# Patient Record
Sex: FEMALE | Race: WHITE | NOT HISPANIC OR LATINO | Employment: OTHER | URBAN - NONMETROPOLITAN AREA
[De-identification: names, ages, dates, MRNs, and addresses within clinical notes are randomized per-mention and may not be internally consistent; named-entity substitution may affect disease eponyms.]

---

## 2017-04-20 ENCOUNTER — OFFICE VISIT (OUTPATIENT)
Dept: FAMILY MEDICINE CLINIC | Facility: CLINIC | Age: 52
End: 2017-04-20

## 2017-04-20 VITALS
WEIGHT: 154 LBS | HEIGHT: 65 IN | BODY MASS INDEX: 25.66 KG/M2 | SYSTOLIC BLOOD PRESSURE: 90 MMHG | OXYGEN SATURATION: 97 % | RESPIRATION RATE: 24 BRPM | TEMPERATURE: 98.6 F | HEART RATE: 73 BPM | DIASTOLIC BLOOD PRESSURE: 62 MMHG

## 2017-04-20 DIAGNOSIS — B02.9 HERPES ZOSTER WITHOUT COMPLICATION: Primary | ICD-10-CM

## 2017-04-20 PROCEDURE — 99213 OFFICE O/P EST LOW 20 MIN: CPT | Performed by: NURSE PRACTITIONER

## 2017-04-20 RX ORDER — FOLIC ACID 1 MG/1
2 TABLET ORAL DAILY
COMMUNITY
End: 2018-03-05

## 2017-04-20 RX ORDER — GABAPENTIN 100 MG/1
CAPSULE ORAL
Qty: 90 CAPSULE | Refills: 0 | Status: SHIPPED | OUTPATIENT
Start: 2017-04-20 | End: 2017-10-23 | Stop reason: SDUPTHER

## 2017-04-20 RX ORDER — VALACYCLOVIR HYDROCHLORIDE 1 G/1
1000 TABLET, FILM COATED ORAL 3 TIMES DAILY
Qty: 21 TABLET | Refills: 0 | Status: SHIPPED | OUTPATIENT
Start: 2017-04-20 | End: 2018-03-05

## 2017-04-20 RX ORDER — ETODOLAC 400 MG/1
500 TABLET, FILM COATED ORAL 2 TIMES DAILY
Status: ON HOLD | COMMUNITY
End: 2018-05-02

## 2017-04-20 NOTE — PATIENT INSTRUCTIONS
Shingles  Shingles, which is also known as herpes zoster, is an infection that causes a painful skin rash and fluid-filled blisters. Shingles is not related to genital herpes, which is a sexually transmitted infection.     Shingles only develops in people who:  · Have had chickenpox.  · Have received the chickenpox vaccine. (This is rare.)  CAUSES  Shingles is caused by varicella-zoster virus (VZV). This is the same virus that causes chickenpox. After exposure to VZV, the virus stays in the body in an inactive (dormant) state. Shingles develops if the virus reactivates. This can happen many years after the initial exposure to VZV. It is not known what causes this virus to reactivate.  RISK FACTORS  People who have had chickenpox or received the chickenpox vaccine are at risk for shingles. Infection is more common in people who:  · Are older than age 50.  · Have a weakened defense (immune) system, such as those with HIV, AIDS, or cancer.  · Are taking medicines that weaken the immune system, such as transplant medicines.  · Are under great stress.  SYMPTOMS  Early symptoms of this condition include itching, tingling, and pain in an area on your skin. Pain may be described as burning, stabbing, or throbbing.  A few days or weeks after symptoms start, a painful red rash appears, usually on one side of the body in a bandlike or beltlike pattern. The rash eventually turns into fluid-filled blisters that break open, scab over, and dry up in about 2-3 weeks.  At any time during the infection, you may also develop:  · A fever.  · Chills.  · A headache.  · An upset stomach.  DIAGNOSIS  This condition is diagnosed with a skin exam. Sometimes, skin or fluid samples are taken from the blisters before a diagnosis is made. These samples are examined under a microscope or sent to a lab for testing.  TREATMENT  There is no specific cure for this condition. Your health care provider will probably prescribe medicines to help you  manage pain, recover more quickly, and avoid long-term problems. Medicines may include:  · Antiviral drugs.  · Anti-inflammatory drugs.  · Pain medicines.  If the area involved is on your face, you may be referred to a specialist, such as an eye doctor (ophthalmologist) or an ear, nose, and throat (ENT) doctor to help you avoid eye problems, chronic pain, or disability.  HOME CARE INSTRUCTIONS  Medicines  · Take medicines only as directed by your health care provider.  · Apply an anti-itch or numbing cream to the affected area as directed by your health care provider.  Blister and Rash Care  · Take a cool bath or apply cool compresses to the area of the rash or blisters as directed by your health care provider. This may help with pain and itching.  · Keep your rash covered with a loose bandage (dressing). Wear loose-fitting clothing to help ease the pain of material rubbing against the rash.  · Keep your rash and blisters clean with mild soap and cool water or as directed by your health care provider.  · Check your rash every day for signs of infection. These include redness, swelling, and pain that lasts or increases.  · Do not pick your blisters.  · Do not scratch your rash.  General Instructions  · Rest as directed by your health care provider.  · Keep all follow-up visits as directed by your health care provider. This is important.  · Until your blisters scab over, your infection can cause chickenpox in people who have never had it or been vaccinated against it. To prevent this from happening, avoid contact with other people, especially:    Babies.    Pregnant women.    Children who have eczema.    Elderly people who have transplants.    People who have chronic illnesses, such as leukemia or AIDS.  SEEK MEDICAL CARE IF:  · Your pain is not relieved with prescribed medicines.  · Your pain does not get better after the rash heals.  · Your rash looks infected. Signs of infection include redness, swelling, and pain  that lasts or increases.  SEEK IMMEDIATE MEDICAL CARE IF:  · The rash is on your face or nose.  · You have facial pain, pain around your eye area, or loss of feeling on one side of your face.  · You have ear pain or you have ringing in your ear.  · You have loss of taste.  · Your condition gets worse.     This information is not intended to replace advice given to you by your health care provider. Make sure you discuss any questions you have with your health care provider.     Document Released: 12/18/2006 Document Revised: 01/08/2016 Document Reviewed: 10/29/2015  [employer] Patient Education ©2016 [employer]

## 2017-04-20 NOTE — PROGRESS NOTES
Subjective     Adela Anthony A 52 y.o. female developed a rash on the right side her her face with tingling and burning and has progressively gotten worse over the last 24 hours that has now broke out in shingles rash on face.  Rates overall 10/10.   Has history of fever blisters.    She has Ra and gets methotrexate and sees specialist in [place], takes etodolac for pain.       Chief Complaint   Patient presents with   • possible shingles     face       Shingles Presentation:  a prodrome of local tingling, burning, pain, and hyperesthesia begins along the involved dermatone 4-5 days before any noticeable skin eruption.  May be mistaken for cardiac, musculoskeletal, gastrointestinal or gyn problems. If ophthalmic branch of the trigeminal nerve is involved, there is significant risk of severe and permanent vision damage. Vesicles on the side or on the tip of the nose (Barone’s sign) are associated with the most serious ocular complications       Current Outpatient Prescriptions:   •  etodolac (LODINE) 400 MG tablet, Take 400 mg by mouth 2 (Two) Times a Day., Disp: , Rfl:   •  folic acid (FOLVITE) 1 MG tablet, Take 2 mg by mouth Daily., Disp: , Rfl:   •  Methotrexate, PF, (RASUVO) 17.5 MG/0.35ML solution auto-injector, Inject 1.4 mL under the skin 1 (One) Time Per Week., Disp: , Rfl:     No Known Allergies    History reviewed. No pertinent past medical history.    Past Surgical History:   Procedure Laterality Date   • FRACTURE SURGERY Bilateral     2010   • WRIST SURGERY Bilateral      Social History     Social History   • Marital status:      Spouse name: N/A   • Number of children: N/A   • Years of education: N/A     Occupational History   • Not on file.     Social History Main Topics   • Smoking status: Current Every Day Smoker     Packs/day: 1.00     Types: Cigarettes   • Smokeless tobacco: Never Used   • Alcohol use Yes   • Drug use: No   • Sexual activity: Not on file     Other Topics Concern   •  "Not on file     Social History Narrative     BOLD indicates positive   General:  weight loss, fever, chills, appetite loss  SKIN: has rash on the right side of face, change in wart/mole, itching, rash, new lesions, nail changes  HEENT:   ear pain, sore throat, sinus pressure, blurry vision, eye pain, dry eyes, tinnitus  Respiratory: cough, difficulty breathing, wheezing, hemoptysis   Cardiovascular:  chest pain, shortness of breath, swelling of extremities, syncope  Gastro: abdominal pain, constipation, nausea, vomiting, diarrhea, hematemesis  Genito: hematuria, dysuria, glycosuria, hesitancy, frequency, incontinence  \"All other systems reviewed and negative, except as listed above.”    OBJECTIVE:  Constitutional:  Appearance-No acute distress, Consistent with stated age. Orientation- Oriented x 3, alert Posture-Not doubled over. Gait-Normal pace, normal arm movement. Posture- Normal Build and Nutrition-Well developed and well nourished.  General- Patient is pleasant and cooperative with the interview and exam.    Integumentary: General-crops of vesicular lesions located on the right side of the cheek following the nerve dermatone.   Palpation- Normal skin moisture/turgor. Skin is warm to touch, no increased warmth. Capillary refill is normal bilateral Upper and lower extremity.     CHEST/LUNG: Inspection- symmetric chest wall no pectus deformity. Normal effort, no distress, no use of accessory muscles. Palpation- nontender sternum, ribline.  No abnormal pulsations. Auscultation- Breath sounds normal throughout all lung fields.  Normal tracheal sounds, Normal bronchial sounds overlying sternum, Bronchovessicular sounds normal between scapulae posteriorly, Normal vessicular breath sounds heard throughout periphery. Lungs are clear today. Adventitious sounds- No wheezes, rales, rhonchi.     CARDIOVASCULAR:  Carotid artery- normal, no bruits or abnormal pulsations. Jugular vein- no pulsations. Palpation/Percussion- " Normal PMI, no palpable thrill  Auscultation- Regular rate and rhythm. No murmur noted in sitting, supine positions. Extremities- no digital clubbing, cyanosis, edema, increased warmth.    Musculoskeletal: Generalized-No generalized swelling or edema of extremities, no digital clubbing or cyanosis, neurovascularly intact all four extremities.    Neuropsych: Oriented- Person, place, time. (AAOx3), Mood/affect- normal and congruent. Able to articulate well. Speech-Normal speech, normal rate, normal tone, normal use of language, volume and coherence.  Thought content- normal with ability   to perform basic computations and apply abstract thought/reason. Associations- intact, no SI/HI, no hallucinations, delusions, obsessions.  Judgment/insight- Appropriate. Memory-Recall intact, remote and recent memory intact. Knowledge- Age appropriate fund of knowledge, concentration and attention span normal.    Lymphatic: Head/Neck- normal size and non tender to palpation. Axillary- Head and neck LN are normal size and non tender to palpation. Femoral and Inguinal- normal size and non tender to palpation.    Assessment   Adela was seen today for possible shingles.    Diagnoses and all orders for this visit:    Herpes zoster without complication  -     valACYclovir (VALTREX) 1000 MG tablet; Take 1 tablet by mouth 3 (Three) Times a Day.  -     gabapentin (NEURONTIN) 100 MG capsule; Day 1:  1 capsule today Day 2:  1 capsule twice daily Day 3:  1 capsule three times daily    Definition:  A painful vesicular rash caused by reactivation of the varicella zoster virus, a double stranded DNA herpes virus persisting latently in dorsal root ganglia     Differentials: Prior to the eruption of the rash, pain localized to one side of the body -whether face, chest or abdomen-is difficult to diagnoses.  Once the vesicular rash appears, considerations include varicella, herpes simplex, contact dermatitis, bacterial skin infections    Prednisone:  60mg/day and tapering the dose over 10-14 days  Cool Tap water (or Domeboro soaks) applied 20 minutes several times a day to promotes maceration of the vesicles, removes serum and crust and also relieves pain      Return in about 1 week (around 4/27/2017).    Charlene Huntley, [place], APRN-BC  04/20/2017

## 2017-05-02 ENCOUNTER — OFFICE VISIT (OUTPATIENT)
Dept: FAMILY MEDICINE CLINIC | Facility: CLINIC | Age: 52
End: 2017-05-02

## 2017-05-02 VITALS
BODY MASS INDEX: 26.46 KG/M2 | DIASTOLIC BLOOD PRESSURE: 64 MMHG | SYSTOLIC BLOOD PRESSURE: 110 MMHG | TEMPERATURE: 98.5 F | OXYGEN SATURATION: 98 % | RESPIRATION RATE: 16 BRPM | HEART RATE: 77 BPM | WEIGHT: 158.8 LBS | HEIGHT: 65 IN

## 2017-05-02 DIAGNOSIS — E66.09 NON MORBID OBESITY DUE TO EXCESS CALORIES: ICD-10-CM

## 2017-05-02 DIAGNOSIS — L02.91 ABSCESS: Primary | ICD-10-CM

## 2017-05-02 DIAGNOSIS — Z72.0 TOBACCO USE: ICD-10-CM

## 2017-05-02 DIAGNOSIS — Z71.6 ENCOUNTER FOR SMOKING CESSATION COUNSELING: ICD-10-CM

## 2017-05-02 DIAGNOSIS — Z12.31 ENCOUNTER FOR SCREENING MAMMOGRAM FOR MALIGNANT NEOPLASM OF BREAST: ICD-10-CM

## 2017-05-02 DIAGNOSIS — M79.601 ARM PAIN, RIGHT: ICD-10-CM

## 2017-05-02 PROCEDURE — 99213 OFFICE O/P EST LOW 20 MIN: CPT | Performed by: NURSE PRACTITIONER

## 2017-05-02 PROCEDURE — 10060 I&D ABSCESS SIMPLE/SINGLE: CPT | Performed by: NURSE PRACTITIONER

## 2017-05-02 RX ORDER — CLINDAMYCIN HYDROCHLORIDE 300 MG/1
300 CAPSULE ORAL 3 TIMES DAILY
Qty: 21 CAPSULE | Refills: 0 | Status: SHIPPED | OUTPATIENT
Start: 2017-05-02 | End: 2017-05-09

## 2017-05-02 RX ORDER — HYDROCODONE BITARTRATE AND ACETAMINOPHEN 7.5; 325 MG/1; MG/1
1 TABLET ORAL EVERY 6 HOURS PRN
Qty: 8 TABLET | Refills: 0 | Status: SHIPPED | OUTPATIENT
Start: 2017-05-02 | End: 2017-08-02

## 2017-05-03 ENCOUNTER — OFFICE VISIT (OUTPATIENT)
Dept: FAMILY MEDICINE CLINIC | Facility: CLINIC | Age: 52
End: 2017-05-03

## 2017-05-03 VITALS
WEIGHT: 157.4 LBS | RESPIRATION RATE: 16 BRPM | OXYGEN SATURATION: 98 % | TEMPERATURE: 98.5 F | BODY MASS INDEX: 26.23 KG/M2 | SYSTOLIC BLOOD PRESSURE: 112 MMHG | HEART RATE: 72 BPM | HEIGHT: 65 IN | DIASTOLIC BLOOD PRESSURE: 70 MMHG

## 2017-05-03 DIAGNOSIS — L08.9 SKIN INFECTION: Primary | ICD-10-CM

## 2017-05-03 PROBLEM — M06.9 RHEUMATOID ARTHRITIS INVOLVING MULTIPLE SITES (HCC): Status: ACTIVE | Noted: 2017-05-03

## 2017-05-03 PROBLEM — B00.2 ORAL HERPES SIMPLEX INFECTION: Status: ACTIVE | Noted: 2017-05-03

## 2017-05-03 PROBLEM — G89.4 CHRONIC PAIN SYNDROME: Status: ACTIVE | Noted: 2017-05-03

## 2017-05-03 PROCEDURE — 96372 THER/PROPH/DIAG INJ SC/IM: CPT | Performed by: NURSE PRACTITIONER

## 2017-05-03 PROCEDURE — 99024 POSTOP FOLLOW-UP VISIT: CPT | Performed by: NURSE PRACTITIONER

## 2017-05-03 RX ORDER — CEFTRIAXONE 1 G/1
1 INJECTION, POWDER, FOR SOLUTION INTRAMUSCULAR; INTRAVENOUS ONCE
Status: COMPLETED | OUTPATIENT
Start: 2017-05-03 | End: 2017-05-03

## 2017-05-03 RX ADMIN — CEFTRIAXONE 1 G: 1 INJECTION, POWDER, FOR SOLUTION INTRAMUSCULAR; INTRAVENOUS at 12:35

## 2017-05-05 ENCOUNTER — OFFICE VISIT (OUTPATIENT)
Dept: FAMILY MEDICINE CLINIC | Facility: CLINIC | Age: 52
End: 2017-05-05

## 2017-05-05 VITALS
OXYGEN SATURATION: 96 % | WEIGHT: 159 LBS | HEART RATE: 67 BPM | BODY MASS INDEX: 26.49 KG/M2 | HEIGHT: 65 IN | DIASTOLIC BLOOD PRESSURE: 68 MMHG | TEMPERATURE: 98.5 F | RESPIRATION RATE: 16 BRPM | SYSTOLIC BLOOD PRESSURE: 108 MMHG

## 2017-05-05 DIAGNOSIS — L02.413 ABSCESS OF FOREARM, RIGHT: Primary | ICD-10-CM

## 2017-05-05 PROCEDURE — 99024 POSTOP FOLLOW-UP VISIT: CPT | Performed by: NURSE PRACTITIONER

## 2017-05-05 PROCEDURE — 96372 THER/PROPH/DIAG INJ SC/IM: CPT | Performed by: NURSE PRACTITIONER

## 2017-05-05 RX ORDER — CEFTRIAXONE 1 G/1
1 INJECTION, POWDER, FOR SOLUTION INTRAMUSCULAR; INTRAVENOUS ONCE
Status: COMPLETED | OUTPATIENT
Start: 2017-05-05 | End: 2017-05-05

## 2017-05-05 RX ORDER — PREDNISONE 10 MG/1
10 TABLET ORAL DAILY
COMMUNITY
End: 2017-08-23

## 2017-05-05 RX ADMIN — CEFTRIAXONE 1 G: 1 INJECTION, POWDER, FOR SOLUTION INTRAMUSCULAR; INTRAVENOUS at 09:28

## 2017-05-08 ENCOUNTER — OFFICE VISIT (OUTPATIENT)
Dept: FAMILY MEDICINE CLINIC | Facility: CLINIC | Age: 52
End: 2017-05-08

## 2017-05-08 VITALS
HEIGHT: 65 IN | HEART RATE: 88 BPM | RESPIRATION RATE: 16 BRPM | WEIGHT: 157 LBS | DIASTOLIC BLOOD PRESSURE: 62 MMHG | SYSTOLIC BLOOD PRESSURE: 114 MMHG | OXYGEN SATURATION: 95 % | TEMPERATURE: 98.5 F | BODY MASS INDEX: 26.16 KG/M2

## 2017-05-08 DIAGNOSIS — L02.413 ABSCESS OF FOREARM, RIGHT: Primary | ICD-10-CM

## 2017-05-08 PROCEDURE — 99024 POSTOP FOLLOW-UP VISIT: CPT | Performed by: NURSE PRACTITIONER

## 2017-08-01 ENCOUNTER — TRANSCRIBE ORDERS (OUTPATIENT)
Dept: ADMINISTRATIVE | Facility: HOSPITAL | Age: 52
End: 2017-08-01

## 2017-08-01 DIAGNOSIS — Z78.0 MENOPAUSE: Primary | ICD-10-CM

## 2017-08-02 ENCOUNTER — OFFICE VISIT (OUTPATIENT)
Dept: FAMILY MEDICINE CLINIC | Facility: CLINIC | Age: 52
End: 2017-08-02

## 2017-08-02 VITALS
TEMPERATURE: 98.2 F | WEIGHT: 163 LBS | DIASTOLIC BLOOD PRESSURE: 72 MMHG | BODY MASS INDEX: 27.16 KG/M2 | SYSTOLIC BLOOD PRESSURE: 114 MMHG | HEART RATE: 91 BPM | HEIGHT: 65 IN | OXYGEN SATURATION: 97 % | RESPIRATION RATE: 12 BRPM

## 2017-08-02 DIAGNOSIS — M54.31 SCIATICA OF RIGHT SIDE: ICD-10-CM

## 2017-08-02 DIAGNOSIS — M62.838 MUSCLE SPASM: Primary | ICD-10-CM

## 2017-08-02 PROCEDURE — 99213 OFFICE O/P EST LOW 20 MIN: CPT | Performed by: NURSE PRACTITIONER

## 2017-08-02 RX ORDER — OMEPRAZOLE 40 MG/1
40 CAPSULE, DELAYED RELEASE ORAL DAILY
COMMUNITY
Start: 2017-05-04 | End: 2018-11-19 | Stop reason: SDUPTHER

## 2017-08-02 RX ORDER — TIZANIDINE 4 MG/1
4 TABLET ORAL 2 TIMES DAILY PRN
Qty: 60 TABLET | Refills: 1 | Status: SHIPPED | OUTPATIENT
Start: 2017-08-02 | End: 2018-03-05 | Stop reason: SDUPTHER

## 2017-08-02 NOTE — PATIENT INSTRUCTIONS
Sciatica  Sciatica is pain, numbness, weakness, or tingling along the path of the sciatic nerve. The sciatic nerve starts in the lower back and runs down the back of each leg. The nerve controls the muscles in the lower leg and in the back of the knee. It also provides feeling (sensation) to the back of the thigh, the lower leg, and the sole of the foot. Sciatica is a symptom of another medical condition that pinches or puts pressure on the sciatic nerve.  Generally, sciatica only affects one side of the body. Sciatica usually goes away on its own or with treatment. In some cases, sciatica may keep coming back (recur).  CAUSES  This condition is caused by pressure on the sciatic nerve, or pinching of the sciatic nerve. This may be the result of:  · A disk in between the bones of the spine (vertebrae) bulging out too far (herniated disk).  · Age-related changes in the spinal disks (degenerative disk disease).  · A pain disorder that affects a muscle in the buttock (piriformis syndrome).  · Extra bone growth (bone spur) near the sciatic nerve.  · An injury or break (fracture) of the pelvis.  · Pregnancy.  · Tumor (rare).  RISK FACTORS  The following factors may make you more likely to develop this condition:  · Playing sports that place pressure or stress on the spine, such as football or weight lifting.  · Having poor strength and flexibility.  · A history of back injury.  · A history of back surgery.  · Sitting for long periods of time.  · Doing activities that involve repetitive bending or lifting.  · Obesity.  SYMPTOMS  Symptoms can vary from mild to very severe, and they may include:  · Any of these problems in the lower back, leg, hip, or buttock:    Mild tingling or dull aches.    Burning sensations.    Sharp pains.  · Numbness in the back of the calf or the sole of the foot.  · Leg weakness.  · Severe back pain that makes movement difficult.  These symptoms may get worse when you cough, sneeze, or laugh, or  when you sit or stand for long periods of time. Being overweight may also make symptoms worse. In some cases, symptoms may recur over time.  DIAGNOSIS  This condition may be diagnosed based on:  · Your symptoms.  · A physical exam. Your health care provider may ask you to do certain movements to check whether those movements trigger your symptoms.  · You may have tests, including:    Blood tests.    X-rays.    MRI.    CT scan.  TREATMENT  In many cases, this condition improves on its own, without any treatment. However, treatment may include:  · Reducing or modifying physical activity during periods of pain.  · Exercising and stretching to strengthen your abdomen and improve the flexibility of your spine.  · Icing and applying heat to the affected area.  · Medicines that help:    To relieve pain and swelling.    To relax your muscles.  · Injections of medicines that help to relieve pain, irritation, and inflammation around the sciatic nerve (steroids).  · Surgery.  HOME CARE INSTRUCTIONS  Medicines  · Take over-the-counter and prescription medicines only as told by your health care provider.  · Do not drive or operate heavy machinery while taking prescription pain medicine.  Managing Pain  · If directed, apply ice to the affected area.    Put ice in a plastic bag.    Place a towel between your skin and the bag.    Leave the ice on for 20 minutes, 2-3 times a day.  · After icing, apply heat to the affected area before you exercise or as often as told by your health care provider. Use the heat source that your health care provider recommends, such as a moist heat pack or a heating pad.    Place a towel between your skin and the heat source.    Leave the heat on for 20-30 minutes.    Remove the heat if your skin turns bright red. This is especially important if you are unable to feel pain, heat, or cold. You may have a greater risk of getting burned.  Activity  · Return to your normal activities as told by your health  care provider. Ask your health care provider what activities are safe for you.    Avoid activities that make your symptoms worse.  · Take brief periods of rest throughout the day. Resting in a lying or standing position is usually better than sitting to rest.    When you rest for longer periods, mix in some mild activity or stretching between periods of rest. This will help to prevent stiffness and pain.    Avoid sitting for long periods of time without moving. Get up and move around at least one time each hour.  · Exercise and stretch regularly, as told by your health care provider.  · Do not lift anything that is heavier than 10 lb (4.5 kg) while you have symptoms of sciatica. When you do not have symptoms, you should still avoid heavy lifting, especially repetitive heavy lifting.  · When you lift objects, always use proper lifting technique, which includes:    Bending your knees.    Keeping the load close to your body.    Avoiding twisting.  General Instructions   · Use good posture.    Avoid leaning forward while sitting.    Avoid hunching over while standing.  · Maintain a healthy weight. Excess weight puts extra stress on your back and makes it difficult to maintain good posture.  · Wear supportive, comfortable shoes. Avoid wearing high heels.  · Avoid sleeping on a mattress that is too soft or too hard. A mattress that is firm enough to support your back when you sleep may help to reduce your pain.  · Keep all follow-up visits as told by your health care provider. This is important.  SEEK MEDICAL CARE IF:  · You have pain that wakes you up when you are sleeping.  · You have pain that gets worse when you lie down.  · Your pain is worse than you have experienced in the past.  · Your pain lasts longer than 4 weeks.  · You experience unexplained weight loss.  SEEK IMMEDIATE MEDICAL CARE IF:  · You lose control of your bowel or bladder (incontinence).  · You have:    Weakness in your lower back, pelvis, buttocks,  or legs that gets worse.    Redness or swelling of your back.    A burning sensation when you urinate.     This information is not intended to replace advice given to you by your health care provider. Make sure you discuss any questions you have with your health care provider.     Document Released: 12/12/2002 Document Revised: 04/10/2017 Document Reviewed: 08/26/2016  [employer] Interactive Patient Education ©2017 [employer]

## 2017-08-02 NOTE — PROGRESS NOTES
Chief Complaint   Patient presents with   • Sciatica     radiates down right leg   • spot on knee     she would like to have evaluated        No Known Allergies    HPI: Adela Davis is a 52 y.o. female presents today with sciatica that is radiating down right leg.  She says it flared up the other day, and she had to drive to [place] yesterday for RA appt and it is really flared up.   She struggles with her RA, and chronic pain syndrome and takes multiple treatments : enbrel, and gabapentin, and methotrexate GERD stable with omeprazole.  She does not come in unless the pain is unbearable.  Rates pain 10+/10. She also has a small spot on her leg R that has been there and doesn't go away    Past Medical History:   Diagnosis Date   • Arthritis      Past Surgical History:   Procedure Laterality Date   • FRACTURE SURGERY Bilateral     2010   • WRIST SURGERY Bilateral      Social History     Social History   • Marital status:      Spouse name: N/A   • Number of children: N/A   • Years of education: N/A     Social History Main Topics   • Smoking status: Current Every Day Smoker     Packs/day: 1.00     Types: Cigarettes   • Smokeless tobacco: Never Used   • Alcohol use No   • Drug use: No   • Sexual activity: Not Asked     Other Topics Concern   • None     Social History Narrative     Family History   Problem Relation Age of Onset   • Arthritis Mother    • COPD Mother    • Atrial fibrillation Mother    • Osteoarthritis Mother    • Heart disease Father    • Hypertension Father    • Arthritis Father    • Arthritis Sister    • Arthritis Brother    • No Known Problems Daughter    • Arthritis Maternal Grandfather        Current Outpatient Prescriptions on File Prior to Visit   Medication Sig Dispense Refill   • etodolac (LODINE) 400 MG tablet Take 500 mg by mouth 2 (Two) Times a Day.     • folic acid (FOLVITE) 1 MG tablet Take 2 mg by mouth Daily.     • Methotrexate, PF, (RASUVO) 17.5 MG/0.35ML solution auto-injector  "Inject 1.4 mL under the skin 1 (One) Time Per Week.     • predniSONE (DELTASONE) 10 MG tablet Take 10 mg by mouth Daily.     • valACYclovir (VALTREX) 1000 MG tablet Take 1 tablet by mouth 3 (Three) Times a Day. 21 tablet 0   • gabapentin (NEURONTIN) 100 MG capsule Day 1:  1 capsule today Day 2:  1 capsule twice daily Day 3:  1 capsule three times daily 90 capsule 0   • [DISCONTINUED] HYDROcodone-acetaminophen (NORCO) 7.5-325 MG per tablet Take 1 tablet by mouth Every 6 (Six) Hours As Needed for Moderate Pain (4-6). 8 tablet 0     No current facility-administered medications on file prior to visit.         REVIEW OF SYMPTOMS: (Positives bolded)  General:  weight loss, fever, chills, night sweats, fatigue, appetite loss  HEENT:  blurry vision, eye pain, eye discharge, dry eyes, decreased vision  Respiratory: shortness of breath, cough, hemoptysis, wheezing, pleurisy,   Cardiovascular:  chest pain, PND, palpitation, edema, orthopnea, syncope, swelling of extremities  Gastro: Nausea, vomiting, diarrhea, hematemesis, abdominal pain, constipation  Genito: hematuria, dysuria, glycosuria, hesitancy, frequency, incontinence  Musckelo: Arthralgia, myalgia, muscle weakness, joint swelling, NSAID use, Joint pain, back pain  Skin: rash, pruritis, sores, nail changes, skin thickening  Neuro:  Migraine, numbness, ataxia, tremor, vertigo, weakness, memory loss,  \"All other systems reviewed and negative, except as listed above.”      OBJECTIVE:  Constitutional:  Appearance-No acute distress, Consistent with stated age. Orientation- Oriented x 3, alert Posture-Not doubled over. Gait-Normal pace, normal arm movement. Posture- Normal Build and Nutrition-Well developed and well nourished.  General- Patient is pleasant and cooperative with the interview and exam. I can see that she is in a lot of pain, she is rubbing her hands and leg, she is standing then sitting, and face is grimacing.     Integumentary: General-No rashes, ulcers or " lesions. No edema.  Palpation- Normal skin moisture/turgor. Skin is warm to touch, no increased warmth. Capillary refill is normal bilateral Upper and lower extremity. Has a small area on the top of her R leg that looks like a cyst or old pimple, it is smaller then a pencil eraser    Head/Neck: Head- normocephalic and atraumatic.  Neck- without visible/palpable lumps or pulsations.  Palpation- No bony tenderness about head/neck along frontal, occiptial, temporal, parietal, mastoid, jawline, zygoma, orbit or any other location.  NO temporal artery tenderness. No TMJ tenderness. Normal cervical ROM.   Neck Supple.  Thyroid-No thyromegaly, no nodules    CHEST/LUNG: Inspection- symmetric chest wall no pectus deformity. Normal effort, no distress, no use of accessory muscles. Palpation- nontender sternum, ribline.  No abnormal pulsations. Auscultation- Breath sounds normal throughout all lung fields.  Normal tracheal sounds, Normal bronchial sounds overlying sternum, Bronchovessicular sounds normal between scapulae posteriorly, Normal vessicular breath sounds heard throughout periphery. Lungs are clear today. Adventitious sounds- No wheezes, rales, rhonchi.     CARDIOVASCULAR:  Carotid artery- normal, no bruits or abnormal pulsations. Jugular vein- no pulsations. Palpation/Percussion- Normal PMI, no palpable thrill  Auscultation- Regular rate and rhythm. No murmur noted in sitting, supine positions. Extremities- no digital clubbing, cyanosis, edema, increased warmth.    Peripheral Vascular: Upper extremity Left- Normal temperature with pink nailbeds and no ulcerations.  Upper extremity Right- Normal temperature with pink nailbeds and no ulcerations.  Lower extremity- Normal temperature with pink nailbeds and no ulcerations. DP pulses 2+ bilaterally.  Pedal hair intact.  Normal capillary refill. Edema- No edema.    Musculoskeletal: Generalized-No generalized swelling or edema of extremities, no digital clubbing or  cyanosis, neurovascularly intact all four extremities.  Upper extremity- Symmetrical posture.  No visible deformity.  Normal sensation along medial and lateral upper extremity proximally and distally.  NO tenderness overlying shoulder, lateral/medial epicondyle.  3/5 and strength 5/5 bilateral UE.  Elbow palpated, no tenderness overlying olecranon.  Normal supination, pronation to active/passive ROM and to resisted rotation. Bicep insertion/tricep insertion appear normal without obvious pathology. Rotator cuff evaluated and intact.  Normal wrist ROM bilaterally. Normal hand movement, intrinsic muscles of hands normal. No tenderness to palpation of hands/wrists/elbows.  Lower extremity- Not tender to palpation, no pain, no swelling, edema or erythema of surrounding tissue, normal strength and tone.  Normal appearing hip ROM bilaterally without pain.  Knee ROM normal at 0-120 degrees. No tenderness overlying trochanters, no tenderness about patella, quad tendon, patellar tendon.  No tenderness at tibial tuberosity. Ankle normal ROM not tender to palpation along medial/lateral malleolus. Normal movement of toes, no tenderness bilateral feet/toes.  Normal foot type. Calves symmetrical.  Stretching demonstrated today.    Lumbar Spine/Ribs- Has some point tenderness on palpation, has decreased rom with any movement.  No deformities, masses or  known fractures, normal strength, Normal stability. Straight leg raises with difficulty, leans back as see raises legs, inversion/eversion normal      Neurological: General- Moves all 4 extremities symmetrically. Symmetrical face and body posture. Cranial nerves- individually evaluated II-XII and intact. PERRLA, Normal EOMI, visual/special senses appear intact, Face is symmetrical and normal sensation/movement, normal tongue, normal strength/posture of neck musculature. Balance- Romberg intact.  Reflexes- ntact with DTR 2+ patellar, Achilles, bicep, brachial,tricep. Ankle  clonus normal with 2 beats.  Strength- 5/5 bilateral UE and LE. Soft touch- intact bilateral UE and LE.  Temperature sensation- intact bilateral UE and LE. Cerebellar testing-Rapid alternating movements intact.  Heel shin intact. Able to walk normal gait, normal heel toe walking. Neck- supple.      Neuropsych: Oriented- Person, place, time. (AAOx3), Mood/affect- normal and congruent. Able to articulate well. Speech-Normal speech, normal rate, normal tone, normal use of language, volume and coherence.  Thought content- normal with ability to perform basic computations and apply abstract thought/reason. Associations- intact, no SI/HI, no hallucinations, delusions, obsessions.  Judgment/insight- Appropriate. Memory-Recall intact, remote and recent memory intact. Knowledge- Age appropriate fund of knowledge, concentration and attention span normal.    Lymphatic: Head/Neck- normal size and non tender to palpation. Axillary- Head and neck LN are normal size and non tender to palpation. Femoral and Inguinal- normal size and non tender to palpation.      Assessment/Plan:  Adela was seen today for sciatica and spot on knee.    Diagnoses and all orders for this visit:    R sided sciatica  Muscle spasm  -     tiZANidine (ZANAFLEX) 4 MG tablet; Take 1 tablet by mouth 2 (Two) Times a Day As Needed for Muscle Spasms.    Return in about 1 week (around 8/9/2017).    Charlene Huntley, [place], APRN-BC  08/02/2017

## 2017-08-03 ENCOUNTER — TELEPHONE (OUTPATIENT)
Dept: FAMILY MEDICINE CLINIC | Facility: CLINIC | Age: 52
End: 2017-08-03

## 2017-08-03 NOTE — TELEPHONE ENCOUNTER
Patient said she was suppose to call us back if her pain wasn't any better, she stated it's a little worse today. She's requesting Charlene call in something for pain. Please review.

## 2017-08-04 DIAGNOSIS — M54.10 RADICULAR LOW BACK PAIN: Primary | ICD-10-CM

## 2017-08-04 RX ORDER — HYDROCODONE BITARTRATE AND ACETAMINOPHEN 7.5; 325 MG/1; MG/1
1 TABLET ORAL EVERY 6 HOURS PRN
Qty: 9 TABLET | Refills: 0 | Status: SHIPPED | OUTPATIENT
Start: 2017-08-04 | End: 2017-08-23

## 2017-08-04 NOTE — PROGRESS NOTES
Pt was seen on 8/2/17 for back pain, radicular pain, and RA flare up.  I told her if she was still having problems by Friday then let me know and I will send in norco for a couple days.  She says the pain is worsening and she is having hard time doing adls.  Explained to her that we can only treat acute pain (back) for 3 days.  She does have chronic pain from RA.    Follow up if no improvement.     Charlene Huntley, [place], APRN-BC  08/04/2017

## 2017-08-07 ENCOUNTER — HOSPITAL ENCOUNTER (OUTPATIENT)
Dept: BONE DENSITY | Facility: HOSPITAL | Age: 52
Discharge: HOME | End: 2017-08-07
Admitting: INTERNAL MEDICINE

## 2017-08-07 DIAGNOSIS — Z78.0 MENOPAUSE: ICD-10-CM

## 2017-08-07 PROCEDURE — 77080 DXA BONE DENSITY AXIAL: CPT

## 2017-08-23 ENCOUNTER — OFFICE VISIT (OUTPATIENT)
Dept: FAMILY MEDICINE CLINIC | Facility: CLINIC | Age: 52
End: 2017-08-23

## 2017-08-23 VITALS
HEART RATE: 70 BPM | TEMPERATURE: 98.4 F | SYSTOLIC BLOOD PRESSURE: 112 MMHG | HEIGHT: 65 IN | OXYGEN SATURATION: 98 % | RESPIRATION RATE: 16 BRPM | BODY MASS INDEX: 27.19 KG/M2 | WEIGHT: 163.2 LBS | DIASTOLIC BLOOD PRESSURE: 74 MMHG

## 2017-08-23 DIAGNOSIS — J01.00 ACUTE NON-RECURRENT MAXILLARY SINUSITIS: ICD-10-CM

## 2017-08-23 DIAGNOSIS — J02.9 SORE THROAT: Primary | ICD-10-CM

## 2017-08-23 LAB
EXPIRATION DATE: NORMAL
INTERNAL CONTROL: NORMAL
Lab: NORMAL
S PYO AG THROAT QL: NEGATIVE

## 2017-08-23 PROCEDURE — 87880 STREP A ASSAY W/OPTIC: CPT | Performed by: NURSE PRACTITIONER

## 2017-08-23 PROCEDURE — 99214 OFFICE O/P EST MOD 30 MIN: CPT | Performed by: NURSE PRACTITIONER

## 2017-08-23 RX ORDER — AZITHROMYCIN 250 MG/1
TABLET, FILM COATED ORAL
Qty: 6 TABLET | Refills: 0 | Status: SHIPPED | OUTPATIENT
Start: 2017-08-23 | End: 2017-10-23

## 2017-08-23 RX ORDER — METHOTREXATE 25 MG/.5ML
22.5 INJECTION, SOLUTION SUBCUTANEOUS WEEKLY
COMMUNITY
Start: 2017-08-18 | End: 2018-03-05

## 2017-08-23 NOTE — PROGRESS NOTES
Chief Complaint   Patient presents with   • URI     sore throat, headache        No Known Allergies    HPI: Adela Davis is a 52 y.o. female presents who today with complaints of sinus pain and pressure, sore throat, headache, ear pain about 3 weeks ago.  She used otc meds and it finally seemed like it went away for a few days and then it came back worse than the first time.  Feels horrible, says that this morning symptoms worsened and she became nauseated.  Has not vomited but feels like it.  Rates over all 8/10.  Has RA some what controlled with enbrel, etodolac, gabapentin, methotrexate, tizanidine, GERD stable with omeprazole and fever blisters stable with valacyclovir.   Would like a strep tests  Because of her RA says she needs to know if it is strep      Past Medical History:   Diagnosis Date   • Arthritis      Past Surgical History:   Procedure Laterality Date   • FRACTURE SURGERY Bilateral     2010   • WRIST SURGERY Bilateral      Social History     Social History   • Marital status:      Spouse name: N/A   • Number of children: N/A   • Years of education: N/A     Social History Main Topics   • Smoking status: Current Every Day Smoker     Packs/day: 1.00     Years: 20.00     Types: Cigarettes   • Smokeless tobacco: Never Used   • Alcohol use No   • Drug use: No   • Sexual activity: Not Asked     Other Topics Concern   • None     Social History Narrative     Family History   Problem Relation Age of Onset   • Arthritis Mother    • COPD Mother    • Atrial fibrillation Mother    • Osteoarthritis Mother    • Heart disease Father    • Hypertension Father    • Arthritis Father    • Arthritis Sister    • Arthritis Brother    • No Known Problems Daughter    • Arthritis Maternal Grandfather        Current Outpatient Prescriptions on File Prior to Visit   Medication Sig Dispense Refill   • Etanercept (ENBREL SC) Inject 50 mg under the skin 1 (One) Time Per Week.     • Etanercept 50 MG/ML solution  "auto-injector Inject 50 mg under the skin 1 (One) Time Per Week.     • etodolac (LODINE) 400 MG tablet Take 500 mg by mouth 2 (Two) Times a Day.     • folic acid (FOLVITE) 1 MG tablet Take 2 mg by mouth Daily.     • gabapentin (NEURONTIN) 100 MG capsule Day 1:  1 capsule today Day 2:  1 capsule twice daily Day 3:  1 capsule three times daily 90 capsule 0   • Methotrexate, PF, (RASUVO) 17.5 MG/0.35ML solution auto-injector Inject 1.4 mL under the skin 1 (One) Time Per Week.     • omeprazole (priLOSEC) 40 MG capsule Take 40 mg by mouth Daily.     • tiZANidine (ZANAFLEX) 4 MG tablet Take 1 tablet by mouth 2 (Two) Times a Day As Needed for Muscle Spasms. 60 tablet 1   • valACYclovir (VALTREX) 1000 MG tablet Take 1 tablet by mouth 3 (Three) Times a Day. 21 tablet 0   • [DISCONTINUED] HYDROcodone-acetaminophen (NORCO) 7.5-325 MG per tablet Take 1 tablet by mouth Every 6 (Six) Hours As Needed for Moderate Pain (4-6). 9 tablet 0   • [DISCONTINUED] predniSONE (DELTASONE) 10 MG tablet Take 10 mg by mouth Daily.       No current facility-administered medications on file prior to visit.       BOLD indicates positive   General:  weight loss, fever, chills, appetite loss  SKIN: change in wart/mole, itching, rash, new lesions, nail changes  HEENT:   ear pain, sore throat, sinus pressure, blurry vision, eye pain, dry eyes, tinnitus  Respiratory: cough, difficulty breathing, wheezing, hemoptysis   Cardiovascular:  chest pain, shortness of breath, swelling of extremities, syncope  Gastro: abdominal pain, constipation, nausea, vomiting, diarrhea, hematemesis  Genito: hematuria, dysuria, glycosuria, hesitancy, frequency, incontinence  Musckelo: joint pain, muscle cramps, arthralgia’s, muscle weakness, joint swelling, NSAID use  \"All other systems reviewed and negative, except as listed above.”      OBJECTIVE:  Constitutional:  Alert, oriented x 3, well developed, well nourished. Consistent with stated age. Not in acute distress.  Has " normal posture. Gait and station normal. .  Behavior appropriate. Patient is pleasant and cooperative with the interview and exam.  Looks like she feels bad    Skin: No rashes, no visible scars or suspicious moles noted.  Skin is warm to touch. Normal appropriate skin turgor.  Capillary refill is normal bilateral Upper and lower extremity.     Head/Neck: Head is normocephalic and atraumatic.  Neck without visible/palpable lumps.  No thyromegaly.    Eye: Bilaterally EOMI.  PERRLA.  Sclera/conjunctiva is normal, no discharge. Cornea is normal and clear. Lens is normal.  Eyeball normal. Upper eyelid normal.  Lower eyelid normal.   Abnormal: allergic shiners, leo-orbital puffiness    OROPHARYNX: Mucosa pink and moist, posterior pharynx erythematous,  Dentition average for age. No obvious dental carries. No lesions. Tongue normal.    EARS: Bilateral auditory canal normal, without redness or cerumen impaction. Bilateral Tympanic membrane Normal, pearly gray with good cone of light and landmarks.  Hearing grossly intact to normal conversation.     NOSE: Purulent drainage, mucosa is erythematous, edematous and congested, nares patent    SINUS:  Frontal and maxillary sinus tenderness on palpation.       CHEST/LUNG: No use of accessory muscles, chest non-tender on palpation.  Breath sounds normal throughout all lung fields.  No wheezes, rales, rhonchi.    CARDIOVASCULAR:  Inspection: Carotid artery bilateral normal, no bruits, pulse regular.  Palpation/Percussion Bilateral normal pulsations.  Auscultation: Regular rate and rhythm. No murmur noted in sitting, supine, standing or squatting positions.    LYMPH: Cervical Nodes-normal, size; non-tender to palpation. Axillary Nodes- normal size; non-tender to palpation.     Assessment:  Adela was seen today for uri.    Diagnoses and all orders for this visit:    Sore throat  -     POCT rapid strep A  -     azithromycin (ZITHROMAX) 250 MG tablet; Take 2 tablets the first day, then 1  tablet daily for 4 days.    Acute non-recurrent maxillary sinusitis  -     azithromycin (ZITHROMAX) 250 MG tablet; Take 2 tablets the first day, then 1 tablet daily for 4 days.       POCT rapid strep A   Order: [id number]   Status:  Final result   Visible to patient:  No (Not Released) Dx:  Sore throat      Ref Range & Units 3:10 PM     Rapid Strep A Screen Negative, VALID, INVALID, Not Performed Negative   Internal Control Passed Passed   Lot Number  [id number]   Expiration Date  10/31/2018   Resulting Agency  [facility]      Specimen Collected: 08/23/17  3:10 PM Last Resulted: 08/23/17  3:10 PM              Declines culture      Definition:  Acute, subacute, or chronic inflammation of the mucous membranes that line the paranasal                       sinuses and concomitant inflammation of nasal mucosa  • Adjunctive Therapy  a. Intranasal saline irrigation with either physiologic or hypertonic saline is recommended  b. Intranasal corticosteroids in addition to antibiotics  c. Netti pot  d. Good handwashing  e. If smoke-recommend smoking cessation  f.  Humidify the air; steam inhalation and warm compresses often help relieve pressure  g. Increase fluid intake  h. Sleep with bed elevated  i. Avoid allergens and excessively dry heat  j. Avoid swimming/diving and air travel during acute period  k. Avoid use of antihistamines unless there is an allergic basis   l. Teach proper application of nasal sprays           Return in about 1 week (around 8/30/2017).    Charlene Huntley, DNP, APRN-BC  8/23/2017

## 2017-10-23 ENCOUNTER — OFFICE VISIT (OUTPATIENT)
Dept: FAMILY MEDICINE CLINIC | Facility: CLINIC | Age: 52
End: 2017-10-23

## 2017-10-23 VITALS
TEMPERATURE: 98.2 F | OXYGEN SATURATION: 94 % | RESPIRATION RATE: 18 BRPM | BODY MASS INDEX: 27.89 KG/M2 | HEART RATE: 73 BPM | WEIGHT: 167.4 LBS | DIASTOLIC BLOOD PRESSURE: 74 MMHG | SYSTOLIC BLOOD PRESSURE: 118 MMHG | HEIGHT: 65 IN

## 2017-10-23 DIAGNOSIS — D36.7 CYST, DERMOID, ARM, RIGHT: Primary | ICD-10-CM

## 2017-10-23 DIAGNOSIS — Z51.81 ENCOUNTER FOR THERAPEUTIC DRUG MONITORING: ICD-10-CM

## 2017-10-23 DIAGNOSIS — B02.9 HERPES ZOSTER WITHOUT COMPLICATION: ICD-10-CM

## 2017-10-23 PROCEDURE — 99214 OFFICE O/P EST MOD 30 MIN: CPT | Performed by: NURSE PRACTITIONER

## 2017-10-23 RX ORDER — GABAPENTIN 100 MG/1
100 CAPSULE ORAL 3 TIMES DAILY
Qty: 90 CAPSULE | Refills: 3 | Status: SHIPPED | OUTPATIENT
Start: 2017-10-23 | End: 2018-04-27 | Stop reason: SDUPTHER

## 2017-10-23 NOTE — PROGRESS NOTES
Chief Complaint   Patient presents with   • Consult     Patient has knot coming back up on right arm. She said the spot is painful and the pain radiates to her thumb.       No Known Allergies    HPI:  Adela Davis is a 52 y.o. female presents today with reoccurrence of a cyst on her right forearm and shingles. She was seen in May for an abscess on her forearm that I drained and it wouldn't go away so I sent pt to Dr. Zayas who reopened it.  It resolved but then it started coming back a couple weeks ago and it continues to grow and is painful. She also has shingles on her cheek next to right nare and complains of severe pain.  She has valtrex that I had prescribed last time she had it and started that but the pain is unbearable.  Rates overall 8/10.    She has multiple chronic problems including RA and chronic pain and she refuses to take any narcotics because she does not want to get addicted.  She has tried all kinds of treatment for the RA and now is on enbrel and methotrexate.   Her pain is so severe that she has difficulty making it thru the day, and any thing she has to do with her hands is painful.  She struggles with the simple things in life like opening a jar, lifting heavy pans, even just doing daily activities of living. We had discussed in the past disability she didn't want to do that but now she has no other options because she can't work a steady job due to the pain.  She struggles every day.  I had given her gabapentin in the past which worked but no that its a narcotic she quit taking it because she didn't want to be on anything addicting.  I explained that although it went Narcotic in June, it was more so because drug cartel where using gabapentin to mix with the drugs to make a more intense high.   We discussed gabapentin in length and with this pain with the shingles she agrees to try it again.  She has GERD stable with omeprazole.  She does not come in unless the pain is unbearable.  She has  RA stable with methotrexate, enbrel, and etodolac.  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      Past Medical History:   Diagnosis Date   • Arthritis      Past Surgical History:   Procedure Laterality Date   • FRACTURE SURGERY Bilateral     2010   • WRIST SURGERY Bilateral      Social History     Social History   • Marital status:      Spouse name: N/A   • Number of children: N/A   • Years of education: N/A     Social History Main Topics   • Smoking status: Current Every Day Smoker     Packs/day: 1.00     Years: 20.00     Types: Cigarettes   • Smokeless tobacco: Never Used   • Alcohol use No   • Drug use: No   • Sexual activity: Not Asked     Other Topics Concern   • None     Social History Narrative     Family History   Problem Relation Age of Onset   • Arthritis Mother    • COPD Mother    • Atrial fibrillation Mother    • Osteoarthritis Mother    • Heart disease Father    • Hypertension Father    • Arthritis Father    • Arthritis Sister    • Arthritis Brother    • No Known Problems Daughter    • Arthritis Maternal Grandfather        Current Outpatient Prescriptions on File Prior to Visit   Medication Sig Dispense Refill   • Etanercept (ENBREL SC) Inject 50 mg under the skin 1 (One) Time Per Week.     • Etanercept 50 MG/ML solution auto-injector Inject 50 mg under the skin 1 (One) Time Per Week.     • etodolac (LODINE) 400 MG tablet Take 500 mg by mouth 2 (Two) Times a Day.     • folic acid (FOLVITE) 1 MG tablet Take 2 mg by mouth Daily.     • gabapentin (NEURONTIN) 100 MG capsule Day 1:  1 capsule today Day 2:  1 capsule twice daily Day 3:  1 capsule three times daily 90 capsule 0   • omeprazole (priLOSEC) 40 MG capsule Take 40 mg by mouth Daily.     • RASUVO 25 MG/0.5ML solution auto-injector Inject 22.5 mg under the skin 1 (One) Time Per Week.     • tiZANidine (ZANAFLEX) 4 MG tablet Take 1 tablet by mouth 2 (Two) Times a Day As Needed for Muscle Spasms. 60 tablet 1   • valACYclovir (VALTREX) 1000 MG  "tablet Take 1 tablet by mouth 3 (Three) Times a Day. 21 tablet 0   • Methotrexate, PF, (RASUVO) 17.5 MG/0.35ML solution auto-injector Inject 1.4 mL under the skin 1 (One) Time Per Week.     • [DISCONTINUED] azithromycin (ZITHROMAX) 250 MG tablet Take 2 tablets the first day, then 1 tablet daily for 4 days. 6 tablet 0     No current facility-administered medications on file prior to visit.         REVIEW OF SYMPTOMS: (Positives bolded)  General:  weight loss, fever, chills, night sweats, fatigue, appetite loss  HEENT:  blurry vision, eye pain, eye discharge, dry eyes, decreased vision, sore throat tinnitus, bloody nose, hearin gloss, sinus pain/pressure, ear pain/pressure.   Respiratory: shortness of breath, cough, hemoptysis, wheezing, pleurisy,   Cardiovascular:  chest pain, PND, palpitation, edema, orthopnea, syncope, swelling of extremities  Gastro: Nausea, vomiting, diarrhea, hematemesis, abdominal pain, constipation  Genito: hematuria, dysuria, glycosuria, hesitancy, frequency, incontinence  Musckelo: Arthralgia, myalgia, muscle weakness, joint swelling, NSAID use, chronic pain  Skin: rash, pruritis, sores, nail changes, skin thickening, change in wart/mole, cyst forearm  Neuro:  Migraine, numbness, ataxia, tremor, vertigo, weakness, memory loss,  \"All other systems reviewed and negative, except as listed above.”      OBJECTIVE:  Constitutional:  Appearance-No acute distress, Consistent with stated age. Orientation- Oriented x 3, alert Posture-Not doubled over. Gait-Normal pace, normal arm movement. Posture- Normal Build and Nutrition-Well developed and well nourished.  General- Patient is pleasant and cooperative with the interview and exam.    Integumentary: General-No ulcers, No edema.  Has several crops of vesicular lesion on the right cheek from the right nare across cheek that resembles shingles.   Has < dime sized cyst on the right forearm parallel with incision from arm surgery. Tender to touch.  " Firm    Head/Neck: Head- normocephalic and atraumatic.  Neck- without visible/palpable lumps or pulsations.  Palpation- No bony tenderness about head/neck along frontal, occiptial, temporal, parietal, mastoid, jawline, zygoma, orbit or any other location.  NO temporal artery tenderness. No TMJ tenderness. Normal cervical ROM.   Neck Supple.  Thyroid-No thyromegaly, no nodules    CHEST/LUNG: Inspection- symmetric chest wall no pectus deformity. Normal effort, no distress, no use of accessory muscles. Palpation- nontender sternum, ribline.  No abnormal pulsations. Auscultation- Breath sounds normal throughout all lung fields.  Normal tracheal sounds, Normal bronchial sounds overlying sternum, Bronchovessicular sounds normal between scapulae posteriorly, Normal vessicular breath sounds heard throughout periphery. Lungs are clear today. Adventitious sounds- No wheezes, rales, rhonchi.     CARDIOVASCULAR:  Carotid artery- normal, no bruits or abnormal pulsations. Jugular vein- no pulsations. Palpation/Percussion- Normal PMI, no palpable thrill  Auscultation- Regular rate and rhythm. No murmur noted in sitting, supine positions. Extremities- no digital clubbing, cyanosis, edema, increased warmth.    ABDOMEN: Inspection- normal and no visible pulsations. Normal contour. Auscultation- Bowel sounds normal, no abdominal bruits. Palpation/Percussion- soft, non-tender, no rebound tenderness, no rigidity (guarding), no jar tenderness, no masses.  Liver-no hepatomegaly, Spleen - no splenomegaly, Hernias- none. Rectal not examined.     Musculoskeletal: Generalized-No generalized swelling or edema of extremities, no digital clubbing or cyanosis, neurovascularly intact all four extremities.  Upper extremity- Symmetrical posture.  No visible deformity.  Normal sensation along medial and lateral upper extremity proximally and distally.  NO tenderness overlying shoulder, lateral/medial epicondyle.  5/5 and strength 5/5 bilateral  UE.  Elbow palpated, no tenderness overlying olecranon.  Normal supination, pronation to active/passive ROM and to resisted rotation. Bicep insertion/tricep insertion appear normal without obvious pathology. Rotator cuff evaluated and intact.  Normal wrist ROM bilaterally. Normal hand movement, intrinsic muscles of hands normal. No tenderness to palpation of hands/wrists/elbows.  Lower extremity- Not tender to palpation, no pain, no swelling, edema or erythema of surrounding tissue, normal strength and tone.  Normal appearing hip ROM bilaterally without pain.  Knee ROM normal at 0-120 degrees. No tenderness overlying trochanters, no tenderness about patella, quad tendon, patellar tendon.  No tenderness at tibial tuberosity. Ankle normal ROM not tender to palpation along medial/lateral malleolus. Normal movement of toes, no tenderness bilateral feet/toes.  Normal foot type. Calves symmetrical.  Stretching demonstrated today.  Spine/Ribs- No deformities, masses or tenderness, no known fractures, normal strength, Normal ROM. Normal stability No tenderness along C/T/L spine.  Normal appearing ROM about spine.        Neuropsych: Oriented- Person, place, time. (AAOx3), Mood/affect- normal and congruent. Able to articulate well. Speech-Normal speech, normal rate, normal tone, normal use of language, volume and coherence.  Thought content- normal with ability to perform basic computations and apply abstract thought/reason. Associations- intact, no SI/HI, no hallucinations, delusions, obsessions.  Judgment/insight- Appropriate. Memory-Recall intact, remote and recent memory intact. Knowledge- Age appropriate fund of knowledge, concentration and attention span normal.    Lymphatic: Head/Neck- normal size and non tender to palpation. Axillary- Head and neck LN are normal size and non tender to palpation. Femoral and Inguinal- normal size and non tender to palpation.      Assessment/Plan:  Adela was seen today for  consult.    Diagnoses and all orders for this visit:    Cyst, dermoid, arm, right  -     Ambulatory Referral to General Surgery    Herpes zoster without complication  -     gabapentin (NEURONTIN) 100 MG capsule; Take 1 capsule by mouth 3 (Three) Times a Day. Day 1:  1 capsule today Day 2:  1 capsule twice daily Day 3:  1 capsule three times daily        -     ALMA query complete. #   Treatment plan to include limited course of prescribed controlled substance. Risks including addiction, benefits, and alternatives presented to patient.           -     The patient has read and signed the [facility] Controlled Substance contract.  I will continue to see patient for regular follow up appointments.  They are well controlled on their medication.                   ALMA is updated every 3 months. The patient is aware of the potential for addiction and dependence.        Return in about 1 week (around 10/30/2017).     Charlene Huntley, DNP, APRN-BC  10/23/2017        Failed to add [place] # earlier:  [phone]

## 2017-10-23 NOTE — PATIENT INSTRUCTIONS
Shingles  Shingles, which is also known as herpes zoster, is an infection that causes a painful skin rash and fluid-filled blisters. Shingles is not related to genital herpes, which is a sexually transmitted infection.   Shingles only develops in people who:  · Have had chickenpox.  · Have received the chickenpox vaccine. (This is rare.)  CAUSES  Shingles is caused by varicella-zoster virus (VZV). This is the same virus that causes chickenpox. After exposure to VZV, the virus stays in the body in an inactive (dormant) state. Shingles develops if the virus reactivates. This can happen many years after the initial exposure to VZV. It is not known what causes this virus to reactivate.  RISK FACTORS  People who have had chickenpox or received the chickenpox vaccine are at risk for shingles. Infection is more common in people who:  · Are older than age 50.  · Have a weakened defense (immune) system, such as those with HIV, AIDS, or cancer.  · Are taking medicines that weaken the immune system, such as transplant medicines.  · Are under great stress.  SYMPTOMS  Early symptoms of this condition include itching, tingling, and pain in an area on your skin. Pain may be described as burning, stabbing, or throbbing.  A few days or weeks after symptoms start, a painful red rash appears, usually on one side of the body in a bandlike or beltlike pattern. The rash eventually turns into fluid-filled blisters that break open, scab over, and dry up in about 2-3 weeks.  At any time during the infection, you may also develop:  · A fever.  · Chills.  · A headache.  · An upset stomach.  DIAGNOSIS  This condition is diagnosed with a skin exam. Sometimes, skin or fluid samples are taken from the blisters before a diagnosis is made. These samples are examined under a microscope or sent to a lab for testing.  TREATMENT  There is no specific cure for this condition. Your health care provider will probably prescribe medicines to help you manage  pain, recover more quickly, and avoid long-term problems. Medicines may include:  · Antiviral drugs.  · Anti-inflammatory drugs.  · Pain medicines.  If the area involved is on your face, you may be referred to a specialist, such as an eye doctor (ophthalmologist) or an ear, nose, and throat (ENT) doctor to help you avoid eye problems, chronic pain, or disability.  HOME CARE INSTRUCTIONS  Medicines  · Take medicines only as directed by your health care provider.  · Apply an anti-itch or numbing cream to the affected area as directed by your health care provider.  Blister and Rash Care  · Take a cool bath or apply cool compresses to the area of the rash or blisters as directed by your health care provider. This may help with pain and itching.  · Keep your rash covered with a loose bandage (dressing). Wear loose-fitting clothing to help ease the pain of material rubbing against the rash.  · Keep your rash and blisters clean with mild soap and cool water or as directed by your health care provider.  · Check your rash every day for signs of infection. These include redness, swelling, and pain that lasts or increases.  · Do not pick your blisters.  · Do not scratch your rash.  General Instructions  · Rest as directed by your health care provider.  · Keep all follow-up visits as directed by your health care provider. This is important.  · Until your blisters scab over, your infection can cause chickenpox in people who have never had it or been vaccinated against it. To prevent this from happening, avoid contact with other people, especially:    Babies.    Pregnant women.    Children who have eczema.    Elderly people who have transplants.    People who have chronic illnesses, such as leukemia or AIDS.  SEEK MEDICAL CARE IF:  · Your pain is not relieved with prescribed medicines.  · Your pain does not get better after the rash heals.  · Your rash looks infected. Signs of infection include redness, swelling, and pain that  lasts or increases.  SEEK IMMEDIATE MEDICAL CARE IF:  · The rash is on your face or nose.  · You have facial pain, pain around your eye area, or loss of feeling on one side of your face.  · You have ear pain or you have ringing in your ear.  · You have loss of taste.  · Your condition gets worse.     This information is not intended to replace advice given to you by your health care provider. Make sure you discuss any questions you have with your health care provider.     Document Released: 12/18/2006 Document Revised: 04/10/2017 Document Reviewed: 10/29/2015  [employer] Patient Education ©2017 [employer]

## 2017-11-01 LAB — DRUGS UR: NORMAL

## 2017-11-21 PROCEDURE — 88304 TISSUE EXAM BY PATHOLOGIST: CPT | Performed by: ORTHOPAEDIC SURGERY

## 2017-11-22 ENCOUNTER — LAB REQUISITION (OUTPATIENT)
Dept: LAB | Facility: HOSPITAL | Age: 52
End: 2017-11-22

## 2017-11-22 ENCOUNTER — TELEPHONE (OUTPATIENT)
Dept: FAMILY MEDICINE CLINIC | Facility: CLINIC | Age: 52
End: 2017-11-22

## 2017-11-22 DIAGNOSIS — Z00.00 ROUTINE GENERAL MEDICAL EXAMINATION AT A HEALTH CARE FACILITY: ICD-10-CM

## 2017-11-22 NOTE — TELEPHONE ENCOUNTER
PATIENT CALLED STATES THAT SHE HAD SURGERY YESTERDAY BY DR PAT SHE WAS GIVEN NORCO FOR PAIN AND WANTED TO CALL AND REPORT THAT SHE WAS GIVEN THE RX.

## 2017-11-27 LAB
CYTO UR: NORMAL
LAB AP CASE REPORT: NORMAL
LAB AP CLINICAL INFORMATION: NORMAL
Lab: NORMAL
PATH REPORT.FINAL DX SPEC: NORMAL
PATH REPORT.GROSS SPEC: NORMAL

## 2018-03-05 ENCOUNTER — OFFICE VISIT (OUTPATIENT)
Dept: FAMILY MEDICINE CLINIC | Facility: CLINIC | Age: 53
End: 2018-03-05

## 2018-03-05 VITALS
WEIGHT: 177 LBS | DIASTOLIC BLOOD PRESSURE: 70 MMHG | TEMPERATURE: 98.7 F | HEART RATE: 78 BPM | BODY MASS INDEX: 29.49 KG/M2 | RESPIRATION RATE: 18 BRPM | OXYGEN SATURATION: 97 % | SYSTOLIC BLOOD PRESSURE: 108 MMHG | HEIGHT: 65 IN

## 2018-03-05 DIAGNOSIS — Z51.81 ENCOUNTER FOR THERAPEUTIC DRUG MONITORING: ICD-10-CM

## 2018-03-05 DIAGNOSIS — M05.79 RHEUMATOID ARTHRITIS INVOLVING MULTIPLE SITES WITH POSITIVE RHEUMATOID FACTOR (HCC): Primary | ICD-10-CM

## 2018-03-05 DIAGNOSIS — M79.643 CHRONIC HAND PAIN, UNSPECIFIED LATERALITY: ICD-10-CM

## 2018-03-05 DIAGNOSIS — G89.29 CHRONIC HIP PAIN, UNSPECIFIED LATERALITY: ICD-10-CM

## 2018-03-05 DIAGNOSIS — M25.559 CHRONIC HIP PAIN, UNSPECIFIED LATERALITY: ICD-10-CM

## 2018-03-05 DIAGNOSIS — M62.838 MUSCLE SPASM: ICD-10-CM

## 2018-03-05 DIAGNOSIS — G89.29 CHRONIC HAND PAIN, UNSPECIFIED LATERALITY: ICD-10-CM

## 2018-03-05 PROCEDURE — 99214 OFFICE O/P EST MOD 30 MIN: CPT | Performed by: NURSE PRACTITIONER

## 2018-03-05 RX ORDER — TIZANIDINE 4 MG/1
4 TABLET ORAL 2 TIMES DAILY PRN
Qty: 60 TABLET | Refills: 1 | Status: SHIPPED | OUTPATIENT
Start: 2018-03-05 | End: 2018-06-22 | Stop reason: SDUPTHER

## 2018-03-05 RX ORDER — MELATONIN
1000 DAILY
Status: ON HOLD | COMMUNITY
End: 2018-05-02

## 2018-03-05 RX ORDER — HYDROCODONE BITARTRATE AND ACETAMINOPHEN 5; 325 MG/1; MG/1
1 TABLET ORAL EVERY 6 HOURS PRN
Qty: 60 TABLET | Refills: 0 | Status: SHIPPED | OUTPATIENT
Start: 2018-03-05 | End: 2018-03-29 | Stop reason: SDUPTHER

## 2018-03-05 NOTE — PROGRESS NOTES
Chief Complaint   Patient presents with   • Follow-up     Follow-up for medication refills and back pain.      No Known Allergies    HPI:  Adela Davis is a 53 y.o. female presents today with complaints of her pain in hands, Hips, back worse.  Is having problems functioning for daily ADLS.  She has rheumatoid arthritis and   Struggling with the pain.  She is scared to death of the spinal injections because her sister had a severe reaction and got paralyzed after one.  I gave her Neurontin and it is helping some but not controlling pain.  She sees Dr. Fontenot Rhematologist and says if they gabapentin is not working she needs to talk to her PCP and see if she will write pain meds.     Chronic problems: Chronic Pain and RA somewhat stable with enbrel, methotrexate, and chronic pain somewhat controlled with Neurontin.   She struggles with the simple things in life like opening a jar, lifting heavy pans, even just doing daily activities of living. We had discussed in the past disability and she applied but they turned her down because she can still use her hands.   She has ligetament reasons for pain medication. Rates pain 10/10        Past Medical History:   Diagnosis Date   • Arthritis      Past Surgical History:   Procedure Laterality Date   • FRACTURE SURGERY Bilateral     2010   • WRIST SURGERY Bilateral      Social History     Social History   • Marital status:      Social History Main Topics   • Smoking status: Current Every Day Smoker     Packs/day: 1.00     Years: 20.00     Types: Cigarettes   • Smokeless tobacco: Never Used      Comment: Would like to quit.   • Alcohol use No   • Drug use: No   • Sexual activity: Defer     Family History   Problem Relation Age of Onset   • Arthritis Mother    • COPD Mother    • Atrial fibrillation Mother    • Osteoarthritis Mother    • Heart disease Father    • Hypertension Father    • Arthritis Father    • Arthritis Sister    • Arthritis Brother    • No Known  Problems Daughter    • Arthritis Maternal Grandfather        Current Outpatient Prescriptions on File Prior to Visit   Medication Sig Dispense Refill   • Etanercept (ENBREL SC) Inject 50 mg under the skin 1 (One) Time Per Week.     • Etanercept 50 MG/ML solution auto-injector Inject 50 mg under the skin 1 (One) Time Per Week.     • etodolac (LODINE) 400 MG tablet Take 500 mg by mouth 2 (Two) Times a Day.     • gabapentin (NEURONTIN) 100 MG capsule Take 1 capsule by mouth 3 (Three) Times a Day. Day 1:  1 capsule today Day 2:  1 capsule twice daily Day 3:  1 capsule three times daily 90 capsule 3   • omeprazole (priLOSEC) 40 MG capsule Take 40 mg by mouth Daily.     • tiZANidine (ZANAFLEX) 4 MG tablet Take 1 tablet by mouth 2 (Two) Times a Day As Needed for Muscle Spasms. 60 tablet 1   • [DISCONTINUED] folic acid (FOLVITE) 1 MG tablet Take 2 mg by mouth Daily.     • [DISCONTINUED] Methotrexate, PF, (RASUVO) 17.5 MG/0.35ML solution auto-injector Inject 1.4 mL under the skin 1 (One) Time Per Week.     • [DISCONTINUED] RASUVO 25 MG/0.5ML solution auto-injector Inject 22.5 mg under the skin 1 (One) Time Per Week.     • [DISCONTINUED] valACYclovir (VALTREX) 1000 MG tablet Take 1 tablet by mouth 3 (Three) Times a Day. 21 tablet 0     No current facility-administered medications on file prior to visit.         REVIEW OF SYMPTOMS: (Positives bolded)  General:  weight loss, fever, chills, night sweats, fatigue, appetite loss  HEENT:  blurry vision, eye pain, eye discharge, dry eyes, decreased vision    Respiratory: shortness of breath, cough, hemoptysis, wheezing, pleurisy,   Cardiovascular:  chest pain, PND, palpitation, edema, orthopnea, syncope, swelling of extremities  Gastro: Nausea, vomiting, diarrhea, hematemesis, abdominal pain, constipation  Genito: hematuria, dysuria, glycosuria, hesitancy, frequency, incontinence  Musckelo: Arthralgia, myalgia, muscle weakness, joint swelling, NSAID use, Hand pain  Skin: rash,  "pruritis, sores, nail changes, skin thickening  Neuro:  Migraine, numbness, ataxia, tremor, vertigo, weakness, memory loss  \"All other systems reviewed and negative, except as listed above.”      OBJECTIVE:  Constitutional:  Appearance-No acute distress, Consistent with stated age. Orientation- Oriented x 3, alert Posture-Not doubled over. Gait-Normal pace, normal arm movement. Posture- Normal Build and Nutrition-Well developed and well nourished.  General- Patient is pleasant and cooperative with the interview and exam.    Integumentary: General-No rashes, ulcers or lesions. No edema.  Palpation- Normal skin moisture/turgor. Skin is warm to touch, no increased warmth. Capillary refill is normal bilateral Upper and lower extremity.     Head/Neck: Head- normocephalic and atraumatic.  Neck- without visible/palpable lumps or pulsations.  Palpation- No bony tenderness about head/neck along frontal, occiptial, temporal, parietal, mastoid, jawline, zygoma, orbit or any other location.  NO temporal artery tenderness. No TMJ tenderness. Normal cervical ROM.   Neck Supple.  Thyroid-No thyromegaly, no nodules    CHEST/LUNG: Inspection- symmetric chest wall no pectus deformity. Normal effort, no distress, no use of accessory muscles. Palpation- nontender sternum, ribline.  No abnormal pulsations. Auscultation- Breath sounds normal throughout all lung fields.  Normal tracheal sounds, Normal bronchial sounds overlying sternum, Bronchovessicular sounds normal between scapulae posteriorly, Normal vessicular breath sounds heard throughout periphery. Lungs are clear today. Adventitious sounds- No wheezes, rales, rhonchi.     CARDIOVASCULAR:  Carotid artery- normal, no bruits or abnormal pulsations. Jugular vein- no pulsations. Palpation/Percussion- Normal PMI, no palpable thrill  Auscultation- Regular rate and rhythm. No murmur noted in sitting, supine positions. Extremities- no digital clubbing, cyanosis, edema, increased " warmth.    ABDOMEN: Inspection- normal and no visible pulsations. Normal contour. Auscultation- Bowel sounds normal, no abdominal bruits. Palpation/Percussion- soft, non-tender, no rebound tenderness, no rigidity (guarding), no jar tenderness, no masses.  Liver-no hepatomegaly, Spleen - no splenomegaly, Hernias- none. Rectal not examined.     Peripheral Vascular: Upper extremity Left- Normal temperature with pink nailbeds and no ulcerations.  Upper extremity Right- Normal temperature with pink nailbeds and no ulcerations.  Lower extremity- Normal temperature with pink nailbeds and no ulcerations. DP pulses 2+ bilaterally.  Pedal hair intact.  Normal capillary refill. Edema- No edema.    Musculoskeletal: Generalized-No generalized swelling or edema of extremities, no digital clubbing or cyanosis, neurovascularly intact all four extremities.  Upper extremity- Symmetrical posture.  No visible deformity.  Normal sensation along medial and lateral upper extremity proximally and distally.  Has tenderness on palpation of the hands, wrists, and fingers.   are weak, 3/5, has decreased sensation and tingling along medial and lateral upper extremity bilaterally.  Elbow palpated, no tenderness overlying olecranon. Complains about pain with any movement. She has decreased ROM, in hands  Lower extremity- Complains of tenderness on palpation of bilateral hips.  Decreased strength and tone.  No tenderness at tibial tuberosity. Ankle normal ROM not tender to palpation along medial/lateral malleolus. Normal movement of toes, no tenderness bilateral feet/toes.  Normal foot type. Calves symmetrical.  Stretching demonstrated today.  Spine/Ribs- No deformities, masses or tenderness, no known fractures, normal strength, Normal ROM. Normal stability No tenderness along C/T/L spine.  Normal appearing ROM about spine.      Neurological: General- Moves all 4 extremities symmetrically. Symmetrical face and body posture. Cranial nerves-  individually evaluated II-XII and intact. PERRLA, Normal EOMI, visual/special senses appear intact, Face is symmetrical and normal sensation/movement, normal tongue, normal strength/posture of neck musculature. Balance- Romberg intact.  Reflexes- ntact with DTR 2+ patellar, Achilles, bicep, brachial,tricep. Ankle clonus normal with 2 beats.  Strength- 5/5 bilateral UE and LE. Soft touch- intact bilateral UE and LE.  Temperature sensation- intact bilateral UE and LE. Cerebellar testing-Rapid alternating movements intact.  Heel shin intact. Able to walk normal gait, normal heel toe walking. Neck- supple.      Neuropsych: Oriented- Person, place, time. (AAOx3), Mood/affect- normal and congruent. Able to articulate well. Speech-Normal speech, normal rate, normal tone, normal use of language, volume and coherence.  Thought content- normal with ability to perform basic computations and apply abstract thought/reason. Associations- intact, no SI/HI, no hallucinations, delusions, obsessions.  Judgment/insight- Appropriate. Memory-Recall intact, remote and recent memory intact. Knowledge- Age appropriate fund of knowledge, concentration and attention span normal.    Lymphatic: Head/Neck- normal size and non tender to palpation. Axillary- Head and neck LN are normal size and non tender to palpation. Femoral and Inguinal- normal size and non tender to palpation.      Assessment/Plan:  Adela was seen today for follow-up.    Diagnoses and all orders for this visit:    Rheumatoid arthritis involving multiple sites with positive rheumatoid factor  Chronic hip pain, unspecified laterality  -     HYDROcodone-acetaminophen (NORCO) 5-325 MG per tablet; Take 1 tablet by mouth Every 6 (Six) Hours As Needed (60).    Muscle spasm  -     tiZANidine (ZANAFLEX) 4 MG tablet; Take 1 tablet by mouth 2 (Two) Times a Day As Needed for Muscle Spasms.    Encounter for therapeutic drug monitoring  -     Drug Profile Urine - 9 Drugs - Urine, Clean  Catch    Chronic hand pain, unspecified laterality  -     HYDROcodone-acetaminophen (NORCO) 5-325 MG per tablet; Take 1 tablet by mouth Every 6 (Six) Hours As Needed (60).        -     I agreed to give her pain medication because she has legitimate RA and is taking RA meds        -     UDS today        -     Control contract discussed and signed today        -     Discussed .HB1 and its regulation        -     ALMA query requested today and complete. #[id number]   Treatment plan to include Long term course norco prescribed controlled substance. Risks including addiction,                                  benefits, and alternatives presented to patient.             -     The patient has read and signed the [facility] Controlled Substance contract.  I will continue to see patient for regular follow up appointments.  Patient is well controlled on medication.      -     Addiction was discussed and risks and benefits of drug therapy.  It was reinforced about the risks and benefits of opioids.  It was reinforced that patient may not call early for medication, may not ask for increase in the number of pills given monthly or increase in dosage of medication, may not jump around to different pharmacies or providers and may not receive any narcotics from other providers including ER, or the contract will be broken and she will no longer get narcotics from this facility.      -    ALMA is updated every 3 months. The patient is aware of the potential for addiction and dependence.    Management plan:  Take medications as prescribed; return to the clinic of new or worsening concerns.       Risks/benefits of current and new medications discussed with the patient and or family today.  The patient/family are aware and accept that if there any side effects they should call or return to clinic as soon as possible.  Appropriate F/U discussed for topics addressed today. All questions were answered to the satisfactory state of  patient/family.  Should symptoms fail to improve or worsen they agree to call or return to clinic or to go to the ER. Education handouts were offered on any new Rx if requested.  Discussed the importance of following up with any needed screening tests/labs/specialist appointments and any requested follow-up recommended by me today.  Importance of maintaining follow-up discussed and patient accepts that missed appointments can delay diagnosis and potentially lead to worsening of conditions.    Return in about 1 month (around 4/5/2018), or if symptoms worsen or fail to improve.    Charlene Huntley, DNP, APRN-BC  03/05/2018

## 2018-03-05 NOTE — PATIENT INSTRUCTIONS
Hand Pain  Many things can cause hand pain. Some common causes are:  · An injury.  · Repeating the same movement with your hand over and over (overuse).  · Osteoporosis.  · Arthritis.  · Lumps in the tendons or joints of the hand and wrist (ganglion cysts).  · Infection.  Follow these instructions at home:  Pay attention to any changes in your symptoms. Take these actions to help with your discomfort:  · If directed, put ice on the affected area:  ¨ Put ice in a plastic bag.  ¨ Place a towel between your skin and the bag.  ¨ Leave the ice on for 15-20 minutes, 3?4 times a day for 2 days.  · Take over-the-counter and prescription medicines only as told by your health care provider.  · Minimize stress on your hands and wrists as much as possible.  · Take breaks from repetitive activity often.  · Do stretches as told by your health care provider.  · Do not do activities that make your pain worse.  Contact a health care provider if:  · Your pain does not get better after a few days of self-care.  · Your pain gets worse.  · Your pain affects your ability to do your daily activities.  Get help right away if:  · Your hand becomes warm, red, or swollen.  · Your hand is numb or tingling.  · Your hand is extremely swollen or deformed.  · Your hand or fingers turn white or blue.  · You cannot move your hand, wrist, or fingers.  This information is not intended to replace advice given to you by your health care provider. Make sure you discuss any questions you have with your health care provider.  Document Released: 01/13/2017 Document Revised: 05/25/2017 Document Reviewed: 01/13/2016  [employer] Interactive Patient Education © 2017 [employer]

## 2018-03-06 LAB
AMPHETAMINES UR QL SCN: NEGATIVE NG/ML
BARBITURATES UR QL SCN: NEGATIVE NG/ML
BENZODIAZ UR QL: NEGATIVE NG/ML
BZE UR QL: NEGATIVE NG/ML
CANNABINOIDS UR QL SCN: NEGATIVE NG/ML
METHADONE UR QL SCN: NEGATIVE NG/ML
OPIATES UR QL: NEGATIVE NG/ML
PCP UR QL: NEGATIVE NG/ML
PROPOXYPH UR QL: NEGATIVE NG/ML

## 2018-03-29 ENCOUNTER — OFFICE VISIT (OUTPATIENT)
Dept: FAMILY MEDICINE CLINIC | Facility: CLINIC | Age: 53
End: 2018-03-29

## 2018-03-29 VITALS
TEMPERATURE: 98.4 F | HEIGHT: 65 IN | OXYGEN SATURATION: 98 % | WEIGHT: 183 LBS | SYSTOLIC BLOOD PRESSURE: 107 MMHG | HEART RATE: 72 BPM | RESPIRATION RATE: 18 BRPM | DIASTOLIC BLOOD PRESSURE: 73 MMHG | BODY MASS INDEX: 30.49 KG/M2

## 2018-03-29 DIAGNOSIS — G89.29 CHRONIC HIP PAIN, UNSPECIFIED LATERALITY: ICD-10-CM

## 2018-03-29 DIAGNOSIS — E66.9 OBESITY (BMI 30-39.9): ICD-10-CM

## 2018-03-29 DIAGNOSIS — M05.79 RHEUMATOID ARTHRITIS INVOLVING MULTIPLE SITES WITH POSITIVE RHEUMATOID FACTOR (HCC): Primary | ICD-10-CM

## 2018-03-29 DIAGNOSIS — M25.559 CHRONIC HIP PAIN, UNSPECIFIED LATERALITY: ICD-10-CM

## 2018-03-29 PROBLEM — M81.0 SENILE OSTEOPOROSIS: Status: ACTIVE | Noted: 2017-09-28

## 2018-03-29 PROCEDURE — 99213 OFFICE O/P EST LOW 20 MIN: CPT | Performed by: NURSE PRACTITIONER

## 2018-03-29 RX ORDER — HYDROCODONE BITARTRATE AND ACETAMINOPHEN 5; 325 MG/1; MG/1
1 TABLET ORAL EVERY 6 HOURS PRN
Qty: 60 TABLET | Refills: 0 | Status: SHIPPED | OUTPATIENT
Start: 2018-03-29 | End: 2018-04-27 | Stop reason: SDUPTHER

## 2018-03-29 RX ORDER — SULFASALAZINE 500 MG/1
TABLET ORAL 3 TIMES DAILY
COMMUNITY
Start: 2018-03-19 | End: 2018-08-20 | Stop reason: ALTCHOICE

## 2018-03-29 NOTE — PROGRESS NOTES
Chief Complaint   Patient presents with   • Rheumatoid Arthritis     Pateint is here today for a follow up after starting new pain medicaton.         No Known Allergies    HPI: Adela Davis is a 53 y.o. female presents today for follow up for chronic pain related to Rheumatoid Arthritis.  She was struggling with the pain unable to do normal ADL's, since taking the norco she is able to be out of bed more and do more ADLs.  She says she tries to only take when she is in a lot of pain.  Rates pain today 7/10, and it is mostly in the back and hips.          Chronic problems: Chronic Pain and RA somewhat stable with enbrel, methotrexate, and chronic pain somewhat controlled with Neurontin.   She struggles with the simple things in life like opening a jar, lifting heavy pans, even just doing daily activities of living. We had discussed in the past disability and she applied but they turned her down because she can still use her hands.   She has ligetament reasons for pain medication.       Past Medical History:   Diagnosis Date   • Arthritis      Past Surgical History:   Procedure Laterality Date   • FRACTURE SURGERY Bilateral     2010   • WRIST SURGERY Bilateral      Social History     Social History   • Marital status:      Social History Main Topics   • Smoking status: Current Every Day Smoker     Packs/day: 1.00     Years: 20.00     Types: Cigarettes   • Smokeless tobacco: Never Used      Comment: Would like to quit.   • Alcohol use No   • Drug use: No   • Sexual activity: Defer     Other Topics Concern   • Not on file     Family History   Problem Relation Age of Onset   • Arthritis Mother    • COPD Mother    • Atrial fibrillation Mother    • Osteoarthritis Mother    • Heart disease Father    • Hypertension Father    • Arthritis Father    • Arthritis Sister    • Arthritis Brother    • No Known Problems Daughter    • Arthritis Maternal Grandfather        Current Outpatient Prescriptions on File Prior to  "Visit   Medication Sig Dispense Refill   • Calcium-Magnesium-Vitamin D (CALCIUM 500 PO) Take  by mouth.     • cholecalciferol (VITAMIN D3) 1000 units tablet Take 1,000 Units by mouth Daily.     • Etanercept (ENBREL SC) Inject 50 mg under the skin 1 (One) Time Per Week.     • Etanercept 50 MG/ML solution auto-injector Inject 50 mg under the skin 1 (One) Time Per Week.     • etodolac (LODINE) 400 MG tablet Take 500 mg by mouth 2 (Two) Times a Day.     • gabapentin (NEURONTIN) 100 MG capsule Take 1 capsule by mouth 3 (Three) Times a Day. Day 1:  1 capsule today Day 2:  1 capsule twice daily Day 3:  1 capsule three times daily 90 capsule 3   • HYDROcodone-acetaminophen (NORCO) 5-325 MG per tablet Take 1 tablet by mouth Every 6 (Six) Hours As Needed (60). 60 tablet 0   • omeprazole (priLOSEC) 40 MG capsule Take 40 mg by mouth Daily.     • tiZANidine (ZANAFLEX) 4 MG tablet Take 1 tablet by mouth 2 (Two) Times a Day As Needed for Muscle Spasms. 60 tablet 1     No current facility-administered medications on file prior to visit.         REVIEW OF SYMPTOMS: (Positives bolded)  General:  weight loss, fever, chills, night sweats, fatigue, appetite loss  HEENT:  blurry vision, eye pain, eye discharge, dry eyes  Respiratory: shortness of breath, cough, hemoptysis, wheezing, pleurisy,   Cardiovascular:  chest pain, PND, palpitation, edema, orthopnea, syncope, swelling of extremities  Gastro: Nausea, vomiting, diarrhea, hematemesis, abdominal pain, constipation  Genito: hematuria, dysuria, glycosuria, hesitancy, frequency, incontinence  Musckelo: Arthralgia, myalgia, muscle weakness, joint swelling, NSAID use  Skin: rash, pruritis, sores, nail changes, skin thickening, change in wart/mole, itching, rash, new lesions, pruritus, nail changes  Neuro:  Migraine, numbness, ataxia, tremor, vertigo, weakness, memory loss  \"All other systems reviewed and negative, except as listed above.”      OBJECTIVE:  Constitutional:  Appearance-No " acute distress, Consistent with stated age. Orientation- Oriented x 3, alert Posture-Not doubled over. Gait-Normal pace, normal arm movement. Posture- Normal Build and Nutrition-Well developed and well nourished.  General- Patient is pleasant and cooperative with the interview and exam.    Integumentary: General-No rashes, ulcers or lesions. No edema.  Palpation- Normal skin moisture/turgor. Skin is warm to touch, no increased warmth. Capillary refill is normal bilateral Upper and lower extremity.     Head/Neck: Head- normocephalic and atraumatic.  Neck- without visible/palpable lumps or pulsations.  Palpation- No bony tenderness about head/neck along frontal, occiptial, temporal, parietal, mastoid, jawline, zygoma, orbit or any other location.  NO temporal artery tenderness. No TMJ tenderness. Normal cervical ROM.   Neck Supple.  Thyroid-No thyromegaly, no nodules    Eye: Bilaterally PERRLA, EOMI.  No discharge.  Upper and lower eyelids are normal. Sclera/conjunctiva normal without discharge. Cornea is normal and clear. Lens is normal.  Eyeball appears normal. No ciliary flushing, no conjunctival injection.    ENMT:  Pinna- normal without tenderness or erythema.  External auditory canal Left- normal without erythema or discharge, no excessive cerumen. External auditory canal Right-normal without erythema or discharge, no excessive cerumen. TM left- Grey/pearly, normal light reflex and anatomy TM Right- Grey/pearly, normal light reflex and anatomy Hearing Assessment-normal to conversational speech at 2-5 feet.  Nose and sinus-No sinus tenderness along frontal/maxillary region. External appearance normal and midline. Nares- bilateral quiet airflow, no discharge. Nasal mucosa- No bleeding noted and no ulcerations observed. Pink, moist. Turbinates non boggy. Lips- normal color, moist without cracks/lesions Oral Cavity/Palate- hard/soft palate intact without lesions, oral mucosa pink and moist. Dentition assessed and  discussed appropriate oral care. Tongue normal midline.  Oropharynx- no pharyngeal erythema, Uvula midline. No post nasal drip. No exudate. Salivary glands- Non tender to palpation    CHEST/LUNG: Inspection- symmetric chest wall no pectus deformity. Normal effort, no distress, no use of accessory muscles. Palpation- nontender sternum, ribline.  No abnormal pulsations. Auscultation- Breath sounds normal throughout all lung fields.  Normal tracheal sounds, Normal bronchial sounds overlying sternum, Bronchovessicular sounds normal between scapulae posteriorly, Normal vessicular breath sounds heard throughout periphery. Lungs are clear today. Adventitious sounds- No wheezes, rales, rhonchi.     CARDIOVASCULAR:  Carotid artery- normal, no bruits or abnormal pulsations. Jugular vein- no pulsations. Palpation/Percussion- Normal PMI, no palpable thrill  Auscultation- Regular rate and rhythm. No murmur noted in sitting, supine positions. Extremities- no digital clubbing, cyanosis, edema, increased warmth.    ABDOMEN: Inspection- normal and no visible pulsations. Normal contour. Auscultation- Bowel sounds normal, no abdominal bruits. Palpation/Percussion- soft, non-tender, no rebound tenderness, no rigidity (guarding), no jar tenderness, no masses.  Liver-no hepatomegaly, Spleen - no splenomegaly, Hernias- none. Rectal not examined.     Peripheral Vascular: Upper extremity Left- Normal temperature with pink nailbeds and no ulcerations.  Upper extremity Right- Normal temperature with pink nailbeds and no ulcerations.  Lower extremity- Normal temperature with pink nailbeds and no ulcerations. DP pulses 2+ bilaterally.  Pedal hair intact.  Normal capillary refill. Edema- No edema.    Musculoskeletal: Generalized-No generalized swelling or edema of extremities, no digital clubbing or cyanosis, neurovascularly intact all four extremities.  Upper extremity- Symmetrical posture.  No visible deformity.  Normal sensation along medial  and lateral upper extremity proximally and distally.  NO tenderness overlying shoulder, lateral/medial epicondyle.  weak 3/5 bilateral, Strength 3/5 bilateral.  Elbow palpated, no tenderness overlying olecranon.  Normal supination, pronation to active/passive ROM and to resisted rotation. Bicep insertion/tricep insertion appear normal without obvious pathology. Rotator cuff evaluated and intact.  Normal wrist ROM bilaterally. Normal hand movement, intrinsic muscles of hands normal. No tenderness to palpation of hands/wrists/elbows.  Lower extremity- Not tender to palpation, no pain, no swelling, edema or erythema of surrounding tissue, normal strength and tone.  Normal appearing hip ROM bilaterally without pain.  Knee ROM normal at 0-120 degrees. No tenderness overlying trochanters, no tenderness about patella, quad tendon, patellar tendon.  No tenderness at tibial tuberosity. Ankle normal ROM not tender to palpation along medial/lateral malleolus. Normal movement of toes, no tenderness bilateral feet/toes.  Normal foot type. Calves symmetrical.  Stretching demonstrated today.  Lumbar Spine- No deformities, masses or no known fractures, decreased, decreased ROM with bending and twisting.  Straight leg raises with pain raises all the way up.  Tenderness on palpation on lumbar.       Neuropsych: Oriented- Person, place, time. (AAOx3), Mood/affect- normal and congruent. Able to articulate well. Speech-Normal speech, normal rate, normal tone, normal use of language, volume and coherence.  Thought content- normal with ability to perform basic computations and apply abstract thought/reason. Associations- intact, no SI/HI, no hallucinations, delusions, obsessions.  Judgment/insight- Appropriate. Memory-Recall intact, remote and recent memory intact. Knowledge- Age appropriate fund of knowledge, concentration and attention span normal.    Lymphatic: Head/Neck- normal size and non tender to palpation. Axillary- Head and  neck LN are normal size and non tender to palpation. Femoral and Inguinal- normal size and non tender to palpation.      Assessment/Plan:  Adela was seen today for rheumatoid arthritis.    Diagnoses and all orders for this visit:    Rheumatoid arthritis involving multiple sites with positive rheumatoid factor  -     HYDROcodone-acetaminophen (NORCO) 5-325 MG per tablet; Take 1 tablet by mouth Every 6 (Six) Hours As Needed (60).    Obesity (BMI 30-39.9)    Chronic hip pain, unspecified laterality  -     HYDROcodone-acetaminophen (NORCO) 5-325 MG per tablet; Take 1 tablet by mouth Every 6 (Six) Hours As Needed (60).      ALMA query requested today and complete.   Treatment plan to include Long term course of prescribed controlled substance. Risks including addiction, benefits, and alternatives presented to patient.     -     The patient has read and signed the [facility] Controlled Substance contract.  I will continue to see patient for regular follow up appointments.  Patient is well controlled on medication.      -     Addiction was discussed and risks and benefits of drug therapy.  It was reinforced about the risks and benefits of opioids.  It was reinforced that patient may not call early for medication, may not ask for increase in the number of pills given monthly or increase in dosage of medication, may not jump around to different pharmacies or providers and may not receive any narcotics from other providers including ER, or the contract will be broken and she will no longer get narcotics from this facility.    -    ALMA is updated every 3 months. The patient is aware of the potential for addiction and dependence.      Obesity: BMI;  30.5 Weight 183 pounds     Follow up plan:  Loose 5 pounds before next visit     -  Documentation of education    The patient BMI is outside this range and we recommended/discussed today to utilize a diet/exercise program to get back into the appropriate range.  Federal  guidelines recommend that people under the age of 65 should have a BMI of 18.5-24.9  Food diary:  Bring to next visit  tial step is to document everything that is consumed. Pt's that have a food diary  Lose 2x the weight  by keeping track of foods.   Choose one bad food weekly and eliminate it from your diet.  Replace with one healthy food  Exercise diary: Goal over next 2-4 weeks is walk 30 minutes per day 5 days per week at pace difficult to hold conversation.   Drink more water, less soda.   Cut back on portion sizes.   Today we encouraged roughly a 1 lb per week weight loss with initial goal of 5% weight loss.  Avoid fast foods, eat more salads  If eating out for a meal, consider cutting food in half and placing into a to go container.  Individually portion any foods coming into the home   Use smaller plates  Drink 12 ozof water 30 minutes before meal as way to suppress appetite.  Discussed Contrave, metformin, topamax, Belviq and the cost of medications  Encouraged internet programs or self help books  Set  Goals that are realistic   Educated on ways to measure food  Baseball: 1 cup good for green salad, frozen yogurt, medium piece of fruit  Handful:  ½ cup good cut fruit, cooked vegetables, pasta, rice  Egg:  ¼ cup good for dried fruit  Deck of cards: 3 ounces good for meat and poultry  Check book:  3 ounces grilled fish  Plate Method:  reduce plate size to 9 inch dinner plate.  Half of plate should be filled with non-starchy vegetables (broccoli, lettuce, cauliflower, tomatoes), ¼ plate with lean source of protein (lean chicken, turkey, fish), remaining ½ with whole grains (brown rice, potato, whole grain breads  Avoid liquid calories (regular soda, juice, coffee with cream)  Focus  on water, seltzer water and other non-calorie drinks  Replace regular sugar with non-caloric sweeteners  Avoid skipping meals: plan small regular meals throughout the day in order to keep your hunger controlled  Consider using  meal replacements if unable to plan a healthy meal (protein shake, high protein bar)  Replace all white bread with whole wheat/whole grain alternative  Swap regular salad dressings (mayonnaise, butter, or low fat or fat free alternative)  Avoid high fat, high calorie, high carbohydrate snakes (cookies, pastries, cakes)  Snack on fruits, low fat dairy (yogurt, cottage cheese)            Management plan:  Take medications as prescribed; return to the clinic of new or worsening concerns.       Risks/benefits of current and new medications discussed with the patient and or family today.  The patient/family are aware and accept that if there any side effects they should call or return to clinic as soon as possible.  Appropriate F/U discussed for topics addressed today. All questions were answered to the satisfactory state of patient/family.  Should symptoms fail to improve or worsen they agree to call or return to clinic or to go to the ER. Education handouts were offered on any new Rx if requested.  Discussed the importance of following up with any needed screening tests/labs/specialist appointments and any requested follow-up recommended by me today.  Importance of maintaining follow-up discussed and patient accepts that missed appointments can delay diagnosis and potentially lead to worsening of conditions.    Return in about 1 month (around 4/29/2018).      Charlene Huntley, DNP, APRN-BC  03/29/2018

## 2018-03-29 NOTE — PATIENT INSTRUCTIONS
Follow up plan:      -  Documentation of education   The patient BMI is outside this range and we recommended/discussed today to utilize a diet/exercise program to get back into the appropriate range.  Federal guidelines recommend that people under the age of 65 should have a BMI of 18.5-24.9  Food diary:  Bring to next visit  tial step is to document everything that is consumed. Pt's that have a food diary  Lose 2x the weight  by keeping track of foods.   Choose one bad food weekly and eliminate it from your diet.  Replace with one healthy food  Exercise diary: Goal over next 2-4 weeks is walk 30 minutes per day 5 days per week at pace difficult to hold conversation.   Drink more water, less soda.   Cut back on portion sizes.   Today we encouraged roughly a 1 lb per week weight loss with initial goal of 5% weight loss.  Avoid fast foods, eat more salads  If eating out for a meal, consider cutting food in half and placing into a to go container.  Individually portion any foods coming into the home   Use smaller plates  Drink 12 ozof water 30 minutes before meal as way to suppress appetite.  Discussed Contrave, metformin, topamax, Belviq and the cost of medications  Encouraged internet programs or self help books  Set  Goals that are realistic   Educated on ways to measure food  Baseball: 1 cup good for green salad, frozen yogurt, medium piece of fruit  Handful:  ½ cup good cut fruit, cooked vegetables, pasta, rice  Egg:  ¼ cup good for dried fruit  Deck of cards: 3 ounces good for meat and poultry  Check book:  3 ounces grilled fish  Plate Method:  reduce plate size to 9 inch dinner plate.  Half of plate should be filled with non-starchy vegetables (broccoli, lettuce, cauliflower, tomatoes), ¼ plate with lean source of protein (lean chicken, turkey, fish), remaining ½ with whole grains (brown rice, potato, whole grain breads  Avoid liquid calories (regular soda, juice, coffee with cream)  Focus  on water, seltzer  water and other non-calorie drinks  Replace regular sugar with non-caloric sweeteners  Avoid skipping meals: plan small regular meals throughout the day in order to keep your hunger controlled  Consider using meal replacements if unable to plan a healthy meal (protein shake, high protein bar)  Replace all white bread with whole wheat/whole grain alternative  Swap regular salad dressings (mayonnaise, butter, or low fat or fat free alternative)  Avoid high fat, high calorie, high carbohydrate snakes (cookies, pastries, cakes)  Snack on fruits, low fat dairy (yogurt, cottage cheese)

## 2018-04-03 DIAGNOSIS — M62.838 MUSCLE SPASM: ICD-10-CM

## 2018-04-03 RX ORDER — TIZANIDINE 4 MG/1
TABLET ORAL
Qty: 60 TABLET | Refills: 0 | OUTPATIENT
Start: 2018-04-03

## 2018-04-27 ENCOUNTER — OFFICE VISIT (OUTPATIENT)
Dept: FAMILY MEDICINE CLINIC | Facility: CLINIC | Age: 53
End: 2018-04-27

## 2018-04-27 VITALS
SYSTOLIC BLOOD PRESSURE: 106 MMHG | BODY MASS INDEX: 31.92 KG/M2 | RESPIRATION RATE: 16 BRPM | HEIGHT: 64 IN | TEMPERATURE: 98.5 F | OXYGEN SATURATION: 98 % | DIASTOLIC BLOOD PRESSURE: 72 MMHG | HEART RATE: 77 BPM | WEIGHT: 187 LBS

## 2018-04-27 DIAGNOSIS — G89.29 CHRONIC HIP PAIN, UNSPECIFIED LATERALITY: ICD-10-CM

## 2018-04-27 DIAGNOSIS — G62.9 NEUROPATHY: ICD-10-CM

## 2018-04-27 DIAGNOSIS — M05.79 RHEUMATOID ARTHRITIS INVOLVING MULTIPLE SITES WITH POSITIVE RHEUMATOID FACTOR (HCC): Primary | ICD-10-CM

## 2018-04-27 DIAGNOSIS — M81.0 SENILE OSTEOPOROSIS: ICD-10-CM

## 2018-04-27 DIAGNOSIS — M25.559 CHRONIC HIP PAIN, UNSPECIFIED LATERALITY: ICD-10-CM

## 2018-04-27 DIAGNOSIS — G89.4 CHRONIC PAIN SYNDROME: ICD-10-CM

## 2018-04-27 PROCEDURE — 99214 OFFICE O/P EST MOD 30 MIN: CPT | Performed by: NURSE PRACTITIONER

## 2018-04-27 RX ORDER — GABAPENTIN 100 MG/1
100 CAPSULE ORAL 3 TIMES DAILY
Qty: 90 CAPSULE | Refills: 5 | Status: SHIPPED | OUTPATIENT
Start: 2018-04-27 | End: 2018-08-20 | Stop reason: DRUGHIGH

## 2018-04-27 RX ORDER — GABAPENTIN 100 MG/1
100 CAPSULE ORAL 3 TIMES DAILY
Qty: 90 CAPSULE | Refills: 5 | Status: SHIPPED | OUTPATIENT
Start: 2018-04-27 | End: 2018-04-27 | Stop reason: SDUPTHER

## 2018-04-27 RX ORDER — HYDROCODONE BITARTRATE AND ACETAMINOPHEN 5; 325 MG/1; MG/1
1 TABLET ORAL EVERY 6 HOURS PRN
Qty: 60 TABLET | Refills: 0 | Status: SHIPPED | OUTPATIENT
Start: 2018-04-27 | End: 2018-05-24 | Stop reason: SDUPTHER

## 2018-04-27 NOTE — PROGRESS NOTES
Chief Complaint   Patient presents with   • Pain     Patient here for refills on her Neurontin and Norco. Patient Uday is completed    • Nicotine Dependence     patient would like to talk to you about smoking cessation         No Known Allergies     HPI:  Adela Davis is a 53 y.o. female presents today with for chronic pain follow up. She still has pain in her hand, hips and back.  She has RA, osteoporosis, and chronic pain.  The medications help her get through her normal adls. She says every day is a struggle.  She see Dr. Thompson Rhematologist.       Chronic problems: Chronic Pain and RA somewhat stable with enbrel, methotrexate, and chronic pain somewhat controlled with Neurontin.   She struggles with the simple things in life like opening a jar, lifting heavy pans, even just doing daily activities of living. We had discussed in the past disability and she applied but they turned her down because she can still use her hands. She has ligetament reasons for pain medication. Rates pain 6/10.  She says since she has been on the neurontin, tizanidine and norco she is functioning better.            Past Medical History:   Diagnosis Date   • Arthritis    • Rheumatoid arthritis    • Senile osteoporosis 9/28/2017     Past Surgical History:   Procedure Laterality Date   • FRACTURE SURGERY Bilateral     2010   • WRIST SURGERY Bilateral      Social History     Social History   • Marital status:      Social History Main Topics   • Smoking status: Current Every Day Smoker     Packs/day: 1.00     Years: 20.00     Types: Cigarettes   • Smokeless tobacco: Never Used      Comment: Would like to quit.   • Alcohol use No   • Drug use: No   • Sexual activity: Defer     Other Topics Concern   • Not on file     Family History   Problem Relation Age of Onset   • Arthritis Mother    • COPD Mother    • Atrial fibrillation Mother    • Osteoarthritis Mother    • Heart disease Father    • Hypertension Father    • Arthritis  Father    • Arthritis Sister    • Arthritis Brother    • No Known Problems Daughter    • Arthritis Maternal Grandfather        Current Outpatient Prescriptions on File Prior to Visit   Medication Sig Dispense Refill   • Calcium-Magnesium-Vitamin D (CALCIUM 500 PO) Take  by mouth.     • cholecalciferol (VITAMIN D3) 1000 units tablet Take 1,000 Units by mouth Daily.     • Etanercept (ENBREL SC) Inject 50 mg under the skin 1 (One) Time Per Week.     • Etanercept 50 MG/ML solution auto-injector Inject 50 mg under the skin 1 (One) Time Per Week.     • etodolac (LODINE) 400 MG tablet Take 500 mg by mouth 2 (Two) Times a Day.     • gabapentin (NEURONTIN) 100 MG capsule Take 1 capsule by mouth 3 (Three) Times a Day. Day 1:  1 capsule today Day 2:  1 capsule twice daily Day 3:  1 capsule three times daily 90 capsule 3   • HYDROcodone-acetaminophen (NORCO) 5-325 MG per tablet Take 1 tablet by mouth Every 6 (Six) Hours As Needed (60). 60 tablet 0   • omeprazole (priLOSEC) 40 MG capsule Take 40 mg by mouth Daily.     • sulfaSALAzine (AZULFIDINE) 500 MG tablet      • tiZANidine (ZANAFLEX) 4 MG tablet Take 1 tablet by mouth 2 (Two) Times a Day As Needed for Muscle Spasms. 60 tablet 1     No current facility-administered medications on file prior to visit.         REVIEW OF SYMPTOMS: (Positives bolded)  General:  weight loss, fever, chills, night sweats, fatigue, appetite loss  HEENT:  blurry vision, eye pain, eye discharge, dry eyes, decreased vision, sore throat tinnitus, bloody nose, hearin gloss, sinus pain/pressure, ear pain/pressure.   Respiratory: shortness of breath, cough, hemoptysis, wheezing, pleurisy,   Cardiovascular:  chest pain, PND, palpitation, edema, orthopnea, syncope, swelling of extremities  Gastro: Nausea, vomiting, diarrhea, hematemesis, abdominal pain, constipation  Genito: hematuria, dysuria, glycosuria, hesitancy, frequency, incontinence  Musckelo: Arthralgia, myalgia, muscle weakness, joint swelling,  "NSAID use  Skin: rash, pruritis, sores, nail changes, skin thickening, change in wart/mole, itching, rash, new lesions, pruritus, nail changes  Neuro:  Migraine, numbness, ataxia, tremor, vertigo, weakness, memory loss  \"All other systems reviewed and negative, except as listed above.”      OBJECTIVE:  Constitutional:  Appearance-No acute distress, Consistent with stated age. Orientation- Oriented x 3, alert Posture-Not doubled over. Gait-Normal pace, normal arm movement. Posture- Normal Build and Nutrition-Well developed and well nourished.  General- Patient is pleasant and cooperative with the interview and exam.    Integumentary: General-No rashes, ulcers or lesions. No edema.  Palpation- Normal skin moisture/turgor. Skin is warm to touch, no increased warmth. Capillary refill is normal bilateral Upper and lower extremity.     Head/Neck: Head- normocephalic and atraumatic.  Neck- without visible/palpable lumps or pulsations.  Palpation- No bony tenderness about head/neck along frontal, occiptial, temporal, parietal, mastoid, jawline, zygoma, orbit or any other location.  NO temporal artery tenderness. No TMJ tenderness. Normal cervical ROM.   Neck Supple.  Thyroid-No thyromegaly, no nodules    CHEST/LUNG: Inspection- symmetric chest wall no pectus deformity. Normal effort, no distress, no use of accessory muscles. Palpation- nontender sternum, ribline.  No abnormal pulsations. Auscultation- Breath sounds normal throughout all lung fields.  Normal tracheal sounds, Normal bronchial sounds overlying sternum, Bronchovessicular sounds normal between scapulae posteriorly, Normal vessicular breath sounds heard throughout periphery. Lungs are clear today. Adventitious sounds- No wheezes, rales, rhonchi.     CARDIOVASCULAR:  Carotid artery- normal, no bruits or abnormal pulsations. Jugular vein- no pulsations. Palpation/Percussion- Normal PMI, no palpable thrill  Auscultation- Regular rate and rhythm. No murmur noted in " sitting, supine positions. Extremities- no digital clubbing, cyanosis, edema, increased warmth.    Musculoskeletal:  Musculoskeletal: Generalized-No generalized swelling or edema of extremities, no digital clubbing or cyanosis, neurovascularly intact all four extremities.  Upper extremity- Symmetrical posture. Bilateral hands have 1+ edema, visible deformity, decreased sensation along medial and lateral upper extremities.  She has tenderness on palpation of the hands, elbows and arms.  weak 3/5 bilateral, Strength 3/5 bilateral.  Elbow palpated, no tenderness overlying olecranon.  Normal supination, pronation to active/passive ROM and to resisted rotation. Bicep insertion/tricep insertion appear normal without obvious pathology. Rotator cuff evaluated and intact.  Normal wrist ROM bilaterally. Normal hand movement, intrinsic muscles of hands normal. No tenderness to palpation of hands/wrists/elbows.  Lower extremity- Not tender to palpation, no pain, no swelling, edema or erythema of surrounding tissue, normal strength and tone.  Normal appearing hip ROM bilaterally without pain.  Knee ROM normal at 0-120 degrees. No tenderness overlying trochanters, no tenderness about patella, quad tendon, patellar tendon.  No tenderness at tibial tuberosity. Ankle normal ROM not tender to palpation along medial/lateral malleolus. Normal movement of toes, no tenderness bilateral feet/toes.  Normal foot type. Calves symmetrical.  Stretching demonstrated today.  Lumbar Spine- No deformities, masses or no known fractures, decreased, decreased ROM with bending and twisting.  Straight leg raises with pain raises all the way up.  Tenderness on palpation on lumbar.         Neuropsych: Oriented- Person, place, time. (AAOx3), Mood/affect- normal and congruent. Able to articulate well. Speech-Normal speech, normal rate, normal tone, normal use of language, volume and coherence.  Thought content- normal with ability to perform basic  computations and apply abstract thought/reason. Associations- intact, no SI/HI, no hallucinations, delusions, obsessions.  Judgment/insight- Appropriate. Memory-Recall intact, remote and recent memory intact. Knowledge- Age appropriate fund of knowledge, concentration and attention span normal.    Lymphatic: Head/Neck- normal size and non tender to palpation. Axillary- Head and neck LN are normal size and non tender to palpation. Femoral and Inguinal- normal size and non tender to palpation.      Assessment/Plan:  Adela was seen today for pain and nicotine dependence.    Diagnoses and all orders for this visit:    Herpes zoster without complication  -     gabapentin (NEURONTIN) 100 MG capsule; Take 1 capsule by mouth 3 (Three) Times a Day. Day 1:  1 capsule today Day 2:  1 capsule twice daily Day 3:  1 capsule three times daily    Rheumatoid arthritis involving multiple sites with positive rheumatoid factor  -     HYDROcodone-acetaminophen (NORCO) 5-325 MG per tablet; Take 1 tablet by mouth Every 6 (Six) Hours As Needed (60).    Chronic hip pain, unspecified laterality  -     HYDROcodone-acetaminophen (NORCO) 5-325 MG per tablet; Take 1 tablet by mouth Every 6 (Six) Hours As Needed (60).        ALMA Report:     As part of this patient's treatment plan, I am prescribing controlled substances. The patient has been made aware of appropriate use of such medications, including potential risk of opiod use, somnolence, limited ability to drive and /or work safely, and potential for dependence or overdose. It has also been made clear that these medications are for use by this patient only, without concomitant use of alcohol or other substances unless prescribed.     Patient has completed prescribing agreement detailing terms of continued prescribing of controlled substances, including monitoring ALMA reports, urine drug screening, and pill counts if necessary. The patient is aware that inappropriate use will result in  cessation of prescribing such medications. Done 1/15/18.      ALMA report has been reviewed by: Charlene Huntley, DNP, APRN on 4/23/18, #[id number].  The report was scanned into the patient's chart.      -     Pt is aware of the potential for addiction and dependence.    Addiction was discussed.  The  Risks, benefits and alternative options of drug therapy discussed.  It was reinforced about the risks and benefits of opioids.  It was reinforced that patient may not call early for medication, may not ask for increase in the number of pills given monthly or increase in dosage of medication, may not jump around to different pharmacies or providers and may not receive any narcotics from other providers including ER, or the contract will be broken and narcotics will no longer be prescribed from this facility if any of these criteria has been broken.      Date of last UDS: 3/5/18    Obesity: Deferred here for chronic pain and says she doesn't want to discuss again at this visit.  We can reasses at next visit.     Take medications as prescribed; return to the clinic of new or worsening concerns.       Risks/benefits of current and new medications discussed with the patient and or family today.  The patient/family are aware and accept that if there any side effects they should call or return to clinic as soon as possible.  Appropriate F/U discussed for topics addressed today. All questions were answered to the satisfactory state of patient/family.  Should symptoms fail to improve or worsen they agree to call or return to clinic or to go to the ER. Education handouts were offered on any new Rx if requested.  Discussed the importance of following up with any needed screening tests/labs/specialist appointments and any requested follow-up recommended by me today.  Importance of maintaining follow-up discussed and patient accepts that missed appointments can delay diagnosis and potentially lead to worsening of  conditions.      Charlene Huntley, [place], APRN-BC  04/27/2018

## 2018-05-01 ENCOUNTER — APPOINTMENT (OUTPATIENT)
Dept: CT IMAGING | Facility: HOSPITAL | Age: 53
End: 2018-05-01

## 2018-05-01 ENCOUNTER — HOSPITAL ENCOUNTER (OUTPATIENT)
Facility: HOSPITAL | Age: 53
Setting detail: OBSERVATION
Discharge: HOME | End: 2018-05-02
Attending: EMERGENCY MEDICINE | Admitting: FAMILY MEDICINE

## 2018-05-01 ENCOUNTER — APPOINTMENT (OUTPATIENT)
Dept: GENERAL RADIOLOGY | Facility: HOSPITAL | Age: 53
End: 2018-05-01

## 2018-05-01 DIAGNOSIS — R07.9 CHEST PAIN, UNSPECIFIED TYPE: Primary | ICD-10-CM

## 2018-05-01 DIAGNOSIS — R91.1 LUNG NODULE: ICD-10-CM

## 2018-05-01 DIAGNOSIS — R11.2 NAUSEA AND VOMITING, INTRACTABILITY OF VOMITING NOT SPECIFIED, UNSPECIFIED VOMITING TYPE: ICD-10-CM

## 2018-05-01 DIAGNOSIS — E87.1 HYPONATREMIA: ICD-10-CM

## 2018-05-01 LAB
ALBUMIN SERPL-MCNC: 3.7 G/DL (ref 3.5–5)
ALBUMIN/GLOB SERPL: 1.5 G/DL (ref 1.1–2.5)
ALP SERPL-CCNC: 74 U/L (ref 24–120)
ALT SERPL W P-5'-P-CCNC: 32 U/L (ref 0–54)
ANION GAP SERPL CALCULATED.3IONS-SCNC: 12 MMOL/L (ref 4–13)
APTT PPP: 33.7 SECONDS (ref 24.1–34.8)
ARTERIAL PATENCY WRIST A: POSITIVE
AST SERPL-CCNC: 25 U/L (ref 7–45)
ATMOSPHERIC PRESS: 753 MMHG
BASE EXCESS BLDA CALC-SCNC: -0.3 MMOL/L (ref 0–2)
BASOPHILS # BLD AUTO: 0.06 10*3/MM3 (ref 0–0.2)
BASOPHILS NFR BLD AUTO: 0.7 % (ref 0–2)
BDY SITE: ABNORMAL
BILIRUB SERPL-MCNC: 0.5 MG/DL (ref 0.1–1)
BODY TEMPERATURE: 37 C
BUN BLD-MCNC: 6 MG/DL (ref 5–21)
BUN/CREAT SERPL: 10.2 (ref 7–25)
CALCIUM SPEC-SCNC: 8.9 MG/DL (ref 8.4–10.4)
CHLORIDE SERPL-SCNC: 94 MMOL/L (ref 98–110)
CO2 SERPL-SCNC: 20 MMOL/L (ref 24–31)
CREAT BLD-MCNC: 0.59 MG/DL (ref 0.5–1.4)
D DIMER PPP FEU-MCNC: 3.64 MG/L (FEU) (ref 0–0.5)
D-LACTATE SERPL-SCNC: 1.5 MMOL/L (ref 0.5–2)
DEPRECATED RDW RBC AUTO: 39 FL (ref 40–54)
EOSINOPHIL # BLD AUTO: 0.13 10*3/MM3 (ref 0–0.7)
EOSINOPHIL NFR BLD AUTO: 1.5 % (ref 0–4)
ERYTHROCYTE [DISTWIDTH] IN BLOOD BY AUTOMATED COUNT: 12.6 % (ref 12–15)
GAS FLOW AIRWAY: 2 LPM
GFR SERPL CREATININE-BSD FRML MDRD: 107 ML/MIN/1.73
GLOBULIN UR ELPH-MCNC: 2.5 GM/DL
GLUCOSE BLD-MCNC: 127 MG/DL (ref 70–100)
HCG SERPL QL: NEGATIVE
HCO3 BLDA-SCNC: 24 MMOL/L (ref 20–26)
HCT VFR BLD AUTO: 38 % (ref 37–47)
HGB BLD-MCNC: 13.3 G/DL (ref 12–16)
HOLD SPECIMEN: NORMAL
HOLD SPECIMEN: NORMAL
IMM GRANULOCYTES # BLD: 0.04 10*3/MM3 (ref 0–0.03)
IMM GRANULOCYTES NFR BLD: 0.4 % (ref 0–5)
INR PPP: 0.92 (ref 0.91–1.09)
LIPASE SERPL-CCNC: 163 U/L (ref 23–203)
LYMPHOCYTES # BLD AUTO: 2.49 10*3/MM3 (ref 0.72–4.86)
LYMPHOCYTES NFR BLD AUTO: 27.8 % (ref 15–45)
Lab: ABNORMAL
MCH RBC QN AUTO: 29.8 PG (ref 28–32)
MCHC RBC AUTO-ENTMCNC: 35 G/DL (ref 33–36)
MCV RBC AUTO: 85 FL (ref 82–98)
MODALITY: ABNORMAL
MONOCYTES # BLD AUTO: 0.92 10*3/MM3 (ref 0.19–1.3)
MONOCYTES NFR BLD AUTO: 10.3 % (ref 4–12)
NEUTROPHILS # BLD AUTO: 5.31 10*3/MM3 (ref 1.87–8.4)
NEUTROPHILS NFR BLD AUTO: 59.3 % (ref 39–78)
NRBC BLD MANUAL-RTO: 0 /100 WBC (ref 0–0)
NT-PROBNP SERPL-MCNC: 118 PG/ML (ref 0–900)
PCO2 BLDA: 37.2 MM HG (ref 35–45)
PH BLDA: 7.42 PH UNITS (ref 7.35–7.45)
PLATELET # BLD AUTO: 270 10*3/MM3 (ref 130–400)
PMV BLD AUTO: 9.7 FL (ref 6–12)
PO2 BLDA: 57.2 MM HG (ref 83–108)
POTASSIUM BLD-SCNC: 3.4 MMOL/L (ref 3.5–5.3)
PROT SERPL-MCNC: 6.2 G/DL (ref 6.3–8.7)
PROTHROMBIN TIME: 12.6 SECONDS (ref 11.9–14.6)
RBC # BLD AUTO: 4.47 10*6/MM3 (ref 4.2–5.4)
SAO2 % BLDCOA: 91.4 % (ref 94–99)
SODIUM BLD-SCNC: 126 MMOL/L (ref 135–145)
TROPONIN I SERPL-MCNC: <0.012 NG/ML (ref 0–0.03)
VENTILATOR MODE: ABNORMAL
WBC NRBC COR # BLD: 8.95 10*3/MM3 (ref 4.8–10.8)
WHOLE BLOOD HOLD SPECIMEN: NORMAL
WHOLE BLOOD HOLD SPECIMEN: NORMAL

## 2018-05-01 PROCEDURE — 25010000002 PROMETHAZINE PER 50 MG: Performed by: NURSE PRACTITIONER

## 2018-05-01 PROCEDURE — 83036 HEMOGLOBIN GLYCOSYLATED A1C: CPT | Performed by: INTERNAL MEDICINE

## 2018-05-01 PROCEDURE — 96361 HYDRATE IV INFUSION ADD-ON: CPT

## 2018-05-01 PROCEDURE — 25010000002 ONDANSETRON PER 1 MG

## 2018-05-01 PROCEDURE — 93010 ELECTROCARDIOGRAM REPORT: CPT | Performed by: INTERNAL MEDICINE

## 2018-05-01 PROCEDURE — 80053 COMPREHEN METABOLIC PANEL: CPT

## 2018-05-01 PROCEDURE — 71275 CT ANGIOGRAPHY CHEST: CPT

## 2018-05-01 PROCEDURE — 82803 BLOOD GASES ANY COMBINATION: CPT

## 2018-05-01 PROCEDURE — 99284 EMERGENCY DEPT VISIT MOD MDM: CPT

## 2018-05-01 PROCEDURE — 96374 THER/PROPH/DIAG INJ IV PUSH: CPT

## 2018-05-01 PROCEDURE — 83880 ASSAY OF NATRIURETIC PEPTIDE: CPT | Performed by: NURSE PRACTITIONER

## 2018-05-01 PROCEDURE — 84703 CHORIONIC GONADOTROPIN ASSAY: CPT

## 2018-05-01 PROCEDURE — 85379 FIBRIN DEGRADATION QUANT: CPT | Performed by: NURSE PRACTITIONER

## 2018-05-01 PROCEDURE — 83605 ASSAY OF LACTIC ACID: CPT | Performed by: NURSE PRACTITIONER

## 2018-05-01 PROCEDURE — 85610 PROTHROMBIN TIME: CPT

## 2018-05-01 PROCEDURE — 71045 X-RAY EXAM CHEST 1 VIEW: CPT

## 2018-05-01 PROCEDURE — 96372 THER/PROPH/DIAG INJ SC/IM: CPT

## 2018-05-01 PROCEDURE — 36600 WITHDRAWAL OF ARTERIAL BLOOD: CPT

## 2018-05-01 PROCEDURE — 93005 ELECTROCARDIOGRAM TRACING: CPT

## 2018-05-01 PROCEDURE — 85025 COMPLETE CBC W/AUTO DIFF WBC: CPT

## 2018-05-01 PROCEDURE — 0 IOPAMIDOL PER 1 ML: Performed by: NURSE PRACTITIONER

## 2018-05-01 PROCEDURE — 83690 ASSAY OF LIPASE: CPT | Performed by: NURSE PRACTITIONER

## 2018-05-01 PROCEDURE — 93005 ELECTROCARDIOGRAM TRACING: CPT | Performed by: EMERGENCY MEDICINE

## 2018-05-01 PROCEDURE — 84484 ASSAY OF TROPONIN QUANT: CPT

## 2018-05-01 PROCEDURE — 85730 THROMBOPLASTIN TIME PARTIAL: CPT | Performed by: NURSE PRACTITIONER

## 2018-05-01 RX ORDER — SODIUM CHLORIDE 0.9 % (FLUSH) 0.9 %
10 SYRINGE (ML) INJECTION AS NEEDED
Status: DISCONTINUED | OUTPATIENT
Start: 2018-05-01 | End: 2018-05-02 | Stop reason: HOSPADM

## 2018-05-01 RX ORDER — SODIUM CHLORIDE 9 MG/ML
125 INJECTION, SOLUTION INTRAVENOUS CONTINUOUS
Status: DISCONTINUED | OUTPATIENT
Start: 2018-05-01 | End: 2018-05-02 | Stop reason: HOSPADM

## 2018-05-01 RX ORDER — PROMETHAZINE HYDROCHLORIDE 25 MG/ML
12.5 INJECTION, SOLUTION INTRAMUSCULAR; INTRAVENOUS ONCE
Status: COMPLETED | OUTPATIENT
Start: 2018-05-01 | End: 2018-05-01

## 2018-05-01 RX ORDER — ASPIRIN 81 MG/1
324 TABLET, CHEWABLE ORAL ONCE
Status: DISCONTINUED | OUTPATIENT
Start: 2018-05-01 | End: 2018-05-01

## 2018-05-01 RX ORDER — ONDANSETRON 2 MG/ML
4 INJECTION INTRAMUSCULAR; INTRAVENOUS ONCE
Status: COMPLETED | OUTPATIENT
Start: 2018-05-01 | End: 2018-05-01

## 2018-05-01 RX ADMIN — SODIUM CHLORIDE 125 ML/HR: 9 INJECTION, SOLUTION INTRAVENOUS at 21:54

## 2018-05-01 RX ADMIN — IOPAMIDOL 89 ML: 755 INJECTION, SOLUTION INTRAVENOUS at 23:25

## 2018-05-01 RX ADMIN — PROMETHAZINE HYDROCHLORIDE 12.5 MG: 25 INJECTION INTRAMUSCULAR; INTRAVENOUS at 21:40

## 2018-05-01 RX ADMIN — SODIUM CHLORIDE 1000 ML: 9 INJECTION, SOLUTION INTRAVENOUS at 21:23

## 2018-05-01 RX ADMIN — ONDANSETRON 4 MG: 2 INJECTION, SOLUTION INTRAMUSCULAR; INTRAVENOUS at 21:06

## 2018-05-02 ENCOUNTER — APPOINTMENT (OUTPATIENT)
Dept: CARDIOLOGY | Facility: HOSPITAL | Age: 53
End: 2018-05-02
Attending: INTERNAL MEDICINE

## 2018-05-02 VITALS
HEIGHT: 64 IN | OXYGEN SATURATION: 99 % | SYSTOLIC BLOOD PRESSURE: 116 MMHG | TEMPERATURE: 97.6 F | DIASTOLIC BLOOD PRESSURE: 73 MMHG | RESPIRATION RATE: 18 BRPM | WEIGHT: 186.44 LBS | BODY MASS INDEX: 31.83 KG/M2 | HEART RATE: 68 BPM

## 2018-05-02 PROBLEM — R07.9 CHEST PAIN: Status: ACTIVE | Noted: 2018-05-02

## 2018-05-02 LAB
ALBUMIN SERPL-MCNC: 4.1 G/DL (ref 3.5–5)
ALBUMIN/GLOB SERPL: 1.4 G/DL (ref 1.1–2.5)
ALP SERPL-CCNC: 56 U/L (ref 24–120)
ALT SERPL W P-5'-P-CCNC: 35 U/L (ref 0–54)
ANION GAP SERPL CALCULATED.3IONS-SCNC: 7 MMOL/L (ref 4–13)
AST SERPL-CCNC: 31 U/L (ref 7–45)
BH CV STRESS BP STAGE 1: NORMAL
BH CV STRESS BP STAGE 2: NORMAL
BH CV STRESS BP STAGE 3: NORMAL
BH CV STRESS DURATION MIN STAGE 1: 3
BH CV STRESS DURATION MIN STAGE 2: 3
BH CV STRESS DURATION MIN STAGE 3: 0
BH CV STRESS DURATION SEC STAGE 1: 0
BH CV STRESS DURATION SEC STAGE 2: 0
BH CV STRESS DURATION SEC STAGE 3: 48
BH CV STRESS ECHO POST STRESS EJECTION FRACTION EF: 65 %
BH CV STRESS GRADE STAGE 1: 10
BH CV STRESS GRADE STAGE 2: 12
BH CV STRESS GRADE STAGE 3: 14
BH CV STRESS HR STAGE 1: 110
BH CV STRESS HR STAGE 2: 132
BH CV STRESS HR STAGE 3: 142
BH CV STRESS METS STAGE 1: 5
BH CV STRESS METS STAGE 2: 7.5
BH CV STRESS METS STAGE 3: 10
BH CV STRESS PROTOCOL 1: NORMAL
BH CV STRESS RECOVERY BP: NORMAL MMHG
BH CV STRESS RECOVERY HR: 69 BPM
BH CV STRESS SPEED STAGE 1: 1.7
BH CV STRESS SPEED STAGE 2: 2.5
BH CV STRESS SPEED STAGE 3: 3.4
BH CV STRESS STAGE 1: 1
BH CV STRESS STAGE 2: 2
BH CV STRESS STAGE 3: 3
BILIRUB SERPL-MCNC: 0.6 MG/DL (ref 0.1–1)
BUN BLD-MCNC: 5 MG/DL (ref 5–21)
BUN/CREAT SERPL: 7.7 (ref 7–25)
CALCIUM SPEC-SCNC: 9.1 MG/DL (ref 8.4–10.4)
CHLORIDE SERPL-SCNC: 105 MMOL/L (ref 98–110)
CO2 SERPL-SCNC: 30 MMOL/L (ref 24–31)
CREAT BLD-MCNC: 0.65 MG/DL (ref 0.5–1.4)
GFR SERPL CREATININE-BSD FRML MDRD: 96 ML/MIN/1.73
GLOBULIN UR ELPH-MCNC: 2.9 GM/DL
GLUCOSE BLD-MCNC: 94 MG/DL (ref 70–100)
HBA1C MFR BLD: 5.1 %
LV EF 2D ECHO EST: 55 %
MAGNESIUM SERPL-MCNC: 1.9 MG/DL (ref 1.4–2.2)
MAXIMAL PREDICTED HEART RATE: 168 BPM
NT-PROBNP SERPL-MCNC: 97.6 PG/ML (ref 0–900)
PERCENT MAX PREDICTED HR: 84.52 %
POTASSIUM BLD-SCNC: 4 MMOL/L (ref 3.5–5.3)
PROT SERPL-MCNC: 7 G/DL (ref 6.3–8.7)
SODIUM BLD-SCNC: 142 MMOL/L (ref 135–145)
STRESS BASELINE BP: NORMAL MMHG
STRESS BASELINE HR: 62 BPM
STRESS PERCENT HR: 99 %
STRESS POST ESTIMATED WORKLOAD: 10 METS
STRESS POST EXERCISE DUR MIN: 6 MIN
STRESS POST EXERCISE DUR SEC: 48 SEC
STRESS POST PEAK BP: NORMAL MMHG
STRESS POST PEAK HR: 142 BPM
STRESS TARGET HR: 143 BPM
TROPONIN I SERPL-MCNC: 0.01 NG/ML (ref 0–0.03)
TROPONIN I SERPL-MCNC: <0.012 NG/ML (ref 0–0.03)
TROPONIN I SERPL-MCNC: <0.012 NG/ML (ref 0–0.03)

## 2018-05-02 PROCEDURE — 93017 CV STRESS TEST TRACING ONLY: CPT

## 2018-05-02 PROCEDURE — 93010 ELECTROCARDIOGRAM REPORT: CPT | Performed by: INTERNAL MEDICINE

## 2018-05-02 PROCEDURE — 80053 COMPREHEN METABOLIC PANEL: CPT | Performed by: INTERNAL MEDICINE

## 2018-05-02 PROCEDURE — 25010000002 ENOXAPARIN PER 10 MG: Performed by: INTERNAL MEDICINE

## 2018-05-02 PROCEDURE — 83735 ASSAY OF MAGNESIUM: CPT | Performed by: INTERNAL MEDICINE

## 2018-05-02 PROCEDURE — 93350 STRESS TTE ONLY: CPT

## 2018-05-02 PROCEDURE — 96361 HYDRATE IV INFUSION ADD-ON: CPT

## 2018-05-02 PROCEDURE — 84484 ASSAY OF TROPONIN QUANT: CPT | Performed by: INTERNAL MEDICINE

## 2018-05-02 PROCEDURE — 93005 ELECTROCARDIOGRAM TRACING: CPT | Performed by: INTERNAL MEDICINE

## 2018-05-02 PROCEDURE — G0378 HOSPITAL OBSERVATION PER HR: HCPCS

## 2018-05-02 PROCEDURE — 25810000003 SODIUM CHLORIDE 0.9 % WITH KCL 20 MEQ 20-0.9 MEQ/L-% SOLUTION: Performed by: INTERNAL MEDICINE

## 2018-05-02 PROCEDURE — 93352 ADMIN ECG CONTRAST AGENT: CPT | Performed by: INTERNAL MEDICINE

## 2018-05-02 PROCEDURE — 93350 STRESS TTE ONLY: CPT | Performed by: INTERNAL MEDICINE

## 2018-05-02 PROCEDURE — 83880 ASSAY OF NATRIURETIC PEPTIDE: CPT | Performed by: INTERNAL MEDICINE

## 2018-05-02 PROCEDURE — 96372 THER/PROPH/DIAG INJ SC/IM: CPT

## 2018-05-02 PROCEDURE — 25010000002 PERFLUTREN 6.52 MG/ML SUSPENSION: Performed by: INTERNAL MEDICINE

## 2018-05-02 PROCEDURE — 84484 ASSAY OF TROPONIN QUANT: CPT | Performed by: EMERGENCY MEDICINE

## 2018-05-02 PROCEDURE — 93005 ELECTROCARDIOGRAM TRACING: CPT | Performed by: EMERGENCY MEDICINE

## 2018-05-02 PROCEDURE — 93018 CV STRESS TEST I&R ONLY: CPT | Performed by: INTERNAL MEDICINE

## 2018-05-02 RX ORDER — ASPIRIN 81 MG/1
81 TABLET ORAL DAILY
Status: DISCONTINUED | OUTPATIENT
Start: 2018-05-03 | End: 2018-05-02 | Stop reason: HOSPADM

## 2018-05-02 RX ORDER — PANTOPRAZOLE SODIUM 40 MG/1
40 TABLET, DELAYED RELEASE ORAL EVERY MORNING
Status: DISCONTINUED | OUTPATIENT
Start: 2018-05-02 | End: 2018-05-02 | Stop reason: HOSPADM

## 2018-05-02 RX ORDER — TIZANIDINE 4 MG/1
4 TABLET ORAL 2 TIMES DAILY PRN
Status: DISCONTINUED | OUTPATIENT
Start: 2018-05-02 | End: 2018-05-02 | Stop reason: HOSPADM

## 2018-05-02 RX ORDER — NITROGLYCERIN 0.4 MG/1
0.4 TABLET SUBLINGUAL
Status: DISCONTINUED | OUTPATIENT
Start: 2018-05-02 | End: 2018-05-02 | Stop reason: HOSPADM

## 2018-05-02 RX ORDER — SULFASALAZINE 500 MG/1
500 TABLET ORAL DAILY
Status: DISCONTINUED | OUTPATIENT
Start: 2018-05-02 | End: 2018-05-02 | Stop reason: HOSPADM

## 2018-05-02 RX ORDER — SODIUM CHLORIDE AND POTASSIUM CHLORIDE 150; 900 MG/100ML; MG/100ML
125 INJECTION, SOLUTION INTRAVENOUS CONTINUOUS
Status: DISCONTINUED | OUTPATIENT
Start: 2018-05-02 | End: 2018-05-02 | Stop reason: HOSPADM

## 2018-05-02 RX ORDER — ASPIRIN 81 MG/1
324 TABLET, CHEWABLE ORAL ONCE
Status: COMPLETED | OUTPATIENT
Start: 2018-05-02 | End: 2018-05-02

## 2018-05-02 RX ORDER — HYDROCODONE BITARTRATE AND ACETAMINOPHEN 5; 325 MG/1; MG/1
1 TABLET ORAL EVERY 6 HOURS PRN
Status: DISCONTINUED | OUTPATIENT
Start: 2018-05-02 | End: 2018-05-02 | Stop reason: HOSPADM

## 2018-05-02 RX ORDER — TOCOPHERSOLAN (VITAMIN E TPGS) 400/15ML
600 LIQUID (ML) ORAL 2 TIMES DAILY
COMMUNITY

## 2018-05-02 RX ORDER — IBUPROFEN 200 MG
200 TABLET ORAL EVERY 6 HOURS SCHEDULED
Status: DISCONTINUED | OUTPATIENT
Start: 2018-05-02 | End: 2018-05-02 | Stop reason: HOSPADM

## 2018-05-02 RX ORDER — ETODOLAC 500 MG/1
1500 TABLET, FILM COATED ORAL 2 TIMES DAILY
COMMUNITY
Start: 2018-04-03 | End: 2018-12-14

## 2018-05-02 RX ORDER — MELATONIN
1000 DAILY
COMMUNITY

## 2018-05-02 RX ORDER — ONDANSETRON 2 MG/ML
4 INJECTION INTRAMUSCULAR; INTRAVENOUS EVERY 6 HOURS PRN
Status: DISCONTINUED | OUTPATIENT
Start: 2018-05-02 | End: 2018-05-02 | Stop reason: HOSPADM

## 2018-05-02 RX ORDER — ETODOLAC 500 MG/1
500 TABLET, FILM COATED ORAL 2 TIMES DAILY
COMMUNITY
Start: 2018-02-01 | End: 2018-05-09 | Stop reason: SDUPTHER

## 2018-05-02 RX ORDER — SODIUM CHLORIDE 0.9 % (FLUSH) 0.9 %
1-10 SYRINGE (ML) INJECTION AS NEEDED
Status: DISCONTINUED | OUTPATIENT
Start: 2018-05-02 | End: 2018-05-02 | Stop reason: HOSPADM

## 2018-05-02 RX ORDER — GABAPENTIN 100 MG/1
100 CAPSULE ORAL 3 TIMES DAILY
Status: DISCONTINUED | OUTPATIENT
Start: 2018-05-02 | End: 2018-05-02 | Stop reason: HOSPADM

## 2018-05-02 RX ORDER — MELATONIN
1000 DAILY
Status: DISCONTINUED | OUTPATIENT
Start: 2018-05-02 | End: 2018-05-02 | Stop reason: HOSPADM

## 2018-05-02 RX ADMIN — ENOXAPARIN SODIUM 40 MG: 40 INJECTION SUBCUTANEOUS at 09:07

## 2018-05-02 RX ADMIN — CHOLECALCIFEROL (VITAMIN D3) 25 MCG (1,000 UNIT) TABLET 1000 UNITS: TABLET at 09:07

## 2018-05-02 RX ADMIN — SULFASALAZINE 500 MG: 500 TABLET ORAL at 09:07

## 2018-05-02 RX ADMIN — PERFLUTREN 8.48 MG: 6.52 INJECTION, SUSPENSION INTRAVENOUS at 13:54

## 2018-05-02 RX ADMIN — POTASSIUM CHLORIDE AND SODIUM CHLORIDE 125 ML/HR: 900; 150 INJECTION, SOLUTION INTRAVENOUS at 04:11

## 2018-05-02 RX ADMIN — PANTOPRAZOLE SODIUM 40 MG: 40 TABLET, DELAYED RELEASE ORAL at 07:27

## 2018-05-02 RX ADMIN — GABAPENTIN 100 MG: 100 CAPSULE ORAL at 09:07

## 2018-05-02 RX ADMIN — ASPIRIN 81 MG 324 MG: 81 TABLET ORAL at 04:09

## 2018-05-02 RX ADMIN — Medication 1 TABLET: at 09:07

## 2018-05-02 NOTE — PLAN OF CARE
Problem: Pain, Chronic (Adult)  Goal: Identify Related Risk Factors and Signs and Symptoms  Outcome: Ongoing (interventions implemented as appropriate)    Goal: Acceptable Pain/Comfort Level and Functional Ability  Outcome: Ongoing (interventions implemented as appropriate)      Problem: Patient Care Overview  Goal: Plan of Care Review  Outcome: Ongoing (interventions implemented as appropriate)   05/02/18 0521   Coping/Psychosocial   Plan of Care Reviewed With patient   Plan of Care Review   Progress improving   OTHER   Outcome Summary pt arrived to our ED via EMS with c/o chest pain, admit for obs, npo status for stress echo with contrast, neg troponin, lovenox for vte, VSS, sinus 64-70 on tele, will continue to monitor       Problem: Cardiac: ACS (Acute Coronary Syndrome) (Adult)  Goal: Signs and Symptoms of Listed Potential Problems Will be Absent, Minimized or Managed (Cardiac: ACS)  Outcome: Ongoing (interventions implemented as appropriate)

## 2018-05-02 NOTE — DISCHARGE SUMMARY
[facility] Medicine Services  DISCHARGE SUMMARY       Date of Admission: 5/1/2018  Date of Discharge:  5/2/2018  Primary Care Physician: Charlene Huntley, DNP, APRN    Discharge Diagnoses:  Hospital Problem List     Chest pain        Discharge diagnoses   1.  Chest pain, atypical  2.  Nausea with vomiting, resolved  3.  Hyponatremia, resolved  4.  Mild hypokalemia, resolved  5.  Tobacco abuse  6.  Rheumatoid arthritis  7.  2 soft tissue pulmonary nodules, 4mm in the right middle lobe, 5mm right upper lobe, repeat CT scan in 6 months  8.  Subcarinal and hilar adenopathy likely reactive in nature, follow-up CT chest  9.  Cholelithiasis  Presenting Problem/History of Present Illness:  Chest pain, unspecified type [R07.9]     Chief Complaint on Day of Discharge: No further chest pain.  History of Present Illness on Day of Discharge:   Sitting up in room.  Denies any further episodes of chest pain, palpitations or shortness of breath.  She denies further nausea or vomiting since emergency room.  She denies diaphoresis.  Tolerated diet.  Ambulated without chest pain or shortness of breath.  Hopes to go home today.     Consults: None    Procedures Performed: None    Pertinent Test Results:     Stress echocardiogram 5/2/18  · Left ventricular systolic function is normal. Estimated EF = 55%.  · Normal stress echo with no significant echocardiographic evidence for myocardial ischemia.      Results from last 7 days  Lab Units 05/01/18  2107   WBC 10*3/mm3 8.95   HEMOGLOBIN g/dL 13.3   HEMATOCRIT % 38.0   PLATELETS 10*3/mm3 270          Results from last 7 days  Lab Units 05/02/18  0405 05/01/18  2107   SODIUM mmol/L 142 126*   POTASSIUM mmol/L 4.0 3.4*   CHLORIDE mmol/L 105 94*   CO2 mmol/L 30.0 20.0*   BUN mg/dL 5 6   CREATININE mg/dL 0.65 0.59   CALCIUM mg/dL 9.1 8.9   BILIRUBIN mg/dL 0.6 0.5   ALK PHOS U/L 56 74   ALT (SGPT) U/L 35 32   AST (SGOT) U/L 31 25   GLUCOSE mg/dL 94 127*       Specimen: Blood Updated: 05/02/18 0645    Troponin I <0.012 ng/mL    Troponin [[id number]] (Normal) Collected: 05/02/18 0405   Lab Status: Final result Specimen: Blood Updated: 05/02/18 0515    Troponin I <0.012 ng/mL    Magnesium [[id number]] (Normal) Collected: 05/02/18 0405   Lab Status: Final result Specimen: Blood Updated: 05/02/18 0508    Magnesium 1.9 mg/dL       Collected: 05/02/18 0005   Lab Status: Final result Specimen: Blood Updated: 05/02/18 0034    Troponin I 0.013 ng/mL    BNP [[id number]] (Normal) Collected: 05/02/18 0005   Lab Status: Final result Specimen: Blood Updated: 05/02/18 0403    proBNP 97.6 pg/mL    Blood Gas, Arterial [[id number]] (Abnormal) Collected: 05/01/18 2150   Lab Status: Final result Specimen: Arterial Blood Updated: 05/01/18 2158    Site Right Radial    Jovanny's Test Positive    pH, Arterial 7.418 pH units     pCO2, Arterial 37.2 mm Hg     pO2, Arterial 57.2 (L) mm Hg     HCO3, Arterial 24.0 mmol/L     Base Excess, Arterial -0.3 (L) mmol/L     O2 Saturation, Arterial 91.4 (L) %     Temperature 37.0 C     Barometric Pressure for Blood Gas 753 mmHg     Modality Nasal Cannula    Flow Rate 2.0 lpm     Ventilator Mode NA    Collected by [id number]   Lactic Acid, Plasma [[id number]] (Normal) Collected: 05/01/18 2123   Lab Status: Final result Specimen: Blood Updated: 05/01/18 2143    Lactate 1.5 mmol/L      Troponin [[id number]] (Normal) Collected: 05/01/18 2107   Lab Status: Final result Specimen: Blood Updated: 05/01/18 2150    Troponin I <0.012 ng/mL    Protime-INR [[id number]] (Normal) Collected: 05/01/18 2107   Lab Status: Final result Specimen: Blood Updated: 05/01/18 2134    Protime 12.6 Seconds     INR 0.92   hCG, Serum, Qualitative [[id number]] (Normal) Collected: 05/01/18 2107   Lab Status: Final result Specimen: Blood Updated: 05/01/18 2137    HCG Qualitative Negative     nRBC 0.0 /100 WBC     Lipase [[id number]] (Normal) Collected: 05/01/18 2107   Lab Status: Final result Specimen:  Blood Updated: 05/01/18 2137    Lipase 163 U/L    BNP [[id number]] (Normal) Collected: 05/01/18 2107   Lab Status: Final result Specimen: Blood Updated: 05/01/18 2150    proBNP 118.0 pg/mL    aPTT [[id number]] (Normal) Collected: 05/01/18 2107   Lab Status: Final result Specimen: Blood Updated: 05/01/18 2134    PTT 33.7 seconds    D-dimer, Quantitative [[id number]] (Abnormal) Collected: 05/01/18 2107   Lab Status: Final result Specimen: Blood Updated: 05/01/18 2232    D-Dimer, Quantitative 3.64 (H) mg/L (FEU)    Narrative:     Reference Range is 0-0.50 mg/L FEU. However, results <0.50 mg/L FEU tends to rule out DVT or PE. Results >0.50 mg/L FEU are not useful in predicting absence or presence of DVT or PE.   Hemoglobin A1c [[id number]] Collected: 05/01/18 2107   Lab Status: Final result Specimen: Blood Updated: 05/02/18 0353    Hemoglobin A1C 5.1 %      Imaging Results (all)     Procedure Component Value Units Date/Time    CT Angiogram Chest With Contrast [[id number]] Collected:  05/02/18 0724     Updated:  05/02/18 0741    Narrative:       CT chest angiogram dated 5/1/2018 11:18 PM CDT      HISTORY: Hypoxia. Midsternal chest pain.     COMPARISON: None.      DLP: 455 mGy cm. Automated exposure control was utilized to diminish  patient radiation dose.     TECHNIQUE: Helical tomographic images of the chest were obtained after  the administration of intravenous contrast following angiogram protocol.  Additionally, 3D MIP reconstructions in the coronal and sagittal planes  were provided.        FINDINGS:    The imaged portion of the base of the neck appears unremarkable.      There is adequate enhancement of the pulmonary arteries to evaluate for  central and segmental pulmonary emboli. There are no filling defects  within the main, lobar, segmental or visualized subsegmental pulmonary  arteries. The pulmonary vessels are within normal limits.      There is a 5 mm soft tissue nodule within the right upper lobe just  above  the fissure on image #61 of sequence 5. This would warrant  follow-up with repeat nonenhanced CT imaging of the thorax in 6 months.  There is dependent atelectasis within both lower lobes. A subpleural  nodule is noted within the right middle lobe just below the minor  fissure measuring 4 mm in size on image #68 of sequence 5. There is no  evidence of pulmonary contusion or pneumothorax.. The trachea and  bronchial tree are patent.      The heart and great vessels appear unremarkable. There is no pericardial  effusion.      There is a 1.4 cm enlarged subpectoral and right axillary node. Several  other rather prominent nodes within the axilla bilaterally. A 1.4 cm  short axis subcarinal node is present. Prominent bilateral hilar nodes  are present including a 11 mm left hilar node and 11 mm right hilar  node. These are nonspecific but would warrant follow-up.     The osseous structures of the thorax and surrounding soft tissues  demonstrate no acute process.      Cholelithiasis is present. The adrenals are normal. A small hiatal  hernia is present..        Impression:       1. No evidence of pulmonary embolus or other acute cardiopulmonary  process.  2. Bilateral lower lobe subsegmental atelectasis. There are 2 soft  tissue pulmonary nodules one within the right middle lobe and one within  the right upper lobe which would warrant follow-up with repeat  nonenhanced CT imaging of the chest in 6 months. There is also enlarged  right axillary, subcarinal and hilar adenopathy which would warrant  follow-up. These nodes may be reactive in nature but given their size I  would recommend follow-up at the time of the repeat CT of the chest for  evaluation of the right upper and middle lobe pulmonary nodules.  3. Cholelithiasis.  4. Hiatal hernia.        This report was finalized on 05/02/2018 07:38 by Dr. Asad Lovett MD.    XR Chest 1 View [[id number]] Collected:  05/01/18 2139     Updated:  05/01/18 2144    Narrative:        History:  53-year-old with chest pain.     Reference:  Chest radiograph October 2016     Findings:  Frontal chest radiograph performed.     Heart size within normal limits. Mild bronchial thickening appears  chronic. Minimal subsegmental atelectasis in the left base. No acute  consolidation or edema. Pleural spaces are clear. There is an old  posterior left rib fracture. There is an old fracture the proximal left  humerus.          Impression:       No acute findings.  This report was finalized on 05/01/2018 21:41 by  Dre Alexandre, .        Hospital Course  Patient is a 53 y.o. female presented to [facility] emergency room 5/1/18 with midsternal chest pain that started 2 hours prior to presentation to the emergency room.  She reported tingling down both arms at rest and notified EMS.  She was given aspirin and sublingual nitroglycerin with resolution of the pain.  She complained of nausea with vomiting in the emergency room.  She received Zofran.  She has history of tobacco abuse and rheumatoid arthritis.  Troponin negative.  D-dimer 3.64.  PO2 57 on room air.  Sodium 126, potassium 3.4.  CT angiogram no evidence of pulmonary embolus or acute cardiopulmonary process.  She received aspirin, Zofran IV, Phenergan IM, normal saline fluid bolus in the emergency room.    She was admitted to the telemetry floor and remained normal sinus rhythm.  Serial troponins were monitored and were all negative.  She has stress echocardiogram 5/2/18 with results normal stress echo with no evidence for myocardial ischemia.  Ejection fraction 55%.  She denied any further episodes of chest pain, nausea or vomiting after treatment in the emergency room.  She remained normal sinus rhythm.  CT scan report revealed 5 mm soft tissue nodule right upper lobe and 4 mm nodule right middle lobe recommend follow-up CT in 6 months.  Also 1.4 cm large subpectoral and right axillary node noted.  Findings were discussed with patient.  She  "tells me that this was reported to her by physician upon admission.  Have advised her to make sure that her primary care provider is aware and she will need follow-up CT scan.  Also primary care provider to examine right axilla regarding any nodes present.  Discussed smoking cessation.  Patient plans to discontinue tobacco use.    On 5/2/18 she is medically stable for discharge.  Cardiac testing negative.  Stress echocardiogram negative.  No further episodes of chest pain, palpitations, shortness of breath, nausea or vomiting.  She will follow-up with her primary care provider SURYA Daniels 5/9/18.    Physical Exam on Discharge:  /73   Pulse 68   Temp 97.6 °F (36.4 °C) (Temporal Artery )   Resp 18   Ht 162.6 cm (64\")   Wt 84.6 kg (186 lb 7 oz)   SpO2 99%   BMI 32.00 kg/m²   Physical Exam   Constitutional: She is oriented to person, place, and time. She appears well-developed and well-nourished.   HENT:   Head: Normocephalic and atraumatic.   Eyes: EOM are normal. Pupils are equal, round, and reactive to light.   Neck: Normal range of motion. Neck supple.   Cardiovascular: Normal rate, regular rhythm, normal heart sounds and intact distal pulses.  Exam reveals no gallop and no friction rub.    No murmur heard.  Normal sinus rhythm 56-70 on telemetry   Pulmonary/Chest: Effort normal and breath sounds normal. No respiratory distress. She has no wheezes. She has no rales.   Abdominal: Soft. Bowel sounds are normal. She exhibits no distension. There is no tenderness.   Genitourinary:   Genitourinary Comments: Voiding.   Musculoskeletal: Normal range of motion. She exhibits no edema or deformity.   Neurological: She is alert and oriented to person, place, and time.   Skin: Skin is warm and dry.   Psychiatric: She has a normal mood and affect. Her behavior is normal. Judgment and thought content normal.     Condition on Discharge:  Stable and improved    Discharge Disposition:  Home    Discharge Diet: "   Diet Instructions     Advance Diet As Tolerated          as tolerated    Activity at Discharge:   Activity Instructions     Activity as Tolerated          as tolerated    Discharge Care Plan / Instructions:   1.  Should she worsen returning symptoms of chest pain she should seek medical attention.    Discharge Medications:   Adela Davis   Home Medication Instructions JOHANNA:[id number]    Printed on:05/02/18 1500   Medication Information                      Calcium-Magnesium-Vitamin D 500-250-200 MG-MG-UNIT tablet  Take 500 tablets by mouth 2 (Two) Times a Day.             cholecalciferol (VITAMIN D3) 1000 units tablet  Take 1,000 Units by mouth Daily.             Etanercept (ENBREL SC)  Inject 50 mg under the skin 1 (One) Time Per Week.             Etanercept 50 MG/ML solution auto-injector  Inject 50 mg under the skin 1 (One) Time Per Week.             etodolac (LODINE) 500 MG tablet  Take 500 mg by mouth 2 (Two) Times a Day.                          gabapentin (NEURONTIN) 100 MG capsule  Take 1 capsule by mouth 3 (Three) Times a Day.             HYDROcodone-acetaminophen (NORCO) 5-325 MG per tablet  Take 1 tablet by mouth Every 6 (Six) Hours As Needed (60).             omeprazole (priLOSEC) 40 MG capsule  Take 40 mg by mouth Daily.             sulfaSALAzine (AZULFIDINE) 500 MG tablet               tiZANidine (ZANAFLEX) 4 MG tablet  Take 1 tablet by mouth 2 (Two) Times a Day As Needed for Muscle Spasms.               Follow-up Appointments:   Follow-up Information     Charlene Huntley, [place], APRN Follow up in 1 week(s).    Specialties:  Family Medicine, Emergency Medicine  Why:  APPOINTMENT ON MAY 09, 2018 AT 09:30 AM  Contact information:  [address] [address] [phone]                 Test Results Pending at Discharge: None    The above documentation resulted from a face-to-face encounter by me Daniella LONDON, [facility].    SURYA Quintanilla  05/02/18  3:00 PM    Time:  This  discharge process required 35 minutes for completion.     Plan discussed with Dr. Chamberlain and patient.    Time spent in face-to-face evaluation, chart review, planning and education 35 minutes.  Please note that portions of this note may have been completed with a voice recognition program. Efforts were made to edit the dictations, but occasionally words are mistranscribed.        I personally evaluated and examined the patient in conjunction with SURYA Sullivan and agree with the assessment, treatment plan, and disposition of the patient as recorded by her. My history, exam, and further recommendations are: I have reviewed and agree with the plans. Kt.

## 2018-05-02 NOTE — H&P
[facility] Medicine Services  HISTORY AND PHYSICAL    Date of Admission: 5/1/2018  Primary Care Physician: Charlene Huntley, DNP, APRN    Subjective     Chief Complaint: Chest pain    History of Present Illness  Patient is a 53-year-old white female past medical history of rheumatoid arthritis who presents emergency room today with acute onset of midsternal chest pain.  She states that it started about 2 hours prior to arrival radiated down both arms it occurred at rest she apparently called EMS was given aspirin and nitroglycerin which improved her pain.  Patient has had some nausea as well since that time she is vomiting in the emergency room and she had no previous cardiac history she does smoke a pack a day she also does have rheumatoid arthritis as a risk factor for coronary artery disease.  No family history of coronary disease.  She is being admitted for further evaluation and management of chest pain.  She is also noted to have a low PO2 on her ABG when she presented on nasal cannula oxygen so a CTA was done for chest itch is also negative.  Her cardiac markers are negative her EKG is unremarkable her potassium is slightly low.        Review of Systems   14 point review of systems negative except for as per HPI    Otherwise complete ROS reviewed and negative except as mentioned in the HPI.    Past Medical History:   Past Medical History:   Diagnosis Date   • Arthritis    • Rheumatoid arthritis    • Senile osteoporosis 9/28/2017     Past Surgical History:  Past Surgical History:   Procedure Laterality Date   • FRACTURE SURGERY Bilateral     2010   • WRIST SURGERY Bilateral      Social History:  reports that she has been smoking Cigarettes.  She has a 20.00 pack-year smoking history. She has never used smokeless tobacco. She reports that she does not drink alcohol or use drugs.    Family History: family history includes Arthritis in her brother, father, maternal  grandfather, mother, and sister; Atrial fibrillation in her mother; COPD in her mother; Heart disease in her father; Hypertension in her father; No Known Problems in her daughter; Osteoarthritis in her mother.       Allergies:  No Known Allergies  Medications:  Prior to Admission medications    Medication Sig Start Date End Date Taking? Authorizing Provider   Calcium-Magnesium-Vitamin D 500-250-200 MG-MG-UNIT tablet Take 500 tablets by mouth 2 (Two) Times a Day.   Yes Historical Provider, MD   cholecalciferol (VITAMIN D3) 1000 units tablet Take 1,000 Units by mouth Daily.   Yes Historical Provider, MD   etodolac (LODINE) 500 MG tablet Take 500 mg by mouth 2 (Two) Times a Day. 4/3/18  Yes Historical Provider, MD   etodolac (LODINE) 500 MG tablet Take 500 mg by mouth 2 (Two) Times a Day. 2/1/18  Yes Historical Provider, MD   gabapentin (NEURONTIN) 100 MG capsule Take 1 capsule by mouth 3 (Three) Times a Day. 4/27/18  Yes Charlene Huntley DNP, APRN   HYDROcodone-acetaminophen (NORCO) 5-325 MG per tablet Take 1 tablet by mouth Every 6 (Six) Hours As Needed (60). 4/27/18  Yes Charlene Huntley DNP, APRN   omeprazole (priLOSEC) 40 MG capsule Take 40 mg by mouth Daily. 5/4/17  Yes Historical Provider, MD   sulfaSALAzine (AZULFIDINE) 500 MG tablet  3/19/18  Yes Historical Provider, MD   tiZANidine (ZANAFLEX) 4 MG tablet Take 1 tablet by mouth 2 (Two) Times a Day As Needed for Muscle Spasms. 3/5/18  Yes Charlene Huntley DNP, APRN   Etanercept (ENBREL SC) Inject 50 mg under the skin 1 (One) Time Per Week.    Historical Provider, MD   Etanercept 50 MG/ML solution auto-injector Inject 50 mg under the skin 1 (One) Time Per Week. 8/1/17   Historical Provider, MD   Calcium-Magnesium-Vitamin D (CALCIUM 500 PO) Take  by mouth.  5/2/18  Historical Provider, MD   cholecalciferol (VITAMIN D3) 1000 units tablet Take 1,000 Units by mouth Daily.  5/2/18  Historical Provider, MD   etodolac (LODINE) 400 MG tablet Take 500 mg by mouth 2  "(Two) Times a Day.  5/2/18  Historical Provider, MD     Objective     Vital Signs: /47 (BP Location: Left arm, Patient Position: Lying)   Pulse 65   Temp 98.7 °F (37.1 °C) (Temporal Artery )   Resp 18   Ht 162.6 cm (64\")   Wt 84.6 kg (186 lb 7 oz)   SpO2 96%   BMI 32.00 kg/m²   Physical Exam  Constitutional: She is oriented to person, place, and time. She appears well-developed and well-nourished.   HENT:   Head: Normocephalic and atraumatic.   Cardiovascular: Normal rate, regular rhythm and normal heart sounds.    Pulmonary/Chest: Effort normal and breath sounds normal.   Abdominal: Soft. Bowel sounds are normal.   Neurological: She is alert and oriented to person, place, and time.   Skin: Skin is warm and dry. Capillary refill takes less than 2 seconds.   Psychiatric: She has a normal mood and affect.   Nursing note and vitals reviewed.      Results Reviewed:  Lab Results (last 24 hours)     Procedure Component Value Units Date/Time    BNP [[id number]]  (Normal) Collected:  05/02/18 0005    Specimen:  Blood Updated:  05/02/18 0403     proBNP 97.6 pg/mL     Hemoglobin A1c [[id number]] Collected:  05/01/18 2107    Specimen:  Blood Updated:  05/02/18 0353     Hemoglobin A1C 5.1 %     Narrative:       Less than 6.0           Non-Diabetic Range  6.0-7.0                 ADA Therapeutic Target  Greater than 7.0        Action Suggested    Troponin [[id number]]  (Normal) Collected:  05/02/18 0005    Specimen:  Blood Updated:  05/02/18 0034     Troponin I 0.013 ng/mL     D-dimer, Quantitative [[id number]]  (Abnormal) Collected:  05/01/18 2107    Specimen:  Blood Updated:  05/01/18 2232     D-Dimer, Quantitative 3.64 (H) mg/L (FEU)     Narrative:       Reference Range is 0-0.50 mg/L FEU. However, results <0.50 mg/L FEU tends to rule out DVT or PE. Results >0.50 mg/L FEU are not useful in predicting absence or presence of DVT or PE.    [place] Draw [[id number]] Collected:  05/01/18 2107    Specimen:  Blood Updated:  " 05/01/18 2215    Narrative:       The following orders were created for panel order [place] Draw.  Procedure                               Abnormality         Status                     ---------                               -----------         ------                     Light Blue Top[[id number]]                                   Final result               Green Top (Gel)[[id number]]                                  Final result               Lavender Top[[id number]]                                     Final result               Red Top[[id number]]                                          Final result                 Please view results for these tests on the individual orders.    Green Top (Gel) [[id number]] Collected:  05/01/18 2107    Specimen:  Blood Updated:  05/01/18 2215     Extra Tube Hold for add-ons.     Comment: Auto resulted.       Red Top [[id number]] Collected:  05/01/18 2107    Specimen:  Blood Updated:  05/01/18 2215     Extra Tube Hold for add-ons.     Comment: Auto resulted.       Light Blue Top [[id number]] Collected:  05/01/18 2107    Specimen:  Blood Updated:  05/01/18 2215     Extra Tube hold for add-on     Comment: Auto resulted       Lavender Top [[id number]] Collected:  05/01/18 2107    Specimen:  Blood Updated:  05/01/18 2215     Extra Tube hold for add-on     Comment: Auto resulted       Blood Gas, Arterial [[id number]]  (Abnormal) Collected:  05/01/18 2150    Specimen:  Arterial Blood Updated:  05/01/18 2158     Site Right Radial     Jovanny's Test Positive     pH, Arterial 7.418 pH units      pCO2, Arterial 37.2 mm Hg      pO2, Arterial 57.2 (L) mm Hg      HCO3, Arterial 24.0 mmol/L      Base Excess, Arterial -0.3 (L) mmol/L      O2 Saturation, Arterial 91.4 (L) %      Temperature 37.0 C      Barometric Pressure for Blood Gas 753 mmHg      Modality Nasal Cannula     Flow Rate 2.0 lpm      Ventilator Mode NA     Collected by [id number]    Troponin [[id number]]  (Normal) Collected:  05/01/18 2107     Specimen:  Blood Updated:  05/01/18 2150     Troponin I <0.012 ng/mL     BNP [[id number]]  (Normal) Collected:  05/01/18 2107    Specimen:  Blood Updated:  05/01/18 2150     proBNP 118.0 pg/mL     Lactic Acid, Plasma [[id number]]  (Normal) Collected:  05/01/18 2123    Specimen:  Blood Updated:  05/01/18 2143     Lactate 1.5 mmol/L     Comprehensive Metabolic Panel [[id number]]  (Abnormal) Collected:  05/01/18 2107    Specimen:  Blood Updated:  05/01/18 2137     Glucose 127 (H) mg/dL      BUN 6 mg/dL      Creatinine 0.59 mg/dL      Sodium 126 (L) mmol/L      Potassium 3.4 (L) mmol/L      Chloride 94 (L) mmol/L      CO2 20.0 (L) mmol/L      Calcium 8.9 mg/dL      Total Protein 6.2 (L) g/dL      Albumin 3.70 g/dL      ALT (SGPT) 32 U/L      AST (SGOT) 25 U/L      Alkaline Phosphatase 74 U/L      Total Bilirubin 0.5 mg/dL      eGFR Non African Amer 107 mL/min/1.73      Globulin 2.5 gm/dL      A/G Ratio 1.5 g/dL      BUN/Creatinine Ratio 10.2     Anion Gap 12.0 mmol/L     hCG, Serum, Qualitative [[id number]]  (Normal) Collected:  05/01/18 2107    Specimen:  Blood Updated:  05/01/18 2137     HCG Qualitative Negative    Lipase [[id number]]  (Normal) Collected:  05/01/18 2107    Specimen:  Blood Updated:  05/01/18 2137     Lipase 163 U/L     Protime-INR [[id number]]  (Normal) Collected:  05/01/18 2107    Specimen:  Blood Updated:  05/01/18 2134     Protime 12.6 Seconds      INR 0.92    aPTT [[id number]]  (Normal) Collected:  05/01/18 2107    Specimen:  Blood Updated:  05/01/18 2134     PTT 33.7 seconds     CBC & Differential [[id number]] Collected:  05/01/18 2107    Specimen:  Blood Updated:  05/01/18 2126    Narrative:       The following orders were created for panel order CBC & Differential.  Procedure                               Abnormality         Status                     ---------                               -----------         ------                     CBC Auto Differential[[id number]]        Abnormal            Final  result                 Please view results for these tests on the individual orders.    CBC Auto Differential [[id number]]  (Abnormal) Collected:  05/01/18 2107    Specimen:  Blood Updated:  05/01/18 2126     WBC 8.95 10*3/mm3      RBC 4.47 10*6/mm3      Hemoglobin 13.3 g/dL      Hematocrit 38.0 %      MCV 85.0 fL      MCH 29.8 pg      MCHC 35.0 g/dL      RDW 12.6 %      RDW-SD 39.0 (L) fl      MPV 9.7 fL      Platelets 270 10*3/mm3      Neutrophil % 59.3 %      Lymphocyte % 27.8 %      Monocyte % 10.3 %      Eosinophil % 1.5 %      Basophil % 0.7 %      Immature Grans % 0.4 %      Neutrophils, Absolute 5.31 10*3/mm3      Lymphocytes, Absolute 2.49 10*3/mm3      Monocytes, Absolute 0.92 10*3/mm3      Eosinophils, Absolute 0.13 10*3/mm3      Basophils, Absolute 0.06 10*3/mm3      Immature Grans, Absolute 0.04 (H) 10*3/mm3      nRBC 0.0 /100 WBC         Imaging Results (last 24 hours)     Procedure Component Value Units Date/Time    CT Angiogram Chest With Contrast [[id number]] Updated:  05/01/18 2336    XR Chest 1 View [[id number]] Collected:  05/01/18 2139     Updated:  05/01/18 2144    Narrative:       History:  53-year-old with chest pain.     Reference:  Chest radiograph October 2016     Findings:  Frontal chest radiograph performed.     Heart size within normal limits. Mild bronchial thickening appears  chronic. Minimal subsegmental atelectasis in the left base. No acute  consolidation or edema. Pleural spaces are clear. There is an old  posterior left rib fracture. There is an old fracture the proximal left  humerus.          Impression:       No acute findings.  This report was finalized on 05/01/2018 21:41 by  Dre Alexandre, .        I have personally reviewed and interpreted the radiology studies and ECG obtained at time of admission.     Assessment / Plan     Assessment:   Hospital Problem List     Chest pain      1.  Chest pain plans to admit patient rule out for myocardial infarction with serial enzymes and  EKGs.  We will also obtain a stress echo if this is negative.  2.  Hypokalemia replace potassium she is also hyponatremic will give her normal saline with 20 of potassium IV and repeat her labs.      She is seen after midnight:          Code Status: Full     I discussed the patients findings and my recommendations with the patient    Estimated length of stay one day    Ned Hernandez MD   05/02/18   4:43 AM

## 2018-05-02 NOTE — ED PROVIDER NOTES
Subjective   Patient is a 7-year-old  female that presents to the ER today with complaint of chest pain.  Patient reports approximately 2 hours prior to arrival she developed some midsternal chest pain.  Patient states she also began to have tingling down both arms.  The patient states that she called EMS and upon arrival EMS.  The patient is a 24 mg of aspirin.  Patient also was given one sublingual nitroglycerin.  States that this did take the pain away.  The patient nausea since that time.  Patient was actively vomiting during the examination.  The patient did receive some Zofran and upon arrival to the ER.  Patient reports no previous cardiac history.  She is a one pack per day smoker.  Patient has history of rheumatoid arthritis, no other medical history.  She has no family history of cardiac disease.  She has had no history of PE or DVT in the past.  She has had normal external swelling or pain.  Patient has had no recent long-distance travel.  She presents ER today for further evaluation.        History provided by:  Patient   used: No    Chest Pain   Pain location:  Substernal area  Pain quality: aching and dull    Pain radiates to:  L arm and R arm  Pain severity:  Mild  Onset quality:  Sudden  Duration:  2 hours  Timing:  Constant  Progression:  Unchanged  Chronicity:  New  Context: not breathing, not drug use, not eating, not intercourse, not lifting, not movement, not raising an arm, not at rest, not stress and not trauma    Relieved by:  Nothing  Worsened by:  Nothing  Ineffective treatments:  None tried  Associated symptoms: nausea and vomiting    Associated symptoms: no abdominal pain, no AICD problem, no altered mental status, no anorexia, no anxiety, no back pain, no claudication, no cough, no diaphoresis, no dizziness, no dysphagia, no fatigue, no fever, no headache, no heartburn, no lower extremity edema, no near-syncope, no numbness, no orthopnea, no palpitations, no  PND, no shortness of breath, no syncope and no weakness    Risk factors: smoking    Risk factors: no aortic disease, no birth control, no coronary artery disease, no diabetes mellitus, no Kena-Danlos syndrome, no high cholesterol, no hypertension, no immobilization, not male, no Marfan's syndrome, not obese, not pregnant, no prior DVT/PE and no surgery        Review of Systems   Constitutional: Negative for diaphoresis, fatigue and fever.   HENT: Negative for trouble swallowing.    Respiratory: Negative for cough and shortness of breath.    Cardiovascular: Positive for chest pain. Negative for palpitations, orthopnea, claudication, syncope, PND and near-syncope.   Gastrointestinal: Positive for nausea and vomiting. Negative for abdominal pain, anorexia and heartburn.   Musculoskeletal: Negative for back pain.   Neurological: Negative for dizziness, weakness, numbness and headaches.   All other systems reviewed and are negative.      Past Medical History:   Diagnosis Date   • Arthritis    • Rheumatoid arthritis    • Senile osteoporosis 9/28/2017       No Known Allergies    Past Surgical History:   Procedure Laterality Date   • FRACTURE SURGERY Bilateral     2010   • WRIST SURGERY Bilateral        Family History   Problem Relation Age of Onset   • Arthritis Mother    • COPD Mother    • Atrial fibrillation Mother    • Osteoarthritis Mother    • Heart disease Father    • Hypertension Father    • Arthritis Father    • Arthritis Sister    • Arthritis Brother    • No Known Problems Daughter    • Arthritis Maternal Grandfather        Social History     Social History   • Marital status:      Social History Main Topics   • Smoking status: Current Every Day Smoker     Packs/day: 1.00     Years: 20.00     Types: Cigarettes   • Smokeless tobacco: Never Used      Comment: Would like to quit.   • Alcohol use No   • Drug use: No   • Sexual activity: Defer     Other Topics Concern   • Not on file           Objective  "  Physical Exam   Constitutional: She is oriented to person, place, and time. She appears well-developed and well-nourished.   HENT:   Head: Normocephalic and atraumatic.   Cardiovascular: Normal rate, regular rhythm and normal heart sounds.    Pulmonary/Chest: Effort normal and breath sounds normal.   Abdominal: Soft. Bowel sounds are normal.   Neurological: She is alert and oriented to person, place, and time.   Skin: Skin is warm and dry. Capillary refill takes less than 2 seconds.   Psychiatric: She has a normal mood and affect.   Nursing note and vitals reviewed.      Procedures           ED Course  ED Course   Comment By Time   Patient's labs are reviewed.  Troponin was negative.  EKG from today compared to EKG from 10/16. There are no significant changes noted. SURYA Zimmerman 05/01 2156   CBC was unremarkable.  CMP showed a glucose of 127, sodium of 126.  Patient's potassium is 3.4.  CO2 of 20. SURYA Zimmerman 05/01 2157   X-ray shows no acute findings.  The patient did receive aspirin 324 mg by mouth per EMS on the way to the ER.  She received 1 sublingual nitroglycerin that relieved her pain.  The patient has received 8 mg of Zofran to cover still has nausea and vomiting.  She is going to receive Phenergran IM. The pt is receiving IV fluids at 125 ml/hr. At this time the pt will be admitted to the hospitalist service at this time. Case discussed with Dr. West who agrees with plan of care.  SURYA Zimmerman 05/01 2157   Pt ABG shows low PO2; will order CTA chest to r/o PE.  SURYA Zimmerman 05/01 2211   Pending CTA chest at this time; case discussed and turned over to Dr. Cook; pt will be admitted once CTA results are returned and reviewed.  SURYA Zimmemran 05/01 2316        Patient signed out to me by Caroline LONDON with CTA chest pending.  CTA chest shows \"no pulmonary embolus and.  Normal thoracic aorta.  Mediastinal and bilateral hilar lymphadenopathy, " "nonspecific.  Small amount of posterior dependent bilateral atelectasis.  6 mm pulmonary nodule opacities and right upper lobe, best seen on series 5 images 61, perihilar region.  Tiny cholelithiasis.  Minimal mid thoracic spine scoliosis convex to the right.  Mild degenerative changes.\"    I spoke to Dr. Hernandez who agrees to admit the patient for her chest pain, nausea vomiting, and hyponatremia.  Patient comfortable at time of admission and agreeable with plan of care.  Also, I made her aware of her pulmonary nodule and the fact that she will need follow-up with a likely follow-up CT chest in the near future.            HEART Score (for prediction of 6-week risk of major adverse cardiac event) reviewed and/or performed as part of the patient evaluation and treatment planning process.  The result associated with this review/performance is: 3    PERC Rule (for pulmonary embolism) reviewed and/or performed as part of the patient evaluation and treatment planning process.  The result associated with this review/performance is: 1       MDM    ED Disposition     None      Admit      Final diagnoses:   Chest pain, unspecified type   Nausea and vomiting, intractability of vomiting not specified, unspecified vomiting type   Hyponatremia   Lung nodule            Judson Cook, DO  05/02/18 0045       Judson Cook, DO  05/02/18 0053    "

## 2018-05-08 ENCOUNTER — TELEPHONE (OUTPATIENT)
Dept: FAMILY MEDICINE CLINIC | Facility: CLINIC | Age: 53
End: 2018-05-08

## 2018-05-08 NOTE — TELEPHONE ENCOUNTER
----- Message from Sanna Tim sent at 5/2/2018  1:23 PM CDT -----  Regarding: Hosp Discharge  Patient will be discharged either today or tomorrow from [facility]. I've made her 1 week f/u appointment for 05/09/2018. She also has an appointment for a med refill on 05/24/2018 and they weren't sure if that needed to be canceled.

## 2018-05-08 NOTE — TELEPHONE ENCOUNTER
Well the appt for 5/9/18 will be a transitional and the one 5/24/18 is for narcotics so yes she will have to have both

## 2018-05-09 ENCOUNTER — OFFICE VISIT (OUTPATIENT)
Dept: FAMILY MEDICINE CLINIC | Facility: CLINIC | Age: 53
End: 2018-05-09

## 2018-05-09 VITALS
TEMPERATURE: 98.4 F | RESPIRATION RATE: 16 BRPM | DIASTOLIC BLOOD PRESSURE: 80 MMHG | HEIGHT: 64 IN | BODY MASS INDEX: 31.58 KG/M2 | OXYGEN SATURATION: 99 % | HEART RATE: 71 BPM | SYSTOLIC BLOOD PRESSURE: 102 MMHG | WEIGHT: 185 LBS

## 2018-05-09 DIAGNOSIS — M05.79 RHEUMATOID ARTHRITIS INVOLVING MULTIPLE SITES WITH POSITIVE RHEUMATOID FACTOR (HCC): ICD-10-CM

## 2018-05-09 DIAGNOSIS — F41.8 ANXIETY ASSOCIATED WITH DEPRESSION: ICD-10-CM

## 2018-05-09 DIAGNOSIS — Z09 HOSPITAL DISCHARGE FOLLOW-UP: Primary | ICD-10-CM

## 2018-05-09 PROCEDURE — 99496 TRANSJ CARE MGMT HIGH F2F 7D: CPT | Performed by: NURSE PRACTITIONER

## 2018-05-09 RX ORDER — DULOXETIN HYDROCHLORIDE 30 MG/1
30 CAPSULE, DELAYED RELEASE ORAL DAILY
Qty: 30 CAPSULE | Refills: 0 | Status: SHIPPED | OUTPATIENT
Start: 2018-05-09 | End: 2018-05-29 | Stop reason: SDUPTHER

## 2018-05-09 NOTE — PROGRESS NOTES
Transitional Care Follow Up Visit    Date of Admission: 5/1/2018  Date of Discharge:  5/2/2018    Risk for Readmission (LACE) Score: 4 (5/2/2018  5:00 AM)      Primary Care Physician: Charlene Huntley, DNP, APRN    Subjective     Adela Davis is a 53 y.o. female who presents for a transitional care management visit.    Within 48 business hours after discharge our office contacted her via telephone to coordinate her care and needs.      I reviewed and discussed the details of that call along with the discharge summary, hospital problems, inpatient lab results, inpatient diagnostic studies, and consultation reports with Adela.     Current outpatient and discharge medications have been reconciled for the patient.    The following portions of the patient's history were reviewed and updated as appropriate: allergies, current medications, past family history, past medical history, past social history, past surgical history and problem list.    History of Present Illness :  Hospital Course  Patient is a 53 y.o. female presented to [facility] emergency room 5/1/18 with midsternal chest pain that started 2 hours prior to presentation to the emergency room.  She reported tingling down both arms at rest and notified EMS.  She was given aspirin and sublingual nitroglycerin with resolution of the pain.  She complained of nausea with vomiting in the emergency room.  She received Zofran.  She has history of tobacco abuse and rheumatoid arthritis.  Troponin negative.  D-dimer 3.64.  PO2 57 on room air.  Sodium 126, potassium 3.4.  CT angiogram no evidence of pulmonary embolus or acute cardiopulmonary process.  She received aspirin, Zofran IV, Phenergan IM, normal saline fluid bolus in the emergency room.     She was admitted to the telemetry floor and remained normal sinus rhythm.     She denied any further episodes of chest pain, nausea or vomiting after treatment in the emergency room.  She remained normal sinus  rhythm.  CT scan report revealed 5 mm soft tissue nodule right upper lobe and 4 mm nodule right middle lobe recommend follow-up CT in 6 months.  Also 1.4 cm large subpectoral and right axillary node noted, will need follow up CT.  Cardiac testing negative.  Stress echocardiogram negative. Serial troponins were monitored and were all negative,  Ejection fraction 55%.         Stress echocardiogram 5/2/18  · Left ventricular systolic function is normal. Estimated EF = 55%.  · Normal stress echo with no significant echocardiographic evidence for myocardial ischemia.    CT Angiogram chest:   Impression:      1. No evidence of pulmonary embolus or other acute cardiopulmonary  process.  2. Bilateral lower lobe subsegmental atelectasis. There are 2 soft  tissue pulmonary nodules one within the right middle lobe and one within  the right upper lobe which would warrant follow-up with repeat  nonenhanced CT imaging of the chest in 6 months. There is also enlarged  right axillary, subcarinal and hilar adenopathy which would warrant  follow-up. These nodes may be reactive in nature but given their size I  would recommend follow-up at the time of the repeat CT of the chest for  evaluation of the right upper and middle lobe pulmonary nodules.  3. Cholelithiasis.  4. Hiatal hernia.        This report was finalized on 05/02/2018 07:38 by Dr. Asad Lovett MD.     XR Chest 1 View [[id number]] Collected:  05/01/18 2139     Updated:  05/01/18 2144   Narrative:      History:  53-year-old with chest pain.     Reference:  Chest radiograph October 2016     Findings:  Frontal chest radiograph performed.     Heart size within normal limits. Mild bronchial thickening appears  chronic. Minimal subsegmental atelectasis in the left base. No acute  consolidation or edema. Pleural spaces are clear. There is an old  posterior left rib fracture. There is an old fracture the proximal left  humerus.             Review of Systems   Constitutional:  Positive for activity change. Negative for chills and fatigue.   HENT: Negative.    Respiratory: Negative for apnea, cough, choking, chest tightness and shortness of breath.    Cardiovascular: Negative for chest pain, palpitations and leg swelling.   Gastrointestinal: Negative.    Endocrine: Negative.    Musculoskeletal: Positive for arthralgias, back pain, joint swelling and myalgias.   Skin: Negative.    Neurological: Negative for dizziness, seizures, facial asymmetry, light-headedness, numbness and headaches.   Hematological: Negative for adenopathy. Does not bruise/bleed easily.   Psychiatric/Behavioral: Negative for agitation, behavioral problems, confusion and decreased concentration.   All other systems reviewed and are negative.      Objective   Physical Exam   Constitutional: She is oriented to person, place, and time. Vital signs are normal. She appears well-developed and well-nourished. She is cooperative.   HENT:   Head: Normocephalic and atraumatic.   Right Ear: Hearing normal.   Left Ear: Hearing normal.   Nose: Nose normal.   Mouth/Throat: Uvula is midline, oropharynx is clear and moist and mucous membranes are normal.   Eyes: Conjunctivae are normal. Pupils are equal, round, and reactive to light.   Neck: Normal range of motion. Neck supple. Normal carotid pulses and no hepatojugular reflux present. Carotid bruit is not present.   Cardiovascular: Normal rate, regular rhythm and normal heart sounds.    Pulmonary/Chest: Effort normal and breath sounds normal.   Abdominal: Soft. Normal appearance and bowel sounds are normal.   Musculoskeletal:        Right wrist: She exhibits decreased range of motion, tenderness and swelling.        Left wrist: She exhibits decreased range of motion, tenderness and swelling.        Right knee: She exhibits decreased range of motion. Tenderness found. Patellar tendon tenderness noted.        Left knee: She exhibits decreased range of motion. Tenderness found. Patellar  tendon tenderness noted.        Right hand: She exhibits decreased range of motion and tenderness.        Left hand: She exhibits decreased range of motion and tenderness.        Hands:  Lymphadenopathy:        Head (right side): No submental, no submandibular and no tonsillar adenopathy present.        Head (left side): No submental, no submandibular and no tonsillar adenopathy present.     She has no cervical adenopathy.   Neurological: She is alert and oriented to person, place, and time. She has normal strength. No cranial nerve deficit or sensory deficit. She displays a negative Romberg sign.   Skin: Skin is warm, dry and intact.   Psychiatric: She has a normal mood and affect. Her speech is normal and behavior is normal. Judgment and thought content normal. Cognition and memory are normal.   Nursing note and vitals reviewed.      Assessment/Plan   Adela was seen today for chest pain.    Diagnoses and all orders for this visit:    Hospital discharge follow-up    Anxiety associated with depression  -     DULoxetine (CYMBALTA) 30 MG capsule; Take 1 capsule by mouth Daily.        -     PHQ-9 score 0    Rheumatoid arthritis involving multiple sites with positive rheumatoid factor        -   Keep follow up appt for RA and pain        -   Continue all meds as previously prescribed        Hospital discharge  Discharge diagnoses   1.  Chest pain, atypical  2.  Nausea with vomiting, resolved  3.  Hyponatremia, resolved  4.  Mild hypokalemia, resolved  5.  Tobacco abuse  6.  Rheumatoid arthritis  7.  2 soft tissue pulmonary nodules, 4mm in the right middle lobe, 5mm right upper lobe, repeat CT scan in 6 months  8.  Subcarinal and hilar adenopathy likely reactive in nature, follow-up CT chest  9.  Cholelithiasis       Return in about 1 month (around 6/9/2018) for Recheck RA and pain.    Charlene Huntley, DNP, APRN-BC  05/09/2018

## 2018-05-09 NOTE — PATIENT INSTRUCTIONS
Generalized Anxiety Disorder, Adult  Generalized anxiety disorder (LYNN) is a mental health disorder. People with this condition constantly worry about everyday events. Unlike normal anxiety, worry related to LYNN is not triggered by a specific event. These worries also do not fade or get better with time. LYNN interferes with life functions, including relationships, work, and school.  LYNN can vary from mild to severe. People with severe LYNN can have intense waves of anxiety with physical symptoms (panic attacks).  What are the causes?  The exact cause of LYNN is not known.  What increases the risk?  This condition is more likely to develop in:  · Women.  · People who have a family history of anxiety disorders.  · People who are very shy.  · People who experience very stressful life events, such as the death of a loved one.  · People who have a very stressful family environment.  What are the signs or symptoms?  People with LYNN often worry excessively about many things in their lives, such as their health and family. They may also be overly concerned about:  · Doing well at work.  · Being on time.  · Natural disasters.  · Friendships.  Physical symptoms of LYNN include:  · Fatigue.  · Muscle tension or having muscle twitches.  · Trembling or feeling shaky.  · Being easily startled.  · Feeling like your heart is pounding or racing.  · Feeling out of breath or like you cannot take a deep breath.  · Having trouble falling asleep or staying asleep.  · Sweating.  · Nausea, diarrhea, or irritable bowel syndrome (IBS).  · Headaches.  · Trouble concentrating or remembering facts.  · Restlessness.  · Irritability.  How is this diagnosed?  Your health care provider can diagnose LYNN based on your symptoms and medical history. You will also have a physical exam. The health care provider will ask specific questions about your symptoms, including how severe they are, when they started, and if they come and go. Your health care  provider may ask you about your use of alcohol or drugs, including prescription medicines. Your health care provider may refer you to a mental health specialist for further evaluation.  Your health care provider will do a thorough examination and may perform additional tests to rule out other possible causes of your symptoms.  To be diagnosed with LYNN, a person must have anxiety that:  · Is out of his or her control.  · Affects several different aspects of his or her life, such as work and relationships.  · Causes distress that makes him or her unable to take part in normal activities.  · Includes at least three physical symptoms of LYNN, such as restlessness, fatigue, trouble concentrating, irritability, muscle tension, or sleep problems.  Before your health care provider can confirm a diagnosis of LYNN, these symptoms must be present more days than they are not, and they must last for six months or longer.  How is this treated?  The following therapies are usually used to treat LYNN:  · Medicine. Antidepressant medicine is usually prescribed for long-term daily control. Antianxiety medicines may be added in severe cases, especially when panic attacks occur.  · Talk therapy (psychotherapy). Certain types of talk therapy can be helpful in treating LYNN by providing support, education, and guidance. Options include:  ¨ Cognitive behavioral therapy (CBT). People learn coping skills and techniques to ease their anxiety. They learn to identify unrealistic or negative thoughts and behaviors and to replace them with positive ones.  ¨ Acceptance and commitment therapy (ACT). This treatment teaches people how to be mindful as a way to cope with unwanted thoughts and feelings.  ¨ Biofeedback. This process trains you to manage your body's response (physiological response) through breathing techniques and relaxation methods. You will work with a therapist while machines are used to monitor your physical symptoms.  · Stress  management techniques. These include yoga, meditation, and exercise.  A mental health specialist can help determine which treatment is best for you. Some people see improvement with one type of therapy. However, other people require a combination of therapies.  Follow these instructions at home:  · Take over-the-counter and prescription medicines only as told by your health care provider.  · Try to maintain a normal routine.  · Try to anticipate stressful situations and allow extra time to manage them.  · Practice any stress management or self-calming techniques as taught by your health care provider.  · Do not punish yourself for setbacks or for not making progress.  · Try to recognize your accomplishments, even if they are small.  · Keep all follow-up visits as told by your health care provider. This is important.  Contact a health care provider if:  · Your symptoms do not get better.  · Your symptoms get worse.  · You have signs of depression, such as:  ¨ A persistently sad, cranky, or irritable mood.  ¨ Loss of enjoyment in activities that used to bring you dafne.  ¨ Change in weight or eating.  ¨ Changes in sleeping habits.  ¨ Avoiding friends or family members.  ¨ Loss of energy for normal tasks.  ¨ Feelings of guilt or worthlessness.  Get help right away if:  · You have serious thoughts about hurting yourself or others.  If you ever feel like you may hurt yourself or others, or have thoughts about taking your own life, get help right away. You can go to your nearest emergency department or call:  · Your local emergency services (911 in the [place]  · A suicide crisis helpline, such as the National Suicide Prevention Lifeline at 1-540.710.5512. This is open 24 hours a day.  Summary  · Generalized anxiety disorder (LYNN) is a mental health disorder that involves worry that is not triggered by a specific event.  · People with LYNN often worry excessively about many things in their lives, such as their health and  family.  · LYNN may cause physical symptoms such as restlessness, trouble concentrating, sleep problems, frequent sweating, nausea, diarrhea, headaches, and trembling or muscle twitching.  · A mental health specialist can help determine which treatment is best for you. Some people see improvement with one type of therapy. However, other people require a combination of therapies.  This information is not intended to replace advice given to you by your health care provider. Make sure you discuss any questions you have with your health care provider.  Document Released: 04/14/2014 Document Revised: 11/07/2017 Document Reviewed: 11/07/2017  [employer] Patient Education © 2017 [employer]

## 2018-05-14 PROBLEM — R59.1 LYMPHADENOPATHY: Status: ACTIVE | Noted: 2018-05-14

## 2018-05-14 PROBLEM — IMO0001 LUNG NODULE < 6CM ON CT: Status: ACTIVE | Noted: 2018-05-14

## 2018-05-24 ENCOUNTER — OFFICE VISIT (OUTPATIENT)
Dept: FAMILY MEDICINE CLINIC | Facility: CLINIC | Age: 53
End: 2018-05-24

## 2018-05-24 VITALS
SYSTOLIC BLOOD PRESSURE: 120 MMHG | BODY MASS INDEX: 30.73 KG/M2 | OXYGEN SATURATION: 98 % | DIASTOLIC BLOOD PRESSURE: 74 MMHG | HEIGHT: 64 IN | HEART RATE: 84 BPM | TEMPERATURE: 98.4 F | RESPIRATION RATE: 16 BRPM | WEIGHT: 180 LBS

## 2018-05-24 DIAGNOSIS — M05.79 RHEUMATOID ARTHRITIS INVOLVING MULTIPLE SITES WITH POSITIVE RHEUMATOID FACTOR (HCC): ICD-10-CM

## 2018-05-24 DIAGNOSIS — G89.29 CHRONIC HIP PAIN, UNSPECIFIED LATERALITY: ICD-10-CM

## 2018-05-24 DIAGNOSIS — E66.9 OBESITY (BMI 30.0-34.9): Primary | ICD-10-CM

## 2018-05-24 DIAGNOSIS — M25.559 CHRONIC HIP PAIN, UNSPECIFIED LATERALITY: ICD-10-CM

## 2018-05-24 PROCEDURE — 99214 OFFICE O/P EST MOD 30 MIN: CPT | Performed by: NURSE PRACTITIONER

## 2018-05-24 RX ORDER — HYDROCODONE BITARTRATE AND ACETAMINOPHEN 5; 325 MG/1; MG/1
1 TABLET ORAL EVERY 6 HOURS PRN
Qty: 60 TABLET | Refills: 0 | Status: SHIPPED | OUTPATIENT
Start: 2018-05-24 | End: 2018-06-22 | Stop reason: SDUPTHER

## 2018-05-24 NOTE — PROGRESS NOTES
Chief Complaint   Patient presents with   • Anxiety     Pt states anxiety is somewhat better but still having problems.          HPI  Adela Davis is a 53 y.o. female presents today for follow up for chronic pain. She states that her Rheumatologist left so now she has to get another one.  She is still struggling with the RA but the infusions due help her.  Rates pain in joints and back as 7/10.  Says that the gabapentin and the norco help her function throughout the day.      Chronic problems: Chronic problems: Chronic Pain and RA somewhat stable with enbrel, methotrexate, and chronic pain somewhat controlled with Neurontin.   She struggles with the simple things in life like opening a jar, lifting heavy pans, even just doing daily activities of living. We had discussed in the past disability and she applied but they turned her down because she can still use her hands. She has ligetament reasons for pain medication. Rates pain 6/10.  She says since she has been on the neurontin, tizanidine and norco she is functioning better.        PCP:  Charlene Huntley, DNP, APRN    No Known Allergies    Past Medical History:   Diagnosis Date   • Arthritis    • Rheumatoid arthritis    • Senile osteoporosis 9/28/2017       Past Surgical History:   Procedure Laterality Date   • FRACTURE SURGERY Bilateral     2010   • WRIST SURGERY Bilateral        Social History     Social History   • Marital status:      Social History Main Topics   • Smoking status: Current Every Day Smoker     Packs/day: 1.00     Years: 20.00     Types: Cigarettes   • Smokeless tobacco: Never Used      Comment: Would like to quit.   • Alcohol use No   • Drug use: No   • Sexual activity: Defer     Other Topics Concern   • Not on file       Family History   Problem Relation Age of Onset   • Arthritis Mother    • COPD Mother    • Atrial fibrillation Mother    • Osteoarthritis Mother    • Heart disease Father    • Hypertension Father    • Arthritis  Father    • Arthritis Sister    • Arthritis Brother    • No Known Problems Daughter    • Arthritis Maternal Grandfather        Current Outpatient Prescriptions on File Prior to Visit   Medication Sig Dispense Refill   • Calcium-Magnesium-Vitamin D 500-250-200 MG-MG-UNIT tablet Take 500 tablets by mouth 2 (Two) Times a Day.     • cholecalciferol (VITAMIN D3) 1000 units tablet Take 1,000 Units by mouth Daily.     • DULoxetine (CYMBALTA) 30 MG capsule Take 1 capsule by mouth Daily. 30 capsule 0   • etodolac (LODINE) 500 MG tablet Take 1,500 mg by mouth 2 (Two) Times a Day.     • gabapentin (NEURONTIN) 100 MG capsule Take 1 capsule by mouth 3 (Three) Times a Day. 90 capsule 5   • omeprazole (priLOSEC) 40 MG capsule Take 40 mg by mouth Daily.     • sulfaSALAzine (AZULFIDINE) 500 MG tablet Take  by mouth 3 (Three) Times a Day.     • tiZANidine (ZANAFLEX) 4 MG tablet Take 1 tablet by mouth 2 (Two) Times a Day As Needed for Muscle Spasms. 60 tablet 1   • [DISCONTINUED] HYDROcodone-acetaminophen (NORCO) 5-325 MG per tablet Take 1 tablet by mouth Every 6 (Six) Hours As Needed (60). 60 tablet 0   • [DISCONTINUED] Etanercept (ENBREL SC) Inject 50 mg under the skin 1 (One) Time Per Week.       No current facility-administered medications on file prior to visit.         REVIEW OF SYMPTOMS: (Positives bolded)  General:  weight loss, fever, chills, night sweats, fatigue, appetite loss  HEENT:  blurry vision, eye pain, eye discharge, dry eyes, decreased vision  Respiratory: shortness of breath, cough, hemoptysis, wheezing, pleurisy,   Cardiovascular:  chest pain, PND, palpitation, edema, orthopnea, syncope, swelling of extremities  Gastro: Nausea, vomiting, diarrhea, hematemesis, abdominal pain, constipation  Genito: hematuria, dysuria, glycosuria, hesitancy, frequency, incontinence  Musckelo: Arthralgia, myalgia, muscle weakness, joint swelling, NSAID use  Skin: rash, pruritis, sores, nail changes, skin thickening, change in  "wart/mole, itching, rash, new lesions, pruritus, nail changes  Neuro:  Migraine, numbness, ataxia, tremor, vertigo, weakness, memory loss  \"All other systems reviewed and negative, except as listed above.”      OBJECTIVE:  Constitutional:  Appearance-No acute distress, Consistent with stated age. Orientation- Oriented x 3, alert Posture-Not doubled over. Gait-Normal pace, normal arm movement. Posture- Normal Build and Nutrition-Well developed and well nourished.  General- Patient is pleasant and cooperative with the interview and exam.    Integumentary: General-No rashes, ulcers or lesions. No edema.  Palpation- Normal skin moisture/turgor. Skin is warm to touch, no increased warmth. Capillary refill is normal bilateral Upper and lower extremity.     Head/Neck: Head- normocephalic and atraumatic.  Neck- without visible/palpable lumps or pulsations.  Palpation- No bony tenderness about head/neck along frontal, occiptial, temporal, parietal, mastoid, jawline, zygoma, orbit or any other location.  NO temporal artery tenderness. No TMJ tenderness. Normal cervical ROM.   Neck Supple.  Thyroid-No thyromegaly, no nodules    CHEST/LUNG: Inspection- symmetric chest wall no pectus deformity. Normal effort, no distress, no use of accessory muscles. Palpation- nontender sternum, ribline.  No abnormal pulsations. Auscultation- Breath sounds normal throughout all lung fields.  Normal tracheal sounds, Normal bronchial sounds overlying sternum, Bronchovessicular sounds normal between scapulae posteriorly, Normal vessicular breath sounds heard throughout periphery. Lungs are clear today. Adventitious sounds- No wheezes, rales, rhonchi.     CARDIOVASCULAR:  Carotid artery- normal, no bruits or abnormal pulsations. Jugular vein- no pulsations. Palpation/Percussion- Normal PMI, no palpable thrill  Auscultation- Regular rate and rhythm. No murmur noted in sitting, supine positions. Extremities- no digital clubbing, cyanosis, edema, " increased warmth.    ABDOMEN: Inspection- normal and no visible pulsations. Normal contour. Auscultation- Bowel sounds normal, no abdominal bruits. Palpation/Percussion- soft, non-tender, no rebound tenderness, no rigidity (guarding), no jar tenderness, no masses.  Liver-no hepatomegaly, Spleen - no splenomegaly, Hernias- none. Rectal not examined.     Peripheral Vascular: Upper extremity Left- Normal temperature with pink nailbeds and no ulcerations.  Upper extremity Right- Normal temperature with pink nailbeds and no ulcerations.  Lower extremity- Normal temperature with pink nailbeds and no ulcerations. DP pulses 2+ bilaterally.  Pedal hair intact.  Normal capillary refill. Edema- No edema.    Musculoskeletal:   Upper extremity- Symmetrical posture. Bilateral hands have 1+ edema, visible deformity, decreased sensation along medial and lateral upper extremities.  She has tenderness on palpation of the hands, elbows and arms.  weak 3/5 bilateral, Strength 3/5 bilateral.  Elbow palpated, no tenderness overlying olecranon.  Normal supination, pronation to active/passive ROM and to resisted rotation. Bicep insertion/tricep insertion appear normal without obvious pathology. Rotator cuff evaluated and intact.  Normal wrist ROM bilaterally. Normal hand movement, intrinsic muscles of hands normal. No tenderness to palpation of hands/wrists/elbows.  Lower extremity- Not tender to palpation, no pain, no swelling, edema or erythema of surrounding tissue, normal strength and tone.  Normal appearing hip ROM bilaterally without pain.  Knee ROM normal at 0-120 degrees. No tenderness overlying trochanters, no tenderness about patella, quad tendon, patellar tendon.  No tenderness at tibial tuberosity. Ankle normal ROM not tender to palpation along medial/lateral malleolus. Normal movement of toes, no tenderness bilateral feet/toes.  Normal foot type. Calves symmetrical.  Stretching demonstrated today.  Lumbar Spine- No  deformities, masses or no known fractures, decreased, decreased ROM with bending and twisting.  Straight leg raises with pain raises all the way up.  Tenderness on palpation on lumbar.           Neuropsych: Oriented- Person, place, time. (AAOx3), Mood/affect- normal and congruent. Able to articulate well. Speech-Normal speech, normal rate, normal tone, normal use of language, volume and coherence.  Thought content- normal with ability to perform basic computations and apply abstract thought/reason. Associations- intact, no SI/HI, no hallucinations, delusions, obsessions.  Judgment/insight- Appropriate. Memory-Recall intact, remote and recent memory intact. Knowledge- Age appropriate fund of knowledge, concentration and attention span normal.    Lymphatic: Head/Neck- normal size and non tender to palpation. Axillary- Head and neck LN are normal size and non tender to palpation. Femoral and Inguinal- normal size and non tender to palpation.      Assessment/Plan:  Adela was seen today for anxiety.    Diagnoses and all orders for this visit:    Rheumatoid arthritis involving multiple sites with positive rheumatoid factor  -     HYDROcodone-acetaminophen (NORCO) 5-325 MG per tablet; Take 1 tablet by mouth Every 6 (Six) Hours As Needed (60).    Chronic hip pain, unspecified laterality  -     HYDROcodone-acetaminophen (NORCO) 5-325 MG per tablet; Take 1 tablet by mouth Every 6 (Six) Hours As Needed (60).         -      ALMA Report:     As part of this patient's treatment plan, I am prescribing controlled substances. The patient has been made aware of appropriate use of such medications, including potential risk of opiod use, somnolence, limited ability to drive and /or work safely, and potential for dependence or overdose. It has also been made clear that these medications are for use by this patient only, without concomitant use of alcohol or other substances unless prescribed.     Patient has completed prescribing  agreement detailing terms of continued prescribing of controlled substances, including monitoring ALMA reports, urine drug screening, and pill counts if necessary. The patient is aware that inappropriate use will result in cessation of prescribing such medications.     ALMA report has been reviewed by: Charlene Huntley, DNP, APRN. The report was scanned into the patient's chart.      -     Pt is aware of the potential for addiction and dependence.    Addiction was discussed.  The  Risks, benefits and alternative options of drug therapy discussed.  It was reinforced about the risks and benefits of opioids.  It was reinforced that patient may not call early for medication, may not ask for increase in the number of pills given monthly or increase in dosage of medication, may not jump around to different pharmacies or providers and may not receive any narcotics from other providers including ER, or the contract will be broken and narcotics will no longer be prescribed from this facility if any of these criteria has been broken.         obesity:  BMI: 30.9      Weight:    180    Follow up plan:  Lose at least 3 pounds before next chronic visit, exercise at least 3 times a week for 30 minute increments, eat baked instead of fried foods, and eat healthier     -  Documentation of education   The patient BMI is outside this range and we recommended/discussed today to utilize a diet/exercise program to get back into the appropriate range.  Federal guidelines recommend that people under the age of 65 should have a BMI of 18.5-24.9  Food diary:  Bring to next visit  tial step is to document everything that is consumed. Pt's that have a food diary  Lose 2x the weight  by keeping track of foods.   Choose one bad food weekly and eliminate it from your diet.  Replace with one healthy food  Exercise diary: Goal over next 2-4 weeks is walk 30 minutes per day 5 days per week at pace difficult to hold conversation.   Drink more  water, less soda.   Cut back on portion sizes.   Today we encouraged roughly a 1 lb per week weight loss with initial goal of 5% weight loss.  Avoid fast foods, eat more salads  If eating out for a meal, consider cutting food in half and placing into a to go container.  Individually portion any foods coming into the home   Use smaller plates  Drink 12 ozof water 30 minutes before meal as way to suppress appetite.  Discussed Contrave, metformin, topamax, Belviq and the cost of medications  Encouraged internet programs or self help books  Set  Goals that are realistic   Educated on ways to measure food  Baseball: 1 cup good for green salad, frozen yogurt, medium piece of fruit  Handful:  ½ cup good cut fruit, cooked vegetables, pasta, rice  Egg:  ¼ cup good for dried fruit  Deck of cards: 3 ounces good for meat and poultry  Check book:  3 ounces grilled fish  Plate Method:  reduce plate size to 9 inch dinner plate.  Half of plate should be filled with non-starchy vegetables (broccoli, lettuce, cauliflower, tomatoes), ¼ plate with lean source of protein (lean chicken, turkey, fish), remaining ½ with whole grains (brown rice, potato, whole grain breads  Avoid liquid calories (regular soda, juice, coffee with cream)  Focus  on water, seltzer water and other non-calorie drinks  Replace regular sugar with non-caloric sweeteners  Avoid skipping meals: plan small regular meals throughout the day in order to keep your hunger controlled  Consider using meal replacements if unable to plan a healthy meal (protein shake, high protein bar)  Replace all white bread with whole wheat/whole grain alternative  Swap regular salad dressings (mayonnaise, butter, or low fat or fat free alternative)  Avoid high fat, high calorie, high carbohydrate snakes (cookies, pastries, cakes)  Snack on fruits, low fat dairy (yogurt, cottage cheese)            Management plan:  Take medications as prescribed; return to the clinic of new or worsening  concerns.       Risks/benefits of current and new medications discussed with the patient and or family today.  The patient/family are aware and accept that if there any side effects they should call or return to clinic as soon as possible.  Appropriate F/U discussed for topics addressed today. All questions were answered to the satisfactory state of patient/family.  Should symptoms fail to improve or worsen they agree to call or return to clinic or to go to the ER. Education handouts were offered on any new Rx if requested.  Discussed the importance of following up with any needed screening tests/labs/specialist appointments and any requested follow-up recommended by me today.  Importance of maintaining follow-up discussed and patient accepts that missed appointments can delay diagnosis and potentially lead to worsening of conditions.    Return in about 1 month (around 6/24/2018) for Recheck Chronic pain and Schedule well visit.    Charlene Huntley, DNP, APRN  5/24/2018

## 2018-05-24 NOTE — PATIENT INSTRUCTIONS
Chronic Pain, Adult  Chronic pain is a type of pain that lasts or keeps coming back (recurs) for at least six months. You may have chronic headaches, abdominal pain, or body pain. Chronic pain may be related to an illness, such as fibromyalgia or complex regional pain syndrome. Sometimes the cause of chronic pain is not known.  Chronic pain can make it hard for you to do daily activities. If not treated, chronic pain can lead to other health problems, including anxiety and depression. Treatment depends on the cause and severity of your pain. You may need to work with a pain specialist to come up with a treatment plan. The plan may include medicine, counseling, and physical therapy. Many people benefit from a combination of two or more types of treatment to control their pain.  Follow these instructions at home:  Lifestyle   · Consider keeping a pain diary to share with your health care providers.  · Consider talking with a mental health care provider (psychologist) about how to cope with chronic pain.  · Consider joining a chronic pain support group.  · Try to control or lower your stress levels. Talk to your health care provider about strategies to do this.  General instructions     · Take over-the-counter and prescription medicines only as told by your health care provider.  · Follow your treatment plan as told by your health care provider. This may include:  ¨ Gentle, regular exercise.  ¨ Eating a healthy diet that includes foods such as vegetables, fruits, fish, and lean meats.  ¨ Cognitive or behavioral therapy.  ¨ Working with a physical therapist.  ¨ Meditation or yoga.  ¨ Acupuncture or massage therapy.  ¨ Aroma, color, light, or sound therapy.  ¨ Local electrical stimulation.  ¨ Shots (injections) of numbing or pain-relieving medicines into the spine or the area of pain.  · Check your pain level as told by your health care provider. Ask your health care provider if you should use a pain scale.  · Learn as  much as you can about how to manage your chronic pain. Ask your health care provider if an intensive pain rehabilitation program or a chronic pain specialist would be helpful.  · Keep all follow-up visits as told by your health care provider. This is important.  Contact a health care provider if:  · Your pain gets worse.  · You have new pain.  · You have trouble sleeping.  · You have trouble doing your normal activities.  · Your pain is not controlled with treatment.  · Your have side effects from pain medicine.  · You feel weak.  Get help right away if:  · You lose feeling or have numbness in your body.  · You lose control of bowel or bladder function.  · Your pain suddenly gets much worse.  · You develop shaking or chills.  · You develop confusion.  · You develop chest pain.  · You have trouble breathing or shortness of breath.  · You pass out.  · You have thoughts about hurting yourself or others.  This information is not intended to replace advice given to you by your health care provider. Make sure you discuss any questions you have with your health care provider.  Document Released: 09/09/2003 Document Revised: 08/17/2017 Document Reviewed: 06/06/2017  [employer] Interactive Patient Education © 2017 [employer]

## 2018-05-29 DIAGNOSIS — F41.8 ANXIETY ASSOCIATED WITH DEPRESSION: ICD-10-CM

## 2018-05-29 RX ORDER — DULOXETIN HYDROCHLORIDE 30 MG/1
CAPSULE, DELAYED RELEASE ORAL
Qty: 30 CAPSULE | Refills: 0 | Status: SHIPPED | OUTPATIENT
Start: 2018-05-29 | End: 2018-05-30 | Stop reason: SDUPTHER

## 2018-05-29 NOTE — TELEPHONE ENCOUNTER
Patient was seen and it was discussed that her Cymbalta was going to be increased and she had checked at the pharmacy and it had not been sent in if you could please review.

## 2018-05-30 DIAGNOSIS — F41.8 ANXIETY ASSOCIATED WITH DEPRESSION: ICD-10-CM

## 2018-05-30 RX ORDER — DULOXETIN HYDROCHLORIDE 60 MG/1
CAPSULE, DELAYED RELEASE ORAL
Qty: 30 CAPSULE | Refills: 2 | Status: SHIPPED | OUTPATIENT
Start: 2018-05-30 | End: 2018-09-08 | Stop reason: SDUPTHER

## 2018-06-22 ENCOUNTER — OFFICE VISIT (OUTPATIENT)
Dept: FAMILY MEDICINE CLINIC | Facility: CLINIC | Age: 53
End: 2018-06-22

## 2018-06-22 VITALS
RESPIRATION RATE: 18 BRPM | OXYGEN SATURATION: 98 % | WEIGHT: 182.8 LBS | HEIGHT: 64 IN | TEMPERATURE: 98.6 F | SYSTOLIC BLOOD PRESSURE: 110 MMHG | HEART RATE: 71 BPM | BODY MASS INDEX: 31.21 KG/M2 | DIASTOLIC BLOOD PRESSURE: 60 MMHG

## 2018-06-22 DIAGNOSIS — M25.559 CHRONIC HIP PAIN, UNSPECIFIED LATERALITY: ICD-10-CM

## 2018-06-22 DIAGNOSIS — M62.838 MUSCLE SPASM: ICD-10-CM

## 2018-06-22 DIAGNOSIS — M05.79 RHEUMATOID ARTHRITIS INVOLVING MULTIPLE SITES WITH POSITIVE RHEUMATOID FACTOR (HCC): Primary | ICD-10-CM

## 2018-06-22 DIAGNOSIS — G89.29 CHRONIC HIP PAIN, UNSPECIFIED LATERALITY: ICD-10-CM

## 2018-06-22 PROCEDURE — 99214 OFFICE O/P EST MOD 30 MIN: CPT | Performed by: NURSE PRACTITIONER

## 2018-06-22 RX ORDER — TIZANIDINE 4 MG/1
4 TABLET ORAL 2 TIMES DAILY PRN
Qty: 60 TABLET | Refills: 5 | Status: SHIPPED | OUTPATIENT
Start: 2018-06-22 | End: 2018-10-19 | Stop reason: SDUPTHER

## 2018-06-22 RX ORDER — HYDROCODONE BITARTRATE AND ACETAMINOPHEN 5; 325 MG/1; MG/1
1 TABLET ORAL EVERY 6 HOURS PRN
Qty: 60 TABLET | Refills: 0 | Status: SHIPPED | OUTPATIENT
Start: 2018-06-22 | End: 2018-07-20 | Stop reason: SDUPTHER

## 2018-06-22 NOTE — PATIENT INSTRUCTIONS
Acetaminophen; Hydrocodone tablets or capsules  What is this medicine?  ACETAMINOPHEN; HYDROCODONE (a set a MICHELE sugar fen; dhiraj droe KOE done) is a pain reliever. It is used to treat moderate to severe pain.  This medicine may be used for other purposes; ask your health care provider or pharmacist if you have questions.  COMMON BRAND NAME(S): Anexsia, Bancap HC, Ceta-Plus, Co-Gesic, Comfortpak, Dolagesic, Dolorex Forte, DuoCet, Hydrocet, Hydrogesic, Lorcet, Lorcet HD, Lorcet Plus, Lortab, Margesic H, Maxidone, Norco, Polygesic, Stagesic, Vanacet, Verdrocet, Vicodin, Vicodin ES, Vicodin HP, Xodol, Zydone  What should I tell my health care provider before I take this medicine?  They need to know if you have any of these conditions:  -brain tumor  -Crohn's disease, inflammatory bowel disease, or ulcerative colitis  -drug abuse or addiction  -head injury  -heart or circulation problems  -if you often drink alcohol  -kidney disease or problems going to the bathroom  -liver disease  -lung disease, asthma, or breathing problems  -an unusual or allergic reaction to acetaminophen, hydrocodone, other opioid analgesics, other medicines, foods, dyes, or preservatives  -pregnant or trying to get pregnant  -breast-feeding  How should I use this medicine?  Take this medicine by mouth with a glass of water. Follow the directions on the prescription label. You can take it with or without food. If it upsets your stomach, take it with food. Do not take your medicine more often than directed.  A special MedGuide will be given to you by the pharmacist with each prescription and refill. Be sure to read this information carefully each time.  Talk to your pediatrician regarding the use of this medicine in children. Special care may be needed.  Overdosage: If you think you have taken too much of this medicine contact a poison control center or emergency room at once.  NOTE: This medicine is only for you. Do not share this medicine with  others.  What if I miss a dose?  If you miss a dose, take it as soon as you can. If it is almost time for your next dose, take only that dose. Do not take double or extra doses.  What may interact with this medicine?  This medicine may interact with the following medications:  -alcohol  -antiviral medicines for HIV or AIDS  -atropine  -antihistamines for allergy, cough and cold  -certain antibiotics like erythromycin, clarithromycin  -certain medicines for anxiety or sleep  -certain medicines for bladder problems like oxybutynin, tolterodine  -certain medicines for depression like amitriptyline, fluoxetine, sertraline  -certain medicines for fungal infections like ketoconazole and itraconazole  -certain medicines for Parkinson's disease like benztropine, trihexyphenidyl  -certain medicines for seizures like carbamazepine, phenobarbital, phenytoin, primidone  -certain medicines for stomach problems like dicyclomine, hyoscyamine  -certain medicines for travel sickness like scopolamine  -general anesthetics like halothane, isoflurane, methoxyflurane, propofol  -ipratropium  -local anesthetics like lidocaine, pramoxine, tetracaine  -MAOIs like Carbex, Eldepryl, Marplan, Nardil, and Parnate  -medicines that relax muscles for surgery  -other medicines with acetaminophen  -other narcotic medicines for pain or cough  -phenothiazines like chlorpromazine, mesoridazine, prochlorperazine, thioridazine  -rifampin  This list may not describe all possible interactions. Give your health care provider a list of all the medicines, herbs, non-prescription drugs, or dietary supplements you use. Also tell them if you smoke, drink alcohol, or use illegal drugs. Some items may interact with your medicine.  What should I watch for while using this medicine?  Tell your doctor or health care professional if your pain does not go away, if it gets worse, or if you have new or a different type of pain. You may develop tolerance to the medicine.  Tolerance means that you will need a higher dose of the medicine for pain relief. Tolerance is normal and is expected if you take the medicine for a long time.  Do not suddenly stop taking your medicine because you may develop a severe reaction. Your body becomes used to the medicine. This does NOT mean you are addicted. Addiction is a behavior related to getting and using a drug for a non-medical reason. If you have pain, you have a medical reason to take pain medicine. Your doctor will tell you how much medicine to take. If your doctor wants you to stop the medicine, the dose will be slowly lowered over time to avoid any side effects.  There are different types of narcotic medicines (opiates). If you take more than one type at the same time or if you are taking another medicine that also causes drowsiness, you may have more side effects. Give your health care provider a list of all medicines you use. Your doctor will tell you how much medicine to take. Do not take more medicine than directed. Call emergency for help if you have problems breathing or unusual sleepiness.  Do not take other medicines that contain acetaminophen with this medicine. Always read labels carefully. If you have questions, ask your doctor or pharmacist.  If you take too much acetaminophen get medical help right away. Too much acetaminophen can be very dangerous and cause liver damage. Even if you do not have symptoms, it is important to get help right away.  You may get drowsy or dizzy. Do not drive, use machinery, or do anything that needs mental alertness until you know how this medicine affects you. Do not stand or sit up quickly, especially if you are an older patient. This reduces the risk of dizzy or fainting spells. Alcohol may interfere with the effect of this medicine. Avoid alcoholic drinks.  The medicine will cause constipation. Try to have a bowel movement at least every 2 to 3 days. If you do not have a bowel movement for 3  days, call your doctor or health care professional.  Your mouth may get dry. Chewing sugarless gum or sucking hard candy, and drinking plenty of water may help. Contact your doctor if the problem does not go away or is severe.  What side effects may I notice from receiving this medicine?  Side effects that you should report to your doctor or health care professional as soon as possible:  -allergic reactions like skin rash, itching or hives, swelling of the face, lips, or tongue  -breathing problems  -confusion  -redness, blistering, peeling or loosening of the skin, including inside the mouth  -signs and symptoms of low blood pressure like dizziness; feeling faint or lightheaded, falls; unusually weak or tired  -trouble passing urine or change in the amount of urine  -yellowing of the eyes or skin  Side effects that usually do not require medical attention (report to your doctor or health care professional if they continue or are bothersome):  -constipation  -dry mouth  -nausea, vomiting  -tiredness  This list may not describe all possible side effects. Call your doctor for medical advice about side effects. You may report side effects to FDA at 2-677-FDA-0421.  Where should I keep my medicine?  Keep out of the reach of children. This medicine can be abused. Keep your medicine in a safe place to protect it from theft. Do not share this medicine with anyone. Selling or giving away this medicine is dangerous and against the law.  This medicine may cause accidental overdose and death if it taken by other adults, children, or pets. Mix any unused medicine with a substance like cat litter or coffee grounds. Then throw the medicine away in a sealed container like a sealed bag or a coffee can with a lid. Do not use the medicine after the expiration date.  Store at room temperature between 15 and 30 degrees C (59 and 86 degrees F).  NOTE: This sheet is a summary. It may not cover all possible information. If you have  questions about this medicine, talk to your doctor, pharmacist, or health care provider.  © 2018 [employer] (2016-09-09 10:02:16)

## 2018-06-22 NOTE — PROGRESS NOTES
Chief Complaint   Patient presents with   • Pain     Back pain.    Needs medication refill.            HPI  Adela Davis is a 53 y.o. female presents today with chronic pain and Rheumatoid arthritis and she is trying to get a disability.  She is unable to work due to chronic pain and her daily life is affected by this disabling disease.  She currently takes humira every 14 days and has had failure on methotrexate, and embrel.  Goes to [facility] and sees RA doctor.  She is unable to work because of the pain in her hips, feet, hands and back.  She barely can get up daily for normal ADL's  Some days are better then others.  She is not able to hold a job due to the pain and she has to take her pain medication daily and since it is a narcotic that limits her ability to work.     Chronic problems: Chronic pain/RA stable with humira, and chronic pain somewhat controlled with Neurontin.   She struggles with the simple things in life like opening a jar, lifting heavy pans, even just doing daily activities of living. We had discussed in the past disability and she applied but they turned her down because she can still use her hands. She has ligetament reasons for pain medication. Rates pain 6/10.  She says since she has been on the neurontin, tizanidine and norco she is functioning better.        PCP:  Charlene Huntley, DNP, APRN    No Known Allergies    Past Medical History:   Diagnosis Date   • Arthritis    • Rheumatoid arthritis    • Senile osteoporosis 9/28/2017       Past Surgical History:   Procedure Laterality Date   • FRACTURE SURGERY Bilateral     2010   • WRIST SURGERY Bilateral        Social History     Social History   • Marital status:      Social History Main Topics   • Smoking status: Current Every Day Smoker     Packs/day: 1.00     Years: 20.00     Types: Cigarettes   • Smokeless tobacco: Never Used      Comment: Would like to quit.   • Alcohol use No   • Drug use: No   • Sexual activity: Defer      Other Topics Concern   • Not on file       Family History   Problem Relation Age of Onset   • Arthritis Mother    • COPD Mother    • Atrial fibrillation Mother    • Osteoarthritis Mother    • Heart disease Father    • Hypertension Father    • Arthritis Father    • Arthritis Sister    • Arthritis Brother    • No Known Problems Daughter    • Arthritis Maternal Grandfather        Current Outpatient Prescriptions on File Prior to Visit   Medication Sig Dispense Refill   • Adalimumab (HUMIRA SC) Inject 1 application under the skin.     • Calcium-Magnesium-Vitamin D 500-250-200 MG-MG-UNIT tablet Take 500 tablets by mouth 2 (Two) Times a Day.     • cholecalciferol (VITAMIN D3) 1000 units tablet Take 1,000 Units by mouth Daily.     • DULoxetine (CYMBALTA) 60 MG capsule One capsule daily 30 capsule 2   • etodolac (LODINE) 500 MG tablet Take 1,500 mg by mouth 2 (Two) Times a Day.     • gabapentin (NEURONTIN) 100 MG capsule Take 1 capsule by mouth 3 (Three) Times a Day. 90 capsule 5   • omeprazole (priLOSEC) 40 MG capsule Take 40 mg by mouth Daily.     • sulfaSALAzine (AZULFIDINE) 500 MG tablet Take  by mouth 3 (Three) Times a Day.     • [DISCONTINUED] HYDROcodone-acetaminophen (NORCO) 5-325 MG per tablet Take 1 tablet by mouth Every 6 (Six) Hours As Needed (60). 60 tablet 0   • [DISCONTINUED] tiZANidine (ZANAFLEX) 4 MG tablet Take 1 tablet by mouth 2 (Two) Times a Day As Needed for Muscle Spasms. 60 tablet 1     No current facility-administered medications on file prior to visit.         REVIEW OF SYMPTOMS: (Positives bolded)  General:  weight loss, fever, chills, night sweats, fatigue, appetite loss  HEENT:  blurry vision, eye pain, eye discharge, dry eyes, decreased vision  Respiratory: shortness of breath, cough, hemoptysis, wheezing, pleurisy,   Cardiovascular:  chest pain, PND, palpitation, edema, orthopnea, syncope, swelling of extremities  Gastro: Nausea, vomiting, diarrhea, hematemesis, abdominal pain,  "constipation  Genito: hematuria, dysuria, glycosuria, hesitancy, frequency, incontinence  Musckelo: Arthralgia, myalgia, muscle weakness, joint swelling, NSAID use  Skin: rash, pruritis, sores, nail changes, skin thickening, change in wart/mole, itching, rash, new lesions, pruritus, nail changes  Neuro:  Migraine, numbness, ataxia, tremor, vertigo, weakness, memory loss  \"All other systems reviewed and negative, except as listed above.”      OBJECTIVE:  Constitutional:  Appearance-No acute distress, Consistent with stated age. Orientation- Oriented x 3, alert Posture-Not doubled over. Gait-Normal pace, normal arm movement. Posture- Normal Build and Nutrition-Well developed and well nourished.  General- Patient is pleasant and cooperative with the interview and exam.    Integumentary: General-No rashes, ulcers or lesions. No edema.  Palpation- Normal skin moisture/turgor. Skin is warm to touch, no increased warmth. Capillary refill is normal bilateral Upper and lower extremity.     Head/Neck: Head- normocephalic and atraumatic.  Neck- without visible/palpable lumps or pulsations.  Palpation- No bony tenderness about head/neck along frontal, occiptial, temporal, parietal, mastoid, jawline, zygoma, orbit or any other location.  NO temporal artery tenderness. No TMJ tenderness. Normal cervical ROM.   Neck Supple.  Thyroid-No thyromegaly, no nodules    Eye: Bilaterally PERRLA, EOMI.  No discharge.  Upper and lower eyelids are normal. Sclera/conjunctiva normal without discharge. Cornea is normal and clear. Lens is normal.  Eyeball appears normal. No ciliary flushing, no conjunctival injection.    ENMT:  Pinna- normal without tenderness or erythema.  External auditory canal Left- normal without erythema or discharge, no excessive cerumen. External auditory canal Right-normal without erythema or discharge, no excessive cerumen. TM left- Grey/pearly, normal light reflex and anatomy TM Right- Grey/pearly, normal light reflex " and anatomy Hearing Assessment-normal to conversational speech at 2-5 feet.  Nose and sinus-No sinus tenderness along frontal/maxillary region. External appearance normal and midline. Nares- bilateral quiet airflow, no discharge. Nasal mucosa- No bleeding noted and no ulcerations observed. Pink, moist. Turbinates non boggy. Lips- normal color, moist without cracks/lesions Oral Cavity/Palate- hard/soft palate intact without lesions, oral mucosa pink and moist. Dentition assessed and discussed appropriate oral care. Tongue normal midline.  Oropharynx- no pharyngeal erythema, Uvula midline. No post nasal drip. No exudate. Salivary glands- Non tender to palpation    CHEST/LUNG: Inspection- symmetric chest wall no pectus deformity. Normal effort, no distress, no use of accessory muscles. Palpation- nontender sternum, ribline.  No abnormal pulsations. Auscultation- Breath sounds normal throughout all lung fields.  Normal tracheal sounds, Normal bronchial sounds overlying sternum, Bronchovessicular sounds normal between scapulae posteriorly, Normal vessicular breath sounds heard throughout periphery. Lungs are clear today. Adventitious sounds- No wheezes, rales, rhonchi.     CARDIOVASCULAR:  Carotid artery- normal, no bruits or abnormal pulsations. Jugular vein- no pulsations. Palpation/Percussion- Normal PMI, no palpable thrill  Auscultation- Regular rate and rhythm. No murmur noted in sitting, supine positions. Extremities- no digital clubbing, cyanosis, edema, increased warmth.    ABDOMEN: Inspection- normal and no visible pulsations. Normal contour. Auscultation- Bowel sounds normal, no abdominal bruits. Palpation/Percussion- soft, non-tender, no rebound tenderness, no rigidity (guarding), no jar tenderness, no masses.  Liver-no hepatomegaly, Spleen - no splenomegaly, Hernias- none. Rectal not examined.     Peripheral Vascular: Upper extremity Left- Normal temperature with pink nailbeds and no ulcerations.  Upper  extremity Right- Normal temperature with pink nailbeds and no ulcerations.  Lower extremity- Normal temperature with pink nailbeds and no ulcerations. DP pulses 2+ bilaterally.  Pedal hair intact.  Normal capillary refill. Edema- No edema.    Musculoskeletal: Generalized-No generalized swelling or edema of extremities, no digital clubbing or cyanosis, neurovascularly intact all four extremities.  Upper extremity- Symmetrical posture.  No visible deformity.  Normal sensation along medial and lateral upper extremity proximally and distally.  NO tenderness overlying shoulder, lateral/medial epicondyle.  bilaterally 3/5, strength 3/5.   Elbow palpated, no tenderness overlying olecranon.  Normal supination, pronation to active/passive ROM and to resisted rotation. Bicep insertion/tricep insertion appear normal without obvious pathology. Rotator cuff evaluated and intact.  Normal wrist ROM bilaterally. Normal hand movement, intrinsic muscles of hands normal. Has tenderness on palpation of the wrists and hands.  Lower extremity- Not tender to palpation, no pain, no swelling, edema or erythema of surrounding tissue, normal strength and tone.  Normal appearing hip ROM bilaterally without pain.  Knee ROM normal at 0-120 degrees. No tenderness overlying trochanters, no tenderness about patella, quad tendon, patellar tendon.  No tenderness at tibial tuberosity. Ankle normal ROM not tender to palpation along medial/lateral malleolus. Normal movement of toes, no tenderness bilateral feet/toes.  Normal foot type. Calves symmetrical.  Stretching demonstrated today.  Lumbar Spine- No deformities, masses or no known fractures, decreased, decreased ROM with bending and twisting.  Straight leg raises with pain raises all the way up.  Tenderness on palpation on whole lumbar spine.  No deformities, masses or no known fractures, normal strength, decreased ROM.      Neuropsych: Oriented- Person, place, time. (AAOx3), Mood/affect-  normal and congruent. Able to articulate well. Speech-Normal speech, normal rate, normal tone, normal use of language, volume and coherence.  Thought content- normal with ability to perform basic computations and apply abstract thought/reason. Associations- intact, no SI/HI, no hallucinations, delusions, obsessions.  Judgment/insight- Appropriate. Memory-Recall intact, remote and recent memory intact. Knowledge- Age appropriate fund of knowledge, concentration and attention span normal.    Lymphatic: Head/Neck- normal size and non tender to palpation. Axillary- Head and neck LN are normal size and non tender to palpation. Femoral and Inguinal- normal size and non tender to palpation.      Assessment/Plan:  Adela was seen today for pain.    Diagnoses and all orders for this visit:    Rheumatoid arthritis involving multiple sites with positive rheumatoid factor  -     HYDROcodone-acetaminophen (NORCO) 5-325 MG per tablet; Take 1 tablet by mouth Every 6 (Six) Hours As Needed (60).        -       ALMA Report:     As part of this patient's treatment plan, I am prescribing controlled substances. The patient has been made aware of appropriate use of such medications, including potential risk of opiod use, somnolence, limited ability to drive and /or work safely, and potential for dependence or overdose. It has also been made clear that these medications are for use by this patient only, without concomitant use of alcohol or other substances unless prescribed.     Patient has completed prescribing agreement detailing terms of continued prescribing of controlled substances, including monitoring ALMA reports, urine drug screening, and pill counts if necessary. The patient is aware that inappropriate use will result in cessation of prescribing such medications.     ALMA report has been reviewed by: Charlene Huntley, [place], APRN on 6/22/18, #[id number]  The report was scanned into the patient's chart.      -     Pt is aware  of the potential for addiction and dependence.    Addiction was discussed.  The  Risks, benefits and alternative options of drug therapy discussed.  It was reinforced about the risks and benefits of opioids.  It was reinforced that patient may not call early for medication, may not ask for increase in the number of pills given monthly or increase in dosage of medication, may not jump around to different pharmacies or providers and may not receive any narcotics from other providers including ER, or the contract will be broken and narcotics will no longer be prescribed from this facility if any of these criteria has been broken.        Chronic hip pain, unspecified laterality  -     HYDROcodone-acetaminophen (NORCO) 5-325 MG per tablet; Take 1 tablet by mouth Every 6 (Six) Hours As Needed (60).    Muscle spasm  -     tiZANidine (ZANAFLEX) 4 MG tablet; Take 1 tablet by mouth 2 (Two) Times a Day As Needed for Muscle Spasms.        Morbid obesity:  BMI:   31.4    Weight:  182      Follow up plan:  Lose at least 3 pounds before next chronic visit, exercise at least 3 times a week for 30 minute increments, eat baked instead of fried foods, and eat healthier     -  Documentation of education   The patient BMI is outside this range and we recommended/discussed today to utilize a diet/exercise program to get back into the appropriate range.  Federal guidelines recommend that people under the age of 65 should have a BMI of 18.5-24.9  Food diary:  Bring to next visit  tial step is to document everything that is consumed. Pt's that have a food diary  Lose 2x the weight  by keeping track of foods.   Choose one bad food weekly and eliminate it from your diet.  Replace with one healthy food  Exercise diary: Goal over next 2-4 weeks is walk 30 minutes per day 5 days per week at pace difficult to hold conversation.   Drink more water, less soda.   Cut back on portion sizes.   Today we encouraged roughly a 1 lb per week weight loss  with initial goal of 5% weight loss.  Avoid fast foods, eat more salads  If eating out for a meal, consider cutting food in half and placing into a to go container.  Individually portion any foods coming into the home   Use smaller plates  Drink 12 ozof water 30 minutes before meal as way to suppress appetite.  Discussed Contrave, metformin, topamax, Belviq and the cost of medications  Encouraged internet programs or self help books  Set  Goals that are realistic   Educated on ways to measure food  Baseball: 1 cup good for green salad, frozen yogurt, medium piece of fruit  Handful:  ½ cup good cut fruit, cooked vegetables, pasta, rice  Egg:  ¼ cup good for dried fruit  Deck of cards: 3 ounces good for meat and poultry  Check book:  3 ounces grilled fish  Plate Method:  reduce plate size to 9 inch dinner plate.  Half of plate should be filled with non-starchy vegetables (broccoli, lettuce, cauliflower, tomatoes), ¼ plate with lean source of protein (lean chicken, turkey, fish), remaining ½ with whole grains (brown rice, potato, whole grain breads  Avoid liquid calories (regular soda, juice, coffee with cream)  Focus  on water, seltzer water and other non-calorie drinks  Replace regular sugar with non-caloric sweeteners  Avoid skipping meals: plan small regular meals throughout the day in order to keep your hunger controlled  Consider using meal replacements if unable to plan a healthy meal (protein shake, high protein bar)  Replace all white bread with whole wheat/whole grain alternative  Swap regular salad dressings (mayonnaise, butter, or low fat or fat free alternative)  Avoid high fat, high calorie, high carbohydrate snakes (cookies, pastries, cakes)  Snack on fruits, low fat dairy (yogurt, cottage cheese)        Disability forms filled out complementary during office visit.     Management plan:  Take medications as prescribed; return to the clinic of new or worsening concerns.       Risks/benefits of current and  new medications discussed with the patient and or family today.  The patient/family are aware and accept that if there any side effects they should call or return to clinic as soon as possible.  Appropriate F/U discussed for topics addressed today. All questions were answered to the satisfactory state of patient/family.  Should symptoms fail to improve or worsen they agree to call or return to clinic or to go to the ER. Education handouts were offered on any new Rx if requested.  Discussed the importance of following up with any needed screening tests/labs/specialist appointments and any requested follow-up recommended by me today.  Importance of maintaining follow-up discussed and patient accepts that missed appointments can delay diagnosis and potentially lead to worsening of conditions.    Return in about 1 month (around 7/22/2018) for Recheck chronic pain.    Charlene Huntley, DNP, APRN  6/22/2018

## 2018-07-20 ENCOUNTER — OFFICE VISIT (OUTPATIENT)
Dept: FAMILY MEDICINE CLINIC | Facility: CLINIC | Age: 53
End: 2018-07-20

## 2018-07-20 VITALS
HEART RATE: 70 BPM | HEIGHT: 64 IN | TEMPERATURE: 98.8 F | SYSTOLIC BLOOD PRESSURE: 134 MMHG | RESPIRATION RATE: 18 BRPM | BODY MASS INDEX: 30.8 KG/M2 | WEIGHT: 180.4 LBS | OXYGEN SATURATION: 95 % | DIASTOLIC BLOOD PRESSURE: 82 MMHG

## 2018-07-20 DIAGNOSIS — Z12.39 SCREENING FOR MALIGNANT NEOPLASM OF BREAST: ICD-10-CM

## 2018-07-20 DIAGNOSIS — M25.559 CHRONIC HIP PAIN, UNSPECIFIED LATERALITY: ICD-10-CM

## 2018-07-20 DIAGNOSIS — F17.200 NEEDS SMOKING CESSATION EDUCATION: ICD-10-CM

## 2018-07-20 DIAGNOSIS — M05.79 RHEUMATOID ARTHRITIS INVOLVING MULTIPLE SITES WITH POSITIVE RHEUMATOID FACTOR (HCC): Primary | ICD-10-CM

## 2018-07-20 DIAGNOSIS — G89.29 CHRONIC HIP PAIN, UNSPECIFIED LATERALITY: ICD-10-CM

## 2018-07-20 PROCEDURE — 99214 OFFICE O/P EST MOD 30 MIN: CPT | Performed by: NURSE PRACTITIONER

## 2018-07-20 RX ORDER — NICOTINE 21 MG/24HR
1 PATCH, TRANSDERMAL 24 HOURS TRANSDERMAL EVERY 24 HOURS
Qty: 30 PATCH | Refills: 5 | Status: SHIPPED | OUTPATIENT
Start: 2018-07-20 | End: 2018-09-18

## 2018-07-20 RX ORDER — HYDROCODONE BITARTRATE AND ACETAMINOPHEN 5; 325 MG/1; MG/1
1 TABLET ORAL EVERY 6 HOURS PRN
Qty: 60 TABLET | Refills: 0 | Status: SHIPPED | OUTPATIENT
Start: 2018-07-20 | End: 2018-08-20 | Stop reason: SDUPTHER

## 2018-07-20 NOTE — PATIENT INSTRUCTIONS
Tobacco abuse: Smoking Cessation discussed today for  >10 minutes.  · Patient advised regarding the risks of continued tobacco use  · Set a quit date    · Methods to quit smoking were discussed   · Handouts offered to the patient regarding tobacco cessation  · Patient expressed willingness to quit smoking   · Benefits of oral medications were discussed:  Chantix/Wellbutrin/Nicoderm CQ/gum   · Risks and side effects of medication discussed:  Suicidal thoughts, behavior change, agitation, hostility  · Discussed no benefit or recommendation to switch to E. Cigarettes as health hazards are not yet known.   · Lifestyle changes discussed  · Stress reduction discussed  · BH cessation class and handout offered to patient today.   · Discussed to consider ration technique to quit with time: Each day set out the number of cigarettes that you allow yourself to smoke.  Each day decrease this number by 1 cigarrette until you get to a point that you feel you need another cigarette.  You can hold at that level x 1 week.  Continue to decrease until you either are tobacco free or until you cannot decline any further. When you cannot decrease this any further you can return and we can discussed medication options again.  Initial goal with reduction technique is 25% in 3 months.  · Write down all the reasons why you want to quit: money, children, spouse, being healthy  · Don't buy cartons of cigarettes, buy one pack at a time so cigarettes are not available  · Write down ways that you plan to stop the cravings: chewing gum, candy, drink a glass of water, kiss your child, take a walk, read a book, throw a ball and exercise  · Develop a contract and have a supportive witness to sign and post on refrigerator  · Get rid of all smoking paraphernalia: left over cigarettes, matches, lighters  · Avoid too much caffeine, it will cause jitters when trying to quit  · In place of smoking cigarettes try sunflower seeds, sugar free lollipops, gum,  carrot or celery sticks.   · Try herbal tea when you have a craving to smoke, it will relax you.   · Instead of taking a smoking break at work play a game of Three Screen Games or take a walk  · Create a smoke free zone, don't allow anyone to smoke in your home, car, or sitting next to you  · Make actual no smoking signs and post in hour house  · Search the Internet on ways to quit smoking:  Http://smokefree.gov/smokefreetxt and www.heart.org  · Report any side effects of smoking cessation medications to your health care provider  · Make an index card and any time you are tempter to light up, take it out and read it:          A.  Smoking increases the risk of lung, bladder, pancreatic, mouth, and esophageal              cancers        B.  Smoking increases the risk of heart disease, stroke and high blood pressure        C.  Smoking reduces levels of folate.  Low Levels can increase the risk for              depression and Alzheimer's disease        D.  Smoking  Increases the likelihood of impotence        E.  Smoking affects the ability to smell and taste        F.  Smoking results in low birthrate babies    Steps to Quit Smoking  Smoking tobacco can be bad for your health. It can also affect almost every organ in your body. Smoking puts you and people around you at risk for many serious long-lasting (chronic) diseases. Quitting smoking is hard, but it is one of the best things that you can do for your health. It is never too late to quit.  What are the benefits of quitting smoking?  When you quit smoking, you lower your risk for getting serious diseases and conditions. They can include:  · Lung cancer or lung disease.  · Heart disease.  · Stroke.  · Heart attack.  · Not being able to have children (infertility).  · Weak bones (osteoporosis) and broken bones (fractures).    If you have coughing, wheezing, and shortness of breath, those symptoms may get better when you quit. You may also get sick less often. If you are  pregnant, quitting smoking can help to lower your chances of having a baby of low birth weight.  What can I do to help me quit smoking?  Talk with your doctor about what can help you quit smoking. Some things you can do (strategies) include:  · Quitting smoking totally, instead of slowly cutting back how much you smoke over a period of time.  · Going to in-person counseling. You are more likely to quit if you go to many counseling sessions.  · Using resources and support systems, such as:  ? Online chats with a counselor.  ? Phone quitlines.  ? Printed self-help materials.  ? Support groups or group counseling.  ? Text messaging programs.  ? Mobile phone apps or applications.  · Taking medicines. Some of these medicines may have nicotine in them. If you are pregnant or breastfeeding, do not take any medicines to quit smoking unless your doctor says it is okay. Talk with your doctor about counseling or other things that can help you.    Talk with your doctor about using more than one strategy at the same time, such as taking medicines while you are also going to in-person counseling. This can help make quitting easier.  What things can I do to make it easier to quit?  Quitting smoking might feel very hard at first, but there is a lot that you can do to make it easier. Take these steps:  · Talk to your family and friends. Ask them to support and encourage you.  · Call phone quitlines, reach out to support groups, or work with a counselor.  · Ask people who smoke to not smoke around you.  · Avoid places that make you want (trigger) to smoke, such as:  ? Bars.  ? Parties.  ? Smoke-break areas at work.  · Spend time with people who do not smoke.  · Lower the stress in your life. Stress can make you want to smoke. Try these things to help your stress:  ? Getting regular exercise.  ? Deep-breathing exercises.  ? Yoga.  ? Meditating.  ? Doing a body scan. To do this, close your eyes, focus on one area of your body at a time  from head to toe, and notice which parts of your body are tense. Try to relax the muscles in those areas.  · Download or buy apps on your mobile phone or tablet that can help you stick to your quit plan. There are many free apps, such as QuitGuide from the CDC (Centers for Disease Control and Prevention). You can find more support from smokefree.gov and other websites.    This information is not intended to replace advice given to you by your health care provider. Make sure you discuss any questions you have with your health care provider.  Document Released: 10/14/2010 Document Revised: 08/15/2017 Document Reviewed: 05/03/2016  [employer] Interactive Patient Education © 2018 [employer]

## 2018-07-20 NOTE — PROGRESS NOTES
Chief Complaint   Patient presents with   • Pain     Medication Refill           HPI  Adela Davis is a 53 y.o. female presents today for chronic pain, and RA  Pain back pain shoulder  Aphal 1     Chronic problems: Chronic Pain and RA somewhat stable with enbrel, methotrexate, and chronic pain somewhat controlled with Neurontin.   She struggles with the simple things in life like opening a jar, lifting heavy pans, even just doing daily activities of living. We had discussed in the past disability and she applied but they turned her down because she can still use her hands. She has ligetament reasons for pain medication.              PCP:  Charlene Huntley, DNP, APRN    No Known Allergies    Past Medical History:   Diagnosis Date   • Arthritis    • Rheumatoid arthritis (CMS/[facility])    • Senile osteoporosis 9/28/2017       Past Surgical History:   Procedure Laterality Date   • FRACTURE SURGERY Bilateral     2010   • WRIST SURGERY Bilateral        Social History     Social History   • Marital status:      Social History Main Topics   • Smoking status: Current Every Day Smoker     Packs/day: 1.00     Years: 20.00     Types: Cigarettes   • Smokeless tobacco: Never Used      Comment: Would like to quit.   • Alcohol use No   • Drug use: No   • Sexual activity: Defer     Other Topics Concern   • Not on file       Family History   Problem Relation Age of Onset   • Arthritis Mother    • COPD Mother    • Atrial fibrillation Mother    • Osteoarthritis Mother    • Heart disease Father    • Hypertension Father    • Arthritis Father    • Arthritis Sister    • Arthritis Brother    • No Known Problems Daughter    • Arthritis Maternal Grandfather        Current Outpatient Prescriptions on File Prior to Visit   Medication Sig Dispense Refill   • Adalimumab (HUMIRA) 40 MG/0.8ML Pen-injector Kit Inject 40 mg under the skin Every 14 (Fourteen) Days.     • Calcium-Magnesium-Vitamin D 500-250-200 MG-MG-UNIT tablet Take 500 tablets  "by mouth 2 (Two) Times a Day.     • cholecalciferol (VITAMIN D3) 1000 units tablet Take 1,000 Units by mouth Daily.     • DULoxetine (CYMBALTA) 60 MG capsule One capsule daily 30 capsule 2   • etodolac (LODINE) 500 MG tablet Take 1,500 mg by mouth 2 (Two) Times a Day.     • gabapentin (NEURONTIN) 100 MG capsule Take 1 capsule by mouth 3 (Three) Times a Day. 90 capsule 5   • HYDROcodone-acetaminophen (NORCO) 5-325 MG per tablet Take 1 tablet by mouth Every 6 (Six) Hours As Needed (60). 60 tablet 0   • omeprazole (priLOSEC) 40 MG capsule Take 40 mg by mouth Daily.     • sulfaSALAzine (AZULFIDINE) 500 MG tablet Take  by mouth 3 (Three) Times a Day.     • tiZANidine (ZANAFLEX) 4 MG tablet Take 1 tablet by mouth 2 (Two) Times a Day As Needed for Muscle Spasms. 60 tablet 5   • [DISCONTINUED] Adalimumab (HUMIRA SC) Inject 1 application under the skin.       No current facility-administered medications on file prior to visit.         REVIEW OF SYMPTOMS: (Positives bolded)  General:  weight loss, fever, chills, night sweats, fatigue, appetite loss  HEENT:  blurry vision, eye pain, eye discharge, dry eyes, decreased vision,   Respiratory: shortness of breath, cough, hemoptysis, wheezing, pleurisy,   Cardiovascular:  chest pain, PND, palpitation, edema, orthopnea, syncope, swelling of extremities  Gastro: Nausea, vomiting, diarrhea, hematemesis, abdominal pain, constipation  Genito: hematuria, dysuria, glycosuria, hesitancy, frequency, incontinence  Musckelo: Arthralgia, myalgia, muscle weakness, joint swelling, NSAID use  Skin: rash, pruritis, sores, nail changes, skin thickening, change in wart/mole, itching, rash, new lesions, pruritus, nail changes  Neuro:  Migraine, numbness, ataxia, tremor, vertigo, weakness, memory loss  \"All other systems reviewed and negative, except as listed above.”      OBJECTIVE:  Constitutional:  Appearance-No acute distress, Consistent with stated age. Orientation- Oriented x 3, alert " Posture-Not doubled over. Gait-Normal pace, normal arm movement. Posture- Normal Build and Nutrition-Well developed and well nourished.  General- Patient is pleasant and cooperative with the interview and exam.    Integumentary: General-No rashes, ulcers or lesions. No edema.  Palpation- Normal skin moisture/turgor. Skin is warm to touch, no increased warmth. Capillary refill is normal bilateral Upper and lower extremity.     Head/Neck: Head- normocephalic and atraumatic.  Neck- without visible/palpable lumps or pulsations.  Palpation- No bony tenderness about head/neck along frontal, occiptial, temporal, parietal, mastoid, jawline, zygoma, orbit or any other location.  NO temporal artery tenderness. No TMJ tenderness. Normal cervical ROM.   Neck Supple.  Thyroid-No thyromegaly, no nodules    CHEST/LUNG: Inspection- symmetric chest wall no pectus deformity. Normal effort, no distress, no use of accessory muscles. Palpation- nontender sternum, ribline.  No abnormal pulsations. Auscultation- Breath sounds normal throughout all lung fields.  Normal tracheal sounds, Normal bronchial sounds overlying sternum, Bronchovessicular sounds normal between scapulae posteriorly, Normal vessicular breath sounds heard throughout periphery. Lungs are clear today. Adventitious sounds- No wheezes, rales, rhonchi.     CARDIOVASCULAR:  Carotid artery- normal, no bruits or abnormal pulsations. Jugular vein- no pulsations. Palpation/Percussion- Normal PMI, no palpable thrill  Auscultation- Regular rate and rhythm. No murmur noted in sitting, supine positions. Extremities- no digital clubbing, cyanosis, edema, increased warmth.    ABDOMEN: Inspection- normal and no visible pulsations. Normal contour. Auscultation- Bowel sounds normal, no abdominal bruits. Palpation/Percussion- soft, non-tender, no rebound tenderness, no rigidity (guarding), no jar tenderness, no masses.  Liver-no hepatomegaly, Spleen - no splenomegaly, Hernias- none.  Rectal not examined.     Peripheral Vascular: Upper extremity Left- Normal temperature with pink nailbeds and no ulcerations.  Upper extremity Right- Normal temperature with pink nailbeds and no ulcerations.  Lower extremity- Normal temperature with pink nailbeds and no ulcerations. DP pulses 2+ bilaterally.  Pedal hair intact.  Normal capillary refill. Edema- No edema.    Musculoskeletal: Generalized-No generalized swelling or edema of extremities, no digital clubbing or cyanosis, neurovascularly intact all four extremities.  Upper extremity-Tenderness on palpation of the left shoulder.  No visible deformity.  Normal sensation along medial and lateral upper extremity proximally and distally.   4/5 and strength 3/5 bilateral UE.  Elbow palpated, no tenderness overlying olecranon.  Normal supination, pronation to active/passive ROM and to resisted rotation. Bicep insertion/tricep insertion appear normal without obvious pathology. Rotator cuff evaluated and intact.  Normal wrist ROM bilaterally. Normal hand movement, intrinsic muscles of hands normal. No tenderness to palpation of hands/wrists/elbows.  Lower extremity- Not tender to palpation, no pain, no swelling, edema or erythema of surrounding tissue, normal strength and tone.  Normal appearing hip ROM bilaterally without pain.  Knee ROM normal at 0-120 degrees. No tenderness overlying trochanters, no tenderness about patella, quad tendon, patellar tendon.  No tenderness at tibial tuberosity. Ankle normal ROM not tender to palpation along medial/lateral malleolus. Normal movement of toes, no tenderness bilateral feet/toes.  Normal foot type. Calves symmetrical.  Stretching demonstrated today.  Lumbar Spine- No deformities, masses or no known fractures, decreased, decreased ROM with bending and twisting.  Straight leg raises with pain raises all the way up. Tenderness on palpation on lumbar.       Neurological: General- Moves all 4 extremities symmetrically.  Symmetrical face and body posture. Cranial nerves- individually evaluated II-XII and intact. PERRLA, Normal EOMI, visual/special senses appear intact, Face is symmetrical and normal sensation/movement, normal tongue, normal strength/posture of neck musculature. Balance- Romberg intact.  Reflexes- ntact with DTR 2+ patellar, Achilles, bicep, brachial,tricep. Ankle clonus normal with 2 beats.  Strength- 5/5 bilateral UE and LE. Soft touch- intact bilateral UE and LE.  Temperature sensation- intact bilateral UE and LE. Cerebellar testing-Rapid alternating movements intact.  Heel shin intact. Able to walk normal gait, normal heel toe walking. Neck- supple.      Neuropsych: Oriented- Person, place, time. (AAOx3), Mood/affect- normal and congruent. Able to articulate well. Speech-Normal speech, normal rate, normal tone, normal use of language, volume and coherence.  Thought content- normal with ability to perform basic computations and apply abstract thought/reason. Associations- intact, no SI/HI, no hallucinations, delusions, obsessions.  Judgment/insight- Appropriate. Memory-Recall intact, remote and recent memory intact. Knowledge- Age appropriate fund of knowledge, concentration and attention span normal.    Lymphatic: Head/Neck- normal size and non tender to palpation. Axillary- Head and neck LN are normal size and non tender to palpation. Femoral and Inguinal- normal size and non tender to palpation.      Assessment/Plan:  Adela was seen today for pain.    Diagnoses and all orders for this visit:    Rheumatoid arthritis involving multiple sites with positive rheumatoid factor (CMS/HCC)  -     HYDROcodone-acetaminophen (NORCO) 5-325 MG per tablet; Take 1 tablet by mouth Every 6 (Six) Hours As Needed (60).    Chronic hip pain, unspecified laterality  -     HYDROcodone-acetaminophen (NORCO) 5-325 MG per tablet; Take 1 tablet by mouth Every 6 (Six) Hours As Needed (60).  -       ALMA Report:     As part of this  patient's treatment plan, I am prescribing controlled substances. The patient has been made aware of appropriate use of such medications, including potential risk of opiod use, somnolence, limited ability to drive and /or work safely, and potential for dependence or overdose. It has also been made clear that these medications are for use by this patient only, without concomitant use of alcohol or other substances unless prescribed.     Patient has completed prescribing agreement detailing terms of continued prescribing of controlled substances, including monitoring ALMA reports, urine drug screening, and pill counts if necessary. The patient is aware that inappropriate use will result in cessation of prescribing such medications.     ALMA report has been reviewed by: Charlene Huntley, DNP, APRN #[id number] The report was scanned into the patient's chart.      -     Pt is aware of the potential for addiction and dependence.    Addiction was discussed.  The  Risks, benefits and alternative options of drug therapy discussed.  It was reinforced about the risks and benefits of opioids.  It was reinforced that patient may not call early for medication, may not ask for increase in the number of pills given monthly or increase in dosage of medication, may not jump around to different pharmacies or providers and may not receive any narcotics from other providers including ER, or the contract will be broken and narcotics will no longer be prescribed from this facility if any of these criteria has been broken        Screening for malignant neoplasm of breast  -     Mammo Screening Digital Tomosynthesis Bilateral With CAD      Morbid obesity:  BMI:  31.0     Weight:    180    Follow up plan:  Lose at least 3 pounds before next chronic visit, exercise at least 3 times a week for 30 minute increments, eat baked instead of fried foods, and eat healthier     -  Documentation of education   The patient BMI is outside this range  and we recommended/discussed today to utilize a diet/exercise program to get back into the appropriate range.  Federal guidelines recommend that people under the age of 65 should have a BMI of 18.5-24.9  Food diary:  Bring to next visit  tial step is to document everything that is consumed. Pt's that have a food diary  Lose 2x the weight  by keeping track of foods.   Choose one bad food weekly and eliminate it from your diet.  Replace with one healthy food  Exercise diary: Goal over next 2-4 weeks is walk 30 minutes per day 5 days per week at pace difficult to hold conversation.   Drink more water, less soda.   Cut back on portion sizes.   Today we encouraged roughly a 1 lb per week weight loss with initial goal of 5% weight loss.  Avoid fast foods, eat more salads  If eating out for a meal, consider cutting food in half and placing into a to go container.  Individually portion any foods coming into the home   Use smaller plates  Drink 12 ozof water 30 minutes before meal as way to suppress appetite.  Discussed Contrave, metformin, topamax, Belviq and the cost of medications  Encouraged internet programs or self help books  Set  Goals that are realistic   Educated on ways to measure food  Baseball: 1 cup good for green salad, frozen yogurt, medium piece of fruit  Handful:  ½ cup good cut fruit, cooked vegetables, pasta, rice  Egg:  ¼ cup good for dried fruit  Deck of cards: 3 ounces good for meat and poultry  Check book:  3 ounces grilled fish  Plate Method:  reduce plate size to 9 inch dinner plate.  Half of plate should be filled with non-starchy vegetables (broccoli, lettuce, cauliflower, tomatoes), ¼ plate with lean source of protein (lean chicken, turkey, fish), remaining ½ with whole grains (brown rice, potato, whole grain breads  Avoid liquid calories (regular soda, juice, coffee with cream)  Focus  on water, seltzer water and other non-calorie drinks  Replace regular sugar with non-caloric sweeteners  Avoid  skipping meals: plan small regular meals throughout the day in order to keep your hunger controlled  Consider using meal replacements if unable to plan a healthy meal (protein shake, high protein bar)  Replace all white bread with whole wheat/whole grain alternative  Swap regular salad dressings (mayonnaise, butter, or low fat or fat free alternative)  Avoid high fat, high calorie, high carbohydrate snakes (cookies, pastries, cakes)  Snack on fruits, low fat dairy (yogurt, cottage cheese)        Needs smoking cessation education  -     nicotine (NICODERM CQ) 21 MG/24HR patch; Place 1 patch on the skin Daily.  -     Plan for a quit date  -     Plan activities around times you smoke    Tobacco abuse: Smoking Cessation discussed today for  >10 minutes.  · Patient advised regarding the risks of continued tobacco use  · Set a quit date    · Methods to quit smoking were discussed   · Handouts offered to the patient regarding tobacco cessation  · Patient expressed willingness to quit smoking   · Benefits of oral medications were discussed:  Chantix/Wellbutrin/Nicoderm CQ/gum   · Risks and side effects of medication discussed:  Suicidal thoughts, behavior change, agitation, hostility  · Discussed no benefit or recommendation to switch to E. Cigarettes as health hazards are not yet known.   · Lifestyle changes discussed  · Stress reduction discussed  · BH cessation class and handout offered to patient today.   · Discussed to consider ration technique to quit with time: Each day set out the number of cigarettes that you allow yourself to smoke.  Each day decrease this number by 1 cigarrette until you get to a point that you feel you need another cigarette.  You can hold at that level x 1 week.  Continue to decrease until you either are tobacco free or until you cannot decline any further. When you cannot decrease this any further you can return and we can discussed medication options again.  Initial goal with reduction  technique is 25% in 3 months.  · Write down all the reasons why you want to quit: money, children, spouse, being healthy  · Don't buy cartons of cigarettes, buy one pack at a time so cigarettes are not available  · Write down ways that you plan to stop the cravings: chewing gum, candy, drink a glass of water, kiss your child, take a walk, read a book, throw a ball and exercise  · Develop a contract and have a supportive witness to sign and post on refrigerator  · Get rid of all smoking paraphernalia: left over cigarettes, matches, lighters  · Avoid too much caffeine, it will cause jitters when trying to quit  · In place of smoking cigarettes try sunflower seeds, sugar free lollipops, gum, carrot or celery sticks.   · Try herbal tea when you have a craving to smoke, it will relax you.   · Instead of taking a smoking break at work play a game of [employer] or take a walk  · Create a smoke free zone, don't allow anyone to smoke in your home, car, or sitting next to you  · Make actual no smoking signs and post in hour house  · Search the Internet on ways to quit smoking:  Http://smokefree.gov/smokefreetxt and www.heart.org  · Report any side effects of smoking cessation medications to your health care provider  · Make an index card and any time you are tempter to light up, take it out and read it:          A.  Smoking increases the risk of lung, bladder, pancreatic, mouth, and esophageal              cancers        B.  Smoking increases the risk of heart disease, stroke and high blood pressure        C.  Smoking reduces levels of folate.  Low Levels can increase the risk for              depression and Alzheimer's disease        D.  Smoking  Increases the likelihood of impotence        E.  Smoking affects the ability to smell and taste        F.  Smoking results in low birthrate babies    Management plan:  Take medications as prescribed; return to the clinic of new or worsening concerns.       Risks/benefits of current  and new medications discussed with the patient and or family today.  The patient/family are aware and accept that if there any side effects they should call or return to clinic as soon as possible.  Appropriate F/U discussed for topics addressed today. All questions were answered to the satisfactory state of patient/family.  Should symptoms fail to improve or worsen they agree to call or return to clinic or to go to the ER. Education handouts were offered on any new Rx if requested.  Discussed the importance of following up with any needed screening tests/labs/specialist appointments and any requested follow-up recommended by me today.  Importance of maintaining follow-up discussed and patient accepts that missed appointments can delay diagnosis and potentially lead to worsening of conditions.    No Follow-up on file.    Charlene Huntley, DNP, APRN  7/20/2018

## 2018-07-30 ENCOUNTER — HOSPITAL ENCOUNTER (OUTPATIENT)
Dept: MAMMOGRAPHY | Facility: HOSPITAL | Age: 53
Discharge: HOME | End: 2018-07-30
Admitting: NURSE PRACTITIONER

## 2018-07-30 PROCEDURE — 77067 SCR MAMMO BI INCL CAD: CPT

## 2018-07-30 PROCEDURE — 77063 BREAST TOMOSYNTHESIS BI: CPT

## 2018-08-15 ENCOUNTER — APPOINTMENT (OUTPATIENT)
Dept: LAB | Facility: HOSPITAL | Age: 53
End: 2018-08-15

## 2018-08-15 ENCOUNTER — TRANSCRIBE ORDERS (OUTPATIENT)
Dept: ADMINISTRATIVE | Facility: HOSPITAL | Age: 53
End: 2018-08-15

## 2018-08-15 DIAGNOSIS — M05.79 SEROPOSITIVE RHEUMATOID ARTHRITIS OF MULTIPLE SITES (HCC): Primary | ICD-10-CM

## 2018-08-15 DIAGNOSIS — Z51.81 THERAPEUTIC DRUG MONITORING: ICD-10-CM

## 2018-08-15 DIAGNOSIS — Z51.81 ENCOUNTER FOR THERAPEUTIC DRUG MONITORING: ICD-10-CM

## 2018-08-15 DIAGNOSIS — M25.60 STIFFNESS IN JOINT: ICD-10-CM

## 2018-08-15 LAB
ALT SERPL W P-5'-P-CCNC: 28 U/L (ref 0–54)
AST SERPL-CCNC: 35 U/L (ref 7–45)
BASOPHILS # BLD AUTO: 0.11 10*3/MM3 (ref 0–0.2)
BASOPHILS NFR BLD AUTO: 1.4 % (ref 0–2)
CREAT BLD-MCNC: 0.64 MG/DL (ref 0.5–1.4)
CRP SERPL-MCNC: <0.5 MG/DL (ref 0–0.99)
DEPRECATED RDW RBC AUTO: 41.1 FL (ref 40–54)
EOSINOPHIL # BLD AUTO: 0.63 10*3/MM3 (ref 0–0.7)
EOSINOPHIL NFR BLD AUTO: 8.3 % (ref 0–4)
ERYTHROCYTE [DISTWIDTH] IN BLOOD BY AUTOMATED COUNT: 12.6 % (ref 12–15)
ERYTHROCYTE [SEDIMENTATION RATE] IN BLOOD: 2 MM/HR (ref 0–20)
GFR SERPL CREATININE-BSD FRML MDRD: 97 ML/MIN/1.73
HCT VFR BLD AUTO: 43.7 % (ref 37–47)
HGB BLD-MCNC: 14.6 G/DL (ref 12–16)
IMM GRANULOCYTES # BLD: 0.02 10*3/MM3 (ref 0–0.03)
IMM GRANULOCYTES NFR BLD: 0.3 % (ref 0–5)
LYMPHOCYTES # BLD AUTO: 2.48 10*3/MM3 (ref 0.72–4.86)
LYMPHOCYTES NFR BLD AUTO: 32.5 % (ref 15–45)
MCH RBC QN AUTO: 29.6 PG (ref 28–32)
MCHC RBC AUTO-ENTMCNC: 33.4 G/DL (ref 33–36)
MCV RBC AUTO: 88.6 FL (ref 82–98)
MONOCYTES # BLD AUTO: 0.8 10*3/MM3 (ref 0.19–1.3)
MONOCYTES NFR BLD AUTO: 10.5 % (ref 4–12)
NEUTROPHILS # BLD AUTO: 3.58 10*3/MM3 (ref 1.87–8.4)
NEUTROPHILS NFR BLD AUTO: 47 % (ref 39–78)
NRBC BLD MANUAL-RTO: 0 /100 WBC (ref 0–0)
PLATELET # BLD AUTO: 281 10*3/MM3 (ref 130–400)
PMV BLD AUTO: 9.4 FL (ref 6–12)
RBC # BLD AUTO: 4.93 10*6/MM3 (ref 4.2–5.4)
WBC NRBC COR # BLD: 7.62 10*3/MM3 (ref 4.8–10.8)

## 2018-08-15 PROCEDURE — 86140 C-REACTIVE PROTEIN: CPT | Performed by: INTERNAL MEDICINE

## 2018-08-15 PROCEDURE — 82565 ASSAY OF CREATININE: CPT | Performed by: INTERNAL MEDICINE

## 2018-08-15 PROCEDURE — 85651 RBC SED RATE NONAUTOMATED: CPT | Performed by: INTERNAL MEDICINE

## 2018-08-15 PROCEDURE — 84460 ALANINE AMINO (ALT) (SGPT): CPT | Performed by: INTERNAL MEDICINE

## 2018-08-15 PROCEDURE — 36415 COLL VENOUS BLD VENIPUNCTURE: CPT

## 2018-08-15 PROCEDURE — 85025 COMPLETE CBC W/AUTO DIFF WBC: CPT | Performed by: INTERNAL MEDICINE

## 2018-08-15 PROCEDURE — 84450 TRANSFERASE (AST) (SGOT): CPT | Performed by: INTERNAL MEDICINE

## 2018-08-20 ENCOUNTER — OFFICE VISIT (OUTPATIENT)
Dept: FAMILY MEDICINE CLINIC | Facility: CLINIC | Age: 53
End: 2018-08-20

## 2018-08-20 VITALS
SYSTOLIC BLOOD PRESSURE: 124 MMHG | TEMPERATURE: 98.2 F | RESPIRATION RATE: 18 BRPM | HEART RATE: 66 BPM | DIASTOLIC BLOOD PRESSURE: 78 MMHG | BODY MASS INDEX: 30.22 KG/M2 | OXYGEN SATURATION: 98 % | HEIGHT: 64 IN | WEIGHT: 177 LBS

## 2018-08-20 VITALS
OXYGEN SATURATION: 98 % | BODY MASS INDEX: 30.22 KG/M2 | HEIGHT: 64 IN | HEART RATE: 66 BPM | SYSTOLIC BLOOD PRESSURE: 124 MMHG | RESPIRATION RATE: 18 BRPM | TEMPERATURE: 98.2 F | DIASTOLIC BLOOD PRESSURE: 78 MMHG | WEIGHT: 177 LBS

## 2018-08-20 DIAGNOSIS — M05.79 RHEUMATOID ARTHRITIS INVOLVING MULTIPLE SITES WITH POSITIVE RHEUMATOID FACTOR (HCC): ICD-10-CM

## 2018-08-20 DIAGNOSIS — M25.559 CHRONIC HIP PAIN, UNSPECIFIED LATERALITY: ICD-10-CM

## 2018-08-20 DIAGNOSIS — G62.9 NEUROPATHY: ICD-10-CM

## 2018-08-20 DIAGNOSIS — G89.29 CHRONIC HIP PAIN, UNSPECIFIED LATERALITY: ICD-10-CM

## 2018-08-20 DIAGNOSIS — Z00.01 ENCOUNTER FOR WELL ADULT EXAM WITH ABNORMAL FINDINGS: Primary | ICD-10-CM

## 2018-08-20 PROCEDURE — 99214 OFFICE O/P EST MOD 30 MIN: CPT | Performed by: NURSE PRACTITIONER

## 2018-08-20 PROCEDURE — 99396 PREV VISIT EST AGE 40-64: CPT | Performed by: NURSE PRACTITIONER

## 2018-08-20 RX ORDER — HYDROCODONE BITARTRATE AND ACETAMINOPHEN 5; 325 MG/1; MG/1
1 TABLET ORAL EVERY 6 HOURS PRN
Qty: 60 TABLET | Refills: 0 | Status: SHIPPED | OUTPATIENT
Start: 2018-08-20 | End: 2018-09-18 | Stop reason: SDUPTHER

## 2018-08-20 RX ORDER — GABAPENTIN 300 MG/1
300 CAPSULE ORAL 3 TIMES DAILY
Qty: 90 CAPSULE | Refills: 5 | Status: SHIPPED | OUTPATIENT
Start: 2018-08-20 | End: 2018-08-20 | Stop reason: SDUPTHER

## 2018-08-20 RX ORDER — GABAPENTIN 300 MG/1
300 CAPSULE ORAL 3 TIMES DAILY
Qty: 90 CAPSULE | Refills: 5 | Status: SHIPPED | OUTPATIENT
Start: 2018-08-20 | End: 2019-02-15 | Stop reason: SDUPTHER

## 2018-08-20 NOTE — PROGRESS NOTES
Subjective     Adela Davis is a 53 year old white female here for follow up and refills for chronic pain and RA.  She has good days and bad days and says she struggles with simple things like lifting pots and pains, working with her hands all day and  Opening jars.  She applied for disability because she is not able to work due to the weakness in her hands and wrists and RA.  She said she did something to her back and could hardly move for a couple days and she had to take extra gabapentin to control the pain.  Just wanted me to know she took extra.  We have talked about increasing the gabapentin but she didn't want to but we discussed again today and she agreed to increase it.     Chronic problems: RA somewhat stable with enbrel, methotrexate, and chronic pain somewhat controlled with Neurontin, tizanidine, etodolac and norco, depression/fibromyalgia stable with duloxetine, vit d def stable with cholecalciferol, gerd stable with omeprazole.           Pain   This is a chronic problem. The current episode started more than 1 year ago. The problem occurs constantly. The problem has been unchanged. Associated symptoms include arthralgias, fatigue, headaches, myalgias, numbness and weakness. Pertinent negatives include no chest pain or chills. The symptoms are aggravated by bending, coughing, walking, twisting and standing. She has tried acetaminophen, heat, ice, NSAIDs, lying down, rest, walking, position changes and oral narcotics for the symptoms.        The following portions of the patient's history were reviewed and updated as appropriate: allergies, current medications, past family history, past medical history, past social history, past surgical history and problem list.    Review of Systems   Constitutional: Positive for fatigue. Negative for chills.   HENT: Negative for ear pain, facial swelling and postnasal drip.    Respiratory: Negative.    Cardiovascular: Negative for chest pain.   Genitourinary: Negative.     Musculoskeletal: Positive for arthralgias and myalgias.   Neurological: Positive for weakness, numbness and headache. Negative for dizziness.   All other systems reviewed and are negative.      Objective   Physical Exam   Constitutional: She is oriented to person, place, and time. Vital signs are normal. She appears well-developed and well-nourished. She is cooperative.   HENT:   Head: Normocephalic and atraumatic.   Right Ear: Hearing normal.   Left Ear: Hearing normal.   Nose: Nose normal.   Mouth/Throat: Uvula is midline, oropharynx is clear and moist and mucous membranes are normal.   Eyes: Pupils are equal, round, and reactive to light. Conjunctivae, EOM and lids are normal.   Neck: Normal range of motion. Neck supple.   Cardiovascular: Normal rate, regular rhythm, normal heart sounds, intact distal pulses and normal pulses.    Pulmonary/Chest: Effort normal and breath sounds normal.   Abdominal: Soft. Bowel sounds are normal.   Musculoskeletal:        Right shoulder: She exhibits decreased range of motion and tenderness.        Left shoulder: She exhibits decreased range of motion and tenderness.        Right hip: She exhibits decreased range of motion and decreased strength.        Left hip: She exhibits decreased range of motion, decreased strength and tenderness.        Right knee: She exhibits decreased range of motion. Tenderness found.        Left knee: She exhibits decreased range of motion. Tenderness found.        Arms:       Right hand: She exhibits decreased range of motion and tenderness.        Left hand: She exhibits decreased range of motion and tenderness.        Legs:  Neurological: She is alert and oriented to person, place, and time. She has normal strength. No cranial nerve deficit or sensory deficit. She displays a negative Romberg sign.   Skin: Skin is warm and dry.   Psychiatric: She has a normal mood and affect. Her speech is normal and behavior is normal. Judgment and thought  content normal. Cognition and memory are normal.   Nursing note and vitals reviewed.      Problems Addressed this Visit        Unprioritized    Rheumatoid arthritis involving multiple sites (CMS/[facility])    Relevant Medications    HYDROcodone-acetaminophen (NORCO) 5-325 MG per tablet    gabapentin (NEURONTIN) 300 MG capsule      Other Visit Diagnoses     Chronic hip pain, unspecified laterality        Relevant Medications    HYDROcodone-acetaminophen (NORCO) 5-325 MG per tablet    Neuropathy        Relevant Medications    gabapentin (NEURONTIN) 300 MG capsule  Increased from 100mg tid to 300mg tid            ALMA Report:     As part of this patient's treatment plan, I am prescribing controlled substances. The patient has been made aware of appropriate use of such medications, including potential risk of opiod use, somnolence, limited ability to drive and /or work safely, and potential for dependence or overdose, and opiods should be used sparingly and are not recommended for long term use.  It has also been made clear that these medications are for use by this patient only, without concomitant use of alcohol or other substances unless prescribed.     Patient has completed prescribing agreement detailing terms of continued prescribing of controlled substances, including monitoring ALMA reports, urine drug screening, and pill counts if necessary. The patient is aware that inappropriate use will result in cessation of prescribing such medications.    Toxassure:  I have ordered a urine drug screen to monitor patients predisposition to and patterns of drug use/misuse in order to establish and maintain the safe and effective use of analgesics in the treatment of chronic pain      ALMA report has been reviewed by: Charlene Huntley, [place], APRN on 8/20/18, #[id number]  The report was scanned into the patient's chart.  Last fill for norco was 7/20/18, gabapentin 7/17/18    -     Pt is aware of the potential for addiction and  dependence.    Addiction was discussed.  The  Risks, benefits and alternative options of drug therapy discussed.  It was reinforced about the risks and benefits of opioids.  It was reinforced that patient may not call early for medication, may not ask for increase in the number of pills given monthly or increase in dosage of medication, may not jump around to different pharmacies or providers and may not receive any narcotics from other providers including ER, or the contract will be broken and narcotics will no longer be prescribed from this facility if any of these criteria has been broken.      Date of last ALMA: today    Date of last UDS: 3/5/18      Return in about 1 month (around 9/20/2018) for Recheck chronic pain.  Charlene Huntley, DNP, APRN-BC  08/20/2018

## 2018-08-20 NOTE — PROGRESS NOTES
Chief Complaint   Patient presents with   • Annual Exam     Pt here for annual wellness exam         Chief Complaint   Patient presents with   • Annual Exam     Pt here for annual wellness exam         No Known Allergies    HPI:  Adela Davis is a 53 y.o. female presents today for an annual exam. She has problems with RA and chronic pain.  She had a flare up of sciatica and had to take a couple extra neurontin and she wanted to tell me about it.   We increased the mg of her neurontin today      Chronic problems: Chronic pain/RA stable with enbrel, and chronic pain somewhat controlled with Neurontin and norco.   She struggles with the simple things in life like opening a jar, lifting heavy pans, even just doing daily activities of living. We had discussed in the past disability and she applied but they turned her down because she can still use her hands. She has ligetament reasons for pain medication. Rates pain 6/10.  She says since she has been on the neurontin, tizanidine and norco she is functioning better.      Past Medical History:   Diagnosis Date   • Senile osteoporosis 9/28/2017     Past Surgical History:   Procedure Laterality Date   • FRACTURE SURGERY Bilateral     2010   • WRIST SURGERY Bilateral      Social History     Social History   • Marital status:      Social History Main Topics   • Smoking status: Current Every Day Smoker     Packs/day: 1.00     Years: 20.00     Types: Cigarettes   • Smokeless tobacco: Never Used      Comment: Would like to quit.   • Alcohol use No   • Drug use: No   • Sexual activity: Defer     Other Topics Concern   • Not on file     Family History   Problem Relation Age of Onset   • Arthritis Mother    • COPD Mother    • Atrial fibrillation Mother    • Osteoarthritis Mother    • Heart disease Father    • Hypertension Father    • Arthritis Father    • Arthritis Sister    • Arthritis Brother    • No Known Problems Daughter    • Arthritis Maternal Grandfather    • Breast  cancer Neg Hx        Current Outpatient Prescriptions on File Prior to Visit   Medication Sig Dispense Refill   • Calcium-Magnesium-Vitamin D 500-250-200 MG-MG-UNIT tablet Take 500 tablets by mouth 2 (Two) Times a Day.     • cholecalciferol (VITAMIN D3) 1000 units tablet Take 1,000 Units by mouth Daily.     • DULoxetine (CYMBALTA) 60 MG capsule One capsule daily 30 capsule 2   • etanercept (ENBREL) 50 MG/ML solution prefilled syringe injection Inject 50 mg under the skin into the appropriate area as directed 1 (One) Time Per Week.     • etodolac (LODINE) 500 MG tablet Take 1,500 mg by mouth 2 (Two) Times a Day.     • gabapentin (NEURONTIN) 300 MG capsule Take 1 capsule by mouth 3 (Three) Times a Day. 90 capsule 5   • HYDROcodone-acetaminophen (NORCO) 5-325 MG per tablet Take 1 tablet by mouth Every 6 (Six) Hours As Needed (60). 60 tablet 0   • nicotine (NICODERM CQ) 21 MG/24HR patch Place 1 patch on the skin Daily. 30 patch 5   • omeprazole (priLOSEC) 40 MG capsule Take 40 mg by mouth Daily.     • tiZANidine (ZANAFLEX) 4 MG tablet Take 1 tablet by mouth 2 (Two) Times a Day As Needed for Muscle Spasms. 60 tablet 5   • [DISCONTINUED] Adalimumab (HUMIRA) 40 MG/0.8ML Pen-injector Kit Inject 40 mg under the skin Every 14 (Fourteen) Days.     • [DISCONTINUED] gabapentin (NEURONTIN) 100 MG capsule Take 1 capsule by mouth 3 (Three) Times a Day. 90 capsule 5   • [DISCONTINUED] HYDROcodone-acetaminophen (NORCO) 5-325 MG per tablet Take 1 tablet by mouth Every 6 (Six) Hours As Needed (60). 60 tablet 0   • [DISCONTINUED] sulfaSALAzine (AZULFIDINE) 500 MG tablet Take  by mouth 3 (Three) Times a Day.       No current facility-administered medications on file prior to visit.         REVIEW OF SYMPTOMS: (Positives bolded)  General:  weight loss, fever, chills, night sweats, fatigue, appetite loss  HEENT:  blurry vision, eye pain, eye discharge, dry eyes, decreased vision, sore throat tinnitus, bloody nose, hearin gloss, sinus  "pain/pressure, ear pain/pressure.   Respiratory: shortness of breath, cough, hemoptysis, wheezing, pleurisy,   Cardiovascular:  chest pain, PND, palpitation, edema, orthopnea, syncope, swelling of extremities  Gastro: Nausea, vomiting, diarrhea, hematemesis, abdominal pain, constipation  Genito: hematuria, dysuria, glycosuria, hesitancy, frequency, incontinence  Skin: rash, pruritis, sores, nail changes, skin thickening, change in wart/mole, itching, rash, new lesions, pruritus, nail changes  Neuro:  Migraine, numbness, ataxia, tremor, vertigo, weakness, memory loss, Irritability, dizziness  Endocrine: excessive thirst, polyuria, cold intolerance, heat intolerance, goiter  Musco:  Arthralgia, myaglias  Psychiatric: depression, anxiety, anti-depressants, alcohol abuse, drug abuse,   \"All other systems reviewed and negative, except as listed above.”    The following portions of the patient's history were reviewed and updated as appropriate: allergies, current medications, past family history, past medical history, past social history, past surgical history and problem list.    OBJECTIVE:  Constitutional:  Appearance-No acute distress, Consistent with stated age. Orientation- Oriented x 3, alert Posture-Not doubled over. Gait-Normal pace, normal arm movement. Posture- Normal Build and Nutrition-Well developed and well nourished.  General- Patient is pleasant and cooperative with the interview and exam.    Integumentary: General-No rashes, ulcers or lesions. No edema.  Palpation- Normal skin moisture/turgor. Skin is warm to touch, no increased warmth. Capillary refill is normal bilateral Upper and lower extremity.     Head/Neck: Head- normocephalic and atraumatic.  Neck- without visible/palpable lumps or pulsations.  Palpation- No bony tenderness about head/neck along frontal, occiptial, temporal, parietal, mastoid, jawline, zygoma, orbit or any other location.  NO temporal artery tenderness. No TMJ tenderness. Normal " cervical ROM.   Neck Supple.  Thyroid-No thyromegaly, no nodules    Eye: Bilaterally PERRLA, EOMI.  No discharge.  Upper and lower eyelids are normal. Sclera/conjunctiva normal without discharge. Cornea is normal and clear. Lens is normal.  Eyeball appears normal. No ciliary flushing, no conjunctival injection.    ENMT:  Pinna- normal without tenderness or erythema.  External auditory canal Left- normal without erythema or discharge, no excessive cerumen. External auditory canal Right-normal without erythema or discharge, no excessive cerumen. TM left- Grey/pearly, normal light reflex and anatomy TM Right- Grey/pearly, normal light reflex and anatomy Hearing Assessment-normal to conversational speech at 2-5 feet.  Nose and sinus-No sinus tenderness along frontal/maxillary region. External appearance normal and midline. Nares- bilateral quiet airflow, no discharge. Nasal mucosa- No bleeding noted and no ulcerations observed. Pink, moist. Turbinates non boggy. Lips- normal color, moist without cracks/lesions Oral Cavity/Palate- hard/soft palate intact without lesions, oral mucosa pink and moist. Dentition assessed and discussed appropriate oral care. Tongue normal midline.  Oropharynx- no pharyngeal erythema, Uvula midline. No post nasal drip. No exudate. Salivary glands- Non tender to palpation    CHEST/LUNG: Inspection- symmetric chest wall no pectus deformity. Normal effort, no distress, no use of accessory muscles. Palpation- nontender sternum, ribline.  No abnormal pulsations. Auscultation- Breath sounds normal throughout all lung fields.  Normal tracheal sounds, Normal bronchial sounds overlying sternum, Bronchovessicular sounds normal between scapulae posteriorly, Normal vessicular breath sounds heard throughout periphery. Lungs are clear today. Adventitious sounds- No wheezes, rales, rhonchi.     CARDIOVASCULAR:  Carotid artery- normal, no bruits or abnormal pulsations. Jugular vein- no pulsations.  Palpation/Percussion- Normal PMI, no palpable thrill  Auscultation- Regular rate and rhythm. No murmur noted in sitting, supine positions. Extremities- no digital clubbing, cyanosis, edema, increased warmth.    Peripheral Vascular: Upper extremity Left- Normal temperature with pink nailbeds and no ulcerations.  Upper extremity Right- Normal temperature with pink nailbeds and no ulcerations.  Lower extremity- Normal temperature with pink nailbeds and no ulcerations. DP pulses 2+ bilaterally.  Pedal hair intact.  Normal capillary refill. Edema- No edema.    Musculoskeletal: Generalized-No generalized swelling or edema of extremities, no digital clubbing or cyanosis, neurovascularly intact all four extremities.  Bilateral Upper extremities- Symmetrical posture.  Has RA joints on the DIP and PIP that are enlarged and tender on fingers, wrists, elbows.   Normal sensation along medial and lateral upper extremity proximally and distally. Has tenderness on palpation of the shoulders, and lateral/medial epicondyle.  3/5, and strength 3/5  Lower extremity- Not tender to palpation, no pain, no swelling, edema or erythema of surrounding tissue, normal strength and tone.  Normal appearing hip ROM bilaterally without pain.  Knee ROM normal at 0-120 degrees. No tenderness overlying trochanters, no tenderness about patella, quad tendon, patellar tendon.  No tenderness at tibial tuberosity. Ankle normal ROM not tender to palpation along medial/lateral malleolus. Normal movement of toes, no tenderness bilateral feet/toes.  Normal foot type. Calves symmetrical.  Stretching demonstrated today.  Lumbar Spine/Ribs- No deformities, masses or known fractures, normal strength,Has decreased ROM with bending and twisting Normal ROM. Normal stability No tenderness along C/T/L spine.  Normal appearing ROM about spine.      Neurological: General- Moves all 4 extremities symmetrically. Symmetrical face and body posture. Cranial nerves-  "individually evaluated II-XII and intact. PERRLA, Normal EOMI, visual/special senses appear intact, Face is symmetrical and normal sensation/movement, normal tongue, normal strength/posture of neck musculature. Balance- Romberg intact.  Reflexes- ntact with DTR 2+ patellar, Achilles, bicep, brachial,tricep. Ankle clonus normal with 2 beats.  Strength- 5/5 bilateral UE and LE. Soft touch- intact bilateral UE and LE.  Temperature sensation- intact bilateral UE and LE. Cerebellar testing-Rapid alternating movements intact.  Heel shin intact. Able to walk normal gait, normal heel toe walking. Neck- supple.      Neuropsych: Oriented- Person, place, time. (AAOx3), Mood/affect- normal and congruent. Able to articulate well. Speech-Normal speech, normal rate, normal tone, normal use of language, volume and coherence.  Thought content- normal with ability to perform basic computations and apply abstract thought/reason. Associations- intact, no SI/HI, no hallucinations, delusions, obsessions.  Judgment/insight- Appropriate. Memory-Recall intact, remote and recent memory intact. Knowledge- Age appropriate fund of knowledge, concentration and attention span normal.    Lymphatic: Head/Neck- normal size and non tender to palpation. Axillary- Head and neck LN are normal size and non tender to palpation. Femoral and Inguinal- normal size and non tender to palpation.    /78 (BP Location: Left arm, Patient Position: Sitting, Cuff Size: Adult)   Pulse 66   Temp 98.2 °F (36.8 °C) (Oral)   Resp 18   Ht 162.6 cm (64\")   Wt 80.3 kg (177 lb)   SpO2 98%   BMI 30.38 kg/m²          ASSESSMENT/PLAN  Problems Addressed this Visit     None      Visit Diagnoses     Encounter for well adult exam with abnormal findings    -  Primary    Rheumatoid arthritis involving multiple sites with positive rheumatoid factor (CMS/[facility])                Health:  Exercise daily at least 30 minutes 3x week  Lose weight- decrease calories in diet, eat " healthier        Return in about 1 year (around 8/20/2019) for Annual physical.    Return in about 1 year (around 10/12/2017).    Charlene Huntley, DNP, APRN-BC  08/20/2018

## 2018-08-20 NOTE — PATIENT INSTRUCTIONS
Back Pain, Adult  Many adults have back pain from time to time. Common causes of back pain include:  · A strained muscle or ligament.  · Wear and tear (degeneration) of the spinal disks.  · Arthritis.  · A hit to the back.    Back pain can be short-lived (acute) or last a long time (chronic). A physical exam, lab tests, and imaging studies may be done to find the cause of your pain.  Follow these instructions at home:  Managing pain and stiffness  · Take over-the-counter and prescription medicines only as told by your health care provider.  · If directed, apply heat to the affected area as often as told by your health care provider. Use the heat source that your health care provider recommends, such as a moist heat pack or a heating pad.  ? Place a towel between your skin and the heat source.  ? Leave the heat on for 20-30 minutes.  ? Remove the heat if your skin turns bright red. This is especially important if you are unable to feel pain, heat, or cold. You have a greater risk of getting burned.  · If directed, apply ice to the injured area:  ? Put ice in a plastic bag.  ? Place a towel between your skin and the bag.  ? Leave the ice on for 20 minutes, 2-3 times a day for the first 2-3 days.  Activity  · Do not stay in bed. Resting more than 1-2 days can delay your recovery.  · Take short walks on even surfaces as soon as you are able. Try to increase the length of time you walk each day.  · Do not sit, drive, or  one place for more than 30 minutes at a time. Sitting or standing for long periods of time can put stress on your back.  · Use proper lifting techniques. When you bend and lift, use positions that put less stress on your back:  ? Bend your knees.  ? Keep the load close to your body.  ? Avoid twisting.  · Exercise regularly as told by your health care provider. Exercising will help your back heal faster. This also helps prevent back injuries by keeping muscles strong and flexible.  · Your health  care provider may recommend that you see a physical therapist. This person can help you come up with a safe exercise program. Do any exercises as told by your physical therapist.  Lifestyle  · Maintain a healthy weight. Extra weight puts stress on your back and makes it difficult to have good posture.  · Avoid activities or situations that make you feel anxious or stressed. Learn ways to manage anxiety and stress. One way to manage stress is through exercise. Stress and anxiety increase muscle tension and can make back pain worse.  General instructions  · Sleep on a firm mattress in a comfortable position. Try lying on your side with your knees slightly bent. If you lie on your back, put a pillow under your knees.  · Follow your treatment plan as told by your health care provider. This may include:  ? Cognitive or behavioral therapy.  ? Acupuncture or massage therapy.  ? Meditation or yoga.  Contact a health care provider if:  · You have pain that is not relieved with rest or medicine.  · You have increasing pain going down into your legs or buttocks.  · Your pain does not improve in 2 weeks.  · You have pain at night.  · You lose weight.  · You have a fever or chills.  Get help right away if:  · You develop new bowel or bladder control problems.  · You have unusual weakness or numbness in your arms or legs.  · You develop nausea or vomiting.  · You develop abdominal pain.  · You feel faint.  Summary  · Many adults have back pain from time to time. A physical exam, lab tests, and imaging studies may be done to find the cause of your pain.  · Use proper lifting techniques. When you bend and lift, use positions that put less stress on your back.  · Take over-the-counter and prescription medicines and apply heat or ice as directed by your health care provider.  This information is not intended to replace advice given to you by your health care provider. Make sure you discuss any questions you have with your health care  provider.  Document Released: 12/18/2006 Document Revised: 01/22/2018 Document Reviewed: 01/22/2018  [employer] Interactive Patient Education © 2018 [employer]

## 2018-08-28 ENCOUNTER — OFFICE VISIT (OUTPATIENT)
Dept: FAMILY MEDICINE CLINIC | Facility: CLINIC | Age: 53
End: 2018-08-28

## 2018-08-28 VITALS
HEIGHT: 64 IN | WEIGHT: 174.8 LBS | HEART RATE: 72 BPM | DIASTOLIC BLOOD PRESSURE: 80 MMHG | RESPIRATION RATE: 18 BRPM | TEMPERATURE: 98.8 F | SYSTOLIC BLOOD PRESSURE: 128 MMHG | OXYGEN SATURATION: 98 % | BODY MASS INDEX: 29.84 KG/M2

## 2018-08-28 DIAGNOSIS — J20.9 ACUTE BRONCHITIS, UNSPECIFIED ORGANISM: ICD-10-CM

## 2018-08-28 DIAGNOSIS — B37.9 YEAST INFECTION: ICD-10-CM

## 2018-08-28 DIAGNOSIS — J01.00 ACUTE NON-RECURRENT MAXILLARY SINUSITIS: Primary | ICD-10-CM

## 2018-08-28 PROCEDURE — 99214 OFFICE O/P EST MOD 30 MIN: CPT | Performed by: NURSE PRACTITIONER

## 2018-08-28 PROCEDURE — 96372 THER/PROPH/DIAG INJ SC/IM: CPT | Performed by: NURSE PRACTITIONER

## 2018-08-28 RX ORDER — DEXTROMETHORPHAN HYDROBROMIDE AND PROMETHAZINE HYDROCHLORIDE 15; 6.25 MG/5ML; MG/5ML
5 SYRUP ORAL 4 TIMES DAILY PRN
Qty: 120 ML | Refills: 1 | Status: SHIPPED | OUTPATIENT
Start: 2018-08-28 | End: 2018-10-19

## 2018-08-28 RX ORDER — CEFDINIR 300 MG/1
300 CAPSULE ORAL 2 TIMES DAILY
Qty: 14 CAPSULE | Refills: 0 | Status: SHIPPED | OUTPATIENT
Start: 2018-08-28 | End: 2018-09-04

## 2018-08-28 RX ORDER — FLUCONAZOLE 150 MG/1
150 TABLET ORAL ONCE
Qty: 1 TABLET | Refills: 0 | Status: SHIPPED | OUTPATIENT
Start: 2018-08-28 | End: 2018-08-28

## 2018-08-28 RX ORDER — DEXAMETHASONE SODIUM PHOSPHATE 10 MG/ML
10 INJECTION INTRAMUSCULAR; INTRAVENOUS ONCE
Status: COMPLETED | OUTPATIENT
Start: 2018-08-28 | End: 2018-08-28

## 2018-08-28 RX ADMIN — DEXAMETHASONE SODIUM PHOSPHATE 10 MG: 10 INJECTION INTRAMUSCULAR; INTRAVENOUS at 16:25

## 2018-08-28 NOTE — PROGRESS NOTES
Chief Complaint   Patient presents with   • URI     Patient reports symptoms started yesterday         No Known Allergies      HPI:  Adela Davis is a 53 y.o. female presents today with complaints of sinus pain and congestion since Friday.  She had an embrel shot on Thursday and then on Friday she had an outbreak of shingles and started taken valtrex and that has gotten better.  Then Friday night started with the sinus pain and pressure across the nasal bridge.  Rates pain 7/10 in face and back  Chronic problems: Chronic pain/RA stable with humira, and chronic pain somewhat controlled with Neurontin.   She struggles with the simple things in life like opening a jar, lifting heavy pans, even just doing daily activities of living. She applied for disability but they turned her down because she can still use her hands. She has ligetament reasons for pain medication. She says since she has been on the neurontin, tizanidine and norco she is functioning better.    Past Medical History:   Diagnosis Date   • Senile osteoporosis 9/28/2017     Past Surgical History:   Procedure Laterality Date   • FRACTURE SURGERY Bilateral     2010   • WRIST SURGERY Bilateral      Social History     Social History   • Marital status:      Social History Main Topics   • Smoking status: Current Every Day Smoker     Packs/day: 1.00     Years: 20.00     Types: Cigarettes   • Smokeless tobacco: Never Used      Comment: Would like to quit.   • Alcohol use No   • Drug use: No   • Sexual activity: Defer     Other Topics Concern   • Not on file     Family History   Problem Relation Age of Onset   • Arthritis Mother    • COPD Mother    • Atrial fibrillation Mother    • Osteoarthritis Mother    • Heart disease Father    • Hypertension Father    • Arthritis Father    • Arthritis Sister    • Arthritis Brother    • No Known Problems Daughter    • Arthritis Maternal Grandfather    • Breast cancer Neg Hx        Current Outpatient Prescriptions on  "File Prior to Visit   Medication Sig Dispense Refill   • Calcium-Magnesium-Vitamin D 500-250-200 MG-MG-UNIT tablet Take 500 tablets by mouth 2 (Two) Times a Day.     • cholecalciferol (VITAMIN D3) 1000 units tablet Take 1,000 Units by mouth Daily.     • DULoxetine (CYMBALTA) 60 MG capsule One capsule daily 30 capsule 2   • etanercept (ENBREL) 50 MG/ML solution prefilled syringe injection Inject 50 mg under the skin into the appropriate area as directed 1 (One) Time Per Week.     • gabapentin (NEURONTIN) 300 MG capsule Take 1 capsule by mouth 3 (Three) Times a Day. 90 capsule 5   • HYDROcodone-acetaminophen (NORCO) 5-325 MG per tablet Take 1 tablet by mouth Every 6 (Six) Hours As Needed (60). 60 tablet 0   • nicotine (NICODERM CQ) 21 MG/24HR patch Place 1 patch on the skin Daily. 30 patch 5   • omeprazole (priLOSEC) 40 MG capsule Take 40 mg by mouth Daily.     • tiZANidine (ZANAFLEX) 4 MG tablet Take 1 tablet by mouth 2 (Two) Times a Day As Needed for Muscle Spasms. 60 tablet 5   • etodolac (LODINE) 500 MG tablet Take 1,500 mg by mouth 2 (Two) Times a Day.       No current facility-administered medications on file prior to visit.         ROS:  REVIEW OF SYMPTOMS: (Positives bolded)  General:  weight loss, fever, chills, night sweats, fatigue, appetite loss  HEENT:  blurry vision, eye pain, dry eyes, decreased vision, sore throat tinnitus, bloody nose, hearing loss, sinus pain/pressure, ear pain/pressure.   Respiratory: shortness of breath, cough, hemoptysis, wheezing, pleurisy,   Cardiovascular:  chest pain, PND, palpitation, edema, orthopnea, syncope, swelling of extremities  Gastro: Nausea, vomiting, diarrhea, hematemesis, abdominal pain, constipation  Neuro:  Migraine, numbness, ataxia, tremor, vertigo, weakness, memory loss, Irritability, dizziness  Allergic/immune: allergic rhinitis, hay fever, asthma, hives  \"All other systems reviewed and negative, except as listed above.”      OBJECTIVE:  Constitutional:  " Alert, oriented x 3, well developed, well nourished. Consistent with stated age. Not in acute distress.  Has normal posture. Gait and station normal. .  Behavior appropriate. Patient is pleasant and cooperative with the interview and exam.    Skin: No rashes, no visible scars or suspicious moles noted.  Skin is warm to touch. Normal appropriate skin turgor.  Capillary refill is normal bilateral Upper and lower extremity.     Head/Neck: Head is normocephalic and atraumatic.  Neck without visible/palpable lumps.  No thyromegaly.    Eye: Bilaterally EOMI.  PERRLA.  Sclera/conjunctiva is normal, no discharge. Cornea is normal and clear. Lens is normal.  Eyeball normal. Upper eyelid normal.  Lower eyelid normal.       OROPHARYNX: Mucosa pink and moist, posterior pharynx erythematous,  Dentition average for age. No obvious dental carries. No lesions. Tongue normal.    EARS: Bilateral auditory canal normal, without redness or cerumen impaction. Bilateral Tympanic membrane Normal, pearly gray with good cone of light and landmarks.  Hearing grossly intact to normal conversation.     NOSE: nasal passages are erythematous, edematous and congested, nares patent    SINUS:  Frontal and maxillary sinus tenderness on palpation.       CHEST/LUNG: No use of accessory muscles, chest non-tender on palpation.  Breath sounds normal throughout all lung fields.  No wheezes, rales, rhonchi.    CARDIOVASCULAR:  Inspection: Carotid artery bilateral normal, no bruits, pulse regular.  Palpation/Percussion Bilateral normal pulsations.  Auscultation: Regular rate and rhythm. No murmur noted in sitting, supine, standing or squatting positions.    LYMPH: Cervical Nodes-normal, size; non-tender to palpation. Axillary Nodes- normal size; non-tender to palpation.     Assessment:  Adela was seen today for uri.    Diagnoses and all orders for this visit:    Acute non-recurrent maxillary sinusitis  -     dexamethasone (DECADRON) injection 10 mg; Inject 1  mL into the appropriate muscle as directed by prescriber 1 (One) Time.  -     cefdinir (OMNICEF) 300 MG capsule; Take 1 capsule by mouth 2 (Two) Times a Day for 7 days.    Yeast infection  -     fluconazole (DIFLUCAN) 150 MG tablet; Take 1 tablet by mouth 1 (One) Time for 1 dose.    Acute bronchitis, unspecified organism  -     promethazine-dextromethorphan (PROMETHAZINE-DM) 6.25-15 MG/5ML syrup; Take 5 mL by mouth 4 (Four) Times a Day As Needed for Cough.      Definition:  Acute, subacute, or chronic inflammation of the mucous membranes that line the paranasal sinuses and concomitant inflammation of nasal mucosa      • Adjunctive Therapy  a. Intranasal saline irrigation with either physiologic or hypertonic saline is recommended  b. Intranasal corticosteroids in addition to antibiotics  c. Netti pot  d. Good handwashing  e. If smoke-recommend smoking cessation  f.  Humidify the air; steam inhalation and warm compresses often help relieve pressure  g. Increase fluid intake  h. Sleep with bed elevated  i. Avoid allergens and excessively dry heat  j. Avoid swimming/diving and air travel during acute period  k. Avoid use of antihistamines unless there is an allergic basis   l. Teach proper application of nasal sprays      I spoke with the patient about the benefits and risks associated with antibiotic therapy; the benefits include treating a bacterial infection, preventing the spread of disease, and minimizing serious complications associated with bacterial diseases; the risks include antibiotic resistance, allergic reactions, oral and/ or vaginal candidia, and GI related side effects such as an upset stomach and diarrhea, as well as placing the patient at an increase risk for C-diff; verbal acknowledgement of these risk were obtained; based on the patients complaint and clinical finding, no antibiotics are required at this time.             Return in about 1 week (around 9/4/2018), or if symptoms worsen or fail to  improve.    Charlene Huntley, [place], APRN-BC  08/28/2018

## 2018-08-30 NOTE — PATIENT INSTRUCTIONS

## 2018-09-08 DIAGNOSIS — F41.8 ANXIETY ASSOCIATED WITH DEPRESSION: ICD-10-CM

## 2018-09-10 RX ORDER — DULOXETIN HYDROCHLORIDE 60 MG/1
CAPSULE, DELAYED RELEASE ORAL
Qty: 30 CAPSULE | Refills: 2 | Status: SHIPPED | OUTPATIENT
Start: 2018-09-10 | End: 2018-12-14 | Stop reason: SDUPTHER

## 2018-09-18 ENCOUNTER — OFFICE VISIT (OUTPATIENT)
Dept: FAMILY MEDICINE CLINIC | Facility: CLINIC | Age: 53
End: 2018-09-18

## 2018-09-18 VITALS
SYSTOLIC BLOOD PRESSURE: 132 MMHG | RESPIRATION RATE: 18 BRPM | HEART RATE: 73 BPM | BODY MASS INDEX: 30.73 KG/M2 | TEMPERATURE: 99 F | WEIGHT: 180 LBS | OXYGEN SATURATION: 98 % | DIASTOLIC BLOOD PRESSURE: 74 MMHG | HEIGHT: 64 IN

## 2018-09-18 DIAGNOSIS — G89.29 CHRONIC HIP PAIN, UNSPECIFIED LATERALITY: ICD-10-CM

## 2018-09-18 DIAGNOSIS — M25.559 CHRONIC HIP PAIN, UNSPECIFIED LATERALITY: ICD-10-CM

## 2018-09-18 DIAGNOSIS — M05.79 RHEUMATOID ARTHRITIS INVOLVING MULTIPLE SITES WITH POSITIVE RHEUMATOID FACTOR (HCC): ICD-10-CM

## 2018-09-18 PROCEDURE — 99214 OFFICE O/P EST MOD 30 MIN: CPT | Performed by: NURSE PRACTITIONER

## 2018-09-18 RX ORDER — HYDROCODONE BITARTRATE AND ACETAMINOPHEN 5; 325 MG/1; MG/1
1 TABLET ORAL EVERY 6 HOURS PRN
Qty: 60 TABLET | Refills: 0 | Status: SHIPPED | OUTPATIENT
Start: 2018-09-18 | End: 2018-10-19 | Stop reason: SDUPTHER

## 2018-09-18 NOTE — PROGRESS NOTES
Chief Complaint   Patient presents with   • Pain     Follow up for pain in back and shoulders.            HPI  Adela Davis is a 53 y.o. female presents today for chronic pain in back, shoulders and hands.  She has RA and sees a rheumatologist in [place], however he left and now she is seeing a new one tomorrow. Says the norco helps so she can do ADLS    Chronic problems: RA somewhat stable with enbrel, methotrexate, and chronic pain somewhat controlled with Neurontin, tizanidine, etodolac and norco, depression/fibromyalgia stable with duloxetine, vit d def stable with cholecalciferol, gerd stable with omeprazole.            PCP:  Charlene Huntley, DNP, APRN    No Known Allergies    Past Medical History:   Diagnosis Date   • Senile osteoporosis 9/28/2017       Past Surgical History:   Procedure Laterality Date   • FRACTURE SURGERY Bilateral     2010   • WRIST SURGERY Bilateral        Social History     Social History   • Marital status:      Social History Main Topics   • Smoking status: Former Smoker     Packs/day: 1.00     Years: 20.00     Types: Cigarettes     Quit date: 9/3/2018   • Smokeless tobacco: Never Used      Comment: Would like to quit.   • Alcohol use No   • Drug use: No   • Sexual activity: Defer     Other Topics Concern   • Not on file       Family History   Problem Relation Age of Onset   • Arthritis Mother    • COPD Mother    • Atrial fibrillation Mother    • Osteoarthritis Mother    • Heart disease Father    • Hypertension Father    • Arthritis Father    • Arthritis Sister    • Arthritis Brother    • No Known Problems Daughter    • Arthritis Maternal Grandfather    • Breast cancer Neg Hx        Current Outpatient Prescriptions on File Prior to Visit   Medication Sig Dispense Refill   • Calcium-Magnesium-Vitamin D 500-250-200 MG-MG-UNIT tablet Take 500 tablets by mouth 2 (Two) Times a Day.     • cholecalciferol (VITAMIN D3) 1000 units tablet Take 1,000 Units by mouth Daily.     •  DULoxetine (CYMBALTA) 60 MG capsule TAKE ONE CAPSULE BY MOUTH DAILY 30 capsule 2   • etanercept (ENBREL) 50 MG/ML solution prefilled syringe injection Inject 50 mg under the skin into the appropriate area as directed 1 (One) Time Per Week.     • etodolac (LODINE) 500 MG tablet Take 1,500 mg by mouth 2 (Two) Times a Day.     • gabapentin (NEURONTIN) 300 MG capsule Take 1 capsule by mouth 3 (Three) Times a Day. 90 capsule 5   • HYDROcodone-acetaminophen (NORCO) 5-325 MG per tablet Take 1 tablet by mouth Every 6 (Six) Hours As Needed (60). 60 tablet 0   • omeprazole (priLOSEC) 40 MG capsule Take 40 mg by mouth Daily.     • promethazine-dextromethorphan (PROMETHAZINE-DM) 6.25-15 MG/5ML syrup Take 5 mL by mouth 4 (Four) Times a Day As Needed for Cough. 120 mL 1   • tiZANidine (ZANAFLEX) 4 MG tablet Take 1 tablet by mouth 2 (Two) Times a Day As Needed for Muscle Spasms. 60 tablet 5   • [DISCONTINUED] nicotine (NICODERM CQ) 21 MG/24HR patch Place 1 patch on the skin Daily. 30 patch 5     No current facility-administered medications on file prior to visit.         REVIEW OF SYMPTOMS: (Positives bolded)  General:  weight loss, fever, chills, night sweats, fatigue, appetite loss  HEENT:  blurry vision, eye pain, eye discharge, dry eyes, decreased vision, sore throat tinnitus, bloody nose, hearin gloss, sinus pain/pressure, ear pain/pressure.   Respiratory: shortness of breath, cough, hemoptysis, wheezing, pleurisy,   Cardiovascular:  chest pain, PND, palpitation, edema, orthopnea, syncope, swelling of extremities  Gastro: Nausea, vomiting, diarrhea, hematemesis, abdominal pain, constipation  Genito: hematuria, dysuria, glycosuria, hesitancy, frequency, incontinence  Musckelo: Arthralgia, myalgia, muscle weakness, joint swelling, NSAID use  Skin: rash, pruritis, sores, nail changes, skin thickening, change in wart/mole, itching, rash, new lesions, pruritus, nail changes  Neuro:  Migraine, numbness, ataxia, tremor, vertigo,  "weakness, memory loss  \"All other systems reviewed and negative, except as listed above.”      OBJECTIVE:  Constitutional:  Appearance-No acute distress, Consistent with stated age. Orientation- Oriented x 3, alert Posture-Not doubled over. Gait-Normal pace, normal arm movement. Posture- Normal Build and Nutrition-Well developed and well nourished.  General- Patient is pleasant and cooperative with the interview and exam.    Integumentary: General-No rashes, ulcers or lesions. No edema.  Palpation- Normal skin moisture/turgor. Skin is warm to touch, no increased warmth. Capillary refill is normal bilateral Upper and lower extremity.     Head/Neck: Head- normocephalic and atraumatic.  Neck- without visible/palpable lumps or pulsations.  Palpation- No bony tenderness about head/neck along frontal, occiptial, temporal, parietal, mastoid, jawline, zygoma, orbit or any other location.  NO temporal artery tenderness. No TMJ tenderness. Normal cervical ROM.   Neck Supple.  Thyroid-No thyromegaly, no nodules    Eye: Bilaterally PERRLA, EOMI.  No discharge.  Upper and lower eyelids are normal. Sclera/conjunctiva normal without discharge. Cornea is normal and clear. Lens is normal.  Eyeball appears normal. No ciliary flushing, no conjunctival injection.    CHEST/LUNG: Inspection- symmetric chest wall no pectus deformity. Normal effort, no distress, no use of accessory muscles. Palpation- nontender sternum, ribline.  No abnormal pulsations. Auscultation- Breath sounds normal throughout all lung fields.  Normal tracheal sounds, Normal bronchial sounds overlying sternum, Bronchovessicular sounds normal between scapulae posteriorly, Normal vessicular breath sounds heard throughout periphery. Lungs are clear today. Adventitious sounds- No wheezes, rales, rhonchi.     CARDIOVASCULAR:  Carotid artery- normal, no bruits or abnormal pulsations. Jugular vein- no pulsations. Palpation/Percussion- Normal PMI, no palpable " thrill  Auscultation- Regular rate and rhythm. No murmur noted in sitting, supine positions. Extremities- no digital clubbing, cyanosis, edema, increased warmth.    ABDOMEN: Inspection- normal and no visible pulsations. Normal contour. Auscultation- Bowel sounds normal, no abdominal bruits. Palpation/Percussion- soft, non-tender, no rebound tenderness, no rigidity (guarding), no jar tenderness, no masses.  Liver-no hepatomegaly, Spleen - no splenomegaly, Hernias- none. Rectal not examined.     Peripheral Vascular: Upper extremity Left- Normal temperature with pink nailbeds and no ulcerations.  Upper extremity Right- Normal temperature with pink nailbeds and no ulcerations.  Lower extremity- Normal temperature with pink nailbeds and no ulcerations. DP pulses 2+ bilaterally.  Pedal hair intact.  Normal capillary refill. Edema- No edema.    Musculoskeletal: Generalized-No generalized swelling or edema of extremities, no digital clubbing or cyanosis, neurovascularly intact all four extremities.  Upper extremity- Symmetrical posture.  Has RA of all hand joints, has 1+ swelling and tenderness.  Normal sensation along medial and lateral upper extremity proximally and distally.  Tenderness overlying shoulder.  3/5 bilaterally, Strength 3/5 bilateral.  Elbow palpated, Tenderness overlying olecranon bilateral.  Normal supination, pronation to active/passive ROM and to resisted rotation. Bicep insertion/tricep insertion appear normal without obvious pathology. Rotator cuff evaluated and intact.  Normal wrist ROM bilaterally. With tenderness on palpation.  Normal hand movement, intrinsic muscles of hands normal. .  Lower extremity- Not tender to palpation, no pain, no swelling, edema or erythema of surrounding tissue, normal strength and tone.  Normal appearing hip ROM bilaterally without pain.  Knee ROM normal at 0-120 degrees. No tenderness overlying trochanters, no tenderness about patella, quad tendon, patellar tendon.   No tenderness at tibial tuberosity. Ankle normal ROM not tender to palpation along medial/lateral malleolus. Normal movement of toes, no tenderness bilateral feet/toes.  Normal foot type. Calves symmetrical.  Stretching demonstrated today.  Lumbar Spine- No deformities, masses or no known fractures, decreased, decreased ROM with bending and twisting.  Straight leg raises with pain raises all the way up. Tenderness on palpation on whole lumbar spine.  No deformities, masses or no known fractures, normal strength, decreased ROM.      Neuropsych: Oriented- Person, place, time. (AAOx3), Mood/affect- normal and congruent. Able to articulate well. Speech-Normal speech, normal rate, normal tone, normal use of language, volume and coherence.  Thought content- normal with ability to perform basic computations and apply abstract thought/reason. Associations- intact, no SI/HI, no hallucinations, delusions, obsessions.  Judgment/insight- Appropriate. Memory-Recall intact, remote and recent memory intact. Knowledge- Age appropriate fund of knowledge, concentration and attention span normal.    Lymphatic: Head/Neck- normal size and non tender to palpation. Axillary- Head and neck LN are normal size and non tender to palpation. Femoral and Inguinal- normal size and non tender to palpation.      Assessment/Plan:  Adela was seen today for pain.    Diagnoses and all orders for this visit:    Rheumatoid arthritis involving multiple sites with positive rheumatoid factor (CMS/HCC)  -     HYDROcodone-acetaminophen (NORCO) 5-325 MG per tablet; Take 1 tablet by mouth Every 6 (Six) Hours As Needed (60).    Chronic hip pain, unspecified laterality  -     HYDROcodone-acetaminophen (NORCO) 5-325 MG per tablet; Take 1 tablet by mouth Every 6 (Six) Hours As Needed (60).        -       ALMA Report:     As part of this patient's treatment plan, I am prescribing controlled substances. The patient has been made aware of appropriate use of such  medications, including potential risk of opiod use, somnolence, limited ability to drive and /or work safely, and potential for dependence or overdose, and opiods should be used sparingly and are not recommended for long term use.  It has also been made clear that these medications are for use by this patient only, without concomitant use of alcohol or other substances unless prescribed.     Patient has completed prescribing agreement detailing terms of continued prescribing of controlled substances, including monitoring ALMA reports, urine drug screening, and pill counts if necessary. The patient is aware that inappropriate use will result in cessation of prescribing such medications.    Toxassure:  I have ordered a urine drug screen to monitor patients predisposition to and patterns of drug use/misuse in order to establish and maintain the safe and effective use of analgesics in the treatment of chronic pain      ALMA report has been reviewed by: Charlene Huntley, [place], APRN 9/18/18, #[id number]  The report was scanned into the patient's chart.      -     Pt is aware of the potential for addiction and dependence.    Addiction was discussed.  The  Risks, benefits and alternative options of drug therapy discussed.  It was reinforced about the risks and benefits of opioids.  It was reinforced that patient may not call early for medication, may not ask for increase in the number of pills given monthly or increase in dosage of medication, may not jump around to different pharmacies or providers and may not receive any narcotics from other providers including ER, or the contract will be broken and narcotics will no longer be prescribed from this facility if any of these criteria has been broken.      Management plan:  Take medications as prescribed; return to the clinic of new or worsening concerns.       Risks/benefits of current and new medications discussed with the patient and or family today.  The patient/family  are aware and accept that if there any side effects they should call or return to clinic as soon as possible.  Appropriate F/U discussed for topics addressed today. All questions were answered to the satisfactory state of patient/family.  Should symptoms fail to improve or worsen they agree to call or return to clinic or to go to the ER. Education handouts were offered on any new Rx if requested.  Discussed the importance of following up with any needed screening tests/labs/specialist appointments and any requested follow-up recommended by me today.  Importance of maintaining follow-up discussed and patient accepts that missed appointments can delay diagnosis and potentially lead to worsening of conditions.    Return in about 1 month (around 10/18/2018) for  chronic pain.    Charlene Huntley, DNP, APRN  9/18/2018

## 2018-10-19 ENCOUNTER — OFFICE VISIT (OUTPATIENT)
Dept: FAMILY MEDICINE CLINIC | Facility: CLINIC | Age: 53
End: 2018-10-19

## 2018-10-19 VITALS
WEIGHT: 183.8 LBS | DIASTOLIC BLOOD PRESSURE: 82 MMHG | BODY MASS INDEX: 31.38 KG/M2 | TEMPERATURE: 98.6 F | HEIGHT: 64 IN | SYSTOLIC BLOOD PRESSURE: 128 MMHG | RESPIRATION RATE: 18 BRPM | OXYGEN SATURATION: 97 % | HEART RATE: 74 BPM

## 2018-10-19 DIAGNOSIS — M62.838 MUSCLE SPASM: ICD-10-CM

## 2018-10-19 DIAGNOSIS — Z23 NEEDS FLU SHOT: ICD-10-CM

## 2018-10-19 DIAGNOSIS — M05.79 RHEUMATOID ARTHRITIS INVOLVING MULTIPLE SITES WITH POSITIVE RHEUMATOID FACTOR (HCC): Primary | ICD-10-CM

## 2018-10-19 DIAGNOSIS — G89.29 CHRONIC HIP PAIN, UNSPECIFIED LATERALITY: ICD-10-CM

## 2018-10-19 DIAGNOSIS — M25.559 CHRONIC HIP PAIN, UNSPECIFIED LATERALITY: ICD-10-CM

## 2018-10-19 PROCEDURE — 99214 OFFICE O/P EST MOD 30 MIN: CPT | Performed by: NURSE PRACTITIONER

## 2018-10-19 PROCEDURE — 90674 CCIIV4 VAC NO PRSV 0.5 ML IM: CPT | Performed by: NURSE PRACTITIONER

## 2018-10-19 PROCEDURE — 90471 IMMUNIZATION ADMIN: CPT | Performed by: NURSE PRACTITIONER

## 2018-10-19 RX ORDER — HYDROCODONE BITARTRATE AND ACETAMINOPHEN 5; 325 MG/1; MG/1
1 TABLET ORAL EVERY 6 HOURS PRN
Qty: 60 TABLET | Refills: 0 | Status: SHIPPED | OUTPATIENT
Start: 2018-10-19 | End: 2018-11-19 | Stop reason: SDUPTHER

## 2018-10-19 RX ORDER — TIZANIDINE 4 MG/1
4 TABLET ORAL 2 TIMES DAILY PRN
Qty: 60 TABLET | Refills: 5 | Status: SHIPPED | OUTPATIENT
Start: 2018-10-19 | End: 2019-04-12 | Stop reason: SDUPTHER

## 2018-10-19 NOTE — PROGRESS NOTES
CC: pain    Adela Arauz is a 53 year old female here for follow up for chronic pain and rheumatoid arthritis.  Her Rhematologist left and she got assigned another one in [place] and she says she is so happy with the new one.   They are going to keep her on the enbrel since she is doing so well on it.  She comes monthly for pain medication, she does not call early, does not ask for increases and says that the RA is controlled to a certain extent with the norco and gabapentin.        Pain   This is a chronic problem. The current episode started more than 1 year ago. The problem occurs constantly. The problem has been unchanged. Associated symptoms include arthralgias, headaches, joint swelling and myalgias. Pertinent negatives include no chest pain, fever, urinary symptoms, vertigo or visual change. The symptoms are aggravated by standing, stress, exertion, twisting and walking. She has tried lying down, heat, position changes, rest and walking for the symptoms. The treatment provided moderate relief.        Chronic problems: RA stable with enbrel, methotrexate and managed by Rhematologist in [place], and chronic pain controlled with Neurontin, tizanidine, etodolac and norco, depression/fibromyalgia stable with duloxetine, vit d def stable with cholecalciferol, gerd stable with omeprazole.    The following portions of the patient's history were reviewed and updated as appropriate: allergies, current medications, past family history, past medical history, past social history, past surgical history and problem list.    Review of Systems   Constitutional: Negative for activity change and fever.   Respiratory: Negative for apnea, chest tightness and shortness of breath.    Cardiovascular: Negative for chest pain, palpitations and leg swelling.   Endocrine: Negative.    Genitourinary: Negative.    Musculoskeletal: Positive for arthralgias, joint swelling and myalgias.   Allergic/Immunologic: Negative.    Neurological:  Negative for vertigo, seizures, headache and memory problem.   Psychiatric/Behavioral: Negative for agitation, negative for hyperactivity and depressed mood.   All other systems reviewed and are negative.      Objective   Physical Exam   Constitutional: She is oriented to person, place, and time. Vital signs are normal. She appears well-developed and well-nourished. She is cooperative.   HENT:   Head: Normocephalic and atraumatic.   Right Ear: Hearing normal.   Left Ear: Hearing normal.   Nose: Nose normal.   Mouth/Throat: Uvula is midline, oropharynx is clear and moist and mucous membranes are normal.   Eyes: Pupils are equal, round, and reactive to light. Conjunctivae, EOM and lids are normal.   Neck: Normal range of motion.   Cardiovascular: Normal rate, regular rhythm, normal heart sounds, intact distal pulses and normal pulses.    Pulmonary/Chest: Effort normal and breath sounds normal.   Abdominal: Soft. Bowel sounds are normal.   Musculoskeletal:        Right wrist: She exhibits decreased range of motion and tenderness.        Left wrist: She exhibits decreased range of motion and tenderness.        Right knee: She exhibits decreased range of motion.        Left knee: She exhibits decreased range of motion.        Right hand: She exhibits decreased range of motion and tenderness.        Left hand: She exhibits decreased range of motion and tenderness.   Lymphadenopathy:        Head (right side): No submental, no submandibular and no tonsillar adenopathy present.        Head (left side): No submental, no submandibular and no tonsillar adenopathy present.     She has no cervical adenopathy.   Neurological: She is alert and oriented to person, place, and time. She has normal strength. No cranial nerve deficit or sensory deficit. She displays a negative Romberg sign.   Skin: Skin is warm and dry.   Psychiatric: She has a normal mood and affect. Her behavior is normal. Judgment and thought content normal.  "Cognition and memory are normal.   Nursing note and vitals reviewed.    /82 (BP Location: Left arm, Patient Position: Sitting, Cuff Size: Adult)   Pulse 74   Temp 98.6 °F (37 °C) (Oral)   Resp 18   Ht 162.6 cm (64.02\")   Wt 83.4 kg (183 lb 12.8 oz)   SpO2 97%   Breastfeeding? No   BMI 31.53 kg/m²       Assessment/Plan   Adela was seen today for pain.    Diagnoses and all orders for this visit:      Rheumatoid arthritis involving multiple sites with positive rheumatoid factor (CMS/[facility])  Chronic hip pain, unspecified laterality  -     HYDROcodone-acetaminophen (NORCO) 5-325 MG per tablet; Take 1 tablet by mouth Every 6 (Six) Hours As Needed (60).        -     Continue the gabapentin as prescribed.        -       ALMA Report:     As part of this patient's treatment plan, I am prescribing controlled substances. The patient has been made aware of appropriate use of such medications, including potential risk of opiod use, somnolence, limited ability to drive and /or work safely, and potential for dependence or overdose, and opiods should be used sparingly and are not recommended for long term use.  It has also been made clear that these medications are for use by this patient only, without concomitant use of alcohol or other substances unless prescribed.     Patient has completed prescribing agreement detailing terms of continued prescribing of controlled substances, including monitoring ALMA reports, urine drug screening, and pill counts if necessary. The patient is aware that inappropriate use will result in cessation of prescribing such medications.    Toxassure:  I have ordered a urine drug screen to monitor patients predisposition to and patterns of drug use/misuse in order to establish and maintain the safe and effective use of analgesics in the treatment of chronic pain      ALMA report has been reviewed by: Charlene Huntley, DNP, APRN on 10/19/18, #[id number]  The report was scanned into the " patient's chart.      -     Pt is aware of the potential for addiction and dependence.    Addiction was discussed.  The  Risks, benefits and alternative options of drug therapy discussed.  It was reinforced about the risks and benefits of opioids.  It was reinforced that patient may not call early for medication, may not ask for increase in the number of pills given monthly or increase in dosage of medication, may not jump around to different pharmacies or providers and may not receive any narcotics from other providers including ER, or the contract will be broken and narcotics will no longer be prescribed from this facility if any of these criteria has been broken.        Muscle spasm  -     tiZANidine (ZANAFLEX) 4 MG tablet; Take 1 tablet by mouth 2 (Two) Times a Day As Needed for Muscle Spasms.         Needs flu shot  -     Flucelvax Quad=>4Years ([id number])    -     Pt is aware of the potential for addiction and dependence.    Addiction was discussed.  The  Risks, benefits and alternative options of drug therapy discussed.  It was reinforced about the risks and benefits of opioids.  It was reinforced that patient may not call early for medication, may not ask for increase in the number of pills given monthly or increase in dosage of medication, may not jump around to different pharmacies or providers and may not receive any narcotics from other providers including ER, or the contract will be broken and narcotics will no longer be prescribed from this facility if any of these criteria has been broken.        Charlene Huntley, DNP, APRN-BC  10/19/2018

## 2018-10-22 NOTE — PATIENT INSTRUCTIONS
Rheumatoid Arthritis  Rheumatoid arthritis (RA) is a long-term (chronic) disease. RA causes inflammation in your joints. Your joints may feel painful, stiff, swollen, warm, or tender. RA may start slowly. Usually, it affects the small joints of the hands and feet. It can also affect other parts of the body, even the heart, eyes, or lungs. Symptoms of RA often come and go. Sometimes, symptoms get worse for a while. These are called flares.  There is no cure for RA, but your doctor will work with you to find the best treatment option for you. This will depend on how the disease is changing in your body.  Follow these instructions at home:  · Take over-the-counter and prescription medicines only as told by your doctor. Your doctor may change (adjust) your medicines every 3 months.  · Start an exercise program as told by your doctor.  · Rest when you have a flare.  · Return to your normal activities as told by your doctor. Ask your doctor what activities are safe for you.  · Keep all follow-up visits as told by your doctor. This is important.  Contact a doctor if:  · You have a flare.  · You have a fever.  · You have problems (side effects) because of your medicines.  Get help right away if:  · You have chest pain.  · You have trouble breathing.  · You have a hot, painful joint all of a sudden, and it is worse than your usual joint aches.  This information is not intended to replace advice given to you by your health care provider. Make sure you discuss any questions you have with your health care provider.  Document Released: 03/11/2013 Document Revised: 05/25/2017 Document Reviewed: 09/29/2016  [email or web address] Interactive Patient Education © 2018 [employer]

## 2018-11-19 ENCOUNTER — OFFICE VISIT (OUTPATIENT)
Dept: FAMILY MEDICINE CLINIC | Facility: CLINIC | Age: 53
End: 2018-11-19

## 2018-11-19 VITALS
SYSTOLIC BLOOD PRESSURE: 130 MMHG | DIASTOLIC BLOOD PRESSURE: 80 MMHG | RESPIRATION RATE: 18 BRPM | HEART RATE: 71 BPM | HEIGHT: 64 IN | TEMPERATURE: 99 F | BODY MASS INDEX: 31.82 KG/M2 | OXYGEN SATURATION: 99 % | WEIGHT: 186.4 LBS

## 2018-11-19 DIAGNOSIS — M05.79 RHEUMATOID ARTHRITIS INVOLVING MULTIPLE SITES WITH POSITIVE RHEUMATOID FACTOR (HCC): ICD-10-CM

## 2018-11-19 DIAGNOSIS — M25.559 CHRONIC HIP PAIN, UNSPECIFIED LATERALITY: ICD-10-CM

## 2018-11-19 DIAGNOSIS — G89.29 CHRONIC HIP PAIN, UNSPECIFIED LATERALITY: ICD-10-CM

## 2018-11-19 PROCEDURE — 99214 OFFICE O/P EST MOD 30 MIN: CPT | Performed by: NURSE PRACTITIONER

## 2018-11-19 RX ORDER — HYDROCODONE BITARTRATE AND ACETAMINOPHEN 5; 325 MG/1; MG/1
1 TABLET ORAL EVERY 6 HOURS PRN
Qty: 60 TABLET | Refills: 0 | Status: SHIPPED | OUTPATIENT
Start: 2018-11-19 | End: 2018-12-14 | Stop reason: SDUPTHER

## 2018-11-19 RX ORDER — OMEPRAZOLE 40 MG/1
40 CAPSULE, DELAYED RELEASE ORAL DAILY
Qty: 30 CAPSULE | Refills: 5 | Status: SHIPPED | OUTPATIENT
Start: 2018-11-19 | End: 2019-10-23 | Stop reason: SDUPTHER

## 2018-11-19 NOTE — PROGRESS NOTES
Chief Complaint   Patient presents with   • Osteoporosis     Follow Up          HPI  Adela Arauz is a 53 y.o. female presents today for follow up for chronic pain.  Sees rheumatologist in [place] for her worsening RA.  Have difficulty doing daily tasks, anything that she has to use her hands she can't do. (opening jars, a can of coffee, lifting or holding pans), has difficulty with buttons and bra, hands stay numb and tingling all the time.   She is currently on Enbrel with some improvement but after a couple of days symptoms return.  Her hands stay swollen most of the time.  She has severe difficulty with steps due to the pain in her knees.   She has problems getting comfortable at night due to hip pain.  She has to use pillows to prop her self.  Has difficulty cooking, like stirring  Things or making biscuits.  Neck continues to have pain and some days she has difficulty turning from side to side or touching chest.  Summer weather is better for her she functions much better.  Winter is worse.  The neurontin and norco help the pain so she can function daily.  Rates pain in hands 8/10.  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  She can not open bottles, she has difficulty with dexterity, as well as back pain after standing for extended hours. She is having left shoulder surgery by Dr. Carvajal in Dec and she has had to get off her NSAIDS and that is also making her pain worse.       Chronic problems: Chronic Pain and RA somewhat stable with enbrel, methotrexate, and chronic pain somewhat controlled with Neurontin.   She struggles with the simple things in life like opening a jar, lifting heavy pans, even just doing daily activities of living. We had discussed in the past disability and she applied but they turned her down because she can still use her hands. She has ligetament reasons for pain medication.       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    • Senile osteoporosis 2017       Past Surgical History:   Procedure Laterality Date   • FRACTURE SURGERY Bilateral        • WRIST SURGERY Bilateral        Social History     Socioeconomic History   • Marital status:      Spouse name: Not on file   • Number of children: Not on file   • Years of education: Not on file   • Highest education level: Not on file   Tobacco Use   • Smoking status: Former Smoker     Packs/day: 1.00     Years: 20.00     Pack years: 20.00     Types: Cigarettes     Last attempt to quit: 9/3/2018     Years since quittin.2   • Smokeless tobacco: Never Used   • Tobacco comment: Would like to quit.   Substance and Sexual Activity   • Alcohol use: No   • Drug use: No   • Sexual activity: Defer       Family History   Problem Relation Age of Onset   • Arthritis Mother    • COPD Mother    • Atrial fibrillation Mother    • Osteoarthritis Mother    • Heart disease Father    • Hypertension Father    • Arthritis Father    • Arthritis Sister    • Arthritis Brother    • No Known Problems Daughter    • Arthritis Maternal Grandfather    • Breast cancer Neg Hx        Current Outpatient Medications on File Prior to Visit   Medication Sig Dispense Refill   • Calcium-Magnesium-Vitamin D 500-250-200 MG-MG-UNIT tablet Take 500 tablets by mouth 2 (Two) Times a Day.     • cholecalciferol (VITAMIN D3) 1000 units tablet Take 1,000 Units by mouth Daily.     • DULoxetine (CYMBALTA) 60 MG capsule TAKE ONE CAPSULE BY MOUTH DAILY 30 capsule 2   • etanercept (ENBREL) 50 MG/ML solution prefilled syringe injection Inject 50 mg under the skin into the appropriate area as directed 1 (One) Time Per Week.     • etodolac (LODINE) 500 MG tablet Take 1,500 mg by mouth 2 (Two) Times a Day.     • gabapentin (NEURONTIN) 300 MG capsule Take 1 capsule by mouth 3 (Three) Times a Day. 90 capsule 5   •  "HYDROcodone-acetaminophen (NORCO) 5-325 MG per tablet Take 1 tablet by mouth Every 6 (Six) Hours As Needed (60). 60 tablet 0   • omeprazole (priLOSEC) 40 MG capsule Take 40 mg by mouth Daily.     • tiZANidine (ZANAFLEX) 4 MG tablet Take 1 tablet by mouth 2 (Two) Times a Day As Needed for Muscle Spasms. 60 tablet 5     No current facility-administered medications on file prior to visit.         REVIEW OF SYMPTOMS: (Positives bolded)  General:  weight loss, fever, chills, night sweats, fatigue, appetite loss  HEENT:  blurry vision, eye pain, eye discharge, dry eyes  Respiratory: shortness of breath, cough, hemoptysis, wheezing, pleurisy,   Cardiovascular:  chest pain, PND, palpitation, edema, orthopnea, syncope, swelling of extremities  Gastro: Nausea, vomiting, diarrhea, hematemesis, abdominal pain, constipation  Genito: hematuria, dysuria, glycosuria, hesitancy, frequency, incontinence  Musckelo: Arthralgia, myalgia, muscle weakness, joint swelling, NSAID use  Skin: rash, pruritis, sores, nail changes, skin thickening, change in wart/mole, itching, rash, new lesions, pruritus, nail changes  Neuro:  Migraine, numbness, ataxia, tremor, vertigo, weakness, memory loss  \"All other systems reviewed and negative, except as listed above.”      OBJECTIVE:  Constitutional:  Appearance-No acute distress, Consistent with stated age. Orientation- Oriented x 3, alert Posture-Not doubled over. Gait-Normal pace, normal arm movement. Posture- Normal Build and Nutrition-Well developed and well nourished.  General- Patient is pleasant and cooperative with the interview and exam.    Integumentary: General-No rashes, ulcers or lesions. No edema.  Palpation- Normal skin moisture/turgor. Skin is warm to touch, no increased warmth. Capillary refill is normal bilateral Upper and lower extremity.     Eye: Bilaterally PERRLA, EOMI.  No discharge.  Upper and lower eyelids are normal. Sclera/conjunctiva normal without discharge. Cornea is " normal and clear. Lens is normal.  Eyeball appears normal. No ciliary flushing, no conjunctival injection.    ENMT:  Pinna- normal without tenderness or erythema.  External auditory canal Left- normal without erythema or discharge, no excessive cerumen. External auditory canal Right-normal without erythema or discharge, no excessive cerumen. TM left- Grey/pearly, normal light reflex and anatomy TM Right- Grey/pearly, normal light reflex and anatomy Hearing Assessment-normal to conversational speech at 2-5 feet.  Nose and sinus-No sinus tenderness along frontal/maxillary region. External appearance normal and midline. Nares- bilateral quiet airflow, no discharge. Nasal mucosa- No bleeding noted and no ulcerations observed. Pink, moist. Turbinates non boggy. Lips- normal color, moist without cracks/lesions Oral Cavity/Palate- hard/soft palate intact without lesions, oral mucosa pink and moist. Dentition assessed and discussed appropriate oral care. Tongue normal midline.  Oropharynx- no pharyngeal erythema, Uvula midline. No post nasal drip. No exudate. Salivary glands- Non tender to palpation    CHEST/LUNG: Inspection- symmetric chest wall no pectus deformity. Normal effort, no distress, no use of accessory muscles. Palpation- nontender sternum, ribline.  No abnormal pulsations. Auscultation- Breath sounds normal throughout all lung fields.  Normal tracheal sounds, Normal bronchial sounds overlying sternum, Bronchovessicular sounds normal between scapulae posteriorly, Normal vessicular breath sounds heard throughout periphery. Lungs are clear today. Adventitious sounds- No wheezes, rales, rhonchi.     CARDIOVASCULAR:  Carotid artery- normal, no bruits or abnormal pulsations. Jugular vein- no pulsations. Palpation/Percussion- Normal PMI, no palpable thrill  Auscultation- Regular rate and rhythm. No murmur noted in sitting, supine positions. Extremities- no digital clubbing, cyanosis, edema, increased  warmth.    ABDOMEN: Inspection- normal and no visible pulsations. Normal contour. Auscultation- Bowel sounds normal, no abdominal bruits. Palpation/Percussion- soft, non-tender, no rebound tenderness, no rigidity (guarding), no jar tenderness, no masses.  Liver-no hepatomegaly, Spleen - no splenomegaly, Hernias- none. Rectal not examined.     Peripheral Vascular: Upper extremity Left- Normal temperature with pink nailbeds and no ulcerations.  Upper extremity Right- Normal temperature with pink nailbeds and no ulcerations.  Lower extremity- Normal temperature with pink nailbeds and no ulcerations. DP pulses 2+ bilaterally.  Pedal hair intact.  Normal capillary refill. Edema- No edema.    Musculoskeletal:  digital clubbing or cyanosis, neurovascularly intact all four extremities.  Bilateral Upper extremities- Symmetrical posture.  No visible deformity.  Normal sensation along medial and lateral upper extremity proximally and distally.  Has tenderness on palpation of the shoulder blades bilaterally, worse on the right, has decreased rom with difficulty raising arms over head.   3/5, strength 3/5  Elbow palpated, Has tenderness overlying olecranon.  Rotator cuff evaluated and intact.  Decreased ROM of wrist and hands, hands are 2+ swelling, with enlarged knuckles, tenderness on all her joints.   Bilateral Lower extremities:  Bilateral hips tender to palpation, has pain with movement,  no swelling, edema or erythema of surrounding tissue, normal strength and tone.  Has decreased ROM with lifting leg up to chest bilaterally.  Complains of pain with movement of knees and has problems with straight leg raises.   Lumbar Spine:  - No deformities, masses or known fractures, Has decreased strength, decreased rom and tenderness on palpation of the whole lumbars spine. This is a change in whole exam, she is usually not this tender all over. .      Neuropsych: Oriented- Person, place, time. (AAOx3), Mood/affect- normal and  congruent. Able to articulate well. Speech-Normal speech, normal rate, normal tone, normal use of language, volume and coherence.  Thought content- normal with ability to perform basic computations and apply abstract thought/reason. Associations- intact, no SI/HI, no hallucinations, delusions, obsessions.  Judgment/insight- Appropriate. Memory-Recall intact, remote and recent memory intact. Knowledge- Age appropriate fund of knowledge, concentration and attention span normal.    Lymphatic: Head/Neck- normal size and non tender to palpation. Axillary- Head and neck LN are normal size and non tender to palpation. Femoral and Inguinal- normal size and non tender to palpation.      Assessment/Plan:  Adela was seen today for osteoporosis.    Diagnoses and all orders for this visit:    Rheumatoid arthritis involving multiple sites with positive rheumatoid factor (CMS/HCC)  -     HYDROcodone-acetaminophen (NORCO) 5-325 MG per tablet; Take 1 tablet by mouth Every 6 (Six) Hours As Needed (60).    Chronic hip pain, unspecified laterality  -     HYDROcodone-acetaminophen (NORCO) 5-325 MG per tablet; Take 1 tablet by mouth Every 6 (Six) Hours As Needed (60).    Other orders  -     omeprazole (priLOSEC) 40 MG capsule; Take 1 capsule by mouth Daily.      ALMA Report:     As part of this patient's treatment plan, I am prescribing controlled substances. The patient has been made aware of appropriate use of such medications, including potential risk of opiod use, somnolence, limited ability to drive and /or work safely, and potential for dependence or overdose, and opiods should be used sparingly and are not recommended for long term use.  It has also been made clear that these medications are for use by this patient only, without concomitant use of alcohol or other substances unless prescribed.     Patient has completed prescribing agreement detailing terms of continued prescribing of controlled substances, including monitoring  ALMA reports, urine drug screening, and pill counts if necessary. The patient is aware that inappropriate use will result in cessation of prescribing such medications.    Toxassure:  I have ordered a urine drug screen to monitor patients predisposition to and patterns of drug use/misuse in order to establish and maintain the safe and effective use of analgesics in the treatment of chronic pain      ALMA report has been reviewed by: Charlene Huntley, [place], APRN on 11/19/18.  The report was scanned into the patient's chart.      -     Pt is aware of the potential for addiction and dependence.    Addiction was discussed.  The  Risks, benefits and alternative options of drug therapy discussed.  It was reinforced about the risks and benefits of opioids.  It was reinforced that patient may not call early for medication, may not ask for increase in the number of pills given monthly or increase in dosage of medication, may not jump around to different pharmacies or providers and may not receive any narcotics from other providers including ER, or the contract will be broken and narcotics will no longer be prescribed from this facility if any of these criteria has been broken.          Morbid obesity:  BMI:  32.0     Weight: 186  Follow up plan:  Lose at least 3 pounds before next chronic visit, exercise at least 3 times a week for 30 minute increments, eat baked instead of fried foods, and eat healthier     -  Documentation of education   The patient BMI is outside this range and we recommended/discussed today to utilize a diet/exercise program to get back into the appropriate range.  Federal guidelines recommend that people under the age of 65 should have a BMI of 18.5-24.9  Food diary:  Bring to next visit  tial step is to document everything that is consumed. Pt's that have a food diary  Lose 2x the weight  by keeping track of foods.   Choose one bad food weekly and eliminate it from your diet.  Replace with one  healthy food  Exercise diary: Goal over next 2-4 weeks is walk 30 minutes per day 5 days per week at pace difficult to hold conversation.   Drink more water, less soda.   Cut back on portion sizes.   Today we encouraged roughly a 1 lb per week weight loss with initial goal of 5% weight loss.  Avoid fast foods, eat more salads  If eating out for a meal, consider cutting food in half and placing into a to go container.  Individually portion any foods coming into the home   Use smaller plates  Drink 12 ozof water 30 minutes before meal as way to suppress appetite.  Discussed Contrave, metformin, topamax, Belviq and the cost of medications  Encouraged internet programs or self help books  Set  Goals that are realistic   Educated on ways to measure food  Baseball: 1 cup good for green salad, frozen yogurt, medium piece of fruit  Handful:  ½ cup good cut fruit, cooked vegetables, pasta, rice  Egg:  ¼ cup good for dried fruit  Deck of cards: 3 ounces good for meat and poultry  Check book:  3 ounces grilled fish  Plate Method:  reduce plate size to 9 inch dinner plate.  Half of plate should be filled with non-starchy vegetables (broccoli, lettuce, cauliflower, tomatoes), ¼ plate with lean source of protein (lean chicken, turkey, fish), remaining ½ with whole grains (brown rice, potato, whole grain breads  Avoid liquid calories (regular soda, juice, coffee with cream)  Focus  on water, seltzer water and other non-calorie drinks  Replace regular sugar with non-caloric sweeteners  Avoid skipping meals: plan small regular meals throughout the day in order to keep your hunger controlled  Consider using meal replacements if unable to plan a healthy meal (protein shake, high protein bar)  Replace all white bread with whole wheat/whole grain alternative  Swap regular salad dressings (mayonnaise, butter, or low fat or fat free alternative)  Avoid high fat, high calorie, high carbohydrate snakes (cookies, pastries, cakes)  Snack on  fruits, low fat dairy (yogurt, cottage cheese)            Management plan:  Take medications as prescribed; return to the clinic of new or worsening concerns.       Risks/benefits of current and new medications discussed with the patient and or family today.  The patient/family are aware and accept that if there any side effects they should call or return to clinic as soon as possible.  Appropriate F/U discussed for topics addressed today. All questions were answered to the satisfactory state of patient/family.  Should symptoms fail to improve or worsen they agree to call or return to clinic or to go to the ER. Education handouts were offered on any new Rx if requested.  Discussed the importance of following up with any needed screening tests/labs/specialist appointments and any requested follow-up recommended by me today.  Importance of maintaining follow-up discussed and patient accepts that missed appointments can delay diagnosis and potentially lead to worsening of conditions.    Return in about 1 month (around 12/19/2018) for Recheck pain RA.    Charlene Huntley, DNP, APRN  11/19/2018

## 2018-11-30 ENCOUNTER — HOSPITAL ENCOUNTER (OUTPATIENT)
Dept: GENERAL RADIOLOGY | Facility: HOSPITAL | Age: 53
Discharge: HOME | End: 2018-11-30
Admitting: NURSE PRACTITIONER

## 2018-11-30 ENCOUNTER — OFFICE VISIT (OUTPATIENT)
Dept: FAMILY MEDICINE CLINIC | Facility: CLINIC | Age: 53
End: 2018-11-30

## 2018-11-30 VITALS
SYSTOLIC BLOOD PRESSURE: 106 MMHG | RESPIRATION RATE: 18 BRPM | HEART RATE: 74 BPM | DIASTOLIC BLOOD PRESSURE: 75 MMHG | BODY MASS INDEX: 32.03 KG/M2 | TEMPERATURE: 98.1 F | HEIGHT: 64 IN | WEIGHT: 187.6 LBS | OXYGEN SATURATION: 97 %

## 2018-11-30 DIAGNOSIS — M25.552 HIP PAIN, BILATERAL: ICD-10-CM

## 2018-11-30 DIAGNOSIS — M25.551 HIP PAIN, BILATERAL: ICD-10-CM

## 2018-11-30 DIAGNOSIS — M54.5 ACUTE BILATERAL LOW BACK PAIN, WITH SCIATICA PRESENCE UNSPECIFIED: Primary | ICD-10-CM

## 2018-11-30 PROCEDURE — 99214 OFFICE O/P EST MOD 30 MIN: CPT | Performed by: NURSE PRACTITIONER

## 2018-11-30 PROCEDURE — 72100 X-RAY EXAM L-S SPINE 2/3 VWS: CPT

## 2018-11-30 PROCEDURE — 96372 THER/PROPH/DIAG INJ SC/IM: CPT | Performed by: NURSE PRACTITIONER

## 2018-11-30 RX ORDER — KETOROLAC TROMETHAMINE 30 MG/ML
60 INJECTION, SOLUTION INTRAMUSCULAR; INTRAVENOUS ONCE
Status: COMPLETED | OUTPATIENT
Start: 2018-11-30 | End: 2018-11-30

## 2018-11-30 RX ORDER — DEXAMETHASONE SODIUM PHOSPHATE 10 MG/ML
10 INJECTION INTRAMUSCULAR; INTRAVENOUS ONCE
Status: COMPLETED | OUTPATIENT
Start: 2018-11-30 | End: 2018-11-30

## 2018-11-30 RX ORDER — KETOROLAC TROMETHAMINE 30 MG/ML
60 INJECTION, SOLUTION INTRAMUSCULAR; INTRAVENOUS ONCE
Status: DISCONTINUED | OUTPATIENT
Start: 2018-11-30 | End: 2018-11-30

## 2018-11-30 RX ADMIN — DEXAMETHASONE SODIUM PHOSPHATE 10 MG: 10 INJECTION INTRAMUSCULAR; INTRAVENOUS at 11:42

## 2018-11-30 RX ADMIN — KETOROLAC TROMETHAMINE 60 MG: 30 INJECTION, SOLUTION INTRAMUSCULAR; INTRAVENOUS at 11:55

## 2018-11-30 NOTE — PATIENT INSTRUCTIONS
Back Pain, Adult  Many adults have back pain from time to time. Common causes of back pain include:  · A strained muscle or ligament.  · Wear and tear (degeneration) of the spinal disks.  · Arthritis.  · A hit to the back.    Back pain can be short-lived (acute) or last a long time (chronic). A physical exam, lab tests, and imaging studies may be done to find the cause of your pain.  Follow these instructions at home:  Managing pain and stiffness  · Take over-the-counter and prescription medicines only as told by your health care provider.  · If directed, apply heat to the affected area as often as told by your health care provider. Use the heat source that your health care provider recommends, such as a moist heat pack or a heating pad.  ? Place a towel between your skin and the heat source.  ? Leave the heat on for 20-30 minutes.  ? Remove the heat if your skin turns bright red. This is especially important if you are unable to feel pain, heat, or cold. You have a greater risk of getting burned.  · If directed, apply ice to the injured area:  ? Put ice in a plastic bag.  ? Place a towel between your skin and the bag.  ? Leave the ice on for 20 minutes, 2-3 times a day for the first 2-3 days.  Activity  · Do not stay in bed. Resting more than 1-2 days can delay your recovery.  · Take short walks on even surfaces as soon as you are able. Try to increase the length of time you walk each day.  · Do not sit, drive, or  one place for more than 30 minutes at a time. Sitting or standing for long periods of time can put stress on your back.  · Use proper lifting techniques. When you bend and lift, use positions that put less stress on your back:  ? Bend your knees.  ? Keep the load close to your body.  ? Avoid twisting.  · Exercise regularly as told by your health care provider. Exercising will help your back heal faster. This also helps prevent back injuries by keeping muscles strong and flexible.  · Your health  care provider may recommend that you see a physical therapist. This person can help you come up with a safe exercise program. Do any exercises as told by your physical therapist.  Lifestyle  · Maintain a healthy weight. Extra weight puts stress on your back and makes it difficult to have good posture.  · Avoid activities or situations that make you feel anxious or stressed. Learn ways to manage anxiety and stress. One way to manage stress is through exercise. Stress and anxiety increase muscle tension and can make back pain worse.  General instructions  · Sleep on a firm mattress in a comfortable position. Try lying on your side with your knees slightly bent. If you lie on your back, put a pillow under your knees.  · Follow your treatment plan as told by your health care provider. This may include:  ? Cognitive or behavioral therapy.  ? Acupuncture or massage therapy.  ? Meditation or yoga.  Contact a health care provider if:  · You have pain that is not relieved with rest or medicine.  · You have increasing pain going down into your legs or buttocks.  · Your pain does not improve in 2 weeks.  · You have pain at night.  · You lose weight.  · You have a fever or chills.  Get help right away if:  · You develop new bowel or bladder control problems.  · You have unusual weakness or numbness in your arms or legs.  · You develop nausea or vomiting.  · You develop abdominal pain.  · You feel faint.  Summary  · Many adults have back pain from time to time. A physical exam, lab tests, and imaging studies may be done to find the cause of your pain.  · Use proper lifting techniques. When you bend and lift, use positions that put less stress on your back.  · Take over-the-counter and prescription medicines and apply heat or ice as directed by your health care provider.  This information is not intended to replace advice given to you by your health care provider. Make sure you discuss any questions you have with your health care  provider.  Document Released: 12/18/2006 Document Revised: 01/22/2018 Document Reviewed: 01/22/2018  [employer] Interactive Patient Education © 2018 [employer]

## 2018-11-30 NOTE — PROGRESS NOTES
Chief Complaint   Patient presents with   • Back Pain     Symptoms of back pain since Sunday.    She states she does not recall injuring it in any way.          HPI  Adela Arauz is a 53 y.o. female presents today with complaints of lower back pain near tailbone that has progressively worsening since Sunday.  Patient states that it feels like something is pulling apart and radiating down to the hips and feels like they are going to pop out of socket.  Treatments trieds: Norco and zanaflex, biofreeze, epson salt, ice with minimal relief, heat worsens the pain.  She has not has any known injury. She has difficulty turning from side to side and twisting.  She is not able to bend and lean forward.         Chronic problems: Chronic Pain and RA somewhat stable with enbrel, methotrexate, and chronic pain somewhat controlled with Neurontin.   She struggles with the simple things in life like opening a jar, lifting heavy pans, even just doing daily activities of living. We had discussed in the past disability and she applied but they turned her down because she can still use her hands. She has ligetament reasons for pain medication.   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    • Osteoporosis    • Senile osteoporosis 9/28/2017       Past Surgical History:   Procedure Laterality Date   • FRACTURE SURGERY Bilateral     2010   • WRIST SURGERY Bilateral        Social History     Socioeconomic History   • Marital status:      Spouse name: Not on file   • Number of children: Not on file   • Years of education: Not on file   • Highest education level: Not on file   Tobacco Use   • Smoking status: Former Smoker     Packs/day: 1.00     Years: 20.00     Pack years: 20.00     Types: Cigarettes     Last attempt to  quit: 9/3/2018     Years since quittin.2   • Smokeless tobacco: Never Used   • Tobacco comment: Would like to quit.   Substance and Sexual Activity   • Alcohol use: No   • Drug use: No   • Sexual activity: Defer       Family History   Problem Relation Age of Onset   • Arthritis Mother    • COPD Mother    • Atrial fibrillation Mother    • Osteoarthritis Mother    • Heart disease Father    • Hypertension Father    • Arthritis Father    • Arthritis Sister    • Arthritis Brother    • No Known Problems Daughter    • Arthritis Maternal Grandfather    • Breast cancer Neg Hx        Current Outpatient Medications on File Prior to Visit   Medication Sig Dispense Refill   • Calcium-Magnesium-Vitamin D 500-250-200 MG-MG-UNIT tablet Take 500 tablets by mouth 2 (Two) Times a Day.     • cholecalciferol (VITAMIN D3) 1000 units tablet Take 1,000 Units by mouth Daily.     • DULoxetine (CYMBALTA) 60 MG capsule TAKE ONE CAPSULE BY MOUTH DAILY 30 capsule 2   • etanercept (ENBREL) 50 MG/ML solution prefilled syringe injection Inject 50 mg under the skin into the appropriate area as directed 1 (One) Time Per Week.     • etodolac (LODINE) 500 MG tablet Take 1,500 mg by mouth 2 (Two) Times a Day.     • gabapentin (NEURONTIN) 300 MG capsule Take 1 capsule by mouth 3 (Three) Times a Day. 90 capsule 5   • HYDROcodone-acetaminophen (NORCO) 5-325 MG per tablet Take 1 tablet by mouth Every 6 (Six) Hours As Needed (60). 60 tablet 0   • omeprazole (priLOSEC) 40 MG capsule Take 1 capsule by mouth Daily. 30 capsule 5   • tiZANidine (ZANAFLEX) 4 MG tablet Take 1 tablet by mouth 2 (Two) Times a Day As Needed for Muscle Spasms. 60 tablet 5     No current facility-administered medications on file prior to visit.         REVIEW OF SYMPTOMS: (Positives bolded)  General:  weight loss, fever, chills, night sweats, fatigue, appetite loss  HEENT:  blurry vision, eye pain, eye discharge, dry eyes, decreased vision, sore throat, tinnitus  Respiratory:  "shortness of breath, cough, hemoptysis, wheezing, pleurisy,   Cardiovascular:  chest pain, PND, palpitation, edema, orthopnea, syncope, swelling of extremities  Gastro: Nausea, vomiting, diarrhea, hematemesis, abdominal pain, constipation  Genito: hematuria, dysuria, glycosuria, hesitancy, frequency, incontinence  Musckelo: Arthralgia, myalgia, muscle weakness, joint swelling, NSAID use  Skin: rash, pruritis, sores, nail changes, skin thickening, change in wart/mole, itching, rash, new lesions, pruritus, nail changes  Neuro:  Migraine, numbness, ataxia, tremor, vertigo, weakness, memory loss  \"All other systems reviewed and negative, except as listed above.”      OBJECTIVE:  Constitutional:  Appearance-No acute distress, Consistent with stated age. Orientation- Oriented x 3, alert Posture-Not doubled over. Gait-Normal pace, normal arm movement. Posture- Normal Build and Nutrition-Well developed and well nourished.  General- Patient is pleasant and cooperative with the interview and exam.    Integumentary: General-No rashes, ulcers or lesions. No edema.  Palpation- Normal skin moisture/turgor. Skin is warm to touch, no increased warmth. Capillary refill is normal bilateral Upper and lower extremity.     Head/Neck: Head- normocephalic and atraumatic.  Neck- without visible/palpable lumps or pulsations.  Palpation- No bony tenderness about head/neck along frontal, occiptial, temporal, parietal, mastoid, jawline, zygoma, orbit or any other location.  NO temporal artery tenderness. No TMJ tenderness. Decreased cervical ROM.   Neck Supple.      CHEST/LUNG: Inspection- symmetric chest wall no pectus deformity. Normal effort, no distress, no use of accessory muscles. Palpation- nontender sternum, ribline.  No abnormal pulsations. Auscultation- Breath sounds normal throughout all lung fields.  Normal tracheal sounds, Normal bronchial sounds overlying sternum, Bronchovessicular sounds normal between scapulae posteriorly, " Normal vessicular breath sounds heard throughout periphery. Lungs are clear today. Adventitious sounds- No wheezes, rales, rhonchi.     CARDIOVASCULAR:  Carotid artery- normal, no bruits or abnormal pulsations. Jugular vein- no pulsations. Palpation/Percussion- Normal PMI, no palpable thrill  Auscultation- Regular rate and rhythm. No murmur noted in sitting, supine positions.     ABDOMEN: Inspection- normal and no visible pulsations. Normal contour. Auscultation- Bowel sounds normal, no abdominal bruits. Palpation/Percussion- soft, non-tender, no rebound tenderness, no rigidity (guarding), no jar tenderness, no masses.  Liver-no hepatomegaly, Spleen - no splenomegaly, Hernias- none. Rectal not examined.     Peripheral Vascular: Upper extremity Left- Normal temperature with pink nailbeds and no ulcerations.  Upper extremity Right- Normal temperature with pink nailbeds and no ulcerations.  Lower extremity- Normal temperature with pink nailbeds and no ulcerations. DP pulses 2+ bilaterally.  Pedal hair intact.  Normal capillary refill. Edema- No edema.    Musculoskeletal:  digital clubbing or cyanosis, neurovascularly intact all four extremities.  Upper extremity- Symmetrical posture.  No visible deformity.  Normal sensation along medial and lateral upper extremity proximally and distally.  Bilateral shoulder tenderness.  5/5 and strength 5/5 bilateral UE.  Elbow palpated, no tenderness overlying olecranon.  Normal supination, pronation to active/passive ROM and to resisted rotation. Bicep insertion/tricep insertion appear normal without obvious pathology. Rotator cuff evaluated and intact.  Normal wrist ROM bilaterally.  Tenderness to palpation of hands/wrists/elbows.    Lower extremity- Not tender to palpation, no pain, no swelling, edema or erythema of surrounding tissue, normal strength and tone.  Normal appearing hip ROM bilaterally without pain.  Knee ROM normal at 0-120 degrees. No tenderness overlying  trochanters, no tenderness about patella, quad tendon, patellar tendon.  No tenderness at tibial tuberosity. Ankle normal ROM not tender to palpation along medial/lateral malleolus. Normal movement of toes, no tenderness bilateral feet/toes.  Normal foot type. Calves symmetrical.  Stretching demonstrated today.  Spine/Ribs- Tenderness lower back that goes into tailbone and hips, decreased strength, decreased ROM. Decreased stability and tenderness along T/L spine.  Unable to twist or bend.       Neurological: General- Moves all 4 extremities symmetrically. Symmetrical face and body posture. Cranial nerves- individually evaluated II-XII and intact. PERRLA, Normal EOMI, visual/special senses appear intact, Face is symmetrical and normal sensation/movement, normal tongue, normal strength/posture of neck musculature.   Cerebellar testing-Rapid alternating movements intact. Unable to perform heel shin. Abnormal gait, patient guarding back while trying to walk.  . Neck- supple.      Neuropsych: Oriented- Person, place, time. (AAOx3), Mood/affect- normal and congruent. Able to articulate well. Speech-Normal speech, normal rate, normal tone, normal use of language, volume and coherence.  Thought content- normal with ability to perform basic computations and apply abstract thought/reason. Associations- intact, no SI/HI, no hallucinations, delusions, obsessions.  Judgment/insight- Appropriate. Memory-Recall intact, remote and recent memory intact. Knowledge- Age appropriate fund of knowledge, concentration and attention span normal.        Assessment/Plan:  Adela was seen today for back pain.    Diagnoses and all orders for this visit:    Acute bilateral low back pain, with sciatica presence unspecified  -     Discontinue: ketorolac (TORADOL) injection 60 mg; Inject 2 mL into the appropriate muscle as directed by prescriber 1 (One) Time.  -     dexamethasone (DECADRON) injection 10 mg; Inject 1 mL into the appropriate muscle  as directed by prescriber 1 (One) Time.  -     XR Spine Lumbar 2 or 3 View  -     ketorolac (TORADOL) injection 60 mg; Inject 2 mL into the appropriate muscle as directed by prescriber 1 (One) Time.    Hip pain, bilateral  -     dexamethasone (DECADRON) injection 10 mg; Inject 1 mL into the appropriate muscle as directed by prescriber 1 (One) Time.  -     XR Spine Lumbar 2 or 3 View  -     ketorolac (TORADOL) injection 60 mg; Inject 2 mL into the appropriate muscle as directed by prescriber 1 (One) Time.    Constipation       -   Take mag citrate 1 bottle today    Management plan:  Take medications as prescribed; return to the clinic of new or worsening concerns.       Risks/benefits of current and new medications discussed with the patient and or family today.  The patient/family are aware and accept that if there any side effects they should call or return to clinic as soon as possible.  Appropriate F/U discussed for topics addressed today. All questions were answered to the satisfactory state of patient/family.  Should symptoms fail to improve or worsen they agree to call or return to clinic or to go to the ER. Education handouts were offered on any new Rx if requested.  Discussed the importance of following up with any needed screening tests/labs/specialist appointments and any requested follow-up recommended by me today.  Importance of maintaining follow-up discussed and patient accepts that missed appointments can delay diagnosis and potentially lead to worsening of conditions.    Return in about 1 week (around 12/7/2018), or if symptoms worsen or fail to improve.  11/30/2018  Charlene Huntley, [place], APRN-BC

## 2018-12-14 ENCOUNTER — OFFICE VISIT (OUTPATIENT)
Dept: FAMILY MEDICINE CLINIC | Facility: CLINIC | Age: 53
End: 2018-12-14

## 2018-12-14 VITALS
DIASTOLIC BLOOD PRESSURE: 83 MMHG | RESPIRATION RATE: 18 BRPM | BODY MASS INDEX: 32.13 KG/M2 | OXYGEN SATURATION: 98 % | TEMPERATURE: 98.4 F | SYSTOLIC BLOOD PRESSURE: 112 MMHG | WEIGHT: 188.2 LBS | HEIGHT: 64 IN | HEART RATE: 74 BPM

## 2018-12-14 DIAGNOSIS — M05.79 RHEUMATOID ARTHRITIS INVOLVING MULTIPLE SITES WITH POSITIVE RHEUMATOID FACTOR (HCC): Primary | ICD-10-CM

## 2018-12-14 DIAGNOSIS — M25.559 CHRONIC HIP PAIN, UNSPECIFIED LATERALITY: ICD-10-CM

## 2018-12-14 DIAGNOSIS — G89.29 CHRONIC HIP PAIN, UNSPECIFIED LATERALITY: ICD-10-CM

## 2018-12-14 DIAGNOSIS — M41.50 SCOLIOSIS DUE TO DEGENERATIVE DISEASE OF SPINE IN ADULT PATIENT: ICD-10-CM

## 2018-12-14 DIAGNOSIS — M54.42 CHRONIC BILATERAL LOW BACK PAIN WITH BILATERAL SCIATICA: ICD-10-CM

## 2018-12-14 DIAGNOSIS — R91.8 PULMONARY NODULES: ICD-10-CM

## 2018-12-14 DIAGNOSIS — G89.29 CHRONIC BILATERAL LOW BACK PAIN WITH BILATERAL SCIATICA: ICD-10-CM

## 2018-12-14 DIAGNOSIS — M54.41 CHRONIC BILATERAL LOW BACK PAIN WITH BILATERAL SCIATICA: ICD-10-CM

## 2018-12-14 DIAGNOSIS — F41.8 ANXIETY ASSOCIATED WITH DEPRESSION: ICD-10-CM

## 2018-12-14 PROCEDURE — 99214 OFFICE O/P EST MOD 30 MIN: CPT | Performed by: NURSE PRACTITIONER

## 2018-12-14 PROCEDURE — 96372 THER/PROPH/DIAG INJ SC/IM: CPT | Performed by: NURSE PRACTITIONER

## 2018-12-14 RX ORDER — DULOXETIN HYDROCHLORIDE 60 MG/1
CAPSULE, DELAYED RELEASE ORAL
Qty: 30 CAPSULE | Refills: 2 | Status: SHIPPED | OUTPATIENT
Start: 2018-12-14 | End: 2019-03-14 | Stop reason: SDUPTHER

## 2018-12-14 RX ORDER — KETOROLAC TROMETHAMINE 30 MG/ML
60 INJECTION, SOLUTION INTRAMUSCULAR; INTRAVENOUS ONCE
Status: COMPLETED | OUTPATIENT
Start: 2018-12-14 | End: 2018-12-14

## 2018-12-14 RX ORDER — HYDROCODONE BITARTRATE AND ACETAMINOPHEN 5; 325 MG/1; MG/1
1 TABLET ORAL EVERY 6 HOURS PRN
Qty: 90 TABLET | Refills: 0 | Status: SHIPPED | OUTPATIENT
Start: 2018-12-14 | End: 2019-01-14 | Stop reason: SDUPTHER

## 2018-12-14 RX ADMIN — KETOROLAC TROMETHAMINE 60 MG: 30 INJECTION, SOLUTION INTRAMUSCULAR; INTRAVENOUS at 11:04

## 2018-12-14 NOTE — PROGRESS NOTES
Chief Complaint   Patient presents with   • Shoulder Pain     Pt is here for followup on shoulder surgery.      Post op 1 week.          HPI  Adela Arauz is a 53 y.o. female presents today with   Include RA, include surgery, she is really hurting today    Chronic problems: Chronic Pain and RA somewhat stable with enbrel, methotrexate, and chronic pain somewhat controlled with Neurontin.   She struggles with the simple things in life like opening a jar, lifting heavy pans, even just doing daily activities of living. We had discussed in the past disability and she applied but they turned her down because she can still use her hands. She has ligetament reasons for pain medication. recently had surgery on her left arm removed 3 bone spurs off and had shave the shoulder joint down because it was bone on bone.  Dr. Carvajal did the surgery.  He gave her a script for norco 7.5 mg but was unable to get it filled because she gets norco from me.  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    • Osteoporosis    • Senile osteoporosis 2017       Past Surgical History:   Procedure Laterality Date   • FRACTURE SURGERY Bilateral        • SHOULDER SURGERY     • WRIST SURGERY Bilateral        Social History     Socioeconomic History   • Marital status:      Spouse name: Not on file   • Number of children: Not on file   • Years of education: Not on file   • Highest education level: Not on file   Tobacco Use   • Smoking status: Former Smoker     Packs/day: 1.00     Years: 20.00     Pack years: 20.00     Types: Cigarettes     Last attempt to quit: 9/3/2018     Years since quittin.2   • Smokeless tobacco: Never Used   • Tobacco comment: Would like to quit.   Substance and Sexual Activity   •  Alcohol use: No   • Drug use: No   • Sexual activity: Defer       Family History   Problem Relation Age of Onset   • Arthritis Mother    • COPD Mother    • Atrial fibrillation Mother    • Osteoarthritis Mother    • Heart disease Father    • Hypertension Father    • Arthritis Father    • Arthritis Sister    • Arthritis Brother    • No Known Problems Daughter    • Arthritis Maternal Grandfather    • Breast cancer Neg Hx        Current Outpatient Medications on File Prior to Visit   Medication Sig Dispense Refill   • Calcium-Magnesium-Vitamin D 500-250-200 MG-MG-UNIT tablet Take 500 tablets by mouth 2 (Two) Times a Day.     • cholecalciferol (VITAMIN D3) 1000 units tablet Take 1,000 Units by mouth Daily.     • DULoxetine (CYMBALTA) 60 MG capsule TAKE ONE CAPSULE BY MOUTH DAILY 30 capsule 2   • etanercept (ENBREL) 50 MG/ML solution prefilled syringe injection Inject 50 mg under the skin into the appropriate area as directed 1 (One) Time Per Week.     • gabapentin (NEURONTIN) 300 MG capsule Take 1 capsule by mouth 3 (Three) Times a Day. 90 capsule 5   • HYDROcodone-acetaminophen (NORCO) 5-325 MG per tablet Take 1 tablet by mouth Every 6 (Six) Hours As Needed (60). 60 tablet 0   • omeprazole (priLOSEC) 40 MG capsule Take 1 capsule by mouth Daily. 30 capsule 5   • tiZANidine (ZANAFLEX) 4 MG tablet Take 1 tablet by mouth 2 (Two) Times a Day As Needed for Muscle Spasms. 60 tablet 5   • [DISCONTINUED] etodolac (LODINE) 500 MG tablet Take 1,500 mg by mouth 2 (Two) Times a Day.       No current facility-administered medications on file prior to visit.         REVIEW OF SYMPTOMS: (Positives bolded)  General:  weight loss, fever, chills, night sweats, fatigue, appetite loss  HEENT:  blurry vision, eye pain, eye discharge, dry eyes, decreased vision, sore throat tinnitus, bloody nose, hearin gloss, sinus pain/pressure, ear pain/pressure.   Respiratory: shortness of breath, cough, hemoptysis, wheezing, pleurisy,   Cardiovascular:   "chest pain, PND, palpitation, edema, orthopnea, syncope, swelling of extremities  Gastro: Nausea, vomiting, diarrhea, hematemesis, abdominal pain, constipation  Genito: hematuria, dysuria, glycosuria, hesitancy, frequency, incontinence  Musckelo: Arthralgia, myalgia, muscle weakness, joint swelling, NSAID use  Skin: rash, pruritis, sores, nail changes, skin thickening, change in wart/mole, itching, rash, new lesions, pruritus, nail changes  Neuro:  Migraine, numbness, ataxia, tremor, vertigo, weakness, memory loss  \"All other systems reviewed and negative, except as listed above.”      OBJECTIVE:  Constitutional:  Appearance-No acute distress, Consistent with stated age. Orientation- Oriented x 3, alert Posture-Not doubled over. Gait-Normal pace, normal arm movement. Posture- Normal Build and Nutrition-Well developed and well nourished.  General- Patient is pleasant and cooperative with the interview and exam.    Integumentary: General-No rashes, ulcers or lesions. No edema.  Palpation- Normal skin moisture/turgor. Skin is warm to touch, no increased warmth. Capillary refill is normal bilateral Upper and lower extremity.     Eye: Bilaterally PERRLA, EOMI.  No discharge.  Upper and lower eyelids are normal. Sclera/conjunctiva normal without discharge. Cornea is normal and clear. Lens is normal.  Eyeball appears normal. No ciliary flushing, no conjunctival injection.    ENMT:  Pinna- normal without tenderness or erythema.  External auditory canal Left- normal without erythema or discharge, no excessive cerumen. External auditory canal Right-normal without erythema or discharge, no excessive cerumen. TM left- Grey/pearly, normal light reflex and anatomy TM Right- Grey/pearly, normal light reflex and anatomy Hearing Assessment-normal to conversational speech at 2-5 feet.  Nose and sinus-No sinus tenderness along frontal/maxillary region. External appearance normal and midline. Nares- bilateral quiet airflow, no " discharge. Nasal mucosa- No bleeding noted and no ulcerations observed. Pink, moist. Turbinates non boggy. Lips- normal color, moist without cracks/lesions Oral Cavity/Palate- hard/soft palate intact without lesions, oral mucosa pink and moist. Dentition assessed and discussed appropriate oral care. Tongue normal midline.  Oropharynx- no pharyngeal erythema, Uvula midline. No post nasal drip. No exudate. Salivary glands- Non tender to palpation    CHEST/LUNG: Inspection- symmetric chest wall no pectus deformity. Normal effort, no distress, no use of accessory muscles. Palpation- nontender sternum, ribline.  No abnormal pulsations. Auscultation- Breath sounds normal throughout all lung fields.  Normal tracheal sounds, Normal bronchial sounds overlying sternum, Bronchovessicular sounds normal between scapulae posteriorly, Normal vessicular breath sounds heard throughout periphery. Lungs are clear today. Adventitious sounds- No wheezes, rales, rhonchi.     CARDIOVASCULAR:  Carotid artery- normal, no bruits or abnormal pulsations. Jugular vein- no pulsations. Palpation/Percussion- Normal PMI, no palpable thrill  Auscultation- Regular rate and rhythm. No murmur noted in sitting, supine positions. Extremities- no digital clubbing, cyanosis, edema, increased warmth.    ABDOMEN: Inspection- normal and no visible pulsations. Normal contour. Auscultation- Bowel sounds normal, no abdominal bruits. Palpation/Percussion- soft, non-tender, no rebound tenderness, no rigidity (guarding), no jar tenderness, no masses.  Liver-no hepatomegaly, Spleen - no splenomegaly, Hernias- none. Rectal not examined.     Peripheral Vascular: Upper extremity Left- Normal temperature with pink nailbeds and no ulcerations.  Upper extremity Right- Normal temperature with pink nailbeds and no ulcerations.  Lower extremity- Normal temperature with pink nailbeds and no ulcerations. DP pulses 2+ bilaterally.  Pedal hair intact.  Normal capillary refill.  Edema- No edema.    Musculoskeletal:  digital clubbing or cyanosis, neurovascularly intact all four extremities.  R Upper extremity- Symmetrical posture.  No visible deformity.  Decreased sensation along medical and lateral upper extremity, with tenderness on palpation.  Unable to examine left arm/shoulder because it is immobilized in a sling  Lower extremity- Not tender to palpation, no pain, no swelling, edema or erythema of surrounding tissue, normal strength and tone.  Normal appearing hip ROM bilaterally without pain.  Knee ROM normal at 0-120 degrees. No tenderness overlying trochanters, no tenderness about patella, quad tendon, patellar tendon.  No tenderness at tibial tuberosity. Ankle normal ROM not tender to palpation along medial/lateral malleolus. Normal movement of toes, no tenderness bilateral feet/toes.  Normal foot type. Calves symmetrical.  Stretching demonstrated today.  Lumbar Spine- No deformities, masses or no known fractures, decreased, decreased ROM with bending and twisting.  Straight leg raises with pain raises all the way up. Tenderness on palpation on lumbar.           Neurological: General- Moves all 4 extremities symmetrically. Symmetrical face and body posture. Cranial nerves- individually evaluated II-XII and intact. PERRLA, Normal EOMI, visual/special senses appear intact, Face is symmetrical and normal sensation/movement, normal tongue, normal strength/posture of neck musculature. Balance- Romberg intact.  Reflexes- ntact with DTR 2+ patellar, Achilles, bicep, brachial,tricep. Ankle clonus normal with 2 beats.  Strength- 5/5 bilateral UE and LE. Soft touch- intact bilateral UE and LE.  Temperature sensation- intact bilateral UE and LE. Cerebellar testing-Rapid alternating movements intact.  Heel shin intact. Able to walk normal gait, normal heel toe walking. Neck- supple.        Neuropsych: Oriented- Person, place, time. (AAOx3), Mood/affect- normal and congruent. Able to articulate  well. Speech-Normal speech, normal rate, normal tone, normal use of language, volume and coherence.  Thought content- normal with ability to perform basic computations and apply abstract thought/reason. Associations- intact, no SI/HI, no hallucinations, delusions, obsessions.  Judgment/insight- Appropriate. Memory-Recall intact, remote and recent memory intact. Knowledge- Age appropriate fund of knowledge, concentration and attention span normal.    Lymphatic: Head/Neck- normal size and non tender to palpation. Axillary- Head and neck LN are normal size and non tender to palpation. Femoral and Inguinal- normal size and non tender to palpation.      Assessment/Plan:  Adela was seen today for shoulder pain.    Diagnoses and all orders for this visit:    Chronic bilateral low back pain with bilateral sciatica  -     ketorolac (TORADOL) injection 60 mg; Inject 2 mL into the appropriate muscle as directed by prescriber 1 (One) Time.    Rheumatoid arthritis involving multiple sites with positive rheumatoid factor (CMS/HCC)  -     HYDROcodone-acetaminophen (NORCO) 5-325 MG per tablet; Take 1 tablet by mouth Every 6 (Six) Hours As Needed (60).    Chronic hip pain, unspecified laterality  -     HYDROcodone-acetaminophen (NORCO) 5-325 MG per tablet; Take 1 tablet by mouth Every 6 (Six) Hours As Needed (60).  -     ketorolac (TORADOL) injection 60 mg; Inject 2 mL into the appropriate muscle as directed by prescriber 1 (One) Time.     ALMA Report:     As part of this patient's treatment plan, I am prescribing controlled substances. The patient has been made aware of appropriate use of such medications, including potential risk of opiod use, somnolence, limited ability to drive and /or work safely, and potential for dependence or overdose, and opiods should be used sparingly and are not recommended for long term use.  It has also been made clear that these medications are for use by this patient only, without concomitant use of  alcohol or other substances unless prescribed.     Patient has completed prescribing agreement detailing terms of continued prescribing of controlled substances, including monitoring LAMA reports, urine drug screening, and pill counts if necessary. The patient is aware that inappropriate use will result in cessation of prescribing such medications.    Toxassure:  I have ordered a urine drug screen to monitor patients predisposition to and patterns of drug use/misuse in order to establish and maintain the safe and effective use of analgesics in the treatment of chronic pain      ALMA report has been reviewed by: Charlene Huntley, DNP, APRN. The report was scanned into the patient's chart.      -     Pt is aware of the potential for addiction and dependence.    Addiction was discussed.  The  Risks, benefits and alternative options of drug therapy discussed.  It was reinforced about the risks and benefits of opioids.  It was reinforced that patient may not call early for medication, may not ask for increase in the number of pills given monthly or increase in dosage of medication, may not jump around to different pharmacies or providers and may not receive any narcotics from other providers including ER, or the contract will be broken and narcotics will no longer be prescribed from this facility if any of these criteria has been broken.      Since pt recently had left shoulder and arm surgery and has not been able to take the norco 7.5 I am going to increase her amt to 90 for this month. She can also fill today.  I called [place] [facility] and told them she could fill today      Pulmonary nodules  -     CT Angiogram Chest With Contrast  CT CHEST 5/2018 showed:   There are 2 soft tissue pulmonary nodules one within the right middle lobe and one within the right upper lobe which would warrant follow-up with repeat nonenhanced CT imaging of the chest in 6 months. There is also enlarged right axillary, subcarinal and  hilar adenopathy which would warrant  follow-up. These nodes may be reactive in nature but given their size I  would recommend follow-up at the time of the repeat CT of the chest for evaluation of the right upper and middle lobe pulmonary nodules.    Scoliosis due to degenerative disc       Refer to Dr. Saturnino miller     Morbid obesity:  BMI:   32.    Weight:    188    Follow up plan:  Lose at least 3 pounds before next chronic visit, exercise at least 3 times a week for 30 minute increments, eat baked instead of fried foods, and eat healthier     -  Documentation of education   The patient BMI is outside this range and we recommended/discussed today to utilize a diet/exercise program to get back into the appropriate range.  Federal guidelines recommend that people under the age of 65 should have a BMI of 18.5-24.9  Food diary:  Bring to next visit  tial step is to document everything that is consumed. Pt's that have a food diary  Lose 2x the weight  by keeping track of foods.   Choose one bad food weekly and eliminate it from your diet.  Replace with one healthy food  Exercise diary: Goal over next 2-4 weeks is walk 30 minutes per day 5 days per week at pace difficult to hold conversation.   Drink more water, less soda.   Cut back on portion sizes.   Today we encouraged roughly a 1 lb per week weight loss with initial goal of 5% weight loss.  Avoid fast foods, eat more salads  If eating out for a meal, consider cutting food in half and placing into a to go container.  Individually portion any foods coming into the home   Use smaller plates  Drink 12 ozof water 30 minutes before meal as way to suppress appetite.  Discussed Contrave, metformin, topamax, Belviq and the cost of medications  Encouraged internet programs or self help books  Set  Goals that are realistic   Educated on ways to measure food  Baseball: 1 cup good for green salad, frozen yogurt, medium piece of fruit  Handful:  ½ cup good cut fruit, cooked  vegetables, pasta, rice  Egg:  ¼ cup good for dried fruit  Deck of cards: 3 ounces good for meat and poultry  Check book:  3 ounces grilled fish  Plate Method:  reduce plate size to 9 inch dinner plate.  Half of plate should be filled with non-starchy vegetables (broccoli, lettuce, cauliflower, tomatoes), ¼ plate with lean source of protein (lean chicken, turkey, fish), remaining ½ with whole grains (brown rice, potato, whole grain breads  Avoid liquid calories (regular soda, juice, coffee with cream)  Focus  on water, seltzer water and other non-calorie drinks  Replace regular sugar with non-caloric sweeteners  Avoid skipping meals: plan small regular meals throughout the day in order to keep your hunger controlled  Consider using meal replacements if unable to plan a healthy meal (protein shake, high protein bar)  Replace all white bread with whole wheat/whole grain alternative  Swap regular salad dressings (mayonnaise, butter, or low fat or fat free alternative)  Avoid high fat, high calorie, high carbohydrate snakes (cookies, pastries, cakes)  Snack on fruits, low fat dairy (yogurt, cottage cheese)            Management plan:  Take medications as prescribed; return to the clinic of new or worsening concerns.       Risks/benefits of current and new medications discussed with the patient and or family today.  The patient/family are aware and accept that if there any side effects they should call or return to clinic as soon as possible.  Appropriate F/U discussed for topics addressed today. All questions were answered to the satisfactory state of patient/family.  Should symptoms fail to improve or worsen they agree to call or return to clinic or to go to the ER. Education handouts were offered on any new Rx if requested.  Discussed the importance of following up with any needed screening tests/labs/specialist appointments and any requested follow-up recommended by me today.  Importance of maintaining follow-up  discussed and patient accepts that missed appointments can delay diagnosis and potentially lead to worsening of conditions.    Return in about 1 month (around 1/14/2019) for Recheck chronic pain.    Charlene Huntley, DNP, APRN  12/14/2018

## 2018-12-17 ENCOUNTER — TELEPHONE (OUTPATIENT)
Dept: FAMILY MEDICINE CLINIC | Facility: CLINIC | Age: 53
End: 2018-12-17

## 2018-12-17 DIAGNOSIS — R91.1 LUNG NODULE SEEN ON IMAGING STUDY: Primary | ICD-10-CM

## 2018-12-17 DIAGNOSIS — R91.1 PULMONARY NODULE: Primary | ICD-10-CM

## 2018-12-21 ENCOUNTER — OFFICE VISIT (OUTPATIENT)
Dept: ORTHOPEDIC SURGERY | Facility: CLINIC | Age: 53
End: 2018-12-21

## 2018-12-21 VITALS — BODY MASS INDEX: 32.1 KG/M2 | WEIGHT: 188 LBS | HEIGHT: 64 IN

## 2018-12-21 DIAGNOSIS — M54.5 LOW BACK PAIN, UNSPECIFIED BACK PAIN LATERALITY, UNSPECIFIED CHRONICITY, WITH SCIATICA PRESENCE UNSPECIFIED: Primary | ICD-10-CM

## 2018-12-21 DIAGNOSIS — M51.36 DDD (DEGENERATIVE DISC DISEASE), LUMBAR: ICD-10-CM

## 2018-12-21 DIAGNOSIS — M05.79 RHEUMATOID ARTHRITIS INVOLVING MULTIPLE SITES WITH POSITIVE RHEUMATOID FACTOR (HCC): ICD-10-CM

## 2018-12-21 PROCEDURE — 99203 OFFICE O/P NEW LOW 30 MIN: CPT | Performed by: ORTHOPAEDIC SURGERY

## 2018-12-21 NOTE — PROGRESS NOTES
Adela Arauz is a 53 y.o. female   Primary provider:  Charlene Huntley, DNP, APRN       Chief Complaint   Patient presents with   • Lumbar Spine - Pain       HISTORY OF PRESENT ILLNESS: new patient with low back pain ,xrays done 11/30/18 patient had steroid shot and using ice and heat,   Pain   This is a chronic problem. The current episode started more than 1 year ago. The problem occurs constantly. The problem has been unchanged. Associated symptoms include numbness. The symptoms are aggravated by twisting, walking and standing. She has tried ice, heat, NSAIDs and rest for the symptoms. The treatment provided no relief.     53 y back pain. The pain is present in the low back. Constant.  Sharp. The pain is worse with prolonged standing, climbing stairs.  Radiating pain to the lower back from the tailbone.  Physical therapy was done 1-1/2 year ago, which did not help.  7/10 pain.  Non smoker.       Current Outpatient Medications on File Prior to Visit   Medication Sig Dispense Refill   • Calcium-Magnesium-Vitamin D 500-250-200 MG-MG-UNIT tablet Take 500 tablets by mouth 2 (Two) Times a Day.     • cholecalciferol (VITAMIN D3) 1000 units tablet Take 1,000 Units by mouth Daily.     • DULoxetine (CYMBALTA) 60 MG capsule TAKE ONE CAPSULE BY MOUTH DAILY 30 capsule 2   • etanercept (ENBREL) 50 MG/ML solution prefilled syringe injection Inject 50 mg under the skin into the appropriate area as directed 1 (One) Time Per Week.     • gabapentin (NEURONTIN) 300 MG capsule Take 1 capsule by mouth 3 (Three) Times a Day. 90 capsule 5   • HYDROcodone-acetaminophen (NORCO) 5-325 MG per tablet Take 1 tablet by mouth Every 6 (Six) Hours As Needed (60). 90 tablet 0   • omeprazole (priLOSEC) 40 MG capsule Take 1 capsule by mouth Daily. 30 capsule 5   • tiZANidine (ZANAFLEX) 4 MG tablet Take 1 tablet by mouth 2 (Two) Times a Day As Needed for Muscle Spasms. 60 tablet 5     No current facility-administered medications on file prior to  visit.      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, Disp: , Rfl:   •  gabapentin (NEURONTIN) 300 MG capsule, Take 1 capsule by mouth 3 (Three) Times a Day., Disp: 90 capsule, Rfl: 5  •  HYDROcodone-acetaminophen (NORCO) 5-325 MG per tablet, Take 1 tablet by mouth Every 6 (Six) Hours As Needed (60)., Disp: 90 tablet, Rfl: 0  •  omeprazole (priLOSEC) 40 MG capsule, Take 1 capsule by mouth Daily., Disp: 30 capsule, Rfl: 5  •  tiZANidine (ZANAFLEX) 4 MG tablet, Take 1 tablet by mouth 2 (Two) Times a Day As Needed for Muscle Spasms., Disp: 60 tablet, Rfl: 5    Past Surgical History:   Procedure Laterality Date   • FRACTURE SURGERY Bilateral     2010   • SHOULDER SURGERY     • WRIST SURGERY Bilateral        Family History   Problem Relation Age of Onset   • Arthritis Mother    • COPD Mother    • Atrial fibrillation Mother    • Osteoarthritis Mother    • Heart disease Father    • Hypertension Father    • Arthritis Father    • Arthritis Sister    • Arthritis Brother    • No Known Problems Daughter    • Arthritis Maternal Grandfather    • Breast cancer Neg Hx        Social History     Socioeconomic History   • Marital status:      Spouse name: Not on file   • Number of children: Not on file   • Years of education: Not on file   • Highest  "education level: Not on file   Social Needs   • Financial resource strain: Not on file   • Food insecurity - worry: Not on file   • Food insecurity - inability: Not on file   • Transportation needs - medical: Not on file   • Transportation needs - non-medical: Not on file   Occupational History   • Not on file   Tobacco Use   • Smoking status: Former Smoker     Packs/day: 1.00     Years: 20.00     Pack years: 20.00     Types: Cigarettes     Last attempt to quit: 9/3/2018     Years since quittin.3   • Smokeless tobacco: Never Used   • Tobacco comment: Would like to quit.   Substance and Sexual Activity   • Alcohol use: No   • Drug use: No   • Sexual activity: Defer   Other Topics Concern   • Not on file   Social History Narrative   • Not on file        Review of Systems   Constitutional: Negative.    HENT: Negative.    Eyes: Negative.    Respiratory: Negative.    Cardiovascular: Negative.    Gastrointestinal: Negative.    Endocrine: Negative.    Genitourinary: Negative.    Musculoskeletal: Negative.    Skin: Negative.    Allergic/Immunologic: Negative.    Neurological: Positive for numbness.   Hematological: Negative.    Psychiatric/Behavioral: Negative.        PHYSICAL EXAMINATION:       Ht 162.6 cm (64\")   Wt 85.3 kg (188 lb)   BMI 32.27 kg/m²     Physical Exam   Constitutional: She is oriented to person, place, and time. She appears well-developed and well-nourished. No distress.   HENT:   Head: Normocephalic.   Mouth/Throat: No oropharyngeal exudate.   Eyes: Pupils are equal, round, and reactive to light. No scleral icterus.   Neck: No JVD present. No tracheal deviation present. No thyromegaly present.   Cardiovascular: Normal rate and intact distal pulses.   Pulmonary/Chest: Effort normal. No stridor. No respiratory distress. She has no wheezes.   Abdominal: Soft. She exhibits no distension. There is no tenderness. There is no guarding.   Neurological: She is alert and oriented to person, place, and time. " She displays normal reflexes. No cranial nerve deficit. Coordination normal.   Skin: Skin is warm and dry. Capillary refill takes less than 2 seconds. No rash noted. No erythema.   Psychiatric: She has a normal mood and affect. Her behavior is normal.       GAIT:     []  Normal  []  Antalgic    Assistive device: []  None  []  Walker     []  Crutches  []  Cane     []  Wheelchair  []  Stretcher    Back Exam     Tenderness   The patient is experiencing tenderness in the lumbar.    Range of Motion   Extension: 10   Flexion: 60   Lateral bend right: 10   Lateral bend left: 10   Rotation right: 10   Rotation left: 10     Muscle Strength   Right Quadriceps:  5/5   Left Quadriceps:  5/5   Right Hamstrings:  5/5   Left Hamstrings:  5/5     Tests   Straight leg raise right: negative  Straight leg raise left: negative    Reflexes   Patellar: 0/4  Achilles: 1/4    Other   Sensation: normal  Gait: normal   Erythema: no back redness                  No results found.    Xray of lumbar spine - decreased disc height of L3-4, L4-5 and L5-S1, curvature, scoliosis changes      ASSESSMENT:    Diagnoses and all orders for this visit:    Low back pain, unspecified back pain laterality, unspecified chronicity, with sciatica presence unspecified    DDD (degenerative disc disease), lumbar    Rheumatoid arthritis involving multiple sites with positive rheumatoid factor (CMS/[facility])          PLAN    Pain medicine, muscle relaxers      Carlos Knapp MD

## 2019-01-03 ENCOUNTER — APPOINTMENT (OUTPATIENT)
Dept: CT IMAGING | Facility: HOSPITAL | Age: 54
End: 2019-01-03

## 2019-01-14 ENCOUNTER — OFFICE VISIT (OUTPATIENT)
Dept: FAMILY MEDICINE CLINIC | Facility: CLINIC | Age: 54
End: 2019-01-14

## 2019-01-14 VITALS
SYSTOLIC BLOOD PRESSURE: 120 MMHG | DIASTOLIC BLOOD PRESSURE: 80 MMHG | WEIGHT: 189 LBS | RESPIRATION RATE: 18 BRPM | HEIGHT: 64 IN | TEMPERATURE: 98.2 F | BODY MASS INDEX: 32.27 KG/M2 | OXYGEN SATURATION: 97 % | HEART RATE: 84 BPM

## 2019-01-14 DIAGNOSIS — M05.79 RHEUMATOID ARTHRITIS INVOLVING MULTIPLE SITES WITH POSITIVE RHEUMATOID FACTOR (HCC): Primary | ICD-10-CM

## 2019-01-14 DIAGNOSIS — M25.559 CHRONIC HIP PAIN, UNSPECIFIED LATERALITY: ICD-10-CM

## 2019-01-14 DIAGNOSIS — F41.0 PANIC ATTACKS: ICD-10-CM

## 2019-01-14 DIAGNOSIS — G89.29 CHRONIC HIP PAIN, UNSPECIFIED LATERALITY: ICD-10-CM

## 2019-01-14 DIAGNOSIS — G89.4 CHRONIC PAIN SYNDROME: ICD-10-CM

## 2019-01-14 PROCEDURE — 99214 OFFICE O/P EST MOD 30 MIN: CPT | Performed by: NURSE PRACTITIONER

## 2019-01-14 RX ORDER — HYDROCODONE BITARTRATE AND ACETAMINOPHEN 5; 325 MG/1; MG/1
1 TABLET ORAL EVERY 6 HOURS PRN
Qty: 90 TABLET | Refills: 0 | Status: SHIPPED | OUTPATIENT
Start: 2019-01-14 | End: 2019-02-15 | Stop reason: SDUPTHER

## 2019-01-14 NOTE — PROGRESS NOTES
Chief Complaint   Patient presents with   • Anxiety     Pt states she is having panic attacks again.            HPI  Adela Aruaz is a 53 y.o. female presents today for follow up for chronic pain and RA.  She sees Rheumatologist in [place], [place].   Rates pain in her back today at 7/10.  She is now having anxiety attacks again.  Has had 3 since xmas, May 2018 her brother  by suicide and she found him.  With the holidays it has been worse.  Says that they get really bad and has shortness of breath, hands and feet tingle, hands shake and the last anywhere between 15-20 mintues.  Some days are worse then others.  Denies any suicidal or homicidal ideations.     Chronic problems: RA stable with humira, and chronic pain, chronic back pain  somewhat controlled with Neurontin.   She struggles with normal ADLS of every day living.  The gabapentin and norco help relieve some of the pain so she can function.  She applied  for disability but they turned her down because she can still use her hands.    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    • Osteoporosis    • Senile osteoporosis 2017       Past Surgical History:   Procedure Laterality Date   • FRACTURE SURGERY Bilateral        • SHOULDER SURGERY     • WRIST SURGERY Bilateral        Social History     Socioeconomic History   • Marital status:      Spouse name: Not on file   • Number of children: Not on file   • Years of education: Not on file   • Highest education level: Not on file   Tobacco Use   • Smoking status: Former Smoker     Packs/day: 1.00     Years: 20.00     Pack years: 20.00     Types: Cigarettes     Last attempt to quit: 9/3/2018     Years since quittin.3   • Smokeless tobacco: Never Used   • Tobacco comment: Would like to quit.   Substance and Sexual Activity   • Alcohol use: No   • Drug use: No   • Sexual activity:  Defer       Family History   Problem Relation Age of Onset   • Arthritis Mother    • COPD Mother    • Atrial fibrillation Mother    • Osteoarthritis Mother    • Heart disease Father    • Hypertension Father    • Arthritis Father    • Arthritis Sister    • Arthritis Brother    • No Known Problems Daughter    • Arthritis Maternal Grandfather    • Breast cancer Neg Hx        Current Outpatient Medications on File Prior to Visit   Medication Sig Dispense Refill   • Calcium-Magnesium-Vitamin D 500-250-200 MG-MG-UNIT tablet Take 500 tablets by mouth 2 (Two) Times a Day.     • cholecalciferol (VITAMIN D3) 1000 units tablet Take 1,000 Units by mouth Daily.     • DULoxetine (CYMBALTA) 60 MG capsule TAKE ONE CAPSULE BY MOUTH DAILY 30 capsule 2   • etanercept (ENBREL) 50 MG/ML solution prefilled syringe injection Inject 50 mg under the skin into the appropriate area as directed 1 (One) Time Per Week.     • gabapentin (NEURONTIN) 300 MG capsule Take 1 capsule by mouth 3 (Three) Times a Day. 90 capsule 5   • HYDROcodone-acetaminophen (NORCO) 5-325 MG per tablet Take 1 tablet by mouth Every 6 (Six) Hours As Needed (60). 90 tablet 0   • omeprazole (priLOSEC) 40 MG capsule Take 1 capsule by mouth Daily. 30 capsule 5   • tiZANidine (ZANAFLEX) 4 MG tablet Take 1 tablet by mouth 2 (Two) Times a Day As Needed for Muscle Spasms. 60 tablet 5     No current facility-administered medications on file prior to visit.         REVIEW OF SYMPTOMS: (Positives bolded)  General:  weight loss, fever, chills, night sweats, fatigue, appetite loss  HEENT:  blurry vision, eye pain, eye discharge, dry eyes, decreased vision  Respiratory: shortness of breath, cough, hemoptysis, wheezing, pleurisy,   Cardiovascular:  chest pain, PND, palpitation, edema, orthopnea, syncope, swelling of extremities  Gastro: Nausea, vomiting, diarrhea, hematemesis, abdominal pain, constipation  Genito: hematuria, dysuria, glycosuria, hesitancy, frequency,  "incontinence  Musckelo: Arthralgia, myalgia, muscle weakness, joint swelling, NSAID use  Skin: rash, pruritis, sores, nail changes, skin thickening, change in wart/mole, itching, rash, new lesions, pruritus, nail changes  Neuro:  Migraine, numbness, ataxia, tremor, vertigo, weakness, panic attacks   \"All other systems reviewed and negative, except as listed above.”      OBJECTIVE:  Constitutional:  Appearance-No acute distress, Consistent with stated age. Orientation- Oriented x 3, alert Posture-Not doubled over. Gait-Normal pace, normal arm movement. Posture- Normal Build and Nutrition-Well developed and well nourished.  General- Patient is pleasant and cooperative with the interview and exam.    Integumentary: General-No rashes, ulcers or lesions. No edema.  Palpation- Normal skin moisture/turgor. Skin is warm to touch, no increased warmth. Capillary refill is normal bilateral Upper and lower extremity.     CHEST/LUNG: Inspection- symmetric chest wall no pectus deformity. Normal effort, no distress, no use of accessory muscles. Palpation- nontender sternum, ribline.  No abnormal pulsations. Auscultation- Breath sounds normal throughout all lung fields.  Normal tracheal sounds, Normal bronchial sounds overlying sternum, Bronchovessicular sounds normal between scapulae posteriorly, Normal vessicular breath sounds heard throughout periphery. Lungs are clear today. Adventitious sounds- No wheezes, rales, rhonchi.     CARDIOVASCULAR:  Carotid artery- normal, no bruits or abnormal pulsations. Jugular vein- no pulsations. Palpation/Percussion- Normal PMI, no palpable thrill  Auscultation- Regular rate and rhythm. No murmur noted in sitting, supine positions. Extremities- no digital clubbing, cyanosis, edema, increased warmth.    ABDOMEN: Inspection- normal and no visible pulsations. Normal contour. Auscultation- Bowel sounds normal, no abdominal bruits. Palpation/Percussion- soft, non-tender, no rebound tenderness, no " rigidity (guarding), no jar tenderness, no masses.  Liver-no hepatomegaly, Spleen - no splenomegaly, Hernias- none. Rectal not examined.     Peripheral Vascular: Upper extremity Left- Normal temperature with pink nailbeds and no ulcerations.  Upper extremity Right- Normal temperature with pink nailbeds and no ulcerations.  Lower extremity- Normal temperature with pink nailbeds and no ulcerations. DP pulses 2+ bilaterally.  Pedal hair intact.  Normal capillary refill. Edema- No edema.    Musculoskeletal:   Upper extremity-         Right wrist: She exhibits decreased range of motion and tenderness.        Left wrist: She exhibits decreased range of motion and tenderness.        Right knee: She exhibits decreased range of motion.        Left knee: She exhibits decreased range of motion.        Right hand: She exhibits decreased range of motion and tenderness,  3/5, strength 3/5       Left hand: She exhibits decreased range of motion and tenderness,  3/5, strength 3/5  Lumbar Spine-   No deformities, masses or known fractures  decreased range of motion and tenderness with bending and twisting.    Straight leg raises with pain.    Tenderness on palpation of the whole lumbar spine  Decreased strength    Neuropsych: Oriented- Person, place, time. (AAOx3), Mood/affect- normal and congruent. Able to articulate well. Speech-Normal speech, normal rate, normal tone, normal use of language, volume and coherence.  Thought content- normal with ability to perform basic computations and apply abstract thought/reason. Associations- intact, no SI/HI, no hallucinations, delusions, obsessions.  Judgment/insight- Appropriate. Memory-Recall intact, remote and recent memory intact. Knowledge- Age appropriate fund of knowledge, concentration and attention span normal.    Lymphatic: Head/Neck- normal size and non tender to palpation. Axillary- Head and neck LN are normal size and non tender to palpation. Femoral and Inguinal- normal  size and non tender to palpation.      Assessment/Plan:  Adela was seen today for anxiety.    Diagnoses and all orders for this visit:    Rheumatoid arthritis involving multiple sites with positive rheumatoid factor (CMS/HCC)  -     HYDROcodone-acetaminophen (NORCO) 5-325 MG per tablet; Take 1 tablet by mouth Every 6 (Six) Hours As Needed (60).    Chronic pain syndrome    Chronic hip pain, unspecified laterality  -     HYDROcodone-acetaminophen (NORCO) 5-325 MG per tablet; Take 1 tablet by mouth Every 6 (Six) Hours As Needed (60).    Panic attacks  -     sertraline (ZOLOFT) 50 MG tablet; Take 0.5 mg nightly x 7 nights, then increase to 1 tab nightly.      ALMA Report:     As part of this patient's treatment plan, I am prescribing controlled substances. The patient has been made aware of appropriate use of such medications, including potential risk of opiod use, somnolence, limited ability to drive and /or work safely, and potential for dependence or overdose, and opiods should be used sparingly and are not recommended for long term use.  It has also been made clear that these medications are for use by this patient only, without concomitant use of alcohol or other substances unless prescribed.     Patient has completed prescribing agreement detailing terms of continued prescribing of controlled substances, including monitoring ALMA reports, urine drug screening, and pill counts if necessary. The patient is aware that inappropriate use will result in cessation of prescribing such medications.    Toxassure:  I have ordered a urine drug screen to monitor patients predisposition to and patterns of drug use/misuse in order to establish and maintain the safe and effective use of analgesics in the treatment of chronic pain      ALMA report has been reviewed by: Charlene Huntley, DNP, APRN on 1/14/19, see scanned alma.  The report was scanned into the patient's chart.      -     Pt is aware of the potential for  addiction and dependence.    Addiction was discussed.  The  Risks, benefits and alternative options of drug therapy discussed.  It was reinforced about the risks and benefits of opioids.  It was reinforced that patient may not call early for medication, may not ask for increase in the number of pills given monthly or increase in dosage of medication, may not jump around to different pharmacies or providers and may not receive any narcotics from other providers including ER, or the contract will be broken and narcotics will no longer be prescribed from this facility if any of these criteria has been broken.              Morbid obesity:  BMI:   32.3    Weight:    188    Follow up plan:  Lose at least 3 pounds before next chronic visit, exercise at least 3 times a week for 30 minute increments, eat baked instead of fried foods, and eat healthier     -  Documentation of education   The patient BMI is outside this range and we recommended/discussed today to utilize a diet/exercise program to get back into the appropriate range.  Federal guidelines recommend that people under the age of 65 should have a BMI of 18.5-24.9  Food diary:  Bring to next visit  tial step is to document everything that is consumed. Pt's that have a food diary  Lose 2x the weight  by keeping track of foods.   Choose one bad food weekly and eliminate it from your diet.  Replace with one healthy food  Exercise diary: Goal over next 2-4 weeks is walk 30 minutes per day 5 days per week at pace difficult to hold conversation.   Drink more water, less soda.   Cut back on portion sizes.   Today we encouraged roughly a 1 lb per week weight loss with initial goal of 5% weight loss.  Avoid fast foods, eat more salads  If eating out for a meal, consider cutting food in half and placing into a to go container.  Individually portion any foods coming into the home   Use smaller plates  Drink 12 ozof water 30 minutes before meal as way to suppress  appetite.  Discussed Contrave, metformin, topamax, Belviq and the cost of medications  Encouraged internet programs or self help books  Set  Goals that are realistic   Educated on ways to measure food  Baseball: 1 cup good for green salad, frozen yogurt, medium piece of fruit  Handful:  ½ cup good cut fruit, cooked vegetables, pasta, rice  Egg:  ¼ cup good for dried fruit  Deck of cards: 3 ounces good for meat and poultry  Check book:  3 ounces grilled fish  Plate Method:  reduce plate size to 9 inch dinner plate.  Half of plate should be filled with non-starchy vegetables (broccoli, lettuce, cauliflower, tomatoes), ¼ plate with lean source of protein (lean chicken, turkey, fish), remaining ½ with whole grains (brown rice, potato, whole grain breads  Avoid liquid calories (regular soda, juice, coffee with cream)  Focus  on water, seltzer water and other non-calorie drinks  Replace regular sugar with non-caloric sweeteners  Avoid skipping meals: plan small regular meals throughout the day in order to keep your hunger controlled  Consider using meal replacements if unable to plan a healthy meal (protein shake, high protein bar)  Replace all white bread with whole wheat/whole grain alternative  Swap regular salad dressings (mayonnaise, butter, or low fat or fat free alternative)  Avoid high fat, high calorie, high carbohydrate snakes (cookies, pastries, cakes)  Snack on fruits, low fat dairy (yogurt, cottage cheese)            Management plan:  Take medications as prescribed; return to the clinic of new or worsening concerns.       Risks/benefits of current and new medications discussed with the patient and or family today.  The patient/family are aware and accept that if there any side effects they should call or return to clinic as soon as possible.  Appropriate F/U discussed for topics addressed today. All questions were answered to the satisfactory state of patient/family.  Should symptoms fail to improve or worsen  they agree to call or return to clinic or to go to the ER. Education handouts were offered on any new Rx if requested.  Discussed the importance of following up with any needed screening tests/labs/specialist appointments and any requested follow-up recommended by me today.  Importance of maintaining follow-up discussed and patient accepts that missed appointments can delay diagnosis and potentially lead to worsening of conditions.    Return in about 1 month (around 2/14/2019) for Recheck RA chronic pain.    Charlene Huntley, DNP, APRN  1/14/2019

## 2019-01-17 ENCOUNTER — OFFICE VISIT (OUTPATIENT)
Dept: PULMONOLOGY | Facility: CLINIC | Age: 54
End: 2019-01-17

## 2019-01-17 DIAGNOSIS — E88.01 AAT (ALPHA-1-ANTITRYPSIN) DEFICIENCY (HCC): ICD-10-CM

## 2019-01-17 DIAGNOSIS — R91.8 MULTIPLE LUNG NODULES: Primary | ICD-10-CM

## 2019-01-17 DIAGNOSIS — R59.0 HILAR ADENOPATHY: ICD-10-CM

## 2019-01-17 DIAGNOSIS — M06.9 RHEUMATOID ARTHRITIS INVOLVING MULTIPLE SITES, UNSPECIFIED RHEUMATOID FACTOR PRESENCE: ICD-10-CM

## 2019-01-17 PROBLEM — R91.1 LUNG NODULE: Status: ACTIVE | Noted: 2018-05-14

## 2019-01-17 LAB
DIFF CAP.CO: NORMAL ML/MMHG SEC
FEV1/FVC: NORMAL %
FEV1: NORMAL LITERS
FVC VOL RESPIRATORY: NORMAL LITERS

## 2019-01-17 PROCEDURE — 94729 DIFFUSING CAPACITY: CPT | Performed by: INTERNAL MEDICINE

## 2019-01-17 PROCEDURE — 94010 BREATHING CAPACITY TEST: CPT | Performed by: INTERNAL MEDICINE

## 2019-01-17 PROCEDURE — 99204 OFFICE O/P NEW MOD 45 MIN: CPT | Performed by: INTERNAL MEDICINE

## 2019-01-17 NOTE — PROGRESS NOTES
Subjective   Adela Arauz is a 54 y.o. female.     Chief Complaint   Patient presents with   • Lung Nodule      [facility].  Records, xray reports and images were not available for review prior to patient arrival.    History of Present Illness   She had a panic attack, went to the ER, got a ct scan and was found to have lung nodule.  Previously healthy till RA diagnosed 2 years ago.  Taking enbrel.  The CT scan in question showed 2 lung nodules for which follow-up was recommended.  The patient had some trouble getting that done.  She is then referred to us for evaluation.  Patient denies any respiratory complaints right now.  Denies fevers or chills or sweats.  She denies hemoptysis or weight loss.  She is unaware of a lung nodules or other lung problems prior to the current illness.  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   has a past surgical history that includes Wrist surgery (Bilateral); Fracture surgery (Bilateral); and Shoulder surgery.  family history includes Arthritis in her brother, father, maternal grandfather, mother, and sister; Atrial fibrillation in her mother; COPD in her mother; Heart disease in her father; Hypertension in her father; No Known Problems in her daughter; Osteoarthritis in her mother.   reports that she quit smoking about 4 months ago. Her smoking use included cigarettes. She has a 20.00 pack-year smoking history. she has never used smokeless tobacco. She reports that she does not drink alcohol or use drugs.  No Known Allergies  Medications    Current Outpatient Medications:   •  Calcium-Magnesium-Vitamin D 500-250-200 MG-MG-UNIT tablet, Take 500  "tablets by mouth 2 (Two) Times a Day., Disp: , Rfl:   •  cholecalciferol (VITAMIN D3) 1000 units tablet, Take 1,000 Units by mouth Daily., Disp: , Rfl:   •  DULoxetine (CYMBALTA) 60 MG capsule, TAKE ONE CAPSULE BY MOUTH DAILY, Disp: 30 capsule, Rfl: 2  •  etanercept (ENBREL) 50 MG/ML solution prefilled syringe injection, Inject 50 mg under the skin into the appropriate area as directed 1 (One) Time Per Week., Disp: , Rfl:   •  gabapentin (NEURONTIN) 300 MG capsule, Take 1 capsule by mouth 3 (Three) Times a Day., Disp: 90 capsule, Rfl: 5  •  HYDROcodone-acetaminophen (NORCO) 5-325 MG per tablet, Take 1 tablet by mouth Every 6 (Six) Hours As Needed (60)., Disp: 90 tablet, Rfl: 0  •  omeprazole (priLOSEC) 40 MG capsule, Take 1 capsule by mouth Daily., Disp: 30 capsule, Rfl: 5  •  sertraline (ZOLOFT) 50 MG tablet, Take 0.5 mg nightly x 7 nights, then increase to 1 tab nightly., Disp: 30 tablet, Rfl: 0  •  tiZANidine (ZANAFLEX) 4 MG tablet, Take 1 tablet by mouth 2 (Two) Times a Day As Needed for Muscle Spasms., Disp: 60 tablet, Rfl: 5    Review of Systems   Constitutional: Negative for chills and fever.   HENT: Negative for trouble swallowing and voice change.    Eyes: Negative for photophobia and visual disturbance.   Respiratory: Negative for shortness of breath.    Gastrointestinal: Negative for abdominal pain, nausea, vomiting and GERD.   Genitourinary: Negative for difficulty urinating and hematuria.   Musculoskeletal: Negative for gait problem and joint swelling.   Skin: Negative for pallor and skin lesions.   Neurological: Negative for tremors, weakness and confusion.   Hematological: Negative for adenopathy. Does not bruise/bleed easily.   Psychiatric/Behavioral: The patient is not nervous/anxious.      ------------------------------------  Objective   Pulse 78   Ht 162.6 cm (64\")   Wt 86.2 kg (190 lb)   SpO2 98% Comment: RA  Breastfeeding? No   BMI 32.61 kg/m²   Physical Exam   Constitutional: She appears " well-developed. She does not appear ill. No distress.   HENT:   Head: Atraumatic.   Nose: Nose normal.   Eyes: Conjunctivae and EOM are normal. No scleral icterus.   Neck: Neck supple.   Cardiovascular: Normal rate, regular rhythm, S1 normal and S2 normal.   Pulmonary/Chest: Effort normal and breath sounds normal.   Abdominal: Soft. She exhibits no distension. There is no tenderness.   Musculoskeletal: She exhibits no deformity.   lue in sling     Lymphadenopathy:     She has no cervical adenopathy.   Neurological: She is alert.   Skin: Skin is warm. No rash noted.   Psychiatric: She has a normal mood and affect.         CT chest angiogram dated 5/1/2018 11:18 PM CDT   HISTORY: Hypoxia. Midsternal chest pain.  COMPARISON: None.   FINDINGS:    The imaged portion of the base of the neck appears unremarkable.   There is adequate enhancement of the pulmonary arteries to evaluate for  central and segmental pulmonary emboli. There are no filling defects  within the main, lobar, segmental or visualized subsegmental pulmonary  arteries. The pulmonary vessels are within normal limits.   There is a 5 mm soft tissue nodule within the right upper lobe just  above the fissure on image #61 of sequence 5. This would warrant  follow-up with repeat nonenhanced CT imaging of the thorax in 6 months.  There is dependent atelectasis within both lower lobes. A subpleural  nodule is noted within the right middle lobe just below the minor  fissure measuring 4 mm in size on image #68 of sequence 5. There is no  evidence of pulmonary contusion or pneumothorax.. The trachea and  bronchial tree are patent.   The heart and great vessels appear unremarkable. There is no pericardial  effusion.   There is a 1.4 cm enlarged subpectoral and right axillary node. Several  other rather prominent nodes within the axilla bilaterally. A 1.4 cm  short axis subcarinal node is present. Prominent bilateral hilar nodes  are present including a 11 mm left hilar  node and 11 mm right hilar  node. These are nonspecific but would warrant follow-up.  The osseous structures of the thorax and surrounding soft tissues  demonstrate no acute process.   Cholelithiasis is present. The adrenals are normal. A small hiatal  hernia is present..   IMPRESSION:  1. No evidence of pulmonary embolus or other acute cardiopulmonary  process.  2. Bilateral lower lobe subsegmental atelectasis. There are 2 soft  tissue pulmonary nodules one within the right middle lobe and one within  the right upper lobe which would warrant follow-up with repeat  nonenhanced CT imaging of the chest in 6 months. There is also enlarged  right axillary, subcarinal and hilar adenopathy which would warrant  follow-up. These nodes may be reactive in nature but given their size I  would recommend follow-up at the time of the repeat CT of the chest for  evaluation of the right upper and middle lobe pulmonary nodules.  3. Cholelithiasis.  4. Hiatal hernia.  This report was finalized on 05/02/2018 07:38 by Dr. Asad Lovett MD.    Pulmonary Functions Testing Results:  FEV1   Date Value Ref Range Status   01/17/2019 118% liters Final     FVC   Date Value Ref Range Status   01/17/2019 118% liters Final     FEV1/FVC   Date Value Ref Range Status   01/17/2019 81.97% % Final     DLCO   Date Value Ref Range Status   01/17/2019 108% ml/mmHg sec Final      My interpretation of PFT: normal  ------------------------------------  Assessment/Plan   Adela was seen today for lung nodule.    Diagnoses and all orders for this visit:    Multiple lung nodules  -     Pulmonary Function Test  -     CT Chest Without Contrast; Future  -     CT Chest Without Contrast    Rheumatoid arthritis involving multiple sites, unspecified rheumatoid factor presence (CMS/HCC)    AAT (alpha-1-antitrypsin) deficiency (CMS/HCC)    Hilar adenopathy      Patient's Body mass index is 32.61 kg/m². BMI is above normal parameters. Recommendations include: referral  to primary care.      This is a lady with hilar adenopathy and some subcarinal adenopathy also.  She does report arthritis and has been found to have lung nodules.  These do require follow-up.  We will try to get a noncontrasted CT of the chest with indication for follow-up of multiple lung nodules. at the same time we discussed her rheumatoid arthritis as a possible cause of the lung nodules.  A former smoker she is at elevated risk for lung cancer.  At the same time she also has alpha-1 antitrypsin unfavorable genetics.  Fortunately poor function tests are normal.  We will need to watch these serially.  She would be a candidate for replacement therapy in the event of decline of pulmonary function.  I strongly advised her never to start smoking again.  We will see her back after the next CT.  We will hopefully also get a decent look at the hilar nodes.  Him to try to do this without contrast.

## 2019-01-18 VITALS
BODY MASS INDEX: 32.44 KG/M2 | DIASTOLIC BLOOD PRESSURE: 100 MMHG | HEIGHT: 64 IN | SYSTOLIC BLOOD PRESSURE: 140 MMHG | HEART RATE: 78 BPM | WEIGHT: 190 LBS | OXYGEN SATURATION: 98 %

## 2019-02-15 ENCOUNTER — OFFICE VISIT (OUTPATIENT)
Dept: FAMILY MEDICINE CLINIC | Facility: CLINIC | Age: 54
End: 2019-02-15

## 2019-02-15 VITALS
TEMPERATURE: 98.8 F | BODY MASS INDEX: 33.02 KG/M2 | HEIGHT: 64 IN | RESPIRATION RATE: 18 BRPM | OXYGEN SATURATION: 96 % | HEART RATE: 79 BPM | WEIGHT: 193.4 LBS | DIASTOLIC BLOOD PRESSURE: 86 MMHG | SYSTOLIC BLOOD PRESSURE: 119 MMHG

## 2019-02-15 DIAGNOSIS — M05.79 RHEUMATOID ARTHRITIS INVOLVING MULTIPLE SITES WITH POSITIVE RHEUMATOID FACTOR (HCC): ICD-10-CM

## 2019-02-15 DIAGNOSIS — G89.29 CHRONIC HIP PAIN, UNSPECIFIED LATERALITY: ICD-10-CM

## 2019-02-15 DIAGNOSIS — F41.0 PANIC ATTACKS: ICD-10-CM

## 2019-02-15 DIAGNOSIS — G62.9 NEUROPATHY: ICD-10-CM

## 2019-02-15 DIAGNOSIS — M25.559 CHRONIC HIP PAIN, UNSPECIFIED LATERALITY: ICD-10-CM

## 2019-02-15 PROCEDURE — 99214 OFFICE O/P EST MOD 30 MIN: CPT | Performed by: NURSE PRACTITIONER

## 2019-02-15 RX ORDER — HYDROCODONE BITARTRATE AND ACETAMINOPHEN 5; 325 MG/1; MG/1
1 TABLET ORAL EVERY 6 HOURS PRN
Qty: 90 TABLET | Refills: 0 | Status: SHIPPED | OUTPATIENT
Start: 2019-02-15 | End: 2019-03-14 | Stop reason: SDUPTHER

## 2019-02-15 RX ORDER — GABAPENTIN 300 MG/1
300 CAPSULE ORAL 3 TIMES DAILY
Qty: 90 CAPSULE | Refills: 5 | Status: SHIPPED | OUTPATIENT
Start: 2019-02-15 | End: 2019-04-12 | Stop reason: SDUPTHER

## 2019-02-15 NOTE — PROGRESS NOTES
Subjective     Chief Complaint   Patient presents with   • Pain     Pt is here for followup and medication refills.          History of Present Illness  Adela Arauz is a 54 y.o. female here today for follow up for chronic pain from Rheumatoid arthritis.  Patient complains of pain in hands and wrists today. Pain today is 8/10 in back and left shoulder.  She goes for her disability hearing on  March 5th in [place].  She states that she has not had any anxiety and panic attack issues in the past month. She states that when she takes a full tablet of the zoloft, she cannot get out of bed.  She denies any suicidal or homicidal ideations.      Chronic problems: RA stable with humira, and chronic pain, chronic back pain  somewhat controlled with Neurontin.   She struggles with normal ADLS of every day living.  The gabapentin and norco help relieve some of the pain so she can function. GERD stable with omeprazole.  Panic attacks stable sertraline.  Depression and fibromyalgia stable with duloxetine.      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  No Known Allergies    Current Outpatient Medications:   •  Calcium-Magnesium-Vitamin D 500-250-200 MG-MG-UNIT tablet, Take 500 tablets by mouth 2 (Two) Times a Day., Disp: , Rfl:   •  cholecalciferol (VITAMIN D3) 1000 units tablet, Take 1,000 Units by mouth Daily., Disp: , Rfl:   •  DULoxetine (CYMBALTA) 60 MG capsule, TAKE ONE CAPSULE BY MOUTH DAILY, Disp: 30 capsule, Rfl: 2  •  etanercept (ENBREL) 50 MG/ML solution prefilled syringe injection, Inject 50 mg under the skin into the appropriate area as directed 1 (One) Time Per Week., Disp: , Rfl:   •  gabapentin (NEURONTIN) 300 MG capsule, Take 1 capsule by mouth 3 (Three) Times a Day., Disp: 90 capsule, Rfl: 5  •  HYDROcodone-acetaminophen (NORCO) 5-325 MG  per tablet, Take 1 tablet by mouth Every 6 (Six) Hours As Needed (60)., Disp: 90 tablet, Rfl: 0  •  omeprazole (priLOSEC) 40 MG capsule, Take 1 capsule by mouth Daily., Disp: 30 capsule, Rfl: 5  •  sertraline (ZOLOFT) 50 MG tablet, Take 0.5 mg nightly x 7 nights, then increase to 1 tab nightly., Disp: 30 tablet, Rfl: 0  •  tiZANidine (ZANAFLEX) 4 MG tablet, Take 1 tablet by mouth 2 (Two) Times a Day As Needed for Muscle Spasms., Disp: 60 tablet, Rfl: 5  Past Medical History:   Diagnosis Date   • AAT (alpha-1-antitrypsin) deficiency (CMS/HCC) 2019   • 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, 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    • Osteoporosis    • Senile osteoporosis 2017     Past Surgical History:   Procedure Laterality Date   • FRACTURE SURGERY Bilateral        • SHOULDER SURGERY     • WRIST SURGERY Bilateral      Social History     Socioeconomic History   • Marital status:      Spouse name: Not on file   • Number of children: Not on file   • Years of education: Not on file   • Highest education level: Not on file   Tobacco Use   • Smoking status: Former Smoker     Packs/day: 1.00     Years: 20.00     Pack years: 20.00     Types: Cigarettes     Last attempt to quit: 9/3/2018     Years since quittin.4   • Smokeless tobacco: Never Used   • Tobacco comment: Would like to quit.   Substance and Sexual Activity   • Alcohol use: No   • Drug use: No   • Sexual activity: Defer     Family History   Problem Relation Age of Onset   • Arthritis Mother    • COPD Mother    • Atrial fibrillation Mother    • Osteoarthritis Mother    • Heart disease Father    • Hypertension Father    • Arthritis Father    • Arthritis Sister    • Arthritis Brother    • No Known Problems Daughter    • Arthritis Maternal Grandfather    • Breast cancer Neg Hx        Family history, surgical history, past medical history, Allergies and med's reviewed with patient today and updated in [employer] EMR.     ROS:  Review of Systems   Constitutional: Positive  for fatigue. Negative for chills and fever.   HENT: Negative for congestion, rhinorrhea, sinus pressure, sinus pain and sore throat.    Eyes: Negative.  Negative for pain, discharge and itching.   Respiratory: Negative.  Negative for cough, chest tightness, shortness of breath and wheezing.    Cardiovascular: Positive for leg swelling. Negative for chest pain and palpitations.   Gastrointestinal: Negative.  Negative for diarrhea, nausea and vomiting.   Genitourinary: Negative.    Musculoskeletal: Positive for arthralgias, back pain, joint swelling, myalgias, neck pain and neck stiffness.   Neurological: Positive for weakness (in hands), numbness (in hands) and headaches. Negative for light-headedness.   All other systems reviewed and are negative.         Objective   Vitals:    02/15/19 0840   BP: 119/86   Pulse: 79   Resp: 18   Temp: 98.8 °F (37.1 °C)   SpO2: 96%     Physical Exam   Constitutional: She is oriented to person, place, and time. She appears well-developed and well-nourished. She is cooperative. No distress.   HENT:   Head: Normocephalic and atraumatic.   Right Ear: Tympanic membrane normal.   Left Ear: Tympanic membrane normal.   Mouth/Throat: Uvula is midline and mucous membranes are normal. No tonsillar exudate.   Eyes: EOM and lids are normal. Pupils are equal, round, and reactive to light.   Neck: Trachea normal, normal range of motion and full passive range of motion without pain. Neck supple.   Cardiovascular: Normal rate, regular rhythm, S1 normal, S2 normal and normal heart sounds.   Pulmonary/Chest: Effort normal and breath sounds normal.   Abdominal: Soft. Normal appearance and bowel sounds are normal.   Musculoskeletal:        Left shoulder: She exhibits decreased range of motion and tenderness.        Right wrist: She exhibits decreased range of motion and tenderness.        Left wrist: She exhibits decreased range of motion and tenderness.        Lumbar back: She exhibits decreased range  of motion and tenderness.        Right hand: She exhibits decreased range of motion and tenderness.        Left hand: She exhibits decreased range of motion and tenderness.   Neurological: She is alert and oriented to person, place, and time. She has normal strength.   Skin: Skin is warm, dry and intact. Capillary refill takes less than 2 seconds. She is not diaphoretic.   Psychiatric: She has a normal mood and affect. Her speech is normal and behavior is normal. Judgment and thought content normal. Cognition and memory are normal.   Nursing note and vitals reviewed.         Assessment/Plan   Adela was seen today for pain.    Diagnoses and all orders for this visit:    Panic Attacks       -      Decrease sertraline dose down to 0.5 tablet due to not tolerating a full tablet.      Rheumatoid arthritis involving multiple sites with positive rheumatoid factor (CMS/HCC)  -     HYDROcodone-acetaminophen (NORCO) 5-325 MG per tablet; Take 1 tablet by mouth Every 6 (Six) Hours As Needed (60).  -     gabapentin (NEURONTIN) 300 MG capsule; Take 1 capsule by mouth 3 (Three) Times a Day.    Chronic hip pain, unspecified laterality  -     HYDROcodone-acetaminophen (NORCO) 5-325 MG per tablet; Take 1 tablet by mouth Every 6 (Six) Hours As Needed (60).    Neuropathy  -     gabapentin (NEURONTIN) 300 MG capsule; Take 1 capsule by mouth 3 (Three) Times a Day.      ALMA Report:     As part of this patient's treatment plan, I am prescribing controlled substances. The patient has been made aware of appropriate use of such medications, including potential risk of opiod use, somnolence, limited ability to drive and /or work safely, and potential for dependence or overdose, and opiods should be used sparingly and are not recommended for long term use.  It has also been made clear that these medications are for use by this patient only, without concomitant use of alcohol or other substances unless prescribed.     Patient has completed  prescribing agreement detailing terms of continued prescribing of controlled substances, including monitoring ALMA reports, urine drug screening, and pill counts if necessary. The patient is aware that inappropriate use will result in cessation of prescribing such medications.    Toxassure:  I have ordered a urine drug screen to monitor patients predisposition to and patterns of drug use/misuse in order to establish and maintain the safe and effective use of analgesics in the treatment of chronic pain      ALMA report has been reviewed by: Charlene Huntley, DNP, APRN.  #[id number].  The report was scanned into the patient's chart.      -     Pt is aware of the potential for addiction and dependence.    Addiction was discussed.  The  Risks, benefits and alternative options of drug therapy discussed.  It was reinforced about the risks and benefits of opioids.  It was reinforced that patient may not call early for medication, may not ask for increase in the number of pills given monthly or increase in dosage of medication, may not jump around to different pharmacies or providers and may not receive any narcotics from other providers including ER, or the contract will be broken and narcotics will no longer be prescribed from this facility if any of these criteria has been broken.      ALMA query complete. Treatment plan to include limited course of prescribed  controlled substance. Risks including addiction, benefits, and alternatives presented to patient.          Management Plan:   Risks/benefits of current and new medications discussed with the patient and or family today.  The patient/family are aware and accept that if there any side effects they should call or return to clinic as soon as possible.  Appropriate F/U discussed for topics addressed today. All questions were answered to the satisfactory state of patient/family.  Should symptoms fail to improve or worsen they agree to call or return to clinic or to  go to the ER. Education handouts were offered on any new Rx if requested.  Discussed the importance of following up with any needed screening tests/labs/specialist appointments and any requested follow-up recommended by me today.  Importance of maintaining follow-up discussed and patient accepts that missed appointments can delay diagnosis and potentially lead to worsening of conditions.    An After Visit Summary was printed and given to the patient at discharge.    Return in about 1 month (around 3/15/2019) for Recheck chronic pain and RA.     Charlene Huntley, [place], APRN-BC    2/15/2019   9:07 AM      This note was electronically signed.

## 2019-02-15 NOTE — PATIENT INSTRUCTIONS
Rheumatoid Arthritis  Rheumatoid arthritis (RA) is a long-term (chronic) disease that causes inflammation in your joints. RA may start slowly. It usually affects the small joints of the hands and feet. Usually, the same joints are affected on both sides of your body. Inflammation from RA can also affect other parts of your body, including your heart, eyes, or lungs.  RA is an autoimmune disease. That means that your body's defense system (immune system) mistakenly attacks healthy body tissues. There is no cure for RA, but medicines can help your symptoms and halt or slow down the progression of the disease.  What are the causes?  The exact cause of RA is not known.  What increases the risk?  This condition is more likely to develop in:  · Women.  · People who have a family history of RA or other autoimmune diseases.    What are the signs or symptoms?  Symptoms of this condition vary from person to person. Symptoms usually start gradually. They are often worse in the morning. The first symptom may be morning stiffness that lasts longer than 30 minutes.  As RA progresses, symptoms may include:  · Pain, stiffness, swelling, warmth, and tenderness in joints on both sides of your body.  · Loss of energy.  · Loss of appetite.  · Weight loss.  · Low-grade fever.  · Dry eyes and dry mouth.  · Firm lumps (rheumatoid nodules) that grow beneath your skin in areas such as your forearm bones near your elbows and on your hands.  · Changes in the appearance of joints (deformity) and loss of joint function.    Symptoms of RA often come and go. Sometimes, symptoms get worse for a period of time. These are called flares.  How is this diagnosed?  This condition is diagnosed based on your symptoms, medical history, and physical exam. You may have X-rays or MRI to check for the type of joint changes that are caused by RA. You may also have blood tests to look for:  · Proteins (antibodies) that your immune system may make if you have RA.  "They include rheumatoid factor (RF) and anti-CCP.  ? When blood tests show these proteins, you are said to have \"seropositive RA.\"  ? When blood tests do not show these proteins, you may have \"seronegative RA.\"  · Inflammation in your blood.  · A low number of red blood cells (anemia).    How is this treated?  The goals of treatment are to relieve pain, reduce inflammation, and slow down or stop joint damage and disability. Treatment may include:  · Lifestyle changes. It is important to rest, eat a healthy diet, and exercise.  · Medicines. Your health care provider may adjust your medicines every 3 months until treatment goals are reached. Common medicines include:  ? Pain relievers (analgesics).  ? Corticosteroids and NSAIDs to reduce inflammation.  ? Disease-modifying antirheumatic drugs (DMARDs) to try to slow the course of the disease.  ? Biologic response modifiers to reduce inflammation and damage.  · Physical therapy and occupational therapy.  · Surgery, if you have severe joint damage. Joint replacement or fusing of joints may be needed.    Your health care provider will work with you to identify the best treatment option for you based on assessment of the overall disease activity in your body.  Follow these instructions at home:  · Take over-the-counter and prescription medicines only as told by your health care provider.  · Start an exercise program as told by your health care provider.  · Rest when you are having a flare.  · Return to your normal activities as told by your health care provider. Ask your health care provider what activities are safe for you.  · Keep all follow-up visits as told by your health care provider. This is important.  Where to find more information:  · American College of Rheumatology: www.rheumatology.org  · Arthritis Foundation: www.arthritis.org  Contact a health care provider if:  · You have a flare-up of RA symptoms.  · You have a fever.  · You have side effects from your " medicines.  Get help right away if:  · You have chest pain.  · You have trouble breathing.  · You quickly develop a hot, painful joint that is more severe than your usual joint aches.  This information is not intended to replace advice given to you by your health care provider. Make sure you discuss any questions you have with your health care provider.  Document Released: 12/15/2001 Document Revised: 05/23/2017 Document Reviewed: 09/29/2016  [employer] Interactive Patient Education © 2018 [employer]

## 2019-03-14 ENCOUNTER — OFFICE VISIT (OUTPATIENT)
Dept: FAMILY MEDICINE CLINIC | Facility: CLINIC | Age: 54
End: 2019-03-14

## 2019-03-14 VITALS
DIASTOLIC BLOOD PRESSURE: 74 MMHG | HEART RATE: 88 BPM | WEIGHT: 189.2 LBS | TEMPERATURE: 98.6 F | HEIGHT: 64 IN | SYSTOLIC BLOOD PRESSURE: 113 MMHG | OXYGEN SATURATION: 97 % | RESPIRATION RATE: 18 BRPM | BODY MASS INDEX: 32.3 KG/M2

## 2019-03-14 DIAGNOSIS — M25.559 CHRONIC HIP PAIN, UNSPECIFIED LATERALITY: ICD-10-CM

## 2019-03-14 DIAGNOSIS — F41.8 ANXIETY ASSOCIATED WITH DEPRESSION: ICD-10-CM

## 2019-03-14 DIAGNOSIS — G89.29 CHRONIC HIP PAIN, UNSPECIFIED LATERALITY: ICD-10-CM

## 2019-03-14 DIAGNOSIS — Z51.81 THERAPEUTIC DRUG MONITORING: ICD-10-CM

## 2019-03-14 DIAGNOSIS — Z79.891 LONG TERM (CURRENT) USE OF OPIATE ANALGESIC: Primary | ICD-10-CM

## 2019-03-14 DIAGNOSIS — M05.79 RHEUMATOID ARTHRITIS INVOLVING MULTIPLE SITES WITH POSITIVE RHEUMATOID FACTOR (HCC): ICD-10-CM

## 2019-03-14 PROCEDURE — 99214 OFFICE O/P EST MOD 30 MIN: CPT | Performed by: NURSE PRACTITIONER

## 2019-03-14 RX ORDER — HYDROCODONE BITARTRATE AND ACETAMINOPHEN 5; 325 MG/1; MG/1
1 TABLET ORAL EVERY 6 HOURS PRN
Qty: 90 TABLET | Refills: 0 | Status: SHIPPED | OUTPATIENT
Start: 2019-03-14 | End: 2019-04-12 | Stop reason: SDUPTHER

## 2019-03-14 NOTE — PROGRESS NOTES
Subjective   Adela Arauz is a 54 y.o. female.     Adela Arauz is a 54 yr old white female here for follow up for chronic pain from fibromyalgia and RA.  She sees a arthritis dr in [place].   She says pain has been worse the last couple days. Says she went for her disability hearing but does not know the outcome.  Has pain all over the worst in her wrists, hips, neck and back.     Pain   This is a chronic problem. The current episode started more than 1 year ago. The problem occurs constantly. The problem has been gradually worsening. Associated symptoms include arthralgias, joint swelling and myalgias. Pertinent negatives include no chest pain, coughing, headaches, sore throat or visual change. The symptoms are aggravated by twisting, standing and bending. She has tried rest, sleep, oral narcotics, position changes and lying down for the symptoms. The treatment provided mild relief.      Chronic problems: Chronic pain/RA stable with humira, and chronic pain somewhat controlled with Neurontin.   She struggles with the simple things in life like opening a jar, lifting heavy pans, even just doing daily activities of living. She applied for disability but they turned her down because she can still use her hands. She has ligetament reasons for pain medication. She says since she has been on the neurontin, tizanidine and norco she is functioning better.    The following portions of the patient's history were reviewed and updated as appropriate: allergies, current medications, past family history, past medical history, past social history, past surgical history and problem list.    Review of Systems   Constitutional: Positive for activity change. Negative for appetite change.   HENT: Negative for sore throat.    Respiratory: Negative for cough, choking and chest tightness.    Cardiovascular: Negative for chest pain, palpitations and leg swelling.   Musculoskeletal: Positive for arthralgias, joint swelling and myalgias.    Allergic/Immunologic: Negative for environmental allergies, food allergies and immunocompromised state.   Psychiatric/Behavioral: Negative for behavioral problems, decreased concentration and depressed mood.   All other systems reviewed and are negative.      Objective   Physical Exam   Constitutional: She is oriented to person, place, and time. Vital signs are normal. She appears well-developed and well-nourished. She is cooperative.   HENT:   Head: Normocephalic and atraumatic.   Right Ear: Hearing normal.   Left Ear: Hearing normal.   Nose: Nose normal.   Eyes: Conjunctivae are normal. Pupils are equal, round, and reactive to light.   Neck:       Cardiovascular: Normal rate, regular rhythm and normal heart sounds.   Pulmonary/Chest: Effort normal and breath sounds normal.   Abdominal: Soft. Normal appearance and bowel sounds are normal.   Musculoskeletal:        Cervical back: She exhibits decreased range of motion, tenderness and pain.        Lumbar back: She exhibits decreased range of motion, tenderness, pain and spasm.        Back:    Lymphadenopathy:        Head (right side): No submental, no submandibular and no tonsillar adenopathy present.        Head (left side): No submental, no submandibular and no tonsillar adenopathy present.   Neurological: She is alert and oriented to person, place, and time. She has normal strength. No cranial nerve deficit or sensory deficit.   Skin: Skin is warm, dry and intact.   Psychiatric: She has a normal mood and affect. Her speech is normal and behavior is normal. Judgment normal. Cognition and memory are normal.   Nursing note and vitals reviewed.        Assessment/Plan   Adela was seen today for pain.    Diagnoses and all orders for this visit:    Rheumatoid arthritis involving multiple sites with positive rheumatoid factor (CMS/[facility])  -     HYDROcodone-acetaminophen (NORCO) 5-325 MG per tablet; Take 1 tablet by mouth Every 6 (Six) Hours As Needed (60).    Chronic hip  pain, unspecified laterality  -     HYDROcodone-acetaminophen (NORCO) 5-325 MG per tablet; Take 1 tablet by mouth Every 6 (Six) Hours As Needed (60).        ALMA Report:     As part of this patient's treatment plan, I am prescribing controlled substances. The patient has been made aware of appropriate use of such medications, including potential risk of opiod use, somnolence, limited ability to drive and /or work safely, and potential for dependence or overdose, and opiods should be used sparingly and are not recommended for long term use.  It has also been made clear that these medications are for use by this patient only, without concomitant use of alcohol or other substances unless prescribed.     Patient has completed prescribing agreement detailing terms of continued prescribing of controlled substances, including monitoring ALMA reports, urine drug screening, and pill counts if necessary. The patient is aware that inappropriate use will result in cessation of prescribing such medications.    Toxassure:  I have ordered a urine drug screen to monitor patients predisposition to and patterns of drug use/misuse in order to establish and maintain the safe and effective use of analgesics in the treatment of chronic pain      ALMA report has been reviewed by: Charlene Huntley, [place], APRN on 3/14/19, #[id number]  The report was scanned into the patient's chart.      -     Pt is aware of the potential for addiction and dependence.    Addiction was discussed.  The  Risks, benefits and alternative options of drug therapy discussed.  It was reinforced about the risks and benefits of opioids.  It was reinforced that patient may not call early for medication, may not ask for increase in the number of pills given monthly or increase in dosage of medication, may not jump around to different pharmacies or providers and may not receive any narcotics from other providers including ER, or the contract will be broken and  narcotics will no longer be prescribed from this facility if any of these criteria has been broken.      Date of last ALMA: today    Date of last UDS: today    Return in about 1 month (around 4/14/2019) for Recheck chronic pain.    Charlene Huntley, DNP, APRN-BC

## 2019-03-15 RX ORDER — DULOXETIN HYDROCHLORIDE 60 MG/1
CAPSULE, DELAYED RELEASE ORAL
Qty: 30 CAPSULE | Refills: 2 | Status: SHIPPED | OUTPATIENT
Start: 2019-03-15 | End: 2019-05-25 | Stop reason: SDUPTHER

## 2019-03-22 LAB
7AMINOCLONAZEPAM/CREAT UR: NOT DETECTED NG/MG CREAT
A-OH ALPRAZ/CREAT UR: NOT DETECTED NG/MG CREAT
ALFENTANIL UR QL: NOT DETECTED NG/MG CREAT
ALPHA-HYDROXYTRIAZOLAM, URINE: NOT DETECTED NG/MG CREAT
AMOBARBITAL UR QL CFM: NOT DETECTED
AMPHET/CREAT UR: NOT DETECTED NG/MG CREAT
AMPHETAMINES UR QL CFM: NEGATIVE
BARBITAL UR QL CFM: NOT DETECTED
BARBITURATES UR QL CFM: NEGATIVE
BENZODIAZ UR QL CFM: NEGATIVE
BUPRENORPHINE UR QL CFM: NEGATIVE
BUPRENORPHINE/CREAT UR: NOT DETECTED NG/MG CREAT
BUTABARBITAL UR QL CFM: NOT DETECTED
BUTALBITAL UR QL CFM: NOT DETECTED
BZE/CREAT UR: NOT DETECTED NG/MG CREAT
CANNABINOIDS UR QL CFM: NORMAL
CARBOXYTHC/CREAT UR: 252 NG/MG CREAT
CLONAZEPAM UR QL: NOT DETECTED NG/MG CREAT
COCAETHYLENE UR QL CFM: NOT DETECTED NG/MG CREAT
COCAINE UR QL CFM: NEGATIVE
CODEINE/CREAT UR: NOT DETECTED NG/MG CREAT
CONV COCAINE, UR: NOT DETECTED NG/MG CREAT
CREAT UR-MCNC: 94 MG/DL
DESALKYLFLURAZ/CREAT UR: NOT DETECTED NG/MG CREAT
DHC/CREAT UR: NOT DETECTED NG/MG CREAT
DIAZEPAM/CREAT UR: NOT DETECTED NG/MG CREAT
DRUGS UR: NORMAL
EDDP/CREAT UR: NOT DETECTED NG/MG CREAT
ETHANOL UR CFM-MCNC: NOT DETECTED G/DL
ETHANOL UR QL CFM: NEGATIVE
FENTANYL UR QL CFM: NEGATIVE
FENTANYL/CREAT UR: NOT DETECTED NG/MG CREAT
GABAPENTIN UR-MCNC: 37.5 UG/ML
HX OF MEDICATION USE: NORMAL
HYDROCODONE/CREAT UR: NOT DETECTED NG/MG CREAT
HYDROMORPHONE/CREAT UR: NOT DETECTED NG/MG CREAT
LEVEL OF DETECTION:: NORMAL
LORAZEPAM/CREAT UR: NOT DETECTED NG/MG CREAT
LORAZEPAM/CREAT UR: NOT DETECTED NG/MG CREAT
MDA/CREAT UR: NOT DETECTED NG/MG CREAT
MDMA/CREAT UR: NOT DETECTED NG/MG CREAT
MEPHOBARBITAL UR QL CFM: NOT DETECTED
METHADONE UR QL CFM: NEGATIVE
METHADONE/CREAT UR: NOT DETECTED NG/MG CREAT
METHAMPHET/CREAT UR: NOT DETECTED NG/MG CREAT
MIDAZOLAM UR-MCNC: NOT DETECTED NG/MG CREAT
MORPHINE/CREAT UR: NOT DETECTED NG/MG CREAT
N-DESMETHYLTRAMADOL, U: NOT DETECTED NG/MG CREAT
NARCOTICS UR: NEGATIVE
NORBUPRENORPHINE/CREAT UR: NOT DETECTED NG/MG CREAT
NORCODEINE UR-MCNC: NOT DETECTED NG/MG CREAT
NORDIAZEPAM/CREAT UR: NOT DETECTED NG/MG CREAT
NORFENTANYL/CREAT UR: NOT DETECTED NG/MG CREAT
NORHYDROCODONE/CREAT UR: 165 NG/MG CREAT
NOROXYCODONE/CREAT UR: NOT DETECTED NG/MG CREAT
NOROXYMORPHONE: NOT DETECTED NG/MG CREAT
O-DESMETHYLTRAMADOL, UR: NOT DETECTED NG/MG CREAT
OPIATES UR QL CFM: NORMAL
OXAZEPAM/CREAT UR: NOT DETECTED NG/MG CREAT
OXYCODONE UR QL CFM: NEGATIVE
OXYCODONE/CREAT UR: NOT DETECTED NG/MG CREAT
OXYMORPHONE/CREAT UR: NOT DETECTED NG/MG CREAT
PENTOBARB UR QL CFM: NOT DETECTED
PHENOBARB UR QL CFM: NOT DETECTED
SECOBARBITAL UR QL CFM: NOT DETECTED
SUFENTANIL UR QL: NOT DETECTED NG/MG CREAT
TAPENTADOL UR QL CFM: NEGATIVE
TAPENTADOL/CREAT UR: NOT DETECTED NG/MG CREAT
TEMAZEPAM/CREAT UR: NOT DETECTED NG/MG CREAT
THIOPENTAL UR QL CFM: NOT DETECTED
TRAMADOL UR QL CFM: NOT DETECTED NG/MG CREAT

## 2019-03-26 NOTE — PROGRESS NOTES
Please call the pt and tell her the results of her drug screen.  Please tell her that the drug screen was positive for marijuana.  I will give her this one chance however, if there is marijuana in her system the next drug screen, then I will no longer be able to prescribe you norco.  She can expect to get a random at some point.

## 2019-04-12 ENCOUNTER — OFFICE VISIT (OUTPATIENT)
Dept: FAMILY MEDICINE CLINIC | Facility: CLINIC | Age: 54
End: 2019-04-12

## 2019-04-12 VITALS
DIASTOLIC BLOOD PRESSURE: 76 MMHG | RESPIRATION RATE: 18 BRPM | WEIGHT: 196 LBS | SYSTOLIC BLOOD PRESSURE: 126 MMHG | HEART RATE: 68 BPM | BODY MASS INDEX: 33.46 KG/M2 | TEMPERATURE: 98.4 F | HEIGHT: 64 IN | OXYGEN SATURATION: 100 %

## 2019-04-12 DIAGNOSIS — F41.0 PANIC ATTACKS: ICD-10-CM

## 2019-04-12 DIAGNOSIS — G89.4 CHRONIC PAIN SYNDROME: Primary | ICD-10-CM

## 2019-04-12 DIAGNOSIS — M25.559 CHRONIC HIP PAIN, UNSPECIFIED LATERALITY: ICD-10-CM

## 2019-04-12 DIAGNOSIS — G62.9 NEUROPATHY: ICD-10-CM

## 2019-04-12 DIAGNOSIS — M62.838 MUSCLE SPASM: ICD-10-CM

## 2019-04-12 DIAGNOSIS — M05.79 RHEUMATOID ARTHRITIS INVOLVING MULTIPLE SITES WITH POSITIVE RHEUMATOID FACTOR (HCC): ICD-10-CM

## 2019-04-12 DIAGNOSIS — G89.29 CHRONIC HIP PAIN, UNSPECIFIED LATERALITY: ICD-10-CM

## 2019-04-12 PROCEDURE — 99214 OFFICE O/P EST MOD 30 MIN: CPT | Performed by: NURSE PRACTITIONER

## 2019-04-12 RX ORDER — GABAPENTIN 300 MG/1
300 CAPSULE ORAL 3 TIMES DAILY
Qty: 90 CAPSULE | Refills: 5 | Status: SHIPPED | OUTPATIENT
Start: 2019-04-12 | End: 2019-10-23 | Stop reason: SDUPTHER

## 2019-04-12 RX ORDER — TIZANIDINE 4 MG/1
4 TABLET ORAL 2 TIMES DAILY PRN
Qty: 60 TABLET | Refills: 5 | Status: SHIPPED | OUTPATIENT
Start: 2019-04-12 | End: 2019-10-23 | Stop reason: SDUPTHER

## 2019-04-12 RX ORDER — HYDROCODONE BITARTRATE AND ACETAMINOPHEN 5; 325 MG/1; MG/1
1 TABLET ORAL EVERY 6 HOURS PRN
Qty: 90 TABLET | Refills: 0 | Status: SHIPPED | OUTPATIENT
Start: 2019-04-12 | End: 2019-05-10 | Stop reason: SDUPTHER

## 2019-04-12 NOTE — PROGRESS NOTES
Chief Complaint   Patient presents with   • Pain     Pt is here for followup and medication refills.         Subjective   Adela Arauz is a 54 y.o. female here for follow up for chronic back/hip pain and chronic joint pain from RA.  Some days  She can't get out of bed.  Her pain is much worse this time because she his having pain in her shoulder and has been going to Dr. Carvajal and getting injections in it. I did talk to her about her drug test which was positive for Carboxy-THC.  We discuss the risks with narcotic use and she says that she has been trying CBD oil and hemp oil to help with her pain.  She says I am just trying to find something to help me.  I did tell her she would have a random test and is she is positive again, I will not be able to prescribe to her.      The following portions of the patient's history were reviewed and updated as appropriate: allergies, current medications, past family history, past medical history, past social history, past surgical history and problem list.    Current Outpatient Medications:   •  Calcium-Magnesium-Vitamin D 500-250-200 MG-MG-UNIT tablet, Take 500 tablets by mouth 2 (Two) Times a Day., Disp: , Rfl:   •  cholecalciferol (VITAMIN D3) 1000 units tablet, Take 1,000 Units by mouth Daily., Disp: , Rfl:   •  DULoxetine (CYMBALTA) 60 MG capsule, TAKE ONE CAPSULE BY MOUTH DAILY, Disp: 30 capsule, Rfl: 2  •  etanercept (ENBREL) 50 MG/ML solution prefilled syringe injection, Inject 50 mg under the skin into the appropriate area as directed 1 (One) Time Per Week., Disp: , Rfl:   •  gabapentin (NEURONTIN) 300 MG capsule, Take 1 capsule by mouth 3 (Three) Times a Day., Disp: 90 capsule, Rfl: 5  •  HYDROcodone-acetaminophen (NORCO) 5-325 MG per tablet, Take 1 tablet by mouth Every 6 (Six) Hours As Needed (60)., Disp: 90 tablet, Rfl: 0  •  omeprazole (priLOSEC) 40 MG capsule, Take 1 capsule by mouth Daily., Disp: 30 capsule, Rfl: 5  •  sertraline (ZOLOFT) 50 MG tablet,  Take 0.5 tablet nightly, Disp: 30 tablet, Rfl: 0  •  tiZANidine (ZANAFLEX) 4 MG tablet, Take 1 tablet by mouth 2 (Two) Times a Day As Needed for Muscle Spasms., Disp: 60 tablet, Rfl: 5  No Known Allergies  Past Medical History:   Diagnosis Date   • AAT (alpha-1-antitrypsin) deficiency (CMS/HCC) 2019   • 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, 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    • Osteoporosis    • Senile osteoporosis 2017     Past Surgical History:   Procedure Laterality Date   • FRACTURE SURGERY Bilateral        • SHOULDER SURGERY     • WRIST SURGERY Bilateral      Social History     Socioeconomic History   • Marital status:      Spouse name: Not on file   • Number of children: Not on file   • Years of education: Not on file   • Highest education level: Not on file   Tobacco Use   • Smoking status: Former Smoker     Packs/day: 1.00     Years: 20.00     Pack years: 20.00     Types: Cigarettes     Last attempt to quit: 9/3/2018     Years since quittin.6   • Smokeless tobacco: Never Used   • Tobacco comment: Would like to quit.   Substance and Sexual Activity   • Alcohol use: No   • Drug use: No   • Sexual activity: Defer       Family History   Problem Relation Age of Onset   • Arthritis Mother    • COPD Mother    • Atrial fibrillation Mother    • Osteoarthritis Mother    • Heart disease Father    • Hypertension Father    • Arthritis Father    • Arthritis Sister    • Arthritis Brother    • No Known Problems Daughter    • Arthritis Maternal Grandfather    • Breast cancer Neg Hx        REVIEW OF SYMPTOMS: (Positives bolded)  General:  weight loss, fever, chills, night sweats, fatigue, appetite loss  HEENT:  blurry vision, eye pain, eye discharge, dry eyes, decreased vision  Respiratory: shortness of breath, cough, hemoptysis, wheezing, pleurisy,   Cardiovascular:  chest pain, PND, palpitation, edema, orthopnea, syncope  Gastro: Nausea, vomiting, diarrhea, hematemesis, abdominal pain, constipation  Genito:  "hematuria, dysuria, glycosuria, hesitancy, frequency, incontinence  Musckelo: Arthralgia, myalgia, muscle weakness, joint swelling, NSAID use  Skin: rash, pruritis, sores, nail changes, skin thickening, change in wart/mole, itching   Neuro:  Migraine, numbness, ataxia, tremor, vertigo, weakness, memory loss  \"All other systems reviewed and negative, except as listed above.”      OBJECTIVE:  Constitutional:  No acute distress, Consistent with stated age. Oriented x 3, alert Posture. Patient is pleasant and cooperative with the interview and exam.    Integumentary: No rashes, ulcers or lesions. No edema.  Normal skin moisture/turgor. Skin is warm to touch, no increased warmth. Capillary refill is normal bilateral Upper and lower extremity.     Eye: Bilaterally PERRLA, EOMI.  No discharge.  Upper and lower eyelids are normal. Sclera/conjunctiva normal without discharge. Cornea is normal and clear. Lens is normal.  Eyeball appears normal. No ciliary flushing, no conjunctival injection.    ENMT:  Canals  normal without erythema or discharge, no excessive cerumen. Tympanic membranes Grey/pearly, normal light reflex and anatomy. Hearing normal to conversational speech at 2-5 feet. Nares airflow, no discharge. Dentition assessed and discussed appropriate oral care. Tongue normal midline.  no pharyngeal erythema, Uvula midline. No post nasal drip.     CHEST/LUNG:Lungs clear throughout lung fields without rale, rhonchi or wheezes.  Normal effort, no distress, no use of accessory muscles. nontender sternum, ribline.  No abnormal pulsations.      CARDIOVASCULAR:  Regular rate and rhythm. No murmur noted in sitting, supine positions. digital clubbing, cyanosis, edema, increased warmth.  normal, no bruits or abnormal pulsations.      ABDOMEN: normal and no visible pulsations. Normal contour. Bowel sounds normal, no abdominal bruits. Abd soft, non-tender, no rebound tenderness, no rigidity (guarding), no jar tenderness, no masses. " " no hepatomegaly, no splenomegaly     Neuropsych: Able to articulate well. Normal speech, normal rate, normal tone, normal use of language, volume and coherence.  Thought- normal with ability to perform basic computations and apply abstract thought/reason. No hallucinations, delusions, obsessions.   Appropriate judgement and memory.      /76 (BP Location: Left arm, Patient Position: Sitting, Cuff Size: Large Adult)   Pulse 68   Temp 98.4 °F (36.9 °C) (Oral)   Resp 18   Ht 162.6 cm (64.02\")   Wt 88.9 kg (196 lb)   SpO2 100%   Breastfeeding? No   BMI 33.63 kg/m²     ASSESSMENT  Adela was seen today for pain.    Diagnoses and all orders for this visit:    Chronic pain syndrome    Chronic hip pain, unspecified laterality  -     HYDROcodone-acetaminophen (NORCO) 5-325 MG per tablet; Take 1 tablet by mouth Every 6 (Six) Hours As Needed (60).    Rheumatoid arthritis involving multiple sites with positive rheumatoid factor (CMS/HCC)  -     gabapentin (NEURONTIN) 300 MG capsule; Take 1 capsule by mouth 3 (Three) Times a Day.  -     HYDROcodone-acetaminophen (NORCO) 5-325 MG per tablet; Take 1 tablet by mouth Every 6 (Six) Hours As Needed (60).  -       ALMA Report:     As part of this patient's treatment plan, I am prescribing controlled substances. The patient has been made aware of appropriate use of such medications, including potential risk of opiod use, somnolence, limited ability to drive and /or work safely, and potential for dependence or overdose, and opiods should be used sparingly and are not recommended for long term use.  It has also been made clear that these medications are for use by this patient only, without concomitant use of alcohol or other substances unless prescribed.     Patient has completed prescribing agreement detailing terms of continued prescribing of controlled substances, including monitoring ALMA reports, urine drug screening, and pill counts if necessary. The patient is " aware that inappropriate use will result in cessation of prescribing such medications.    Toxassure:  I have ordered a urine drug screen to monitor patients predisposition to and patterns of drug use/misuse in order to establish and maintain the safe and effective use of analgesics in the treatment of chronic pain      ALMA report has been reviewed by: Charlene Huntley DNP, APRN, #[id number]  The report was scanned into the patient's chart.      -     Pt is aware of the potential for addiction and dependence.    Addiction was discussed.  The  Risks, benefits and alternative options of drug therapy discussed.  It was reinforced about the risks and benefits of opioids.  It was reinforced that patient may not call early for medication, may not ask for increase in the number of pills given monthly or increase in dosage of medication, may not jump around to different pharmacies or providers and may not receive any narcotics from other providers including ER, or the contract will be broken and narcotics will no longer be prescribed from this facility if any of these criteria has been broken.   Last Dose was: this morning    Last Fill was:  3/14/19    Date of last ALMA: 4/9/19    Date of last UDS:  3/15/19       Neuropathy  -     gabapentin (NEURONTIN) 300 MG capsule; Take 1 capsule by mouth 3 (Three) Times a Day.    Panic attacks  -     sertraline (ZOLOFT) 50 MG tablet; Take 0.5 tablet nightly    Muscle spasm  -     tiZANidine (ZANAFLEX) 4 MG tablet; Take 1 tablet by mouth 2 (Two) Times a Day As Needed for Muscle Spasms.           Return for Recheck chornic pain.    Charlene Huntley DNP, APRN-BC   04/12/2019

## 2019-05-10 ENCOUNTER — OFFICE VISIT (OUTPATIENT)
Dept: FAMILY MEDICINE CLINIC | Facility: CLINIC | Age: 54
End: 2019-05-10

## 2019-05-10 VITALS
OXYGEN SATURATION: 95 % | TEMPERATURE: 98.6 F | WEIGHT: 198.6 LBS | HEART RATE: 81 BPM | BODY MASS INDEX: 33.9 KG/M2 | SYSTOLIC BLOOD PRESSURE: 114 MMHG | HEIGHT: 64 IN | RESPIRATION RATE: 18 BRPM | DIASTOLIC BLOOD PRESSURE: 78 MMHG

## 2019-05-10 DIAGNOSIS — Z79.891 ENCOUNTER FOR LONG-TERM USE OF OPIATE ANALGESIC: Primary | ICD-10-CM

## 2019-05-10 DIAGNOSIS — M05.79 RHEUMATOID ARTHRITIS INVOLVING MULTIPLE SITES WITH POSITIVE RHEUMATOID FACTOR (HCC): ICD-10-CM

## 2019-05-10 DIAGNOSIS — G89.29 CHRONIC HIP PAIN, UNSPECIFIED LATERALITY: ICD-10-CM

## 2019-05-10 DIAGNOSIS — M25.559 CHRONIC HIP PAIN, UNSPECIFIED LATERALITY: ICD-10-CM

## 2019-05-10 PROCEDURE — 99214 OFFICE O/P EST MOD 30 MIN: CPT | Performed by: NURSE PRACTITIONER

## 2019-05-10 RX ORDER — HYDROCODONE BITARTRATE AND ACETAMINOPHEN 5; 325 MG/1; MG/1
1 TABLET ORAL EVERY 6 HOURS PRN
Qty: 90 TABLET | Refills: 0 | Status: SHIPPED | OUTPATIENT
Start: 2019-05-10 | End: 2019-06-03 | Stop reason: SDUPTHER

## 2019-05-10 NOTE — PROGRESS NOTES
CC: Chronic Pain and RA    Adela Arauz is a 54 yr old female here for follow up for chronic pain and RA, RA is managed by Rheumatologist in [place], however, there is a rheumatologist in [place] and she is going to transfer to her.  She takes Reclast injection       HPI  Adela Arauz is a 53 y.o. female presents today for follow up for chronic pain and RA.  She sees Rheumatologist in [place], [place].   Rates pain in her back today at 7/10.  She is now having anxiety attacks again.  Has had 3 since xmas, May 2018 her brother  by suicide and she found him.  With the holidays it has been worse.  Says that they get really bad and has shortness of breath, hands and feet tingle, hands shake and the last anywhere between 15-20 mintues.  Some days are worse then others.  Denies any suicidal or homicidal ideations.      Chronic problems: RA stable with humira, and chronic pain, chronic back pain  somewhat controlled with Neurontin.   She struggles with normal ADLS of every day living.  The gabapentin and norco help relieve some of the pain so she can function.  She applied  for disability but they turned her down because she can still use her hands.        Pain   This is a chronic problem. The current episode started more than 1 year ago. The problem occurs constantly. Associated symptoms include arthralgias, fatigue and myalgias. Pertinent negatives include no chest pain or coughing. The symptoms are aggravated by twisting, standing, walking and exertion. She has tried rest, sleep, position changes, lying down, NSAIDs and walking for the symptoms. The treatment provided mild relief.        The following portions of the patient's history were reviewed and updated as appropriate: allergies, current medications, past family history, past medical history, past social history, past surgical history and problem list.    Review of Systems   Constitutional: Positive for activity change and fatigue.   Respiratory: Negative for  cough, chest tightness and shortness of breath.    Cardiovascular: Negative for chest pain, palpitations and leg swelling.   Genitourinary: Negative.    Musculoskeletal: Positive for arthralgias and myalgias.   Neurological: Negative.    Hematological: Negative.    Psychiatric/Behavioral: Negative for agitation, behavioral problems and decreased concentration.   All other systems reviewed and are negative.      Objective   Physical Exam   Constitutional: Vital signs are normal. She appears well-developed and well-nourished. She is cooperative.   HENT:   Head: Normocephalic and atraumatic.   Right Ear: Hearing, tympanic membrane, external ear and ear canal normal.   Left Ear: Hearing, tympanic membrane, external ear and ear canal normal.   Nose: Nose normal.   Mouth/Throat: Uvula is midline, oropharynx is clear and moist and mucous membranes are normal.   Eyes: Conjunctivae and EOM are normal. Pupils are equal, round, and reactive to light.   Neck: Normal range of motion. Neck supple.   Cardiovascular: Normal rate, regular rhythm and normal heart sounds.   Pulmonary/Chest: Effort normal and breath sounds normal.   Abdominal: Soft. Normal appearance and bowel sounds are normal.   Musculoskeletal:        Right shoulder: She exhibits tenderness, pain and spasm.        Left shoulder: She exhibits decreased range of motion, tenderness, pain and spasm.        Right wrist: She exhibits decreased range of motion and tenderness.        Left wrist: She exhibits decreased range of motion and tenderness.        Right hip: She exhibits decreased range of motion, decreased strength and tenderness.        Left hip: She exhibits decreased range of motion, decreased strength and tenderness.        Right knee: She exhibits decreased range of motion. Tenderness found.        Left knee: She exhibits decreased range of motion. Tenderness found.        Right ankle: She exhibits decreased range of motion. Tenderness.        Left ankle: She  exhibits decreased range of motion. Tenderness.        Cervical back: She exhibits decreased range of motion and tenderness.        Thoracic back: She exhibits decreased range of motion and tenderness.        Lumbar back: She exhibits decreased range of motion and tenderness.        Right forearm: She exhibits tenderness.        Left forearm: She exhibits tenderness.        Arms:  Neurological: She is alert.   Psychiatric: She has a normal mood and affect. Her behavior is normal. Judgment and thought content normal.   Nursing note and vitals reviewed.        Assessment/Plan   Adela was seen today for pain.    Diagnoses and all orders for this visit:    Rheumatoid arthritis involving multiple sites with positive rheumatoid factor (CMS/HCC)  -     HYDROcodone-acetaminophen (NORCO) 5-325 MG per tablet; Take 1 tablet by mouth Every 6 (Six) Hours As Needed (60).    Chronic hip pain, unspecified laterality  -     HYDROcodone-acetaminophen (NORCO) 5-325 MG per tablet; Take 1 tablet by mouth Every 6 (Six) Hours As Needed (60).        ALMA Report:     As part of this patient's treatment plan, I am prescribing controlled substances. The patient has been made aware of appropriate use of such medications, including potential risk of opiod use, somnolence, limited ability to drive and /or work safely, and potential for dependence or overdose, and opiods should be used sparingly and are not recommended for long term use.  It has also been made clear that these medications are for use by this patient only, without concomitant use of alcohol or other substances unless prescribed.     Patient has completed prescribing agreement detailing terms of continued prescribing of controlled substances, including monitoring ALMA reports, urine drug screening, and pill counts if necessary. The patient is aware that inappropriate use will result in cessation of prescribing such medications.    Toxassure:  I have ordered a urine drug screen  to monitor patients predisposition to and patterns of drug use/misuse in order to establish and maintain the safe and effective use of analgesics in the treatment of chronic pain      ALMA report has been reviewed by: Charlene Huntley DNP, APRN.   The report was scanned into the patient's chart.      -     Pt is aware of the potential for addiction and dependence.    Addiction was discussed.  The  Risks, benefits and alternative options of drug therapy discussed.  It was reinforced about the risks and benefits of opioids.  It was reinforced that patient may not call early for medication, may not ask for increase in the number of pills given monthly or increase in dosage of medication, may not jump around to different pharmacies or providers and may not receive any narcotics from other providers including ER, or the contract will be broken and narcotics will no longer be prescribed from this facility if any of these criteria has been broken.    Last Dose was:    Last Fill was:  4/2019  Date of last ALMA: today    Date of last UDS: 3/15/19 but was positive for THC, so I am going to random her today    Return in about 1 month (around 6/10/2019) for Recheck.    Charlene Huntley DNP, APRN-BC  05/10/2019

## 2019-05-16 LAB
7AMINOCLONAZEPAM/CREAT UR: NOT DETECTED NG/MG CREAT
A-OH ALPRAZ/CREAT UR: NOT DETECTED NG/MG CREAT
ALFENTANIL UR QL: NOT DETECTED NG/MG CREAT
ALPHA-HYDROXYTRIAZOLAM, URINE: NOT DETECTED NG/MG CREAT
AMOBARBITAL UR QL CFM: NOT DETECTED
AMPHET/CREAT UR: NOT DETECTED NG/MG CREAT
AMPHETAMINES UR QL CFM: NEGATIVE
BARBITAL UR QL CFM: NOT DETECTED
BARBITURATES UR QL CFM: NEGATIVE
BENZODIAZ UR QL CFM: NEGATIVE
BUPRENORPHINE UR QL CFM: NEGATIVE
BUPRENORPHINE/CREAT UR: NOT DETECTED NG/MG CREAT
BUTABARBITAL UR QL CFM: NOT DETECTED
BUTALBITAL UR QL CFM: NOT DETECTED
BZE/CREAT UR: NOT DETECTED NG/MG CREAT
CANNABINOIDS UR QL CFM: NEGATIVE
CARBOXYTHC/CREAT UR: NOT DETECTED NG/MG CREAT
CLONAZEPAM UR QL: NOT DETECTED NG/MG CREAT
COCAETHYLENE UR QL CFM: NOT DETECTED NG/MG CREAT
COCAINE UR QL CFM: NEGATIVE
CODEINE/CREAT UR: NOT DETECTED NG/MG CREAT
CONV COCAINE, UR: NOT DETECTED NG/MG CREAT
CREAT UR-MCNC: 43 MG/DL
DESALKYLFLURAZ/CREAT UR: NOT DETECTED NG/MG CREAT
DHC/CREAT UR: 121 NG/MG CREAT
DIAZEPAM/CREAT UR: NOT DETECTED NG/MG CREAT
DRUGS UR: NORMAL
EDDP/CREAT UR: NOT DETECTED NG/MG CREAT
ETHANOL UR CFM-MCNC: NOT DETECTED G/DL
ETHANOL UR QL CFM: NEGATIVE
FENTANYL UR QL CFM: NEGATIVE
FENTANYL/CREAT UR: NOT DETECTED NG/MG CREAT
HX OF MEDICATION USE: NORMAL
HYDROCODONE/CREAT UR: 1500 NG/MG CREAT
HYDROMORPHONE/CREAT UR: NOT DETECTED NG/MG CREAT
LEVEL OF DETECTION:: NORMAL
LORAZEPAM/CREAT UR: NOT DETECTED NG/MG CREAT
LORAZEPAM/CREAT UR: NOT DETECTED NG/MG CREAT
MDA/CREAT UR: NOT DETECTED NG/MG CREAT
MDMA/CREAT UR: NOT DETECTED NG/MG CREAT
MEPHOBARBITAL UR QL CFM: NOT DETECTED
METHADONE UR QL CFM: NEGATIVE
METHADONE/CREAT UR: NOT DETECTED NG/MG CREAT
METHAMPHET/CREAT UR: NOT DETECTED NG/MG CREAT
MIDAZOLAM UR-MCNC: NOT DETECTED NG/MG CREAT
MORPHINE/CREAT UR: NOT DETECTED NG/MG CREAT
N-DESMETHYLTRAMADOL, U: NOT DETECTED NG/MG CREAT
NARCOTICS UR: NEGATIVE
NORBUPRENORPHINE/CREAT UR: NOT DETECTED NG/MG CREAT
NORCODEINE UR-MCNC: NOT DETECTED NG/MG CREAT
NORDIAZEPAM/CREAT UR: NOT DETECTED NG/MG CREAT
NORFENTANYL/CREAT UR: NOT DETECTED NG/MG CREAT
NORHYDROCODONE/CREAT UR: 993 NG/MG CREAT
NOROXYCODONE/CREAT UR: NOT DETECTED NG/MG CREAT
NOROXYMORPHONE: NOT DETECTED NG/MG CREAT
O-DESMETHYLTRAMADOL, UR: NOT DETECTED NG/MG CREAT
OPIATES UR QL CFM: NORMAL
OXAZEPAM/CREAT UR: NOT DETECTED NG/MG CREAT
OXYCODONE UR QL CFM: NEGATIVE
OXYCODONE/CREAT UR: NOT DETECTED NG/MG CREAT
OXYMORPHONE/CREAT UR: NOT DETECTED NG/MG CREAT
PENTOBARB UR QL CFM: NOT DETECTED
PHENOBARB UR QL CFM: NOT DETECTED
SECOBARBITAL UR QL CFM: NOT DETECTED
SUFENTANIL UR QL: NOT DETECTED NG/MG CREAT
TAPENTADOL UR QL CFM: NEGATIVE
TAPENTADOL/CREAT UR: NOT DETECTED NG/MG CREAT
TEMAZEPAM/CREAT UR: NOT DETECTED NG/MG CREAT
THIOPENTAL UR QL CFM: NOT DETECTED
TRAMADOL UR QL CFM: NOT DETECTED NG/MG CREAT

## 2019-05-25 DIAGNOSIS — F41.8 ANXIETY ASSOCIATED WITH DEPRESSION: ICD-10-CM

## 2019-05-28 RX ORDER — DULOXETIN HYDROCHLORIDE 60 MG/1
CAPSULE, DELAYED RELEASE ORAL
Qty: 30 CAPSULE | Refills: 2 | Status: SHIPPED | OUTPATIENT
Start: 2019-05-28 | End: 2019-08-28 | Stop reason: SDUPTHER

## 2019-05-28 NOTE — TELEPHONE ENCOUNTER
Please call pt and find out if she is taking the sertraline or the duloxetine because this came over for duloxetine and was put on sertraline

## 2019-05-29 ENCOUNTER — TELEPHONE (OUTPATIENT)
Dept: FAMILY MEDICINE CLINIC | Facility: CLINIC | Age: 54
End: 2019-05-29

## 2019-05-29 DIAGNOSIS — B02.9 HERPES ZOSTER WITHOUT COMPLICATION: Primary | ICD-10-CM

## 2019-05-29 RX ORDER — VALACYCLOVIR HYDROCHLORIDE 500 MG/1
TABLET, FILM COATED ORAL
Qty: 60 TABLET | Refills: 1 | Status: SHIPPED | OUTPATIENT
Start: 2019-05-29 | End: 2019-09-18

## 2019-05-29 NOTE — TELEPHONE ENCOUNTER
Adela called said she had shingles again on her face and wanted to know if you would call her Valtrex in or does she have to be seen.

## 2019-06-03 ENCOUNTER — OFFICE VISIT (OUTPATIENT)
Dept: FAMILY MEDICINE CLINIC | Facility: CLINIC | Age: 54
End: 2019-06-03

## 2019-06-03 VITALS
BODY MASS INDEX: 33.53 KG/M2 | OXYGEN SATURATION: 95 % | HEIGHT: 64 IN | DIASTOLIC BLOOD PRESSURE: 75 MMHG | WEIGHT: 196.4 LBS | RESPIRATION RATE: 20 BRPM | SYSTOLIC BLOOD PRESSURE: 112 MMHG

## 2019-06-03 DIAGNOSIS — F41.0 PANIC ATTACK: Primary | ICD-10-CM

## 2019-06-03 DIAGNOSIS — M25.559 CHRONIC HIP PAIN, UNSPECIFIED LATERALITY: ICD-10-CM

## 2019-06-03 DIAGNOSIS — M05.79 RHEUMATOID ARTHRITIS INVOLVING MULTIPLE SITES WITH POSITIVE RHEUMATOID FACTOR (HCC): ICD-10-CM

## 2019-06-03 DIAGNOSIS — G89.29 CHRONIC HIP PAIN, UNSPECIFIED LATERALITY: ICD-10-CM

## 2019-06-03 PROCEDURE — 99214 OFFICE O/P EST MOD 30 MIN: CPT | Performed by: NURSE PRACTITIONER

## 2019-06-03 RX ORDER — HYDROCODONE BITARTRATE AND ACETAMINOPHEN 5; 325 MG/1; MG/1
1 TABLET ORAL EVERY 6 HOURS PRN
Qty: 90 TABLET | Refills: 0 | Status: SHIPPED | OUTPATIENT
Start: 2019-06-03 | End: 2019-06-27 | Stop reason: SDUPTHER

## 2019-06-03 RX ORDER — ALPRAZOLAM 0.5 MG/1
0.5 TABLET ORAL 2 TIMES DAILY PRN
Qty: 60 TABLET | Refills: 0 | Status: SHIPPED | OUTPATIENT
Start: 2019-06-03 | End: 2019-06-27 | Stop reason: SDUPTHER

## 2019-06-03 NOTE — PROGRESS NOTES
Chief Complaint   Patient presents with   • Herpes Zoster     Complaints of what she thinks is the shingles.    States she has had 2 panic attacks in the past two weeks.     • Panic Attack       Subjective   Adela Arauz is a 54 y.o. female here for follow up for shingles on the right side of face.  I sent prescription for valtrex last week when I was out of office, and she ha been taking it and the lesions are almost gone on her face, she does have small amt of swelling still present. She also had 2 panic attacks in the last week because her and her daughter got into a fight and now daughter will not allow her to see the grandbabby.  Says that she can't keep having these attacks.  Is tearful in office. Has been struggling with these attacks for the last several months.   She is on zoloft but when she takes a whole pill then she sleeps half of the next day, I do want her to increase to 1 tab nightly of zoloft.   She is also on cymbalta too. She is also here for refill on her pain medications for chronic pain. She sees a rheumatologist and struggles with RA.  She says the gabapentin and the norco really help her function daily.       Chronic problems: RA stable with humira, and chronic pain, chronic back pain  somewhat controlled with Neurontin.   She struggles with normal ADLS of every day living.  The gabapentin and norco help relieve some of the pain so she can function.  She applied  for disability but they turned her down because she can still use her hands.    The following portions of the patient's history were reviewed and updated as appropriate: allergies, current medications, past family history, past medical history, past social history, past surgical history and problem list.      Current Outpatient Medications:   •  Calcium-Magnesium-Vitamin D 500-250-200 MG-MG-UNIT tablet, Take 500 tablets by mouth 2 (Two) Times a Day., Disp: , Rfl:   •  cholecalciferol (VITAMIN D3) 1000 units tablet, Take 1,000  Units by mouth Daily., Disp: , Rfl:   •  DULoxetine (CYMBALTA) 60 MG capsule, TAKE ONE CAPSULE BY MOUTH DAILY, Disp: 30 capsule, Rfl: 2  •  etanercept (ENBREL) 50 MG/ML solution prefilled syringe injection, Inject 50 mg under the skin into the appropriate area as directed 1 (One) Time Per Week., Disp: , Rfl:   •  gabapentin (NEURONTIN) 300 MG capsule, Take 1 capsule by mouth 3 (Three) Times a Day., Disp: 90 capsule, Rfl: 5  •  HYDROcodone-acetaminophen (NORCO) 5-325 MG per tablet, Take 1 tablet by mouth Every 6 (Six) Hours As Needed (60)., Disp: 90 tablet, Rfl: 0  •  omeprazole (priLOSEC) 40 MG capsule, Take 1 capsule by mouth Daily., Disp: 30 capsule, Rfl: 5  •  sertraline (ZOLOFT) 50 MG tablet, Take 0.5 tablet nightly, Disp: 30 tablet, Rfl: 0  •  tiZANidine (ZANAFLEX) 4 MG tablet, Take 1 tablet by mouth 2 (Two) Times a Day As Needed for Muscle Spasms., Disp: 60 tablet, Rfl: 5  •  valACYclovir (VALTREX) 500 MG tablet, Take 1 tab q 12 hours x 3 days., Disp: 60 tablet, Rfl: 1  No Known Allergies  Past Medical History:   Diagnosis Date   • AAT (alpha-1-antitrypsin) deficiency (CMS/HCC) 2019   • 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, 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    • Osteoporosis    • Senile osteoporosis 2017     Past Surgical History:   Procedure Laterality Date   • FRACTURE SURGERY Bilateral        • SHOULDER SURGERY     • WRIST SURGERY Bilateral      Social History     Socioeconomic History   • Marital status:      Spouse name: Not on file   • Number of children: Not on file   • Years of education: Not on file   • Highest education level: Not on file   Tobacco Use   • Smoking status: Former Smoker     Packs/day: 1.00     Years: 20.00     Pack years: 20.00     Types: Cigarettes     Last attempt to quit: 9/3/2018     Years since quittin.7   • Smokeless tobacco: Never Used   • Tobacco comment: Would like to quit.   Substance and Sexual Activity   • Alcohol use: No   • Drug use: No   • Sexual activity:  "Defer     Family History   Problem Relation Age of Onset   • Arthritis Mother    • COPD Mother    • Atrial fibrillation Mother    • Osteoarthritis Mother    • Heart disease Father    • Hypertension Father    • Arthritis Father    • Arthritis Sister    • Arthritis Brother    • No Known Problems Daughter    • Arthritis Maternal Grandfather    • Breast cancer Neg Hx        REVIEW OF SYMPTOMS: (Positives bolded)  General:  weight loss, fever, chills, night sweats, fatigue, appetite loss  HEENT:  blurry vision, eye pain, eye discharge, dry eyes, decreased vision  Respiratory: shortness of breath, cough, hemoptysis, wheezing, pleurisy,   Cardiovascular:  chest pain, PND, palpitation, edema, orthopnea, syncope  Gastro: Nausea, vomiting, diarrhea, hematemesis, abdominal pain, constipation  Genito: hematuria, dysuria, glycosuria, hesitancy, frequency, incontinence  Musckelo: Arthralgia, myalgia, muscle weakness, joint swelling, NSAID use  Skin: rash, pruritis, sores, nail changes, skin thickening, change in wart/mole, itching   Neuro:  Migraine, numbness, ataxia, tremor, vertigo, weakness, memory loss,  Anxiety attack   \"All other systems reviewed and negative, except as listed above.”      OBJECTIVE:  Constitutional:  No acute distress, Consistent with stated age. Oriented x 3, alert Posture. Patient is pleasant and cooperative with the interview and exam.    Integumentary: No rashes, ulcers or lesions. No edema.  Normal skin moisture/turgor. Skin is warm to touch, no increased warmth. Capillary refill is normal bilateral Upper and lower extremity.     Eye: Bilaterally PERRLA, EOMI.  No discharge.  Upper and lower eyelids are normal. Sclera/conjunctiva normal without discharge. Cornea is normal and clear. Lens is normal.  Eyeball appears normal. No ciliary flushing, no conjunctival injection.    ENMT:  Canals  normal without erythema or discharge, no excessive cerumen. Tympanic membranes Grey/pearly, normal light reflex and " "anatomy. Hearing normal to conversational speech at 2-5 feet. Nares airflow, no discharge. Dentition assessed and discussed appropriate oral care. Tongue normal midline.  no pharyngeal erythema, Uvula midline. No post nasal drip.     CHEST/LUNG:Lungs clear throughout lung fields without rale, rhonchi or wheezes.  Normal effort, no distress, no use of accessory muscles. nontender sternum, ribline.  No abnormal pulsations.      CARDIOVASCULAR:  Regular rate and rhythm. No murmur noted in sitting, supine positions. digital clubbing, cyanosis, edema, increased warmth.  normal, no bruits or abnormal pulsations.      ABDOMEN: normal and no visible pulsations. Normal contour. Bowel sounds normal, no abdominal bruits. Abd soft, non-tender, no rebound tenderness, no rigidity (guarding), no jar tenderness, no masses.  no hepatomegaly, no splenomegaly     Neuropsych: Able to articulate well. Normal speech, normal rate, normal tone, normal use of language, volume and coherence.  Thought- normal with ability to perform basic computations and apply abstract thought/reason. No hallucinations, delusions, obsessions.   Appropriate judgement and memory.      /75 (BP Location: Left arm, Patient Position: Sitting, Cuff Size: Large Adult)   Resp 20   Ht 162.6 cm (64.02\")   Wt 89.1 kg (196 lb 6.4 oz)   SpO2 95%   Breastfeeding? No   BMI 33.70 kg/m²     ASSESSMENT  Adela was seen today for herpes zoster and panic attack.    Diagnoses and all orders for this visit:    Panic attack  -     ALPRAZolam (XANAX) 0.5 MG tablet; Take 1 tablet by mouth 2 (Two) Times a Day As Needed for Anxiety.    Rheumatoid arthritis involving multiple sites with positive rheumatoid factor (CMS/HCC)  -     HYDROcodone-acetaminophen (NORCO) 5-325 MG per tablet; Take 1 tablet by mouth Every 6 (Six) Hours As Needed (60).    Chronic hip pain, unspecified laterality  -     HYDROcodone-acetaminophen (NORCO) 5-325 MG per tablet; Take 1 tablet by mouth Every 6 " (Six) Hours As Needed (60).      ALMA Report:     As part of this patient's treatment plan, I am prescribing controlled substances. The patient has been made aware of appropriate use of such medications, including potential risk of opiod use, somnolence, limited ability to drive and /or work safely, and potential for dependence or overdose, and opiods should be used sparingly and are not recommended for long term use.  It has also been made clear that these medications are for use by this patient only, without concomitant use of alcohol or other substances unless prescribed.     Patient has completed prescribing agreement detailing terms of continued prescribing of controlled substances, including monitoring ALMA reports, urine drug screening yearly an random drug screens to monitor patients compliance to treatment plan, and pill counts if necessary. The patient is aware that inappropriate use will result in cessation of prescribing such medications.    Toxassure:  I have ordered a urine drug screen to monitor patients predisposition to and patterns of drug use/misuse in order to establish and maintain the safe and effective use of analgesics in the treatment of chronic pain      ALMA report has been reviewed by: Charlene Huntley, [place], APRN.  The report was scanned into the patient's chart.      -     Pt is aware of the potential for addiction and dependence.    Addiction was discussed.  The  Risks, benefits and alternative options of drug therapy discussed.  It was reinforced about the risks and benefits of opioids.  It was reinforced that patient may not call early for medication, may not ask for increase in the number of pills given monthly or increase in dosage of medication, may not jump around to different pharmacies or providers and may not receive any narcotics from other providers including ER, or the contract will be broken and narcotics will no longer be prescribed from this facility if any of  these criteria has been broken.  Last Dose was: today    Last Fill was:  April 2019    Date of last UDS: 5/10/19         PHQ-9 Depression Screening  Little interest or pleasure in doing things? 2   Feeling down, depressed, or hopeless? 2   Trouble falling or staying asleep, or sleeping too much? 1   Feeling tired or having little energy? 2   Poor appetite or overeating? 0   Feeling bad about yourself - or that you are a failure or have let yourself or your family down? 1   Trouble concentrating on things, such as reading the newspaper or watching television? 1   Moving or speaking so slowly that other people could have noticed? Or the opposite - being so fidgety or restless that you have been moving around a lot more than usual? 1   Thoughts that you would be better off dead, or of hurting yourself in some way? 2   PHQ-9 Total Score 12   If you checked off any problems, how difficult have these problems made it for you to do your work, take care of things at home, or get along with other people? Somewhat difficult       Return in about 1 month (around 7/3/2019) for Recheck anxiety/panic attacks.    Charlene Huntley, DNP, APRN-BC   06/03/19

## 2019-06-27 ENCOUNTER — OFFICE VISIT (OUTPATIENT)
Dept: FAMILY MEDICINE CLINIC | Facility: CLINIC | Age: 54
End: 2019-06-27

## 2019-06-27 VITALS
BODY MASS INDEX: 33.39 KG/M2 | RESPIRATION RATE: 18 BRPM | OXYGEN SATURATION: 98 % | HEIGHT: 64 IN | SYSTOLIC BLOOD PRESSURE: 117 MMHG | DIASTOLIC BLOOD PRESSURE: 79 MMHG | TEMPERATURE: 98.1 F | HEART RATE: 75 BPM | WEIGHT: 195.6 LBS

## 2019-06-27 DIAGNOSIS — M05.79 RHEUMATOID ARTHRITIS INVOLVING MULTIPLE SITES WITH POSITIVE RHEUMATOID FACTOR (HCC): ICD-10-CM

## 2019-06-27 DIAGNOSIS — G89.29 CHRONIC HIP PAIN, UNSPECIFIED LATERALITY: ICD-10-CM

## 2019-06-27 DIAGNOSIS — M25.559 CHRONIC HIP PAIN, UNSPECIFIED LATERALITY: ICD-10-CM

## 2019-06-27 DIAGNOSIS — F41.0 PANIC ATTACK: ICD-10-CM

## 2019-06-27 PROCEDURE — 99214 OFFICE O/P EST MOD 30 MIN: CPT | Performed by: NURSE PRACTITIONER

## 2019-06-27 RX ORDER — HYDROCODONE BITARTRATE AND ACETAMINOPHEN 5; 325 MG/1; MG/1
1 TABLET ORAL EVERY 6 HOURS PRN
Qty: 90 TABLET | Refills: 0 | Status: SHIPPED | OUTPATIENT
Start: 2019-06-27 | End: 2019-07-26 | Stop reason: SDUPTHER

## 2019-06-27 RX ORDER — ALPRAZOLAM 0.5 MG/1
0.5 TABLET ORAL 2 TIMES DAILY PRN
Qty: 60 TABLET | Refills: 0 | Status: SHIPPED | OUTPATIENT
Start: 2019-06-27 | End: 2019-07-26 | Stop reason: SDUPTHER

## 2019-07-12 ENCOUNTER — HOSPITAL ENCOUNTER (OUTPATIENT)
Dept: CT IMAGING | Facility: HOSPITAL | Age: 54
Discharge: HOME | End: 2019-07-12
Admitting: INTERNAL MEDICINE

## 2019-07-12 PROCEDURE — 71250 CT THORAX DX C-: CPT

## 2019-07-17 ENCOUNTER — OFFICE VISIT (OUTPATIENT)
Dept: PULMONOLOGY | Facility: CLINIC | Age: 54
End: 2019-07-17

## 2019-07-17 VITALS
BODY MASS INDEX: 33.46 KG/M2 | HEART RATE: 78 BPM | OXYGEN SATURATION: 97 % | SYSTOLIC BLOOD PRESSURE: 122 MMHG | WEIGHT: 196 LBS | HEIGHT: 64 IN | DIASTOLIC BLOOD PRESSURE: 80 MMHG

## 2019-07-17 DIAGNOSIS — R59.0 HILAR ADENOPATHY: ICD-10-CM

## 2019-07-17 DIAGNOSIS — M06.9 RHEUMATOID ARTHRITIS INVOLVING MULTIPLE SITES, UNSPECIFIED RHEUMATOID FACTOR PRESENCE: Primary | ICD-10-CM

## 2019-07-17 DIAGNOSIS — E88.01 AAT (ALPHA-1-ANTITRYPSIN) DEFICIENCY (HCC): ICD-10-CM

## 2019-07-17 DIAGNOSIS — R91.8 MULTIPLE LUNG NODULES: ICD-10-CM

## 2019-07-17 DIAGNOSIS — R59.0 AXILLARY ADENOPATHY: ICD-10-CM

## 2019-07-17 PROCEDURE — 99214 OFFICE O/P EST MOD 30 MIN: CPT | Performed by: INTERNAL MEDICINE

## 2019-07-17 NOTE — PROGRESS NOTES
Subjective   Adela Arauz is a 54 y.o. female.     Background: A patient for whom [facility] denied a CT angiogram in 12/2018.  Alpha 1 SZ genotype    Chief Complaint   Patient presents with   • f/u lung nodules        History of Present Illness   She is bothered mainly with her arthritis now.  No respiratory complaints.  She follows up after the latest ct chest confirming stabiility of lung nodules at 1 year but persistent enlarged right axillary node.  This is not palpable or otherwise noted by the patient.  Nodules are present continuously for at least 1 year asymptomatically in chest following hospitalization last year,  No fever no hemoptysis.  No other aggravating, alleviating factors or associated symptoms.     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   has a past surgical history that includes Wrist surgery (Bilateral); Fracture surgery (Bilateral); and Shoulder surgery.  family history includes Arthritis in her brother, father, maternal grandfather, mother, and sister; Atrial fibrillation in her mother; COPD in her mother; Heart disease in her father; Hypertension in her father; No Known Problems in her daughter; Osteoarthritis in her mother.   reports that she quit smoking about 10 months ago. Her smoking use included cigarettes. She has a 20.00 pack-year smoking history. She has never used smokeless tobacco. She reports that she does not drink alcohol or use drugs.  No Known Allergies  Medications    Current Outpatient Medications:   •  ALPRAZolam (XANAX) 0.5 MG tablet, Take 1 tablet by mouth 2 (Two) Times a Day As Needed for Anxiety., Disp: 60 tablet, Rfl: 0  •  Calcium-Magnesium-Vitamin D 500-250-200 MG-MG-UNIT tablet, Take 500 tablets by mouth 2 (Two) Times a Day., Disp: , Rfl:   •  cholecalciferol (VITAMIN D3)  "1000 units tablet, Take 1,000 Units by mouth Daily., Disp: , Rfl:   •  DULoxetine (CYMBALTA) 60 MG capsule, TAKE ONE CAPSULE BY MOUTH DAILY, Disp: 30 capsule, Rfl: 2  •  etanercept (ENBREL) 50 MG/ML solution prefilled syringe injection, Inject 50 mg under the skin into the appropriate area as directed 1 (One) Time Per Week., Disp: , Rfl:   •  gabapentin (NEURONTIN) 300 MG capsule, Take 1 capsule by mouth 3 (Three) Times a Day., Disp: 90 capsule, Rfl: 5  •  HYDROcodone-acetaminophen (NORCO) 5-325 MG per tablet, Take 1 tablet by mouth Every 6 (Six) Hours As Needed (60)., Disp: 90 tablet, Rfl: 0  •  omeprazole (priLOSEC) 40 MG capsule, Take 1 capsule by mouth Daily., Disp: 30 capsule, Rfl: 5  •  sertraline (ZOLOFT) 50 MG tablet, Take 0.5 tablet nightly, Disp: 30 tablet, Rfl: 0  •  tiZANidine (ZANAFLEX) 4 MG tablet, Take 1 tablet by mouth 2 (Two) Times a Day As Needed for Muscle Spasms., Disp: 60 tablet, Rfl: 5  •  valACYclovir (VALTREX) 500 MG tablet, Take 1 tab q 12 hours x 3 days., Disp: 60 tablet, Rfl: 1    Review of Systems   Respiratory: Negative for cough, shortness of breath, wheezing and stridor.    Musculoskeletal: Positive for arthralgias (related to RA).   Hematological: Negative for adenopathy.       Objective   /80   Pulse 78   Ht 162.6 cm (64\")   Wt 88.9 kg (196 lb)   SpO2 97% Comment: ra  Breastfeeding? No   BMI 33.64 kg/m²   Physical Exam   Constitutional: She appears well-developed. She does not appear ill. No distress.   HENT:   Head: Atraumatic.   Nose: Nose normal.   Eyes: Conjunctivae and EOM are normal. No scleral icterus.   Neck: Neck supple.   Cardiovascular: Normal rate, regular rhythm, S1 normal and S2 normal.   No murmur heard.  Pulmonary/Chest: Effort normal and breath sounds normal. She has no wheezes. She has no rales.   Abdominal: Soft. She exhibits no distension. There is no tenderness.   Musculoskeletal: She exhibits no deformity.   Lymphadenopathy:     She has no cervical " adenopathy.   Neurological: She is alert.   Skin: Skin is warm. No rash noted.   Psychiatric: She has a normal mood and affect.     -----------------------------------------------------------------------------------------------  Recent Imaging:    Ct Chest Without Contrast    Result Date: 7/12/2019  Impression: 1. Right axillary lymph node increasing in size compared to the previous examination measuring 2.4 x 2 by 3 cm previously measured 1.6 x 1.1 x 2 cm. Differential considerations include reactive lymph nodes; a malignant process not excluded. Correlate with patient presentation. Ultrasound guided biopsy considered if clinically indicated. 2. Stable probably benign pulmonary nodules. 3. Otherwise, no acute cardiopulmonary process. This report was finalized on 07/12/2019 12:36 by Dr. Israel Tracy MD.    My interpretation: axillary node enlarged on right side: previously described pretracheal and subcarinal adenopathy normalized  -----------------------------------------------------------------------------------------------  Pulmonary Functions Testing Results:  FEV1   Date Value Ref Range Status   01/17/2019 118% liters Final     FVC   Date Value Ref Range Status   01/17/2019 118% liters Final     FEV1/FVC   Date Value Ref Range Status   01/17/2019 81.97% % Final     DLCO   Date Value Ref Range Status   01/17/2019 108% ml/mmHg sec Final        -----------------------------------------------------------------------------------------------  Assessment/Plan   Problem List Items Addressed This Visit        Respiratory    Multiple lung nodules < 6mm identified 5/2018    Overview       CT scan report revealed 5 mm soft tissue nodule right upper lobe and 4 mm nodule right middle lobe recommend follow-up CT in 6 months.  (November 2018), denied by insurance.  CTA 2019 showed stable lung nodules, mediastinal and hilar adenopathy.  Follow up 7/2019 showed stable lung nodules and progressive axillary adenopathy on right,  improved mediastinal nodes.         AAT (alpha-1-antitrypsin) deficiency (CMS/HCC)       Musculoskeletal and Integument    Rheumatoid arthritis involving multiple sites (CMS/HCC) - Primary       Immune and Lymphatic    Hilar adenopathy    Axillary adenopathy        Patient's Body mass index is 33.64 kg/m². BMI is above normal parameters. Recommendations include: referral to primary care.      Will plan usg directed sampling of axillary node  Follow up after that  Will need additonal surveillance CT chest next year, stable at 1 year  Follow up spirometry next time, at elevated risk emphysema due to AAT genetics  Hilar and mediastinal adenopathy are better  RA still problematic but I don't think chest process is directly related, although it can be with RA lung involvement, warrants follow up.       Electronically signed by Yan Seay MD, 7/17/2019, 9:35 AM

## 2019-07-22 ENCOUNTER — TRANSCRIBE ORDERS (OUTPATIENT)
Dept: PULMONOLOGY | Facility: CLINIC | Age: 54
End: 2019-07-22

## 2019-07-24 ENCOUNTER — TELEPHONE (OUTPATIENT)
Dept: PULMONOLOGY | Facility: CLINIC | Age: 54
End: 2019-07-24

## 2019-07-24 DIAGNOSIS — F41.0 PANIC ATTACKS: ICD-10-CM

## 2019-07-26 ENCOUNTER — OFFICE VISIT (OUTPATIENT)
Dept: FAMILY MEDICINE CLINIC | Facility: CLINIC | Age: 54
End: 2019-07-26

## 2019-07-26 VITALS
DIASTOLIC BLOOD PRESSURE: 72 MMHG | SYSTOLIC BLOOD PRESSURE: 132 MMHG | WEIGHT: 192 LBS | HEART RATE: 87 BPM | TEMPERATURE: 99.7 F | HEIGHT: 64 IN | RESPIRATION RATE: 18 BRPM | OXYGEN SATURATION: 97 % | BODY MASS INDEX: 32.78 KG/M2

## 2019-07-26 DIAGNOSIS — M05.79 RHEUMATOID ARTHRITIS INVOLVING MULTIPLE SITES WITH POSITIVE RHEUMATOID FACTOR (HCC): ICD-10-CM

## 2019-07-26 DIAGNOSIS — R59.0 AXILLARY ADENOPATHY: Primary | ICD-10-CM

## 2019-07-26 DIAGNOSIS — G89.29 CHRONIC HIP PAIN, UNSPECIFIED LATERALITY: ICD-10-CM

## 2019-07-26 DIAGNOSIS — F41.0 PANIC ATTACK: ICD-10-CM

## 2019-07-26 DIAGNOSIS — M25.559 CHRONIC HIP PAIN, UNSPECIFIED LATERALITY: ICD-10-CM

## 2019-07-26 PROCEDURE — 99214 OFFICE O/P EST MOD 30 MIN: CPT | Performed by: NURSE PRACTITIONER

## 2019-07-26 RX ORDER — HYDROCODONE BITARTRATE AND ACETAMINOPHEN 5; 325 MG/1; MG/1
1 TABLET ORAL EVERY 6 HOURS PRN
Qty: 90 TABLET | Refills: 0 | Status: SHIPPED | OUTPATIENT
Start: 2019-07-26 | End: 2019-08-26 | Stop reason: SDUPTHER

## 2019-07-26 RX ORDER — ALPRAZOLAM 0.5 MG/1
0.5 TABLET ORAL 2 TIMES DAILY PRN
Qty: 60 TABLET | Refills: 0 | Status: SHIPPED | OUTPATIENT
Start: 2019-07-26 | End: 2019-08-26 | Stop reason: SDUPTHER

## 2019-07-31 DIAGNOSIS — R59.0 AXILLARY ADENOPATHY: Primary | ICD-10-CM

## 2019-08-09 ENCOUNTER — HOSPITAL ENCOUNTER (OUTPATIENT)
Dept: ULTRASOUND IMAGING | Facility: HOSPITAL | Age: 54
Discharge: HOME | End: 2019-08-09
Admitting: INTERNAL MEDICINE

## 2019-08-09 DIAGNOSIS — R59.0 AXILLARY ADENOPATHY: ICD-10-CM

## 2019-08-09 PROCEDURE — 88305 TISSUE EXAM BY PATHOLOGIST: CPT | Performed by: INTERNAL MEDICINE

## 2019-08-09 PROCEDURE — 88334 PATH CONSLTJ SURG CYTO XM EA: CPT | Performed by: INTERNAL MEDICINE

## 2019-08-09 PROCEDURE — 88184 FLOWCYTOMETRY/ TC 1 MARKER: CPT | Performed by: INTERNAL MEDICINE

## 2019-08-09 PROCEDURE — 76942 ECHO GUIDE FOR BIOPSY: CPT

## 2019-08-09 PROCEDURE — 88185 FLOWCYTOMETRY/TC ADD-ON: CPT | Performed by: INTERNAL MEDICINE

## 2019-08-20 ENCOUNTER — OFFICE VISIT (OUTPATIENT)
Dept: PULMONOLOGY | Facility: CLINIC | Age: 54
End: 2019-08-20

## 2019-08-20 VITALS
DIASTOLIC BLOOD PRESSURE: 78 MMHG | WEIGHT: 192 LBS | SYSTOLIC BLOOD PRESSURE: 124 MMHG | BODY MASS INDEX: 32.78 KG/M2 | HEIGHT: 64 IN | HEART RATE: 90 BPM | OXYGEN SATURATION: 99 %

## 2019-08-20 DIAGNOSIS — R59.0 HILAR ADENOPATHY: ICD-10-CM

## 2019-08-20 DIAGNOSIS — C82.04 GRADE 1 FOLLICULAR LYMPHOMA OF LYMPH NODES OF AXILLA (HCC): ICD-10-CM

## 2019-08-20 DIAGNOSIS — R91.8 MULTIPLE LUNG NODULES: Primary | ICD-10-CM

## 2019-08-20 DIAGNOSIS — R59.0 AXILLARY ADENOPATHY: ICD-10-CM

## 2019-08-20 DIAGNOSIS — M06.9 RHEUMATOID ARTHRITIS INVOLVING MULTIPLE SITES, UNSPECIFIED RHEUMATOID FACTOR PRESENCE: ICD-10-CM

## 2019-08-20 DIAGNOSIS — E88.01 AAT (ALPHA-1-ANTITRYPSIN) DEFICIENCY (HCC): ICD-10-CM

## 2019-08-20 LAB
DIFF CAP.CO: NORMAL ML/MMHG SEC
FEV1/FVC: 81.58 %
FEV1: NORMAL LITERS
FVC VOL RESPIRATORY: NORMAL LITERS
TLC: NORMAL LITERS

## 2019-08-20 PROCEDURE — 99213 OFFICE O/P EST LOW 20 MIN: CPT | Performed by: INTERNAL MEDICINE

## 2019-08-20 PROCEDURE — 94727 GAS DIL/WSHOT DETER LNG VOL: CPT | Performed by: INTERNAL MEDICINE

## 2019-08-20 PROCEDURE — 94729 DIFFUSING CAPACITY: CPT | Performed by: INTERNAL MEDICINE

## 2019-08-20 PROCEDURE — 94010 BREATHING CAPACITY TEST: CPT | Performed by: INTERNAL MEDICINE

## 2019-08-20 ASSESSMENT — PULMONARY FUNCTION TESTS: FEV1/FVC: 81.58

## 2019-08-20 NOTE — PROGRESS NOTES
Subjective   Adela Arauz is a 54 y.o. female.     Background: Pt with multiple lung nodules stable 7/2019, RA, panic attacks.  Alpha 1 SZ genotype    Chief Complaint   Patient presents with   • F/u of Lung Nodules        History of Present Illness   Pt with RA and lung nodules, stable mediastinal adenopathy and persistent axillary adenopathy.  We sent her for FNA of axillary LN.  She is here for path follow up.  Lesion in axilla is asymptomatic in chest for undetermined time and uncertain severity pending biopsy report, with no associated symptoms.  Biopsy report was not back at the time of visit.  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   has a past surgical history that includes Wrist surgery (Bilateral); Fracture surgery (Bilateral); Shoulder surgery; and US Guided Lymph Node Biopsy (8/9/2019).  family history includes Arthritis in her brother, father, maternal grandfather, mother, and sister; Atrial fibrillation in her mother; COPD in her mother; Heart disease in her father; Hypertension in her father; No Known Problems in her daughter; Osteoarthritis in her mother.   reports that she has been smoking cigarettes.  She has a 20.00 pack-year smoking history. She has never used smokeless tobacco. She reports that she does not drink alcohol or use drugs.  No Known Allergies  Medications    Current Outpatient Medications:   •  ALPRAZolam (XANAX) 0.5 MG tablet, Take 1 tablet by mouth 2 (Two) Times a Day As Needed for Anxiety., Disp: 60 tablet, Rfl: 0  •  Calcium-Magnesium-Vitamin D 500-250-200 MG-MG-UNIT tablet, Take 500 tablets by mouth 2 (Two) Times a Day., Disp: , Rfl:   •  cholecalciferol (VITAMIN D3) 1000 units tablet, Take 1,000 Units by mouth Daily., Disp: , Rfl:   •  DULoxetine (CYMBALTA)  "60 MG capsule, TAKE ONE CAPSULE BY MOUTH DAILY, Disp: 30 capsule, Rfl: 2  •  etanercept (ENBREL) 50 MG/ML solution prefilled syringe injection, Inject 50 mg under the skin into the appropriate area as directed 1 (One) Time Per Week., Disp: , Rfl:   •  gabapentin (NEURONTIN) 300 MG capsule, Take 1 capsule by mouth 3 (Three) Times a Day., Disp: 90 capsule, Rfl: 5  •  HYDROcodone-acetaminophen (NORCO) 5-325 MG per tablet, Take 1 tablet by mouth Every 6 (Six) Hours As Needed (60)., Disp: 90 tablet, Rfl: 0  •  omeprazole (priLOSEC) 40 MG capsule, Take 1 capsule by mouth Daily., Disp: 30 capsule, Rfl: 5  •  sertraline (ZOLOFT) 50 MG tablet, TAKE 1/2 TABLET BY MOUTH NIGHTLY (Patient taking differently: 50 mg. TAKE 1 TABLET BY MOUTH NIGHTLY), Disp: 30 tablet, Rfl: 2  •  tiZANidine (ZANAFLEX) 4 MG tablet, Take 1 tablet by mouth 2 (Two) Times a Day As Needed for Muscle Spasms., Disp: 60 tablet, Rfl: 5  •  valACYclovir (VALTREX) 500 MG tablet, Take 1 tab q 12 hours x 3 days., Disp: 60 tablet, Rfl: 1    Review of Systems   Constitutional: Negative for chills and fever.   Respiratory: Negative for shortness of breath.    Psychiatric/Behavioral: The patient is nervous/anxious.        Objective   /78   Pulse 90   Ht 162.6 cm (64\")   Wt 87.1 kg (192 lb)   SpO2 99% Comment: Ra  Breastfeeding? No   BMI 32.96 kg/m²   Physical Exam   Constitutional: She appears well-developed. She does not appear ill. No distress.   HENT:   Head: Atraumatic.   Nose: Nose normal.   Eyes: Conjunctivae and EOM are normal. No scleral icterus.   Neck: Neck supple.   Cardiovascular: S1 normal and S2 normal.   Pulmonary/Chest: Effort normal.   Abdominal: She exhibits no distension. There is no tenderness.   Musculoskeletal: She exhibits no deformity.   Neurological: She is alert.   Skin: Skin is warm. No rash noted.   Psychiatric: She has a normal mood and affect. "     -----------------------------------------------------------------------------------------------  Recent Imaging:  Us Guided Lymph Node Biopsy    Result Date: 8/9/2019  Impression: 1. Successful ultrasound-guided core biopsy of the right axillary adenopathy. Pathology is pending.  This report was finalized on 08/09/2019 10:04 by Dr Abdelrahman Craig, .    -----------------------------------------------------------------------------------------------  Pulmonary Functions Testing Results:  FEV1   Date Value Ref Range Status   08/20/2019 114% liters Final     FVC   Date Value Ref Range Status   08/20/2019 115% liters Final     FEV1/FVC   Date Value Ref Range Status   08/20/2019 81.58 % Final     TLC   Date Value Ref Range Status   08/20/2019 111% liters Final     DLCO   Date Value Ref Range Status   08/20/2019 95% ml/mmHg sec Final      -----------------------------------------------------------------------------------------------  Assessment/Plan   Problem List Items Addressed This Visit        Respiratory    Multiple lung nodules < 6mm identified 5/2018 - Primary    Overview       CT scan report revealed 5 mm soft tissue nodule right upper lobe and 4 mm nodule right middle lobe recommend follow-up CT in 6 months.  (November 2018), denied by insurance.  CTA 2019 showed stable lung nodules, mediastinal and hilar adenopathy.  Follow up 7/2019 showed stable lung nodules and progressive axillary adenopathy on right, improved mediastinal nodes.         Relevant Orders    Pulmonary Function Test (Completed)    AAT (alpha-1-antitrypsin) deficiency (CMS/HCC)       Musculoskeletal and Integument    Rheumatoid arthritis involving multiple sites (CMS/HCC)       Immune and Lymphatic    Hilar adenopathy    Axillary adenopathy        Patient's Body mass index is 32.96 kg/m². BMI is above normal parameters. Recommendations include: referral to primary care.      We called path and specimen is still under review at the time of the  visit.  We then received faxed report after the patient left showing follicular lymphoma.  We contacted patient with result, discussed malignant diagnosis.  She requests evaluation by Dr. Orozco.  Will make referral.   follow up spirometry next year    Electronically signed by Yan Seay MD, 8/20/2019, 1:27 PM

## 2019-08-21 DIAGNOSIS — R59.0 AXILLARY ADENOPATHY: ICD-10-CM

## 2019-08-22 LAB
CYTO UR: NORMAL
LAB AP CASE REPORT: NORMAL
Lab: NORMAL
PATH REPORT.FINAL DX SPEC: NORMAL
PATH REPORT.GROSS SPEC: NORMAL

## 2019-08-26 ENCOUNTER — OFFICE VISIT (OUTPATIENT)
Dept: FAMILY MEDICINE CLINIC | Facility: CLINIC | Age: 54
End: 2019-08-26

## 2019-08-26 VITALS
OXYGEN SATURATION: 98 % | DIASTOLIC BLOOD PRESSURE: 82 MMHG | SYSTOLIC BLOOD PRESSURE: 129 MMHG | TEMPERATURE: 98.6 F | HEART RATE: 87 BPM | RESPIRATION RATE: 18 BRPM | HEIGHT: 64 IN | WEIGHT: 193 LBS | BODY MASS INDEX: 32.95 KG/M2

## 2019-08-26 DIAGNOSIS — M25.559 CHRONIC HIP PAIN, UNSPECIFIED LATERALITY: ICD-10-CM

## 2019-08-26 DIAGNOSIS — F41.0 PANIC ATTACKS: ICD-10-CM

## 2019-08-26 DIAGNOSIS — G89.29 CHRONIC HIP PAIN, UNSPECIFIED LATERALITY: ICD-10-CM

## 2019-08-26 DIAGNOSIS — M05.79 RHEUMATOID ARTHRITIS INVOLVING MULTIPLE SITES WITH POSITIVE RHEUMATOID FACTOR (HCC): ICD-10-CM

## 2019-08-26 DIAGNOSIS — F41.0 PANIC ATTACK: ICD-10-CM

## 2019-08-26 PROCEDURE — 99214 OFFICE O/P EST MOD 30 MIN: CPT | Performed by: NURSE PRACTITIONER

## 2019-08-26 RX ORDER — ALPRAZOLAM 0.5 MG/1
0.5 TABLET ORAL 2 TIMES DAILY PRN
Qty: 60 TABLET | Refills: 0 | Status: SHIPPED | OUTPATIENT
Start: 2019-08-26 | End: 2019-09-24 | Stop reason: SDUPTHER

## 2019-08-26 RX ORDER — HYDROCODONE BITARTRATE AND ACETAMINOPHEN 5; 325 MG/1; MG/1
1 TABLET ORAL EVERY 6 HOURS PRN
Qty: 90 TABLET | Refills: 0 | Status: SHIPPED | OUTPATIENT
Start: 2019-08-26 | End: 2019-09-24 | Stop reason: SDUPTHER

## 2019-08-26 NOTE — PROGRESS NOTES
Chief Complaint   Patient presents with   • Pain     Pt is here for followup and medication refills.    Pt reports that she has recently been diagnosed with lymphoma.           No Known Allergies    History provided by: self     HPI:  Subjective   Adela Arauz is a 54 y.o. female presents today for follow up for chronic problems, med refills..  She says she is not doing well.  Says the RA is really getting to her and she is having problems controlling the pain despite the humera, says she has had a couple of  Mini meltdowns because all that is going on. Grace has been seeing Dr. Seay for  Multiple lung nodules.  She continues to smoke.  She has a 20.00 pack-year smoking history. She has never used smokeless tobacco. She reports that she does not drink alcohol or use drugs.  No Known Allergies  Medications     Chronic problems: RA stable with humira, and chronic pain, chronic back pain  somewhat controlled with Neurontin.   She struggles with normal ADLS of every day living.  The gabapentin and norco help relieve some of the pain so she can function.  She applied  for disability but they turned her down because she can still use her hands.  She has a 20.00 pack-year smoking history, and has quit this is week 3. She has never used smokeless tobacco. She reports that she does not drink alcohol or use drugs.   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       The following portions of the patient's history were reviewed and updated as appropriate: allergies, current medications, past family history, past medical history, past social history, past surgical history and problem list    Current Outpatient Medications:   •  ALPRAZolam (XANAX) 0.5 MG tablet, Take 1 tablet by mouth 2 (Two) Times a Day As Needed for Anxiety.,  Disp: 60 tablet, Rfl: 0  •  Calcium-Magnesium-Vitamin D 500-250-200 MG-MG-UNIT tablet, Take 500 tablets by mouth 2 (Two) Times a Day., Disp: , Rfl:   •  cholecalciferol (VITAMIN D3) 1000 units tablet, Take 1,000 Units by mouth Daily., Disp: , Rfl:   •  DULoxetine (CYMBALTA) 60 MG capsule, TAKE ONE CAPSULE BY MOUTH DAILY, Disp: 30 capsule, Rfl: 2  •  etanercept (ENBREL) 50 MG/ML solution prefilled syringe injection, Inject 50 mg under the skin into the appropriate area as directed 1 (One) Time Per Week., Disp: , Rfl:   •  gabapentin (NEURONTIN) 300 MG capsule, Take 1 capsule by mouth 3 (Three) Times a Day., Disp: 90 capsule, Rfl: 5  •  HYDROcodone-acetaminophen (NORCO) 5-325 MG per tablet, Take 1 tablet by mouth Every 6 (Six) Hours As Needed (60)., Disp: 90 tablet, Rfl: 0  •  omeprazole (priLOSEC) 40 MG capsule, Take 1 capsule by mouth Daily., Disp: 30 capsule, Rfl: 5  •  sertraline (ZOLOFT) 50 MG tablet, TAKE 1/2 TABLET BY MOUTH NIGHTLY (Patient taking differently: 50 mg. 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    • Osteoporosis    • Senile osteoporosis 9/28/2017     Past Surgical History:   Procedure Laterality Date   • FRACTURE SURGERY Bilateral     2010   • SHOULDER SURGERY     • US GUIDED LYMPH NODE BIOPSY  8/9/2019   • WRIST SURGERY Bilateral      Social History     Socioeconomic History   • Marital status:      Spouse name: Not on file   • Number of children: Not on file   • Years of education: Not on file   • Highest education level: Not on file   Tobacco Use   • Smoking status: Former Smoker      "Packs/day: 1.00     Years: 20.00     Pack years: 20.00     Types: Cigarettes     Last attempt to quit: 9/3/2018     Years since quittin.9   • Smokeless tobacco: Never Used   • Tobacco comment: Would like to quit.   Substance and Sexual Activity   • Alcohol use: No   • Drug use: No   • Sexual activity: Defer     Family History   Problem Relation Age of Onset   • Arthritis Mother    • COPD Mother    • Atrial fibrillation Mother    • Osteoarthritis Mother    • Heart disease Father    • Hypertension Father    • Arthritis Father    • Arthritis Sister    • Arthritis Brother    • No Known Problems Daughter    • Arthritis Maternal Grandfather    • Breast cancer Neg Hx        REVIEW OF SYMPTOMS: (Positives bolded)  General:  weight loss, fever, chills, night sweats, fatigue, appetite loss  HEENT:  blurry vision, eye pain, eye discharge, dry eyes, decreased vision  Respiratory: shortness of breath, cough, hemoptysis, wheezing, pleurisy,   Cardiovascular:  chest pain, PND, palpitation, edema, orthopnea, syncope  Gastro: Nausea, vomiting, diarrhea, hematemesis, abdominal pain, constipation  Genito: hematuria, dysuria, glycosuria, hesitancy, frequency, incontinence  Musckelo: Arthralgia, myalgia, muscle weakness, joint swelling, NSAID use  Skin: rash, pruritis, sores, nail changes, skin thickening, change in wart/mole, itching   Neuro:  Migraine, numbness, ataxia, tremor, vertigo, weakness, memory loss  \"All other systems reviewed and negative, except as listed above.”    OBJECTIVE:  Constitutional:  No acute distress, Consistent with stated age. Oriented x 3,  Gait normal. Patient is pleasant and cooperative with the interview and exam.    Integumentary: No rashes, ulcers or lesions. No edema.  Normal skin moisture/turgor. Skin is warm to touch, no increased warmth.      Eye: Bilaterally PERRLA, EOMI.   Upper and lower eyelids are normal. Sclera/conjunctiva normal without discharge. Cornea is normal and clear. Lens is " "normal.  Eyeball appears normal.     ENMT:  Canals  normal without erythema or discharge, no excessive cerumen. Tympanic membranes Grey/pearly, normal light reflex and anatomy. Hearing normal to conversational speech at 2-5 feet. Nares airflow, no discharge. Dentition assessed and discussed appropriate oral care. Tongue normal midline.  no pharyngeal erythema, Uvula midline. No post nasal drip.     CHEST/LUNG: Lungs clear throughout lung fields without rale, rhonchi or wheezes. no distress, no use of accessory muscles.       CARDIOVASCULAR:  Regular rate and rhythm. No murmur noted in sitting, supine positions.      ABDOMEN:  Bowel sounds normal, no abdominal bruits. Abd soft, non-tender, no rebound tenderness, no rigidity (guarding), no jar tenderness, no masses.  no hepatomegaly, no splenomegaly     Neuropsych: Normal speech, Thought- normal. No hallucinations, delusions, obsessions.   Appropriate judgement and memory.    Musculoskeletal: complains of pain all over, hands, wrists, neck, shoulders and back, knees, ankles, feet. States, \"my pain is all over and hard to controll  Upper extremity- Symmetrical posture.  No visible deformity.  Normal sensation along medial and lateral upper extremity proximally and distally.  NO tenderness overlying shoulder, lateral/medial epicondyle.  5/5 and strength 5/5 bilateral UE.  Elbow palpated, no tenderness overlying olecranon.  Normal supination, pronation to active/passive ROM and to resisted rotation. Bicep insertion/tricep insertion appear normal without obvious pathology. Rotator cuff evaluated and intact.  Normal wrist ROM bilaterally. Normal hand movement, intrinsic muscles of hands normal. No tenderness to palpation of hands/wrists/elbows.  Lower extremity;   Pain every where good ROM  Knee ROM normal at 0-120 degrees. No tenderness overlying trochanters, no tenderness about patella, quad tendon, patellar tendon. No tenderness at tibial tuberosity. Ankle normal " "ROM not tender to palpation along medial/lateral malleolus. Normal movement of toes, no tenderness bilateral feet/toes. Normal foot type. Calves symmetrical. Stretching demonstrated today  Cervical: Normal ROM with turning head right to left and touching chin to chest. Has no tenderness on palpation of the cervical spine  Lumbar Spine:  Pain with movement especially bending and twisting.   No tenderness on palpation of the lumbar spine or sciatic notch.  Straight leg raises normal bilaterally. Can heel/toe walk.  Inversion/eversion normal     /82 (BP Location: Left arm, Patient Position: Sitting, Cuff Size: Adult)   Pulse 87   Temp 98.6 °F (37 °C) (Oral)   Resp 18   Ht 162.6 cm (64.02\")   Wt 87.5 kg (193 lb)   SpO2 98%   Breastfeeding? No   BMI 33.11 kg/m²     ASSESSMENT  Adela was seen today for pain.    Diagnoses and all orders for this visit:    Panic attacks  -     sertraline (ZOLOFT) 50 MG tablet; Take 1 tablet by mouth Every Night.    Rheumatoid arthritis involving multiple sites with positive rheumatoid factor (CMS/HCC)  -     HYDROcodone-acetaminophen (NORCO) 5-325 MG per tablet; Take 1 tablet by mouth Every 6 (Six) Hours As Needed (60).    Chronic hip pain, unspecified laterality  -     HYDROcodone-acetaminophen (NORCO) 5-325 MG per tablet; Take 1 tablet by mouth Every 6 (Six) Hours As Needed (60).    Panic attack  -     ALPRAZolam (XANAX) 0.5 MG tablet; Take 1 tablet by mouth 2 (Two) Times a Day As Needed for Anxiety.      ALMA Report:     As part of this patient's treatment plan, I am prescribing controlled substances. The patient has been made aware of appropriate use of such medications, including potential risk of opiod use, somnolence, limited ability to drive and /or work safely, and potential for dependence or overdose, and opiods should be used sparingly and are not recommended for long term use.  It has also been made clear that these medications are for use by this patient only, " without concomitant use of alcohol or other substances unless prescribed.     Patient has completed prescribing agreement detailing terms of continued prescribing of controlled substances, including monitoring ALMA reports, urine drug screening yearly an random drug screens to monitor patients compliance to treatment plan, and pill counts if necessary. The patient is aware that inappropriate use will result in cessation of prescribing such medications.    Toxassure:  I have ordered a urine drug screen to monitor patients predisposition to and patterns of drug use/misuse in order to establish and maintain the safe and effective use of analgesics in the treatment of chronic pain      ALMA report has been reviewed by: Charlene Huntley, DNP, APRN  The report was scanned into the patient's chart.      -     Pt is aware of the potential for addiction and dependence.    Addiction was discussed.  The  Risks, benefits and alternative options of drug therapy discussed.  It was reinforced about the risks and benefits of opioids.  It was reinforced that patient may not call early for medication, may not ask for increase in the number of pills given monthly or increase in dosage of medication, may not jump around to different pharmacies or providers and may not receive any narcotics from other providers including ER, or the contract will be broken and narcotics will no longer be prescribed from this facility if any of these criteria has been broken.      Last Dose was: this am    Last Fill was:  july  Date of last ALMA: today                Obesity Plan:  BMI: 33.1  Weight 193  Plan:  Lose 3 pounds before next visit  The patient BMI is outside this range and we recommended/discussed today to utilize a diet/exercise program to get back into the appropriate range.  Federal guidelines recommend that people under the age of 65 should have a BMI of 18.5-24.9  The initial step is to document everything that is consumed into a  food diary. Studies have shown that patients can lose up to 2x the weight by keeping track of foods.   Choose one bad food weekly and eliminate it from your diet.  Replace with one healthy food  Goal over next 2-4 weeks is walk 30 minutes per day 5 days per week at pace difficult to hold conversation.   Drink more water, less soda.   Cut back on portion sizes.   Today we encouraged roughly a 1 lb per week weight loss with initial goal of 5% weight loss.  Discussed if eating out for a meal, consider cutting food in half and placing into a to go container.  Individually portion any foods coming into the home based on package.  Use smaller plates  Drinking 12 oz of water 30 minutes before meal as way to suppress appetite.  Medications discussed today include metformin, topamax, phentermine, Qsymia, Belviq (lorcaserin), Contrave (Buproprion/naltrexone).    Lifestyle therapy   Simple advice to lose weight   Internet programs or self help books.    Advice from dietitian  Set Goals that are realistic   Encouraged to use visual aides to help in measuring foods  Baseball-1 cup good for green salad, frozen yogurt, medium piece of fruit, baked potato  Handful - ½ cup good cut fruit, cooked vegetables, pasta, rice  Egg- ¼ cup good for dried fruit  Deck of cards-3 ounces good for meat and poultry  Check book-3 ounces good for grilled fish  6 dice side by side- 1 ½ ounces good for natural cheese  Simple tips   Plate method-reduce plate size to 9 inch dinner plate.  Half of plate should be filled with non-starchy vegetables (broccoli, lettuce, cauliflower, tomatoes), ¼ plate with lean source of protein (lean chicken, turkey, fish), remaining ½ with whole grains (brown rice, potato, whole grain breads  Avoid liquid calories (regular soda, juice, coffee with cream)  Focus  on water, seltzer water and other non-calorie drinks  Replace regular sugar with non-caloric sweeteners  Avoid skipping meals: plan small regular meals throughout  the day in order to keep your hunger controlled  Consider using meal replacements if unable to plan a healthy meal (protein shake, high protein bar)  Replace all white bread with whole wheat/whole grain alternative  Swap regular salad dressings (mayonnaise, butter, or low fat or fat free alternative)  Avoid high fat, high calorie, high carbohydrate snakes (cookies, pastries, cakes)  Snack on fruits, low fat dairy (yogurt, cottage cheese)    Behavioral Modification  Self-Monitoring of dietary Intake-keep a dietary intake log. Obese individuals have been shown to underestimate their food intake  Behavioral treatment -gives exacts about amount and total calories  Read food labels    R/B/A of medications/treatment options d/w  the pt/family today. The patient/family are aware and accept that medications can have side effects.  If there any obvious side effects they should call or return to clinic as soon as possible.  Appropriate f/u discussed for topics addressed today. All questions were answered to the satisfactory state of patient/family.  Should symptoms fail to improve or worsen they agree to call or return to clinic or to go to the ER. Education handouts were offered on any new Rx if requested.  Discussed the importance of f/u with any needed screening tests/labs/specialist appointments and any requested follow-up recommended by me today.  Importance of maintaining f/u discussed and patient accepts that missed appointments can delay diagnosis and potentially lead to worsening of conditions.    Using medications as prescribed.  Discussed need to take regularly as prescribed, to avoid missing doses as able.  Medications reviewed with patient today. We discussed cessation/continuation of medications for current problem.  Needed Rx refills will be provided.  No side effects from medications.       Return in about 1 month (around 9/26/2019) for Recheck 1 month  chronic pain and anxiety.    Electronically signed by  Charlene Huntley, [place], APRN, 08/26/19, 11:10 AM.

## 2019-08-28 DIAGNOSIS — F41.8 ANXIETY ASSOCIATED WITH DEPRESSION: ICD-10-CM

## 2019-08-28 RX ORDER — DULOXETIN HYDROCHLORIDE 60 MG/1
CAPSULE, DELAYED RELEASE ORAL
Qty: 30 CAPSULE | Refills: 2 | Status: SHIPPED | OUTPATIENT
Start: 2019-08-28 | End: 2019-11-30 | Stop reason: SDUPTHER

## 2019-09-13 ENCOUNTER — TRANSCRIBE ORDERS (OUTPATIENT)
Dept: ADMINISTRATIVE | Facility: HOSPITAL | Age: 54
End: 2019-09-13

## 2019-09-13 ENCOUNTER — OFFICE VISIT (OUTPATIENT)
Dept: HEMATOLOGY | Age: 54
End: 2019-09-13
Payer: MEDICAID

## 2019-09-13 ENCOUNTER — HOSPITAL ENCOUNTER (OUTPATIENT)
Dept: INFUSION THERAPY | Age: 54
Discharge: HOME | End: 2019-09-13
Payer: MEDICAID

## 2019-09-13 VITALS
HEART RATE: 83 BPM | DIASTOLIC BLOOD PRESSURE: 80 MMHG | OXYGEN SATURATION: 96 % | WEIGHT: 192.7 LBS | SYSTOLIC BLOOD PRESSURE: 100 MMHG

## 2019-09-13 DIAGNOSIS — C82.90 FOLLICULAR LYMPHOMA, UNSPECIFIED FOLLICULAR LYMPHOMA TYPE, UNSPECIFIED BODY REGION (HCC): ICD-10-CM

## 2019-09-13 DIAGNOSIS — C82.90 FOLLICULAR LYMPHOMA, UNSPECIFIED FOLLICULAR LYMPHOMA TYPE, UNSPECIFIED BODY REGION (HCC): Primary | ICD-10-CM

## 2019-09-13 DIAGNOSIS — Z71.89 CARE PLAN DISCUSSED WITH PATIENT: ICD-10-CM

## 2019-09-13 PROCEDURE — 99205 OFFICE O/P NEW HI 60 MIN: CPT | Performed by: INTERNAL MEDICINE

## 2019-09-13 PROCEDURE — 85025 COMPLETE CBC W/AUTO DIFF WBC: CPT

## 2019-09-13 RX ORDER — OMEPRAZOLE 40 MG/1
CAPSULE, DELAYED RELEASE ORAL
COMMUNITY
Start: 2019-09-03

## 2019-09-13 RX ORDER — DULOXETIN HYDROCHLORIDE 60 MG/1
CAPSULE, DELAYED RELEASE ORAL
Refills: 2 | COMMUNITY
Start: 2019-08-28

## 2019-09-13 RX ORDER — ALPRAZOLAM 0.5 MG/1
TABLET ORAL
Refills: 0 | COMMUNITY
Start: 2019-08-26

## 2019-09-13 RX ORDER — GABAPENTIN 300 MG/1
CAPSULE ORAL
COMMUNITY
Start: 2019-09-04

## 2019-09-13 RX ORDER — TIZANIDINE 4 MG/1
TABLET ORAL
COMMUNITY
Start: 2019-09-04

## 2019-09-13 RX ORDER — HYDROCODONE BITARTRATE AND ACETAMINOPHEN 5; 325 MG/1; MG/1
TABLET ORAL
COMMUNITY
Start: 2019-09-04

## 2019-09-13 NOTE — PROGRESS NOTES
Evaluation of hilar lymph nodes are brought, without benefit of iodine contrast.  Mediastinal nodes likely stable. 5 mm pulmonary nodule appreciated in the right upper lobe. Pleural-based nodule appreciated in the right middle lobe      ASSESSMENT    Orders Placed This Encounter   Procedures    CT ABDOMEN PELVIS W IV CONTRAST Additional Contrast? Radiologist Recommendation     Standing Status:   Future     Standing Expiration Date:   9/13/2020     Order Specific Question:   Additional Contrast?     Answer:   Radiologist Recommendation     Order Specific Question:   Reason for exam:     Answer: Follicular Lymphoma    PET CT Skull Base To Mid Thigh     Standing Status:   Future     Standing Expiration Date:   9/13/2020     Order Specific Question:   Reason for exam:     Answer: Follicular Lymphoma    Beta 2 Microglobulin, Serum     Standing Status:   Future     Number of Occurrences:   1     Standing Expiration Date:   9/13/2020    Lactate Dehydrogenase     Standing Status:   Future     Number of Occurrences:   1     Standing Expiration Date:   9/13/2020    Comprehensive Metabolic Panel     Standing Status:   Future     Number of Occurrences:   1     Standing Expiration Date:   9/13/2020    Hepatitis Panel, Acute     Standing Status:   Future     Number of Occurrences:   1     Standing Expiration Date:   9/13/2020    HIV Rapid 1&2     Standing Status:   Future     Number of Occurrences:   1     Standing Expiration Date:   9/13/2020     Order Specific Question:   Specify Date & Time of Incident     Answer:   NA    Amb External Referral To General Surgery     Referral Priority:   Routine     Referral Type:   Consult for Advice and Opinion     Referral Reason:   Specialty Services Required     Requested Specialty:   General Surgery     Number of Visits Requested:   1      Fredo Ohara was seen today for lymphoma.     Diagnoses and all orders for this visit:    Follicular lymphoma, unspecified follicular lymphoma

## 2019-09-18 ENCOUNTER — APPOINTMENT (OUTPATIENT)
Dept: PREADMISSION TESTING | Facility: HOSPITAL | Age: 54
End: 2019-09-18

## 2019-09-18 VITALS
SYSTOLIC BLOOD PRESSURE: 126 MMHG | HEIGHT: 64 IN | WEIGHT: 193.12 LBS | BODY MASS INDEX: 32.97 KG/M2 | DIASTOLIC BLOOD PRESSURE: 75 MMHG | OXYGEN SATURATION: 96 % | HEART RATE: 80 BPM

## 2019-09-18 LAB
ALBUMIN SERPL-MCNC: 4.3 G/DL (ref 3.5–5.2)
ALBUMIN/GLOB SERPL: 1.3 G/DL
ALP SERPL-CCNC: 67 U/L (ref 39–117)
ALT SERPL W P-5'-P-CCNC: 25 U/L (ref 1–33)
ANION GAP SERPL CALCULATED.3IONS-SCNC: 11 MMOL/L (ref 5–15)
ANISOCYTOSIS BLD QL: ABNORMAL
AST SERPL-CCNC: 31 U/L (ref 1–32)
BASOPHILS # BLD MANUAL: 0.12 10*3/MM3 (ref 0–0.2)
BASOPHILS NFR BLD AUTO: 2 % (ref 0–1.5)
BILIRUB SERPL-MCNC: 0.3 MG/DL (ref 0.2–1.2)
BUN BLD-MCNC: 8 MG/DL (ref 6–20)
BUN/CREAT SERPL: 12.9 (ref 7–25)
CALCIUM SPEC-SCNC: 9.5 MG/DL (ref 8.6–10.5)
CHLORIDE SERPL-SCNC: 99 MMOL/L (ref 98–107)
CO2 SERPL-SCNC: 28 MMOL/L (ref 22–29)
CREAT BLD-MCNC: 0.62 MG/DL (ref 0.57–1)
DEPRECATED RDW RBC AUTO: 43.3 FL (ref 37–54)
EOSINOPHIL # BLD MANUAL: 0.3 10*3/MM3 (ref 0–0.4)
EOSINOPHIL NFR BLD MANUAL: 5 % (ref 0.3–6.2)
ERYTHROCYTE [DISTWIDTH] IN BLOOD BY AUTOMATED COUNT: 14 % (ref 12.3–15.4)
GFR SERPL CREATININE-BSD FRML MDRD: 101 ML/MIN/1.73
GIANT PLATELETS: ABNORMAL
GLOBULIN UR ELPH-MCNC: 3.4 GM/DL
GLUCOSE BLD-MCNC: 94 MG/DL (ref 65–99)
HCT VFR BLD AUTO: 43.5 % (ref 34–46.6)
HGB BLD-MCNC: 14.6 G/DL (ref 12–15.9)
LYMPHOCYTES # BLD MANUAL: 2 10*3/MM3 (ref 0.7–3.1)
LYMPHOCYTES NFR BLD MANUAL: 33 % (ref 19.6–45.3)
LYMPHOCYTES NFR BLD MANUAL: 5 % (ref 5–12)
MCH RBC QN AUTO: 28.4 PG (ref 26.6–33)
MCHC RBC AUTO-ENTMCNC: 33.6 G/DL (ref 31.5–35.7)
MCV RBC AUTO: 84.6 FL (ref 79–97)
MICROCYTES BLD QL: ABNORMAL
MONOCYTES # BLD AUTO: 0.3 10*3/MM3 (ref 0.1–0.9)
NEUTROPHILS # BLD AUTO: 3.15 10*3/MM3 (ref 1.7–7)
NEUTROPHILS NFR BLD MANUAL: 52 % (ref 42.7–76)
PLATELET # BLD AUTO: 289 10*3/MM3 (ref 140–450)
PMV BLD AUTO: 9.3 FL (ref 6–12)
POIKILOCYTOSIS BLD QL SMEAR: ABNORMAL
POTASSIUM BLD-SCNC: 4 MMOL/L (ref 3.5–5.2)
PROT SERPL-MCNC: 7.7 G/DL (ref 6–8.5)
RBC # BLD AUTO: 5.14 10*6/MM3 (ref 3.77–5.28)
SODIUM BLD-SCNC: 138 MMOL/L (ref 136–145)
VARIANT LYMPHS NFR BLD MANUAL: 3 % (ref 0–5)
WBC MORPH BLD: NORMAL
WBC NRBC COR # BLD: 6.05 10*3/MM3 (ref 3.4–10.8)

## 2019-09-18 PROCEDURE — 80053 COMPREHEN METABOLIC PANEL: CPT | Performed by: SPECIALIST

## 2019-09-18 PROCEDURE — 85025 COMPLETE CBC W/AUTO DIFF WBC: CPT | Performed by: SPECIALIST

## 2019-09-18 PROCEDURE — 85007 BL SMEAR W/DIFF WBC COUNT: CPT | Performed by: SPECIALIST

## 2019-09-18 PROCEDURE — 36415 COLL VENOUS BLD VENIPUNCTURE: CPT

## 2019-09-18 NOTE — DISCHARGE INSTRUCTIONS
DAY OF SURGERY INSTRUCTIONS        YOUR SURGEON: ***    PROCEDURE: ***excision right axillary mass    DATE OF SURGERY: ***9/19/19    ARRIVAL TIME: AS DIRECTED BY OFFICE    YOU MAY TAKE THE FOLLOWING MEDICATION(S) THE MORNING OF SURGERY WITH A SIP OF WATER: *** gabapentin, norco      ALL OTHER HOME MEDICATION CHECK WITH YOUR PHYSICIAN                MANAGING PAIN AFTER SURGERY    We know you are probably wondering what your pain will be like after surgery.  Following surgery it is unrealistic to expect you will not have pain.   Pain is how our bodies let us know that something is wrong or cautions us to be careful.  That said, our goal is to make your pain tolerable.    Methods we may use to treat your pain include (oral or IV medications, PCAs, epidurals, nerve blocks, etc.)   While some procedures require IV pain medications for a short time after surgery, transitioning to pain medications by mouth allows for better management of pain.   Your nurse will encourage you to take oral pain medications whenever possible.  IV medications work almost immediately, but only last a short while.  Taking medications by mouth allows for a more constant level of medication in your blood stream for a longer period of time.      Once your pain is out of control it is harder to get back under control.  It is important you are aware when your next dose of pain medication is due.  If you are admitted, your nurse may write the time of your next dose on the white board in your room to help you remember.      We are interested in your pain and encourage you to inform us about aggravating factors during your visit.   Many times a simple repositioning every few hours can make a big difference.    If your physician says it is okay, do not let your pain prevent you from getting out of bed. Be sure to call your nurse for assistance prior to getting up so you do not fall.      Before surgery, please decide your tolerable pain goal.   These faces help describe the pain ratings we use on a 0-10 scale.   Be prepared to tell us your goal and whether or not you take pain or anxiety medications at home.          BEFORE YOU COME TO THE HOSPITAL  (Pre-op instructions)  • Do not eat, drink, smoke or chew gum after midnight the night before surgery.  This also includes no mints.  • Morning of surgery take only the medicines you have been instructed with a sip of water unless otherwise instructed  by your physician.  • Do not shave, wear makeup or dark nail polish.  • Remove all jewelry including rings.  • Leave anything you consider valuable at home.  • Leave your suitcase in the car until after your surgery.  • Bring the following with you if applicable:  o Picture ID and insurance, Medicare or Medicaid cards  o Co-pay/deductible required by insurance (cash, check, credit card)  o Copy of advance directive, living will or power-of- documents if not brought to PAT  o CPAP or BIPAP mask and tubing  o Relaxation aids ( book, magazine), etc.  o Hearing aids                        ON THE DAY OF SURGERY  · On the day of surgery check in at registration located at the main entrance of the hospital.   ? You will be registered and given a beeper with instructions where to wait in the main lobby.  ? When your beeper lights up and vibrates a member of the Outpatient Surgery staff will meet you at the double doors under the stair steps and escort you to your preoperative room.   · You may have cloth compression devices placed on your legs. These help to prevent blood clots and reduce swelling in your legs.  · An IV may be inserted into one of your veins.  · In the operating room, you may be given one or more of the following:  ? A medicine to help you relax (sedative).  ? A medicine to numb the area (local anesthetic).  ? A medicine to make you fall asleep (general anesthetic).  ? A medicine that is injected into an area of your body to numb everything  "below the injection site (regional anesthetic).  · Your surgical site will be marked or identified.  · You may be given an antibiotic through your IV to help prevent infection.  Contact a health care provider if you:  · Develop a fever of more than 100.4°F (38°C) or other feelings of illness during the 48 hours before your surgery.  · Have symptoms that get worse.  Have questions or concerns about your surgery    General Anesthesia/Surgery, Adult  General anesthesia is the use of medicines to make a person \"go to sleep\" (unconscious) for a medical procedure. General anesthesia must be used for certain procedures, and is often recommended for procedures that:  · Last a long time.  · Require you to be still or in an unusual position.  · Are major and can cause blood loss.  The medicines used for general anesthesia are called general anesthetics. As well as making you unconscious for a certain amount of time, these medicines:  · Prevent pain.  · Control your blood pressure.  · Relax your muscles.  Tell a health care provider about:  · Any allergies you have.  · All medicines you are taking, including vitamins, herbs, eye drops, creams, and over-the-counter medicines.  · Any problems you or family members have had with anesthetic medicines.  · Types of anesthetics you have had in the past.  · Any blood disorders you have.  · Any surgeries you have had.  · Any medical conditions you have.  · Any recent upper respiratory, chest, or ear infections.  · Any history of:  ? Heart or lung conditions, such as heart failure, sleep apnea, asthma, or chronic obstructive pulmonary disease (COPD).  ?  service.  ? Depression or anxiety.  · Any tobacco or drug use, including marijuana or alcohol use.  · Whether you are pregnant or may be pregnant.  What are the risks?  Generally, this is a safe procedure. However, problems may occur, including:  · Allergic reaction.  · Lung and heart problems.  · Inhaling food or liquid from " the stomach into the lungs (aspiration).  · Nerve injury.  · Air in the bloodstream, which can lead to stroke.  · Extreme agitation or confusion (delirium) when you wake up from the anesthetic.  · Waking up during your procedure and being unable to move. This is rare.  These problems are more likely to develop if you are having a major surgery or if you have an advanced or serious medical condition. You can prevent some of these complications by answering all of your health care provider's questions thoroughly and by following all instructions before your procedure.  General anesthesia can cause side effects, including:  · Nausea or vomiting.  · A sore throat from the breathing tube.  · Hoarseness.  · Wheezing or coughing.  · Shaking chills.  · Tiredness.  · Body aches.  · Anxiety.  · Sleepiness or drowsiness.  · Confusion or agitation.  RISKS AND COMPLICATIONS OF SURGERY  Your health care provider will discuss possible risks and complications with you before surgery. Common risks and complications include:    · Problems due to the use of anesthetics.  · Blood loss and replacement (does not apply to minor surgical procedures).  · Temporary increase in pain due to surgery.  · Uncorrected pain or problems that the surgery was meant to correct.  · Infection.  · New damage.    What happens before the procedure?    Medicines  Ask your health care provider about:  · Changing or stopping your regular medicines. This is especially important if you are taking diabetes medicines or blood thinners.  · Taking medicines such as aspirin and ibuprofen. These medicines can thin your blood. Do not take these medicines unless your health care provider tells you to take them.  · Taking over-the-counter medicines, vitamins, herbs, and supplements. Do not take these during the week before your procedure unless your health care provider approves them.  General instructions  · Starting 3-6 weeks before the procedure, do not use any  products that contain nicotine or tobacco, such as cigarettes and e-cigarettes. If you need help quitting, ask your health care provider.  · If you brush your teeth on the morning of the procedure, make sure to spit out all of the toothpaste.  · Tell your health care provider if you become ill or develop a cold, cough, or fever.  · If instructed by your health care provider, bring your sleep apnea device with you on the day of your surgery (if applicable).  · Ask your health care provider if you will be going home the same day, the following day, or after a longer hospital stay.  ? Plan to have someone take you home from the hospital or clinic.  ? Plan to have a responsible adult care for you for at least 24 hours after you leave the hospital or clinic. This is important.  What happens during the procedure?  · You will be given anesthetics through both of the following:  ? A mask placed over your nose and mouth.  ? An IV in one of your veins.  · You may receive a medicine to help you relax (sedative).  · After you are unconscious, a breathing tube may be inserted down your throat to help you breathe. This will be removed before you wake up.  · An anesthesia specialist will stay with you throughout your procedure. He or she will:  ? Keep you comfortable and safe by continuing to give you medicines and adjusting the amount of medicine that you get.  ? Monitor your blood pressure, pulse, and oxygen levels to make sure that the anesthetics do not cause any problems.  The procedure may vary among health care providers and hospitals.  What happens after the procedure?  · Your blood pressure, temperature, heart rate, breathing rate, and blood oxygen level will be monitored until the medicines you were given have worn off.  · You will wake up in a recovery area. You may wake up slowly.  · If you feel anxious or agitated, you may be given medicine to help you calm down.  · If you will be going home the same day, your health  care provider may check to make sure you can walk, drink, and urinate.  · Your health care provider will treat any pain or side effects you have before you go home.  · Do not drive for 24 hours if you were given a sedative.  Summary  · General anesthesia is used to keep you still and prevent pain during a procedure.  · It is important to tell your healthcare provider about your medical history and any surgeries you have had, and previous experience with anesthesia.  · Follow your healthcare provider’s instructions about when to stop eating, drinking, or taking certain medicines before your procedure.  · Plan to have someone take you home from the hospital or clinic.  This information is not intended to replace advice given to you by your health care provider. Make sure you discuss any questions you have with your health care provider.  Document Released: 03/26/2009 Document Revised: 08/03/2018 Document Reviewed: 08/03/2018  [employer] Interactive Patient Education © 2019 [employer]       Fall Prevention in Hospitals, Adult  As a hospital patient, your condition and the treatments you receive can increase your risk for falls. Some additional risk factors for falls in a hospital include:  · Being in an unfamiliar environment.  · Being on bed rest.  · Your surgery.  · Taking certain medicines.  · Your tubing requirements, such as intravenous (IV) therapy or catheters.  It is important that you learn how to decrease fall risks while at the hospital. Below are important tips that can help prevent falls.  SAFETY TIPS FOR PREVENTING FALLS  Talk about your risk of falling.  · Ask your health care provider why you are at risk for falling. Is it your medicine, illness, tubing placement, or something else?  · Make a plan with your health care provider to keep you safe from falls.  · Ask your health care provider or pharmacist about side effects of your medicines. Some medicines can make you dizzy or affect your  coordination.  Ask for help.  · Ask for help before getting out of bed. You may need to press your call button.  · Ask for assistance in getting safely to the toilet.  · Ask for a walker or cane to be put at your bedside. Ask that most of the side rails on your bed be placed up before your health care provider leaves the room.  · Ask family or friends to sit with you.  · Ask for things that are out of your reach, such as your glasses, hearing aids, telephone, bedside table, or call button.  Follow these tips to avoid falling:  · Stay lying or seated, rather than standing, while waiting for help.  · Wear rubber-soled slippers or shoes whenever you walk in the hospital.  · Avoid quick, sudden movements.  ¨ Change positions slowly.  ¨ Sit on the side of your bed before standing.  ¨ Stand up slowly and wait before you start to walk.  · Let your health care provider know if there is a spill on the floor.  · Pay careful attention to the medical equipment, electrical cords, and tubes around you.  · When you need help, use your call button by your bed or in the bathroom. Wait for one of your health care providers to help you.  · If you feel dizzy or unsure of your footing, return to bed and wait for assistance.  · Avoid being distracted by the TV, telephone, or another person in your room.  · Do not lean or support yourself on rolling objects, such as IV poles or bedside tables.     This information is not intended to replace advice given to you by your health care provider. Make sure you discuss any questions you have with your health care provider.     Document Released: 12/15/2001 Document Revised: 01/08/2016 Document Reviewed: 08/25/2013  [employer] Interactive Patient Education ©2016 [employer]       Surgical Site Infections FAQs  What is a Surgical Site Infection (SSI)?  A surgical site infection is an infection that occurs after surgery in the part of the body where the surgery took place. Most patients who have  surgery do not develop an infection. However, infections develop in about 1 to 3 out of every 100 patients who have surgery.  Some of the common symptoms of a surgical site infection are:  · Redness and pain around the area where you had surgery  · Drainage of cloudy fluid from your surgical wound  · Fever  Can SSIs be treated?  Yes. Most surgical site infections can be treated with antibiotics. The antibiotic given to you depends on the bacteria (germs) causing the infection. Sometimes patients with SSIs also need another surgery to treat the infection.  What are some of the things that hospitals are doing to prevent SSIs?  To prevent SSIs, doctors, nurses, and other healthcare providers:  · Clean their hands and arms up to their elbows with an antiseptic agent just before the surgery.  · Clean their hands with soap and water or an alcohol-based hand rub before and after caring for each patient.  · May remove some of your hair immediately before your surgery using electric clippers if the hair is in the same area where the procedure will occur. They should not shave you with a razor.  · Wear special hair covers, masks, gowns, and gloves during surgery to keep the surgery area clean.  · Give you antibiotics before your surgery starts. In most cases, you should get antibiotics within 60 minutes before the surgery starts and the antibiotics should be stopped within 24 hours after surgery.  · Clean the skin at the site of your surgery with a special soap that kills germs.  What can I do to help prevent SSIs?  Before your surgery:  · Tell your doctor about other medical problems you may have. Health problems such as allergies, diabetes, and obesity could affect your surgery and your treatment.  · Quit smoking. Patients who smoke get more infections. Talk to your doctor about how you can quit before your surgery.  · Do not shave near where you will have surgery. Shaving with a razor can irritate your skin and make it  easier to develop an infection.  At the time of your surgery:  · Speak up if someone tries to shave you with a razor before surgery. Ask why you need to be shaved and talk with your surgeon if you have any concerns.  · Ask if you will get antibiotics before surgery.  After your surgery:  · Make sure that your healthcare providers clean their hands before examining you, either with soap and water or an alcohol-based hand rub.  · If you do not see your providers clean their hands, please ask them to do so.  · Family and friends who visit you should not touch the surgical wound or dressings.  · Family and friends should clean their hands with soap and water or an alcohol-based hand rub before and after visiting you. If you do not see them clean their hands, ask them to clean their hands.  What do I need to do when I go home from the hospital?  · Before you go home, your doctor or nurse should explain everything you need to know about taking care of your wound. Make sure you understand how to care for your wound before you leave the hospital.  · Always clean your hands before and after caring for your wound.  · Before you go home, make sure you know who to contact if you have questions or problems after you get home.  · If you have any symptoms of an infection, such as redness and pain at the surgery site, drainage, or fever, call your doctor immediately.  If you have additional questions, please ask your doctor or nurse.  Developed and co-sponsored by The Society for Healthcare Epidemiology of [place] (SHEA); Infectious Diseases Society of [place] (IDSA); American Hospital Association; Association for Professionals in Infection Control and Epidemiology (APIC); Centers for Disease Control and Prevention (CDC); and The Joint Commission.     This information is not intended to replace advice given to you by your health care provider. Make sure you discuss any questions you have with your health care provider.     Document  Released: 12/23/2014 Document Revised: 01/08/2016 Document Reviewed: 03/02/2016  [employer] Interactive Patient Education ©2016 [employer]           [facility]  CHG 4% Patient Instruction Sheet    Chlorhexidine Before Surgery  Chlorhexidine gluconate (CHG) is a germ-killing (antiseptic) solution that is used to clean the skin. It gets rid of the bacteria that normally live on the skin. Cleaning your skin with CHG before surgery helps lower the risk for infection after surgery.    How to use CHG solution  · You will take 2 showers, one shower the night before surgery, the second shower the morning of surgery before coming to the hospital.  · Use CHG only as told by your health care provider, and follow the instructions on the label.  · Use CHG solution while taking a shower. Follow these steps when using CHG solution (unless your health care provider gives you different instructions):  1. Start the shower.  2. Use your normal soap and shampoo to wash your face and hair.  3. Turn off the shower or move out of the shower stream.  4. Pour the CHG onto a clean washcloth. Do not use any type of brush or rough-edged sponge.  5. Starting at your neck, lather your body down to your toes. Make sure you:  6. Pay special attention to the part of your body where you will be having surgery. Scrub this area for at least 1 minute.  7. Use the full amount of CHG as directed. Usually, this is one half bottle for each shower.  8. Do not use CHG on your head or face. If the solution gets into your ears or eyes, rinse them well with water.  9. Avoid your genital area.  10. Avoid any areas of skin that have broken skin, cuts, or scrapes.  11. Scrub your back and under your arms. Make sure to wash skin folds.  12. Let the lather sit on your skin for 1-2 minutes or as long as told by your health care  provider.  13. Thoroughly rinse your entire body in the shower. Make sure that all body creases and crevices are rinsed  well.  14. Dry off with a clean towel. Do not put any substances on your body afterward, such as powder, lotion, or perfume.  15. Put on clean clothes or pajamas.  16. If it is the night before your surgery, sleep in clean sheets.    What are the risks?  Risks of using CHG include:  · A skin reaction.  · Hearing loss, if CHG gets in your ears.  · Eye injury, if CHG gets in your eyes and is not rinsed out.  · The CHG product catching fire.  Make sure that you avoid smoking and flames after applying CHG to your skin.  Do not use CHG:  · If you have a chlorhexidine allergy or have previously reacted to chlorhexidine.  · On babies younger than 2 months of age.      On the day of surgery, when you are taken to your room in Outpatient Surgery you will be given a CHG prepackaged cloth to wipe the site for your surgery.  How to use CHG prepackaged cloths  · Follow the instructions on the label.  · Use the CHG cloth on clean, dry skin. Follow these steps when using a CHG cloth (unless your health care provider gives you different instructions):  1. Using the CHG cloth, vigorously scrub the part of your body where you will be having surgery. Scrub using a back-and-forth motion for 3 minutes. The area on your body should be completely wet with CHG when you are finished scrubbing.  2. Do not rinse. Discard the cloth and let the area air-dry for 1 minute. Do not put any substances on your body afterward, such as powder, lotion, or perfume.  Contact a health care provider if:  · Your skin gets irritated after scrubbing.  · You have questions about using your solution or cloth.  Get help right away if:  · Your eyes become very red or swollen.  · Your eyes itch badly.  · Your skin itches badly and is red or swollen.  · Your hearing changes.  · You have trouble seeing.  · You have swelling or tingling in your mouth or throat.  · You have trouble breathing.  · You swallow any chlorhexidine.  Summary  · Chlorhexidine gluconate (CHG)  is a germ-killing (antiseptic) solution that is used to clean the skin. Cleaning your skin with CHG before surgery helps lower the risk for infection after surgery.  · You may be given CHG to use at home. It may be in a bottle or in a prepackaged cloth to use on your skin. Carefully follow your health care provider's instructions and the instructions on the product label.  · Do not use CHG if you have a chlorhexidine allergy.  · Contact your health care provider if your skin gets irritated after scrubbing.  This information is not intended to replace advice given to you by your health care provider. Make sure you discuss any questions you have with your health care provider.  Document Released: 09/11/2013 Document Revised: 11/15/2018 Document Reviewed: 11/15/2018  [employer] Interactive Patient Education © 2019 [employer]          PATIENT/FAMILY/RESPONSIBLE PARTY VERBALIZES UNDERSTANDING OF ABOVE EDUCATION.  COPY OF PAIN SCALE GIVEN AND REVIEWED WITH VERBALIZED UNDERSTANDING.

## 2019-09-19 ENCOUNTER — ANESTHESIA (OUTPATIENT)
Dept: PERIOP | Facility: HOSPITAL | Age: 54
End: 2019-09-19

## 2019-09-19 ENCOUNTER — HOSPITAL ENCOUNTER (OUTPATIENT)
Facility: HOSPITAL | Age: 54
Setting detail: HOSPITAL OUTPATIENT SURGERY
Discharge: HOME | End: 2019-09-19
Attending: SPECIALIST | Admitting: SPECIALIST

## 2019-09-19 ENCOUNTER — ANESTHESIA EVENT (OUTPATIENT)
Dept: PERIOP | Facility: HOSPITAL | Age: 54
End: 2019-09-19

## 2019-09-19 VITALS
RESPIRATION RATE: 16 BRPM | SYSTOLIC BLOOD PRESSURE: 117 MMHG | TEMPERATURE: 97.7 F | DIASTOLIC BLOOD PRESSURE: 60 MMHG | HEART RATE: 95 BPM | OXYGEN SATURATION: 94 %

## 2019-09-19 DIAGNOSIS — C82.90 FOLLICULAR LYMPHOMA (HCC): ICD-10-CM

## 2019-09-19 PROCEDURE — 88185 FLOWCYTOMETRY/TC ADD-ON: CPT | Performed by: SPECIALIST

## 2019-09-19 PROCEDURE — 88360 TUMOR IMMUNOHISTOCHEM/MANUAL: CPT | Performed by: SPECIALIST

## 2019-09-19 PROCEDURE — 25010000002 METOCLOPRAMIDE PER 10 MG: Performed by: ANESTHESIOLOGY

## 2019-09-19 PROCEDURE — 25010000002 DEXAMETHASONE PER 1 MG: Performed by: ANESTHESIOLOGY

## 2019-09-19 PROCEDURE — 25010000002 PROPOFOL 10 MG/ML EMULSION: Performed by: NURSE ANESTHETIST, CERTIFIED REGISTERED

## 2019-09-19 PROCEDURE — 25010000002 MIDAZOLAM PER 1 MG: Performed by: ANESTHESIOLOGY

## 2019-09-19 PROCEDURE — 88307 TISSUE EXAM BY PATHOLOGIST: CPT | Performed by: SPECIALIST

## 2019-09-19 PROCEDURE — 25010000002 ONDANSETRON PER 1 MG: Performed by: ANESTHESIOLOGY

## 2019-09-19 PROCEDURE — 94640 AIRWAY INHALATION TREATMENT: CPT

## 2019-09-19 PROCEDURE — 88334 PATH CONSLTJ SURG CYTO XM EA: CPT | Performed by: SPECIALIST

## 2019-09-19 PROCEDURE — 88184 FLOWCYTOMETRY/ TC 1 MARKER: CPT | Performed by: SPECIALIST

## 2019-09-19 RX ORDER — METOCLOPRAMIDE HYDROCHLORIDE 5 MG/ML
5 INJECTION INTRAMUSCULAR; INTRAVENOUS
Status: DISCONTINUED | OUTPATIENT
Start: 2019-09-19 | End: 2019-09-19 | Stop reason: HOSPADM

## 2019-09-19 RX ORDER — PROPOFOL 10 MG/ML
VIAL (ML) INTRAVENOUS AS NEEDED
Status: DISCONTINUED | OUTPATIENT
Start: 2019-09-19 | End: 2019-09-19 | Stop reason: SURG

## 2019-09-19 RX ORDER — LABETALOL HYDROCHLORIDE 5 MG/ML
5 INJECTION, SOLUTION INTRAVENOUS
Status: DISCONTINUED | OUTPATIENT
Start: 2019-09-19 | End: 2019-09-19 | Stop reason: HOSPADM

## 2019-09-19 RX ORDER — SODIUM CHLORIDE 0.9 % (FLUSH) 0.9 %
3 SYRINGE (ML) INJECTION AS NEEDED
Status: DISCONTINUED | OUTPATIENT
Start: 2019-09-19 | End: 2019-09-19 | Stop reason: HOSPADM

## 2019-09-19 RX ORDER — SUFENTANIL CITRATE 50 UG/ML
INJECTION EPIDURAL; INTRAVENOUS AS NEEDED
Status: DISCONTINUED | OUTPATIENT
Start: 2019-09-19 | End: 2019-09-19 | Stop reason: SURG

## 2019-09-19 RX ORDER — SODIUM CHLORIDE, SODIUM LACTATE, POTASSIUM CHLORIDE, CALCIUM CHLORIDE 600; 310; 30; 20 MG/100ML; MG/100ML; MG/100ML; MG/100ML
100 INJECTION, SOLUTION INTRAVENOUS CONTINUOUS
Status: DISCONTINUED | OUTPATIENT
Start: 2019-09-19 | End: 2019-09-19 | Stop reason: HOSPADM

## 2019-09-19 RX ORDER — BUPIVACAINE HYDROCHLORIDE AND EPINEPHRINE 2.5; 5 MG/ML; UG/ML
INJECTION, SOLUTION INFILTRATION; PERINEURAL AS NEEDED
Status: DISCONTINUED | OUTPATIENT
Start: 2019-09-19 | End: 2019-09-19 | Stop reason: HOSPADM

## 2019-09-19 RX ORDER — MAGNESIUM HYDROXIDE 1200 MG/15ML
LIQUID ORAL AS NEEDED
Status: DISCONTINUED | OUTPATIENT
Start: 2019-09-19 | End: 2019-09-19 | Stop reason: HOSPADM

## 2019-09-19 RX ORDER — HYDROCODONE BITARTRATE AND ACETAMINOPHEN 5; 325 MG/1; MG/1
1-2 TABLET ORAL EVERY 4 HOURS PRN
Qty: 30 TABLET | Refills: 0 | Status: SHIPPED | OUTPATIENT
Start: 2019-09-19 | End: 2019-09-24 | Stop reason: SDUPTHER

## 2019-09-19 RX ORDER — OXYCODONE AND ACETAMINOPHEN 10; 325 MG/1; MG/1
1 TABLET ORAL ONCE AS NEEDED
Status: DISCONTINUED | OUTPATIENT
Start: 2019-09-19 | End: 2019-09-19 | Stop reason: HOSPADM

## 2019-09-19 RX ORDER — IBUPROFEN 600 MG/1
600 TABLET ORAL ONCE AS NEEDED
Status: DISCONTINUED | OUTPATIENT
Start: 2019-09-19 | End: 2019-09-19 | Stop reason: HOSPADM

## 2019-09-19 RX ORDER — DEXAMETHASONE SODIUM PHOSPHATE 4 MG/ML
4 INJECTION, SOLUTION INTRA-ARTICULAR; INTRALESIONAL; INTRAMUSCULAR; INTRAVENOUS; SOFT TISSUE ONCE AS NEEDED
Status: COMPLETED | OUTPATIENT
Start: 2019-09-19 | End: 2019-09-19

## 2019-09-19 RX ORDER — METOCLOPRAMIDE HYDROCHLORIDE 5 MG/ML
5 INJECTION INTRAMUSCULAR; INTRAVENOUS ONCE AS NEEDED
Status: COMPLETED | OUTPATIENT
Start: 2019-09-19 | End: 2019-09-19

## 2019-09-19 RX ORDER — ACETAMINOPHEN 500 MG
1000 TABLET ORAL ONCE
Status: COMPLETED | OUTPATIENT
Start: 2019-09-19 | End: 2019-09-19

## 2019-09-19 RX ORDER — HYDROCODONE BITARTRATE AND ACETAMINOPHEN 5; 325 MG/1; MG/1
1 TABLET ORAL ONCE AS NEEDED
Status: DISCONTINUED | OUTPATIENT
Start: 2019-09-19 | End: 2019-09-19 | Stop reason: SDUPTHER

## 2019-09-19 RX ORDER — SODIUM CHLORIDE 0.9 % (FLUSH) 0.9 %
3 SYRINGE (ML) INJECTION EVERY 12 HOURS SCHEDULED
Status: DISCONTINUED | OUTPATIENT
Start: 2019-09-19 | End: 2019-09-19 | Stop reason: HOSPADM

## 2019-09-19 RX ORDER — IPRATROPIUM BROMIDE AND ALBUTEROL SULFATE 2.5; .5 MG/3ML; MG/3ML
3 SOLUTION RESPIRATORY (INHALATION) ONCE AS NEEDED
Status: COMPLETED | OUTPATIENT
Start: 2019-09-19 | End: 2019-09-19

## 2019-09-19 RX ORDER — SODIUM CHLORIDE 0.9 % (FLUSH) 0.9 %
3-10 SYRINGE (ML) INJECTION AS NEEDED
Status: DISCONTINUED | OUTPATIENT
Start: 2019-09-19 | End: 2019-09-19 | Stop reason: HOSPADM

## 2019-09-19 RX ORDER — SODIUM CHLORIDE, SODIUM LACTATE, POTASSIUM CHLORIDE, CALCIUM CHLORIDE 600; 310; 30; 20 MG/100ML; MG/100ML; MG/100ML; MG/100ML
1000 INJECTION, SOLUTION INTRAVENOUS CONTINUOUS
Status: DISCONTINUED | OUTPATIENT
Start: 2019-09-19 | End: 2019-09-19 | Stop reason: HOSPADM

## 2019-09-19 RX ORDER — MIDAZOLAM HYDROCHLORIDE 1 MG/ML
2 INJECTION INTRAMUSCULAR; INTRAVENOUS
Status: DISCONTINUED | OUTPATIENT
Start: 2019-09-19 | End: 2019-09-19 | Stop reason: HOSPADM

## 2019-09-19 RX ORDER — MIDAZOLAM HYDROCHLORIDE 1 MG/ML
1 INJECTION INTRAMUSCULAR; INTRAVENOUS
Status: DISCONTINUED | OUTPATIENT
Start: 2019-09-19 | End: 2019-09-19 | Stop reason: HOSPADM

## 2019-09-19 RX ORDER — OXYCODONE AND ACETAMINOPHEN 7.5; 325 MG/1; MG/1
2 TABLET ORAL EVERY 4 HOURS PRN
Status: DISCONTINUED | OUTPATIENT
Start: 2019-09-19 | End: 2019-09-19 | Stop reason: HOSPADM

## 2019-09-19 RX ORDER — NALOXONE HCL 0.4 MG/ML
0.4 VIAL (ML) INJECTION AS NEEDED
Status: DISCONTINUED | OUTPATIENT
Start: 2019-09-19 | End: 2019-09-19 | Stop reason: HOSPADM

## 2019-09-19 RX ORDER — SCOLOPAMINE TRANSDERMAL SYSTEM 1 MG/1
1 PATCH, EXTENDED RELEASE TRANSDERMAL CONTINUOUS
Status: DISCONTINUED | OUTPATIENT
Start: 2019-09-19 | End: 2019-09-19 | Stop reason: HOSPADM

## 2019-09-19 RX ORDER — ONDANSETRON 2 MG/ML
4 INJECTION INTRAMUSCULAR; INTRAVENOUS ONCE AS NEEDED
Status: COMPLETED | OUTPATIENT
Start: 2019-09-19 | End: 2019-09-19

## 2019-09-19 RX ORDER — FENTANYL CITRATE 50 UG/ML
25 INJECTION, SOLUTION INTRAMUSCULAR; INTRAVENOUS AS NEEDED
Status: DISCONTINUED | OUTPATIENT
Start: 2019-09-19 | End: 2019-09-19 | Stop reason: HOSPADM

## 2019-09-19 RX ADMIN — OXYCODONE HYDROCHLORIDE AND ACETAMINOPHEN 2 TABLET: 7.5; 325 TABLET ORAL at 09:34

## 2019-09-19 RX ADMIN — SODIUM CHLORIDE, POTASSIUM CHLORIDE, SODIUM LACTATE AND CALCIUM CHLORIDE 1000 ML: 600; 310; 30; 20 INJECTION, SOLUTION INTRAVENOUS at 05:58

## 2019-09-19 RX ADMIN — ACETAMINOPHEN 1000 MG: 500 TABLET, FILM COATED ORAL at 06:50

## 2019-09-19 RX ADMIN — ONDANSETRON HYDROCHLORIDE 4 MG: 2 SOLUTION INTRAMUSCULAR; INTRAVENOUS at 09:08

## 2019-09-19 RX ADMIN — SCOPALAMINE 1 PATCH: 1 PATCH, EXTENDED RELEASE TRANSDERMAL at 06:51

## 2019-09-19 RX ADMIN — CEFAZOLIN 1 G: 1 INJECTION, POWDER, FOR SOLUTION INTRAMUSCULAR; INTRAVENOUS; PARENTERAL at 07:46

## 2019-09-19 RX ADMIN — PROPOFOL 200 MG: 10 INJECTION, EMULSION INTRAVENOUS at 07:50

## 2019-09-19 RX ADMIN — DEXAMETHASONE SODIUM PHOSPHATE 4 MG: 4 INJECTION, SOLUTION INTRAMUSCULAR; INTRAVENOUS at 06:50

## 2019-09-19 RX ADMIN — IPRATROPIUM BROMIDE AND ALBUTEROL SULFATE 3 ML: 2.5; .5 SOLUTION RESPIRATORY (INHALATION) at 08:35

## 2019-09-19 RX ADMIN — MIDAZOLAM HYDROCHLORIDE 2 MG: 1 INJECTION, SOLUTION INTRAMUSCULAR; INTRAVENOUS at 06:51

## 2019-09-19 RX ADMIN — SUFENTANIL CITRATE 30 MCG: 50 INJECTION, SOLUTION EPIDURAL; INTRAVENOUS at 08:00

## 2019-09-19 RX ADMIN — LIDOCAINE HYDROCHLORIDE 100 MG: 20 INJECTION, SOLUTION INTRAVENOUS at 07:50

## 2019-09-19 RX ADMIN — METOCLOPRAMIDE 5 MG: 5 INJECTION, SOLUTION INTRAMUSCULAR; INTRAVENOUS at 06:50

## 2019-09-19 RX ADMIN — SODIUM CHLORIDE, POTASSIUM CHLORIDE, SODIUM LACTATE AND CALCIUM CHLORIDE: 600; 310; 30; 20 INJECTION, SOLUTION INTRAVENOUS at 07:46

## 2019-09-19 RX ADMIN — SUFENTANIL CITRATE 20 MCG: 50 INJECTION, SOLUTION EPIDURAL; INTRAVENOUS at 07:50

## 2019-09-19 NOTE — DISCHARGE INSTRUCTIONS

## 2019-09-19 NOTE — ANESTHESIA PROCEDURE NOTES
Airway  Urgency: elective    Date/Time: 9/19/2019 7:50 AM  Airway not difficult    General Information and Staff    Patient location during procedure: OR  CRNA: Robbie Hinds CRNA    Indications and Patient Condition  Indications for airway management: airway protection    Preoxygenated: yes  MILS maintained throughout  Mask difficulty assessment: 1 - vent by mask    Final Airway Details  Final airway type: supraglottic airway      Successful airway: unique  Size 4    Number of attempts at approach: 1

## 2019-09-19 NOTE — ANESTHESIA PREPROCEDURE EVALUATION
Anesthesia Evaluation     Patient summary reviewed   history of anesthetic complications: PONV  NPO Solid Status: > 8 hours  NPO Liquid Status: > 8 hours           Airway   Mallampati: II  TM distance: >3 FB  Neck ROM: full  No difficulty expected  Dental - normal exam     Pulmonary - negative pulmonary ROS   Cardiovascular - negative cardio ROS  Exercise tolerance: good (4-7 METS)    (-) hypertension, past MI, CAD      Neuro/Psych- negative ROS  GI/Hepatic/Renal/Endo    (+)   liver disease,     Musculoskeletal     Abdominal    Substance History      OB/GYN          Other   (+) arthritis (Rheumatoid)   history of cancer (lymphoma)    ROS/Med Hx Other: Alpha-1 antitrypsin deficiency                  Anesthesia Plan    ASA 2     general     intravenous induction   Anesthetic plan, all risks, benefits, and alternatives have been provided, discussed and informed consent has been obtained with: patient.

## 2019-09-19 NOTE — ANESTHESIA POSTPROCEDURE EVALUATION
Patient: Adela Arauz    Procedure Summary     Date:  09/19/19 Room / Location:   [facility]  /  [facility]    Anesthesia Start:  0746 Anesthesia Stop:  0826    Procedure:  EXCISION RIGHT AXILLARY MASS (Right ) Diagnosis:  (AXILLARY MASS)    Surgeon:  Jes Enamorado MD Provider:  Robbie Hinds CRNA    Anesthesia Type:  general ASA Status:  2          Anesthesia Type: general  Last vitals  BP   134/79 (09/19/19 0545)   Temp   97.2 °F (36.2 °C) (09/19/19 0545)   Pulse   71 (09/19/19 0639)   Resp   16 (09/19/19 0639)     SpO2   96 % (09/19/19 0639)     Post Anesthesia Care and Evaluation    Patient location during evaluation: PACU  Patient participation: complete - patient participated  Level of consciousness: awake and alert  Pain management: adequate  Airway patency: patent  Anesthetic complications: No anesthetic complications    Cardiovascular status: acceptable  Respiratory status: acceptable  Hydration status: acceptable    Comments: Blood pressure 134/79, pulse 71, temperature 97.2 °F (36.2 °C), temperature source Temporal, resp. rate 16, last menstrual period 04/01/2014, SpO2 96 %, not currently breastfeeding.    Pt discharged from PACU based on carol score >8

## 2019-09-19 NOTE — OP NOTE
Preoperative diagnosis:  Right axillary mass  Postoperative diagnosis:  Same  Procedure;  Excision right axillary mass  Surgeon:  Jes Enamorado MD  Anesthesia:  Mac loc  Ebl:  Minimal  Ivf:  See anesthesia  Indications: the patient is a 54-year-old female who has undergone percutaneous biopsy of an enlarged right axillary lymph node.  Pathology demonstrated follicular lymphoma.  She presents for excisional biopsy.  The risks, benefits, complications, and possible alternatives were discussed with the patient who agreed to proceed.  Description of procedure; the patient was laid supine. The right axilla was prepped and draped.  An incision was created.  bovie was used to dissect the subcutaneous tissues.  An enlarged node was identified and bluntly dissected and with bovie.  The wound was irrigated and the skin was closed with 3-0 and 4-0 vicyrl. The sponge, needle, and instrument counts were correct.  Complications:none  Disposition: good to pacu  Findings:  3cm lymph node

## 2019-09-20 ENCOUNTER — APPOINTMENT (OUTPATIENT)
Dept: CT IMAGING | Facility: HOSPITAL | Age: 54
End: 2019-09-20

## 2019-09-24 ENCOUNTER — OFFICE VISIT (OUTPATIENT)
Dept: FAMILY MEDICINE CLINIC | Facility: CLINIC | Age: 54
End: 2019-09-24

## 2019-09-24 VITALS
HEART RATE: 72 BPM | SYSTOLIC BLOOD PRESSURE: 120 MMHG | OXYGEN SATURATION: 97 % | DIASTOLIC BLOOD PRESSURE: 82 MMHG | TEMPERATURE: 98.7 F | BODY MASS INDEX: 33.2 KG/M2 | WEIGHT: 194.5 LBS | HEIGHT: 64 IN | RESPIRATION RATE: 18 BRPM

## 2019-09-24 VITALS
BODY MASS INDEX: 33.2 KG/M2 | RESPIRATION RATE: 18 BRPM | DIASTOLIC BLOOD PRESSURE: 82 MMHG | OXYGEN SATURATION: 97 % | HEIGHT: 64 IN | HEART RATE: 72 BPM | TEMPERATURE: 98.7 F | SYSTOLIC BLOOD PRESSURE: 120 MMHG | WEIGHT: 194.5 LBS

## 2019-09-24 DIAGNOSIS — F41.9 ANXIETY: ICD-10-CM

## 2019-09-24 DIAGNOSIS — G89.4 CHRONIC PAIN SYNDROME: Primary | ICD-10-CM

## 2019-09-24 DIAGNOSIS — Z23 NEEDS FLU SHOT: ICD-10-CM

## 2019-09-24 DIAGNOSIS — F41.0 PANIC ATTACK: ICD-10-CM

## 2019-09-24 DIAGNOSIS — Z12.4 SCREENING FOR MALIGNANT NEOPLASM OF CERVIX: ICD-10-CM

## 2019-09-24 DIAGNOSIS — E66.01 MORBID OBESITY DUE TO EXCESS CALORIES (HCC): ICD-10-CM

## 2019-09-24 DIAGNOSIS — Z00.01 ENCOUNTER FOR WELL ADULT EXAM WITH ABNORMAL FINDINGS: Primary | ICD-10-CM

## 2019-09-24 DIAGNOSIS — Z13.820 SCREENING FOR OSTEOPOROSIS: ICD-10-CM

## 2019-09-24 DIAGNOSIS — Z78.0 POST-MENOPAUSAL: ICD-10-CM

## 2019-09-24 PROCEDURE — 99214 OFFICE O/P EST MOD 30 MIN: CPT | Performed by: NURSE PRACTITIONER

## 2019-09-24 PROCEDURE — 99396 PREV VISIT EST AGE 40-64: CPT | Performed by: NURSE PRACTITIONER

## 2019-09-24 PROCEDURE — 90674 CCIIV4 VAC NO PRSV 0.5 ML IM: CPT | Performed by: NURSE PRACTITIONER

## 2019-09-24 PROCEDURE — 90471 IMMUNIZATION ADMIN: CPT | Performed by: NURSE PRACTITIONER

## 2019-09-24 RX ORDER — HYDROCODONE BITARTRATE AND ACETAMINOPHEN 5; 325 MG/1; MG/1
1-2 TABLET ORAL EVERY 4 HOURS PRN
Qty: 30 TABLET | Refills: 0 | Status: SHIPPED | OUTPATIENT
Start: 2019-09-24 | End: 2019-09-24 | Stop reason: SDUPTHER

## 2019-09-24 RX ORDER — HYDROCODONE BITARTRATE AND ACETAMINOPHEN 5; 325 MG/1; MG/1
1-2 TABLET ORAL EVERY 4 HOURS PRN
Qty: 90 TABLET | Refills: 0 | Status: SHIPPED | OUTPATIENT
Start: 2019-09-24 | End: 2019-10-23 | Stop reason: SDUPTHER

## 2019-09-24 RX ORDER — ALPRAZOLAM 0.5 MG/1
0.5 TABLET ORAL 2 TIMES DAILY PRN
Qty: 60 TABLET | Refills: 0 | Status: SHIPPED | OUTPATIENT
Start: 2019-09-24 | End: 2019-10-23 | Stop reason: SDUPTHER

## 2019-09-24 NOTE — PROGRESS NOTES
Chief Complaint   Patient presents with   • Pain     Pt is here for followup and medication refills.           Adela Hill A 54 y.o. female here for follow up for chronic pain from rheumatoid arthritis, fibromyalgia, and low back pain.  Rating the pain 7/10.  It is hard for her to complete normal ADLS without pain medication.  She has had RA, fibromyalgia, low back pain, and osteoarthritis for years.  Recently diagnosed with follicular lymphoma, and just had the lymph node removed.  She has pet scan and ct abdomen in the next couple days and she sees Dr. Marie.  Anxiety stable with alprazolam.   She wanted me to know that Jes Enamorado gave her an additional 30 norco because she was having her take them every 4-6 hours from surgery.      Has applied for disability, has a physical therapy eval this Saturday.     Does not have living will or advance directive.     Chronic problems: anxiety stable with alprazolam, vit d def stable with otc D3, depression and pain stable with cymbalta and sertaline, neuropathy somewhat controlled with gabapentin, feels it is worse than last month because Dr. 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)    • Osteoarthritis    • Osteoporosis    • PONV (postoperative nausea and vomiting)    • RA (rheumatoid arthritis) (CMS/[facility])    • Senile osteoporosis 9/28/2017       Past Surgical History:   Procedure Laterality Date   • AXILLARY LYMPH NODE BIOPSY/EXCISION Right 9/19/2019    Procedure: EXCISION RIGHT AXILLARY MASS;  Surgeon: Jes Enamorado MD;  Location: [facility] OR;  Service: General   • SHOULDER SURGERY     • US GUIDED LYMPH NODE  BIOPSY  2019   • WRIST SURGERY Bilateral     fracture       Social History     Socioeconomic History   • Marital status:      Spouse name: Not on file   • Number of children: Not on file   • Years of education: Not on file   • Highest education level: Not on file   Tobacco Use   • Smoking status: Former Smoker     Packs/day: 1.00     Years: 20.00     Pack years: 20.00     Types: Cigarettes     Last attempt to quit: 9/3/2018     Years since quittin.0   • Smokeless tobacco: Never Used   • Tobacco comment: quit 2018   Substance and Sexual Activity   • Alcohol use: No   • Drug use: No   • Sexual activity: Defer       Family History   Problem Relation Age of Onset   • Arthritis Mother    • COPD Mother    • Atrial fibrillation Mother    • Osteoarthritis Mother    • Heart disease Father    • Hypertension Father    • Arthritis Father    • Arthritis Sister    • Arthritis Brother    • No Known Problems Daughter    • Arthritis Maternal Grandfather    • Breast cancer Neg Hx        Current Outpatient Medications on File Prior to Visit   Medication Sig Dispense Refill   • ALPRAZolam (XANAX) 0.5 MG tablet Take 1 tablet by mouth 2 (Two) Times a Day As Needed for Anxiety. 60 tablet 0   • Calcium-Magnesium-Vitamin D 500-250-200 MG-MG-UNIT tablet Take 500 tablets by mouth 2 (Two) Times a Day.     • cholecalciferol (VITAMIN D3) 1000 units tablet Take 1,000 Units by mouth Daily.     • DULoxetine (CYMBALTA) 60 MG capsule TAKE ONE CAPSULE BY MOUTH DAILY 30 capsule 2   • gabapentin (NEURONTIN) 300 MG capsule Take 1 capsule by mouth 3 (Three) Times a Day. 90 capsule 5   • HYDROcodone-acetaminophen (NORCO) 5-325 MG per tablet Take 1-2 tablets by mouth Every 4 (Four) Hours As Needed (Pain). 30 tablet 0   • omeprazole (priLOSEC) 40 MG capsule Take 1 capsule by mouth Daily. 30 capsule 5   • sertraline (ZOLOFT) 50 MG tablet Take 1 tablet by mouth Every Night. 90 tablet 1   • tiZANidine (ZANAFLEX) 4 MG tablet Take 1 tablet by mouth  "2 (Two) Times a Day As Needed for Muscle Spasms. 60 tablet 5   • [DISCONTINUED] HYDROcodone-acetaminophen (NORCO) 5-325 MG per tablet Take 1 tablet by mouth Every 6 (Six) Hours As Needed (60). 90 tablet 0     No current facility-administered medications on file prior to visit.         REVIEW OF SYMPTOMS: (Positives bolded)  General:  weight loss, fever, chills, night sweats, fatigue, appetite loss  HEENT:  blurry vision, eye pain, eye discharge, dry eyes, decreased vision,    Respiratory: shortness of breath, cough, hemoptysis, wheezing, pleurisy,   Cardiovascular:  chest pain, PND, palpitation, edema, orthopnea, syncope, swelling of extremities  Gastro: Nausea, vomiting, diarrhea, hematemesis, abdominal pain, constipation  Genito: hematuria, dysuria, glycosuria, hesitancy, frequency, incontinence  Musckelo: Arthralgia, myalgia, muscle weakness, joint swelling, NSAID use  Skin: rash, pruritis, sores, nail changes, skin thickening, change in wart/mole, itching, rash, new lesions, pruritus, nail changes  Neuro:  Migraine, numbness, ataxia, tremor, vertigo, weakness, memory loss  \"All other systems reviewed and negative, except as listed above.”      OBJECTIVE:  Constitutional:  -No acute distress, Consistent with stated age.  Oriented x 3, alert Posture-Not doubled over. Normal pace, normal arm movement. Patient is pleasant and cooperative with the interview and exam.    Integumentary: No rashes, ulcers or lesions. No edema.  Capillary refill is normal bilateral Upper and lower extremity.     Right Breast: Has a healing incision on the right out quadrant of breast near axilla. No redness, no drainage, steri strips in place    CHEST/LUNG:  Normal effort, no distress, Breath sounds normal throughout all lung fields.No wheezes, rales, rhonchi.     CARDIOVASCULAR:  RRR,  No murmur noted in sitting, supine positions. no digital clubbing, cyanosis, edema, increased warmth.    ABDOMEN: normal, Bowel sounds in all 4 " quadrants. Soft, non-tender, no rebound tenderness, no rigidity (guarding), no jar tenderness, no masses.  no hepatomegaly/splenomegaly, Hernias- none. Rectal not examined.     Peripheral Vascular: Normal temperature with pink nailbeds and no ulcerations. Pedal hair intact.  Normal capillary refill. No edema.    Lymphatic: Head/Neck- normal size and non tender to palpation. Axillary- Head and neck LN are normal size and non tender to palpation.    Musculo:   Upper extremity: Has tenderness on palpation of bilateral shoulders, elbows, wrists, and hands.  Has decreased rom of wrists.  Has normal rom of shoulders, elbows but complains of pain with movement.   Lower extremity:  Has tenderness on palpation of bilateral hips. Straight leg raises are normal but she complains of pain.  Lumbar spine:  Tenderness on palpation of the lumbar spine.  Has pain with bending over, twisting right and left.     Assessment/Plan:  Adela was seen today for pain.    Diagnoses and all orders for this visit:    Anxiety  -     ALPRAZolam (XANAX) 0.5 MG tablet; Take 1 tablet by mouth 2 (Two) Times a Day As Needed for Anxiety.    Panic attack  -     ALPRAZolam (XANAX) 0.5 MG tablet; Take 1 tablet by mouth 2 (Two) Times a Day As Needed for Anxiety.    Chronic pain syndrome  -     HYDROcodone-acetaminophen (NORCO) 5-325 MG per tablet; Take 1-2 tablets by mouth Every 4 (Four) Hours As Needed (Pain).      ALMA Report:     As part of this patient's treatment plan, I am prescribing controlled substances. The patient has been made aware of appropriate use of such medications, including potential risk of opiod use, somnolence, limited ability to drive and /or work safely, and potential for dependence or overdose, and opiods should be used sparingly and are not recommended for long term use.  It has also been made clear that these medications are for use by this patient only, without concomitant use of alcohol or other substances unless  prescribed.     Patient has completed prescribing agreement detailing terms of continued prescribing of controlled substances, including monitoring ALMA reports, urine drug screening yearly an random drug screens to monitor patients compliance to treatment plan, and pill counts if necessary. The patient is aware that inappropriate use will result in cessation of prescribing such medications.    Toxassure:  I have ordered a urine drug screen to monitor patients predisposition to and patterns of drug use/misuse in order to establish and maintain the safe and effective use of analgesics in the treatment of chronic pain      ALMA report has been reviewed by: Charlene Huntley, DNP, APRN.   The report was scanned into the patient's chart.      -     Pt is aware of the potential for addiction and dependence.    Addiction was discussed.  The  Risks, benefits and alternative options of drug therapy discussed.  It was reinforced about the risks and benefits of opioids.  It was reinforced that patient may not call early for medication, may not ask for increase in the number of pills given monthly or increase in dosage of medication, may not jump around to different pharmacies or providers and may not receive any narcotics from other providers including ER, or the contract will be broken and narcotics will no longer be prescribed from this facility if any of these criteria has been broken.    Last Dose was: this an    Last Fill was:  August    Date of last ALMA: today    Morbid obesity:  BMI:  33.0     Weight:   194     Follow up plan:  Lose at least 3 pounds before next chronic visit, exercise at least 3 times a week for 30 minute increments, eat baked instead of fried foods, and eat healthier     -  Documentation of education   The patient BMI is outside this range and we recommended/discussed today to utilize a diet/exercise program to get back into the appropriate range.  Federal guidelines recommend that people  under the age of 65 should have a BMI of 18.5-24.9  Food diary:  Bring to next visit  tial step is to document everything that is consumed. Pt's that have a food diary  Lose 2x the weight  by keeping track of foods.   Choose one bad food weekly and eliminate it from your diet.  Replace with one healthy food  Exercise diary: Goal over next 2-4 weeks is walk 30 minutes per day 5 days per week at pace difficult to hold conversation.   Drink more water, less soda.   Cut back on portion sizes.   Today we encouraged roughly a 1 lb per week weight loss with initial goal of 5% weight loss.  Avoid fast foods, eat more salads  If eating out for a meal, consider cutting food in half and placing into a to go container.  Individually portion any foods coming into the home   Use smaller plates  Drink 12 ozof water 30 minutes before meal as way to suppress appetite.  Discussed Contrave, metformin, topamax, Belviq and the cost of medications  Encouraged internet programs or self help books  Set  Goals that are realistic   Educated on ways to measure food  Baseball: 1 cup good for green salad, frozen yogurt, medium piece of fruit  Handful:  ½ cup good cut fruit, cooked vegetables, pasta, rice  Egg:  ¼ cup good for dried fruit  Deck of cards: 3 ounces good for meat and poultry  Check book:  3 ounces grilled fish  Plate Method:  reduce plate size to 9 inch dinner plate.  Half of plate should be filled with non-starchy vegetables (broccoli, lettuce, cauliflower, tomatoes), ¼ plate with lean source of protein (lean chicken, turkey, fish), remaining ½ with whole grains (brown rice, potato, whole grain breads  Avoid liquid calories (regular soda, juice, coffee with cream)  Focus  on water, seltzer water and other non-calorie drinks  Replace regular sugar with non-caloric sweeteners  Avoid skipping meals: plan small regular meals throughout the day in order to keep your hunger controlled  Consider using meal replacements if unable to plan  a healthy meal (protein shake, high protein bar)  Replace all white bread with whole wheat/whole grain alternative  Swap regular salad dressings (mayonnaise, butter, or low fat or fat free alternative)  Avoid high fat, high calorie, high carbohydrate snakes (cookies, pastries, cakes)  Snack on fruits, low fat dairy (yogurt, cottage cheese)            Management plan:  Take medications as prescribed; return to the clinic of new or worsening concerns.       Risks/benefits of current and new medications discussed with the patient and or family today.  The patient/family are aware and accept that if there any side effects they should call or return to clinic as soon as possible.  Appropriate F/U discussed for topics addressed today. All questions were answered to the satisfactory state of patient/family.  Should symptoms fail to improve or worsen they agree to call or return to clinic or to go to the ER. Education handouts were offered on any new Rx if requested.  Discussed the importance of following up with any needed screening tests/labs/specialist appointments and any requested follow-up recommended by me today.  Importance of maintaining follow-up discussed and patient accepts that missed appointments can delay diagnosis and potentially lead to worsening of conditions.    Return in about 1 month (around 10/24/2019) for Recheck pain and anxiety.    Electronically signed by Charlene Huntley DNP, APRN, 09/24/19, 9:35 AM.

## 2019-09-24 NOTE — PROGRESS NOTES
Chief Complaint   Patient presents with   • Annual Exam     Pt is here for annual physical with pap smear.            No Known Allergies    History provided by: self     HPI: Adela Arauz is a 54 y.o. female presents today for a well exam, breast exam and pap smear.  Has not had menstrual cycle for 4 years.  Had 1 pregnancy living daughter.  Recently, had a biopsy and lumpectomy with path reports showing:  NON-HODGKIN LYMPHOMA/LYMPHOID NEOPLASMS: Biopsy, Resection.  She struggles with RA, and chronic joint pain. She had been struggling with anxiety, but it has worsened over the last month with this new breast problem.    Chief Complaint   Patient presents with   • Annual Exam     Pt is here for annual physical with pap smear.         Chronic Problems: Depression stable with duloxetine and sertraline, anxiety worse over the last couple weeks because of the cancer, on alprazolam, gerd stable with omeprazole, RA and chronic joint pain stable with norco and gabapentin, vit d de stable with vit d.  PCP currently listed as Charlene Huntley, DNP, APRN.     Advance Directive: no      [x]  Last Pap Smear  [x]  Normal      [x]   Last Mammogram    [x]  abnormal   Where was it done? [facility]    [x] Last menstrual cycle  4 years ago, post menopausal     [x] Pregnancies  1 living daughter   [x] Natural      [x] Live Births  1      [x]  No Drug Use/abuse except the CBD oil  [x]  No Alcohol Use/abuse   [x]  No Tobacco Use/abuse; quit     The following portions of the patient's history were reviewed and updated as appropriate: allergies, current medications, past family history, past medical history, past social history, past surgical history and problem list    Current Outpatient Medications   Medication Sig Dispense Refill   • Calcium-Magnesium-Vitamin D 500-250-200 MG-MG-UNIT tablet Take 500 tablets by mouth 2 (Two) Times a Day.     • cholecalciferol (VITAMIN D3) 1000 units tablet Take 1,000 Units by mouth Daily.      • DULoxetine (CYMBALTA) 60 MG capsule TAKE ONE CAPSULE BY MOUTH DAILY 30 capsule 2   • gabapentin (NEURONTIN) 300 MG capsule Take 1 capsule by mouth 3 (Three) Times a Day. 90 capsule 5   • omeprazole (priLOSEC) 40 MG capsule Take 1 capsule by mouth Daily. 30 capsule 5   • sertraline (ZOLOFT) 50 MG tablet Take 1 tablet by mouth Every Night. 90 tablet 1   • tiZANidine (ZANAFLEX) 4 MG tablet Take 1 tablet by mouth 2 (Two) Times a Day As Needed for Muscle Spasms. 60 tablet 5   • ALPRAZolam (XANAX) 0.5 MG tablet Take 1 tablet by mouth 2 (Two) Times a Day As Needed for Anxiety. 60 tablet 0   • HYDROcodone-acetaminophen (NORCO) 5-325 MG per tablet Take 1-2 tablets by mouth Every 4 (Four) Hours As Needed (Pain). 30 tablet 0     No current facility-administered medications for this visit.      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    • Low back pain    • Lymphoma of lymph nodes of axilla (CMS/[facility])    • Osteoarthritis    • Osteoporosis    • PONV (postoperative nausea and vomiting)    • RA (rheumatoid arthritis) (CMS/[facility])    • Senile osteoporosis 9/28/2017     Family History   Problem Relation Age of Onset   • Arthritis Mother    • COPD Mother    • Atrial fibrillation Mother    • Osteoarthritis Mother    • Heart disease Father    • Hypertension Father    • Arthritis Father    • Arthritis Sister    • Arthritis Brother    • No Known Problems Daughter    • Arthritis Maternal Grandfather    • Breast cancer Neg Hx      Social History     Socioeconomic History   • Marital status:      Spouse name: Not on file   • Number of children: Not on file   • Years of education: Not on file   • Highest education level: Not on file   Tobacco Use   • Smoking status: Former Smoker     Packs/day: 1.00     Years: 20.00     Pack years: 20.00     Types: Cigarettes     Last attempt to quit: 9/3/2018     Years since  quittin.0   • Smokeless tobacco: Never Used   • Tobacco comment: quit 2018   Substance and Sexual Activity   • Alcohol use: No   • Drug use: No   • Sexual activity: Defer       Immunization History   Administered Date(s) Administered   • Flu Vaccine Quad PF >18YRS 10/04/2017   • Flu Vaccine Split Quad 10/04/2017   • Influenza Split Preservative Free ID 2016, 10/24/2018   • Pneumococcal Conjugate 13-Valent (PCV13) 2017   • Pneumococcal Polysaccharide (PPSV23) 01/15/2015   • Zostavax 2017   • flucelvax quad pfs =>4 YRS 10/19/2018, 2019       Review of Systems   Constitutional: Positive for activity change. Negative for appetite change.   HENT: Negative.    Respiratory: Negative for chest tightness and shortness of breath.    Cardiovascular: Negative for chest pain, palpitations and leg swelling.   Musculoskeletal: Positive for arthralgias, back pain and myalgias.   Allergic/Immunologic: Negative.    Neurological: Negative for dizziness, seizures and light-headedness.   Hematological: Negative.    Psychiatric/Behavioral: Negative.    All other systems reviewed and are negative.    OBJECTIVE:    General Appearance:    Alert, cooperative, no distress, appears stated age   Head:    Normocephalic, without obvious abnormality, atraumatic   Eyes:    PERRL, conjunctiva/corneas clear, EOM's intact, fundi     benign, both eyes   Ears:    Normal TM's and external ear canals, both ears   Nose:   Nares normal, septum midline, mucosa normal, no drainage     or sinus tenderness   Throat:   Lips, mucosa, and tongue normal; teeth and gums normal   Neck:   Supple, symmetrical, trachea midline, no adenopathy;     thyroid:  no enlargement/tenderness/nodules; no carotid    bruit or JVD   Back:     Symmetric, no curvature, ROM normal, no CVA tenderness   Lungs:     Clear to auscultation bilaterally, respirations unlabored   Chest Wall:    No tenderness or deformity    Heart:    Regular rate and rhythm, S1 and S2  normal, no murmur, rub    or gallop   Breast Exam:    See exam below    Abdomen:     Soft, non-tender, bowel sounds active all four quadrants,     no masses, no organomegaly   Genitalia:    Normal female without lesion, discharge or tenderness   Rectal:    Normal tone, no masses or tenderness; guaiac negative stool   Extremities:   Extremities normal, atraumatic, no cyanosis or edema   Pulses:   2+ and symmetric all extremities   Skin:   Skin color, texture, turgor normal, no rashes or lesions   Lymph nodes:   Cervical, supraclavicular, and axillary nodes normal   Neurologic:   CNII-XII intact, normal strength, sensation and reflexes     throughout     BREAST EXAM:  Right Breast: Skin texture smooth, contour normal.  No retractions or dimpling. Nipple/areola round, equal  bilaterally. There is no retraction of nipples. There is no supernumerary nipples. Nipples are brown.  No masses noted, has tenderness over the outer aspect of the breast from previous biopsy, with healing incision and steri stripsi= in place   Left Breast: Skin texture smooth, contour normal.  No retractions or dimpling. Nipple/areola round, equal  bilaterally. There is no retraction of nipples. There is no supernumerary nipples. Nipples are brown.  No masses noted. (breast mass- Location, size, shape. Consistency, tenderness, mobility, borders, retraction)       VAGINAL EXAM:  External examination: Normal hair distribution, clean shaven.  Skin smooth and clean.  Hair free of nits or lice    Labia majora: Dry, moist. Symmetric. Tissue soft and homogeneous. Free of rashes, lesions or excoriation.    Labia minora: symmetric, moist, dark pink, no inflammation, irritation, excoriation or caking of discharge in tissue folds.      Clitoris: Normal size, no atrophy, inflammation or adhesions.     Urethral orifice: irregular opening or slit, midline, no discharge, polyps, caruncles, or fistulas.     Charco/Bartholin Glands: no discharge, no odor, no  "tenderness, no swelling, no warmth.    Internal exam:   Cervix: Cervical os is pink, symmetric, midline, no redness, normal protrusion.  Os is multiparous, horizontal slit,  Position: Cervix posteriorly (indicates an anteverted uterus).   Surface characteristics: Smooth, no cysts observed, no irregularity, no redness, no granular areas.   Discharge:  No discharge present, odorless.   Specimen:  Cytobrush used to collect endocervical cells and sent to lab for pap smear.    Bimanual exam: cervix consistency is firm and moveable.  Cervix is soft (pregnancy) nodules present, hardness noted.  Uterus:  Uterus is anteverted    Adnexa/ovaries: ovaries are palpable, normal size  Rectal: normal    /82 (BP Location: Right arm, Patient Position: Sitting, Cuff Size: Adult)   Pulse 72   Temp 98.7 °F (37.1 °C) (Oral)   Resp 18   Ht 163.5 cm (64.37\")   Wt 88.2 kg (194 lb 8 oz)   LMP 04/01/2014 (Approximate)   SpO2 97%   Breastfeeding? No   BMI 33.00 kg/m²     ASSESSMENT:    Adela was seen today for annual exam.    Diagnoses and all orders for this visit:    Encounter for well adult exam with abnormal findings    Needs flu shot  -     Flucelvax Quad=>4Years ([id number])    Screening for osteoporosis  -     DEXA Bone Density Axial    Post-menopausal  -     DEXA Bone Density Axial    Screening for malignant neoplasm of cervix  -     PAPIG, CTNGTVHSV,HPV,RFX16 / 18          Health Maintenance Summary    Overdue - ZOSTER VACCINE; Overdue since 7/4/2017; (2 of 3) Declines  05/09/2017  Completion Imm Admin: Zostavax     Overdue - DXA SCAN; Overdue since 8/7/2019; (Every 2 Years)  Ordered today  08/07/2017  Completion DEXA BONE DENSITY AXIAL     PNEUMOCOCCAL VACCINE (19-64 HIGHEST RISK); Next due on 1/15/2020; (3 of 3 - PPSV23)   05/04/2017  Completion Imm Admin: Pneumococcal Conjugate 13-Valent (PCV13)   01/15/2015  Completion Imm Admin: Pneumococcal Polysaccharide (PPSV23)     PAP SMEAR; Next due on 4/20/2020 (Every 3 Years) "   04/20/2017  Completion Declined     MAMMOGRAM; Next due on 7/30/2020; (Every 2 Years)   07/30/2018  Completion MAMMO SCREENING DIGITAL TOMOSYNTHESIS BILATERAL W CAD     ANNUAL PHYSICAL; Next due on 9/25/2020; (Yearly)   09/24/2019  Completion Done   08/20/2018  Completion Done   08/20/2018  Completion Registry Metric: Last Annual Physical     TDAP/TD VACCINES; Next due on 4/20/2027; (2 - Td)   04/20/2017  Completion Declined     COLONOSCOPY; Next due on 11/6/2027; (Every 10 Years)   11/06/2017  Completion Done   11/06/2017  Completion SCANNED - COLONOSCOPY   04/20/2017  Completion Declined     INFLUENZA VACCINE   Completed   09/24/2019  Completion Done   10/24/2018  Completion Imm Admin: Influenza Split Preservative Free ID   10/19/2018  Completion Imm Admin: flucelvax quad pfs =>4 YRS   10/04/2017  Completion Imm Admin: Flu Vaccine Quad PF >18YRS   10/01/2017  Completion Patient-Reported (Performed Externally)          Obesity Plan:    The patient BMI is outside this range and we recommended/discussed today to utilize a diet/exercise program to get back into the appropriate range.  Federal guidelines recommend that people under the age of 65 should have a BMI of 18.5-24.9  The initial step is to document everything that is consumed into a food diary. Studies have shown that patients can lose up to 2x the weight by keeping track of foods.   Choose one bad food weekly and eliminate it from your diet.  Replace with one healthy food  Goal over next 2-4 weeks is walk 30 minutes per day 5 days per week at pace difficult to hold conversation.   Drink more water, less soda.   Cut back on portion sizes.   Today we encouraged roughly a 1 lb per week weight loss with initial goal of 5% weight loss.  Discussed if eating out for a meal, consider cutting food in half and placing into a to go container.  Individually portion any foods coming into the home based on package.  Use smaller plates  Drinking 12 oz of water 30 minutes  before meal as way to suppress appetite.  Medications discussed today include metformin, topamax, phentermine, Qsymia, Belviq (lorcaserin), Contrave (Buproprion/naltrexone).    Lifestyle therapy   Simple advice to lose weight   Internet programs or self help books.    Advice from dietitian  Set Goals that are realistic   Encouraged to use visual aides to help in measuring foods  Baseball-1 cup good for green salad, frozen yogurt, medium piece of fruit, baked potato  Handful - ½ cup good cut fruit, cooked vegetables, pasta, rice  Egg- ¼ cup good for dried fruit  Deck of cards-3 ounces good for meat and poultry  Check book-3 ounces good for grilled fish  6 dice side by side- 1 ½ ounces good for natural cheese  Simple tips   Plate method-reduce plate size to 9 inch dinner plate.  Half of plate should be filled with non-starchy vegetables (broccoli, lettuce, cauliflower, tomatoes), ¼ plate with lean source of protein (lean chicken, turkey, fish), remaining ½ with whole grains (brown rice, potato, whole grain breads  Avoid liquid calories (regular soda, juice, coffee with cream)  Focus  on water, seltzer water and other non-calorie drinks  Replace regular sugar with non-caloric sweeteners  Avoid skipping meals: plan small regular meals throughout the day in order to keep your hunger controlled  Consider using meal replacements if unable to plan a healthy meal (protein shake, high protein bar)  Replace all white bread with whole wheat/whole grain alternative  Swap regular salad dressings (mayonnaise, butter, or low fat or fat free alternative)  Avoid high fat, high calorie, high carbohydrate snakes (cookies, pastries, cakes)  Snack on fruits, low fat dairy (yogurt, cottage cheese)    Behavioral Modification  Self-Monitoring of dietary Intake-keep a dietary intake log. Obese individuals have been shown to underestimate their food intake  Behavioral treatment -gives exacts about amount and total calories  Read food  labels    R/B/A of medications/treatment options d/w  the pt/family today. The patient/family are aware and accept that medications can have side effects.  If there any obvious side effects they should call or return to clinic as soon as possible.  Appropriate f/u discussed for topics addressed today. All questions were answered to the satisfactory state of patient/family.  Should symptoms fail to improve or worsen they agree to call or return to clinic or to go to the ER. Education handouts were offered on any new Rx if requested.  Discussed the importance of f/u with any needed screening tests/labs/specialist appointments and any requested follow-up recommended by me today.  Importance of maintaining f/u discussed and patient accepts that missed appointments can delay diagnosis and potentially lead to worsening of conditions.      Return in about 1 year (around 9/24/2020) for Annual physical.    Electronically signed by Charlene Huntley DNP, APRHEENA, 09/24/19, 9:49 AM.

## 2019-09-24 NOTE — PATIENT INSTRUCTIONS
Breast Self-Awareness  Breast self-awareness means:  · Knowing how your breasts look.  · Knowing how your breasts feel.  · Checking your breasts every month for changes.  · Telling your doctor if you notice a change in your breasts.  Breast self-awareness allows you to notice a breast problem early while it is still small.  How to do a breast self-exam  One way to learn what is normal for your breasts and to check for changes is to do a breast self-exam. To do a breast self-exam:  Look for Changes    1. Take off all the clothes above your waist.  2.  front of a mirror in a room with good lighting.  3. Put your hands on your hips.  4. Push your hands down.  5. Look at your breasts and nipples in the mirror to see if one breast or nipple looks different than the other. Check to see if:  ? The shape of one breast is different.  ? The size of one breast is different.  ? There are wrinkles, dips, and bumps in one breast and not the other.  6. Look at each breast for changes in your skin, such as:  ? Redness.  ? Scaly areas.  7. Look for changes in your nipples, such as:  ? Liquid around the nipples.  ? Bleeding.  ? Dimpling.  ? Redness.  ? A change in where the nipples are.  Feel for Changes  1. Lie on your back on the floor.  2. Feel each breast. To do this, follow these steps:  ? Pick a breast to feel.  ? Put the arm closest to that breast above your head.  ? Use your other arm to feel the nipple area of your breast. Feel the area with the pads of your three middle fingers by making small circles with your fingers. For the first [place], press lightly. For the second [place], press harder. For the third [place], press even harder.  ? Keep making circles with your fingers at the light, harder, and even harder pressures as you move down your breast. Stop when you feel your ribs.  ? Move your fingers a little toward the center of your body.  ? Start making circles with your fingers again, this time going up until you  reach your collarbone.  ? Keep making up and down circles until you reach your armpit. Remember to keep using the three pressures.  ? Feel the other breast in the same way.  3. Sit or  the shower or tub.  4. With soapy water on your skin, feel each breast the same way you did in step 2, when you were lying on the floor.  Write Down What You Find  After doing the self-exam, write down:  · What is normal for each breast.  · Any changes you find in each breast.  · When you last had your period.    How often should I check my breasts?  Check your breasts every month. If you are breastfeeding, the best time to check them is after you feed your baby or after you use a breast pump. If you get periods, the best time to check your breasts is 5-7 days after your period is over.  When should I see my doctor?  See your doctor if you notice:  · A change in shape or size of your breasts or nipples.  · A change in the skin of your breast or nipples, such as red or scaly skin.  · Unusual fluid coming from your nipples.  · A lump or thick area that was not there before.  · Pain in your breasts.  · Anything that concerns you.  This information is not intended to replace advice given to you by your health care provider. Make sure you discuss any questions you have with your health care provider.  Document Released: 06/05/2009 Document Revised: 05/25/2017 Document Reviewed: 11/06/2016  [employer] Interactive Patient Education © 2019 [employer]

## 2019-09-26 ENCOUNTER — HOSPITAL ENCOUNTER (OUTPATIENT)
Dept: CT IMAGING | Facility: HOSPITAL | Age: 54
Discharge: HOME | End: 2019-09-26
Admitting: INTERNAL MEDICINE

## 2019-09-26 ENCOUNTER — HOSPITAL ENCOUNTER (OUTPATIENT)
Dept: CT IMAGING | Facility: HOSPITAL | Age: 54
Discharge: HOME | End: 2019-09-26

## 2019-09-26 DIAGNOSIS — C82.90 FOLLICULAR LYMPHOMA, UNSPECIFIED FOLLICULAR LYMPHOMA TYPE, UNSPECIFIED BODY REGION (HCC): ICD-10-CM

## 2019-09-26 PROCEDURE — A9552 F18 FDG: HCPCS | Performed by: INTERNAL MEDICINE

## 2019-09-26 PROCEDURE — 0 FLUDEOXYGLUCOSE F18 SOLUTION: Performed by: INTERNAL MEDICINE

## 2019-09-26 PROCEDURE — 78815 PET IMAGE W/CT SKULL-THIGH: CPT

## 2019-09-26 RX ADMIN — FLUDEOXYGLUCOSE F18 1 DOSE: 300 INJECTION INTRAVENOUS at 10:02

## 2019-09-27 ENCOUNTER — HOSPITAL ENCOUNTER (OUTPATIENT)
Dept: CT IMAGING | Facility: HOSPITAL | Age: 54
Discharge: HOME | End: 2019-09-27
Admitting: INTERNAL MEDICINE

## 2019-09-27 DIAGNOSIS — C82.90 FOLLICULAR LYMPHOMA, UNSPECIFIED FOLLICULAR LYMPHOMA TYPE, UNSPECIFIED BODY REGION (HCC): ICD-10-CM

## 2019-09-27 LAB
CYTO UR: NORMAL
LAB AP CASE REPORT: NORMAL
LAB AP CLINICAL INFORMATION: NORMAL
LAB AP DIAGNOSIS COMMENT: NORMAL
LAB AP FLOW CYTOMETRY SUMMARY: NORMAL
LAB AP SYNOPTIC CHECKLIST: NORMAL
PATH REPORT.FINAL DX SPEC: NORMAL
PATH REPORT.GROSS SPEC: NORMAL

## 2019-09-27 PROCEDURE — 25010000002 IOPAMIDOL 61 % SOLUTION: Performed by: INTERNAL MEDICINE

## 2019-09-27 PROCEDURE — 0 IOHEXOL 300 MG/ML SOLUTION: Performed by: INTERNAL MEDICINE

## 2019-09-27 PROCEDURE — 74177 CT ABD & PELVIS W/CONTRAST: CPT

## 2019-09-27 RX ADMIN — IOHEXOL 50 ML: 300 INJECTION, SOLUTION INTRAVENOUS at 07:33

## 2019-09-27 RX ADMIN — IOPAMIDOL 100 ML: 612 INJECTION, SOLUTION INTRAVENOUS at 07:32

## 2019-09-29 PROBLEM — C82.90 FOLLICULAR LYMPHOMA: Status: ACTIVE | Noted: 2019-09-29

## 2019-09-29 LAB
C TRACH RRNA CVX QL NAA+PROBE: NEGATIVE
CYTOLOGIST CVX/VAG CYTO: ABNORMAL
CYTOLOGY CVX/VAG DOC CYTO: ABNORMAL
CYTOLOGY CVX/VAG DOC THIN PREP: ABNORMAL
DX ICD CODE: ABNORMAL
HIV 1 & 2 AB SER-IMP: ABNORMAL
HPV I/H RISK 1 DNA CVX QL PROBE+SIG AMP: POSITIVE
HPV16 DNA CVX QL PROBE+SIG AMP: NEGATIVE
HPV18 DNA CVX QL PROBE+SIG AMP: NEGATIVE
HSV1 DNA SPEC QL NAA+PROBE: NEGATIVE
HSV2 DNA SPEC QL NAA+PROBE: NEGATIVE
N GONORRHOEA RRNA CVX QL NAA+PROBE: NEGATIVE
OTHER STN SPEC: ABNORMAL
STAT OF ADQ CVX/VAG CYTO-IMP: ABNORMAL
T VAGINALIS RRNA SPEC QL NAA+PROBE: NEGATIVE

## 2019-10-01 DIAGNOSIS — B97.7 HPV IN FEMALE: Primary | ICD-10-CM

## 2019-10-02 ENCOUNTER — TRANSCRIBE ORDERS (OUTPATIENT)
Dept: ADMINISTRATIVE | Facility: HOSPITAL | Age: 54
End: 2019-10-02

## 2019-10-02 DIAGNOSIS — N63.0 BREAST MASS: Primary | ICD-10-CM

## 2019-10-07 ENCOUNTER — OFFICE VISIT (OUTPATIENT)
Dept: HEMATOLOGY | Age: 54
End: 2019-10-07
Payer: MEDICAID

## 2019-10-07 ENCOUNTER — HOSPITAL ENCOUNTER (OUTPATIENT)
Dept: INFUSION THERAPY | Age: 54
Discharge: HOME | End: 2019-10-07
Payer: MEDICAID

## 2019-10-07 VITALS
HEIGHT: 63 IN | SYSTOLIC BLOOD PRESSURE: 110 MMHG | OXYGEN SATURATION: 96 % | HEART RATE: 75 BPM | BODY MASS INDEX: 34.54 KG/M2 | DIASTOLIC BLOOD PRESSURE: 80 MMHG | WEIGHT: 194.9 LBS

## 2019-10-07 DIAGNOSIS — Z71.89 CARE PLAN DISCUSSED WITH PATIENT: ICD-10-CM

## 2019-10-07 DIAGNOSIS — C82.90 FOLLICULAR LYMPHOMA, UNSPECIFIED FOLLICULAR LYMPHOMA TYPE, UNSPECIFIED BODY REGION (HCC): Primary | ICD-10-CM

## 2019-10-07 DIAGNOSIS — C82.90 FOLLICULAR LYMPHOMA, UNSPECIFIED FOLLICULAR LYMPHOMA TYPE, UNSPECIFIED BODY REGION (HCC): ICD-10-CM

## 2019-10-07 PROCEDURE — 85025 COMPLETE CBC W/AUTO DIFF WBC: CPT

## 2019-10-07 PROCEDURE — 99214 OFFICE O/P EST MOD 30 MIN: CPT | Performed by: INTERNAL MEDICINE

## 2019-10-08 ENCOUNTER — OFFICE VISIT (OUTPATIENT)
Dept: OBSTETRICS AND GYNECOLOGY | Facility: CLINIC | Age: 54
End: 2019-10-08

## 2019-10-08 VITALS — HEIGHT: 64 IN | WEIGHT: 192 LBS | BODY MASS INDEX: 32.78 KG/M2

## 2019-10-08 DIAGNOSIS — R87.810 CERVICAL HIGH RISK HPV (HUMAN PAPILLOMAVIRUS) TEST POSITIVE: Primary | ICD-10-CM

## 2019-10-08 PROCEDURE — 99202 OFFICE O/P NEW SF 15 MIN: CPT | Performed by: OBSTETRICS & GYNECOLOGY

## 2019-10-08 NOTE — PROGRESS NOTES
"Adela Arauz is a 54 y.o. female here today for evaluation of cervical HPV.  On  she had a Pap smear performed.  The results returned showing a normal Pap smear but was HPV positive.  Subtype testing was negative for type 16 or 18.  She has no history of treatment for cervical dysplasia.  She was recently diagnosed with lymphoma, and is still undergoing evaluation.  She is not on chemotherapy or other immunosuppression.    Social History     Tobacco Use   • Smoking status: Former Smoker     Packs/day: 1.00     Years: 20.00     Pack years: 20.00     Types: Cigarettes     Last attempt to quit: 9/3/2018     Years since quittin.0   • Smokeless tobacco: Never Used   • Tobacco comment: quit 2018   Substance Use Topics   • Alcohol use: No   • Drug use: No      Visit Vitals  Ht 163.5 cm (64.37\")   Wt 87.1 kg (192 lb)   LMP 2014 (Approximate)   BMI 32.58 kg/m²     Pleasant female no acute distress  Mood and affect normal  Breathing unlabored    Assessment: Cervical Pap smear positive for high risk HPV    We discussed current ASCCP guidelines which recommend a repeat Pap smear with HPV testing in 1 year. We discussed the role of HPV and cervical dysplasia.      "

## 2019-10-09 ENCOUNTER — HOSPITAL ENCOUNTER (OUTPATIENT)
Dept: ULTRASOUND IMAGING | Facility: HOSPITAL | Age: 54
Discharge: HOME | End: 2019-10-09

## 2019-10-09 ENCOUNTER — HOSPITAL ENCOUNTER (OUTPATIENT)
Dept: BONE DENSITY | Facility: HOSPITAL | Age: 54
Discharge: HOME | End: 2019-10-09
Admitting: NURSE PRACTITIONER

## 2019-10-09 ENCOUNTER — HOSPITAL ENCOUNTER (OUTPATIENT)
Dept: MAMMOGRAPHY | Facility: HOSPITAL | Age: 54
Discharge: HOME | End: 2019-10-09

## 2019-10-09 PROCEDURE — 76642 ULTRASOUND BREAST LIMITED: CPT

## 2019-10-09 PROCEDURE — 77066 DX MAMMO INCL CAD BI: CPT

## 2019-10-09 PROCEDURE — G0279 TOMOSYNTHESIS, MAMMO: HCPCS

## 2019-10-09 PROCEDURE — 77080 DXA BONE DENSITY AXIAL: CPT

## 2019-10-14 ENCOUNTER — ANESTHESIA EVENT (OUTPATIENT)
Dept: OPERATING ROOM | Age: 54
End: 2019-10-14

## 2019-10-18 ENCOUNTER — HOSPITAL ENCOUNTER (OUTPATIENT)
Age: 54
Setting detail: OUTPATIENT SURGERY
Discharge: HOME | End: 2019-10-18
Attending: INTERNAL MEDICINE | Admitting: INTERNAL MEDICINE
Payer: MEDICAID

## 2019-10-18 ENCOUNTER — ANESTHESIA (OUTPATIENT)
Dept: OPERATING ROOM | Age: 54
End: 2019-10-18

## 2019-10-18 VITALS
HEIGHT: 63 IN | BODY MASS INDEX: 33.84 KG/M2 | RESPIRATION RATE: 18 BRPM | SYSTOLIC BLOOD PRESSURE: 104 MMHG | DIASTOLIC BLOOD PRESSURE: 65 MMHG | HEART RATE: 74 BPM | WEIGHT: 191 LBS | OXYGEN SATURATION: 97 %

## 2019-10-18 VITALS — OXYGEN SATURATION: 96 % | DIASTOLIC BLOOD PRESSURE: 80 MMHG | SYSTOLIC BLOOD PRESSURE: 135 MMHG

## 2019-10-18 PROCEDURE — 38222 DX BONE MARROW BX & ASPIR: CPT | Performed by: PHYSICIAN ASSISTANT

## 2019-10-18 PROCEDURE — G8918 PT W/O PREOP ORDER IV AB PRO: HCPCS

## 2019-10-18 PROCEDURE — 38222 DX BONE MARROW BX & ASPIR: CPT

## 2019-10-18 PROCEDURE — G8907 PT DOC NO EVENTS ON DISCHARG: HCPCS

## 2019-10-18 RX ORDER — LIDOCAINE HYDROCHLORIDE 10 MG/ML
1 INJECTION, SOLUTION EPIDURAL; INFILTRATION; INTRACAUDAL; PERINEURAL
Status: DISCONTINUED | OUTPATIENT
Start: 2019-10-18 | End: 2019-10-18 | Stop reason: HOSPADM

## 2019-10-18 RX ORDER — ONDANSETRON 2 MG/ML
INJECTION INTRAMUSCULAR; INTRAVENOUS PRN
Status: DISCONTINUED | OUTPATIENT
Start: 2019-10-18 | End: 2019-10-18 | Stop reason: SDUPTHER

## 2019-10-18 RX ORDER — LIDOCAINE HYDROCHLORIDE 10 MG/ML
INJECTION, SOLUTION INFILTRATION; PERINEURAL PRN
Status: DISCONTINUED | OUTPATIENT
Start: 2019-10-18 | End: 2019-10-18 | Stop reason: SDUPTHER

## 2019-10-18 RX ORDER — SODIUM CHLORIDE, SODIUM LACTATE, POTASSIUM CHLORIDE, CALCIUM CHLORIDE 600; 310; 30; 20 MG/100ML; MG/100ML; MG/100ML; MG/100ML
INJECTION, SOLUTION INTRAVENOUS CONTINUOUS
Status: DISCONTINUED | OUTPATIENT
Start: 2019-10-18 | End: 2019-10-18 | Stop reason: HOSPADM

## 2019-10-18 RX ORDER — LIDOCAINE HYDROCHLORIDE 20 MG/ML
INJECTION, SOLUTION EPIDURAL; INFILTRATION; INTRACAUDAL; PERINEURAL PRN
Status: DISCONTINUED | OUTPATIENT
Start: 2019-10-18 | End: 2019-10-18 | Stop reason: ALTCHOICE

## 2019-10-18 RX ORDER — PROPOFOL 10 MG/ML
INJECTION, EMULSION INTRAVENOUS PRN
Status: DISCONTINUED | OUTPATIENT
Start: 2019-10-18 | End: 2019-10-18 | Stop reason: SDUPTHER

## 2019-10-18 RX ADMIN — SODIUM CHLORIDE, SODIUM LACTATE, POTASSIUM CHLORIDE, CALCIUM CHLORIDE: 600; 310; 30; 20 INJECTION, SOLUTION INTRAVENOUS at 08:07

## 2019-10-18 RX ADMIN — PROPOFOL 150 MG: 10 INJECTION, EMULSION INTRAVENOUS at 08:38

## 2019-10-18 RX ADMIN — ONDANSETRON 4 MG: 2 INJECTION INTRAMUSCULAR; INTRAVENOUS at 08:37

## 2019-10-18 RX ADMIN — LIDOCAINE HYDROCHLORIDE 20 MG: 10 INJECTION, SOLUTION INFILTRATION; PERINEURAL at 08:38

## 2019-10-20 PROBLEM — Z87.891 FORMER SMOKER: Status: ACTIVE | Noted: 2019-10-20

## 2019-10-20 PROBLEM — Z71.9 ENCOUNTER FOR CONSULTATION: Status: ACTIVE | Noted: 2019-10-20

## 2019-10-21 ENCOUNTER — HOSPITAL ENCOUNTER (OUTPATIENT)
Dept: RADIATION ONCOLOGY | Facility: HOSPITAL | Age: 54
Setting detail: RADIATION/ONCOLOGY SERIES
End: 2019-10-21

## 2019-10-21 ENCOUNTER — CONSULT (OUTPATIENT)
Dept: RADIATION ONCOLOGY | Facility: HOSPITAL | Age: 54
End: 2019-10-21

## 2019-10-21 VITALS
HEIGHT: 64 IN | WEIGHT: 194 LBS | SYSTOLIC BLOOD PRESSURE: 116 MMHG | DIASTOLIC BLOOD PRESSURE: 76 MMHG | BODY MASS INDEX: 33.12 KG/M2

## 2019-10-21 DIAGNOSIS — Z87.891 FORMER SMOKER: ICD-10-CM

## 2019-10-21 DIAGNOSIS — Z71.9 ENCOUNTER FOR CONSULTATION: ICD-10-CM

## 2019-10-21 DIAGNOSIS — C82.04 GRADE 1 FOLLICULAR LYMPHOMA OF LYMPH NODES OF AXILLA (HCC): Primary | ICD-10-CM

## 2019-10-21 PROCEDURE — 77290 THER RAD SIMULAJ FIELD CPLX: CPT | Performed by: RADIOLOGY

## 2019-10-22 PROBLEM — C82.04: Status: ACTIVE | Noted: 2019-09-29

## 2019-10-23 ENCOUNTER — OFFICE VISIT (OUTPATIENT)
Dept: FAMILY MEDICINE CLINIC | Facility: CLINIC | Age: 54
End: 2019-10-23

## 2019-10-23 VITALS
SYSTOLIC BLOOD PRESSURE: 130 MMHG | RESPIRATION RATE: 18 BRPM | HEIGHT: 64 IN | DIASTOLIC BLOOD PRESSURE: 80 MMHG | OXYGEN SATURATION: 98 % | WEIGHT: 190 LBS | HEART RATE: 87 BPM | BODY MASS INDEX: 32.44 KG/M2 | TEMPERATURE: 98.3 F

## 2019-10-23 DIAGNOSIS — K21.9 GASTROESOPHAGEAL REFLUX DISEASE WITHOUT ESOPHAGITIS: ICD-10-CM

## 2019-10-23 DIAGNOSIS — F41.0 PANIC ATTACK: ICD-10-CM

## 2019-10-23 DIAGNOSIS — M05.79 RHEUMATOID ARTHRITIS INVOLVING MULTIPLE SITES WITH POSITIVE RHEUMATOID FACTOR (HCC): ICD-10-CM

## 2019-10-23 DIAGNOSIS — F41.9 ANXIETY: ICD-10-CM

## 2019-10-23 DIAGNOSIS — M81.0 OSTEOPOROSIS WITHOUT CURRENT PATHOLOGICAL FRACTURE, UNSPECIFIED OSTEOPOROSIS TYPE: ICD-10-CM

## 2019-10-23 DIAGNOSIS — G89.4 CHRONIC PAIN SYNDROME: Primary | ICD-10-CM

## 2019-10-23 DIAGNOSIS — M62.838 MUSCLE SPASM: ICD-10-CM

## 2019-10-23 DIAGNOSIS — G62.9 NEUROPATHY: ICD-10-CM

## 2019-10-23 PROCEDURE — 99214 OFFICE O/P EST MOD 30 MIN: CPT | Performed by: NURSE PRACTITIONER

## 2019-10-23 RX ORDER — ALPRAZOLAM 0.5 MG/1
0.5 TABLET ORAL 2 TIMES DAILY PRN
Qty: 60 TABLET | Refills: 0 | Status: SHIPPED | OUTPATIENT
Start: 2019-10-23 | End: 2019-11-20 | Stop reason: SDUPTHER

## 2019-10-23 RX ORDER — TIZANIDINE 4 MG/1
4 TABLET ORAL 2 TIMES DAILY PRN
Qty: 60 TABLET | Refills: 5 | Status: SHIPPED | OUTPATIENT
Start: 2019-10-23 | End: 2020-02-21 | Stop reason: SDUPTHER

## 2019-10-23 RX ORDER — GABAPENTIN 300 MG/1
300 CAPSULE ORAL 3 TIMES DAILY
Qty: 90 CAPSULE | Refills: 5 | Status: SHIPPED | OUTPATIENT
Start: 2019-10-23 | End: 2020-03-20 | Stop reason: SDUPTHER

## 2019-10-23 RX ORDER — HYDROCODONE BITARTRATE AND ACETAMINOPHEN 5; 325 MG/1; MG/1
1-2 TABLET ORAL EVERY 4 HOURS PRN
Qty: 90 TABLET | Refills: 0 | Status: SHIPPED | OUTPATIENT
Start: 2019-10-23 | End: 2019-11-20 | Stop reason: SDUPTHER

## 2019-10-23 RX ORDER — OMEPRAZOLE 40 MG/1
40 CAPSULE, DELAYED RELEASE ORAL DAILY
Qty: 30 CAPSULE | Refills: 5 | Status: SHIPPED | OUTPATIENT
Start: 2019-10-23 | End: 2020-02-21 | Stop reason: SDUPTHER

## 2019-11-01 ENCOUNTER — HOSPITAL ENCOUNTER (OUTPATIENT)
Dept: RADIATION ONCOLOGY | Facility: HOSPITAL | Age: 54
Setting detail: RADIATION/ONCOLOGY SERIES
End: 2019-11-01

## 2019-11-04 ENCOUNTER — HOSPITAL ENCOUNTER (OUTPATIENT)
Dept: INFUSION THERAPY | Age: 54
Discharge: HOME | End: 2019-11-04
Payer: MEDICAID

## 2019-11-04 ENCOUNTER — OFFICE VISIT (OUTPATIENT)
Dept: HEMATOLOGY | Age: 54
End: 2019-11-04
Payer: MEDICAID

## 2019-11-04 VITALS
DIASTOLIC BLOOD PRESSURE: 76 MMHG | HEIGHT: 63 IN | HEART RATE: 86 BPM | WEIGHT: 192.1 LBS | SYSTOLIC BLOOD PRESSURE: 126 MMHG | BODY MASS INDEX: 34.04 KG/M2 | OXYGEN SATURATION: 99 %

## 2019-11-04 DIAGNOSIS — C82.90 FOLLICULAR LYMPHOMA, UNSPECIFIED FOLLICULAR LYMPHOMA TYPE, UNSPECIFIED BODY REGION (HCC): ICD-10-CM

## 2019-11-04 DIAGNOSIS — Z71.89 CARE PLAN DISCUSSED WITH PATIENT: Primary | ICD-10-CM

## 2019-11-04 PROCEDURE — 85025 COMPLETE CBC W/AUTO DIFF WBC: CPT

## 2019-11-04 PROCEDURE — 99214 OFFICE O/P EST MOD 30 MIN: CPT | Performed by: INTERNAL MEDICINE

## 2019-11-04 PROCEDURE — 99211 OFF/OP EST MAY X REQ PHY/QHP: CPT

## 2019-11-12 ENCOUNTER — DOCUMENTATION (OUTPATIENT)
Dept: RADIATION ONCOLOGY | Facility: HOSPITAL | Age: 54
End: 2019-11-12

## 2019-11-12 PROCEDURE — 77307 TELETHX ISODOSE PLAN CPLX: CPT | Performed by: RADIOLOGY

## 2019-11-12 PROCEDURE — 77334 RADIATION TREATMENT AID(S): CPT | Performed by: RADIOLOGY

## 2019-11-12 NOTE — PROGRESS NOTES
Dr. Marie did call to notify us that her bone marrow was negative.  Therefore she has localized disease and we will proceed with involved site radiotherapy.  My plan is to deliver 3000 cGy in 15 treatment fractions to the axillary and supraclavicular lymph nodes.

## 2019-11-13 ENCOUNTER — OFFICE VISIT (OUTPATIENT)
Dept: FAMILY MEDICINE CLINIC | Facility: CLINIC | Age: 54
End: 2019-11-13

## 2019-11-13 VITALS
HEART RATE: 80 BPM | RESPIRATION RATE: 18 BRPM | DIASTOLIC BLOOD PRESSURE: 78 MMHG | BODY MASS INDEX: 32.44 KG/M2 | OXYGEN SATURATION: 97 % | TEMPERATURE: 97.8 F | WEIGHT: 190 LBS | HEIGHT: 64 IN | SYSTOLIC BLOOD PRESSURE: 132 MMHG

## 2019-11-13 DIAGNOSIS — J20.8 ACUTE BRONCHITIS DUE TO OTHER SPECIFIED ORGANISMS: ICD-10-CM

## 2019-11-13 DIAGNOSIS — J01.00 ACUTE NON-RECURRENT MAXILLARY SINUSITIS: Primary | ICD-10-CM

## 2019-11-13 PROCEDURE — 99214 OFFICE O/P EST MOD 30 MIN: CPT | Performed by: NURSE PRACTITIONER

## 2019-11-13 RX ORDER — DEXTROMETHORPHAN HYDROBROMIDE AND PROMETHAZINE HYDROCHLORIDE 15; 6.25 MG/5ML; MG/5ML
5 SYRUP ORAL 4 TIMES DAILY PRN
Qty: 120 ML | Refills: 0 | Status: SHIPPED | OUTPATIENT
Start: 2019-11-13 | End: 2020-02-19

## 2019-11-13 RX ORDER — AMOXICILLIN 500 MG/1
500 CAPSULE ORAL 2 TIMES DAILY
Qty: 20 CAPSULE | Refills: 0 | Status: SHIPPED | OUTPATIENT
Start: 2019-11-13 | End: 2019-11-23

## 2019-11-13 NOTE — PROGRESS NOTES
Chief Complaint   Patient presents with   • Sinusitis     Pt is here for possible sinus infection.   Face hurting.   Reports she feels like her head is going to explode.           No Known Allergies      HPI: Adela Arauz is a 54 y.o. female presents today with complaints of sinus pain and pressure, for the last week.  She recently was diagnosed with follicular lymphoma and is awaiting radiation.  She denies fever or chills but says that she feels horrible, she has associated myalgia, headache and congestion. .  She has tried otc pseudofed and ibuprofen.  Says that her  RA is really bad.      Chronic problems:  Increased anxiety from recent cancer diagnosis stable with alprazolam, vit d def stable with cholecalciferol, fibromyalgia stable with cymbalta, depression stable with sertraline, GERD stable with omeprazole, RA stable with gabapentin and norco.         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    • Low back pain    • Osteoarthritis    • Osteoporosis    • PONV (postoperative nausea and vomiting)    • RA (rheumatoid arthritis) (CMS/[facility])    • Senile osteoporosis 9/28/2017     Past Surgical History:   Procedure Laterality Date   • AXILLARY LYMPH NODE BIOPSY/EXCISION Right 9/19/2019    Procedure: EXCISION RIGHT AXILLARY MASS;  Surgeon: Jes Enamorado MD;  Location: [facility];  Service: General   • SHOULDER SURGERY     • TUBAL ABDOMINAL LIGATION     • US GUIDED LYMPH NODE BIOPSY  8/9/2019   • WRIST SURGERY Bilateral     fracture     Social History     Socioeconomic History   • Marital status:      Spouse name: Not on file   • Number of children: Not on file   • Years of education: Not on file   • Highest education level: Not on file   Tobacco Use   • Smoking status: Former Smoker     Packs/day: 1.00     Years: 20.00     Pack years: 20.00     Types: Cigarettes     Last attempt to  quit: 9/3/2018     Years since quittin.1   • Smokeless tobacco: Never Used   • Tobacco comment: quit 2018   Substance and Sexual Activity   • Alcohol use: No   • Drug use: No   • Sexual activity: Defer     Family History   Problem Relation Age of Onset   • Arthritis Mother    • COPD Mother    • Atrial fibrillation Mother    • Osteoarthritis Mother    • Heart disease Father    • Hypertension Father    • Arthritis Father    • Melanoma Father         metastatic   • Arthritis Sister    • Arthritis Brother    • No Known Problems Daughter    • Arthritis Maternal Grandfather    • Breast cancer Neg Hx        Current Outpatient Medications on File Prior to Visit   Medication Sig Dispense Refill   • ALPRAZolam (XANAX) 0.5 MG tablet Take 1 tablet by mouth 2 (Two) Times a Day As Needed for Anxiety. 60 tablet 0   • Calcium-Magnesium-Vitamin D 500-250-200 MG-MG-UNIT tablet Take 500 tablets by mouth 2 (Two) Times a Day.     • cholecalciferol (VITAMIN D3) 1000 units tablet Take 1,000 Units by mouth Daily.     • DULoxetine (CYMBALTA) 60 MG capsule TAKE ONE CAPSULE BY MOUTH DAILY 30 capsule 2   • gabapentin (NEURONTIN) 300 MG capsule Take 1 capsule by mouth 3 (Three) Times a Day. 90 capsule 5   • HYDROcodone-acetaminophen (NORCO) 5-325 MG per tablet Take 1-2 tablets by mouth Every 4 (Four) Hours As Needed (Pain). 90 tablet 0   • omeprazole (priLOSEC) 40 MG capsule Take 1 capsule by mouth Daily. 30 capsule 5   • sertraline (ZOLOFT) 50 MG tablet Take 1 tablet by mouth Every Night. 90 tablet 1   • tiZANidine (ZANAFLEX) 4 MG tablet Take 1 tablet by mouth 2 (Two) Times a Day As Needed for Muscle Spasms. 60 tablet 5     No current facility-administered medications on file prior to visit.         ROS:  REVIEW OF SYMPTOMS: (Positives bolded)  General:  weight loss, fever, chills, night sweats, fatigue, appetite loss  HEENT:  sore throat tinnitus, bloody nose, hearing loss, sinus pain/pressure, ear pain/pressure.   Respiratory: shortness  "of breath, cough, hemoptysis, wheezing, pleurisy,   Cardiovascular:  chest pain, PND, palpitation, edema, orthopnea, syncope, swelling of extremities  Gastro: Nausea, vomiting, diarrhea, hematemesis, abdominal pain, constipation  Neuro:  Migraine, numbness, ataxia, tremor, vertigo, weakness, memory loss, Irritability, dizziness  Allergic/immune: allergic rhinitis, hay fever, asthma, hives  \"All other systems reviewed and negative, except as listed above.”      OBJECTIVE:  Constitutional:  Alert, oriented x 3, well developed, well nourished. Consistent with stated age. Not in acute distress.  Has normal posture. Gait and station normal. .  Behavior appropriate. Patient is pleasant and cooperative with the interview and exam.    Skin: No rashes, no visible scars or suspicious moles noted.  Skin is warm to touch. Normal appropriate skin turgor.  Capillary refill is normal bilateral Upper and lower extremity.     Head/Neck: Head is normocephalic and atraumatic.  Neck without visible/palpable lumps.  No thyromegaly.    Eye: Bilaterally EOMI.  PERRLA.  Sclera/conjunctiva is normal, no discharge. Cornea is normal and clear. Lens is normal.  Eyeball normal. Upper eyelid normal.  Lower eyelid normal.     OROPHARYNX:   Mucosa pink and moist, posterior pharynx erythematous. Dentition average for age. No obvious dental carries. No lesions. Tongue normal.    EARS: Bilateral auditory canal normal, without redness or cerumen impaction. Bilateral Tympanic membrane Normal, pearly gray with good cone of light and landmarks.  Hearing grossly intact to normal conversation.     NOSE:Purulent drainage, mucosa is erythematous, edematous and congested, nares patent    SINUS:  Frontal and maxillary sinus tenderness on palpation.      CHEST/LUNG: No use of accessory muscles, chest non-tender on palpation.  Breath sounds normal throughout all lung fields.  No wheezes, rales, rhonchi.    CARDIOVASCULAR:  Inspection: Carotid artery bilateral " normal, no bruits, pulse regular.  Palpation/Percussion Bilateral normal pulsations.  Auscultation: Regular rate and rhythm. No murmur noted in sitting, supine, standing or squatting positions.    LYMPH: Cervical Nodes-normal, size; non-tender to palpation. Axillary Nodes- normal size; non-tender to palpation.     Assessment:  Adela was seen today for sinusitis.    Diagnoses and all orders for this visit:    Acute non-recurrent maxillary sinusitis  -     amoxicillin (AMOXIL) 500 MG capsule; Take 1 capsule by mouth 2 (Two) Times a Day for 10 days.    Acute bronchitis due to other specified organisms  -     promethazine-dextromethorphan (PROMETHAZINE-DM) 6.25-15 MG/5ML syrup; Take 5 mL by mouth 4 (Four) Times a Day As Needed for Cough.          • Adjunctive Therapy  a. Intranasal saline irrigation with either physiologic or hypertonic saline is recommended  b. Intranasal corticosteroids in addition to antibiotics  c. Netti pot  d. Good handwashing  e. If smoke-recommend smoking cessation  f.  Humidify the air; steam inhalation and warm compresses often help relieve pressure  g. Increase fluid intake  h. Sleep with bed elevated  i. Avoid allergens and excessively dry heat  j. Avoid swimming/diving and air travel during acute period  k. Avoid use of antihistamines unless there is an allergic basis   l. Teach proper application of nasal sprays      I spoke with the patient about the benefits and risks associated with antibiotic therapy; the benefits include treating a bacterial infection, preventing the spread of disease, and minimizing serious complications associated with bacterial diseases; the risks include antibiotic resistance, allergic reactions, oral and/ or vaginal candidia, and GI related side effects such as an upset stomach and diarrhea, as well as placing the patient at an increase risk for C-diff; verbal acknowledgement of these risk were obtained; based on the patients complaint and clinical finding, no  antibiotics are required at this time.             Return in about 1 week (around 11/20/2019).    Electronically signed by Charlene Huntley DNP, APRN, 11/13/19, 2:08 PM.

## 2019-11-18 ENCOUNTER — HOSPITAL ENCOUNTER (OUTPATIENT)
Dept: RADIATION ONCOLOGY | Facility: HOSPITAL | Age: 54
Discharge: HOME | End: 2019-11-18

## 2019-11-18 PROCEDURE — 77412 RADIATION TX DELIVERY LVL 3: CPT | Performed by: RADIOLOGY

## 2019-11-18 PROCEDURE — 77417 THER RADIOLOGY PORT IMAGE(S): CPT | Performed by: RADIOLOGY

## 2019-11-19 ENCOUNTER — HOSPITAL ENCOUNTER (OUTPATIENT)
Dept: RADIATION ONCOLOGY | Facility: HOSPITAL | Age: 54
Discharge: HOME | End: 2019-11-19

## 2019-11-19 PROCEDURE — 77412 RADIATION TX DELIVERY LVL 3: CPT | Performed by: RADIOLOGY

## 2019-11-20 ENCOUNTER — OFFICE VISIT (OUTPATIENT)
Dept: FAMILY MEDICINE CLINIC | Facility: CLINIC | Age: 54
End: 2019-11-20

## 2019-11-20 ENCOUNTER — HOSPITAL ENCOUNTER (OUTPATIENT)
Dept: RADIATION ONCOLOGY | Facility: HOSPITAL | Age: 54
Discharge: HOME | End: 2019-11-20

## 2019-11-20 VITALS
TEMPERATURE: 97.5 F | HEIGHT: 64 IN | OXYGEN SATURATION: 99 % | HEART RATE: 79 BPM | BODY MASS INDEX: 32.68 KG/M2 | WEIGHT: 191.4 LBS | DIASTOLIC BLOOD PRESSURE: 70 MMHG | SYSTOLIC BLOOD PRESSURE: 106 MMHG | RESPIRATION RATE: 18 BRPM

## 2019-11-20 DIAGNOSIS — Z23 IMMUNIZATION DUE: Primary | ICD-10-CM

## 2019-11-20 DIAGNOSIS — T78.40XD ALLERGIC STATE, SUBSEQUENT ENCOUNTER: ICD-10-CM

## 2019-11-20 DIAGNOSIS — G89.4 CHRONIC PAIN SYNDROME: ICD-10-CM

## 2019-11-20 DIAGNOSIS — F41.0 PANIC ATTACK: ICD-10-CM

## 2019-11-20 DIAGNOSIS — R39.9 LOWER URINARY TRACT SYMPTOMS (LUTS): ICD-10-CM

## 2019-11-20 DIAGNOSIS — F41.9 ANXIETY: ICD-10-CM

## 2019-11-20 LAB
BILIRUB BLD-MCNC: NEGATIVE MG/DL
CLARITY, POC: CLEAR
COLOR UR: YELLOW
GLUCOSE UR STRIP-MCNC: NEGATIVE MG/DL
KETONES UR QL: NEGATIVE
LEUKOCYTE EST, POC: NEGATIVE
NITRITE UR-MCNC: NEGATIVE MG/ML
PH UR: 7 [PH] (ref 5–8)
PROT UR STRIP-MCNC: NEGATIVE MG/DL
RBC # UR STRIP: ABNORMAL /UL
SP GR UR: 1.02 (ref 1–1.03)
UROBILINOGEN UR QL: NORMAL

## 2019-11-20 PROCEDURE — 90471 IMMUNIZATION ADMIN: CPT | Performed by: NURSE PRACTITIONER

## 2019-11-20 PROCEDURE — 90732 PPSV23 VACC 2 YRS+ SUBQ/IM: CPT | Performed by: NURSE PRACTITIONER

## 2019-11-20 PROCEDURE — 77412 RADIATION TX DELIVERY LVL 3: CPT | Performed by: RADIOLOGY

## 2019-11-20 PROCEDURE — 77336 RADIATION PHYSICS CONSULT: CPT | Performed by: RADIOLOGY

## 2019-11-20 PROCEDURE — 99214 OFFICE O/P EST MOD 30 MIN: CPT | Performed by: NURSE PRACTITIONER

## 2019-11-20 RX ORDER — ALPRAZOLAM 0.5 MG/1
0.5 TABLET ORAL 2 TIMES DAILY PRN
Qty: 60 TABLET | Refills: 0 | Status: SHIPPED | OUTPATIENT
Start: 2019-11-20 | End: 2019-12-20 | Stop reason: SDUPTHER

## 2019-11-20 RX ORDER — FLUTICASONE PROPIONATE 50 MCG
2 SPRAY, SUSPENSION (ML) NASAL DAILY
Qty: 1 BOTTLE | Refills: 3 | Status: SHIPPED | OUTPATIENT
Start: 2019-11-20

## 2019-11-20 RX ORDER — HYDROCODONE BITARTRATE AND ACETAMINOPHEN 5; 325 MG/1; MG/1
1-2 TABLET ORAL EVERY 4 HOURS PRN
Qty: 90 TABLET | Refills: 0 | Status: SHIPPED | OUTPATIENT
Start: 2019-11-20 | End: 2019-12-20

## 2019-11-20 RX ORDER — HYDROXYCHLOROQUINE SULFATE 200 MG/1
200 TABLET, FILM COATED ORAL 2 TIMES DAILY
Refills: 2 | COMMUNITY
Start: 2019-11-15 | End: 2020-05-22 | Stop reason: SDDI

## 2019-11-20 NOTE — PROGRESS NOTES
Chief Complaint   Patient presents with   • Pain     Pt is here for followup for chronic pain and medication refills.   Also complaints of earache.          Adela Arauz is a 54 y.o. female here for follow up for chronic problems of RA, chronic joint pain, anxiety and lymphoma.  She says that her ear has been hurting her for the last week.  Denies any fever or chills.  She says she has been upset because Rheumatologist wants to switch her to plaqinel and she is scared due to all of its side effects and she doesn't think she is going to take it. She says her anxiety is doing good with the xanax.  Denies suicidal/homicidal ideations.  She said she would like her urine tested because it has a bad smell.     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  She was recently diagnosed with follicular lymphoma, and is being managed by Dr. Marie, and recently had a bone marrow biopsy, to get read for chemo and radiation.      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    • Low back pain    • Osteoarthritis    • Osteoporosis    • PONV (postoperative nausea and vomiting)    • RA (rheumatoid arthritis) (CMS/HCC)    • Senile osteoporosis 9/28/2017       Past Surgical History:   Procedure  Laterality Date   • AXILLARY LYMPH NODE BIOPSY/EXCISION Right 2019    Procedure: EXCISION RIGHT AXILLARY MASS;  Surgeon: Jes Enamorado MD;  Location: [facility] OR;  Service: General   • SHOULDER SURGERY     • TUBAL ABDOMINAL LIGATION     • US GUIDED LYMPH NODE BIOPSY  2019   • WRIST SURGERY Bilateral     fracture       Social History     Socioeconomic History   • Marital status:      Spouse name: Not on file   • Number of children: Not on file   • Years of education: Not on file   • Highest education level: Not on file   Tobacco Use   • Smoking status: Former Smoker     Packs/day: 1.00     Years: 20.00     Pack years: 20.00     Types: Cigarettes     Last attempt to quit: 9/3/2018     Years since quittin.2   • Smokeless tobacco: Never Used   • Tobacco comment: quit 2018   Substance and Sexual Activity   • Alcohol use: No   • Drug use: No   • Sexual activity: Defer       Family History   Problem Relation Age of Onset   • Arthritis Mother    • COPD Mother    • Atrial fibrillation Mother    • Osteoarthritis Mother    • Heart disease Father    • Hypertension Father    • Arthritis Father    • Melanoma Father         metastatic   • Arthritis Sister    • Arthritis Brother    • No Known Problems Daughter    • Arthritis Maternal Grandfather    • Breast cancer Neg Hx        Current Outpatient Medications on File Prior to Visit   Medication Sig Dispense Refill   • ALPRAZolam (XANAX) 0.5 MG tablet Take 1 tablet by mouth 2 (Two) Times a Day As Needed for Anxiety. 60 tablet 0   • amoxicillin (AMOXIL) 500 MG capsule Take 1 capsule by mouth 2 (Two) Times a Day for 10 days. 20 capsule 0   • Calcium-Magnesium-Vitamin D 500-250-200 MG-MG-UNIT tablet Take 500 tablets by mouth 2 (Two) Times a Day.     • cholecalciferol (VITAMIN D3) 1000 units tablet Take 1,000 Units by mouth Daily.     • DULoxetine (CYMBALTA) 60 MG capsule TAKE ONE CAPSULE BY MOUTH DAILY 30 capsule 2   • gabapentin (NEURONTIN) 300 MG capsule Take 1  "capsule by mouth 3 (Three) Times a Day. 90 capsule 5   • HYDROcodone-acetaminophen (NORCO) 5-325 MG per tablet Take 1-2 tablets by mouth Every 4 (Four) Hours As Needed (Pain). 90 tablet 0   • hydroxychloroquine (PLAQUENIL) 200 MG tablet   2   • omeprazole (priLOSEC) 40 MG capsule Take 1 capsule by mouth Daily. 30 capsule 5   • promethazine-dextromethorphan (PROMETHAZINE-DM) 6.25-15 MG/5ML syrup Take 5 mL by mouth 4 (Four) Times a Day As Needed for Cough. 120 mL 0   • sertraline (ZOLOFT) 50 MG tablet Take 1 tablet by mouth Every Night. 90 tablet 1   • tiZANidine (ZANAFLEX) 4 MG tablet Take 1 tablet by mouth 2 (Two) Times a Day As Needed for Muscle Spasms. 60 tablet 5     No current facility-administered medications on file prior to visit.         REVIEW OF SYMPTOMS: (Positives bolded)  General:  weight loss, fever, chills, night sweats, fatigue, appetite loss  HEENT:  blurry vision, eye pain, eye discharge, dry eyes, decreased vision   Respiratory: shortness of breath, cough, hemoptysis, wheezing, pleurisy,   Cardiovascular:  chest pain, PND, palpitation, edema, orthopnea, syncope, swelling of extremities  Gastro: Nausea, vomiting, diarrhea, hematemesis, abdominal pain, constipation  Genito: hematuria, dysuria, glycosuria, hesitancy, frequency, incontinence  Musckelo: Arthralgia, myalgia, muscle weakness, joint swelling, NSAID use  Skin: rash, pruritis, sores, nail changes, skin thickening, change in wart/mole, itching, rash, new lesions, pruritus, nail changes  Neuro:  Migraine, numbness, ataxia, tremor, vertigo, weakness, memory loss  \"All other systems reviewed and negative, except as listed above.”      OBJECTIVE:  Constitutional:  -No acute distress, Consistent with stated age.  Oriented x 3, alert Posture-Not doubled over. Normal pace, normal arm movement. Patient is pleasant and cooperative with the interview and exam.    Integumentary: No rashes, ulcers or lesions. No edema.  Capillary refill is normal " bilateral Upper and lower extremity.     Eye: Bilaterally PERRLA, EOMI.  No discharge.  Upper and lower eyelids are normal. Sclera/conjunctiva normal without discharge. Cornea is normal and clear. Lens is normal.  Eyeball appears normal. No ciliary flushing, no conjunctival injection.    CHEST/LUNG:  Normal effort, no distress, no use of accessory muscles. Breath sounds normal throughout all lung fields.  Lungs are clear today. No wheezes, rales, rhonchi.     CARDIOVASCULAR:  RRR, no bruits or abnormal pulsations. No murmur noted in sitting, supine positions. no digital clubbing, cyanosis, edema, increased warmth. She has her markings on her right upper breast where they are going to do radiation.     ABDOMEN: normal, Bowel sounds in all 4 quadrants. Soft, non-tender, no rebound tenderness, no rigidity (guarding), no jar tenderness, no masses.  no hepatomegaly/splenomegaly, Hernias- none. Rectal not examined.     Peripheral Vascular: Normal temperature with pink nailbeds and no ulcerations. Pedal hair intact.  Normal capillary refill. No edema.    Musco: Has tenderness on palpation of all her joints: hands, wrists, elbows, hips, shoulders, knees.  She has normal range of motion of extremities/joints, complains of pain with all movement.      Neuropsych: normal mood and congruent affect. Able to articulate well, normal speech, normal rate, normal tone, normal use of language, volume and coherence.  no SI/HI, no hallucinations, delusions, obsessions.  Judgement- Appropriate. Memory-Recall intact, remote and recent memory intact. Knowledge- Age appropriate fund of knowledge, concentration and attention span normal.    Lymphatic: Head/Neck- normal size and non tender to palpation. Axillary- Head and neck LN are normal size and non tender to palpation.      Assessment/Plan:  Adela was seen today for pain.    Diagnoses and all orders for this visit:    Immunization due  -     Pneumococcal Polysaccharide Vaccine 23-Valent  Greater Than or Equal To 1yo Subcutaneous / IM    Panic attack  -     ALPRAZolam (XANAX) 0.5 MG tablet; Take 1 tablet by mouth 2 (Two) Times a Day As Needed for Anxiety.    Anxiety  -     ALPRAZolam (XANAX) 0.5 MG tablet; Take 1 tablet by mouth 2 (Two) Times a Day As Needed for Anxiety.    Chronic pain syndrome  -     HYDROcodone-acetaminophen (NORCO) 5-325 MG per tablet; Take 1-2 tablets by mouth Every 4 (Four) Hours As Needed (Pain).    Allergic state, subsequent encounter  -     fluticasone (FLONASE) 50 MCG/ACT nasal spray; 2 sprays into the nostril(s) as directed by provider Daily.    Lower urinary tract symptoms (LUTS)  -     POC Urinalysis Dipstick, Automated  -     Urine Culture - Urine, Urine, Clean Catch  Contains abnormal data POC Urinalysis Dipstick, Automated   Order: [id number]   Status:  Final result   Visible to patient:  No (Not Released) Next appt:  11/21/2019 at 02:10 PM in Radiation Oncology ([facility]) Dx:  Lower urinary tract symptoms (LUTS)   Component  Ref Range & Units 11:47   Color  Yellow, Straw, Dark Yellow, Alejandra Yellow    Clarity, UA  Clear Clear    Specific Gravity   1.005 - 1.030 1.020    pH, Urine  5.0 - 8.0 7.0    Leukocytes  Negative Negative    Nitrite, UA  Negative Negative    Protein, POC  Negative mg/dL Negative    Glucose, UA  Negative, 1000 mg/dL (3+) mg/dL Negative    Ketones, UA  Negative Negative    Urobilinogen, UA  Normal Normal    Bilirubin  Negative Negative    Blood, UA  Negative Trace Abnormal     Resulting Agency [facility]         Specimen Collected: 11/20/19 11:47 Last Resulted: 11/20/19 11:47                   ALMA Report:     As part of this patient's treatment plan, I am prescribing controlled substances. The patient has been made aware of appropriate use of such medications, including potential risk of opiod use, somnolence, limited ability to drive and /or work safely, and potential for dependence or overdose, and opiods should be  used sparingly and are not recommended for long term use.  It has also been made clear that these medications are for use by this patient only, without concomitant use of alcohol or other substances unless prescribed.     Patient has completed prescribing agreement detailing terms of continued prescribing of controlled substances, including monitoring ALMA reports, urine drug screening yearly an random drug screens to monitor patients compliance to treatment plan, and pill counts if necessary. The patient is aware that inappropriate use will result in cessation of prescribing such medications.    Toxassure:  I have ordered a urine drug screen to monitor patients predisposition to and patterns of drug use/misuse in order to establish and maintain the safe and effective use of analgesics in the treatment of chronic pain      ALMA report has been reviewed by: Chalrene Huntley, DNP, APRN.  The report was scanned into the patient's chart.      -     Pt is aware of the potential for addiction and dependence.    Addiction was discussed.  The  Risks, benefits and alternative options of drug therapy discussed.  It was reinforced about the risks and benefits of opioids.  It was reinforced that patient may not call early for medication, may not ask for increase in the number of pills given monthly or increase in dosage of medication, may not jump around to different pharmacies or providers and may not receive any narcotics from other providers including ER, or the contract will be broken and narcotics will no longer be prescribed from this facility if any of these criteria has been broken.      Last Dose was: this am     Last Fill was:  Oct    Date of last ALMA:           Date of last UDS: 5/10/19    Contains abnormal data POC Urinalysis Dipstick, Automated   Order: [id number]   Status:  Final result   Visible to patient:  No (Not Released) Dx:  Lower urinary tract symptoms (LUTS)   Component  Ref Range & Units 11:47    Color  Yellow, Straw, Dark Yellow, Alejandra Yellow    Clarity, UA  Clear Clear    Specific Gravity   1.005 - 1.030 1.020    pH, Urine  5.0 - 8.0 7.0    Leukocytes  Negative Negative    Nitrite, UA  Negative Negative    Protein, POC  Negative mg/dL Negative    Glucose, UA  Negative, 1000 mg/dL (3+) mg/dL Negative    Ketones, UA  Negative Negative    Urobilinogen, UA  Normal Normal    Bilirubin  Negative Negative    Blood, UA  Negative Trace Abnormal     Resulting Agency [facility]         Specimen Collected: 11/20/19 11:47           Urine culture sent to lab      Morbid obesity:  BMI:  32.5     Weight:   191     Follow up plan:  Lose at least 3 pounds before next chronic visit, exercise at least 3 times a week for 30 minute increments, eat baked instead of fried foods, and eat healthier     -  Documentation of education   The patient BMI is outside this range and we recommended/discussed today to utilize a diet/exercise program to get back into the appropriate range.  Federal guidelines recommend that people under the age of 65 should have a BMI of 18.5-24.9  Food diary:  Bring to next visit  tial step is to document everything that is consumed. Pt's that have a food diary  Lose 2x the weight  by keeping track of foods.   Choose one bad food weekly and eliminate it from your diet.  Replace with one healthy food  Exercise diary: Goal over next 2-4 weeks is walk 30 minutes per day 5 days per week at pace difficult to hold conversation.   Drink more water, less soda.   Cut back on portion sizes.   Today we encouraged roughly a 1 lb per week weight loss with initial goal of 5% weight loss.  Avoid fast foods, eat more salads  If eating out for a meal, consider cutting food in half and placing into a to go container.  Individually portion any foods coming into the home   Use smaller plates  Drink 12 ozof water 30 minutes before meal as way to suppress appetite.  Discussed Contrave, metformin,  topamax, Belviq and the cost of medications  Encouraged internet programs or self help books  Set  Goals that are realistic   Educated on ways to measure food  Baseball: 1 cup good for green salad, frozen yogurt, medium piece of fruit  Handful:  ½ cup good cut fruit, cooked vegetables, pasta, rice  Egg:  ¼ cup good for dried fruit  Deck of cards: 3 ounces good for meat and poultry  Check book:  3 ounces grilled fish  Plate Method:  reduce plate size to 9 inch dinner plate.  Half of plate should be filled with non-starchy vegetables (broccoli, lettuce, cauliflower, tomatoes), ¼ plate with lean source of protein (lean chicken, turkey, fish), remaining ½ with whole grains (brown rice, potato, whole grain breads  Avoid liquid calories (regular soda, juice, coffee with cream)  Focus  on water, seltzer water and other non-calorie drinks  Replace regular sugar with non-caloric sweeteners  Avoid skipping meals: plan small regular meals throughout the day in order to keep your hunger controlled  Consider using meal replacements if unable to plan a healthy meal (protein shake, high protein bar)  Replace all white bread with whole wheat/whole grain alternative  Swap regular salad dressings (mayonnaise, butter, or low fat or fat free alternative)  Avoid high fat, high calorie, high carbohydrate snakes (cookies, pastries, cakes)  Snack on fruits, low fat dairy (yogurt, cottage cheese)            Management plan:  Take medications as prescribed; return to the clinic of new or worsening concerns.       Risks/benefits of current and new medications discussed with the patient and or family today.  The patient/family are aware and accept that if there any side effects they should call or return to clinic as soon as possible.  Appropriate F/U discussed for topics addressed today. All questions were answered to the satisfactory state of patient/family.  Should symptoms fail to improve or worsen they agree to call or return to clinic or  to go to the ER. Education handouts were offered on any new Rx if requested.  Discussed the importance of following up with any needed screening tests/labs/specialist appointments and any requested follow-up recommended by me today.  Importance of maintaining follow-up discussed and patient accepts that missed appointments can delay diagnosis and potentially lead to worsening of conditions.    Return in about 1 month (around 12/20/2019) for Recheck chronic problems .    Electronically signed by Charlene Huntley DNP, APRN, 11/20/19, 9:20 AM.

## 2019-11-21 ENCOUNTER — HOSPITAL ENCOUNTER (OUTPATIENT)
Dept: RADIATION ONCOLOGY | Facility: HOSPITAL | Age: 54
Discharge: HOME | End: 2019-11-21

## 2019-11-21 PROCEDURE — 77412 RADIATION TX DELIVERY LVL 3: CPT | Performed by: RADIOLOGY

## 2019-11-22 ENCOUNTER — HOSPITAL ENCOUNTER (OUTPATIENT)
Dept: RADIATION ONCOLOGY | Facility: HOSPITAL | Age: 54
Discharge: HOME | End: 2019-11-22

## 2019-11-22 LAB
BACTERIA UR CULT: NORMAL
BACTERIA UR CULT: NORMAL

## 2019-11-22 PROCEDURE — 77412 RADIATION TX DELIVERY LVL 3: CPT | Performed by: RADIOLOGY

## 2019-11-25 ENCOUNTER — HOSPITAL ENCOUNTER (OUTPATIENT)
Dept: RADIATION ONCOLOGY | Facility: HOSPITAL | Age: 54
Discharge: HOME | End: 2019-11-25

## 2019-11-25 PROCEDURE — 77412 RADIATION TX DELIVERY LVL 3: CPT | Performed by: RADIOLOGY

## 2019-11-26 ENCOUNTER — HOSPITAL ENCOUNTER (OUTPATIENT)
Dept: RADIATION ONCOLOGY | Facility: HOSPITAL | Age: 54
Discharge: HOME | End: 2019-11-26

## 2019-11-26 PROCEDURE — 77412 RADIATION TX DELIVERY LVL 3: CPT | Performed by: RADIOLOGY

## 2019-11-26 PROCEDURE — 77417 THER RADIOLOGY PORT IMAGE(S): CPT | Performed by: RADIOLOGY

## 2019-11-27 ENCOUNTER — HOSPITAL ENCOUNTER (OUTPATIENT)
Dept: RADIATION ONCOLOGY | Facility: HOSPITAL | Age: 54
Discharge: HOME | End: 2019-11-27

## 2019-11-27 PROCEDURE — 77336 RADIATION PHYSICS CONSULT: CPT | Performed by: RADIOLOGY

## 2019-11-27 PROCEDURE — 77412 RADIATION TX DELIVERY LVL 3: CPT | Performed by: RADIOLOGY

## 2019-11-30 DIAGNOSIS — F41.8 ANXIETY ASSOCIATED WITH DEPRESSION: ICD-10-CM

## 2019-12-02 ENCOUNTER — HOSPITAL ENCOUNTER (OUTPATIENT)
Dept: RADIATION ONCOLOGY | Facility: HOSPITAL | Age: 54
Discharge: HOME | End: 2019-12-02

## 2019-12-02 ENCOUNTER — HOSPITAL ENCOUNTER (OUTPATIENT)
Dept: RADIATION ONCOLOGY | Facility: HOSPITAL | Age: 54
Setting detail: RADIATION/ONCOLOGY SERIES
End: 2019-12-02

## 2019-12-02 PROCEDURE — 77412 RADIATION TX DELIVERY LVL 3: CPT | Performed by: RADIOLOGY

## 2019-12-02 RX ORDER — DULOXETIN HYDROCHLORIDE 60 MG/1
CAPSULE, DELAYED RELEASE ORAL
Qty: 30 CAPSULE | Refills: 2 | Status: SHIPPED | OUTPATIENT
Start: 2019-12-02 | End: 2020-03-04

## 2019-12-03 ENCOUNTER — HOSPITAL ENCOUNTER (OUTPATIENT)
Dept: RADIATION ONCOLOGY | Facility: HOSPITAL | Age: 54
Discharge: HOME | End: 2019-12-03

## 2019-12-03 PROCEDURE — 77412 RADIATION TX DELIVERY LVL 3: CPT | Performed by: RADIOLOGY

## 2019-12-04 ENCOUNTER — HOSPITAL ENCOUNTER (OUTPATIENT)
Dept: RADIATION ONCOLOGY | Facility: HOSPITAL | Age: 54
Discharge: HOME | End: 2019-12-04

## 2019-12-04 PROCEDURE — 77412 RADIATION TX DELIVERY LVL 3: CPT | Performed by: RADIOLOGY

## 2019-12-05 ENCOUNTER — HOSPITAL ENCOUNTER (OUTPATIENT)
Dept: RADIATION ONCOLOGY | Facility: HOSPITAL | Age: 54
Discharge: HOME | End: 2019-12-05

## 2019-12-05 PROCEDURE — 77412 RADIATION TX DELIVERY LVL 3: CPT | Performed by: RADIOLOGY

## 2019-12-05 PROCEDURE — 77417 THER RADIOLOGY PORT IMAGE(S): CPT | Performed by: RADIOLOGY

## 2019-12-06 ENCOUNTER — HOSPITAL ENCOUNTER (OUTPATIENT)
Dept: RADIATION ONCOLOGY | Facility: HOSPITAL | Age: 54
Discharge: HOME | End: 2019-12-06

## 2019-12-06 PROCEDURE — 77412 RADIATION TX DELIVERY LVL 3: CPT | Performed by: RADIOLOGY

## 2019-12-09 ENCOUNTER — HOSPITAL ENCOUNTER (OUTPATIENT)
Dept: RADIATION ONCOLOGY | Facility: HOSPITAL | Age: 54
Setting detail: RADIATION/ONCOLOGY SERIES
Discharge: HOME | End: 2019-12-09

## 2019-12-09 PROCEDURE — 77412 RADIATION TX DELIVERY LVL 3: CPT | Performed by: RADIOLOGY

## 2019-12-10 ENCOUNTER — HOSPITAL ENCOUNTER (OUTPATIENT)
Dept: RADIATION ONCOLOGY | Facility: HOSPITAL | Age: 54
Setting detail: RADIATION/ONCOLOGY SERIES
Discharge: HOME | End: 2019-12-10

## 2019-12-10 PROCEDURE — 77412 RADIATION TX DELIVERY LVL 3: CPT | Performed by: RADIOLOGY

## 2019-12-10 PROCEDURE — 77336 RADIATION PHYSICS CONSULT: CPT | Performed by: RADIOLOGY

## 2019-12-20 ENCOUNTER — OFFICE VISIT (OUTPATIENT)
Dept: FAMILY MEDICINE CLINIC | Facility: CLINIC | Age: 54
End: 2019-12-20

## 2019-12-20 VITALS
HEIGHT: 64 IN | RESPIRATION RATE: 18 BRPM | BODY MASS INDEX: 32.74 KG/M2 | OXYGEN SATURATION: 98 % | WEIGHT: 191.8 LBS | TEMPERATURE: 98.1 F | HEART RATE: 58 BPM | DIASTOLIC BLOOD PRESSURE: 76 MMHG | SYSTOLIC BLOOD PRESSURE: 124 MMHG

## 2019-12-20 DIAGNOSIS — F41.0 PANIC ATTACK: ICD-10-CM

## 2019-12-20 DIAGNOSIS — F41.9 ANXIETY: ICD-10-CM

## 2019-12-20 DIAGNOSIS — G89.4 CHRONIC PAIN SYNDROME: ICD-10-CM

## 2019-12-20 PROCEDURE — 99214 OFFICE O/P EST MOD 30 MIN: CPT | Performed by: NURSE PRACTITIONER

## 2019-12-20 RX ORDER — HYDROCODONE BITARTRATE AND ACETAMINOPHEN 10; 325 MG/1; MG/1
1 TABLET ORAL EVERY 6 HOURS PRN
Qty: 90 TABLET | Refills: 0 | Status: SHIPPED | OUTPATIENT
Start: 2019-12-20 | End: 2020-01-20 | Stop reason: SDUPTHER

## 2019-12-20 RX ORDER — ALPRAZOLAM 0.5 MG/1
0.5 TABLET ORAL 2 TIMES DAILY PRN
Qty: 60 TABLET | Refills: 0 | Status: SHIPPED | OUTPATIENT
Start: 2019-12-20 | End: 2020-01-20 | Stop reason: SDUPTHER

## 2019-12-20 RX ORDER — HYDROCODONE BITARTRATE AND ACETAMINOPHEN 5; 325 MG/1; MG/1
1-2 TABLET ORAL EVERY 4 HOURS PRN
Qty: 90 TABLET | Refills: 0 | Status: CANCELLED | OUTPATIENT
Start: 2019-12-20

## 2019-12-20 NOTE — PATIENT INSTRUCTIONS
Chronic Back Pain  When back pain lasts longer than 3 months, it is called chronic back pain. Pain may get worse at certain times (flare-ups). There are things you can do at home to manage your pain.  Follow these instructions at home:  Activity         · Avoid bending and other activities that make pain worse.  · When standing:  ? Keep your upper back and neck straight.  ? Keep your shoulders pulled back.  ? Avoid slouching.  · When sitting:  ? Keep your back straight.  ? Relax your shoulders. Do not round your shoulders or pull them backward.  · Do not sit or  one place for long periods of time.  · Take short rest breaks during the day. Lying down or standing is usually better than sitting. Resting can help relieve pain.  · When sitting or lying down for a long time, do some mild activity or stretching. This will help to prevent stiffness and pain.  · Get regular exercise. Ask your doctor what activities are safe for you.  · Do not lift anything that is heavier than 10 lb (4.5 kg). To prevent injury when you lift things:  ? Bend your knees.  ? Keep the weight close to your body.  ? Avoid twisting.  Managing pain  · If told, put ice on the painful area. Your doctor may tell you to use ice for 24-48 hours after a flare-up starts.  ? Put ice in a plastic bag.  ? Place a towel between your skin and the bag.  ? Leave the ice on for 20 minutes, 2-3 times a day.  · If told, put heat on the painful area as often as told by your doctor. Use the heat source that your doctor recommends, such as a moist heat pack or a heating pad.  ? Place a towel between your skin and the heat source.  ? Leave the heat on for 20-30 minutes.  ? Remove the heat if your skin turns bright red. This is especially important if you are unable to feel pain, heat, or cold. You may have a greater risk of getting burned.  · Soak in a warm bath. This can help relieve pain.  · Take over-the-counter and prescription medicines only as told by your  doctor.  General instructions  · Sleep on a firm mattress. Try lying on your side with your knees slightly bent. If you lie on your back, put a pillow under your knees.  · Keep all follow-up visits as told by your doctor. This is important.  Contact a doctor if:  · You have pain that does not get better with rest or medicine.  Get help right away if:  · One or both of your arms or legs feel weak.  · One or both of your arms or legs lose feeling (numbness).  · You have trouble controlling when you poop (bowel movement) or pee (urinate).  · You feel sick to your stomach (nauseous).  · You throw up (vomit).  · You have belly (abdominal) pain.  · You have shortness of breath.  · You pass out (faint).  Summary  · When back pain lasts longer than 3 months, it is called chronic back pain.  · Pain may get worse at certain times (flare-ups).  · Use ice and heat as told by your doctor. Your doctor may tell you to use ice after flare-ups.  This information is not intended to replace advice given to you by your health care provider. Make sure you discuss any questions you have with your health care provider.  Document Released: 06/05/2009 Document Revised: 08/02/2018 Document Reviewed: 08/02/2018  [employer] Interactive Patient Education © 2019 [employer]

## 2019-12-20 NOTE — PROGRESS NOTES
Chief Complaint   Patient presents with   • Pain     Pt is here for followup and medication refills.           No Known Allergies    History provided by: self     HPI:  Subjective   Adela Arauz is a 54 y.o. female presents today for follow up for chronic problems and refills.  She has RA, recently diagnosed  With follicular lymphoma.  She says during her radiation she had to lay for long periods on the metal table and she has been really suffering with pain and she has had to take extra norco so now she has been out for 3 days and today the pain is really bad.  Adela never asks for increase in meds or dose, she is complaint with coming to all her appts, she has always been honest about when she takes and extra pill and why.   I feel like with the lymphoma, she needs an increase in dosage and we will do that for a couple of months and then when treatments are over drop her back down.   She says all her joints hurt especially her hips from laying on that metal table.        Chronic problems:  Tobacco abuse (20 yr pack history),  osteoporosis not currently on a bisphosphanate, GERD stable with omeprazole, anxiety stable with xanax, Chronic joint pain, back pain, leg pain.  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  She was recently diagnosed with follicular lymphoma, and is being managed by Dr. Marie, and just finished her radiation.     PCP currently listed as Charlene Huntley, DNP, APRN.     The following portions of the patient's history were reviewed and updated as appropriate: allergies, current medications, past family history, past medical history, past social history, past  surgical history and problem list      Current Outpatient Medications:   •  ALPRAZolam (XANAX) 0.5 MG tablet, Take 1 tablet by mouth 2 (Two) Times a Day As Needed for Anxiety., Disp: 60 tablet, Rfl: 0  •  Calcium-Magnesium-Vitamin D 500-250-200 MG-MG-UNIT tablet, Take 500 tablets by mouth 2 (Two) Times a Day., Disp: , Rfl:   •  cholecalciferol (VITAMIN D3) 1000 units tablet, Take 1,000 Units by mouth Daily., Disp: , Rfl:   •  DULoxetine (CYMBALTA) 60 MG capsule, TAKE ONE CAPSULE BY MOUTH DAILY, Disp: 30 capsule, Rfl: 2  •  fluticasone (FLONASE) 50 MCG/ACT nasal spray, 2 sprays into the nostril(s) as directed by provider Daily., Disp: 1 bottle, Rfl: 3  •  gabapentin (NEURONTIN) 300 MG capsule, Take 1 capsule by mouth 3 (Three) Times a Day., Disp: 90 capsule, Rfl: 5  •  HYDROcodone-acetaminophen (NORCO) 5-325 MG per tablet, Take 1-2 tablets by mouth Every 4 (Four) Hours As Needed (Pain)., Disp: 90 tablet, Rfl: 0  •  hydroxychloroquine (PLAQUENIL) 200 MG tablet, , Disp: , Rfl: 2  •  omeprazole (priLOSEC) 40 MG capsule, Take 1 capsule by mouth Daily., Disp: 30 capsule, Rfl: 5  •  promethazine-dextromethorphan (PROMETHAZINE-DM) 6.25-15 MG/5ML syrup, Take 5 mL by mouth 4 (Four) Times a Day As Needed for Cough., Disp: 120 mL, Rfl: 0  •  sertraline (ZOLOFT) 50 MG tablet, Take 1 tablet by mouth Every Night., Disp: 90 tablet, Rfl: 1  •  tiZANidine (ZANAFLEX) 4 MG tablet, Take 1 tablet by mouth 2 (Two) Times a Day As Needed for Muscle Spasms., Disp: 60 tablet, Rfl: 5  Past Medical History:   Diagnosis Date   • AAT (alpha-1-antitrypsin) deficiency (CMS/HCC) 1/17/2019   • 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, 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    • Low back pain    • Osteoarthritis    • Osteoporosis    • PONV (postoperative nausea and vomiting)    • RA (rheumatoid arthritis) (CMS/HCC)    • Senile osteoporosis 9/28/2017     Past Surgical History:   Procedure Laterality Date   • AXILLARY LYMPH NODE BIOPSY/EXCISION Right 9/19/2019    Procedure:  EXCISION RIGHT AXILLARY MASS;  Surgeon: Jes Enamorado MD;  Location: [facility] OR;  Service: General   • SHOULDER SURGERY     • TUBAL ABDOMINAL LIGATION     • US GUIDED LYMPH NODE BIOPSY  2019   • WRIST SURGERY Bilateral     fracture     Social History     Socioeconomic History   • Marital status:      Spouse name: Not on file   • Number of children: Not on file   • Years of education: Not on file   • Highest education level: Not on file   Tobacco Use   • Smoking status: Former Smoker     Packs/day: 1.00     Years: 20.00     Pack years: 20.00     Types: Cigarettes     Last attempt to quit: 9/3/2018     Years since quittin.2   • Smokeless tobacco: Never Used   • Tobacco comment: quit 2018   Substance and Sexual Activity   • Alcohol use: No   • Drug use: No   • Sexual activity: Defer     Family History   Problem Relation Age of Onset   • Arthritis Mother    • COPD Mother    • Atrial fibrillation Mother    • Osteoarthritis Mother    • Heart disease Father    • Hypertension Father    • Arthritis Father    • Melanoma Father         metastatic   • Arthritis Sister    • Arthritis Brother    • No Known Problems Daughter    • Arthritis Maternal Grandfather    • Breast cancer Neg Hx        REVIEW OF SYMPTOMS: (Positives bolded)  General:  weight loss, fever, chills, night sweats, fatigue, appetite loss  HEENT:  blurry vision, eye pain, eye discharge, dry eyes, decreased vision  Respiratory: shortness of breath, cough, hemoptysis, wheezing, pleurisy,   Cardiovascular:  chest pain, PND, palpitation, edema, orthopnea, syncope  Gastro: Nausea, vomiting, diarrhea, hematemesis, abdominal pain, constipation  Genito: hematuria, dysuria, glycosuria, hesitancy, frequency, incontinence  Musckelo: Arthralgia, myalgia, muscle weakness, joint swelling, NSAID use  Skin: rash, pruritis, sores, nail changes, skin thickening, change in wart/mole, itching   Neuro:  Migraine, numbness, ataxia, tremor, vertigo, weakness, memory  "loss  \"All other systems reviewed and negative, except as listed above.”    OBJECTIVE:  Constitutional:  No acute distress, Consistent with stated age. Oriented x 3,  Gait normal. Patient is pleasant and cooperative with the interview and exam.    Integumentary: No rashes, ulcers or lesions. No edema.  Normal skin moisture/turgor. Skin is warm to touch, no increased warmth.      Eye: Bilaterally PERRLA, EOMI.   Upper and lower eyelids are normal. Sclera/conjunctiva normal without discharge. Cornea is normal and clear. Lens is normal.  Eyeball appears normal.     ENMT:  Canals  normal without erythema or discharge, no excessive cerumen. Tympanic membranes Grey/pearly, normal light reflex and anatomy. Hearing normal to conversational speech at 2-5 feet. Nares airflow, no discharge. Dentition assessed and discussed appropriate oral care. Tongue normal midline.  no pharyngeal erythema, Uvula midline. No post nasal drip.     CHEST/LUNG: Lungs clear throughout lung fields without rale, rhonchi or wheezes. no distress, no use of accessory muscles.       CARDIOVASCULAR:  Regular rate and rhythm. No murmur noted in sitting, supine positions.        Neuropsych: Normal speech, Thought- normal. No hallucinations, delusions, obsessions.   Appropriate judgement and memory.    Musculoskeletal:  digital clubbing or cyanosis, neurovascularly intact all four extremities.  Upper extremity- Has tenderness on palpation of the shoulders, shoulder blades, elbows, wrist and hands.  Has normal range of motion but complains of pain, has normal sensation  strength 5/5 bilateral UE.  Normal supination, pronation to active/passive ROM and to resisted rotation. Bicep insertion/tricep insertion appear normal without obvious pathology. Rotator cuff evaluated and intact.  Normal wrist ROM bilaterally. Normal hand movement, intrinsic muscles of hands normal.     Lower extremity:   Knee ROM normal at 0-120 degrees. Has tenderness on palpation of " "the hips, knees, and ankles. Normal movement of toes, no tenderness bilateral feet/toes. Normal foot type. Calves symmetrical. Stretching demonstrated today  Cervical: Does not have point tenderness on palpation of the cervical spine but does have paraspinal tenderness and complaints of pain turning head right and left or touching chin to chest.    Lumbar Spine: Normal ROM with bending over, twisting right and left.  Has tenderness on palpation of the lumbar spine. Straight leg raises normal bilaterally. Can heel/toe walk.  Inversion/eversion normal     /76 (BP Location: Left arm, Patient Position: Sitting, Cuff Size: Adult)   Pulse 58   Temp 98.1 °F (36.7 °C) (Oral)   Resp 18   Ht 163.5 cm (64.37\")   Wt 87 kg (191 lb 12.8 oz)   LMP 04/01/2014 (Approximate)   SpO2 98%   Breastfeeding No   BMI 32.55 kg/m²     ASSESSMENT  Adela was seen today for pain.    Diagnoses and all orders for this visit:    Chronic pain syndrome  -     HYDROcodone-acetaminophen (NORCO)  MG per tablet; Take 1 tablet by mouth Every 6 (Six) Hours As Needed for Moderate Pain .    Panic attack  Anxiety  -     ALPRAZolam (XANAX) 0.5 MG tablet; Take 1 tablet by mouth 2 (Two) Times a Day As Needed for Anxiety.        ALMA Report:     As part of this patient's treatment plan, I am prescribing controlled substances. The patient has been made aware of appropriate use of such medications, including potential risk of opiod use, somnolence, limited ability to drive and /or work safely, and potential for dependence or overdose, and opiods should be used sparingly and are not recommended for long term use.  It has also been made clear that these medications are for use by this patient only, without concomitant use of alcohol or other substances unless prescribed.     Patient has completed prescribing agreement detailing terms of continued prescribing of controlled substances, including monitoring ALMA reports, urine drug screening " yearly an random drug screens to monitor patients compliance to treatment plan, and pill counts if necessary. The patient is aware that inappropriate use will result in cessation of prescribing such medications.    Toxassure:  I have ordered a urine drug screen to monitor patients predisposition to and patterns of drug use/misuse in order to establish and maintain the safe and effective use of analgesics in the treatment of chronic pain      ALMA report has been reviewed by: Charlene Huntley, [place], APRN. The report was scanned into the patient's chart.      -     Pt is aware of the potential for addiction and dependence.    Addiction was discussed.  The  Risks, benefits and alternative options of drug therapy discussed.  It was reinforced about the risks and benefits of opioids.  It was reinforced that patient may not call early for medication, may not ask for increase in the number of pills given monthly or increase in dosage of medication, may not jump around to different pharmacies or providers and may not receive any narcotics from other providers including ER, or the contract will be broken and narcotics will no longer be prescribed from this facility if any of these criteria has been broken.       Last Dose was: 3 days ago    Last Fill was:  Nov  Date of last ALMA: today    Date of last UDS: 5/10/19      Obesity Plan:  BMI 32.6  Weight 191 Plan:  Lose 3 pounds before next visit  The patient BMI is outside this range and we recommended/discussed today to utilize a diet/exercise program to get back into the appropriate range.  Federal guidelines recommend that people under the age of 65 should have a BMI of 18.5-24.9  The initial step is to document everything that is consumed into a food diary. Studies have shown that patients can lose up to 2x the weight by keeping track of foods.   Choose one bad food weekly and eliminate it from your diet.  Replace with one healthy food  Goal over next 2-4 weeks is walk  30 minutes per day 5 days per week at pace difficult to hold conversation.   Drink more water, less soda.   Cut back on portion sizes.   Today we encouraged roughly a 1 lb per week weight loss with initial goal of 5% weight loss.  Discussed if eating out for a meal, consider cutting food in half and placing into a to go container.  Individually portion any foods coming into the home based on package.  Use smaller plates  Drinking 12 oz of water 30 minutes before meal as way to suppress appetite.  Medications discussed today include metformin, topamax, phentermine, Qsymia, Belviq (lorcaserin), Contrave (Buproprion/naltrexone).    Lifestyle therapy   Simple advice to lose weight   Internet programs or self help books.    Advice from dietitian  Set Goals that are realistic   Encouraged to use visual aides to help in measuring foods  Baseball-1 cup good for green salad, frozen yogurt, medium piece of fruit, baked potato  Handful - ½ cup good cut fruit, cooked vegetables, pasta, rice  Egg- ¼ cup good for dried fruit  Deck of cards-3 ounces good for meat and poultry  Check book-3 ounces good for grilled fish  6 dice side by side- 1 ½ ounces good for natural cheese  Simple tips   Plate method-reduce plate size to 9 inch dinner plate.  Half of plate should be filled with non-starchy vegetables (broccoli, lettuce, cauliflower, tomatoes), ¼ plate with lean source of protein (lean chicken, turkey, fish), remaining ½ with whole grains (brown rice, potato, whole grain breads  Avoid liquid calories (regular soda, juice, coffee with cream)  Focus  on water, seltzer water and other non-calorie drinks  Replace regular sugar with non-caloric sweeteners  Avoid skipping meals: plan small regular meals throughout the day in order to keep your hunger controlled  Consider using meal replacements if unable to plan a healthy meal (protein shake, high protein bar)  Replace all white bread with whole wheat/whole grain alternative  Swap  regular salad dressings (mayonnaise, butter, or low fat or fat free alternative)  Avoid high fat, high calorie, high carbohydrate snakes (cookies, pastries, cakes)  Snack on fruits, low fat dairy (yogurt, cottage cheese)    Behavioral Modification  Self-Monitoring of dietary Intake-keep a dietary intake log. Obese individuals have been shown to underestimate their food intake  Behavioral treatment -gives exacts about amount and total calories  Read food labels    R/B/A of medications/treatment options d/w  the pt/family today. The patient/family are aware and accept that medications can have side effects.  If there any obvious side effects they should call or return to clinic as soon as possible.  Appropriate f/u discussed for topics addressed today. All questions were answered to the satisfactory state of patient/family.  Should symptoms fail to improve or worsen they agree to call or return to clinic or to go to the ER. Education handouts were offered on any new Rx if requested.  Discussed the importance of f/u with any needed screening tests/labs/specialist appointments and any requested follow-up recommended by me today.  Importance of maintaining f/u discussed and patient accepts that missed appointments can delay diagnosis and potentially lead to worsening of conditions.    Using medications as prescribed.  Discussed need to take regularly as prescribed, to avoid missing doses as able.  Medications reviewed with patient today. We discussed cessation/continuation of medications for current problem.  Needed Rx refills will be provided.  No side effects from medications.       Return in about 1 month (around 1/20/2020) for Recheck chronic pain .    Electronically signed by Charlene Huntley, [place], APRN, 12/20/19, 10:18 AM.

## 2020-01-02 NOTE — PROGRESS NOTES
Feliciano Alvarado   1965  1/3/2020     Chief Complaint   Patient presents with    Other     Follicular lymphoma, unspecified follicular lymphoma type, unspecified body region        INTERVAL HISTORY/HISTORY OF PRESENT ILLNESS:    Diagnosis  · Follicular lymphoma, grade 1, July 2019  · Stage II  · Rheumatoid arthritis on Enbrel    Treatment summary  · 11/18/2019 through 12/10/2019-curative intent RT 3000 cGy      The patient was found to have limited stage follicular lymphoma stage II. She was recommended curative intent RT. She received RT in November and December 2019. She tolerated treatment well. She returns for follow-up today. She denies any B symptoms. She denies any new lymphadenopathy. Otherwise doing well. 10/18/2019-hematology history  The patient is a 49years old female who was first seen by me on 9/13/2019 referred for a diagnosis of follicular lymphoma grade 1. She underwent a CT scan of the chest on 7/12/2019 that revealed axillary adenopathy. She has been followed by Dr. Emmanuel Stone, pulmonary for rheumatoid arthritis with lung involvement. The patient is a current smoker. She also has a diagnosis of deficiency of alpha 1 antitrypsin. The patient report no fever no chills no weight loss but does report frequent night sweats for the last 2 years. She also has complains of fatigue. · 7/12/2019-CT chest without contrast compared to prior CT scan dated 5/1/2018 showed a level 3 right axillary lymph node measuring 2.4 x 2 x 3cm (previously 1.6 x 1.1 x 2 cm). Evaluation of hilar lymph nodes are brought, without benefit of iodine contrast.  Mediastinal nodes likely stable. 5 mm pulmonary nodule appreciated in the right upper lobe. Pleural-based nodule appreciated in the right middle lobe. · 8/9/2019- ultrasound-guided FNA ymph node biopsy showed a follicular lymphoma grade 1 out of 3. Follicular lymphoma, grade 1.  · 9/13/2019-she was first seen by me.   · 9/19/2019-excisional

## 2020-01-03 ENCOUNTER — HOSPITAL ENCOUNTER (OUTPATIENT)
Dept: INFUSION THERAPY | Age: 55
Discharge: HOME | End: 2020-01-03
Payer: MEDICAID

## 2020-01-03 ENCOUNTER — OFFICE VISIT (OUTPATIENT)
Dept: HEMATOLOGY | Age: 55
End: 2020-01-03
Payer: MEDICAID

## 2020-01-03 VITALS
HEIGHT: 63 IN | HEART RATE: 92 BPM | BODY MASS INDEX: 33.4 KG/M2 | DIASTOLIC BLOOD PRESSURE: 80 MMHG | WEIGHT: 188.5 LBS | SYSTOLIC BLOOD PRESSURE: 112 MMHG | OXYGEN SATURATION: 99 %

## 2020-01-03 DIAGNOSIS — C82.90 FOLLICULAR LYMPHOMA, UNSPECIFIED FOLLICULAR LYMPHOMA TYPE, UNSPECIFIED BODY REGION (HCC): ICD-10-CM

## 2020-01-03 PROCEDURE — 85025 COMPLETE CBC W/AUTO DIFF WBC: CPT

## 2020-01-03 PROCEDURE — 99211 OFF/OP EST MAY X REQ PHY/QHP: CPT

## 2020-01-03 PROCEDURE — 99213 OFFICE O/P EST LOW 20 MIN: CPT | Performed by: INTERNAL MEDICINE

## 2020-01-03 RX ORDER — MELATONIN
1000
COMMUNITY

## 2020-01-03 RX ORDER — TOCOPHERSOLAN (VITAMIN E TPGS) 400/15ML
LIQUID (ML) ORAL
COMMUNITY

## 2020-01-03 RX ORDER — FLUTICASONE PROPIONATE 50 MCG
2 SPRAY, SUSPENSION (ML) NASAL PRN
COMMUNITY
Start: 2019-11-20

## 2020-01-03 RX ORDER — VALACYCLOVIR HYDROCHLORIDE 1 G/1
TABLET, FILM COATED ORAL PRN
COMMUNITY
Start: 2017-06-01

## 2020-01-07 PROBLEM — Z92.3 HISTORY OF RADIATION THERAPY: Status: ACTIVE | Noted: 2020-01-07

## 2020-01-08 ENCOUNTER — HOSPITAL ENCOUNTER (OUTPATIENT)
Dept: RADIATION ONCOLOGY | Facility: HOSPITAL | Age: 55
Setting detail: RADIATION/ONCOLOGY SERIES
End: 2020-01-08

## 2020-01-08 ENCOUNTER — OFFICE VISIT (OUTPATIENT)
Dept: RADIATION ONCOLOGY | Facility: HOSPITAL | Age: 55
End: 2020-01-08

## 2020-01-08 VITALS — BODY MASS INDEX: 31.92 KG/M2 | WEIGHT: 187 LBS | HEIGHT: 64 IN

## 2020-01-08 DIAGNOSIS — Z92.3 HISTORY OF RADIATION THERAPY: ICD-10-CM

## 2020-01-08 DIAGNOSIS — Z87.39 HX OF OSTEOMYELITIS: ICD-10-CM

## 2020-01-08 DIAGNOSIS — L03.113 CELLULITIS OF RIGHT UPPER EXTREMITY: Primary | ICD-10-CM

## 2020-01-08 DIAGNOSIS — Z87.891 FORMER SMOKER: ICD-10-CM

## 2020-01-08 DIAGNOSIS — Z98.890 HISTORY OF LYMPH NODE DISSECTION OF RIGHT AXILLA: ICD-10-CM

## 2020-01-08 DIAGNOSIS — C82.04 GRADE 1 FOLLICULAR LYMPHOMA OF LYMPH NODES OF AXILLA (HCC): ICD-10-CM

## 2020-01-08 DIAGNOSIS — I89.0 LYMPHEDEMA OF RIGHT ARM: ICD-10-CM

## 2020-01-08 DIAGNOSIS — M79.601 PAIN IN RIGHT ARM: ICD-10-CM

## 2020-01-08 PROCEDURE — G0463 HOSPITAL OUTPT CLINIC VISIT: HCPCS | Performed by: RADIOLOGY

## 2020-01-08 RX ORDER — AZITHROMYCIN 250 MG/1
250 TABLET, FILM COATED ORAL DAILY
COMMUNITY
Start: 2020-01-06 | End: 2020-01-20

## 2020-01-08 RX ORDER — VALACYCLOVIR HYDROCHLORIDE 1 G/1
1000 TABLET, FILM COATED ORAL AS NEEDED
COMMUNITY
Start: 2017-06-01 | End: 2020-09-11

## 2020-01-08 NOTE — PROGRESS NOTES
[facility]MD Kimberly Sesay BSN, PA-C  ____________________________________________________________  [facility]  Department of Radiation Oncology  [address] [place] [id number]  Office:  [phone]  Fax: [phone]    DATE:   01/08/2020    PATIENT:   Adela Arauz   1965                                 MEDICAL RECORD #:  [id number]    Reason for Follow up Visit: Adela Arauz is a very pleasant 54 y.o. patient that completed radiation therapy on 12/10/2019, called our office yesterday reporting extreme pain in right arm, from wrist to right axillary region at previous biopsy site. This arm extreme pain, swelling and redness started on Saturday, 1/4/2020, presented to go orthopedic surgeon and he sent her for ultrasound which was negative for a clot.  Denies exaggerating circumstances, nothing makes it better, only keeps getting worse even though she has kept it elevated, Norco not helping, she is unable to sleep, ROM is decreased due to pain and swelling.    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 Bone Marrow biopsy negative.  On 09/19/2019, she underwent right axillary lymph node excision by Dr. Jes Enamorado, pathology was consistent with follicular lymphoma, Grade 1. Completed radiation therapy to the supraclavicular nodes, 3000 cGy on 12/10/2019.     05/01/2018 - CT Angio chest:  • No evidence of pulmonary embolus or other acute cardiopulmonary process.  • Bilateral lower lobe subsegmental atelectasis. There are 2 soft tissue pulmonary nodules one within the right middle lobe and one within the right upper lobe which would warrant follow-up with repeat nonenhanced CT imaging of the chest in 6 months. There is also enlarged right axillary, subcarinal and hilar adenopathy  which would warrant follow-up. These nodes may be reactive in nature but given their size I would recommend follow-up at the time of the repeat CT of the chest for evaluation of the right upper and middle lobe pulmonary nodules.    07/30/2018 - Bilateral mammogram:  • No mammographic evidence of malignancy.   • Recommendation is for the patient to return for routine mammography in one year or sooner, if clinically indicated.   • Assessment: BI-RADS Category 2 benign    07/12/2019 - CT Chest:  • Right axillary lymph node increasing in size compared to the previous examination measuring 2.4 x 2 by 3 cm previously measured 1.6 x 1.1 x 2 cm. Differential considerations include reactive lymph nodes; a malignant process not excluded. Correlate with patient presentation. Ultrasound guided biopsy considered if clinically indicated.  • Stable probably benign pulmonary nodules.  • Otherwise, no acute cardiopulmonary process.    08/09/2019 - Lymph node, right axilla, core biopsies:  • Follicular lymphoma, follicular, grade 1 of 3.    09/19/2019 - Axilla, excision of right axillary mass per :   • Follicular lymphoma, grade 1.  • For Flow Cytometry  • + rearrangement BCL2  • CD10(+) B Cells consistent with B Cell lymphoma    09/26/2019 - PET Scan:  • Mild surgical site uptake in the right axilla.   • Additional low level uptake within 4 minimally enlarged right supraclavicular fossa and right subpectoral lymph nodes. This could reflect a reactive adenopathy/reactive uptake given the recent surgery.       09/27/2019- CT Abdomen Pelvis With Contrast:  • No mass lesion or suspicious adenopathy in the abdomen or pelvis.   • Spleen is normal in size.  • Cholelithiasis.     10/07/2019 - Appointment with :  • Bone marrow biopsy in [facility] with [facility]   • Referral to radiation oncology  • RTC MD in 4 weeks    10/09/2019 - US Breast Bilateral Limited/ Mammo:  • Negative mammogram and ultrasound.   • BI-RADS  1.    •  Follow-up of the palpable abnormality should be based on clinical suspicion.  • Screening mammography recommended in one year.     10/18/2019 - Underwent bone marrow aspiration biopsy:  · Hypercellular bone marrow 30 to 40%.  · No signs of lymphoma involvement.   · Flow cytometry was unremarkable.   · Normal female karyotype 40 XX.    10/21/2019 - Consult with :  • After careful consideration of the available clinical diagnostic data and extensive examination and evaluation of the patient, it is my recommendation to treat the left axillary and clavicular lymph nodes with radiation therapy if the bone marrow is negative.    • I anticipate of dose of 3000 cGy over 15 fractions, final course to be determined with planning.     • We will perform CT simulation to initiate the treatment planning and notify the patient when complete to begin.     11/18/2019 - 12/10/2019 - Completed radiation therapy:  • Received 3000 cGy in 15 treatment fractions to the supraclavicular nodes    01/03/2020 - Appointment with :  • RTC 4 months.  • Follow-up with RT.  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    • Low back pain    • Osteoarthritis    • Osteoporosis    • PONV (postoperative nausea and vomiting)    • RA (rheumatoid arthritis) (CMS/HCC)    • Senile osteoporosis 9/28/2017      PAST SURGICAL HISTORY   Past Surgical History:   Procedure Laterality Date   • AXILLARY LYMPH NODE BIOPSY/EXCISION Right 9/19/2019    Procedure: EXCISION RIGHT AXILLARY MASS;  Surgeon: Jes Enamorado MD;  Location: [facility];  Service: General   • SHOULDER SURGERY     • TUBAL ABDOMINAL LIGATION     • US GUIDED LYMPH NODE BIOPSY  8/9/2019   • WRIST SURGERY Bilateral     fracture      FAMILY HISTORY  family history includes  Arthritis in her brother, father, maternal grandfather, mother, and sister; Atrial fibrillation in her mother; COPD in her mother; Heart disease in her father; Hypertension in her father; Melanoma in her father; No Known Problems in her daughter; Osteoarthritis in her mother.     SOCIAL HISTORY   Social History     Tobacco Use   • Smoking status: Former Smoker     Packs/day: 1.00     Years: 20.00     Pack years: 20.00     Types: Cigarettes     Last attempt to quit: 9/3/2018     Years since quittin.3   • Smokeless tobacco: Never Used   • Tobacco comment: quit 2018   Substance Use Topics   • Alcohol use: No   • Drug use: No      ALLERGIES  Patient has no known allergies.     MEDICATIONS   Current Outpatient Medications   Medication Sig Dispense Refill   • ALPRAZolam (XANAX) 0.5 MG tablet Take 1 tablet by mouth 2 (Two) Times a Day As Needed for Anxiety. 60 tablet 0   • azithromycin (ZITHROMAX) 250 MG tablet Take 250 mg by mouth Daily.     • Calcium-Magnesium-Vitamin D 500-250-200 MG-MG-UNIT tablet Take 500 tablets by mouth 2 (Two) Times a Day.     • cholecalciferol (VITAMIN D3) 1000 units tablet Take 1,000 Units by mouth Daily.     • DULoxetine (CYMBALTA) 60 MG capsule TAKE ONE CAPSULE BY MOUTH DAILY 30 capsule 2   • fluticasone (FLONASE) 50 MCG/ACT nasal spray 2 sprays into the nostril(s) as directed by provider Daily. 1 bottle 3   • gabapentin (NEURONTIN) 300 MG capsule Take 1 capsule by mouth 3 (Three) Times a Day. 90 capsule 5   • HYDROcodone-acetaminophen (NORCO)  MG per tablet Take 1 tablet by mouth Every 6 (Six) Hours As Needed for Moderate Pain . 90 tablet 0   • hydroxychloroquine (PLAQUENIL) 200 MG tablet Take 200 mg by mouth 2 (Two) Times a Day.  2   • omeprazole (priLOSEC) 40 MG capsule Take 1 capsule by mouth Daily. 30 capsule 5   • sertraline (ZOLOFT) 50 MG tablet Take 1 tablet by mouth Every Night. 90 tablet 1   • tiZANidine (ZANAFLEX) 4 MG tablet Take 1 tablet by mouth 2 (Two) Times a Day As  "Needed for Muscle Spasms. 60 tablet 5   • promethazine-dextromethorphan (PROMETHAZINE-DM) 6.25-15 MG/5ML syrup Take 5 mL by mouth 4 (Four) Times a Day As Needed for Cough. 120 mL 0   • valACYclovir (VALTREX) 1000 MG tablet Take 1,000 mg by mouth As Needed.       No current facility-administered medications for this visit.       The following portions of the patient's history were reviewed and updated as appropriate: allergies, current medications, past family history, past medical history, past social history, past surgical history and problem list.    REVIEW OF SYSTEMS  Review of Systems   Constitutional: Positive for appetite change (decrease) and fatigue. Negative for unexpected weight change.   HENT: Negative.    Eyes: Negative.    Respiratory: Negative.    Cardiovascular: Negative.    Gastrointestinal: Negative.    Endocrine: Negative.    Genitourinary: Negative.    Musculoskeletal:        \"Burning pain\" to right arm, edema, warm to touch, present x last 4 days  10/10 pain level, Norco 10 not helping  Decrease ROM due to pain right arm     Skin: Negative.    Allergic/Immunologic: Negative.    Neurological: Negative.  Negative for numbness.   Hematological: Negative.    Psychiatric/Behavioral: Negative.      PHYSICAL EXAM  VITAL SIGNS:   Vitals:    01/08/20 1357   Weight: 84.8 kg (187 lb)   Height: 163.5 cm (64.37\")   PainSc: 10-Worst pain ever   PainLoc: Arm  Comment: right   General Appearance:  awake, alert, oriented, in no acute distress, cooperative. Appears stated age.   Head: Normocephalic  Eyes: Conjunctiva pink, pupils equal and reactive.   Ears:  Normal externally.  Hearing- normal to conversation  Nose/Sinuses:  Mucosa normal. No drainage or sinus tenderness.  Mouth/Throat:  Mucosa moist, no lesions; pharynx without erythema, edema or exudate.  Neck: Supple, no mass, non-tender  Back:  Symmetric, no curvature, ROM normal, no CVA tenderness   Lungs:  Normal expansion.  Clear to auscultation.  No rales, " rhonchi, or wheezing.  Heart:  Heart sounds are normal.  Regular rate and rhythm without murmur, gallop or rub.  Abdomen:  Soft, non-tender, normal bowel sounds; no bruits, organomegaly or masses.  Lymphatics: no supraclavicular or axillary adenopathy felt bilaterally  Extremities: Pulses 2+ bilaterally. Right arm tenderness, fullness from wrist to right axillary incision, sensitive to touch. Lower right arm very warm to touch over old incision site and surrounding 10x8 area.     Bilateral arm measurements are as follows:  Wrist -R 21 L 18.5  Mid Forearm- R 26.5 L 24.5  2 inches above elbow- R 13.75 L 13.5          Musculoskeletal: strength and sensation grossly normal, decreased ROM in right arm. 10x8 cm indurated area, extremely tender over old incision site, red and warm to touch.  Patient offers history of prior infection at the site that required 'surgical procedure and bone scrubbing.'  Neurologic:  Alert and oriented, gait normal  Psych exam: normal situational behavior   Skin:  Warm and moist. No suspicious lesions or rashes of concern     Performance Status: ECOG (0) Fully active, able to carry on all predisease performance without restriction    Clinical Quality Measures  Adela Arauz reports a pain score of 10/10- right arm, worst pain ever.  Given her pain assessment as noted, treatment options were discussed and the following options were decided upon as a follow-up plan to address the patient's pain: continuation of current treatment plan for pain.  -Advanced Care Planning Care plan discussed, no care plan provided  -Body Mass Index Screening and Follow-Up Plan Patient's Body mass index is 31.73 kg/m². BMI is above normal parameters. Recommendations include: educational material.  -Tobacco Use: Screening and Cessation Intervention Social History    Tobacco Use      Smoking status: Former Smoker        Packs/day: 1.00        Years: 20.00        Pack years: 20        Types: Cigarettes        Quit  date: 9/3/2018        Years since quittin.3      Smokeless tobacco: Never Used      Tobacco comment: quit 2018     ASSESSMENT AND PLAN   1. Cellulitis of right upper extremity    2. Lymphedema of right arm    3. Pain in right arm    4. Hx of osteomyelitis    5. Grade 1 follicular lymphoma of lymph nodes of axilla (CMS/HCC)    6. History of lymph node dissection of right axilla    7. History of radiation therapy    8. Former smoker       Orders Placed This Encounter   Procedures   • Ambulatory Referral to Physical Therapy Evaluate and treat, Bioimpedance, Lymphedema     RECOMMENDATIONS: Adela Arauz is status post completion of radiation therapy on 12/10/2019.  Ms. Mason called our office yesterday having significant pain in the right arm.  She states she saw Dr. Thompson, orthopedic surgeon who ordered a ultrasound evaluate for clot and was negative.  She states was placed on a Z-Charles on Monday by him and is on day #3 with worsening symptoms.  She offers a history of prior infection at the same site that required surgical intervention due to questionable bone involvement.    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 Bone Marrow biopsy negative.  On 2019, she underwent excisional lymph node biopsy by Dr. Jes Enamorado pathology was consistent with follicular lymphoma, Grade 1. Completed radiation therapy to the supraclavicular nodes on 12/10/2019.     On exam, I find no supraclavicular or axillary adenopathy bilaterally, right arm is very tender with fullness from wrist to right axillary incision, sensitive to touch. Lower right arm very warm to touch over old incision site and surrounding 10x8 area. Bilateral arm measurements are as follows:  Wrist -R 21 L 18.5 /  Mid Forearm- R 26.5 L 24.5 /  2 inches above elbow- R 13.75 L 13.5    With her history of right axillary and  supraclavicular node lymphoma, right axillary excision of lymph node and very recent radiation to the supraclavicular area, this certainly could be a component of early lymphedema but with the exquisite tenderness over the old incision site and the indurated area surrounding it, I am leaning more towards an infectious process. She stated then that she had a procedure in 2017 where the orthopedic surgeon had to go in, scrape the bone and clean the infection out which sounds more like osteomyelitis.  With recent chemoradiation, possible compromised immunity, and mild or early lymphedema, and infectious process could certainly result.  She denies any injury or breaking of the skin.     As for the lymphoma, she is without symptoms or evidence for recurrent or metastatic disease at this time, Next appt with Dr. Marie is in May 2020, we will see her again in 3-4 months;  I will refer her to lymphedema on an urgent basis and I have asked her to call Dr. Thompson today to make him aware of the worsening state of the arm. Will continue follow-up/surveillance as discussed    They will continue to see the other health care providers as per their scheduling.    Patient Instructions   Will place a referral for lymphedema, they will call you.  Call Dr. Thompson to evaluate the right arm again.    Todays appointment time was spent in counseling, coordination of care and surveillance related to patients diagnosis as well as radiation therapy possible and probable after effects.   Bubba Brar MD  01/08/2020

## 2020-01-08 NOTE — PATIENT INSTRUCTIONS
Will place a referral for lymphedema, they will call you.  Call Dr. Thompson to evaluate the right arm again.

## 2020-01-20 ENCOUNTER — OFFICE VISIT (OUTPATIENT)
Dept: FAMILY MEDICINE CLINIC | Facility: CLINIC | Age: 55
End: 2020-01-20

## 2020-01-20 VITALS
OXYGEN SATURATION: 97 % | DIASTOLIC BLOOD PRESSURE: 81 MMHG | HEIGHT: 64 IN | WEIGHT: 186.4 LBS | BODY MASS INDEX: 31.82 KG/M2 | RESPIRATION RATE: 18 BRPM | TEMPERATURE: 98.7 F | HEART RATE: 84 BPM | SYSTOLIC BLOOD PRESSURE: 137 MMHG

## 2020-01-20 DIAGNOSIS — Z22.322 MRSA (METHICILLIN RESISTANT STAPH AUREUS) CULTURE POSITIVE: Primary | ICD-10-CM

## 2020-01-20 DIAGNOSIS — G89.4 CHRONIC PAIN SYNDROME: ICD-10-CM

## 2020-01-20 DIAGNOSIS — F41.0 PANIC ATTACK: ICD-10-CM

## 2020-01-20 DIAGNOSIS — F41.9 ANXIETY: ICD-10-CM

## 2020-01-20 DIAGNOSIS — E66.09 CLASS 1 OBESITY DUE TO EXCESS CALORIES WITHOUT SERIOUS COMORBIDITY WITH BODY MASS INDEX (BMI) OF 31.0 TO 31.9 IN ADULT: ICD-10-CM

## 2020-01-20 PROCEDURE — 99214 OFFICE O/P EST MOD 30 MIN: CPT | Performed by: NURSE PRACTITIONER

## 2020-01-20 RX ORDER — HYDROCODONE BITARTRATE AND ACETAMINOPHEN 10; 325 MG/1; MG/1
1 TABLET ORAL EVERY 6 HOURS PRN
Qty: 90 TABLET | Refills: 0 | Status: SHIPPED | OUTPATIENT
Start: 2020-01-20 | End: 2020-02-21 | Stop reason: SDUPTHER

## 2020-01-20 RX ORDER — ALPRAZOLAM 0.5 MG/1
0.5 TABLET ORAL 2 TIMES DAILY PRN
Qty: 60 TABLET | Refills: 0 | Status: SHIPPED | OUTPATIENT
Start: 2020-01-20 | End: 2020-02-21 | Stop reason: SDUPTHER

## 2020-01-20 NOTE — PROGRESS NOTES
Chief Complaint   Patient presents with   • Pain     Pt is here for chronic pain and medication refills.          No Known Allergies    History provided by: self     HPI:  Subjective   Adela Arauz is a 55 y.o. female presents today for follow up for chronic problems, med refills.  Has been in the hospital for MRSA in her arm and had to have it débrided and given strong antibiotics IV.   She was admitted to [facility] Jan 9, 2020 and Dr. Carvajal did surgery the next day.   She is doing much better.  She just finished her radiation for follicular lymphoma.   She still has the dressing on her forearm where Dr. Carvajal operated.  Denies any fever or chills.  Says her pain is worse in all her joints because she had been off her RA meds to get radiation, then she was in the hospital and she missed a couple doses of pain meds.  Says her pain level of back is 9/10      Chronic problems:  Increased anxiety from recent cancer diagnosis stable with alprazolam, vit d def stable with cholecalciferol, fibromyalgia stable with cymbalta, depression stable with sertraline, GERD stable with omeprazole, RA stable with gabapentin and norco. Follicular lymphoma just finished radiation.  Had episode of MRSA that she was just in the hospital for.     PCP currently listed as Charlene Huntley, DNP, APRN.     Advance Directive: None  The following portions of the patient's history were reviewed and updated as appropriate: allergies, current medications, past family history, past medical history, past social history, past surgical history and problem list      Current Outpatient Medications:   •  ALPRAZolam (XANAX) 0.5 MG tablet, Take 1 tablet by mouth 2 (Two) Times a Day As Needed for Anxiety., Disp: 60 tablet, Rfl: 0  •  Calcium-Magnesium-Vitamin D 500-250-200 MG-MG-UNIT tablet, Take 500 tablets by mouth 2 (Two) Times a Day., Disp: , Rfl:   •  cholecalciferol (VITAMIN D3) 1000 units tablet, Take 1,000 Units by  mouth Daily., Disp: , Rfl:   •  DULoxetine (CYMBALTA) 60 MG capsule, TAKE ONE CAPSULE BY MOUTH DAILY, Disp: 30 capsule, Rfl: 2  •  fluticasone (FLONASE) 50 MCG/ACT nasal spray, 2 sprays into the nostril(s) as directed by provider Daily., Disp: 1 bottle, Rfl: 3  •  gabapentin (NEURONTIN) 300 MG capsule, Take 1 capsule by mouth 3 (Three) Times a Day., Disp: 90 capsule, Rfl: 5  •  HYDROcodone-acetaminophen (NORCO)  MG per tablet, Take 1 tablet by mouth Every 6 (Six) Hours As Needed for Moderate Pain ., Disp: 90 tablet, Rfl: 0  •  hydroxychloroquine (PLAQUENIL) 200 MG tablet, Take 200 mg by mouth 2 (Two) Times a Day., Disp: , Rfl: 2  •  omeprazole (priLOSEC) 40 MG capsule, Take 1 capsule by mouth Daily., Disp: 30 capsule, Rfl: 5  •  promethazine-dextromethorphan (PROMETHAZINE-DM) 6.25-15 MG/5ML syrup, Take 5 mL by mouth 4 (Four) Times a Day As Needed for Cough., Disp: 120 mL, Rfl: 0  •  sertraline (ZOLOFT) 50 MG tablet, Take 1 tablet by mouth Every Night., Disp: 90 tablet, Rfl: 1  •  tiZANidine (ZANAFLEX) 4 MG tablet, Take 1 tablet by mouth 2 (Two) Times a Day As Needed for Muscle Spasms., Disp: 60 tablet, Rfl: 5  •  valACYclovir (VALTREX) 1000 MG tablet, Take 1,000 mg by mouth As Needed., Disp: , Rfl:   Past Medical History:   Diagnosis Date   • AAT (alpha-1-antitrypsin) deficiency (CMS/HCC) 1/17/2019   • 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, 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    • Low back pain    • Osteoarthritis    • Osteoporosis    • PONV (postoperative nausea and vomiting)    • RA (rheumatoid arthritis) (CMS/HCC)    • Senile osteoporosis 9/28/2017     Past Surgical History:   Procedure Laterality Date   • AXILLARY LYMPH NODE BIOPSY/EXCISION Right 9/19/2019    Procedure: EXCISION RIGHT AXILLARY MASS;  Surgeon: Jes Enamorado MD;  Location: [facility];  Service: General   • SHOULDER SURGERY     • TUBAL ABDOMINAL LIGATION     • US GUIDED LYMPH NODE BIOPSY  8/9/2019   • WRIST SURGERY Bilateral     fracture     Social History  "    Socioeconomic History   • Marital status:      Spouse name: Not on file   • Number of children: Not on file   • Years of education: Not on file   • Highest education level: Not on file   Tobacco Use   • Smoking status: Former Smoker     Packs/day: 1.00     Years: 20.00     Pack years: 20.00     Types: Cigarettes     Last attempt to quit: 9/3/2018     Years since quittin.3   • Smokeless tobacco: Never Used   • Tobacco comment: quit 2018   Substance and Sexual Activity   • Alcohol use: No   • Drug use: No   • Sexual activity: Defer     Family History   Problem Relation Age of Onset   • Arthritis Mother    • COPD Mother    • Atrial fibrillation Mother    • Osteoarthritis Mother    • Heart disease Father    • Hypertension Father    • Arthritis Father    • Melanoma Father         metastatic   • Arthritis Sister    • Arthritis Brother    • No Known Problems Daughter    • Arthritis Maternal Grandfather    • Breast cancer Neg Hx        REVIEW OF SYMPTOMS: (Positives bolded)  General:  weight loss, fever, chills, night sweats, fatigue, appetite loss  HEENT:  blurry vision, eye pain, eye discharge, dry eyes, decreased vision  Respiratory: shortness of breath, cough, hemoptysis, wheezing, pleurisy,   Cardiovascular:  chest pain, PND, palpitation, edema, orthopnea, syncope  Gastro: Nausea, vomiting, diarrhea, hematemesis, abdominal pain, constipation  Genito: hematuria, dysuria, glycosuria, hesitancy, frequency, incontinence  Musckelo: Arthralgia, myalgia, muscle weakness, joint swelling, NSAID use  Skin: rash, pruritis, sores, nail changes, skin thickening, change in wart/mole, itching   Neuro:  Migraine, numbness, ataxia, tremor, vertigo, weakness, memory loss  \"All other systems reviewed and negative, except as listed above.”    OBJECTIVE:  Constitutional:  No acute distress, Consistent with stated age. Oriented x 3,  Gait normal. Patient is pleasant and cooperative with the interview and " "exam.    Integumentary: No rashes, ulcers or lesions. No edema.  Normal skin moisture/turgor. Skin is warm to touch, no increased warmth.      CHEST/LUNG: Lungs clear throughout lung fields without rale, rhonchi or wheezes. no distress, no use of accessory muscles.       CARDIOVASCULAR:  Regular rate and rhythm. No murmur noted in sitting, supine positions.      ABDOMEN:  Bowel sounds normal, no abdominal bruits. Abd soft, non-tender, no rebound tenderness, no rigidity (guarding), no jar tenderness, no masses.  no hepatomegaly, no splenomegaly     Neuropsych: Normal speech, Thought- normal. No hallucinations, delusions, obsessions.   Appropriate judgement and memory.    Musculoskeletal:  digital clubbing or cyanosis, neurovascularly intact all four extremities.  R Upper extremity- has a dressing in place on the right forearm.  Has good rom and  is 4/5.  Normal wrist ROM bilaterally. Normal hand movement, intrinsic muscles of hands normal. Has tenderness on palpation of the hands, wrists/elbows.   Lumbar Spine:  Decreased ROM with bending and twisting, has tenderness on palpation of her lumbar spine, knees and hips    /81 (BP Location: Left arm, Patient Position: Sitting, Cuff Size: Large Adult)   Pulse 84   Temp 98.7 °F (37.1 °C) (Oral)   Resp 18   Ht 163.5 cm (64.37\")   Wt 84.6 kg (186 lb 6.4 oz)   LMP 04/01/2014 (Approximate)   SpO2 97%   Breastfeeding No   BMI 31.63 kg/m²     ASSESSMENT  Adela was seen today for pain.    Diagnoses and all orders for this visit:    MRSA (methicillin resistant staph aureus) culture positive  -     mupirocin (BACTROBAN) 2 % ointment; Apply to nostrils and fingernails nighlty x 5 nights same 5 nights monthly x 6 months.    Panic attack  -     ALPRAZolam (XANAX) 0.5 MG tablet; Take 1 tablet by mouth 2 (Two) Times a Day As Needed for Anxiety.    Anxiety  -     ALPRAZolam (XANAX) 0.5 MG tablet; Take 1 tablet by mouth 2 (Two) Times a Day As Needed for " Anxiety.    Chronic pain syndrome  -     HYDROcodone-acetaminophen (NORCO)  MG per tablet; Take 1 tablet by mouth Every 6 (Six) Hours As Needed for Moderate Pain .        ALMA Report:     As part of this patient's treatment plan, I am prescribing controlled substances. The patient has been made aware of appropriate use of such medications, including potential risk of opiod use, somnolence, limited ability to drive and /or work safely, and potential for dependence or overdose, and opiods should be used sparingly and are not recommended for long term use.  It has also been made clear that these medications are for use by this patient only, without concomitant use of alcohol or other substances unless prescribed.     Patient has completed prescribing agreement detailing terms of continued prescribing of controlled substances, including monitoring ALMA reports, urine drug screening yearly an random drug screens to monitor patients compliance to treatment plan, and pill counts if necessary. The patient is aware that inappropriate use will result in cessation of prescribing such medications.    Toxassure:  I have ordered a urine drug screen to monitor patients predisposition to and patterns of drug use/misuse in order to establish and maintain the safe and effective use of analgesics in the treatment of chronic pain      ALMA report has been reviewed by: Charlene Huntley, DNP, APRN.   The report was scanned into the patient's chart.      -     Pt is aware of the potential for addiction and dependence.    Addiction was discussed.  The  Risks, benefits and alternative options of drug therapy discussed.  It was reinforced about the risks and benefits of opioids.  It was reinforced that patient may not call early for medication, may not ask for increase in the number of pills given monthly or increase in dosage of medication, may not jump around to different pharmacies or providers and may not receive any  narcotics from other providers including ER, or the contract will be broken and narcotics will no longer be prescribed from this facility if any of these criteria has been broken.      Last Dose was: yesterday    Last Fill was:  Dec  Date of last ALMA:         Date of last UDS: 5/10/19    Class 1 obesity due to excess calories without serious comorbidity with body mass index (BMI) of 31.0 to 31.9 in adult  Obesity Plan:  BMI 31.6 Weight 186 Plan:  Lose 3 pounds before next visit  • The patient BMI is outside this range and we recommended/discussed today to utilize a diet/exercise program to get back into the appropriate range.  • Federal guidelines recommend that people under the age of 65 should have a BMI of 18.5-24.9  • The initial step is to document everything that is consumed into a food diary. Studies have shown that patients can lose up to 2x the weight by keeping track of foods.   • Choose one bad food weekly and eliminate it from your diet.  Replace with one healthy food  • Goal over next 2-4 weeks is walk 30 minutes per day 5 days per week at pace difficult to hold conversation.   • Drink more water, less soda.   • Cut back on portion sizes.   • Today we encouraged roughly a 1 lb per week weight loss with initial goal of 5% weight loss.  • Discussed if eating out for a meal, consider cutting food in half and placing into a to go container.  Individually portion any foods coming into the home based on package.  • Use smaller plates  • Drinking 12 oz of water 30 minutes before meal as way to suppress appetite.  • Medications discussed today include metformin, topamax, phentermine, Qsymia, Belviq (lorcaserin), Contrave (Buproprion/naltrexone).    • Lifestyle therapy   • Simple advice to lose weight   • Internet programs or self help books.    • Advice from dietitian  • Set Goals that are realistic   • Encouraged to use visual aides to help in measuring foods  • Baseball-1 cup good for green salad, frozen  yogurt, medium piece of fruit, baked potato  • Handful - ½ cup good cut fruit, cooked vegetables, pasta, rice  • Egg- ¼ cup good for dried fruit  • Deck of cards-3 ounces good for meat and poultry  • Check book-3 ounces good for grilled fish  • 6 dice side by side- 1 ½ ounces good for natural cheese  • Simple tips   • Plate method-reduce plate size to 9 inch dinner plate.  Half of plate should be filled with non-starchy vegetables (broccoli, lettuce, cauliflower, tomatoes), ¼ plate with lean source of protein (lean chicken, turkey, fish), remaining ½ with whole grains (brown rice, potato, whole grain breads  • Avoid liquid calories (regular soda, juice, coffee with cream)  • Focus  on water, seltzer water and other non-calorie drinks  • Replace regular sugar with non-caloric sweeteners  • Avoid skipping meals: plan small regular meals throughout the day in order to keep your hunger controlled  • Consider using meal replacements if unable to plan a healthy meal (protein shake, high protein bar)  • Replace all white bread with whole wheat/whole grain alternative  • Swap regular salad dressings (mayonnaise, butter, or low fat or fat free alternative)  • Avoid high fat, high calorie, high carbohydrate snakes (cookies, pastries, cakes)  • Snack on fruits, low fat dairy (yogurt, cottage cheese)    Behavioral Modification  • Self-Monitoring of dietary Intake-keep a dietary intake log. Obese individuals have been shown to underestimate their food intake  • Behavioral treatment -gives exacts about amount and total calories  • Read food labels    R/B/A of medications/treatment options d/w  the pt/family today. The patient/family are aware and accept that medications can have side effects.  If there any obvious side effects they should call or return to clinic as soon as possible.  Appropriate f/u discussed for topics addressed today. All questions were answered to the satisfactory state of patient/family.  Should symptoms  fail to improve or worsen they agree to call or return to clinic or to go to the ER. Education handouts were offered on any new Rx if requested.  Discussed the importance of f/u with any needed screening tests/labs/specialist appointments and any requested follow-up recommended by me today.  Importance of maintaining f/u discussed and patient accepts that missed appointments can delay diagnosis and potentially lead to worsening of conditions.    Using medications as prescribed.  Discussed need to take regularly as prescribed, to avoid missing doses as able.  Medications reviewed with patient today. We discussed cessation/continuation of medications for current problem.  Needed Rx refills will be provided.  No side effects from medications.       Return in about 1 month (around 2/20/2020) for Recheck chronic pain and anxiety.    Electronically signed by Charlene Huntley DNP, APRN, 01/20/20, 9:40 AM.

## 2020-01-20 NOTE — PATIENT INSTRUCTIONS
MRSA    May use 1 cup bleach to tub of water- rinse down and pat dry.  Leave on for 8-12 hours then shower off.   Repeat monthly    Use mupirocin ointment in nostrils nightly x 5 nights, then repeat monthly x 6 months.  Do not use longer than 5 days monthly    May purchase Doterra oils: on guard and run in diffuser at night, or purchase bar soap or foaming soap.

## 2020-02-05 DIAGNOSIS — F41.0 PANIC ATTACKS: ICD-10-CM

## 2020-02-19 ENCOUNTER — OFFICE VISIT (OUTPATIENT)
Dept: PULMONOLOGY | Facility: CLINIC | Age: 55
End: 2020-02-19

## 2020-02-19 VITALS
OXYGEN SATURATION: 95 % | SYSTOLIC BLOOD PRESSURE: 130 MMHG | BODY MASS INDEX: 31.76 KG/M2 | HEART RATE: 69 BPM | HEIGHT: 64 IN | DIASTOLIC BLOOD PRESSURE: 80 MMHG | WEIGHT: 186 LBS

## 2020-02-19 DIAGNOSIS — E88.01 AAT (ALPHA-1-ANTITRYPSIN) DEFICIENCY (HCC): ICD-10-CM

## 2020-02-19 DIAGNOSIS — M06.9 RHEUMATOID ARTHRITIS INVOLVING MULTIPLE SITES, UNSPECIFIED RHEUMATOID FACTOR PRESENCE: ICD-10-CM

## 2020-02-19 DIAGNOSIS — R91.8 MULTIPLE LUNG NODULES: Primary | ICD-10-CM

## 2020-02-19 PROCEDURE — 94010 BREATHING CAPACITY TEST: CPT | Performed by: INTERNAL MEDICINE

## 2020-02-19 PROCEDURE — 99214 OFFICE O/P EST MOD 30 MIN: CPT | Performed by: INTERNAL MEDICINE

## 2020-02-19 NOTE — PROCEDURES
Pulmonary Function Test  Performed by: Yan Seay MD  Authorized by: Yan Seay MD      Pre Drug    FVC: 117%   FEV1: 109%   FEF 25-75%: 95%   FEV1/FVC: 76.6%

## 2020-02-19 NOTE — PROGRESS NOTES
Subjective   Adela Arauz is a 55 y.o. female.     Background: Pt with multiple lung nodules stable 7/2019, RA, panic attacks.  Alpha 1 SZ genotype    Chief Complaint   Patient presents with   • multiple lung nodules        History of Present Illness   She got MRSA in her arm last month.  She had a lymph node resection followed by radiation for 4 weeks prior to that.  She is not having problems with that any more. She was hospitalized of several days.  She was treated by Dr. Thompson.  She is followin up with Dr. 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), and Senile osteoporosis (9/28/2017).   has a past surgical history that includes Wrist surgery (Bilateral); Shoulder surgery; US Guided Lymph Node Biopsy (8/9/2019); Axillary Lymph Node Biopsy/Excision (Right, 9/19/2019); and Tubal ligation.  family history includes Arthritis in her brother, father, maternal grandfather, mother, and sister; Atrial fibrillation in her mother; COPD in her mother; Heart disease in her father; Hypertension in her father; Melanoma in her father; No Known Problems in her daughter; Osteoarthritis in her mother.   reports that she quit smoking about 17 months ago. Her smoking use included cigarettes. She has a 20.00 pack-year smoking history. She has never used smokeless tobacco. She reports that she does not drink alcohol or use drugs.  No Known Allergies  Medications    Current Outpatient Medications:   •  ALPRAZolam (XANAX) 0.5 MG tablet, Take 1 tablet by mouth 2 (Two) Times a Day As Needed for Anxiety., Disp: 60 tablet, Rfl: 0  •  Calcium-Magnesium-Vitamin D 500-250-200 MG-MG-UNIT tablet, Take 500 tablets by mouth 2 (Two) Times a Day., Disp: , Rfl:   •   "cholecalciferol (VITAMIN D3) 1000 units tablet, Take 1,000 Units by mouth Daily., Disp: , Rfl:   •  DULoxetine (CYMBALTA) 60 MG capsule, TAKE ONE CAPSULE BY MOUTH DAILY, Disp: 30 capsule, Rfl: 2  •  fluticasone (FLONASE) 50 MCG/ACT nasal spray, 2 sprays into the nostril(s) as directed by provider Daily., Disp: 1 bottle, Rfl: 3  •  gabapentin (NEURONTIN) 300 MG capsule, Take 1 capsule by mouth 3 (Three) Times a Day., Disp: 90 capsule, Rfl: 5  •  HYDROcodone-acetaminophen (NORCO)  MG per tablet, Take 1 tablet by mouth Every 6 (Six) Hours As Needed for Moderate Pain ., Disp: 90 tablet, Rfl: 0  •  hydroxychloroquine (PLAQUENIL) 200 MG tablet, Take 200 mg by mouth 2 (Two) Times a Day., Disp: , Rfl: 2  •  mupirocin (BACTROBAN) 2 % ointment, Apply to nostrils and fingernails nighlty x 5 nights same 5 nights monthly x 6 months., Disp: 30 g, Rfl: 5  •  omeprazole (priLOSEC) 40 MG capsule, Take 1 capsule by mouth Daily., Disp: 30 capsule, Rfl: 5  •  sertraline (ZOLOFT) 50 MG tablet, Take 1 tablet by mouth Every Night., Disp: 90 tablet, Rfl: 1  •  tiZANidine (ZANAFLEX) 4 MG tablet, Take 1 tablet by mouth 2 (Two) Times a Day As Needed for Muscle Spasms., Disp: 60 tablet, Rfl: 5  •  valACYclovir (VALTREX) 1000 MG tablet, Take 1,000 mg by mouth As Needed., Disp: , Rfl:     Review of Systems   Constitutional: Negative for chills and fever.   Respiratory: Negative for wheezing and stridor.    Cardiovascular: Negative for chest pain.   Gastrointestinal: Negative for nausea and vomiting.         Objective   /80   Pulse 69   Ht 163.5 cm (64.37\")   Wt 84.4 kg (186 lb)   LMP 04/01/2014 (Approximate)   SpO2 95% Comment: RA  Breastfeeding No   BMI 31.56 kg/m²   Physical Exam   Constitutional: She appears well-developed. She does not appear ill. No distress.   HENT:   Head: Atraumatic.   Nose: Nose normal.   Eyes: Conjunctivae and EOM are normal. No scleral icterus.   Neck: Neck supple. No tracheal deviation present. "   Cardiovascular: Normal rate, regular rhythm, S1 normal and S2 normal.   Pulmonary/Chest: Effort normal and breath sounds normal. No stridor. She has no wheezes.   Abdominal: Soft. She exhibits no distension. There is no tenderness.   Musculoskeletal: She exhibits no deformity.   Neurological: She is alert.   Skin: Skin is warm. No rash noted.   Psychiatric: She has a normal mood and affect.        -----------------------------------------------------------------------------------------------    EXAMINATION:  CT CHEST WO CONTRAST-  7/12/2019 11:24 AM CDT     HISTORY: follow up nodules; R91.8-Other nonspecific abnormal finding of  lung field      COMPARISON: CT angiogram dated 05/01/2018     TECHNIQUE: Radiation dose equals  mGy-cm.  Automated exposure  control dose reduction technique was implemented.     Thin section axial imaging was obtained without intravenous contrast.  2-D sagittal and coronal reconstruction images were generated.     FINDINGS:      There is a prominent level 3 right axillary lymph node observed just  anterior to the axillary artery and vein measuring 2.4 x 2 x 3 cm  previously measured 1.6 x 1.1 x 2 cm. Reactive nodes appreciated with  inflammatory changes considered, malignant process not excluded.  Ultrasound guided biopsy suggested if clinically indicated.     Again identified are additional smaller level 2 and level 3 lymph nodes  are observed bilaterally and noted previously.     Evaluation of the hilar lymph nodes suboptimal without benefit of  iodinated contrast. Mediastinal nodes are likely stable.     There is a stable 5 mm pulmonary nodule appreciated in the right upper  lobe, just above the minor fissure, centrally, axial image #62.     The pleural-based nodule appreciated in the right middle lobe, axial  image #75 is also stable.     There are paraseptal emphysematous changes identified in the upper  lobes. There is emphysematous blebs in the lung apices.     There are no  developing pulmonary nodules or masses. There are no  parenchymal infiltrates or pleural effusions.     There is resolution of interstitial groundglass opacities noted on the  previous study.     Limited imaging of the upper abdomen demonstrate gallstones. No CT  evidence of acute intra-abdominal process.     Bony structures are intact without acute osseous abnormalities.     IMPRESSION:  1. Right axillary lymph node increasing in size compared to the previous  examination measuring 2.4 x 2 by 3 cm previously measured 1.6 x 1.1 x 2  cm. Differential considerations include reactive lymph nodes; a  malignant process not excluded. Correlate with patient presentation.  Ultrasound guided biopsy considered if clinically indicated.  2. Stable probably benign pulmonary nodules.  3. Otherwise, no acute cardiopulmonary process.  This report was finalized on 07/12/2019 12:36 by Dr. Israel Tracy MD.      -----------------------------------------------------------------------------------------------  PFT Values        Some values may be hidden. Unless noted otherwise, only the newest values recorded on each date are displayed.         Old Values PFT Results 1/17/19 8/20/19 2/19/20   % 115%    FEV1 118% 114%    FEV1/FVC 81.97% 81.58    DLCO 108% 95%    TLC  111%       Pre Drug PFT Results 1/17/19 8/20/19 2/19/20   FVC   117   FEV1   109   FEF 25-75%   95   FEV1/FVC   76.6      Post Drug PFT Results 1/17/19 8/20/19 2/19/20   No data to display.      Other Tests PFT Results 1/17/19 8/20/19 2/19/20   No data to display.           My interpretation of the PFT: noirmal spirometry except mild flattening of insp and exp curves.     -----------------------------------------------------------------------------------------------  Assessment/Plan   Problem List Items Addressed This Visit        Pulmonary Problems    Multiple lung nodules < 6mm identified 5/2018 - Primary    Overview       CT scan report revealed 5 mm soft tissue  nodule right upper lobe and 4 mm nodule right middle lobe recommend follow-up CT in 6 months.  (November 2018), denied by insurance.  CTA 2019 showed stable lung nodules, mediastinal and hilar adenopathy.  Follow up 7/2019 showed stable lung nodules and progressive axillary adenopathy on right, improved mediastinal nodes.         Relevant Orders    Pulmonary Function Test (Completed)    AAT (alpha-1-antitrypsin) deficiency (CMS/HCC)       Other    Rheumatoid arthritis involving multiple sites (CMS/HCC)        Patient's Body mass index is 31.56 kg/m². BMI is above normal parameters. Recommendations include: referral to primary care.      She is stable from pulmonary standpoint  Nodules have been stable  Plan follow up with Dr. Márquez inasmuch as follow up scans are concerned  PFT normal except flattening of loops; uncertain significance.  Review of old records shows this was not present before  Will repeat FVL in 6 mos.  She does not have stridor today.  Come sooner prn symptoms       Electronically signed by Yan Seay MD, 2/19/2020, 10:53 AM

## 2020-02-21 ENCOUNTER — OFFICE VISIT (OUTPATIENT)
Dept: FAMILY MEDICINE CLINIC | Facility: CLINIC | Age: 55
End: 2020-02-21

## 2020-02-21 VITALS
DIASTOLIC BLOOD PRESSURE: 84 MMHG | HEIGHT: 64 IN | RESPIRATION RATE: 18 BRPM | TEMPERATURE: 98.4 F | WEIGHT: 184.6 LBS | OXYGEN SATURATION: 97 % | HEART RATE: 79 BPM | BODY MASS INDEX: 31.51 KG/M2 | SYSTOLIC BLOOD PRESSURE: 118 MMHG

## 2020-02-21 DIAGNOSIS — F41.0 PANIC ATTACK: ICD-10-CM

## 2020-02-21 DIAGNOSIS — F41.9 ANXIETY: ICD-10-CM

## 2020-02-21 DIAGNOSIS — M62.838 MUSCLE SPASM: ICD-10-CM

## 2020-02-21 DIAGNOSIS — E66.01 MORBID OBESITY DUE TO EXCESS CALORIES (HCC): ICD-10-CM

## 2020-02-21 DIAGNOSIS — G89.4 CHRONIC PAIN SYNDROME: ICD-10-CM

## 2020-02-21 DIAGNOSIS — K21.9 GASTROESOPHAGEAL REFLUX DISEASE WITHOUT ESOPHAGITIS: ICD-10-CM

## 2020-02-21 PROCEDURE — 99214 OFFICE O/P EST MOD 30 MIN: CPT | Performed by: NURSE PRACTITIONER

## 2020-02-21 RX ORDER — OMEPRAZOLE 40 MG/1
40 CAPSULE, DELAYED RELEASE ORAL DAILY
Qty: 30 CAPSULE | Refills: 5 | Status: SHIPPED | OUTPATIENT
Start: 2020-02-21 | End: 2020-04-07

## 2020-02-21 RX ORDER — ALPRAZOLAM 0.5 MG/1
0.5 TABLET ORAL 2 TIMES DAILY PRN
Qty: 60 TABLET | Refills: 0 | Status: SHIPPED | OUTPATIENT
Start: 2020-02-21 | End: 2020-03-27 | Stop reason: SDUPTHER

## 2020-02-21 RX ORDER — TIZANIDINE 4 MG/1
4 TABLET ORAL 2 TIMES DAILY PRN
Qty: 60 TABLET | Refills: 5 | Status: SHIPPED | OUTPATIENT
Start: 2020-02-21 | End: 2020-08-21

## 2020-02-21 RX ORDER — HYDROCODONE BITARTRATE AND ACETAMINOPHEN 10; 325 MG/1; MG/1
1 TABLET ORAL EVERY 6 HOURS PRN
Qty: 90 TABLET | Refills: 0 | Status: SHIPPED | OUTPATIENT
Start: 2020-02-21 | End: 2020-03-20 | Stop reason: SDUPTHER

## 2020-02-21 NOTE — PATIENT INSTRUCTIONS
Obesity, Adult  Obesity is having too much body fat. Being obese means that your weight is more than what is healthy for you.  BMI is a number that explains how much body fat you have. If you have a BMI of 30 or more, you are obese. Obesity is often caused by eating or drinking more calories than your body uses. Changing your lifestyle can help you lose weight.  Obesity can cause serious health problems, such as:  · Stroke.  · Coronary artery disease (CAD).  · Type 2 diabetes.  · Some types of cancer, including cancers of the colon, breast, uterus, and gallbladder.  · Osteoarthritis.  · High blood pressure (hypertension).  · High cholesterol.  · Sleep apnea.  · Gallbladder stones.  · Infertility problems.  What are the causes?  · Eating meals each day that are high in calories, sugar, and fat.  · Being born with genes that may make you more likely to become obese.  · Having a medical condition that causes obesity.  · Taking certain medicines.  · Sitting a lot (having a sedentary lifestyle).  · Not getting enough sleep.  · Drinking a lot of drinks that have sugar in them.  What increases the risk?  · Having a family history of obesity.  · Being an  woman.  · Being a  man.  · Living in an area with limited access to:  ? [place], recreation centers, or sidewalks.  ? Healthy food choices, such as grocery stores and [employer] markets.  What are the signs or symptoms?  The main sign is having too much body fat.  How is this treated?  · Treatment for this condition often includes changing your lifestyle. Treatment may include:  ? Changing your diet. This may include making a healthy meal plan.  ? Exercise. This may include activity that causes your heart to beat faster (aerobic exercise) and strength training. Work with your doctor to design a program that works for you.  ? Medicine to help you lose weight. This may be used if you are not able to lose 1 pound a week after 6 weeks of healthy eating and  more exercise.  ? Treating conditions that cause the obesity.  ? Surgery. Options may include gastric banding and gastric bypass. This may be done if:  § Other treatments have not helped to improve your condition.  § You have a BMI of 40 or higher.  § You have life-threatening health problems related to obesity.  Follow these instructions at home:  Eating and drinking    · Follow advice from your doctor about what to eat and drink. Your doctor may tell you to:  ? Limit fast food, sweets, and processed snack foods.  ? Choose low-fat options. For example, choose low-fat milk instead of whole milk.  ? Eat 5 or more servings of fruits or vegetables each day.  ? Eat at home more often. This gives you more control over what you eat.  ? Choose healthy foods when you eat out.  ? Learn to read food labels. This will help you learn how much food is in 1 serving.  ? Keep low-fat snacks available.  ? Avoid drinks that have a lot of sugar in them. These include soda, fruit juice, iced tea with sugar, and flavored milk.  · Drink enough water to keep your pee (urine) pale yellow.  · Do not go on fad diets.  Physical activity  · Exercise often, as told by your doctor. Most adults should get up to 150 minutes of moderate-intensity exercise every week.Ask your doctor:  ? What types of exercise are safe for you.  ? How often you should exercise.  · Warm up and stretch before being active.  · Do slow stretching after being active (cool down).  · Rest between times of being active.  Lifestyle  · Work with your doctor and a food expert (dietitian) to set a weight-loss goal that is best for you.  · Limit your screen time.  · Find ways to reward yourself that do not involve food.  · Do not drink alcohol if:  ? Your doctor tells you not to drink.  ? You are pregnant, may be pregnant, or are planning to become pregnant.  · If you drink alcohol:  ? Limit how much you use to:  § 0-1 drink a day for women.  § 0-2 drinks a day for men.  ? Be  aware of how much alcohol is in your drink. In the [place] one drink equals one 12 oz bottle of beer (355 mL), one 5 oz glass of wine (148 mL), or one 1½ oz glass of hard liquor (44 mL).  General instructions  · Keep a weight-loss journal. This can help you keep track of:  ? The food that you eat.  ? How much exercise you get.  · Take over-the-counter and prescription medicines only as told by your doctor.  · Take vitamins and supplements only as told by your doctor.  · Think about joining a support group.  · Keep all follow-up visits as told by your doctor. This is important.  Contact a doctor if:  · You cannot meet your weight loss goal after you have changed your diet and lifestyle for 6 weeks.  Get help right away if you:  · Are having trouble breathing.  · Are having thoughts of harming yourself.  Summary  · Obesity is having too much body fat.  · Being obese means that your weight is more than what is healthy for you.  · Work with your doctor to set a weight-loss goal.  · Get regular exercise as told by your doctor.  This information is not intended to replace advice given to you by your health care provider. Make sure you discuss any questions you have with your health care provider.  Document Released: 03/11/2013 Document Revised: 08/22/2019 Document Reviewed: 08/22/2019  [employer] Interactive Patient Education © 2020 [employer]

## 2020-02-21 NOTE — PROGRESS NOTES
CC: Chronic pain, back, hips, wrist (Has osteoporosis and RA)    Answers for HPI/ROS submitted by the patient on 2/21/2020   What is the primary reason for your visit?: Other  Please describe your symptoms.: Monthly checkup  Have you had these symptoms before?: Yes    Adela Arauz, is a 55 yr old female here to follow up for chronic pain secondary to RA worse in the cold weather.  She had gotten reclast last year around June-July and wants to get it again we will order it and try to get approved we will revisit this in about May.   Her pain is about the same, she struggles every day to do normal ADL's rating the pain 8/10.  She is also going thru treatment by Dr. Harris and Dr. Rouse.   She also recently had a horrible infection in her left forearm that cultured MRSA.  She says she is doing better    Back Pain   This is a chronic problem. The current episode started more than 1 year ago. The problem occurs constantly. The problem is unchanged. The pain is present in the lumbar spine, thoracic spine and sacro-iliac. The quality of the pain is described as aching, burning, cramping, shooting and stabbing. The pain does not radiate. The pain is at a severity of 8/10. The pain is moderate. The pain is the same all the time. The symptoms are aggravated by bending, position, lying down, standing, sitting, twisting and stress. Stiffness is present in the morning. Associated symptoms include leg pain and weakness. Pertinent negatives include no bowel incontinence, chest pain, dysuria, fever, numbness or paresis. Risk factors include menopause and obesity. She has tried heat, muscle relaxant, ice, NSAIDs, walking, home exercises, bed rest and analgesics for the symptoms. The treatment provided mild relief.        The following portions of the patient's history were reviewed and updated as appropriate: allergies, current medications, past family history, past medical history, past social history, past surgical history and  problem list.    Review of Systems   Constitutional: Positive for activity change and appetite change. Negative for chills, fatigue and fever.   HENT: Negative for ear discharge, ear pain and facial swelling.    Respiratory: Negative for cough and choking.    Cardiovascular: Negative for chest pain, palpitations and leg swelling.   Gastrointestinal: Negative for bowel incontinence, constipation and diarrhea.   Genitourinary: Negative for dyspareunia, dysuria and flank pain.   Musculoskeletal: Positive for arthralgias and back pain.   Neurological: Positive for weakness. Negative for numbness.   All other systems reviewed and are negative.      Objective   Physical Exam   Constitutional: Vital signs are normal. She appears well-developed and well-nourished. She is cooperative.   HENT:   Head: Normocephalic and atraumatic.   Eyes: Pupils are equal, round, and reactive to light. EOM are normal.   Cardiovascular: Normal rate, regular rhythm and normal heart sounds.   Pulmonary/Chest: Effort normal and breath sounds normal.   Musculoskeletal:        Right shoulder: She exhibits decreased range of motion.        Right wrist: She exhibits decreased range of motion and tenderness.        Left wrist: She exhibits decreased range of motion and tenderness.        Left hip: She exhibits decreased range of motion, decreased strength and tenderness.        Lumbar back: She exhibits decreased range of motion, tenderness, bony tenderness, pain and spasm.        Arms:       Legs:  Neurological: She is alert.   Nursing note and vitals reviewed.        Assessment/Plan   Adela was seen today for pain.    Diagnoses and all orders for this visit:    Chronic pain syndrome  -     HYDROcodone-acetaminophen (NORCO)  MG per tablet; Take 1 tablet by mouth Every 6 (Six) Hours As Needed for Moderate Pain .    Panic attack  -     ALPRAZolam (XANAX) 0.5 MG tablet; Take 1 tablet by mouth 2 (Two) Times a Day As Needed for Anxiety.    Anxiety  -      ALPRAZolam (XANAX) 0.5 MG tablet; Take 1 tablet by mouth 2 (Two) Times a Day As Needed for Anxiety.  -       ALMA Report:     As part of this patient's treatment plan, I am prescribing controlled substances. The patient has been made aware of appropriate use of such medications, including potential risk of opiod use, somnolence, limited ability to drive and /or work safely, and potential for dependence or overdose, and opiods should be used sparingly and are not recommended for long term use.  It has also been made clear that these medications are for use by this patient only, without concomitant use of alcohol or other substances unless prescribed.    This medication is a narcotic pain medication. This medication is monitored by the federal TER[employer] and the [facility]. Narcotic medication is commonly abused and many patients can become addicted to this medication.    There are problems with narcotic pain medication including addiction, dependence and tolerance to the pain relief. We discussed appropriate and expected levels of pain to be in the 3-4/10 range following surgery. Increasing levels of pain medication can be required if you take this medication on a regular basis to be effective for pain control. It is best to try to avoid taking this medication other than for the first few weeks after surgery, because of the potential for abuse and dependence and addiction.    Narcotics have side effects, including the common side effect of nausea/vomiting if taken on an empty stomach and the problem of constipation that occur with narcotic use. Therefore, this medication should be only used sparingly and only when needed and weaned off quickly.     Narcotics can impair your judgement and therefore you should never drive or operate heavy machinery while using these medications. Treat this medication similar to alcohol, do not take this and drive or you could injure yourself or others.    As an alternative drug,  we encourage the use of tylenol and/or NSAIDs (non-steroidal anti-inflammatory drugs) for additional pain relief and as an anti-inflammatory. NSAIDS are drugs such as aleve (naproxen) and motrin (ibuprofen). Narcotics and NSAIDs work differently and can complement each other well after surgery.    In general, NSAIDs and tylenol are much safer drugs than narcotics. Tylenol (acetaminophen) should not be taken with narcotics as most prescribed narcotics already contain tylenol. The maximum dose of tylenol is 4 grams per day, so this can be used if your narcotic pain medication is used sparingly. Pay close attention to the dosages and ask your pharmacist about the dosage of tylenol.  Some patients can experience GI irritation from NSAID use and patients with kidney problems, previous bleeding from the GI tract, gastric bypass, or certain other conditions may not be able to take these over the counter medications.      Patient has completed prescribing agreement detailing terms of continued prescribing of controlled substances, including monitoring ALMA reports, urine drug screening yearly an random drug screens to monitor patients compliance to treatment plan, and pill counts if necessary. The patient is aware that inappropriate use will result in cessation of prescribing such medications.    -     Pt is aware of the potential for addiction and dependence.    Addiction was discussed.  The  Risks, benefits and alternative options of drug therapy discussed.  It was reinforced about the risks and benefits of opioids.  It was reinforced that patient may not call early for medication, may not ask for increase in the number of pills given monthly or increase in dosage of medication, may not jump around to different pharmacies or providers and may not receive any narcotics from other providers including ER, or the contract will be broken and narcotics will no longer be prescribed from this facility if any of these criteria  has been broken.     Last Dose was: today    Last Fill was:  Jan  Date of last ALMA: today    Muscle spasm  -     tiZANidine (ZANAFLEX) 4 MG tablet; Take 1 tablet by mouth 2 (Two) Times a Day As Needed for Muscle Spasms.    Gastroesophageal reflux disease without esophagitis  -     omeprazole (priLOSEC) 40 MG capsule; Take 1 capsule by mouth Daily.      Return in about 2 weeks (around 3/6/2020), or if symptoms worsen or fail to improve, for Recheck chronic pain.    Electronically signed by Charlene Huntley, [place], APRN, 02/21/20, 9:14 AM.

## 2020-02-28 ENCOUNTER — OFFICE VISIT (OUTPATIENT)
Dept: FAMILY MEDICINE CLINIC | Facility: CLINIC | Age: 55
End: 2020-02-28

## 2020-02-28 VITALS
WEIGHT: 184.8 LBS | RESPIRATION RATE: 18 BRPM | OXYGEN SATURATION: 100 % | HEIGHT: 64 IN | DIASTOLIC BLOOD PRESSURE: 78 MMHG | HEART RATE: 78 BPM | TEMPERATURE: 98.2 F | SYSTOLIC BLOOD PRESSURE: 100 MMHG | BODY MASS INDEX: 31.55 KG/M2

## 2020-02-28 DIAGNOSIS — M54.31 BILATERAL SCIATICA: Primary | ICD-10-CM

## 2020-02-28 DIAGNOSIS — M54.32 BILATERAL SCIATICA: Primary | ICD-10-CM

## 2020-02-28 PROCEDURE — 99214 OFFICE O/P EST MOD 30 MIN: CPT | Performed by: NURSE PRACTITIONER

## 2020-02-28 PROCEDURE — 96372 THER/PROPH/DIAG INJ SC/IM: CPT | Performed by: NURSE PRACTITIONER

## 2020-02-28 RX ORDER — KETOROLAC TROMETHAMINE 30 MG/ML
60 INJECTION, SOLUTION INTRAMUSCULAR; INTRAVENOUS ONCE
Status: COMPLETED | OUTPATIENT
Start: 2020-02-28 | End: 2020-02-28

## 2020-02-28 RX ORDER — DEXAMETHASONE SODIUM PHOSPHATE 4 MG/ML
10 INJECTION, SOLUTION INTRA-ARTICULAR; INTRALESIONAL; INTRAMUSCULAR; INTRAVENOUS; SOFT TISSUE ONCE
Status: COMPLETED | OUTPATIENT
Start: 2020-02-28 | End: 2020-02-28

## 2020-02-28 RX ADMIN — KETOROLAC TROMETHAMINE 60 MG: 30 INJECTION, SOLUTION INTRAMUSCULAR; INTRAVENOUS at 08:45

## 2020-02-28 RX ADMIN — DEXAMETHASONE SODIUM PHOSPHATE 10 MG: 4 INJECTION, SOLUTION INTRA-ARTICULAR; INTRALESIONAL; INTRAMUSCULAR; INTRAVENOUS; SOFT TISSUE at 08:44

## 2020-03-04 DIAGNOSIS — F41.8 ANXIETY ASSOCIATED WITH DEPRESSION: ICD-10-CM

## 2020-03-04 RX ORDER — DULOXETIN HYDROCHLORIDE 60 MG/1
CAPSULE, DELAYED RELEASE ORAL
Qty: 30 CAPSULE | Refills: 2 | Status: SHIPPED | OUTPATIENT
Start: 2020-03-04 | End: 2020-06-05

## 2020-03-20 DIAGNOSIS — G89.4 CHRONIC PAIN SYNDROME: ICD-10-CM

## 2020-03-20 DIAGNOSIS — G62.9 NEUROPATHY: ICD-10-CM

## 2020-03-20 DIAGNOSIS — M05.79 RHEUMATOID ARTHRITIS INVOLVING MULTIPLE SITES WITH POSITIVE RHEUMATOID FACTOR (HCC): ICD-10-CM

## 2020-03-20 RX ORDER — GABAPENTIN 300 MG/1
300 CAPSULE ORAL 3 TIMES DAILY
Qty: 90 CAPSULE | Refills: 1 | Status: SHIPPED | OUTPATIENT
Start: 2020-03-20 | End: 2020-06-05

## 2020-03-20 RX ORDER — HYDROCODONE BITARTRATE AND ACETAMINOPHEN 10; 325 MG/1; MG/1
1 TABLET ORAL EVERY 6 HOURS PRN
Qty: 90 TABLET | Refills: 0 | Status: SHIPPED | OUTPATIENT
Start: 2020-03-20 | End: 2020-04-22 | Stop reason: SDUPTHER

## 2020-03-25 DIAGNOSIS — F41.9 ANXIETY: ICD-10-CM

## 2020-03-25 DIAGNOSIS — F41.0 PANIC ATTACK: ICD-10-CM

## 2020-03-26 RX ORDER — ALPRAZOLAM 0.5 MG/1
TABLET ORAL
Qty: 60 TABLET | Refills: 0 | OUTPATIENT
Start: 2020-03-26

## 2020-03-27 DIAGNOSIS — F41.9 ANXIETY: ICD-10-CM

## 2020-03-27 DIAGNOSIS — F41.0 PANIC ATTACK: ICD-10-CM

## 2020-03-27 RX ORDER — ALPRAZOLAM 0.5 MG/1
0.5 TABLET ORAL 2 TIMES DAILY PRN
Qty: 60 TABLET | Refills: 0 | Status: SHIPPED | OUTPATIENT
Start: 2020-03-27 | End: 2020-04-22 | Stop reason: SDUPTHER

## 2020-03-27 NOTE — TELEPHONE ENCOUNTER
PT CALLED TO REQUEST A REFILL FOR ALPRAZolam (XANAX) 0.5 MG tablet. PLEASE SEND TO [facility].

## 2020-04-07 DIAGNOSIS — K21.9 GASTROESOPHAGEAL REFLUX DISEASE WITHOUT ESOPHAGITIS: ICD-10-CM

## 2020-04-07 RX ORDER — OMEPRAZOLE 40 MG/1
CAPSULE, DELAYED RELEASE ORAL
Qty: 30 CAPSULE | Refills: 5 | Status: SHIPPED | OUTPATIENT
Start: 2020-04-07 | End: 2020-10-12

## 2020-04-22 ENCOUNTER — TELEMEDICINE (OUTPATIENT)
Dept: FAMILY MEDICINE CLINIC | Facility: CLINIC | Age: 55
End: 2020-04-22

## 2020-04-22 VITALS — RESPIRATION RATE: 12 BRPM | BODY MASS INDEX: 31.41 KG/M2 | HEIGHT: 64 IN | WEIGHT: 184 LBS

## 2020-04-22 DIAGNOSIS — F41.0 PANIC ATTACKS: ICD-10-CM

## 2020-04-22 DIAGNOSIS — F41.9 ANXIETY: ICD-10-CM

## 2020-04-22 DIAGNOSIS — G89.4 CHRONIC PAIN SYNDROME: ICD-10-CM

## 2020-04-22 DIAGNOSIS — F41.0 PANIC ATTACK: ICD-10-CM

## 2020-04-22 PROCEDURE — 99213 OFFICE O/P EST LOW 20 MIN: CPT | Performed by: NURSE PRACTITIONER

## 2020-04-22 RX ORDER — ALPRAZOLAM 0.5 MG/1
0.5 TABLET ORAL 2 TIMES DAILY PRN
Qty: 60 TABLET | Refills: 0 | Status: SHIPPED | OUTPATIENT
Start: 2020-04-22 | End: 2020-05-22 | Stop reason: SDUPTHER

## 2020-04-22 RX ORDER — HYDROCODONE BITARTRATE AND ACETAMINOPHEN 10; 325 MG/1; MG/1
1 TABLET ORAL EVERY 6 HOURS PRN
Qty: 90 TABLET | Refills: 0 | Status: SHIPPED | OUTPATIENT
Start: 2020-04-22 | End: 2020-05-22 | Stop reason: SDUPTHER

## 2020-04-22 NOTE — PROGRESS NOTES
"Chief Complaint   Patient presents with   • Pain   • Arthritis   • Depression      HPI:  Adela Arauz, 1965, for chronic pain worse over the last 6 weeks because she has not been able to get her injections due to covid. She says that she is trying to live with the pain but it is getting to her and she says for the first time she had to take an extra ppain pill here and there.  She has always complied with coming to office visits, doing random and scheduled drug screens, as well as abide by control contract.  She says her anxiety is worse having to stay in the house more and says she hasn't gone anywhere. Says her hips and wrists are the worst.     You have chosen to receive care through a video visit today. Do you consent to use a telephone visit for your medical care today? Yes    I have reviewed the e-Visit questionnaire and patient's answers, my assessment and plan are as follows:    BOLD indicates positive   General:  weight loss, fever, chills, appetite loss  SKIN: change in wart/mole, itching, rash, new lesions, nail changes  HEENT:   ear pain, sore throat, sinus pressure, blurry vision, eye pain, dry eyes, tinnitus  Respiratory: cough, difficulty breathing, wheezing, hemoptysis   Cardiovascular:  chest pain, shortness of breath, swelling of extremities, syncope  Gastro: abdominal pain, constipation, nausea, vomiting, diarrhea, hematemesis  Genito: hematuria, dysuria, glycosuria, hesitancy, frequency, incontinence  Musckelo: joint pain, muscle cramps, arthralgia’s, muscle weakness, joint swelling, NSAID use  \"All other systems reviewed and negative, except as listed above.”    Past Medical History:   Diagnosis Date   • AAT (alpha-1-antitrypsin) deficiency (CMS/[facility]) 1/17/2019   • 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, 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    • Low back pain    • Morbid obesity due to excess calories (CMS/[facility]) 2/21/2020   • Osteoarthritis    • Osteoporosis    • PONV (postoperative nausea and vomiting)  "   • RA (rheumatoid arthritis) (CMS/[facility])    • Senile osteoporosis 2017       Family History   Problem Relation Age of Onset   • Arthritis Mother    • COPD Mother    • Atrial fibrillation Mother    • Osteoarthritis Mother    • Heart disease Father    • Hypertension Father    • Arthritis Father    • Melanoma Father         metastatic   • Arthritis Sister    • Arthritis Brother    • No Known Problems Daughter    • Arthritis Maternal Grandfather    • Breast cancer Neg Hx        Social History     Socioeconomic History   • Marital status:      Spouse name: Not on file   • Number of children: Not on file   • Years of education: Not on file   • Highest education level: Not on file   Tobacco Use   • Smoking status: Former Smoker     Packs/day: 1.00     Years: 20.00     Pack years: 20.00     Types: Cigarettes     Last attempt to quit: 9/3/2018     Years since quittin.6   • Smokeless tobacco: Never Used   • Tobacco comment: quit 2018   Substance and Sexual Activity   • Alcohol use: No   • Drug use: No   • Sexual activity: Defer       OBJECTIVE:  Constitutional:  No acute distress, Consistent with stated age. Oriented x 3,  Says Gait normal. Patient is pleasant and cooperative with the interview and exam.    Integumentary: No rashes, ulcers or lesions. No edema.  Normal skin moisture/turgor. Skin is warm to touch, no increased warmth.      Eye: Bilaterally PERRLA.   Upper and lower eyelids are normal. Sclera/conjunctiva normal without discharge. Cornea is normal and clear. Lens is normal.  Eyeball appears normal.     ENMT:  Canals  normal without erythema or discharge. Hearing normal to conversational speech at 2-5 feet. Nares airflow, no discharge. Dentition assessed and discussed appropriate oral care. Tongue normal midline.      CHEST/LUNG: Says her lungs clear without wheezes.     CARDIOVASCULAR:  Says heart is Regular      Neuropsych: Normal speech, Thought- normal. No hallucinations, delusions, obsessions.        Musco: Says that she has pain and tenderness on palpation of bilateral hips, wrists, and shoulders.  She says she is walking slower, it hurts to get up from a sitting position.  Says she has good ROM but all of it hurts, raising arms over head hurts, has difficulty lifting things up such as frying pain, says it hurts to bend over and turn right to left. .          ASSESSMENT  Adela was seen today for pain, arthritis and depression.    Diagnoses and all orders for this visit:    Panic attack  -     ALPRAZolam (Xanax) 0.5 MG tablet; Take 1 tablet by mouth 2 (Two) Times a Day As Needed for Anxiety.    Anxiety  -     ALPRAZolam (Xanax) 0.5 MG tablet; Take 1 tablet by mouth 2 (Two) Times a Day As Needed for Anxiety.  Uday, control contract, and UDS on chart    Chronic pain syndrome  -     HYDROcodone-acetaminophen (Norco)  MG per tablet; Take 1 tablet by mouth Every 6 (Six) Hours As Needed for Moderate Pain .    Panic attacks  -     sertraline (ZOLOFT) 50 MG tablet; Take 1 tablet by mouth Every Night.             This visit has been rescheduled as a video visit to comply with patient safety concerns in accordance with CDC recommendations. Total time of discussion was 14 minutes.    This was an audio and video enabled telemedicine encounter.    Any medications prescribed have been sent electronically to   [facility] - [place], [place] - [address] [place] [address] - [phone]  - [phone] FX  [address] [place] [id number]  Phone: [phone] Fax: [phone]      .Stay in, use good hand washing and practice social distancing during these hard times.       Return in about 1 month (around 5/22/2020) for Recheck chronic pain.    Electronically signed by Charlene Huntley, [place], APRN, 04/22/20, 10:42 AM.    04/22/20

## 2020-05-12 ENCOUNTER — HOSPITAL ENCOUNTER (OUTPATIENT)
Dept: RADIATION ONCOLOGY | Facility: HOSPITAL | Age: 55
Setting detail: RADIATION/ONCOLOGY SERIES
End: 2020-05-12

## 2020-05-13 ENCOUNTER — OFFICE VISIT (OUTPATIENT)
Dept: RADIATION ONCOLOGY | Facility: HOSPITAL | Age: 55
End: 2020-05-13

## 2020-05-13 VITALS
DIASTOLIC BLOOD PRESSURE: 67 MMHG | HEIGHT: 64 IN | SYSTOLIC BLOOD PRESSURE: 128 MMHG | BODY MASS INDEX: 31.92 KG/M2 | WEIGHT: 187 LBS

## 2020-05-13 DIAGNOSIS — Z98.890 HISTORY OF LYMPH NODE DISSECTION OF RIGHT AXILLA: ICD-10-CM

## 2020-05-13 DIAGNOSIS — C82.04 GRADE 1 FOLLICULAR LYMPHOMA OF LYMPH NODES OF AXILLA (HCC): Primary | ICD-10-CM

## 2020-05-13 DIAGNOSIS — Z92.3 HISTORY OF RADIATION THERAPY: ICD-10-CM

## 2020-05-13 DIAGNOSIS — Z87.891 FORMER SMOKER: ICD-10-CM

## 2020-05-13 DIAGNOSIS — A49.02 MRSA (METHICILLIN RESISTANT STAPHYLOCOCCUS AUREUS) INFECTION: ICD-10-CM

## 2020-05-13 PROCEDURE — G0463 HOSPITAL OUTPT CLINIC VISIT: HCPCS | Performed by: RADIOLOGY

## 2020-05-20 PROBLEM — R59.0 HILAR ADENOPATHY: Status: RESOLVED | Noted: 2019-01-17 | Resolved: 2020-05-20

## 2020-05-22 ENCOUNTER — OFFICE VISIT (OUTPATIENT)
Dept: FAMILY MEDICINE CLINIC | Facility: CLINIC | Age: 55
End: 2020-05-22

## 2020-05-22 ENCOUNTER — HOSPITAL ENCOUNTER (OUTPATIENT)
Dept: CT IMAGING | Facility: HOSPITAL | Age: 55
Discharge: HOME | End: 2020-05-22
Admitting: RADIOLOGY

## 2020-05-22 VITALS
SYSTOLIC BLOOD PRESSURE: 136 MMHG | RESPIRATION RATE: 18 BRPM | BODY MASS INDEX: 31.76 KG/M2 | HEART RATE: 91 BPM | TEMPERATURE: 98.3 F | DIASTOLIC BLOOD PRESSURE: 74 MMHG | OXYGEN SATURATION: 98 % | HEIGHT: 64 IN | WEIGHT: 186 LBS

## 2020-05-22 DIAGNOSIS — Z92.3 HISTORY OF RADIATION THERAPY: ICD-10-CM

## 2020-05-22 DIAGNOSIS — Z87.891 FORMER SMOKER: ICD-10-CM

## 2020-05-22 DIAGNOSIS — M06.9 RHEUMATOID ARTHRITIS INVOLVING MULTIPLE SITES, UNSPECIFIED RHEUMATOID FACTOR PRESENCE: Primary | ICD-10-CM

## 2020-05-22 DIAGNOSIS — F41.0 PANIC ATTACK: ICD-10-CM

## 2020-05-22 DIAGNOSIS — M81.0 SENILE OSTEOPOROSIS: ICD-10-CM

## 2020-05-22 DIAGNOSIS — C82.04 GRADE 1 FOLLICULAR LYMPHOMA OF LYMPH NODES OF AXILLA (HCC): ICD-10-CM

## 2020-05-22 DIAGNOSIS — Z98.890 HISTORY OF LYMPH NODE DISSECTION OF RIGHT AXILLA: ICD-10-CM

## 2020-05-22 DIAGNOSIS — G89.4 CHRONIC PAIN SYNDROME: ICD-10-CM

## 2020-05-22 DIAGNOSIS — F41.9 ANXIETY: ICD-10-CM

## 2020-05-22 DIAGNOSIS — H43.393 FLOATERS IN VISUAL FIELD, BILATERAL: ICD-10-CM

## 2020-05-22 PROCEDURE — 25010000002 IOPAMIDOL 61 % SOLUTION: Performed by: RADIOLOGY

## 2020-05-22 PROCEDURE — 99214 OFFICE O/P EST MOD 30 MIN: CPT | Performed by: NURSE PRACTITIONER

## 2020-05-22 PROCEDURE — 71260 CT THORAX DX C+: CPT

## 2020-05-22 RX ORDER — ALPRAZOLAM 0.5 MG/1
0.5 TABLET ORAL 2 TIMES DAILY PRN
Qty: 60 TABLET | Refills: 0 | Status: SHIPPED | OUTPATIENT
Start: 2020-05-22 | End: 2020-06-22 | Stop reason: SDUPTHER

## 2020-05-22 RX ORDER — HYDROCODONE BITARTRATE AND ACETAMINOPHEN 10; 325 MG/1; MG/1
1 TABLET ORAL EVERY 6 HOURS PRN
Qty: 90 TABLET | Refills: 0 | Status: SHIPPED | OUTPATIENT
Start: 2020-05-22 | End: 2020-06-22 | Stop reason: SDUPTHER

## 2020-05-22 RX ADMIN — IOPAMIDOL 100 ML: 612 INJECTION, SOLUTION INTRAVENOUS at 16:50

## 2020-05-22 NOTE — PROGRESS NOTES
Subjective   Adela Arauz is a 55 y.o. female presents for monthly follow up for management of chronic pain and anxiety.     History of Present Illness   Rheumatoid arthritis/ chronic pain:  Chronic. Stable with current medications. Pain is about the same. See's rheumatologist in [place]. Has not been taking plaquenil due to scare of side effects including palpitations and floaters in vision. She reports floaters are fairly new and are occurring now during this visit. Reports she cannot see eye doctor until her 2 year appt this winter due to insurance. Is currently taking zanaflex, cymbalta and norco 10 tid for pain. Wants to discuss different medications options with rheumatology. Interested in trying turmeric for inflammation management.     Osteoporosis:  Chronic. Wants to discuss reclast treatment. Tried to discuss with rheumatology who deferred to us. She is fearful of fosamax due to severe gerd history. Is having ortho look into this for her and she will discuss back with us if they cannot get approved with insurance.       Anxiety:  Chronic. Uncontrolled recently. Anniversary this week of her brothers suicide 5 years ago.  Currently on xanax 0.5mg tid.      Lymphoma:  Chronic. Under care of dr. desouza (radiation oncology) and dr. gaspar (oncology). She has ct chest today to monitor status.       The following portions of the patient's history were reviewed and updated as appropriate: allergies, current medications, past family history, past medical history, past social history, past surgical history and problem list.    Review of Systems   Constitutional: Positive for activity change and fatigue (chronic). Negative for appetite change, chills, diaphoresis and fever.   Eyes: Positive for visual disturbance.   Respiratory: Negative for cough, chest tightness, shortness of breath and wheezing.    Cardiovascular: Negative for chest pain and leg swelling.   Musculoskeletal: Positive for arthralgias, back  pain, gait problem, joint swelling and myalgias.   Skin: Negative for color change, rash and wound.   Neurological: Negative for dizziness, syncope and headaches.       Objective     Vitals:    05/22/20 1023   BP: 136/74   Pulse: 91   Resp: 18   Temp: 98.3 °F (36.8 °C)   SpO2: 98%       Physical Exam   Constitutional: She is oriented to person, place, and time. Vital signs are normal. She appears well-developed and well-nourished.   HENT:   Head: Normocephalic and atraumatic.   Eyes: Conjunctivae are normal.   Neck: Neck supple.   Cardiovascular: Normal rate, regular rhythm and normal heart sounds.   Pulmonary/Chest: Effort normal and breath sounds normal.   Musculoskeletal:        Right hand: She exhibits deformity and swelling.        Left hand: She exhibits deformity and swelling.   MCP joint swelling and deformities   Lymphadenopathy:     She has no cervical adenopathy.   Neurological: She is alert and oriented to person, place, and time.   Skin: Skin is warm, dry and intact.   Psychiatric: She has a normal mood and affect.   Nursing note and vitals reviewed.         Patient's Body mass index is 31.56 kg/m². BMI is above normal parameters. Recommendations include: exercise counseling.       Assessment/Plan   Adela was seen today for sciatica.    Diagnoses and all orders for this visit:    Rheumatoid arthritis involving multiple sites, unspecified rheumatoid factor presence (CMS/HCC)    Senile osteoporosis    Anxiety  -     ALPRAZolam (Xanax) 0.5 MG tablet; Take 1 tablet by mouth 2 (Two) Times a Day As Needed for Anxiety.    Chronic pain syndrome  -     HYDROcodone-acetaminophen (Norco)  MG per tablet; Take 1 tablet by mouth Every 6 (Six) Hours As Needed for Moderate Pain .    Panic attack  -     ALPRAZolam (Xanax) 0.5 MG tablet; Take 1 tablet by mouth 2 (Two) Times a Day As Needed for Anxiety.    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 Rheumatoid arthritis/chronic pain-   Udt, gladys and contract in emr. Refill norco 10/325 mg tid prn #90 nrf today. Follow up in 1 month. udt at rtc. Continue follow up with rheumatology. Continue norco, cymbalta, and neurontin.     2. Osteoporosis  Awaiting management from ortho for reclast    3. Anxiety  Udt, gladys, contract in emr. Refill xanax 0.5mg tid #90 nrf today. Continue current treatment cymbalta and zoloft.     4. Lymphoma  Continue current treatment with oncology.     5. Floaters  Refer to opthalmology for further evaluation.    Fasting labs at rtc.        Return in about 1 month (around 6/22/2020).             Electronically signed by SURYA Amaral, 05/22/20, 11:02 AM.

## 2020-05-28 ENCOUNTER — TELEPHONE (OUTPATIENT)
Dept: RADIATION ONCOLOGY | Facility: HOSPITAL | Age: 55
End: 2020-05-28

## 2020-05-28 NOTE — TELEPHONE ENCOUNTER
I spoke to her today about her CT scan.  There is complete resolution of her axillary lymphadenopathy and no other signs of suspicious lymphadenopathy.  She does have some mild paraseptal emphysema and I did admonished her not to return to smoking cigarettes.  She also some sludge in the gallbladder but no other significant findings.    She will see Dr. Marie in July and we will see her for routine follow-up in November 2020

## 2020-06-05 DIAGNOSIS — M05.79 RHEUMATOID ARTHRITIS INVOLVING MULTIPLE SITES WITH POSITIVE RHEUMATOID FACTOR (HCC): ICD-10-CM

## 2020-06-05 DIAGNOSIS — F41.8 ANXIETY ASSOCIATED WITH DEPRESSION: ICD-10-CM

## 2020-06-05 DIAGNOSIS — G62.9 NEUROPATHY: ICD-10-CM

## 2020-06-05 RX ORDER — GABAPENTIN 300 MG/1
CAPSULE ORAL
Qty: 90 CAPSULE | Refills: 1 | Status: SHIPPED | OUTPATIENT
Start: 2020-06-05 | End: 2020-08-12

## 2020-06-05 RX ORDER — DULOXETIN HYDROCHLORIDE 60 MG/1
CAPSULE, DELAYED RELEASE ORAL
Qty: 30 CAPSULE | Refills: 2 | Status: SHIPPED | OUTPATIENT
Start: 2020-06-05 | End: 2020-10-12

## 2020-06-22 ENCOUNTER — OFFICE VISIT (OUTPATIENT)
Dept: FAMILY MEDICINE CLINIC | Facility: CLINIC | Age: 55
End: 2020-06-22

## 2020-06-22 VITALS
OXYGEN SATURATION: 98 % | WEIGHT: 188.8 LBS | RESPIRATION RATE: 18 BRPM | HEIGHT: 64 IN | DIASTOLIC BLOOD PRESSURE: 70 MMHG | TEMPERATURE: 97.9 F | BODY MASS INDEX: 32.23 KG/M2 | SYSTOLIC BLOOD PRESSURE: 106 MMHG | HEART RATE: 66 BPM

## 2020-06-22 DIAGNOSIS — G89.4 CHRONIC PAIN SYNDROME: ICD-10-CM

## 2020-06-22 DIAGNOSIS — Z11.59 NEED FOR HEPATITIS C SCREENING TEST: ICD-10-CM

## 2020-06-22 DIAGNOSIS — L98.9 SKIN LESION: ICD-10-CM

## 2020-06-22 DIAGNOSIS — E66.01 MORBID OBESITY DUE TO EXCESS CALORIES (HCC): ICD-10-CM

## 2020-06-22 DIAGNOSIS — M06.9 RHEUMATOID ARTHRITIS INVOLVING MULTIPLE SITES, UNSPECIFIED RHEUMATOID FACTOR PRESENCE: ICD-10-CM

## 2020-06-22 DIAGNOSIS — F41.9 ANXIETY: ICD-10-CM

## 2020-06-22 DIAGNOSIS — Z51.81 THERAPEUTIC DRUG MONITORING: Primary | ICD-10-CM

## 2020-06-22 DIAGNOSIS — F41.0 PANIC ATTACK: ICD-10-CM

## 2020-06-22 PROCEDURE — 99213 OFFICE O/P EST LOW 20 MIN: CPT | Performed by: NURSE PRACTITIONER

## 2020-06-22 RX ORDER — VIT C/B6/B5/MAGNESIUM/HERB 173 50-5-6-5MG
1 CAPSULE ORAL 2 TIMES DAILY
COMMUNITY

## 2020-06-22 RX ORDER — ALPRAZOLAM 0.5 MG/1
0.5 TABLET ORAL 2 TIMES DAILY PRN
Qty: 60 TABLET | Refills: 0 | Status: SHIPPED | OUTPATIENT
Start: 2020-06-22 | End: 2020-07-22 | Stop reason: SDUPTHER

## 2020-06-22 RX ORDER — HYDROCODONE BITARTRATE AND ACETAMINOPHEN 10; 325 MG/1; MG/1
1 TABLET ORAL EVERY 6 HOURS PRN
Qty: 90 TABLET | Refills: 0 | Status: SHIPPED | OUTPATIENT
Start: 2020-06-22 | End: 2020-07-22 | Stop reason: SDUPTHER

## 2020-06-22 NOTE — PROGRESS NOTES
Subjective   Adela Arauz is a 55 y.o. female presents in office for follow up on anxiety and pain.     History of Present Illness   Rheumatoid arthritis/ chronic pain:  Chronic. Stable with current medications. Pain is about the same. Is currently taking zanaflex, cymbalta and norco 10 tid for pain. Tumeric is helping with pain as well. [facility] (Rohan [facility]cgowan and fidel gautam) a few weeks ago for evaluation of lumbar spine. Had MRI. F/u next week to discuss plan.     Osteoporosis:  Chronic. Seeing ortho for this now.     Anxiety:  Chronic. Doing better this month.  Currently on xanax 0.5mg tid.      Lymphoma:  Chronic. Under care of dr. desouza (radiation oncology) and dr. gaspar (oncology). Ct chest looked ok.     Skin lesion:  Left knee and right elbow. Present x10 years elbow and 2 years on knee. Becoming more flaky.     The following portions of the patient's history were reviewed and updated as appropriate: allergies, current medications, past family history, past medical history, past social history, past surgical history and problem list.    Subjective:    Review of Systems   Constitutional: Positive for activity change and fatigue (chronic). Negative for appetite change, chills, diaphoresis and fever.   Eyes: Positive for visual disturbance.   Respiratory: Negative for cough, chest tightness, shortness of breath and wheezing.    Cardiovascular: Negative for chest pain and leg swelling.   Musculoskeletal: Positive for arthralgias, back pain, gait problem, joint swelling and myalgias.   Skin: Negative for color change, rash and wound.   Neurological: Negative for dizziness, syncope and headaches.           Objective     Vitals:    06/22/20 0802 06/22/20 0820   BP: 106/70    BP Location: Left arm    Patient Position: Sitting    Cuff Size: Large Adult    Pulse: 66    Resp: 18    Temp: 97.9 °F (36.6 °C)    TempSrc: Temporal    SpO2: (!) 89% 98%   Weight: 85.6 kg (188 lb 12.8 oz)   "  Height: 163.5 cm (64.37\")          Physical Exam   Constitutional: She appears well-developed and well-nourished. No distress.   HENT:   Right Ear: External ear normal.   Left Ear: External ear normal.   Nose: Nose normal.   Mouth/Throat: Oropharynx is clear and moist.   Eyes: Conjunctivae are normal.   Neck: Neck supple. No thyromegaly present.   Cardiovascular: Normal rate, regular rhythm and normal heart sounds.   Pulmonary/Chest: Effort normal and breath sounds normal.   Abdominal: Soft. Bowel sounds are normal. She exhibits no abdominal bruit and no mass. There is no hepatosplenomegaly. There is no tenderness.   Musculoskeletal:        Lumbar back: She exhibits decreased range of motion.        Right hand: She exhibits decreased range of motion and deformity.        Left hand: She exhibits decreased range of motion and deformity.   mcp joint swelling, deformity   Lymphadenopathy:     She has no cervical adenopathy.   Neurological: She is alert.   Skin: Skin is warm and dry.        Erythematous flat patch appearing lesion. Slightly scaly. Regular defined borders.    Psychiatric: Her speech is normal and behavior is normal. Her mood appears anxious.   Nursing note and vitals reviewed.         Patient's Body mass index is 32.04 kg/m². BMI is above normal parameters. Recommendations include: nutrition counseling.       Assessment/Plan   Adela was seen today for pain.    Diagnoses and all orders for this visit:    Therapeutic drug monitoring  -     Cancel: ToxASSURE Select 13 (MW) - Urine, Clean Catch  -     Compliance Drug Analysis, Ur - Urine, Clean Catch    Panic attack  -     ALPRAZolam (Xanax) 0.5 MG tablet; Take 1 tablet by mouth 2 (Two) Times a Day As Needed for Anxiety.    Anxiety  -     ALPRAZolam (Xanax) 0.5 MG tablet; Take 1 tablet by mouth 2 (Two) Times a Day As Needed for Anxiety.    Chronic pain syndrome  -     HYDROcodone-acetaminophen (Norco)  MG per tablet; Take 1 tablet by mouth Every 6 " (Six) Hours As Needed for Moderate Pain .    Morbid obesity due to excess calories (CMS/HCC)  -     Comprehensive metabolic panel  -     CBC & Differential  -     Lipid panel  -     TSH    Rheumatoid arthritis involving multiple sites, unspecified rheumatoid factor presence (CMS/HCC)    Need for hepatitis C screening test  -     Hepatitis C antibody    Skin lesion    Other orders  -     betamethasone valerate (VALISONE) 0.1 % ointment; Apply  topically to the appropriate area as directed 2 (Two) Times a Day.      Skin lesion knee:  Trial of topical steroid- appearance of psoriasis- refer to derm if not improved with steroid.    Return in about 1 month (around 7/22/2020) for Recheck.             Electronically signed by SURYA Amaral, 06/22/20, 8:39 AM.

## 2020-06-23 LAB
ALBUMIN SERPL-MCNC: 4.3 G/DL (ref 3.5–5.2)
ALBUMIN/GLOB SERPL: 1.5 G/DL
ALP SERPL-CCNC: 82 U/L (ref 39–117)
ALT SERPL-CCNC: 22 U/L (ref 1–33)
AST SERPL-CCNC: 23 U/L (ref 1–32)
BASOPHILS # BLD AUTO: 0.08 10*3/MM3 (ref 0–0.2)
BASOPHILS NFR BLD AUTO: 1.4 % (ref 0–1.5)
BILIRUB SERPL-MCNC: 0.3 MG/DL (ref 0.2–1.2)
BUN SERPL-MCNC: 9 MG/DL (ref 6–20)
BUN/CREAT SERPL: 14.1 (ref 7–25)
CALCIUM SERPL-MCNC: 9.4 MG/DL (ref 8.6–10.5)
CHLORIDE SERPL-SCNC: 99 MMOL/L (ref 98–107)
CHOLEST SERPL-MCNC: 206 MG/DL (ref 0–200)
CO2 SERPL-SCNC: 31.5 MMOL/L (ref 22–29)
CREAT SERPL-MCNC: 0.64 MG/DL (ref 0.57–1)
EOSINOPHIL # BLD AUTO: 0.25 10*3/MM3 (ref 0–0.4)
EOSINOPHIL NFR BLD AUTO: 4.2 % (ref 0.3–6.2)
ERYTHROCYTE [DISTWIDTH] IN BLOOD BY AUTOMATED COUNT: 14.9 % (ref 12.3–15.4)
GLOBULIN SER CALC-MCNC: 2.8 GM/DL
GLUCOSE SERPL-MCNC: 82 MG/DL (ref 65–99)
HCT VFR BLD AUTO: 39.8 % (ref 34–46.6)
HCV AB S/CO SERPL IA: 0.1 S/CO RATIO (ref 0–0.9)
HDLC SERPL-MCNC: 53 MG/DL (ref 40–60)
HGB BLD-MCNC: 12.7 G/DL (ref 12–15.9)
IMM GRANULOCYTES # BLD AUTO: 0.01 10*3/MM3 (ref 0–0.05)
IMM GRANULOCYTES NFR BLD AUTO: 0.2 % (ref 0–0.5)
LDLC SERPL CALC-MCNC: 116 MG/DL (ref 0–100)
LYMPHOCYTES # BLD AUTO: 1.15 10*3/MM3 (ref 0.7–3.1)
LYMPHOCYTES NFR BLD AUTO: 19.4 % (ref 19.6–45.3)
MCH RBC QN AUTO: 26.5 PG (ref 26.6–33)
MCHC RBC AUTO-ENTMCNC: 31.9 G/DL (ref 31.5–35.7)
MCV RBC AUTO: 83.1 FL (ref 79–97)
MONOCYTES # BLD AUTO: 0.77 10*3/MM3 (ref 0.1–0.9)
MONOCYTES NFR BLD AUTO: 13 % (ref 5–12)
NEUTROPHILS # BLD AUTO: 3.66 10*3/MM3 (ref 1.7–7)
NEUTROPHILS NFR BLD AUTO: 61.8 % (ref 42.7–76)
NRBC BLD AUTO-RTO: 0 /100 WBC (ref 0–0.2)
PLATELET # BLD AUTO: 288 10*3/MM3 (ref 140–450)
POTASSIUM SERPL-SCNC: 4.4 MMOL/L (ref 3.5–5.2)
PROT SERPL-MCNC: 7.1 G/DL (ref 6–8.5)
RBC # BLD AUTO: 4.79 10*6/MM3 (ref 3.77–5.28)
SODIUM SERPL-SCNC: 139 MMOL/L (ref 136–145)
TRIGL SERPL-MCNC: 185 MG/DL (ref 0–150)
TSH SERPL DL<=0.005 MIU/L-ACNC: 3.88 UIU/ML (ref 0.27–4.2)
VLDLC SERPL CALC-MCNC: 37 MG/DL
WBC # BLD AUTO: 5.92 10*3/MM3 (ref 3.4–10.8)

## 2020-06-25 LAB — DRUGS UR: NORMAL

## 2020-07-14 NOTE — PROGRESS NOTES
Ricardo Ceron   1965  7/17/2020     Chief Complaint   Patient presents with    Follow-up     Follicular lymphoma, unspecified follicular lymphoma type, unspecified body region         INTERVAL HISTORY/HISTORY OF PRESENT ILLNESS:    Diagnosis  · Follicular lymphoma, grade 1, July 2019  · Stage II  · Rheumatoid arthritis on Enbrel    Treatment summary  · 11/18/2019 through 12/10/2019-curative intent RT 3000 cGy  · Observation  · CT scans chest every 6 months x2 years followed by CT chest annually x3 years (maximum 5 years). The patient was found to have limited stage follicular lymphoma stage II. She was recommended curative intent RT. She received 3000 cgy RT in November and December 2019. She tolerated treatment well. She returns for follow-up today. She denies any B symptoms. She denies any new lymphadenopathy. Otherwise, doing well. 10/18/2019-hematology history  The patient is a 49years old female who was first seen by me on 9/13/2019 referred for a diagnosis of follicular lymphoma grade 1. She underwent a CT scan of the chest on 7/12/2019 that revealed axillary adenopathy. She has been followed by Dr. Argentina Leon, pulmonary for rheumatoid arthritis with lung involvement. The patient is a current smoker. She also has a diagnosis of deficiency of alpha 1 antitrypsin. The patient report no fever no chills no weight loss but does report frequent night sweats for the last 2 years. She also has complains of fatigue. · 7/12/2019-CT chest without contrast compared to prior CT scan dated 5/1/2018 showed a level 3 right axillary lymph node measuring 2.4 x 2 x 3cm (previously 1.6 x 1.1 x 2 cm). Evaluation of hilar lymph nodes are brought, without benefit of iodine contrast.  Mediastinal nodes likely stable. 5 mm pulmonary nodule appreciated in the right upper lobe. Pleural-based nodule appreciated in the right middle lobe.   · 8/9/2019- ultrasound-guided FNA ymph node biopsy showed a follicular lymphoma grade 1 out of 3. Follicular lymphoma, grade 1.  · 2019-she was first seen by me. · 2019-excisional lymph node biopsy by Dr. Haven Garland compatible with follicular lymphoma grade 1.  · 2019- PET scan showed mild surgical site uptake in the right axilla. Additional low-level uptake within 4 minimally enlarged right supraclavicular fossa and right subpectoral lymph nodes. · 2019-CT of the abdomen was unremarkable except for cholelithiasis. · 10/18/2019-bone marrow biopsy/aspirate showed a hypercellular bone marrow 30 to 40%. No signs of lymphoma involvement. Flow cytometry was unremarkable. Normal female karyotype 36 XX. · 2019 through 12/10/2019-curative intent RT 3000 cGy  · 2020 CT Chest No acute intrathoracic abnormality, interval resolution or surgical excision of a pathologically enlarged lymph node in the right axilla is noted. There is no measurable lymphadenopathy to indicate active lymphoma. Mild diffuse changes of paraseptal emphysema. There are 2 small nodules in the right lung which are stable and appear benign, measuring less than 6 mm each. Biapical pleural thickening/fibrosis, slightly increased at the right apex. There is mild pleural thickening the right upper lateral chest with interstitial thickening, with no mass effect. This dependent debris within the gallbladder compatible with stones  and probably milk of calcium. · 2020- surveillance visit. No evidence of peripheral adenopathy. No B symptoms. Discussed results of CT scan above. Continue surveillance. Yu Mendez          [facility]    PAST MEDICAL HISTORY:   Alpha-1 antitrypsin deficiency  Rheumatoid arthritis  Pulmonary involvement of rheumatoid arthritis  Follicular lymphoma grade 1  Osteoporosis    PAST SURGICAL HISTORY:  Wrist surgery  Shoulder surgery  Ultrasound-guided lymph node biopsy    SOCIAL HISTORY:  Social History     Socioeconomic History    Marital status:  Spouse name: Not on file    Number of children: Not on file    Years of education: Not on file    Highest education level: Not on file   Occupational History    Not on file   Social Needs    Financial resource strain: Not on file    Food insecurity     Worry: Not on file     Inability: Not on file    Transportation needs     Medical: Not on file     Non-medical: Not on file   Tobacco Use    Smoking status: Current Some Day Smoker    Smokeless tobacco: Never Used   Substance and Sexual Activity    Alcohol use: Not Currently    Drug use: Not on file    Sexual activity: Not on file   Lifestyle    Physical activity     Days per week: Not on file     Minutes per session: Not on file    Stress: Not on file   Relationships    Social connections     Talks on phone: Not on file     Gets together: Not on file     Attends Yazidism service: Not on file     Active member of club or organization: Not on file     Attends meetings of clubs or organizations: Not on file     Relationship status: Not on file    Intimate partner violence     Fear of current or ex partner: Not on file     Emotionally abused: Not on file     Physically abused: Not on file     Forced sexual activity: Not on file   Other Topics Concern    Not on file   Social History Narrative    Not on file   Currently smoker    FAMILY HISTORY:  Family history of hypertension, arthritis, atrial fibrillation, COPD.     Current Outpatient Medications   Medication Sig Dispense Refill    leflunomide (ARAVA) 20 MG tablet Take 20 mg by mouth daily      valACYclovir (VALTREX) 1 g tablet as needed      Calcium-Magnesium-Vitamin D 830-150-357 MG-MG-UNIT TABS Take by mouth      Cholecalciferol (VITAMIN D3) 25 MCG (1000 UT) TABS Take 1,000 Units by mouth      fluticasone (FLONASE) 50 MCG/ACT nasal spray 2 sprays by Nasal route as needed      tiZANidine (ZANAFLEX) 4 MG tablet       sertraline (ZOLOFT) 50 MG tablet   1    omeprazole (PRILOSEC) 40 MG delayed release capsule       HYDROcodone-acetaminophen (NORCO) 5-325 MG per tablet       gabapentin (NEURONTIN) 300 MG capsule       DULoxetine (CYMBALTA) 60 MG extended release capsule   2    ALPRAZolam (XANAX) 0.5 MG tablet   0     No current facility-administered medications for this visit. REVIEW OF SYSTEMS:    Constitutional: no fever, no night sweats, no fatigue. HEENT: no blurring of vision, no double vision, no hearing difficulty, no tinnitus,no ulceration, no dysphagia  Lungs: no cough, no shortness of breath, no wheeze;   CVS: no palpitation, no chest pain, no shortness of breath;  GI: no abdominal pain, no nausea , no vomiting, no constipation;   ARABELLA: no dysuria, frequency and urgency, no hematuria, no kidney stones;   Musculoskeletal: no joint pain, swelling , stiffness; Back Pain  Endocrine: no polyuria, polydypsia, no cold or heat intolerence; Hot Flashes   Hematology/lymphatic: no easy brusing or bleeding, no hx of clotting disorder; no peripheral adenopathy. Dermatology: no skin rash, no eczema, no pruritis;   Psychiatry: no depression, no anxiety, panic attacks, no suicide ideation; Neurology: no syncope, no seizures, no numbness or tingling of hands, no numbness or tingling of feet, no paresis;     PHYSICAL EXAM:    Vitals signs:  /78   Pulse 87   Temp 97.5 °F (36.4 °C)   Ht 5' 3\" (1.6 m)   Wt 188 lb 14.4 oz (85.7 kg)   SpO2 97%   BMI 33.46 kg/m²    Pain scale:  Pain Score:   8     CONSTITUTIONAL: Alert, appropriate, no acute distress,   EYES: Non icteric, EOM intact, pupils equal round and reactive to light and accommodation. ENT: Oral mucus membranes moist, no oral pharyngeal lesions. External inspection of ears and nose are normal.   NECK: Supple, no masses. No palpable thyroid mass    CHEST/LUNGS: CTA bilaterally, normal respiratory effort   CARDIOVASCULAR: RRR, no murmurs. No lower extremity edema   ABDOMEN: soft non-tender, active bowel sounds, no hepatosplenomegaly. No palpable masses. EXTREMITIES: warm, Full ROM of all fours extremities. No focal weakness. SKIN: warm, dry with no rashes or lesions  LYMPH: No cervical, clavicular  or inguinal lymphadenopathy. palpable right axillary adenopathy  NEUROLOGIC: follows commands, non focal.   PSYCH: mood and affect appropriate. Alert and oriented to time and place and person. Relevant Lab findings/reviewed by me:  CBC:07/17/2020  WBC- 4.89  HGB- 12.3  PLT- 247,000  Neut- 2.64    Relevant Imaging studies/reviewed by me:  5/22/2020 CT Chest    1. No acute intrathoracic abnormality, interval resolution or surgical  excision of a pathologically enlarged lymph node in the right axilla is  noted. There is no measurable lymphadenopathy to indicate active  lymphoma. 2. Mild diffuse changes of paraseptal emphysema. There are 2 small  nodules in the right lung which are stable and appear benign, measuring  less than 6 mm each. Biapical pleural thickening/fibrosis, slightly  increased at the right apex. There is mild pleural thickening the right  upper lateral chest with interstitial thickening, with no mass effect. 3. This dependent debris within the gallbladder compatible with stones  and probably milk of calcium. This report was finalized on 05/22/2020 17:14 by Dr. Olivier Perkins MD.      ASSESSMENT    Orders Placed This Encounter   Procedures    CT Chest W Contrast     Standing Status:   Future     Standing Expiration Date:   1/17/2021     Scheduling Instructions:      [facility]     Diagnoses and all orders for this visit:    Follicular lymphoma, unspecified follicular lymphoma type, unspecified body region [facility])  -     CT Chest W Contrast; Future    Care plan discussed with patient    Encounter for follow-up surveillance of lymphoma    Panic attacks       #Follicular lymphoma grade 1, stage IIB  The patient was counseled today about diagnosis, staging, prognosis, diagnostic tests,

## 2020-07-17 ENCOUNTER — TELEPHONE (OUTPATIENT)
Dept: HEMATOLOGY | Age: 55
End: 2020-07-17

## 2020-07-17 ENCOUNTER — TRANSCRIBE ORDERS (OUTPATIENT)
Dept: ADMINISTRATIVE | Facility: HOSPITAL | Age: 55
End: 2020-07-17

## 2020-07-17 ENCOUNTER — HOSPITAL ENCOUNTER (OUTPATIENT)
Dept: INFUSION THERAPY | Age: 55
Discharge: HOME | End: 2020-07-17
Payer: MEDICAID

## 2020-07-17 ENCOUNTER — OFFICE VISIT (OUTPATIENT)
Dept: HEMATOLOGY | Age: 55
End: 2020-07-17
Payer: MEDICAID

## 2020-07-17 VITALS
TEMPERATURE: 97.5 F | SYSTOLIC BLOOD PRESSURE: 126 MMHG | HEIGHT: 63 IN | WEIGHT: 188.9 LBS | HEART RATE: 87 BPM | DIASTOLIC BLOOD PRESSURE: 78 MMHG | OXYGEN SATURATION: 97 % | BODY MASS INDEX: 33.47 KG/M2

## 2020-07-17 DIAGNOSIS — C82.90 FOLLICULAR LYMPHOMA, UNSPECIFIED FOLLICULAR LYMPHOMA TYPE, UNSPECIFIED BODY REGION (HCC): ICD-10-CM

## 2020-07-17 DIAGNOSIS — C82.90: Primary | ICD-10-CM

## 2020-07-17 LAB
BASOPHILS ABSOLUTE: 0.07 K/UL (ref 0.01–0.08)
BASOPHILS RELATIVE PERCENT: 1.4 % (ref 0.1–1.2)
EOSINOPHILS ABSOLUTE: 0.28 K/UL (ref 0.04–0.54)
EOSINOPHILS RELATIVE PERCENT: 5.7 % (ref 0.7–7)
HCT VFR BLD CALC: 38.5 % (ref 34.1–44.9)
HEMOGLOBIN: 12.3 G/DL (ref 11.2–15.7)
LYMPHOCYTES ABSOLUTE: 1.22 K/UL (ref 1.18–3.74)
LYMPHOCYTES RELATIVE PERCENT: 24.9 % (ref 19.3–53.1)
MCH RBC QN AUTO: 27.4 PG (ref 25.6–32.2)
MCHC RBC AUTO-ENTMCNC: 31.9 G/DL (ref 32.3–35.5)
MCV RBC AUTO: 85.7 FL (ref 79.4–94.8)
MONOCYTES ABSOLUTE: 0.68 K/UL (ref 0.24–0.82)
MONOCYTES RELATIVE PERCENT: 13.9 % (ref 4.7–12.5)
NEUTROPHILS ABSOLUTE: 2.64 K/UL (ref 1.56–6.13)
NEUTROPHILS RELATIVE PERCENT: 54.1 % (ref 34–71.1)
PDW BLD-RTO: 15 % (ref 11.7–14.4)
PLATELET # BLD: 247 K/UL (ref 182–369)
PMV BLD AUTO: 9.1 FL (ref 7.4–10.4)
RBC # BLD: 4.49 M/UL (ref 3.93–5.22)
WBC # BLD: 4.89 K/UL (ref 3.98–10.04)

## 2020-07-17 PROCEDURE — 36415 COLL VENOUS BLD VENIPUNCTURE: CPT

## 2020-07-17 PROCEDURE — 85025 COMPLETE CBC W/AUTO DIFF WBC: CPT

## 2020-07-17 PROCEDURE — 99213 OFFICE O/P EST LOW 20 MIN: CPT | Performed by: INTERNAL MEDICINE

## 2020-07-17 PROCEDURE — 99211 OFF/OP EST MAY X REQ PHY/QHP: CPT

## 2020-07-17 RX ORDER — LEFLUNOMIDE 20 MG/1
20 TABLET ORAL DAILY
COMMUNITY
End: 2020-11-20 | Stop reason: ALTCHOICE

## 2020-07-22 ENCOUNTER — OFFICE VISIT (OUTPATIENT)
Dept: FAMILY MEDICINE CLINIC | Facility: CLINIC | Age: 55
End: 2020-07-22

## 2020-07-22 VITALS
BODY MASS INDEX: 31.72 KG/M2 | HEIGHT: 64 IN | OXYGEN SATURATION: 99 % | SYSTOLIC BLOOD PRESSURE: 118 MMHG | DIASTOLIC BLOOD PRESSURE: 87 MMHG | TEMPERATURE: 98 F | WEIGHT: 185.8 LBS | HEART RATE: 85 BPM | RESPIRATION RATE: 18 BRPM

## 2020-07-22 DIAGNOSIS — F41.0 PANIC ATTACK: ICD-10-CM

## 2020-07-22 DIAGNOSIS — G89.4 CHRONIC PAIN SYNDROME: ICD-10-CM

## 2020-07-22 DIAGNOSIS — F41.9 ANXIETY: ICD-10-CM

## 2020-07-22 PROCEDURE — 99214 OFFICE O/P EST MOD 30 MIN: CPT | Performed by: NURSE PRACTITIONER

## 2020-07-22 RX ORDER — HYDROCODONE BITARTRATE AND ACETAMINOPHEN 10; 325 MG/1; MG/1
1 TABLET ORAL EVERY 6 HOURS PRN
Qty: 90 TABLET | Refills: 0 | Status: SHIPPED | OUTPATIENT
Start: 2020-07-22 | End: 2020-08-21 | Stop reason: SDUPTHER

## 2020-07-22 RX ORDER — ALPRAZOLAM 0.5 MG/1
0.5 TABLET ORAL 2 TIMES DAILY PRN
Qty: 60 TABLET | Refills: 0 | Status: SHIPPED | OUTPATIENT
Start: 2020-07-22 | End: 2020-08-21 | Stop reason: SDUPTHER

## 2020-08-12 DIAGNOSIS — M05.79 RHEUMATOID ARTHRITIS INVOLVING MULTIPLE SITES WITH POSITIVE RHEUMATOID FACTOR (HCC): ICD-10-CM

## 2020-08-12 DIAGNOSIS — G62.9 NEUROPATHY: ICD-10-CM

## 2020-08-12 DIAGNOSIS — F41.0 PANIC ATTACKS: ICD-10-CM

## 2020-08-12 RX ORDER — GABAPENTIN 300 MG/1
CAPSULE ORAL
Qty: 90 CAPSULE | Refills: 1 | Status: SHIPPED | OUTPATIENT
Start: 2020-08-12 | End: 2020-10-15 | Stop reason: DRUGHIGH

## 2020-08-21 ENCOUNTER — OFFICE VISIT (OUTPATIENT)
Dept: FAMILY MEDICINE CLINIC | Facility: CLINIC | Age: 55
End: 2020-08-21

## 2020-08-21 VITALS
RESPIRATION RATE: 18 BRPM | WEIGHT: 186 LBS | DIASTOLIC BLOOD PRESSURE: 82 MMHG | SYSTOLIC BLOOD PRESSURE: 137 MMHG | TEMPERATURE: 98.2 F | HEART RATE: 86 BPM | HEIGHT: 63 IN | OXYGEN SATURATION: 99 % | BODY MASS INDEX: 32.96 KG/M2

## 2020-08-21 DIAGNOSIS — G89.4 CHRONIC PAIN SYNDROME: ICD-10-CM

## 2020-08-21 DIAGNOSIS — M51.36 L4-L5 DISC BULGE: ICD-10-CM

## 2020-08-21 DIAGNOSIS — M54.31 BILATERAL SCIATICA: Primary | ICD-10-CM

## 2020-08-21 DIAGNOSIS — M54.32 BILATERAL SCIATICA: Primary | ICD-10-CM

## 2020-08-21 DIAGNOSIS — M62.838 MUSCLE SPASM: ICD-10-CM

## 2020-08-21 DIAGNOSIS — F41.9 ANXIETY: ICD-10-CM

## 2020-08-21 DIAGNOSIS — F41.0 PANIC ATTACK: ICD-10-CM

## 2020-08-21 PROCEDURE — 99214 OFFICE O/P EST MOD 30 MIN: CPT | Performed by: NURSE PRACTITIONER

## 2020-08-21 RX ORDER — CYCLOBENZAPRINE HCL 10 MG
10 TABLET ORAL 3 TIMES DAILY PRN
Qty: 90 TABLET | Refills: 0 | Status: SHIPPED | OUTPATIENT
Start: 2020-08-21 | End: 2020-11-23

## 2020-08-21 RX ORDER — HYDROCODONE BITARTRATE AND ACETAMINOPHEN 10; 325 MG/1; MG/1
1 TABLET ORAL EVERY 6 HOURS PRN
Qty: 90 TABLET | Refills: 0 | Status: SHIPPED | OUTPATIENT
Start: 2020-08-21 | End: 2020-09-21 | Stop reason: SDUPTHER

## 2020-08-21 RX ORDER — ALPRAZOLAM 0.5 MG/1
0.5 TABLET ORAL 2 TIMES DAILY PRN
Qty: 60 TABLET | Refills: 0 | Status: SHIPPED | OUTPATIENT
Start: 2020-08-21 | End: 2020-09-25 | Stop reason: SDUPTHER

## 2020-08-21 NOTE — PROGRESS NOTES
Subjective   Adela Arauz is a 55 y.o. female presents in office for follow up on chronic back pain and anxiety.     History of Present Illness   Chronic back pain  Chronic. Uncontrolled. Currently with norco. Had spinal injection last month did not help but for about 1 week, now hurting more the last couple weeks. See Dr. Calderon in 2 weeks to discuss surgical options for l4-l5 spinal issues. Spasms worse lately. Needs refill of norco.     Anxiety  Chronic. Controlled. Anxiety stable on xanax. Needs refills today.     The following portions of the patient's history were reviewed and updated as appropriate: allergies, current medications, past family history, past medical history, past social history, past surgical history and problem list.    Review of Systems   Constitutional: Positive for activity change. Negative for appetite change, chills, diaphoresis, fatigue (chronic) and fever.   Eyes: Negative for visual disturbance.   Respiratory: Negative for cough, chest tightness, shortness of breath and wheezing.    Cardiovascular: Negative for chest pain and leg swelling.   Musculoskeletal: Positive for arthralgias, back pain, gait problem, joint swelling and myalgias.   Skin: Negative for color change, rash and wound.   Neurological: Negative for dizziness, syncope and headaches.   Psychiatric/Behavioral: The patient is nervous/anxious.        Objective     Vitals:    08/21/20 1011   BP: 137/82   Pulse: 86   Resp: 18   Temp: 98.2 °F (36.8 °C)   SpO2: 99%       Physical Exam   Constitutional: She is oriented to person, place, and time. Vital signs are normal. She appears well-developed and well-nourished.   HENT:   Head: Normocephalic and atraumatic.   Eyes: Conjunctivae are normal.   Neck: Neck supple.   Cardiovascular: Normal rate, regular rhythm and normal heart sounds.   Pulmonary/Chest: Effort normal and breath sounds normal.   Musculoskeletal:        Thoracic back: She exhibits decreased range of motion  and pain.        Lumbar back: She exhibits decreased range of motion and pain.        Right hand: She exhibits decreased range of motion, deformity and swelling.        Left hand: She exhibits decreased range of motion, deformity and swelling.   MCP joint swelling and deformities   Lymphadenopathy:     She has no cervical adenopathy.   Neurological: She is alert and oriented to person, place, and time.   Skin: Skin is warm, dry and intact.   Psychiatric: She has a normal mood and affect.   Nursing note and vitals reviewed.         Patient's Body mass index is 32.95 kg/m². BMI is above normal parameters. Recommendations include: exercise counseling and nutrition counseling.       Assessment/Plan   Adela was seen today for pain.    Diagnoses and all orders for this visit:    Bilateral sciatica    Chronic pain syndrome  -     HYDROcodone-acetaminophen (Norco)  MG per tablet; Take 1 tablet by mouth Every 6 (Six) Hours As Needed for Moderate Pain .    Panic attack  -     ALPRAZolam (Xanax) 0.5 MG tablet; Take 1 tablet by mouth 2 (Two) Times a Day As Needed for Anxiety.    Anxiety  -     ALPRAZolam (Xanax) 0.5 MG tablet; Take 1 tablet by mouth 2 (Two) Times a Day As Needed for Anxiety.    Muscle spasm    L4-L5 disc bulge    Other orders  -     cyclobenzaprine (FLEXERIL) 10 MG tablet; Take 1 tablet by mouth 3 (Three) Times a Day As Needed for Muscle Spasms.      [place], UDS, and controlled substance contract in emr. Advised patient of risks associated with controlled substances including physical and mental dependence, abuse, and mental impairment. Advised patient to use least amount of possible and never overuse or share medications with other people. Patient states understanding of risks and instructions on proper use.     Discussed increased risk of overdose and abuse of narcotics when taking multiple ones at one.     Return in about 1 month (around 9/21/2020) for Recheck.             Electronically signed by  Arik Horn, SURYA, 08/21/20, 10:40 AM.

## 2020-08-31 ENCOUNTER — TELEPHONE (OUTPATIENT)
Dept: FAMILY MEDICINE CLINIC | Facility: CLINIC | Age: 55
End: 2020-08-31

## 2020-08-31 NOTE — TELEPHONE ENCOUNTER
Pt called stating she has Mersa in her arm again and Dr. Carvajal is going to do surgery tomorrow.   She said he told her to let you know that she will have to be taking more pain medication.

## 2020-09-08 DIAGNOSIS — M05.79 RHEUMATOID ARTHRITIS INVOLVING MULTIPLE SITES WITH POSITIVE RHEUMATOID FACTOR (HCC): ICD-10-CM

## 2020-09-08 DIAGNOSIS — F41.8 ANXIETY ASSOCIATED WITH DEPRESSION: ICD-10-CM

## 2020-09-08 DIAGNOSIS — G62.9 NEUROPATHY: ICD-10-CM

## 2020-09-08 PROBLEM — M51.369 DDD (DEGENERATIVE DISC DISEASE), LUMBAR: Status: ACTIVE | Noted: 2020-09-08

## 2020-09-08 PROBLEM — M51.9 FORAMINAL STENOSIS DUE TO INTERVERTEBRAL DISC DISEASE: Status: ACTIVE | Noted: 2020-09-08

## 2020-09-08 PROBLEM — M51.36 DDD (DEGENERATIVE DISC DISEASE), LUMBAR: Status: ACTIVE | Noted: 2020-09-08

## 2020-09-08 PROBLEM — M99.79 FORAMINAL STENOSIS DUE TO INTERVERTEBRAL DISC DISEASE: Status: ACTIVE | Noted: 2020-09-08

## 2020-09-08 RX ORDER — DULOXETIN HYDROCHLORIDE 60 MG/1
CAPSULE, DELAYED RELEASE ORAL
Qty: 30 CAPSULE | Refills: 2 | OUTPATIENT
Start: 2020-09-08

## 2020-09-08 RX ORDER — GABAPENTIN 300 MG/1
CAPSULE ORAL
Qty: 90 CAPSULE | Refills: 1 | OUTPATIENT
Start: 2020-09-08

## 2020-09-11 ENCOUNTER — OFFICE VISIT (OUTPATIENT)
Dept: FAMILY MEDICINE CLINIC | Facility: CLINIC | Age: 55
End: 2020-09-11

## 2020-09-11 VITALS
BODY MASS INDEX: 32.82 KG/M2 | HEIGHT: 63 IN | TEMPERATURE: 99.1 F | HEART RATE: 93 BPM | WEIGHT: 185.2 LBS | OXYGEN SATURATION: 99 % | RESPIRATION RATE: 18 BRPM

## 2020-09-11 DIAGNOSIS — B02.9 HERPES ZOSTER WITHOUT COMPLICATION: Primary | ICD-10-CM

## 2020-09-11 PROCEDURE — 99213 OFFICE O/P EST LOW 20 MIN: CPT | Performed by: NURSE PRACTITIONER

## 2020-09-11 RX ORDER — VALACYCLOVIR HYDROCHLORIDE 1 G/1
1000 TABLET, FILM COATED ORAL 3 TIMES DAILY
Qty: 21 TABLET | Refills: 0 | Status: SHIPPED | OUTPATIENT
Start: 2020-09-11 | End: 2020-09-18

## 2020-09-11 RX ORDER — LEVOFLOXACIN 750 MG/1
TABLET ORAL
COMMUNITY
Start: 2020-08-26 | End: 2020-09-25

## 2020-09-21 DIAGNOSIS — G89.4 CHRONIC PAIN SYNDROME: ICD-10-CM

## 2020-09-21 RX ORDER — HYDROCODONE BITARTRATE AND ACETAMINOPHEN 10; 325 MG/1; MG/1
1 TABLET ORAL EVERY 6 HOURS PRN
Qty: 90 TABLET | Refills: 0 | Status: SHIPPED | OUTPATIENT
Start: 2020-09-21 | End: 2020-10-15 | Stop reason: SDUPTHER

## 2020-09-21 NOTE — TELEPHONE ENCOUNTER
Yes she had surgery and Dr. Carvajal increased norco to 10.5 for post surgery, and I told her to call and I would send in a one time script and she can follow back up in October for office visit.  Script sent

## 2020-09-21 NOTE — TELEPHONE ENCOUNTER
She was told to call when she runs out of her HYDROcodone-acetaminophen (Norco)  MG per tablet    [facility] - [place] - [address] - [phone] [facility] [phone] FX    2 left and that's it

## 2020-09-25 ENCOUNTER — OFFICE VISIT (OUTPATIENT)
Dept: FAMILY MEDICINE CLINIC | Facility: CLINIC | Age: 55
End: 2020-09-25

## 2020-09-25 VITALS
HEIGHT: 65 IN | SYSTOLIC BLOOD PRESSURE: 117 MMHG | WEIGHT: 186.4 LBS | BODY MASS INDEX: 31.06 KG/M2 | DIASTOLIC BLOOD PRESSURE: 79 MMHG | HEART RATE: 75 BPM | RESPIRATION RATE: 18 BRPM | TEMPERATURE: 98.4 F | OXYGEN SATURATION: 96 %

## 2020-09-25 VITALS
HEIGHT: 65 IN | WEIGHT: 186.4 LBS | HEART RATE: 75 BPM | BODY MASS INDEX: 31.06 KG/M2 | OXYGEN SATURATION: 96 % | RESPIRATION RATE: 18 BRPM | SYSTOLIC BLOOD PRESSURE: 117 MMHG | DIASTOLIC BLOOD PRESSURE: 79 MMHG | TEMPERATURE: 98.4 F

## 2020-09-25 DIAGNOSIS — G89.4 CHRONIC PAIN SYNDROME: ICD-10-CM

## 2020-09-25 DIAGNOSIS — Z12.4 SCREENING FOR CERVICAL CANCER: ICD-10-CM

## 2020-09-25 DIAGNOSIS — Z01.419 ENCOUNTER FOR GYNECOLOGICAL EXAMINATION WITHOUT ABNORMAL FINDING: ICD-10-CM

## 2020-09-25 DIAGNOSIS — F41.9 ANXIETY: ICD-10-CM

## 2020-09-25 DIAGNOSIS — F41.0 PANIC ATTACK: ICD-10-CM

## 2020-09-25 DIAGNOSIS — Z23 NEEDS FLU SHOT: Primary | ICD-10-CM

## 2020-09-25 DIAGNOSIS — Z00.01 ENCOUNTER FOR WELL ADULT EXAM WITH ABNORMAL FINDINGS: Primary | ICD-10-CM

## 2020-09-25 PROCEDURE — 99396 PREV VISIT EST AGE 40-64: CPT | Performed by: NURSE PRACTITIONER

## 2020-09-25 PROCEDURE — 90686 IIV4 VACC NO PRSV 0.5 ML IM: CPT | Performed by: NURSE PRACTITIONER

## 2020-09-25 PROCEDURE — 90471 IMMUNIZATION ADMIN: CPT | Performed by: NURSE PRACTITIONER

## 2020-09-25 PROCEDURE — 99213 OFFICE O/P EST LOW 20 MIN: CPT | Performed by: NURSE PRACTITIONER

## 2020-09-25 RX ORDER — HYDROCODONE BITARTRATE AND ACETAMINOPHEN 10; 325 MG/1; MG/1
1 TABLET ORAL EVERY 6 HOURS PRN
Qty: 90 TABLET | Refills: 0 | Status: CANCELLED | OUTPATIENT
Start: 2020-09-25

## 2020-09-25 RX ORDER — ALPRAZOLAM 0.5 MG/1
0.5 TABLET ORAL 2 TIMES DAILY PRN
Qty: 60 TABLET | Refills: 0 | Status: SHIPPED | OUTPATIENT
Start: 2020-09-25 | End: 2020-10-15

## 2020-09-25 RX ORDER — PREDNISONE 20 MG/1
20 TABLET ORAL DAILY
Qty: 7 TABLET | Refills: 0 | Status: SHIPPED | OUTPATIENT
Start: 2020-09-25 | End: 2020-10-02

## 2020-09-25 RX ORDER — ALENDRONATE SODIUM 70 MG/1
TABLET ORAL
COMMUNITY
Start: 2020-09-14 | End: 2020-10-23

## 2020-09-25 NOTE — PATIENT INSTRUCTIONS
Chronic Pain, Adult  Chronic pain is a type of pain that lasts or keeps coming back (recurs) for at least six months. You may have chronic headaches, abdominal pain, or body pain. Chronic pain may be related to an illness, such as fibromyalgia or complex regional pain syndrome. Sometimes the cause of chronic pain is not known.  Chronic pain can make it hard for you to do daily activities. If not treated, chronic pain can lead to other health problems, including anxiety and depression. Treatment depends on the cause and severity of your pain. You may need to work with a pain specialist to come up with a treatment plan. The plan may include medicine, counseling, and physical therapy. Many people benefit from a combination of two or more types of treatment to control their pain.  Follow these instructions at home:  Lifestyle  · Consider keeping a pain diary to share with your health care providers.  · Consider talking with a mental health care provider (psychologist) about how to cope with chronic pain.  · Consider joining a chronic pain support group.  · Try to control or lower your stress levels. Talk to your health care provider about strategies to do this.  General instructions    · Take over-the-counter and prescription medicines only as told by your health care provider.  · Follow your treatment plan as told by your health care provider. This may include:  ? Gentle, regular exercise.  ? Eating a healthy diet that includes foods such as vegetables, fruits, fish, and lean meats.  ? Cognitive or behavioral therapy.  ? Working with a physical therapist.  ? Meditation or yoga.  ? Acupuncture or massage therapy.  ? Aroma, color, light, or sound therapy.  ? Local electrical stimulation.  ? Shots (injections) of numbing or pain-relieving medicines into the spine or the area of pain.  · Check your pain level as told by your health care provider. Ask your health care provider if you should use a pain scale.  · Learn as  much as you can about how to manage your chronic pain. Ask your health care provider if an intensive pain rehabilitation program or a chronic pain specialist would be helpful.  · Keep all follow-up visits as told by your health care provider. This is important.  Contact a health care provider if:  · Your pain gets worse.  · You have new pain.  · You have trouble sleeping.  · You have trouble doing your normal activities.  · Your pain is not controlled with treatment.  · Your have side effects from pain medicine.  · You feel weak.  Get help right away if:  · You lose feeling or have numbness in your body.  · You lose control of bowel or bladder function.  · Your pain suddenly gets much worse.  · You develop shaking or chills.  · You develop confusion.  · You develop chest pain.  · You have trouble breathing or shortness of breath.  · You pass out.  · You have thoughts about hurting yourself or others.  This information is not intended to replace advice given to you by your health care provider. Make sure you discuss any questions you have with your health care provider.  Document Released: 09/09/2003 Document Revised: 11/30/2018 Document Reviewed: 06/06/2017  [employer]vier Patient Education © 2020 [employer]

## 2020-09-25 NOTE — PROGRESS NOTES
ANNUAL WELL WOMAN EXAM    HPI  Adela Arauz is a 55 y.o.  menopausal female (last cycle ) who presents for her annual well woman exam.  The patient has no complaints.  Denies any vaginal itching, burning, irritation, or discharge.  Denies any abnormal uterine bleeding.  Continues to be sexually active.  Denies any sexual dysfunction concerns.  Denies any urinary symptoms including incontinence, dysuria, frequency, urgency, nocturia. Denies any female problems. Recently had to have surgery on her right forearm by Dr. Carvajal for a MRSA infection.  Says she has pain in the back of 8/10 and all her joints are also hurting.      ROS  Review of Systems   Constitutional: Negative for activity change, appetite change and chills.   HENT: Negative.    Respiratory: Negative for chest tightness, shortness of breath and wheezing.    Cardiovascular: Negative for chest pain, palpitations and leg swelling.   Endocrine: Negative.    Musculoskeletal: Positive for arthralgias and myalgias.   Skin: Negative.    Allergic/Immunologic: Positive for environmental allergies.   Hematological: Negative.    Psychiatric/Behavioral: Negative for self-injury, sleep disturbance and suicidal ideas. The patient is not nervous/anxious.    All other systems reviewed and are negative.    Advance Care Planning is discussed, questions answered, forms given.  Advance Care Plan or surrogate decision maker not documented in the medial record. Pt does not desire to have one at this time.       Last Completed Pap Smear       Status Date      PAP SMEAR Done 2019 PAPIG, CTNGTVHSV,HPV,RFX16/18     Patient has more history with this topic...          GYN HISTORY  Last pap smear:   Last Completed Pap Smear       Status Date      PAP SMEAR Done 2019 PAPIG, CTNGTVHSV,HPV,RFX16/18     Patient has more history with this topic...        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    • Low back pain    • Morbid obesity due to excess calories (CMS/[facility]) 2020   • Osteoarthritis    • Osteoporosis    • PONV (postoperative nausea and vomiting)    • RA (rheumatoid arthritis) (CMS/[facility])    • Senile osteoporosis 2017     PAST SURGICAL HISTORY  Past Surgical History:   Procedure Laterality Date   • AXILLARY LYMPH NODE BIOPSY/EXCISION Right 2019    Procedure: EXCISION RIGHT AXILLARY MASS;  Surgeon: Jes Enamorado MD;  Location: [facility] OR;  Service: General   • SHOULDER SURGERY     • TUBAL ABDOMINAL LIGATION     • US GUIDED LYMPH NODE BIOPSY  2019   • WRIST SURGERY Bilateral     fracture     FAMILY HISTORY  Family History   Problem Relation Age of Onset   • Arthritis Mother    • COPD Mother    • Atrial fibrillation Mother    • Osteoarthritis Mother    • Heart disease Father    • Hypertension Father    • Arthritis Father    • Melanoma Father         metastatic   • Arthritis Sister    • Arthritis Brother    • No Known Problems Daughter    • Arthritis Maternal Grandfather    • Breast cancer Neg Hx      SOCIAL HISTORY  Social History     Socioeconomic History   • Marital status:      Spouse name: Not on file   • Number of children: Not on file   • Years of education: Not on file   • Highest education level: Not on file   Tobacco Use   • Smoking status: Former Smoker     Packs/day: 1.00     Years: 20.00     Pack years: 20.00     Types: Cigarettes     Quit date: 9/3/2018     Years since quittin.0   • Smokeless tobacco: Never Used   • Tobacco comment: quit 2018   Substance and Sexual Activity   • Alcohol use: No   • Drug use: No   •  Sexual activity: Defer     HOME MEDICATIONS  Prior to Admission medications    Medication Sig Start Date End Date Taking? Authorizing Provider   betamethasone valerate (VALISONE) 0.1 % ointment Apply  topically to the appropriate area as directed 2 (Two) Times a Day. 6/22/20  Yes Arik Horn APRN   Calcium-Magnesium-Vitamin D 500-250-200 MG-MG-UNIT tablet Take 500 tablets by mouth 2 (Two) Times a Day.   Yes Buddy Stanton MD   cholecalciferol (VITAMIN D3) 1000 units tablet Take 1,000 Units by mouth Daily.   Yes Buddy Stanton MD   cyclobenzaprine (FLEXERIL) 10 MG tablet Take 1 tablet by mouth 3 (Three) Times a Day As Needed for Muscle Spasms. 8/21/20  Yes Arik Horn APRN   DULoxetine (CYMBALTA) 60 MG capsule TAKE ONE CAPSULE BY MOUTH DAILY 6/5/20  Yes Arik Horn APRN   fluticasone (FLONASE) 50 MCG/ACT nasal spray 2 sprays into the nostril(s) as directed by provider Daily. 11/20/19  Yes Charlene Huntley DNP, APRN   gabapentin (NEURONTIN) 300 MG capsule TAKE ONE CAPSULE BY MOUTH BY MOUTH 3 TIMES A DAY 8/12/20  Yes Charlene Huntley DNP, APRN   HYDROcodone-acetaminophen (Norco)  MG per tablet Take 1 tablet by mouth Every 6 (Six) Hours As Needed for Moderate Pain . 9/21/20  Yes Charlene Huntley DNP, APRN   mupirocin (BACTROBAN) 2 % ointment Apply to nostrils and fingernails nighlty x 5 nights same 5 nights monthly x 6 months. 1/20/20  Yes Charlene Huntley DNP, APRN   omeprazole (priLOSEC) 40 MG capsule TAKE ONE CAPSULE BY MOUTH DAILY 4/7/20  Yes Charlene Huntley DNP, APRN   sertraline (ZOLOFT) 50 MG tablet Take 1 tablet by mouth Every Night. 8/12/20  Yes Charlene Huntley DNP, APRN   Turmeric 500 MG capsule Take  by mouth 2 (Two) Times a Day.   Yes Buddy Stanton MD   ALPRAZolam (Xanax) 0.5 MG tablet Take 1 tablet by mouth 2 (Two) Times a Day As Needed for Anxiety. 8/21/20 9/25/20 Yes Arik Horn APRN   levoFLOXacin (LEVAQUIN) 750 MG tablet   "8/26/20 9/25/20 Yes Provider, MD Buddy   alendronate (FOSAMAX) 70 MG tablet  9/14/20   Provider, MD Buddy   ALPRAZolam (Xanax) 0.5 MG tablet Take 1 tablet by mouth 2 (Two) Times a Day As Needed for Anxiety. 9/25/20   Charlene Huntley DNP, SURYA   diclofenac (VOLTAREN) 50 MG EC tablet Take 1 tablet by mouth 2 (two) times a day. 9/25/20   Charlene Huntley DNP, APRN   predniSONE (DELTASONE) 20 MG tablet Take 1 tablet by mouth Daily for 7 days. 9/25/20 10/2/20  Charlene Huntley DNP, APRN     ALLERGIES  No Known Allergies  PE  /79 (BP Location: Left arm, Patient Position: Sitting, Cuff Size: Adult)   Pulse 75   Temp 98.4 °F (36.9 °C) (Infrared)   Resp 18   Ht 163.8 cm (64.5\") Comment: measured  Wt 84.6 kg (186 lb 6.4 oz) Comment: weighed  LMP 04/01/2014 (Approximate)   SpO2 96%   Breastfeeding No   BMI 31.50 kg/m²        General: Alert, healthy, no distress, well nourished and well developed   Neurologic: Alert, oriented to person, place, and time.  Gait normal.  Cranial nerves II-XII grossly intact.  HEENT: Normocephalic, atraumatic.  Extraocular muscles intact, pupils equal and reactive times two.    Neck: Supple, no adenopathy, thyroid normal size, non-tender, without nodularity, trachea midline   Breasts: has fibrocysts in both breasts which she say is normal for her.  No masses, skin dimpling, skin retraction, nipple discharge, or asymmetry bilaterally. No lymphadenopathy   Lungs: Normal respiratory effort.  Clear to auscultation bilaterally.   Heart: Regular rate and rhythm, without murmer, rub or gallop.   Abdomen: Soft, non-tender, non-distended,no masses, no hepatosplenomegaly, no hernia.    Skin: No rash, no lesions.  Extremities: No cyanosis, clubbing or edema, has a healing scar on her right forearm  PELVIC EXAM:  External Genitalia/Vulva: Anatomy is normal, no significant redness of labia, no discharge on vulvar tissues, Lindon's and Bartholin's glands are normal, no ulcers, " no condylomatous lesions    Urethra: Normal, no lesions   Vagina: Vaginal tissues are not inflamed, normal color and texture, no significant discharge present  Cervix: Normal in appearance without lesions or purulent discharge, no cervical motion tenderness    Uterus: Normal size, shape, and consistency.  Adnexa: Normal size and shape bilaterally, no palpable mass bilaterally and non-tender bilaterally    Rectal Exam: Normal, no masses or polyps      HEALTH MAINTENANCE  Mammogram: up to date, due 2021  Colonoscopy: Done 11/6/17  DEXA scan: up to date, due 2021  Pap smear: Doing one today  Dental: June 2020  Vision: June 2020  Wants flu shot today     ASSESSMENT  Adela was seen today for annual exam.    Diagnoses and all orders for this visit:    Encounter for well adult exam with abnormal findings  Encounter for gynecological examination without abnormal finding  Screening for cervical cancer  -     Pap IG (Image Guided)      Return in about 1 year (around 9/25/2021) for Annual physical.  Electronically signed by Charlene Huntley DNP, APRHEENA, 09/30/20, 2:32 PM CDT.

## 2020-09-28 NOTE — PROGRESS NOTES
Subjective   Adela Arauz is a 55 y.o. female presents in office for evaluation of rash.     History of Present Illness   Rash  Recurrent. Feels like previous shingles. Rash on right cheek. Denies visual disturbance. Painful and blistering.     The following portions of the patient's history were reviewed and updated as appropriate: allergies, current medications, past family history, past medical history, past social history, past surgical history and problem list.    Review of Systems   Constitutional: Positive for activity change.   Eyes: Negative for photophobia, pain, discharge, redness, itching and visual disturbance.   Skin: Positive for rash. Negative for wound.       Objective     Vitals:    09/11/20 0937   Pulse: 93   Resp: 18   Temp: 99.1 °F (37.3 °C)   SpO2: 99%       Physical Exam  Vitals signs and nursing note reviewed.   Constitutional:       Appearance: She is well-developed.   HENT:      Head: Normocephalic and atraumatic.   Eyes:      Conjunctiva/sclera: Conjunctivae normal.   Neck:      Musculoskeletal: Neck supple.   Cardiovascular:      Rate and Rhythm: Normal rate and regular rhythm.   Pulmonary:      Effort: Pulmonary effort is normal.   Lymphadenopathy:      Cervical: No cervical adenopathy.   Skin:     Findings: Lesion and rash present. Rash is vesicular.          Neurological:      Mental Status: She is alert and oriented to person, place, and time.            Patient's Body mass index is 32.81 kg/m². BMI is above normal parameters. Recommendations include: none (medical contraindication) (acute visit today).       Assessment/Plan   Adela was seen today for blister.    Diagnoses and all orders for this visit:    Herpes zoster without complication    Other orders  -     valACYclovir (Valtrex) 1000 MG tablet; Take 1 tablet by mouth 3 (Three) Times a Day for 7 days.      If any eye involvement advised to see ophthalmologist asap, voiced understanding.       Return in about 1 week (around  9/18/2020), or if symptoms worsen or fail to improve.             Electronically signed by SURYA Amaral, 09/28/20, 11:16 AM CDT.

## 2020-09-30 LAB
CYTOLOGIST CVX/VAG CYTO: NORMAL
CYTOLOGY CVX/VAG DOC CYTO: NORMAL
CYTOLOGY CVX/VAG DOC THIN PREP: NORMAL
DX ICD CODE: NORMAL
HIV 1 & 2 AB SER-IMP: NORMAL
OTHER STN SPEC: NORMAL
STAT OF ADQ CVX/VAG CYTO-IMP: NORMAL

## 2020-10-10 DIAGNOSIS — F41.8 ANXIETY ASSOCIATED WITH DEPRESSION: ICD-10-CM

## 2020-10-10 DIAGNOSIS — K21.9 GASTROESOPHAGEAL REFLUX DISEASE WITHOUT ESOPHAGITIS: ICD-10-CM

## 2020-10-12 RX ORDER — DULOXETIN HYDROCHLORIDE 60 MG/1
CAPSULE, DELAYED RELEASE ORAL
Qty: 30 CAPSULE | Refills: 2 | Status: SHIPPED | OUTPATIENT
Start: 2020-10-12 | End: 2021-01-08

## 2020-10-12 RX ORDER — OMEPRAZOLE 40 MG/1
CAPSULE, DELAYED RELEASE ORAL
Qty: 30 CAPSULE | Refills: 5 | Status: SHIPPED | OUTPATIENT
Start: 2020-10-12 | End: 2021-04-05

## 2020-10-15 ENCOUNTER — OFFICE VISIT (OUTPATIENT)
Dept: FAMILY MEDICINE CLINIC | Facility: CLINIC | Age: 55
End: 2020-10-15

## 2020-10-15 VITALS
DIASTOLIC BLOOD PRESSURE: 87 MMHG | OXYGEN SATURATION: 98 % | HEIGHT: 63 IN | RESPIRATION RATE: 18 BRPM | HEART RATE: 87 BPM | WEIGHT: 186 LBS | BODY MASS INDEX: 32.96 KG/M2 | SYSTOLIC BLOOD PRESSURE: 130 MMHG | TEMPERATURE: 97.3 F

## 2020-10-15 DIAGNOSIS — F41.0 PANIC ATTACK: ICD-10-CM

## 2020-10-15 DIAGNOSIS — G62.9 NEUROPATHY: ICD-10-CM

## 2020-10-15 DIAGNOSIS — F41.9 ANXIETY: ICD-10-CM

## 2020-10-15 DIAGNOSIS — M05.79 RHEUMATOID ARTHRITIS INVOLVING MULTIPLE SITES WITH POSITIVE RHEUMATOID FACTOR (HCC): ICD-10-CM

## 2020-10-15 DIAGNOSIS — G89.4 CHRONIC PAIN SYNDROME: ICD-10-CM

## 2020-10-15 PROCEDURE — 96372 THER/PROPH/DIAG INJ SC/IM: CPT | Performed by: NURSE PRACTITIONER

## 2020-10-15 PROCEDURE — 99214 OFFICE O/P EST MOD 30 MIN: CPT | Performed by: NURSE PRACTITIONER

## 2020-10-15 RX ORDER — GABAPENTIN 600 MG/1
600 TABLET ORAL 3 TIMES DAILY
Qty: 90 TABLET | Refills: 3 | Status: SHIPPED | OUTPATIENT
Start: 2020-10-15 | End: 2021-02-08

## 2020-10-15 RX ORDER — DEXAMETHASONE SODIUM PHOSPHATE 10 MG/ML
10 INJECTION INTRAMUSCULAR; INTRAVENOUS ONCE
Status: COMPLETED | OUTPATIENT
Start: 2020-10-15 | End: 2020-10-15

## 2020-10-15 RX ORDER — HYDROCODONE BITARTRATE AND ACETAMINOPHEN 10; 325 MG/1; MG/1
1 TABLET ORAL EVERY 6 HOURS PRN
Qty: 120 TABLET | Refills: 0 | Status: SHIPPED | OUTPATIENT
Start: 2020-10-15 | End: 2020-11-20 | Stop reason: SDUPTHER

## 2020-10-15 RX ORDER — ALPRAZOLAM 0.5 MG/1
0.5 TABLET ORAL 3 TIMES DAILY PRN
Qty: 90 TABLET | Refills: 0 | Status: SHIPPED | OUTPATIENT
Start: 2020-10-15 | End: 2020-11-20 | Stop reason: SDUPTHER

## 2020-10-15 RX ADMIN — DEXAMETHASONE SODIUM PHOSPHATE 10 MG: 10 INJECTION INTRAMUSCULAR; INTRAVENOUS at 13:50

## 2020-10-19 ENCOUNTER — TELEPHONE (OUTPATIENT)
Dept: FAMILY MEDICINE CLINIC | Facility: CLINIC | Age: 55
End: 2020-10-19

## 2020-10-19 NOTE — TELEPHONE ENCOUNTER
PATIENT CALLING IN STATING THAT HER SCIATICA NERVE IS NOT GETTING ANY BETTER, ITS GETTING WORSE. PATIENT IS REQUESTING A CALL  BACK TO SEE WHAT SHE SHOULD DO.     CALL BACK NUMBER: [phone]

## 2020-10-19 NOTE — TELEPHONE ENCOUNTER
I attempted to call the pt at the number you listed and it says its not a valid number, so I looked the number up on her demographics and attempted to call that number but says pt does not have voice mail set up.  If she is not improving she has 2 choices.  She can go to ER and get evaluated, but since Dr. Dunaway has already done and MRI and is waiting for surgical clearance my recommendation is for her to get in to see him asap.

## 2020-10-20 ENCOUNTER — TRANSCRIBE ORDERS (OUTPATIENT)
Dept: ADMINISTRATIVE | Facility: HOSPITAL | Age: 55
End: 2020-10-20

## 2020-10-20 ENCOUNTER — HOSPITAL ENCOUNTER (OUTPATIENT)
Dept: GENERAL RADIOLOGY | Facility: HOSPITAL | Age: 55
Discharge: HOME | End: 2020-10-20

## 2020-10-20 ENCOUNTER — LAB (OUTPATIENT)
Dept: LAB | Facility: HOSPITAL | Age: 55
End: 2020-10-20

## 2020-10-20 DIAGNOSIS — M86.631: ICD-10-CM

## 2020-10-20 DIAGNOSIS — M86.631: Primary | ICD-10-CM

## 2020-10-20 LAB
ANION GAP SERPL CALCULATED.3IONS-SCNC: 10 MMOL/L (ref 5–15)
BASOPHILS # BLD AUTO: 0.05 10*3/MM3 (ref 0–0.2)
BASOPHILS NFR BLD AUTO: 0.6 % (ref 0–1.5)
BUN SERPL-MCNC: 9 MG/DL (ref 6–20)
BUN/CREAT SERPL: 15.3 (ref 7–25)
CALCIUM SPEC-SCNC: 9.5 MG/DL (ref 8.6–10.5)
CHLORIDE SERPL-SCNC: 97 MMOL/L (ref 98–107)
CO2 SERPL-SCNC: 29 MMOL/L (ref 22–29)
CREAT SERPL-MCNC: 0.59 MG/DL (ref 0.57–1)
CRP SERPL-MCNC: 0.48 MG/DL (ref 0–0.5)
DEPRECATED RDW RBC AUTO: 44.4 FL (ref 37–54)
EOSINOPHIL # BLD AUTO: 0.34 10*3/MM3 (ref 0–0.4)
EOSINOPHIL NFR BLD AUTO: 4.2 % (ref 0.3–6.2)
ERYTHROCYTE [DISTWIDTH] IN BLOOD BY AUTOMATED COUNT: 15.2 % (ref 12.3–15.4)
GFR SERPL CREATININE-BSD FRML MDRD: 106 ML/MIN/1.73
GLUCOSE SERPL-MCNC: 63 MG/DL (ref 65–99)
HCT VFR BLD AUTO: 41.1 % (ref 34–46.6)
HGB BLD-MCNC: 13.4 G/DL (ref 12–15.9)
IMM GRANULOCYTES # BLD AUTO: 0.03 10*3/MM3 (ref 0–0.05)
IMM GRANULOCYTES NFR BLD AUTO: 0.4 % (ref 0–0.5)
LYMPHOCYTES # BLD AUTO: 1.15 10*3/MM3 (ref 0.7–3.1)
LYMPHOCYTES NFR BLD AUTO: 14.2 % (ref 19.6–45.3)
MCH RBC QN AUTO: 26.3 PG (ref 26.6–33)
MCHC RBC AUTO-ENTMCNC: 32.6 G/DL (ref 31.5–35.7)
MCV RBC AUTO: 80.6 FL (ref 79–97)
MONOCYTES # BLD AUTO: 0.87 10*3/MM3 (ref 0.1–0.9)
MONOCYTES NFR BLD AUTO: 10.7 % (ref 5–12)
NEUTROPHILS NFR BLD AUTO: 5.68 10*3/MM3 (ref 1.7–7)
NEUTROPHILS NFR BLD AUTO: 69.9 % (ref 42.7–76)
NRBC BLD AUTO-RTO: 0 /100 WBC (ref 0–0.2)
PLATELET # BLD AUTO: 377 10*3/MM3 (ref 140–450)
PMV BLD AUTO: 9 FL (ref 6–12)
POTASSIUM SERPL-SCNC: 4.3 MMOL/L (ref 3.5–5.2)
RBC # BLD AUTO: 5.1 10*6/MM3 (ref 3.77–5.28)
SODIUM SERPL-SCNC: 136 MMOL/L (ref 136–145)
WBC # BLD AUTO: 8.12 10*3/MM3 (ref 3.4–10.8)

## 2020-10-20 PROCEDURE — 85025 COMPLETE CBC W/AUTO DIFF WBC: CPT | Performed by: INTERNAL MEDICINE

## 2020-10-20 PROCEDURE — 73090 X-RAY EXAM OF FOREARM: CPT

## 2020-10-20 PROCEDURE — 86140 C-REACTIVE PROTEIN: CPT | Performed by: INTERNAL MEDICINE

## 2020-10-20 PROCEDURE — 36415 COLL VENOUS BLD VENIPUNCTURE: CPT

## 2020-10-20 PROCEDURE — 80048 BASIC METABOLIC PNL TOTAL CA: CPT | Performed by: INTERNAL MEDICINE

## 2020-10-20 RX ORDER — TIZANIDINE 4 MG/1
1 TABLET ORAL 3 TIMES DAILY
COMMUNITY
Start: 2020-10-12 | End: 2020-12-09

## 2020-10-23 ENCOUNTER — OFFICE VISIT (OUTPATIENT)
Dept: FAMILY MEDICINE CLINIC | Facility: CLINIC | Age: 55
End: 2020-10-23

## 2020-10-23 VITALS
TEMPERATURE: 98.4 F | BODY MASS INDEX: 32.6 KG/M2 | HEART RATE: 74 BPM | DIASTOLIC BLOOD PRESSURE: 76 MMHG | SYSTOLIC BLOOD PRESSURE: 109 MMHG | OXYGEN SATURATION: 98 % | RESPIRATION RATE: 18 BRPM | WEIGHT: 184 LBS | HEIGHT: 63 IN

## 2020-10-23 DIAGNOSIS — M54.42 CHRONIC BILATERAL LOW BACK PAIN WITH BILATERAL SCIATICA: Primary | ICD-10-CM

## 2020-10-23 DIAGNOSIS — G89.29 CHRONIC BILATERAL LOW BACK PAIN WITH BILATERAL SCIATICA: Primary | ICD-10-CM

## 2020-10-23 DIAGNOSIS — M54.41 CHRONIC BILATERAL LOW BACK PAIN WITH BILATERAL SCIATICA: Primary | ICD-10-CM

## 2020-10-23 PROCEDURE — 96372 THER/PROPH/DIAG INJ SC/IM: CPT | Performed by: NURSE PRACTITIONER

## 2020-10-23 PROCEDURE — 99214 OFFICE O/P EST MOD 30 MIN: CPT | Performed by: NURSE PRACTITIONER

## 2020-10-23 RX ORDER — KETOROLAC TROMETHAMINE 30 MG/ML
60 INJECTION, SOLUTION INTRAMUSCULAR; INTRAVENOUS ONCE
Status: COMPLETED | OUTPATIENT
Start: 2020-10-23 | End: 2020-10-23

## 2020-10-23 RX ORDER — IBANDRONATE SODIUM 150 MG/1
150 TABLET, FILM COATED ORAL
COMMUNITY
Start: 2020-10-21 | End: 2022-04-04 | Stop reason: SDUPTHER

## 2020-10-23 RX ADMIN — KETOROLAC TROMETHAMINE 60 MG: 30 INJECTION, SOLUTION INTRAMUSCULAR; INTRAVENOUS at 10:17

## 2020-10-23 NOTE — PATIENT INSTRUCTIONS
Chronic Back Pain  When back pain lasts longer than 3 months, it is called chronic back pain. Pain may get worse at certain times (flare-ups). There are things you can do at home to manage your pain.  Follow these instructions at home:  Activity         · Avoid bending and other activities that make pain worse.  · When standing:  ? Keep your upper back and neck straight.  ? Keep your shoulders pulled back.  ? Avoid slouching.  · When sitting:  ? Keep your back straight.  ? Relax your shoulders. Do not round your shoulders or pull them backward.  · Do not sit or  one place for long periods of time.  · Take short rest breaks during the day. Lying down or standing is usually better than sitting. Resting can help relieve pain.  · When sitting or lying down for a long time, do some mild activity or stretching. This will help to prevent stiffness and pain.  · Get regular exercise. Ask your doctor what activities are safe for you.  · Do not lift anything that is heavier than 10 lb (4.5 kg). To prevent injury when you lift things:  ? Bend your knees.  ? Keep the weight close to your body.  ? Avoid twisting.  Managing pain  · If told, put ice on the painful area. Your doctor may tell you to use ice for 24-48 hours after a flare-up starts.  ? Put ice in a plastic bag.  ? Place a towel between your skin and the bag.  ? Leave the ice on for 20 minutes, 2-3 times a day.  · If told, put heat on the painful area as often as told by your doctor. Use the heat source that your doctor recommends, such as a moist heat pack or a heating pad.  ? Place a towel between your skin and the heat source.  ? Leave the heat on for 20-30 minutes.  ? Remove the heat if your skin turns bright red. This is especially important if you are unable to feel pain, heat, or cold. You may have a greater risk of getting burned.  · Soak in a warm bath. This can help relieve pain.  · Take over-the-counter and prescription medicines only as told by your  doctor.  General instructions  · Sleep on a firm mattress. Try lying on your side with your knees slightly bent. If you lie on your back, put a pillow under your knees.  · Keep all follow-up visits as told by your doctor. This is important.  Contact a doctor if:  · You have pain that does not get better with rest or medicine.  Get help right away if:  · One or both of your arms or legs feel weak.  · One or both of your arms or legs lose feeling (numbness).  · You have trouble controlling when you poop (bowel movement) or pee (urinate).  · You feel sick to your stomach (nauseous).  · You throw up (vomit).  · You have belly (abdominal) pain.  · You have shortness of breath.  · You pass out (faint).  Summary  · When back pain lasts longer than 3 months, it is called chronic back pain.  · Pain may get worse at certain times (flare-ups).  · Use ice and heat as told by your doctor. Your doctor may tell you to use ice after flare-ups.  This information is not intended to replace advice given to you by your health care provider. Make sure you discuss any questions you have with your health care provider.  Document Released: 06/05/2009 Document Revised: 04/09/2020 Document Reviewed: 08/02/2018  [employer] Patient Education © 2020 [employer]

## 2020-10-23 NOTE — PROGRESS NOTES
Chief Complaint   Patient presents with   • Pain     Follow up        No Known Allergies    History provided by: self     HPI:  Subjective   Adela Arauz is a 55 y.o. female presents today for follow up for chronic problems, med refills and labs     Back Pain   This is a chronic problem. The current episode started more than 1 year ago. The problem occurs constantly. The problem has been rapidly worsening over the last 3 weeks. The pain is present in the lumbar spine.  The quality of the pain is described as aching, shooting and having numbness in legs. The pain radiates down legs. The pain is at a severity of 10/10. The pain is severe. The pain is the same all the time, it does not let up. The symptoms are aggravated by position, bending, standing, sitting and twisting. Stiffness is present all day. Associated symptoms include leg pain, weakness (she has been in a wheel chair the last couple days)   Pertinent negatives include no abdominal pain, bladder incontinence, bowel incontinence, chest pain, dysuria, fever, headaches or numbness. Risk factors include menopause and sedentary lifestyle. She has tried analgesics for the symptoms. The treatment provided mild relief.   She is suppose to have back surgery as soon as she gets cleared from Dr. Kenney for MRSA that was in her right forearm.  Pt has struggle with RA pain and back pain but she has never been this bad.        Chronic problems: anxiety worsening with this severe back pain with alprazolam, vit d def stable with cholecalciferol, depression stable with duloxetine and sertraline, neuropathy not controlled with gabapentin.  I had increased the dose when she was in last week however, she tells me that the pharmacy would not fill it.  I called [facility] and talled to Janis who says she doesn't know why they wouldn't fill it. GERD stable with omeprazole, she is now taking boniva for osteo    PCP currently listed as Charlene Huntley, DNP, APRN.          The following portions of the patient's history were reviewed and updated as appropriate: allergies, current medications, past family history, past medical history, past social history, past surgical history and problem list      Current Outpatient Medications:   •  ALPRAZolam (Xanax) 0.5 MG tablet, Take 1 tablet by mouth 3 (Three) Times a Day As Needed for Anxiety., Disp: 90 tablet, Rfl: 0  •  betamethasone valerate (VALISONE) 0.1 % ointment, Apply  topically to the appropriate area as directed 2 (Two) Times a Day., Disp: 30 g, Rfl: 0  •  Calcium-Magnesium-Vitamin D 500-250-200 MG-MG-UNIT tablet, Take 500 tablets by mouth 2 (Two) Times a Day., Disp: , Rfl:   •  cholecalciferol (VITAMIN D3) 1000 units tablet, Take 1,000 Units by mouth Daily., Disp: , Rfl:   •  cyclobenzaprine (FLEXERIL) 10 MG tablet, Take 1 tablet by mouth 3 (Three) Times a Day As Needed for Muscle Spasms., Disp: 90 tablet, Rfl: 0  •  diclofenac (VOLTAREN) 50 MG EC tablet, Take 1 tablet by mouth 2 (two) times a day., Disp: 60 tablet, Rfl: 0  •  DULoxetine (CYMBALTA) 60 MG capsule, TAKE ONE CAPSULE BY MOUTH DAILY, Disp: 30 capsule, Rfl: 2  •  fluticasone (FLONASE) 50 MCG/ACT nasal spray, 2 sprays into the nostril(s) as directed by provider Daily., Disp: 1 bottle, Rfl: 3  •  gabapentin (NEURONTIN) 600 MG tablet, Take 1 tablet by mouth 3 (Three) Times a Day., Disp: 90 tablet, Rfl: 3  •  HYDROcodone-acetaminophen (Norco)  MG per tablet, Take 1 tablet by mouth Every 6 (Six) Hours As Needed for Moderate Pain ., Disp: 120 tablet, Rfl: 0  •  mupirocin (BACTROBAN) 2 % ointment, Apply to nostrils and fingernails nighlty x 5 nights same 5 nights monthly x 6 months., Disp: 30 g, Rfl: 5  •  omeprazole (priLOSEC) 40 MG capsule, TAKE ONE CAPSULE BY MOUTH DAILY, Disp: 30 capsule, Rfl: 5  •  sertraline (ZOLOFT) 50 MG tablet, Take 1 tablet by mouth Every Night., Disp: 90 tablet, Rfl: 1  •  tiZANidine (ZANAFLEX) 4 MG tablet, Take 1 tablet by mouth 3  (Three) Times a Day., Disp: , Rfl:   •  Turmeric 500 MG capsule, Take  by mouth 2 (Two) Times a Day., Disp: , Rfl:   •  ibandronate (BONIVA) 150 MG tablet, , Disp: , Rfl:   Past Medical History:   Diagnosis Date   • AAT (alpha-1-antitrypsin) deficiency (CMS/Formerly [facility]) 2019   • Anxiety 2020   • 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, 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    • Low back pain    • Morbid obesity due to excess calories (CMS/Formerly [facility]) 2020   • Osteoarthritis    • Osteoporosis    • PONV (postoperative nausea and vomiting)    • RA (rheumatoid arthritis) (CMS/Formerly [facility])    • Senile osteoporosis 2017     Past Surgical History:   Procedure Laterality Date   • AXILLARY LYMPH NODE BIOPSY/EXCISION Right 2019    Procedure: EXCISION RIGHT AXILLARY MASS;  Surgeon: Jes Enamorado MD;  Location: [facility];  Service: General   • SHOULDER SURGERY     • TUBAL ABDOMINAL LIGATION     • US GUIDED LYMPH NODE BIOPSY  2019   • WRIST SURGERY Bilateral     fracture     Social History     Socioeconomic History   • Marital status:      Spouse name: Not on file   • Number of children: Not on file   • Years of education: Not on file   • Highest education level: Not on file   Tobacco Use   • Smoking status: Former Smoker     Packs/day: 1.00     Years: 20.00     Pack years: 20.00     Types: Cigarettes     Quit date: 9/3/2018     Years since quittin.1   • Smokeless tobacco: Never Used   • Tobacco comment: quit 2018   Substance and Sexual Activity   • Alcohol use: No   • Drug use: No   • Sexual activity: Defer     Family History   Problem Relation Age of Onset   • Arthritis Mother    • COPD Mother    • Atrial fibrillation Mother    • Osteoarthritis Mother    • Heart disease Father    • Hypertension Father    • Arthritis Father    • Melanoma Father         metastatic   • Arthritis Sister    • Arthritis Brother    • No Known Problems Daughter    • Arthritis Maternal Grandfather    • Breast cancer Neg Hx        REVIEW OF SYMPTOMS:  "(Positives bolded)  General:  weight loss, fever, chills, night sweats, fatigue, appetite loss  HEENT:  blurry vision, eye pain, eye discharge, dry eyes, decreased vision  Respiratory: shortness of breath, cough, hemoptysis, wheezing, pleurisy,   Cardiovascular:  chest pain, PND, palpitation, edema, orthopnea, syncope  Gastro: Nausea, vomiting, diarrhea, hematemesis, abdominal pain, constipation  Genito: hematuria, dysuria, glycosuria, hesitancy, frequency, incontinence  Musckelo: Arthralgia, myalgia, muscle weakness, joint swelling, NSAID use  Skin: rash, pruritis, sores, nail changes, skin thickening, change in wart/mole, itching   Neuro:  Migraine, numbness, ataxia, tremor, vertigo, weakness, memory loss  \"All other systems reviewed and negative, except as listed above.”    OBJECTIVE:  Constitutional:  No acute distress, Consistent with stated age. Oriented x 3,  Gait normal. Patient is pleasant and cooperative with the interview and exam.    Integumentary: No rashes, ulcers or lesions. No edema.  Normal skin moisture/turgor. Skin is warm to touch, no increased warmth.      CHEST/LUNG: Lungs clear throughout lung fields without rale, rhonchi or wheezes. no distress, no use of accessory muscles.       CARDIOVASCULAR:  Regular rate and rhythm. No murmur noted in sitting, supine positions.      ABDOMEN:  Bowel sounds normal, no abdominal bruits. Abd soft, non-tender, no rebound tenderness, no rigidity (guarding), no jar tenderness, no masses.  no hepatomegaly, no splenomegaly     Neuropsych: Normal speech, Thought- normal. No hallucinations, delusions, obsessions.   Appropriate judgement and memory.    Musculoskeletal:  digital clubbing or cyanosis, neurovascularly intact all four extremities.  Lumbar Spine: Pt is sitting in a wheel chair and says she doesn't want to stand up if possible because the pain goes down her legs and they buckle.  Unable to perform straight leg raises, refuses to twist right or left or " "bend over     /76 (BP Location: Left arm, Patient Position: Sitting, Cuff Size: Adult)   Pulse 74   Temp 98.4 °F (36.9 °C) (Infrared)   Resp 18   Ht 160 cm (63\") Comment: Per patient  Wt 83.5 kg (184 lb)   LMP 04/01/2014 (Approximate)   SpO2 98%   BMI 32.59 kg/m²     ASSESSMENT  Diagnoses and all orders for this visit:    1. Chronic bilateral low back pain with bilateral sciatica (Primary)  -     ketorolac (TORADOL) injection 60 mg        I increased the gabapentin and the norco at the last visit 10/15/20 however the pharmacist would not fill it.  I called and explained that the dose had been increased and she is running out of pills.  I called Pharmacist and she says they will fill it today.    R/B/A of medications/treatment options d/w  the pt/family today. The patient/family are aware and accept that medications can have side effects.  If there any obvious side effects they should call or return to clinic as soon as possible.  Appropriate f/u discussed for topics addressed today. All questions were answered to the satisfactory state of patient/family.  Should symptoms fail to improve or worsen they agree to call or return to clinic or to go to the ER. Education handouts were offered on any new Rx if requested.  Discussed the importance of f/u with any needed screening tests/labs/specialist appointments and any requested follow-up recommended by me today.  Importance of maintaining f/u discussed and patient accepts that missed appointments can delay diagnosis and potentially lead to worsening of conditions.    Using medications as prescribed.  Discussed need to take regularly as prescribed, to avoid missing doses as able.  Medications reviewed with patient today. We discussed cessation/continuation of medications for current problem.  Needed Rx refills will be provided.  No side effects from medications.       Return in about 1 week (around 10/30/2020), or if symptoms worsen or fail to " improve.    Electronically signed by Charlene Huntley, [place], APRN, 10/23/20, 9:52 AM CDT.

## 2020-11-12 ENCOUNTER — HOSPITAL ENCOUNTER (OUTPATIENT)
Dept: CT IMAGING | Facility: HOSPITAL | Age: 55
Discharge: HOME | End: 2020-11-12
Admitting: INTERNAL MEDICINE

## 2020-11-12 ENCOUNTER — OFFICE VISIT (OUTPATIENT)
Dept: RADIATION ONCOLOGY | Facility: HOSPITAL | Age: 55
End: 2020-11-12

## 2020-11-12 ENCOUNTER — HOSPITAL ENCOUNTER (OUTPATIENT)
Dept: RADIATION ONCOLOGY | Facility: HOSPITAL | Age: 55
Setting detail: RADIATION/ONCOLOGY SERIES
End: 2020-11-12

## 2020-11-12 VITALS
DIASTOLIC BLOOD PRESSURE: 62 MMHG | BODY MASS INDEX: 34.02 KG/M2 | SYSTOLIC BLOOD PRESSURE: 140 MMHG | HEIGHT: 63 IN | WEIGHT: 192 LBS

## 2020-11-12 DIAGNOSIS — C82.04 GRADE 1 FOLLICULAR LYMPHOMA OF LYMPH NODES OF AXILLA (HCC): Primary | ICD-10-CM

## 2020-11-12 DIAGNOSIS — Z87.891 FORMER SMOKER: ICD-10-CM

## 2020-11-12 DIAGNOSIS — Z92.3 HISTORY OF RADIATION THERAPY: ICD-10-CM

## 2020-11-12 DIAGNOSIS — C82.90 FOLLICULAR LYMPHOMA, UNSPECIFIED FOLLICULAR LYMPHOMA TYPE, UNSPECIFIED BODY REGION (HCC): ICD-10-CM

## 2020-11-12 DIAGNOSIS — Z98.890 HISTORY OF LYMPH NODE DISSECTION OF RIGHT AXILLA: ICD-10-CM

## 2020-11-12 PROCEDURE — 82565 ASSAY OF CREATININE: CPT

## 2020-11-12 PROCEDURE — 71260 CT THORAX DX C+: CPT

## 2020-11-12 PROCEDURE — 25010000002 IOPAMIDOL 61 % SOLUTION: Performed by: INTERNAL MEDICINE

## 2020-11-12 PROCEDURE — G0463 HOSPITAL OUTPT CLINIC VISIT: HCPCS | Performed by: RADIOLOGY

## 2020-11-12 RX ADMIN — IOPAMIDOL 100 ML: 612 INJECTION, SOLUTION INTRAVENOUS at 12:03

## 2020-11-13 LAB — CREAT BLDA-MCNC: 0.7 MG/DL (ref 0.6–1.3)

## 2020-11-18 NOTE — PROGRESS NOTES
Alfonzoatiya Farris   1965  11/20/2020     Chief Complaint   Patient presents with    Follow-up     Follicular lymphoma, unspecified follicular lymphoma type, unspecified body region [facility]      INTERVAL HISTORY/HISTORY OF PRESENT ILLNESS:  Diagnosis  · Follicular lymphoma, grade 1, July 2019  · Stage II  · Rheumatoid arthritis on Enbrel    Treatment summary  · 11/18/2019 through 12/10/2019-curative intent RT 3000 cGy  · Observation  · CT scans chest every 6 months x2 years until November 2022 followed by CT chest annually x3 years (maximum 5 years). The patient is a very pleasant 48years old female who has a diagnosis of limited stage follicular lymphoma stage II. She was diagnosed in July 2019 and received curative intent involved field RT 3000 cGy in November and December 2019. She tolerated treatment well. She returns for follow-up today. She denies any B symptoms. She denies any new lymphadenopathy. Otherwise, doing well. 10/18/2019-hematology history  The patient is a 49years old female who was first seen by me on 9/13/2019 referred for a diagnosis of follicular lymphoma grade 1. She underwent a CT scan of the chest on 7/12/2019 that revealed axillary adenopathy. She has been followed by Dr. Omid Abraham, pulmonary for rheumatoid arthritis with lung involvement. The patient is a current smoker. She also has a diagnosis of deficiency of alpha 1 antitrypsin. The patient report no fever no chills no weight loss but does report frequent night sweats for the last 2 years. She also has complains of fatigue. · 7/12/2019-CT chest without contrast compared to prior CT scan dated 5/1/2018 showed a level 3 right axillary lymph node measuring 2.4 x 2 x 3cm (previously 1.6 x 1.1 x 2 cm). Evaluation of hilar lymph nodes are brought, without benefit of iodine contrast.  Mediastinal nodes likely stable. 5 mm pulmonary nodule appreciated in the right upper lobe.   Pleural-based nodule appreciated in emphysema. Aleks L[facility]    PAST MEDICAL HISTORY:   Alpha-1 antitrypsin deficiency  Rheumatoid arthritis  Pulmonary involvement of rheumatoid arthritis  Follicular lymphoma grade 1  Osteoporosis    PAST SURGICAL HISTORY:  Wrist surgery  Shoulder surgery  Ultrasound-guided lymph node biopsy    SOCIAL HISTORY:  Social History     Socioeconomic History    Marital status:      Spouse name: Not on file    Number of children: Not on file    Years of education: Not on file    Highest education level: Not on file   Occupational History    Not on file   Social Needs    Financial resource strain: Not on file    Food insecurity     Worry: Not on file     Inability: Not on file    Transportation needs     Medical: Not on file     Non-medical: Not on file   Tobacco Use    Smoking status: Current Some Day Smoker    Smokeless tobacco: Never Used   Substance and Sexual Activity    Alcohol use: Not Currently    Drug use: Not on file    Sexual activity: Not on file   Lifestyle    Physical activity     Days per week: Not on file     Minutes per session: Not on file    Stress: Not on file   Relationships    Social connections     Talks on phone: Not on file     Gets together: Not on file     Attends Holiness service: Not on file     Active member of club or organization: Not on file     Attends meetings of clubs or organizations: Not on file     Relationship status: Not on file    Intimate partner violence     Fear of current or ex partner: Not on file     Emotionally abused: Not on file     Physically abused: Not on file     Forced sexual activity: Not on file   Other Topics Concern    Not on file   Social History Narrative    Not on file   Currently smoker    FAMILY HISTORY:  Family history of hypertension, arthritis, atrial fibrillation, COPD.     Current Outpatient Medications   Medication Sig Dispense Refill    ibandronate (BONIVA) 150 MG tablet       valACYclovir (VALTREX) 1 g tablet as needed      Calcium-Magnesium-Vitamin D [id number] MG-MG-UNIT TABS Take by mouth      Cholecalciferol (VITAMIN D3) 25 MCG (1000 UT) TABS Take 1,000 Units by mouth      fluticasone (FLONASE) 50 MCG/ACT nasal spray 2 sprays by Nasal route as needed      tiZANidine (ZANAFLEX) 4 MG tablet       sertraline (ZOLOFT) 50 MG tablet   1    omeprazole (PRILOSEC) 40 MG delayed release capsule       HYDROcodone-acetaminophen (NORCO) 5-325 MG per tablet       gabapentin (NEURONTIN) 300 MG capsule       DULoxetine (CYMBALTA) 60 MG extended release capsule   2    ALPRAZolam (XANAX) 0.5 MG tablet   0     No current facility-administered medications for this visit. REVIEW OF SYSTEMS:    Constitutional: no fever, no night sweats, no fatigue. HEENT: no blurring of vision, no double vision, no hearing difficulty, no tinnitus,no ulceration, no dysphagia  Lungs: no cough, no shortness of breath, no wheeze;   CVS: no palpitation, no chest pain, no shortness of breath;  GI: no abdominal pain, no nausea , no vomiting, no constipation;   ARABELLA: no dysuria, frequency and urgency, no hematuria, no kidney stones;   Musculoskeletal: no joint pain, swelling , stiffness; Back Pain  Endocrine: no polyuria, polydypsia, no cold or heat intolerence; Hot Flashes   Hematology/lymphatic: no easy brusing or bleeding, no hx of clotting disorder; no peripheral adenopathy. Dermatology: no skin rash, no eczema, no pruritis;   Psychiatry: no depression, no anxiety, panic attacks, no suicide ideation;    Neurology: no syncope, no seizures, no numbness or tingling of hands, no numbness or tingling of feet, no paresis;     PHYSICAL EXAM:    Vitals signs:  /78   Pulse 95   Temp 96.8 °F (36 °C) (Temporal)   Ht 5' 3\" (1.6 m)   Wt 193 lb 1.6 oz (87.6 kg)   SpO2 98%   BMI 34.21 kg/m²    Pain scale:  Pain Score:  10 - Worst pain ever     CONSTITUTIONAL: Alert, appropriate, no acute distress,   EYES: Non icteric, EOM intact, pupils equal round and reactive to light and accommodation. ENT: Oral mucus membranes moist, no oral pharyngeal lesions. External inspection of ears and nose are normal.   NECK: Supple, no masses. No palpable thyroid mass    CHEST/LUNGS: CTA bilaterally, normal respiratory effort   CARDIOVASCULAR: RRR, no murmurs. No lower extremity edema   ABDOMEN: soft non-tender, active bowel sounds, no hepatosplenomegaly. No palpable masses. EXTREMITIES: warm, Full ROM of all fours extremities. No focal weakness. SKIN: warm, dry with no rashes or lesions  LYMPH: No cervical, clavicular  or inguinal lymphadenopathy. palpable right axillary adenopathy  NEUROLOGIC: follows commands, non focal.   PSYCH: mood and affect appropriate. Alert and oriented to time and place and person. Relevant Lab findings/reviewed by me:  Lab Results   Component Value Date    WBC 7.37 11/20/2020    HGB 13.1 11/20/2020    HCT 41.9 11/20/2020    MCV 87.3 11/20/2020     11/20/2020     Lab Results   Component Value Date    NEUTROABS 4.50 11/20/2020       Relevant Imaging studies/reviewed by me:  11/12/20 CT chest: Unchanged appearance of all the lymph nodes since the comparison study. Tiny subpleural micronodules bilaterally. Follow-up CT chest is recommended in 12 months. Incompletely healed RIGHT anterolateral third rib fracture. Cholelithiasis. Mild changes of emphysema. ASSESSMENT:    No orders of the defined types were placed in this encounter. Tamica Santana was seen today for follow-up. Diagnoses and all orders for this visit:    Follicular lymphoma, unspecified follicular lymphoma type, unspecified body region [facility])    Care plan discussed with patient    Encounter for follow-up surveillance of lymphoma       #Follicular lymphoma grade 1, stage IIB  The patient was counseled today about diagnosis, staging, prognosis, diagnostic tests, medications, side effects and disease management. The method of counseling included verbal explanation.  The patient verbalized understanding. PET scan showed uptake in the right axilla and supraclavicular area. Likely stage II non-bulky. She  has been symptoms. Bone marrow biopsy was unremarkable. No signs of lymphoma involvement. Therefore the patient stage II. S/p ISRT 3000cGy completed December 2019    [facility] recommendations:  H&P every 6 months x2 years  CT scans every 6 months x2 years followed by CT scans annually      #Rheumatoid arthritis-she has stopped Enbrel. She will follow-up with her rheumatologist for further recommendations. #Pulmonary nodules-followed by pulmonary    PLAN:  · RTC in June by HANH Stovall  · CT Chest after May 11, 2021  · CBC    Follow Up:     Return in about 6 months (around 6/1/2021) for Appointment with HANH Stovall, CBC. CT chest after 5/11/21  RTC with Lizzy in June 2021     I, Candace Jacques, am scribing for Dudley Tellez MD. Electronically signed by Candace Jacques RN on 11/20/2020 at 4:57 PM CST. I, Dr Kory Darden, personally performed the services described in this documentation as scribed by Candace Jacques RN in my presence and is both accurate and complete. I have seen, examined and reviewed this patient medication list, appropriate labs and imaging studies. I reviewed relevant medical records and others physicians notes. I discussed the plans of care with the patient. I answered all the questions to the patients satisfaction. I have also reviewed the chief complaint (CC) and part of the history (History of Present Illness (HPI), Past Family Social History [facility]), or Review of Systems (ROS) and made changes when appropriated.        (Please note that portions of this note were completed with a voice recognition program. Efforts were made to edit the dictations but occasionally words are mis-transcribed.)  Over 50% of the total visit time of 15 minutes in face to face encounter with the patient, out of which more than 50% of the time was spent in counseling patient or family and coordination of care. Counseling included but was not limited to time spent reviewing labs, imaging studies/ treatment plan and answering questions.

## 2020-11-20 ENCOUNTER — TRANSCRIBE ORDERS (OUTPATIENT)
Dept: ADMINISTRATIVE | Facility: HOSPITAL | Age: 55
End: 2020-11-20

## 2020-11-20 ENCOUNTER — OFFICE VISIT (OUTPATIENT)
Dept: HEMATOLOGY | Age: 55
End: 2020-11-20
Payer: MEDICAID

## 2020-11-20 ENCOUNTER — TELEPHONE (OUTPATIENT)
Dept: VASCULAR SURGERY | Facility: CLINIC | Age: 55
End: 2020-11-20

## 2020-11-20 ENCOUNTER — HOSPITAL ENCOUNTER (OUTPATIENT)
Facility: HOSPITAL | Age: 55
Setting detail: SURGERY ADMIT
End: 2020-11-20
Attending: ORTHOPAEDIC SURGERY | Admitting: ORTHOPAEDIC SURGERY

## 2020-11-20 ENCOUNTER — OFFICE VISIT (OUTPATIENT)
Dept: FAMILY MEDICINE CLINIC | Facility: CLINIC | Age: 55
End: 2020-11-20

## 2020-11-20 ENCOUNTER — HOSPITAL ENCOUNTER (OUTPATIENT)
Dept: INFUSION THERAPY | Age: 55
Discharge: HOME | End: 2020-11-20
Payer: MEDICAID

## 2020-11-20 VITALS
HEART RATE: 88 BPM | WEIGHT: 193.2 LBS | BODY MASS INDEX: 34.23 KG/M2 | OXYGEN SATURATION: 99 % | TEMPERATURE: 96.9 F | RESPIRATION RATE: 16 BRPM | HEIGHT: 63 IN | DIASTOLIC BLOOD PRESSURE: 82 MMHG | SYSTOLIC BLOOD PRESSURE: 115 MMHG

## 2020-11-20 VITALS
TEMPERATURE: 96.8 F | DIASTOLIC BLOOD PRESSURE: 78 MMHG | HEART RATE: 95 BPM | OXYGEN SATURATION: 98 % | WEIGHT: 193.1 LBS | BODY MASS INDEX: 34.21 KG/M2 | HEIGHT: 63 IN | SYSTOLIC BLOOD PRESSURE: 130 MMHG

## 2020-11-20 DIAGNOSIS — F41.0 PANIC ATTACK: ICD-10-CM

## 2020-11-20 DIAGNOSIS — F41.9 ANXIETY: ICD-10-CM

## 2020-11-20 DIAGNOSIS — C82.90 FOLLICULAR LYMPHOMA, UNSPECIFIED FOLLICULAR LYMPHOMA TYPE, UNSPECIFIED BODY REGION (HCC): ICD-10-CM

## 2020-11-20 DIAGNOSIS — C82.90 FOLLICULAR LYMPHOMA, UNSPECIFIED FOLLICULAR LYMPHOMA TYPE, UNSPECIFIED BODY REGION (HCC): Primary | ICD-10-CM

## 2020-11-20 DIAGNOSIS — G89.4 CHRONIC PAIN SYNDROME: ICD-10-CM

## 2020-11-20 LAB
BASOPHILS ABSOLUTE: 0.06 K/UL (ref 0.01–0.08)
BASOPHILS RELATIVE PERCENT: 0.8 % (ref 0.1–1.2)
EOSINOPHILS ABSOLUTE: 0.23 K/UL (ref 0.04–0.54)
EOSINOPHILS RELATIVE PERCENT: 3.1 % (ref 0.7–7)
HCT VFR BLD CALC: 41.9 % (ref 34.1–44.9)
HEMOGLOBIN: 13.1 G/DL (ref 11.2–15.7)
LYMPHOCYTES ABSOLUTE: 1.48 K/UL (ref 1.18–3.74)
LYMPHOCYTES RELATIVE PERCENT: 20.1 % (ref 19.3–53.1)
MCH RBC QN AUTO: 27.3 PG (ref 25.6–32.2)
MCHC RBC AUTO-ENTMCNC: 31.3 G/DL (ref 32.3–35.5)
MCV RBC AUTO: 87.3 FL (ref 79.4–94.8)
MONOCYTES ABSOLUTE: 1.1 K/UL (ref 0.24–0.82)
MONOCYTES RELATIVE PERCENT: 14.9 % (ref 4.7–12.5)
NEUTROPHILS ABSOLUTE: 4.5 K/UL (ref 1.56–6.13)
NEUTROPHILS RELATIVE PERCENT: 61.1 % (ref 34–71.1)
PDW BLD-RTO: 15 % (ref 11.7–14.4)
PLATELET # BLD: 343 K/UL (ref 182–369)
PMV BLD AUTO: 9.4 FL (ref 7.4–10.4)
RBC # BLD: 4.8 M/UL (ref 3.93–5.22)
WBC # BLD: 7.37 K/UL (ref 3.98–10.04)

## 2020-11-20 PROCEDURE — 99214 OFFICE O/P EST MOD 30 MIN: CPT | Performed by: NURSE PRACTITIONER

## 2020-11-20 PROCEDURE — G8427 DOCREV CUR MEDS BY ELIG CLIN: HCPCS | Performed by: INTERNAL MEDICINE

## 2020-11-20 PROCEDURE — G8417 CALC BMI ABV UP PARAM F/U: HCPCS | Performed by: INTERNAL MEDICINE

## 2020-11-20 PROCEDURE — 99212 OFFICE O/P EST SF 10 MIN: CPT | Performed by: INTERNAL MEDICINE

## 2020-11-20 PROCEDURE — 99212 OFFICE O/P EST SF 10 MIN: CPT

## 2020-11-20 PROCEDURE — G8484 FLU IMMUNIZE NO ADMIN: HCPCS | Performed by: INTERNAL MEDICINE

## 2020-11-20 PROCEDURE — 85025 COMPLETE CBC W/AUTO DIFF WBC: CPT

## 2020-11-20 PROCEDURE — 4004F PT TOBACCO SCREEN RCVD TLK: CPT | Performed by: INTERNAL MEDICINE

## 2020-11-20 PROCEDURE — 3017F COLORECTAL CA SCREEN DOC REV: CPT | Performed by: INTERNAL MEDICINE

## 2020-11-20 PROCEDURE — 36415 COLL VENOUS BLD VENIPUNCTURE: CPT

## 2020-11-20 RX ORDER — ALPRAZOLAM 0.5 MG/1
0.5 TABLET ORAL 3 TIMES DAILY PRN
Qty: 90 TABLET | Refills: 0 | Status: ON HOLD | OUTPATIENT
Start: 2020-11-20 | End: 2020-12-17 | Stop reason: SDUPTHER

## 2020-11-20 RX ORDER — ALENDRONATE SODIUM 70 MG/1
TABLET ORAL
COMMUNITY
Start: 2020-09-14 | End: 2020-11-20 | Stop reason: ALTCHOICE

## 2020-11-20 RX ORDER — HYDROCODONE BITARTRATE AND ACETAMINOPHEN 10; 325 MG/1; MG/1
1 TABLET ORAL EVERY 6 HOURS PRN
Qty: 120 TABLET | Refills: 0 | Status: SHIPPED | OUTPATIENT
Start: 2020-11-20 | End: 2020-12-03 | Stop reason: HOSPADM

## 2020-11-20 RX ORDER — IBANDRONATE SODIUM 150 MG/1
TABLET, FILM COATED ORAL
COMMUNITY
Start: 2020-10-21

## 2020-11-20 RX ORDER — CYCLOBENZAPRINE HCL 10 MG
10 TABLET ORAL 3 TIMES DAILY PRN
COMMUNITY
Start: 2020-08-21 | End: 2020-11-20 | Stop reason: ALTCHOICE

## 2020-11-20 NOTE — PROGRESS NOTES
Chief Complaint   Patient presents with   • Chronic bilateral low back pain with bilateral sciatica        No Known Allergies    History provided by: self     HPI:  Subjective   Adela Arauz is a 55 y.o. female presents today for follow up for chronic pain and anxiety.  She says this whole covid thing is driving her crazy not able to go any where or see family.  She is also anxious because she is schedule to have multiple back surgeries next week.  First surgery is Dec2, going thru stomach to clean up Lumbar spine, Dec 9 will also be to clean up lumbar spine, Dec 12  To put hardware in.    Back Pain   This is a chronic problem. The current episode started more than year ago. The problem occurs constantly. The problem has been gradually worse since onset. The pain is present in the lumbar spine. The quality of the pain is described as sharp and stabbing. The pain radiates down her legs. The pain is at a severity of 10/10. The pain is severe. The pain is the same all the time. The symptoms are aggravated by position, bending, standing, sitting and twisting. Stiffness is present all day. Associated symptoms include leg pain and weakness. Since the most recent flare up she has been in a wheel chair because her legs are too week to walk far distances.  Pertinent negatives include no abdominal pain, bladder incontinence, bowel incontinence, chest pain, dysuria, fever, headaches or numbness. Risk factors include menopause and sedentary lifestyle. She has tried analgesics for the symptoms. The treatment provided mild relief.          Chronic problems: chronic pain, RA not controlled with gabapentin, norco, and tizanidine, gerd stable with omeprazole, anxiety/panic attack stable on xanax, depression stable with sertraline and duloxetine, vit d def stable with otc vit d, seasonal allergies stable with flonase     PCP currently listed as Charlene Huntley, DNP, APRN.     Advance Directive: non  The following portions of  the patient's history were reviewed and updated as appropriate: allergies, current medications, past family history, past medical history, past social history, past surgical history and problem list    Current Outpatient Medications:   •  ALPRAZolam (Xanax) 0.5 MG tablet, Take 1 tablet by mouth 3 (Three) Times a Day As Needed for Anxiety., Disp: 90 tablet, Rfl: 0  •  betamethasone valerate (VALISONE) 0.1 % ointment, Apply  topically to the appropriate area as directed 2 (Two) Times a Day., Disp: 30 g, Rfl: 0  •  Calcium-Magnesium-Vitamin D 500-250-200 MG-MG-UNIT tablet, Take 500 tablets by mouth 2 (Two) Times a Day., Disp: , Rfl:   •  cholecalciferol (VITAMIN D3) 1000 units tablet, Take 1,000 Units by mouth Daily., Disp: , Rfl:   •  DULoxetine (CYMBALTA) 60 MG capsule, TAKE ONE CAPSULE BY MOUTH DAILY, Disp: 30 capsule, Rfl: 2  •  fluticasone (FLONASE) 50 MCG/ACT nasal spray, 2 sprays into the nostril(s) as directed by provider Daily., Disp: 1 bottle, Rfl: 3  •  gabapentin (NEURONTIN) 600 MG tablet, Take 1 tablet by mouth 3 (Three) Times a Day., Disp: 90 tablet, Rfl: 3  •  HYDROcodone-acetaminophen (Norco)  MG per tablet, Take 1 tablet by mouth Every 6 (Six) Hours As Needed for Moderate Pain ., Disp: 120 tablet, Rfl: 0  •  ibandronate (BONIVA) 150 MG tablet, , Disp: , Rfl:   •  mupirocin (BACTROBAN) 2 % ointment, Apply to nostrils and fingernails nighlty x 5 nights same 5 nights monthly x 6 months., Disp: 30 g, Rfl: 5  •  omeprazole (priLOSEC) 40 MG capsule, TAKE ONE CAPSULE BY MOUTH DAILY, Disp: 30 capsule, Rfl: 5  •  sertraline (ZOLOFT) 50 MG tablet, Take 1 tablet by mouth Every Night., Disp: 90 tablet, Rfl: 1  •  tiZANidine (ZANAFLEX) 4 MG tablet, Take 1 tablet by mouth 3 (Three) Times a Day., Disp: , Rfl:   •  Turmeric 500 MG capsule, Take  by mouth 2 (Two) Times a Day., Disp: , Rfl:   •  cyclobenzaprine (FLEXERIL) 10 MG tablet, Take 1 tablet by mouth 3 (Three) Times a Day As Needed for Muscle Spasms.,  Disp: 90 tablet, Rfl: 0  •  diclofenac (VOLTAREN) 50 MG EC tablet, Take 1 tablet by mouth 2 (two) times a day., Disp: 60 tablet, Rfl: 0  Past Medical History:   Diagnosis Date   • AAT (alpha-1-antitrypsin) deficiency (CMS/Formerly [facility]) 2019   • Anxiety 2020   • 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, 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    • Low back pain    • Morbid obesity due to excess calories (CMS/Formerly [facility]) 2020   • Osteoarthritis    • Osteoporosis    • PONV (postoperative nausea and vomiting)    • RA (rheumatoid arthritis) (CMS/Formerly [facility])    • Senile osteoporosis 2017     Past Surgical History:   Procedure Laterality Date   • AXILLARY LYMPH NODE BIOPSY/EXCISION Right 2019    Procedure: EXCISION RIGHT AXILLARY MASS;  Surgeon: Jes Enamorado MD;  Location: [facility];  Service: General   • SHOULDER SURGERY     • TUBAL ABDOMINAL LIGATION     • US GUIDED LYMPH NODE BIOPSY  2019   • WRIST SURGERY Bilateral     fracture     Social History     Socioeconomic History   • Marital status:      Spouse name: Not on file   • Number of children: Not on file   • Years of education: Not on file   • Highest education level: Not on file   Tobacco Use   • Smoking status: Former Smoker     Packs/day: 1.00     Years: 20.00     Pack years: 20.00     Types: Cigarettes     Quit date: 9/3/2018     Years since quittin.2   • Smokeless tobacco: Never Used   • Tobacco comment: quit    Substance and Sexual Activity   • Alcohol use: No   • Drug use: No   • Sexual activity: Defer     Family History   Problem Relation Age of Onset   • Arthritis Mother    • COPD Mother    • Atrial fibrillation Mother    • Osteoarthritis Mother    • Heart disease Father    • Hypertension Father    • Arthritis Father    • Melanoma Father         metastatic   • Arthritis Sister    • Arthritis Brother    • No Known Problems Daughter    • Arthritis Maternal Grandfather    • Breast cancer Neg Hx        REVIEW OF SYMPTOMS: (Positives bolded)  General:  weight  "loss, fever, chills, night sweats, fatigue, appetite loss  HEENT:  blurry vision, eye pain, eye discharge, dry eyes, decreased vision  Respiratory: shortness of breath, cough, hemoptysis, wheezing, pleurisy,   Cardiovascular:  chest pain, PND, palpitation, edema, orthopnea, syncope  Gastro: Nausea, vomiting, diarrhea, hematemesis, abdominal pain, constipation  Genito: hematuria, dysuria, glycosuria, hesitancy, frequency, incontinence  Musckelo: Arthralgia, myalgia, muscle weakness, joint swelling, NSAID use  Skin: rash, pruritis, sores, nail changes, skin thickening, change in wart/mole, itching   Neuro:  Migraine, numbness, ataxia, tremor, vertigo, weakness, memory loss  \"All other systems reviewed and negative, except as listed above.”    OBJECTIVE:  Constitutional:  No acute distress, Consistent with stated age. Oriented x 3,  Gait difficult she is in a wheel chair.  Patient is pleasant and cooperative with the interview and exam.    Integumentary: No rashes, ulcers or lesions. No edema.  Normal skin moisture/turgor. Skin is warm to touch, no increased warmth.      CHEST/LUNG: Lungs clear throughout lung fields without rale, rhonchi or wheezes. no distress, no use of accessory muscles.       CARDIOVASCULAR:  Regular rate and rhythm. No murmur noted in sitting, supine positions.      ABDOMEN:  Bowel sounds normal, no abdominal bruits. Abd soft, non-tender, no rebound tenderness, no rigidity (guarding), no jar tenderness, no masses.  no hepatomegaly, no splenomegaly     Neuropsych: Normal speech, Thought- normal. No hallucinations, delusions, obsessions.   Appropriate judgement and memory.    Musculoskeletal:  digital clubbing or cyanosis, neurovascularly intact all four extremities.  Lower extremity:  Knee ROM normal at 0-120 degrees. No tenderness overlying trochanters, no tenderness about patella, quad tendon, patellar tendon. No tenderness at tibial tuberosity. Ankle normal ROM not tender to palpation along " "medial/lateral malleolus. Normal movement of toes, no tenderness bilateral feet/toes. Normal foot type. Calves symmetrical. Stretching demonstrated today. Lower extremities are weak after walking about 15 steps  Cervical: Normal ROM with turning head right to left and touching chin to chest. Has no tenderness on palpation of the cervical spine  Lumbar Spine:  Decreased ROM with any movement.  Bending over she can go about 20 degrees and says that's it, she has severe pain twisting right and left.  She can raise legs but has difficulty.  Has tenderness on palpation of the lumbar spine.     /82 (BP Location: Right arm, Patient Position: Sitting)   Pulse 88   Temp 96.9 °F (36.1 °C)   Resp 16   Ht 160 cm (63\")   Wt 87.6 kg (193 lb 3.2 oz)   LMP 04/01/2014 (Approximate)   SpO2 99%   Breastfeeding No   BMI 34.22 kg/m²     ASSESSMENT  Diagnoses and all orders for this visit:    1. Chronic pain syndrome  -     HYDROcodone-acetaminophen (Norco)  MG per tablet; Take 1 tablet by mouth Every 6 (Six) Hours As Needed for Moderate Pain .  Dispense: 120 tablet; Refill: 0        -     [facility]day reviewed, control contract and toxassure on file    2. Panic attack  -     ALPRAZolam (Xanax) 0.5 MG tablet; Take 1 tablet by mouth 3 (Three) Times a Day As Needed for Anxiety.  Dispense: 90 tablet; Refill: 0    3. Anxiety  -     ALPRAZolam (Xanax) 0.5 MG tablet; Take 1 tablet by mouth 3 (Three) Times a Day As Needed for Anxiety.  Dispense: 90 tablet; Refill: 0      I called [facility], to notify them that she will be having 3 back surgeries by Dr. Dunaway and he is going to be prescribing post surgical pain meds.  I am aware and He will be the person to lia until she is released from him.        Return in about 1 month (around 12/20/2020).    Electronically signed by Charlene Hnutley, [place], APRN, 11/20/20, 4:08 PM CST.  "

## 2020-11-20 NOTE — TELEPHONE ENCOUNTER
Spoke with patient and advsied of upcomgin appointment with Dr. Rivera for upcoming procedure with Dr. Dunaway.  Patient expressed understanding for all that was discussed.

## 2020-11-23 ENCOUNTER — HOSPITAL ENCOUNTER (OUTPATIENT)
Dept: GENERAL RADIOLOGY | Facility: HOSPITAL | Age: 55
Discharge: HOME | End: 2020-11-23

## 2020-11-23 ENCOUNTER — APPOINTMENT (OUTPATIENT)
Dept: PREADMISSION TESTING | Facility: HOSPITAL | Age: 55
End: 2020-11-23

## 2020-11-23 VITALS
HEIGHT: 64 IN | HEART RATE: 68 BPM | RESPIRATION RATE: 16 BRPM | DIASTOLIC BLOOD PRESSURE: 68 MMHG | SYSTOLIC BLOOD PRESSURE: 133 MMHG | WEIGHT: 195.55 LBS | BODY MASS INDEX: 33.38 KG/M2 | OXYGEN SATURATION: 98 %

## 2020-11-23 LAB
ALBUMIN SERPL-MCNC: 4.3 G/DL (ref 3.5–5.2)
ALBUMIN/GLOB SERPL: 1.4 G/DL
ALP SERPL-CCNC: 94 U/L (ref 39–117)
ALT SERPL W P-5'-P-CCNC: 24 U/L (ref 1–33)
ANION GAP SERPL CALCULATED.3IONS-SCNC: 7 MMOL/L (ref 5–15)
APTT PPP: 28.1 SECONDS (ref 24.1–35)
AST SERPL-CCNC: 28 U/L (ref 1–32)
BASOPHILS # BLD AUTO: 0.07 10*3/MM3 (ref 0–0.2)
BASOPHILS NFR BLD AUTO: 1.1 % (ref 0–1.5)
BILIRUB SERPL-MCNC: 0.2 MG/DL (ref 0–1.2)
BILIRUB UR QL STRIP: NEGATIVE
BUN SERPL-MCNC: 9 MG/DL (ref 6–20)
BUN/CREAT SERPL: 15.5 (ref 7–25)
CALCIUM SPEC-SCNC: 9.5 MG/DL (ref 8.6–10.5)
CHLORIDE SERPL-SCNC: 100 MMOL/L (ref 98–107)
CLARITY UR: CLEAR
CO2 SERPL-SCNC: 28 MMOL/L (ref 22–29)
COLOR UR: YELLOW
CREAT SERPL-MCNC: 0.58 MG/DL (ref 0.57–1)
DEPRECATED RDW RBC AUTO: 42.9 FL (ref 37–54)
EOSINOPHIL # BLD AUTO: 0.23 10*3/MM3 (ref 0–0.4)
EOSINOPHIL NFR BLD AUTO: 3.7 % (ref 0.3–6.2)
ERYTHROCYTE [DISTWIDTH] IN BLOOD BY AUTOMATED COUNT: 14.5 % (ref 12.3–15.4)
GFR SERPL CREATININE-BSD FRML MDRD: 108 ML/MIN/1.73
GLOBULIN UR ELPH-MCNC: 3 GM/DL
GLUCOSE SERPL-MCNC: 52 MG/DL (ref 65–99)
GLUCOSE UR STRIP-MCNC: NEGATIVE MG/DL
HCT VFR BLD AUTO: 39.6 % (ref 34–46.6)
HGB BLD-MCNC: 12.5 G/DL (ref 12–15.9)
HGB UR QL STRIP.AUTO: NEGATIVE
IMM GRANULOCYTES # BLD AUTO: 0.01 10*3/MM3 (ref 0–0.05)
IMM GRANULOCYTES NFR BLD AUTO: 0.2 % (ref 0–0.5)
INR PPP: 0.93 (ref 0.91–1.09)
KETONES UR QL STRIP: NEGATIVE
LEUKOCYTE ESTERASE UR QL STRIP.AUTO: NEGATIVE
LYMPHOCYTES # BLD AUTO: 1.33 10*3/MM3 (ref 0.7–3.1)
LYMPHOCYTES NFR BLD AUTO: 21.5 % (ref 19.6–45.3)
MCH RBC QN AUTO: 25.8 PG (ref 26.6–33)
MCHC RBC AUTO-ENTMCNC: 31.6 G/DL (ref 31.5–35.7)
MCV RBC AUTO: 81.8 FL (ref 79–97)
MONOCYTES # BLD AUTO: 0.92 10*3/MM3 (ref 0.1–0.9)
MONOCYTES NFR BLD AUTO: 14.9 % (ref 5–12)
NEUTROPHILS NFR BLD AUTO: 3.63 10*3/MM3 (ref 1.7–7)
NEUTROPHILS NFR BLD AUTO: 58.6 % (ref 42.7–76)
NITRITE UR QL STRIP: NEGATIVE
NRBC BLD AUTO-RTO: 0 /100 WBC (ref 0–0.2)
PH UR STRIP.AUTO: 6.5 [PH] (ref 5–8)
PLATELET # BLD AUTO: 351 10*3/MM3 (ref 140–450)
PMV BLD AUTO: 9.9 FL (ref 6–12)
POTASSIUM SERPL-SCNC: 4.4 MMOL/L (ref 3.5–5.2)
PROT SERPL-MCNC: 7.3 G/DL (ref 6–8.5)
PROT UR QL STRIP: NEGATIVE
PROTHROMBIN TIME: 12.1 SECONDS (ref 11.9–14.6)
RBC # BLD AUTO: 4.84 10*6/MM3 (ref 3.77–5.28)
SODIUM SERPL-SCNC: 135 MMOL/L (ref 136–145)
SP GR UR STRIP: 1.01 (ref 1–1.03)
UROBILINOGEN UR QL STRIP: NORMAL
WBC # BLD AUTO: 6.19 10*3/MM3 (ref 3.4–10.8)

## 2020-11-23 PROCEDURE — 85025 COMPLETE CBC W/AUTO DIFF WBC: CPT

## 2020-11-23 PROCEDURE — 93005 ELECTROCARDIOGRAM TRACING: CPT

## 2020-11-23 PROCEDURE — 81003 URINALYSIS AUTO W/O SCOPE: CPT

## 2020-11-23 PROCEDURE — 71046 X-RAY EXAM CHEST 2 VIEWS: CPT

## 2020-11-23 PROCEDURE — 85610 PROTHROMBIN TIME: CPT

## 2020-11-23 PROCEDURE — 36415 COLL VENOUS BLD VENIPUNCTURE: CPT

## 2020-11-23 PROCEDURE — 80053 COMPREHEN METABOLIC PANEL: CPT

## 2020-11-23 PROCEDURE — 93010 ELECTROCARDIOGRAM REPORT: CPT | Performed by: INTERNAL MEDICINE

## 2020-11-23 PROCEDURE — 85730 THROMBOPLASTIN TIME PARTIAL: CPT

## 2020-11-23 NOTE — DISCHARGE INSTRUCTIONS
DAY OF SURGERY INSTRUCTIONS        YOUR SURGEON: ***ANN MARIE LONGORIA    PROCEDURE: ***ANTERIOR DECOMPRESSION,INTERBODY FUSION    DATE OF SURGERY: ***December 2,2020    ARRIVAL TIME: AS DIRECTED BY OFFICE    YOU MAY TAKE THE FOLLOWING MEDICATION(S) THE MORNING OF SURGERY WITH A SIP OF WATER: ***XANAX,GABAPENTIN      ALL OTHER HOME MEDICATION CHECK WITH YOUR PHYSICIAN      DO NOT TAKE ANY ERECTILE DYSFUNCTION MEDICATIONS (EX: CIALIS, VIAGRA) 24 HOURS PRIOR TO SURGERY                      MANAGING PAIN AFTER SURGERY    We know you are probably wondering what your pain will be like after surgery.  Following surgery it is unrealistic to expect you will not have pain.   Pain is how our bodies let us know that something is wrong or cautions us to be careful.  That said, our goal is to make your pain tolerable.    Methods we may use to treat your pain include (oral or IV medications, PCAs, epidurals, nerve blocks, etc.)   While some procedures require IV pain medications for a short time after surgery, transitioning to pain medications by mouth allows for better management of pain.   Your nurse will encourage you to take oral pain medications whenever possible.  IV medications work almost immediately, but only last a short while.  Taking medications by mouth allows for a more constant level of medication in your blood stream for a longer period of time.      Once your pain is out of control it is harder to get back under control.  It is important you are aware when your next dose of pain medication is due.  If you are admitted, your nurse may write the time of your next dose on the white board in your room to help you remember.      We are interested in your pain and encourage you to inform us about aggravating factors during your visit.   Many times a simple repositioning every few hours can make a big difference.    If your physician says it is okay, do not let your pain prevent you from getting out of bed. Be sure to  call your nurse for assistance prior to getting up so you do not fall.      Before surgery, please decide your tolerable pain goal.  These faces help describe the pain ratings we use on a 0-10 scale.   Be prepared to tell us your goal and whether or not you take pain or anxiety medications at home.          BEFORE YOU COME TO THE HOSPITAL  (Pre-op instructions)  • Do not eat, drink, smoke or chew gum after midnight the night before surgery.  This also includes no mints.  • Morning of surgery take only the medicines you have been instructed with a sip of water unless otherwise instructed  by your physician.  • Do not shave, wear makeup or dark nail polish.  • Remove all jewelry including rings.  • Leave anything you consider valuable at home.  • Leave your suitcase in the car until after your surgery.  • Bring the following with you if applicable:  o Picture ID and insurance, Medicare or Medicaid cards  o Co-pay/deductible required by insurance (cash, check, credit card)  o Copy of advance directive, living will or power-of- documents if not brought to PAT  o CPAP or BIPAP mask and tubing  o Relaxation aids ( book, magazine), etc.  o Hearing aids                        ON THE DAY OF SURGERY  · On the day of surgery check in at registration located at the main entrance of the hospital.   ? You will be registered and given a beeper with instructions where to wait in the main lobby.  ? When your beeper lights up and vibrates a member of the Outpatient Surgery staff will meet you at the double doors under the stair steps and escort you to your preoperative room.   · You may have cloth compression devices placed on your legs. These help to prevent blood clots and reduce swelling in your legs.  · An IV may be inserted into one of your veins.  · In the operating room, you may be given one or more of the following:  ? A medicine to help you relax (sedative).  ? A medicine to numb the area (local anesthetic).  ? A  "medicine to make you fall asleep (general anesthetic).  ? A medicine that is injected into an area of your body to numb everything below the injection site (regional anesthetic).  · Your surgical site will be marked or identified.  · You may be given an antibiotic through your IV to help prevent infection.  Contact a health care provider if you:  · Develop a fever of more than 100.4°F (38°C) or other feelings of illness during the 48 hours before your surgery.  · Have symptoms that get worse.  Have questions or concerns about your surgery    General Anesthesia/Surgery, Adult  General anesthesia is the use of medicines to make a person \"go to sleep\" (unconscious) for a medical procedure. General anesthesia must be used for certain procedures, and is often recommended for procedures that:  · Last a long time.  · Require you to be still or in an unusual position.  · Are major and can cause blood loss.  The medicines used for general anesthesia are called general anesthetics. As well as making you unconscious for a certain amount of time, these medicines:  · Prevent pain.  · Control your blood pressure.  · Relax your muscles.  Tell a health care provider about:  · Any allergies you have.  · All medicines you are taking, including vitamins, herbs, eye drops, creams, and over-the-counter medicines.  · Any problems you or family members have had with anesthetic medicines.  · Types of anesthetics you have had in the past.  · Any blood disorders you have.  · Any surgeries you have had.  · Any medical conditions you have.  · Any recent upper respiratory, chest, or ear infections.  · Any history of:  ? Heart or lung conditions, such as heart failure, sleep apnea, asthma, or chronic obstructive pulmonary disease (COPD).  ?  service.  ? Depression or anxiety.  · Any tobacco or drug use, including marijuana or alcohol use.  · Whether you are pregnant or may be pregnant.  What are the risks?  Generally, this is a safe " procedure. However, problems may occur, including:  · Allergic reaction.  · Lung and heart problems.  · Inhaling food or liquid from the stomach into the lungs (aspiration).  · Nerve injury.  · Air in the bloodstream, which can lead to stroke.  · Extreme agitation or confusion (delirium) when you wake up from the anesthetic.  · Waking up during your procedure and being unable to move. This is rare.  These problems are more likely to develop if you are having a major surgery or if you have an advanced or serious medical condition. You can prevent some of these complications by answering all of your health care provider's questions thoroughly and by following all instructions before your procedure.  General anesthesia can cause side effects, including:  · Nausea or vomiting.  · A sore throat from the breathing tube.  · Hoarseness.  · Wheezing or coughing.  · Shaking chills.  · Tiredness.  · Body aches.  · Anxiety.  · Sleepiness or drowsiness.  · Confusion or agitation.  RISKS AND COMPLICATIONS OF SURGERY  Your health care provider will discuss possible risks and complications with you before surgery. Common risks and complications include:    · Problems due to the use of anesthetics.  · Blood loss and replacement (does not apply to minor surgical procedures).  · Temporary increase in pain due to surgery.  · Uncorrected pain or problems that the surgery was meant to correct.  · Infection.  · New damage.    What happens before the procedure?    Medicines  Ask your health care provider about:  · Changing or stopping your regular medicines. This is especially important if you are taking diabetes medicines or blood thinners.  · Taking medicines such as aspirin and ibuprofen. These medicines can thin your blood. Do not take these medicines unless your health care provider tells you to take them.  · Taking over-the-counter medicines, vitamins, herbs, and supplements. Do not take these during the week before your procedure  unless your health care provider approves them.  General instructions  · Starting 3-6 weeks before the procedure, do not use any products that contain nicotine or tobacco, such as cigarettes and e-cigarettes. If you need help quitting, ask your health care provider.  · If you brush your teeth on the morning of the procedure, make sure to spit out all of the toothpaste.  · Tell your health care provider if you become ill or develop a cold, cough, or fever.  · If instructed by your health care provider, bring your sleep apnea device with you on the day of your surgery (if applicable).  · Ask your health care provider if you will be going home the same day, the following day, or after a longer hospital stay.  ? Plan to have someone take you home from the hospital or clinic.  ? Plan to have a responsible adult care for you for at least 24 hours after you leave the hospital or clinic. This is important.  What happens during the procedure?  · You will be given anesthetics through both of the following:  ? A mask placed over your nose and mouth.  ? An IV in one of your veins.  · You may receive a medicine to help you relax (sedative).  · After you are unconscious, a breathing tube may be inserted down your throat to help you breathe. This will be removed before you wake up.  · An anesthesia specialist will stay with you throughout your procedure. He or she will:  ? Keep you comfortable and safe by continuing to give you medicines and adjusting the amount of medicine that you get.  ? Monitor your blood pressure, pulse, and oxygen levels to make sure that the anesthetics do not cause any problems.  The procedure may vary among health care providers and hospitals.  What happens after the procedure?  · Your blood pressure, temperature, heart rate, breathing rate, and blood oxygen level will be monitored until the medicines you were given have worn off.  · You will wake up in a recovery area. You may wake up slowly.  · If you  feel anxious or agitated, you may be given medicine to help you calm down.  · If you will be going home the same day, your health care provider may check to make sure you can walk, drink, and urinate.  · Your health care provider will treat any pain or side effects you have before you go home.  · Do not drive for 24 hours if you were given a sedative.  Summary  · General anesthesia is used to keep you still and prevent pain during a procedure.  · It is important to tell your healthcare provider about your medical history and any surgeries you have had, and previous experience with anesthesia.  · Follow your healthcare provider’s instructions about when to stop eating, drinking, or taking certain medicines before your procedure.  · Plan to have someone take you home from the hospital or clinic.  This information is not intended to replace advice given to you by your health care provider. Make sure you discuss any questions you have with your health care provider.  Document Released: 03/26/2009 Document Revised: 08/03/2018 Document Reviewed: 08/03/2018  [employer] Interactive Patient Education © 2019 [employer]       Fall Prevention in Hospitals, Adult  As a hospital patient, your condition and the treatments you receive can increase your risk for falls. Some additional risk factors for falls in a hospital include:  · Being in an unfamiliar environment.  · Being on bed rest.  · Your surgery.  · Taking certain medicines.  · Your tubing requirements, such as intravenous (IV) therapy or catheters.  It is important that you learn how to decrease fall risks while at the hospital. Below are important tips that can help prevent falls.  SAFETY TIPS FOR PREVENTING FALLS  Talk about your risk of falling.  · Ask your health care provider why you are at risk for falling. Is it your medicine, illness, tubing placement, or something else?  · Make a plan with your health care provider to keep you safe from falls.  · Ask your health  care provider or pharmacist about side effects of your medicines. Some medicines can make you dizzy or affect your coordination.  Ask for help.  · Ask for help before getting out of bed. You may need to press your call button.  · Ask for assistance in getting safely to the toilet.  · Ask for a walker or cane to be put at your bedside. Ask that most of the side rails on your bed be placed up before your health care provider leaves the room.  · Ask family or friends to sit with you.  · Ask for things that are out of your reach, such as your glasses, hearing aids, telephone, bedside table, or call button.  Follow these tips to avoid falling:  · Stay lying or seated, rather than standing, while waiting for help.  · Wear rubber-soled slippers or shoes whenever you walk in the hospital.  · Avoid quick, sudden movements.  ¨ Change positions slowly.  ¨ Sit on the side of your bed before standing.  ¨ Stand up slowly and wait before you start to walk.  · Let your health care provider know if there is a spill on the floor.  · Pay careful attention to the medical equipment, electrical cords, and tubes around you.  · When you need help, use your call button by your bed or in the bathroom. Wait for one of your health care providers to help you.  · If you feel dizzy or unsure of your footing, return to bed and wait for assistance.  · Avoid being distracted by the TV, telephone, or another person in your room.  · Do not lean or support yourself on rolling objects, such as IV poles or bedside tables.     This information is not intended to replace advice given to you by your health care provider. Make sure you discuss any questions you have with your health care provider.     Document Released: 12/15/2001 Document Revised: 01/08/2016 Document Reviewed: 08/25/2013  [employer] Interactive Patient Education ©2016 [employer]       [facility] 4% Patient Instruction Sheet    Chlorhexidine Before Surgery  Chlorhexidine  gluconate (CHG) is a germ-killing (antiseptic) solution that is used to clean the skin. It gets rid of the bacteria that normally live on the skin. Cleaning your skin with CHG before surgery helps lower the risk for infection after surgery.    How to use CHG solution  · You will take 2 showers, one shower the night before surgery, the second shower the morning of surgery before coming to the hospital.  · Use CHG only as told by your health care provider, and follow the instructions on the label.  · Use CHG solution while taking a shower. Follow these steps when using CHG solution (unless your health care provider gives you different instructions):  1. Start the shower.  2. Use your normal soap and shampoo to wash your face and hair.  3. Turn off the shower or move out of the shower stream.  4. Pour the CHG onto a clean washcloth. Do not use any type of brush or rough-edged sponge.  5. Starting at your neck, lather your body down to your toes. Make sure you:  6. Pay special attention to the part of your body where you will be having surgery. Scrub this area for at least 1 minute.  7. Use the full amount of CHG as directed. Usually, this is one half bottle for each shower.  8. Do not use CHG on your head or face. If the solution gets into your ears or eyes, rinse them well with water.  9. Avoid your genital area.  10. Avoid any areas of skin that have broken skin, cuts, or scrapes.  11. Scrub your back and under your arms. Make sure to wash skin folds.  12. Let the lather sit on your skin for 1-2 minutes or as long as told by your health care  provider.  13. Thoroughly rinse your entire body in the shower. Make sure that all body creases and crevices are rinsed well.  14. Dry off with a clean towel. Do not put any substances on your body afterward, such as powder, lotion, or perfume.  15. Put on clean clothes or pajamas.  16. If it is the night before your surgery, sleep in clean sheets.    What are the risks?  Risks  of using CHG include:  · A skin reaction.  · Hearing loss, if CHG gets in your ears.  · Eye injury, if CHG gets in your eyes and is not rinsed out.  · The CHG product catching fire.  Make sure that you avoid smoking and flames after applying CHG to your skin.  Do not use CHG:  · If you have a chlorhexidine allergy or have previously reacted to chlorhexidine.  · On babies younger than 2 months of age.      On the day of surgery, when you are taken to your room in Outpatient Surgery you will be given a CHG prepackaged cloth to wipe the site for your surgery.  How to use CHG prepackaged cloths  · Follow the instructions on the label.  · Use the CHG cloth on clean, dry skin. Follow these steps when using a CHG cloth (unless your health care provider gives you different instructions):  1. Using the CHG cloth, vigorously scrub the part of your body where you will be having surgery. Scrub using a back-and-forth motion for 3 minutes. The area on your body should be completely wet with CHG when you are finished scrubbing.  2. Do not rinse. Discard the cloth and let the area air-dry for 1 minute. Do not put any substances on your body afterward, such as powder, lotion, or perfume.  Contact a health care provider if:  · Your skin gets irritated after scrubbing.  · You have questions about using your solution or cloth.  Get help right away if:  · Your eyes become very red or swollen.  · Your eyes itch badly.  · Your skin itches badly and is red or swollen.  · Your hearing changes.  · You have trouble seeing.  · You have swelling or tingling in your mouth or throat.  · You have trouble breathing.  · You swallow any chlorhexidine.  Summary  · Chlorhexidine gluconate (CHG) is a germ-killing (antiseptic) solution that is used to clean the skin. Cleaning your skin with CHG before surgery helps lower the risk for infection after surgery.  · You may be given CHG to use at home. It may be in a bottle or in a prepackaged cloth to use on  your skin. Carefully follow your health care provider's instructions and the instructions on the product label.  · Do not use CHG if you have a chlorhexidine allergy.  · Contact your health care provider if your skin gets irritated after scrubbing.  This information is not intended to replace advice given to you by your health care provider. Make sure you discuss any questions you have with your health care provider.  Document Released: 09/11/2013 Document Revised: 11/15/2018 Document Reviewed: 11/15/2018  [employer] Interactive Patient Education © 2019 [employer]          PATIENT/FAMILY/RESPONSIBLE PARTY VERBALIZES UNDERSTANDING OF ABOVE EDUCATION.  COPY OF PAIN SCALE GIVEN AND REVIEWED WITH VERBALIZED UNDERSTANDING.

## 2020-11-24 ENCOUNTER — TRANSCRIBE ORDERS (OUTPATIENT)
Dept: ADMINISTRATIVE | Facility: HOSPITAL | Age: 55
End: 2020-11-24

## 2020-11-24 ENCOUNTER — OFFICE VISIT (OUTPATIENT)
Dept: VASCULAR SURGERY | Facility: CLINIC | Age: 55
End: 2020-11-24

## 2020-11-24 ENCOUNTER — TRANSCRIBE ORDERS (OUTPATIENT)
Dept: LAB | Facility: HOSPITAL | Age: 55
End: 2020-11-24

## 2020-11-24 VITALS
HEIGHT: 64 IN | WEIGHT: 196 LBS | SYSTOLIC BLOOD PRESSURE: 110 MMHG | HEART RATE: 89 BPM | BODY MASS INDEX: 33.46 KG/M2 | OXYGEN SATURATION: 99 % | DIASTOLIC BLOOD PRESSURE: 82 MMHG

## 2020-11-24 DIAGNOSIS — M54.41 CHRONIC MIDLINE LOW BACK PAIN WITH BILATERAL SCIATICA: Primary | ICD-10-CM

## 2020-11-24 DIAGNOSIS — Z01.818 PREOPERATIVE TESTING: Primary | ICD-10-CM

## 2020-11-24 DIAGNOSIS — Z01.818 PREOPERATIVE TESTING: ICD-10-CM

## 2020-11-24 DIAGNOSIS — Z11.59 SCREENING FOR VIRAL DISEASE: Primary | ICD-10-CM

## 2020-11-24 DIAGNOSIS — M54.42 CHRONIC MIDLINE LOW BACK PAIN WITH BILATERAL SCIATICA: Primary | ICD-10-CM

## 2020-11-24 DIAGNOSIS — G89.29 CHRONIC MIDLINE LOW BACK PAIN WITH BILATERAL SCIATICA: Primary | ICD-10-CM

## 2020-11-24 LAB
QT INTERVAL: 376 MS
QTC INTERVAL: 419 MS

## 2020-11-24 PROCEDURE — 99204 OFFICE O/P NEW MOD 45 MIN: CPT | Performed by: SURGERY

## 2020-11-24 NOTE — PROGRESS NOTES
11/24/2020      ALEXIS Dunaway MD  [address] [place] [address]    Adela Arauz  1965    Chief Complaint   Patient presents with   • Establish Care     ALIF on 12/2/20 with Dr. Dunaway.       Dear ALEXIS Dunaway MD:      HPI  I had the pleasure of seeing your patient Adela Arauz in the office today.  Thank you kindly for this consultation.  As you recall, Adela Arauz is a 55 y.o.  female who you are currently following for chronic back pain.  Adela Yes scheduled for anterior lumbar interbody fusion of L5-S1 with Dr. Dunaway on 12/2/2020.  The patient denies any history of DVT.    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    • Low back pain    • Morbid obesity due to excess calories (CMS/Formerly [facility]) 2/21/2020   • MRSA infection    • Osteoarthritis    • Osteoporosis    • Panic attacks    • PONV (postoperative nausea and vomiting)    • Psoriasis    • RA (rheumatoid arthritis) (CMS/Formerly [facility])    • Seasonal allergies    • Senile osteoporosis 9/28/2017       Past Surgical History:   Procedure Laterality Date   • AXILLARY LYMPH NODE BIOPSY/EXCISION Right 9/19/2019    Procedure: EXCISION RIGHT AXILLARY MASS;  Surgeon: Jes Enamorado MD;  Location: [facility];  Service: General   • SHOULDER SURGERY     • TUBAL ABDOMINAL LIGATION     • US GUIDED LYMPH NODE BIOPSY  8/9/2019   • WRIST SURGERY Bilateral     fracture       Family History   Problem Relation Age of Onset   • Arthritis Mother    • COPD Mother    • Atrial fibrillation Mother    • Osteoarthritis Mother    • Heart disease Father    • Hypertension Father    • Arthritis Father    • Melanoma Father         metastatic   • Arthritis Sister    • Arthritis Brother    • No Known Problems Daughter    • Arthritis  Maternal Grandfather    • Breast cancer Neg Hx        Social History     Socioeconomic History   • Marital status:      Spouse name: Not on file   • Number of children: Not on file   • Years of education: Not on file   • Highest education level: Not on file   Tobacco Use   • Smoking status: Former Smoker     Packs/day: 1.00     Years: 20.00     Pack years: 20.00     Types: Cigarettes     Quit date: 9/3/2018     Years since quittin.2   • Smokeless tobacco: Never Used   • Tobacco comment: quit 2018   Substance and Sexual Activity   • Alcohol use: No   • Drug use: No   • Sexual activity: Defer       No Known Allergies  Current Outpatient Medications   Medication Instructions   • ALPRAZolam (XANAX) 0.5 mg, Oral, 3 Times Daily PRN   • betamethasone valerate (VALISONE) 0.1 % ointment Topical, 2 Times Daily   • Calcium-Magnesium-Vitamin D 500-250-200 MG-MG-UNIT tablet 600 tablets, Oral, 2 Times Daily   • cholecalciferol (VITAMIN D3) 1,000 Units, Oral, Daily   • DULoxetine (CYMBALTA) 60 MG capsule TAKE ONE CAPSULE BY MOUTH DAILY   • fluticasone (FLONASE) 50 MCG/ACT nasal spray 2 sprays, Nasal, Daily   • gabapentin (NEURONTIN) 600 mg, Oral, 3 Times Daily   • HYDROcodone-acetaminophen (Norco)  MG per tablet 1 tablet, Oral, Every 6 Hours PRN   • ibandronate (BONIVA) 150 mg, Oral, Every 30 Days   • mupirocin (BACTROBAN) 2 % ointment Apply to nostrils and fingernails nighlty x 5 nights same 5 nights monthly x 6 months.   • omeprazole (priLOSEC) 40 MG capsule TAKE ONE CAPSULE BY MOUTH DAILY   • sertraline (ZOLOFT) 50 mg, Oral, Nightly   • tiZANidine (ZANAFLEX) 4 MG tablet 1 tablet, Oral, 3 Times Daily   • Turmeric 500 MG capsule Oral, 2 Times Daily      Review of Systems   Constitutional: Negative.    HENT: Negative.    Eyes: Negative.    Respiratory: Negative.    Cardiovascular: Negative.    Gastrointestinal: Negative.    Endocrine: Negative.    Genitourinary: Negative.    Musculoskeletal: Positive for  "arthralgias, back pain and gait problem.   Skin: Negative.    Allergic/Immunologic: Negative.    Neurological: Positive for numbness.   Hematological: Negative.    Psychiatric/Behavioral: Negative.    All other systems reviewed and are negative.      /82   Pulse 89   Ht 162.6 cm (64\")   Wt 88.9 kg (196 lb)   LMP 04/01/2014 (Approximate)   SpO2 99%   BMI 33.64 kg/m²   Physical Exam  Vitals signs and nursing note reviewed.   Constitutional:       Appearance: Normal appearance. She is well-developed. She is obese.   HENT:      Head: Normocephalic and atraumatic.   Eyes:      General: No scleral icterus.     Pupils: Pupils are equal, round, and reactive to light.   Neck:      Musculoskeletal: Neck supple.      Thyroid: No thyromegaly.      Vascular: No carotid bruit or JVD.   Cardiovascular:      Rate and Rhythm: Normal rate and regular rhythm.      Pulses:           Carotid pulses are 2+ on the right side and 2+ on the left side.       Femoral pulses are 2+ on the right side and 2+ on the left side.       Popliteal pulses are 2+ on the right side and 2+ on the left side.        Dorsalis pedis pulses are 2+ on the right side and 2+ on the left side.        Posterior tibial pulses are 2+ on the right side and 2+ on the left side.      Heart sounds: Normal heart sounds.   Pulmonary:      Effort: Pulmonary effort is normal.      Breath sounds: Normal breath sounds.   Abdominal:      General: Bowel sounds are normal. There is no distension or abdominal bruit.      Palpations: Abdomen is soft. There is no mass.      Tenderness: There is no abdominal tenderness.   Musculoskeletal:      Lumbar back: She exhibits pain.      Comments: Using walker   Lymphadenopathy:      Cervical: No cervical adenopathy.   Skin:     General: Skin is warm and dry.   Neurological:      General: No focal deficit present.      Mental Status: She is alert and oriented to person, place, and time.      Cranial Nerves: No cranial nerve " deficit.      Sensory: No sensory deficit.   Psychiatric:         Mood and Affect: Mood normal.         Behavior: Behavior normal.         Thought Content: Thought content normal.         Judgment: Judgment normal.         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 Chronic midline low back pain with bilateral sciatica  M54.41 724.2    M54.42 724.3    G89.29 338.29       Plan: After thoroughly evaluating Adela Arauz, I believe the best course of action is to proceed with anterior lumbar interbody fusion of L5-S1.  Risks of ALIF were discussed and include, but are not limited to, bleeding, infection, nerve damage, vessel damage, retrograde ejaculation, bowel damage, ureteral damage, DVT, MI, stroke, and death.  The patient understands these risks and wishes to proceed with procedure.  The patient can continue taking their current medication regimen as previously planned.  This was all discussed in full with complete understanding.    Thank you for allowing me to participate in the care of your patient.  Please do not hesitate with any questions or  concerns.  I will keep you aware of any further encounters with Adela Arauz.        Sincerely yours,         Neptali Rivera, DO

## 2020-11-30 ENCOUNTER — LAB (OUTPATIENT)
Dept: LAB | Facility: HOSPITAL | Age: 55
End: 2020-11-30

## 2020-11-30 DIAGNOSIS — Z01.818 PREOPERATIVE TESTING: ICD-10-CM

## 2020-11-30 LAB — SARS-COV-2 N GENE RESP QL NAA+PROBE: NOT DETECTED

## 2020-11-30 PROCEDURE — C9803 HOPD COVID-19 SPEC COLLECT: HCPCS

## 2020-11-30 PROCEDURE — 87635 SARS-COV-2 COVID-19 AMP PRB: CPT

## 2020-12-02 ENCOUNTER — APPOINTMENT (OUTPATIENT)
Dept: GENERAL RADIOLOGY | Facility: HOSPITAL | Age: 55
End: 2020-12-02

## 2020-12-02 ENCOUNTER — ANESTHESIA EVENT (OUTPATIENT)
Dept: PERIOP | Facility: HOSPITAL | Age: 55
End: 2020-12-02

## 2020-12-02 ENCOUNTER — HOSPITAL ENCOUNTER (INPATIENT)
Facility: HOSPITAL | Age: 55
LOS: 1 days | Discharge: HOME | End: 2020-12-03
Attending: ORTHOPAEDIC SURGERY | Admitting: ORTHOPAEDIC SURGERY

## 2020-12-02 ENCOUNTER — ANESTHESIA (OUTPATIENT)
Dept: PERIOP | Facility: HOSPITAL | Age: 55
End: 2020-12-02

## 2020-12-02 DIAGNOSIS — Z74.09 IMPAIRED MOBILITY: ICD-10-CM

## 2020-12-02 DIAGNOSIS — M48.062 SPINAL STENOSIS OF LUMBAR REGION WITH NEUROGENIC CLAUDICATION: Primary | ICD-10-CM

## 2020-12-02 DIAGNOSIS — G89.4 CHRONIC PAIN SYNDROME: ICD-10-CM

## 2020-12-02 PROBLEM — M48.061 LUMBAR STENOSIS: Status: ACTIVE | Noted: 2020-12-02

## 2020-12-02 LAB
ABO GROUP BLD: NORMAL
BLD GP AB SCN SERPL QL: NEGATIVE
RH BLD: POSITIVE
T&S EXPIRATION DATE: NORMAL

## 2020-12-02 PROCEDURE — C1713 ANCHOR/SCREW BN/BN,TIS/BN: HCPCS | Performed by: ORTHOPAEDIC SURGERY

## 2020-12-02 PROCEDURE — 97165 OT EVAL LOW COMPLEX 30 MIN: CPT

## 2020-12-02 PROCEDURE — 86900 BLOOD TYPING SEROLOGIC ABO: CPT

## 2020-12-02 PROCEDURE — 25010000002 DEXAMETHASONE PER 1 MG: Performed by: NURSE ANESTHETIST, CERTIFIED REGISTERED

## 2020-12-02 PROCEDURE — 25010000002 ONDANSETRON PER 1 MG: Performed by: NURSE ANESTHETIST, CERTIFIED REGISTERED

## 2020-12-02 PROCEDURE — 86850 RBC ANTIBODY SCREEN: CPT

## 2020-12-02 PROCEDURE — 25010000002 PROPOFOL 10 MG/ML EMULSION: Performed by: NURSE ANESTHETIST, CERTIFIED REGISTERED

## 2020-12-02 PROCEDURE — 72100 X-RAY EXAM L-S SPINE 2/3 VWS: CPT

## 2020-12-02 PROCEDURE — 0SB40ZZ EXCISION OF LUMBOSACRAL DISC, OPEN APPROACH: ICD-10-PCS | Performed by: ORTHOPAEDIC SURGERY

## 2020-12-02 PROCEDURE — 22558 ARTHRD ANT NTRBD MIN DSC LUM: CPT | Performed by: SURGERY

## 2020-12-02 PROCEDURE — 25010000002 CEFAZOLIN 1-4 GM/50ML-% SOLUTION: Performed by: ORTHOPAEDIC SURGERY

## 2020-12-02 PROCEDURE — 36415 COLL VENOUS BLD VENIPUNCTURE: CPT

## 2020-12-02 PROCEDURE — 94799 UNLISTED PULMONARY SVC/PX: CPT

## 2020-12-02 PROCEDURE — 0SG30A0 FUSION OF LUMBOSACRAL JOINT WITH INTERBODY FUSION DEVICE, ANTERIOR APPROACH, ANTERIOR COLUMN, OPEN APPROACH: ICD-10-PCS | Performed by: ORTHOPAEDIC SURGERY

## 2020-12-02 PROCEDURE — 0SG30K0 FUSION OF LUMBOSACRAL JOINT WITH NONAUTOLOGOUS TISSUE SUBSTITUTE, ANTERIOR APPROACH, ANTERIOR COLUMN, OPEN APPROACH: ICD-10-PCS | Performed by: ORTHOPAEDIC SURGERY

## 2020-12-02 PROCEDURE — 25010000002 MIDAZOLAM PER 1 MG: Performed by: ANESTHESIOLOGY

## 2020-12-02 PROCEDURE — 76000 FLUOROSCOPY <1 HR PHYS/QHP: CPT

## 2020-12-02 PROCEDURE — 74018 RADEX ABDOMEN 1 VIEW: CPT

## 2020-12-02 PROCEDURE — 86901 BLOOD TYPING SEROLOGIC RH(D): CPT

## 2020-12-02 PROCEDURE — 25010000002 DEXAMETHASONE PER 1 MG: Performed by: ANESTHESIOLOGY

## 2020-12-02 PROCEDURE — 25010000002 HYDROMORPHONE PER 4 MG: Performed by: ANESTHESIOLOGY

## 2020-12-02 PROCEDURE — 97162 PT EVAL MOD COMPLEX 30 MIN: CPT

## 2020-12-02 PROCEDURE — 25010000002 SUCCINYLCHOLINE PER 20 MG: Performed by: NURSE ANESTHETIST, CERTIFIED REGISTERED

## 2020-12-02 DEVICE — HEMOST ABS SURGIFOAM SZ100 8X12 10MM: Type: IMPLANTABLE DEVICE | Status: FUNCTIONAL

## 2020-12-02 DEVICE — ALLOGRFT FLD BIOBURST 1ML: Type: IMPLANTABLE DEVICE | Status: FUNCTIONAL

## 2020-12-02 DEVICE — SCRW STANDALONE M3 ALIF VARI S/TAP 5.5X20MM: Type: IMPLANTABLE DEVICE | Status: FUNCTIONAL

## 2020-12-02 DEVICE — BONE FILLER VOID NANOSS BIOACTIVE 3D 20CC: Type: IMPLANTABLE DEVICE | Status: FUNCTIONAL

## 2020-12-02 DEVICE — LIGACLIP MCA MULTIPLE CLIP APPLIERS, 20 SMALL CLIPS
Type: IMPLANTABLE DEVICE | Status: FUNCTIONAL
Brand: LIGACLIP

## 2020-12-02 DEVICE — KT HEMOST ABS SURGIFOAM PORCN 1GRAM: Type: IMPLANTABLE DEVICE | Status: FUNCTIONAL

## 2020-12-02 DEVICE — ALIF SCREW, 6.0MM X 30MM
Type: IMPLANTABLE DEVICE | Status: FUNCTIONAL
Brand: ASPIDA

## 2020-12-02 DEVICE — CAGE ALIF STANDALONE M3 15DEG 27X40X16MM: Type: IMPLANTABLE DEVICE | Status: FUNCTIONAL

## 2020-12-02 DEVICE — LK ASMBL STANDALONE M3 ALIF: Type: IMPLANTABLE DEVICE | Status: FUNCTIONAL

## 2020-12-02 DEVICE — 16MM SACRAL PLATE
Type: IMPLANTABLE DEVICE | Status: FUNCTIONAL
Brand: ASPIDA

## 2020-12-02 DEVICE — LIGACLIP MCA MULTIPLE CLIP APPLIERS, 30 MEDIUM CLIPS
Type: IMPLANTABLE DEVICE | Status: FUNCTIONAL
Brand: LIGACLIP

## 2020-12-02 RX ORDER — SCOLOPAMINE TRANSDERMAL SYSTEM 1 MG/1
1 PATCH, EXTENDED RELEASE TRANSDERMAL CONTINUOUS
Status: DISPENSED | OUTPATIENT
Start: 2020-12-02 | End: 2020-12-03

## 2020-12-02 RX ORDER — NALOXONE HCL 0.4 MG/ML
0.04 VIAL (ML) INJECTION AS NEEDED
Status: DISCONTINUED | OUTPATIENT
Start: 2020-12-02 | End: 2020-12-02 | Stop reason: HOSPADM

## 2020-12-02 RX ORDER — FLUMAZENIL 0.1 MG/ML
0.2 INJECTION INTRAVENOUS AS NEEDED
Status: DISCONTINUED | OUTPATIENT
Start: 2020-12-02 | End: 2020-12-02 | Stop reason: HOSPADM

## 2020-12-02 RX ORDER — CEFAZOLIN SODIUM 1 G/50ML
1 INJECTION, SOLUTION INTRAVENOUS EVERY 8 HOURS
Status: COMPLETED | OUTPATIENT
Start: 2020-12-02 | End: 2020-12-03

## 2020-12-02 RX ORDER — FAMOTIDINE 10 MG/ML
20 INJECTION, SOLUTION INTRAVENOUS EVERY 12 HOURS SCHEDULED
Status: DISCONTINUED | OUTPATIENT
Start: 2020-12-02 | End: 2020-12-03 | Stop reason: HOSPADM

## 2020-12-02 RX ORDER — ALPRAZOLAM 0.5 MG/1
0.5 TABLET ORAL 3 TIMES DAILY PRN
Status: DISCONTINUED | OUTPATIENT
Start: 2020-12-02 | End: 2020-12-03 | Stop reason: HOSPADM

## 2020-12-02 RX ORDER — SODIUM CHLORIDE 0.9 % (FLUSH) 0.9 %
10 SYRINGE (ML) INJECTION AS NEEDED
Status: DISCONTINUED | OUTPATIENT
Start: 2020-12-02 | End: 2020-12-02 | Stop reason: HOSPADM

## 2020-12-02 RX ORDER — MIDAZOLAM HYDROCHLORIDE 1 MG/ML
1 INJECTION INTRAMUSCULAR; INTRAVENOUS
Status: COMPLETED | OUTPATIENT
Start: 2020-12-02 | End: 2020-12-02

## 2020-12-02 RX ORDER — OXYCODONE AND ACETAMINOPHEN 10; 325 MG/1; MG/1
1 TABLET ORAL ONCE AS NEEDED
Status: COMPLETED | OUTPATIENT
Start: 2020-12-02 | End: 2020-12-02

## 2020-12-02 RX ORDER — OXYCODONE HCL 20 MG/1
20 TABLET, FILM COATED, EXTENDED RELEASE ORAL ONCE
Status: COMPLETED | OUTPATIENT
Start: 2020-12-02 | End: 2020-12-02

## 2020-12-02 RX ORDER — GABAPENTIN 300 MG/1
600 CAPSULE ORAL EVERY 8 HOURS SCHEDULED
Status: DISCONTINUED | OUTPATIENT
Start: 2020-12-02 | End: 2020-12-03 | Stop reason: HOSPADM

## 2020-12-02 RX ORDER — ONDANSETRON 2 MG/ML
4 INJECTION INTRAMUSCULAR; INTRAVENOUS AS NEEDED
Status: DISCONTINUED | OUTPATIENT
Start: 2020-12-02 | End: 2020-12-02 | Stop reason: HOSPADM

## 2020-12-02 RX ORDER — DULOXETIN HYDROCHLORIDE 30 MG/1
60 CAPSULE, DELAYED RELEASE ORAL DAILY
Status: DISCONTINUED | OUTPATIENT
Start: 2020-12-02 | End: 2020-12-03 | Stop reason: HOSPADM

## 2020-12-02 RX ORDER — LIDOCAINE HYDROCHLORIDE 40 MG/ML
SOLUTION TOPICAL AS NEEDED
Status: DISCONTINUED | OUTPATIENT
Start: 2020-12-02 | End: 2020-12-02 | Stop reason: SURG

## 2020-12-02 RX ORDER — FLUTICASONE PROPIONATE 50 MCG
2 SPRAY, SUSPENSION (ML) NASAL DAILY
Status: DISCONTINUED | OUTPATIENT
Start: 2020-12-02 | End: 2020-12-03 | Stop reason: HOSPADM

## 2020-12-02 RX ORDER — DEXTROSE MONOHYDRATE 25 G/50ML
12.5 INJECTION, SOLUTION INTRAVENOUS AS NEEDED
Status: DISCONTINUED | OUTPATIENT
Start: 2020-12-02 | End: 2020-12-02 | Stop reason: HOSPADM

## 2020-12-02 RX ORDER — DIPHENHYDRAMINE HCL 25 MG
25 CAPSULE ORAL NIGHTLY PRN
Status: DISCONTINUED | OUTPATIENT
Start: 2020-12-02 | End: 2020-12-03 | Stop reason: HOSPADM

## 2020-12-02 RX ORDER — SODIUM CHLORIDE, SODIUM LACTATE, POTASSIUM CHLORIDE, CALCIUM CHLORIDE 600; 310; 30; 20 MG/100ML; MG/100ML; MG/100ML; MG/100ML
1000 INJECTION, SOLUTION INTRAVENOUS CONTINUOUS
Status: DISCONTINUED | OUTPATIENT
Start: 2020-12-02 | End: 2020-12-03 | Stop reason: HOSPADM

## 2020-12-02 RX ORDER — SODIUM CHLORIDE, SODIUM LACTATE, POTASSIUM CHLORIDE, CALCIUM CHLORIDE 600; 310; 30; 20 MG/100ML; MG/100ML; MG/100ML; MG/100ML
9 INJECTION, SOLUTION INTRAVENOUS CONTINUOUS
Status: DISCONTINUED | OUTPATIENT
Start: 2020-12-02 | End: 2020-12-03 | Stop reason: HOSPADM

## 2020-12-02 RX ORDER — LIDOCAINE HYDROCHLORIDE 10 MG/ML
0.5 INJECTION, SOLUTION EPIDURAL; INFILTRATION; INTRACAUDAL; PERINEURAL ONCE AS NEEDED
Status: DISCONTINUED | OUTPATIENT
Start: 2020-12-02 | End: 2020-12-02 | Stop reason: HOSPADM

## 2020-12-02 RX ORDER — ONDANSETRON 2 MG/ML
INJECTION INTRAMUSCULAR; INTRAVENOUS AS NEEDED
Status: DISCONTINUED | OUTPATIENT
Start: 2020-12-02 | End: 2020-12-02 | Stop reason: SURG

## 2020-12-02 RX ORDER — IBUPROFEN 600 MG/1
600 TABLET ORAL ONCE AS NEEDED
Status: DISCONTINUED | OUTPATIENT
Start: 2020-12-02 | End: 2020-12-02 | Stop reason: HOSPADM

## 2020-12-02 RX ORDER — SUCCINYLCHOLINE CHLORIDE 20 MG/ML
INJECTION INTRAMUSCULAR; INTRAVENOUS AS NEEDED
Status: DISCONTINUED | OUTPATIENT
Start: 2020-12-02 | End: 2020-12-02 | Stop reason: SURG

## 2020-12-02 RX ORDER — SODIUM CHLORIDE 0.9 % (FLUSH) 0.9 %
10 SYRINGE (ML) INJECTION EVERY 12 HOURS SCHEDULED
Status: DISCONTINUED | OUTPATIENT
Start: 2020-12-02 | End: 2020-12-02 | Stop reason: HOSPADM

## 2020-12-02 RX ORDER — SUFENTANIL CITRATE 50 UG/ML
INJECTION EPIDURAL; INTRAVENOUS AS NEEDED
Status: DISCONTINUED | OUTPATIENT
Start: 2020-12-02 | End: 2020-12-02 | Stop reason: SURG

## 2020-12-02 RX ORDER — ONDANSETRON 2 MG/ML
4 INJECTION INTRAMUSCULAR; INTRAVENOUS EVERY 6 HOURS PRN
Status: DISCONTINUED | OUTPATIENT
Start: 2020-12-02 | End: 2020-12-03 | Stop reason: HOSPADM

## 2020-12-02 RX ORDER — TIZANIDINE 4 MG/1
4 TABLET ORAL 3 TIMES DAILY
Status: DISCONTINUED | OUTPATIENT
Start: 2020-12-02 | End: 2020-12-03 | Stop reason: HOSPADM

## 2020-12-02 RX ORDER — SODIUM CHLORIDE 0.9 % (FLUSH) 0.9 %
10 SYRINGE (ML) INJECTION AS NEEDED
Status: DISCONTINUED | OUTPATIENT
Start: 2020-12-02 | End: 2020-12-03 | Stop reason: HOSPADM

## 2020-12-02 RX ORDER — PROPOFOL 10 MG/ML
VIAL (ML) INTRAVENOUS AS NEEDED
Status: DISCONTINUED | OUTPATIENT
Start: 2020-12-02 | End: 2020-12-02 | Stop reason: SURG

## 2020-12-02 RX ORDER — ROCURONIUM BROMIDE 10 MG/ML
INJECTION, SOLUTION INTRAVENOUS AS NEEDED
Status: DISCONTINUED | OUTPATIENT
Start: 2020-12-02 | End: 2020-12-02 | Stop reason: SURG

## 2020-12-02 RX ORDER — SODIUM CHLORIDE, SODIUM LACTATE, POTASSIUM CHLORIDE, CALCIUM CHLORIDE 600; 310; 30; 20 MG/100ML; MG/100ML; MG/100ML; MG/100ML
100 INJECTION, SOLUTION INTRAVENOUS CONTINUOUS PRN
Status: DISCONTINUED | OUTPATIENT
Start: 2020-12-02 | End: 2020-12-03 | Stop reason: HOSPADM

## 2020-12-02 RX ORDER — MAGNESIUM HYDROXIDE 1200 MG/15ML
LIQUID ORAL AS NEEDED
Status: DISCONTINUED | OUTPATIENT
Start: 2020-12-02 | End: 2020-12-02 | Stop reason: HOSPADM

## 2020-12-02 RX ORDER — ONDANSETRON 4 MG/1
4 TABLET, FILM COATED ORAL EVERY 6 HOURS PRN
Status: DISCONTINUED | OUTPATIENT
Start: 2020-12-02 | End: 2020-12-03 | Stop reason: HOSPADM

## 2020-12-02 RX ORDER — SODIUM CHLORIDE 9 MG/ML
INJECTION, SOLUTION INTRAVENOUS AS NEEDED
Status: DISCONTINUED | OUTPATIENT
Start: 2020-12-02 | End: 2020-12-02 | Stop reason: HOSPADM

## 2020-12-02 RX ORDER — SODIUM CHLORIDE 0.9 % (FLUSH) 0.9 %
3 SYRINGE (ML) INJECTION EVERY 12 HOURS SCHEDULED
Status: DISCONTINUED | OUTPATIENT
Start: 2020-12-02 | End: 2020-12-03 | Stop reason: HOSPADM

## 2020-12-02 RX ORDER — OXYCODONE AND ACETAMINOPHEN 10; 325 MG/1; MG/1
1 TABLET ORAL EVERY 4 HOURS PRN
Status: DISCONTINUED | OUTPATIENT
Start: 2020-12-02 | End: 2020-12-03 | Stop reason: HOSPADM

## 2020-12-02 RX ORDER — ONDANSETRON 2 MG/ML
4 INJECTION INTRAMUSCULAR; INTRAVENOUS EVERY 6 HOURS PRN
Status: DISCONTINUED | OUTPATIENT
Start: 2020-12-02 | End: 2020-12-02

## 2020-12-02 RX ORDER — SODIUM CHLORIDE 9 MG/ML
75 INJECTION, SOLUTION INTRAVENOUS CONTINUOUS
Status: DISCONTINUED | OUTPATIENT
Start: 2020-12-02 | End: 2020-12-03 | Stop reason: HOSPADM

## 2020-12-02 RX ORDER — DEXAMETHASONE SODIUM PHOSPHATE 4 MG/ML
4 INJECTION, SOLUTION INTRA-ARTICULAR; INTRALESIONAL; INTRAMUSCULAR; INTRAVENOUS; SOFT TISSUE ONCE AS NEEDED
Status: COMPLETED | OUTPATIENT
Start: 2020-12-02 | End: 2020-12-02

## 2020-12-02 RX ORDER — FENTANYL CITRATE 50 UG/ML
25 INJECTION, SOLUTION INTRAMUSCULAR; INTRAVENOUS
Status: DISCONTINUED | OUTPATIENT
Start: 2020-12-02 | End: 2020-12-02 | Stop reason: HOSPADM

## 2020-12-02 RX ORDER — HYDROMORPHONE HYDROCHLORIDE 1 MG/ML
0.5 INJECTION, SOLUTION INTRAMUSCULAR; INTRAVENOUS; SUBCUTANEOUS
Status: DISCONTINUED | OUTPATIENT
Start: 2020-12-02 | End: 2020-12-02 | Stop reason: HOSPADM

## 2020-12-02 RX ORDER — SODIUM CHLORIDE 9 MG/ML
75 INJECTION, SOLUTION INTRAVENOUS CONTINUOUS
Status: DISPENSED | OUTPATIENT
Start: 2020-12-02 | End: 2020-12-03

## 2020-12-02 RX ORDER — FAMOTIDINE 20 MG/1
20 TABLET, FILM COATED ORAL EVERY 12 HOURS SCHEDULED
Status: DISCONTINUED | OUTPATIENT
Start: 2020-12-02 | End: 2020-12-03 | Stop reason: HOSPADM

## 2020-12-02 RX ORDER — LABETALOL HYDROCHLORIDE 5 MG/ML
5 INJECTION, SOLUTION INTRAVENOUS
Status: DISCONTINUED | OUTPATIENT
Start: 2020-12-02 | End: 2020-12-02 | Stop reason: HOSPADM

## 2020-12-02 RX ORDER — ACETAMINOPHEN 500 MG
1000 TABLET ORAL ONCE
Status: COMPLETED | OUTPATIENT
Start: 2020-12-02 | End: 2020-12-02

## 2020-12-02 RX ORDER — DEXAMETHASONE SODIUM PHOSPHATE 4 MG/ML
INJECTION, SOLUTION INTRA-ARTICULAR; INTRALESIONAL; INTRAMUSCULAR; INTRAVENOUS; SOFT TISSUE AS NEEDED
Status: DISCONTINUED | OUTPATIENT
Start: 2020-12-02 | End: 2020-12-02 | Stop reason: SURG

## 2020-12-02 RX ORDER — BUPIVACAINE HCL/0.9 % NACL/PF 0.1 %
2 PLASTIC BAG, INJECTION (ML) EPIDURAL ONCE
Status: COMPLETED | OUTPATIENT
Start: 2020-12-02 | End: 2020-12-02

## 2020-12-02 RX ADMIN — FAMOTIDINE 20 MG: 20 TABLET, FILM COATED ORAL at 13:42

## 2020-12-02 RX ADMIN — FAMOTIDINE 20 MG: 20 TABLET, FILM COATED ORAL at 21:19

## 2020-12-02 RX ADMIN — HYDROMORPHONE HYDROCHLORIDE 0.5 MG: 1 INJECTION, SOLUTION INTRAMUSCULAR; INTRAVENOUS; SUBCUTANEOUS at 09:51

## 2020-12-02 RX ADMIN — SUFENTANIL CITRATE 15 MCG: 50 INJECTION EPIDURAL; INTRAVENOUS at 08:45

## 2020-12-02 RX ADMIN — LIDOCAINE HYDROCHLORIDE 100 MG: 20 INJECTION, SOLUTION INTRAVENOUS at 07:23

## 2020-12-02 RX ADMIN — DEXAMETHASONE SODIUM PHOSPHATE 4 MG: 4 INJECTION, SOLUTION INTRAMUSCULAR; INTRAVENOUS at 09:02

## 2020-12-02 RX ADMIN — ROCURONIUM BROMIDE 35 MG: 10 INJECTION INTRAVENOUS at 07:22

## 2020-12-02 RX ADMIN — HYDROMORPHONE HYDROCHLORIDE 0.5 MG: 1 INJECTION, SOLUTION INTRAMUSCULAR; INTRAVENOUS; SUBCUTANEOUS at 10:05

## 2020-12-02 RX ADMIN — TIZANIDINE 4 MG: 4 TABLET ORAL at 21:19

## 2020-12-02 RX ADMIN — SODIUM CHLORIDE, POTASSIUM CHLORIDE, SODIUM LACTATE AND CALCIUM CHLORIDE 100 ML/HR: 600; 310; 30; 20 INJECTION, SOLUTION INTRAVENOUS at 06:06

## 2020-12-02 RX ADMIN — GABAPENTIN 600 MG: 300 CAPSULE ORAL at 13:47

## 2020-12-02 RX ADMIN — PROPOFOL 150 MG: 10 INJECTION, EMULSION INTRAVENOUS at 07:22

## 2020-12-02 RX ADMIN — TIZANIDINE 4 MG: 4 TABLET ORAL at 13:45

## 2020-12-02 RX ADMIN — Medication 1 TABLET: at 17:22

## 2020-12-02 RX ADMIN — LIDOCAINE HYDROCHLORIDE 1 EACH: 40 SOLUTION TOPICAL at 07:22

## 2020-12-02 RX ADMIN — MIDAZOLAM 1 MG: 1 INJECTION INTRAMUSCULAR; INTRAVENOUS at 06:59

## 2020-12-02 RX ADMIN — MUPIROCIN: 20 OINTMENT TOPICAL at 21:20

## 2020-12-02 RX ADMIN — OXYCODONE HYDROCHLORIDE AND ACETAMINOPHEN 1 TABLET: 10; 325 TABLET ORAL at 10:39

## 2020-12-02 RX ADMIN — DEXAMETHASONE SODIUM PHOSPHATE 4 MG: 4 INJECTION, SOLUTION INTRA-ARTICULAR; INTRALESIONAL; INTRAMUSCULAR; INTRAVENOUS; SOFT TISSUE at 06:43

## 2020-12-02 RX ADMIN — SERTRALINE HYDROCHLORIDE 50 MG: 50 TABLET, FILM COATED ORAL at 21:19

## 2020-12-02 RX ADMIN — OXYCODONE HYDROCHLORIDE AND ACETAMINOPHEN 1 TABLET: 10; 325 TABLET ORAL at 21:21

## 2020-12-02 RX ADMIN — DULOXETINE HYDROCHLORIDE 60 MG: 30 CAPSULE, DELAYED RELEASE ORAL at 13:41

## 2020-12-02 RX ADMIN — MIDAZOLAM 1 MG: 1 INJECTION INTRAMUSCULAR; INTRAVENOUS at 06:43

## 2020-12-02 RX ADMIN — CEFAZOLIN SODIUM 1 G: 1 INJECTION, SOLUTION INTRAVENOUS at 15:29

## 2020-12-02 RX ADMIN — ONDANSETRON HYDROCHLORIDE 4 MG: 2 SOLUTION INTRAMUSCULAR; INTRAVENOUS at 09:02

## 2020-12-02 RX ADMIN — SUCCINYLCHOLINE CHLORIDE 160 MG: 20 INJECTION, SOLUTION INTRAMUSCULAR; INTRAVENOUS at 07:22

## 2020-12-02 RX ADMIN — Medication 2 G: at 07:39

## 2020-12-02 RX ADMIN — SODIUM CHLORIDE 75 ML/HR: 9 INJECTION, SOLUTION INTRAVENOUS at 11:35

## 2020-12-02 RX ADMIN — ACETAMINOPHEN 1000 MG: 500 TABLET, FILM COATED ORAL at 06:43

## 2020-12-02 RX ADMIN — SODIUM CHLORIDE, PRESERVATIVE FREE 3 ML: 5 INJECTION INTRAVENOUS at 13:42

## 2020-12-02 RX ADMIN — SODIUM CHLORIDE, POTASSIUM CHLORIDE, SODIUM LACTATE AND CALCIUM CHLORIDE 1000 ML: 600; 310; 30; 20 INJECTION, SOLUTION INTRAVENOUS at 05:57

## 2020-12-02 RX ADMIN — GABAPENTIN 600 MG: 300 CAPSULE ORAL at 21:19

## 2020-12-02 RX ADMIN — SODIUM CHLORIDE, PRESERVATIVE FREE 3 ML: 5 INJECTION INTRAVENOUS at 21:26

## 2020-12-02 RX ADMIN — SCOPALAMINE 1 PATCH: 1 PATCH, EXTENDED RELEASE TRANSDERMAL at 06:43

## 2020-12-02 RX ADMIN — SUFENTANIL CITRATE 35 MCG: 50 INJECTION EPIDURAL; INTRAVENOUS at 07:23

## 2020-12-02 RX ADMIN — SODIUM CHLORIDE, PRESERVATIVE FREE 3 ML: 5 INJECTION INTRAVENOUS at 13:43

## 2020-12-02 RX ADMIN — OXYCODONE HYDROCHLORIDE AND ACETAMINOPHEN 1 TABLET: 10; 325 TABLET ORAL at 15:34

## 2020-12-02 RX ADMIN — OXYCODONE HYDROCHLORIDE 20 MG: 20 TABLET, FILM COATED, EXTENDED RELEASE ORAL at 05:52

## 2020-12-02 NOTE — OP NOTE
LUMBAR ANTERIOR INTERBODY FUSION  Procedure Note    Adela Arauz  12/2/2020    Pre-op Diagnosis:    1. Increasing chronic back pain.  2. Bilateral buttock, thigh and leg radiculopathy, now right worse than left.   3. Severe neurogenic claudication.   4. Multilevel lumbar degenerative disc disease worse L3-S1.   5. Degenerative lumbar scoliosis, L3-S1, concave left L5-S1, concave right L4-5.   6. Facet arthropathy, L4-S1.   7. Large disc herniation right central L3-4  8. Central and foraminal stenosis, L3-S1, worse central L3-4, right L4-5, severe left L5-S1.   9. Rheumatoid arthritis.   10. History of chronic osteomyelitis, right forearm, status post external fixation, Dr. Cr.    Post-op Diagnosis:    same    Procedure/CPT® Codes:     1. Anterior discectomy decompression with bilateral neural foraminotomy L5-S1  2. Anterior lumbar interbody fusion L5-S1  3. Anterior spinal instrumentation L5-S1 (ATEC anterior plate and screws)  4. Use of titanium interbody biomechanical device for fusion L5-S1 (CoreLink titanium spacer)  5. Use of allograft bone matrix for fusion L5-S1  6. Use of allograft liquid for fusion L5-S1 (BioBurst)  7. Use of fluoroscopy for confirmation of surgical level, placement of interbody spacer and instrumentation  8. Intraoperative neural monitoring     Anesthesia: General     Surgeon: MELCHOR Dunaway MD     Co Surgeon: Dr. Neptali Rivera D.O.     Assistant: Michael Hudson PA-C     Estimated Blood Loss: 50 mL     Complications: None     Condition: Stable to PACU.     Indications:     The patient is a 55-year-old who sees Charlene Huntley nurse practitioner for medical issues.  She presented to the office with increasing chronic back pain along with symptoms down both lower extremities consistent with radiculopathy, with the right side worse than the left.  She had complaints that were consistent also with severe neurogenic claudication.  Imaging studies revealed multilevel lumbar  degenerative disc disease that was worse from L4-S1.  She was noted to have scoliosis from L3-S1 that was concave to the left at L5-S1 and concave to the right at L4-5.  Both levels had facet arthropathy and central as well as foraminal stenosis that was worse on the right at L4-5 and on the left at L5-S1 corresponding to the scoliosis concavities.  She was also noted to have a rather large disc herniation at L3-4 that will also need to be addressed at a later date.    After failing all conservative measures, it was mutually decided that surgery would be the best option.  Risks, benefits, and complications of surgery were discussed in detail. The patient appeared well informed and wished to proceed. We specifically discussed the risk of infection, blood loss, nerve root injury, CSF leak, and the possibility of incomplete resolution of symptoms. We also discussed the possible risk of a nonunion and the potential need for additional surgery in the event of a pseudoarthrosis or hardware failure.    We elected to proceed with a staged operation.  Today we are performing an anterior decompression and fusion of L5-S1, it is planned that we will be returning to surgery for a second lateral procedure at a later date to address L3-4 and L4-5 along with a final posterior procedure involving a decompression and posterior spinal fusion with instrumentation from L3-S1.     Operative Procedure:     After obtaining informed consent and verifying the correct operative level, the patient was brought to the operating room and placed supine on an operating table. A general anesthetic was provided by the anesthesia service with the assistance of an endotracheal tube. Once this was appropriately positioned and secured, the anterior abdominal region was prepped and draped in usual sterile fashion. A surgical timeout was taken to confirm this was the correct patient, we were working at the correct level, and that preoperative antibiotics  were given in a timely fashion.     At this point, Dr. Neptali Rivera D.O. provided vascular access to the L5-S1 level. He performed a left sided anterior retroperitoneal approach to the L5-S1 segment. Please see his separate dictated operative report regarding the details on the approach itself.  When I entered the procedure, self retaining retractors were already in position with excellent exposure of the L5-S1 disc space.     After confirming we were at the correct level using fluoroscopy, I used a long handle 10 blade scalpel to cut into the L5-S1 disc space. A Cardozo elevator was used to remove disc material off of the endplates. Disc material was retrieved using pituitaries and Kerrisons. A disc space distractor was then placed into the L5-S1 disc space and I used curettes to remove posterior disc material. Kerrisons were used to remove posterior osteophytes across the endplates of L5 and S1. There was high-grade stenosis centrally but also foraminally on the left corresponding to the scoliosis that was present. I then performed a bilateral neural foraminotomy with Kerrisons and curettes. The decompression was much more involved than what is usually required for an anterior lumbar interbody fusion by itself and required significantly more time to perform.  This was due to the severe disc space collapse on the left side and subsequent high-grade foraminal stenosis again on the left.     After the decompression was completed bilaterally and centrally, I used a series of endplate scrapers to prepare the endplates for interbody fusion. Trial spacers were then malleted into position and it was felt that a 16 mm titanium spacer from the CoreLink instrumentation set would be the best fit to restore disc height also restoring foraminal height and providing some indirect decompression. The disc space was then thoroughly irrigated with saline solution.  Gelfoam powder with thrombin was used to control epidural  bleeding.     A 16 mm titanium spacer from the CoreLink instrumentation set was then packed as tightly as possible with allograft bone matrix mixed with an allograft liquid called BioBurst. This spacer was then malleted into the L5-S1 disc space under fluoroscopic guidance. It was placed as an interbody biomechanical device to assist with fusion.  I placed a 20 mm screw into L5 and a second 25 mm screw into S1 to help prevent migration of the spacer.     A 4-hole anterior plate from the [facility] instrumentation set was then chosen. The 4 holes were drilled and four 30 mm screws were used to fix the plate across the L5-S1 segment augmenting the fusion.      We then inspected the entire operative field for any signs of bleeding.  Bleeding was once again controlled using thrombin with Gelfoam powder and bipolar cautery.  Once we ensured that adequate hemostasis was accomplished, final fluoroscopy imaging was taken to confirm adequate position of our implants. There was excellent restoration of disc height and the plate and screw construct appeared to be adequately positioned across L5-S1.     Please see Dr. Neptali Rivera's separate dictated operative report regarding the closure of the wound. Once this was accomplished, the patient was extubated and sent to the recovery room in good stable condition. We estimated blood loss to be approximately 50 mL and the patient remained hemodynamically stable during the procedure.     Dr. Neptali Rivera D.O. provided vascular access to the L5-S1 level and acted as a co-surgeon in this fashion.  Michael Hudson PA-C provided critical assistance during the decompression at L5-S1 as well as during the placement of the titanium spacer, bone graft and instrumentation to obtain a fusion across L5-S1.    MELCHOR Dunaway MD    Date: 12/2/2020  Time: 14:59 CST

## 2020-12-02 NOTE — PLAN OF CARE
"  Problem: Adult Inpatient Plan of Care  Goal: Plan of Care Review  Recent Flowsheet Documentation  Taken 12/2/2020 1308 by Maki Odonnell, PT Student  Progress: improving  Plan of Care Reviewed With: patient  Outcome Summary: PT eval completed. A&Ox4. Pt exhibits decrease strength, impaired balance, and decrease activity tolerance. Pt is CGA for bed mobility, sit<>stand, and ambulation. Pt ambulated 60 feet with 2 HHA, decrease gait speed, and decrease stride length. Pt reports she is \"scared to fall.\" Pt educated on spinal precautions, and brace care and wear, and verbalizes understanding. Skilled PT recommended to improve strength, balance, activity tolerance and gait training with SPC vs walker to improve independence with functional mobility and decrease risk for falls. D/c home with assist     "

## 2020-12-02 NOTE — ANESTHESIA PREPROCEDURE EVALUATION
Anesthesia Evaluation     Patient summary reviewed   history of anesthetic complications: PONV  NPO Solid Status: > 8 hours             Airway   Mallampati: II  TM distance: >3 FB  Neck ROM: full  Dental      Pulmonary    (-) COPD, asthma, sleep apnea, not a smoker  Cardiovascular   Exercise tolerance: poor (<4 METS) (Limited by back pain  )    ECG reviewed    (-) pacemaker, past MI, angina, cardiac stents      Neuro/Psych  (-) seizures, TIA, CVA  GI/Hepatic/Renal/Endo    (+) obesity,  GERD,    (-) liver disease, no renal disease, diabetes    Musculoskeletal     Abdominal    Substance History      OB/GYN          Other                      Anesthesia Plan    ASA 2     general     intravenous induction     Anesthetic plan, all risks, benefits, and alternatives have been provided, discussed and informed consent has been obtained with: patient.

## 2020-12-02 NOTE — ANESTHESIA PROCEDURE NOTES
Airway  Urgency: elective    Date/Time: 12/2/2020 7:15 AM  Airway not difficult    General Information and Staff    Patient location during procedure: OR  CRNA: Delta Hernandez CRNA    Indications and Patient Condition  Indications for airway management: airway protection    Preoxygenated: yes  MILS maintained throughout  Mask difficulty assessment: 1 - vent by mask    Final Airway Details  Final airway type: endotracheal airway      Successful airway: ETT  Cuffed: yes   Successful intubation technique: direct laryngoscopy  Endotracheal tube insertion site: oral  Blade: Ellison  Blade size: 2  ETT size (mm): 7.0  Cormack-Lehane Classification: grade IIa - partial view of glottis  Placement verified by: chest auscultation   Cuff volume (mL): 10  Measured from: lips  ETT/EBT  to lips (cm): 21  Number of attempts at approach: 1  Assessment: lips, teeth, and gum same as pre-op and atraumatic intubation

## 2020-12-02 NOTE — PLAN OF CARE
Goal Outcome Evaluation:  Plan of Care Reviewed With: patient  Progress: no change  Outcome Summary: Recieved pt form PACU, report from Ama Gr.Alert and oriented x4. C/o pain , prn meds given with relief. Dressing on abdomen dry and intact. Walked in burnette with PT.

## 2020-12-02 NOTE — PLAN OF CARE
Goal Outcome Evaluation:  Plan of Care Reviewed With: patient  Progress: improving  Outcome Summary: OT kiraal completed. Pt awake and alert in fowlers, drowsy in no apparent distress, c/o 10/10 pain with no obs signs of pain. Comes to EOB with log roll technique with CGA and vc. Dons brace in sitting with Yoni for education and cues for positioning. S for LB dressing with activity modifications. Stands CGA and amb in burnette with HHAx1-2 for increased confidence rather than stability as pt does not push through hands, she is fearful of falling. Pt is functioning at her baseline with ADL and demos understanding of spinal precautions with activity modifications, will defer mobility to PT at this time. Anticipate d/c home w assist.

## 2020-12-02 NOTE — THERAPY EVALUATION
Patient Name: Adela Arauz  : 1965    MRN: [id number]                              Today's Date: 2020       Admit Date: 2020    Visit Dx: No diagnosis found.  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 (CMS/HCC)   • Encounter for consultation   • Former smoker   • History of radiation therapy   • Cellulitis of right upper extremity   • Lymphedema of right arm   • Pain in right arm   • Hx of osteomyelitis   • History of lymph node dissection of right axilla   • Morbid obesity due to excess calories (CMS/[facility])   • MRSA (methicillin resistant Staphylococcus aureus) infection   • Anxiety   • DDD (degenerative disc disease), lumbar   • Foraminal stenosis due to intervertebral disc disease   • Lumbar stenosis     Past Medical History:   Diagnosis Date   • AAT (alpha-1-antitrypsin) deficiency (CMS/HCC) 2019   • Anxiety 2020   • Cancer (CMS/[facility])     LYMPHOMA   • GERD (gastroesophageal reflux disease)    • 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, 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    • Low back pain    • Morbid obesity due to excess calories (CMS/[facility]) 2020   • MRSA infection    • Osteoarthritis    • Osteoporosis    • Panic attacks    • PONV (postoperative nausea and vomiting)    • Psoriasis    • RA (rheumatoid arthritis) (CMS/[facility])    • Seasonal allergies    • Senile osteoporosis 2017     Past Surgical History:   Procedure Laterality Date   • AXILLARY LYMPH NODE BIOPSY/EXCISION Right 2019    Procedure: EXCISION RIGHT AXILLARY MASS;  Surgeon: Jes Enamorado MD;  Location: [facility];  Service: General   • SHOULDER SURGERY     • TUBAL ABDOMINAL  LIGATION     • US GUIDED LYMPH NODE BIOPSY  8/9/2019   • WRIST SURGERY Bilateral     fracture     General Information     Row Name 12/02/20 1308          Physical Therapy Time and Intention    Document Type  evaluation  (Pended)  s/p ALIF L5-S1  -AN     Mode of Treatment  physical therapy  -DRE (r) AN (t) DRE (c)     Row Name 12/02/20 1308          General Information    Patient Profile Reviewed  yes  -DRE (r) AN (t) DRE (c)     Prior Level of Function  independent:;all household mobility;community mobility;ADL's shower chair, tub shower, rails in the tub, uses a cane or walker to ambulate  -DRE (r) AN (t) DRE (c)     Existing Precautions/Restrictions  fall;spinal;brace worn when out of bed LSO  -DRE (r) AN (t) DRE (c)     Barriers to Rehab  physical barrier  -DRE (r) AN (t) DRE (c)     Row Name 12/02/20 1308          Living Environment    Lives With  spouse  -DRE (r) AN (t) DRE (c)     Row Name 12/02/20 1308          Home Main Entrance    Number of Stairs, Main Entrance  four  -DRE (r) AN (t) DRE (c)     Stair Railings, Main Entrance  railings on both sides of stairs  -DRE (r) AN (t) DRE (c)     Row Name 12/02/20 1308          Stairs Within Home, Primary    Number of Stairs, Within Home, Primary  none  -DRE (r) AN (t) DRE (c)     Row Name 12/02/20 1308          Cognition    Orientation Status (Cognition)  oriented x 4  -DRE (r) AN (t) DRE (c)       User Key  (r) = Recorded By, (t) = Taken By, (c) = Cosigned By    Initials Name Provider Type    Guillaume Swanson, PT DPT Physical Therapist    Maki Walton, JAY Student PT Student        Mobility     Row Name 12/02/20 1308          Bed Mobility    Bed Mobility  rolling left;sidelying-sit  -DRE (r) AN (t)     Rolling Left [place] (Bed Mobility)  modified independence  -DRE (r) AN (t)     Sidelying-Sit [place] (Bed Mobility)  contact guard  -DRE (r) AN (t)     Assistive Device (Bed Mobility)  bed rails;head of bed elevated  -DRE (r) AN (t)     Row Name 12/02/20 1308           Sit-Stand Transfer    Sit-Stand [place] (Transfers)  contact guard  -DRE (r) AN (t)     Row Name 12/02/20 1308          Gait/Stairs (Locomotion)    [place] Level (Gait)  contact guard 2 HHA  -DRE (r) AN (t)     Distance in Feet (Gait)  60  (Pended)   -AN     Deviations/Abnormal Patterns (Gait)  ben decreased;gait speed decreased;stride length decreased  -DRE (r) AN (t)     Bilateral Gait Deviations  heel strike decreased  -DRE (r) AN (t)       User Key  (r) = Recorded By, (t) = Taken By, (c) = Cosigned By    Initials Name Provider Type    Guillaume Swanson, PT DPT Physical Therapist    Maki Walton, PT Student PT Student        Obj/Interventions     Row Name 12/02/20 1308          Range of Motion Comprehensive    General Range of Motion  no range of motion deficits identified  -DRE (r) AN (t) DRE (c)     Row Name 12/02/20 1308          Strength Comprehensive (MMT)    Comment, General Manual Muscle Testing (MMT) Assessment  Brendan LE Strength grossly 4/5  -DRE (r) AN (t) DRE (c)     Row Name 12/02/20 1308          Balance    Balance Assessment  sitting static balance;standing static balance;standing dynamic balance  -DRE (r) AN (t) DRE (c)     Static Sitting Balance  WFL;sitting, edge of bed;unsupported  -DRE (r) AN (t) DRE (c)     Static Standing Balance  mild impairment;supported;standing  -DRE (r) AN (t) DRE (c)     Dynamic Standing Balance  mild impairment;supported;standing  -DRE (r) AN (t) DRE (c)     Row Name 12/02/20 1308          Sensory Assessment (Somatosensory)    Sensory Assessment (Somatosensory)  sensation intact  -DRE (r) AN (t) DRE (c)     Right LE Sensory Assessment  -- reports dorsal foot light touch is less sensitive than the L  -DRE (r) AN (t) DRE (c)       User Key  (r) = Recorded By, (t) = Taken By, (c) = Cosigned By    Initials Name Provider Type    Guillaume Swanson, PT DPT Physical Therapist    Maki Walton, PT Student PT Student        Goals/Plan     Row Name 12/02/20 1308           Bed Mobility Goal 1 (PT)    Activity/Assistive Device (Bed Mobility Goal 1, PT)  bed mobility activities, all maintaining spinal pracautions  -DRE (r) AN (t) DRE (c)     [place] Level/Cues Needed (Bed Mobility Goal 1, PT)  modified independence  -DRE (r) AN (t) DRE (c)     Time Frame (Bed Mobility Goal 1, PT)  long term goal (LTG);10 days  -DRE (r) AN (t) DRE (c)     Progress/Outcomes (Bed Mobility Goal 1, PT)  goal ongoing  -DRE (r) AN (t) DRE (c)     Row Name 12/02/20 1307          Transfer Goal 1 (PT)    Activity/Assistive Device (Transfer Goal 1, PT)  sit-to-stand/stand-to-sit;bed-to-chair/chair-to-bed  -DRE (r) AN (t) DRE (c)     [place] Level/Cues Needed (Transfer Goal 1, PT)  standby assist  -DRE (r) AN (t) DRE (c)     Time Frame (Transfer Goal 1, PT)  long term goal (LTG);10 days  -DRE (r) AN (t) DRE (c)     Progress/Outcome (Transfer Goal 1, PT)  goal ongoing  -DRE (r) AN (t) DRE (c)     Row Name 12/02/20 1304          Gait Training Goal 1 (PT)    Activity/Assistive Device (Gait Training Goal 1, PT)  gait (walking locomotion);assistive device use;decrease fall risk;improve balance and speed;increase endurance/gait distance  -DRE (r) AN (t) DRE (c)     [place] Level (Gait Training Goal 1, PT)  standby assist  -DRE (r) AN (t) DRE (c)     Distance (Gait Training Goal 1, PT)  150  -DRE (r) AN (t) DRE (c)     Time Frame (Gait Training Goal 1, PT)  long term goal (LTG);10 days  -DRE (r) AN (t) DRE (c)     Progress/Outcome (Gait Training Goal 1, PT)  goal ongoing  -DRE (r) AN (t) DRE (c)     Row Name 12/02/20 1307          Stairs Goal 1 (PT)    Activity/Assistive Device (Stairs Goal 1, PT)  ascending stairs;descending stairs;using handrail, left;using handrail, right;decrease fall risk;improve balance and speed  -DRE (r) AN (t) DRE (c)     [place] Level/Cues Needed (Stairs Goal 1, PT)  contact guard assist  -DRE (r) AN (t) RDE (c)     Number of Stairs (Stairs Goal 1, PT)  4  -DRE (r) AN (t) DRE (c)     Time Frame (Stairs Goal  "1, PT)  long term goal (LTG);10 days  -DRE (r) AN (t) DRE (c)     Progress/Outcome (Stairs Goal 1, PT)  goal ongoing  -DRE (r) AN (t) DRE (c)     Row Name 12/02/20 1308          Patient Education Goal (PT)    Activity (Patient Education Goal, PT)  Spinal precautions, and I in brace don/doffing  -DRE (r) AN (t) DRE (c)     [place]/Cues/Accuracy (Memory Goal 2, PT)  demonstrates adequately;independent  -DRE (r) AN (t) DRE (c)     Time Frame (Patient Education Goal, PT)  long term goal (LTG);10 days  -DRE (r) AN (t) DRE (c)     Progress/Outcome (Patient Education Goal, PT)  goal ongoing  -DRE (r) AN (t) DRE (c)       User Key  (r) = Recorded By, (t) = Taken By, (c) = Cosigned By    Initials Name Provider Type    Guillaume Swanson, PT DPT Physical Therapist    Maki Walton, PT Student PT Student        Clinical Impression     Row Name 12/02/20 1308          Pain    Additional Documentation  Pain Scale: Numbers Pre/Post-Treatment (Group)  -DRE (r) AN (t) DRE (c)     Row Name 12/02/20 1308          Pain Scale: Numbers Pre/Post-Treatment    Pretreatment Pain Rating  10/10  -DRE (r) AN (t) DRE (c)     Posttreatment Pain Rating  10/10  -DRE (r) AN (t) DRE (c)     Pain Location - Orientation  incisional  -DRE (r) AN (t) DRE (c)     Pain Location  back  -DRE (r) AN (t) DRE (c)     Pain Intervention(s)  Repositioned;Ambulation/increased activity  -DRE (r) AN (t) DRE (c)     Row Name 12/02/20 1308          Plan of Care Review    Plan of Care Reviewed With  patient  -DRE (r) AN (t) DRE (c)     Progress  improving  -DRE (r) AN (t) DRE (c)     Outcome Summary  PT eval completed. A&Ox4. Pt exhibits decrease strength, impaired balance, and decrease activity tolerance. Pt is CGA for bed mobility, sit<>stand, and ambulation. Pt ambulated 60 feet with 2 HHA, decrease gait speed, and decrease stride length. Pt reports she is \"scared to fall.\" Pt educated on spinal precautions, and brace care and wear, and verbalizes understanding. Skilled PT " recommended to improve strength, balance, activity tolerance and gait training with SPC vs walker to improve independence with functional mobility and decrease risk for falls. D/c home with assist  -DRE (r) AN (t) DRE (c)     Row Name 12/02/20 1301          Therapy Assessment/Plan (PT)    Patient/Family Therapy Goals Statement (PT)  to d/c  -DRE (r) AN (t) DRE (c)     Rehab Potential (PT)  good, to achieve stated therapy goals  -DRE (r) AN (t) DRE (c)     Criteria for Skilled Interventions Met (PT)  yes;meets criteria  -DRE (r) AN (t) DRE (c)     Predicted Duration of Therapy Intervention (PT)  to d/c  -DRE (r) AN (t) DRE (c)     Row Name 12/02/20 1300          Vital Signs    O2 Delivery Pre Treatment  room air  -DRE (r) AN (t) DRE (c)     O2 Delivery Intra Treatment  room air  -DRE (r) AN (t) DRE (c)     O2 Delivery Post Treatment  supplemental O2  -DRE (r) AN (t) DRE (c)     Pre Patient Position  Supine  -DRE (r) AN (t) DRE (c)     Intra Patient Position  Standing  -DRE (r) AN (t) DRE (c)     Post Patient Position  Sitting  -DRE (r) AN (t) DRE (c)     Row Name 12/02/20 1301          Positioning and Restraints    Pre-Treatment Position  in bed  -DRE (r) AN (t) DRE (c)     Post Treatment Position  chair  -DRE (r) AN (t) DRE (c)     In Chair  notified nsg;call light within reach;encouraged to call for assist;sitting;legs elevated  -DRE (r) AN (t) DRE (c)       User Key  (r) = Recorded By, (t) = Taken By, (c) = Cosigned By    Initials Name Provider Type    Guillaume Swanson, PT DPT Physical Therapist    Maki Walton, PT Student PT Student        Outcome Measures     Row Name 12/02/20 5667          How much help from another person do you currently need...    Turning from your back to your side while in flat bed without using bedrails?  3  -DRE (r) AN (t) DRE (c)     Moving from lying on back to sitting on the side of a flat bed without bedrails?  3  -DRE (r) AN (t) DRE (c)     Moving to and from a bed to a chair (including a wheelchair)?  3   -DRE (r) AN (t) DRE (c)     Standing up from a chair using your arms (e.g., wheelchair, bedside chair)?  3  -DRE (r) AN (t) DRE (c)     Climbing 3-5 steps with a railing?  3  -DRE (r) AN (t) DRE (c)     To walk in hospital room?  3  -DRE (r) AN (t) DRE (c)     AM-PAC 6 Clicks Score (PT)  18  -DRE (r) AN (t)     Row Name 12/02/20 1308          Functional Assessment    Outcome Measure Options  AM-PAC 6 Clicks Basic Mobility (PT)  -DRE (r) AN (t) DRE (c)       User Key  (r) = Recorded By, (t) = Taken By, (c) = Cosigned By    Initials Name Provider Type    Guillaume Swanson, PT DPT Physical Therapist    Maki Walton, PT Student PT Student        Physical Therapy Education                 Title: PT OT SLP Therapies (In Progress)     Topic: Physical Therapy (In Progress)     Point: Mobility training (Done)     Learning Progress Summary           Patient Acceptance, E, VU by AN at 12/2/2020 1308    Comment: spinal precautions, brace wear and care                   Point: Home exercise program (Not Started)     Learner Progress:  Not documented in this visit.          Point: Body mechanics (Done)     Learning Progress Summary           Patient Acceptance, E, VU by AN at 12/2/2020 1308    Comment: spinal precautions, brace wear and care                   Point: Precautions (Done)     Learning Progress Summary           Patient Acceptance, E, VU by AN at 12/2/2020 1308    Comment: spinal precautions, brace wear and care                               User Key     Initials Effective Dates Name Provider Type Discipline    GENESIS 10/08/20 -  Maki Odonnell, PT Student PT Student PT              PT Recommendation and Plan     Plan of Care Reviewed With: patient  Progress: improving  Outcome Summary: PT eval completed. A&Ox4. Pt exhibits decrease strength, impaired balance, and decrease activity tolerance. Pt is CGA for bed mobility, sit<>stand, and ambulation. Pt ambulated 60 feet with 2 HHA, decrease gait speed, and decrease stride  "length. Pt reports she is \"scared to fall.\" Pt educated on spinal precautions, and brace care and wear, and verbalizes understanding. Skilled PT recommended to improve strength, balance, activity tolerance and gait training with SPC vs walker to improve independence with functional mobility and decrease risk for falls. D/c home with assist     Time Calculation:   PT Charges     Row Name 12/02/20 1308             Time Calculation    Start Time  1308  -DRE (r) AN (t) DRE (c)      Stop Time  1338  -DRE (r) AN (t) DRE (c)      Time Calculation (min)  30 min  -DRE (r) AN (t)      PT Received On  12/02/20  -DRE (r) AN (t) DRE (c)      PT Goal Re-Cert Due Date  12/12/20  -DRE (r) AN (t) DRE (c)        User Key  (r) = Recorded By, (t) = Taken By, (c) = Cosigned By    Initials Name Provider Type    Guillaume Swanson, PT DPT Physical Therapist    Maki Walton, PT Student PT Student            PT G-Codes  Outcome Measure Options: AM-PAC 6 Clicks Basic Mobility (PT)  AM-PAC 6 Clicks Score (PT): 18  AM-PAC 6 Clicks Score (OT): 21    aMki Marie, PT Student  12/2/2020    "

## 2020-12-02 NOTE — OP NOTE
Adela Arauz  12/2/2020     PREOPERATIVE DIAGNOSIS:   1. Increasing chronic back pain.  2. Bilateral buttock, thigh and leg radiculopathy, now right worse than left.   3. Severe neurogenic claudication.   4. Multilevel lumbar degenerative disc disease worse L4-S1.   5. Degenerative lumbar scoliosis, L3-S1, concave left L5-S1, concave right L4-5.   6. Facet arthropathy, L4-S1.   7. Central and foraminal stenosis, L4-S1, worse right L4-5, severe left L5-S1.   8. Rheumatoid arthritis.   9. History of chronic osteomyelitis, right forearm, status post external fixation, Dr. Cr.     POSTOPERATIVE DIAGNOSIS: same     PROCEDURE PERFORMED:   1.  Anterior lumbar interbody fusion of L5-S1 with instrumentation     SURGEON: Neptali Rivera DO   COSURGEON: Joseph Dunaway MD     ANESTHESIA: General.    PREPARATION: Routine.    STAFF: Circulator: Leticia Chaudhry RN; Jose Heard RN  Physician Assistant: Michael Hudson PA-C  Scrub Person: Hanna Delcid; Conchita Tracy; Oziel Houston    ESTIMATED BLOOD LOSS: 50 mL    SPECIMENS: None    COMPLICATIONS: None    INDICATIONS: Adela Arauz is a 55 y.o. female who you are currently following for chronic back pain.  Adela Mckeon scheduled for anterior lumbar interbody fusion of L5-S1 with Dr. Dunaway on 12/2/2020.  The patient denies any history of DVT. The indications, risks, and possible complications of the procedure were explained to the patient, who voiced understanding and wished to proceed with surgery.     PROCEDURE IN DETAIL:   The patient was taken to the operating room and placed on the operating table in a supine position. After general anesthesia was obtained, the abdomen was prepped and draped in a sterile manner.  A transverse incision was then made in the left lower quadrant.  Careful dissection was made down through the subcutaneous tissues using the Bovie cautery to ensure hemostasis.  Any crossing veins were ligated with  3-0 silk suture and hemoclips.  The rectus fascia was identified.  It was incised with the Bovie cautery.  Kocher clamps are placed on each side of the rectus fascia and subfascial planes were established in a cephalad and caudad direction.  Once the subfascial planes were established the attention was then turned to the left rectus muscle.  Blunt mobilization was made of the left rectus muscle including its blood supply medially and to the right.  Once it was fully mobilized the attention was then turned laterally to the peritoneal reflection.  Entrance into the retroperitoneal space was established with the use of a sponge stick and the Bovie cautery.  Continued blunt mobilization was made with my hand using a finger sweeping motion moving the peritoneal contents and sacral fat pad medially and to the right.  Once it was fully mobilized the Brau-Heard retractor system was set up.  The retractor blades were set in place.  The left iliac vein was then carefully dissected free and placed safely behind the retractor blade.  The sacral vessels were carefully taken down with hemoclips.  At this point full exposure was established of the L5-S1 disc space.  The next part of the case will be dictated by Dr. Dunaway. Upon completion of Dr. Dunaway's part of the case the wound bed was irrigated with antibiotic saline and hemostasis was observed.  The retractor blades were carefully taken out one at a time.  The structures were then placed back in their normal anatomic positions.  The rectus fascia was then closed with a #1 PDS in a running fashion to meet in the midline.  The deep layers were closed with a 2-0 Vicryl in a running fashion.  The subcutaneous layers were closed with a 3-0 Vicryl in a running fashion.  The skin was then reapproximated using a 4-0 Monocryl in a subcuticular fashion.  The wound was then cleaned.  Sterile dressings were applied. The patient tolerated the procedure well. Sponge and needle counts  were correct. The patient was then awakened and extubated in the operating room and taken to the recovery room in good condition.    Neptali Rivera, DO    Date: 12/2/2020 Time: 09:22 CST

## 2020-12-02 NOTE — THERAPY DISCHARGE NOTE
Acute Care - Occupational Therapy Discharge  [facility]    Patient Name: Adela Arauz  : 1965    MRN: [id number]                              Today's Date: 2020       Admit Date: 2020    Visit Dx: No diagnosis found.  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)   • Encounter for consultation   • Former smoker   • History of radiation therapy   • Cellulitis of right upper extremity   • Lymphedema of right arm   • Pain in right arm   • Hx of osteomyelitis   • History of lymph node dissection of right axilla   • Morbid obesity due to excess calories (CMS/HCC)   • MRSA (methicillin resistant Staphylococcus aureus) infection   • Anxiety   • DDD (degenerative disc disease), lumbar   • Foraminal stenosis due to intervertebral disc disease   • Lumbar stenosis     Past Medical History:   Diagnosis Date   • AAT (alpha-1-antitrypsin) deficiency (CMS/HCC) 2019   • Anxiety 2020   • Cancer (CMS/HCC)     LYMPHOMA   • GERD (gastroesophageal reflux disease)    • 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, 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    • Low back pain    • Morbid obesity due to excess calories (CMS/HCC) 2020   • MRSA infection    • Osteoarthritis    • Osteoporosis    • Panic attacks    • PONV (postoperative nausea and vomiting)    • Psoriasis    • RA (rheumatoid arthritis) (CMS/HCC)    • Seasonal allergies    • Senile osteoporosis 2017     Past Surgical History:   Procedure Laterality Date   • AXILLARY LYMPH NODE BIOPSY/EXCISION Right 2019    Procedure: EXCISION RIGHT AXILLARY MASS;  Surgeon: Jes Enamorado MD;  Location: [facility] OR;   Service: General   • SHOULDER SURGERY     • TUBAL ABDOMINAL LIGATION     • US GUIDED LYMPH NODE BIOPSY  8/9/2019   • WRIST SURGERY Bilateral     fracture     General Information     Row Name 12/02/20 1306          OT Time and Intention    Document Type  evaluation  -     Mode of Treatment  occupational therapy  -     Row Name 12/02/20 1306          General Information    Patient Profile Reviewed  yes  -MW     Prior Level of Function  independent:;ADL's;all household mobility  -     Existing Precautions/Restrictions  fall;spinal;brace worn when out of bed  -     Row Name 12/02/20 1306          Occupational Profile    Reason for Services/Referral (Occupational Profile)  ANTERIOR DECOMPRESSION, ANTERIOR LUMBAR INTERBODY FUSION WITH INSTRUMENTATION L5-S1 12/2  -     Environmental Supports and Barriers (Occupational Profile)  tub shower, RW, hurry cane  -     Row Name 12/02/20 1306          Living Environment    Lives With  spouse  -[facility] Name 12/02/20 1306          Cognition    Orientation Status (Cognition)  oriented x 4  -[facility] Name 12/02/20 1306          Safety Issues, Functional Mobility    Impairments Affecting Function (Mobility)  balance;endurance/activity tolerance;pain  -       User Key  (r) = Recorded By, (t) = Taken By, (c) = Cosigned By    Initials Name Provider Type     Carlyn Alvarado, OTR/L Occupational Therapist        Mobility/ADL's     Row Name 12/02/20 1306          Bed Mobility    Bed Mobility  rolling left;sidelying-sit  -     Rolling Left [place] (Bed Mobility)  contact guard;verbal cues  -     Sidelying-Sit [place] (Bed Mobility)  contact guard;verbal cues  -     Assistive Device (Bed Mobility)  head of bed elevated;bed rails  -     Row Name 12/02/20 1306          Transfers    Transfers  sit-stand transfer  -     Sit-Stand [place] (Transfers)  contact guard  -     Row Name 12/02/20 1306          Functional Mobility    Functional Mobility- Ind.  Level  contact guard assist  -     Functional Mobility- Comment  HHAx1-2 for increased confidence, very little pushing through hands for stability  -MW     Row Name 12/02/20 1306          Activities of Daily Living    BADL Assessment/Intervention  lower body dressing;upper body dressing  -     Row Name 12/02/20 1306          Lower Body Dressing Assessment/Training    [place] Level (Lower Body Dressing)  don;doff;socks;supervision  -     Position (Lower Body Dressing)  edge of bed sitting  -     Row Name 12/02/20 1306          Upper Body Dressing Assessment/Training    [place] Level (Upper Body Dressing)  doff;don;minimum assist (75% patient effort)  -MW     Position (Upper Body Dressing)  edge of bed sitting  -     Comment (Upper Body Dressing)  LSO  -       User Key  (r) = Recorded By, (t) = Taken By, (c) = Cosigned By    Initials Name Provider Type    Carlyn Martinez, OTR/L Occupational Therapist        Obj/Interventions     [facility] Name 12/02/20 1306          Range of Motion Comprehensive    Comment, General Range of Motion  AROM WFL BUE  -[facility] Name 12/02/20 1306          Strength Comprehensive (MMT)    Comment, General Manual Muscle Testing (MMT) Assessment  BUE functionally 5/5  -MW     [facility] Name 12/02/20 1306          Balance    Balance Assessment  sitting static balance;standing static balance;sitting dynamic balance  -MW     Static Sitting Balance  WFL  -MW     Dynamic Sitting Balance  WFL in context of LB ADL  -MW     Static Standing Balance  WFL HHA provided  -MW     Dynamic Standing Balance  WFL HHA provided  -MW       User Key  (r) = Recorded By, (t) = Taken By, (c) = Cosigned By    Initials Name Provider Type    Carlyn Martinez, OTR/L Occupational Therapist        Goals/Plan    No documentation.       Clinical Impression     Row Name 12/02/20 1306          Pain Assessment    Additional Documentation  Pain Scale: Numbers Pre/Post-Treatment (Group)  -[facility] Name 12/02/20  1306          Pain Scale: Numbers Pre/Post-Treatment    Pretreatment Pain Rating  10/10  -MW     Posttreatment Pain Rating  10/10  -MW     Pain Location - Orientation  incisional  -MW     Pain Location  abdomen  -MW     Pain Intervention(s)  Medication (See MAR);Repositioned;Ambulation/increased activity  -MW     Row Name 12/02/20 1306          Plan of Care Review    Plan of Care Reviewed With  patient  -MW     Progress  no change  -MW     Outcome Summary  OT eval completed. Pt awake and alert in fowlers, drowsy in no apparent distress, c/o 10/10 pain with no obs signs of pain. Comes to EOB with log roll technique with CGA and vc. Dons brace in sitting with Yoni for education and cues for positioning. S for LB dressing with activity modifications. Stands CGA and amb in [place] with HHAx1-2 for increased confidence rather than stability as pt does not push through hands, she is fearful of falling. Pt is functioning at her baseline with ADL and demos understanding of spinal precautions with activity modifications, will defer mobility to PT at this time. Anticipate d/c home w assist.  -MW     Row Name 12/02/20 1306          Therapy Assessment/Plan (OT)    Patient/Family Therapy Goal Statement (OT)  decrease pain  -MW     Criteria for Skilled Therapeutic Interventions Met (OT)  no;no problems identified which require skilled intervention  -MW     Therapy Frequency (OT)  evaluation only  -MW     Row Name 12/02/20 1306          Therapy Plan Review/Discharge Plan (OT)    Anticipated Discharge Disposition (OT)  home with assist  -MW     Row Name 12/02/20 1306          Vital Signs    Pre SpO2 (%)  95  -MW     O2 Delivery Pre Treatment  supplemental O2  -MW     Post SpO2 (%)  92 replaced nc, increased to 96% on 2L  -MW     O2 Delivery Post Treatment  room air  -MW     Pre Patient Position  Supine  -MW     Intra Patient Position  Standing  -MW     Post Patient Position  Sitting  -MW     Row Name 12/02/20 1306          Positioning  and Restraints    Pre-Treatment Position  in bed  -MW     Post Treatment Position  chair  -MW     In Chair  sitting;call light within reach;encouraged to call for assist;with brace;legs elevated  -MW       User Key  (r) = Recorded By, (t) = Taken By, (c) = Cosigned By    Initials Name Provider Type    Carlyn Martinez, OTR/L Occupational Therapist        Outcome Measures     Row Name 12/02/20 1306          How much help from another is currently needed...    Putting on and taking off regular lower body clothing?  3  -MW     Bathing (including washing, rinsing, and drying)  3  -MW     Toileting (which includes using toilet bed pan or urinal)  3  -MW     Putting on and taking off regular upper body clothing  4  -MW     Taking care of personal grooming (such as brushing teeth)  4  -MW     Eating meals  4  -MW     AM-PAC 6 Clicks Score (OT)  21  -MW     Row Name 12/02/20 1306          Functional Assessment    Outcome Measure Options  AM-PAC 6 Clicks Daily Activity (OT)  -MW       User Key  (r) = Recorded By, (t) = Taken By, (c) = Cosigned By    Initials Name Provider Type    Carlyn Martinez, OTR/L Occupational Therapist        Occupational Therapy Education                 Title: PT OT SLP Therapies (In Progress)     Topic: Occupational Therapy (Done)     Point: ADL training (Done)     Description:   Instruct learner(s) on proper safety adaptation and remediation techniques during self care or transfers.   Instruct in proper use of assistive devices.              Learning Progress Summary           Patient Acceptance, E, VU by  at 12/2/2020 1401                   Point: Home exercise program (Done)     Description:   Instruct learner(s) on appropriate technique for monitoring, assisting and/or progressing therapeutic exercises/activities.              Learning Progress Summary           Patient Acceptance, E, VU by  at 12/2/2020 1401                   Point: Precautions (Done)     Description:   Instruct  learner(s) on prescribed precautions during self-care and functional transfers.              Learning Progress Summary           Patient Acceptance, E, VU by  at 12/2/2020 1401                   Point: Body mechanics (Done)     Description:   Instruct learner(s) on proper positioning and spine alignment during self-care, functional mobility activities and/or exercises.              Learning Progress Summary           Patient Acceptance, E, VU by  at 12/2/2020 1401                               User Key     Initials Effective Dates Name Provider Type Discipline     08/28/18 -  Carlyn Alvarado, OTR/L Occupational Therapist OT              OT Recommendation and Plan  Retired Outcome Summary/Treatment Plan (OT)  Anticipated Discharge Disposition (OT): home with assist  Therapy Frequency (OT): evaluation only  Plan of Care Review  Plan of Care Reviewed With: patient  Progress: no change  Outcome Summary: OT eval completed. Pt awake and alert in fowlers, drowsy in no apparent distress, c/o 10/10 pain with no obs signs of pain. Comes to EOB with log roll technique with CGA and vc. Dons brace in sitting with Yoni for education and cues for positioning. S for LB dressing with activity modifications. Stands CGA and amb in [place] with HHAx1-2 for increased confidence rather than stability as pt does not push through hands, she is fearful of falling. Pt is functioning at her baseline with ADL and demos understanding of spinal precautions with activity modifications, will defer mobility to PT at this time. Anticipate d/c home w assist.  Plan of Care Reviewed With: patient  Outcome Summary: OT eval completed. Pt awake and alert in fowlers, drowsy in no apparent distress, c/o 10/10 pain with no obs signs of pain. Comes to EOB with log roll technique with CGA and vc. Dons brace in sitting with Yoni for education and cues for positioning. S for LB dressing with activity modifications. Stands CGA and amb in [place] with HHAx1-2 for  increased confidence rather than stability as pt does not push through hands, she is fearful of falling. Pt is functioning at her baseline with ADL and demos understanding of spinal precautions with activity modifications, will defer mobility to PT at this time. Anticipate d/c home w assist.     Time Calculation:   Time Calculation- OT     Row Name 12/02/20 1402             Time Calculation- OT    OT Start Time  1305  -MW      OT Stop Time  1350  -MW      OT Time Calculation (min)  45 min  -MW      OT Received On  12/02/20  -MW        User Key  (r) = Recorded By, (t) = Taken By, (c) = Cosigned By    Initials Name Provider Type    Carlyn Martinez, OTR/L Occupational Therapist        Therapy Charges for Today     Code Description Service Date Service Provider Modifiers Qty    [id number]  OT EVAL LOW COMPLEXITY 3 12/2/2020 Carlyn Alvarado OTR/L GO 1               Carlyn Alvarado OTR/LEON  12/2/2020

## 2020-12-03 ENCOUNTER — READMISSION MANAGEMENT (OUTPATIENT)
Dept: CALL CENTER | Facility: HOSPITAL | Age: 55
End: 2020-12-03

## 2020-12-03 VITALS
RESPIRATION RATE: 20 BRPM | DIASTOLIC BLOOD PRESSURE: 55 MMHG | TEMPERATURE: 97.8 F | OXYGEN SATURATION: 93 % | SYSTOLIC BLOOD PRESSURE: 96 MMHG | HEART RATE: 64 BPM

## 2020-12-03 LAB
ANION GAP SERPL CALCULATED.3IONS-SCNC: 8 MMOL/L (ref 5–15)
BASOPHILS # BLD AUTO: 0.03 10*3/MM3 (ref 0–0.2)
BASOPHILS NFR BLD AUTO: 0.3 % (ref 0–1.5)
BUN SERPL-MCNC: 7 MG/DL (ref 6–20)
BUN/CREAT SERPL: 14.3 (ref 7–25)
CALCIUM SPEC-SCNC: 9.3 MG/DL (ref 8.6–10.5)
CHLORIDE SERPL-SCNC: 104 MMOL/L (ref 98–107)
CO2 SERPL-SCNC: 28 MMOL/L (ref 22–29)
CREAT SERPL-MCNC: 0.49 MG/DL (ref 0.57–1)
DEPRECATED RDW RBC AUTO: 42.5 FL (ref 37–54)
EOSINOPHIL # BLD AUTO: 0.02 10*3/MM3 (ref 0–0.4)
EOSINOPHIL NFR BLD AUTO: 0.2 % (ref 0.3–6.2)
ERYTHROCYTE [DISTWIDTH] IN BLOOD BY AUTOMATED COUNT: 14.5 % (ref 12.3–15.4)
GFR SERPL CREATININE-BSD FRML MDRD: 132 ML/MIN/1.73
GLUCOSE SERPL-MCNC: 95 MG/DL (ref 65–99)
HCT VFR BLD AUTO: 34 % (ref 34–46.6)
HGB BLD-MCNC: 10.8 G/DL (ref 12–15.9)
IMM GRANULOCYTES # BLD AUTO: 0.04 10*3/MM3 (ref 0–0.05)
IMM GRANULOCYTES NFR BLD AUTO: 0.4 % (ref 0–0.5)
LYMPHOCYTES # BLD AUTO: 1.17 10*3/MM3 (ref 0.7–3.1)
LYMPHOCYTES NFR BLD AUTO: 11.9 % (ref 19.6–45.3)
MCH RBC QN AUTO: 25.6 PG (ref 26.6–33)
MCHC RBC AUTO-ENTMCNC: 31.8 G/DL (ref 31.5–35.7)
MCV RBC AUTO: 80.6 FL (ref 79–97)
MONOCYTES # BLD AUTO: 1.41 10*3/MM3 (ref 0.1–0.9)
MONOCYTES NFR BLD AUTO: 14.4 % (ref 5–12)
NEUTROPHILS NFR BLD AUTO: 7.14 10*3/MM3 (ref 1.7–7)
NEUTROPHILS NFR BLD AUTO: 72.8 % (ref 42.7–76)
NRBC BLD AUTO-RTO: 0 /100 WBC (ref 0–0.2)
PLATELET # BLD AUTO: 280 10*3/MM3 (ref 140–450)
PMV BLD AUTO: 9.7 FL (ref 6–12)
POTASSIUM SERPL-SCNC: 4.2 MMOL/L (ref 3.5–5.2)
RBC # BLD AUTO: 4.22 10*6/MM3 (ref 3.77–5.28)
SODIUM SERPL-SCNC: 140 MMOL/L (ref 136–145)
WBC # BLD AUTO: 9.81 10*3/MM3 (ref 3.4–10.8)

## 2020-12-03 PROCEDURE — 97116 GAIT TRAINING THERAPY: CPT

## 2020-12-03 PROCEDURE — 80048 BASIC METABOLIC PNL TOTAL CA: CPT | Performed by: ORTHOPAEDIC SURGERY

## 2020-12-03 PROCEDURE — 85025 COMPLETE CBC W/AUTO DIFF WBC: CPT | Performed by: ORTHOPAEDIC SURGERY

## 2020-12-03 PROCEDURE — 25010000002 CEFAZOLIN 1-4 GM/50ML-% SOLUTION: Performed by: ORTHOPAEDIC SURGERY

## 2020-12-03 RX ORDER — OXYCODONE AND ACETAMINOPHEN 10; 325 MG/1; MG/1
1 TABLET ORAL EVERY 6 HOURS PRN
Qty: 60 TABLET | Refills: 0 | Status: SHIPPED | OUTPATIENT
Start: 2020-12-03 | End: 2020-12-19 | Stop reason: HOSPADM

## 2020-12-03 RX ORDER — ONDANSETRON 4 MG/1
4 TABLET, ORALLY DISINTEGRATING ORAL EVERY 8 HOURS PRN
Qty: 40 TABLET | Refills: 0 | Status: SHIPPED | OUTPATIENT
Start: 2020-12-03 | End: 2021-06-28 | Stop reason: SDUPTHER

## 2020-12-03 RX ADMIN — CEFAZOLIN SODIUM 1 G: 1 INJECTION, SOLUTION INTRAVENOUS at 00:30

## 2020-12-03 RX ADMIN — SODIUM CHLORIDE 75 ML/HR: 9 INJECTION, SOLUTION INTRAVENOUS at 04:38

## 2020-12-03 RX ADMIN — TIZANIDINE 4 MG: 4 TABLET ORAL at 09:19

## 2020-12-03 RX ADMIN — OXYCODONE HYDROCHLORIDE AND ACETAMINOPHEN 1 TABLET: 10; 325 TABLET ORAL at 04:45

## 2020-12-03 RX ADMIN — GABAPENTIN 600 MG: 300 CAPSULE ORAL at 06:18

## 2020-12-03 RX ADMIN — Medication 1 TABLET: at 09:19

## 2020-12-03 RX ADMIN — DULOXETINE HYDROCHLORIDE 60 MG: 30 CAPSULE, DELAYED RELEASE ORAL at 09:19

## 2020-12-03 RX ADMIN — OXYCODONE HYDROCHLORIDE AND ACETAMINOPHEN 1 TABLET: 10; 325 TABLET ORAL at 09:19

## 2020-12-03 RX ADMIN — CEFAZOLIN SODIUM 1 G: 1 INJECTION, SOLUTION INTRAVENOUS at 09:18

## 2020-12-03 RX ADMIN — FAMOTIDINE 20 MG: 20 TABLET, FILM COATED ORAL at 09:19

## 2020-12-03 NOTE — THERAPY TREATMENT NOTE
Acute Care - Physical Therapy Treatment Note  [facility]     Patient Name: Adela Arauz  : 1965  MRN: [id number]  Today's Date: 12/3/2020           PT Assessment (last 12 hours)      PT Evaluation and Treatment     Row Name 20 1117          Physical Therapy Time and Intention    Subjective Information  complains of  -KJ     Document Type  therapy note (daily note)  -KJ     Mode of Treatment  physical therapy  -KJ     Patient Effort  excellent  -KJ     Comment  LSO  -KJ     Row Name 20 1117          General Information    Existing Precautions/Restrictions  brace worn when out of bed;spinal  -KJ     Row Name 20 1117          Pain Scale: Numbers Pre/Post-Treatment    Pretreatment Pain Rating  8/10  -KJ     Posttreatment Pain Rating  8/10  -KJ     Pain Location  back  -KJ     Row Name 20 1117          Bed Mobility    Rolling Left [place] (Bed Mobility)  independent  -KJ     Sidelying-Sit [place] (Bed Mobility)  independent  -KJ     Row Name 20 1117          Transfers    Sit-Stand [place] (Transfers)  independent  -KJ     Row Name 20 1117          Gait/Stairs (Locomotion)    [place] Level (Gait)  independent  -KJ     Distance in Feet (Gait)  125'x 2  -KJ     Row Name             Wound 20 0758 Left lower abdomen Incision    Wound - Properties Group Placement Date: 20  -DT Placement Time: 0758  -DT Present on Hospital Admission: N  -DT Side: Left  -DT Orientation: lower  -DT Location: abdomen  -DT Primary Wound Type: Incision  -DT    Retired Wound - Properties Group Date first assessed: 20  -DT Time first assessed: 0758  -DT Present on Hospital Admission: N  -DT Side: Left  -DT Location: abdomen  -DT Primary Wound Type: Incision  -DT    Row Name 20 1117          Positioning and Restraints    Pre-Treatment Position  in bed  -KJ     Post Treatment Position  bed  -KJ     In Bed  call light within reach  -KJ       User Key  (r) =  Recorded By, (t) = Taken By, (c) = Cosigned By    Initials Name Provider Type    Geena Franks, SUBHA Physical Therapy Assistant    Jose Cancino RN Registered Nurse        Physical Therapy Education                 Title: PT OT SLP Therapies (Resolved)     Topic: Physical Therapy (Resolved)     Point: Mobility training (Resolved)     Learning Progress Summary           Patient Acceptance, E, VU by AN at 12/2/2020 1308    Comment: spinal precautions, brace wear and care                   Point: Home exercise program (Resolved)     Learner Progress:  Not documented in this visit.          Point: Body mechanics (Resolved)     Learning Progress Summary           Patient Acceptance, E, VU by AN at 12/2/2020 1308    Comment: spinal precautions, brace wear and care                   Point: Precautions (Resolved)     Learning Progress Summary           Patient Acceptance, E, VU by AN at 12/2/2020 1308    Comment: spinal precautions, brace wear and care                               User Key     Initials Effective Dates Name Provider Type Discipline    AN 10/08/20 -  Maki Odonnell, PT Student PT Student PT              PT Recommendation and Plan     Plan of Care Reviewed With: patient  Progress: improving  Outcome Summary: PT tx completed. Pt rates back pn 8/10. I with bed mobility, transfers, amb 125' x 2 CG/SBA. Reinforced bed mobility, body mechaniccs, and don/doffing LSO.       Time Calculation:   PT Charges     Row Name 12/03/20 1128             Time Calculation    Start Time  1117  -KJ      Stop Time  1129  -KJ      Time Calculation (min)  12 min  -KJ      PT Received On  12/03/20  -KJ      PT Goal Re-Cert Due Date  12/12/20  -KJ         Time Calculation- PT    Total Timed Code Minutes- PT  12 minute(s)  -KJ        User Key  (r) = Recorded By, (t) = Taken By, (c) = Cosigned By    Initials Name Provider Type    Geena Franks, PTA Physical Therapy Assistant        Therapy Charges for Today     Code  Description Service Date Service Provider Modifiers Qty    [id number] HC GAIT TRAINING EA 15 MIN 12/3/2020 Geena Pineda, PTA GP 1          PT G-Codes  Outcome Measure Options: AM-PAC 6 Clicks Basic Mobility (PT)  AM-PAC 6 Clicks Score (PT): 18  AM-PAC 6 Clicks Score (OT): 21    Geena Pineda, PTA  12/3/2020

## 2020-12-03 NOTE — PLAN OF CARE
Goal Outcome Evaluation:  Plan of Care Reviewed With: patient  Progress: improving  Outcome Summary: PT tx completed. Pt rates back pn 8/10. I with bed mobility, transfers, amb 125' x 2 CG/SBA. Reinforced bed mobility, body mechaniccs, and don/doffing LSO.

## 2020-12-03 NOTE — PLAN OF CARE
Goal Outcome Evaluation:  Plan of Care Reviewed With: patient  Progress: no change  Outcome Summary: Pt is A&Ox4, no neuro changes. Pt c/o pain, prn PO pain meds administered with good relief. Dressing to abdomen CDI. Up x1 with LSO brace. VSS. Safety maintained. Will continue to monitor.

## 2020-12-03 NOTE — NURSING NOTE
Pt discharging home in stable condition. Dressing to abdomen c/d/i. Discharge teaching completed, pt to have part 2 surgery on 12/9. Safety maintained.

## 2020-12-03 NOTE — THERAPY DISCHARGE NOTE
Acute Care - Physical Therapy Discharge Summary  [facility]       Patient Name: Adela Arauz  : 1965  MRN: [id number]    Today's Date: 12/3/2020                 Admit Date: 2020      PT Recommendation and Plan    Visit Dx:    ICD-10-CM ICD-9-CM   1. Spinal stenosis of lumbar region with neurogenic claudication  M48.062 724.03   2. Impaired mobility  Z74.09 799.89   3. Chronic pain syndrome  G89.4 338.4           PT Charges     Row Name 20 1128             Time Calculation    Start Time  1117  -KJ      Stop Time  1129  -KJ      Time Calculation (min)  12 min  -KJ      PT Received On  20  -KJ      PT Goal Re-Cert Due Date  20  -KJ         Time Calculation- PT    Total Timed Code Minutes- PT  12 minute(s)  -KJ        User Key  (r) = Recorded By, (t) = Taken By, (c) = Cosigned By    Initials Name Provider Type    Geena Franks, PTA Physical Therapy Assistant          Rehab Goal Summary     Row Name 20 1422             Bed Mobility Goal 1 (PT)    Activity/Assistive Device (Bed Mobility Goal 1, PT)  bed mobility activities, all maintaining spinal pracautions  -REVA      [place] Level/Cues Needed (Bed Mobility Goal 1, PT)  modified independence  -REVA      Time Frame (Bed Mobility Goal 1, PT)  long term goal (LTG);10 days  -REVA      Progress/Outcomes (Bed Mobility Goal 1, PT)  goal met  -REVA         Transfer Goal 1 (PT)    Activity/Assistive Device (Transfer Goal 1, PT)  sit-to-stand/stand-to-sit;bed-to-chair/chair-to-bed  -REVA      [place] Level/Cues Needed (Transfer Goal 1, PT)  standby assist  -REVA      Time Frame (Transfer Goal 1, PT)  long term goal (LTG);10 days  -REVA      Progress/Outcome (Transfer Goal 1, PT)  goal met  -REVA         Gait Training Goal 1 (PT)    Activity/Assistive Device (Gait Training Goal 1, PT)  gait (walking locomotion);assistive device use;decrease fall risk;improve balance and speed;increase endurance/gait distance  -REVA      [place] Level  (Gait Training Goal 1, PT)  standby assist  -REVA      Distance (Gait Training Goal 1, PT)  150  -REVA      Time Frame (Gait Training Goal 1, PT)  long term goal (LTG);10 days  -REVA      Progress/Outcome (Gait Training Goal 1, PT)  goal met  -REVA         Stairs Goal 1 (PT)    Activity/Assistive Device (Stairs Goal 1, PT)  ascending stairs;descending stairs;using handrail, left;using handrail, right;decrease fall risk;improve balance and speed  -REVA      [place] Level/Cues Needed (Stairs Goal 1, PT)  contact guard assist  -REVA      Number of Stairs (Stairs Goal 1, PT)  4  -REVA      Time Frame (Stairs Goal 1, PT)  long term goal (LTG);10 days  -REVA      Progress/Outcome (Stairs Goal 1, PT)  goal not met  -REVA         Patient Education Goal (PT)    Activity (Patient Education Goal, PT)  Spinal precautions, and I in brace don/doffing  -REVA      [place]/Cues/Accuracy (Memory Goal 2, PT)  demonstrates adequately;independent  -REVA      Time Frame (Patient Education Goal, PT)  long term goal (LTG);10 days  -REVA      Progress/Outcome (Patient Education Goal, PT)  goal met  -REVA        User Key  (r) = Recorded By, (t) = Taken By, (c) = Cosigned By    Initials Name Provider Type Discipline    Escobar Joy PTA Physical Therapy Assistant PT              PT Discharge Summary  Anticipated Discharge Disposition (PT): home  Reason for Discharge: Discharge from facility  Outcomes Achieved: Refer to plan of care for updates on goals achieved  Discharge Destination: Home      Escobar Gongora PTA   12/3/2020

## 2020-12-03 NOTE — DISCHARGE SUMMARY
[facility]  SPINE SURGERY  ROLANDO Daniels  DISCHARGE SUMMARY  Patient ID:  Adela Arauz  [id number]  55 y.o.  1965    Admit date: 12/2/2020    Discharge date and time: 12/3/2020    Admitting Physician: ALEXIS Dunaway MD     Indication for Admission: Planned admit for surgical procedure     Admission Diagnoses: Lumbar stenosis [M48.061]    Discharge Diagnoses: Lumbar stenosis [M48.061]    Procedures: ALIF L5/S1    Problem List:   Lumbar stenosis      Consults: none    Admission Condition: stable    Discharged Condition: stable    Hospital Course: no immediate post operative complication     Disposition: home    Patient Instructions:      Discharge Medications      New Medications      Instructions Start Date   ondansetron ODT 4 MG disintegrating tablet  Commonly known as: Zofran ODT   4 mg, Translingual, Every 8 Hours PRN      oxyCODONE-acetaminophen  MG per tablet  Commonly known as: PERCOCET   1 tablet, Oral, Every 6 Hours PRN         Continue These Medications      Instructions Start Date   ALPRAZolam 0.5 MG tablet  Commonly known as: Xanax   0.5 mg, Oral, 3 Times Daily PRN      betamethasone valerate 0.1 % ointment  Commonly known as: VALISONE   Topical, 2 Times Daily      Calcium-Magnesium-Vitamin D 500-250-200 MG-MG-UNIT tablet   600 tablets, Oral, 2 Times Daily      cholecalciferol 25 MCG (1000 UT) tablet  Commonly known as: VITAMIN D3   1,000 Units, Oral, Daily      DULoxetine 60 MG capsule  Commonly known as: CYMBALTA   TAKE ONE CAPSULE BY MOUTH DAILY      fluticasone 50 MCG/ACT nasal spray  Commonly known as: Flonase   2 sprays, Nasal, Daily      gabapentin 600 MG tablet  Commonly known as: NEURONTIN   600 mg, Oral, 3 Times Daily      ibandronate 150 MG tablet  Commonly known as: BONIVA   150 mg, Oral, Every 30 Days      mupirocin 2 % ointment  Commonly known as: Bactroban   Apply to nostrils and fingernails nighlty x 5 nights same 5 nights  monthly x 6 months.      omeprazole 40 MG capsule  Commonly known as: priLOSEC   TAKE ONE CAPSULE BY MOUTH DAILY      sertraline 50 MG tablet  Commonly known as: ZOLOFT   50 mg, Oral, Nightly      tiZANidine 4 MG tablet  Commonly known as: ZANAFLEX   1 tablet, Oral, 3 Times Daily      Turmeric 500 MG capsule   Oral, 2 Times Daily         Stop These Medications    HYDROcodone-acetaminophen  MG per tablet  Commonly known as: Norco          Activity: Avoid bending lifting or twisting, brace when up and out of bed, no driving while taking narcotic pain medication, walk 10-15 minutes 2-3 times daily  Diet: regular diet  Wound Care: keep wound clean and dry  Other: Contact [facility] [place] office with questions or concerns    Follow-up with Dr Dunaway or PA's in 2 weeks.    Electronically signed by ROLANDO Daniels 06:41 CST 12/3/2020

## 2020-12-03 NOTE — PAYOR COMM NOTE
"FROM: DANG NEIL  PHONE: [phone]  FAX: [phone]    ID: [id number]    SCHEDULED SURGERY 12/2      Ran Kenyon (55 y.o. Female)     Date of Birth Social Security Number Address Home Phone MRN    1965  [id number] [facility], [facility] [id number] [phone] [id number]    Sikh Marital Status          Voodoo        Admission Date Admission Type Admitting Provider Attending Provider Department, Room/Bed    12/2/20 Elective ALEXIS Dunaway MD Strenge, K Brandon, MD [facility] 3A, 351/1    Discharge Date Discharge Disposition Discharge Destination         Home or Self Care              Attending Provider: ALEXIS Dunaway MD    Allergies: No Known Allergies    Isolation: None   Infection: None   Code Status: CPR    Ht: 162.6 cm (64\")   Wt: 88.9 kg (196 lb)    Admission Cmt: None   Principal Problem: None                Active Insurance as of 12/2/2020     Primary Coverage     Payor Plan Insurance Group Employer/Plan Group    [employer] [place] [employer] [id number]     Payor Plan Address Payor Plan Phone Number Payor Plan Fax Number Effective Dates    [address] [address] [phone]  10/25/2016 - None Entered    [facility] [id number]       Subscriber Name Subscriber Birth Date Member ID       RAN KENYON 1965 [id number]                 Emergency Contacts      (Rel.) Home Phone Work Phone Mobile Phone    Manny Kenyon (Spouse) [phone] -- [phone]    Jerri Martinez (Daughter) [phone] -- [phone]        Inpatient Services prior Authorization   Fax [phone]  Phone [phone] To: [employer]   CHECK ONE OF THE FOLLOWING      £Skilled Nursing Facility     £ Rehab     £ LTACH     £ Hospice     X INPATIENT    CHOOSE THE APPROPRIATE REQUEST TYPE        Standard request         Requests for prior authorization (with supporting clinical information and documentation) should be sent to the Health Plan fourteen (14) days prior to the date " the requested services will be performed. If a response has not been received within two (2) business days, [phone] to confirm your request has been received.   ? Expedited Request  By signing below I certify that applying the standard review time frame may seriously jeopardize the life or health of the member or the member’s ability to regain maximum function.   Physician Signature Validating Expedited Request:                                                                     Date:   MEMBER INFORMATION   Member  Last Name:  KOSTAS First Name  RAN POSEY    1965   MEMBERS ID#   [id number]    Other Insurance:   REQUESTING PHYSICIAN INFORMATION   Provider Last Name : VON First Name   ALEXIS JESUS  Provider NPI :    Type: Specialist  Specialty ORTHOPEDIC SURGERY  Participating:     Phone Number: [phone] Fax Number: [phone] Contact:     Address:   [facility]  Zip Code:[address]   FACILITY INFORMATION   Type:             INPATIENT ADMIT X                                Medical Record Number: [id number]   Facility Name: [facility] Facility ID [id number] Tax [phone]   Phone Number: [phone] Fax Number: [phone] Hospital Contact: DANG [phone]    Address: [address] [place] [address]  City: [place] State: [place] Zip: [address]   REQUESTED SERVICE(S)   Planned Date(s) of Service From: 20 To: 12/3/20 Or Requested length of stay    days   CPT/HCPCS Code(s):  Procedure(s):     Primary ICD-10 Code(s)  M48.061  LUMBAR STENOSIS   Please attach documentation to support medical necessity   If this request is for out-of-network services, please provide an explanation below.               Vital Signs (last 2 days)     Date/Time   Temp   Temp src   Pulse   Resp   BP   Patient Position   SpO2    20 0748   97.8 (36.6)   Oral   64   20   96/55   Lying   93    20 0555   97.7 (36.5)   Oral   68   18   96/58   Lying   96    20 0033   --   --   --   --   --   --   95    20  0026   98.5 (36.9)   Oral   70   14   112/51   Lying   96    12/02/20 1948   98.4 (36.9)   Oral   74   16   117/52   Lying   97    12/02/20 1559   97.7 (36.5)   Oral   77   16   118/53   Lying   100    12/02/20 1117   98.6 (37)   Oral   82   18   130/50   Lying   94    12/02/20 1045   97 (36.1)   Temporal   89   11   134/65   --   96    12/02/20 1030   --   --   92   10   125/73   --   96    12/02/20 1015   --   --   99   12   130/74   --   96    12/02/20 1005   --   --   102   --   --   --   (!) 89    12/02/20 1000   --   --   105   13   136/71   --   97    12/02/20 0945   --   --   107   13   132/72   --   97    12/02/20 0940   --   --   106   13   112/76   --   98    12/02/20 0935   --   --   105   14   131/69   --   97    12/02/20 0934   99.3 (37.4)   Temporal   105   9   126/65   Lying   97    12/02/20 0626   --   --   76   --   --   --   96    12/02/20 0544   97.5 (36.4)   Temporal   82   20   133/99   Sitting   100              Facility-Administered Medications as of 12/3/2020   Medication Dose Route Frequency Provider Last Rate Last Admin   • [COMPLETED] acetaminophen (TYLENOL) tablet 1,000 mg  1,000 mg Oral Once Riki Mclean MD   1,000 mg at 12/02/20 0643   • ALPRAZolam (XANAX) tablet 0.5 mg  0.5 mg Oral TID PRN ALEXIS Dunaway MD       • calcium carb-cholecalciferol 600-800 MG-UNIT tablet 1 tablet  1 tablet Oral BID With Meals ALEXIS Dunaway MD   1 tablet at 12/03/20 0919   • [COMPLETED] ceFAZolin (ANCEF) IVPB 1 g  1 g Intravenous Q8H ALEXIS Dunaway MD   1 g at 12/03/20 0918   • [COMPLETED] ceFAZolin in 0.9% normal saline (ANCEF) IVPB solution 2 g  2 g Intravenous Once ALEXIS Dunaway MD   2 g at 12/02/20 0739   • [COMPLETED] dexamethasone (DECADRON) injection 4 mg  4 mg Intravenous Once PRN Riki Mclean MD   4 mg at 12/02/20 0643   • diphenhydrAMINE (BENADRYL) capsule 25 mg  25 mg Oral Nightly PRN ALEXIS Dunaway MD       • DULoxetine (CYMBALTA) DR capsule  60 mg  60 mg Oral Daily ALEXIS Dunaway MD   60 mg at 20 0919   • famotidine (PEPCID) injection 20 mg  20 mg Intravenous Q12H ALEXIS Dunaway MD        Or   • famotidine (PEPCID) tablet 20 mg  20 mg Oral Q12H ALEXIS Dunaway MD   20 mg at 20 0919   • fluticasone (FLONASE) 50 MCG/ACT nasal spray 2 spray  2 spray Nasal Daily ALEXIS Dunaway MD       • gabapentin (NEURONTIN) capsule 600 mg  600 mg Oral Q8H ALEXIS Dunaway MD   600 mg at 20 0618   • HYDROmorphone (DILAUDID) injection 1 mg  1 mg Intravenous Q3H PRN ALEXIS Dunaway MD       • lactated ringers infusion 1,000 mL  1,000 mL Intravenous Continuous ALEXIS Dunaway MD 25 mL/hr at 20 0557 1,000 mL at 20 0557   • lactated ringers infusion  100 mL/hr Intravenous Continuous PRN ALEXIS Dunaway  mL/hr at 20 0606 100 mL/hr at 20 0606   • lactated ringers infusion  9 mL/hr Intravenous Continuous ALEXIS Dunaway MD   Stopped at 20 1136   • [COMPLETED] midazolam (VERSED) injection 1 mg  1 mg Intravenous Q10 Min PRN Riki Mclean MD   1 mg at 20 0659   • mupirocin (BACTROBAN) 2 % ointment   Topical Nightly ALEXIS Dunaway MD   Given at 20 2120   • ondansetron (ZOFRAN) tablet 4 mg  4 mg Oral Q6H PRN ALEXIS Dunaway MD        Or   • ondansetron (ZOFRAN) injection 4 mg  4 mg Intravenous Q6H PRN ALEXIS Dunaway MD       • [COMPLETED] oxyCODONE ER (oxyCONTIN) 12 hr tablet 20 mg  20 mg Oral Once ALEXIS Dunaway MD   20 mg at 20 0552   • [COMPLETED] oxyCODONE-acetaminophen (PERCOCET)  MG per tablet 1 tablet  1 tablet Oral Once PRN Riki Mclean MD   1 tablet at 20 1039   • oxyCODONE-acetaminophen (PERCOCET)  MG per tablet 1 tablet  1 tablet Oral Q4H PRN ALEXIS Dunaway MD   1 tablet at 20 0919   • [] Scopolamine (TRANSDERM-SCOP) 1.5 MG/3DAYS patch 1 patch  1 patch Transdermal Continuous Caridad,  Riki You MD   1 patch at 12/02/20 0643   • sertraline (ZOLOFT) tablet 50 mg  50 mg Oral Nightly ALEXIS Dunaway MD   50 mg at 12/02/20 2119   • sodium chloride 0.9 % flush 10 mL  10 mL Intravenous PRN ALEXIS Dunaway MD       • sodium chloride 0.9 % flush 3 mL  3 mL Intravenous Q12H ALEXIS Dunaway MD   3 mL at 12/02/20 2126   • sodium chloride 0.9 % infusion  75 mL/hr Intravenous Continuous ALEXIS Dunaway MD       • sodium chloride 0.9 % infusion  75 mL/hr Intravenous Continuous ALEXIS Dunaway MD 75 mL/hr at 12/03/20 0438 75 mL/hr at 12/03/20 0438   • tiZANidine (ZANAFLEX) tablet 4 mg  4 mg Oral TID ALEXIS Dunaway MD   4 mg at 12/03/20 0919     Lab Results (last 48 hours)     Procedure Component Value Units Date/Time    Basic Metabolic Panel [[id number]]  (Abnormal) Collected: 12/03/20 0331    Specimen: Blood Updated: 12/03/20 0459     Glucose 95 mg/dL      BUN 7 mg/dL      Creatinine 0.49 mg/dL      Sodium 140 mmol/L      Potassium 4.2 mmol/L      Comment: Slight hemolysis detected by analyzer. Results may be affected.        Chloride 104 mmol/L      CO2 28.0 mmol/L      Calcium 9.3 mg/dL      eGFR Non African Amer 132 mL/min/1.73      BUN/Creatinine Ratio 14.3     Anion Gap 8.0 mmol/L     Narrative:      GFR Normal >60  Chronic Kidney Disease <60  Kidney Failure <15      CBC & Differential [[id number]]  (Abnormal) Collected: 12/03/20 0331    Specimen: Blood Updated: 12/03/20 0435    Narrative:      The following orders were created for panel order CBC & Differential.  Procedure                               Abnormality         Status                     ---------                               -----------         ------                     CBC Auto Differential[[id number]]        Abnormal            Final result                 Please view results for these tests on the individual orders.    CBC Auto Differential [[id number]]  (Abnormal) Collected: 12/03/20 0331    Specimen: Blood  Updated: 12/03/20 0435     WBC 9.81 10*3/mm3      RBC 4.22 10*6/mm3      Hemoglobin 10.8 g/dL      Hematocrit 34.0 %      MCV 80.6 fL      MCH 25.6 pg      MCHC 31.8 g/dL      RDW 14.5 %      RDW-SD 42.5 fl      MPV 9.7 fL      Platelets 280 10*3/mm3      Neutrophil % 72.8 %      Lymphocyte % 11.9 %      Monocyte % 14.4 %      Eosinophil % 0.2 %      Basophil % 0.3 %      Immature Grans % 0.4 %      Neutrophils, Absolute 7.14 10*3/mm3      Lymphocytes, Absolute 1.17 10*3/mm3      Monocytes, Absolute 1.41 10*3/mm3      Eosinophils, Absolute 0.02 10*3/mm3      Basophils, Absolute 0.03 10*3/mm3      Immature Grans, Absolute 0.04 10*3/mm3      nRBC 0.0 /100 WBC           Imaging Results (Last 48 Hours)     Procedure Component Value Units Date/Time    XR Spine Lumbar AP & Lateral [[id number]] Collected: 12/02/20 0938     Updated: 12/03/20 0831    Narrative:      XR SPINE LUMBAR AP AND LATERAL- 12/2/2020 7:30 AM CST     HISTORY: alif     COMPARISON: None     FLUOROSCOPY TIME: 29 seconds     FLUOROSCOPY DOSE: 18.81 mGy     NUMBER OF IMAGES: 7       Impression:         Intraoperative fluoroscopic images during ALIF.     Please refer to the operative note for more details.  This report was finalized on 12/02/2020 09:38 by Dr Abdelrahman Craig, .    FL C Arm During Surgery [[id number]] Collected: 12/02/20 0938     Updated: 12/03/20 0831    Narrative:      XR SPINE LUMBAR AP AND LATERAL- 12/2/2020 7:30 AM CST     HISTORY: alif     COMPARISON: None     FLUOROSCOPY TIME: 29 seconds     FLUOROSCOPY DOSE: 18.81 mGy     NUMBER OF IMAGES: 7       Impression:         Intraoperative fluoroscopic images during ALIF.     Please refer to the operative note for more details.  This report was finalized on 12/02/2020 09:38 by Dr Abdelrahman Craig, .    XR Abdomen KUB [[id number]] Collected: 12/02/20 0929     Updated: 12/02/20 0934    Narrative:      Exam: XR ABDOMEN KUB-     Indication: alif, no instrument count     Comparison: None     Findings:      Postoperative change of anterior fusion at L5-S1. A few adjacent  surgical clips are noted. No unexpected radiopaque foreign body.  Degenerative change and levocurvature of the lumbar spine is noted. The  bowel gas pattern is normal. No mass effect or suspicious  calcifications.       Impression:      Impression:     No unexpected radiopaque foreign body.  This report was finalized on 2020 09:31 by Dr. Martin Santamaria MD.        ECG/EMG Results (last 48 hours)     ** No results found for the last 48 hours. **          Orders (last 48 hrs)      Start     Ordered    20 0639  Discharge patient  Once      20 0640    20 0600  CBC & Differential  Morning Draw      20 1113    20 0600  Basic Metabolic Panel  Morning Draw      20 1113    20 0600  CBC Auto Differential  PROCEDURE ONCE      20 0002    20 0000  oxyCODONE-acetaminophen (PERCOCET)  MG per tablet  Every 6 Hours PRN      20 0640    20 0000  ondansetron ODT (Zofran ODT) 4 MG disintegrating tablet  Every 8 Hours PRN      20 0640    20 2100  mupirocin (BACTROBAN) 2 % ointment  Nightly      20 1113    20 2100  sertraline (ZOLOFT) tablet 50 mg  Nightly      20 1113    20 1800  calcium carb-cholecalciferol 600-800 MG-UNIT tablet 1 tablet  2 Times Daily With Meals      20 1113    20 1600  ceFAZolin (ANCEF) IVPB 1 g  Every 8 Hours      20 1113    20 1412  PT Plan of Care Cert / Re-Cert  Once     Comments: Physical Therapy Plan of Care  Initial Certification  Certification Period: 2020 - 3/2/2021    Patient Name: Adela Arauz  : 1965    (Z74.09) Impaired mobility                  Assessment  PT Assessment  Criteria for Skilled Interventions Met (PT): yes, meets criteria        Rehab Goal Summary     Row Name 20 1308             Bed Mobility Goal 1 (PT)    Activity/Assistive Device (Bed Mobility Goal 1, PT)  bed  mobility   activities, all maintaining spinal pracautions  -DRE (r) AN (t) DRE (c)      [place] Level/Cues Needed (Bed Mobility Goal 1, PT)  modified   independence  -DRE (r) AN (t) DRE (c)      Time Frame (Bed Mobility Goal 1, PT)  long term goal (LTG);10 days  -DRE   (r) AN (t) DRE (c)      Progress/Outcomes (Bed Mobility Goal 1, PT)  goal ongoing  -DRE (r) AN (t)   DRE (c)         Transfer Goal 1 (PT)    Activity/Assistive Device (Transfer Goal 1, PT)    sit-to-stand/stand-to-sit;bed-to-chair/chair-to-bed  -DRE (r) AN (t) DRE (c)        [place] Level/Cues Needed (Transfer Goal 1, PT)  standby assist  -DRE   (r) AN (t) DRE (c)      Time Frame (Transfer Goal 1, PT)  long term goal (LTG);10 days  -DRE (r)   AN (t) DRE (c)      Progress/Outcome (Transfer Goal 1, PT)  goal ongoing  -DRE (r) AN (t) DRE   (c)         Gait Training Goal 1 (PT)    Activity/Assistive Device (Gait Training Goal 1, PT)  gait (walking   locomotion);assistive device use;decrease fall risk;improve balance and   speed;increase endurance/gait distance  -DRE (r) AN (t) DRE (c)      [place] Level (Gait Training Goal 1, PT)  standby assist  -DRE (r) AN   (t) DRE (c)      Distance (Gait Training Goal 1, PT)  150  -DRE (r) AN (t) DRE (c)      Time Frame (Gait Training Goal 1, PT)  long term goal (LTG);10 days  -DRE   (r) AN (t) DRE (c)      Progress/Outcome (Gait Training Goal 1, PT)  goal ongoing  -DRE (r) AN (t)   DRE (c)         Stairs Goal 1 (PT)    Activity/Assistive Device (Stairs Goal 1, PT)  ascending   stairs;descending stairs;using handrail, left;using handrail,   right;decrease fall risk;improve balance and speed  -DRE (r) AN (t) DRE (c)        [place] Level/Cues Needed (Stairs Goal 1, PT)  contact guard assist    -DRE (r) AN (t) DRE (c)      Number of Stairs (Stairs Goal 1, PT)  4  -DRE (r) AN (t) DRE (c)      Time Frame (Stairs Goal 1, PT)  long term goal (LTG);10 days  -DRE (r) AN   (t) DRE (c)      Progress/Outcome (Stairs Goal 1, PT)  goal ongoing   "-DRE (r) AN (t) DRE (c)           Patient Education Goal (PT)    Activity (Patient Education Goal, PT)  Spinal precautions, and I in brace   don/doffing  -DRE (r) AN (t) DRE (c)      [place]/Cues/Accuracy (Memory Goal 2, PT)  demonstrates   adequately;independent  -DRE (r) AN (t) DRE (c)      Time Frame (Patient Education Goal, PT)  long term goal (LTG);10 days    -DRE (r) AN (t) DRE (c)      Progress/Outcome (Patient Education Goal, PT)  goal ongoing  -DRE (r) AN   (t) DRE (c)        User Key  (r) = Recorded By, (t) = Taken By, (c) = Cosigned By    Initials Name Provider Type Discipline    Guillaume Swanson, PT DPT Physical Therapist PT    Maki Walton, PT Student PT Student PT      PT OP Goals     Row Name 12/02/20 1308          Time Calculation    PT Goal Re-Cert Due Date  12/12/20  -DRE (r) AN (t) DRE (c)       User Key  (r) = Recorded By, (t) = Taken By, (c) = Cosigned By    Initials Name Provider Type    Guillaume Swanson, PT DPT Physical Therapist    Maki Walton, PT Student PT Student            Plan  PT Plan  Therapy Frequency (PT): 2 times/day  Planned Therapy Interventions (PT): balance training, bed mobility training, gait training, patient/family education, strengthening, stair training, transfer training, orthotic fitting/training  Outcome Summary: PT eval completed. A&Ox4. Pt exhibits decrease strength, impaired balance, and decrease activity tolerance. Pt is CGA for bed mobility, sit<>stand, and ambulation. Pt ambulated 60 feet with 2 HHA, decrease gait speed, and decrease stride length. Pt reports she is \"scared to fall.\" Pt educated on spinal precautions, and brace care and wear, and verbalizes understanding. Skilled PT recommended to improve strength, balance, activity tolerance and gait training with SPC vs walker to improve independence with functional mobility and decrease risk for falls. D/c home with assist        Guillaume Liu, JAY DPT  12/2/2020            By cosigning this " order, either electronically or physically, I certify that the therapy services are furnished while this patient is under my care, the services outline above are required by this patient, and will be reviewed every 90 days.        M.D.:__________________________________________ Date: ______________    20 1411    20 1408  OT Plan of Care Cert / Re-Cert  Once     Comments: Occupational Therapy Plan of Care  Initial Certification  Certification Period: 2020 - 3/2/2021    Patient Name: Adela Arauz  : 1965    No diagnosis found.                Assessment  OT Assessment  Criteria for Skilled Therapeutic Interventions Met (OT): no, no problems identified which require skilled intervention        Rehab Goal Summary     Row Name 20 1308             Bed Mobility Goal 1 (PT)    Activity/Assistive Device (Bed Mobility Goal 1, PT)  bed mobility   activities, all maintaining spinal pracautions  -DRE (r) AN (t) DRE (c)      [place] Level/Cues Needed (Bed Mobility Goal 1, PT)  modified   independence  -DRE (r) AN (t) DRE (c)      Time Frame (Bed Mobility Goal 1, PT)  long term goal (LTG);10 days  -DRE   (r) AN (t) DRE (c)      Progress/Outcomes (Bed Mobility Goal 1, PT)  goal ongoing  -DRE (r) AN (t)   DRE (c)         Transfer Goal 1 (PT)    Activity/Assistive Device (Transfer Goal 1, PT)    sit-to-stand/stand-to-sit;bed-to-chair/chair-to-bed  -DRE (r) AN (t) DRE (c)        [place] Level/Cues Needed (Transfer Goal 1, PT)  standby assist  -DRE   (r) AN (t) DRE (c)      Time Frame (Transfer Goal 1, PT)  long term goal (LTG);10 days  -DRE (r)   AN (t) DRE (c)      Progress/Outcome (Transfer Goal 1, PT)  goal ongoing  -DRE (r) AN (t) DRE   (c)         Gait Training Goal 1 (PT)    Activity/Assistive Device (Gait Training Goal 1, PT)  gait (walking   locomotion);assistive device use;decrease fall risk;improve balance and   speed;increase endurance/gait distance  -DRE (r) AN (t) DRE (c)      [place]  Level (Gait Training Goal 1, PT)  standby assist  -DRE (r) AN   (t) DRE (c)      Distance (Gait Training Goal 1, PT)  150  -DRE (r) AN (t) DRE (c)      Time Frame (Gait Training Goal 1, PT)  long term goal (LTG);10 days  -DRE   (r) AN (t) DRE (c)      Progress/Outcome (Gait Training Goal 1, PT)  goal ongoing  -DRE (r) AN (t)   DRE (c)         Stairs Goal 1 (PT)    Activity/Assistive Device (Stairs Goal 1, PT)  ascending   stairs;descending stairs;using handrail, left;using handrail,   right;decrease fall risk;improve balance and speed  -DRE (r) AN (t) DRE (c)        [place] Level/Cues Needed (Stairs Goal 1, PT)  contact guard assist    -DRE (r) AN (t) DRE (c)      Number of Stairs (Stairs Goal 1, PT)  4  -DRE (r) AN (t) DRE (c)      Time Frame (Stairs Goal 1, PT)  long term goal (LTG);10 days  -DRE (r) AN   (t) DRE (c)      Progress/Outcome (Stairs Goal 1, PT)  goal ongoing  -DRE (r) AN (t) DRE (c)           Patient Education Goal (PT)    Activity (Patient Education Goal, PT)  Spinal precautions, and I in brace   don/doffing  -DRE (r) AN (t) DRE (c)      [place]/Cues/Accuracy (Memory Goal 2, PT)  demonstrates   adequately;independent  -DRE (r) AN (t) DRE (c)      Time Frame (Patient Education Goal, PT)  long term goal (LTG);10 days    -DRE (r) AN (t) DRE (c)      Progress/Outcome (Patient Education Goal, PT)  goal ongoing  -DRE (r) AN   (t) DRE (c)        User Key  (r) = Recorded By, (t) = Taken By, (c) = Cosigned By    Initials Name Provider Type Discipline    Guillaume Swanson, PT DPT Physical Therapist PT    Maki Walton, PT Student PT Student PT              Plan    OT Plan  Therapy Frequency (OT): evaluation only  Outcome Summary: OT eval completed. Pt awake and alert in fowlers, drowsy in no apparent distress, c/o 10/10 pain with no obs signs of pain. Comes to EOB with log roll technique with CGA and vc. Dons brace in sitting with Yoni for education and cues for positioning. S for LB dressing with activity  modifications. Stands CGA and amb in burnette with HHAx1-2 for increased confidence rather than stability as pt does not push through hands, she is fearful of falling. Pt is functioning at her baseline with ADL and demos understanding of spinal precautions with activity modifications, will defer mobility to PT at this time. Anticipate d/c home w assist.      Carlyn Alvarado, LACEYR/L  12/2/2020        By cosigning this order, either electronically or physically, I certify that the therapy services are furnished while this patient is under my care, the services outline above are required by this patient, and will be reviewed every 90 days.        ADAMS.MAYELA.:__________________________________________ Date: ______________    12/02/20 1408    12/02/20 1400  gabapentin (NEURONTIN) capsule 600 mg  Every 8 Hours Scheduled      12/02/20 1113    12/02/20 1200  DULoxetine (CYMBALTA) DR capsule 60 mg  Daily      12/02/20 1113    12/02/20 1200  fluticasone (FLONASE) 50 MCG/ACT nasal spray 2 spray  Daily      12/02/20 1113    12/02/20 1200  tiZANidine (ZANAFLEX) tablet 4 mg  3 Times Daily      12/02/20 1113    12/02/20 1200  Neurovascular Checks  Every 4 Hours      12/02/20 1113    12/02/20 1200  Incentive Spirometry  Every 2 Hours While Awake      12/02/20 1113    12/02/20 1200  sodium chloride 0.9 % flush 3 mL  Every 12 Hours Scheduled      12/02/20 1113    12/02/20 1200  sodium chloride 0.9 % infusion  Continuous      12/02/20 1113    12/02/20 1200  famotidine (PEPCID) injection 20 mg  Every 12 Hours Scheduled      12/02/20 1113    12/02/20 1200  famotidine (PEPCID) tablet 20 mg  Every 12 Hours Scheduled      12/02/20 1113    12/02/20 1200  sodium chloride 0.9 % infusion  Continuous      12/02/20 1113    12/02/20 1114  Oxygen Therapy- Blow by - Humidified; Titrate for SPO2: equal to or greater than, per policy, 92%  Continuous,   Status:  Canceled      12/02/20 1113    12/02/20 1114  Pulse Oximetry, Continuous  Continuous,   Status:   Canceled      12/02/20 1113    12/02/20 1114  Code Status and Medical Interventions:  Continuous      12/02/20 1113    12/02/20 1114  Vital Signs  Per Hospital Policy      12/02/20 1113    12/02/20 1114  Ambulate Patient  Every Shift      12/02/20 1113    12/02/20 1114  Empty drain  Every Shift      12/02/20 1113    12/02/20 1114  Oxygen Therapy- Nasal Cannula; Titrate for SPO2: equal to or greater than, 92%, per policy  Continuous      12/02/20 1113    12/02/20 1114  Continuous Pulse Oximetry  Continuous      12/02/20 1113    12/02/20 1114  Diet Regular  Diet Effective Now      12/02/20 1113    12/02/20 1114  Advance Diet as Tolerated  Until Discontinued      12/02/20 1113    12/02/20 1114  PT Consult: Eval & Treat  Once      12/02/20 1113    12/02/20 1114  OT Consult: Eval & Treat  Once      12/02/20 1113    12/02/20 1114  Insert Peripheral IV  Once      12/02/20 1113    12/02/20 1114  Saline Lock & Maintain IV Access  Continuous      12/02/20 1113    12/02/20 1114  Place Sequential Compression Device  Once      12/02/20 1113    12/02/20 1114  Maintain Sequential Compression Device  Continuous      12/02/20 1113    12/02/20 1114  Bracing Equipment Communication Spinal; Lumbo-Sacral (LSO); Not Applicable; When Out of Bed  Once      12/02/20 1113    12/02/20 1113  ondansetron (ZOFRAN) tablet 4 mg  Every 6 Hours PRN      12/02/20 1113    12/02/20 1113  ondansetron (ZOFRAN) injection 4 mg  Every 6 Hours PRN      12/02/20 1113    12/02/20 1113  oxyCODONE-acetaminophen (PERCOCET)  MG per tablet 1 tablet  Every 4 Hours PRN      12/02/20 1113    12/02/20 1113  ALPRAZolam (XANAX) tablet 0.5 mg  3 Times Daily PRN      12/02/20 1113    12/02/20 1113  sodium chloride 0.9 % flush 10 mL  As Needed      12/02/20 1113    12/02/20 1113  diphenhydrAMINE (BENADRYL) capsule 25 mg  Nightly PRN      12/02/20 1113    12/02/20 1113  ondansetron (ZOFRAN) injection 4 mg  Every 6 Hours PRN,   Status:  Discontinued      12/02/20 1113     12/02/20 1113  HYDROmorphone (DILAUDID) injection 1 mg  Every 3 Hours PRN      12/02/20 1113    12/02/20 0939  Inpatient Admission  Once      12/02/20 0938 12/02/20 0936  Vital signs every 5 minutes for 15 minutes, every 15 minutes thereafter.  Once,   Status:  Canceled      12/02/20 0935    12/02/20 0936  Call Anesthesiologist for additional IV Fluid bolus for Hypotension/Tachycardia  Continuous,   Status:  Canceled      12/02/20 0935    12/02/20 0936  Notify Anesthesia of Any Acute Changes in Patient Condition  Until Discontinued,   Status:  Canceled      12/02/20 0935    12/02/20 0936  Notify Anesthesia for Unrelieved Pain  Until Discontinued,   Status:  Canceled      12/02/20 0935    12/02/20 0936  Once DC criteria to floor met, follow surgeon's orders.  Until Discontinued,   Status:  Canceled      12/02/20 0935    12/02/20 0936  Discharge patient from PACU when discharge criteria is met.  Until Discontinued,   Status:  Canceled      12/02/20 0935    12/02/20 0935  Apply warming blanket  As Needed,   Status:  Canceled     Comments: For a recorded temp of <36.9 C    12/02/20 0935 12/02/20 0935  oxyCODONE-acetaminophen (PERCOCET)  MG per tablet 1 tablet  Once As Needed      12/02/20 0935 12/02/20 0935  ondansetron (ZOFRAN) injection 4 mg  As Needed,   Status:  Discontinued      12/02/20 0935 12/02/20 0935  ibuprofen (ADVIL,MOTRIN) tablet 600 mg  Once As Needed,   Status:  Discontinued      12/02/20 0935 12/02/20 0935  HYDROmorphone (DILAUDID) injection 0.5 mg  Every 10 Minutes PRN,   Status:  Discontinued      12/02/20 0935 12/02/20 0935  fentaNYL citrate (PF) (SUBLIMAZE) injection 25 mcg  Every 5 Minutes PRN,   Status:  Discontinued      12/02/20 0935 12/02/20 0935  labetalol (NORMODYNE,TRANDATE) injection 5 mg  Every 5 Minutes PRN,   Status:  Discontinued      12/02/20 0935 12/02/20 0935  atropine sulfate injection 0.5 mg  Once As Needed,   Status:  Discontinued      12/02/20  0935    12/02/20 0935  naloxone (NARCAN) injection 0.04 mg  As Needed,   Status:  Discontinued      12/02/20 0935    12/02/20 0935  flumazenil (ROMAZICON) injection 0.2 mg  As Needed,   Status:  Discontinued      12/02/20 0935    12/02/20 0900  sodium chloride 0.9 % flush 10 mL  Every 12 Hours Scheduled,   Status:  Discontinued      12/02/20 0540    12/02/20 0900  sodium chloride 0.9 % flush 10 mL  Every 12 Hours Scheduled,   Status:  Discontinued      12/02/20 0638    12/02/20 0811  sodium chloride 0.9 % solution  As Needed,   Status:  Discontinued      12/02/20 0811    12/02/20 0811  thrombin topical  As Needed,   Status:  Discontinued      12/02/20 0811    12/02/20 0810  gelatin absorbable (GELFOAM) powder  As Needed,   Status:  Discontinued      12/02/20 0810    12/02/20 0810  gelatin absorbable (GELFOAM) sponge  As Needed,   Status:  Discontinued      12/02/20 0810    12/02/20 0810  sodium chloride (NS) irrigation solution  As Needed,   Status:  Discontinued      12/02/20 0811    12/02/20 0743  Assess Need for Indwelling Urinary Catheter - Follow Removal Protocol  Continuous,   Status:  Canceled     Comments: Indwelling Urinary Catheter Removal Criteria  Discontinue Indwelling Urinary Catheter Unless One of the Following is Present:  Urinary Retention or Obstruction  Chronic Urinary Catheter Use  End of Life  Critical Illness with Strict I/O   Tract or Abdominal Surgery  Stage 3/4 Sacral / Perineal Wound  Required Activity Restriction: Trauma  Required Activity Restriction: Spine Surgery  If Patient is Being Followed by Urology Contact Them PRIOR to Removal  Do Not Remove Indwelling Urinary Catheter Order is Present with a CLINICAL REASON to Maintain the Catheter. Provider is Required to Include a Clinical Reason to Maintain a Urinary Catheter    Patient Admitted With Indwelling Urinary Catheter (Not Placed at [facility])  Assess for Continued Need & Document Medical Necessity  If Infection is  Suspected, Contact the Provider        12/02/20 0743    12/02/20 0743  Urinary Catheter Care  Every Shift,   Status:  Canceled      12/02/20 0743    12/02/20 0725  Insert Indwelling Urinary Catheter  Once,   Status:  Canceled      12/02/20 0743    12/02/20 0705  XR Abdomen KUB  1 Time Imaging      12/02/20 0704    12/02/20 0704  XR Spine Lumbar AP & Lateral  1 Time Imaging      12/02/20 0703    12/02/20 0704  FL C Arm During Surgery  1 Time Imaging      12/02/20 0703    12/02/20 0640  lactated ringers infusion  Continuous      12/02/20 0638    12/02/20 0640  acetaminophen (TYLENOL) tablet 1,000 mg  Once      12/02/20 0638    12/02/20 0640  Scopolamine (TRANSDERM-SCOP) 1.5 MG/3DAYS patch 1 patch  Continuous      12/02/20 0638    12/02/20 0639  Oxygen Therapy- Nasal Cannula; Titrate for SPO2: equal to or greater than, 90%  Continuous,   Status:  Canceled      12/02/20 0638    12/02/20 0639  Pulse Oximetry, Continuous  Continuous,   Status:  Canceled      12/02/20 0638    12/02/20 0639  Insert Peripheral IV  Once,   Status:  Canceled      12/02/20 0638    12/02/20 0639  Saline Lock & Maintain IV Access  Continuous,   Status:  Canceled      12/02/20 0638    12/02/20 0638  dexamethasone (DECADRON) injection 4 mg  Once As Needed      12/02/20 0638    12/02/20 0638  dextrose (D50W) 25 g/ 50mL Intravenous Solution 12.5 g  As Needed,   Status:  Discontinued      12/02/20 0638    12/02/20 0638  Vital Signs - Per Anesthesia Protocol  As Needed,   Status:  Canceled      12/02/20 0638    12/02/20 0638  sodium chloride 0.9 % flush 10 mL  As Needed,   Status:  Discontinued      12/02/20 0638    12/02/20 0638  lidocaine PF 1% (XYLOCAINE) injection 0.5 mL  Once As Needed,   Status:  Discontinued      12/02/20 0638    12/02/20 0638  fentaNYL citrate (PF) (SUBLIMAZE) injection 25 mcg  Every 5 Minutes PRN,   Status:  Discontinued      12/02/20 0638    12/02/20 0638  midazolam (VERSED) injection 1 mg  Every 10 Minutes PRN      12/02/20  0638    12/02/20 0600  Insert Peripheral IV  Once,   Status:  Canceled      12/02/20 0540    12/02/20 0600  Saline Lock & Maintain IV Access  Continuous,   Status:  Canceled      12/02/20 0540    12/02/20 0544  lactated ringers infusion 1,000 mL  Continuous      12/02/20 0542    12/02/20 0542  oxyCODONE ER (oxyCONTIN) 12 hr tablet 20 mg  Once      12/02/20 0540    12/02/20 0542  ceFAZolin in 0.9% normal saline (ANCEF) IVPB solution 2 g  Once      12/02/20 0540    12/02/20 0542  Type & Screen  Once,   Status:  Canceled      12/02/20 0542    12/02/20 0541  Please Insert a Second Peripheral IV If Indicated For Procedure (All DaVinci Cases, Gastric Bypass, Sleeve Surgery, AAA, Any Vascular Bypass, Carotid, Sigmoidectomy, Colectomy (Lap Assisted), Colon Resection, Kahlil, Exploratory Laparotomy, Spinal...  Once,   Status:  Canceled     Comments: Please Insert a Second Peripheral IV If Indicated For Procedure (All DaVinci Cases, Gastric Bypass, Sleeve Surgery, AAA, Any Vascular Bypass, Carotid, Sigmoidectomy, Colectomy (Lap Assisted), Colon Resection, Kahlil, Exploratory Laparotomy, Spinal Fusion, Craniotomy, Thoracotomy, or Mediastinoscopy.    12/02/20 0542    12/02/20 0541  Insert Peripheral IV  Once,   Status:  Canceled      12/02/20 0542    12/02/20 0541  Maintain IV Access  Continuous,   Status:  Canceled      12/02/20 0542    12/02/20 0540  sodium chloride 0.9 % flush 10 mL  As Needed,   Status:  Discontinued      12/02/20 0542    12/02/20 0540  sodium chloride 0.9 % flush 10 mL  As Needed,   Status:  Discontinued      12/02/20 0540    12/02/20 0540  lidocaine PF 1% (XYLOCAINE) injection 0.5 mL  Once As Needed,   Status:  Discontinued      12/02/20 0540    12/02/20 0540  lactated ringers infusion  Continuous PRN      12/02/20 0540    Unscheduled  Apply ice to affected area/incisions as needed for pain or swelling  As Needed      12/02/20 1113                 Operative/Procedure Notes (last 48 hours) (Notes from  12/01/20 1131 through 12/03/20 1131)      ALEXIS Dunaway MD at 12/02/20 0540        LUMBAR ANTERIOR INTERBODY FUSION  Procedure Note    Adela Arauz  12/2/2020    Pre-op Diagnosis:    1. Increasing chronic back pain.  2. Bilateral buttock, thigh and leg radiculopathy, now right worse than left.   3. Severe neurogenic claudication.   4. Multilevel lumbar degenerative disc disease worse L3-S1.   5. Degenerative lumbar scoliosis, L3-S1, concave left L5-S1, concave right L4-5.   6. Facet arthropathy, L4-S1.   7. Large disc herniation right central L3-4  8. Central and foraminal stenosis, L3-S1, worse central L3-4, right L4-5, severe left L5-S1.   9. Rheumatoid arthritis.   10. History of chronic osteomyelitis, right forearm, status post external fixation, Dr. Cr.    Post-op Diagnosis:    same    Procedure/CPT® Codes:     1. Anterior discectomy decompression with bilateral neural foraminotomy L5-S1  2. Anterior lumbar interbody fusion L5-S1  3. Anterior spinal instrumentation L5-S1 (ATEC anterior plate and screws)  4. Use of titanium interbody biomechanical device for fusion L5-S1 (CoreLink titanium spacer)  5. Use of allograft bone matrix for fusion L5-S1  6. Use of allograft liquid for fusion L5-S1 (BioBurst)  7. Use of fluoroscopy for confirmation of surgical level, placement of interbody spacer and instrumentation  8. Intraoperative neural monitoring     Anesthesia: General     Surgeon: MELCHOR Dunaway MD     Co Surgeon: Dr. Neptali Rivera D.O.     Assistant: Michael Hudson PA-C     Estimated Blood Loss: 50 mL     Complications: None     Condition: Stable to PACU.     Indications:     The patient is a 55-year-old who sees Charlene Huntley nurse practitioner for medical issues.  She presented to the office with increasing chronic back pain along with symptoms down both lower extremities consistent with radiculopathy, with the right side worse than the left.  She had complaints that were consistent  also with severe neurogenic claudication.  Imaging studies revealed multilevel lumbar degenerative disc disease that was worse from L4-S1.  She was noted to have scoliosis from L3-S1 that was concave to the left at L5-S1 and concave to the right at L4-5.  Both levels had facet arthropathy and central as well as foraminal stenosis that was worse on the right at L4-5 and on the left at L5-S1 corresponding to the scoliosis concavities.  She was also noted to have a rather large disc herniation at L3-4 that will also need to be addressed at a later date.    After failing all conservative measures, it was mutually decided that surgery would be the best option.  Risks, benefits, and complications of surgery were discussed in detail. The patient appeared well informed and wished to proceed. We specifically discussed the risk of infection, blood loss, nerve root injury, CSF leak, and the possibility of incomplete resolution of symptoms. We also discussed the possible risk of a nonunion and the potential need for additional surgery in the event of a pseudoarthrosis or hardware failure.    We elected to proceed with a staged operation.  Today we are performing an anterior decompression and fusion of L5-S1, it is planned that we will be returning to surgery for a second lateral procedure at a later date to address L3-4 and L4-5 along with a final posterior procedure involving a decompression and posterior spinal fusion with instrumentation from L3-S1.     Operative Procedure:     After obtaining informed consent and verifying the correct operative level, the patient was brought to the operating room and placed supine on an operating table. A general anesthetic was provided by the anesthesia service with the assistance of an endotracheal tube. Once this was appropriately positioned and secured, the anterior abdominal region was prepped and draped in usual sterile fashion. A surgical timeout was taken to confirm this was the  correct patient, we were working at the correct level, and that preoperative antibiotics were given in a timely fashion.     At this point, Dr. Neptali Rviera D.O. provided vascular access to the L5-S1 level. He performed a left sided anterior retroperitoneal approach to the L5-S1 segment. Please see his separate dictated operative report regarding the details on the approach itself.  When I entered the procedure, self retaining retractors were already in position with excellent exposure of the L5-S1 disc space.     After confirming we were at the correct level using fluoroscopy, I used a long handle 10 blade scalpel to cut into the L5-S1 disc space. A Cardozo elevator was used to remove disc material off of the endplates. Disc material was retrieved using pituitaries and Kerrisons. A disc space distractor was then placed into the L5-S1 disc space and I used curettes to remove posterior disc material. Kerrisons were used to remove posterior osteophytes across the endplates of L5 and S1. There was high-grade stenosis centrally but also foraminally on the left corresponding to the scoliosis that was present. I then performed a bilateral neural foraminotomy with Kerrisons and curettes. The decompression was much more involved than what is usually required for an anterior lumbar interbody fusion by itself and required significantly more time to perform.  This was due to the severe disc space collapse on the left side and subsequent high-grade foraminal stenosis again on the left.     After the decompression was completed bilaterally and centrally, I used a series of endplate scrapers to prepare the endplates for interbody fusion. Trial spacers were then malleted into position and it was felt that a 16 mm titanium spacer from the CoreLink instrumentation set would be the best fit to restore disc height also restoring foraminal height and providing some indirect decompression. The disc space was then thoroughly irrigated  with saline solution.  Gelfoam powder with thrombin was used to control epidural bleeding.     A 16 mm titanium spacer from the CoreLink instrumentation set was then packed as tightly as possible with allograft bone matrix mixed with an allograft liquid called BioBurst. This spacer was then malleted into the L5-S1 disc space under fluoroscopic guidance. It was placed as an interbody biomechanical device to assist with fusion.  I placed a 20 mm screw into L5 and a second 25 mm screw into S1 to help prevent migration of the spacer.     A 4-hole anterior plate from the [facility] instrumentation set was then chosen. The 4 holes were drilled and four 30 mm screws were used to fix the plate across the L5-S1 segment augmenting the fusion.      We then inspected the entire operative field for any signs of bleeding.  Bleeding was once again controlled using thrombin with Gelfoam powder and bipolar cautery.  Once we ensured that adequate hemostasis was accomplished, final fluoroscopy imaging was taken to confirm adequate position of our implants. There was excellent restoration of disc height and the plate and screw construct appeared to be adequately positioned across L5-S1.     Please see Dr. Neptali Rivera's separate dictated operative report regarding the closure of the wound. Once this was accomplished, the patient was extubated and sent to the recovery room in good stable condition. We estimated blood loss to be approximately 50 mL and the patient remained hemodynamically stable during the procedure.     Dr. Neptali Rivera D.O. provided vascular access to the L5-S1 level and acted as a co-surgeon in this fashion.  Michael Hudson PA-C provided critical assistance during the decompression at L5-S1 as well as during the placement of the titanium spacer, bone graft and instrumentation to obtain a fusion across L5-S1.    MELCHOR Dunaway MD    Date: 12/2/2020  Time: 14:59 CST      Electronically signed by ALEXIS Dunaway,  MD at 12/02/20 1459     Neptali Rivera DO at 12/02/20 0658        Adela Arauz  12/2/2020     PREOPERATIVE DIAGNOSIS:   1. Increasing chronic back pain.  2. Bilateral buttock, thigh and leg radiculopathy, now right worse than left.   3. Severe neurogenic claudication.   4. Multilevel lumbar degenerative disc disease worse L4-S1.   5. Degenerative lumbar scoliosis, L3-S1, concave left L5-S1, concave right L4-5.   6. Facet arthropathy, L4-S1.   7. Central and foraminal stenosis, L4-S1, worse right L4-5, severe left L5-S1.   8. Rheumatoid arthritis.   9. History of chronic osteomyelitis, right forearm, status post external fixation, Dr. Cr.     POSTOPERATIVE DIAGNOSIS: same     PROCEDURE PERFORMED:   1.  Anterior lumbar interbody fusion of L5-S1 with instrumentation     SURGEON: Neptali Rivera DO   COSURGEON: Joseph Dunaway MD     ANESTHESIA: General.    PREPARATION: Routine.    STAFF: Circulator: Leticia Chaudhry RN; Jose Heard RN  Physician Assistant: Michael Hudson PA-C  Scrub Person: Hanna Delcid; Conchita Tracy; Oziel Houston    ESTIMATED BLOOD LOSS: 50 mL    SPECIMENS: None    COMPLICATIONS: None    INDICATIONS: Adela Arauz is a 55 y.o. female who you are currently following for chronic back pain.  Adela Mckeon scheduled for anterior lumbar interbody fusion of L5-S1 with Dr. Dunaway on 12/2/2020.  The patient denies any history of DVT. The indications, risks, and possible complications of the procedure were explained to the patient, who voiced understanding and wished to proceed with surgery.     PROCEDURE IN DETAIL:   The patient was taken to the operating room and placed on the operating table in a supine position. After general anesthesia was obtained, the abdomen was prepped and draped in a sterile manner.  A transverse incision was then made in the left lower quadrant.  Careful dissection was made down through the subcutaneous tissues using the  Bovie cautery to ensure hemostasis.  Any crossing veins were ligated with 3-0 silk suture and hemoclips.  The rectus fascia was identified.  It was incised with the Bovie cautery.  Kocher clamps are placed on each side of the rectus fascia and subfascial planes were established in a cephalad and caudad direction.  Once the subfascial planes were established the attention was then turned to the left rectus muscle.  Blunt mobilization was made of the left rectus muscle including its blood supply medially and to the right.  Once it was fully mobilized the attention was then turned laterally to the peritoneal reflection.  Entrance into the retroperitoneal space was established with the use of a sponge stick and the Bovie cautery.  Continued blunt mobilization was made with my hand using a finger sweeping motion moving the peritoneal contents and sacral fat pad medially and to the right.  Once it was fully mobilized the Redd-Heard retractor system was set up.  The retractor blades were set in place.  The left iliac vein was then carefully dissected free and placed safely behind the retractor blade.  The sacral vessels were carefully taken down with hemoclips.  At this point full exposure was established of the L5-S1 disc space.  The next part of the case will be dictated by Dr. Dunaway. Upon completion of Dr. Dunaway's part of the case the wound bed was irrigated with antibiotic saline and hemostasis was observed.  The retractor blades were carefully taken out one at a time.  The structures were then placed back in their normal anatomic positions.  The rectus fascia was then closed with a #1 PDS in a running fashion to meet in the midline.  The deep layers were closed with a 2-0 Vicryl in a running fashion.  The subcutaneous layers were closed with a 3-0 Vicryl in a running fashion.  The skin was then reapproximated using a 4-0 Monocryl in a subcuticular fashion.  The wound was then cleaned.  Sterile dressings were  applied. The patient tolerated the procedure well. Sponge and needle counts were correct. The patient was then awakened and extubated in the operating room and taken to the recovery room in good condition.    Neptali Rivera DO    Date: 12/2/2020 Time: 09:22 CST      Electronically signed by Neptali Rivera DO at 12/02/20 0923         Physician Progress Notes (last 48 hours) (Notes from 12/01/20 1132 through 12/03/20 1132)    No notes of this type exist for this encounter.       Consult Notes (last 48 hours) (Notes from 12/01/20 1132 through 12/03/20 1132)    No notes of this type exist for this encounter.          Discharge Summary      Carlos Meléndez PA at 12/03/20 0641     Attestation signed by ALEXIS Dunaway MD at 12/03/20 1037    [facility]                  [facility]  SPINE SURGERY  ROLANDO Daniels  DISCHARGE SUMMARY  Patient ID:  Adela Arauz  [id number]  55 y.o.  1965    Admit date: 12/2/2020    Discharge date and time: 12/3/2020    Admitting Physician: ALEXIS Dunaway MD     Indication for Admission: Planned admit for surgical procedure     Admission Diagnoses: Lumbar stenosis [M48.061]    Discharge Diagnoses: Lumbar stenosis [M48.061]    Procedures: ALIF L5/S1    Problem List:   Lumbar stenosis      Consults: none    Admission Condition: stable    Discharged Condition: stable    Hospital Course: no immediate post operative complication     Disposition: home    Patient Instructions:      Discharge Medications      New Medications      Instructions Start Date   ondansetron ODT 4 MG disintegrating tablet  Commonly known as: Zofran ODT   4 mg, Translingual, Every 8 Hours PRN      oxyCODONE-acetaminophen  MG per tablet  Commonly known as: PERCOCET   1 tablet, Oral, Every 6 Hours PRN         Continue These Medications      Instructions Start Date   ALPRAZolam 0.5 MG tablet  Commonly known as: Xanax   0.5 mg, Oral, 3 Times Daily  PRN      betamethasone valerate 0.1 % ointment  Commonly known as: VALISONE   Topical, 2 Times Daily      Calcium-Magnesium-Vitamin D 500-250-200 MG-MG-UNIT tablet   600 tablets, Oral, 2 Times Daily      cholecalciferol 25 MCG (1000 UT) tablet  Commonly known as: VITAMIN D3   1,000 Units, Oral, Daily      DULoxetine 60 MG capsule  Commonly known as: CYMBALTA   TAKE ONE CAPSULE BY MOUTH DAILY      fluticasone 50 MCG/ACT nasal spray  Commonly known as: Flonase   2 sprays, Nasal, Daily      gabapentin 600 MG tablet  Commonly known as: NEURONTIN   600 mg, Oral, 3 Times Daily      ibandronate 150 MG tablet  Commonly known as: BONIVA   150 mg, Oral, Every 30 Days      mupirocin 2 % ointment  Commonly known as: Bactroban   Apply to nostrils and fingernails nighlty x 5 nights same 5 nights monthly x 6 months.      omeprazole 40 MG capsule  Commonly known as: priLOSEC   TAKE ONE CAPSULE BY MOUTH DAILY      sertraline 50 MG tablet  Commonly known as: ZOLOFT   50 mg, Oral, Nightly      tiZANidine 4 MG tablet  Commonly known as: ZANAFLEX   1 tablet, Oral, 3 Times Daily      Turmeric 500 MG capsule   Oral, 2 Times Daily         Stop These Medications    HYDROcodone-acetaminophen  MG per tablet  Commonly known as: Norco          Activity: Avoid bending lifting or twisting, brace when up and out of bed, no driving while taking narcotic pain medication, walk 10-15 minutes 2-3 times daily  Diet: regular diet  Wound Care: keep wound clean and dry  Other: Contact [facility] office with questions or concerns    Follow-up with Dr Dunaway or PA's in 2 weeks.    Electronically signed by ROLANDO Daniels 06:41 CST 12/3/2020      Electronically signed by ALEXIS Dunaway MD at 12/03/20 1037

## 2020-12-04 ENCOUNTER — TRANSITIONAL CARE MANAGEMENT TELEPHONE ENCOUNTER (OUTPATIENT)
Dept: CALL CENTER | Facility: HOSPITAL | Age: 55
End: 2020-12-04

## 2020-12-04 NOTE — OUTREACH NOTE
Call Center TCM Note      Responses   [facility] patient discharged from?  [place]   Does the patient have one of the following disease processes/diagnoses(primary or secondary)?  General Surgery   TCM attempt successful?  Yes   Call start time  1546   Call end time  1547   Discharge diagnosis  ALIF L5/S1   Is patient permission given to speak with other caregiver?  Yes   List who call center can speak with  spouse- Manny   Person spoke with today (if not patient) and relationship  spouseClaudio Bryant   Does the patient have all medications related to this admission filled (includes all antibiotics, pain medications, etc.)  Yes   Is the patient taking all medications as directed (includes completed medication regime)?  Yes   Does the patient have a follow up appointment scheduled with their surgeon?  Yes   Has the patient kept scheduled appointments due by today?  N/A   Comments  f/u with PCP on 12/21   Psychosocial issues?  No   Did the patient receive a copy of their discharge instructions?  Yes   What is the patient's perception of their health status since discharge?  Improving   Nursing interventions  Nurse provided patient education   Is the patient/caregiver able to teach back signs and symptoms of incisional infection?  Fever   Is the patient/caregiver able to teach back the hierarchy of who to call/visit for symptoms/problems? PCP, Specialist, Home health nurse, Urgent Care, ED, 911  Yes   TCM call completed?  Yes   Wrap up additional comments  Per spouse, pt is doing well, no questions or concerns at this time.          Pilar Nguyen RN    12/4/2020, 15:47 EST

## 2020-12-04 NOTE — OUTREACH NOTE
Prep Survey      Responses   [facility] patient discharged from?  [place]   Is LACE score < 7 ?  No   Eligibility  [facility]   Date of Admission  12/02/20   Date of Discharge  12/03/20   Discharge Disposition  Home or Self Care   Discharge diagnosis  ALIF L5/S1   Does the patient have one of the following disease processes/diagnoses(primary or secondary)?  General Surgery   Does the patient have Home health ordered?  No   Is there a DME ordered?  No   Prep survey completed?  Yes          Kathleen Kirk RN

## 2020-12-04 NOTE — PAYOR COMM NOTE
"Ref: [id number]    [facility],  [phone]  OR  FAX   [phone]      Ran Kenyon (55 y.o. Female)     Date of Birth Social Security Number Address Home Phone MRN    1965  [id number] [facility], [facility] [id number] [phone] [id number]    Quaker Marital Status          Sabianism        Admission Date Admission Type Admitting Provider Attending Provider Department, Room/Bed    12/2/20 Elective ALEXIS Dunaway MD  [facility] 3A, 351/1    Discharge Date Discharge Disposition Discharge Destination        12/3/2020 Home or Self Care              Attending Provider: (none)   Allergies: No Known Allergies    Isolation: None   Infection: None   Code Status: Prior    Ht: 162.6 cm (64\")   Wt: 88.9 kg (196 lb)    Admission Cmt: None   Principal Problem: None                Active Insurance as of 12/2/2020     Primary Coverage     Payor Plan Insurance Group Employer/Plan Group    [employer] [place] [id number]     Payor Plan Address Payor Plan Phone Number Payor Plan Fax Number Effective Dates    [address] [phone]  10/25/2016 - None Entered    [facility] [id number]       Subscriber Name Subscriber Birth Date Member ID       RAN KENYON 1965 [id number]                 Emergency Contacts      (Rel.) Home Phone Work Phone Mobile Phone    Manny Kenyon (Spouse) [phone] -- [phone]    JuanJerri (Daughter) [phone] -- [phone]               Discharge Summary      Carlos Meléndez PA at 12/03/20 0641     Attestation signed by ALEXIS Dunaway MD at 12/03/20 1037    [facility]                  [facility]  SPINE SURGERY  ROLANDO Daniels  DISCHARGE SUMMARY  Patient ID:  Ran Kenyon  [id number]  55 y.o.  1965    Admit date: 12/2/2020    Discharge date and time: 12/3/2020    Admitting Physician: ALEXIS Dunaway MD     Indication for " Admission: Planned admit for surgical procedure     Admission Diagnoses: Lumbar stenosis [M48.061]    Discharge Diagnoses: Lumbar stenosis [M48.061]    Procedures: ALIF L5/S1    Problem List:   Lumbar stenosis      Consults: none    Admission Condition: stable    Discharged Condition: stable    Hospital Course: no immediate post operative complication     Disposition: home    Patient Instructions:      Discharge Medications      New Medications      Instructions Start Date   ondansetron ODT 4 MG disintegrating tablet  Commonly known as: Zofran ODT   4 mg, Translingual, Every 8 Hours PRN      oxyCODONE-acetaminophen  MG per tablet  Commonly known as: PERCOCET   1 tablet, Oral, Every 6 Hours PRN         Continue These Medications      Instructions Start Date   ALPRAZolam 0.5 MG tablet  Commonly known as: Xanax   0.5 mg, Oral, 3 Times Daily PRN      betamethasone valerate 0.1 % ointment  Commonly known as: VALISONE   Topical, 2 Times Daily      Calcium-Magnesium-Vitamin D 500-250-200 MG-MG-UNIT tablet   600 tablets, Oral, 2 Times Daily      cholecalciferol 25 MCG (1000 UT) tablet  Commonly known as: VITAMIN D3   1,000 Units, Oral, Daily      DULoxetine 60 MG capsule  Commonly known as: CYMBALTA   TAKE ONE CAPSULE BY MOUTH DAILY      fluticasone 50 MCG/ACT nasal spray  Commonly known as: Flonase   2 sprays, Nasal, Daily      gabapentin 600 MG tablet  Commonly known as: NEURONTIN   600 mg, Oral, 3 Times Daily      ibandronate 150 MG tablet  Commonly known as: BONIVA   150 mg, Oral, Every 30 Days      mupirocin 2 % ointment  Commonly known as: Bactroban   Apply to nostrils and fingernails nighlty x 5 nights same 5 nights monthly x 6 months.      omeprazole 40 MG capsule  Commonly known as: priLOSEC   TAKE ONE CAPSULE BY MOUTH DAILY      sertraline 50 MG tablet  Commonly known as: ZOLOFT   50 mg, Oral, Nightly      tiZANidine 4 MG tablet  Commonly known as: ZANAFLEX   1 tablet, Oral, 3 Times Daily      Turmeric 500  MG capsule   Oral, 2 Times Daily         Stop These Medications    HYDROcodone-acetaminophen  MG per tablet  Commonly known as: Norco          Activity: Avoid bending lifting or twisting, brace when up and out of bed, no driving while taking narcotic pain medication, walk 10-15 minutes 2-3 times daily  Diet: regular diet  Wound Care: keep wound clean and dry  Other: Contact [facility] office with questions or concerns    Follow-up with Dr Dunaway or PA's in 2 weeks.    Electronically signed by ROLANDO Daniels 06:41 CST 12/3/2020      Electronically signed by ALEXIS Dunaway MD at 12/03/20 1037

## 2020-12-09 DIAGNOSIS — F41.8 ANXIETY ASSOCIATED WITH DEPRESSION: ICD-10-CM

## 2020-12-09 RX ORDER — TIZANIDINE 4 MG/1
TABLET ORAL
Qty: 60 TABLET | Refills: 5 | Status: SHIPPED | OUTPATIENT
Start: 2020-12-09 | End: 2021-06-01

## 2020-12-09 RX ORDER — DULOXETIN HYDROCHLORIDE 60 MG/1
CAPSULE, DELAYED RELEASE ORAL
Qty: 30 CAPSULE | Refills: 2 | OUTPATIENT
Start: 2020-12-09

## 2020-12-09 NOTE — TELEPHONE ENCOUNTER
LOV 11/20/2020  LRX Cymbalta 10/12/2020 for 3 months  LRX Zanaflex 10/12/2020 HP  NXTAPT 12/21/2020

## 2020-12-10 ENCOUNTER — TRANSCRIBE ORDERS (OUTPATIENT)
Dept: ADMINISTRATIVE | Facility: HOSPITAL | Age: 55
End: 2020-12-10

## 2020-12-10 DIAGNOSIS — Z01.818 PREOP TESTING: ICD-10-CM

## 2020-12-10 DIAGNOSIS — Z11.59 SCREENING FOR VIRAL DISEASE: Primary | ICD-10-CM

## 2020-12-14 ENCOUNTER — LAB (OUTPATIENT)
Dept: LAB | Facility: HOSPITAL | Age: 55
End: 2020-12-14

## 2020-12-14 DIAGNOSIS — Z01.818 PREOP TESTING: Primary | ICD-10-CM

## 2020-12-14 LAB — SARS-COV-2 ORF1AB RESP QL NAA+PROBE: NOT DETECTED

## 2020-12-14 PROCEDURE — U0004 COV-19 TEST NON-CDC HGH THRU: HCPCS

## 2020-12-14 PROCEDURE — C9803 HOPD COVID-19 SPEC COLLECT: HCPCS

## 2020-12-16 ENCOUNTER — ANESTHESIA EVENT (OUTPATIENT)
Dept: PERIOP | Facility: HOSPITAL | Age: 55
End: 2020-12-16

## 2020-12-16 ENCOUNTER — APPOINTMENT (OUTPATIENT)
Dept: GENERAL RADIOLOGY | Facility: HOSPITAL | Age: 55
End: 2020-12-16

## 2020-12-16 ENCOUNTER — ANESTHESIA (OUTPATIENT)
Dept: PERIOP | Facility: HOSPITAL | Age: 55
End: 2020-12-16

## 2020-12-16 ENCOUNTER — HOSPITAL ENCOUNTER (INPATIENT)
Facility: HOSPITAL | Age: 55
LOS: 3 days | Discharge: HOME | End: 2020-12-19
Attending: ORTHOPAEDIC SURGERY | Admitting: ORTHOPAEDIC SURGERY

## 2020-12-16 DIAGNOSIS — M48.062 SPINAL STENOSIS OF LUMBAR REGION WITH NEUROGENIC CLAUDICATION: Primary | ICD-10-CM

## 2020-12-16 DIAGNOSIS — Z74.09 IMPAIRED MOBILITY: ICD-10-CM

## 2020-12-16 DIAGNOSIS — Z78.9 DECREASED ACTIVITIES OF DAILY LIVING (ADL): ICD-10-CM

## 2020-12-16 DIAGNOSIS — C82.90 FOLLICULAR LYMPHOMA, UNSPECIFIED FOLLICULAR LYMPHOMA TYPE, UNSPECIFIED BODY REGION (HCC): ICD-10-CM

## 2020-12-16 PROCEDURE — 25010000002 FENTANYL CITRATE (PF) 100 MCG/2ML SOLUTION: Performed by: ANESTHESIOLOGY

## 2020-12-16 PROCEDURE — C1713 ANCHOR/SCREW BN/BN,TIS/BN: HCPCS | Performed by: ORTHOPAEDIC SURGERY

## 2020-12-16 PROCEDURE — 76000 FLUOROSCOPY <1 HR PHYS/QHP: CPT

## 2020-12-16 PROCEDURE — 25010000002 CEFAZOLIN 1-4 GM/50ML-% SOLUTION: Performed by: ORTHOPAEDIC SURGERY

## 2020-12-16 PROCEDURE — 86901 BLOOD TYPING SEROLOGIC RH(D): CPT | Performed by: ORTHOPAEDIC SURGERY

## 2020-12-16 PROCEDURE — 86850 RBC ANTIBODY SCREEN: CPT | Performed by: ORTHOPAEDIC SURGERY

## 2020-12-16 PROCEDURE — 25010000002 DEXAMETHASONE PER 1 MG: Performed by: ANESTHESIOLOGY

## 2020-12-16 PROCEDURE — 0ST20ZZ RESECTION OF LUMBAR VERTEBRAL DISC, OPEN APPROACH: ICD-10-PCS | Performed by: ORTHOPAEDIC SURGERY

## 2020-12-16 PROCEDURE — 01NB0ZZ RELEASE LUMBAR NERVE, OPEN APPROACH: ICD-10-PCS | Performed by: ORTHOPAEDIC SURGERY

## 2020-12-16 PROCEDURE — 25010000002 FENTANYL CITRATE (PF) 100 MCG/2ML SOLUTION: Performed by: NURSE ANESTHETIST, CERTIFIED REGISTERED

## 2020-12-16 PROCEDURE — 25010000002 DEXAMETHASONE PER 1 MG: Performed by: NURSE ANESTHETIST, CERTIFIED REGISTERED

## 2020-12-16 PROCEDURE — 0SG10A0 FUSION OF 2 OR MORE LUMBAR VERTEBRAL JOINTS WITH INTERBODY FUSION DEVICE, ANTERIOR APPROACH, ANTERIOR COLUMN, OPEN APPROACH: ICD-10-PCS | Performed by: ORTHOPAEDIC SURGERY

## 2020-12-16 PROCEDURE — 25010000002 ONDANSETRON PER 1 MG: Performed by: NURSE ANESTHETIST, CERTIFIED REGISTERED

## 2020-12-16 PROCEDURE — 86900 BLOOD TYPING SEROLOGIC ABO: CPT | Performed by: ORTHOPAEDIC SURGERY

## 2020-12-16 PROCEDURE — 25010000002 PROPOFOL 10 MG/ML EMULSION: Performed by: NURSE ANESTHETIST, CERTIFIED REGISTERED

## 2020-12-16 PROCEDURE — 72100 X-RAY EXAM L-S SPINE 2/3 VWS: CPT

## 2020-12-16 PROCEDURE — 25010000002 HYDROMORPHONE PER 4 MG: Performed by: ANESTHESIOLOGY

## 2020-12-16 PROCEDURE — 25010000002 MIDAZOLAM PER 1 MG: Performed by: ANESTHESIOLOGY

## 2020-12-16 PROCEDURE — 25010000003 HYDROMORPHONE 1 MG/ML SOLUTION: Performed by: ORTHOPAEDIC SURGERY

## 2020-12-16 PROCEDURE — 94799 UNLISTED PULMONARY SVC/PX: CPT

## 2020-12-16 DEVICE — CAGE LIF FOUNDATION F3D 8DEG 22X50X12MM: Type: IMPLANTABLE DEVICE | Status: FUNCTIONAL

## 2020-12-16 DEVICE — BONE FILLER VOID NANOSS BIOACTIVE 3D 20CC: Type: IMPLANTABLE DEVICE | Status: FUNCTIONAL

## 2020-12-16 DEVICE — ALLOGRFT FLD BIOBURST 1ML: Type: IMPLANTABLE DEVICE | Status: FUNCTIONAL

## 2020-12-16 DEVICE — DISC SHIM FOUNDATION RATCHETING: Type: IMPLANTABLE DEVICE | Status: FUNCTIONAL

## 2020-12-16 DEVICE — PLT LAT ORO SYS 2/SCRW 15X20MM: Type: IMPLANTABLE DEVICE | Status: FUNCTIONAL

## 2020-12-16 DEVICE — SCRW ORO LAT PLT SYS 5.5X30MM: Type: IMPLANTABLE DEVICE | Status: FUNCTIONAL

## 2020-12-16 DEVICE — KT HEMOST ABS SURGIFOAM PORCN 1GRAM: Type: IMPLANTABLE DEVICE | Status: FUNCTIONAL

## 2020-12-16 RX ORDER — ALPRAZOLAM 0.5 MG/1
0.5 TABLET ORAL 3 TIMES DAILY PRN
Status: DISCONTINUED | OUTPATIENT
Start: 2020-12-16 | End: 2020-12-19 | Stop reason: HOSPADM

## 2020-12-16 RX ORDER — DEXTROSE MONOHYDRATE 25 G/50ML
12.5 INJECTION, SOLUTION INTRAVENOUS AS NEEDED
Status: DISCONTINUED | OUTPATIENT
Start: 2020-12-16 | End: 2020-12-16 | Stop reason: HOSPADM

## 2020-12-16 RX ORDER — FLUTICASONE PROPIONATE 50 MCG
2 SPRAY, SUSPENSION (ML) NASAL DAILY
Status: DISCONTINUED | OUTPATIENT
Start: 2020-12-16 | End: 2020-12-19 | Stop reason: HOSPADM

## 2020-12-16 RX ORDER — GABAPENTIN 300 MG/1
600 CAPSULE ORAL EVERY 8 HOURS SCHEDULED
Status: DISCONTINUED | OUTPATIENT
Start: 2020-12-16 | End: 2020-12-19 | Stop reason: HOSPADM

## 2020-12-16 RX ORDER — SCOLOPAMINE TRANSDERMAL SYSTEM 1 MG/1
1 PATCH, EXTENDED RELEASE TRANSDERMAL CONTINUOUS
Status: DISPENSED | OUTPATIENT
Start: 2020-12-16 | End: 2020-12-17

## 2020-12-16 RX ORDER — TIZANIDINE 4 MG/1
4 TABLET ORAL EVERY 8 HOURS PRN
Status: DISCONTINUED | OUTPATIENT
Start: 2020-12-16 | End: 2020-12-19 | Stop reason: HOSPADM

## 2020-12-16 RX ORDER — DULOXETIN HYDROCHLORIDE 30 MG/1
60 CAPSULE, DELAYED RELEASE ORAL DAILY
Status: DISCONTINUED | OUTPATIENT
Start: 2020-12-16 | End: 2020-12-19 | Stop reason: HOSPADM

## 2020-12-16 RX ORDER — SODIUM CHLORIDE 0.9 % (FLUSH) 0.9 %
10 SYRINGE (ML) INJECTION AS NEEDED
Status: DISCONTINUED | OUTPATIENT
Start: 2020-12-16 | End: 2020-12-19 | Stop reason: HOSPADM

## 2020-12-16 RX ORDER — OXYCODONE AND ACETAMINOPHEN 10; 325 MG/1; MG/1
1 TABLET ORAL ONCE AS NEEDED
Status: DISCONTINUED | OUTPATIENT
Start: 2020-12-16 | End: 2020-12-16 | Stop reason: HOSPADM

## 2020-12-16 RX ORDER — CEFAZOLIN SODIUM 1 G/50ML
1 INJECTION, SOLUTION INTRAVENOUS EVERY 8 HOURS
Status: COMPLETED | OUTPATIENT
Start: 2020-12-16 | End: 2020-12-17

## 2020-12-16 RX ORDER — SUFENTANIL CITRATE 50 UG/ML
INJECTION EPIDURAL; INTRAVENOUS AS NEEDED
Status: DISCONTINUED | OUTPATIENT
Start: 2020-12-16 | End: 2020-12-16 | Stop reason: SURG

## 2020-12-16 RX ORDER — OXYCODONE AND ACETAMINOPHEN 10; 325 MG/1; MG/1
1 TABLET ORAL EVERY 4 HOURS PRN
Status: DISCONTINUED | OUTPATIENT
Start: 2020-12-16 | End: 2020-12-19 | Stop reason: HOSPADM

## 2020-12-16 RX ORDER — DEXAMETHASONE SODIUM PHOSPHATE 4 MG/ML
INJECTION, SOLUTION INTRA-ARTICULAR; INTRALESIONAL; INTRAMUSCULAR; INTRAVENOUS; SOFT TISSUE AS NEEDED
Status: DISCONTINUED | OUTPATIENT
Start: 2020-12-16 | End: 2020-12-16 | Stop reason: SURG

## 2020-12-16 RX ORDER — ONDANSETRON 2 MG/ML
4 INJECTION INTRAMUSCULAR; INTRAVENOUS AS NEEDED
Status: DISCONTINUED | OUTPATIENT
Start: 2020-12-16 | End: 2020-12-16 | Stop reason: HOSPADM

## 2020-12-16 RX ORDER — SODIUM CHLORIDE, SODIUM LACTATE, POTASSIUM CHLORIDE, CALCIUM CHLORIDE 600; 310; 30; 20 MG/100ML; MG/100ML; MG/100ML; MG/100ML
100 INJECTION, SOLUTION INTRAVENOUS CONTINUOUS PRN
Status: DISCONTINUED | OUTPATIENT
Start: 2020-12-16 | End: 2020-12-19 | Stop reason: SDUPTHER

## 2020-12-16 RX ORDER — NALOXONE HCL 0.4 MG/ML
0.04 VIAL (ML) INJECTION AS NEEDED
Status: DISCONTINUED | OUTPATIENT
Start: 2020-12-16 | End: 2020-12-16 | Stop reason: HOSPADM

## 2020-12-16 RX ORDER — PROPOFOL 10 MG/ML
VIAL (ML) INTRAVENOUS AS NEEDED
Status: DISCONTINUED | OUTPATIENT
Start: 2020-12-16 | End: 2020-12-16 | Stop reason: SURG

## 2020-12-16 RX ORDER — LIDOCAINE HYDROCHLORIDE 10 MG/ML
0.5 INJECTION, SOLUTION EPIDURAL; INFILTRATION; INTRACAUDAL; PERINEURAL ONCE AS NEEDED
Status: DISCONTINUED | OUTPATIENT
Start: 2020-12-16 | End: 2020-12-16 | Stop reason: SDUPTHER

## 2020-12-16 RX ORDER — BETAMETHASONE DIPROPIONATE 0.05 %
OINTMENT (GRAM) TOPICAL EVERY 12 HOURS SCHEDULED
Status: DISCONTINUED | OUTPATIENT
Start: 2020-12-16 | End: 2020-12-19 | Stop reason: HOSPADM

## 2020-12-16 RX ORDER — OXYCODONE HCL 20 MG/1
20 TABLET, FILM COATED, EXTENDED RELEASE ORAL ONCE
Status: COMPLETED | OUTPATIENT
Start: 2020-12-16 | End: 2020-12-16

## 2020-12-16 RX ORDER — FENTANYL CITRATE 50 UG/ML
25 INJECTION, SOLUTION INTRAMUSCULAR; INTRAVENOUS
Status: COMPLETED | OUTPATIENT
Start: 2020-12-16 | End: 2020-12-16

## 2020-12-16 RX ORDER — DIPHENHYDRAMINE HCL 25 MG
25 CAPSULE ORAL NIGHTLY PRN
Status: DISCONTINUED | OUTPATIENT
Start: 2020-12-16 | End: 2020-12-19 | Stop reason: HOSPADM

## 2020-12-16 RX ORDER — MIDAZOLAM HYDROCHLORIDE 1 MG/ML
1 INJECTION INTRAMUSCULAR; INTRAVENOUS
Status: COMPLETED | OUTPATIENT
Start: 2020-12-16 | End: 2020-12-16

## 2020-12-16 RX ORDER — IBUPROFEN 600 MG/1
600 TABLET ORAL ONCE AS NEEDED
Status: DISCONTINUED | OUTPATIENT
Start: 2020-12-16 | End: 2020-12-16 | Stop reason: HOSPADM

## 2020-12-16 RX ORDER — SODIUM CHLORIDE 0.9 % (FLUSH) 0.9 %
10 SYRINGE (ML) INJECTION EVERY 12 HOURS SCHEDULED
Status: DISCONTINUED | OUTPATIENT
Start: 2020-12-16 | End: 2020-12-16 | Stop reason: HOSPADM

## 2020-12-16 RX ORDER — SODIUM CHLORIDE 0.9 % (FLUSH) 0.9 %
10 SYRINGE (ML) INJECTION AS NEEDED
Status: DISCONTINUED | OUTPATIENT
Start: 2020-12-16 | End: 2020-12-16 | Stop reason: HOSPADM

## 2020-12-16 RX ORDER — FENTANYL CITRATE 50 UG/ML
25 INJECTION, SOLUTION INTRAMUSCULAR; INTRAVENOUS
Status: DISCONTINUED | OUTPATIENT
Start: 2020-12-16 | End: 2020-12-16 | Stop reason: HOSPADM

## 2020-12-16 RX ORDER — HYDROMORPHONE HYDROCHLORIDE 1 MG/ML
0.5 INJECTION, SOLUTION INTRAMUSCULAR; INTRAVENOUS; SUBCUTANEOUS
Status: DISCONTINUED | OUTPATIENT
Start: 2020-12-16 | End: 2020-12-16 | Stop reason: HOSPADM

## 2020-12-16 RX ORDER — FAMOTIDINE 10 MG/ML
20 INJECTION, SOLUTION INTRAVENOUS EVERY 12 HOURS SCHEDULED
Status: DISCONTINUED | OUTPATIENT
Start: 2020-12-16 | End: 2020-12-19 | Stop reason: HOSPADM

## 2020-12-16 RX ORDER — ONDANSETRON 2 MG/ML
INJECTION INTRAMUSCULAR; INTRAVENOUS AS NEEDED
Status: DISCONTINUED | OUTPATIENT
Start: 2020-12-16 | End: 2020-12-16 | Stop reason: SURG

## 2020-12-16 RX ORDER — SODIUM CHLORIDE 0.9 % (FLUSH) 0.9 %
3 SYRINGE (ML) INJECTION EVERY 12 HOURS SCHEDULED
Status: DISCONTINUED | OUTPATIENT
Start: 2020-12-16 | End: 2020-12-19 | Stop reason: HOSPADM

## 2020-12-16 RX ORDER — LABETALOL HYDROCHLORIDE 5 MG/ML
5 INJECTION, SOLUTION INTRAVENOUS
Status: DISCONTINUED | OUTPATIENT
Start: 2020-12-16 | End: 2020-12-16 | Stop reason: HOSPADM

## 2020-12-16 RX ORDER — SODIUM CHLORIDE 9 MG/ML
75 INJECTION, SOLUTION INTRAVENOUS CONTINUOUS
Status: DISPENSED | OUTPATIENT
Start: 2020-12-16 | End: 2020-12-18

## 2020-12-16 RX ORDER — ONDANSETRON 2 MG/ML
4 INJECTION INTRAMUSCULAR; INTRAVENOUS EVERY 6 HOURS PRN
Status: DISCONTINUED | OUTPATIENT
Start: 2020-12-16 | End: 2020-12-19 | Stop reason: HOSPADM

## 2020-12-16 RX ORDER — FLUMAZENIL 0.1 MG/ML
0.2 INJECTION INTRAVENOUS AS NEEDED
Status: DISCONTINUED | OUTPATIENT
Start: 2020-12-16 | End: 2020-12-16 | Stop reason: HOSPADM

## 2020-12-16 RX ORDER — SODIUM CHLORIDE 9 MG/ML
75 INJECTION, SOLUTION INTRAVENOUS CONTINUOUS
Status: DISPENSED | OUTPATIENT
Start: 2020-12-16 | End: 2020-12-17

## 2020-12-16 RX ORDER — LIDOCAINE HYDROCHLORIDE 10 MG/ML
0.5 INJECTION, SOLUTION EPIDURAL; INFILTRATION; INTRACAUDAL; PERINEURAL ONCE AS NEEDED
Status: DISCONTINUED | OUTPATIENT
Start: 2020-12-16 | End: 2020-12-16 | Stop reason: HOSPADM

## 2020-12-16 RX ORDER — ROCURONIUM BROMIDE 10 MG/ML
INJECTION, SOLUTION INTRAVENOUS AS NEEDED
Status: DISCONTINUED | OUTPATIENT
Start: 2020-12-16 | End: 2020-12-16 | Stop reason: SURG

## 2020-12-16 RX ORDER — MAGNESIUM HYDROXIDE 1200 MG/15ML
LIQUID ORAL AS NEEDED
Status: DISCONTINUED | OUTPATIENT
Start: 2020-12-16 | End: 2020-12-16 | Stop reason: HOSPADM

## 2020-12-16 RX ORDER — BUPIVACAINE HCL/0.9 % NACL/PF 0.1 %
2 PLASTIC BAG, INJECTION (ML) EPIDURAL ONCE
Status: COMPLETED | OUTPATIENT
Start: 2020-12-16 | End: 2020-12-16

## 2020-12-16 RX ORDER — ONDANSETRON 2 MG/ML
4 INJECTION INTRAMUSCULAR; INTRAVENOUS EVERY 6 HOURS PRN
Status: DISCONTINUED | OUTPATIENT
Start: 2020-12-16 | End: 2020-12-16 | Stop reason: SDUPTHER

## 2020-12-16 RX ORDER — FAMOTIDINE 20 MG/1
20 TABLET, FILM COATED ORAL EVERY 12 HOURS SCHEDULED
Status: DISCONTINUED | OUTPATIENT
Start: 2020-12-16 | End: 2020-12-19 | Stop reason: HOSPADM

## 2020-12-16 RX ORDER — FENTANYL CITRATE 50 UG/ML
INJECTION, SOLUTION INTRAMUSCULAR; INTRAVENOUS AS NEEDED
Status: DISCONTINUED | OUTPATIENT
Start: 2020-12-16 | End: 2020-12-16 | Stop reason: SURG

## 2020-12-16 RX ORDER — ONDANSETRON 4 MG/1
4 TABLET, FILM COATED ORAL EVERY 6 HOURS PRN
Status: DISCONTINUED | OUTPATIENT
Start: 2020-12-16 | End: 2020-12-19 | Stop reason: HOSPADM

## 2020-12-16 RX ORDER — SODIUM CHLORIDE, SODIUM LACTATE, POTASSIUM CHLORIDE, CALCIUM CHLORIDE 600; 310; 30; 20 MG/100ML; MG/100ML; MG/100ML; MG/100ML
1000 INJECTION, SOLUTION INTRAVENOUS CONTINUOUS
Status: DISPENSED | OUTPATIENT
Start: 2020-12-16 | End: 2020-12-17

## 2020-12-16 RX ORDER — DEXAMETHASONE SODIUM PHOSPHATE 4 MG/ML
4 INJECTION, SOLUTION INTRA-ARTICULAR; INTRALESIONAL; INTRAMUSCULAR; INTRAVENOUS; SOFT TISSUE ONCE AS NEEDED
Status: COMPLETED | OUTPATIENT
Start: 2020-12-16 | End: 2020-12-16

## 2020-12-16 RX ORDER — SODIUM CHLORIDE, SODIUM LACTATE, POTASSIUM CHLORIDE, CALCIUM CHLORIDE 600; 310; 30; 20 MG/100ML; MG/100ML; MG/100ML; MG/100ML
9 INJECTION, SOLUTION INTRAVENOUS CONTINUOUS
Status: DISCONTINUED | OUTPATIENT
Start: 2020-12-16 | End: 2020-12-19 | Stop reason: SDUPTHER

## 2020-12-16 RX ADMIN — OXYCODONE HYDROCHLORIDE AND ACETAMINOPHEN 1 TABLET: 10; 325 TABLET ORAL at 22:44

## 2020-12-16 RX ADMIN — FENTANYL CITRATE 100 MCG: 50 INJECTION, SOLUTION INTRAMUSCULAR; INTRAVENOUS at 08:49

## 2020-12-16 RX ADMIN — Medication 1 TABLET: at 17:57

## 2020-12-16 RX ADMIN — SODIUM CHLORIDE, POTASSIUM CHLORIDE, SODIUM LACTATE AND CALCIUM CHLORIDE 1000 ML: 600; 310; 30; 20 INJECTION, SOLUTION INTRAVENOUS at 07:46

## 2020-12-16 RX ADMIN — SCOPALAMINE 1 PATCH: 1 PATCH, EXTENDED RELEASE TRANSDERMAL at 08:12

## 2020-12-16 RX ADMIN — MIDAZOLAM 1 MG: 1 INJECTION INTRAMUSCULAR; INTRAVENOUS at 08:31

## 2020-12-16 RX ADMIN — OXYCODONE HYDROCHLORIDE AND ACETAMINOPHEN 1 TABLET: 10; 325 TABLET ORAL at 16:24

## 2020-12-16 RX ADMIN — SERTRALINE HYDROCHLORIDE 50 MG: 50 TABLET, FILM COATED ORAL at 20:50

## 2020-12-16 RX ADMIN — FENTANYL CITRATE 25 MCG: 50 INJECTION INTRAMUSCULAR; INTRAVENOUS at 11:23

## 2020-12-16 RX ADMIN — DEXAMETHASONE SODIUM PHOSPHATE 4 MG: 4 INJECTION, SOLUTION INTRAMUSCULAR; INTRAVENOUS at 08:12

## 2020-12-16 RX ADMIN — GABAPENTIN 600 MG: 300 CAPSULE ORAL at 22:44

## 2020-12-16 RX ADMIN — ONDANSETRON HYDROCHLORIDE 4 MG: 2 SOLUTION INTRAMUSCULAR; INTRAVENOUS at 10:12

## 2020-12-16 RX ADMIN — MUPIROCIN: 20 OINTMENT TOPICAL at 14:44

## 2020-12-16 RX ADMIN — ROCURONIUM BROMIDE 25 MG: 10 INJECTION INTRAVENOUS at 08:52

## 2020-12-16 RX ADMIN — FENTANYL CITRATE 100 MCG: 50 INJECTION, SOLUTION INTRAMUSCULAR; INTRAVENOUS at 08:56

## 2020-12-16 RX ADMIN — CEFAZOLIN SODIUM 1 G: 1 INJECTION, SOLUTION INTRAVENOUS at 16:21

## 2020-12-16 RX ADMIN — Medication 2 G: at 09:01

## 2020-12-16 RX ADMIN — SODIUM CHLORIDE, PRESERVATIVE FREE 3 ML: 5 INJECTION INTRAVENOUS at 13:42

## 2020-12-16 RX ADMIN — DULOXETINE HYDROCHLORIDE 60 MG: 30 CAPSULE, DELAYED RELEASE ORAL at 14:44

## 2020-12-16 RX ADMIN — PROPOFOL 50 MG: 10 INJECTION, EMULSION INTRAVENOUS at 08:51

## 2020-12-16 RX ADMIN — HYDROMORPHONE HYDROCHLORIDE 0.5 MG: 1 INJECTION, SOLUTION INTRAMUSCULAR; INTRAVENOUS; SUBCUTANEOUS at 11:26

## 2020-12-16 RX ADMIN — PROPOFOL 100 MG: 10 INJECTION, EMULSION INTRAVENOUS at 09:46

## 2020-12-16 RX ADMIN — SODIUM CHLORIDE, POTASSIUM CHLORIDE, SODIUM LACTATE AND CALCIUM CHLORIDE 100 ML/HR: 600; 310; 30; 20 INJECTION, SOLUTION INTRAVENOUS at 07:45

## 2020-12-16 RX ADMIN — PROPOFOL 100 MG: 10 INJECTION, EMULSION INTRAVENOUS at 08:52

## 2020-12-16 RX ADMIN — FAMOTIDINE 20 MG: 20 TABLET, FILM COATED ORAL at 14:44

## 2020-12-16 RX ADMIN — HYDROMORPHONE HYDROCHLORIDE 1 MG: 1 INJECTION, SOLUTION INTRAMUSCULAR; INTRAVENOUS; SUBCUTANEOUS at 20:50

## 2020-12-16 RX ADMIN — FENTANYL CITRATE 25 MCG: 50 INJECTION INTRAMUSCULAR; INTRAVENOUS at 11:28

## 2020-12-16 RX ADMIN — DEXAMETHASONE SODIUM PHOSPHATE 4 MG: 4 INJECTION, SOLUTION INTRAMUSCULAR; INTRAVENOUS at 10:12

## 2020-12-16 RX ADMIN — HYDROMORPHONE HYDROCHLORIDE 1 MG: 1 INJECTION, SOLUTION INTRAMUSCULAR; INTRAVENOUS; SUBCUTANEOUS at 13:38

## 2020-12-16 RX ADMIN — FENTANYL CITRATE 25 MCG: 50 INJECTION INTRAMUSCULAR; INTRAVENOUS at 11:18

## 2020-12-16 RX ADMIN — LIDOCAINE HYDROCHLORIDE 100 MG: 20 INJECTION, SOLUTION INTRAVENOUS at 08:51

## 2020-12-16 RX ADMIN — FENTANYL CITRATE 100 MCG: 50 INJECTION, SOLUTION INTRAMUSCULAR; INTRAVENOUS at 09:26

## 2020-12-16 RX ADMIN — MIDAZOLAM 1 MG: 1 INJECTION INTRAMUSCULAR; INTRAVENOUS at 08:12

## 2020-12-16 RX ADMIN — SUFENTANIL CITRATE 30 MCG: 50 INJECTION EPIDURAL; INTRAVENOUS at 09:54

## 2020-12-16 RX ADMIN — FAMOTIDINE 20 MG: 20 TABLET, FILM COATED ORAL at 20:50

## 2020-12-16 RX ADMIN — SODIUM CHLORIDE, POTASSIUM CHLORIDE, SODIUM LACTATE AND CALCIUM CHLORIDE: 600; 310; 30; 20 INJECTION, SOLUTION INTRAVENOUS at 10:25

## 2020-12-16 RX ADMIN — HYDROMORPHONE HYDROCHLORIDE 0.5 MG: 1 INJECTION, SOLUTION INTRAMUSCULAR; INTRAVENOUS; SUBCUTANEOUS at 11:16

## 2020-12-16 RX ADMIN — FENTANYL CITRATE 25 MCG: 50 INJECTION INTRAMUSCULAR; INTRAVENOUS at 11:40

## 2020-12-16 RX ADMIN — SODIUM CHLORIDE 75 ML/HR: 9 INJECTION, SOLUTION INTRAVENOUS at 13:42

## 2020-12-16 RX ADMIN — OXYCODONE HYDROCHLORIDE 20 MG: 20 TABLET, FILM COATED, EXTENDED RELEASE ORAL at 07:22

## 2020-12-16 RX ADMIN — SUFENTANIL CITRATE 20 MCG: 50 INJECTION EPIDURAL; INTRAVENOUS at 09:44

## 2020-12-16 NOTE — ANESTHESIA POSTPROCEDURE EVALUATION
Patient: Adela Arauz    Procedure Summary     Date: 12/16/20 Room / Location:  [facility] OR  /  [facility] OR    Anesthesia Start: 0848 Anesthesia Stop: 1102    Procedure: LEFT LATERAL LUMBAR INTERBODY FUSION L3-5 WITH INSTRUMENTATION L3-4 (Left Spine Lumbar) Diagnosis: (M54.16)    Surgeon: ALEXIS Dunaway MD Provider: Shaheed Mondragon CRNA    Anesthesia Type: general ASA Status: 2          Anesthesia Type: general    Vitals  Vitals Value Taken Time   /86 12/16/20 1220   Temp 97.9 °F (36.6 °C) 12/16/20 1220   Pulse 94 12/16/20 1223   Resp 12 12/16/20 1220   SpO2 96 % 12/16/20 1223   Vitals shown include unvalidated device data.        Post Anesthesia Care and Evaluation    Patient location during evaluation: PACU  Patient participation: complete - patient participated  Level of consciousness: awake and alert  Pain management: adequate  Airway patency: patent  Anesthetic complications: No anesthetic complications  PONV Status: none  Cardiovascular status: acceptable and hemodynamically stable  Respiratory status: acceptable  Hydration status: acceptable    Comments: Blood pressure 142/67, pulse 86, temperature 97.9 °F (36.6 °C), temperature source Axillary, resp. rate 18, last menstrual period 04/01/2014, SpO2 94 %, not currently breastfeeding.    Patient discharged from PACU based upon Marin score. Please see RN notes for further details

## 2020-12-16 NOTE — OP NOTE
LUMBAR LATERAL INTERBODY FUSION  Procedure Note    Adela Arauz  12/16/2020    Pre-op Diagnosis:     1. Increasing chronic back pain.  2. Bilateral buttock, thigh and leg radiculopathy, now right worse than left.   3. Severe neurogenic claudication.   4. Multilevel lumbar degenerative disc disease worse L3-S1.   5. Degenerative lumbar scoliosis, L3-S1, concave left L5-S1, concave right L4-5.   6. Facet arthropathy, L4-S1.   7. Large disc herniation right central L3-4  8. Central and foraminal stenosis, L3-S1, worse central L3-4, right L4-5, severe left L5-S1.   9. Rheumatoid arthritis.   10. History of chronic osteomyelitis, right forearm, status post external fixation, Dr. Cr.  11.  Status post anterior decompression, ALIF with instrumentation L5-S1, 12/2/2020    Post-op Diagnosis:    same    Procedure/CPT® Codes:    1.  Left Lateral Lumbar Interbody Fusion L3-4, L4-5  2.  Anterior spinal instrumentation L3-4 (CoreLink lateral plate and screws)  3.  Use of titanium interbody biomechanical device for fusion L3-4, L4-5 (CoreLink titanium spacer x 2)  4.  Use of allograft matrix for fusion  5.  Use of allograft liquid for fusion (BioBurst)  6.  Use of fluoroscopy for confirmation of surgical level, placement of titanium spacers and instrumentation  7.  Intraoperative neural monitoring    Anesthesia: General    Surgeon: MELCHOR Dunaway MD    Assistant: Michael Hudson PA-C    Estimated Blood Loss: Minimal    Complications: None    Condition: Stable to PACU.    Indications:    The patient is a 55-year-old who sees Charlene Huntley nurse practitioner for medical issues.  She presented to the office with increasing chronic back pain along with symptoms down both lower extremities consistent with radiculopathy, with the right side worse than the left.  She had complaints that were consistent also with severe neurogenic claudication.  Imaging studies revealed multilevel lumbar degenerative disc disease that was  worse from L4-S1.  She was noted to have scoliosis from L3-S1 that was concave to the left at L5-S1 and concave to the right at L4-5.  Both levels had facet arthropathy and central as well as foraminal stenosis that was worse on the right at L4-5 and on the left at L5-S1 corresponding to the scoliosis concavities.  She was also noted to have a rather large disc herniation at L3-4 that will also need to be addressed at a later date.     After failing conservative measures, it was mutually decided that surgery would be the best option. Risks, benefits, and complications of surgery were discussed with the patient. The patient appeared well informed and wished to proceed. We specifically discussed the risk of infection, blood loss, nerve root injury, CSF leak, and the possibility of incomplete resolution of symptoms. We also discussed the possible risk of a nonunion and the potential need for additional surgery in the event of a pseudoarthrosis or hardware failure.     We elected proceed with a staged operation.  The first stage of the procedure was performed on 12/2/2020 and involved and anterior decompression and ALIF with instrumentation of L5-S1.  Today were performing the second stage of the procedure involving a left lateral fusion from L3-5.  It is planned that we will be returning to surgery for a final posterior procedure at a later date.    Operative Procedure:    After obtaining informed consent and verifying the correct operative site, the patient was brought to the operating room and placed supine on operating table.  A general anesthetic was provided by the anesthesia service with the assistance of an endotracheal tube.  Once this was appropriately positioned and secured, the patient was carefully rotated into the lateral decubitus position with the left side up.  All bony prominences were well-padded.  3 inch wide cloth tape was used to maintain the patient's position.  It was also used to tip open the  pelvis slightly allowing better access to the lumbar spine through a lateral approach.  Fluoroscopy was then used to identify the L3-4 and L4-5 levels, and the skin was marked for our planned incision.  The left flank was then prepped and draped in the usual sterile fashion.  A surgical timeout was taken to confirm this was the correct patient, we were working at the correct level, and that preoperative antibiotics were given in a timely fashion.    An oblique incision was created of the left flank using a 10 blade scalpel directly centered between the L3-4 and L4-5 segments. Dissection was carried bluntly through subcutaneous tissues. Blunt dissection was also carried between the external oblique and internal oblique musculature. The transversalis fascia was pierced allowing access to the retro-peroneal space. At this point, a series of neuro monitoring probes were used to safely access the L4-5 level. We used stimulated and free run EMG for this purpose. Once nerve roots were confirmed to be out of harm's way, self retaining retractors were placed for continuous exposure.     An annulotomy was then created at the L4-5 area using a 15 blade scalpel on a long handle. Angled Cardozo elevators were used to remove disc material off of the endplates. Disc material was removed using Kerrisons, pituitaries, and forward angled curettes. Once all disc material had been retrieved, I used the angled Cardozo elevator to divide the contralateral annulus. This assisted with mobilization of the vertebral body. We then used a series of endplate scrapers to prepare the endplates for interbody fusion. Trial spacers were malleted into position and for the disc space it was felt that a 12 mm x 50 mm implant would be the best fit to restore disc height. The disc space was then thoroughly irrigated with saline solution.     A titanium spacer from the CoreLink instrumentation set measuring 12 mm x 50 mm x 22 mm was then packed as tightly as  possible with a combination of allograft bone chips mixed with an allograft liquid called BioBurst. This titanium spacer was then malleted into the L4-5 disc space under fluoroscopic guidance. It was placed as a titanium interbody biomechanical device to assist with interbody fusion.  After confirming the spacer was properly positioned in both the AP and lateral projections, next attention was turned to the L3-4 segment.    The neuro monitoring probes were once again used this time to access L3-4.  Once nerve roots were confirmed to be out of harm's way, self retaining retractors were placed for continuous exposure of L3-4.  This disc space was prepared in an identical fashion as L4-5.  We used first the 15 blade scalpel to create an annulotomy.  A Cardozo elevators were used to remove disc material off of the endplates.  Disc material was removed using Kerrisons, pituitaries, and forward angled curettes.  Endplate scrapers were used to prepare the endplates for interbody fusion and the contralateral annulus was divided with the Cardozo elevator.  Trial spacers were then malleted into position across L3-4.  A second titanium spacer was then chosen matching the final trial.  In this case, again we used a 12 mm x 50 mm implant.  This spacer was packed as tightly as possible with the same combination of allograft bone chips mixed with allograft liquid called BioBurst.  This spacer was malleted into the L3-4 disc space under fluoroscopic guidance as a titanium interbody biomechanical device to assist with interbody fusion.    Once this spacer was confirmed to be properly positioned, I chose a 2-hole plate to help augment the fusion of L3-4. This plate was held into position using screws. I placed 30 mm screws into both L3 and into L4.     The wound was then irrigated thoroughly. A thorough inspection was then undertaken to ensure that we had adequate hemostasis. Bleeding at this point was controlled using FloSeal and bipolar  cautery. Closure was then accomplished with a #1 Vicryl reapproximating the transversalis fascia as well as the internal and external oblique musculature. Immediate subcutaneous tissues were closed with a 3-0 Vicryl and skin closure was accomplished using Mastisol and Steri-Strips. The wound was then sterilely dressed. The patient was then carefully rotated supine onto a hospital gurney, extubated, and sent to the recovery room in good stable condition.     The patient tolerated the procedure well. There were no complications. We estimated blood loss to be minimal. The patient remained hemodynamically stable.     Michael Hudson PA-C provided critical assistance during the procedure. His assistance was medically necessary in order to allow the procedure to occur in the most safe and efficient manner.    MELCHOR Dunaway MD     Date: 12/16/2020  Time: 10:58 CST

## 2020-12-16 NOTE — PLAN OF CARE
Goal Outcome Evaluation:  Plan of Care Reviewed With: patient  Progress: no change   Patient came from PACU today. OX4. Drowsy but will arouse to voice. , O2 at 3L. C/o severe pain, PRN IV pain meds given with some relief. DTV. Abd dressing CDI. Left flank dressing CDI. SCD. IVF. Patient is to have part 3 on Friday. Safety maintained. Will continue to monitor.

## 2020-12-16 NOTE — ANESTHESIA PROCEDURE NOTES
Airway  Date/Time: 12/16/2020 8:55 AM  Airway not difficult    General Information and Staff    Patient location during procedure: OR  CRNA: Shaheed Mondragon CRNA    Indications and Patient Condition  Indications for airway management: airway protection    Preoxygenated: yes  Mask difficulty assessment: 0 - not attempted    Final Airway Details  Final airway type: endotracheal airway      Successful airway: ETT  Cuffed: yes   Successful intubation technique: direct laryngoscopy  Blade: Ellison  Blade size: 2  ETT size (mm): 7.5  Cormack-Lehane Classification: grade I - full view of glottis  Placement verified by: chest auscultation and capnometry   Cuff volume (mL): 6  Measured from: teeth  ETT/EBT  to teeth (cm): 21  Number of attempts at approach: 1  Assessment: lips, teeth, and gum same as pre-op and atraumatic intubation

## 2020-12-17 ENCOUNTER — READMISSION MANAGEMENT (OUTPATIENT)
Dept: CALL CENTER | Facility: HOSPITAL | Age: 55
End: 2020-12-17

## 2020-12-17 DIAGNOSIS — F41.9 ANXIETY: ICD-10-CM

## 2020-12-17 DIAGNOSIS — F41.0 PANIC ATTACK: ICD-10-CM

## 2020-12-17 LAB
ANION GAP SERPL CALCULATED.3IONS-SCNC: 6 MMOL/L (ref 5–15)
BASOPHILS # BLD AUTO: 0.05 10*3/MM3 (ref 0–0.2)
BASOPHILS NFR BLD AUTO: 0.4 % (ref 0–1.5)
BUN SERPL-MCNC: 7 MG/DL (ref 6–20)
BUN/CREAT SERPL: 14 (ref 7–25)
CALCIUM SPEC-SCNC: 9 MG/DL (ref 8.6–10.5)
CHLORIDE SERPL-SCNC: 103 MMOL/L (ref 98–107)
CO2 SERPL-SCNC: 30 MMOL/L (ref 22–29)
CREAT SERPL-MCNC: 0.5 MG/DL (ref 0.57–1)
DEPRECATED RDW RBC AUTO: 42 FL (ref 37–54)
EOSINOPHIL # BLD AUTO: 0 10*3/MM3 (ref 0–0.4)
EOSINOPHIL NFR BLD AUTO: 0 % (ref 0.3–6.2)
ERYTHROCYTE [DISTWIDTH] IN BLOOD BY AUTOMATED COUNT: 14.3 % (ref 12.3–15.4)
GFR SERPL CREATININE-BSD FRML MDRD: 129 ML/MIN/1.73
GLUCOSE SERPL-MCNC: 133 MG/DL (ref 65–99)
HCT VFR BLD AUTO: 35.2 % (ref 34–46.6)
HGB BLD-MCNC: 10.9 G/DL (ref 12–15.9)
IMM GRANULOCYTES # BLD AUTO: 0.07 10*3/MM3 (ref 0–0.05)
IMM GRANULOCYTES NFR BLD AUTO: 0.6 % (ref 0–0.5)
LYMPHOCYTES # BLD AUTO: 1.38 10*3/MM3 (ref 0.7–3.1)
LYMPHOCYTES NFR BLD AUTO: 11.2 % (ref 19.6–45.3)
MCH RBC QN AUTO: 25.2 PG (ref 26.6–33)
MCHC RBC AUTO-ENTMCNC: 31 G/DL (ref 31.5–35.7)
MCV RBC AUTO: 81.5 FL (ref 79–97)
MONOCYTES # BLD AUTO: 1.29 10*3/MM3 (ref 0.1–0.9)
MONOCYTES NFR BLD AUTO: 10.5 % (ref 5–12)
NEUTROPHILS NFR BLD AUTO: 77.3 % (ref 42.7–76)
NEUTROPHILS NFR BLD AUTO: 9.54 10*3/MM3 (ref 1.7–7)
NRBC BLD AUTO-RTO: 0 /100 WBC (ref 0–0.2)
PLATELET # BLD AUTO: 400 10*3/MM3 (ref 140–450)
PMV BLD AUTO: 9.4 FL (ref 6–12)
POTASSIUM SERPL-SCNC: 4.5 MMOL/L (ref 3.5–5.2)
RBC # BLD AUTO: 4.32 10*6/MM3 (ref 3.77–5.28)
SODIUM SERPL-SCNC: 139 MMOL/L (ref 136–145)
WBC # BLD AUTO: 12.33 10*3/MM3 (ref 3.4–10.8)

## 2020-12-17 PROCEDURE — 80048 BASIC METABOLIC PNL TOTAL CA: CPT | Performed by: ORTHOPAEDIC SURGERY

## 2020-12-17 PROCEDURE — 25010000003 HYDROMORPHONE 1 MG/ML SOLUTION: Performed by: ORTHOPAEDIC SURGERY

## 2020-12-17 PROCEDURE — 97162 PT EVAL MOD COMPLEX 30 MIN: CPT | Performed by: PHYSICAL THERAPIST

## 2020-12-17 PROCEDURE — 85025 COMPLETE CBC W/AUTO DIFF WBC: CPT | Performed by: ORTHOPAEDIC SURGERY

## 2020-12-17 PROCEDURE — 63710000001 DIPHENHYDRAMINE PER 50 MG: Performed by: ORTHOPAEDIC SURGERY

## 2020-12-17 PROCEDURE — 25010000002 CEFAZOLIN 1-4 GM/50ML-% SOLUTION: Performed by: ORTHOPAEDIC SURGERY

## 2020-12-17 PROCEDURE — 97116 GAIT TRAINING THERAPY: CPT

## 2020-12-17 PROCEDURE — 97166 OT EVAL MOD COMPLEX 45 MIN: CPT

## 2020-12-17 RX ORDER — POLYETHYLENE GLYCOL 3350 17 G/17G
17 POWDER, FOR SOLUTION ORAL 2 TIMES DAILY
Status: DISCONTINUED | OUTPATIENT
Start: 2020-12-17 | End: 2020-12-19 | Stop reason: HOSPADM

## 2020-12-17 RX ORDER — ALPRAZOLAM 0.5 MG/1
0.5 TABLET ORAL 3 TIMES DAILY PRN
Qty: 90 TABLET | Refills: 0 | Status: SHIPPED | OUTPATIENT
Start: 2020-12-17 | End: 2021-01-27 | Stop reason: SDUPTHER

## 2020-12-17 RX ADMIN — GABAPENTIN 600 MG: 300 CAPSULE ORAL at 06:02

## 2020-12-17 RX ADMIN — FAMOTIDINE 20 MG: 20 TABLET, FILM COATED ORAL at 09:09

## 2020-12-17 RX ADMIN — POLYETHYLENE GLYCOL (3350) 17 G: 17 POWDER, FOR SOLUTION ORAL at 22:10

## 2020-12-17 RX ADMIN — FAMOTIDINE 20 MG: 20 TABLET, FILM COATED ORAL at 21:31

## 2020-12-17 RX ADMIN — POLYETHYLENE GLYCOL (3350) 17 G: 17 POWDER, FOR SOLUTION ORAL at 09:09

## 2020-12-17 RX ADMIN — TIZANIDINE 4 MG: 4 TABLET ORAL at 13:16

## 2020-12-17 RX ADMIN — Medication 1 TABLET: at 09:09

## 2020-12-17 RX ADMIN — ALPRAZOLAM 0.5 MG: 0.5 TABLET ORAL at 22:10

## 2020-12-17 RX ADMIN — Medication 1 TABLET: at 17:10

## 2020-12-17 RX ADMIN — GABAPENTIN 600 MG: 300 CAPSULE ORAL at 21:30

## 2020-12-17 RX ADMIN — DULOXETINE HYDROCHLORIDE 60 MG: 30 CAPSULE, DELAYED RELEASE ORAL at 09:09

## 2020-12-17 RX ADMIN — OXYCODONE HYDROCHLORIDE AND ACETAMINOPHEN 1 TABLET: 10; 325 TABLET ORAL at 22:10

## 2020-12-17 RX ADMIN — TIZANIDINE 4 MG: 4 TABLET ORAL at 00:55

## 2020-12-17 RX ADMIN — HYDROMORPHONE HYDROCHLORIDE 1 MG: 1 INJECTION, SOLUTION INTRAMUSCULAR; INTRAVENOUS; SUBCUTANEOUS at 04:16

## 2020-12-17 RX ADMIN — SODIUM CHLORIDE, PRESERVATIVE FREE 3 ML: 5 INJECTION INTRAVENOUS at 21:31

## 2020-12-17 RX ADMIN — OXYCODONE HYDROCHLORIDE AND ACETAMINOPHEN 1 TABLET: 10; 325 TABLET ORAL at 09:09

## 2020-12-17 RX ADMIN — CEFAZOLIN SODIUM 1 G: 1 INJECTION, SOLUTION INTRAVENOUS at 00:55

## 2020-12-17 RX ADMIN — OXYCODONE HYDROCHLORIDE AND ACETAMINOPHEN 1 TABLET: 10; 325 TABLET ORAL at 13:16

## 2020-12-17 RX ADMIN — HYDROMORPHONE HYDROCHLORIDE 1 MG: 1 INJECTION, SOLUTION INTRAMUSCULAR; INTRAVENOUS; SUBCUTANEOUS at 21:30

## 2020-12-17 RX ADMIN — DIPHENHYDRAMINE HYDROCHLORIDE 25 MG: 25 CAPSULE ORAL at 21:31

## 2020-12-17 RX ADMIN — OXYCODONE HYDROCHLORIDE AND ACETAMINOPHEN 1 TABLET: 10; 325 TABLET ORAL at 17:11

## 2020-12-17 RX ADMIN — GABAPENTIN 600 MG: 300 CAPSULE ORAL at 13:16

## 2020-12-17 RX ADMIN — CEFAZOLIN SODIUM 1 G: 1 INJECTION, SOLUTION INTRAVENOUS at 09:09

## 2020-12-17 RX ADMIN — SERTRALINE HYDROCHLORIDE 50 MG: 50 TABLET, FILM COATED ORAL at 21:31

## 2020-12-17 NOTE — PAYOR COMM NOTE
"FROM: DANG NEIL  PHONE: [phone]  FAX: [phone]    PENDING: [id number]  ID: [id number]    Ran Kenyon (55 y.o. Female)     Date of Birth Social Security Number Address Home Phone MRN    1965  [id number] [facility] [phone] [id number]    Anabaptism Marital Status          Religious        Admission Date Admission Type Admitting Provider Attending Provider Department, Room/Bed    12/16/20 Elective ALEXIS Dunaway MD Strenge, K Brandon, MD [facility] 3A, 341/1    Discharge Date Discharge Disposition Discharge Destination                       Attending Provider: ALEXIS Dunaway MD    Allergies: No Known Allergies    Isolation: None   Infection: None   Code Status: CPR    Ht: 162.6 cm (64\")   Wt: 88.9 kg (196 lb)    Admission Cmt: None   Principal Problem: None                Active Insurance as of 12/16/2020     Primary Coverage     Payor Plan Insurance Group Employer/Plan Group    [employer] [place] [id number]     Payor Plan Address Payor Plan Phone Number Payor Plan Fax Number Effective Dates    [address] [address] [phone]  10/25/2016 - None Entered    [facility] [id number]       Subscriber Name Subscriber Birth Date Member ID       RAN KENYON 1965 [id number]                 Emergency Contacts      (Rel.) Home Phone Work Phone Mobile Phone    DavonteManny (Spouse) [phone] -- [phone]    Jerri Martinez (Daughter) [phone] -- [phone]               History & Physical      ALEXIS Dunaway MD at 12/16/20 0703          H&P reviewed. The patient was examined and there are no changes to the H&P.          Electronically signed by ALEXIS Dunaway MD at 12/16/20 0703   Source Note     Scan on 12/16/2020 by [employer], [employer]: H&amp;P, OIWK, 12/11/2020          Electronically signed by [employer], [employer] at 12/11/20 1305             H&P signed by [employer], [employer] at 12/11/20 1305      " Scan on 12/16/2020 by [employer] On[employer], [employer]: H&amp;P, OIWK, 12/11/2020          Electronically signed by [employer] On[employer], [employer] at 12/11/20 1305       Vital Signs (last 2 days)     Date/Time   Temp   Temp src   Pulse   Resp   BP   Patient Position   SpO2    12/17/20 0300   98.3 (36.8)   Oral   77   18   110/58   Lying   98    12/17/20 0043   98.8 (37.1)   Oral   77   18   124/78   Lying   97    12/16/20 2020   98.3 (36.8)   Oral   70   18   124/79   Sitting   97    12/16/20 1551   98.2 (36.8)   Oral   83   16   139/83   Lying   98    12/16/20 1414   --   --   81   16   --   --   96    12/16/20 1344   97.9 (36.6)   Axillary   86   18   142/67   Lying   94    12/16/20 1310   97.8 (36.6)   --   83   18   142/84   Lying   96    12/16/20 1238   98 (36.7)   --   93   18   143/84   Lying   90    12/16/20 1220   97.9 (36.6)   Temporal   95   12   144/86   --   95    12/16/20 1150   --   --   96   15   151/81   --   93    12/16/20 1140   --   --   96   16   142/70   --   (!) 88    12/16/20 1135   --   --   97   15   --   --   99    12/16/20 1125   --   --   99   14   145/92   --   98    12/16/20 1114   --   --   102   16   (!) 134/103   --   99    12/16/20 1109   --   --   99   14   121/80   --   99    12/16/20 1104   --   --   98   12   132/78   --   99    12/16/20 1059   97.3 (36.3)   Temporal   100   14   122/68   Lying   100    12/16/20 0814   --   --   76   --   --   --   100    12/16/20 0702   97.2 (36.2)   Temporal   93   18   138/51   Sitting   100              Facility-Administered Medications as of 12/17/2020   Medication Dose Route Frequency Provider Last Rate Last Admin   • [COMPLETED] acetaminophen (TYLENOL) tablet 1,000 mg  1,000 mg Oral Once Riki Mclean MD   1,000 mg at 12/02/20 0643   • ALPRAZolam (XANAX) tablet 0.5 mg  0.5 mg Oral TID PRN ALEXIS Dunaway MD       • betamethasone dipropionate (DIPROLENE) 0.05 % ointment   Topical Q12H ALEXIS Dunaway MD       • calcium carb-cholecalciferol  600-800 MG-UNIT tablet 1 tablet  1 tablet Oral BID With Meals ALEXIS Dunaway MD   1 tablet at 12/16/20 1757   • [COMPLETED] ceFAZolin (ANCEF) IVPB 1 g  1 g Intravenous Q8H ALEXIS Dunaway MD   1 g at 12/03/20 0918   • ceFAZolin (ANCEF) IVPB 1 g  1 g Intravenous Q8H ALEXIS Dunaway MD   1 g at 12/17/20 0055   • [COMPLETED] ceFAZolin in 0.9% normal saline (ANCEF) IVPB solution 2 g  2 g Intravenous Once ALEXIS Dunaway MD   2 g at 12/02/20 0739   • [COMPLETED] ceFAZolin in 0.9% normal saline (ANCEF) IVPB solution 2 g  2 g Intravenous Once ALEXIS Dunaway MD   2 g at 12/16/20 0901   • [COMPLETED] dexamethasone (DECADRON) injection 4 mg  4 mg Intravenous Once PRN Riki Mclean MD   4 mg at 12/02/20 0643   • [COMPLETED] dexamethasone (DECADRON) injection 4 mg  4 mg Intravenous Once PRN Riki Mclean MD   4 mg at 12/16/20 0812   • diphenhydrAMINE (BENADRYL) capsule 25 mg  25 mg Oral Nightly PRN ALEXIS Dunaway MD       • DULoxetine (CYMBALTA) DR capsule 60 mg  60 mg Oral Daily ALEXIS Dunaway MD   60 mg at 12/16/20 1444   • famotidine (PEPCID) injection 20 mg  20 mg Intravenous Q12H ALEXIS Dunaway MD        Or   • famotidine (PEPCID) tablet 20 mg  20 mg Oral Q12H ALEXIS Dunaway MD   20 mg at 12/16/20 2050   • [COMPLETED] fentaNYL citrate (PF) (SUBLIMAZE) injection 25 mcg  25 mcg Intravenous Q5 Min PRN Riki Mclean MD   25 mcg at 12/16/20 1140   • fluticasone (FLONASE) 50 MCG/ACT nasal spray 2 spray  2 spray Nasal Daily ALEXIS Dunaway MD       • gabapentin (NEURONTIN) capsule 600 mg  600 mg Oral Q8H ALEXIS Dunaway MD   600 mg at 12/17/20 0602   • HYDROmorphone (DILAUDID) injection 1 mg  1 mg Intravenous Q3H PRN ALEXIS Dunaway MD   1 mg at 12/17/20 0416   • lactated ringers infusion 1,000 mL  1,000 mL Intravenous Continuous ALEXIS Dunaway MD   Stopped at 12/16/20 1336   • lactated ringers infusion  100 mL/hr Intravenous  Continuous PRN ALEXIS Dunaway MD   Stopped at 20 1336   • lactated ringers infusion  9 mL/hr Intravenous Continuous ALEXIS Dunaway MD       • [COMPLETED] midazolam (VERSED) injection 1 mg  1 mg Intravenous Q10 Min PRN Riki Mclean MD   1 mg at 20 0659   • [COMPLETED] midazolam (VERSED) injection 1 mg  1 mg Intravenous Q10 Min PRN Riki Mclean MD   1 mg at 20 0831   • mupirocin (BACTROBAN) 2 % ointment   Topical Q24H ALEXIS Dunaway MD   Given at 20 1444   • ondansetron (ZOFRAN) tablet 4 mg  4 mg Oral Q6H PRN ALEXIS Dunaway MD        Or   • ondansetron (ZOFRAN) injection 4 mg  4 mg Intravenous Q6H PRN ALEXIS Dunaway MD       • [COMPLETED] oxyCODONE ER (oxyCONTIN) 12 hr tablet 20 mg  20 mg Oral Once ALEXIS Dunaway MD   20 mg at 20 0552   • [COMPLETED] oxyCODONE ER (oxyCONTIN) 12 hr tablet 20 mg  20 mg Oral Once ALEXIS Dunaway MD   20 mg at 20 0722   • [COMPLETED] oxyCODONE-acetaminophen (PERCOCET)  MG per tablet 1 tablet  1 tablet Oral Once PRN Riki Mclean MD   1 tablet at 20 1039   • oxyCODONE-acetaminophen (PERCOCET)  MG per tablet 1 tablet  1 tablet Oral Q4H PRN ALEXIS Dunaway MD   1 tablet at 20 2244   • [] Scopolamine (TRANSDERM-SCOP) 1.5 MG/3DAYS patch 1 patch  1 patch Transdermal Continuous Riki Mclean MD   1 patch at 20 0643   • Scopolamine (TRANSDERM-SCOP) 1.5 MG/3DAYS patch 1 patch  1 patch Transdermal Continuous Riki Mclean MD   1 patch at 20 0812   • sertraline (ZOLOFT) tablet 50 mg  50 mg Oral Nightly ALEXIS Dunaway MD   50 mg at 20 2050   • sodium chloride 0.9 % flush 10 mL  10 mL Intravenous PRN ALEXIS Dunaway MD       • sodium chloride 0.9 % flush 3 mL  3 mL Intravenous Q12H ALEXIS Dunaway MD   3 mL at 20 1342   • [] sodium chloride 0.9 % infusion  75 mL/hr Intravenous Continuous  ALEXIS Dunaway MD 75 mL/hr at 12/03/20 0438 75 mL/hr at 12/03/20 0438   • sodium chloride 0.9 % infusion  75 mL/hr Intravenous Continuous ALEXIS Dunaway MD       • sodium chloride 0.9 % infusion  75 mL/hr Intravenous Continuous ALEXIS Dunaway MD 75 mL/hr at 12/16/20 1342 75 mL/hr at 12/16/20 1342   • tiZANidine (ZANAFLEX) tablet 4 mg  4 mg Oral Q8H PRN ALEXIS Dunaway MD   4 mg at 12/17/20 0055     Lab Results (last 48 hours)     Procedure Component Value Units Date/Time    Basic Metabolic Panel [[id number]]  (Abnormal) Collected: 12/17/20 0426    Specimen: Blood Updated: 12/17/20 0513     Glucose 133 mg/dL      BUN 7 mg/dL      Creatinine 0.50 mg/dL      Sodium 139 mmol/L      Potassium 4.5 mmol/L      Chloride 103 mmol/L      CO2 30.0 mmol/L      Calcium 9.0 mg/dL      eGFR Non African Amer 129 mL/min/1.73      BUN/Creatinine Ratio 14.0     Anion Gap 6.0 mmol/L     Narrative:      GFR Normal >60  Chronic Kidney Disease <60  Kidney Failure <15      CBC & Differential [[id number]]  (Abnormal) Collected: 12/17/20 0426    Specimen: Blood Updated: 12/17/20 0456    Narrative:      The following orders were created for panel order CBC & Differential.  Procedure                               Abnormality         Status                     ---------                               -----------         ------                     CBC Auto Differential[[id number]]        Abnormal            Final result                 Please view results for these tests on the individual orders.    CBC Auto Differential [[id number]]  (Abnormal) Collected: 12/17/20 0426    Specimen: Blood Updated: 12/17/20 0456     WBC 12.33 10*3/mm3      RBC 4.32 10*6/mm3      Hemoglobin 10.9 g/dL      Hematocrit 35.2 %      MCV 81.5 fL      MCH 25.2 pg      MCHC 31.0 g/dL      RDW 14.3 %      RDW-SD 42.0 fl      MPV 9.4 fL      Platelets 400 10*3/mm3      Neutrophil % 77.3 %      Lymphocyte % 11.2 %      Monocyte % 10.5 %      Eosinophil % 0.0 %       Basophil % 0.4 %      Immature Grans % 0.6 %      Neutrophils, Absolute 9.54 10*3/mm3      Lymphocytes, Absolute 1.38 10*3/mm3      Monocytes, Absolute 1.29 10*3/mm3      Eosinophils, Absolute 0.00 10*3/mm3      Basophils, Absolute 0.05 10*3/mm3      Immature Grans, Absolute 0.07 10*3/mm3      nRBC 0.0 /100 WBC           Imaging Results (Last 48 Hours)     Procedure Component Value Units Date/Time    XR Spine Lumbar AP & Lateral [[id number]] Collected: 12/16/20 1112     Updated: 12/16/20 1232    Narrative:      EXAMINATION: XR SPINE LUMBAR AP AND LATERAL-     12/16/2020 8:50 AM CST     HISTORY: Lumbar fusion.     Number of fluoroscopic spot images obtained = 10.     Fluoroscopy dose in Air Kerma mGy = 23.19.     Fluoroscopy time = 1.0 minutes.     Spot images document L3-4 and L4-5 discectomy.  Intervertebral spacers placed.  Left lateral hardware placed at L3-4.     Previously performed L5-S1 fusion.     This report was finalized on 12/16/2020 11:13 by Dr. Price Claudio MD.    FL C Arm During Surgery [[id number]] Collected: 12/16/20 1112     Updated: 12/16/20 1232    Narrative:      EXAMINATION: XR SPINE LUMBAR AP AND LATERAL-     12/16/2020 8:50 AM CST     HISTORY: Lumbar fusion.     Number of fluoroscopic spot images obtained = 10.     Fluoroscopy dose in Air Kerma mGy = 23.19.     Fluoroscopy time = 1.0 minutes.     Spot images document L3-4 and L4-5 discectomy.  Intervertebral spacers placed.  Left lateral hardware placed at L3-4.     Previously performed L5-S1 fusion.     This report was finalized on 12/16/2020 11:13 by Dr. Price Claudio MD.        ECG/EMG Results (last 48 hours)     ** No results found for the last 48 hours. **          Orders (last 48 hrs)      Start     Ordered    12/18/20 0001  NPO Diet  Diet Effective Midnight      12/17/20 0620    12/17/20 0621  Case Management  Consult  Once     Provider:  (Not yet assigned)    12/17/20 0620    12/17/20 0600  CBC & Differential  Morning  Draw      12/16/20 1241    12/17/20 0600  Basic Metabolic Panel  Morning Draw      12/16/20 1241    12/17/20 0600  CBC Auto Differential  PROCEDURE ONCE      12/17/20 0002    12/16/20 2100  sertraline (ZOLOFT) tablet 50 mg  Nightly      12/16/20 1242    12/16/20 1800  calcium carb-cholecalciferol 600-800 MG-UNIT tablet 1 tablet  2 Times Daily With Meals      12/16/20 1242    12/16/20 1700  ceFAZolin (ANCEF) IVPB 1 g  Every 8 Hours      12/16/20 1242    12/16/20 1600  Neurovascular Checks  Every 4 Hours      12/16/20 1242    12/16/20 1500  DULoxetine (CYMBALTA) DR capsule 60 mg  Daily      12/16/20 1242    12/16/20 1400  gabapentin (NEURONTIN) capsule 600 mg  Every 8 Hours Scheduled      12/16/20 1242    12/16/20 1400  Incentive Spirometry  Every 2 Hours While Awake      12/16/20 1242    12/16/20 1330  betamethasone dipropionate (DIPROLENE) 0.05 % ointment  Every 12 Hours Scheduled      12/16/20 1242    12/16/20 1330  fluticasone (FLONASE) 50 MCG/ACT nasal spray 2 spray  Daily      12/16/20 1242    12/16/20 1330  mupirocin (BACTROBAN) 2 % ointment  Every 24 Hours Scheduled      12/16/20 1242    12/16/20 1330  sodium chloride 0.9 % flush 3 mL  Every 12 Hours Scheduled      12/16/20 1241    12/16/20 1330  sodium chloride 0.9 % infusion  Continuous      12/16/20 1241    12/16/20 1330  famotidine (PEPCID) injection 20 mg  Every 12 Hours Scheduled      12/16/20 1241    12/16/20 1330  famotidine (PEPCID) tablet 20 mg  Every 12 Hours Scheduled      12/16/20 1241    12/16/20 1330  sodium chloride 0.9 % infusion  Continuous      12/16/20 1242    12/16/20 1243  Oxygen Therapy- Blow by - Humidified; Titrate for SPO2: equal to or greater than, per policy, 92%  Continuous      12/16/20 1242    12/16/20 1243  Pulse Oximetry, Continuous  Continuous      12/16/20 1242    12/16/20 1243  Inpatient Family Practice Consult  Once     Comments: Serafin   Specialty:  Family Medicine  Provider:  Nathaniel Betancourt MD    12/16/20 1244     12/16/20 1242  ALPRAZolam (XANAX) tablet 0.5 mg  3 Times Daily PRN      12/16/20 1242    12/16/20 1242  HYDROmorphone (DILAUDID) injection 1 mg  Every 3 Hours PRN      12/16/20 1242    12/16/20 1242  oxyCODONE-acetaminophen (PERCOCET)  MG per tablet 1 tablet  Every 4 Hours PRN      12/16/20 1242    12/16/20 1242  tiZANidine (ZANAFLEX) tablet 4 mg  Every 8 Hours PRN      12/16/20 1242    12/16/20 1242  Code Status and Medical Interventions:  Continuous      12/16/20 1241    12/16/20 1242  Vital Signs  Per Hospital Policy      12/16/20 1242    12/16/20 1242  Ambulate Patient  Every Shift      12/16/20 1242    12/16/20 1242  Empty drain  Every Shift      12/16/20 1242    12/16/20 1242  Oxygen Therapy- Nasal Cannula; Titrate for SPO2: equal to or greater than, 92%, per policy  Continuous,   Status:  Canceled      12/16/20 1242    12/16/20 1242  Continuous Pulse Oximetry  Continuous,   Status:  Canceled      12/16/20 1242    12/16/20 1242  Diet Regular  Diet Effective Now      12/16/20 1241    12/16/20 1242  Advance Diet as Tolerated  Until Discontinued      12/16/20 1241    12/16/20 1242  PT Consult: Eval & Treat  Once      12/16/20 1241    12/16/20 1242  OT Consult: Eval & Treat  Once      12/16/20 1241    12/16/20 1242  Insert Peripheral IV  Once      12/16/20 1241    12/16/20 1242  Saline Lock & Maintain IV Access  Continuous      12/16/20 1241    12/16/20 1242  Place Sequential Compression Device  Once      12/16/20 1241    12/16/20 1242  Maintain Sequential Compression Device  Continuous      12/16/20 1241    12/16/20 1241  ondansetron (ZOFRAN) tablet 4 mg  Every 6 Hours PRN      12/16/20 1241    12/16/20 1241  ondansetron (ZOFRAN) injection 4 mg  Every 6 Hours PRN      12/16/20 1242    12/16/20 1241  sodium chloride 0.9 % flush 10 mL  As Needed      12/16/20 1241    12/16/20 1241  diphenhydrAMINE (BENADRYL) capsule 25 mg  Nightly PRN      12/16/20 1241    12/16/20 1241  ondansetron (ZOFRAN) injection 4 mg   Every 6 Hours PRN,   Status:  Discontinued      12/16/20 1241    12/16/20 1201  Inpatient Admission  Once      12/16/20 1200    12/16/20 1100  Vital signs every 5 minutes for 15 minutes, every 15 minutes thereafter.  Once,   Status:  Canceled      12/16/20 1059    12/16/20 1100  Call Anesthesiologist for additional IV Fluid bolus for Hypotension/Tachycardia  Continuous,   Status:  Canceled      12/16/20 1059    12/16/20 1100  Notify Anesthesia of Any Acute Changes in Patient Condition  Until Discontinued,   Status:  Canceled      12/16/20 1059    12/16/20 1100  Notify Anesthesia for Unrelieved Pain  Until Discontinued,   Status:  Canceled      12/16/20 1059    12/16/20 1100  Once DC criteria to floor met, follow surgeon's orders.  Until Discontinued,   Status:  Canceled      12/16/20 1059    12/16/20 1100  Discharge patient from PACU when discharge criteria is met.  Until Discontinued,   Status:  Canceled      12/16/20 1059    12/16/20 1059  Apply warming blanket  As Needed,   Status:  Canceled     Comments: For a recorded temp of <36.9 C    12/16/20 1059    12/16/20 1059  ibuprofen (ADVIL,MOTRIN) tablet 600 mg  Once As Needed,   Status:  Discontinued      12/16/20 1059 12/16/20 1059  oxyCODONE-acetaminophen (PERCOCET)  MG per tablet 1 tablet  Once As Needed,   Status:  Discontinued      12/16/20 1059    12/16/20 1059  labetalol (NORMODYNE,TRANDATE) injection 5 mg  Every 5 Minutes PRN,   Status:  Discontinued      12/16/20 1059    12/16/20 1059  naloxone (NARCAN) injection 0.04 mg  As Needed,   Status:  Discontinued      12/16/20 1059    12/16/20 1059  ondansetron (ZOFRAN) injection 4 mg  As Needed,   Status:  Discontinued      12/16/20 1059    12/16/20 1059  HYDROmorphone (DILAUDID) injection 0.5 mg  Every 10 Minutes PRN,   Status:  Discontinued      12/16/20 1059    12/16/20 1059  fentaNYL citrate (PF) (SUBLIMAZE) injection 25 mcg  Every 5 Minutes PRN      12/16/20 1059    12/16/20 1059  atropine sulfate  injection 0.5 mg  Once As Needed,   Status:  Discontinued      12/16/20 1059    12/16/20 1059  flumazenil (ROMAZICON) injection 0.2 mg  As Needed,   Status:  Discontinued      12/16/20 1059    12/16/20 0900  sodium chloride 0.9 % flush 10 mL  Every 12 Hours Scheduled,   Status:  Discontinued      12/16/20 0703    12/16/20 0900  sodium chloride 0.9 % flush 10 mL  Every 12 Hours Scheduled,   Status:  Discontinued      12/16/20 0803 12/16/20 0805  lactated ringers infusion  Continuous      12/16/20 0803 12/16/20 0805  Scopolamine (TRANSDERM-SCOP) 1.5 MG/3DAYS patch 1 patch  Continuous      12/16/20 0803 12/16/20 0804  Oxygen Therapy- Nasal Cannula; Titrate for SPO2: equal to or greater than, 90%  Continuous,   Status:  Canceled      12/16/20 0803    12/16/20 0804  Pulse Oximetry, Continuous  Continuous,   Status:  Canceled      12/16/20 0803 12/16/20 0804  Insert Peripheral IV  Once,   Status:  Canceled      12/16/20 0803 12/16/20 0804  Saline Lock & Maintain IV Access  Continuous,   Status:  Canceled      12/16/20 0803 12/16/20 0803  dexamethasone (DECADRON) injection 4 mg  Once As Needed      12/16/20 0803 12/16/20 0803  Vital Signs - Per Anesthesia Protocol  As Needed,   Status:  Canceled      12/16/20 0803 12/16/20 0803  sodium chloride 0.9 % flush 10 mL  As Needed,   Status:  Discontinued      12/16/20 0803 12/16/20 0803  lidocaine PF 1% (XYLOCAINE) injection 0.5 mL  Once As Needed,   Status:  Discontinued      12/16/20 0803 12/16/20 0803  fentaNYL citrate (PF) (SUBLIMAZE) injection 25 mcg  Every 5 Minutes PRN,   Status:  Discontinued      12/16/20 0803 12/16/20 0803  midazolam (VERSED) injection 1 mg  Every 10 Minutes PRN      12/16/20 0803 12/16/20 0803  dextrose (D50W) 25 g/ 50mL Intravenous Solution 12.5 g  As Needed,   Status:  Discontinued      12/16/20 0803    12/16/20 0744  thrombin topical  As Needed,   Status:  Discontinued      12/16/20 0744 12/16/20 0744  gelatin  absorbable (GELFOAM) powder  As Needed,   Status:  Discontinued      12/16/20 0744    12/16/20 0744  sodium chloride (NS) irrigation solution  As Needed,   Status:  Discontinued      12/16/20 0744    12/16/20 0730  ceFAZolin in 0.9% normal saline (ANCEF) IVPB solution 2 g  Once      12/16/20 0703    12/16/20 0706  lactated ringers infusion 1,000 mL  Continuous      12/16/20 0704    12/16/20 0705  oxyCODONE ER (oxyCONTIN) 12 hr tablet 20 mg  Once      12/16/20 0703    12/16/20 0705  Type & Screen  STAT      12/16/20 0704    12/16/20 0705  Please Insert a Second Peripheral IV If Indicated For Procedure (All DaVinci Cases, Gastric Bypass, Sleeve Surgery, AAA, Any Vascular Bypass, Carotid, Sigmoidectomy, Colectomy (Lap Assisted), Colon Resection, Kahlil, Exploratory Laparotomy, Spinal...  Once,   Status:  Canceled     Comments: Please Insert a Second Peripheral IV If Indicated For Procedure (All DaVinci Cases, Gastric Bypass, Sleeve Surgery, AAA, Any Vascular Bypass, Carotid, Sigmoidectomy, Colectomy (Lap Assisted), Colon Resection, Kahlil, Exploratory Laparotomy, Spinal Fusion, Craniotomy, Thoracotomy, CABG, TAVR, Valve Surgery or Mediastinoscopy.    12/16/20 0704    12/16/20 0705  Insert Peripheral IV  Once,   Status:  Canceled      12/16/20 0704    12/16/20 0705  Maintain IV Access  Continuous,   Status:  Canceled      12/16/20 0704    12/16/20 0704  sodium chloride 0.9 % flush 10 mL  As Needed,   Status:  Discontinued      12/16/20 0704    12/16/20 0704  Insert Peripheral IV  Once,   Status:  Canceled      12/16/20 0703    12/16/20 0704  Saline Lock & Maintain IV Access  Continuous,   Status:  Canceled      12/16/20 0703    12/16/20 0703  sodium chloride 0.9 % flush 10 mL  As Needed,   Status:  Discontinued      12/16/20 0703    12/16/20 0703  lidocaine PF 1% (XYLOCAINE) injection 0.5 mL  Once As Needed,   Status:  Discontinued      12/16/20 0703    12/16/20 0703  lactated ringers infusion  Continuous PRN       12/16/20 0703    12/16/20 0659  FL C Arm During Surgery  1 Time Imaging      12/16/20 0658    12/16/20 0658  XR Spine Lumbar AP & Lateral  1 Time Imaging      12/16/20 0657    Unscheduled  Apply ice to affected area/incisions as needed for pain or swelling  As Needed      12/16/20 1242                 Operative/Procedure Notes (last 48 hours) (Notes from 12/15/20 0741 through 12/17/20 0741)      ALEXIS Dunaway MD at 12/16/20 0603        LUMBAR LATERAL INTERBODY FUSION  Procedure Note    Adela Arauz  12/16/2020    Pre-op Diagnosis:     1. Increasing chronic back pain.  2. Bilateral buttock, thigh and leg radiculopathy, now right worse than left.   3. Severe neurogenic claudication.   4. Multilevel lumbar degenerative disc disease worse L3-S1.   5. Degenerative lumbar scoliosis, L3-S1, concave left L5-S1, concave right L4-5.   6. Facet arthropathy, L4-S1.   7. Large disc herniation right central L3-4  8. Central and foraminal stenosis, L3-S1, worse central L3-4, right L4-5, severe left L5-S1.   9. Rheumatoid arthritis.   10. History of chronic osteomyelitis, right forearm, status post external fixation, Dr. Cr.  11.  Status post anterior decompression, ALIF with instrumentation L5-S1, 12/2/2020    Post-op Diagnosis:    same    Procedure/CPT® Codes:    1.  Left Lateral Lumbar Interbody Fusion L3-4, L4-5  2.  Anterior spinal instrumentation L3-4 (CoreLink lateral plate and screws)  3.  Use of titanium interbody biomechanical device for fusion L3-4, L4-5 (CoreLink titanium spacer x 2)  4.  Use of allograft matrix for fusion  5.  Use of allograft liquid for fusion (BioBurst)  6.  Use of fluoroscopy for confirmation of surgical level, placement of titanium spacers and instrumentation  7.  Intraoperative neural monitoring    Anesthesia: General    Surgeon: MELCHOR Dunaway MD    Assistant: Michael Hudson PA-C    Estimated Blood Loss: Minimal    Complications: None    Condition: Stable to  PACU.    Indications:    The patient is a 55-year-old who sees Charlene Huntley nurse practitioner for medical issues.  She presented to the office with increasing chronic back pain along with symptoms down both lower extremities consistent with radiculopathy, with the right side worse than the left.  She had complaints that were consistent also with severe neurogenic claudication.  Imaging studies revealed multilevel lumbar degenerative disc disease that was worse from L4-S1.  She was noted to have scoliosis from L3-S1 that was concave to the left at L5-S1 and concave to the right at L4-5.  Both levels had facet arthropathy and central as well as foraminal stenosis that was worse on the right at L4-5 and on the left at L5-S1 corresponding to the scoliosis concavities.  She was also noted to have a rather large disc herniation at L3-4 that will also need to be addressed at a later date.     After failing conservative measures, it was mutually decided that surgery would be the best option. Risks, benefits, and complications of surgery were discussed with the patient. The patient appeared well informed and wished to proceed. We specifically discussed the risk of infection, blood loss, nerve root injury, CSF leak, and the possibility of incomplete resolution of symptoms. We also discussed the possible risk of a nonunion and the potential need for additional surgery in the event of a pseudoarthrosis or hardware failure.     We elected proceed with a staged operation.  The first stage of the procedure was performed on 12/2/2020 and involved and anterior decompression and ALIF with instrumentation of L5-S1.  Today were performing the second stage of the procedure involving a left lateral fusion from L3-5.  It is planned that we will be returning to surgery for a final posterior procedure at a later date.    Operative Procedure:    After obtaining informed consent and verifying the correct operative site, the patient was  brought to the operating room and placed supine on operating table.  A general anesthetic was provided by the anesthesia service with the assistance of an endotracheal tube.  Once this was appropriately positioned and secured, the patient was carefully rotated into the lateral decubitus position with the left side up.  All bony prominences were well-padded.  3 inch wide cloth tape was used to maintain the patient's position.  It was also used to tip open the pelvis slightly allowing better access to the lumbar spine through a lateral approach.  Fluoroscopy was then used to identify the L3-4 and L4-5 levels, and the skin was marked for our planned incision.  The left flank was then prepped and draped in the usual sterile fashion.  A surgical timeout was taken to confirm this was the correct patient, we were working at the correct level, and that preoperative antibiotics were given in a timely fashion.    An oblique incision was created of the left flank using a 10 blade scalpel directly centered between the L3-4 and L4-5 segments. Dissection was carried bluntly through subcutaneous tissues. Blunt dissection was also carried between the external oblique and internal oblique musculature. The transversalis fascia was pierced allowing access to the retro-peroneal space. At this point, a series of neuro monitoring probes were used to safely access the L4-5 level. We used stimulated and free run EMG for this purpose. Once nerve roots were confirmed to be out of harm's way, self retaining retractors were placed for continuous exposure.     An annulotomy was then created at the L4-5 area using a 15 blade scalpel on a long handle. Angled Cardozo elevators were used to remove disc material off of the endplates. Disc material was removed using Kerrisons, pituitaries, and forward angled curettes. Once all disc material had been retrieved, I used the angled Cardozo elevator to divide the contralateral annulus. This assisted with  mobilization of the vertebral body. We then used a series of endplate scrapers to prepare the endplates for interbody fusion. Trial spacers were malleted into position and for the disc space it was felt that a 12 mm x 50 mm implant would be the best fit to restore disc height. The disc space was then thoroughly irrigated with saline solution.     A titanium spacer from the [employer] instrumentation set measuring 12 mm x 50 mm x 22 mm was then packed as tightly as possible with a combination of allograft bone chips mixed with an allograft liquid called BioBurst. This titanium spacer was then malleted into the L4-5 disc space under fluoroscopic guidance. It was placed as a titanium interbody biomechanical device to assist with interbody fusion.  After confirming the spacer was properly positioned in both the AP and lateral projections, next attention was turned to the L3-4 segment.    The neuro monitoring probes were once again used this time to access L3-4.  Once nerve roots were confirmed to be out of harm's way, self retaining retractors were placed for continuous exposure of L3-4.  This disc space was prepared in an identical fashion as L4-5.  We used first the 15 blade scalpel to create an annulotomy.  A Cardozo elevators were used to remove disc material off of the endplates.  Disc material was removed using Kerrisons, pituitaries, and forward angled curettes.  Endplate scrapers were used to prepare the endplates for interbody fusion and the contralateral annulus was divided with the Cardozo elevator.  Trial spacers were then malleted into position across L3-4.  A second titanium spacer was then chosen matching the final trial.  In this case, again we used a 12 mm x 50 mm implant.  This spacer was packed as tightly as possible with the same combination of allograft bone chips mixed with allograft liquid called BioBurst.  This spacer was malleted into the L3-4 disc space under fluoroscopic guidance as a titanium  interbody biomechanical device to assist with interbody fusion.    Once this spacer was confirmed to be properly positioned, I chose a 2-hole plate to help augment the fusion of L3-4. This plate was held into position using screws. I placed 30 mm screws into both L3 and into L4.     The wound was then irrigated thoroughly. A thorough inspection was then undertaken to ensure that we had adequate hemostasis. Bleeding at this point was controlled using FloSeal and bipolar cautery. Closure was then accomplished with a #1 Vicryl reapproximating the transversalis fascia as well as the internal and external oblique musculature. Immediate subcutaneous tissues were closed with a 3-0 Vicryl and skin closure was accomplished using Mastisol and Steri-Strips. The wound was then sterilely dressed. The patient was then carefully rotated supine onto a hospital gurney, extubated, and sent to the recovery room in good stable condition.     The patient tolerated the procedure well. There were no complications. We estimated blood loss to be minimal. The patient remained hemodynamically stable.     Michael Hudson PA-C provided critical assistance during the procedure. His assistance was medically necessary in order to allow the procedure to occur in the most safe and efficient manner.    MELCHOR Dunaway MD     Date: 12/16/2020  Time: 10:58 CST      Electronically signed by ALEXIS Dunaway MD at 12/16/20 1056          Physician Progress Notes (last 48 hours) (Notes from 12/15/20 0741 through 12/17/20 0741)      Carlos Meléndez PA at 12/17/20 0617          [facility]  Spine Surgery  ROLANDO Daniels   Progress Note        Subjective/Overnight Events:  Very weak in legs, post operative pain, tolerating PO, working with PT/OT    Vitals  Vitals:    12/16/20 1551 12/16/20 2020 12/17/20 0043 12/17/20 0300   BP: 139/83 124/79 124/78 110/58   BP Location: Right arm Right arm Right arm  Right arm   Patient Position: Lying Sitting Lying Lying   Pulse: 83 70 77 77   Resp: 16 18 18 18   Temp: 98.2 °F (36.8 °C) 98.3 °F (36.8 °C) 98.8 °F (37.1 °C) 98.3 °F (36.8 °C)   TempSrc: Oral Oral Oral Oral   SpO2: 98% 97% 97% 98%       Current Facility-Administered Medications   Medication Dose Route Frequency Provider Last Rate Last Admin   • ALPRAZolam (XANAX) tablet 0.5 mg  0.5 mg Oral TID PRN ALEXIS Dunaway MD       • betamethasone dipropionate (DIPROLENE) 0.05 % ointment   Topical Q12H ALEXIS Dunaway MD       • calcium carb-cholecalciferol 600-800 MG-UNIT tablet 1 tablet  1 tablet Oral BID With Meals ALEXIS Dunaway MD   1 tablet at 12/16/20 1757   • ceFAZolin (ANCEF) IVPB 1 g  1 g Intravenous Q8H ALEXIS Dunaway MD   1 g at 12/17/20 0055   • diphenhydrAMINE (BENADRYL) capsule 25 mg  25 mg Oral Nightly PRN ALEXIS Dunaway MD       • DULoxetine (CYMBALTA) DR capsule 60 mg  60 mg Oral Daily ALEXIS Dunaway MD   60 mg at 12/16/20 1444   • famotidine (PEPCID) injection 20 mg  20 mg Intravenous Q12H ALEXIS Dunaway MD        Or   • famotidine (PEPCID) tablet 20 mg  20 mg Oral Q12H ALEXIS Dunaway MD   20 mg at 12/16/20 2050   • fluticasone (FLONASE) 50 MCG/ACT nasal spray 2 spray  2 spray Nasal Daily ALEXIS Dunaway MD       • gabapentin (NEURONTIN) capsule 600 mg  600 mg Oral Q8H ALEXIS Dunaway MD   600 mg at 12/17/20 0602   • HYDROmorphone (DILAUDID) injection 1 mg  1 mg Intravenous Q3H PRN ALEXIS Dunaway MD   1 mg at 12/17/20 0416   • lactated ringers infusion 1,000 mL  1,000 mL Intravenous Continuous ALEXIS Dunaway MD   Stopped at 12/16/20 1336   • lactated ringers infusion  100 mL/hr Intravenous Continuous PRN ALEXIS Dunaway MD   Stopped at 12/16/20 1336   • lactated ringers infusion  9 mL/hr Intravenous Continuous ALEXIS Dunaway MD       • mupirocin (BACTROBAN) 2 % ointment   Topical Q24H ALEXIS Dunaway MD   Given at 12/16/20 1444   •  ondansetron (ZOFRAN) tablet 4 mg  4 mg Oral Q6H PRN ALEXIS Dunaway MD        Or   • ondansetron (ZOFRAN) injection 4 mg  4 mg Intravenous Q6H PRN ALEXIS Dunaway MD       • oxyCODONE-acetaminophen (PERCOCET)  MG per tablet 1 tablet  1 tablet Oral Q4H PRN ALEXIS Dunaway MD   1 tablet at 12/16/20 2244   • Scopolamine (TRANSDERM-SCOP) 1.5 MG/3DAYS patch 1 patch  1 patch Transdermal Continuous Caridad, Riki You MD   1 patch at 12/16/20 0812   • sertraline (ZOLOFT) tablet 50 mg  50 mg Oral Nightly ALEXIS Dunaway MD   50 mg at 12/16/20 2050   • sodium chloride 0.9 % flush 10 mL  10 mL Intravenous PRN ALEXIS Dunaway MD       • sodium chloride 0.9 % flush 3 mL  3 mL Intravenous Q12H ALEXIS Dunaway MD   3 mL at 12/16/20 1342   • sodium chloride 0.9 % infusion  75 mL/hr Intravenous Continuous ALEXIS Dunaway MD       • sodium chloride 0.9 % infusion  75 mL/hr Intravenous Continuous ALEXIS Dunaway MD 75 mL/hr at 12/16/20 1342 75 mL/hr at 12/16/20 1342   • tiZANidine (ZANAFLEX) tablet 4 mg  4 mg Oral Q8H PRN ALEXIS Dunaway MD   4 mg at 12/17/20 0055       PHYSICAL EXAM:    Orientation:  alert and oriented to person, place, and time    Incision:  no significant drainage, no dehiscence    Upper Extremity Motor :  5/5 bilaterally    Upper Motor Neuron Signs:  none     Lower Extremity Motor :  Diffusely weak    Lower Extremity Sensory:  Tibial nerve: Intact and Superficial peroneal nerve: Intact    Flatus:  flatus    ABNORMAL EXAM FINDINGS:  Diffuse weakness bilateral LE    LABS:    Lab Results (last 24 hours)     Procedure Component Value Units Date/Time    Basic Metabolic Panel [[id number]]  (Abnormal) Collected: 12/17/20 0426    Specimen: Blood Updated: 12/17/20 0513     Glucose 133 mg/dL      BUN 7 mg/dL      Creatinine 0.50 mg/dL      Sodium 139 mmol/L      Potassium 4.5 mmol/L      Chloride 103 mmol/L      CO2 30.0 mmol/L      Calcium 9.0 mg/dL      eGFR Non African  Amer 129 mL/min/1.73      BUN/Creatinine Ratio 14.0     Anion Gap 6.0 mmol/L     Narrative:      GFR Normal >60  Chronic Kidney Disease <60  Kidney Failure <15      CBC & Differential [[id number]]  (Abnormal) Collected: 12/17/20 0426    Specimen: Blood Updated: 12/17/20 0456    Narrative:      The following orders were created for panel order CBC & Differential.  Procedure                               Abnormality         Status                     ---------                               -----------         ------                     CBC Auto Differential[[id number]]        Abnormal            Final result                 Please view results for these tests on the individual orders.    CBC Auto Differential [[id number]]  (Abnormal) Collected: 12/17/20 0426    Specimen: Blood Updated: 12/17/20 0456     WBC 12.33 10*3/mm3      RBC 4.32 10*6/mm3      Hemoglobin 10.9 g/dL      Hematocrit 35.2 %      MCV 81.5 fL      MCH 25.2 pg      MCHC 31.0 g/dL      RDW 14.3 %      RDW-SD 42.0 fl      MPV 9.4 fL      Platelets 400 10*3/mm3      Neutrophil % 77.3 %      Lymphocyte % 11.2 %      Monocyte % 10.5 %      Eosinophil % 0.0 %      Basophil % 0.4 %      Immature Grans % 0.6 %      Neutrophils, Absolute 9.54 10*3/mm3      Lymphocytes, Absolute 1.38 10*3/mm3      Monocytes, Absolute 1.29 10*3/mm3      Eosinophils, Absolute 0.00 10*3/mm3      Basophils, Absolute 0.05 10*3/mm3      Immature Grans, Absolute 0.07 10*3/mm3      nRBC 0.0 /100 WBC           ASSESSMENT AND PLAN:    Post operative day 1 status post LLIF L3-L5    1:  Activity Level:  Up with assist, encourage in bed leg exercise and ambulation   2:  Pain Control:  Continue current   3:  Discharge Planning:  Will likely need home health   4:  Other:  NPO after midnight for stage three      Electronically signed by ROLANDO Daniels 12/17/2020 06:17 CST              Electronically signed by Carlos Meléndez PA at 12/17/20 0619       Consult Notes (last 48  hours) (Notes from 12/15/20 0741 through 12/17/20 0741)    No notes of this type exist for this encounter.

## 2020-12-17 NOTE — NURSING NOTE
Neuro and dressing check at bedside with TISHA Kelly. C/o left upper thigh numbness. No other changes.

## 2020-12-17 NOTE — THERAPY EVALUATION
Acute Care - Occupational Therapy Initial Evaluation  [facility]     Patient Name: Adela Arauz  : 1965  MRN: [id number]  Today's Date: 2020  Onset of Illness/Injury or Date of Surgery: 20  Date of Referral to OT: 20  Referring Physician: Dr. Dunaway    Admit Date: 2020       ICD-10-CM ICD-9-CM   1. 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 (CMS/[facility])   • Encounter for consultation   • Former smoker   • History of radiation therapy   • Cellulitis of right upper extremity   • Lymphedema of right arm   • Pain in right arm   • Hx of osteomyelitis   • History of lymph node dissection of right axilla   • Morbid obesity due to excess calories (CMS/[facility])   • MRSA (methicillin resistant Staphylococcus aureus) infection   • Anxiety   • DDD (degenerative disc disease), lumbar   • Foraminal stenosis due to intervertebral disc disease   • Lumbar stenosis     Past Medical History:   Diagnosis Date   • AAT (alpha-1-antitrypsin) deficiency (CMS/[facility]) 2019   • Anxiety 2020   • Cancer (CMS/[facility])     LYMPHOMA   • DDD (degenerative disc disease), lumbar    • GERD (gastroesophageal reflux disease)    • 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, 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    • Low back pain    • Morbid obesity due to excess calories (CMS/[facility]) 2020   • MRSA infection    • Nausea after anesthesia    • Osteoarthritis    • Osteoporosis    • Panic attacks    • PONV (postoperative nausea and vomiting)    • Psoriasis    • RA (rheumatoid arthritis) (CMS/[facility])    • Seasonal allergies    • Senile  osteoporosis 9/28/2017     Past Surgical History:   Procedure Laterality Date   • ANTERIOR LUMBAR EXPOSURE N/A 12/2/2020    Procedure: Anterior lumbar interbody fusion of L5-S1 with instrumentation ;  Surgeon: Neptali Rivera DO;  Location:  [facility] OR;  Service: Vascular;  Laterality: N/A;   • AXILLARY LYMPH NODE BIOPSY/EXCISION Right 9/19/2019    Procedure: EXCISION RIGHT AXILLARY MASS;  Surgeon: Jes Enamorado MD;  Location:  [facility] OR;  Service: General   • LUMBAR FUSION N/A 12/2/2020    Procedure: ANTERIOR DECOMPRESSION, ANTERIOR LUMBAR INTERBODY FUSION WITH INSTRUMENTATION L5-S1;  Surgeon: ALEXIS Dunaway MD;  Location:  [facility] OR;  Service: Orthopedic Spine;  Laterality: N/A;   • LUMBAR FUSION Left 12/16/2020    Procedure: LEFT LATERAL LUMBAR INTERBODY FUSION L3-5 WITH INSTRUMENTATION L3-4;  Surgeon: ALEXIS Dunaway MD;  Location:  [facility] OR;  Service: Orthopedic Spine;  Laterality: Left;   • SHOULDER SURGERY     • TUBAL ABDOMINAL LIGATION     • US GUIDED LYMPH NODE BIOPSY  8/9/2019   • WRIST SURGERY Bilateral     fracture            OT ASSESSMENT FLOWSHEET (last 12 hours)      OT Evaluation and Treatment     Row Name 12/17/20 0743                   OT Time and Intention    Subjective Information  complains of;pain  -CS (r) EP (t) CS (c)        Document Type  evaluation  -CS (r) EP (t) CS (c)        Mode of Treatment  occupational therapy  -CS (r) EP (t) CS (c)           General Information    Patient Profile Reviewed  yes  -CS (r) EP (t) CS (c)        Onset of Illness/Injury or Date of Surgery  12/16/20  -CS (r) EP (t) CS (c)        Referring Physician  Dr. Dunaway  -CS (r) EP (t) CS (c)        Prior Level of Function  independent:;ADL's;driving;mod assist:;cooking;cleaning assist with cooking/cleaning last 2 months due to back  -CS (r) EP (t) CS (c)        Equipment Currently Used at Home  shower chair;walker, rolling;cane, quad  -CS (r) EP (t) CS (c)        Pertinent History of Current Functional  Problem  Pt presents with increased back pain, RLE>LLE radiculopathy, large L3-4 disc herniation s/p LLIF L3-4, L4-5 12/15/20  -CS (r) EP (t) CS (c)        Existing Precautions/Restrictions  brace worn when out of bed;fall;spinal  -CS (r) EP (t) CS (c)        Barriers to Rehab  previous functional deficit;[place] barrier  -CS (r) EP (t) CS (c)           Living Environment    Current Living Arrangements  home/apartment/condo tub shower   -CS (r) EP (t) CS (c)        Home Accessibility  stairs to enter home;stairs within home  -CS (r) EP (t) CS (c)        Lives With  spouse  -CS (r) EP (t) CS (c)           Home Main Entrance    Number of Stairs, Main Entrance  four  -CS (r) EP (t) CS (c)        Stair Railings, Main Entrance  railings on both sides of stairs  -CS (r) EP (t) CS (c)           Stairs Within Home, Primary    Number of Stairs, Within Home, Primary  none  -CS (r) EP (t) CS (c)           Sensory    Additional Documentation  Sensory Assessment (Somatosensory) (Group)  -CS (r) EP (t) CS (c)           Sensory Assessment (Somatosensory)    Sensory Assessment (Somatosensory)  UE sensation intact light touch   -CS (r) EP (t) CS (c)           Cognition    Affect/Mental Status (Cognitive)  WFL  -CS (r) EP (t) CS (c)        Orientation Status (Cognition)  oriented x 4  -CS (r) EP (t) CS (c)        Follows Commands (Cognition)  WFL  -CS (r) EP (t) CS (c)        Personal Safety Interventions  fall prevention program maintained;gait belt;muscle strengthening facilitated;nonskid shoes/slippers when out of bed;supervised activity  -CS (r) EP (t) CS (c)           Pain Assessment    Additional Documentation  Pain Scale: Numbers Pre/Post-Treatment (Group)  -CS (r) EP (t) CS (c)           Pain Scale: Numbers Pre/Post-Treatment    Pretreatment Pain Rating  7/10  -CS (r) EP (t) CS (c)        Pain Location - Orientation  lower  -CS (r) EP (t) CS (c)        Pain Location  back  -CS (r) EP (t) CS (c)        Pain Intervention(s)   Repositioned;Ambulation/increased activity  -CS (r) EP (t) CS (c)           Range of Motion Comprehensive    General Range of Motion  bilateral upper extremity ROM WFL  -CS (r) EP (t) CS (c)        Comment, General Range of Motion  BUE AROM WFL  -CS (r) EP (t) CS (c)           Strength Comprehensive (MMT)    General Manual Muscle Testing (MMT) Assessment  no strength deficits identified  -CS (r) EP (t) CS (c)        Comment, General Manual Muscle Testing (MMT) Assessment  BUE 5/5  -CS (r) EP (t) CS (c)           Bed Mobility    Bed Mobility  rolling right;sidelying-sit  -CS (r) EP (t) CS (c)        Rolling Right [place] (Bed Mobility)  verbal cues;minimum assist (75% patient effort)  -CS (r) EP (t) CS (c)        Sidelying-Sit [place] (Bed Mobility)  verbal cues;minimum assist (75% patient effort)  -CS (r) EP (t) CS (c)        Assistive Device (Bed Mobility)  bed rails  -CS (r) EP (t) CS (c)           Functional Mobility    Functional Mobility- Ind. Level  minimum assist (75% patient effort);moderate assist (50% patient effort);verbal cues required  -CS (r) EP (t) CS (c)        Functional Mobility- Device  quad cane  -CS (r) EP (t) CS (c)        Functional Mobility- Comment  bed-BR-chair  -CS (r) EP (t) CS (c)           Transfer Assessment/Treatment    Transfers  sit-stand transfer;stand-sit transfer  -CS (r) EP (t) CS (c)           Transfers    Sit-Stand [place] (Transfers)  minimum assist (75% patient effort);moderate assist (50% patient effort);verbal cues  -CS (r) EP (t) CS (c)        Stand-Sit [place] (Transfers)  contact guard;verbal cues  -CS (r) EP (t) CS (c)           Sit-Stand Transfer    Assistive Device (Sit-Stand Transfers)  cane, quad  -CS (r) EP (t) CS (c)           Stand-Sit Transfer    Assistive Device (Stand-Sit Transfers)  cane, quad  -CS (r) EP (t) CS (c)           Safety Issues, Functional Mobility    Safety Issues Affecting Function (Mobility)  safety precaution awareness   -CS (r) EP (t) CS (c)        Impairments Affecting Function (Mobility)  balance;endurance/activity tolerance;pain;shortness of breath  -CS (r) EP (t) CS (c)           Balance    Balance Assessment  sitting static balance;sitting dynamic balance;standing static balance;standing dynamic balance  -CS (r) EP (t) CS (c)        Static Sitting Balance  WFL;sitting, edge of bed  -CS (r) EP (t) CS (c)        Dynamic Sitting Balance  WFL;sitting, edge of bed  -CS (r) EP (t) CS (c)        Static Standing Balance  mild impairment;supported;standing  -CS (r) EP (t) CS (c)        Dynamic Standing Balance  mild impairment;supported;standing  -CS (r) EP (t) CS (c)           Activities of Daily Living    BADL Assessment/Intervention  upper body dressing;toileting  -CS (r) EP (t) CS (c)           Upper Body Dressing Assessment/Training    [place] Level (Upper Body Dressing)  don;minimum assist (75% patient effort) LSO  -CS (r) EP (t) CS (c)        Position (Upper Body Dressing)  edge of bed sitting  -CS (r) EP (t) CS (c)           Toileting Assessment/Training    [place] Level (Toileting)  toileting skills;verbal cues;contact guard assist  -CS (r) EP (t) CS (c)        Assistive Devices (Toileting)  commode  -CS (r) EP (t) CS (c)        Position (Toileting)  supported sitting  -CS (r) EP (t) CS (c)           BADL Safety/Performance    Impairments, BADL Safety/Performance  balance;endurance/activity tolerance;pain;shortness of breath  -CS (r) EP (t) CS (c)           Wound 12/16/20 0959 Left flank    Wound - Properties Group Placement Date: 12/16/20  -CF Placement Time: 0959 -CF Present on Hospital Admission: N  -CF Side: Left  -CF Location: flank  -CF Additional Comments: closed incision. dressings applied.  -CF    Retired Wound - Properties Group Date first assessed: 12/16/20  -CF Time first assessed: 0959 -CF Present on Hospital Admission: N  -CF Side: Left  -CF Location: flank  -CF Additional Comments: closed incision.  dressings applied.  -CF       Wound 12/02/20 0758 Left lower abdomen Incision    Wound - Properties Group Placement Date: 12/02/20  -DT Placement Time: 0758  -DT Present on Hospital Admission: N  -DT Side: Left  -DT Orientation: lower  -DT Location: abdomen  -DT Primary Wound Type: Incision  -DT    Retired Wound - Properties Group Date first assessed: 12/02/20  -DT Time first assessed: 0758  -DT Present on Hospital Admission: N  -DT Side: Left  -DT Location: abdomen  -DT Primary Wound Type: Incision  -DT       Plan of Care Review    Plan of Care Reviewed With  patient  -CS (r) EP (t) CS (c)        Progress  no change  -CS (r) EP (t) CS (c)        Outcome Summary  OT eval completed. Pt was educated on spinal precautions. Pt was able to roll right and to lay sidelying-sit with VCs and Min A to sit EOB. Pt don LSO with Min A. Pt completed sit-stand transfer with Min-Mod A. Pt ambul to BR and had mild impairment in standing balance due to LLE weakness after surgery. Pt completed toileting with CGA overall. Pt stand-sit t/f in chair with VCs and CGA. Recommend to continue OT services to address ADL indepedence. Recommended d/c to home with assist.   -CS (r) EP (t) CS (c)           OT Goals    Bathing Goal Selection (OT)  bathing, OT goal 1  -CS (r) EP (t) CS (c)        Dressing Goal Selection (OT)  dressing, OT goal 1  -CS (r) EP (t) CS (c)           Bathing Goal 1 (OT)    Activity/Device (Bathing Goal 1, OT)  bathing skills, all  -CS (r) EP (t) CS (c)        [place] Level/Cues Needed (Bathing Goal 1, OT)  independent  -CS (r) EP (t) CS (c)        Time Frame (Bathing Goal 1, OT)  long term goal (LTG);10 days  -CS (r) EP (t) CS (c)        Progress/Outcomes (Bathing Goal 1, OT)  goal ongoing  -CS (r) EP (t) CS (c)           Dressing Goal 1 (OT)    Activity/Device (Dressing Goal 1, OT)  dressing skills, all including LSO  -CS (r) EP (t) CS (c)        [place]/Cues Needed (Dressing Goal 1, OT)  independent  -CS (r)  EP (t) CS (c)        Time Frame (Dressing Goal 1, OT)  long term goal (LTG);10 days  -CS (r) EP (t) CS (c)        Progress/Outcome (Dressing Goal 1, OT)  goal ongoing  -CS (r) EP (t) CS (c)           Patient Education Goal (OT)    Activity (Patient Education Goal, OT)  spinal precautions   -CS (r) EP (t) CS (c)        [place]/Cues/Accuracy (Memory Goal 2, OT)  independent;demonstrates adequately;verbalizes understanding  -CS (r) EP (t) CS (c)        Time Frame (Patient Education Goal, OT)  long term goal (LTG);10 days  -CS (r) EP (t) CS (c)        Progress/Outcome (Patient Education Goal, OT)  goal ongoing  -CS (r) EP (t) CS (c)           Positioning and Restraints    Pre-Treatment Position  in bed  -CS (r) EP (t) CS (c)        Post Treatment Position  chair  -CS (r) EP (t) CS (c)        In Chair  sitting;call light within reach;encouraged to call for assist  -CS (r) EP (t) CS (c)           Therapy Assessment/Plan (OT)    Date of Referral to OT  12/16/20  -CS (r) EP (t) CS (c)        OT Diagnosis  decreased ADL  -CS (r) EP (t) CS (c)        Rehab Potential (OT)  good, to achieve stated therapy goals  -CS (r) EP (t) CS (c)        Criteria for Skilled Therapeutic Interventions Met (OT)  meets criteria;skilled treatment is necessary  -CS (r) EP (t) CS (c)        Therapy Frequency (OT)  5 times/wk  -CS (r) EP (t) CS (c)        Predicted Duration of Therapy Intervention (OT)  10 days   -CS (r) EP (t) CS (c)        Problem List (OT)  balance;mobility;pain  -CS (r) EP (t) CS (c)        Activity Limitations Related to Problem List (OT)  unable to ambulate safely;unable to transfer safely;BADLs not performed adequately or safely;IADLs not performed adequately or safely  -CS (r) EP (t) CS (c)        Planned Therapy Interventions (OT)  activity tolerance training;BADL retraining;functional balance retraining;occupation/activity based interventions;patient/caregiver education/training;transfer/mobility retraining;adaptive  equipment training;orthotic fabrication/fitting/training  -CS (r) EP (t) CS (c)           Therapy Plan Review/Discharge Plan (OT)    Anticipated Discharge Disposition (OT)  home with assist  -CS (r) EP (t) CS (c)          User Key  (r) = Recorded By, (t) = Taken By, (c) = Cosigned By    Initials Name Effective Dates    CS Janis Baker S, OTR/L, CNT 04/02/19 -     DT Jose Heard RN 10/17/16 -     CF Pamela Izaguirre RN 10/15/19 -     EP Sugey Gregory, OT Student 11/24/20 -          Occupational Therapy Education                 Title: PT OT SLP Therapies (In Progress)     Topic: Occupational Therapy (In Progress)     Point: ADL training (Done)     Description:   Instruct learner(s) on proper safety adaptation and remediation techniques during self care or transfers.   Instruct in proper use of assistive devices.              Learning Progress Summary           Patient Acceptance, E, VU by EP at 12/17/2020 0838                   Point: Home exercise program (Not Started)     Description:   Instruct learner(s) on appropriate technique for monitoring, assisting and/or progressing therapeutic exercises/activities.              Learner Progress:  Not documented in this visit.          Point: Precautions (Done)     Description:   Instruct learner(s) on prescribed precautions during self-care and functional transfers.              Learning Progress Summary           Patient Acceptance, E, VU by EP at 12/17/2020 0838                   Point: Body mechanics (Not Started)     Description:   Instruct learner(s) on proper positioning and spine alignment during self-care, functional mobility activities and/or exercises.              Learner Progress:  Not documented in this visit.                      User Key     Initials Effective Dates Name Provider Type Discipline    EP 11/24/20 -  Sugey Gregory, OT Student OT Student OT                  OT Recommendation and Plan  Planned Therapy Interventions (OT): activity tolerance  training, BADL retraining, functional balance retraining, occupation/activity based interventions, patient/caregiver education/training, transfer/mobility retraining, adaptive equipment training, orthotic fabrication/fitting/training  Therapy Frequency (OT): 5 times/wk  Plan of Care Review  Plan of Care Reviewed With: patient  Progress: no change  Outcome Summary: OT eval completed. Pt was educated on spinal precautions. Pt was able to roll right and to lay sidelying-sit with VCs and Min A to sit EOB. Pt don LSO with Min A. Pt completed sit-stand transfer with Min-Mod A. Pt ambul to BR and had mild impairment in standing balance due to LLE weakness after surgery. Pt completed toileting with CGA overall. Pt stand-sit t/f in chair with VCs and CGA. Recommend to continue OT services to address ADL indepedence. Recommended d/c to home with assist.   Plan of Care Reviewed With: patient  Outcome Summary: OT eval completed. Pt was educated on spinal precautions. Pt was able to roll right and to lay sidelying-sit with VCs and Min A to sit EOB. Pt don LSO with Min A. Pt completed sit-stand transfer with Min-Mod A. Pt ambul to BR and had mild impairment in standing balance due to LLE weakness after surgery. Pt completed toileting with CGA overall. Pt stand-sit t/f in chair with VCs and CGA. Recommend to continue OT services to address ADL indepedence. Recommended d/c to home with assist.     Outcome Measures     Row Name 12/17/20 0743             How much help from another is currently needed...    Putting on and taking off regular lower body clothing?  2  -CS (r) EP (t) CS (c)      Bathing (including washing, rinsing, and drying)  3  -CS (r) EP (t) CS (c)      Toileting (which includes using toilet bed pan or urinal)  3  -CS (r) EP (t) CS (c)      Putting on and taking off regular upper body clothing  3  -CS (r) EP (t) CS (c)      Taking care of personal grooming (such as brushing teeth)  4  -CS (r) EP (t) CS (c)      Eating  meals  4  -CS (r) EP (t) CS (c)      AM-PAC 6 Clicks Score (OT)  19  -CS (r) EP (t)         Functional Assessment    Outcome Measure Options  AM-PAC 6 Clicks Daily Activity (OT)  -CS (r) EP (t) CS (c)        User Key  (r) = Recorded By, (t) = Taken By, (c) = Cosigned By    Initials Name Provider Type    CS Janis Baker, OTR/L, VIET Occupational Therapist    EP Sugey Gregory, OT Student OT Student          Time Calculation:   Time Calculation- OT     Row Name 12/17/20 0839             Time Calculation- OT    OT Start Time  0743 +7 min chart review  -CS (r) EP (t) CS (c)      OT Stop Time  0815  -CS (r) EP (t) CS (c)      OT Time Calculation (min)  32 min  -CS (r) EP (t)      OT Received On  12/17/20  -CS (r) EP (t) CS (c)      OT Goal Re-Cert Due Date  12/27/20  -CS (r) EP (t) CS (c)        User Key  (r) = Recorded By, (t) = Taken By, (c) = Cosigned By    Initials Name Provider Type    Janis Chau OTR/L, VIET Occupational Therapist    Sugey Ryan, OT Student OT Student                 Sugey Gregory OT Student  12/17/2020

## 2020-12-17 NOTE — PROGRESS NOTES
[facility]  Spine Surgery  ROLANDO Daniels   Progress Note        Subjective/Overnight Events:  Very weak in legs, post operative pain, tolerating PO, working with PT/OT    Vitals  Vitals:    12/16/20 1551 12/16/20 2020 12/17/20 0043 12/17/20 0300   BP: 139/83 124/79 124/78 110/58   BP Location: Right arm Right arm Right arm Right arm   Patient Position: Lying Sitting Lying Lying   Pulse: 83 70 77 77   Resp: 16 18 18 18   Temp: 98.2 °F (36.8 °C) 98.3 °F (36.8 °C) 98.8 °F (37.1 °C) 98.3 °F (36.8 °C)   TempSrc: Oral Oral Oral Oral   SpO2: 98% 97% 97% 98%       Current Facility-Administered Medications   Medication Dose Route Frequency Provider Last Rate Last Admin   • ALPRAZolam (XANAX) tablet 0.5 mg  0.5 mg Oral TID PRN ALEXIS Dunaway MD       • betamethasone dipropionate (DIPROLENE) 0.05 % ointment   Topical Q12H ALEXIS Dunaway MD       • calcium carb-cholecalciferol 600-800 MG-UNIT tablet 1 tablet  1 tablet Oral BID With Meals ALEXIS Dunaway MD   1 tablet at 12/16/20 1757   • ceFAZolin (ANCEF) IVPB 1 g  1 g Intravenous Q8H ALEXIS Dunaway MD   1 g at 12/17/20 0055   • diphenhydrAMINE (BENADRYL) capsule 25 mg  25 mg Oral Nightly PRN ALEXIS Dunaway MD       • DULoxetine (CYMBALTA) DR capsule 60 mg  60 mg Oral Daily ALEXIS Dunaway MD   60 mg at 12/16/20 1444   • famotidine (PEPCID) injection 20 mg  20 mg Intravenous Q12H ALEXIS Dunaway MD        Or   • famotidine (PEPCID) tablet 20 mg  20 mg Oral Q12H ALEXIS Dunaway MD   20 mg at 12/16/20 2050   • fluticasone (FLONASE) 50 MCG/ACT nasal spray 2 spray  2 spray Nasal Daily ALEXIS Dunaway MD       • gabapentin (NEURONTIN) capsule 600 mg  600 mg Oral Q8H ALEXIS Dunaway MD   600 mg at 12/17/20 0602   • HYDROmorphone (DILAUDID) injection 1 mg  1 mg Intravenous Q3H PRN ALEXIS Dunaway MD   1 mg at 12/17/20 0416   • lactated ringers infusion 1,000 mL  1,000 mL Intravenous Continuous  ALEXIS Dunaway MD   Stopped at 12/16/20 1336   • lactated ringers infusion  100 mL/hr Intravenous Continuous PRN ALEXIS Dunaway MD   Stopped at 12/16/20 1336   • lactated ringers infusion  9 mL/hr Intravenous Continuous ALEXIS Dunaway MD       • mupirocin (BACTROBAN) 2 % ointment   Topical Q24H ALEXIS Dunaway MD   Given at 12/16/20 1444   • ondansetron (ZOFRAN) tablet 4 mg  4 mg Oral Q6H PRN ALEXIS Dunaway MD        Or   • ondansetron (ZOFRAN) injection 4 mg  4 mg Intravenous Q6H PRN ALEXIS Dunaway MD       • oxyCODONE-acetaminophen (PERCOCET)  MG per tablet 1 tablet  1 tablet Oral Q4H PRN ALEXIS Dunaway MD   1 tablet at 12/16/20 2244   • Scopolamine (TRANSDERM-SCOP) 1.5 MG/3DAYS patch 1 patch  1 patch Transdermal Continuous Caridad, Riki You MD   1 patch at 12/16/20 0812   • sertraline (ZOLOFT) tablet 50 mg  50 mg Oral Nightly ALEXIS Dunaway MD   50 mg at 12/16/20 2050   • sodium chloride 0.9 % flush 10 mL  10 mL Intravenous PRN ALEXIS Dunaway MD       • sodium chloride 0.9 % flush 3 mL  3 mL Intravenous Q12H ALEXIS Dunaway MD   3 mL at 12/16/20 1342   • sodium chloride 0.9 % infusion  75 mL/hr Intravenous Continuous ALEXIS Dunaway MD       • sodium chloride 0.9 % infusion  75 mL/hr Intravenous Continuous ALEXIS Dunaway MD 75 mL/hr at 12/16/20 1342 75 mL/hr at 12/16/20 1342   • tiZANidine (ZANAFLEX) tablet 4 mg  4 mg Oral Q8H PRN ALEXIS Dunaway MD   4 mg at 12/17/20 0055       PHYSICAL EXAM:    Orientation:  alert and oriented to person, place, and time    Incision:  no significant drainage, no dehiscence    Upper Extremity Motor :  5/5 bilaterally    Upper Motor Neuron Signs:  none     Lower Extremity Motor :  Diffusely weak    Lower Extremity Sensory:  Tibial nerve: Intact and Superficial peroneal nerve: Intact    Flatus:  flatus    ABNORMAL EXAM FINDINGS:  Diffuse weakness bilateral LE    LABS:    Lab Results (last 24 hours)      Procedure Component Value Units Date/Time    Basic Metabolic Panel [[id number]]  (Abnormal) Collected: 12/17/20 0426    Specimen: Blood Updated: 12/17/20 0513     Glucose 133 mg/dL      BUN 7 mg/dL      Creatinine 0.50 mg/dL      Sodium 139 mmol/L      Potassium 4.5 mmol/L      Chloride 103 mmol/L      CO2 30.0 mmol/L      Calcium 9.0 mg/dL      eGFR Non African Amer 129 mL/min/1.73      BUN/Creatinine Ratio 14.0     Anion Gap 6.0 mmol/L     Narrative:      GFR Normal >60  Chronic Kidney Disease <60  Kidney Failure <15      CBC & Differential [[id number]]  (Abnormal) Collected: 12/17/20 0426    Specimen: Blood Updated: 12/17/20 0456    Narrative:      The following orders were created for panel order CBC & Differential.  Procedure                               Abnormality         Status                     ---------                               -----------         ------                     CBC Auto Differential[[id number]]        Abnormal            Final result                 Please view results for these tests on the individual orders.    CBC Auto Differential [[id number]]  (Abnormal) Collected: 12/17/20 0426    Specimen: Blood Updated: 12/17/20 0456     WBC 12.33 10*3/mm3      RBC 4.32 10*6/mm3      Hemoglobin 10.9 g/dL      Hematocrit 35.2 %      MCV 81.5 fL      MCH 25.2 pg      MCHC 31.0 g/dL      RDW 14.3 %      RDW-SD 42.0 fl      MPV 9.4 fL      Platelets 400 10*3/mm3      Neutrophil % 77.3 %      Lymphocyte % 11.2 %      Monocyte % 10.5 %      Eosinophil % 0.0 %      Basophil % 0.4 %      Immature Grans % 0.6 %      Neutrophils, Absolute 9.54 10*3/mm3      Lymphocytes, Absolute 1.38 10*3/mm3      Monocytes, Absolute 1.29 10*3/mm3      Eosinophils, Absolute 0.00 10*3/mm3      Basophils, Absolute 0.05 10*3/mm3      Immature Grans, Absolute 0.07 10*3/mm3      nRBC 0.0 /100 WBC           ASSESSMENT AND PLAN:    Post operative day 1 status post LLIF L3-L5    1:  Activity Level:  Up with assist, encourage in bed  leg exercise and ambulation   2:  Pain Control:  Continue current   3:  Discharge Planning:  Will likely need home health   4:  Other:  NPO after midnight for stage three      Electronically signed by ROLANDO Daniels 12/17/2020 06:17 CST

## 2020-12-17 NOTE — PROGRESS NOTES
Discharge Planning Assessment  [facility]     Patient Name: Adela Arauz  MRN: [id number]  Today's Date: 12/17/2020    Admit Date: 12/16/2020    Discharge Needs Assessment     Row Name 12/17/20 1359       Living Environment    Lives With  spouse    Current Living Arrangements  home/apartment/condo    Primary Care Provided by  self    Provides Primary Care For  no one    Family Caregiver if Needed  spouse    Quality of Family Relationships  helpful    Able to Return to Prior Arrangements  yes       Resource/Environmental Concerns    Resource/Environmental Concerns  none    Transportation Concerns  car, none       Transition Planning    Patient/Family Anticipates Transition to  home with help/services;home with family    Patient/Family Anticipated Services at Transition  home health care    Transportation Anticipated  family or friend will provide       Discharge Needs Assessment    Readmission Within the Last 30 Days  no previous admission in last 30 days    Equipment Currently Used at Home  walker, rolling;cane, quad    Anticipated Changes Related to Illness  none    Equipment Needed After Discharge  none    Outpatient/Agency/Support Group Needs  homecare agency    Discharge Facility/Level of Care Needs  home with home health    Discharge Coordination/Progress  Pt is scheduled for 3rd surgery tomorrow (12-18). Pt lives at home with spouse and plans to dc home when ready. Pt has DME in home. Will follow post op for dc needs.        Discharge Plan    No documentation.       Continued Care and Services - Admitted Since 12/16/2020    Coordination has not been started for this encounter.         Demographic Summary    No documentation.       Functional Status    No documentation.       Psychosocial    No documentation.       Abuse/Neglect    No documentation.       Legal    No documentation.       Substance Abuse    No documentation.       Patient Forms    No documentation.           ESTEPHANIA Jimenez

## 2020-12-17 NOTE — OUTREACH NOTE
General Surgery Week 3 Survey      Responses   [facility] patient discharged from?  [facility]   Does the patient have one of the following disease processes/diagnoses(primary or secondary)?  General Surgery   Week 3 attempt successful?  No   Revoke  Readmitted          Maura Prabhakar RN

## 2020-12-17 NOTE — THERAPY TREATMENT NOTE
Acute Care - Physical Therapy Treatment Note  [facility]     Patient Name: Adela Arauz  : 1965  MRN: [id number]  Today's Date: 2020   Onset of Illness/Injury or Date of Surgery: 20       PT Assessment (last 12 hours)      PT Evaluation and Treatment     [facility] Name 20 1359          Physical Therapy Time and Intention    Subjective Information  complains of;pain;weakness  -     Document Type  therapy note (daily note)  -     Mode of Treatment  physical therapy  -     Row Name 20 1359          General Information    Existing Precautions/Restrictions  brace worn when out of bed;fall;spinal  -[facility] Name 20 1359          Pain Scale: Numbers Pre/Post-Treatment    Pretreatment Pain Rating  6/10  -     Posttreatment Pain Rating  6/10  -     Pain Location - Orientation  lower  -     Pain Location  back  -[facility] Name 20 1359          Pain Scale: Word Pre/Post-Treatment    Pain Intervention(s)  Medication (See MAR);Repositioned;Ambulation/increased activity  -[facility] Name 20 1359          Bed Mobility    Bed Mobility  sidelying-sit;sit-sidelying  -     Sidelying-Sit Schuyler (Bed Mobility)  modified independence  -     Sit-Sidelying Schuyler (Bed Mobility)  modified independence  -[facility] Name 20 1359          Transfers    Transfers  toilet transfer  -     Sit-Stand Schuyler (Transfers)  contact guard;verbal cues  -     Stand-Sit Schuyler (Transfers)  contact guard;verbal cues  -     Schuyler Level (Toilet Transfer)  modified independence  -     Assistive Device (Toilet Transfer)  commode;walker, front-wheeled  -[facility] Name 20 0799          Gait/Stairs (Locomotion)    Schuyler Level (Gait)  contact guard;verbal cues  -     Assistive Device (Gait)  walker, front-wheeled  -     Distance in Feet (Gait)  40  -[facility] Name             Wound 20 0959 Left flank    Wound - Properties Group  Placement Date: 12/16/20  -CF Placement Time: 0959  -CF Present on Hospital Admission: N  -CF Side: Left  -CF Location: flank  -CF Additional Comments: closed incision. dressings applied.  -CF    Retired Wound - Properties Group Date first assessed: 12/16/20  -CF Time first assessed: 0959  -CF Present on Hospital Admission: N  -CF Side: Left  -CF Location: flank  -CF Additional Comments: closed incision. dressings applied.  -CF    Row Name             Wound 12/02/20 0758 Left lower abdomen Incision    Wound - Properties Group Placement Date: 12/02/20  -DT Placement Time: 0758  -DT Present on Hospital Admission: N  -DT Side: Left  -DT Orientation: lower  -DT Location: abdomen  -DT Primary Wound Type: Incision  -DT    Retired Wound - Properties Group Date first assessed: 12/02/20  -DT Time first assessed: 0758  -DT Present on Hospital Admission: N  -DT Side: Left  -DT Location: abdomen  -DT Primary Wound Type: Incision  -DT    Row Name 12/17/20 1359          Positioning and Restraints    Pre-Treatment Position  in bed  -AH     Post Treatment Position  bed  -AH     In Bed  fowlers;call light within reach;encouraged to call for assist;SCD pump applied  -AH       User Key  (r) = Recorded By, (t) = Taken By, (c) = Cosigned By    Initials Name Provider Type     Inga Stern, SUBHA Physical Therapy Assistant    Jose Cancino, RN Registered Nurse    Pamela Root RN Registered Nurse        Physical Therapy Education                 Title: PT OT SLP Therapies (In Progress)     Topic: Physical Therapy (In Progress)     Point: Mobility training (Done)     Learning Progress Summary           Patient Acceptance, E, VU by MS at 12/17/2020 4554    Comment: role of PT in her care, use of LSO and spinal restrictions                   Point: Home exercise program (Not Started)     Learner Progress:  Not documented in this visit.          Point: Body mechanics (Not Started)     Learner Progress:  Not documented in this  visit.          Point: Precautions (Done)     Learning Progress Summary           Patient Acceptance, E, VU by MS at 12/17/2020 1404    Comment: role of PT in her care, use of LSO and spinal restrictions                               User Key     Initials Effective Dates Name Provider Type Discipline    MS 06/19/18 -  Stefani Salazar, PT, DPT, NCS Physical Therapist PT              PT Recommendation and Plan        Outcome Measures     Row Name 12/17/20 0743             How much help from another is currently needed...    Putting on and taking off regular lower body clothing?  2  -CS (r) EP (t) CS (c)      Bathing (including washing, rinsing, and drying)  3  -CS (r) EP (t) CS (c)      Toileting (which includes using toilet bed pan or urinal)  3  -CS (r) EP (t) CS (c)      Putting on and taking off regular upper body clothing  3  -CS (r) EP (t) CS (c)      Taking care of personal grooming (such as brushing teeth)  4  -CS (r) EP (t) CS (c)      Eating meals  4  -CS (r) EP (t) CS (c)      AM-PAC 6 Clicks Score (OT)  19  -CS (r) EP (t)         Functional Assessment    Outcome Measure Options  AM-PAC 6 Clicks Daily Activity (OT)  -CS (r) EP (t) CS (c)        User Key  (r) = Recorded By, (t) = Taken By, (c) = Cosigned By    Initials Name Provider Type    CS Janis Baker S, OTR/L, CNT Occupational Therapist    EP Sugey Gregory, OT Student OT Student           Time Calculation:   PT Charges     Row Name 12/17/20 1424 12/17/20 0730          Time Calculation    Start Time  1359  -AH  0730 5 min chart review  -MS     Stop Time  1414  -AH  0820  -MS     Time Calculation (min)  15 min  -AH  50 min  -MS     PT Received On  --  12/17/20  -MS     PT Goal Re-Cert Due Date  --  12/27/20  -MS        Time Calculation- PT    Total Timed Code Minutes- PT  15 minute(s)  -AH  --        Timed Charges    44371 - Gait Training Minutes   15  -AH  --       User Key  (r) = Recorded By, (t) = Taken By, (c) = Cosigned By    Initials Name  Provider Type     Inga Stern, PTA Physical Therapy Assistant    Stefani Woody, PT, DPT, NCS Physical Therapist        Therapy Charges for Today     Code Description Service Date Service Provider Modifiers Qty    89207835911 HC GAIT TRAINING EA 15 MIN 12/17/2020 Inga Stern PTA GP 1          PT G-Codes  Outcome Measure Options: AM-PAC 6 Clicks Daily Activity (OT)  AM-PAC 6 Clicks Score (PT): 16  AM-PAC 6 Clicks Score (OT): 19    Inga Stern PTA  12/17/2020

## 2020-12-17 NOTE — PLAN OF CARE
Goal Outcome Evaluation:  Plan of Care Reviewed With: patient  Progress: no change  Outcome Summary: OT eval completed. Pt was educated on spinal precautions. Pt was able to roll right and to lay sidelying-sit with VCs and Min A to sit EOB. Pt don LSO with Min A. Pt completed sit-stand transfer with Min-Mod A. Pt ambul to BR and had mild impairment in standing balance due to LLE weakness after surgery. Pt completed toileting with CGA overall. Pt stand-sit t/f in chair with VCs and CGA. Recommend to continue OT services to address ADL indepedence. Recommended d/c to home with assist.

## 2020-12-17 NOTE — PLAN OF CARE
Goal Outcome Evaluation:  Plan of Care Reviewed With: patient  Progress: no change  Outcome Summary: PT evaluation completed. The patient presents alert and oriented x4. She reports L LE weakness that is new and impaired sensation in the L LE. Upon examination, the patiant has more than just L hip flexion weakness which is expected with this approach of surgery. She has L hip weakness along with quad, hamstring, dorsiflexion, and plantarflexion weakness. She also has impaired sensation in her entire L LE that is new to her. She demonstrate L knee buckling during gait and poor L foot clearance due to weak hip flexors and dorsiflexion. She will benefit from skilled PT services to work on strengthening and gait mechanics. The patient is having her third part surgery tomorrow. PT to follow up after surgery for discharge recommendations.

## 2020-12-17 NOTE — CONSULTS
Referring Provider: Dr. Dunaway  Reason for Consultation: Medical management    Patient Care Team:  Charlene Huntley, [place], APRN as PCP - General (Family Medicine)  Yan Seay MD as Consulting Physician (Pulmonary Disease)  Ramírez Marie MD as Consulting Physician (Hematology and Oncology)    Chief complaint chronic back pain    Subjective .     History of present illness:  The patient presents today for surgical correction after failing conservative management.  They have been through anti-inflammatories, muscle relaxers, and pain medication.  The pain has progressed to the point in time where it is affecting their activities of daily living and after being explained all their options, elected to undergo surgical correction.  Their primary care physician does not attend here at [facility]; therefore, I have been asked to take care of their primary medical needs in the perioperative period.  Postoperative pain is as expected.  There are no other precipitating or relieving factors. I have been requested by the Attending Physician to provide Medical Consultation in the perioperative period. The patient understands my role in their hospitalization and agrees with my treatment plan. They understand the importance of follow up with their PCP upon discharge  from [facility] for any concerns or abnormalities.    Pleasant 55-year-old white female with a multitude of medical issues as outlined below.  Patient appears medically stable postoperative day #1.  Plan for posterior aspect of surgery tomorrow.  Education of patient on incentive spirometry, aggressive pulmonary toilet and discontinuation of all tobacco products.      REVIEW OF SYSTEMS:    CONSTITUTIONAL:  Negative for anorexia, chills, fevers, night sweats and weight loss  EYES:  negative for eye dryness, icterus and redness  HEENT:   negative for dental problems, epistaxis, facial trauma and thrush  RESPIRATORY: Chronic  shortness of air.  CARDIOVASCULAR: negative for chest pain, dyspnea, exertional chest pressure/discomfort, irregular heart beat, palpitations, paroxysmal nocturnal dyspnea and syncope  GASTROINTESTINAL:  negative for abdominal pain, hematemesis, jaundice, melena and rectal bleeding. No significant changes in bowel habits preoperatively.   MUSCULOSKELETAL:  negative for muscle weakness, myalgias and neck pain, outside of surgical issues noted above  NEUROLOGICAL:   negative for dizziness, headaches, seizures, speech problems, tremors and vertigo  INTEGUMENT: negative for pruritus, rash, skin color change and skin lesion(s)         History    Past Medical History:   Diagnosis Date   • AAT (alpha-1-antitrypsin) deficiency (CMS/[facility]) 1/17/2019   • Anxiety 5/22/2020   • Cancer (CMS/[facility])     LYMPHOMA   • DDD (degenerative disc disease), lumbar    • GERD (gastroesophageal reflux disease)    • 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, 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    • Low back pain    • Morbid obesity due to excess calories (CMS/[facility]) 2/21/2020   • MRSA infection    • Nausea after anesthesia    • Osteoarthritis    • Osteoporosis    • Panic attacks    • PONV (postoperative nausea and vomiting)    • Psoriasis    • RA (rheumatoid arthritis) (CMS/[facility])    • Seasonal allergies    • Senile osteoporosis 9/28/2017     Past Surgical History:   Procedure Laterality Date   • ANTERIOR LUMBAR EXPOSURE N/A 12/2/2020    Procedure: Anterior lumbar interbody fusion of L5-S1 with instrumentation ;  Surgeon: Neptali Rivera DO;  Location:  [facility] OR;  Service: Vascular;  Laterality: N/A;   • AXILLARY LYMPH NODE BIOPSY/EXCISION Right 9/19/2019    Procedure: EXCISION RIGHT AXILLARY MASS;  Surgeon: Jes Enamorado MD;  Location:  [facility] OR;  Service: General   • LUMBAR FUSION N/A 12/2/2020    Procedure: ANTERIOR DECOMPRESSION, ANTERIOR LUMBAR INTERBODY FUSION WITH INSTRUMENTATION L5-S1;  Surgeon: ALEXIS Dunaway MD;  Location:  [facility] OR;  Service:  Orthopedic Spine;  Laterality: N/A;   • LUMBAR FUSION Left 2020    Procedure: LEFT LATERAL LUMBAR INTERBODY FUSION L3-5 WITH INSTRUMENTATION L3-4;  Surgeon: ALEXIS Dunaway MD;  Location: [facility] OR;  Service: Orthopedic Spine;  Laterality: Left;   • SHOULDER SURGERY     • TUBAL ABDOMINAL LIGATION     • US GUIDED LYMPH NODE BIOPSY  2019   • WRIST SURGERY Bilateral     fracture     Family History   Problem Relation Age of Onset   • Arthritis Mother    • COPD Mother    • Atrial fibrillation Mother    • Osteoarthritis Mother    • Heart disease Father    • Hypertension Father    • Arthritis Father    • Melanoma Father         metastatic   • Arthritis Sister    • Arthritis Brother    • No Known Problems Daughter    • Arthritis Maternal Grandfather    • Breast cancer Neg Hx      Social History     Tobacco Use   • Smoking status: Former Smoker     Packs/day: 1.00     Years: 20.00     Pack years: 20.00     Types: Cigarettes     Quit date: 9/3/2018     Years since quittin.2   • Smokeless tobacco: Never Used   • Tobacco comment: quit 2018   Substance Use Topics   • Alcohol use: No   • Drug use: No     Medications Prior to Admission   Medication Sig Dispense Refill Last Dose   • ALPRAZolam (Xanax) 0.5 MG tablet Take 1 tablet by mouth 3 (Three) Times a Day As Needed for Anxiety. 90 tablet 0 2020 at 0515   • betamethasone valerate (VALISONE) 0.1 % ointment Apply  topically to the appropriate area as directed 2 (Two) Times a Day. 30 g 0 12/15/2020 at am   • Calcium-Magnesium-Vitamin D 500-250-200 MG-MG-UNIT tablet Take 600 tablets by mouth 2 (Two) Times a Day.   12/15/2020 at am   • cholecalciferol (VITAMIN D3) 1000 units tablet Take 1,000 Units by mouth Daily.   12/15/2020 at am   • DULoxetine (CYMBALTA) 60 MG capsule TAKE ONE CAPSULE BY MOUTH DAILY 30 capsule 2 12/15/2020 at am   • fluticasone (FLONASE) 50 MCG/ACT nasal spray 2 sprays into the nostril(s) as directed by provider Daily. 1 bottle 3 Past Week  at Unknown time   • gabapentin (NEURONTIN) 600 MG tablet Take 1 tablet by mouth 3 (Three) Times a Day. 90 tablet 3 12/15/2020 at pm   • ibandronate (BONIVA) 150 MG tablet Take 150 mg by mouth Every 30 (Thirty) Days.   Past Month at Unknown time   • mupirocin (BACTROBAN) 2 % ointment Apply to nostrils and fingernails nighlty x 5 nights same 5 nights monthly x 6 months. 30 g 5 Past Month at Unknown time   • omeprazole (priLOSEC) 40 MG capsule TAKE ONE CAPSULE BY MOUTH DAILY 30 capsule 5 12/15/2020 at am   • ondansetron ODT (Zofran ODT) 4 MG disintegrating tablet Place 1 tablet on the tongue Every 8 (Eight) Hours As Needed for Nausea or Vomiting. 40 tablet 0 Past Month at Unknown time   • oxyCODONE-acetaminophen (PERCOCET)  MG per tablet Take 1 tablet by mouth Every 6 (Six) Hours As Needed for Severe Pain  for up to 15 days. 60 tablet 0 12/15/2020 at pm   • sertraline (ZOLOFT) 50 MG tablet Take 1 tablet by mouth Every Night. 90 tablet 1 12/15/2020 at pm   • tiZANidine (ZANAFLEX) 4 MG tablet TAKE ONE TABLET BY MOUTH TWICE A DAY AS NEEDED FOR MUSCLE SPASMS 60 tablet 5 12/15/2020 at pm   • Turmeric 500 MG capsule Take  by mouth 2 (Two) Times a Day.   12/15/2020 at pm       Allergies:  Patient has no known allergies.    Objective     Vital Signs   Temp:  [97.3 °F (36.3 °C)-98.8 °F (37.1 °C)] 98.3 °F (36.8 °C)  Heart Rate:  [] 77  Resp:  [12-18] 18  BP: (110-151)/() 110/58          Physical Exam:  Constitutional: oriented to person, place, and time. appears well-developed.   HEENT:   Head: Normocephalic and atraumatic.   Eyes: Pupils are equal, round, and reactive to light.   Neck: Neck supple.   Cardiovascular: Regular rhythm and normal heart sounds.    Pulmonary/Chest: Effort normal and breath sounds normal. CTAB  Abdominal: Soft. Bowel sounds are normal to hypoactive. No significant distension. There is no tenderness. There is no rebound and no guarding.   Musculoskeletal: Normal range of motion and no  edema or tenderness outside of surgical area.   Neurological: Pt is alert and oriented to person, place, and time.  normal reflexes present.   Skin: Skin is warm and dry.     Results Review:   I reviewed the patient's new imaging results and agree with the interpretation.      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  Educated on the proper use of incentive spirometry.  Will use nebulizers as needed.  Aggressive pulmonary toilet and early ambulation.    Hypertension-review pre and post BP's,will restart home BP meds when BP allows. Recent studies demonstrate the need for patience and less reactivity for precipitous drop of BP in the acute care setting. Will educate staff to use other modalities to help reduce MAP prior to adding additional medical interventions. Add clonidine 0.1 mg every 4 hours prn if bp> 140/90,monitor BP and adjust meds as necessary.      Constipation-start with Miralax 1 capful BID until BM, then decrease to 1 x a day,then can step up to Amitiza 24 mcg po BID which can be used for opioid induced constipation,and ultimately end up with Relistor 12 mcg subq every 48 hours to block the effect of narcotics on the gut.      Explained to patient and staff that we were consulted for medical management during their acute care hospitalization. The Medical Consultation was requested by the Attending Physician. The patient has been recommended to f/u with their regular primary care provider concerning any further treatment and review of abnormalities found during their hospitalization at [facility].They agree with the treatment plan as well as understand our role in their hospitalization. All questions were encouraged and answered to best  of my ability.    I discussed the patient's findings and my recommendations with patient, nursing staff and consulting provider    Nathaniel Betancourt MD  12/17/20  07:46 CST

## 2020-12-17 NOTE — PLAN OF CARE
Goal Outcome Evaluation:  Plan of Care Reviewed With: patient  Progress: no change  Outcome Summary: Rates pain 6-10/10.  PRN pain meds given with stated relief.  Pt c/o numbness in left thight.  LEWIS.  Brendan dorsiflexion weak, though plantarflexion was moderate.  Self turning in bed.  Voiding per BP, pt did not feel that she could ambulate tonight.  Courtney SOLANO.

## 2020-12-17 NOTE — TELEPHONE ENCOUNTER
Caller: Adela Arauz    Relationship: Self    Best call back number: [phone]    Medication needed:   Requested Prescriptions     Pending Prescriptions Disp Refills   • ALPRAZolam (Xanax) 0.5 MG tablet 90 tablet 0     Sig: Take 1 tablet by mouth 3 (Three) Times a Day As Needed for Anxiety.       When do you need the refill by: 12/17/20    Does the patient have less than a 3 day supply:  [x] Yes  [] No    What is the patient's preferred pharmacy: [facility] - [place], [place] - [address] [place] [address] [phone] [facility] [phone] FX

## 2020-12-17 NOTE — THERAPY EVALUATION
Patient Name: Adela Arauz  : 1965    MRN: [id number]                              Today's Date: 2020       Admit Date: 2020    Visit Dx:     ICD-10-CM ICD-9-CM   1. Decreased activities of daily living (ADL)  Z78.9 V49.89   2. 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 (CMS/[facility])   • Encounter for consultation   • Former smoker   • History of radiation therapy   • Cellulitis of right upper extremity   • Lymphedema of right arm   • Pain in right arm   • Hx of osteomyelitis   • History of lymph node dissection of right axilla   • Morbid obesity due to excess calories (CMS/[facility])   • MRSA (methicillin resistant Staphylococcus aureus) infection   • Anxiety   • DDD (degenerative disc disease), lumbar   • Foraminal stenosis due to intervertebral disc disease   • Lumbar stenosis     Past Medical History:   Diagnosis Date   • AAT (alpha-1-antitrypsin) deficiency (CMS/[facility]) 2019   • Anxiety 2020   • Cancer (CMS/[facility])     LYMPHOMA   • DDD (degenerative disc disease), lumbar    • GERD (gastroesophageal reflux disease)    • 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, 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    • Low back pain    • Morbid obesity due to excess calories (CMS/[facility]) 2020   • MRSA infection    • Nausea after anesthesia    • Osteoarthritis    • Osteoporosis    • Panic attacks    • PONV (postoperative nausea and vomiting)    • Psoriasis    • RA (rheumatoid arthritis) (CMS/[facility])    • Seasonal allergies    • Senile osteoporosis 2017     Past Surgical History:   Procedure Laterality Date   • ANTERIOR LUMBAR EXPOSURE N/A  12/2/2020    Procedure: Anterior lumbar interbody fusion of L5-S1 with instrumentation ;  Surgeon: Neptali Rivera DO;  Location:  [facility] OR;  Service: Vascular;  Laterality: N/A;   • AXILLARY LYMPH NODE BIOPSY/EXCISION Right 9/19/2019    Procedure: EXCISION RIGHT AXILLARY MASS;  Surgeon: Jes Enamorado MD;  Location:  [facility] OR;  Service: General   • LUMBAR FUSION N/A 12/2/2020    Procedure: ANTERIOR DECOMPRESSION, ANTERIOR LUMBAR INTERBODY FUSION WITH INSTRUMENTATION L5-S1;  Surgeon: ALEXIS Dunaway MD;  Location:  [facility] OR;  Service: Orthopedic Spine;  Laterality: N/A;   • LUMBAR FUSION Left 12/16/2020    Procedure: LEFT LATERAL LUMBAR INTERBODY FUSION L3-5 WITH INSTRUMENTATION L3-4;  Surgeon: ALEXIS Dunaway MD;  Location:  [facility] OR;  Service: Orthopedic Spine;  Laterality: Left;   • SHOULDER SURGERY     • TUBAL ABDOMINAL LIGATION     • US GUIDED LYMPH NODE BIOPSY  8/9/2019   • WRIST SURGERY Bilateral     fracture     General Information     Row Name 12/17/20 0730          Physical Therapy Time and Intention    Document Type  evaluation s/p L LLIF L3-5 with instrumentation L3-4 due to increased chronic back pain, B buttock, thigh, and leg radiculopathy R>L, neurogenic claudication, RA  -MS     Mode of Treatment  physical therapy;co-treatment  -MS     Row Name 12/17/20 0730          General Information    Patient Profile Reviewed  yes  -MS     Existing Precautions/Restrictions  brace worn when out of bed;fall;spinal  -MS     Barriers to Rehab  previous functional deficit;physical barrier  -MS     Row Name 12/17/20 0730          Living Environment    Lives With  spouse  -MS     Row Name 12/17/20 0730          Cognition    Orientation Status (Cognition)  oriented x 4  -MS     Row Name 12/17/20 0730          Safety Issues, Functional Mobility    Impairments Affecting Function (Mobility)  motor control;range of motion (ROM);sensation/sensory awareness;strength  -MS       User Key  (r) = Recorded By, (t)  = Taken By, (c) = Cosigned By    Initials Name Provider Type    Stefani Woody R, PT, DPT, NCS Physical Therapist        Mobility     Row Name 12/17/20 0730          Bed Mobility    Bed Mobility  rolling right;sidelying-sit  -MS     Rolling Right [place] (Bed Mobility)  contact guard;verbal cues;nonverbal cues (demo/gesture)  -MS     Sidelying-Sit [place] (Bed Mobility)  contact guard;verbal cues;nonverbal cues (demo/gesture)  -MS     Assistive Device (Bed Mobility)  bed rails  -MS     Row Name 12/17/20 0730          Transfers    Comment (Transfers)  blocking for L knee on first stand  -MS     Row Name 12/17/20 0730          Sit-Stand Transfer    Sit-Stand [place] (Transfers)  minimum assist (75% patient effort);verbal cues;nonverbal cues (demo/gesture)  -MS     Assistive Device (Sit-Stand Transfers)  -- HHA x2  -MS     Row Name 12/17/20 0730          Gait/Stairs (Locomotion)    [place] Level (Gait)  minimum assist (75% patient effort);verbal cues;nonverbal cues (demo/gesture)  -MS     Assistive Device (Gait)  -- HHA x2  -MS     Distance in Feet (Gait)  15ft and 20ft, impaired L foot clearance due to weak hip flexion and dorsiflexion, L knee buckling, but pt able to self correct.  -MS       User Key  (r) = Recorded By, (t) = Taken By, (c) = Cosigned By    Initials Name Provider Type    Stefani Woody [place], PT, DPT, NCS Physical Therapist        Obj/Interventions     Row Name 12/17/20 0730          Range of Motion Comprehensive    Comment, General Range of Motion  L hip flexion impaired 50%, L dorsiflexion impaired 50%  -MS     Row Name 12/17/20 0730          Strength Comprehensive (MMT)    Comment, General Manual Muscle Testing (MMT) Assessment  L hip flexion 2+/5, L quad 3/5, L hamstring 3/5, L dorsiflexion 2+/5, L plantarflexion 2+/5  -MS     Row Name 12/17/20 0730          Balance    Static Sitting Balance  WNL;sitting, edge of bed;unsupported  -MS     Dynamic Sitting Balance  WFL;sitting,  edge of bed;unsupported  -MS     Static Standing Balance  mild impairment;supported  -MS     Dynamic Standing Balance  mild impairment;supported  -MS     Row Name 12/17/20 0730          Sensory Assessment (Somatosensory)    Sensory Assessment (Somatosensory)  -- L LE impaired sensation L2-S1, pt reports L lateral thigh numbness as well  -MS       User Key  (r) = Recorded By, (t) = Taken By, (c) = Cosigned By    Initials Name Provider Type    Stefani Woody R, PT, DPT, NCS Physical Therapist        Goals/Plan     Row Name 12/17/20 0730          Bed Mobility Goal 1 (PT)    Activity/Assistive Device (Bed Mobility Goal 1, PT)  bed mobility activities, all  -MS     [place] Level/Cues Needed (Bed Mobility Goal 1, PT)  independent  -MS     Time Frame (Bed Mobility Goal 1, PT)  long term goal (LTG);by discharge  -MS     Progress/Outcomes (Bed Mobility Goal 1, PT)  goal ongoing  -MS     Row Name 12/17/20 0730          Transfer Goal 1 (PT)    Activity/Assistive Device (Transfer Goal 1, PT)  sit-to-stand/stand-to-sit;bed-to-chair/chair-to-bed;walker, rolling  -MS     [place] Level/Cues Needed (Transfer Goal 1, PT)  modified independence  -MS     Time Frame (Transfer Goal 1, PT)  long term goal (LTG);by discharge  -MS     Progress/Outcome (Transfer Goal 1, PT)  goal ongoing  -MS     Row Name 12/17/20 0730          Gait Training Goal 1 (PT)    Activity/Assistive Device (Gait Training Goal 1, PT)  gait (walking locomotion);assistive device use;decrease fall risk;walker, rolling  -MS     [place] Level (Gait Training Goal 1, PT)  modified independence  -MS     Distance (Gait Training Goal 1, PT)  150ft with no knee buckling and full L foot clearance 100% of the time  -MS     Time Frame (Gait Training Goal 1, PT)  long term goal (LTG);by discharge  -MS     Progress/Outcome (Gait Training Goal 1, PT)  goal ongoing  -MS       User Key  (r) = Recorded By, (t) = Taken By, (c) = Cosigned By    Initials Name Provider  Type    MS Stefani Salazar R, PT, DPT, NCS Physical Therapist        Clinical Impression     Row Name 12/17/20 0730          Pain    Additional Documentation  Pain Scale: Numbers Pre/Post-Treatment (Group)  -MS     Row Name 12/17/20 0730          Pain Scale: Numbers Pre/Post-Treatment    Pretreatment Pain Rating  7/10  -MS     Posttreatment Pain Rating  7/10  -MS     Pain Location  back  -MS     Pain Intervention(s)  Medication (See MAR);Repositioned;Ambulation/increased activity  -MS     Row Name 12/17/20 0730          Plan of Care Review    Plan of Care Reviewed With  patient  -MS     Progress  no change  -MS     Outcome Summary  PT evaluation completed. The patient presents alert and oriented x4. She reports L LE weakness that is new and impaired sensation in the L LE. Upon examination, the patiant has more than just L hip flexion weakness which is expected with this approach of surgery. She has L hip weakness along with quad, hamstring, dorsiflexion, and plantarflexion weakness. She also has impaired sensation in her entire L LE that is new to her. She demonstrate L knee buckling during gait and poor L foot clearance due to weak hip flexors and dorsiflexion. She will benefit from skilled PT services to work on strengthening and gait mechanics. The patient is having her third part surgery tomorrow. PT to follow up after surgery for discharge recommendations.  -MS     Row Name 12/17/20 0730          Therapy Assessment/Plan (PT)    Patient/Family Therapy Goals Statement (PT)  increase strength and walk safely  -MS     Rehab Potential (PT)  good, to achieve stated therapy goals  -MS     Criteria for Skilled Interventions Met (PT)  yes;skilled treatment is necessary  -MS     Predicted Duration of Therapy Intervention (PT)  until discharge  -MS     Row Name 12/17/20 0730          Positioning and Restraints    Post Treatment Position  chair  -MS     In Chair  notified nsg;sitting;call light within reach;encouraged to  call for assist;with brace  -MS       User Key  (r) = Recorded By, (t) = Taken By, (c) = Cosigned By    Initials Name Provider Type    MS Salazar Stefani AVILA, PT, DPT, NCS Physical Therapist        Outcome Measures     Row Name 12/17/20 0730          How much help from another person do you currently need...    Turning from your back to your side while in flat bed without using bedrails?  3  -MS     Moving from lying on back to sitting on the side of a flat bed without bedrails?  3  -MS     Moving to and from a bed to a chair (including a wheelchair)?  3  -MS     Standing up from a chair using your arms (e.g., wheelchair, bedside chair)?  3  -MS     Climbing 3-5 steps with a railing?  1  -MS     To walk in hospital room?  3  -MS     AM-PAC 6 Clicks Score (PT)  16  -MS     Row Name 12/17/20 0730          Functional Assessment    Outcome Measure Options  AM-PAC 6 Clicks Basic Mobility (PT)  -MS       User Key  (r) = Recorded By, (t) = Taken By, (c) = Cosigned By    Initials Name Provider Type    MS Martin Stefani AVILA, PT, DPT, NCS Physical Therapist        Physical Therapy Education                 Title: PT OT SLP Therapies (In Progress)     Topic: Physical Therapy (In Progress)     Point: Mobility training (Done)     Learning Progress Summary           Patient Acceptance, E, VU by MS at 12/17/2020 1404    Comment: role of PT in her care, use of LSO and spinal restrictions                   Point: Home exercise program (Not Started)     Learner Progress:  Not documented in this visit.          Point: Body mechanics (Not Started)     Learner Progress:  Not documented in this visit.          Point: Precautions (Done)     Learning Progress Summary           Patient Acceptance, E, VU by MS at 12/17/2020 1404    Comment: role of PT in her care, use of LSO and spinal restrictions                               User Key     Initials Effective Dates Name Provider Type Discipline    MS 06/19/18 -  Stefani Salazar, PT, DPT, NCS  Physical Therapist PT              PT Recommendation and Plan  Planned Therapy Interventions (PT): balance training, bed mobility training, gait training, strengthening, transfer training, patient/family education  Plan of Care Reviewed With: patient  Progress: no change  Outcome Summary: PT evaluation completed. The patient presents alert and oriented x4. She reports L LE weakness that is new and impaired sensation in the L LE. Upon examination, the patiant has more than just L hip flexion weakness which is expected with this approach of surgery. She has L hip weakness along with quad, hamstring, dorsiflexion, and plantarflexion weakness. She also has impaired sensation in her entire L LE that is new to her. She demonstrate L knee buckling during gait and poor L foot clearance due to weak hip flexors and dorsiflexion. She will benefit from skilled PT services to work on strengthening and gait mechanics. The patient is having her third part surgery tomorrow. PT to follow up after surgery for discharge recommendations.     Time Calculation:   PT Charges     Row Name 12/17/20 0730             Time Calculation    Start Time  0730 5 min chart review  -MS      Stop Time  0820  -MS      Time Calculation (min)  50 min  -MS      PT Received On  12/17/20  -MS      PT Goal Re-Cert Due Date  12/27/20  -MS        User Key  (r) = Recorded By, (t) = Taken By, (c) = Cosigned By    Initials Name Provider Type    MS Stefani Salazar, PT, DPT, NCS Physical Therapist        Therapy Charges for Today     Code Description Service Date Service Provider Modifiers Qty    14014854726 HC PT EVAL MOD COMPLEXITY 4 12/17/2020 Stefani Salazar PT, DPT, NCS GP 1          PT G-Codes  Outcome Measure Options: AM-PAC 6 Clicks Daily Activity (OT)  AM-PAC 6 Clicks Score (PT): 16  AM-PAC 6 Clicks Score (OT): 19    Stefani Salazar PT, DPT, NCS  12/17/2020

## 2020-12-18 ENCOUNTER — APPOINTMENT (OUTPATIENT)
Dept: GENERAL RADIOLOGY | Facility: HOSPITAL | Age: 55
End: 2020-12-18

## 2020-12-18 ENCOUNTER — ANESTHESIA (OUTPATIENT)
Dept: PERIOP | Facility: HOSPITAL | Age: 55
End: 2020-12-18

## 2020-12-18 ENCOUNTER — ANESTHESIA EVENT (OUTPATIENT)
Dept: PERIOP | Facility: HOSPITAL | Age: 55
End: 2020-12-18

## 2020-12-18 LAB
FOLATE SERPL-MCNC: 5.97 NG/ML (ref 4.78–24.2)
IRON 24H UR-MRATE: 24 MCG/DL (ref 37–145)
IRON SATN MFR SERPL: 5 % (ref 20–50)
TIBC SERPL-MCNC: 443 MCG/DL (ref 298–536)
TRANSFERRIN SERPL-MCNC: 297 MG/DL (ref 200–360)
VIT B12 BLD-MCNC: 291 PG/ML (ref 211–946)

## 2020-12-18 PROCEDURE — 25010000002 DEXAMETHASONE PER 1 MG: Performed by: NURSE ANESTHETIST, CERTIFIED REGISTERED

## 2020-12-18 PROCEDURE — 25010000002 FENTANYL CITRATE (PF) 100 MCG/2ML SOLUTION: Performed by: ANESTHESIOLOGY

## 2020-12-18 PROCEDURE — 25010000002 SUCCINYLCHOLINE PER 20 MG: Performed by: NURSE ANESTHETIST, CERTIFIED REGISTERED

## 2020-12-18 PROCEDURE — 72100 X-RAY EXAM L-S SPINE 2/3 VWS: CPT

## 2020-12-18 PROCEDURE — 0SG3071 FUSION OF LUMBOSACRAL JOINT WITH AUTOLOGOUS TISSUE SUBSTITUTE, POSTERIOR APPROACH, POSTERIOR COLUMN, OPEN APPROACH: ICD-10-PCS | Performed by: ORTHOPAEDIC SURGERY

## 2020-12-18 PROCEDURE — 25010000002 HYDROMORPHONE PER 4 MG: Performed by: ANESTHESIOLOGY

## 2020-12-18 PROCEDURE — 25010000003 BUPIVACAINE LIPOSOME 1.3 % SUSPENSION: Performed by: ORTHOPAEDIC SURGERY

## 2020-12-18 PROCEDURE — 25010000003 HYDROMORPHONE 1 MG/ML SOLUTION: Performed by: ORTHOPAEDIC SURGERY

## 2020-12-18 PROCEDURE — 83540 ASSAY OF IRON: CPT | Performed by: FAMILY MEDICINE

## 2020-12-18 PROCEDURE — 25010000002 CEFAZOLIN PER 500 MG: Performed by: ORTHOPAEDIC SURGERY

## 2020-12-18 PROCEDURE — 25010000002 PROPOFOL 10 MG/ML EMULSION: Performed by: NURSE ANESTHETIST, CERTIFIED REGISTERED

## 2020-12-18 PROCEDURE — 76000 FLUOROSCOPY <1 HR PHYS/QHP: CPT

## 2020-12-18 PROCEDURE — C9290 INJ, BUPIVACAINE LIPOSOME: HCPCS | Performed by: ORTHOPAEDIC SURGERY

## 2020-12-18 PROCEDURE — 25010000002 PHENYLEPHRINE HCL 0.8 MG/10ML SOLUTION PREFILLED SYRINGE: Performed by: NURSE ANESTHETIST, CERTIFIED REGISTERED

## 2020-12-18 PROCEDURE — 4A11X4G MONITORING OF PERIPHERAL NERVOUS ELECTRICAL ACTIVITY, INTRAOPERATIVE, EXTERNAL APPROACH: ICD-10-PCS | Performed by: ORTHOPAEDIC SURGERY

## 2020-12-18 PROCEDURE — C1713 ANCHOR/SCREW BN/BN,TIS/BN: HCPCS | Performed by: ORTHOPAEDIC SURGERY

## 2020-12-18 PROCEDURE — 97535 SELF CARE MNGMENT TRAINING: CPT | Performed by: OCCUPATIONAL THERAPIST

## 2020-12-18 PROCEDURE — C1769 GUIDE WIRE: HCPCS | Performed by: ORTHOPAEDIC SURGERY

## 2020-12-18 PROCEDURE — 25010000002 DEXAMETHASONE PER 1 MG: Performed by: ANESTHESIOLOGY

## 2020-12-18 PROCEDURE — 82746 ASSAY OF FOLIC ACID SERUM: CPT | Performed by: FAMILY MEDICINE

## 2020-12-18 PROCEDURE — 82607 VITAMIN B-12: CPT | Performed by: FAMILY MEDICINE

## 2020-12-18 PROCEDURE — 0SG1071 FUSION OF 2 OR MORE LUMBAR VERTEBRAL JOINTS WITH AUTOLOGOUS TISSUE SUBSTITUTE, POSTERIOR APPROACH, POSTERIOR COLUMN, OPEN APPROACH: ICD-10-PCS | Performed by: ORTHOPAEDIC SURGERY

## 2020-12-18 PROCEDURE — 97168 OT RE-EVAL EST PLAN CARE: CPT | Performed by: OCCUPATIONAL THERAPIST

## 2020-12-18 PROCEDURE — 25010000002 CEFAZOLIN 1-4 GM/50ML-% SOLUTION: Performed by: ORTHOPAEDIC SURGERY

## 2020-12-18 PROCEDURE — 63710000001 DIPHENHYDRAMINE PER 50 MG: Performed by: ORTHOPAEDIC SURGERY

## 2020-12-18 PROCEDURE — 84466 ASSAY OF TRANSFERRIN: CPT | Performed by: FAMILY MEDICINE

## 2020-12-18 PROCEDURE — 25010000002 ONDANSETRON PER 1 MG: Performed by: NURSE ANESTHETIST, CERTIFIED REGISTERED

## 2020-12-18 DEVICE — TI MIS LORDOTIC ROD, 5.5 MM X 90 MM
Type: IMPLANTABLE DEVICE | Status: FUNCTIONAL
Brand: INVICTUS

## 2020-12-18 DEVICE — CANNULATED EXTENDED TAB POLYAXIAL REDUCTION SCREW, 6.5 MM X 40 MM
Type: IMPLANTABLE DEVICE | Status: FUNCTIONAL
Brand: INVICTUS

## 2020-12-18 DEVICE — CANNULATED EXTENDED TAB POLYAXIAL REDUCTION SCREW, 6.5 MM X 45 MM
Type: IMPLANTABLE DEVICE | Status: FUNCTIONAL
Brand: INVICTUS

## 2020-12-18 DEVICE — SET SCREW
Type: IMPLANTABLE DEVICE | Status: FUNCTIONAL
Brand: INVICTUS

## 2020-12-18 RX ORDER — ONDANSETRON 2 MG/ML
INJECTION INTRAMUSCULAR; INTRAVENOUS AS NEEDED
Status: DISCONTINUED | OUTPATIENT
Start: 2020-12-18 | End: 2020-12-18 | Stop reason: SURG

## 2020-12-18 RX ORDER — SODIUM CHLORIDE 0.9 % (FLUSH) 0.9 %
10 SYRINGE (ML) INJECTION AS NEEDED
Status: DISCONTINUED | OUTPATIENT
Start: 2020-12-18 | End: 2020-12-18 | Stop reason: HOSPADM

## 2020-12-18 RX ORDER — SODIUM CHLORIDE, SODIUM LACTATE, POTASSIUM CHLORIDE, CALCIUM CHLORIDE 600; 310; 30; 20 MG/100ML; MG/100ML; MG/100ML; MG/100ML
1000 INJECTION, SOLUTION INTRAVENOUS CONTINUOUS
Status: DISCONTINUED | OUTPATIENT
Start: 2020-12-18 | End: 2020-12-19 | Stop reason: SDUPTHER

## 2020-12-18 RX ORDER — SUFENTANIL CITRATE 50 UG/ML
INJECTION EPIDURAL; INTRAVENOUS AS NEEDED
Status: DISCONTINUED | OUTPATIENT
Start: 2020-12-18 | End: 2020-12-18 | Stop reason: SURG

## 2020-12-18 RX ORDER — BUPIVACAINE HYDROCHLORIDE AND EPINEPHRINE 2.5; 5 MG/ML; UG/ML
INJECTION, SOLUTION INFILTRATION; PERINEURAL AS NEEDED
Status: DISCONTINUED | OUTPATIENT
Start: 2020-12-18 | End: 2020-12-18 | Stop reason: HOSPADM

## 2020-12-18 RX ORDER — MAGNESIUM HYDROXIDE 1200 MG/15ML
LIQUID ORAL AS NEEDED
Status: DISCONTINUED | OUTPATIENT
Start: 2020-12-18 | End: 2020-12-18 | Stop reason: HOSPADM

## 2020-12-18 RX ORDER — SODIUM CHLORIDE, SODIUM LACTATE, POTASSIUM CHLORIDE, CALCIUM CHLORIDE 600; 310; 30; 20 MG/100ML; MG/100ML; MG/100ML; MG/100ML
100 INJECTION, SOLUTION INTRAVENOUS CONTINUOUS
Status: DISCONTINUED | OUTPATIENT
Start: 2020-12-18 | End: 2020-12-19 | Stop reason: SDUPTHER

## 2020-12-18 RX ORDER — NALOXONE HCL 0.4 MG/ML
0.04 VIAL (ML) INJECTION AS NEEDED
Status: DISCONTINUED | OUTPATIENT
Start: 2020-12-18 | End: 2020-12-18 | Stop reason: HOSPADM

## 2020-12-18 RX ORDER — SODIUM CHLORIDE 0.9 % (FLUSH) 0.9 %
3 SYRINGE (ML) INJECTION EVERY 12 HOURS SCHEDULED
Status: DISCONTINUED | OUTPATIENT
Start: 2020-12-18 | End: 2020-12-18 | Stop reason: HOSPADM

## 2020-12-18 RX ORDER — BUPIVACAINE HCL/0.9 % NACL/PF 0.1 %
2 PLASTIC BAG, INJECTION (ML) EPIDURAL ONCE
Status: COMPLETED | OUTPATIENT
Start: 2020-12-18 | End: 2020-12-18

## 2020-12-18 RX ORDER — PHENYLEPHRINE HCL IN 0.9% NACL 0.8MG/10ML
SYRINGE (ML) INTRAVENOUS AS NEEDED
Status: DISCONTINUED | OUTPATIENT
Start: 2020-12-18 | End: 2020-12-18 | Stop reason: SURG

## 2020-12-18 RX ORDER — PROPOFOL 10 MG/ML
VIAL (ML) INTRAVENOUS AS NEEDED
Status: DISCONTINUED | OUTPATIENT
Start: 2020-12-18 | End: 2020-12-18 | Stop reason: SURG

## 2020-12-18 RX ORDER — SUCCINYLCHOLINE CHLORIDE 20 MG/ML
INJECTION INTRAMUSCULAR; INTRAVENOUS AS NEEDED
Status: DISCONTINUED | OUTPATIENT
Start: 2020-12-18 | End: 2020-12-18 | Stop reason: SURG

## 2020-12-18 RX ORDER — SODIUM CHLORIDE 9 MG/ML
75 INJECTION, SOLUTION INTRAVENOUS CONTINUOUS
Status: DISPENSED | OUTPATIENT
Start: 2020-12-18 | End: 2020-12-19

## 2020-12-18 RX ORDER — DEXAMETHASONE SODIUM PHOSPHATE 4 MG/ML
4 INJECTION, SOLUTION INTRA-ARTICULAR; INTRALESIONAL; INTRAMUSCULAR; INTRAVENOUS; SOFT TISSUE ONCE AS NEEDED
Status: COMPLETED | OUTPATIENT
Start: 2020-12-18 | End: 2020-12-18

## 2020-12-18 RX ORDER — NEOSTIGMINE METHYLSULFATE 5 MG/5 ML
SYRINGE (ML) INTRAVENOUS AS NEEDED
Status: DISCONTINUED | OUTPATIENT
Start: 2020-12-18 | End: 2020-12-18 | Stop reason: SURG

## 2020-12-18 RX ORDER — ACETAMINOPHEN 500 MG
1000 TABLET ORAL ONCE
Status: DISCONTINUED | OUTPATIENT
Start: 2020-12-18 | End: 2020-12-18

## 2020-12-18 RX ORDER — ROCURONIUM BROMIDE 10 MG/ML
INJECTION, SOLUTION INTRAVENOUS AS NEEDED
Status: DISCONTINUED | OUTPATIENT
Start: 2020-12-18 | End: 2020-12-18 | Stop reason: SURG

## 2020-12-18 RX ORDER — DEXAMETHASONE SODIUM PHOSPHATE 4 MG/ML
INJECTION, SOLUTION INTRA-ARTICULAR; INTRALESIONAL; INTRAMUSCULAR; INTRAVENOUS; SOFT TISSUE AS NEEDED
Status: DISCONTINUED | OUTPATIENT
Start: 2020-12-18 | End: 2020-12-18 | Stop reason: SURG

## 2020-12-18 RX ORDER — FLUMAZENIL 0.1 MG/ML
0.2 INJECTION INTRAVENOUS AS NEEDED
Status: DISCONTINUED | OUTPATIENT
Start: 2020-12-18 | End: 2020-12-18 | Stop reason: HOSPADM

## 2020-12-18 RX ORDER — LABETALOL HYDROCHLORIDE 5 MG/ML
5 INJECTION, SOLUTION INTRAVENOUS
Status: DISCONTINUED | OUTPATIENT
Start: 2020-12-18 | End: 2020-12-18 | Stop reason: HOSPADM

## 2020-12-18 RX ORDER — LIDOCAINE HYDROCHLORIDE 40 MG/ML
SOLUTION TOPICAL AS NEEDED
Status: DISCONTINUED | OUTPATIENT
Start: 2020-12-18 | End: 2020-12-18 | Stop reason: SURG

## 2020-12-18 RX ORDER — LIDOCAINE HYDROCHLORIDE 10 MG/ML
0.5 INJECTION, SOLUTION EPIDURAL; INFILTRATION; INTRACAUDAL; PERINEURAL ONCE AS NEEDED
Status: DISCONTINUED | OUTPATIENT
Start: 2020-12-18 | End: 2020-12-18 | Stop reason: HOSPADM

## 2020-12-18 RX ORDER — SODIUM CHLORIDE, SODIUM LACTATE, POTASSIUM CHLORIDE, CALCIUM CHLORIDE 600; 310; 30; 20 MG/100ML; MG/100ML; MG/100ML; MG/100ML
100 INJECTION, SOLUTION INTRAVENOUS CONTINUOUS
Status: DISCONTINUED | OUTPATIENT
Start: 2020-12-18 | End: 2020-12-19 | Stop reason: HOSPADM

## 2020-12-18 RX ORDER — MIDAZOLAM HYDROCHLORIDE 1 MG/ML
1 INJECTION INTRAMUSCULAR; INTRAVENOUS
Status: DISCONTINUED | OUTPATIENT
Start: 2020-12-18 | End: 2020-12-18 | Stop reason: HOSPADM

## 2020-12-18 RX ORDER — CEFAZOLIN SODIUM 1 G/50ML
1 INJECTION, SOLUTION INTRAVENOUS EVERY 8 HOURS
Status: COMPLETED | OUTPATIENT
Start: 2020-12-18 | End: 2020-12-19

## 2020-12-18 RX ORDER — ONDANSETRON 2 MG/ML
4 INJECTION INTRAMUSCULAR; INTRAVENOUS AS NEEDED
Status: DISCONTINUED | OUTPATIENT
Start: 2020-12-18 | End: 2020-12-18 | Stop reason: HOSPADM

## 2020-12-18 RX ORDER — OXYCODONE HCL 20 MG/1
20 TABLET, FILM COATED, EXTENDED RELEASE ORAL ONCE
Status: COMPLETED | OUTPATIENT
Start: 2020-12-18 | End: 2020-12-18

## 2020-12-18 RX ORDER — FENTANYL CITRATE 50 UG/ML
25 INJECTION, SOLUTION INTRAMUSCULAR; INTRAVENOUS
Status: DISCONTINUED | OUTPATIENT
Start: 2020-12-18 | End: 2020-12-18 | Stop reason: HOSPADM

## 2020-12-18 RX ORDER — OXYCODONE AND ACETAMINOPHEN 10; 325 MG/1; MG/1
1 TABLET ORAL ONCE AS NEEDED
Status: DISCONTINUED | OUTPATIENT
Start: 2020-12-18 | End: 2020-12-18 | Stop reason: HOSPADM

## 2020-12-18 RX ORDER — SODIUM CHLORIDE 0.9 % (FLUSH) 0.9 %
3-10 SYRINGE (ML) INJECTION AS NEEDED
Status: DISCONTINUED | OUTPATIENT
Start: 2020-12-18 | End: 2020-12-18 | Stop reason: HOSPADM

## 2020-12-18 RX ORDER — METOCLOPRAMIDE HYDROCHLORIDE 5 MG/ML
10 INJECTION INTRAMUSCULAR; INTRAVENOUS ONCE AS NEEDED
Status: DISCONTINUED | OUTPATIENT
Start: 2020-12-18 | End: 2020-12-18 | Stop reason: HOSPADM

## 2020-12-18 RX ORDER — HYDROMORPHONE HYDROCHLORIDE 1 MG/ML
0.5 INJECTION, SOLUTION INTRAMUSCULAR; INTRAVENOUS; SUBCUTANEOUS
Status: DISCONTINUED | OUTPATIENT
Start: 2020-12-18 | End: 2020-12-18 | Stop reason: HOSPADM

## 2020-12-18 RX ORDER — ALPRAZOLAM 0.5 MG/1
0.5 TABLET ORAL 3 TIMES DAILY PRN
Status: DISCONTINUED | OUTPATIENT
Start: 2020-12-18 | End: 2020-12-18 | Stop reason: SDUPTHER

## 2020-12-18 RX ORDER — KETAMINE HYDROCHLORIDE 50 MG/ML
INJECTION, SOLUTION, CONCENTRATE INTRAMUSCULAR; INTRAVENOUS AS NEEDED
Status: DISCONTINUED | OUTPATIENT
Start: 2020-12-18 | End: 2020-12-18 | Stop reason: SURG

## 2020-12-18 RX ADMIN — PROPOFOL 50 MG: 10 INJECTION, EMULSION INTRAVENOUS at 08:30

## 2020-12-18 RX ADMIN — HYDROMORPHONE HYDROCHLORIDE 1 MG: 1 INJECTION, SOLUTION INTRAMUSCULAR; INTRAVENOUS; SUBCUTANEOUS at 19:02

## 2020-12-18 RX ADMIN — SERTRALINE HYDROCHLORIDE 50 MG: 50 TABLET, FILM COATED ORAL at 20:25

## 2020-12-18 RX ADMIN — KETAMINE HYDROCHLORIDE 25 MG: 50 INJECTION, SOLUTION INTRAMUSCULAR; INTRAVENOUS at 08:25

## 2020-12-18 RX ADMIN — PROPOFOL 50 MG: 10 INJECTION, EMULSION INTRAVENOUS at 08:24

## 2020-12-18 RX ADMIN — PROPOFOL 50 MG: 10 INJECTION, EMULSION INTRAVENOUS at 08:19

## 2020-12-18 RX ADMIN — SODIUM CHLORIDE, POTASSIUM CHLORIDE, SODIUM LACTATE AND CALCIUM CHLORIDE 100 ML/HR: 600; 310; 30; 20 INJECTION, SOLUTION INTRAVENOUS at 10:03

## 2020-12-18 RX ADMIN — OXYCODONE HYDROCHLORIDE AND ACETAMINOPHEN 1 TABLET: 10; 325 TABLET ORAL at 10:04

## 2020-12-18 RX ADMIN — DIPHENHYDRAMINE HYDROCHLORIDE 25 MG: 25 CAPSULE ORAL at 22:03

## 2020-12-18 RX ADMIN — LIDOCAINE HYDROCHLORIDE 1 EACH: 40 SOLUTION TOPICAL at 06:58

## 2020-12-18 RX ADMIN — SODIUM CHLORIDE, POTASSIUM CHLORIDE, SODIUM LACTATE AND CALCIUM CHLORIDE: 600; 310; 30; 20 INJECTION, SOLUTION INTRAVENOUS at 06:47

## 2020-12-18 RX ADMIN — FENTANYL CITRATE 25 MCG: 50 INJECTION, SOLUTION INTRAMUSCULAR; INTRAVENOUS at 09:05

## 2020-12-18 RX ADMIN — Medication 640 MCG: at 07:52

## 2020-12-18 RX ADMIN — OXYCODONE HYDROCHLORIDE AND ACETAMINOPHEN 1 TABLET: 10; 325 TABLET ORAL at 20:25

## 2020-12-18 RX ADMIN — CEFAZOLIN SODIUM 1 G: 1 INJECTION, SOLUTION INTRAVENOUS at 22:03

## 2020-12-18 RX ADMIN — POLYETHYLENE GLYCOL (3350) 17 G: 17 POWDER, FOR SOLUTION ORAL at 20:25

## 2020-12-18 RX ADMIN — GABAPENTIN 600 MG: 300 CAPSULE ORAL at 15:00

## 2020-12-18 RX ADMIN — Medication 5 MG: at 08:06

## 2020-12-18 RX ADMIN — HYDROMORPHONE HYDROCHLORIDE 0.5 MG: 1 INJECTION, SOLUTION INTRAMUSCULAR; INTRAVENOUS; SUBCUTANEOUS at 09:10

## 2020-12-18 RX ADMIN — HYDROMORPHONE HYDROCHLORIDE 1 MG: 1 INJECTION, SOLUTION INTRAMUSCULAR; INTRAVENOUS; SUBCUTANEOUS at 04:36

## 2020-12-18 RX ADMIN — FAMOTIDINE 20 MG: 20 TABLET, FILM COATED ORAL at 20:25

## 2020-12-18 RX ADMIN — FENTANYL CITRATE 25 MCG: 50 INJECTION, SOLUTION INTRAMUSCULAR; INTRAVENOUS at 09:15

## 2020-12-18 RX ADMIN — Medication 800 MCG: at 07:05

## 2020-12-18 RX ADMIN — HYDROMORPHONE HYDROCHLORIDE 1 MG: 1 INJECTION, SOLUTION INTRAMUSCULAR; INTRAVENOUS; SUBCUTANEOUS at 15:10

## 2020-12-18 RX ADMIN — DEXAMETHASONE SODIUM PHOSPHATE 4 MG: 4 INJECTION, SOLUTION INTRAMUSCULAR; INTRAVENOUS at 08:37

## 2020-12-18 RX ADMIN — GLYCOPYRROLATE 0.4 MG: 0.2 INJECTION, SOLUTION INTRAMUSCULAR; INTRAVENOUS at 08:04

## 2020-12-18 RX ADMIN — FAMOTIDINE 20 MG: 20 TABLET, FILM COATED ORAL at 10:03

## 2020-12-18 RX ADMIN — CEFAZOLIN SODIUM 1 G: 1 INJECTION, SOLUTION INTRAVENOUS at 15:01

## 2020-12-18 RX ADMIN — HYDROMORPHONE HYDROCHLORIDE 1 MG: 1 INJECTION, SOLUTION INTRAMUSCULAR; INTRAVENOUS; SUBCUTANEOUS at 11:11

## 2020-12-18 RX ADMIN — ROCURONIUM BROMIDE 10 MG: 10 INJECTION INTRAVENOUS at 06:58

## 2020-12-18 RX ADMIN — Medication 1 TABLET: at 17:24

## 2020-12-18 RX ADMIN — ROCURONIUM BROMIDE 40 MG: 10 INJECTION INTRAVENOUS at 07:29

## 2020-12-18 RX ADMIN — HYDROMORPHONE HYDROCHLORIDE 0.5 MG: 1 INJECTION, SOLUTION INTRAMUSCULAR; INTRAVENOUS; SUBCUTANEOUS at 09:00

## 2020-12-18 RX ADMIN — LIDOCAINE HYDROCHLORIDE 100 MG: 20 INJECTION, SOLUTION INTRAVENOUS at 06:58

## 2020-12-18 RX ADMIN — Medication 160 MCG: at 07:46

## 2020-12-18 RX ADMIN — ONDANSETRON HYDROCHLORIDE 4 MG: 2 SOLUTION INTRAMUSCULAR; INTRAVENOUS at 08:37

## 2020-12-18 RX ADMIN — GABAPENTIN 600 MG: 300 CAPSULE ORAL at 22:03

## 2020-12-18 RX ADMIN — SUCCINYLCHOLINE CHLORIDE 160 MG: 20 INJECTION, SOLUTION INTRAMUSCULAR; INTRAVENOUS at 06:58

## 2020-12-18 RX ADMIN — PROPOFOL 100 MG: 10 INJECTION, EMULSION INTRAVENOUS at 06:58

## 2020-12-18 RX ADMIN — OXYCODONE HYDROCHLORIDE 20 MG: 20 TABLET, FILM COATED, EXTENDED RELEASE ORAL at 06:52

## 2020-12-18 RX ADMIN — SUFENTANIL CITRATE 50 MCG: 50 INJECTION EPIDURAL; INTRAVENOUS at 06:58

## 2020-12-18 RX ADMIN — ALPRAZOLAM 0.5 MG: 0.5 TABLET ORAL at 22:03

## 2020-12-18 RX ADMIN — SODIUM CHLORIDE, POTASSIUM CHLORIDE, SODIUM LACTATE AND CALCIUM CHLORIDE 9 ML/HR: 600; 310; 30; 20 INJECTION, SOLUTION INTRAVENOUS at 06:41

## 2020-12-18 RX ADMIN — SODIUM CHLORIDE, PRESERVATIVE FREE 3 ML: 5 INJECTION INTRAVENOUS at 20:25

## 2020-12-18 RX ADMIN — HYDROMORPHONE HYDROCHLORIDE 1 MG: 1 INJECTION, SOLUTION INTRAMUSCULAR; INTRAVENOUS; SUBCUTANEOUS at 22:03

## 2020-12-18 RX ADMIN — VASOPRESSIN 2 ML: 20 INJECTION INTRAVENOUS at 07:01

## 2020-12-18 RX ADMIN — SODIUM CHLORIDE, POTASSIUM CHLORIDE, SODIUM LACTATE AND CALCIUM CHLORIDE 100 ML/HR: 600; 310; 30; 20 INJECTION, SOLUTION INTRAVENOUS at 20:26

## 2020-12-18 RX ADMIN — SODIUM CHLORIDE, POTASSIUM CHLORIDE, SODIUM LACTATE AND CALCIUM CHLORIDE 100 ML/HR: 600; 310; 30; 20 INJECTION, SOLUTION INTRAVENOUS at 06:40

## 2020-12-18 RX ADMIN — Medication 2 G: at 07:00

## 2020-12-18 RX ADMIN — DEXAMETHASONE SODIUM PHOSPHATE 4 MG: 4 INJECTION, SOLUTION INTRAMUSCULAR; INTRAVENOUS at 06:52

## 2020-12-18 RX ADMIN — OXYCODONE HYDROCHLORIDE AND ACETAMINOPHEN 1 TABLET: 10; 325 TABLET ORAL at 03:00

## 2020-12-18 NOTE — PLAN OF CARE
Goal Outcome Evaluation:  Plan of Care Reviewed With: patient  Progress: no change  Pt A&Ox4. Drsg to lower back CDI, gauze and metapore. C/o severe pain since arrival from sx, IV and PO meds given with little stated relief. No n/t per pt. LEWIS, PPP. Pt ambulated with PT, was only able to sit in chair for 30 minutes. UP x 1 with walker and LSO to BR. No n/v changes. IV antibiotics started. VSS. Safety maintained.

## 2020-12-18 NOTE — ANESTHESIA POSTPROCEDURE EVALUATION
Patient: Adela Arauz    Procedure Summary     Date: 12/18/20 Room / Location:  [facility]  /  [facility]    Anesthesia Start: 0656 Anesthesia Stop: 0846    Procedure: DECOMPRESSION L3-4, POSTERIOR SPINAL FUSION WITH INSTRUMENTATION L3-S1 (N/A Spine Lumbar) Diagnosis: (M54.16)    Surgeon: ALEXIS Dunaway MD Provider: Delta Hernandez CRNA    Anesthesia Type: general ASA Status: 2          Anesthesia Type: general    Vitals  Vitals Value Taken Time   /98 12/18/20 0945   Temp 98 °F (36.7 °C) 12/18/20 0945   Pulse 79 12/18/20 0945   Resp 16 12/18/20 0945   SpO2 94 % 12/18/20 0945           Post Anesthesia Care and Evaluation    Patient location during evaluation: PACU  Patient participation: complete - patient participated  Level of consciousness: awake and alert  Pain management: adequate  Airway patency: patent  Anesthetic complications: No anesthetic complications    Cardiovascular status: acceptable  Respiratory status: acceptable  Hydration status: acceptable    Comments: Blood pressure 138/98, pulse 79, temperature 98 °F (36.7 °C), temperature source Temporal, resp. rate 16, last menstrual period 04/01/2014, SpO2 94 %, not currently breastfeeding.    Pt discharged from PACU based on carol score >8  No anesthesia care post op

## 2020-12-18 NOTE — OP NOTE
LUMBAR LAMINECTOMY POSTERIOR LUMBAR INTERBODY FUSION  Procedure Note    Adela Arauz  12/18/2020    Pre-op Diagnosis:     1. Increasing chronic back pain.  2. Bilateral buttock, thigh and leg radiculopathy, now right worse than left.   3. Severe neurogenic claudication.   4. Multilevel lumbar degenerative disc disease worse L3-S1.   5. Degenerative lumbar scoliosis, L3-S1, concave left L5-S1, concave right L4-5.   6. Facet arthropathy, L4-S1.   7. Large disc herniation right central L3-4  8. Central and foraminal stenosis, L3-S1, worse central L3-4, right L4-5, severe left L5-S1.   9. Rheumatoid arthritis.   10. History of chronic osteomyelitis, right forearm, status post external fixation, Dr. Cr.  11.  Status post anterior decompression, ALIF with instrumentation L5-S1, 12/2/2020  12.  Status post left LLIF L3-5 with instrumentation L3-4, 12/16/2020    Post-op Diagnosis:    same    Procedure/CPT® Codes:     1.  Posterior spinal fusion L3-S1  2.  Posterior spinal instrumentation L3-S1 (ATEC pedicle screws and rods)  3.  Use of locally obtained autograft bone for fusion  4.  Use of fluoroscopy for confirmation of surgical level and placement of instrumentation  5.  Intraoperative neural monitoring with pedicle screw stimulation     Anesthesia: General     Surgeon: MELCHOR Dunaway MD     Assistant:  Michael Hudson PA-C     Estimated Blood Loss: minimal     Complications: None     Condition: Stable to PACU.     Indications:     The patient is a 55-year-old who sees Charlene Huntley nurse practitioner for medical issues.  She presented to the office with increasing chronic back pain along with symptoms down both lower extremities consistent with radiculopathy, with the right side worse than the left.  She had complaints that were consistent also with severe neurogenic claudication.  Imaging studies revealed multilevel lumbar degenerative disc disease that was worse from L4-S1.  She was noted to have  scoliosis from L3-S1 that was concave to the left at L5-S1 and concave to the right at L4-5.  Both levels had facet arthropathy and central as well as foraminal stenosis that was worse on the right at L4-5 and on the left at L5-S1 corresponding to the scoliosis concavities.  She was also noted to have a rather large disc herniation at L3-4 that will also need to be addressed at a later date.     After failing conservative measures, it was mutually decided that surgery would be the best option. Risks, benefits, and complications of surgery were discussed with the patient. The patient appeared well informed and wished to proceed. We specifically discussed the risk of infection, blood loss, nerve root injury, CSF leak, and the possibility of incomplete resolution of symptoms. We also discussed the possible risk of a nonunion and the potential need for additional surgery in the event of a pseudoarthrosis or hardware failure.     We elected proceed with a staged operation.  The first stage of the procedure was performed on 12/2/2020 and involved an anterior decompression and ALIF with instrumentation of L5-S1.  The second stage of the procedure was performed on 12/16/2020 and involved a left lateral fusion from L3-5 with instrumentation at L3-4.  Today we return to surgery for the final posterior stage the procedure involving a posterior spinal fusion with instrumentation from L3 to S1.     Operative Procedure:     After obtaining informed consent and verifying the correct operative site, the patient was brought to the operating room. A general anesthetic was provided by the anesthesia service with the assistance of an endotracheal tube. Once this was appropriately positioned and secured, the patient was carefully rotated prone onto a Fei frame. All bony processes were well-padded. The lumbar region was prepped and draped in usual sterile fashion. A surgical timeout was taken to confirm this was the correct patient,  we were working at the correct levels, and that preoperative antibiotics were given in a timely fashion.      Using fluoroscopy for guidance, a right sided [place] incision was created overlying L3 to S1 using a 10 blade scalpel.  Dissection was carried through subcutaneous tissues using Bovie cautery.  The lumbar fascia was divided in line with the incision and blunt dissection was carried between the multifidus and the longissimus down to the facet joints of L3-4, L4-5 and L5-S1.  Dissection was carried laterally exposing the transverse processes of L3, L4, L5, and the sacral ala.  We then exposed the facet joints further.  The capsules were destroyed using Bovie cautery exposing as much bone as possible.  The high-speed bur was then used to decorticate the facet joints, destroying as much of the facet cartilage as possible.  I also decorticated the lateral pars region and the tranverse processes.  The locally obtained autograft bone was left in situ in the posterolateral gutter.  This constituted a posterior fusion of L3-4, L4-5 and L5-S1.      Next under fluoroscopic guidance, Jamshidi needles were used to cannulate the pedicles of L3, L4, L5, and S1.  Once the needles were properly positioned in the pedicles, guidewires were placed to maintain position.  Over each of the guidewires, an [employer] pedicle screw was placed.  We used 6.5 mm x 45 mm screws into each of the pedicles of L3, L4, and L5 and a 6.5 mm x 40 mm screw into S1.  I then used a neuro monitoring probe to stimulate the screws themselves confirming appropriate position ensuring no breach of the pedicles.  After confirming the screws were properly positioned, an appropriate-sized lola was chosen to span the pedicle screws.  The lola was tightened into position using set screws.  The setscrews were tightened using the appropriate antitorque wrench and torque limiting screwdriver provided by [employer].     Next again under fluoroscopic guidance, I created stab  incisions over the pedicles on the left side at L3, L5 and S1.  Through these incisions, I used the Jamshidi needles to cannulate the pedicles.  Once properly positioned, I placed guidewires to maintain position.  I then again placed 6.5 mm x 45 mm screws into the pedicles of L3 and L5 and a 6.5 mm x 40 mm screw into S1.  I then used the neuro monitoring probe to stimulate the screws again ensuring that they were properly positioned.  A second lola was chosen and passed under the musculature.  This was held into position using set screws tightened in the same fashion using the antitorque wrench and torque limiting screwdriver again provided by [facility].      The wounds were then thoroughly irrigated and an inspection was then undertaken to ensure that we had adequate hemostasis.  Bleeding at this point was controlled using thrombin with Gelfoam powder and bipolar cautery.  Final fluoroscopy imaging confirmed adequate position of the newly placed posterior instrumentation spanning L3 to S1.      Wound closure was then accomplished by reapproximating the fascia with a #1 Vicryl area immediate subcutaneous tissues were closed using a 2-0 Vicryl and skin closure was augmented using Mastisol and Steri-Strips.  The incisions were then washed and sterilely dressed with Bioclusive dressings.      The patient was then carefully rotated supine onto a hospital gurney, extubated, and sent to the recovery room in good stable condition.  The patient tolerated the procedure well.  There were no complications.  We estimated blood loss to be minimal.      Michael Hudson PA-C provided critical assistance during the procedure.  His assistance was medically necessary in order to allow the procedure to occur in the most safe and efficient manner.    MELCHOR Dunaway MD     Date: 12/18/2020  Time: 08:41 CST

## 2020-12-18 NOTE — PLAN OF CARE
Goal Outcome Evaluation:  Plan of Care Reviewed With: patient  Progress: improving  Outcome Summary: Pt is A&OX4, up x standby with LSO and walker, LLE still numb and difficult to move. C/o constant severe pain, states tolerable at a 6. PRN given as ordered with some effect noted. dressing CDI. NPO past 0000 and CHG performed for sx this am. O2 applied during night due to sat drop after IV pain med. VSS, safety maintained, no neuro changes noted. will continue to monitor

## 2020-12-18 NOTE — PLAN OF CARE
Goal Outcome Evaluation:  Plan of Care Reviewed With: patient  Progress: improving  Outcome Summary: OT re-eval completed s/p 3rd part sx. She is A&Ox4. Continues to demo decreased strength, balance, activity tolerance and increased pain. She was able to provide teach-back of spinal precautions, LSO fitting/mgt and wear schedule. Able to come to EOB while demo'ing proper body mechanics with CGA and vcs. CGA for donning LSO with vcs. CGA for sit <> stand t/f from EOB and commode and functional mobility in room with rwx. She would benefit from skilled OT services to address these deficits and assist with safe return back home. Recommend d/c home with spouse from facility.

## 2020-12-18 NOTE — PLAN OF CARE
Goal Outcome Evaluation:  Plan of Care Reviewed With: patient  Progress: no change  Outcome Summary: Pt a& o x4. Continues to have some LLE weakness. Up with assist x1, walker et LSO brace. Dressing to left flank CDI. VSS. Will be NPO after midno for part III sx tomorrow.

## 2020-12-18 NOTE — ANESTHESIA PREPROCEDURE EVALUATION
Anesthesia Evaluation     Patient summary reviewed   history of anesthetic complications: PONV  NPO Solid Status: > 8 hours  NPO Liquid Status: > 8 hours           Airway   Mallampati: II  TM distance: >3 FB  Neck ROM: full  Dental      Pulmonary    (-) COPD, asthma, sleep apnea, not a smoker  Cardiovascular   Exercise tolerance: poor (<4 METS) (Limited by back pain  )    ECG reviewed    (-) pacemaker, past MI, angina, cardiac stents      Neuro/Psych  (-) seizures, TIA, CVA  GI/Hepatic/Renal/Endo    (+) obesity,  GERD,    (-) liver disease, no renal disease, diabetes    Musculoskeletal     Abdominal    Substance History      OB/GYN          Other   autoimmune disease rheumatoid arthritis,                        Anesthesia Plan    ASA 2     general     intravenous induction     Anesthetic plan, all risks, benefits, and alternatives have been provided, discussed and informed consent has been obtained with: patient.

## 2020-12-18 NOTE — THERAPY RE-EVALUATION
Patient Name: Adela Arauz  : 1965    MRN: [id number]                              Today's Date: 2020       Admit Date: 2020    Visit Dx:     Therapist utilized gait belt, applied non-slipped socks, provided fall risk education/prevention, & facilitated muscle strengthening PRN to reduce patient falls risk during this session.      ICD-10-CM ICD-9-CM   1. Decreased activities of daily living (ADL)  Z78.9 V49.89   2. Impaired mobility  Z74.09 799.89   3. 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 (CMS/[facility])   • Encounter for consultation   • Former smoker   • History of radiation therapy   • Cellulitis of right upper extremity   • Lymphedema of right arm   • Pain in right arm   • Hx of osteomyelitis   • History of lymph node dissection of right axilla   • Morbid obesity due to excess calories (CMS/[facility])   • MRSA (methicillin resistant Staphylococcus aureus) infection   • Anxiety   • DDD (degenerative disc disease), lumbar   • Foraminal stenosis due to intervertebral disc disease   • Lumbar stenosis     Past Medical History:   Diagnosis Date   • AAT (alpha-1-antitrypsin) deficiency (CMS/[facility]) 2019   • Anxiety 2020   • Cancer (CMS/[facility])     LYMPHOMA   • DDD (degenerative disc disease), lumbar    • GERD (gastroesophageal reflux disease)    • 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)    • Seasonal allergies    • Senile osteoporosis 9/28/2017     Past Surgical History:   Procedure Laterality Date   • ANTERIOR LUMBAR EXPOSURE N/A 12/2/2020    Procedure: Anterior lumbar interbody fusion of L5-S1 with instrumentation ;  Surgeon: Neptali Rivera DO;  Location:  [facility] OR;  Service: Vascular;  Laterality: N/A;   • AXILLARY LYMPH NODE BIOPSY/EXCISION Right 9/19/2019    Procedure: EXCISION RIGHT AXILLARY MASS;  Surgeon: Jes Enamorado MD;  Location:  [facility] OR;  Service: General   • LUMBAR FUSION N/A 12/2/2020    Procedure: ANTERIOR DECOMPRESSION, ANTERIOR LUMBAR INTERBODY FUSION WITH INSTRUMENTATION L5-S1;  Surgeon: ALEXIS Dunaway MD;  Location:  [facility] OR;  Service: Orthopedic Spine;  Laterality: N/A;   • LUMBAR FUSION Left 12/16/2020    Procedure: LEFT LATERAL LUMBAR INTERBODY FUSION L3-5 WITH INSTRUMENTATION L3-4;  Surgeon: ALEXIS Dunaway MD;  Location:  [facility] OR;  Service: Orthopedic Spine;  Laterality: Left;   • SHOULDER SURGERY     • TUBAL ABDOMINAL LIGATION     • US GUIDED LYMPH NODE BIOPSY  8/9/2019   • WRIST SURGERY Bilateral     fracture     General Information     Row Name 12/18/20 1428          OT Time and Intention    Document Type  re-evaluation Pt s/p decompression L3-L4 and PSF with instrumentation L3-S1 on 12/18  -     Mode of Treatment  occupational therapy  -JJ     Row Name 12/18/20 1428          General Information    Patient Profile Reviewed  yes  -JJ     Existing Precautions/Restrictions  brace worn when out of bed;fall;spinal  -JJ     Barriers to Rehab  previous functional deficit;Yarsanism barrier  -JJ     Row Name 12/18/20 1428          Living Environment    Lives With  spouse  -JJ     Row Name 12/18/20 1428          Cognition    Orientation Status (Cognition)  oriented x 4  -JJ     Row Name 12/18/20 1424           Safety Issues, Functional Mobility    Impairments Affecting Function (Mobility)  balance;endurance/activity tolerance;pain;shortness of breath;sensation/sensory awareness  -       User Key  (r) = Recorded By, (t) = Taken By, (c) = Cosigned By    Initials Name Provider Type    Katelyn Sevilla OTR/L Occupational Therapist          Mobility/ADL's     Row Name 12/18/20 1428          Bed Mobility    Bed Mobility  sidelying-sit;sit-sidelying  -     Rolling Right [place] (Bed Mobility)  contact guard;verbal cues  -     Sidelying-Sit [place] (Bed Mobility)  contact guard;verbal cues  -     Assistive Device (Bed Mobility)  bed rails  -     Row Name 12/18/20 1428          Transfers    Transfers  sit-stand transfer;toilet transfer  -     Sit-Stand [place] (Transfers)  contact guard;verbal cues  -     [place] Level (Toilet Transfer)  contact guard  -     Assistive Device (Toilet Transfer)  commode;walker, front-wheeled;grab bars/safety frame  -     Row Name 12/18/20 1428          Sit-Stand Transfer    Assistive Device (Sit-Stand Transfers)  walker, front-wheeled  -     Row Name 12/18/20 1428          Toilet Transfer    Type (Toilet Transfer)  sit-stand;stand-sit  -     Row Name 12/18/20 1428          Functional Mobility    Functional Mobility- Ind. Level  minimum assist (75% patient effort);moderate assist (50% patient effort);verbal cues required  -     Functional Mobility- Device  rolling walker  -     Functional Mobility- Comment  bed to bathroom to chair  -     Row Name 12/18/20 1428          Activities of Daily Living    BADL Assessment/Intervention  toileting;upper body dressing  -     Row Name 12/18/20 1428          Toileting Assessment/Training    [place] Level (Toileting)  toileting skills;verbal cues;contact guard assist  -     Assistive Devices (Toileting)  commode;grab bar/safety frame  -     Position (Toileting)  supported sitting  -      Row Name 12/18/20 1428          Upper Body Dressing Assessment/Training    [place] Level (Upper Body Dressing)  don;contact guard assist;verbal cues  -J     Position (Upper Body Dressing)  edge of bed sitting  -JJ     Comment (Upper Body Dressing)  don LSO  -       User Key  (r) = Recorded By, (t) = Taken By, (c) = Cosigned By    Initials Name Provider Type     Katelyn Alberts OTR/L Occupational Therapist        Obj/Interventions     Row Name 12/18/20 1428          Sensory Assessment (Somatosensory)    Sensory Assessment (Somatosensory)  sensation intact in BUEs  -[facility] Name 12/18/20 1428          Vision Assessment/Intervention    Visual Impairment/Limitations  WFL  -     Row Name 12/18/20 1428          Range of Motion Comprehensive    General Range of Motion  bilateral upper extremity ROM WFL  -JJ     Row Name 12/18/20 1428          Strength Comprehensive (MMT)    General Manual Muscle Testing (MMT) Assessment  no strength deficits identified  -JJ     Comment, General Manual Muscle Testing (MMT) Assessment  BUE 5/5  [facility] 12/18/20 1428          Balance    Balance Assessment  sitting static balance;standing static balance  -J     Static Sitting Balance  WFL;sitting, edge of bed;unsupported  -JJ     Static Standing Balance  mild impairment;supported;standing  -JJ       User Key  (r) = Recorded By, (t) = Taken By, (c) = Cosigned By    Initials Name Provider Type     Katelyn Alberts OTR/L Occupational Therapist        Goals/Plan     Row Name 12/18/20 1428          Transfer Goal 1 (OT)    Activity/Assistive Device (Transfer Goal 1, OT)  toilet;shower chair;bed-to-chair/chair-to-bed  -     [place] Level/Cues Needed (Transfer Goal 1, OT)  independent  -JJ     Time Frame (Transfer Goal 1, OT)  long term goal (LTG);by discharge  -     Progress/Outcome (Transfer Goal 1, OT)  goal ongoing  -JJ     Row Name 12/18/20 1428          Bathing Goal 1 (OT)    Activity/Device (Bathing  Goal 1, OT)  bathing skills, all  -JJ     [place] Level/Cues Needed (Bathing Goal 1, OT)  independent  -JJ     Time Frame (Bathing Goal 1, OT)  long term goal (LTG);10 days  -JJ     Progress/Outcomes (Bathing Goal 1, OT)  goal not met;goal ongoing  -     Row Name 12/18/20 1428          Dressing Goal 1 (OT)    Activity/Device (Dressing Goal 1, OT)  dressing skills, all including LSO  -JJ     [place]/Cues Needed (Dressing Goal 1, OT)  independent  -JJ     Time Frame (Dressing Goal 1, OT)  long term goal (LTG);10 days  -JJ     Progress/Outcome (Dressing Goal 1, OT)  goal not met;goal ongoing  -     Row Name 12/18/20 1429          Therapy Assessment/Plan (OT)    Planned Therapy Interventions (OT)  activity tolerance training;adaptive equipment training;BADL retraining;functional balance retraining;neuromuscular control/coordination retraining;occupation/activity based interventions;patient/caregiver education/training;orthotic fabrication/fitting/training;transfer/mobility retraining  -       User Key  (r) = Recorded By, (t) = Taken By, (c) = Cosigned By    Initials Name Provider Type     Katelyn Alberts OTR/L Occupational Therapist        Clinical Impression     Row Name 12/18/20 1424          Pain Assessment    Additional Documentation  Pain Scale: Numbers Pre/Post-Treatment (Group)  -     Row Name 12/18/20 1423          Pain Scale: Numbers Pre/Post-Treatment    Pretreatment Pain Rating  6/10  -JJ     Posttreatment Pain Rating  6/10  -JJ     Pain Location  back  -JJ     Pain Intervention(s)  Medication (See MAR);Ambulation/increased activity;Repositioned  -     Row Name 12/18/20 1420          Plan of Care Review    Plan of Care Reviewed With  patient  -     Outcome Summary  OT re-eval completed s/p 3rd part sx. She is A&Ox4. Continues to demo decreased strength, balance, activity tolerance and increased pain. She was able to provide teach-back of spinal precautions, LSO fitting/mgt and wear  schedule. Able to come to EOB while demo'ing proper body mechanics with CGA and vcs. CGA for donning LSO with vcs. CGA for sit <> stand t/f from EOB and commode and functional mobility in room with rwx. She would benefit from skilled OT services to address these deficits and assist with safe return back home. Recommend d/c home with spouse from facility.  -     Row Name 12/18/20 1428          Therapy Assessment/Plan (OT)    Patient/Family Therapy Goal Statement (OT)  return home  -     Rehab Potential (OT)  good, to achieve stated therapy goals  -     Criteria for Skilled Therapeutic Interventions Met (OT)  yes;skilled treatment is necessary  -     Therapy Frequency (OT)  5 times/wk  -     Predicted Duration of Therapy Intervention (OT)  10 days  -     Row Name 12/18/20 1428          Therapy Plan Review/Discharge Plan (OT)    Anticipated Discharge Disposition (OT)  home with assist  -     Row Name 12/18/20 1428          Vital Signs    Pre Patient Position  Supine  -J     Intra Patient Position  Standing  -JJ     Post Patient Position  Sitting  -     Row Name 12/18/20 1428          Positioning and Restraints    Pre-Treatment Position  in bed  -JJ     Post Treatment Position  chair  -JJ     In Chair  notified nsg;reclined;sitting;call light within reach;encouraged to call for assist;with brace  -       User Key  (r) = Recorded By, (t) = Taken By, (c) = Cosigned By    Initials Name Provider Type    Kaetlyn Sevilla, OTR/L Occupational Therapist        Outcome Measures     Row Name 12/18/20 1428          How much help from another is currently needed...    Putting on and taking off regular lower body clothing?  2  -JJ     Bathing (including washing, rinsing, and drying)  3  -JJ     Toileting (which includes using toilet bed pan or urinal)  3  -JJ     Putting on and taking off regular upper body clothing  3  -JJ     Taking care of personal grooming (such as brushing teeth)  4  -JJ     Eating meals   4  -[facility] 6 Clicks Score (OT)  19  -JJ     Row Name 12/18/20 1428          Functional Assessment    Outcome Measure Options  AM-PAC 6 Clicks Daily Activity (OT)  -       User Key  (r) = Recorded By, (t) = Taken By, (c) = Cosigned By    Initials Name Provider Type    Katelyn Ward OTR/L Occupational Therapist        Occupational Therapy Education                 Title: PT OT SLP Therapies (In Progress)     Topic: Occupational Therapy (In Progress)     Point: ADL training (Done)     Description:   Instruct learner(s) on proper safety adaptation and remediation techniques during self care or transfers.   Instruct in proper use of assistive devices.              Learning Progress Summary           Patient Acceptance, E, VU by MIYA at 12/18/2020 1457    Acceptance, E, VU by EP at 12/17/2020 0838                   Point: Home exercise program (Not Started)     Description:   Instruct learner(s) on appropriate technique for monitoring, assisting and/or progressing therapeutic exercises/activities.              Learner Progress:  Not documented in this visit.          Point: Precautions (Done)     Description:   Instruct learner(s) on prescribed precautions during self-care and functional transfers.              Learning Progress Summary           Patient Acceptance, E, VU by MIYA at 12/18/2020 1457    Acceptance, E, VU by EP at 12/17/2020 0838                   Point: Body mechanics (Not Started)     Description:   Instruct learner(s) on proper positioning and spine alignment during self-care, functional mobility activities and/or exercises.              Learner Progress:  Not documented in this visit.                      User Key     Initials Effective Dates Name Provider Type Discipline     12/04/20 -  Katelyn Alberts OTR/L Occupational Therapist OT    EP 11/24/20 -  Sugey Gregory, OT Student OT Student OT              OT Recommendation and Plan  Planned Therapy Interventions (OT): activity  tolerance training, adaptive equipment training, BADL retraining, functional balance retraining, neuromuscular control/coordination retraining, occupation/activity based interventions, patient/caregiver education/training, orthotic fabrication/fitting/training, transfer/mobility retraining  Therapy Frequency (OT): 5 times/wk  Plan of Care Review  Plan of Care Reviewed With: patient  Outcome Summary: OT re-eval completed s/p 3rd part sx. She is A&Ox4. Continues to demo decreased strength, balance, activity tolerance and increased pain. She was able to provide teach-back of spinal precautions, LSO fitting/mgt and wear schedule. Able to come to EOB while demo'ing proper body mechanics with CGA and vcs. CGA for donning LSO with vcs. CGA for sit <> stand t/f from EOB and commode and functional mobility in room with rwx. She would benefit from skilled OT services to address these deficits and assist with safe return back home. Recommend d/c home with spouse from facility.     Time Calculation:   Time Calculation- OT     Row Name 12/18/20 1450             Time Calculation- OT    OT Start Time  1426 add 8 minutes for chart review  -      OT Stop Time  1457  -      OT Time Calculation (min)  31 min  -      OT Received On  12/18/20  -      OT Goal Re-Cert Due Date  12/28/20  -        User Key  (r) = Recorded By, (t) = Taken By, (c) = Cosigned By    Initials Name Provider Type    Katelyn Sevilla OTR/L Occupational Therapist        Therapy Charges for Today     Code Description Service Date Service Provider Modifiers Qty    [id number]  OT RE-EVAL 2 12/18/2020 Katelyn Alberts OTR/L GO 1    [id number]  OT SELF CARE/MGMT/TRAIN EA 15 MIN 12/18/2020 Katelyn Alberts OTR/L GO 1               TARA Christiansen/LEON  12/18/2020

## 2020-12-18 NOTE — ANESTHESIA PROCEDURE NOTES
Airway  Urgency: elective    Date/Time: 12/18/2020 7:01 AM  Airway not difficult    General Information and Staff    Patient location during procedure: OR  CRNA: Delta Hernandez CRNA    Indications and Patient Condition  Indications for airway management: airway protection    Preoxygenated: yes  MILS maintained throughout  Mask difficulty assessment: 1 - vent by mask    Final Airway Details  Final airway type: endotracheal airway      Successful airway: ETT  Cuffed: yes   Successful intubation technique: direct laryngoscopy  Endotracheal tube insertion site: oral  Blade: [place]  Blade size: 2  ETT size (mm): 7.0  Cormack-Lehane Classification: grade I - full view of glottis  Placement verified by: chest auscultation   Cuff volume (mL): 10  Measured from: lips  ETT/EBT  to lips (cm): 22  Number of attempts at approach: 1  Assessment: lips, teeth, and gum same as pre-op and atraumatic intubation

## 2020-12-19 ENCOUNTER — READMISSION MANAGEMENT (OUTPATIENT)
Dept: CALL CENTER | Facility: HOSPITAL | Age: 55
End: 2020-12-19

## 2020-12-19 VITALS
DIASTOLIC BLOOD PRESSURE: 54 MMHG | RESPIRATION RATE: 18 BRPM | OXYGEN SATURATION: 97 % | TEMPERATURE: 98.3 F | SYSTOLIC BLOOD PRESSURE: 130 MMHG | HEART RATE: 94 BPM

## 2020-12-19 PROCEDURE — 25010000003 HYDROMORPHONE 1 MG/ML SOLUTION: Performed by: ORTHOPAEDIC SURGERY

## 2020-12-19 PROCEDURE — 97164 PT RE-EVAL EST PLAN CARE: CPT

## 2020-12-19 PROCEDURE — 25010000002 CEFAZOLIN 1-4 GM/50ML-% SOLUTION: Performed by: ORTHOPAEDIC SURGERY

## 2020-12-19 RX ORDER — OXYCODONE AND ACETAMINOPHEN 10; 325 MG/1; MG/1
1 TABLET ORAL EVERY 4 HOURS PRN
Start: 2020-12-19 | End: 2020-12-26

## 2020-12-19 RX ADMIN — Medication 1 TABLET: at 09:00

## 2020-12-19 RX ADMIN — TIZANIDINE 4 MG: 4 TABLET ORAL at 16:17

## 2020-12-19 RX ADMIN — OXYCODONE HYDROCHLORIDE AND ACETAMINOPHEN 1 TABLET: 10; 325 TABLET ORAL at 11:05

## 2020-12-19 RX ADMIN — OXYCODONE HYDROCHLORIDE AND ACETAMINOPHEN 1 TABLET: 10; 325 TABLET ORAL at 01:37

## 2020-12-19 RX ADMIN — GABAPENTIN 600 MG: 300 CAPSULE ORAL at 05:45

## 2020-12-19 RX ADMIN — TIZANIDINE 2 MG: 4 TABLET ORAL at 01:02

## 2020-12-19 RX ADMIN — SODIUM CHLORIDE, PRESERVATIVE FREE 3 ML: 5 INJECTION INTRAVENOUS at 09:00

## 2020-12-19 RX ADMIN — HYDROMORPHONE HYDROCHLORIDE 1 MG: 1 INJECTION, SOLUTION INTRAMUSCULAR; INTRAVENOUS; SUBCUTANEOUS at 04:35

## 2020-12-19 RX ADMIN — OXYCODONE HYDROCHLORIDE AND ACETAMINOPHEN 1 TABLET: 10; 325 TABLET ORAL at 16:17

## 2020-12-19 RX ADMIN — POLYETHYLENE GLYCOL (3350) 17 G: 17 POWDER, FOR SOLUTION ORAL at 09:48

## 2020-12-19 RX ADMIN — SODIUM CHLORIDE, POTASSIUM CHLORIDE, SODIUM LACTATE AND CALCIUM CHLORIDE 100 ML/HR: 600; 310; 30; 20 INJECTION, SOLUTION INTRAVENOUS at 07:22

## 2020-12-19 RX ADMIN — GABAPENTIN 600 MG: 300 CAPSULE ORAL at 13:22

## 2020-12-19 RX ADMIN — FAMOTIDINE 20 MG: 20 TABLET, FILM COATED ORAL at 09:00

## 2020-12-19 RX ADMIN — CEFAZOLIN SODIUM 1 G: 1 INJECTION, SOLUTION INTRAVENOUS at 09:00

## 2020-12-19 RX ADMIN — DULOXETINE HYDROCHLORIDE 60 MG: 30 CAPSULE, DELAYED RELEASE ORAL at 09:00

## 2020-12-19 RX ADMIN — OXYCODONE HYDROCHLORIDE AND ACETAMINOPHEN 1 TABLET: 10; 325 TABLET ORAL at 05:45

## 2020-12-19 NOTE — DISCHARGE SUMMARY
Date of Discharge:  12/19/2020    Admission Diagnosis: M54.16    Discharge Diagnosis:   1. Increasing chronic back pain.  2. Bilateral buttock, thigh and leg radiculopathy, now right worse than left.   3. Severe neurogenic claudication.   4. Multilevel lumbar degenerative disc disease worse L3-S1.   5. Degenerative lumbar scoliosis, L3-S1, concave left L5-S1, concave right L4-5.   6. Facet arthropathy, L4-S1.   7. Large disc herniation right central L3-4  8. Central and foraminal stenosis, L3-S1, worse central L3-4, right L4-5, severe left L5-S1.   9. Rheumatoid arthritis.   10. History of chronic osteomyelitis, right forearm, status post external fixation, Dr. Cr.  11.  Status post anterior decompression, ALIF with instrumentation L5-S1, 12/2/2020  12.  Status post left LLIF L3-5 with instrumentation L3-4, 12/16/2020  13.  Status post PSF with instrumentation L3-S1, 12/18/2020    Consults During Admission: Dr. Betancourt    Hospital Course  Patient is a 55 y.o. female Known to our practice. Admitted for the above staged fusion.  This has been well tolerated and the patient will be discharged home today in good stable condition with instructions for brace when out of bed.  No driving until directed.  Patient will follow-up with Dr. Dunaway's clinic in two weeks. They will call if problems arise.         Condition on Discharge:  STABLE    Vital Signs  Temp:  [98.1 °F (36.7 °C)-99.4 °F (37.4 °C)] 98.7 °F (37.1 °C)  Heart Rate:  [] 90  Resp:  [16-18] 16  BP: (109-134)/(49-79) 109/49    Physical Exam:   Alert and oriented ×3, no acute distress, grossly neurovascularly intact, vital signs stable, dressing clean dry and intact, moving all extremities without focal deficit      Discharge Disposition  Home or Self Care    Discharge Medications     Discharge Medications      Changes to Medications      Instructions Start Date   oxyCODONE-acetaminophen  MG per tablet  Commonly known as: PERCOCET  What  changed:   · when to take this  · reasons to take this   1 tablet, Oral, Every 4 Hours PRN         Continue These Medications      Instructions Start Date   ALPRAZolam 0.5 MG tablet  Commonly known as: Xanax   0.5 mg, Oral, 3 Times Daily PRN      betamethasone valerate 0.1 % ointment  Commonly known as: VALISONE   Topical, 2 Times Daily      Calcium-Magnesium-Vitamin D 500-250-200 MG-MG-UNIT tablet   600 tablets, Oral, 2 Times Daily      cholecalciferol 25 MCG (1000 UT) tablet  Commonly known as: VITAMIN D3   1,000 Units, Oral, Daily      DULoxetine 60 MG capsule  Commonly known as: CYMBALTA   TAKE ONE CAPSULE BY MOUTH DAILY      fluticasone 50 MCG/ACT nasal spray  Commonly known as: Flonase   2 sprays, Nasal, Daily      gabapentin 600 MG tablet  Commonly known as: NEURONTIN   600 mg, Oral, 3 Times Daily      ibandronate 150 MG tablet  Commonly known as: BONIVA   150 mg, Oral, Every 30 Days      mupirocin 2 % ointment  Commonly known as: Bactroban   Apply to nostrils and fingernails nighlty x 5 nights same 5 nights monthly x 6 months.      omeprazole 40 MG capsule  Commonly known as: priLOSEC   TAKE ONE CAPSULE BY MOUTH DAILY      ondansetron ODT 4 MG disintegrating tablet  Commonly known as: Zofran ODT   4 mg, Translingual, Every 8 Hours PRN      sertraline 50 MG tablet  Commonly known as: ZOLOFT   50 mg, Oral, Nightly      tiZANidine 4 MG tablet  Commonly known as: ZANAFLEX   TAKE ONE TABLET BY MOUTH TWICE A DAY AS NEEDED FOR MUSCLE SPASMS      Turmeric 500 MG capsule   1 capsule, Oral, 2 Times Daily             Discharge Diet: Resume Home diet, advance as tolerated    Activity at Discharge: Resume home activity advace as tolerated, no lifting, no twisting, no bending, brace as directed, no driving until directed.     Follow-up Appointments  Followup with PCP within one week  Followup [facility] at 2weeks post-op         Michael Hudson PA-C  12/19/20  14:54 CST

## 2020-12-19 NOTE — THERAPY RE-EVALUATION
Patient Name: Adela Arauz  : 1965    MRN: [id number]                              Today's Date: 2020       Admit Date: 2020    Visit Dx:     ICD-10-CM ICD-9-CM   1. Decreased activities of daily living (ADL)  Z78.9 V49.89   2. Impaired mobility  Z74.09 799.89   3. Follicular lymphoma, unspecified follicular lymphoma type, unspecified body region (CMS/[facility])  C82.90 202.00     Patient Active Problem List   Diagnosis   • Chronic pain syndrome   • Rheumatoid arthritis involving multiple sites (CMS/[facility])   • Oral herpes simplex infection   • Senile osteoporosis   • Chest pain   • Multiple lung nodules < 6mm identified 2018   • Lymphadenopathy   • AAT (alpha-1-antitrypsin) deficiency (CMS/[facility])   • Axillary adenopathy   • Grade 1 follicular lymphoma of lymph nodes of axilla (CMS/[facility])   • Encounter for consultation   • Former smoker   • History of radiation therapy   • Cellulitis of right upper extremity   • Lymphedema of right arm   • Pain in right arm   • Hx of osteomyelitis   • History of lymph node dissection of right axilla   • Morbid obesity due to excess calories (CMS/[facility])   • MRSA (methicillin resistant Staphylococcus aureus) infection   • Anxiety   • DDD (degenerative disc disease), lumbar   • Foraminal stenosis due to intervertebral disc disease   • Lumbar stenosis     Past Medical History:   Diagnosis Date   • AAT (alpha-1-antitrypsin) deficiency (CMS/[facility]) 2019   • Anxiety 2020   • Cancer (CMS/[facility])     LYMPHOMA   • DDD (degenerative disc disease), lumbar    • GERD (gastroesophageal reflux disease)    • 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, 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    • Low back pain    • Morbid obesity due to excess calories (CMS/[facility]) 2020   • MRSA infection    • Nausea after anesthesia    • Osteoarthritis    • Osteoporosis    • Panic attacks    • PONV (postoperative nausea and vomiting)    • Psoriasis    • RA (rheumatoid arthritis) (CMS/[facility])    • Seasonal allergies    • Senile  osteoporosis 9/28/2017     Past Surgical History:   Procedure Laterality Date   • ANTERIOR LUMBAR EXPOSURE N/A 12/2/2020    Procedure: Anterior lumbar interbody fusion of L5-S1 with instrumentation ;  Surgeon: Neptali Rivera DO;  Location:  [facility] OR;  Service: Vascular;  Laterality: N/A;   • AXILLARY LYMPH NODE BIOPSY/EXCISION Right 9/19/2019    Procedure: EXCISION RIGHT AXILLARY MASS;  Surgeon: Jes Enamorado MD;  Location:  [facility] OR;  Service: General   • LUMBAR FUSION N/A 12/2/2020    Procedure: ANTERIOR DECOMPRESSION, ANTERIOR LUMBAR INTERBODY FUSION WITH INSTRUMENTATION L5-S1;  Surgeon: ALEXIS Dunaway MD;  Location:  [facility];  Service: Orthopedic Spine;  Laterality: N/A;   • LUMBAR FUSION Left 12/16/2020    Procedure: LEFT LATERAL LUMBAR INTERBODY FUSION L3-5 WITH INSTRUMENTATION L3-4;  Surgeon: ALEXIS Dunaway MD;  Location:  [facility];  Service: Orthopedic Spine;  Laterality: Left;   • LUMBAR LAMINECTOMY WITH FUSION N/A 12/18/2020    Procedure: DECOMPRESSION L3-4, POSTERIOR SPINAL FUSION WITH INSTRUMENTATION L3-S1;  Surgeon: ALEXIS Dunaway MD;  Location:  [facility];  Service: Orthopedic Spine;  Laterality: N/A;   • SHOULDER SURGERY     • TUBAL ABDOMINAL LIGATION     • US GUIDED LYMPH NODE BIOPSY  8/9/2019   • WRIST SURGERY Bilateral     fracture     General Information     Row Name 12/19/20 1400          Physical Therapy Time and Intention    Document Type  re-evaluation s/p 12/18 PSF L3-S1.  -DRE     Mode of Treatment  physical therapy  -DRE     Row Name 12/19/20 1400          General Information    Patient Profile Reviewed  yes  -DRE     Existing Precautions/Restrictions  brace worn when out of bed;fall;spinal LSO  -DRE     Barriers to Rehab  previous functional deficit  -DRE     Row Name 12/19/20 1400          Cognition    Orientation Status (Cognition)  oriented x 4  -DRE     Row Name 12/19/20 1400          Safety Issues, Functional Mobility    Impairments Affecting Function (Mobility)   balance;endurance/activity tolerance;pain;sensation/sensory awareness;strength  -DRE       User Key  (r) = Recorded By, (t) = Taken By, (c) = Cosigned By    Initials Name Provider Type    Guillaume Swanson PT DPT Physical Therapist        Mobility     Row Name 12/19/20 1400          Bed Mobility    Bed Mobility  sidelying-sit;sit-sidelying  -DRE     Sidelying-Sit [place] (Bed Mobility)  contact guard  -DRE     Sit-Sidelying [place] (Bed Mobility)  minimum assist (75% patient effort) Assist with L LE back to bed  -DRE     Assistive Device (Bed Mobility)  head of bed elevated;bed rails  -DRE     Row Name 12/19/20 1400          Sit-Stand Transfer    Sit-Stand [place] (Transfers)  contact guard;verbal cues  -DRE     Assistive Device (Sit-Stand Transfers)  walker, front-wheeled  -DRE     Row Name 12/19/20 1400          Gait/Stairs (Locomotion)    [place] Level (Gait)  contact guard;verbal cues  -DRE     Assistive Device (Gait)  walker, front-wheeled  -DRE     Distance in Feet (Gait)  100  -DRE     Deviations/Abnormal Patterns (Gait)  ben decreased;gait speed decreased  -DRE       User Key  (r) = Recorded By, (t) = Taken By, (c) = Cosigned By    Initials Name Provider Type    Guillaume Swanson PT DPT Physical Therapist        Obj/Interventions     Row Name 12/19/20 1400          Range of Motion Comprehensive    Comment, General Range of Motion  R LE AROM WFL,  In sitting EOB: L knee AROM impaired ~ 25%, L hip flexion AROM impaired ~ 90% (pain/weakness)  -DRE     Row Name 12/19/20 1400          Strength Comprehensive (MMT)    Comment, General Manual Muscle Testing (MMT) Assessment  R LE functionally 4/5, L LE grossly 3+/5, L hip flexion 2-/5 in sitting EOB.  -DRE     Row Name 12/19/20 1400          Balance    Balance Assessment  sitting static balance;standing static balance  -DRE     Static Sitting Balance  WFL;unsupported;sitting, edge of bed  -DRE     Static Standing Balance  WFL;supported;standing CGA  w RW  -DRE     Row Name 12/19/20 1400          Sensory Assessment (Somatosensory)    Sensory Assessment (Somatosensory)  -- Reports L LE numbness  -DRE       User Key  (r) = Recorded By, (t) = Taken By, (c) = Cosigned By    Initials Name Provider Type    Guillaume Swanson, PT DPT Physical Therapist        Goals/Plan     Row Name 12/19/20 1400          Bed Mobility Goal 1 (PT)    Activity/Assistive Device (Bed Mobility Goal 1, PT)  bed mobility activities, all  -DRE     [place] Level/Cues Needed (Bed Mobility Goal 1, PT)  independent  -DRE     Time Frame (Bed Mobility Goal 1, PT)  long term goal (LTG);by discharge  -DRE     Progress/Outcomes (Bed Mobility Goal 1, PT)  goal not met;goal ongoing  -DRE     Row Name 12/19/20 1400          Transfer Goal 1 (PT)    Activity/Assistive Device (Transfer Goal 1, PT)  sit-to-stand/stand-to-sit;bed-to-chair/chair-to-bed;walker, rolling  -DRE     [place] Level/Cues Needed (Transfer Goal 1, PT)  modified independence  -DRE     Time Frame (Transfer Goal 1, PT)  long term goal (LTG);by discharge  -DRE     Progress/Outcome (Transfer Goal 1, PT)  goal not met;goal ongoing  -DRE     Row Name 12/19/20 1400          Gait Training Goal 1 (PT)    Activity/Assistive Device (Gait Training Goal 1, PT)  gait (walking locomotion);assistive device use;decrease fall risk;walker, rolling  -DRE     [place] Level (Gait Training Goal 1, PT)  modified independence  -DRE     Distance (Gait Training Goal 1, PT)  150ft with no knee buckling and full L foot clearance 100% of the time  -DRE     Time Frame (Gait Training Goal 1, PT)  long term goal (LTG);by discharge  -DRE     Progress/Outcome (Gait Training Goal 1, PT)  goal not met;goal ongoing  -DRE       User Key  (r) = Recorded By, (t) = Taken By, (c) = Cosigned By    Initials Name Provider Type    Guillaume Swanson, PT DPT Physical Therapist        Clinical Impression     Row Name 12/19/20 1400          Pain    Additional Documentation  Pain  Scale: Numbers Pre/Post-Treatment (Group)  -DRE     Row Name 12/19/20 1400          Pain Scale: Numbers Pre/Post-Treatment    Pretreatment Pain Rating  7/10  -DRE     Pain Location - Orientation  incisional  -DRE     Pain Location  back  -DRE     Pain Intervention(s)  Repositioned;Ambulation/increased activity  -DRE     Row Name 12/19/20 1400          Plan of Care Review    Plan of Care Reviewed With  patient  -DRE     Outcome Summary  PT completed re-eval s/p back surgery yesterday. She was again educated on back precautions and LSO use/irma/doff. She reports L LE numbness and weakness when compared to R LE. She was CGA to stand and ambulate with 100 ft w a RW. No sway, LOB or L knee buckling at this time. PT will cont with gait, balance and strengthening. She is hopeful to d.c home today with spouse.  -DRE     Row Name 12/19/20 1400          Therapy Assessment/Plan (PT)    Patient/Family Therapy Goals Statement (PT)  go home  -DRE     Rehab Potential (PT)  good, to achieve stated therapy goals  -DRE     Criteria for Skilled Interventions Met (PT)  yes;skilled treatment is necessary  -DRE     Predicted Duration of Therapy Intervention (PT)  until d.c  -DRE     Row Name 12/19/20 1400          Vital Signs    O2 Delivery Intra Treatment  room air  -DRE     Post SpO2 (%)  95  -DRE     O2 Delivery Post Treatment  room air  -DRE     Pre Patient Position  Supine  -DRE     Intra Patient Position  Standing  -DRE     Post Patient Position  Supine  -DRE     Row Name 12/19/20 1400          Positioning and Restraints    Pre-Treatment Position  in bed  -DRE     Post Treatment Position  bed  -DRE     In Bed  fowlers;call light within reach;encouraged to call for assist  -DRE       User Key  (r) = Recorded By, (t) = Taken By, (c) = Cosigned By    Initials Name Provider Type    Guillaume Swanson, PT DPT Physical Therapist        Outcome Measures     Row Name 12/19/20 1400          How much help from another person do you currently need...     Turning from your back to your side while in flat bed without using bedrails?  3  -DRE     Moving from lying on back to sitting on the side of a flat bed without bedrails?  3  -DRE     Moving to and from a bed to a chair (including a wheelchair)?  3  -DRE     Standing up from a chair using your arms (e.g., wheelchair, bedside chair)?  3  -DRE     Climbing 3-5 steps with a railing?  3  -DRE     To walk in hospital room?  3  -DRE     AM-PAC 6 Clicks Score (PT)  18  -DRE     Row Name 12/19/20 1400          Functional Assessment    Outcome Measure Options  AM-PAC 6 Clicks Basic Mobility (PT)  -DRE       User Key  (r) = Recorded By, (t) = Taken By, (c) = Cosigned By    Initials Name Provider Type    Guillaume Swanson, PT DPT Physical Therapist        Physical Therapy Education                 Title: PT OT SLP Therapies (In Progress)     Topic: Physical Therapy (In Progress)     Point: Mobility training (Done)     Learning Progress Summary           Patient Acceptance, E, VU,DU,NR by DRE at 12/19/2020 1400    Comment: Educated pt on progression of PT POC, benefits of activity, back prec, LSO use/irma/doff    Acceptance, E, VU by MS at 12/17/2020 1404    Comment: role of PT in her care, use of LSO and spinal restrictions                   Point: Home exercise program (Not Started)     Learner Progress:  Not documented in this visit.          Point: Body mechanics (Not Started)     Learner Progress:  Not documented in this visit.          Point: Precautions (Done)     Learning Progress Summary           Patient Acceptance, E, VU,DU,NR by DRE at 12/19/2020 1400    Comment: Educated pt on progression of PT POC, benefits of activity, back prec, LSO use/irma/doff    Acceptance, E, VU by MS at 12/17/2020 1404    Comment: role of PT in her care, use of LSO and spinal restrictions                               User Key     Initials Effective Dates Name Provider Type Discipline    DRE 08/02/16 -  Guillaume Liu, PT DPT Physical  Therapist PT    MS 06/19/18 -  Stefani Salazar, PT, DPT, NCS Physical Therapist PT              PT Recommendation and Plan  Planned Therapy Interventions (PT): bed mobility training, transfer training, gait training, balance training, home exercise program, patient/family education, postural re-education, stair training, strengthening, orthotic fitting/training  Plan of Care Reviewed With: patient  Outcome Summary: PT completed re-eval s/p back surgery yesterday. She was again educated on back precautions and LSO use/irma/doff. She reports L LE numbness and weakness when compared to R LE. She was CGA to stand and ambulate with 100 ft w a RW. No sway, LOB or L knee buckling at this time. PT will cont with gait, balance and strengthening. She is hopeful to d.c home today with spouse.     Time Calculation:   PT Charges     Row Name 12/19/20 1437             Time Calculation    Start Time  1400  -DRE      Stop Time  1425  -DRE      Time Calculation (min)  25 min  -DRE      PT Received On  12/19/20  -DRE      PT Goal Re-Cert Due Date  12/29/20  -DRE        User Key  (r) = Recorded By, (t) = Taken By, (c) = Cosigned By    Initials Name Provider Type    Guillaume Swanson, PT DPT Physical Therapist        Therapy Charges for Today     Code Description Service Date Service Provider Modifiers Qty    [id number]  PT RE-EVAL ESTABLISHED PLAN 2 12/19/2020 Guillaume Liu PT DPT GP 1          PT G-Codes  Outcome Measure Options: AM-PAC 6 Clicks Basic Mobility (PT)  AM-PAC 6 Clicks Score (PT): 18  AM-PAC 6 Clicks Score (OT): 19    Guillaume Liu PT DPT  12/19/2020

## 2020-12-19 NOTE — PLAN OF CARE
Goal Outcome Evaluation:  Plan of Care Reviewed With: patient  Progress: improving  Pt A&Ox4. Dried drainage to drsg on lower back, will change to gauze and Tegaderm before d/c this afternoon. No n/v changes. Pt still reports small amount of numbness in thigh. On RA. Voiding per BR. Up standby w/ walker to BR. Pain well controlled with PO pain meds today. VSS. Safety maintained.

## 2020-12-19 NOTE — DISCHARGE INSTR - ACTIVITY
No bending, lifting, or twisting.  Do not lift more than 8 pounds.  Wear your brace when out of bed.  You may shower.  Knapp dressing to tegaderm and leave intact until follow up.  Do not drive while taking narcotics.

## 2020-12-19 NOTE — PLAN OF CARE
Goal Outcome Evaluation:  Plan of Care Reviewed With: patient  Progress: improving  Outcome Summary: PT completed re-eval s/p back surgery yesterday. She was again educated on back precautions and LSO use/irma/doff. She reports L LE numbness and weakness when compared to R LE. She was CGA to stand and ambulate with 100 ft w a RW. No sway, LOB or L knee buckling at this time. PT will cont with gait, balance and strengthening. She is hopeful to d.c home today with spouse.

## 2020-12-19 NOTE — PLAN OF CARE
Goal Outcome Evaluation:  Plan of Care Reviewed With: patient  Progress: improving  Outcome Summary: Pt is A&OX4, vss other than 2x low grade temp, insc spirometer encouraged. up x standby with min assist to BR, LSO and walker. ambulated multiple times tonight. c/o constant severe pain, IV and PO alternated, pt rested well. o2 via nasal can while asleep. dressing DI, with small amt of drainage marked. Safety maintianed, will cont to monitor

## 2020-12-19 NOTE — OUTREACH NOTE
Prep Survey      Responses   [facility] patient discharged from?  [facility]   Is LACE score < 7 ?  No   Eligibility  [facility]   Date of Admission  12/16/20   Date of Discharge  12/19/20   Discharge Disposition  Home or Self Care   Discharge diagnosis  DECOMPRESSION L3-4, POSTERIOR SPINAL FUSION WITH INSTRUMENTATION L3-S1   Does the patient have one of the following disease processes/diagnoses(primary or secondary)?  General Surgery   Does the patient have Home health ordered?  No   Is there a DME ordered?  No   Prep survey completed?  Yes          Kathleen Kirk RN

## 2020-12-20 NOTE — PAYOR COMM NOTE
"[facility] 12-19-20  [id number], [id number]    Ran Kenyon (55 y.o. Female)     Date of Birth Social Security Number Address Home Phone MRN    1965  [id number] [facility] [id number] [phone] [id number]    [place] Marital Status          Religion        Admission Date Admission Type Admitting Provider Attending Provider Department, Room/Bed    12/16/20 Elective ALEXIS Dunaway MD  [facility] 3A, 341/1    Discharge Date Discharge Disposition Discharge Destination        12/19/2020 Home or Self Care              Attending Provider: (none)   Allergies: No Known Allergies    Isolation: None   Infection: None   Code Status: Prior    Ht: 162.6 cm (64\")   Wt: 88.9 kg (196 lb)    Admission Cmt: None   Principal Problem: None                Active Insurance as of 12/16/2020     Primary Coverage     Payor Plan Insurance Group Employer/Plan Group    [employer] [place] [id number]     Payor Plan Address Payor Plan Phone Number Payor Plan Fax Number Effective Dates    [address] [address] [phone]  10/25/2016 - None Entered    [facility] [address]       Subscriber Name Subscriber Birth Date Member ID       RAN KENYON 1965 [id number]                 Emergency Contacts      (Rel.) Home Phone Work Phone Mobile Phone    Manny Kenyon (Spouse) [phone] -- [phone]    Jerri Martinez (Daughter) [phone] -- [phone]               Discharge Summary      Michael Hudson PA-C at 12/19/20 1454            Date of Discharge:  12/19/2020    Admission Diagnosis: M54.16    Discharge Diagnosis:   1. Increasing chronic back pain.  2. Bilateral buttock, thigh and leg radiculopathy, now right worse than left.   3. Severe neurogenic claudication.   4. Multilevel lumbar degenerative disc disease worse L3-S1.   5. Degenerative lumbar scoliosis, L3-S1, concave left L5-S1, concave right L4-5.   6. Facet arthropathy, L4-S1.   7. Large disc herniation " right central L3-4  8. Central and foraminal stenosis, L3-S1, worse central L3-4, right L4-5, severe left L5-S1.   9. Rheumatoid arthritis.   10. History of chronic osteomyelitis, right forearm, status post external fixation, Dr. Cr.  11.  Status post anterior decompression, ALIF with instrumentation L5-S1, 12/2/2020  12.  Status post left LLIF L3-5 with instrumentation L3-4, 12/16/2020  13.  Status post PSF with instrumentation L3-S1, 12/18/2020    Consults During Admission: Dr. Betancourt    Hospital Course  Patient is a 55 y.o. female Known to our practice. Admitted for the above staged fusion.  This has been well tolerated and the patient will be discharged home today in good stable condition with instructions for brace when out of bed.  No driving until directed.  Patient will follow-up with Dr. Dunaway's clinic in two weeks. They will call if problems arise.         Condition on Discharge:  STABLE    Vital Signs  Temp:  [98.1 °F (36.7 °C)-99.4 °F (37.4 °C)] 98.7 °F (37.1 °C)  Heart Rate:  [] 90  Resp:  [16-18] 16  BP: (109-134)/(49-79) 109/49    Physical Exam:   Alert and oriented ×3, no acute distress, grossly neurovascularly intact, vital signs stable, dressing clean dry and intact, moving all extremities without focal deficit      Discharge Disposition  Home or Self Care    Discharge Medications     Discharge Medications      Changes to Medications      Instructions Start Date   oxyCODONE-acetaminophen  MG per tablet  Commonly known as: PERCOCET  What changed:   · when to take this  · reasons to take this   1 tablet, Oral, Every 4 Hours PRN         Continue These Medications      Instructions Start Date   ALPRAZolam 0.5 MG tablet  Commonly known as: Xanax   0.5 mg, Oral, 3 Times Daily PRN      betamethasone valerate 0.1 % ointment  Commonly known as: VALISONE   Topical, 2 Times Daily      Calcium-Magnesium-Vitamin D 500-250-200 MG-MG-UNIT tablet   600 tablets, Oral, 2 Times Daily       cholecalciferol 25 MCG (1000 UT) tablet  Commonly known as: VITAMIN D3   1,000 Units, Oral, Daily      DULoxetine 60 MG capsule  Commonly known as: CYMBALTA   TAKE ONE CAPSULE BY MOUTH DAILY      fluticasone 50 MCG/ACT nasal spray  Commonly known as: Flonase   2 sprays, Nasal, Daily      gabapentin 600 MG tablet  Commonly known as: NEURONTIN   600 mg, Oral, 3 Times Daily      ibandronate 150 MG tablet  Commonly known as: BONIVA   150 mg, Oral, Every 30 Days      mupirocin 2 % ointment  Commonly known as: Bactroban   Apply to nostrils and fingernails nighlty x 5 nights same 5 nights monthly x 6 months.      omeprazole 40 MG capsule  Commonly known as: priLOSEC   TAKE ONE CAPSULE BY MOUTH DAILY      ondansetron ODT 4 MG disintegrating tablet  Commonly known as: Zofran ODT   4 mg, Translingual, Every 8 Hours PRN      sertraline 50 MG tablet  Commonly known as: ZOLOFT   50 mg, Oral, Nightly      tiZANidine 4 MG tablet  Commonly known as: ZANAFLEX   TAKE ONE TABLET BY MOUTH TWICE A DAY AS NEEDED FOR MUSCLE SPASMS      Turmeric 500 MG capsule   1 capsule, Oral, 2 Times Daily             Discharge Diet: Resume Home diet, advance as tolerated    Activity at Discharge: Resume home activity advace as tolerated, no lifting, no twisting, no bending, brace as directed, no driving until directed.     Follow-up Appointments  Followup with PCP within one week  Followup [facility] at 2weeks post-op         Michael Hudson PA-C  12/19/20  14:54 CST                Electronically signed by Michael Hudson PA-C at 12/19/20 3534

## 2020-12-20 NOTE — THERAPY DISCHARGE NOTE
Acute Care - Physical Therapy Discharge Summary  [facility]       Patient Name: Adela Arauz  : 1965  MRN: [id number]    Today's Date: 2020  Onset of Illness/Injury or Date of Surgery: 20       Referring Physician: Dr. Dunaway      Admit Date: 2020      PT Recommendation and Plan    Visit Dx:    ICD-10-CM ICD-9-CM   1. Spinal stenosis of lumbar region with neurogenic claudication  M48.062 724.03   2. Decreased activities of daily living (ADL)  Z78.9 V49.89   3. Impaired mobility  Z74.09 799.89   4. Follicular lymphoma, unspecified follicular lymphoma type, unspecified body region (CMS/HCC)  C82.90 202.00       Outcome Measures     Row Name 20 0743             How much help from another is currently needed...    Putting on and taking off regular lower body clothing?  2  -CS (r) EP (t) CS (c)      Bathing (including washing, rinsing, and drying)  3  -CS (r) EP (t) CS (c)      Toileting (which includes using toilet bed pan or urinal)  3  -CS (r) EP (t) CS (c)      Putting on and taking off regular upper body clothing  3  -CS (r) EP (t) CS (c)      Taking care of personal grooming (such as brushing teeth)  4  -CS (r) EP (t) CS (c)      Eating meals  4  -CS (r) EP (t) CS (c)      AM-PAC 6 Clicks Score (OT)  19  -CS (r) EP (t)         Functional Assessment    Outcome Measure Options  AM-PAC 6 Clicks Daily Activity (OT)  -CS (r) EP (t) CS (c)        User Key  (r) = Recorded By, (t) = Taken By, (c) = Cosigned By    Initials Name Provider Type    CS Janis Baker, OTR/L, CNT Occupational Therapist    Sugey Ryan, OT Student OT Student              Rehab Goal Summary     Row Name 20 0650             Bed Mobility Goal 1 (PT)    Activity/Assistive Device (Bed Mobility Goal 1, PT)  bed mobility activities, all  -AH      [place] Level/Cues Needed (Bed Mobility Goal 1, PT)  independent  -AH      Time Frame (Bed Mobility Goal 1, PT)  long term goal (LTG);by discharge  -       Progress/Outcomes (Bed Mobility Goal 1, PT)  goal met  -AH         Transfer Goal 1 (PT)    Activity/Assistive Device (Transfer Goal 1, PT)  sit-to-stand/stand-to-sit;bed-to-chair/chair-to-bed;walker, rolling  -AH      [place] Level/Cues Needed (Transfer Goal 1, PT)  modified independence  -AH      Time Frame (Transfer Goal 1, PT)  long term goal (LTG);by discharge  -      Progress/Outcome (Transfer Goal 1, PT)  goal met  -AH         Gait Training Goal 1 (PT)    Activity/Assistive Device (Gait Training Goal 1, PT)  gait (walking locomotion);assistive device use;decrease fall risk;walker, rolling  -AH      [place] Level (Gait Training Goal 1, PT)  modified independence  -AH      Distance (Gait Training Goal 1, PT)  150ft with no knee buckling and full L foot clearance 100% of the time  -AH      Time Frame (Gait Training Goal 1, PT)  long term goal (LTG);by discharge  -      Progress/Outcome (Gait Training Goal 1, PT)  goal not met  -        User Key  (r) = Recorded By, (t) = Taken By, (c) = Cosigned By    Initials Name Provider Type Discipline    Inga Umanzor PTA Physical Therapy Assistant PT              PT Discharge Summary  Reason for Discharge: Discharge from facility  Outcomes Achieved: Refer to plan of care for updates on goals achieved  Discharge Destination: Home      Inga Stern PTA   12/20/2020

## 2020-12-21 ENCOUNTER — TRANSITIONAL CARE MANAGEMENT TELEPHONE ENCOUNTER (OUTPATIENT)
Dept: CALL CENTER | Facility: HOSPITAL | Age: 55
End: 2020-12-21

## 2020-12-21 NOTE — THERAPY DISCHARGE NOTE
Acute Care - Occupational Therapy Discharge Summary  [facility]     Patient Name: Adela Arauz  : 1965  MRN: [id number]    Today's Date: 2020  Onset of Illness/Injury or Date of Surgery: 20    Date of Referral to OT: 20  Referring Physician: Dr. Dunaway      Admit Date: 2020        OT Recommendation and Plan    Visit Dx:    ICD-10-CM ICD-9-CM   1. Spinal stenosis of lumbar region with neurogenic claudication  M48.062 724.03   2. Decreased activities of daily living (ADL)  Z78.9 V49.89   3. Impaired mobility  Z74.09 799.89   4. Follicular lymphoma, unspecified follicular lymphoma type, unspecified body region (CMS/HCC)  C82.90 202.00               Rehab Goal Summary     Row Name 20 0700             Transfer Goal 1 (OT)    Activity/Assistive Device (Transfer Goal 1, OT)  toilet;shower chair;bed-to-chair/chair-to-bed  -TS      [place] Level/Cues Needed (Transfer Goal 1, OT)  independent  -TS      Time Frame (Transfer Goal 1, OT)  long term goal (LTG);by discharge  -TS      Progress/Outcome (Transfer Goal 1, OT)  goal not met  -TS         Bathing Goal 1 (OT)    Activity/Device (Bathing Goal 1, OT)  bathing skills, all  -TS      [place] Level/Cues Needed (Bathing Goal 1, OT)  independent  -TS      Time Frame (Bathing Goal 1, OT)  long term goal (LTG);10 days  -TS      Progress/Outcomes (Bathing Goal 1, OT)  goal not met  -TS         Dressing Goal 1 (OT)    Activity/Device (Dressing Goal 1, OT)  dressing skills, all  -TS      [place]/Cues Needed (Dressing Goal 1, OT)  independent  -TS      Time Frame (Dressing Goal 1, OT)  long term goal (LTG);10 days  -TS      Progress/Outcome (Dressing Goal 1, OT)  goal not met  -TS        User Key  (r) = Recorded By, (t) = Taken By, (c) = Cosigned By    Initials Name Provider Type Discipline    TS Sarah Koehler, RUSSO/L Occupational Therapy Assistant OT                      OT Discharge Summary  Anticipated Discharge  Disposition (OT): home with assist  Reason for Discharge: Discharge from facility  Outcomes Achieved: Refer to plan of care for updates on goals achieved  Discharge Destination: Home with assist      KARAN Martino/LEON  12/21/2020

## 2020-12-21 NOTE — OUTREACH NOTE
Call Center TCM Note      Responses   [facility] patient discharged from?  [place]   Does the patient have one of the following disease processes/diagnoses(primary or secondary)?  General Surgery   TCM attempt successful?  Yes   Call start time  1436   Call end time  1441   Discharge diagnosis  DECOMPRESSION L3-4, POSTERIOR SPINAL FUSION WITH INSTRUMENTATION L3-S1   Is patient permission given to speak with other caregiver?  Yes   List who call center can speak with  spouse- Manny   Person spoke with today (if not patient) and relationship  patient   Meds reviewed with patient/caregiver?  Yes   Is the patient having any side effects they believe may be caused by any medication additions or changes?  No   Does the patient have all medications related to this admission filled (includes all antibiotics, pain medications, etc.)  Yes   Is the patient taking all medications as directed (includes completed medication regime)?  Yes   Does the patient have a follow up appointment scheduled with their surgeon?  No   What is preventing the patient from scheduling follow up appointments?  Waiting on return call   Nursing Interventions  Advised patient to make appointment [Advised to contact surgeon's office if haven't heard from them by tomorrow. ]   Has the patient kept scheduled appointments due by today?  N/A   Comments  Office Visit already in place with Charlene Huntley DNP, APRN Monday Dec 28, 2020 2:30 PM   Has home health visited the patient within 72 hours of discharge?  N/A   DME comments  Patient reports ambulatory with walker.    Psychosocial issues?  No   Did the patient receive a copy of their discharge instructions?  Yes   Nursing interventions  Reviewed instructions with patient   What is the patient's perception of their health status since discharge?  Same [patient reports still has leg numbess. Patient reports that surgeon told her it would improve with time. ]   Nursing interventions  Nurse  provided patient education   Is the patient /caregiver able to teach back basic post-op care?  Lifting as instructed by MD in discharge instructions, Keep incision areas clean,dry and protected, Practice 'cough and deep breath'   Is the patient/caregiver able to teach back signs and symptoms of incisional infection?  Increased redness, swelling or pain at the incisonal site, Increased drainage or bleeding, Incisional warmth, Pus or odor from incision, Fever   Is the patient/caregiver able to teach back steps to recovery at home?  Set small, achievable goals for return to baseline health, Rest and rebuild strength, gradually increase activity   Is the patient/caregiver able to teach back the hierarchy of who to call/visit for symptoms/problems? PCP, Specialist, Home health nurse, Urgent Care, ED, 911  Yes   TCM call completed?  Yes          Adela Alcala RN    12/21/2020, 14:41 EST

## 2020-12-28 ENCOUNTER — READMISSION MANAGEMENT (OUTPATIENT)
Dept: CALL CENTER | Facility: HOSPITAL | Age: 55
End: 2020-12-28

## 2020-12-28 ENCOUNTER — OFFICE VISIT (OUTPATIENT)
Dept: FAMILY MEDICINE CLINIC | Facility: CLINIC | Age: 55
End: 2020-12-28

## 2020-12-28 VITALS
HEART RATE: 105 BPM | WEIGHT: 190 LBS | SYSTOLIC BLOOD PRESSURE: 116 MMHG | RESPIRATION RATE: 16 BRPM | HEIGHT: 64 IN | OXYGEN SATURATION: 99 % | BODY MASS INDEX: 32.44 KG/M2 | DIASTOLIC BLOOD PRESSURE: 78 MMHG | TEMPERATURE: 97.3 F

## 2020-12-28 DIAGNOSIS — Z09 HOSPITAL DISCHARGE FOLLOW-UP: Primary | ICD-10-CM

## 2020-12-28 DIAGNOSIS — M54.50 ACUTE BILATERAL LOW BACK PAIN WITHOUT SCIATICA: ICD-10-CM

## 2020-12-28 PROCEDURE — 99214 OFFICE O/P EST MOD 30 MIN: CPT | Performed by: NURSE PRACTITIONER

## 2020-12-28 RX ORDER — OXYCODONE AND ACETAMINOPHEN 10; 325 MG/1; MG/1
1 TABLET ORAL EVERY 6 HOURS PRN
COMMUNITY
End: 2021-01-27

## 2020-12-28 RX ORDER — HYDROCODONE BITARTRATE AND ACETAMINOPHEN 10; 325 MG/1; MG/1
1 TABLET ORAL EVERY 6 HOURS PRN
Qty: 120 TABLET | Refills: 0 | Status: SHIPPED | OUTPATIENT
Start: 2020-12-28 | End: 2021-01-27 | Stop reason: SDUPTHER

## 2020-12-28 NOTE — PROGRESS NOTES
Transitional Care Follow Up Visit  Subjective     Adela Arauz is a 55 y.o. female who presents for a transitional care management visit.    Within 48 business hours after discharge our office contacted her via telephone to coordinate her care and needs.      I reviewed and discussed the details of that call along with the discharge summary, hospital problems, inpatient lab results, inpatient diagnostic studies, and consultation reports with Adela.     Current outpatient and discharge medications have been reconciled for the patient.  Reviewed by: Charlene Huntley, DNP, APRN      Date of TCM Phone Call 12/19/2020   [facility]   Date of Admission 12/16/2020   Date of Discharge 12/19/2020   Discharge Disposition Home or Self Care     Risk for Readmission (LACE) Score: 11 (12/19/2020  5:00 AM)    History of Present Illness   Course During Hospital Stay:    Patient is a 55 y.o. female admitted for the above staged fusion.  Her hospital stay was uneventful and well tolerated and she was discharged home  stable condition with instructions for brace when out of bed.  Patient will follow-up with Dr. Dunaway's clinic in two weeks. They will call if problems arise.      Discharge Diagnosis:   1. Increasing chronic back pain.  2. Bilateral buttock, thigh and leg radiculopathy, now right worse than left.   3. Severe neurogenic claudication.   4. Multilevel lumbar degenerative disc disease worse L3-S1.   5. Degenerative lumbar scoliosis, L3-S1, concave left L5-S1, concave right L4-5.   6. Facet arthropathy, L4-S1.   7. Large disc herniation right central L3-4  8. Central and foraminal stenosis, L3-S1, worse central L3-4, right L4-5, severe left L5-S1.   9. Rheumatoid arthritis.   10. History of chronic osteomyelitis, right forearm, status post external fixation, Dr. Cr.  11.  Status post anterior decompression, ALIF with instrumentation L5-S1, 12/2/2020  12.  Status post left LLIF L3-5 with  instrumentation L3-4, 12/16/2020  13.  Status post PSF with instrumentation L3-S1, 12/18/2020     Consults During Admission: Dr. Betancourt    It was agreed with Dr. Dunaway that he would provide post op pain medication which was written on Dec 19th and dispensed for 7 dasys on Dec 21 she says she hasn't taken them all and has probably a day - da in half left. After that I was to resume  chronic pain medication.         The following portions of the patient's history were reviewed and updated as appropriate: allergies, current medications, past family history, past medical history, past social history, past surgical history and problem list.    Review of Systems   Constitutional: Negative.    HENT: Negative.    Respiratory: Negative.  Negative for chest tightness, shortness of breath and wheezing.    Cardiovascular: Negative for chest pain, palpitations and leg swelling.   Musculoskeletal: Positive for arthralgias, back pain, gait problem and myalgias.   Allergic/Immunologic: Negative.    Hematological: Negative.    Psychiatric/Behavioral: Negative.    All other systems reviewed and are negative.      Objective   Physical Exam  Vitals signs and nursing note reviewed.   Constitutional:       General: She is awake.      Appearance: Normal appearance. She is well-developed and well-groomed. She is obese.   HENT:      Head: Normocephalic and atraumatic.      Right Ear: Hearing, tympanic membrane, ear canal and external ear normal.      Left Ear: Hearing, tympanic membrane, ear canal and external ear normal.      Nose: Nose normal.      Mouth/Throat:      Lips: Pink.      Mouth: Mucous membranes are moist.      Pharynx: Oropharynx is clear.   Cardiovascular:      Rate and Rhythm: Normal rate and regular rhythm.      Heart sounds: Normal heart sounds.   Pulmonary:      Effort: Pulmonary effort is normal.      Breath sounds: Normal breath sounds and air entry.   Abdominal:      General: Abdomen is flat. Bowel sounds are  normal.      Palpations: Abdomen is soft.   Musculoskeletal:        Arms:       Right lower leg: No edema.      Left lower leg: No edema.   Skin:     General: Skin is warm and dry.   Neurological:      Mental Status: She is alert and oriented to person, place, and time.      Sensory: Sensation is intact.      Motor: Weakness present.      Coordination: Coordination is intact.      Gait: Gait abnormal.   Psychiatric:         Attention and Perception: Attention and perception normal.         Mood and Affect: Mood and affect normal.         Speech: Speech normal.         Behavior: Behavior is cooperative.         Thought Content: Thought content normal.         Cognition and Memory: Cognition normal.          Left leg is weak, has follow up with Dr. Dunaway next week.        Assessment/Plan   Diagnoses and all orders for this visit:    1. Hospital discharge follow-up (Primary)    2. Acute bilateral low back pain without sciatica  -     HYDROcodone-acetaminophen (Norco)  MG per tablet; Take 1 tablet by mouth Every 6 (Six) Hours As Needed for Moderate Pain .  Dispense: 120 tablet; Refill: 0        Return in about 1 month (around 1/28/2021) for Recheck chronic pain.        Electronically signed by Charlene Huntley, [place], APRN, 12/28/20, 2:41 PM CST.

## 2020-12-28 NOTE — OUTREACH NOTE
General Surgery Week 2 Survey      Responses   [facility] patient discharged from?  [place]   Does the patient have one of the following disease processes/diagnoses(primary or secondary)?  General Surgery   Week 2 attempt successful?  No   Unsuccessful attempts  Attempt 1          Richar Cochran RN

## 2020-12-30 ENCOUNTER — READMISSION MANAGEMENT (OUTPATIENT)
Dept: CALL CENTER | Facility: HOSPITAL | Age: 55
End: 2020-12-30

## 2020-12-30 NOTE — OUTREACH NOTE
General Surgery Week 2 Survey      Responses   [facility] patient discharged from?  [place]   Does the patient have one of the following disease processes/diagnoses(primary or secondary)?  General Surgery   Week 2 attempt successful?  No   Unsuccessful attempts  Attempt 2          Humaira Esquivel RN

## 2021-01-08 ENCOUNTER — READMISSION MANAGEMENT (OUTPATIENT)
Dept: CALL CENTER | Facility: HOSPITAL | Age: 56
End: 2021-01-08

## 2021-01-08 DIAGNOSIS — F41.8 ANXIETY ASSOCIATED WITH DEPRESSION: ICD-10-CM

## 2021-01-08 RX ORDER — DULOXETIN HYDROCHLORIDE 60 MG/1
CAPSULE, DELAYED RELEASE ORAL
Qty: 30 CAPSULE | Refills: 2 | Status: SHIPPED | OUTPATIENT
Start: 2021-01-08 | End: 2021-03-08

## 2021-01-08 NOTE — OUTREACH NOTE
General Surgery Week 3 Survey      Responses   [facility] patient discharged from?  [place]   Does the patient have one of the following disease processes/diagnoses(primary or secondary)?  General Surgery   Week 3 attempt successful?  Yes   Call start time  1522   Call end time  1525   Discharge diagnosis  DECOMPRESSION L3-4, POSTERIOR SPINAL FUSION WITH INSTRUMENTATION L3-S1   Meds reviewed with patient/caregiver?  Yes   Is the patient having any side effects they believe may be caused by any medication additions or changes?  No   Does the patient have all medications related to this admission filled (includes all antibiotics, pain medications, etc.)  Yes   Is the patient taking all medications as directed (includes completed medication regime)?  Yes   Has the patient kept scheduled appointments due by today?  Yes   Comments  patient saw surgeon's PA one week ago.     DME comments  Patient reports ambulatory with walker.    Psychosocial issues?  No   Comments  Patient is doing home exercises   Did the patient receive a copy of their discharge instructions?  Yes   Nursing interventions  Reviewed instructions with patient   What is the patient's perception of their health status since discharge?  Improving   Is the patient /caregiver able to teach back basic post-op care?  Continue use of incentive spirometry at least 1 week post discharge, Practice 'cough and deep breath'   Is the patient/caregiver able to teach back signs and symptoms of incisional infection?  Increased redness, swelling or pain at the incisonal site, Incisional warmth, Increased drainage or bleeding, Pus or odor from incision, Fever   Is the patient/caregiver able to teach back the hierarchy of who to call/visit for symptoms/problems? PCP, Specialist, Home health nurse, Urgent Care, ED, 911  Yes   Week 3 call completed?  Yes   Wrap up additional comments  Patient reports she is still having some weakness but providers are aware and have  stated this is part of recovery.  She states she has all that she needs and denies questions at this time.           Keshia Petty RN

## 2021-01-15 ENCOUNTER — READMISSION MANAGEMENT (OUTPATIENT)
Dept: CALL CENTER | Facility: HOSPITAL | Age: 56
End: 2021-01-15

## 2021-01-15 NOTE — OUTREACH NOTE
General Surgery Week 4 Survey      Responses   [facility] patient discharged from?  [place]   Does the patient have one of the following disease processes/diagnoses(primary or secondary)?  General Surgery   Week 4 attempt successful?  Yes   Call start time  1405   Call end time  1406   Discharge diagnosis  DECOMPRESSION L3-4, POSTERIOR SPINAL FUSION WITH INSTRUMENTATION L3-S1   Is the patient taking all medications as directed (includes completed medication regime)?  Yes   Has the patient kept scheduled appointments due by today?  Yes   Is the patient still receiving Home Health Services?  N/A   What is the patient's perception of their health status since discharge?  Improving   Nursing interventions  Nurse provided patient education   Is the patient/caregiver able to teach back steps to recovery at home?  Set small, achievable goals for return to baseline health   If the patient is a current smoker, are they able to teach back resources for cessation?  Smoking cessation medications   Week 4 call completed?  Yes   Would the patient like one additional call?  No   Graduated  Yes   Is the patient interested in additional calls from an ambulatory ?  NOTE:  applies to high risk patients requiring additional follow-up.  No   Did the patient feel the follow up calls were helpful during their recovery period?  Yes   Was the number of calls appropriate?  Yes          Kala Mendez RN

## 2021-01-27 ENCOUNTER — OFFICE VISIT (OUTPATIENT)
Dept: FAMILY MEDICINE CLINIC | Facility: CLINIC | Age: 56
End: 2021-01-27

## 2021-01-27 VITALS
RESPIRATION RATE: 18 BRPM | OXYGEN SATURATION: 98 % | WEIGHT: 199.38 LBS | TEMPERATURE: 97.3 F | SYSTOLIC BLOOD PRESSURE: 136 MMHG | DIASTOLIC BLOOD PRESSURE: 90 MMHG | BODY MASS INDEX: 34.04 KG/M2 | HEART RATE: 99 BPM | HEIGHT: 64 IN

## 2021-01-27 DIAGNOSIS — F41.9 ANXIETY: ICD-10-CM

## 2021-01-27 DIAGNOSIS — M54.50 ACUTE BILATERAL LOW BACK PAIN WITHOUT SCIATICA: ICD-10-CM

## 2021-01-27 DIAGNOSIS — E66.01 MORBID OBESITY DUE TO EXCESS CALORIES (HCC): ICD-10-CM

## 2021-01-27 DIAGNOSIS — M05.79 RHEUMATOID ARTHRITIS INVOLVING MULTIPLE SITES WITH POSITIVE RHEUMATOID FACTOR (HCC): ICD-10-CM

## 2021-01-27 DIAGNOSIS — F41.0 PANIC ATTACK: ICD-10-CM

## 2021-01-27 PROCEDURE — 99214 OFFICE O/P EST MOD 30 MIN: CPT | Performed by: NURSE PRACTITIONER

## 2021-01-27 RX ORDER — ALPRAZOLAM 0.5 MG/1
0.5 TABLET ORAL 3 TIMES DAILY PRN
Qty: 90 TABLET | Refills: 0 | Status: SHIPPED | OUTPATIENT
Start: 2021-01-27 | End: 2021-02-19 | Stop reason: SDUPTHER

## 2021-01-27 RX ORDER — HYDROCODONE BITARTRATE AND ACETAMINOPHEN 10; 325 MG/1; MG/1
1 TABLET ORAL EVERY 6 HOURS PRN
Qty: 120 TABLET | Refills: 0 | Status: SHIPPED | OUTPATIENT
Start: 2021-01-27 | End: 2021-02-19 | Stop reason: SDUPTHER

## 2021-01-27 NOTE — PROGRESS NOTES
"Chief Complaint  Pain    Subjective        Adela Arauz presents to [facility] for   History of Present Illness    Objective   Vital Signs:   /90 (BP Location: Left arm, Patient Position: Sitting, Cuff Size: Adult)   Pulse 99   Temp 97.3 °F (36.3 °C) (Infrared)   Resp 18   Ht 162.6 cm (64\")   Wt 90.4 kg (199 lb 6 oz)   SpO2 98%   BMI 34.22 kg/m²         Physical Exam   ***     Assessment and Plan  {Assess/PlanSmartLinks:14000}    Follow Up   Return in about 6 months (around 7/27/2021) for Recheck 1 month.  Patient was given instructions and counseling regarding her condition or for health maintenance advice. Please see specific information pulled into the AVS if appropriate.       "

## 2021-01-27 NOTE — PROGRESS NOTES
Chief Complaint  Pain    Subjective    Adela Arauz presents to [facility] for   Chronic back pain, DDD and arm pain.  She recently had several surgeries by Dr. Dunaway for Decompression L3-L4, posterior spinal fusion with instrumentation.  She says she has had good days and bad days. Lately she has been struggling with the pain but says she did talk to Dr. Dunaway and he encouraged her to give it time. Says that her anxiety has been worse due to the pain.  Denies suicidal or homicidal ideations, denies numbness and tingling.   Pain  This is a chronic problem. The current episode started more than 1 year ago. The problem has been gradually worsening. Associated symptoms include arthralgias. Pertinent negatives include no change in bowel habit, chest pain, chills, congestion, fever, headaches, joint swelling, sore throat, swollen glands, urinary symptoms or vertigo. The symptoms are aggravated by standing, twisting, walking, stress, sneezing, exertion, intercourse and bending. She has tried heat, lying down, NSAIDs, position changes, oral narcotics, rest, sleep and walking for the symptoms. The treatment provided mild relief.   Anxiety  Presents for follow-up visit. Symptoms include decreased concentration, depressed mood, insomnia and irritability. Patient reports no chest pain, compulsions, confusion, hyperventilation, impotence, obsessions, palpitations or panic. Symptoms occur constantly. The severity of symptoms is severe. The patient sleeps 4 hours per night. The quality of sleep is fair. Nighttime awakenings: several.     Compliance with medications is %.     Chronic problems:  Anxiety/panic attacks stable with alprazolam, depression stable with duloxetine and sertraline, chronic pain stable with tizanidine, tumeric, norco and gabapentin, gerd stable with omeprazole, seasonal allergies stable with fluticasone, vit d def stable with cholecalciferol, chronic nausea  "stable with ondansetron.     Objective   Vital Signs:   /90 (BP Location: Left arm, Patient Position: Sitting, Cuff Size: Adult)   Pulse 99   Temp 97.3 °F (36.3 °C) (Infrared)   Resp 18   Ht 162.6 cm (64\")   Wt 90.4 kg (199 lb 6 oz)   SpO2 98%   BMI 34.22 kg/m²            Assessment and Plan  Diagnoses and all orders for this visit:    1. Panic attack  2. Anxiety  -     ALPRAZolam (Xanax) 0.5 MG tablet; Take 1 tablet by mouth 3 (Three) Times a Day As Needed for Anxiety.  Dispense: 90 tablet; Refill: 0        -     Uday, control contract and toxassure on file    3. Acute bilateral low back pain without sciatica  -     HYDROcodone-acetaminophen (Norco)  MG per tablet; Take 1 tablet by mouth Every 6 (Six) Hours As Needed for Moderate Pain .  Dispense: 120 tablet; Refill: 0    4. Rheumatoid arthritis involving multiple sites with positive rheumatoid factor (CMS/[facility])    5. Morbid obesity due to excess calories (CMS/[facility])    Obesity Plan:  BMI: 199 lb  BMI 34.2 Try to lose 3 pounds before next visit  The patient BMI is outside this range and we recommended/discussed today to utilize a diet/exercise program to get back into the appropriate range.  Federal guidelines recommend that people under the age of 65 should have a BMI of 18.5-24.9  The initial step is to document everything that is consumed into a food diary. Studies have shown that patients can lose up to 2x the weight by keeping track of foods.   Choose one bad food weekly and eliminate it from your diet.  Replace with one healthy food  Goal over next 2-4 weeks is walk 30 minutes per day 5 days per week at pace difficult to hold conversation.   Drink more water, less soda.   Cut back on portion sizes.   Today we encouraged roughly a 1 lb per week weight loss with initial goal of 5% weight loss.  Discussed if eating out for a meal, consider cutting food in half and placing into a to go container.  Individually portion any foods coming into the " home based on package.  Use smaller plates  Drinking 12 oz of water 30 minutes before meal as way to suppress appetite.  Medications discussed today include metformin, topamax, phentermine, Qsymia, Belviq (lorcaserin), Contrave (Buproprion/naltrexone).    Lifestyle therapy   Simple advice to lose weight   Internet programs or self help books.    Advice from dietitian  Set Goals that are realistic   Encouraged to use visual aides to help in measuring foods  Baseball-1 cup good for green salad, frozen yogurt, medium piece of fruit, baked potato  Handful - ½ cup good cut fruit, cooked vegetables, pasta, rice  Egg- ¼ cup good for dried fruit  Deck of cards-3 ounces good for meat and poultry  Check book-3 ounces good for grilled fish  6 dice side by side- 1 ½ ounces good for natural cheese  Simple tips   Plate method-reduce plate size to 9 inch dinner plate.  Half of plate should be filled with non-starchy vegetables (broccoli, lettuce, cauliflower, tomatoes), ¼ plate with lean source of protein (lean chicken, turkey, fish), remaining ½ with whole grains (brown rice, potato, whole grain breads  Avoid liquid calories (regular soda, juice, coffee with cream)  Focus  on water, seltzer water and other non-calorie drinks  Replace regular sugar with non-caloric sweeteners  Avoid skipping meals: plan small regular meals throughout the day in order to keep your hunger controlled  Consider using meal replacements if unable to plan a healthy meal (protein shake, high protein bar)  Replace all white bread with whole wheat/whole grain alternative  Swap regular salad dressings (mayonnaise, butter, or low fat or fat free alternative)  Avoid high fat, high calorie, high carbohydrate snakes (cookies, pastries, cakes)  Snack on fruits, low fat dairy (yogurt, cottage cheese)    Follow Up  Return in about 1 month (around 2/27/2021) for Recheck 1 month.  Patient was given instructions and counseling regarding her condition or for health  maintenance advice. Please see specific information pulled into the AVS if appropriate.     Electronically signed by Charlene Huntley DNP, APRN, 01/27/21, 12:54 PM CST.

## 2021-01-27 NOTE — PATIENT INSTRUCTIONS
Chronic Back Pain  When back pain lasts longer than 3 months, it is called chronic back pain. Pain may get worse at certain times (flare-ups). There are things you can do at home to manage your pain.  Follow these instructions at home:  Activity         · Avoid bending and other activities that make pain worse.  · When standing:  ? Keep your upper back and neck straight.  ? Keep your shoulders pulled back.  ? Avoid slouching.  · When sitting:  ? Keep your back straight.  ? Relax your shoulders. Do not round your shoulders or pull them backward.  · Do not sit or  one place for long periods of time.  · Take short rest breaks during the day. Lying down or standing is usually better than sitting. Resting can help relieve pain.  · When sitting or lying down for a long time, do some mild activity or stretching. This will help to prevent stiffness and pain.  · Get regular exercise. Ask your doctor what activities are safe for you.  · Do not lift anything that is heavier than 10 lb (4.5 kg). To prevent injury when you lift things:  ? Bend your knees.  ? Keep the weight close to your body.  ? Avoid twisting.  Managing pain  · If told, put ice on the painful area. Your doctor may tell you to use ice for 24-48 hours after a flare-up starts.  ? Put ice in a plastic bag.  ? Place a towel between your skin and the bag.  ? Leave the ice on for 20 minutes, 2-3 times a day.  · If told, put heat on the painful area as often as told by your doctor. Use the heat source that your doctor recommends, such as a moist heat pack or a heating pad.  ? Place a towel between your skin and the heat source.  ? Leave the heat on for 20-30 minutes.  ? Remove the heat if your skin turns bright red. This is especially important if you are unable to feel pain, heat, or cold. You may have a greater risk of getting burned.  · Soak in a warm bath. This can help relieve pain.  · Take over-the-counter and prescription medicines only as told by your  doctor.  General instructions  · Sleep on a firm mattress. Try lying on your side with your knees slightly bent. If you lie on your back, put a pillow under your knees.  · Keep all follow-up visits as told by your doctor. This is important.  Contact a doctor if:  · You have pain that does not get better with rest or medicine.  Get help right away if:  · One or both of your arms or legs feel weak.  · One or both of your arms or legs lose feeling (numbness).  · You have trouble controlling when you poop (bowel movement) or pee (urinate).  · You feel sick to your stomach (nauseous).  · You throw up (vomit).  · You have belly (abdominal) pain.  · You have shortness of breath.  · You pass out (faint).  Summary  · When back pain lasts longer than 3 months, it is called chronic back pain.  · Pain may get worse at certain times (flare-ups).  · Use ice and heat as told by your doctor. Your doctor may tell you to use ice after flare-ups.  This information is not intended to replace advice given to you by your health care provider. Make sure you discuss any questions you have with your health care provider.  Document Revised: 04/09/2020 Document Reviewed: 08/02/2018  Elsevier Patient Education © 2020 [employer]

## 2021-02-08 DIAGNOSIS — G62.9 NEUROPATHY: ICD-10-CM

## 2021-02-08 DIAGNOSIS — G89.4 CHRONIC PAIN SYNDROME: ICD-10-CM

## 2021-02-08 DIAGNOSIS — M05.79 RHEUMATOID ARTHRITIS INVOLVING MULTIPLE SITES WITH POSITIVE RHEUMATOID FACTOR (HCC): ICD-10-CM

## 2021-02-08 DIAGNOSIS — F41.9 ANXIETY: ICD-10-CM

## 2021-02-08 RX ORDER — GABAPENTIN 600 MG/1
TABLET ORAL
Qty: 90 TABLET | Refills: 3 | Status: SHIPPED | OUTPATIENT
Start: 2021-02-08 | End: 2021-03-23

## 2021-02-19 ENCOUNTER — OFFICE VISIT (OUTPATIENT)
Dept: FAMILY MEDICINE CLINIC | Facility: CLINIC | Age: 56
End: 2021-02-19

## 2021-02-19 VITALS
HEIGHT: 64 IN | OXYGEN SATURATION: 97 % | RESPIRATION RATE: 18 BRPM | BODY MASS INDEX: 34.49 KG/M2 | TEMPERATURE: 97.3 F | WEIGHT: 202 LBS | DIASTOLIC BLOOD PRESSURE: 84 MMHG | HEART RATE: 108 BPM | SYSTOLIC BLOOD PRESSURE: 123 MMHG

## 2021-02-19 DIAGNOSIS — M54.50 ACUTE BILATERAL LOW BACK PAIN WITHOUT SCIATICA: ICD-10-CM

## 2021-02-19 DIAGNOSIS — F41.0 PANIC ATTACK: ICD-10-CM

## 2021-02-19 DIAGNOSIS — M06.9 RHEUMATOID ARTHRITIS INVOLVING MULTIPLE SITES, UNSPECIFIED WHETHER RHEUMATOID FACTOR PRESENT (HCC): ICD-10-CM

## 2021-02-19 DIAGNOSIS — B00.1 FEVER BLISTER: ICD-10-CM

## 2021-02-19 DIAGNOSIS — G89.4 CHRONIC PAIN SYNDROME: Primary | ICD-10-CM

## 2021-02-19 DIAGNOSIS — M51.36 DDD (DEGENERATIVE DISC DISEASE), LUMBAR: ICD-10-CM

## 2021-02-19 DIAGNOSIS — Z79.899 LONG TERM USE OF DRUG: ICD-10-CM

## 2021-02-19 DIAGNOSIS — E88.01 AAT (ALPHA-1-ANTITRYPSIN) DEFICIENCY (HCC): ICD-10-CM

## 2021-02-19 DIAGNOSIS — F41.9 ANXIETY: ICD-10-CM

## 2021-02-19 DIAGNOSIS — Z51.81 THERAPEUTIC DRUG MONITORING: ICD-10-CM

## 2021-02-19 DIAGNOSIS — C82.90 FOLLICULAR LYMPHOMA, UNSPECIFIED FOLLICULAR LYMPHOMA TYPE, UNSPECIFIED BODY REGION (HCC): ICD-10-CM

## 2021-02-19 PROCEDURE — 99214 OFFICE O/P EST MOD 30 MIN: CPT | Performed by: NURSE PRACTITIONER

## 2021-02-19 RX ORDER — VALACYCLOVIR HYDROCHLORIDE 1 G/1
1000 TABLET, FILM COATED ORAL 2 TIMES DAILY
Qty: 60 TABLET | Refills: 1 | Status: SHIPPED | OUTPATIENT
Start: 2021-02-19 | End: 2021-03-24

## 2021-02-19 RX ORDER — HYDROCODONE BITARTRATE AND ACETAMINOPHEN 10; 325 MG/1; MG/1
1 TABLET ORAL EVERY 6 HOURS PRN
Qty: 120 TABLET | Refills: 0 | Status: SHIPPED | OUTPATIENT
Start: 2021-02-19 | End: 2021-03-23 | Stop reason: SDUPTHER

## 2021-02-19 RX ORDER — PREGABALIN 75 MG/1
1 CAPSULE ORAL 2 TIMES DAILY
COMMUNITY
Start: 2021-02-10 | End: 2021-03-23 | Stop reason: SDUPTHER

## 2021-02-19 RX ORDER — ALPRAZOLAM 0.5 MG/1
0.5 TABLET ORAL 3 TIMES DAILY PRN
Qty: 90 TABLET | Refills: 0 | Status: SHIPPED | OUTPATIENT
Start: 2021-02-19 | End: 2021-03-23 | Stop reason: SDUPTHER

## 2021-03-08 DIAGNOSIS — F41.8 ANXIETY ASSOCIATED WITH DEPRESSION: ICD-10-CM

## 2021-03-08 RX ORDER — DULOXETIN HYDROCHLORIDE 60 MG/1
CAPSULE, DELAYED RELEASE ORAL
Qty: 30 CAPSULE | Refills: 2 | Status: SHIPPED | OUTPATIENT
Start: 2021-03-08 | End: 2021-04-05

## 2021-03-23 ENCOUNTER — OFFICE VISIT (OUTPATIENT)
Dept: FAMILY MEDICINE CLINIC | Facility: CLINIC | Age: 56
End: 2021-03-23

## 2021-03-23 VITALS
BODY MASS INDEX: 36.14 KG/M2 | TEMPERATURE: 96.6 F | WEIGHT: 204 LBS | SYSTOLIC BLOOD PRESSURE: 134 MMHG | HEIGHT: 63 IN | RESPIRATION RATE: 18 BRPM | DIASTOLIC BLOOD PRESSURE: 87 MMHG | HEART RATE: 83 BPM | OXYGEN SATURATION: 99 %

## 2021-03-23 DIAGNOSIS — Z51.81 THERAPEUTIC DRUG MONITORING: ICD-10-CM

## 2021-03-23 DIAGNOSIS — G89.4 CHRONIC PAIN SYNDROME: ICD-10-CM

## 2021-03-23 DIAGNOSIS — F41.9 ANXIETY: ICD-10-CM

## 2021-03-23 DIAGNOSIS — M06.9 RHEUMATOID ARTHRITIS INVOLVING MULTIPLE SITES, UNSPECIFIED WHETHER RHEUMATOID FACTOR PRESENT (HCC): ICD-10-CM

## 2021-03-23 DIAGNOSIS — K59.00 CONSTIPATION, UNSPECIFIED CONSTIPATION TYPE: ICD-10-CM

## 2021-03-23 DIAGNOSIS — M51.36 DDD (DEGENERATIVE DISC DISEASE), LUMBAR: ICD-10-CM

## 2021-03-23 DIAGNOSIS — D64.9 ANEMIA, UNSPECIFIED TYPE: Primary | ICD-10-CM

## 2021-03-23 DIAGNOSIS — F41.0 PANIC ATTACK: ICD-10-CM

## 2021-03-23 DIAGNOSIS — M54.50 ACUTE BILATERAL LOW BACK PAIN WITHOUT SCIATICA: ICD-10-CM

## 2021-03-23 PROCEDURE — 99214 OFFICE O/P EST MOD 30 MIN: CPT | Performed by: NURSE PRACTITIONER

## 2021-03-23 RX ORDER — ALPRAZOLAM 0.5 MG/1
0.5 TABLET ORAL 3 TIMES DAILY PRN
Qty: 90 TABLET | Refills: 0 | Status: SHIPPED | OUTPATIENT
Start: 2021-03-23 | End: 2021-04-27 | Stop reason: SDUPTHER

## 2021-03-23 RX ORDER — FERROUS SULFATE TAB EC 324 MG (65 MG FE EQUIVALENT) 324 (65 FE) MG
324 TABLET DELAYED RESPONSE ORAL
Qty: 30 TABLET | Refills: 2 | Status: SHIPPED | OUTPATIENT
Start: 2021-03-23 | End: 2021-06-01

## 2021-03-23 RX ORDER — PREGABALIN 75 MG/1
75 CAPSULE ORAL 2 TIMES DAILY
Qty: 180 CAPSULE | Refills: 1 | Status: SHIPPED | OUTPATIENT
Start: 2021-03-23 | End: 2021-10-01 | Stop reason: SDUPTHER

## 2021-03-23 RX ORDER — HYDROCODONE BITARTRATE AND ACETAMINOPHEN 10; 325 MG/1; MG/1
1 TABLET ORAL EVERY 6 HOURS PRN
Qty: 120 TABLET | Refills: 0 | Status: SHIPPED | OUTPATIENT
Start: 2021-03-23 | End: 2021-04-27 | Stop reason: SDUPTHER

## 2021-03-23 RX ORDER — POLYETHYLENE GLYCOL 3350 17 G/17G
17 POWDER, FOR SOLUTION ORAL DAILY
Qty: 30 PACKET | Refills: 5 | Status: SHIPPED | OUTPATIENT
Start: 2021-03-23

## 2021-03-23 NOTE — PATIENT INSTRUCTIONS
Chronic Pain, Adult  Chronic pain is a type of pain that lasts or keeps coming back for at least 3-6 months. You may have headaches, pain in the abdomen, or pain in other areas of the body. Chronic pain may be related to an illness, such as fibromyalgia or complex regional pain syndrome. Chronic pain may also be related to an injury or a health condition. Sometimes, the cause of chronic pain is not known.  Chronic pain can make it hard for you to do daily activities. If not treated, chronic pain can lead to anxiety and depression. Treatment depends on the cause and severity of your pain. You may need to work with a pain specialist to come up with a treatment plan. The plan may include medicine, counseling, and physical therapy. Many people benefit from a combination of two or more types of treatment to control their pain.  Follow these instructions at home:  Medicines  · Take over-the-counter and prescription medicines only as told by your health care provider.  · Ask your health care provider if the medicine prescribed to you:  ? Requires you to avoid driving or using machinery.  ? Can cause constipation. You may need to take these actions to prevent or treat constipation:  § Drink enough fluid to keep your urine pale yellow.  § Take over-the-counter or prescription medicines.  § Eat foods that are high in fiber, such as beans, whole grains, and fresh fruits and vegetables.  § Limit foods that are high in fat and processed sugars, such as fried or sweet foods.  Treatment plan  Follow your treatment plan as told by your health care provider. This may include:  · Gentle, regular exercise.  · Eating a healthy diet that includes foods such as vegetables, fruits, fish, and lean meats.  · Cognitive or behavioral therapy that changes the way you think or act in response to the pain. This may help improve how you feel.  · Working with a physical therapist.  · Meditation, yoga, acupuncture, or massage therapy.  · Aroma,  color, light, or sound therapy.  · Local electrical stimulation. The electrical pulses help to relieve pain by temporarily stopping the nerve impulses that cause you to feel pain.  · Injections. These deliver numbing or pain-relieving medicines into the spine or the area of pain.    Lifestyle    · Ask your health care provider whether you should keep a pain diary. Your health care provider will tell you what information to write in the diary. This may include when you have pain, what the pain feels like, and how medicines and other behaviors or treatments help to reduce the pain.  · Consider talking with a mental health care provider about how to manage chronic pain.  · Consider joining a chronic pain support group.  · Try to control or lower your stress levels. Talk with your health care provider about ways to do this.  General instructions  · Learn as much as you can about how to manage your chronic pain. Ask your health care provider if an intensive pain rehabilitation program or a chronic pain specialist would be helpful.  · Check your pain level as told by your health care provider. Ask your health care provider if you should use a pain scale.  · It is up to you to get the results of any tests that were done. Ask your health care provider, or the department that is doing the tests, when your results will be ready.  · Keep all follow-up visits as told by your health care provider. This is important.  Contact a health care provider if:  · Your pain gets worse, or you have new pain.  · You have trouble sleeping.  · You have trouble doing your normal activities.  · Your pain is not controlled with treatment.  · You have side effects from pain medicine.  · You feel weak.  · You notice any other changes that show that your condition is getting worse.  Get help right away if:  · You lose feeling or have numbness in your body.  · You lose control of bowel or bladder function.  · Your pain suddenly gets much  worse.  · You develop shaking or chills.  · You develop confusion.  · You develop chest pain.  · You have trouble breathing or shortness of breath.  · You pass out.  · You have thoughts about hurting yourself or others.  If you ever feel like you may hurt yourself or others, or have thoughts about taking your own life, get help right away. Go to your nearest emergency department or:  · Call your local emergency services (911 in the [place]  · Call a suicide crisis helpline, such as the National Suicide Prevention Lifeline at 1-725.141.5654. This is open 24 hours a day in the [place]  · Text the Crisis Text Line at 569337 (in the [place]  Summary  · Chronic pain is a type of pain that lasts or keeps coming back for at least 3-6 months.  · Chronic pain may be related to an illness, injury, or other health condition. Sometimes, the cause of chronic pain is not known.  · Treatment depends on the cause and severity of your pain.  · Many people benefit from a combination of two or more types of treatment to control their pain.  · Follow your treatment plan as told by your health care provider.  This information is not intended to replace advice given to you by your health care provider. Make sure you discuss any questions you have with your health care provider.  Document Revised: 09/03/2020 Document Reviewed: 09/03/2020  [employer]vier Patient Education © 2021 [employer]

## 2021-03-23 NOTE — PROGRESS NOTES
Chief Complaint  Pain (follow up)    Subjective     Adela Arauz presents to [facility]       Chronic pain.  Adela has been struggling with all kinds of pain, she has fibromyalgia, RA, chronic back pain and recently had multiple back surgeries.  She does say that she feels like she is improving although her pain in her back is still 8/10 and has associated numbness and tingling in lower extremities.  She says she has been very fatigued and her Rheumatologist just ordered lab work and said she was anemic and wants her to discuss that with me.  She says she has been doing better on the lyrica then the gabapentin, and says she is able to stay up and move around more than she had been.  She denies any chest pain, shortness of breath or palpitations.  She says her depression is stable denies any suicidal/homicidal ideations and says anxiety is improved too.      Pain  This is a chronic problem. The current episode started more than 1 year ago. The problem occurs daily. The problem has been gradually improving. Associated symptoms include arthralgias, a change in bowel habit, fatigue, headaches, myalgias and numbness. Pertinent negatives include no anorexia, chills, congestion, coughing, nausea, neck pain, rash, urinary symptoms, vomiting or weakness. The symptoms are aggravated by bending, coughing, exertion, standing, twisting and walking. She has tried acetaminophen, position changes, oral narcotics, rest, sleep, walking, relaxation, NSAIDs and heat for the symptoms. The treatment provided mild relief.   Muscle Pain  This is a chronic problem. The current episode started more than 1 year ago. The problem occurs intermittently. The problem is unchanged. The context of the pain is unknown. The pain is present in the lower back, left elbow, left hand, left hip, left shoulder, left wrist, right elbow, right hand, right hip, right shoulder and right wrist. The pain is severe. The  "symptoms are aggravated by any movement. Associated symptoms include constipation, diarrhea, fatigue, headaches and stiffness. Pertinent negatives include no nausea, rash, shortness of breath, urinary symptoms, vomiting or weakness. Past treatments include acetaminophen, heat pack, prescription NSAID, rest and prescription narcotic. The treatment provided mild relief. There is no swelling present. She has been behaving normally. Her past medical history is significant for chronic back pain. There is no history of rheumatic disease or sickle cell disease.   Depression  Visit Type: follow-up  Patient presents with the following symptoms: decreased concentration, depressed mood, fatigue, irritability, nervousness/anxiety and restlessness.  Patient is not experiencing: excessive worry, feelings of hopelessness, feelings of worthlessness, insomnia, shortness of breath, suicidal ideas, suicidal planning and thoughts of death.  Frequency of symptoms: most days   Severity: moderate   Sleep per night: 5 hours  Sleep quality: fair  Nighttime awakenings: occasional  Compliance with medications:  %        Anxiety  Presents for follow-up visit. Symptoms include decreased concentration, depressed mood, irritability, nervous/anxious behavior and restlessness. Patient reports no excessive worry, insomnia, nausea, shortness of breath or suicidal ideas. Symptoms occur most days. The severity of symptoms is moderate. The quality of sleep is fair. Nighttime awakenings: occasional.     Her past medical history is significant for depression. Compliance with medications is %.     The following data was reviewed by: Charlene Huntley, [place], APRN on 03/23/2021:  Objective   Vital Signs:   /87 (BP Location: Left arm, Patient Position: Sitting, Cuff Size: Large Adult)   Pulse 83   Temp 96.6 °F (35.9 °C) (Infrared)   Resp 18   Ht 160 cm (63\") Comment: per patient  Wt 92.5 kg (204 lb)   SpO2 99%   BMI 36.14 kg/m²   "     Result Review: Labs were drawn at the RA doctor and she wanted her to discuss with PCP.  I have reviewed these labs and made my recommenddations    Lab Results   Component Value Date    WBC 12.33 (H) 12/17/2020    HGB 10.9 (L) 12/17/2020    HCT 35.2 12/17/2020    MCV 81.5 12/17/2020     12/17/2020     Lab Results   Component Value Date    GLUCOSE 133 (H) 12/17/2020    BUN 7 12/17/2020    CREATININE 0.50 (L) 12/17/2020    EGFRIFNONA 129 12/17/2020    EGFRIFAFRI 117 06/22/2020    BCR 14.0 12/17/2020    K 4.5 12/17/2020    CO2 30.0 (H) 12/17/2020    CALCIUM 9.0 12/17/2020    PROTENTOTREF 7.1 06/22/2020    ALBUMIN 4.30 11/23/2020    LABIL2 1.5 06/22/2020    AST 28 11/23/2020    ALT 24 11/23/2020     Lab Results   Component Value Date    IRON 24 (L) 12/18/2020    TIBC 443 12/18/2020     Lab Results   Component Value Date    WHUKUYZZ44 291 12/18/2020     Lab Results   Component Value Date    FOLATE 5.97 12/18/2020       Physical Exam  Vitals and nursing note reviewed.   Constitutional:       General: She is awake.      Appearance: Normal appearance. She is well-developed and well-groomed. She is morbidly obese.   HENT:      Head: Normocephalic and atraumatic.      Right Ear: Hearing, tympanic membrane, ear canal and external ear normal.      Left Ear: Hearing, tympanic membrane, ear canal and external ear normal.      Nose: Nose normal.      Mouth/Throat:      Lips: Pink.   Cardiovascular:      Rate and Rhythm: Normal rate and regular rhythm.      Heart sounds: Normal heart sounds.   Pulmonary:      Effort: Pulmonary effort is normal.      Breath sounds: Normal breath sounds and air entry.   Abdominal:      General: Abdomen is flat. Bowel sounds are normal.      Palpations: Abdomen is soft.   Musculoskeletal:      Right shoulder: Tenderness present. Decreased range of motion. Decreased strength.      Left shoulder: Tenderness present. Decreased range of motion. Decreased strength.      Right elbow: Decreased  range of motion. Tenderness present.      Left elbow: Decreased range of motion. Tenderness present.      Right wrist: Tenderness and bony tenderness present. Decreased range of motion.      Left wrist: Tenderness and bony tenderness present. Decreased range of motion.        Arms:       Cervical back: Normal.      Thoracic back: Normal.      Lumbar back: Spasms and tenderness present. Decreased range of motion.        Back:       Right lower leg: No edema.      Left lower leg: No edema.   Lymphadenopathy:      Head:      Right side of head: No submental, submandibular or tonsillar adenopathy.      Left side of head: No submental, submandibular or tonsillar adenopathy.      Cervical: No cervical adenopathy.   Skin:     General: Skin is warm and dry.   Neurological:      Mental Status: She is alert and oriented to person, place, and time.      Sensory: Sensation is intact.      Coordination: Coordination is intact.      Gait: Gait is intact.   Psychiatric:         Attention and Perception: Attention and perception normal.         Mood and Affect: Mood and affect normal.         Speech: Speech normal.         Behavior: Behavior is cooperative.         Thought Content: Thought content normal.         Cognition and Memory: Cognition normal.         Judgment: Judgment normal.            Assessment and Plan  Diagnoses and all orders for this visit:    1. Anemia, unspecified type (Primary)  -     I have reviewed the CBC, Iron, TIBC, and ferritin ordered from Rheumatology.  We discussed her options.  After long discussion about the anemia and plan to move forward, we will recheck labs 1 month, she is going to start taking the iron prescribed.    -     Offered referral to hematology, pt declines at this time says she is already seeing so many people. I told her we could table that until repeat labs   -     ferrous sulfate 324 (65 Fe) MG tablet delayed-release EC tablet; Take 1 tablet by mouth Daily With Breakfast.  Dispense:  30 tablet; Refill: 2  -     CBC (No Diff); Future  -     Iron and TIBC; Future  -     Ferritin; Future    2. Constipation, unspecified constipation type  -     polyethylene glycol (MIRALAX) 17 g packet; Take 17 g by mouth Daily.  Dispense: 30 packet; Refill: 5    3. Acute bilateral low back pain without sciatica  -     HYDROcodone-acetaminophen (Norco)  MG per tablet; Take 1 tablet by mouth Every 6 (Six) Hours As Needed for Moderate Pain .  Dispense: 120 tablet; Refill: 0    4. Chronic pain syndrome  -     HYDROcodone-acetaminophen (Norco)  MG per tablet; Take 1 tablet by mouth Every 6 (Six) Hours As Needed for Moderate Pain .  Dispense: 120 tablet; Refill: 0  -     ALPRAZolam (Xanax) 0.5 MG tablet; Take 1 tablet by mouth 3 (Three) Times a Day As Needed for Anxiety.  Dispense: 90 tablet; Refill: 0  -     pregabalin (LYRICA) 75 MG capsule; Take 1 capsule by mouth 2 (Two) Times a Day.  Dispense: 180 capsule; Refill: 1    5. DDD (degenerative disc disease), lumbar  -     HYDROcodone-acetaminophen (Norco)  MG per tablet; Take 1 tablet by mouth Every 6 (Six) Hours As Needed for Moderate Pain .  Dispense: 120 tablet; Refill: 0    6. Rheumatoid arthritis involving multiple sites, unspecified whether rheumatoid factor present (CMS/HCC)  -     HYDROcodone-acetaminophen (Norco)  MG per tablet; Take 1 tablet by mouth Every 6 (Six) Hours As Needed for Moderate Pain .  Dispense: 120 tablet; Refill: 0  -     pregabalin (LYRICA) 75 MG capsule; Take 1 capsule by mouth 2 (Two) Times a Day.  Dispense: 180 capsule; Refill: 1    7. Panic attack  -     ALPRAZolam (Xanax) 0.5 MG tablet; Take 1 tablet by mouth 3 (Three) Times a Day As Needed for Anxiety.  Dispense: 90 tablet; Refill: 0    8. Anxiety  -     ALPRAZolam (Xanax) 0.5 MG tablet; Take 1 tablet by mouth 3 (Three) Times a Day As Needed for Anxiety.  Dispense: 90 tablet; Refill: 0    9. Therapeutic drug monitoring  -     ToxASSURE Select 13 Discrete -  today  -      Uday reviewed today  -      Control contract signed today  -      I reviewed the following with the pt      As part of this patient's treatment plan, I am prescribing controlled substances. The patient has been made aware of appropriate use of such medications, including potential risk of opiod use, somnolence, limited ability to drive and /or work safely, and potential for dependence or overdose, and opiods should be used sparingly and are not recommended for long term use.  It has also been made clear that these medications are for use by this patient only, without concomitant use of alcohol or other substances unless prescribed.    The patient was provided a Rx for norco/lyrica/xanax as needed for pain control after sustaining multiple back and neck injuries as well as surgeries.     This medication is a narcotic pain medication. This medication is monitored by the federal TERRA and the [facility]. Narcotic medication is commonly abused and many patients can become addicted to this medication.    There are problems with narcotic pain medication including addiction, dependence and tolerance to the pain relief. We discussed appropriate and expected levels of pain.     Narcotics have side effects, including the common side effect of nausea/vomiting if taken on an empty stomach and the problem of constipation that occur with narcotic use.     Narcotics can impair your judgement and therefore you should never drive or operate heavy machinery while using these medications. Treat this medication similar to alcohol, do not take this and drive or you could injure yourself or others.    As an alternative drug, we encourage the use of tylenol and/or NSAIDs (non-steroidal anti-inflammatory drugs) for additional pain relief and as an anti-inflammatory. NSAIDS are drugs such as aleve (naproxen) and motrin (ibuprofen). Narcotics and NSAIDs work differently and can complement each other well after  surgery.    In general, NSAIDs and tylenol are much safer drugs than narcotics. Tylenol (acetaminophen) should not be taken with narcotics as most prescribed narcotics already contain tylenol. The maximum dose of tylenol is 4 grams per day, so this can be used if your narcotic pain medication is used sparingly. Pay close attention to the dosages and ask your pharmacist about the dosage of tylenol.  Some patients can experience GI irritation from NSAID use and patients with kidney problems, previous bleeding from the GI tract, gastric bypass, or certain other conditions may not be able to take these over the counter medications.      Patient has completed prescribing agreement detailing terms of continued prescribing of controlled substances, including monitoring ALMA reports, urine drug screening yearly an random drug screens to monitor patients compliance to treatment plan, and pill counts if necessary. The patient is aware that inappropriate use will result in cessation of prescribing such medications.    Toxassure:  I have ordered a urine drug screen to monitor patients predisposition to and patterns of drug use/misuse in order to establish and maintain the safe and effective use of analgesics in the treatment of chronic pain    ALMA report has been reviewed by: Charlene Huntley, DNP, APRN.      -     Pt is aware of the potential for addiction and dependence.    Addiction was discussed.  The  Risks, benefits and alternative options of drug therapy discussed.  It was reinforced about the risks and benefits of opioids.  It was reinforced that patient may not call early for medication, may not ask for increase in the number of pills given monthly or increase in dosage of medication, may not jump around to different pharmacies or providers and may not receive any narcotics from other providers including ER, or the contract will be broken and narcotics will no longer be prescribed from this facility if any of  these criteria has been broken.      Last Dose was: last night    Last Fill was:  February 2021    Date of last ALMA: Feb 2021; read today    Date of last UDS: today        I spent 16 minutes caring for Adela on this date of service. This time includes time spent by me in the following activities:preparing for the visit, obtaining and/or reviewing a separately obtained history, performing a medically appropriate examination and/or evaluation , counseling and educating the patient/family/caregiver, discussing/reviewing control contract, drug testing, and alma, ordering medications, tests, or procedures, documenting information in the medical record and care coordination    Follow Up  Return in about 1 month (around 4/23/2021), or if symptoms worsen or fail to improve, for Recheck.                                                                                                                    Patient was given instructions and counseling regarding her condition or for health maintenance advice. Please see specific information pulled into the AVS if appropriate.     Electronically signed by Charlene Huntley DNP, APRN, 03/23/21, 3:16 PM CDT.

## 2021-03-24 DIAGNOSIS — B00.1 FEVER BLISTER: ICD-10-CM

## 2021-03-24 RX ORDER — VALACYCLOVIR HYDROCHLORIDE 1 G/1
TABLET, FILM COATED ORAL
Qty: 60 TABLET | Refills: 1 | Status: SHIPPED | OUTPATIENT
Start: 2021-03-24 | End: 2021-06-01 | Stop reason: SDUPTHER

## 2021-03-27 LAB
6MAM UR QL CFM: NEGATIVE
6MAM/CREAT UR: NOT DETECTED NG/MG CREAT
7AMINOCLONAZEPAM/CREAT UR: NOT DETECTED NG/MG CREAT
A-OH ALPRAZ/CREAT UR: 194 NG/MG CREAT
A-OH-TRIAZOLAM/CREAT UR CFM: NOT DETECTED NG/MG CREAT
ALFENTANIL/CREAT UR CFM: NOT DETECTED NG/MG CREAT
ALPHA-HYDROXYMIDAZOLAM, URINE: NOT DETECTED NG/MG CREAT
ALPRAZ/CREAT UR CFM: 406 NG/MG CREAT
AMOBARBITAL UR QL CFM: NOT DETECTED
AMPHET/CREAT UR: NOT DETECTED NG/MG CREAT
AMPHETAMINES UR QL CFM: NEGATIVE
BARBITAL UR QL CFM: NOT DETECTED
BARBITURATES UR QL CFM: NEGATIVE
BENZODIAZ UR QL CFM: ABNORMAL
BUPRENORPHINE UR QL CFM: NEGATIVE
BUPRENORPHINE/CREAT UR: NOT DETECTED NG/MG CREAT
BUTABARBITAL UR QL CFM: NOT DETECTED
BUTALBITAL UR QL CFM: NOT DETECTED
BZE/CREAT UR: NOT DETECTED NG/MG CREAT
CANNABINOIDS UR QL CFM: NEGATIVE
CARBOXYTHC/CREAT UR: NOT DETECTED NG/MG CREAT
CLONAZEPAM/CREAT UR CFM: NOT DETECTED NG/MG CREAT
COCAETHYLENE/CREAT UR CFM: NOT DETECTED NG/MG CREAT
COCAINE UR QL CFM: NEGATIVE
COCAINE/CREAT UR CFM: NOT DETECTED NG/MG CREAT
CODEINE/CREAT UR: NOT DETECTED NG/MG CREAT
CREAT UR-MCNC: 16 MG/DL
DESALKYLFLURAZ/CREAT UR: NOT DETECTED NG/MG CREAT
DESMETHYLFLUNITRAZEPAM: NOT DETECTED NG/MG CREAT
DHC/CREAT UR: NOT DETECTED NG/MG CREAT
DIAZEPAM/CREAT UR: NOT DETECTED NG/MG CREAT
DRUGS UR: ABNORMAL
EDDP/CREAT UR: NOT DETECTED NG/MG CREAT
ETHANOL UR CFM-MCNC: NOT DETECTED G/DL
ETHANOL UR QL CFM: NEGATIVE
FENTANYL UR QL CFM: NEGATIVE
FENTANYL/CREAT UR: NOT DETECTED NG/MG CREAT
FLUNITRAZEPAM UR QL CFM: NOT DETECTED NG/MG CREAT
HYDROCODONE/CREAT UR: 1631 NG/MG CREAT
HYDROMORPHONE/CREAT UR: NOT DETECTED NG/MG CREAT
LEVEL OF DETECTION:: ABNORMAL
LORAZEPAM/CREAT UR: NOT DETECTED NG/MG CREAT
MDA/CREAT UR: NOT DETECTED NG/MG CREAT
MDMA/CREAT UR: NOT DETECTED NG/MG CREAT
MEPHOBARBITAL UR QL CFM: NOT DETECTED
METHADONE UR QL CFM: NEGATIVE
METHADONE/CREAT UR: NOT DETECTED NG/MG CREAT
METHAMPHET/CREAT UR: NOT DETECTED NG/MG CREAT
MIDAZOLAM/CREAT UR CFM: NOT DETECTED NG/MG CREAT
MORPHINE/CREAT UR: NOT DETECTED NG/MG CREAT
N-NORTRAMADOL/CREAT UR CFM: NOT DETECTED NG/MG CREAT
NARCOTICS UR: NEGATIVE
NORBUPRENORPHINE/CREAT UR: NOT DETECTED NG/MG CREAT
NORCODEINE/CREAT UR CFM: NOT DETECTED NG/MG CREAT
NORDIAZEPAM/CREAT UR: NOT DETECTED NG/MG CREAT
NORFENTANYL/CREAT UR: NOT DETECTED NG/MG CREAT
NORHYDROCODONE/CREAT UR: 2100 NG/MG CREAT
NORMORPHINE UR-MCNC: NOT DETECTED NG/MG CREAT
NOROXYCODONE/CREAT UR: NOT DETECTED NG/MG CREAT
NOROXYMORPHONE/CREAT UR CFM: NOT DETECTED NG/MG CREAT
O-NORTRAMADOL UR CFM-MCNC: NOT DETECTED NG/MG CREAT
OPIATES UR QL CFM: ABNORMAL
OXAZEPAM/CREAT UR: NOT DETECTED NG/MG CREAT
OXYCODONE UR QL CFM: NEGATIVE
OXYCODONE/CREAT UR: NOT DETECTED NG/MG CREAT
OXYMORPHONE/CREAT UR: NOT DETECTED NG/MG CREAT
PENTOBARB UR QL CFM: NOT DETECTED
PHENOBARB UR QL CFM: NOT DETECTED
SECOBARBITAL UR QL CFM: NOT DETECTED
SUFENTANIL/CREAT UR CFM: NOT DETECTED NG/MG CREAT
TAPENTADOL UR QL CFM: NEGATIVE
TAPENTADOL/CREAT UR: NOT DETECTED NG/MG CREAT
TEMAZEPAM/CREAT UR: NOT DETECTED NG/MG CREAT
THIOPENTAL UR QL CFM: NOT DETECTED
TRAMADOL UR QL CFM: NOT DETECTED NG/MG CREAT

## 2021-04-05 DIAGNOSIS — K21.9 GASTROESOPHAGEAL REFLUX DISEASE WITHOUT ESOPHAGITIS: ICD-10-CM

## 2021-04-05 DIAGNOSIS — F41.8 ANXIETY ASSOCIATED WITH DEPRESSION: ICD-10-CM

## 2021-04-05 RX ORDER — OMEPRAZOLE 40 MG/1
CAPSULE, DELAYED RELEASE ORAL
Qty: 30 CAPSULE | Refills: 5 | Status: SHIPPED | OUTPATIENT
Start: 2021-04-05 | End: 2021-10-01 | Stop reason: SDUPTHER

## 2021-04-05 RX ORDER — DULOXETIN HYDROCHLORIDE 60 MG/1
CAPSULE, DELAYED RELEASE ORAL
Qty: 30 CAPSULE | Refills: 2 | Status: SHIPPED | OUTPATIENT
Start: 2021-04-05 | End: 2021-06-01

## 2021-04-05 NOTE — TELEPHONE ENCOUNTER
Last RF:     Cymbalta 3/8/2021 #30 with 2 refills     Prilosec 10/12/2020 #30 with 5 refills      Last OV: 3/23/2021    Next OV: 4/27/2021

## 2021-04-27 ENCOUNTER — OFFICE VISIT (OUTPATIENT)
Dept: FAMILY MEDICINE CLINIC | Facility: CLINIC | Age: 56
End: 2021-04-27

## 2021-04-27 ENCOUNTER — TELEPHONE (OUTPATIENT)
Dept: FAMILY MEDICINE CLINIC | Facility: CLINIC | Age: 56
End: 2021-04-27

## 2021-04-27 VITALS
DIASTOLIC BLOOD PRESSURE: 80 MMHG | OXYGEN SATURATION: 98 % | TEMPERATURE: 97.8 F | SYSTOLIC BLOOD PRESSURE: 119 MMHG | HEIGHT: 63 IN | HEART RATE: 93 BPM | RESPIRATION RATE: 18 BRPM | BODY MASS INDEX: 35.79 KG/M2 | WEIGHT: 202 LBS

## 2021-04-27 DIAGNOSIS — M54.50 ACUTE BILATERAL LOW BACK PAIN WITHOUT SCIATICA: ICD-10-CM

## 2021-04-27 DIAGNOSIS — R60.1 GENERALIZED EDEMA: ICD-10-CM

## 2021-04-27 DIAGNOSIS — M51.36 DDD (DEGENERATIVE DISC DISEASE), LUMBAR: ICD-10-CM

## 2021-04-27 DIAGNOSIS — G89.4 CHRONIC PAIN SYNDROME: ICD-10-CM

## 2021-04-27 DIAGNOSIS — M06.9 RHEUMATOID ARTHRITIS INVOLVING MULTIPLE SITES, UNSPECIFIED WHETHER RHEUMATOID FACTOR PRESENT (HCC): ICD-10-CM

## 2021-04-27 DIAGNOSIS — F41.0 PANIC ATTACKS: ICD-10-CM

## 2021-04-27 DIAGNOSIS — F41.9 ANXIETY: ICD-10-CM

## 2021-04-27 DIAGNOSIS — F33.1 MODERATE EPISODE OF RECURRENT MAJOR DEPRESSIVE DISORDER (HCC): Primary | ICD-10-CM

## 2021-04-27 DIAGNOSIS — F41.0 PANIC ATTACK: ICD-10-CM

## 2021-04-27 PROCEDURE — 99214 OFFICE O/P EST MOD 30 MIN: CPT | Performed by: NURSE PRACTITIONER

## 2021-04-27 RX ORDER — FUROSEMIDE 20 MG/1
20 TABLET ORAL DAILY
Qty: 30 TABLET | Refills: 0 | Status: SHIPPED | OUTPATIENT
Start: 2021-04-27 | End: 2021-06-01

## 2021-04-27 RX ORDER — HYDROCODONE BITARTRATE AND ACETAMINOPHEN 10; 325 MG/1; MG/1
1 TABLET ORAL EVERY 6 HOURS PRN
Qty: 120 TABLET | Refills: 0 | Status: SHIPPED | OUTPATIENT
Start: 2021-04-27 | End: 2021-05-28 | Stop reason: SDUPTHER

## 2021-04-27 RX ORDER — ALPRAZOLAM 0.5 MG/1
0.5 TABLET ORAL 3 TIMES DAILY PRN
Qty: 90 TABLET | Refills: 0 | Status: SHIPPED | OUTPATIENT
Start: 2021-04-27 | End: 2021-05-28 | Stop reason: SDUPTHER

## 2021-04-27 NOTE — PROGRESS NOTES
"Chief Complaint  Anemia (follow up)    Subjective      Adela Arauz presents to [facility]  History of Present Illness    Objective   Vital Signs:   /80 (BP Location: Left arm, Patient Position: Sitting, Cuff Size: Adult)   Pulse 93   Temp 97.8 °F (36.6 °C) (Infrared)   Resp 18   Ht 160 cm (63\") Comment: per patient  Wt 91.6 kg (202 lb) Comment: per patient  SpO2 98%   BMI 35.78 kg/m²       Physical Exam            Assessment and Plan  Diagnoses and all orders for this visit:    1. Acute bilateral low back pain without sciatica  -     HYDROcodone-acetaminophen (Norco)  MG per tablet; Take 1 tablet by mouth Every 6 (Six) Hours As Needed for Moderate Pain .  Dispense: 120 tablet; Refill: 0    2. Chronic pain syndrome  -     HYDROcodone-acetaminophen (Norco)  MG per tablet; Take 1 tablet by mouth Every 6 (Six) Hours As Needed for Moderate Pain .  Dispense: 120 tablet; Refill: 0  -     ALPRAZolam (Xanax) 0.5 MG tablet; Take 1 tablet by mouth 3 (Three) Times a Day As Needed for Anxiety.  Dispense: 90 tablet; Refill: 0    3. DDD (degenerative disc disease), lumbar  -     HYDROcodone-acetaminophen (Norco)  MG per tablet; Take 1 tablet by mouth Every 6 (Six) Hours As Needed for Moderate Pain .  Dispense: 120 tablet; Refill: 0    4. Rheumatoid arthritis involving multiple sites, unspecified whether rheumatoid factor present (CMS/[facility])  -     HYDROcodone-acetaminophen (Norco)  MG per tablet; Take 1 tablet by mouth Every 6 (Six) Hours As Needed for Moderate Pain .  Dispense: 120 tablet; Refill: 0    5. Panic attack  -     ALPRAZolam (Xanax) 0.5 MG tablet; Take 1 tablet by mouth 3 (Three) Times a Day As Needed for Anxiety.  Dispense: 90 tablet; Refill: 0    6. Anxiety  -     ALPRAZolam (Xanax) 0.5 MG tablet; Take 1 tablet by mouth 3 (Three) Times a Day As Needed for Anxiety.  Dispense: 90 tablet; Refill: 0           {Time Spent (Optional):74464} "       Follow Up       Return in about 1 month (around 5/27/2021) for Recheck.  Patient was given instructions and counseling regarding her condition or for health maintenance advice. Please see specific information pulled into the AVS if appropriate.

## 2021-04-27 NOTE — PATIENT INSTRUCTIONS
Chronic Pain, Adult  Chronic pain is a type of pain that lasts or keeps coming back for at least 3-6 months. You may have headaches, pain in the abdomen, or pain in other areas of the body. Chronic pain may be related to an illness, such as fibromyalgia or complex regional pain syndrome. Chronic pain may also be related to an injury or a health condition. Sometimes, the cause of chronic pain is not known.  Chronic pain can make it hard for you to do daily activities. If not treated, chronic pain can lead to anxiety and depression. Treatment depends on the cause and severity of your pain. You may need to work with a pain specialist to come up with a treatment plan. The plan may include medicine, counseling, and physical therapy. Many people benefit from a combination of two or more types of treatment to control their pain.  Follow these instructions at home:  Medicines  · Take over-the-counter and prescription medicines only as told by your health care provider.  · Ask your health care provider if the medicine prescribed to you:  ? Requires you to avoid driving or using machinery.  ? Can cause constipation. You may need to take these actions to prevent or treat constipation:  § Drink enough fluid to keep your urine pale yellow.  § Take over-the-counter or prescription medicines.  § Eat foods that are high in fiber, such as beans, whole grains, and fresh fruits and vegetables.  § Limit foods that are high in fat and processed sugars, such as fried or sweet foods.  Treatment plan  Follow your treatment plan as told by your health care provider. This may include:  · Gentle, regular exercise.  · Eating a healthy diet that includes foods such as vegetables, fruits, fish, and lean meats.  · Cognitive or behavioral therapy that changes the way you think or act in response to the pain. This may help improve how you feel.  · Working with a physical therapist.  · Meditation, yoga, acupuncture, or massage therapy.  · Aroma,  color, light, or sound therapy.  · Local electrical stimulation. The electrical pulses help to relieve pain by temporarily stopping the nerve impulses that cause you to feel pain.  · Injections. These deliver numbing or pain-relieving medicines into the spine or the area of pain.    Lifestyle    · Ask your health care provider whether you should keep a pain diary. Your health care provider will tell you what information to write in the diary. This may include when you have pain, what the pain feels like, and how medicines and other behaviors or treatments help to reduce the pain.  · Consider talking with a mental health care provider about how to manage chronic pain.  · Consider joining a chronic pain support group.  · Try to control or lower your stress levels. Talk with your health care provider about ways to do this.  General instructions  · Learn as much as you can about how to manage your chronic pain. Ask your health care provider if an intensive pain rehabilitation program or a chronic pain specialist would be helpful.  · Check your pain level as told by your health care provider. Ask your health care provider if you should use a pain scale.  · It is up to you to get the results of any tests that were done. Ask your health care provider, or the department that is doing the tests, when your results will be ready.  · Keep all follow-up visits as told by your health care provider. This is important.  Contact a health care provider if:  · Your pain gets worse, or you have new pain.  · You have trouble sleeping.  · You have trouble doing your normal activities.  · Your pain is not controlled with treatment.  · You have side effects from pain medicine.  · You feel weak.  · You notice any other changes that show that your condition is getting worse.  Get help right away if:  · You lose feeling or have numbness in your body.  · You lose control of bowel or bladder function.  · Your pain suddenly gets much  worse.  · You develop shaking or chills.  · You develop confusion.  · You develop chest pain.  · You have trouble breathing or shortness of breath.  · You pass out.  · You have thoughts about hurting yourself or others.  If you ever feel like you may hurt yourself or others, or have thoughts about taking your own life, get help right away. Go to your nearest emergency department or:  · Call your local emergency services (911 in the [place]  · Call a suicide crisis helpline, such as the National Suicide Prevention Lifeline at 1-830.991.8528. This is open 24 hours a day in the [place]  · Text the Crisis Text Line at 887739 (in the [place]  Summary  · Chronic pain is a type of pain that lasts or keeps coming back for at least 3-6 months.  · Chronic pain may be related to an illness, injury, or other health condition. Sometimes, the cause of chronic pain is not known.  · Treatment depends on the cause and severity of your pain.  · Many people benefit from a combination of two or more types of treatment to control their pain.  · Follow your treatment plan as told by your health care provider.  This information is not intended to replace advice given to you by your health care provider. Make sure you discuss any questions you have with your health care provider.  Document Revised: 09/03/2020 Document Reviewed: 09/03/2020  [employer] Patient Education © 2021 [employer]

## 2021-04-27 NOTE — PROGRESS NOTES
"Chief Complaint  Anemia (follow up), Pain, Anxiety, and Edema    Subjective    Adela Arauz presents to [facility]  Anemia, chronic pain, anxiety, and lower extremity edema.  She says she feels much better now that she is taking her ferrous sulfate.  She states, \"I did something stupid I had to squatt down and then I had problems getting up.\"  Neuro told her she can not squatt down.  She says she is working thru the pain.  She still has a considerable amount of pain from her back surgery, rating it 8/10, says that the norco helps her with that, however, she is having problems with edema in her lower extremities.     BOLD indicates positive   General:  weight loss, fever, chills, appetite loss  SKIN: change in wart/mole, itching, rash, new lesions, nail changes  HEENT:   ear pain, sore throat, sinus pressure, blurry vision, eye pain, dry eyes, tinnitus  Respiratory: cough, difficulty breathing, wheezing, hemoptysis   Cardiovascular:  chest pain, shortness of breath, swelling of extremities, syncope  Gastro: abdominal pain, constipation, nausea, vomiting, diarrhea, hematemesis  Genito: hematuria, dysuria, glycosuria, hesitancy, frequency, incontinence  Musckelo: joint pain, muscle cramps, arthralgia’s, muscle weakness, joint swelling, NSAID use  Hematology: Denies bruising, denies bleeding gums, denies blood clots, denies enlarged lymph nodes  \"All other systems reviewed and negative, except as listed above.”      Chronic problems:  Anxiety stable with alprazolam, depression stable with duloxetine and sertraline, neuropathy stable with lyrica, chronic back pain stable with norco and tizanidine, gerd stable with omeprazole, nausea stable with ondansetron, iron def anemia stable with ferrous sulfate        Objective   Vital Signs:   /80 (BP Location: Left arm, Patient Position: Sitting, Cuff Size: Adult)   Pulse 93   Temp 97.8 °F (36.6 °C) (Infrared)   Resp 18   Ht 160 " "cm (63\") Comment: per patient  Wt 91.6 kg (202 lb) Comment: per patient  SpO2 98%   BMI 35.78 kg/m²       Physical Exam  Vitals and nursing note reviewed.   Constitutional:       General: She is awake.      Appearance: Normal appearance. She is well-developed and well-groomed. She is obese.   HENT:      Head: Normocephalic and atraumatic.      Right Ear: Hearing normal.      Left Ear: Hearing normal.      Nose: Nose normal.      Mouth/Throat:      Lips: Pink.      Pharynx: Oropharynx is clear.   Cardiovascular:      Rate and Rhythm: Normal rate and regular rhythm.      Heart sounds: Normal heart sounds.   Pulmonary:      Effort: Pulmonary effort is normal.      Breath sounds: Normal breath sounds and air entry.   Musculoskeletal:      Cervical back: Normal.      Thoracic back: Normal.      Lumbar back: Spasms and tenderness present. Decreased range of motion.        Back:       Right lower le+ Edema present.      Left lower le+ Edema present.   Lymphadenopathy:      Head:      Right side of head: No submental, submandibular or tonsillar adenopathy.      Left side of head: No submental, submandibular or tonsillar adenopathy.   Skin:     General: Skin is warm and dry.      Capillary Refill: Capillary refill takes less than 2 seconds.   Neurological:      Mental Status: She is alert.      Sensory: Sensation is intact.      Coordination: Coordination is intact.      Gait: Gait is intact.   Psychiatric:         Attention and Perception: Attention and perception normal.         Mood and Affect: Mood and affect normal.         Speech: Speech normal.         Behavior: Behavior is cooperative.         Thought Content: Thought content normal.         Cognition and Memory: Cognition normal.         Judgment: Judgment normal.           Assessment and Plan     Diagnoses and all orders for this visit:    1. Moderate episode of recurrent major depressive disorder (CMS/HCC) (Primary)  -     sertraline (ZOLOFT) 50 MG " tablet; Take 1 tablet by mouth Every Night.  Dispense: 90 tablet; Refill: 1    2. Acute bilateral low back pain without sciatica  -     HYDROcodone-acetaminophen (Norco)  MG per tablet; Take 1 tablet by mouth Every 6 (Six) Hours As Needed for Moderate Pain .  Dispense: 120 tablet; Refill: 0    3. Chronic pain syndrome  -     HYDROcodone-acetaminophen (Norco)  MG per tablet; Take 1 tablet by mouth Every 6 (Six) Hours As Needed for Moderate Pain .  Dispense: 120 tablet; Refill: 0  -     ALPRAZolam (Xanax) 0.5 MG tablet; Take 1 tablet by mouth 3 (Three) Times a Day As Needed for Anxiety.  Dispense: 90 tablet; Refill: 0  -     Comprehensive metabolic panel; Future    4. DDD (degenerative disc disease), lumbar  -     HYDROcodone-acetaminophen (Norco)  MG per tablet; Take 1 tablet by mouth Every 6 (Six) Hours As Needed for Moderate Pain .  Dispense: 120 tablet; Refill: 0    5. Rheumatoid arthritis involving multiple sites, unspecified whether rheumatoid factor present (CMS/[facility])  -     HYDROcodone-acetaminophen (Norco)  MG per tablet; Take 1 tablet by mouth Every 6 (Six) Hours As Needed for Moderate Pain .  Dispense: 120 tablet; Refill: 0  -[facility] reviewed  - toxassure on file  -  Control contract on file    6. Panic attack  -     ALPRAZolam (Xanax) 0.5 MG tablet; Take 1 tablet by mouth 3 (Three) Times a Day As Needed for Anxiety.  Dispense: 90 tablet; Refill: 0    7. Anxiety  -     ALPRAZolam (Xanax) 0.5 MG tablet; Take 1 tablet by mouth 3 (Three) Times a Day As Needed for Anxiety.  Dispense: 90 tablet; Refill: 0  -     sertraline (ZOLOFT) 50 MG tablet; Take 1 tablet by mouth Every Night.  Dispense: 90 tablet; Refill: 1    8. Panic attacks  -     sertraline (ZOLOFT) 50 MG tablet; Take 1 tablet by mouth Every Night.  Dispense: 90 tablet; Refill: 1    9. Generalized edema  -     furosemide (Lasix) 20 MG tablet; Take 1 tablet by mouth Daily.  Dispense: 30 tablet; Refill: 0  -     Comprehensive  metabolic panel; Future  -     Keep legs propped up as much as possible     LAbs in computer: CMP, CBC, Iron, TIBC and ferriten, reminded to return for blood work.     I spent 14 minutes caring for Adela on this date of service. This time includes time spent by me in the following activities:preparing for the visit, performing a medically appropriate examination and/or evaluation , counseling and educating the patient/family/caregiver, ordering medications, tests, or procedures, documenting information in the medical record and care coordination  Follow Up   Return in about 1 month (around 5/27/2021) for Recheck.     Patient was given instructions and counseling regarding her condition or for health maintenance advice. Please see specific information pulled into the AVS if appropriate.     Electronically signed by Charlene Huntley, [place], APRN, 04/27/21, 2:53 PM CDT.

## 2021-05-12 ENCOUNTER — TELEPHONE (OUTPATIENT)
Dept: FAMILY MEDICINE CLINIC | Facility: CLINIC | Age: 56
End: 2021-05-12

## 2021-05-12 NOTE — TELEPHONE ENCOUNTER
Caller: Kaylan denton    Relationship to patient:     Best call back number:  [phone]    Patient is needing: A PA was submitted for the hydrocodone, and the pharmacist is needing addiitonal information. They will send a fax over with what is needed.

## 2021-05-14 ENCOUNTER — HOSPITAL ENCOUNTER (OUTPATIENT)
Dept: CT IMAGING | Facility: HOSPITAL | Age: 56
Discharge: HOME | End: 2021-05-14
Admitting: INTERNAL MEDICINE

## 2021-05-14 PROCEDURE — 71260 CT THORAX DX C+: CPT

## 2021-05-14 PROCEDURE — 82565 ASSAY OF CREATININE: CPT

## 2021-05-14 PROCEDURE — 25010000002 IOPAMIDOL 61 % SOLUTION: Performed by: INTERNAL MEDICINE

## 2021-05-14 RX ADMIN — IOPAMIDOL 100 ML: 612 INJECTION, SOLUTION INTRAVENOUS at 10:21

## 2021-05-28 ENCOUNTER — OFFICE VISIT (OUTPATIENT)
Dept: FAMILY MEDICINE CLINIC | Facility: CLINIC | Age: 56
End: 2021-05-28

## 2021-05-28 ENCOUNTER — TELEPHONE (OUTPATIENT)
Dept: FAMILY MEDICINE CLINIC | Facility: CLINIC | Age: 56
End: 2021-05-28

## 2021-05-28 VITALS
HEIGHT: 63 IN | SYSTOLIC BLOOD PRESSURE: 116 MMHG | DIASTOLIC BLOOD PRESSURE: 79 MMHG | HEART RATE: 95 BPM | WEIGHT: 207 LBS | OXYGEN SATURATION: 99 % | RESPIRATION RATE: 18 BRPM | TEMPERATURE: 97.4 F | BODY MASS INDEX: 36.68 KG/M2

## 2021-05-28 DIAGNOSIS — M54.50 ACUTE BILATERAL LOW BACK PAIN WITHOUT SCIATICA: ICD-10-CM

## 2021-05-28 DIAGNOSIS — F41.9 ANXIETY: ICD-10-CM

## 2021-05-28 DIAGNOSIS — G89.4 CHRONIC PAIN SYNDROME: ICD-10-CM

## 2021-05-28 DIAGNOSIS — M06.9 RHEUMATOID ARTHRITIS INVOLVING MULTIPLE SITES, UNSPECIFIED WHETHER RHEUMATOID FACTOR PRESENT (HCC): ICD-10-CM

## 2021-05-28 DIAGNOSIS — M51.36 DDD (DEGENERATIVE DISC DISEASE), LUMBAR: ICD-10-CM

## 2021-05-28 DIAGNOSIS — Z12.31 ENCOUNTER FOR SCREENING MAMMOGRAM FOR MALIGNANT NEOPLASM OF BREAST: ICD-10-CM

## 2021-05-28 DIAGNOSIS — Z78.0 POST-MENOPAUSAL: ICD-10-CM

## 2021-05-28 DIAGNOSIS — M25.561 ACUTE PAIN OF RIGHT KNEE: Primary | ICD-10-CM

## 2021-05-28 DIAGNOSIS — F41.0 PANIC ATTACK: ICD-10-CM

## 2021-05-28 DIAGNOSIS — Z12.31 ENCOUNTER FOR SCREENING MAMMOGRAM FOR BREAST CANCER: Primary | ICD-10-CM

## 2021-05-28 PROCEDURE — 96372 THER/PROPH/DIAG INJ SC/IM: CPT | Performed by: NURSE PRACTITIONER

## 2021-05-28 PROCEDURE — 99214 OFFICE O/P EST MOD 30 MIN: CPT | Performed by: NURSE PRACTITIONER

## 2021-05-28 RX ORDER — DEXAMETHASONE SODIUM PHOSPHATE 10 MG/ML
10 INJECTION INTRAMUSCULAR; INTRAVENOUS ONCE
Status: COMPLETED | OUTPATIENT
Start: 2021-05-28 | End: 2021-05-28

## 2021-05-28 RX ORDER — HYDROCODONE BITARTRATE AND ACETAMINOPHEN 10; 325 MG/1; MG/1
1 TABLET ORAL EVERY 6 HOURS PRN
Qty: 120 TABLET | Refills: 0 | Status: SHIPPED | OUTPATIENT
Start: 2021-05-28 | End: 2021-06-28 | Stop reason: SDUPTHER

## 2021-05-28 RX ORDER — LEFLUNOMIDE 20 MG/1
TABLET ORAL
COMMUNITY
Start: 2021-05-08

## 2021-05-28 RX ORDER — ALPRAZOLAM 0.5 MG/1
0.5 TABLET ORAL 3 TIMES DAILY PRN
Qty: 90 TABLET | Refills: 0 | Status: SHIPPED | OUTPATIENT
Start: 2021-05-28 | End: 2021-06-28 | Stop reason: SDUPTHER

## 2021-05-28 RX ADMIN — DEXAMETHASONE SODIUM PHOSPHATE 10 MG: 10 INJECTION INTRAMUSCULAR; INTRAVENOUS at 09:58

## 2021-05-28 NOTE — PATIENT INSTRUCTIONS
Chronic Pain, Adult  Chronic pain is a type of pain that lasts or keeps coming back for at least 3-6 months. You may have headaches, pain in the abdomen, or pain in other areas of the body. Chronic pain may be related to an illness, such as fibromyalgia or complex regional pain syndrome. Chronic pain may also be related to an injury or a health condition. Sometimes, the cause of chronic pain is not known.  Chronic pain can make it hard for you to do daily activities. If not treated, chronic pain can lead to anxiety and depression. Treatment depends on the cause and severity of your pain. You may need to work with a pain specialist to come up with a treatment plan. The plan may include medicine, counseling, and physical therapy. Many people benefit from a combination of two or more types of treatment to control their pain.  Follow these instructions at home:  Medicines  · Take over-the-counter and prescription medicines only as told by your health care provider.  · Ask your health care provider if the medicine prescribed to you:  ? Requires you to avoid driving or using machinery.  ? Can cause constipation. You may need to take these actions to prevent or treat constipation:  § Drink enough fluid to keep your urine pale yellow.  § Take over-the-counter or prescription medicines.  § Eat foods that are high in fiber, such as beans, whole grains, and fresh fruits and vegetables.  § Limit foods that are high in fat and processed sugars, such as fried or sweet foods.  Treatment plan  Follow your treatment plan as told by your health care provider. This may include:  · Gentle, regular exercise.  · Eating a healthy diet that includes foods such as vegetables, fruits, fish, and lean meats.  · Cognitive or behavioral therapy that changes the way you think or act in response to the pain. This may help improve how you feel.  · Working with a physical therapist.  · Meditation, yoga, acupuncture, or massage therapy.  · Aroma,  color, light, or sound therapy.  · Local electrical stimulation. The electrical pulses help to relieve pain by temporarily stopping the nerve impulses that cause you to feel pain.  · Injections. These deliver numbing or pain-relieving medicines into the spine or the area of pain.    Lifestyle    · Ask your health care provider whether you should keep a pain diary. Your health care provider will tell you what information to write in the diary. This may include when you have pain, what the pain feels like, and how medicines and other behaviors or treatments help to reduce the pain.  · Consider talking with a mental health care provider about how to manage chronic pain.  · Consider joining a chronic pain support group.  · Try to control or lower your stress levels. Talk with your health care provider about ways to do this.  General instructions  · Learn as much as you can about how to manage your chronic pain. Ask your health care provider if an intensive pain rehabilitation program or a chronic pain specialist would be helpful.  · Check your pain level as told by your health care provider. Ask your health care provider if you should use a pain scale.  · It is up to you to get the results of any tests that were done. Ask your health care provider, or the department that is doing the tests, when your results will be ready.  · Keep all follow-up visits as told by your health care provider. This is important.  Contact a health care provider if:  · Your pain gets worse, or you have new pain.  · You have trouble sleeping.  · You have trouble doing your normal activities.  · Your pain is not controlled with treatment.  · You have side effects from pain medicine.  · You feel weak.  · You notice any other changes that show that your condition is getting worse.  Get help right away if:  · You lose feeling or have numbness in your body.  · You lose control of bowel or bladder function.  · Your pain suddenly gets much  worse.  · You develop shaking or chills.  · You develop confusion.  · You develop chest pain.  · You have trouble breathing or shortness of breath.  · You pass out.  · You have thoughts about hurting yourself or others.  If you ever feel like you may hurt yourself or others, or have thoughts about taking your own life, get help right away. Go to your nearest emergency department or:  · Call your local emergency services (911 in the [place]  · Call a suicide crisis helpline, such as the National Suicide Prevention Lifeline at 1-749.407.7940. This is open 24 hours a day in the [place]  · Text the Crisis Text Line at 738041 (in the [place]  Summary  · Chronic pain is a type of pain that lasts or keeps coming back for at least 3-6 months.  · Chronic pain may be related to an illness, injury, or other health condition. Sometimes, the cause of chronic pain is not known.  · Treatment depends on the cause and severity of your pain.  · Many people benefit from a combination of two or more types of treatment to control their pain.  · Follow your treatment plan as told by your health care provider.  This information is not intended to replace advice given to you by your health care provider. Make sure you discuss any questions you have with your health care provider.  Document Revised: 09/03/2020 Document Reviewed: 09/03/2020  Elsevier Patient Education © 2021 [employer]

## 2021-05-28 NOTE — PROGRESS NOTES
Chief Complaint  Depression (follow up)    Subjective    Adela Arauz presents to [facility]  For chronic back pain after multiple back surgery, neuropathy, and RA stable with norco, tizanidine and lyrica.  Says that the pain in her back is 8/10, still having some numbness and tingling, joints swollen but feels like she is doing very well.  Anxiety is stable with alprazolam, and depression stable with duloxetine and sertraline, osteoporosis stable with boniiva, gerd stable with omeprazole.  Denies any chest pain, shortness of breath or palpitations.  Has not taken any otc meds. Says that she has done something to her right knee, doesn't think she had an injury but yesterday it started hurting and has gotten progressively worse over the last 12 hours.  Has decreased rom   Pain  This is a chronic problem. The current episode started more than 1 year ago. The problem occurs daily. The problem has been unchanged. Associated symptoms include arthralgias, joint swelling and myalgias. Pertinent negatives include no change in bowel habit, chest pain, chills, congestion, swollen glands, urinary symptoms or vertigo. The symptoms are aggravated by twisting, walking, standing, intercourse and exertion. She has tried oral narcotics, rest and walking for the symptoms. The treatment provided mild relief.   Depression  Visit Type: follow-up  Patient presents with the following symptoms: decreased concentration, depressed mood, insomnia, irritability and muscle tension.  Patient is not experiencing: compulsions, confusion, dry mouth, excessive worry, nervousness/anxiety, shortness of breath, suicidal ideas and suicidal planning.  Frequency of symptoms: most days   Severity: moderate   Sleep per night: 5 hours  Sleep quality: fair  Nighttime awakenings: several  Compliance with medications:  %        Anxiety  Presents for follow-up visit. Symptoms include decreased concentration,  "depressed mood, insomnia, irritability and muscle tension. Patient reports no chest pain, compulsions, confusion, dry mouth, excessive worry, nervous/anxious behavior, shortness of breath or suicidal ideas. Symptoms occur most days. The severity of symptoms is moderate. The quality of sleep is fair. Nighttime awakenings: several.     Her past medical history is significant for depression. Compliance with medications is %.       Objective   Vital Signs:   /79 (BP Location: Right arm, Patient Position: Sitting, Cuff Size: Large Adult)   Pulse 95   Temp 97.4 °F (36.3 °C) (Infrared)   Resp 18   Ht 160 cm (63\") Comment: per patient  Wt 93.9 kg (207 lb)   SpO2 99%   BMI 36.67 kg/m²       Chronic problems: alpha 1 antitrypsin, axillary adenopathy, chronic pain syndrome with chronic back pain stable with norco and tizanidine, DDD, foraminal stenosis and multiple back surgeries, HX of MRSA, anxiety/depression stable with alprazolam, sertraline and duloxetine, vit d def stable with cholecalciferol, iron def stable ferrous sulfate, edema stable with furosemide, gerd stable omeprazole, neuropathy stable with lyrica    Physical Exam  Vitals and nursing note reviewed.   Constitutional:       Appearance: Normal appearance. She is well-developed and well-groomed.   HENT:      Head: Normocephalic and atraumatic.      Right Ear: Hearing normal.      Left Ear: Hearing normal.      Nose: Nose normal.      Mouth/Throat:      Lips: Pink.      Mouth: Mucous membranes are moist.      Pharynx: Oropharynx is clear.   Cardiovascular:      Rate and Rhythm: Normal rate and regular rhythm.      Heart sounds: Normal heart sounds.   Pulmonary:      Effort: Pulmonary effort is normal.      Breath sounds: Normal breath sounds and air entry.   Abdominal:      General: Abdomen is flat. Bowel sounds are normal.      Palpations: Abdomen is soft.   Musculoskeletal:      Cervical back: Normal.      Thoracic back: Spasms and tenderness " present.      Lumbar back: Spasms and tenderness present. Decreased range of motion.        Back:       Right lower leg: No edema.      Left lower leg: No edema.        Legs:    Lymphadenopathy:      Head:      Right side of head: No submental, submandibular or tonsillar adenopathy.      Left side of head: No submental or submandibular adenopathy.   Skin:     General: Skin is warm and dry.      Capillary Refill: Capillary refill takes less than 2 seconds.   Neurological:      Mental Status: She is alert and oriented to person, place, and time.      Cranial Nerves: Cranial nerves are intact.      Sensory: Sensation is intact.      Motor: Motor function is intact.      Coordination: Coordination is intact.      Gait: Gait is intact.   Psychiatric:         Attention and Perception: Attention and perception normal.         Mood and Affect: Mood and affect normal.         Speech: Speech normal.         Behavior: Behavior is cooperative.         Thought Content: Thought content normal.         Cognition and Memory: Cognition normal.         Judgment: Judgment normal.             Assessment and Plan   Diagnoses and all orders for this visit:    1. Chronic pain syndrome  -     ALPRAZolam (Xanax) 0.5 MG tablet; Take 1 tablet by mouth 3 (Three) Times a Day As Needed for Anxiety.  Dispense: 90 tablet; Refill: 0  -     HYDROcodone-acetaminophen (Norco)  MG per tablet; Take 1 tablet by mouth Every 6 (Six) Hours As Needed for Moderate Pain .  Dispense: 120 tablet; Refill: 0    2. Panic attack  -     ALPRAZolam (Xanax) 0.5 MG tablet; Take 1 tablet by mouth 3 (Three) Times a Day As Needed for Anxiety.  Dispense: 90 tablet; Refill: 0    3. Anxiety  -     ALPRAZolam (Xanax) 0.5 MG tablet; Take 1 tablet by mouth 3 (Three) Times a Day As Needed for Anxiety.  Dispense: 90 tablet; Refill: 0    4.  Acute bilateral low back pain without sciatica  5.  DDD (degenerative disc disease), lumbar     6.  Rheumatoid arthritis involving  multiple sites, unspecified whether rheumatoid factor present (CMS/HCC)  -     HYDROcodone-acetaminophen (Norco)  MG per tablet; Take 1 tablet by mouth Every 6 (Six) Hours As Needed for Moderate Pain .  Dispense: 120 tablet; Refill: 0  -     [facility]day, control contract and toxassure    7. Acute Right Knee pain      -    Decadron 10 mg IM today       -    Offered xray pt declines at this time     8. Encounter for screening mammogram for malignant neoplasm of breast  9. Post-menopausal      -   Pt called office after visit and said she forgot to ask me for mammogram order.    -     Mammo Screening Bilateral With CAD; Future    I spent 14 minutes caring for Adela on this date of service. This time includes time spent by me in the following activities:preparing for the visit, performing a medically appropriate examination and/or evaluation , counseling and educating the patient/family/caregiver, ordering medications, tests, or procedures, documenting information in the medical record and care coordination     Follow Up   Return in about 1 month (around 6/28/2021) for Recheck chronic problems.  Patient was given instructions and counseling regarding her condition or for health maintenance advice. Please see specific information pulled into the AVS if appropriate.     Electronically signed by Charlene Huntley, [place], APRN, 05/28/21, 9:27 AM CDT.

## 2021-05-28 NOTE — TELEPHONE ENCOUNTER
Pt states she is due for mammo & that she has been getting calls from [facility] saying she is overdue. Pt is requesting this be ordered.

## 2021-06-01 DIAGNOSIS — R60.1 GENERALIZED EDEMA: ICD-10-CM

## 2021-06-01 DIAGNOSIS — C82.90 FOLLICULAR LYMPHOMA, UNSPECIFIED FOLLICULAR LYMPHOMA TYPE, UNSPECIFIED BODY REGION (HCC): Primary | ICD-10-CM

## 2021-06-01 DIAGNOSIS — D64.9 ANEMIA, UNSPECIFIED TYPE: ICD-10-CM

## 2021-06-01 DIAGNOSIS — F41.8 ANXIETY ASSOCIATED WITH DEPRESSION: ICD-10-CM

## 2021-06-01 DIAGNOSIS — B00.1 FEVER BLISTER: ICD-10-CM

## 2021-06-01 RX ORDER — DULOXETIN HYDROCHLORIDE 60 MG/1
CAPSULE, DELAYED RELEASE ORAL
Qty: 30 CAPSULE | Refills: 2 | Status: SHIPPED | OUTPATIENT
Start: 2021-06-01 | End: 2021-09-03

## 2021-06-01 RX ORDER — FUROSEMIDE 20 MG/1
20 TABLET ORAL DAILY
Qty: 30 TABLET | Refills: 0 | Status: SHIPPED | OUTPATIENT
Start: 2021-06-01 | End: 2021-06-28

## 2021-06-01 RX ORDER — TIZANIDINE 4 MG/1
TABLET ORAL
Qty: 60 TABLET | Refills: 5 | Status: SHIPPED | OUTPATIENT
Start: 2021-06-01 | End: 2021-12-01

## 2021-06-01 RX ORDER — VALACYCLOVIR HYDROCHLORIDE 1 G/1
1000 TABLET, FILM COATED ORAL 2 TIMES DAILY
Qty: 270 TABLET | Refills: 1 | Status: SHIPPED | OUTPATIENT
Start: 2021-06-01

## 2021-06-01 RX ORDER — FUROSEMIDE 20 MG/1
TABLET ORAL
Qty: 30 TABLET | Refills: 0 | Status: SHIPPED | OUTPATIENT
Start: 2021-06-01 | End: 2021-06-02 | Stop reason: SDUPTHER

## 2021-06-01 RX ORDER — FERROUS SULFATE TAB EC 324 MG (65 MG FE EQUIVALENT) 324 (65 FE) MG
TABLET DELAYED RESPONSE ORAL
Qty: 30 TABLET | Refills: 2 | Status: SHIPPED | OUTPATIENT
Start: 2021-06-01 | End: 2021-06-28 | Stop reason: SDUPTHER

## 2021-06-01 NOTE — TELEPHONE ENCOUNTER
Caller: Adela Arauz    Relationship: Self    Best call back number: [phone]    Medication needed:   Requested Prescriptions     Pending Prescriptions Disp Refills   • furosemide (Lasix) 20 MG tablet 30 tablet 0     Sig: Take 1 tablet by mouth Daily.   • valACYclovir (VALTREX) 1000 MG tablet 60 tablet 1     Sig: Take 1 tablet by mouth 2 (Two) Times a Day.       When do you need the refill by: 6/1/21    Does the patient have less than a 3 day supply:  [x] Yes  [] No    What is the patient's preferred pharmacy: [facility] - [place], [place] - [address] - [phone]  - [phone] FX

## 2021-06-02 ENCOUNTER — OFFICE VISIT (OUTPATIENT)
Dept: FAMILY MEDICINE CLINIC | Facility: CLINIC | Age: 56
End: 2021-06-02

## 2021-06-02 VITALS
BODY MASS INDEX: 36.68 KG/M2 | WEIGHT: 207 LBS | SYSTOLIC BLOOD PRESSURE: 123 MMHG | RESPIRATION RATE: 18 BRPM | DIASTOLIC BLOOD PRESSURE: 80 MMHG | HEART RATE: 85 BPM | TEMPERATURE: 97.1 F | OXYGEN SATURATION: 99 % | HEIGHT: 63 IN

## 2021-06-02 DIAGNOSIS — R22.31 LUMP OF SKIN OF RIGHT UPPER EXTREMITY: Primary | ICD-10-CM

## 2021-06-02 DIAGNOSIS — L02.413 ABSCESS OF RIGHT FOREARM: ICD-10-CM

## 2021-06-02 PROCEDURE — 99214 OFFICE O/P EST MOD 30 MIN: CPT | Performed by: NURSE PRACTITIONER

## 2021-06-02 RX ORDER — LEVOFLOXACIN 750 MG/1
750 TABLET ORAL DAILY
Qty: 7 TABLET | Refills: 0 | Status: SHIPPED | OUTPATIENT
Start: 2021-06-02 | End: 2021-06-09

## 2021-06-02 NOTE — PROGRESS NOTES
"Chief Complaint  Arm Pain    Subjective    Adela Arauz presents to [facility]  Lump and infection in the right forearm that has had surgery previously 3 times with MRSA per Dr. Carvajal.  Dr. Carvajal is out of town and pt is tired of this returning and wants to see a different doctor. She says this is how it starts and then it ruptures and it has to be cleaned out.  Last time they gave me levaquin.         Arm Pain   The incident occurred 12 to 24 hours ago. There was no injury mechanism. The pain is present in the right forearm. The quality of the pain is described as aching and burning. The pain radiates to the right arm and right hand. The pain is at a severity of 9/10. The pain is severe. The pain has been constant since the incident. Associated symptoms include numbness and tingling. The symptoms are aggravated by palpation, lifting and movement (hx of previous mrsa/staph infections). She has tried nothing for the symptoms. The treatment provided no relief.       Objective   Vital Signs:   /80 (BP Location: Left arm, Patient Position: Sitting, Cuff Size: Adult)   Pulse 85   Temp 97.1 °F (36.2 °C) (Infrared)   Resp 18   Ht 160 cm (63\") Comment: per patient  Wt 93.9 kg (207 lb)   SpO2 99%   BMI 36.67 kg/m²     Physical Exam  Vitals and nursing note reviewed.   Constitutional:       General: She is awake.      Appearance: Normal appearance. She is well-developed and well-groomed.   HENT:      Head: Normocephalic and atraumatic.      Right Ear: Hearing normal.      Left Ear: Hearing normal.      Nose: Nose normal.      Mouth/Throat:      Lips: Pink.      Pharynx: Oropharynx is clear.   Cardiovascular:      Rate and Rhythm: Normal rate and regular rhythm.      Heart sounds: Normal heart sounds.   Pulmonary:      Effort: Pulmonary effort is normal.      Breath sounds: Normal breath sounds and air entry.   Musculoskeletal:      Right lower leg: No edema.      " Left lower leg: No edema.   Skin:     General: Skin is warm.          Neurological:      Mental Status: She is alert.      Sensory: Sensation is intact.      Motor: Motor function is intact.      Coordination: Coordination is intact.      Gait: Gait is intact.   Psychiatric:         Attention and Perception: Attention and perception normal.         Mood and Affect: Mood and affect normal.         Speech: Speech normal.         Behavior: Behavior normal. Behavior is cooperative.         Thought Content: Thought content normal.         Cognition and Memory: Cognition normal.        Review results:   The following data was reviewed by: Charlene Huntley, [place], APRN on 06/02/2021:    Data reviewed: Radiologic studies right forearm xray  I reviewed the xrays and showed the pt                   Assessment and Plan   Diagnoses and all orders for this visit:    1. Lump of skin of right upper extremity (Primary)  -     XR Forearm 2 View Right (In Office)  -     levoFLOXacin (Levaquin) 750 MG tablet; Take 1 tablet by mouth Daily for 7 days.  Dispense: 7 tablet; Refill: 0  -     Ambulatory Referral to General Surgery    2. Abscess of right forearm  -     XR Forearm 2 View Right (In Office)  -     levoFLOXacin (Levaquin) 750 MG tablet; Take 1 tablet by mouth Daily for 7 days.  Dispense: 7 tablet; Refill: 0  -     Ambulatory Referral to General Surgery      Narrative & Impression   XR FOREARM 2 VW RIGHT- 6/2/2021 3:01 PM CDT     HISTORY: lump, pain; R22.31-Localized swelling, mass and lump, right  upper limb; L02.413-Cutaneous abscess of right upper limb. History of  MRSA     COMPARISON: 10/20/2020     FINDINGS:  Frontal and lateral radiographs of the RIGHT forearm were obtained.     Postoperative changes are again seen to the radius. Some chronic  cortical thickening in the radius redemonstrated and unchanged. Old  posttraumatic changes are again suspected in the distal radius. Soft  tissue swelling seen over the proximal  forearm.        IMPRESSION:     1.  Soft tissue swelling proximally. No definite evidence of an acute  osseous abnormality.     2.  Chronic changes redemonstrated in the radius with findings  suspicious for previous postoperative change and possibly sequelae of  previous chronic osteomyelitis.     This report was finalized on 06/02/2021 16:20 by Dr. Mateusz Holm MD.       I spent 15 minutes caring for Adela on this date of service. This time includes time spent by me in the following activities:preparing for the visit, performing a medically appropriate examination and/or evaluation , counseling and educating the patient/family/caregiver, ordering medications, tests, or procedures, referring and communicating with other health care professionals , documenting information in the medical record and care coordination     Follow Up   Return in about 1 day (around 6/3/2021) for Recheck.  Patient was given instructions and counseling regarding her condition or for health maintenance advice. Please see specific information pulled into the AVS if appropriate.     Electronically signed by Charlene Huntley DNP, SURYA, 06/02/21, 5:01 PM CDT.

## 2021-06-03 ENCOUNTER — HOSPITAL ENCOUNTER (OUTPATIENT)
Facility: HOSPITAL | Age: 56
Setting detail: HOSPITAL OUTPATIENT SURGERY
Discharge: HOME | End: 2021-06-03
Attending: SPECIALIST | Admitting: SPECIALIST

## 2021-06-03 ENCOUNTER — ANESTHESIA EVENT (OUTPATIENT)
Dept: PERIOP | Facility: HOSPITAL | Age: 56
End: 2021-06-03

## 2021-06-03 ENCOUNTER — ANESTHESIA (OUTPATIENT)
Dept: PERIOP | Facility: HOSPITAL | Age: 56
End: 2021-06-03

## 2021-06-03 VITALS
TEMPERATURE: 98.2 F | HEIGHT: 64 IN | RESPIRATION RATE: 16 BRPM | DIASTOLIC BLOOD PRESSURE: 67 MMHG | OXYGEN SATURATION: 96 % | HEART RATE: 86 BPM | WEIGHT: 203.93 LBS | SYSTOLIC BLOOD PRESSURE: 112 MMHG | BODY MASS INDEX: 34.82 KG/M2

## 2021-06-03 DIAGNOSIS — R22.30: ICD-10-CM

## 2021-06-03 DIAGNOSIS — L03.113 CELLULITIS OF RIGHT UPPER EXTREMITY: Primary | ICD-10-CM

## 2021-06-03 LAB — SARS-COV-2 RNA PNL SPEC NAA+PROBE: NOT DETECTED

## 2021-06-03 PROCEDURE — 87205 SMEAR GRAM STAIN: CPT | Performed by: SPECIALIST

## 2021-06-03 PROCEDURE — 25010000002 FENTANYL CITRATE (PF) 100 MCG/2ML SOLUTION: Performed by: NURSE ANESTHETIST, CERTIFIED REGISTERED

## 2021-06-03 PROCEDURE — 88304 TISSUE EXAM BY PATHOLOGIST: CPT | Performed by: SPECIALIST

## 2021-06-03 PROCEDURE — C9803 HOPD COVID-19 SPEC COLLECT: HCPCS

## 2021-06-03 PROCEDURE — 25010000002 MIDAZOLAM PER 1 MG: Performed by: ANESTHESIOLOGY

## 2021-06-03 PROCEDURE — 25010000002 SUCCINYLCHOLINE PER 20 MG: Performed by: NURSE ANESTHETIST, CERTIFIED REGISTERED

## 2021-06-03 PROCEDURE — 87186 SC STD MICRODIL/AGAR DIL: CPT | Performed by: SPECIALIST

## 2021-06-03 PROCEDURE — 25010000002 FENTANYL CITRATE (PF) 50 MCG/ML SOLUTION: Performed by: ANESTHESIOLOGY

## 2021-06-03 PROCEDURE — 87070 CULTURE OTHR SPECIMN AEROBIC: CPT | Performed by: SPECIALIST

## 2021-06-03 PROCEDURE — 25010000002 ONDANSETRON PER 1 MG: Performed by: ANESTHESIOLOGY

## 2021-06-03 PROCEDURE — 87635 SARS-COV-2 COVID-19 AMP PRB: CPT | Performed by: SPECIALIST

## 2021-06-03 PROCEDURE — 25010000002 PROPOFOL 10 MG/ML EMULSION: Performed by: NURSE ANESTHETIST, CERTIFIED REGISTERED

## 2021-06-03 PROCEDURE — 87147 CULTURE TYPE IMMUNOLOGIC: CPT | Performed by: SPECIALIST

## 2021-06-03 RX ORDER — BUPIVACAINE HYDROCHLORIDE AND EPINEPHRINE 2.5; 5 MG/ML; UG/ML
INJECTION, SOLUTION INFILTRATION; PERINEURAL AS NEEDED
Status: DISCONTINUED | OUTPATIENT
Start: 2021-06-03 | End: 2021-06-03 | Stop reason: HOSPADM

## 2021-06-03 RX ORDER — ROCURONIUM BROMIDE 10 MG/ML
INJECTION, SOLUTION INTRAVENOUS AS NEEDED
Status: DISCONTINUED | OUTPATIENT
Start: 2021-06-03 | End: 2021-06-03 | Stop reason: SURG

## 2021-06-03 RX ORDER — SODIUM CHLORIDE, SODIUM LACTATE, POTASSIUM CHLORIDE, CALCIUM CHLORIDE 600; 310; 30; 20 MG/100ML; MG/100ML; MG/100ML; MG/100ML
100 INJECTION, SOLUTION INTRAVENOUS CONTINUOUS
Status: DISCONTINUED | OUTPATIENT
Start: 2021-06-03 | End: 2021-06-03 | Stop reason: HOSPADM

## 2021-06-03 RX ORDER — SUCCINYLCHOLINE CHLORIDE 20 MG/ML
INJECTION INTRAMUSCULAR; INTRAVENOUS AS NEEDED
Status: DISCONTINUED | OUTPATIENT
Start: 2021-06-03 | End: 2021-06-03 | Stop reason: SURG

## 2021-06-03 RX ORDER — FENTANYL CITRATE 50 UG/ML
INJECTION, SOLUTION INTRAMUSCULAR; INTRAVENOUS AS NEEDED
Status: DISCONTINUED | OUTPATIENT
Start: 2021-06-03 | End: 2021-06-03 | Stop reason: SURG

## 2021-06-03 RX ORDER — SODIUM CHLORIDE 0.9 % (FLUSH) 0.9 %
3-10 SYRINGE (ML) INJECTION AS NEEDED
Status: DISCONTINUED | OUTPATIENT
Start: 2021-06-03 | End: 2021-06-03 | Stop reason: HOSPADM

## 2021-06-03 RX ORDER — LIDOCAINE HYDROCHLORIDE 20 MG/ML
INJECTION, SOLUTION EPIDURAL; INFILTRATION; INTRACAUDAL; PERINEURAL AS NEEDED
Status: DISCONTINUED | OUTPATIENT
Start: 2021-06-03 | End: 2021-06-03 | Stop reason: SURG

## 2021-06-03 RX ORDER — OXYCODONE HYDROCHLORIDE AND ACETAMINOPHEN 5; 325 MG/1; MG/1
1 TABLET ORAL ONCE AS NEEDED
Status: DISCONTINUED | OUTPATIENT
Start: 2021-06-03 | End: 2021-06-03 | Stop reason: HOSPADM

## 2021-06-03 RX ORDER — ONDANSETRON 2 MG/ML
4 INJECTION INTRAMUSCULAR; INTRAVENOUS ONCE AS NEEDED
Status: COMPLETED | OUTPATIENT
Start: 2021-06-03 | End: 2021-06-03

## 2021-06-03 RX ORDER — IBUPROFEN 600 MG/1
600 TABLET ORAL ONCE AS NEEDED
Status: DISCONTINUED | OUTPATIENT
Start: 2021-06-03 | End: 2021-06-03 | Stop reason: HOSPADM

## 2021-06-03 RX ORDER — FENTANYL CITRATE 50 UG/ML
25 INJECTION, SOLUTION INTRAMUSCULAR; INTRAVENOUS
Status: DISCONTINUED | OUTPATIENT
Start: 2021-06-03 | End: 2021-06-03 | Stop reason: HOSPADM

## 2021-06-03 RX ORDER — PROPOFOL 10 MG/ML
VIAL (ML) INTRAVENOUS AS NEEDED
Status: DISCONTINUED | OUTPATIENT
Start: 2021-06-03 | End: 2021-06-03 | Stop reason: SURG

## 2021-06-03 RX ORDER — LABETALOL HYDROCHLORIDE 5 MG/ML
5 INJECTION, SOLUTION INTRAVENOUS
Status: DISCONTINUED | OUTPATIENT
Start: 2021-06-03 | End: 2021-06-03 | Stop reason: HOSPADM

## 2021-06-03 RX ORDER — SODIUM CHLORIDE 0.9 % (FLUSH) 0.9 %
3 SYRINGE (ML) INJECTION AS NEEDED
Status: DISCONTINUED | OUTPATIENT
Start: 2021-06-03 | End: 2021-06-03 | Stop reason: HOSPADM

## 2021-06-03 RX ORDER — NALOXONE HCL 0.4 MG/ML
0.4 VIAL (ML) INJECTION AS NEEDED
Status: DISCONTINUED | OUTPATIENT
Start: 2021-06-03 | End: 2021-06-03 | Stop reason: HOSPADM

## 2021-06-03 RX ORDER — OXYCODONE AND ACETAMINOPHEN 10; 325 MG/1; MG/1
1 TABLET ORAL ONCE AS NEEDED
Status: DISCONTINUED | OUTPATIENT
Start: 2021-06-03 | End: 2021-06-03 | Stop reason: HOSPADM

## 2021-06-03 RX ORDER — SODIUM CHLORIDE 0.9 % (FLUSH) 0.9 %
3 SYRINGE (ML) INJECTION EVERY 12 HOURS SCHEDULED
Status: DISCONTINUED | OUTPATIENT
Start: 2021-06-03 | End: 2021-06-03 | Stop reason: HOSPADM

## 2021-06-03 RX ORDER — LIDOCAINE HYDROCHLORIDE 10 MG/ML
0.5 INJECTION, SOLUTION EPIDURAL; INFILTRATION; INTRACAUDAL; PERINEURAL ONCE AS NEEDED
Status: DISCONTINUED | OUTPATIENT
Start: 2021-06-03 | End: 2021-06-03 | Stop reason: HOSPADM

## 2021-06-03 RX ORDER — FLUMAZENIL 0.1 MG/ML
0.2 INJECTION INTRAVENOUS AS NEEDED
Status: DISCONTINUED | OUTPATIENT
Start: 2021-06-03 | End: 2021-06-03 | Stop reason: HOSPADM

## 2021-06-03 RX ORDER — MIDAZOLAM HYDROCHLORIDE 1 MG/ML
1 INJECTION INTRAMUSCULAR; INTRAVENOUS
Status: DISCONTINUED | OUTPATIENT
Start: 2021-06-03 | End: 2021-06-03 | Stop reason: HOSPADM

## 2021-06-03 RX ORDER — SODIUM CHLORIDE, SODIUM LACTATE, POTASSIUM CHLORIDE, CALCIUM CHLORIDE 600; 310; 30; 20 MG/100ML; MG/100ML; MG/100ML; MG/100ML
1000 INJECTION, SOLUTION INTRAVENOUS CONTINUOUS
Status: DISCONTINUED | OUTPATIENT
Start: 2021-06-03 | End: 2021-06-03 | Stop reason: HOSPADM

## 2021-06-03 RX ORDER — OXYCODONE HYDROCHLORIDE AND ACETAMINOPHEN 5; 325 MG/1; MG/1
1-2 TABLET ORAL EVERY 4 HOURS PRN
Qty: 30 TABLET | Refills: 0 | Status: SHIPPED | OUTPATIENT
Start: 2021-06-03 | End: 2021-06-28

## 2021-06-03 RX ORDER — OXYCODONE AND ACETAMINOPHEN 7.5; 325 MG/1; MG/1
2 TABLET ORAL EVERY 4 HOURS PRN
Status: DISCONTINUED | OUTPATIENT
Start: 2021-06-03 | End: 2021-06-03 | Stop reason: HOSPADM

## 2021-06-03 RX ADMIN — MIDAZOLAM 1 MG: 1 INJECTION INTRAMUSCULAR; INTRAVENOUS at 15:21

## 2021-06-03 RX ADMIN — FENTANYL CITRATE 25 MCG: 50 INJECTION, SOLUTION INTRAMUSCULAR; INTRAVENOUS at 16:57

## 2021-06-03 RX ADMIN — PROPOFOL 50 MG: 10 INJECTION, EMULSION INTRAVENOUS at 16:18

## 2021-06-03 RX ADMIN — LIDOCAINE HYDROCHLORIDE 40 MG: 20 INJECTION, SOLUTION EPIDURAL; INFILTRATION; INTRACAUDAL; PERINEURAL at 16:05

## 2021-06-03 RX ADMIN — PROPOFOL 50 MG: 10 INJECTION, EMULSION INTRAVENOUS at 16:14

## 2021-06-03 RX ADMIN — CEFAZOLIN 1 G: 1 INJECTION, POWDER, FOR SOLUTION INTRAMUSCULAR; INTRAVENOUS; PARENTERAL at 16:07

## 2021-06-03 RX ADMIN — SUCCINYLCHOLINE CHLORIDE 80 MG: 20 INJECTION, SOLUTION INTRAMUSCULAR; INTRAVENOUS at 16:05

## 2021-06-03 RX ADMIN — FENTANYL CITRATE 100 MCG: 50 INJECTION, SOLUTION INTRAMUSCULAR; INTRAVENOUS at 16:05

## 2021-06-03 RX ADMIN — FENTANYL CITRATE 25 MCG: 50 INJECTION, SOLUTION INTRAMUSCULAR; INTRAVENOUS at 16:43

## 2021-06-03 RX ADMIN — FENTANYL CITRATE 25 MCG: 50 INJECTION, SOLUTION INTRAMUSCULAR; INTRAVENOUS at 16:48

## 2021-06-03 RX ADMIN — FENTANYL CITRATE 25 MCG: 50 INJECTION, SOLUTION INTRAMUSCULAR; INTRAVENOUS at 16:52

## 2021-06-03 RX ADMIN — PROPOFOL 150 MG: 10 INJECTION, EMULSION INTRAVENOUS at 16:05

## 2021-06-03 RX ADMIN — OXYCODONE HYDROCHLORIDE AND ACETAMINOPHEN 2 TABLET: 7.5; 325 TABLET ORAL at 16:40

## 2021-06-03 RX ADMIN — SODIUM CHLORIDE, POTASSIUM CHLORIDE, SODIUM LACTATE AND CALCIUM CHLORIDE 1000 ML: 600; 310; 30; 20 INJECTION, SOLUTION INTRAVENOUS at 14:18

## 2021-06-03 RX ADMIN — ROCURONIUM BROMIDE 5 MG: 10 INJECTION INTRAVENOUS at 16:05

## 2021-06-03 RX ADMIN — ONDANSETRON HYDROCHLORIDE 4 MG: 2 SOLUTION INTRAMUSCULAR; INTRAVENOUS at 16:36

## 2021-06-03 RX ADMIN — PROPOFOL 50 MG: 10 INJECTION, EMULSION INTRAVENOUS at 16:11

## 2021-06-03 NOTE — OP NOTE
Preoperative diagnosis:  Recurrent right forearm abscess  Postoperative diagnosis:  Same  Procedure:  Incision and drainage right forearm abscess;  Biopsy indurated tissue  Surgeon:  Jes Enamorado MD  Anesthesia:  Get loc  Ebl:  Minimal  Ivf: see anesthesia  Indications: the patient is a 56-year-old female who has undergone incision and drainage of an abscess at the dorsal surface of her right forearm x three.  She presents complaining of recurrent pain and swelling.  Description of procedure:  The patient was laid supine. The right forearm was prepped and draped.  There was an incisional scar with slight overlying erythema.  No discharge was expressible.  There was induration deep to the mid portion of the scar.  The area of induration was incised.  A small amount of purulent discharge was expressed.  Cultures were taken.  There was a 2cm area of indurated, edematous subcutaneous tissue. It was biopsied.  The wound bed was copiously irrigated with sterile saline.  The incision was partially closed with 3-0 chromic and then packed with saline soaked gauze.  Dry dressings were applied. The sponge, needle, and instrument counts were correct.  Complications: none  Disposition: good to pacu  Findings:  Small amount purulent discharge; 2cm area of indurated, edematous subcutaneous tissue

## 2021-06-03 NOTE — ANESTHESIA PROCEDURE NOTES
Airway  Urgency: elective    Date/Time: 6/3/2021 4:05 PM  Airway not difficult    General Information and Staff    Patient location during procedure: OR  CRNA: Omkar Garcia CRNA    Indications and Patient Condition  Indications for airway management: airway protection    Preoxygenated: yes  Mask difficulty assessment: 0 - not attempted    Final Airway Details  Final airway type: endotracheal airway      Successful airway: ETT  Cuffed: yes   Successful intubation technique: direct laryngoscopy  Facilitating devices/methods: intubating stylet  Endotracheal tube insertion site: oral  Blade: Ellison  Blade size: 2  ETT size (mm): 7.0  Cormack-Lehane Classification: grade I - full view of glottis  Placement verified by: chest auscultation and capnometry   Measured from: lips  ETT/EBT  to lips (cm): 22  Number of attempts at approach: 1  Assessment: lips, teeth, and gum same as pre-op and atraumatic intubation

## 2021-06-03 NOTE — H&P
Jes Enamorado MD [facility] History and Physical     Referring Provider: Jes Enamorado MD    Patient Care Team:  Charlene Huntley, [place], APRN as PCP - General (Family Medicine)  Yan Seay MD as Consulting Physician (Pulmonary Disease)  Ramírez Marie MD as Consulting Physician (Hematology and Oncology)    Chief complaint forearm mass    Subjective .     History of present illness:  The patient is a 56 y.o. female who presents complaining of a painful mass at her right forearm.  She is status post incision and drainage of the area x 3 in the past.  She denies any fevers, chills, skin changes, or discharge.  She was seen in the ED yesterday and was given Levaquin.  .    Review of Systems    Review of Systems - General ROS: negative  ENT ROS: negative  Respiratory ROS: no cough, shortness of breath, or wheezing  Cardiovascular ROS: no chest pain or dyspnea on exertion  Gastrointestinal ROS: no abdominal pain, change in bowel habits, or black or bloody stools  Genitourinary ROS: no dysuria, trouble voiding, or hematuria  Dermatological ROS: negative   Breast ROS: negative for breast lumps  Hematological and Lymphatic ROS: negative  Musculoskeletal ROS: negative   Neurological ROS: no TIA or stroke symptoms    Psychological ROS: negative  Endocrine ROS: negative    History  Past Medical History:   Diagnosis Date   • AAT (alpha-1-antitrypsin) deficiency (CMS/[facility], located within [facility]) 1/17/2019   • Anxiety 5/22/2020   • Cancer (CMS/[facility], located within 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) 2/21/2020   • MRSA infection    • Nausea after anesthesia    • Osteoarthritis    • Osteoporosis    • Panic attacks    • PONV (postoperative nausea and vomiting)    • Psoriasis    • RA (rheumatoid arthritis) (CMS/[facility], located within [facility])    • Seasonal allergies    • Senile  osteoporosis 2017   ,   Past Surgical History:   Procedure Laterality Date   • ANTERIOR LUMBAR EXPOSURE N/A 2020    Procedure: Anterior lumbar interbody fusion of L5-S1 with instrumentation ;  Surgeon: Neptali Rivera DO;  Location:  [facility] OR;  Service: Vascular;  Laterality: N/A;   • AXILLARY LYMPH NODE BIOPSY/EXCISION Right 2019    Procedure: EXCISION RIGHT AXILLARY MASS;  Surgeon: Jes Enamorado MD;  Location:  [facility] OR;  Service: General   • LUMBAR FUSION N/A 2020    Procedure: ANTERIOR DECOMPRESSION, ANTERIOR LUMBAR INTERBODY FUSION WITH INSTRUMENTATION L5-S1;  Surgeon: ALEXIS Dunaway MD;  Location:  [facility] OR;  Service: Orthopedic Spine;  Laterality: N/A;   • LUMBAR FUSION Left 2020    Procedure: LEFT LATERAL LUMBAR INTERBODY FUSION L3-5 WITH INSTRUMENTATION L3-4;  Surgeon: ALEXIS Dunaway MD;  Location:  [facility] OR;  Service: Orthopedic Spine;  Laterality: Left;   • LUMBAR LAMINECTOMY WITH FUSION N/A 2020    Procedure: DECOMPRESSION L3-4, POSTERIOR SPINAL FUSION WITH INSTRUMENTATION L3-S1;  Surgeon: ALEXIS Dunaway MD;  Location:  [facility] OR;  Service: Orthopedic Spine;  Laterality: N/A;   • SHOULDER SURGERY     • TUBAL ABDOMINAL LIGATION     • US GUIDED LYMPH NODE BIOPSY  2019   • WRIST SURGERY Bilateral     fracture   ,   Family History   Problem Relation Age of Onset   • Arthritis Mother    • COPD Mother    • Atrial fibrillation Mother    • Osteoarthritis Mother    • Heart disease Father    • Hypertension Father    • Arthritis Father    • Melanoma Father         metastatic   • Arthritis Sister    • Arthritis Brother    • No Known Problems Daughter    • Arthritis Maternal Grandfather    • Breast cancer Neg Hx    ,   Social History     Tobacco Use   • Smoking status: Former Smoker     Packs/day: 1.00     Years: 20.00     Pack years: 20.00     Types: Cigarettes     Quit date: 9/3/2018     Years since quittin.7   • Smokeless tobacco: Never Used   • Tobacco  comment: quit 2018   Substance Use Topics   • Alcohol use: No   • Drug use: No   ,   Medications Prior to Admission   Medication Sig Dispense Refill Last Dose   • ALPRAZolam (Xanax) 0.5 MG tablet Take 1 tablet by mouth 3 (Three) Times a Day As Needed for Anxiety. 90 tablet 0    • betamethasone valerate (VALISONE) 0.1 % ointment Apply  topically to the appropriate area as directed 2 (Two) Times a Day. 30 g 0    • Calcium-Magnesium-Vitamin D 500-250-200 MG-MG-UNIT tablet Take 600 tablets by mouth 2 (Two) Times a Day.      • cholecalciferol (VITAMIN D3) 1000 units tablet Take 1,000 Units by mouth Daily.      • DULoxetine (CYMBALTA) 60 MG capsule TAKE ONE CAPSULE BY MOUTH DAILY. 30 capsule 2    • ferrous sulfate 324 (65 Fe) MG tablet delayed-release EC tablet TAKE ONE TABLET BY MOUTH DAILY WITH BREAKFAST 30 tablet 2    • fluticasone (FLONASE) 50 MCG/ACT nasal spray 2 sprays into the nostril(s) as directed by provider Daily. 1 bottle 3    • furosemide (Lasix) 20 MG tablet Take 1 tablet by mouth Daily. 30 tablet 0    • HYDROcodone-acetaminophen (Norco)  MG per tablet Take 1 tablet by mouth Every 6 (Six) Hours As Needed for Moderate Pain . 120 tablet 0    • ibandronate (BONIVA) 150 MG tablet Take 150 mg by mouth Every 30 (Thirty) Days.      • leflunomide (ARAVA) 20 MG tablet       • levoFLOXacin (Levaquin) 750 MG tablet Take 1 tablet by mouth Daily for 7 days. 7 tablet 0    • mupirocin (BACTROBAN) 2 % ointment Apply to nostrils and fingernails nighlty x 5 nights same 5 nights monthly x 6 months. 30 g 5    • omeprazole (priLOSEC) 40 MG capsule TAKE ONE CAPSULE BY MOUTH DAILY 30 capsule 5    • ondansetron ODT (Zofran ODT) 4 MG disintegrating tablet Place 1 tablet on the tongue Every 8 (Eight) Hours As Needed for Nausea or Vomiting. 40 tablet 0    • polyethylene glycol (MIRALAX) 17 g packet Take 17 g by mouth Daily. 30 packet 5    • pregabalin (LYRICA) 75 MG capsule Take 1 capsule by mouth 2 (Two) Times a Day. 180  capsule 1    • sertraline (ZOLOFT) 50 MG tablet Take 1 tablet by mouth Every Night. 90 tablet 1    • tiZANidine (ZANAFLEX) 4 MG tablet TAKE ONE TABLET BY MOUTH TWICE A DAY AS NEEDED FOR MUSCLE SPASMS 60 tablet 5    • Turmeric 500 MG capsule Take 1 capsule by mouth 2 (Two) Times a Day.      • valACYclovir (VALTREX) 1000 MG tablet Take 1 tablet by mouth 2 (Two) Times a Day. 270 tablet 1     and Allergies:  Patient has no known allergies.  No current facility-administered medications for this encounter.    Objective     Vital Signs   Temp:  [97.1 °F (36.2 °C)] 97.1 °F (36.2 °C)  Heart Rate:  [85] 85  Resp:  [18] 18  BP: (123)/(80) 123/80    Physical Exam:  General appearance - alert, well appearing, and in no distress  Mental status - alert, oriented to person, place, and time  Neck - supple, no significant adenopathy  Chest - clear to auscultation, no wheezes, rales or rhonchi, symmetric air entry  Heart - normal rate, regular rhythm, normal S1, S2, no murmurs, rubs, clicks or gallops  Abdomen - soft, nontender, nondistended, no masses or organomegaly  Neurological - alert, oriented, normal speech, no focal findings or movement disorder noted  Musculoskeletal - no joint tenderness, deformity or swelling  Extremities - right dorsal forearm, incisional scar without associated erythema or expressible discharge, painful mass    Results Review:     Lab Results (last 24 hours)     ** No results found for the last 24 hours. **        Imaging Results (Last 24 Hours)     ** No results found for the last 24 hours. **            Assessment/Plan       Painful mass right forearm    She will undergo exploration and possible drainage of the right forearm mass.  She has a history of drainage at the same site x 3.  It is likely a recurrent cyst.  The risks, benefits, complications, and possible alternatives were discussed with the patient who agreed to proceed.      Jes Enamorado MD  06/03/21  13:38 CDT

## 2021-06-03 NOTE — DISCHARGE INSTRUCTIONS
YOUR NEXT PAIN MEDICATION IS DUE AT______________         General Anesthesia, Adult, Care After  This sheet gives you information about how to care for yourself after your procedure. Your health care provider may also give you more specific instructions. If you have problems or questions, contact your health care provider.  What can I expect after the procedure?  After the procedure, the following side effects are common:  · Pain or discomfort at the IV site.  · Nausea.  · Vomiting.  · Sore throat.  · Trouble concentrating.  · Feeling cold or chills.  · Weak or tired.  · Sleepiness and fatigue.  · Soreness and body aches. These side effects can affect parts of the body that were not involved in surgery.  Follow these instructions at home:   For at least 24 hours after the procedure:  1. Have a responsible adult stay with you. It is important to have someone help care for you until you are awake and alert.  2. Rest as needed.  3. Do not:  ? Participate in activities in which you could fall or become injured.  ? Drive.  ? Use heavy machinery.  ? Drink alcohol.  ? Take sleeping pills or medicines that cause drowsiness.  ? Make important decisions or sign legal documents.  ? Take care of children on your own.  Eating and drinking  · Follow any instructions from your health care provider about eating or drinking restrictions.  · When you feel hungry, start by eating small amounts of foods that are soft and easy to digest (bland), such as toast. Gradually return to your regular diet.  · Drink enough fluid to keep your urine pale yellow.  · If you vomit, rehydrate by drinking water, juice, or clear broth.  General instructions  1. If you have sleep apnea, surgery and certain medicines can increase your risk for breathing problems. Follow instructions from your health care provider about wearing your sleep device:  ? Anytime you are sleeping, including during daytime naps.  ? While taking prescription pain medicines,  sleeping medicines, or medicines that make you drowsy.  2. Return to your normal activities as told by your health care provider. Ask your health care provider what activities are safe for you.  3. Take over-the-counter and prescription medicines only as told by your health care provider.  4. If you smoke, do not smoke without supervision.  5. Keep all follow-up visits as told by your health care provider. This is important.  Contact a health care provider if:  · You have nausea or vomiting that does not get better with medicine.  · You cannot eat or drink without vomiting.  · You have pain that does not get better with medicine.  · You are unable to pass urine.  · You develop a skin rash.  · You have a fever.  · You have redness around your IV site that gets worse.  Get help right away if:  · You have difficulty breathing.  · You have chest pain.  · You have blood in your urine or stool, or you vomit blood.  Summary  · After the procedure, it is common to have a sore throat or nausea. It is also common to feel tired.  · Have a responsible adult stay with you for the first 24 hours after general anesthesia. It is important to have someone help care for you until you are awake and alert.  · When you feel hungry, start by eating small amounts of foods that are soft and easy to digest (bland), such as toast. Gradually return to your regular diet.  · Drink enough fluid to keep your urine pale yellow.  · Return to your normal activities as told by your health care provider. Ask your health care provider what activities are safe for you.  This information is not intended to replace advice given to you by your health care provider. Make sure you discuss any questions you have with your health care provider.  Document Revised: 12/21/2018 Document Reviewed: 08/03/2018    CALL YOUR PHYSICIAN IF YOU EXPERIENCE  INCREASED PAIN NOT HELPED BY YOUR PAIN MEDICATION.      .                                              Fall  Prevention in the Home      Falls can cause injuries. They can happen to people of all ages. There are many things you can do to make your home safe and to help prevent falls.    WHAT CAN I DO ON THE OUTSIDE OF MY HOME?  · Regularly fix the edges of walkways and driveways and fix any cracks.  · Remove anything that might make you trip as you walk through a door, such as a raised step or threshold.  · Trim any bushes or trees on the path to your home.  · Use bright outdoor lighting.  · Clear any walking paths of anything that might make someone trip, such as rocks or tools.  · Regularly check to see if handrails are loose or broken. Make sure that both sides of any steps have handrails.  · Any raised decks and porches should have guardrails on the edges.  · Have any leaves, snow, or ice cleared regularly.  · Use sand or salt on walking paths during winter.  · Clean up any spills in your garage right away. This includes oil or grease spills.  WHAT CAN I DO IN THE BATHROOM?    · Use night lights.  · Install grab bars by the toilet and in the tub and shower. Do not use towel bars as grab bars.  · Use non-skid mats or decals in the tub or shower.  · If you need to sit down in the shower, use a plastic, non-slip stool.  · Keep the floor dry. Clean up any water that spills on the floor as soon as it happens.  · Remove soap buildup in the tub or shower regularly.  · Attach bath mats securely with double-sided non-slip rug tape.  · Do not have throw rugs and other things on the floor that can make you trip.  WHAT CAN I DO IN THE BEDROOM?  · Use night lights.  · Make sure that you have a light by your bed that is easy to reach.  · Do not use any sheets or blankets that are too big for your bed. They should not hang down onto the floor.  · Have a firm chair that has side arms. You can use this for support while you get dressed.  · Do not have throw rugs and other things on the floor that can make you trip.  WHAT CAN I DO IN  THE KITCHEN?  · Clean up any spills right away.  · Avoid walking on wet floors.  · Keep items that you use a lot in easy-to-reach places.  · If you need to reach something above you, use a strong step stool that has a grab bar.  · Keep electrical cords out of the way.  · Do not use floor polish or wax that makes floors slippery. If you must use wax, use non-skid floor wax.  · Do not have throw rugs and other things on the floor that can make you trip.  WHAT CAN I DO WITH MY STAIRS?  · Do not leave any items on the stairs.  · Make sure that there are handrails on both sides of the stairs and use them. Fix handrails that are broken or loose. Make sure that handrails are as long as the stairways.  · Check any carpeting to make sure that it is firmly attached to the stairs. Fix any carpet that is loose or worn.  · Avoid having throw rugs at the top or bottom of the stairs. If you do have throw rugs, attach them to the floor with carpet tape.  · Make sure that you have a light switch at the top of the stairs and the bottom of the stairs. If you do not have them, ask someone to add them for you.  WHAT ELSE CAN I DO TO HELP PREVENT FALLS?  · Wear shoes that:  ¨ Do not have high heels.  ¨ Have rubber bottoms.  ¨ Are comfortable and fit you well.  ¨ Are closed at the toe. Do not wear sandals.  · If you use a stepladder:  ¨ Make sure that it is fully opened. Do not climb a closed stepladder.  ¨ Make sure that both sides of the stepladder are locked into place.  ¨ Ask someone to hold it for you, if possible.  · Clearly aura and make sure that you can see:  ¨ Any grab bars or handrails.  ¨ First and last steps.  ¨ Where the edge of each step is.  · Use tools that help you move around (mobility aids) if they are needed. These include:  ¨ Canes.  ¨ Walkers.  ¨ Scooters.  ¨ Crutches.  · Turn on the lights when you go into a dark area. Replace any light bulbs as soon as they burn out.  · Set up your furniture so you have a clear  path. Avoid moving your furniture around.  · If any of your floors are uneven, fix them.  · If there are any pets around you, be aware of where they are.  · Review your medicines with your doctor. Some medicines can make you feel dizzy. This can increase your chance of falling.  Ask your doctor what other things that you can do to help prevent falls.     This information is not intended to replace advice given to you by your health care provider. Make sure you discuss any questions you have with your health care provider.     Document Released: 10/14/2010 Document Revised: 05/03/2016 Document Reviewed: 01/22/2016  [employer] Interactive Patient Education ©2016 [employer]     PATIENT/FAMILY/RESPONSIBLE PARTY VERBALIZES UNDERSTANDING OF ABOVE EDUCATION.  COPY OF PAIN SCALE GIVEN AND REVIEWED WITH VERBALIZED UNDERSTANDING.

## 2021-06-04 LAB
LAB AP CASE REPORT: NORMAL
PATH REPORT.FINAL DX SPEC: NORMAL
PATH REPORT.GROSS SPEC: NORMAL

## 2021-06-04 NOTE — ANESTHESIA POSTPROCEDURE EVALUATION
"Patient: Adela Arauz    Procedure Summary     Date: 06/03/21 Room / Location: [facility] OR  /  [facility] OR    Anesthesia Start: 1601 Anesthesia Stop: 1625    Procedure: INCISION AND DRAINAGE FOREARM (Right Arm Upper) Diagnosis:     Surgeons: Jes Enamorado MD Provider: Omkar Garcia CRNA    Anesthesia Type: general ASA Status: 2          Anesthesia Type: general    Vitals  Vitals Value Taken Time   /65 06/03/21 1720   Temp 98.2 °F (36.8 °C) 06/03/21 1720   Pulse 76 06/03/21 1725   Resp 15 06/03/21 1720   SpO2 94 % 06/03/21 1725   Vitals shown include unvalidated device data.        Post Anesthesia Care and Evaluation    Patient location during evaluation: PACU  Patient participation: complete - patient participated  Level of consciousness: awake and alert  Pain management: adequate  Airway patency: patent  Anesthetic complications: No anesthetic complications    Cardiovascular status: acceptable  Respiratory status: acceptable  Hydration status: acceptable    Comments: Blood pressure 112/67, pulse 86, temperature 98.2 °F (36.8 °C), temperature source Temporal, resp. rate 16, height 161.5 cm (63.58\"), weight 92.5 kg (203 lb 14.8 oz), last menstrual period 04/01/2014, SpO2 96 %, not currently breastfeeding.    Pt discharged from PACU based on carol score >8      "

## 2021-06-05 LAB
BACTERIA SPEC AEROBE CULT: ABNORMAL
GRAM STN SPEC: ABNORMAL
GRAM STN SPEC: ABNORMAL

## 2021-06-07 ENCOUNTER — APPOINTMENT (OUTPATIENT)
Dept: MAMMOGRAPHY | Facility: HOSPITAL | Age: 56
End: 2021-06-07

## 2021-06-11 ENCOUNTER — HOSPITAL ENCOUNTER (OUTPATIENT)
Dept: MAMMOGRAPHY | Facility: HOSPITAL | Age: 56
Discharge: HOME | End: 2021-06-11
Admitting: NURSE PRACTITIONER

## 2021-06-11 DIAGNOSIS — Z78.0 POST-MENOPAUSAL: ICD-10-CM

## 2021-06-11 PROCEDURE — 77067 SCR MAMMO BI INCL CAD: CPT

## 2021-06-11 PROCEDURE — 77063 BREAST TOMOSYNTHESIS BI: CPT

## 2021-06-28 ENCOUNTER — OFFICE VISIT (OUTPATIENT)
Dept: FAMILY MEDICINE CLINIC | Facility: CLINIC | Age: 56
End: 2021-06-28

## 2021-06-28 VITALS
SYSTOLIC BLOOD PRESSURE: 107 MMHG | WEIGHT: 205 LBS | HEIGHT: 63 IN | OXYGEN SATURATION: 99 % | HEART RATE: 90 BPM | TEMPERATURE: 98.2 F | RESPIRATION RATE: 18 BRPM | DIASTOLIC BLOOD PRESSURE: 77 MMHG | BODY MASS INDEX: 36.32 KG/M2

## 2021-06-28 DIAGNOSIS — F41.9 ANXIETY: ICD-10-CM

## 2021-06-28 DIAGNOSIS — F41.0 PANIC ATTACK: ICD-10-CM

## 2021-06-28 DIAGNOSIS — M06.9 RHEUMATOID ARTHRITIS INVOLVING MULTIPLE SITES, UNSPECIFIED WHETHER RHEUMATOID FACTOR PRESENT (HCC): ICD-10-CM

## 2021-06-28 DIAGNOSIS — R60.1 GENERALIZED EDEMA: ICD-10-CM

## 2021-06-28 DIAGNOSIS — M79.601 RIGHT ARM PAIN: ICD-10-CM

## 2021-06-28 DIAGNOSIS — G89.4 CHRONIC PAIN SYNDROME: ICD-10-CM

## 2021-06-28 DIAGNOSIS — D64.9 ANEMIA, UNSPECIFIED TYPE: ICD-10-CM

## 2021-06-28 DIAGNOSIS — M51.36 DDD (DEGENERATIVE DISC DISEASE), LUMBAR: ICD-10-CM

## 2021-06-28 DIAGNOSIS — M54.50 ACUTE BILATERAL LOW BACK PAIN WITHOUT SCIATICA: ICD-10-CM

## 2021-06-28 DIAGNOSIS — R11.0 NAUSEA: Primary | ICD-10-CM

## 2021-06-28 PROBLEM — M51.369 DEGENERATION OF LUMBAR INTERVERTEBRAL DISC: Status: ACTIVE | Noted: 2021-06-28

## 2021-06-28 PROBLEM — M54.16 LUMBAR RADICULOPATHY: Status: ACTIVE | Noted: 2021-06-28

## 2021-06-28 PROBLEM — G89.29 CHRONIC PAIN: Status: ACTIVE | Noted: 2021-06-28

## 2021-06-28 PROBLEM — Z98.1 HISTORY OF LUMBAR FUSION: Status: ACTIVE | Noted: 2021-06-28

## 2021-06-28 PROCEDURE — 99214 OFFICE O/P EST MOD 30 MIN: CPT | Performed by: NURSE PRACTITIONER

## 2021-06-28 RX ORDER — FUROSEMIDE 40 MG/1
40 TABLET ORAL DAILY
Qty: 90 TABLET | Refills: 0 | Status: SHIPPED | OUTPATIENT
Start: 2021-06-28 | End: 2021-07-28 | Stop reason: SDUPTHER

## 2021-06-28 RX ORDER — ALPRAZOLAM 0.5 MG/1
0.5 TABLET ORAL 3 TIMES DAILY PRN
Qty: 90 TABLET | Refills: 0 | Status: SHIPPED | OUTPATIENT
Start: 2021-06-28 | End: 2021-07-28 | Stop reason: SDUPTHER

## 2021-06-28 RX ORDER — FERROUS SULFATE TAB EC 324 MG (65 MG FE EQUIVALENT) 324 (65 FE) MG
324 TABLET DELAYED RESPONSE ORAL
Qty: 90 TABLET | Refills: 1 | Status: SHIPPED | OUTPATIENT
Start: 2021-06-28 | End: 2022-01-07 | Stop reason: SDUPTHER

## 2021-06-28 RX ORDER — ONDANSETRON 4 MG/1
4 TABLET, ORALLY DISINTEGRATING ORAL EVERY 8 HOURS PRN
Qty: 40 TABLET | Refills: 0 | Status: SHIPPED | OUTPATIENT
Start: 2021-06-28 | End: 2021-07-28 | Stop reason: SDUPTHER

## 2021-06-28 RX ORDER — HYDROCODONE BITARTRATE AND ACETAMINOPHEN 10; 325 MG/1; MG/1
1 TABLET ORAL EVERY 6 HOURS PRN
Qty: 120 TABLET | Refills: 0 | Status: SHIPPED | OUTPATIENT
Start: 2021-06-28 | End: 2021-07-28 | Stop reason: SDUPTHER

## 2021-06-28 NOTE — PROGRESS NOTES
Chief Complaint  Depression (follow up)    Subjective    Adela Arauz presents to [facility]  Chronic pain, RA and Recent R arm surgery.  Adela presents today for her monthly follow up for chronic back pain, DDD and anxiety.   She has had problems with her right forearm that it keeps getting abscessed and she has to have surgery on it.  She did have to have surgery again because a gold ball sized knot came up on it again.  She does have a hx of MRSA in that area.  Dr. Enamorado did the surgery and tested for MRSA but she did not have it.  She does struggle with RA, chronic back pain with lumbar radiculopathy and stenosis.  She does take norco for for the pain and complies with all regulations for it.  She has increased anxiety due to all the pain and it is controlled with alprazolam. Today, she says she has had good days and bad days, She had talked to me prior to surgery on her arm and we decided Dr. Enamorado would do post op pain meds and I would continue to write the norco.  She hasn't been taking the percocet.  Today she rates her pain 8/10 in lumbar back and says that her hands are really hurting today too.   Other  Complaints today includes nasuea.  She has not vomited but says she can't get rid of this sick feeling. Lower extremities are swelling more than usual. Anxiety is stable without suicidal/homicidal ideations  Back Pain  This is a chronic problem. The current episode started more than 1 year ago. The problem occurs constantly. The problem is unchanged. The pain is present in the lumbar spine. The quality of the pain is described as aching, burning and shooting. The pain does not radiate. The pain is at a severity of 8/10. The pain is severe. The pain is worse during the day. The symptoms are aggravated by bending, lying down, sitting, twisting, standing and position. Stiffness is present all day. Associated symptoms include numbness. Pertinent negatives include no  bladder incontinence, bowel incontinence, chest pain, dysuria, headaches, paresis, paresthesias or perianal numbness. Risk factors include menopause, obesity, poor posture and sedentary lifestyle. She has tried analgesics, bed rest, heat, ice, NSAIDs, walking and muscle relaxant for the symptoms. The treatment provided mild relief.   Pain  This is a chronic problem. The current episode started more than 1 year ago (RA joint pain in hands, wrists, shoulders, knees, hips). The problem has been gradually worsening. Associated symptoms include arthralgias, nausea and numbness. Pertinent negatives include no change in bowel habit, chest pain, headaches, joint swelling, myalgias, swollen glands, vertigo or visual change. The symptoms are aggravated by bending, exertion, sneezing, stress, swallowing, standing, twisting and walking. She has tried acetaminophen, ice, heat, position changes, relaxation, oral narcotics, rest, sleep and walking for the symptoms. The treatment provided mild relief.   Anxiety  Presents for follow-up visit. Symptoms include decreased concentration, depressed mood, insomnia, nausea and nervous/anxious behavior. Patient reports no chest pain, excessive worry, irritability, palpitations or shortness of breath. Symptoms occur most days. The severity of symptoms is moderate. The patient sleeps 5 hours per night. The quality of sleep is fair. Nighttime awakenings: several.     Compliance with medications is %. Side effects of treatment include joint pain.     Chronic problems:  Anxiety stable with alprazolam, depression stable with duloxetine and sertraline, anemia stable with ferrous sulfate, seasonal allergies stable with flonase, lower exremity edema stable with furosemide, chronic RA pain and lumbar back pain and DDD, stable with tizanidine, tumeric, norco, neuropathy stable with lyrica  gerd stable with omeprazole, complies with all TERRA regulations with narcotics    Objective   Vital Signs:  "  /77 (BP Location: Left arm, Patient Position: Sitting, Cuff Size: Large Adult)   Pulse 90   Temp 98.2 °F (36.8 °C) (Infrared)   Resp 18   Ht 160 cm (63\") Comment: per pateint  Wt 93 kg (205 lb)   SpO2 99%   BMI 36.31 kg/m²       Physical Exam  Vitals and nursing note reviewed.   Constitutional:       General: She is awake.      Appearance: Normal appearance. She is well-developed and well-groomed. She is morbidly obese.   HENT:      Head: Normocephalic and atraumatic.      Right Ear: Hearing normal.      Left Ear: Hearing normal.      Nose: Nose normal.      Mouth/Throat:      Lips: Pink.      Mouth: Mucous membranes are moist.      Pharynx: Oropharynx is clear.   Cardiovascular:      Rate and Rhythm: Regular rhythm. Tachycardia present.      Heart sounds: Normal heart sounds.   Pulmonary:      Effort: Pulmonary effort is normal.      Breath sounds: Normal breath sounds and air entry.   Abdominal:      General: Abdomen is flat.      Palpations: Abdomen is soft.   Musculoskeletal:      Cervical back: Normal.      Thoracic back: Normal.      Lumbar back: Spasms and tenderness present. Decreased range of motion.        Back:       Right lower le+ Edema present.      Left lower le+ Edema present.   Lymphadenopathy:      Head:      Right side of head: No submental, submandibular or tonsillar adenopathy.      Left side of head: No submental, submandibular or tonsillar adenopathy.   Skin:     General: Skin is warm and dry.      Capillary Refill: Capillary refill takes less than 2 seconds.          Neurological:      Mental Status: She is alert and oriented to person, place, and time.      Sensory: Sensation is intact.      Motor: Motor function is intact.      Coordination: Coordination is intact.      Gait: Gait is intact.   Psychiatric:         Attention and Perception: Attention normal.         Mood and Affect: Mood normal.         Speech: Speech normal.         Behavior: Behavior normal. Behavior " is cooperative.         Cognition and Memory: Cognition normal.         Judgment: Judgment normal.            Assessment and Plan    Diagnoses and all orders for this visit:    1. Nausea (Primary)  -     ondansetron ODT (Zofran ODT) 4 MG disintegrating tablet; Place 1 tablet on the tongue Every 8 (Eight) Hours As Needed for Nausea or Vomiting.  Dispense: 40 tablet; Refill: 0    2. Generalized edema  -     furosemide (Lasix) 40 MG tablet; Take 1 tablet by mouth Daily.  Dispense: 90 tablet; Refill: 0    3. Anemia, unspecified type  -     ferrous sulfate 324 (65 Fe) MG tablet delayed-release EC tablet; Take 1 tablet by mouth Daily With Breakfast.  Dispense: 90 tablet; Refill: 1    4. Chronic pain syndrome  -     HYDROcodone-acetaminophen (Norco)  MG per tablet; Take 1 tablet by mouth Every 6 (Six) Hours As Needed for Moderate Pain .  Dispense: 120 tablet; Refill: 0  -     ALPRAZolam (Xanax) 0.5 MG tablet; Take 1 tablet by mouth 3 (Three) Times a Day As Needed for Anxiety.  Dispense: 90 tablet; Refill: 0    5. Acute bilateral low back pain without sciatica  -     HYDROcodone-acetaminophen (Norco)  MG per tablet; Take 1 tablet by mouth Every 6 (Six) Hours As Needed for Moderate Pain .  Dispense: 120 tablet; Refill: 0    6. DDD (degenerative disc disease), lumbar  -     HYDROcodone-acetaminophen (Norco)  MG per tablet; Take 1 tablet by mouth Every 6 (Six) Hours As Needed for Moderate Pain .  Dispense: 120 tablet; Refill: 0    7. Rheumatoid arthritis involving multiple sites, unspecified whether rheumatoid factor present (CMS/[facility])  -     HYDROcodone-acetaminophen (Norco)  MG per tablet; Take 1 tablet by mouth Every 6 (Six) Hours As Needed for Moderate Pain .  Dispense: 120 tablet; Refill: 0    8. Panic attack  -     ALPRAZolam (Xanax) 0.5 MG tablet; Take 1 tablet by mouth 3 (Three) Times a Day As Needed for Anxiety.  Dispense: 90 tablet; Refill: 0    9. Anxiety  -     ALPRAZolam (Xanax) 0.5 MG  tablet; Take 1 tablet by mouth 3 (Three) Times a Day As Needed for Anxiety.  Dispense: 90 tablet; Refill: 0     10.  Right arm pain          -   S/P surgery to r forearm, stable and improving    ALMA Report:      As part of this patient's treatment plan, I am prescribing controlled substances. The patient has been made aware of appropriate use of such medications, including potential risk of opiod use, somnolence, limited ability to drive and /or work safely, and potential for dependence or overdose, and opiods should be used sparingly and are not recommended for long term use.  It has also been made clear that these medications are for use by this patient only, without concomitant use of alcohol or other substances unless prescribed.    The patient was provided a Rx for norco as needed for pain control after sustaining multiple back and neck injuries as well as surgeries.     This medication is a narcotic pain medication. This medication is monitored by the federal [facility] and the [facility]. Narcotic medication is commonly abused and many patients can become addicted to this medication.    There are problems with narcotic pain medication including addiction, dependence and tolerance to the pain relief. We discussed appropriate and expected levels of pain.     Narcotics have side effects, including the common side effect of nausea/vomiting if taken on an empty stomach and the problem of constipation that occur with narcotic use.     Narcotics can impair your judgement and therefore you should never drive or operate heavy machinery while using these medications. Treat this medication similar to alcohol, do not take this and drive or you could injure yourself or others.    As an alternative drug, we encourage the use of tylenol and/or NSAIDs (non-steroidal anti-inflammatory drugs) for additional pain relief and as an anti-inflammatory. NSAIDS are drugs such as aleve (naproxen) and motrin (ibuprofen). Narcotics  and NSAIDs work differently and can complement each other well after surgery.    In general, NSAIDs and tylenol are much safer drugs than narcotics. Tylenol (acetaminophen) should not be taken with narcotics as most prescribed narcotics already contain tylenol. The maximum dose of tylenol is 4 grams per day, so this can be used if your narcotic pain medication is used sparingly. Pay close attention to the dosages and ask your pharmacist about the dosage of tylenol.  Some patients can experience GI irritation from NSAID use and patients with kidney problems, previous bleeding from the GI tract, gastric bypass, or certain other conditions may not be able to take these over the counter medications.      Patient has completed prescribing agreement detailing terms of continued prescribing of controlled substances, including monitoring ALMA reports, urine drug screening yearly an random drug screens to monitor patients compliance to treatment plan, and pill counts if necessary. The patient is aware that inappropriate use will result in cessation of prescribing such medications.    Toxassure:  I have ordered a urine drug screen to monitor patients predisposition to and patterns of drug use/misuse in order to establish and maintain the safe and effective use of analgesics in the treatment of chronic pain    ALMA report has been reviewed by: Charlene Huntley    -     Pt is aware of the potential for addiction and dependence.    Addiction was discussed.  The  Risks, benefits and alternative options of drug therapy discussed.  It was reinforced about the risks and benefits of opioids.  It was reinforced that patient may not call early for medication, may not ask for increase in the number of pills given monthly or increase in dosage of medication, may not jump around to different pharmacies or providers and may not receive any narcotics from other providers including ER, or the contract will be broken and narcotics will  no longer be prescribed from this facility if any of these criteria has been broken.      Last Dose was: today    Last Fill was:  May 2020  Date of last ALMA: today    Date of last UDS: on file    I spent 12 minutes caring for Adela on this date of service. This time includes time spent by me in the following activities:preparing for the visit, obtaining and/or reviewing a separately obtained history, performing a medically appropriate examination and/or evaluation , counseling and educating the patient/family/caregiver, ordering medications, tests, or procedures, documenting information in the medical record and care coordination     Follow Up     Return in about 1 month (around 7/28/2021) for Recheck.     Patient was given instructions and counseling regarding her condition or for health maintenance advice. Please see specific information pulled into the AVS if appropriate.

## 2021-06-28 NOTE — PATIENT INSTRUCTIONS
Chronic Pain, Adult  Chronic pain is a type of pain that lasts or keeps coming back for at least 3-6 months. You may have headaches, pain in the abdomen, or pain in other areas of the body. Chronic pain may be related to an illness, such as fibromyalgia or complex regional pain syndrome. Chronic pain may also be related to an injury or a health condition. Sometimes, the cause of chronic pain is not known.  Chronic pain can make it hard for you to do daily activities. If not treated, chronic pain can lead to anxiety and depression. Treatment depends on the cause and severity of your pain. You may need to work with a pain specialist to come up with a treatment plan. The plan may include medicine, counseling, and physical therapy. Many people benefit from a combination of two or more types of treatment to control their pain.  Follow these instructions at home:  Medicines  · Take over-the-counter and prescription medicines only as told by your health care provider.  · Ask your health care provider if the medicine prescribed to you:  ? Requires you to avoid driving or using machinery.  ? Can cause constipation. You may need to take these actions to prevent or treat constipation:  § Drink enough fluid to keep your urine pale yellow.  § Take over-the-counter or prescription medicines.  § Eat foods that are high in fiber, such as beans, whole grains, and fresh fruits and vegetables.  § Limit foods that are high in fat and processed sugars, such as fried or sweet foods.  Treatment plan  Follow your treatment plan as told by your health care provider. This may include:  · Gentle, regular exercise.  · Eating a healthy diet that includes foods such as vegetables, fruits, fish, and lean meats.  · Cognitive or behavioral therapy that changes the way you think or act in response to the pain. This may help improve how you feel.  · Working with a physical therapist.  · Meditation, yoga, acupuncture, or massage therapy.  · Aroma,  color, light, or sound therapy.  · Local electrical stimulation. The electrical pulses help to relieve pain by temporarily stopping the nerve impulses that cause you to feel pain.  · Injections. These deliver numbing or pain-relieving medicines into the spine or the area of pain.    Lifestyle    · Ask your health care provider whether you should keep a pain diary. Your health care provider will tell you what information to write in the diary. This may include when you have pain, what the pain feels like, and how medicines and other behaviors or treatments help to reduce the pain.  · Consider talking with a mental health care provider about how to manage chronic pain.  · Consider joining a chronic pain support group.  · Try to control or lower your stress levels. Talk with your health care provider about ways to do this.  General instructions  · Learn as much as you can about how to manage your chronic pain. Ask your health care provider if an intensive pain rehabilitation program or a chronic pain specialist would be helpful.  · Check your pain level as told by your health care provider. Ask your health care provider if you should use a pain scale.  · It is up to you to get the results of any tests that were done. Ask your health care provider, or the department that is doing the tests, when your results will be ready.  · Keep all follow-up visits as told by your health care provider. This is important.  Contact a health care provider if:  · Your pain gets worse, or you have new pain.  · You have trouble sleeping.  · You have trouble doing your normal activities.  · Your pain is not controlled with treatment.  · You have side effects from pain medicine.  · You feel weak.  · You notice any other changes that show that your condition is getting worse.  Get help right away if:  · You lose feeling or have numbness in your body.  · You lose control of bowel or bladder function.  · Your pain suddenly gets much  worse.  · You develop shaking or chills.  · You develop confusion.  · You develop chest pain.  · You have trouble breathing or shortness of breath.  · You pass out.  · You have thoughts about hurting yourself or others.  If you ever feel like you may hurt yourself or others, or have thoughts about taking your own life, get help right away. Go to your nearest emergency department or:  · Call your local emergency services (911 in the [place]  · Call a suicide crisis helpline, such as the National Suicide Prevention Lifeline at 1-318.148.9535. This is open 24 hours a day in the [place]  · Text the Crisis Text Line at 188069 (in the [place]  Summary  · Chronic pain is a type of pain that lasts or keeps coming back for at least 3-6 months.  · Chronic pain may be related to an illness, injury, or other health condition. Sometimes, the cause of chronic pain is not known.  · Treatment depends on the cause and severity of your pain.  · Many people benefit from a combination of two or more types of treatment to control their pain.  · Follow your treatment plan as told by your health care provider.  This information is not intended to replace advice given to you by your health care provider. Make sure you discuss any questions you have with your health care provider.  Document Revised: 09/03/2020 Document Reviewed: 09/03/2020  [employer]vier Patient Education © 2021 [employer]

## 2021-07-28 ENCOUNTER — TELEPHONE (OUTPATIENT)
Dept: FAMILY MEDICINE CLINIC | Facility: CLINIC | Age: 56
End: 2021-07-28

## 2021-07-28 ENCOUNTER — OFFICE VISIT (OUTPATIENT)
Dept: FAMILY MEDICINE CLINIC | Facility: CLINIC | Age: 56
End: 2021-07-28

## 2021-07-28 VITALS
DIASTOLIC BLOOD PRESSURE: 76 MMHG | SYSTOLIC BLOOD PRESSURE: 113 MMHG | RESPIRATION RATE: 18 BRPM | WEIGHT: 202 LBS | TEMPERATURE: 97.3 F | HEART RATE: 80 BPM | BODY MASS INDEX: 35.79 KG/M2 | HEIGHT: 63 IN

## 2021-07-28 DIAGNOSIS — F41.8 ANXIETY ASSOCIATED WITH DEPRESSION: ICD-10-CM

## 2021-07-28 DIAGNOSIS — R60.1 GENERALIZED EDEMA: ICD-10-CM

## 2021-07-28 DIAGNOSIS — F41.9 ANXIETY: ICD-10-CM

## 2021-07-28 DIAGNOSIS — G89.4 CHRONIC PAIN SYNDROME: ICD-10-CM

## 2021-07-28 DIAGNOSIS — F41.0 PANIC ATTACK: ICD-10-CM

## 2021-07-28 DIAGNOSIS — M51.36 DDD (DEGENERATIVE DISC DISEASE), LUMBAR: ICD-10-CM

## 2021-07-28 DIAGNOSIS — R11.0 NAUSEA: ICD-10-CM

## 2021-07-28 PROBLEM — M84.376A STRESS FRACTURE OF CALCANEUS: Status: ACTIVE | Noted: 2021-07-28

## 2021-07-28 PROCEDURE — 99214 OFFICE O/P EST MOD 30 MIN: CPT | Performed by: NURSE PRACTITIONER

## 2021-07-28 RX ORDER — ONDANSETRON 4 MG/1
4 TABLET, ORALLY DISINTEGRATING ORAL EVERY 8 HOURS PRN
Qty: 40 TABLET | Refills: 0 | Status: SHIPPED | OUTPATIENT
Start: 2021-07-28 | End: 2021-08-30 | Stop reason: SDUPTHER

## 2021-07-28 RX ORDER — ALPRAZOLAM 0.5 MG/1
0.5 TABLET ORAL 3 TIMES DAILY PRN
Qty: 90 TABLET | Refills: 0 | Status: SHIPPED | OUTPATIENT
Start: 2021-07-28 | End: 2021-08-30 | Stop reason: SDUPTHER

## 2021-07-28 RX ORDER — FUROSEMIDE 40 MG/1
40 TABLET ORAL DAILY
Qty: 90 TABLET | Refills: 0 | Status: SHIPPED | OUTPATIENT
Start: 2021-07-28 | End: 2021-08-30 | Stop reason: SDUPTHER

## 2021-07-28 RX ORDER — HYDROCODONE BITARTRATE AND ACETAMINOPHEN 10; 325 MG/1; MG/1
1 TABLET ORAL EVERY 6 HOURS PRN
Qty: 120 TABLET | Refills: 0 | Status: SHIPPED | OUTPATIENT
Start: 2021-07-28 | End: 2021-08-30 | Stop reason: SDUPTHER

## 2021-07-28 NOTE — TELEPHONE ENCOUNTER
Caller: Adela Arauz    Relationship to patient: Self    Best call back number: [phone]    Patient is needing:  PATIENT IS REQUESTING TO HAVE PRIOR AUTHORIZATION DONE FOR ALPRAZolam (Xanax) 0.5 MG tablet.      PLEASE CONTACT ONCE COMPLETED

## 2021-08-05 ENCOUNTER — TELEPHONE (OUTPATIENT)
Dept: FAMILY MEDICINE CLINIC | Facility: CLINIC | Age: 56
End: 2021-08-05

## 2021-08-30 ENCOUNTER — OFFICE VISIT (OUTPATIENT)
Dept: FAMILY MEDICINE CLINIC | Facility: CLINIC | Age: 56
End: 2021-08-30

## 2021-08-30 VITALS
SYSTOLIC BLOOD PRESSURE: 112 MMHG | DIASTOLIC BLOOD PRESSURE: 78 MMHG | OXYGEN SATURATION: 98 % | TEMPERATURE: 97.3 F | BODY MASS INDEX: 37.39 KG/M2 | HEART RATE: 76 BPM | WEIGHT: 211 LBS | RESPIRATION RATE: 18 BRPM | HEIGHT: 63 IN

## 2021-08-30 DIAGNOSIS — G89.4 CHRONIC PAIN SYNDROME: ICD-10-CM

## 2021-08-30 DIAGNOSIS — R11.0 NAUSEA: ICD-10-CM

## 2021-08-30 DIAGNOSIS — R60.1 GENERALIZED EDEMA: ICD-10-CM

## 2021-08-30 DIAGNOSIS — F43.10 PTSD (POST-TRAUMATIC STRESS DISORDER): Primary | ICD-10-CM

## 2021-08-30 DIAGNOSIS — F41.0 PANIC ATTACK: ICD-10-CM

## 2021-08-30 DIAGNOSIS — M51.36 DDD (DEGENERATIVE DISC DISEASE), LUMBAR: ICD-10-CM

## 2021-08-30 PROBLEM — M72.2 PLANTAR FASCIITIS: Status: ACTIVE | Noted: 2021-08-30

## 2021-08-30 PROCEDURE — 99214 OFFICE O/P EST MOD 30 MIN: CPT | Performed by: NURSE PRACTITIONER

## 2021-08-30 RX ORDER — ALPRAZOLAM 0.5 MG/1
0.5 TABLET ORAL 3 TIMES DAILY PRN
Qty: 90 TABLET | Refills: 0 | Status: SHIPPED | OUTPATIENT
Start: 2021-08-30 | End: 2021-10-01 | Stop reason: SDUPTHER

## 2021-08-30 RX ORDER — FUROSEMIDE 40 MG/1
40 TABLET ORAL DAILY
Qty: 90 TABLET | Refills: 1 | Status: SHIPPED | OUTPATIENT
Start: 2021-08-30 | End: 2022-03-04

## 2021-08-30 RX ORDER — MELOXICAM 15 MG/1
TABLET ORAL
COMMUNITY
Start: 2021-08-06 | End: 2021-12-01 | Stop reason: SDUPTHER

## 2021-08-30 RX ORDER — HYDROCODONE BITARTRATE AND ACETAMINOPHEN 10; 325 MG/1; MG/1
1 TABLET ORAL EVERY 6 HOURS PRN
Qty: 120 TABLET | Refills: 0 | Status: SHIPPED | OUTPATIENT
Start: 2021-08-30 | End: 2021-10-01 | Stop reason: SDUPTHER

## 2021-08-30 RX ORDER — ONDANSETRON 4 MG/1
4 TABLET, ORALLY DISINTEGRATING ORAL EVERY 8 HOURS PRN
Qty: 40 TABLET | Refills: 0 | Status: SHIPPED | OUTPATIENT
Start: 2021-08-30 | End: 2021-12-01 | Stop reason: SDUPTHER

## 2021-08-30 NOTE — PATIENT INSTRUCTIONS
"[email or web address]">   Chronic Back Pain  When back pain lasts longer than 3 months, it is called chronic back pain. The cause of your back pain may not be known. Some common causes include:  · Wear and tear (degenerative disease) of the bones, ligaments, or disks in your back.  · Inflammation and stiffness in your back (arthritis).  People who have chronic back pain often go through certain periods in which the pain is more intense (flare-ups). Many people can learn to manage the pain with home care.  Follow these instructions at home:  Pay attention to any changes in your symptoms. Take these actions to help with your pain:  Managing pain and stiffness         · If directed, apply ice to the painful area. Your health care provider may recommend applying ice during the first 24-48 hours after a flare-up begins. To do this:  ? Put ice in a plastic bag.  ? Place a towel between your skin and the bag.  ? Leave the ice on for 20 minutes, 2-3 times per day.  · If directed, apply heat to the affected area as often as told by your health care provider. Use the heat source that your health care provider recommends, such as a moist heat pack or a heating pad.  ? Place a towel between your skin and the heat source.  ? Leave the heat on for 20-30 minutes.  ? Remove the heat if your skin turns bright red. This is especially important if you are unable to feel pain, heat, or cold. You may have a greater risk of getting burned.  · Try soaking in a warm tub.  Activity    · Avoid bending and other activities that make the problem worse.  · Maintain a proper position when standing or sitting:  ? When standing, keep your upper back and neck straight, with your shoulders pulled back. Avoid slouching.  ? When sitting, keep your back straight and relax your shoulders. Do not round your shoulders or pull them backward.  · Do not sit or  one place for long periods of time.  · Take brief periods of rest " throughout the day. This will reduce your pain. Resting in a lying or standing position is usually better than sitting to rest.  · When you are resting for longer periods, mix in some mild activity or stretching between periods of rest. This will help to prevent stiffness and pain.  · Get regular exercise. Ask your health care provider what activities are safe for you.  · Do not lift anything that is heavier than 10 lb (4.5 kg), or the limit that you are told, until your health care provider says that it is safe. Always use proper lifting technique, which includes:  ? Bending your knees.  ? Keeping the load close to your body.  ? Avoiding twisting.  · Sleep on a firm mattress in a comfortable position. Try lying on your side with your knees slightly bent. If you lie on your back, put a pillow under your knees.  Medicines  · Treatment may include medicines for pain and inflammation taken by mouth or applied to the skin, prescription pain medicine, or muscle relaxants. Take over-the-counter and prescription medicines only as told by your health care provider.  · Ask your health care provider if the medicine prescribed to you:  ? Requires you to avoid driving or using machinery.  ? Can cause constipation. You may need to take these actions to prevent or treat constipation:  § Drink enough fluid to keep your urine pale yellow.  § Take over-the-counter or prescription medicines.  § Eat foods that are high in fiber, such as beans, whole grains, and fresh fruits and vegetables.  § Limit foods that are high in fat and processed sugars, such as fried or sweet foods.  General instructions  · Do not use any products that contain nicotine or tobacco, such as cigarettes, e-cigarettes, and chewing tobacco. If you need help quitting, ask your health care provider.  · Keep all follow-up visits as told by your health care provider. This is important.  Contact a health care provider if:  · You have pain that is not relieved with rest  or medicine.  · Your pain gets worse, or you have new pain.  · You have a high fever.  · You have rapid weight loss.  · You have trouble doing your normal activities.  Get help right away if:  · You have weakness or numbness in one or both of your legs or feet.  · You have trouble controlling your bladder or your bowels.  · You have severe back pain and have any of the following:  ? Nausea or vomiting.  ? Pain in your abdomen.  ? Shortness of breath or you faint.  Summary  · Chronic back pain is back pain that lasts longer than 3 months.  · When a flare-up begins, apply ice to the painful area for the first 24-48 hours.  · Apply a moist heat pad or use a heating pad on the painful area as directed by your health care provider.  · When you are resting for longer periods, mix in some mild activity or stretching between periods of rest. This will help to prevent stiffness and pain.  This information is not intended to replace advice given to you by your health care provider. Make sure you discuss any questions you have with your health care provider.  Document Revised: 01/27/2021 Document Reviewed: 01/27/2021  Elsevier Patient Education © 2021 [employer]

## 2021-08-30 NOTE — PROGRESS NOTES
Chief Complaint  Chronic pain syndrome (follow up)    Subjective    Adela Arauz presents to [facility]  Here for PTSD, anxiety and chronic pain.  Her PTSD began in May 18, 2015, after her brother called her and said he was going to kill himself. She left immediately to go to him but by the time she got there he had already committed suicide and she found him dead with gun in hand.  Ever since she has had panic attacks and PTSD, her latest panic attack was the other day and she feels like impending doom, can't breath and becomes very anxious. She is duloxetine and sertraline which helps/  She also has been diagnosed with RA and has severe problems with chronic joint and back pain.  She recently underwent a couple of back surgeries and a 3rd surgery on a mass in her arm.     Depression  Visit Type: follow-up  Patient presents with the following symptoms: decreased concentration, depressed mood, excessive worry, fatigue, hypersomnia, irritability, nervousness/anxiety and panic.  Patient is not experiencing: shortness of breath, suicidal ideas, suicidal planning and thoughts of death.  Frequency of symptoms: most days   Severity: moderate   Sleep per night: 5 hours  Sleep quality: fair  Nighttime awakenings: several  Compliance with medications:  %        Pain  This is a chronic problem. The current episode started more than 1 year ago. The problem occurs constantly. The problem has been unchanged. Associated symptoms include arthralgias, joint swelling and myalgias. Pertinent negatives include no chills, congestion, fever, headaches, swollen glands, urinary symptoms, vertigo, visual change, vomiting or weakness. The symptoms are aggravated by twisting, walking, exertion and bending. She has tried rest, oral narcotics and NSAIDs for the symptoms. The treatment provided mild relief.   Anxiety  Presents for follow-up visit. Symptoms include decreased concentration, depressed  "mood, excessive worry, irritability, nervous/anxious behavior and panic. Patient reports no shortness of breath or suicidal ideas. Symptoms occur most days. The severity of symptoms is moderate. The patient sleeps 5 hours per night. The quality of sleep is fair. Nighttime awakenings: several.     Her past medical history is significant for depression.     The following portions of the patient's history were reviewed and updated as appropriate: allergies, current medications, past family history, past medical history, past social history, past surgical history and problem list.    Chronic problems:  Anxiety, panic attacks, ptsd, depression stable with alprazolam, duloxetine, and sertraline, however still has quite a few panic attackes, vit d def stable with cholecaiciferol, iron def stable with ferrous sulfate, edema stable with furosemide, chronic pain (back, joints, hands) stable with norco, meloxicam and lyrica.   Objective   Vital Signs:   /78 (BP Location: Left arm, Patient Position: Sitting, Cuff Size: Adult)   Pulse 76   Temp 97.3 °F (36.3 °C) (Infrared)   Resp 18   Ht 160 cm (63\") Comment: per patient  Wt 95.7 kg (211 lb)   SpO2 98%   BMI 37.38 kg/m²       Physical Exam  Vitals and nursing note reviewed.   Constitutional:       General: She is awake.      Appearance: Normal appearance. She is well-developed and well-groomed. She is morbidly obese.   HENT:      Head: Normocephalic and atraumatic.      Right Ear: Hearing, tympanic membrane, ear canal and external ear normal.      Left Ear: Hearing, tympanic membrane, ear canal and external ear normal.      Nose: Nose normal.      Mouth/Throat:      Lips: Pink.      Pharynx: Oropharynx is clear.   Cardiovascular:      Rate and Rhythm: Normal rate and regular rhythm.      Heart sounds: Normal heart sounds.   Pulmonary:      Effort: Pulmonary effort is normal.      Breath sounds: Normal breath sounds and air entry.   Abdominal:      General: Abdomen " is flat.      Palpations: Abdomen is soft.   Musculoskeletal:      Cervical back: Normal.      Thoracic back: Normal.      Lumbar back: Spasms, tenderness and bony tenderness present. Decreased range of motion.        Back:       Right hip: Tenderness present. Decreased range of motion.      Left hip: Tenderness present. Decreased range of motion.      Right knee: Decreased range of motion. Tenderness present.      Left knee: Decreased range of motion. Tenderness present.      Right lower leg: No edema.      Left lower leg: No edema.   Lymphadenopathy:      Head:      Right side of head: No submental, submandibular or tonsillar adenopathy.      Left side of head: No submental, submandibular or tonsillar adenopathy.   Skin:     General: Skin is warm and dry.   Neurological:      Mental Status: She is alert and oriented to person, place, and time.      Sensory: Sensation is intact.      Motor: Motor function is intact.      Coordination: Coordination is intact.      Gait: Gait is intact.   Psychiatric:         Attention and Perception: Attention and perception normal.         Mood and Affect: Mood and affect normal.         Speech: Speech normal.         Behavior: Behavior normal. Behavior is cooperative.         Thought Content: Thought content normal.         Cognition and Memory: Cognition and memory normal.         Judgment: Judgment normal.           Assessment and Plan   Diagnoses and all orders for this visit:    1. Panic attack  -     ALPRAZolam (Xanax) 0.5 MG tablet; Take 1 tablet by mouth 3 (Three) Times a Day As Needed for Anxiety.  Dispense: 90 tablet; Refill: 0    2. Generalized edema  -     furosemide (Lasix) 40 MG tablet; Take 1 tablet by mouth Daily.  Dispense: 90 tablet; Refill: 1    3. Chronic pain syndrome  -     HYDROcodone-acetaminophen (Norco)  MG per tablet; Take 1 tablet by mouth Every 6 (Six) Hours As Needed for Moderate Pain .  Dispense: 120 tablet; Refill: 0    4. DDD (degenerative  disc disease), lumbar  -     HYDROcodone-acetaminophen (Norco)  MG per tablet; Take 1 tablet by mouth Every 6 (Six) Hours As Needed for Moderate Pain .  Dispense: 120 tablet; Refill: 0  -   ALMA Report:      As part of this patient's treatment plan, I am prescribing controlled substances. The patient has been made aware of appropriate use of such medications, including potential risk of opiod use, somnolence, limited ability to drive and /or work safely, and potential for dependence or overdose, and opiods should be used sparingly and are not recommended for long term use.  It has also been made clear that these medications are for use by this patient only, without concomitant use of alcohol or other substances unless prescribed.    The patient was provided a Rx for norco as needed for pain control after sustaining multiple back and neck injuries as well as surgeries.     This medication is a narcotic pain medication. This medication is monitored by the federal [employer] and the [facility]. Narcotic medication is commonly abused and many patients can become addicted to this medication.    There are problems with narcotic pain medication including addiction, dependence and tolerance to the pain relief. We discussed appropriate and expected levels of pain.     Narcotics have side effects, including the common side effect of nausea/vomiting if taken on an empty stomach and the problem of constipation that occur with narcotic use.     Narcotics can impair your judgement and therefore you should never drive or operate heavy machinery while using these medications. Treat this medication similar to alcohol, do not take this and drive or you could injure yourself or others.    As an alternative drug, we encourage the use of tylenol and/or NSAIDs (non-steroidal anti-inflammatory drugs) for additional pain relief and as an anti-inflammatory. NSAIDS are drugs such as aleve (naproxen) and motrin (ibuprofen). Narcotics  and NSAIDs work differently and can complement each other well after surgery.    In general, NSAIDs and tylenol are much safer drugs than narcotics. Tylenol (acetaminophen) should not be taken with narcotics as most prescribed narcotics already contain tylenol. The maximum dose of tylenol is 4 grams per day, so this can be used if your narcotic pain medication is used sparingly. Pay close attention to the dosages and ask your pharmacist about the dosage of tylenol.  Some patients can experience GI irritation from NSAID use and patients with kidney problems, previous bleeding from the GI tract, gastric bypass, or certain other conditions may not be able to take these over the counter medications.      Patient has completed prescribing agreement detailing terms of continued prescribing of controlled substances, including monitoring ALMA reports, urine drug screening yearly an random drug screens to monitor patients compliance to treatment plan, and pill counts if necessary. The patient is aware that inappropriate use will result in cessation of prescribing such medications.    Toxassure:  I have ordered a urine drug screen to monitor patients predisposition to and patterns of drug use/misuse in order to establish and maintain the safe and effective use of analgesics in the treatment of chronic pain    ALMA report has been reviewed by: Charlene Huntley, [place], APRN.  The report was scanned into the patient's chart.      -     Pt is aware of the potential for addiction and dependence.    Addiction was discussed.  The  Risks, benefits and alternative options of drug therapy discussed.  It was reinforced about the risks and benefits of opioids.  It was reinforced that patient may not call early for medication, may not ask for increase in the number of pills given monthly or increase in dosage of medication, may not jump around to different pharmacies or providers and may not receive any narcotics from other providers  including ER, or the contract will be broken and narcotics will no longer be prescribed from this facility if any of these criteria has been broken.    Last Dose was: today    Last Fill was:  July  Date of last ALMA: today    Date of last UDS: on file      5. Nausea  -     ondansetron ODT (Zofran ODT) 4 MG disintegrating tablet; Place 1 tablet on the tongue Every 8 (Eight) Hours As Needed for Nausea or Vomiting.  Dispense: 40 tablet; Refill: 0         I spent 15 minutes caring for Adela on this date of service. This time includes time spent by me in the following activities:preparing for the visit, performing a medically appropriate examination and/or evaluation , counseling and educating the patient/family/caregiver, ordering medications, tests, or procedures, documenting information in the medical record and care coordination     Follow Up    Return in about 1 month (around 9/30/2021) for Recheck.  Patient was given instructions and counseling regarding her condition or for health maintenance advice. Please see specific information pulled into the AVS if appropriate.     Electronically signed by Charlene Huntley, [place], APRN, 08/30/21, 10:54 AM CDT.

## 2021-09-02 DIAGNOSIS — F41.8 ANXIETY ASSOCIATED WITH DEPRESSION: ICD-10-CM

## 2021-09-03 RX ORDER — DULOXETIN HYDROCHLORIDE 60 MG/1
CAPSULE, DELAYED RELEASE ORAL
Qty: 30 CAPSULE | Refills: 2 | Status: SHIPPED | OUTPATIENT
Start: 2021-09-03 | End: 2022-04-04

## 2021-09-20 ENCOUNTER — TELEPHONE (OUTPATIENT)
Dept: FAMILY MEDICINE CLINIC | Facility: CLINIC | Age: 56
End: 2021-09-20

## 2021-09-20 NOTE — TELEPHONE ENCOUNTER
PA denied for alprazolam- needs explanation and script for naloxone for approval review.   Pt paid for last script out of pocket. Pharmacy said she was fine with it. Please advise.

## 2021-10-01 ENCOUNTER — OFFICE VISIT (OUTPATIENT)
Dept: FAMILY MEDICINE CLINIC | Facility: CLINIC | Age: 56
End: 2021-10-01

## 2021-10-01 VITALS
BODY MASS INDEX: 37.56 KG/M2 | HEART RATE: 89 BPM | OXYGEN SATURATION: 98 % | HEIGHT: 63 IN | TEMPERATURE: 97.7 F | RESPIRATION RATE: 18 BRPM | DIASTOLIC BLOOD PRESSURE: 72 MMHG | SYSTOLIC BLOOD PRESSURE: 118 MMHG | WEIGHT: 212 LBS

## 2021-10-01 DIAGNOSIS — G89.4 CHRONIC PAIN SYNDROME: ICD-10-CM

## 2021-10-01 DIAGNOSIS — M06.9 RHEUMATOID ARTHRITIS INVOLVING MULTIPLE SITES, UNSPECIFIED WHETHER RHEUMATOID FACTOR PRESENT (HCC): ICD-10-CM

## 2021-10-01 DIAGNOSIS — M51.36 DDD (DEGENERATIVE DISC DISEASE), LUMBAR: ICD-10-CM

## 2021-10-01 DIAGNOSIS — F41.0 PANIC ATTACK: ICD-10-CM

## 2021-10-01 DIAGNOSIS — K21.9 GASTROESOPHAGEAL REFLUX DISEASE WITHOUT ESOPHAGITIS: ICD-10-CM

## 2021-10-01 DIAGNOSIS — F41.9 ANXIETY: ICD-10-CM

## 2021-10-01 DIAGNOSIS — F33.1 MODERATE EPISODE OF RECURRENT MAJOR DEPRESSIVE DISORDER (HCC): ICD-10-CM

## 2021-10-01 DIAGNOSIS — F41.0 PANIC ATTACKS: ICD-10-CM

## 2021-10-01 DIAGNOSIS — F11.90 NARCOTIC DRUG USE: Primary | ICD-10-CM

## 2021-10-01 PROCEDURE — 99214 OFFICE O/P EST MOD 30 MIN: CPT | Performed by: NURSE PRACTITIONER

## 2021-10-01 RX ORDER — HYDROCODONE BITARTRATE AND ACETAMINOPHEN 10; 325 MG/1; MG/1
1 TABLET ORAL EVERY 6 HOURS PRN
Qty: 120 TABLET | Refills: 0 | Status: SHIPPED | OUTPATIENT
Start: 2021-10-01 | End: 2021-11-01 | Stop reason: SDUPTHER

## 2021-10-01 RX ORDER — PREGABALIN 75 MG/1
75 CAPSULE ORAL 2 TIMES DAILY
Qty: 180 CAPSULE | Refills: 1 | Status: SHIPPED | OUTPATIENT
Start: 2021-10-01 | End: 2022-04-04 | Stop reason: SDUPTHER

## 2021-10-01 RX ORDER — ALPRAZOLAM 0.5 MG/1
0.5 TABLET ORAL 3 TIMES DAILY PRN
Qty: 90 TABLET | Refills: 0 | Status: SHIPPED | OUTPATIENT
Start: 2021-10-01 | End: 2021-11-01 | Stop reason: SDUPTHER

## 2021-10-01 RX ORDER — NALOXONE HYDROCHLORIDE 0.4 MG/ML
0.4 INJECTION, SOLUTION INTRAMUSCULAR; INTRAVENOUS; SUBCUTANEOUS
Qty: 1 ML | Refills: 3 | Status: SHIPPED | OUTPATIENT
Start: 2021-10-01

## 2021-10-01 RX ORDER — OMEPRAZOLE 40 MG/1
40 CAPSULE, DELAYED RELEASE ORAL DAILY
Qty: 30 CAPSULE | Refills: 5 | Status: SHIPPED | OUTPATIENT
Start: 2021-10-01 | End: 2022-03-04

## 2021-10-01 NOTE — PATIENT INSTRUCTIONS
Chronic Pain, Adult  Chronic pain is a type of pain that lasts or keeps coming back for at least 3-6 months. You may have headaches, pain in the abdomen, or pain in other areas of the body. Chronic pain may be related to an illness, such as fibromyalgia or complex regional pain syndrome. Chronic pain may also be related to an injury or a health condition. Sometimes, the cause of chronic pain is not known.  Chronic pain can make it hard for you to do daily activities. If not treated, chronic pain can lead to anxiety and depression. Treatment depends on the cause and severity of your pain. You may need to work with a pain specialist to come up with a treatment plan. The plan may include medicine, counseling, and physical therapy. Many people benefit from a combination of two or more types of treatment to control their pain.  Follow these instructions at home:  Medicines  · Take over-the-counter and prescription medicines only as told by your health care provider.  · Ask your health care provider if the medicine prescribed to you:  ? Requires you to avoid driving or using machinery.  ? Can cause constipation. You may need to take these actions to prevent or treat constipation:  § Drink enough fluid to keep your urine pale yellow.  § Take over-the-counter or prescription medicines.  § Eat foods that are high in fiber, such as beans, whole grains, and fresh fruits and vegetables.  § Limit foods that are high in fat and processed sugars, such as fried or sweet foods.  Treatment plan  Follow your treatment plan as told by your health care provider. This may include:  · Gentle, regular exercise.  · Eating a healthy diet that includes foods such as vegetables, fruits, fish, and lean meats.  · Cognitive or behavioral therapy that changes the way you think or act in response to the pain. This may help improve how you feel.  · Working with a physical therapist.  · Meditation, yoga, acupuncture, or massage therapy.  · Aroma,  color, light, or sound therapy.  · Local electrical stimulation. The electrical pulses help to relieve pain by temporarily stopping the nerve impulses that cause you to feel pain.  · Injections. These deliver numbing or pain-relieving medicines into the spine or the area of pain.    Lifestyle    · Ask your health care provider whether you should keep a pain diary. Your health care provider will tell you what information to write in the diary. This may include when you have pain, what the pain feels like, and how medicines and other behaviors or treatments help to reduce the pain.  · Consider talking with a mental health care provider about how to manage chronic pain.  · Consider joining a chronic pain support group.  · Try to control or lower your stress levels. Talk with your health care provider about ways to do this.    General instructions  · Learn as much as you can about how to manage your chronic pain. Ask your health care provider if an intensive pain rehabilitation program or a chronic pain specialist would be helpful.  · Check your pain level as told by your health care provider. Ask your health care provider if you should use a pain scale.  · It is up to you to get the results of any tests that were done. Ask your health care provider, or the department that is doing the tests, when your results will be ready.  · Keep all follow-up visits as told by your health care provider. This is important.  Contact a health care provider if:  · Your pain gets worse, or you have new pain.  · You have trouble sleeping.  · You have trouble doing your normal activities.  · Your pain is not controlled with treatment.  · You have side effects from pain medicine.  · You feel weak.  · You notice any other changes that show that your condition is getting worse.  Get help right away if:  · You lose feeling or have numbness in your body.  · You lose control of bowel or bladder function.  · Your pain suddenly gets much  worse.  · You develop shaking or chills.  · You develop confusion.  · You develop chest pain.  · You have trouble breathing or shortness of breath.  · You pass out.  · You have thoughts about hurting yourself or others.  If you ever feel like you may hurt yourself or others, or have thoughts about taking your own life, get help right away. Go to your nearest emergency department or:  · Call your local emergency services (911 in the [place]  · Call a suicide crisis helpline, such as the National Suicide Prevention Lifeline at 1-312.536.1024. This is open 24 hours a day in the [place]  · Text the Crisis Text Line at 051517 (in the [place]  Summary  · Chronic pain is a type of pain that lasts or keeps coming back for at least 3-6 months.  · Chronic pain may be related to an illness, injury, or other health condition. Sometimes, the cause of chronic pain is not known.  · Treatment depends on the cause and severity of your pain.  · Many people benefit from a combination of two or more types of treatment to control their pain.  · Follow your treatment plan as told by your health care provider.  This information is not intended to replace advice given to you by your health care provider. Make sure you discuss any questions you have with your health care provider.  Document Revised: 09/03/2020 Document Reviewed: 09/03/2020  Elsevier Patient Education © 2021 [employer]

## 2021-10-01 NOTE — PROGRESS NOTES
"Chief Complaint  PTSD (follow up)    Subjective          Adela Arauz presents to [facility]  Pt here for follow up for chronic problems of chronic pain, PTSD, Depression, GERD, osteoporosis, neuropathy.  Her insurance as denied alprazolam \"needs explanation and script for naloxone for approval review.  (Pt paid for last script out of pocket). Pharmacy said she was fine with it).  Age 50 brother called her and said he was going to kill self and she went over to take to him and she found him dead, he had shot himself in the head with a 357.  As other health problems have afflicted her, it became harder and harder to control those panic/anxiety attacks and depression.  Currently on sertraline and cybalta which helps but she requests the alprazolam also.  Today with discussed the naloxone pen and how it is used. Prior  To this medication combination she had tried multiple other anti-depressants however, records are unavailable because that drs office closed 8 yrs ago and no records are available.  Her insurance is refusing to pay for her medication until they have records.  Xanax is the only medi cation that has kept her controlled.  Insurance also required a naloxone pen  Script which is written today, and discussion held on how to use it, when to use it, and contraindications.  I expressed to the pt that this is not a get of free [facility] card where she takes extra and uses the pain. .    Depression  Visit Type: follow-up  Patient presents with the following symptoms: decreased concentration, depressed mood, fatigue, insomnia, irritability and nervousness/anxiety.  Patient is not experiencing: chest pain, choking sensation, excessive worry, feelings of hopelessness, malaise, memory impairment, muscle tension, nausea, psychomotor agitation, psychomotor retardation, restlessness and shortness of breath.  Frequency of symptoms: most days   Severity: moderate   Sleep quality: " fair  Nighttime awakenings: several  Patient has a history of: anxiety/panic attacks    Pain  This is a chronic problem. The current episode started more than 1 year ago. The problem occurs constantly. The problem has been unchanged. Pertinent negatives include no arthralgias, change in bowel habit, chest pain, diaphoresis, headaches, joint swelling, myalgias, neck pain, swollen glands, urinary symptoms, vertigo or visual change. The symptoms are aggravated by bending, walking, twisting, sneezing, standing and exertion. She has tried oral narcotics, rest, sleep, walking, relaxation and position changes for the symptoms. The treatment provided mild relief.   Panic Attack  This is a chronic problem. The current episode started more than 1 year ago. The problem occurs constantly. The problem has been gradually worsening. Pertinent negatives include no arthralgias, change in bowel habit, chest pain, diaphoresis, headaches, joint swelling, myalgias, neck pain, swollen glands, urinary symptoms, vertigo or visual change. Nothing aggravates the symptoms. She has tried nothing for the symptoms. The treatment provided mild relief.   Neurologic Problem  The patient's pertinent negatives include no focal sensory loss, focal weakness, memory loss, near-syncope or visual change. This is a chronic problem. The current episode started more than 1 year ago. The neurological problem developed gradually. The problem is unchanged. There was no focality noted. Pertinent negatives include no auditory change, back pain, bladder incontinence, bowel incontinence, chest pain, diaphoresis, dizziness, headaches, neck pain, shortness of breath or vertigo. Past treatments include neck support, sleep, walking, position change, medication and bed rest. The treatment provided no relief.     The following portions of the patient's history were reviewed and updated as appropriate: allergies, current medications, past family history, past medical  "history, past social history, past surgical history and problem list.    Chronic problems: RA treated by rheumatology, anxiety stable with alprazolam, depression stable with duloxetine, iron def anemia stable with ferrous sulfate, vit d def stable with otc vit d, seasonal allergies stable with flonase, osteoporosis stable with boniva, lower extremity edema stable with furosemide, RA stable with arava, gerd stable with omeprazole, neuropathy stable with pregabain, chronic pain on norco       Objective   Vital Signs:   /72 (BP Location: Left arm, Patient Position: Sitting, Cuff Size: Adult)   Pulse 89   Temp 97.7 °F (36.5 °C) (Infrared)   Resp 18   Ht 160 cm (63\") Comment: per patient  Wt 96.2 kg (212 lb)   SpO2 98%   BMI 37.55 kg/m²     Physical Exam  Vitals and nursing note reviewed.   Constitutional:       General: She is awake.      Appearance: Normal appearance. She is well-developed and well-groomed.   HENT:      Head: Normocephalic and atraumatic.      Right Ear: Hearing, tympanic membrane, ear canal and external ear normal.      Left Ear: Hearing, tympanic membrane, ear canal and external ear normal.      Nose: Nose normal.      Mouth/Throat:      Lips: Pink.      Pharynx: Oropharynx is clear.   Cardiovascular:      Rate and Rhythm: Normal rate and regular rhythm.      Heart sounds: Normal heart sounds.   Pulmonary:      Effort: Pulmonary effort is normal.      Breath sounds: Normal breath sounds and air entry.   Musculoskeletal:      Right shoulder: Decreased range of motion.      Left shoulder: Decreased range of motion.      Right upper arm: Normal.      Left upper arm: Normal.      Right wrist: Tenderness present. Decreased range of motion.      Left wrist: Tenderness present. Decreased range of motion.      Right hand: Tenderness present. Decreased range of motion.      Left hand: Tenderness present. Decreased range of motion.      Right lower leg: No edema.      Left lower leg: No edema. "   Lymphadenopathy:      Head:      Right side of head: No submental, submandibular or tonsillar adenopathy.      Left side of head: No submental, submandibular or tonsillar adenopathy.   Skin:     General: Skin is warm and dry.   Neurological:      Mental Status: She is alert and oriented to person, place, and time.      Cranial Nerves: Cranial nerves are intact.      Motor: Motor function is intact.      Coordination: Coordination is intact.      Gait: Gait is intact.   Psychiatric:         Attention and Perception: Attention and perception normal.         Mood and Affect: Mood and affect normal.         Speech: Speech normal.         Behavior: Behavior normal. Behavior is cooperative.         Thought Content: Thought content normal.         Cognition and Memory: Cognition and memory normal.         Judgment: Judgment normal.        Result Review :                 Assessment and Plan    Diagnoses and all orders for this visit:    1. Narcotic drug use (Primary)  -     naloxone (NARCAN) 0.4 MG/ML injection; Infuse 1 mL into a venous catheter Every 5 (Five) Minutes As Needed for Opioid Reversal.  Dispense: 1 mL; Refill: 3    2. Gastroesophageal reflux disease without esophagitis  -     omeprazole (priLOSEC) 40 MG capsule; Take 1 capsule by mouth Daily.  Dispense: 30 capsule; Refill: 5    3. Panic attack  -     ALPRAZolam (Xanax) 0.5 MG tablet; Take 1 tablet by mouth 3 (Three) Times a Day As Needed for Anxiety.  Dispense: 90 tablet; Refill: 0    4. Chronic pain syndrome  -     HYDROcodone-acetaminophen (Norco)  MG per tablet; Take 1 tablet by mouth Every 6 (Six) Hours As Needed for Moderate Pain .  Dispense: 120 tablet; Refill: 0  -     pregabalin (LYRICA) 75 MG capsule; Take 1 capsule by mouth 2 (Two) Times a Day.  Dispense: 180 capsule; Refill: 1    5. DDD (degenerative disc disease), lumbar  -     HYDROcodone-acetaminophen (Norco)  MG per tablet; Take 1 tablet by mouth Every 6 (Six) Hours As Needed for  Moderate Pain .  Dispense: 120 tablet; Refill: 0    6. Anxiety  -     sertraline (ZOLOFT) 50 MG tablet; Take 1 tablet by mouth Every Night.  Dispense: 90 tablet; Refill: 1    7. Panic attacks  -     sertraline (ZOLOFT) 50 MG tablet; Take 1 tablet by mouth Every Night.  Dispense: 90 tablet; Refill: 1    8. Moderate episode of recurrent major depressive disorder (HCC)  -     sertraline (ZOLOFT) 50 MG tablet; Take 1 tablet by mouth Every Night.  Dispense: 90 tablet; Refill: 1    9. Rheumatoid arthritis involving multiple sites, unspecified whether rheumatoid factor present (HCC)  -     pregabalin (LYRICA) 75 MG capsule; Take 1 capsule by mouth 2 (Two) Times a Day.  Dispense: 180 capsule; Refill: 1      I spent 14 minutes caring for Adela on this date of service. This time includes time spent by me in the following activities:preparing for the visit, performing a medically appropriate examination and/or evaluation , counseling and educating the patient/family/caregiver, ordering medications, tests, or procedures, documenting information in the medical record and care coordination  Follow Up   Return in about 1 month (around 11/1/2021) for Recheck  chronic.  Patient was given instructions and counseling regarding her condition or for health maintenance advice. Please see specific information pulled into the AVS if appropriate.     Electronically signed by Charlene Huntley, [place], APRN, 10/05/21, 8:36 AM CDT.

## 2021-10-15 ENCOUNTER — LAB (OUTPATIENT)
Dept: LAB | Facility: HOSPITAL | Age: 56
End: 2021-10-15

## 2021-10-15 DIAGNOSIS — Z01.818 PREOP TESTING: ICD-10-CM

## 2021-10-15 LAB — SARS-COV-2 ORF1AB RESP QL NAA+PROBE: NOT DETECTED

## 2021-10-15 PROCEDURE — U0004 COV-19 TEST NON-CDC HGH THRU: HCPCS

## 2021-10-15 PROCEDURE — C9803 HOPD COVID-19 SPEC COLLECT: HCPCS

## 2021-10-17 PROBLEM — J43.8 OTHER EMPHYSEMA (HCC): Status: ACTIVE | Noted: 2021-10-17

## 2021-10-17 NOTE — PROGRESS NOTES
"Background:  Pt with multiple lung nodules, RA, panic attacks.  Alpha 1 SZ, follicular lymphoma   Chief Complaint  mulitple lung nodules < 6mm identified 05/2018    Subjective    History of Present Illness       Adela Arauz presents to [facility].  She started smoking after back surgery, trying to stop again.  She has RA and AAT carrier state. No increased respiratory complaints.  Here for follow up these problems.  PFT today showed minimal decline since 2 years ago. No wheezing.  She had to sit around for several months after surgery, has been using incentive spirometer.  She has not had vaccine for covid and has been anemic.     Objective     Vital Signs:   /80   Pulse 85   Ht 160 cm (63\")   Wt 97.5 kg (215 lb)   SpO2 98% Comment: RA  BMI 38.09 kg/m²   Physical Exam  Constitutional:       General: She is not in acute distress.     Appearance: She is well-developed. She is not ill-appearing or toxic-appearing.   HENT:      Head: Atraumatic.   Eyes:      General: No scleral icterus.     Conjunctiva/sclera: Conjunctivae normal.   Cardiovascular:      Rate and Rhythm: Normal rate and regular rhythm.      Heart sounds: S1 normal and S2 normal.   Pulmonary:      Effort: Pulmonary effort is normal.      Breath sounds: Normal breath sounds. No wheezing or rhonchi.   Abdominal:      General: There is no distension.   Musculoskeletal:         General: No deformity.      Cervical back: Neck supple.   Skin:     Coloration: Skin is not pale.      Findings: No rash.   Neurological:      Mental Status: She is alert.        Result Review  Data Reviewed:{ Labs  Result Review  Imaging  Media :23}     CT Chest With Contrast (11/12/2020 12:24) tiny nodules stable since 2019, mild emphysema  CT Chest With Contrast (05/14/2021 10:21) stable tiny nodules    PFT Values        Some values may be hidden. Unless noted otherwise, only the newest values recorded on each " date are displayed.         Old Values PFT Results 2/19/20 10/18/21   No data to display.      Pre Drug PFT Results 2/19/20 10/18/21    111   FEV1 109 101   FEF 25-75% 95 72   FEV1/FVC 76.6 74.18      Post Drug PFT Results 2/19/20 10/18/21   No data to display.      Other Tests PFT Results 2/19/20 10/18/21   No data to display.                      Assessment and Plan  {CC Problem List  Visit Diagnosis  ROS  Review (Popup)  Health Maintenance  Quality  BestPractice  Medications  SmartSets  SnapShot Encounters  Media :23}   Problem List Items Addressed This Visit        Pulmonary Problems    Multiple lung nodules < 6mm identified 5/2018 - Primary    Overview            AAT (alpha-1-antitrypsin) deficiency (HCC)    Relevant Orders    Pulmonary Function Test      Other Visit Diagnoses     Preop testing        Relevant Orders    COVID PRE-OP / PRE-PROCEDURE SCREENING ORDER (NO ISOLATION) - Swab, Nasal Cavity (Completed)      Nodules were stable for 18 months as of this year.  No need for follow up at this point per Fleischner guidelines.   Pt has dx of other emphysema.  I have added it as a visit diagnosis, but for reasons well beyond my understanding, this diagnosis does not show up in dx list.  Will continue to follow clinically off meds.  She is an AAT SZ.  At risk for decline warranting treatment at some point  Currently PFT are still supranormal but slightly down from baseline  Repeat spirometry in a few months.  Down a little today, may be related to having started smoking again.  We will need to continue to follow.  Recommend corona vaccine especially with hx RA, immune suppression, lung disease.    Follow Up {Instructions Charge Capture  Follow-up Communications :23}   Return in about 6 months (around 4/18/2022) for Spirometry and DLCO.  Patient was given instructions and counseling regarding her condition or for health maintenance advice. Please see specific information pulled into the AVS  if appropriate.    Electronically signed by Yan Seay MD, 10/18/2021, 12:15 CDT

## 2021-10-18 ENCOUNTER — OFFICE VISIT (OUTPATIENT)
Dept: PULMONOLOGY | Facility: CLINIC | Age: 56
End: 2021-10-18

## 2021-10-18 VITALS
WEIGHT: 215 LBS | BODY MASS INDEX: 38.09 KG/M2 | HEIGHT: 63 IN | DIASTOLIC BLOOD PRESSURE: 80 MMHG | SYSTOLIC BLOOD PRESSURE: 122 MMHG | OXYGEN SATURATION: 98 % | HEART RATE: 85 BPM

## 2021-10-18 DIAGNOSIS — M06.9 RHEUMATOID ARTHRITIS INVOLVING MULTIPLE SITES, UNSPECIFIED WHETHER RHEUMATOID FACTOR PRESENT (HCC): ICD-10-CM

## 2021-10-18 DIAGNOSIS — R91.8 MULTIPLE LUNG NODULES: Primary | ICD-10-CM

## 2021-10-18 DIAGNOSIS — Z01.818 PREOP TESTING: ICD-10-CM

## 2021-10-18 DIAGNOSIS — J43.8 OTHER EMPHYSEMA (HCC): ICD-10-CM

## 2021-10-18 DIAGNOSIS — E88.01 AAT (ALPHA-1-ANTITRYPSIN) DEFICIENCY (HCC): ICD-10-CM

## 2021-10-18 PROCEDURE — 99214 OFFICE O/P EST MOD 30 MIN: CPT | Performed by: INTERNAL MEDICINE

## 2021-10-18 PROCEDURE — 94010 BREATHING CAPACITY TEST: CPT | Performed by: INTERNAL MEDICINE

## 2021-10-18 RX ORDER — NALOXONE HYDROCHLORIDE 4 MG/.1ML
SPRAY NASAL
COMMUNITY
Start: 2021-10-01

## 2021-10-18 NOTE — PROCEDURES
Pulmonary Function Test  Performed by: Melissa Galan, RRT  Authorized by: Yan Seay MD      Pre Drug % Predicted    FVC: 111%   FEV1: 101%   FEF 25-75%: 72%   FEV1/FVC: 74.18%    Interpretation   Spirometry   Spirometry shows normal results. There is reduced midflow suggesting small airway/airflow obstruction.

## 2021-10-28 DIAGNOSIS — D64.9 ANEMIA, UNSPECIFIED TYPE: ICD-10-CM

## 2021-10-28 RX ORDER — FERROUS SULFATE TAB EC 324 MG (65 MG FE EQUIVALENT) 324 (65 FE) MG
TABLET DELAYED RESPONSE ORAL
Qty: 90 TABLET | Refills: 1 | OUTPATIENT
Start: 2021-10-28

## 2021-11-01 ENCOUNTER — OFFICE VISIT (OUTPATIENT)
Dept: FAMILY MEDICINE CLINIC | Facility: CLINIC | Age: 56
End: 2021-11-01

## 2021-11-01 VITALS
OXYGEN SATURATION: 99 % | BODY MASS INDEX: 38.09 KG/M2 | TEMPERATURE: 96.7 F | HEART RATE: 88 BPM | WEIGHT: 215 LBS | RESPIRATION RATE: 18 BRPM | DIASTOLIC BLOOD PRESSURE: 87 MMHG | HEIGHT: 63 IN | SYSTOLIC BLOOD PRESSURE: 117 MMHG

## 2021-11-01 VITALS
RESPIRATION RATE: 18 BRPM | HEIGHT: 63 IN | TEMPERATURE: 96.7 F | WEIGHT: 215 LBS | HEART RATE: 88 BPM | BODY MASS INDEX: 38.09 KG/M2 | OXYGEN SATURATION: 99 % | DIASTOLIC BLOOD PRESSURE: 87 MMHG | SYSTOLIC BLOOD PRESSURE: 117 MMHG

## 2021-11-01 DIAGNOSIS — Z00.01 ENCOUNTER FOR WELL ADULT EXAM WITH ABNORMAL FINDINGS: Primary | ICD-10-CM

## 2021-11-01 DIAGNOSIS — F41.0 PANIC ATTACK: Primary | ICD-10-CM

## 2021-11-01 DIAGNOSIS — M51.36 DDD (DEGENERATIVE DISC DISEASE), LUMBAR: ICD-10-CM

## 2021-11-01 DIAGNOSIS — Z23 IMMUNIZATION DUE: ICD-10-CM

## 2021-11-01 DIAGNOSIS — G89.4 CHRONIC PAIN SYNDROME: ICD-10-CM

## 2021-11-01 PROCEDURE — 99214 OFFICE O/P EST MOD 30 MIN: CPT | Performed by: NURSE PRACTITIONER

## 2021-11-01 PROCEDURE — 90471 IMMUNIZATION ADMIN: CPT | Performed by: NURSE PRACTITIONER

## 2021-11-01 PROCEDURE — 99396 PREV VISIT EST AGE 40-64: CPT | Performed by: NURSE PRACTITIONER

## 2021-11-01 PROCEDURE — 90686 IIV4 VACC NO PRSV 0.5 ML IM: CPT | Performed by: NURSE PRACTITIONER

## 2021-11-01 RX ORDER — ALPRAZOLAM 0.5 MG/1
0.5 TABLET ORAL 3 TIMES DAILY PRN
Qty: 90 TABLET | Refills: 0 | Status: SHIPPED | OUTPATIENT
Start: 2021-11-01 | End: 2021-12-01 | Stop reason: SDUPTHER

## 2021-11-01 RX ORDER — HYDROCODONE BITARTRATE AND ACETAMINOPHEN 10; 325 MG/1; MG/1
1 TABLET ORAL EVERY 6 HOURS PRN
Qty: 120 TABLET | Refills: 0 | Status: SHIPPED | OUTPATIENT
Start: 2021-11-01 | End: 2021-12-01 | Stop reason: SDUPTHER

## 2021-11-01 NOTE — PROGRESS NOTES
Chief Complaint   Patient presents with   • Annual Exam      Subjective    History of Present Illness:  Adela Arauz is a 56 y.o. female who presents for an annual Wellness Visit.    The following portions of the patient's history were reviewed and   updated as appropriate: allergies, current medications, past family history, past medical history, past social history, past surgical history and problem list.    Compared to one year ago, the patient feels her physical   health is better.    Compared to one year ago, the patient feels her mental   health is better.    The following portions of the patient's history were reviewed and updated as appropriate: allergies, current medications, past family history, past medical history, past social history, past surgical history and problem list.    Chronic problems:  PTSD, anxiety and depression stable with alprazolam, duloxetine and sertraline, vit d def stable with vitamin D3, edmea stable with furosemide, iron def stable with ferrous sulfate, osteoporosis, RA and chronic pain stable with boniva, arava, mobic, lyrica and norco, fever blisters stable with valtrex, gerd stable with omeprazole, and narcan for on hand accidental drug overdose     Recent Hospitalizations:  This patient has had a [facility] admission record on file within the last 365 days.    Current Medical Providers:  Patient Care Team:  Charlene Huntley, DNP, APRN as PCP - General (Family Medicine)  Yan Seay MD as Consulting Physician (Pulmonary Disease)  Ramírez Marie MD as Consulting Physician (Hematology and Oncology)    Outpatient Medications Prior to Visit   Medication Sig Dispense Refill   • ALPRAZolam (Xanax) 0.5 MG tablet Take 1 tablet by mouth 3 (Three) Times a Day As Needed for Anxiety. 90 tablet 0   • betamethasone valerate (VALISONE) 0.1 % ointment Apply  topically to the appropriate area as directed 2 (Two) Times a Day. 30 g 0   • Calcium-Magnesium-Vitamin D  500-250-200 MG-MG-UNIT tablet Take 600 tablets by mouth 2 (Two) Times a Day.     • cholecalciferol (VITAMIN D3) 1000 units tablet Take 1,000 Units by mouth Daily.     • DULoxetine (CYMBALTA) 60 MG capsule TAKE ONE CAPSULE BY MOUTH DAILY. 30 capsule 2   • ferrous sulfate 324 (65 Fe) MG tablet delayed-release EC tablet Take 1 tablet by mouth Daily With Breakfast. 90 tablet 1   • fluticasone (FLONASE) 50 MCG/ACT nasal spray 2 sprays into the nostril(s) as directed by provider Daily. 1 bottle 3   • furosemide (Lasix) 40 MG tablet Take 1 tablet by mouth Daily. 90 tablet 1   • HYDROcodone-acetaminophen (Norco)  MG per tablet Take 1 tablet by mouth Every 6 (Six) Hours As Needed for Moderate Pain . 120 tablet 0   • ibandronate (BONIVA) 150 MG tablet Take 150 mg by mouth Every 30 (Thirty) Days.     • leflunomide (ARAVA) 20 MG tablet      • meloxicam (MOBIC) 15 MG tablet      • mupirocin (BACTROBAN) 2 % ointment Apply to nostrils and fingernails nighlty x 5 nights same 5 nights monthly x 6 months. 30 g 5   • naloxone (NARCAN) 0.4 MG/ML injection Infuse 1 mL into a venous catheter Every 5 (Five) Minutes As Needed for Opioid Reversal. 1 mL 3   • Narcan 4 MG/0.1ML nasal spray      • omeprazole (priLOSEC) 40 MG capsule Take 1 capsule by mouth Daily. 30 capsule 5   • ondansetron ODT (Zofran ODT) 4 MG disintegrating tablet Place 1 tablet on the tongue Every 8 (Eight) Hours As Needed for Nausea or Vomiting. 40 tablet 0   • polyethylene glycol (MIRALAX) 17 g packet Take 17 g by mouth Daily. 30 packet 5   • pregabalin (LYRICA) 75 MG capsule Take 1 capsule by mouth 2 (Two) Times a Day. 180 capsule 1   • sertraline (ZOLOFT) 50 MG tablet Take 1 tablet by mouth Every Night. 90 tablet 1   • tiZANidine (ZANAFLEX) 4 MG tablet TAKE ONE TABLET BY MOUTH TWICE A DAY AS NEEDED FOR MUSCLE SPASMS 60 tablet 5   • Turmeric 500 MG capsule Take 1 capsule by mouth 2 (Two) Times a Day.     • valACYclovir (VALTREX) 1000 MG tablet Take 1 tablet by  mouth 2 (Two) Times a Day. 270 tablet 1     No facility-administered medications prior to visit.       Opioid medication/s are on active medication list.  and I have evaluated her active treatment plan and pain score trends (see table).  Vitals:    11/01/21 0951   PainSc:   8     I have reviewed the chart for potential of high risk medication and harmful drug interactions in the elderly.      Aspirin is not on active medication list.  Aspirin use is not indicated based on review of current medical condition/s. Risk of harm outweighs potential benefits.  .    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 ([facility])   • Encounter for consultation   • Former smoker   • History of radiation therapy   • Cellulitis of right upper extremity   • Lymphedema of right arm   • Pain in right arm   • Hx of osteomyelitis   • History of lymph node dissection of right axilla   • Morbid obesity due to excess calories ([facility])   • MRSA (methicillin resistant Staphylococcus aureus) infection   • Anxiety   • DDD (degenerative disc disease), lumbar   • Foraminal stenosis due to intervertebral disc disease   • Lumbar stenosis   • Lumbar radiculopathy   • History of lumbar fusion   • Chronic pain   • Degeneration of lumbar intervertebral disc   • Stress fracture of calcaneus   • Plantar fasciitis   • Other emphysema ([facility])     Advance Care Planning  Advance Directive is not on file.  ACP discussion was declined by the patient. Patient does not have an advance directive, declines further assistance.    Review of Systems   Constitutional: Negative.    HENT: Negative.    Eyes: Negative.    Respiratory:  "Negative.    Cardiovascular: Negative for chest pain, palpitations and leg swelling.   Gastrointestinal: Negative.    Genitourinary: Negative.    Musculoskeletal: Positive for arthralgias, back pain and myalgias.   All other systems reviewed and are negative.       Objective    Vitals:    11/01/21 0951   BP: 117/87   BP Location: Right arm   Patient Position: Sitting   Cuff Size: Adult   Pulse: 88   Resp: 18   Temp: 96.7 °F (35.9 °C)   TempSrc: Infrared   SpO2: 99%   Weight: 97.5 kg (215 lb)   Height: 160 cm (63\")   PainSc:   8     BMI Readings from Last 1 Encounters:   11/01/21 38.09 kg/m²   BMI is above normal parameters. Recommendations include: educational material, exercise counseling and nutrition counseling    Does the patient have evidence of cognitive impairment? No    Physical Exam  Vitals and nursing note reviewed.   Constitutional:       General: She is awake.      Appearance: Normal appearance. She is well-developed and well-groomed.   HENT:      Head: Normocephalic and atraumatic.      Right Ear: Hearing, tympanic membrane, ear canal and external ear normal.      Left Ear: Hearing, tympanic membrane, ear canal and external ear normal.      Nose: Nose normal.      Mouth/Throat:      Lips: Pink.      Pharynx: Oropharynx is clear.   Eyes:      General: Lids are normal.      Conjunctiva/sclera: Conjunctivae normal.   Cardiovascular:      Rate and Rhythm: Normal rate and regular rhythm.      Heart sounds: Normal heart sounds.   Pulmonary:      Effort: Pulmonary effort is normal.      Breath sounds: Normal breath sounds and air entry.   Musculoskeletal:      Cervical back: Spasms present. Decreased range of motion.      Thoracic back: Normal.      Lumbar back: Spasms and tenderness present. Decreased range of motion.        Back:       Right hip: Tenderness present. Decreased range of motion.      Left hip: Tenderness present. Decreased range of motion.      Right knee: Decreased range of motion. Tenderness " present.      Left knee: Decreased range of motion. Tenderness present.      Right lower leg: Normal. No edema.      Left lower leg: Normal. No edema.        Legs:    Lymphadenopathy:      Head:      Right side of head: No submental, submandibular or tonsillar adenopathy.      Left side of head: No submental, submandibular or tonsillar adenopathy.   Skin:     General: Skin is warm and dry.   Neurological:      Mental Status: She is alert.      Sensory: Sensation is intact.      Motor: Motor function is intact.      Coordination: Coordination is intact.      Gait: Gait is intact.   Psychiatric:         Attention and Perception: Attention and perception normal.         Mood and Affect: Mood and affect normal.         Speech: Speech normal.         Behavior: Behavior normal. Behavior is cooperative.         Thought Content: Thought content normal.         Cognition and Memory: Cognition and memory normal.         Judgment: Judgment normal.                 HEALTH RISK ASSESSMENT    Smoking Status:  Social History     Tobacco Use   Smoking Status Current Every Day Smoker   • Packs/day: 0.50   • Years: 25.00   • Pack years: 12.50   • Types: Cigarettes   Smokeless Tobacco Never Used     Alcohol Consumption:  Social History     Substance and Sexual Activity   Alcohol Use No     Fall Risk Screen:    [facility] Fall Risk Assessment has not been completed.    Depression Screening:  PHQ-2/PHQ-9 Depression Screening 4/27/2021   Little interest or pleasure in doing things 0   Feeling down, depressed, or hopeless 0   Trouble falling or staying asleep, or sleeping too much 2   Feeling tired or having little energy 0   Poor appetite or overeating 0   Feeling bad about yourself - or that you are a failure or have let yourself or your family down 1   Trouble concentrating on things, such as reading the newspaper or watching television 1   Moving or speaking so slowly that other people could have noticed. Or the opposite - being so fidgety  or restless that you have been moving around a lot more than usual 0   Thoughts that you would be better off dead, or of hurting yourself in some way 0   Total Score 4   If you checked off any problems, how difficult have these problems made it for you to do your work, take care of things at home, or get along with other people? Not difficult at all       Health Habits and Functional and Cognitive Screening:  No flowsheet data found.    Age-appropriate Screening Schedule:  Refer to the list below for future screening recommendations based on patient's age, sex and/or medical conditions. Orders for these recommended tests are listed in the plan section. The patient has been provided with a written plan.    Health Maintenance   Topic Date Due   • DXA SCAN  10/09/2021   • MAMMOGRAM  06/11/2023   • PAP SMEAR  09/25/2023   • TDAP/TD VACCINES (2 - Td or Tdap) 04/20/2027   • INFLUENZA VACCINE  Completed   • ZOSTER VACCINE  Completed              Assessment/Plan   Diagnoses and all orders for this visit:    1. Encounter for well adult exam with abnormal findings (Primary)    2. Chronic pain syndrome        Follow Up:   Return in about 1 year (around 11/1/2022) for Annual physical.     An After Visit Summary    Electronically signed by Charlene Huntley, [place], APRN, 11/15/21, 9:55 AM CST.

## 2021-11-01 NOTE — PROGRESS NOTES
Chief Complaint  Chronic pain syndrome (follow up)    Subjective          Adela Arauz presents to [facility]  Follow up for chronic pain secondary to RA, anxiety and PTSD from finding brother who committed suicide.  Says that pain is the same rating it an 8/10 basically all over, wrists, hands, elbows, shoulders, back, knees.  Says she has good days and bad days and that the xanax and the norco keep her functioning daily     Back Pain  This is a chronic problem. The current episode started more than 1 year ago. The problem occurs intermittently. The problem is unchanged. The pain is present in the lumbar spine and thoracic spine. The quality of the pain is described as aching, shooting and stabbing. The pain does not radiate. The pain is at a severity of 8/10. The pain is severe. The pain is worse during the day. The symptoms are aggravated by bending, twisting, sitting, position, standing and lying down. Stiffness is present all day. Pertinent negatives include no bladder incontinence, bowel incontinence, chest pain, dysuria, fever, paresis, paresthesias, pelvic pain or perianal numbness. Risk factors include menopause and sedentary lifestyle. She has tried analgesics, home exercises, heat, muscle relaxant, NSAIDs and walking for the symptoms. The treatment provided mild relief.     Chronic pain  This is a chronic problem. The current episode started more than 1 yr ago.  The problems occurs constantly.  The problem is worsening.  The pain is present all over her body, hands, wrists, elbows, back, neck hips, knees and legs.  Rates the pain 8/10 and worse during the day.  Pain is achy and there all the time.  The pain does not radiate. Pertinent negatives include no bladder incontinence, no bowel changes.  Worsens with any activity, but does use heating pad that helps.  She has tried pain meds, muscle relaxers, RA medications, muscle relaxant and NSAIDS with little improvement  "    The following portions of the patient's history were reviewed and updated as appropriate: allergies, current medications, past family history, past medical history, past social history, past surgical history and problem list.    Chronic problems: anxiety and ptsd stable with alprazolam, duloxetine and sertraline, vit d def stable with cholecqalciferol, anemia stable with ferrous sulfate, edema stable with furosemide, chronic pain stable with norco, RA stable with boniva and arava and meloxicam, gerd stable with omeprazole, and neuropathy stable with lyrica.     Objective   Vital Signs:   /87 (BP Location: Left arm, Patient Position: Sitting, Cuff Size: Adult)   Pulse 88   Temp 96.7 °F (35.9 °C) (Infrared)   Resp 18   Ht 160 cm (63\") Comment: per patient  Wt 97.5 kg (215 lb)   SpO2 99%   BMI 38.09 kg/m²     Physical Exam  Vitals and nursing note reviewed.   Constitutional:       General: She is awake.      Appearance: Normal appearance. She is well-developed and well-groomed. She is morbidly obese.   HENT:      Head: Normocephalic and atraumatic.      Right Ear: Hearing normal.      Left Ear: Hearing normal.      Nose: Nose normal.      Mouth/Throat:      Lips: Pink.      Pharynx: Oropharynx is clear.   Cardiovascular:      Rate and Rhythm: Normal rate and regular rhythm.      Heart sounds: Normal heart sounds.   Pulmonary:      Effort: Pulmonary effort is normal.      Breath sounds: Normal breath sounds and air entry.   Abdominal:      General: Abdomen is flat.      Palpations: Abdomen is soft.   Musculoskeletal:      Cervical back: Normal.      Thoracic back: Normal.      Lumbar back: Spasms and tenderness present. Decreased range of motion.        Back:       Right hip: Tenderness present. Decreased range of motion.      Left hip: Tenderness present. Decreased range of motion.      Right knee: Decreased range of motion. Tenderness present.      Left knee: Decreased range of motion. Tenderness " present.      Right lower leg: No deformity, lacerations or tenderness. No edema.      Left lower leg: No deformity, lacerations or tenderness. No edema.        Legs:    Lymphadenopathy:      Head:      Right side of head: No submental, submandibular or tonsillar adenopathy.      Left side of head: No submental, submandibular or tonsillar adenopathy.   Skin:     General: Skin is warm and dry.   Neurological:      Mental Status: She is alert and oriented to person, place, and time.      Sensory: Sensation is intact.      Motor: Motor function is intact.      Coordination: Coordination is intact.      Gait: Gait is intact.   Psychiatric:         Attention and Perception: Attention and perception normal.         Mood and Affect: Mood and affect normal.         Speech: Speech normal.         Behavior: Behavior is cooperative.         Thought Content: Thought content normal.         Cognition and Memory: Cognition normal.         Judgment: Judgment normal.        Result Review :              Assessment and Plan    Diagnoses and all orders for this visit:    1. Panic attack (Primary)  2. Chronic pain syndrome  3. DDD (degenerative disc disease), lumbar  -     ALPRAZolam (Xanax) 0.5 MG tablet; Take 1 tablet by mouth 3 (Three) Times a Day As Needed for Anxiety.  Dispense: 90 tablet; Refill: 0  -     HYDROcodone-acetaminophen (Norco)  MG per tablet; Take 1 tablet by mouth Every 6 (Six) Hours As Needed for Moderate Pain .  Dispense: 120 tablet; Refill: 0    ALMA Report:      As part of this patient's treatment plan, I am prescribing controlled substances. The patient has been made aware of appropriate use of such medications, including potential risk of opiod use, somnolence, limited ability to drive and /or work safely, and potential for dependence or overdose, and opiods should be used sparingly and are not recommended for long term use.  It has also been made clear that these medications are for use by this patient  only, without concomitant use of alcohol or other substances unless prescribed.    The patient was provided a Rx for norco as needed for pain control after sustaining multiple back and neck injuries as well as surgeries.     This medication is a narcotic pain medication. This medication is monitored by the federal [employer] and the [facility]. Narcotic medication is commonly abused and many patients can become addicted to this medication.    There are problems with narcotic pain medication including addiction, dependence and tolerance to the pain relief. We discussed appropriate and expected levels of pain.     Narcotics have side effects, including the common side effect of nausea/vomiting if taken on an empty stomach and the problem of constipation that occur with narcotic use.     Narcotics can impair your judgement and therefore you should never drive or operate heavy machinery while using these medications. Treat this medication similar to alcohol, do not take this and drive or you could injure yourself or others.    As an alternative drug, we encourage the use of tylenol and/or NSAIDs (non-steroidal anti-inflammatory drugs) for additional pain relief and as an anti-inflammatory. NSAIDS are drugs such as aleve (naproxen) and motrin (ibuprofen). Narcotics and NSAIDs work differently and can complement each other well after surgery.    In general, NSAIDs and tylenol are much safer drugs than narcotics. Tylenol (acetaminophen) should not be taken with narcotics as most prescribed narcotics already contain tylenol. The maximum dose of tylenol is 4 grams per day, so this can be used if your narcotic pain medication is used sparingly. Pay close attention to the dosages and ask your pharmacist about the dosage of tylenol.  Some patients can experience GI irritation from NSAID use and patients with kidney problems, previous bleeding from the GI tract, gastric bypass, or certain other conditions may not be able to take  these over the counter medications.      Patient has completed prescribing agreement detailing terms of continued prescribing of controlled substances, including monitoring ALMA reports, urine drug screening yearly an random drug screens to monitor patients compliance to treatment plan, and pill counts if necessary. The patient is aware that inappropriate use will result in cessation of prescribing such medications.    Toxassure:  I have ordered a urine drug screen to monitor patients predisposition to and patterns of drug use/misuse in order to establish and maintain the safe and effective use of analgesics in the treatment of chronic pain    ALMA report has been reviewed by: Charlene Huntley, DNP, APRN. The report was scanned into the patient's chart.      -     Pt is aware of the potential for addiction and dependence.    Addiction was discussed.  The  Risks, benefits and alternative options of drug therapy discussed.  It was reinforced about the risks and benefits of opioids.  It was reinforced that patient may not call early for medication, may not ask for increase in the number of pills given monthly or increase in dosage of medication, may not jump around to different pharmacies or providers and may not receive any narcotics from other providers including ER, or the contract will be broken and narcotics will no longer be prescribed from this facility if any of these criteria has been broken.      Last Dose was: today    Last Fill was:  October   Date of last ALMA: today  Date of last UDS: on several on file     4. Immunization due  -     FluLaval/Fluarix/Fluzone >6 Months ([id number])      I spent 14 minutes caring for Adela on this date of service. This time includes time spent by me in the following activities:preparing for the visit, performing a medically appropriate examination and/or evaluation , counseling and educating the patient/family/caregiver, ordering medications, tests, or procedures,  documenting information in the medical record and care coordination     Follow Up     Return in about 1 month (around 12/1/2021).     Patient was given instructions and counseling regarding her condition or for health maintenance advice. Please see specific information pulled into the AVS if appropriate.     Electronically signed by Charlene Huntley DNP, APRN, 11/10/21, 7:46 AM CST.

## 2021-11-01 NOTE — PATIENT INSTRUCTIONS
"[email or web address]   Generalized Anxiety Disorder, Adult  Generalized anxiety disorder (LYNN) is a mental health condition. Unlike normal worries, anxiety related to LYNN is not triggered by a specific event. These worries do not fade or get better with time. LYNN interferes with relationships, work, and school.  LYNN symptoms can vary from mild to severe. People with severe LYNN can have intense waves of anxiety with physical symptoms that are similar to panic attacks.  What are the causes?  The exact cause of LYNN is not known, but the following are believed to have an impact:  · Differences in natural brain chemicals.  · Genes passed down from parents to children.  · Differences in the way threats are perceived.  · Development during childhood.  · Personality.  What increases the risk?  The following factors may make you more likely to develop this condition:  · Being female.  · Having a family history of anxiety disorders.  · Being very shy.  · Experiencing very stressful life events, such as the death of a loved one.  · Having a very stressful family environment.  What are the signs or symptoms?  People with LYNN often worry excessively about many things in their lives, such as their health and family. Symptoms may also include:  · Mental and emotional symptoms:  ? Worrying excessively about natural disasters.  ? Fear of being late.  ? Difficulty concentrating.  ? Fears that others are judging your performance.  · Physical symptoms:  ? Fatigue.  ? Headaches, muscle tension, muscle twitches, trembling, or feeling shaky.  ? Feeling like your heart is pounding or beating very fast.  ? Feeling out of breath or like you cannot take a deep breath.  ? Having trouble falling asleep or staying asleep, or experiencing restlessness.  ? Sweating.  ? Nausea, diarrhea, or irritable bowel syndrome (IBS).  · Behavioral symptoms:  ? Experiencing erratic moods or irritability.  ? Avoidance of new situations.  ? Avoidance of " people.  ? Extreme difficulty making decisions.  How is this diagnosed?  This condition is diagnosed based on your symptoms and medical history. You will also have a physical exam. Your health care provider may perform tests to rule out other possible causes of your symptoms.  To be diagnosed with LYNN, a person must have anxiety that:  · Is out of his or her control.  · Affects several different aspects of his or her life, such as work and relationships.  · Causes distress that makes him or her unable to take part in normal activities.  · Includes at least three symptoms of LYNN, such as restlessness, fatigue, trouble concentrating, irritability, muscle tension, or sleep problems.  Before your health care provider can confirm a diagnosis of LYNN, these symptoms must be present more days than they are not, and they must last for 6 months or longer.  How is this treated?  This condition may be treated with:  · Medicine. Antidepressant medicine is usually prescribed for long-term daily control. Anti-anxiety medicines may be added in severe cases, especially when panic attacks occur.  · Talk therapy (psychotherapy). Certain types of talk therapy can be helpful in treating LYNN by providing support, education, and guidance. Options include:  ? Cognitive behavioral therapy (CBT). People learn coping skills and self-calming techniques to ease their physical symptoms. They learn to identify unrealistic thoughts and behaviors and to replace them with more appropriate thoughts and behaviors.  ? Acceptance and commitment therapy (ACT). This treatment teaches people how to be mindful as a way to cope with unwanted thoughts and feelings.  ? Biofeedback. This process trains you to manage your body's response (physiological response) through breathing techniques and relaxation methods. You will work with a therapist while machines are used to monitor your physical symptoms.  · Stress management techniques. These include yoga,  meditation, and exercise.  A mental health specialist can help determine which treatment is best for you. Some people see improvement with one type of therapy. However, other people require a combination of therapies.  Follow these instructions at home:  Lifestyle  · Maintain a consistent routine and schedule.  · Anticipate stressful situations. Create a plan, and allow extra time to work with your plan.  · Practice stress management or self-calming techniques that you have learned from your therapist or your health care provider.  General instructions  · Take over-the-counter and prescription medicines only as told by your health care provider.  · Understand that you are likely to have setbacks. Accept this and be kind to yourself as you persist to take better care of yourself.  · Recognize and accept your accomplishments, even if you  them as small.  · Keep all follow-up visits as told by your health care provider. This is important.  Contact a health care provider if:  · Your symptoms do not get better.  · Your symptoms get worse.  · You have signs of depression, such as:  ? A persistently sad or irritable mood.  ? Loss of enjoyment in activities that used to bring you dafne.  ? Change in weight or eating.  ? Changes in sleeping habits.  ? Avoiding friends or family members.  ? Loss of energy for normal tasks.  ? Feelings of guilt or worthlessness.  Get help right away if:  · You have serious thoughts about hurting yourself or others.  If you ever feel like you may hurt yourself or others, or have thoughts about taking your own life, get help right away. Go to your nearest emergency department or:  · Call your local emergency services (031 in the [place]  · Call a suicide crisis helpline, such as the National Suicide Prevention Lifeline at 1-200.397.3679. This is open 24 hours a day in the [place]  · Text the Crisis Text Line at 769574 (in the [place]  Summary  · Generalized anxiety disorder (LYNN) is a mental  health condition that involves worry that is not triggered by a specific event.  · People with LYNN often worry excessively about many things in their lives, such as their health and family.  · LYNN may cause symptoms such as restlessness, trouble concentrating, sleep problems, frequent sweating, nausea, diarrhea, headaches, and trembling or muscle twitching.  · A mental health specialist can help determine which treatment is best for you. Some people see improvement with one type of therapy. However, other people require a combination of therapies.  This information is not intended to replace advice given to you by your health care provider. Make sure you discuss any questions you have with your health care provider.  Document Revised: 10/07/2020 Document Reviewed: 10/07/2020  Elsevier Patient Education © 2021 [employer]

## 2021-11-15 NOTE — PATIENT INSTRUCTIONS
Chronic Back Pain  When back pain lasts longer than 3 months, it is called chronic back pain. Pain may get worse at certain times (flare-ups). There are things you can do at home to manage your pain.  Follow these instructions at home:  Pay attention to any changes in your symptoms. Take these actions to help with your pain:  Managing pain and stiffness         · If told, put ice on the painful area. Your doctor may tell you to use ice for 24-48 hours after the flare-up starts. To do this:  ? Put ice in a plastic bag.  ? Place a towel between your skin and the bag.  ? Leave the ice on for 20 minutes, 2-3 times a day.  · If told, put heat on the painful area. Do this as often as told by your doctor. Use the heat source that your doctor recommends, such as a moist heat pack or a heating pad.  ? Place a towel between your skin and the heat source.  ? Leave the heat on for 20-30 minutes.  ? Take off the heat if your skin turns bright red. This is especially important if you are unable to feel pain, heat, or cold. You may have a greater risk of getting burned.  · Soak in a warm bath. This can help relieve pain.  Activity    · Avoid bending and other activities that make pain worse.  · When standing:  ? Keep your upper back and neck straight.  ? Keep your shoulders pulled back.  ? Avoid slouching.  · When sitting:  ? Keep your back straight.  ? Relax your shoulders. Do not round your shoulders or pull them backward.  · Do not sit or  one place for long periods of time.  · Take short rest breaks during the day. Lying down or standing is usually better than sitting. Resting can help relieve pain.  · When sitting or lying down for a long time, do some mild activity or stretching. This will help to prevent stiffness and pain.  · Get regular exercise. Ask your doctor what activities are safe for you.  · Do not lift anything that is heavier than 10 lb (4.5 kg) or the limit that you are told, until your doctor says that  it is safe.  · To prevent injury when you lift things:  ? Bend your knees.  ? Keep the weight close to your body.  ? Avoid twisting.  · Sleep on a firm mattress. Try lying on your side with your knees slightly bent. If you lie on your back, put a pillow under your knees.    Medicines  · Treatment may include medicines for pain and swelling taken by mouth or put on the skin, prescription pain medicine, or muscle relaxants.  · Take over-the-counter and prescription medicines only as told by your doctor.  · Ask your doctor if the medicine prescribed to you:  ? Requires you to avoid driving or using machinery.  ? Can cause trouble pooping (constipation). You may need to take these actions to prevent or treat trouble pooping:  § Drink enough fluid to keep your pee (urine) pale yellow.  § Take over-the-counter or prescription medicines.  § Eat foods that are high in fiber. These include beans, whole grains, and fresh fruits and vegetables.  § Limit foods that are high in fat and sugars. These include fried or sweet foods.  General instructions  · Do not use any products that contain nicotine or tobacco, such as cigarettes, e-cigarettes, and chewing tobacco. If you need help quitting, ask your doctor.  · Keep all follow-up visits as told by your doctor. This is important.  Contact a doctor if:  · Your pain does not get better with rest or medicine.  · Your pain gets worse, or you have new pain.  · You have a high fever.  · You lose weight very quickly.  · You have trouble doing your normal activities.  Get help right away if:  · One or both of your legs or feet feel weak.  · One or both of your legs or feet lose feeling (have numbness).  · You have trouble controlling when you poop (have a bowel movement) or pee (urinate).  · You have bad back pain and:  ? You feel like you may vomit (nauseous), or you vomit.  ? You have pain in your belly (abdomen).  ? You have shortness of breath.  ? You faint.  Summary  · When back pain  lasts longer than 3 months, it is called chronic back pain.  · Pain may get worse at certain times (flare-ups).  · Use ice and heat as told by your doctor. Your doctor may tell you to use ice after flare-ups.  This information is not intended to replace advice given to you by your health care provider. Make sure you discuss any questions you have with your health care provider.  Document Revised: 01/27/2021 Document Reviewed: 01/27/2021  Elsevier Patient Education © 2021 [employer] Inc[employer]

## 2021-11-17 NOTE — ANESTHESIA POSTPROCEDURE EVALUATION
Patient: Adela Arauz    Procedure Summary     Date: 12/02/20 Room / Location: [facility] OR  /  [facility] OR    Anesthesia Start: 0721 Anesthesia Stop: 0934    Procedures:       ANTERIOR DECOMPRESSION, ANTERIOR LUMBAR INTERBODY FUSION WITH INSTRUMENTATION L5-S1 (N/A Spine Lumbar)      Anterior lumbar interbody fusion of L5-S1 with instrumentation  (N/A Abdomen) Diagnosis: (M54.16)    Surgeon: ALEXIS Dunaway MD; Neptali Rivera DO Provider: Delta Hernandez CRNA    Anesthesia Type: general ASA Status: 2          Anesthesia Type: general    Vitals  Vitals Value Taken Time   /65 12/02/20 1045   Temp 97 °F (36.1 °C) 12/02/20 1045   Pulse 85 12/02/20 1055   Resp 11 12/02/20 1045   SpO2 97 % 12/02/20 1055   Vitals shown include unvalidated device data.        Post Anesthesia Care and Evaluation    Patient location during evaluation: PACU  Patient participation: complete - patient participated  Level of consciousness: awake and alert  Pain management: adequate  Airway patency: patent  Anesthetic complications: No anesthetic complications    Cardiovascular status: acceptable  Respiratory status: acceptable  Hydration status: acceptable    Comments: Blood pressure 130/50, pulse 82, temperature 98.6 °F (37 °C), temperature source Oral, resp. rate 18, last menstrual period 04/01/2014, SpO2 94 %, not currently breastfeeding.    Pt discharged from PACU based on carol score >8  No anesthesia care post op    
Render Risk Assessment In Note?: no
Detail Level: Simple
Comment: Including lesion of concern.
Comment: Including sternum, no worrisome features on exam/dermatocopy. Monitor and rtc with changes.

## 2021-12-01 ENCOUNTER — OFFICE VISIT (OUTPATIENT)
Dept: FAMILY MEDICINE CLINIC | Facility: CLINIC | Age: 56
End: 2021-12-01

## 2021-12-01 VITALS
DIASTOLIC BLOOD PRESSURE: 68 MMHG | HEIGHT: 63 IN | WEIGHT: 219 LBS | BODY MASS INDEX: 38.8 KG/M2 | RESPIRATION RATE: 18 BRPM | HEART RATE: 99 BPM | OXYGEN SATURATION: 98 % | SYSTOLIC BLOOD PRESSURE: 115 MMHG

## 2021-12-01 DIAGNOSIS — R11.0 NAUSEA: ICD-10-CM

## 2021-12-01 DIAGNOSIS — M51.36 DDD (DEGENERATIVE DISC DISEASE), LUMBAR: ICD-10-CM

## 2021-12-01 DIAGNOSIS — G89.4 CHRONIC PAIN SYNDROME: ICD-10-CM

## 2021-12-01 DIAGNOSIS — F41.0 PANIC ATTACK: ICD-10-CM

## 2021-12-01 PROCEDURE — 99214 OFFICE O/P EST MOD 30 MIN: CPT | Performed by: NURSE PRACTITIONER

## 2021-12-01 RX ORDER — ONDANSETRON 4 MG/1
4 TABLET, ORALLY DISINTEGRATING ORAL EVERY 8 HOURS PRN
Qty: 40 TABLET | Refills: 0 | Status: SHIPPED | OUTPATIENT
Start: 2021-12-01 | End: 2022-06-14 | Stop reason: SDUPTHER

## 2021-12-01 RX ORDER — TIZANIDINE 4 MG/1
TABLET ORAL
Qty: 60 TABLET | Refills: 5 | Status: SHIPPED | OUTPATIENT
Start: 2021-12-01 | End: 2022-05-09 | Stop reason: SDUPTHER

## 2021-12-01 RX ORDER — HYDROCODONE BITARTRATE AND ACETAMINOPHEN 10; 325 MG/1; MG/1
1 TABLET ORAL EVERY 6 HOURS PRN
Qty: 120 TABLET | Refills: 0 | Status: SHIPPED | OUTPATIENT
Start: 2021-12-01 | End: 2022-01-07 | Stop reason: SDUPTHER

## 2021-12-01 RX ORDER — MELOXICAM 15 MG/1
15 TABLET ORAL DAILY
Qty: 90 TABLET | Refills: 0 | Status: SHIPPED | OUTPATIENT
Start: 2021-12-01 | End: 2022-01-07 | Stop reason: SDUPTHER

## 2021-12-01 RX ORDER — ALPRAZOLAM 0.5 MG/1
0.5 TABLET ORAL 3 TIMES DAILY PRN
Qty: 90 TABLET | Refills: 0 | Status: SHIPPED | OUTPATIENT
Start: 2021-12-01 | End: 2022-01-07 | Stop reason: SDUPTHER

## 2021-12-01 NOTE — TELEPHONE ENCOUNTER
Caller: Adela Arauz    Relationship to patient: Self    Best call back number: [phone]     Patient is needing: PA FOR MEDICATION HYDROcodone-acetaminophen (Norco)  MG per tablet SINCE INSURANCE WILL NO LONGER COVER IT. PATIENT STATED THAT IT NEEDS TO BE DATED FOR TODAY 12/01/2021

## 2021-12-01 NOTE — PATIENT INSTRUCTIONS
Chronic Pain, Adult  Chronic pain is a type of pain that lasts or keeps coming back for at least 3-6 months. You may have headaches, pain in the abdomen, or pain in other areas of the body. Chronic pain may be related to an illness, such as fibromyalgia or complex regional pain syndrome. Chronic pain may also be related to an injury or a health condition. Sometimes, the cause of chronic pain is not known.  Chronic pain can make it hard for you to do daily activities. If not treated, chronic pain can lead to anxiety and depression. Treatment depends on the cause and severity of your pain. You may need to work with a pain specialist to come up with a treatment plan. The plan may include medicine, counseling, and physical therapy. Many people benefit from a combination of two or more types of treatment to control their pain.  Follow these instructions at home:  Medicines  · Take over-the-counter and prescription medicines only as told by your health care provider.  · Ask your health care provider if the medicine prescribed to you:  ? Requires you to avoid driving or using machinery.  ? Can cause constipation. You may need to take these actions to prevent or treat constipation:  § Drink enough fluid to keep your urine pale yellow.  § Take over-the-counter or prescription medicines.  § Eat foods that are high in fiber, such as beans, whole grains, and fresh fruits and vegetables.  § Limit foods that are high in fat and processed sugars, such as fried or sweet foods.  Treatment plan  Follow your treatment plan as told by your health care provider. This may include:  · Gentle, regular exercise.  · Eating a healthy diet that includes foods such as vegetables, fruits, fish, and lean meats.  · Cognitive or behavioral therapy that changes the way you think or act in response to the pain. This may help improve how you feel.  · Working with a physical therapist.  · Meditation, yoga, acupuncture, or massage therapy.  · Aroma,  color, light, or sound therapy.  · Local electrical stimulation. The electrical pulses help to relieve pain by temporarily stopping the nerve impulses that cause you to feel pain.  · Injections. These deliver numbing or pain-relieving medicines into the spine or the area of pain.    Lifestyle    · Ask your health care provider whether you should keep a pain diary. Your health care provider will tell you what information to write in the diary. This may include when you have pain, what the pain feels like, and how medicines and other behaviors or treatments help to reduce the pain.  · Consider talking with a mental health care provider about how to manage chronic pain.  · Consider joining a chronic pain support group.  · Try to control or lower your stress levels. Talk with your health care provider about ways to do this.    General instructions  · Learn as much as you can about how to manage your chronic pain. Ask your health care provider if an intensive pain rehabilitation program or a chronic pain specialist would be helpful.  · Check your pain level as told by your health care provider. Ask your health care provider if you should use a pain scale.  · It is up to you to get the results of any tests that were done. Ask your health care provider, or the department that is doing the tests, when your results will be ready.  · Keep all follow-up visits as told by your health care provider. This is important.  Contact a health care provider if:  · Your pain gets worse, or you have new pain.  · You have trouble sleeping.  · You have trouble doing your normal activities.  · Your pain is not controlled with treatment.  · You have side effects from pain medicine.  · You feel weak.  · You notice any other changes that show that your condition is getting worse.  Get help right away if:  · You lose feeling or have numbness in your body.  · You lose control of bowel or bladder function.  · Your pain suddenly gets much  worse.  · You develop shaking or chills.  · You develop confusion.  · You develop chest pain.  · You have trouble breathing or shortness of breath.  · You pass out.  · You have thoughts about hurting yourself or others.  If you ever feel like you may hurt yourself or others, or have thoughts about taking your own life, get help right away. Go to your nearest emergency department or:  · Call your local emergency services (911 in the [place]  · Call a suicide crisis helpline, such as the National Suicide Prevention Lifeline at 1-831.619.1873. This is open 24 hours a day in the [place]  · Text the Crisis Text Line at 153954 (in the [place]  Summary  · Chronic pain is a type of pain that lasts or keeps coming back for at least 3-6 months.  · Chronic pain may be related to an illness, injury, or other health condition. Sometimes, the cause of chronic pain is not known.  · Treatment depends on the cause and severity of your pain.  · Many people benefit from a combination of two or more types of treatment to control their pain.  · Follow your treatment plan as told by your health care provider.  This information is not intended to replace advice given to you by your health care provider. Make sure you discuss any questions you have with your health care provider.  Document Revised: 09/03/2020 Document Reviewed: 09/03/2020  Elsevier Patient Education © 2021 [employer]

## 2021-12-01 NOTE — TELEPHONE ENCOUNTER
Rx Refill Note  Requested Prescriptions     Pending Prescriptions Disp Refills   • tiZANidine (ZANAFLEX) 4 MG tablet [Pharmacy Med Name: TIZANIDINE HCL 4 MG TAB 4 Tablet] 60 tablet 5     Sig: TAKE ONE TABLET BY MOUTH TWICE A DAY AS NEEDED FOR MUSCLE SPASMS      Last office visit with prescribing clinician: 12/1/2021      Next office visit with prescribing clinician: 12/29/2021       Last fill date: 6/1/21      Mildred Sanders  12/01/21, 14:22 CST

## 2021-12-01 NOTE — PROGRESS NOTES
Chief Complaint  Anxiety (follow up)    Subjective          Adela Arauz presents to [facility]  Chronic pain from fibromyalgia, RA, back pain.  Says she is doing ok, still has a lot of pain but is improved from last back surgery, has had many back surgeries.  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  Area is healed well and she denies any further complications with it. She struggles with anxiety/panic attacks/PTSD after finding her brother dead.  He had called her and told her he was going to kill himself. She went over immediately however by the time she got there it was to late and she struggles with it daily but says the alprazolam helps. .  RA is managed by rheumatologist. Today she rates her pain 6/10, last dose of norco was today, she did fill the script for a narcan pen and understands how to use it.  Says that she is going to have surgery on her left foot for plantar fasciitis and heal spur per Dr. Freitas       Pain  This is a chronic problem. The current episode started more than 1 year ago. The problem occurs constantly. The problem has been unchanged. Associated symptoms include arthralgias. Pertinent negatives include no change in bowel habit, congestion, coughing, headaches, joint swelling, nausea or neck pain. The symptoms are aggravated by bending, walking and twisting. She has tried nothing for the symptoms. The treatment provided mild relief.   Depression  Visit Type: follow-up  Patient presents with the following symptoms: decreased concentration, depressed mood and nervousness/anxiety.  Patient is not experiencing: compulsions, confusion, dizziness, excessive worry, insomnia, irritability, panic, shortness of breath, suicidal ideas, suicidal planning and thoughts of death.  Frequency of symptoms: most days   Severity: moderate   Sleep per night: 4 hours  Sleep quality:  good  Nighttime awakenings: several  Side effects:  Insomnia  Anxiety  Presents for follow-up visit. Symptoms include decreased concentration, depressed mood and nervous/anxious behavior. Patient reports no compulsions, confusion, excessive worry, insomnia, irritability, nausea, panic, shortness of breath or suicidal ideas. Symptoms occur most days. The severity of symptoms is moderate. The patient sleeps 4 hours per night. Nighttime awakenings: one to two, several.     Her past medical history is significant for depression. Compliance with medications is %.     The following portions of the patient's history were reviewed and updated as appropriate: allergies, current medications, past family history, past medical history, past social history, past surgical history and problem list.    Chronic problems: anxiety/depression/ptsd stable with alprazolam, duloxetine, and sertraline, constipation induced from long term narcotic use (norco) and iron, stable with miralax, fever blisters stable with valtrx prn, gerd stable with omeprazole, DDD, chronic back pain, RA stable with meloxicam, tizanidine and norco, seasonal allergies stable with flonase, iron deficiency stable with ferrous sulfate  Objective   Vital Signs:   There were no vitals taken for this visit.    Physical Exam  Vitals and nursing note reviewed.   Constitutional:       General: She is awake.      Appearance: Normal appearance. She is well-developed and well-groomed.   HENT:      Head: Normocephalic and atraumatic.      Right Ear: Hearing, tympanic membrane, ear canal and external ear normal.      Left Ear: Hearing, tympanic membrane, ear canal and external ear normal.      Nose: Nose normal.      Mouth/Throat:      Lips: Pink.      Pharynx: Oropharynx is clear.   Eyes:      General: Lids are normal.      Conjunctiva/sclera: Conjunctivae normal.   Cardiovascular:      Rate and Rhythm: Normal rate and regular rhythm.      Heart sounds: Normal heart  sounds.   Pulmonary:      Effort: Pulmonary effort is normal.      Breath sounds: Normal breath sounds and air entry.   Musculoskeletal:      Cervical back: Spasms and tenderness present. Decreased range of motion.      Thoracic back: Spasms and tenderness present. Decreased range of motion.      Lumbar back: Spasms and tenderness present. Decreased range of motion.        Back:       Right lower leg: No edema.      Left lower leg: No edema.   Lymphadenopathy:      Head:      Right side of head: No submental, submandibular or tonsillar adenopathy.      Left side of head: No submental, submandibular or tonsillar adenopathy.   Skin:     General: Skin is warm and dry.   Neurological:      Mental Status: She is alert.      Sensory: Sensation is intact.      Motor: Motor function is intact.      Coordination: Coordination is intact.      Gait: Gait is intact.   Psychiatric:         Attention and Perception: Attention and perception normal.         Mood and Affect: Mood and affect normal.         Speech: Speech normal.         Behavior: Behavior normal. Behavior is cooperative.         Thought Content: Thought content normal.         Cognition and Memory: Cognition and memory normal.         Judgment: Judgment normal.        Result Review :                 Assessment and Plan    Diagnoses and all orders for this visit:    1. Panic attack  -     ALPRAZolam (Xanax) 0.5 MG tablet; Take 1 tablet by mouth 3 (Three) Times a Day As Needed for Anxiety.  Dispense: 90 tablet; Refill: 0        -       ALMA Report:      As part of this patient's treatment plan, I am prescribing controlled substances. The patient has been made aware of appropriate use of such medications, including potential risk of opiod use, somnolence, limited ability to drive and /or work safely, and potential for dependence or overdose, and opiods should be used sparingly and are not recommended for long term use.  It has also been made clear that these  medications are for use by this patient only, without concomitant use of alcohol or other substances unless prescribed.    The patient was provided a Rx for norco as needed for pain control after sustaining multiple back and neck injuries as well as surgeries.     This medication is a narcotic pain medication. This medication is monitored by the federal [employer] and the [facility]. Narcotic medication is commonly abused and many patients can become addicted to this medication.    There are problems with narcotic pain medication including addiction, dependence and tolerance to the pain relief. We discussed appropriate and expected levels of pain.     Narcotics have side effects, including the common side effect of nausea/vomiting if taken on an empty stomach and the problem of constipation that occur with narcotic use.     Narcotics can impair your judgement and therefore you should never drive or operate heavy machinery while using these medications. Treat this medication similar to alcohol, do not take this and drive or you could injure yourself or others.    As an alternative drug, we encourage the use of tylenol and/or NSAIDs (non-steroidal anti-inflammatory drugs) for additional pain relief and as an anti-inflammatory. NSAIDS are drugs such as aleve (naproxen) and motrin (ibuprofen). Narcotics and NSAIDs work differently and can complement each other well after surgery.    In general, NSAIDs and tylenol are much safer drugs than narcotics. Tylenol (acetaminophen) should not be taken with narcotics as most prescribed narcotics already contain tylenol. The maximum dose of tylenol is 4 grams per day, so this can be used if your narcotic pain medication is used sparingly. Pay close attention to the dosages and ask your pharmacist about the dosage of tylenol.  Some patients can experience GI irritation from NSAID use and patients with kidney problems, previous bleeding from the GI tract, gastric bypass, or certain  other conditions may not be able to take these over the counter medications.      Patient has completed prescribing agreement detailing terms of continued prescribing of controlled substances, including monitoring ALMA reports, urine drug screening yearly an random drug screens to monitor patients compliance to treatment plan, and pill counts if necessary. The patient is aware that inappropriate use will result in cessation of prescribing such medications.    Toxassure:  I have ordered a urine drug screen to monitor patients predisposition to and patterns of drug use/misuse in order to establish and maintain the safe and effective use of analgesics in the treatment of chronic pain    ALMA report has been reviewed by: Charlene Huntley, DNP, APRN  -     Pt is aware of the potential for addiction and dependence.    Addiction was discussed.  The  Risks, benefits and alternative options of drug therapy discussed.  It was reinforced about the risks and benefits of opioids.  It was reinforced that patient may not call early for medication, may not ask for increase in the number of pills given monthly or increase in dosage of medication, may not jump around to different pharmacies or providers and may not receive any narcotics from other providers including ER, or the contract will be broken and narcotics will no longer be prescribed from this facility if any of these criteria has been broken.    Last Dose was: today    Last Fill was:  November 1  Date of last ALMA: today    Date of last UDS: today    2. Chronic pain syndrome  -     HYDROcodone-acetaminophen (Norco)  MG per tablet; Take 1 tablet by mouth Every 6 (Six) Hours As Needed for Moderate Pain .  Dispense: 120 tablet; Refill: 0    3. DDD (degenerative disc disease), lumbar  -     HYDROcodone-acetaminophen (Norco)  MG per tablet; Take 1 tablet by mouth Every 6 (Six) Hours As Needed for Moderate Pain .  Dispense: 120 tablet; Refill: 0  -      meloxicam (MOBIC) 15 MG tablet; Take 1 tablet by mouth Daily.  Dispense: 90 tablet; Refill: 0    4. Nausea  -     ondansetron ODT (Zofran ODT) 4 MG disintegrating tablet; Place 1 tablet on the tongue Every 8 (Eight) Hours As Needed for Nausea or Vomiting.  Dispense: 40 tablet; Refill: 0      I spent 12 minutes caring for Adela on this date of service. This time includes time spent by me in the following activities:preparing for the visit, performing a medically appropriate examination and/or evaluation , counseling and educating the patient/family/caregiver, ordering medications, tests, or procedures, documenting information in the medical record and care coordination     Follow Up     Return in about 1 month (around 1/1/2022) for Recheck  chronic.     Patient was given instructions and counseling regarding her condition or for health maintenance advice. Please see specific information pulled into the AVS if appropriate.       Electronically signed by Charlene Huntley, [place], APRN, 12/01/21, 10:03 AM CST.

## 2021-12-06 ENCOUNTER — TRANSCRIBE ORDERS (OUTPATIENT)
Dept: ADMINISTRATIVE | Facility: HOSPITAL | Age: 56
End: 2021-12-06

## 2021-12-06 ENCOUNTER — HOSPITAL ENCOUNTER (OUTPATIENT)
Dept: ULTRASOUND IMAGING | Facility: HOSPITAL | Age: 56
Discharge: HOME | End: 2021-12-06
Admitting: PODIATRIST

## 2021-12-06 DIAGNOSIS — I73.9 PERIPHERAL VASCULAR DISEASE (HCC): Primary | ICD-10-CM

## 2021-12-06 PROCEDURE — 93923 UPR/LXTR ART STDY 3+ LVLS: CPT

## 2022-01-07 ENCOUNTER — OFFICE VISIT (OUTPATIENT)
Dept: FAMILY MEDICINE CLINIC | Facility: CLINIC | Age: 57
End: 2022-01-07

## 2022-01-07 VITALS
SYSTOLIC BLOOD PRESSURE: 105 MMHG | HEIGHT: 63 IN | DIASTOLIC BLOOD PRESSURE: 74 MMHG | TEMPERATURE: 97.2 F | WEIGHT: 224.2 LBS | OXYGEN SATURATION: 98 % | RESPIRATION RATE: 16 BRPM | HEART RATE: 90 BPM | BODY MASS INDEX: 39.73 KG/M2

## 2022-01-07 DIAGNOSIS — M51.36 DDD (DEGENERATIVE DISC DISEASE), LUMBAR: ICD-10-CM

## 2022-01-07 DIAGNOSIS — F41.0 PANIC ATTACK: ICD-10-CM

## 2022-01-07 DIAGNOSIS — D64.9 ANEMIA, UNSPECIFIED TYPE: ICD-10-CM

## 2022-01-07 DIAGNOSIS — G89.4 CHRONIC PAIN SYNDROME: ICD-10-CM

## 2022-01-07 PROCEDURE — 99214 OFFICE O/P EST MOD 30 MIN: CPT | Performed by: NURSE PRACTITIONER

## 2022-01-07 RX ORDER — MELOXICAM 15 MG/1
15 TABLET ORAL DAILY
Qty: 90 TABLET | Refills: 0 | Status: SHIPPED | OUTPATIENT
Start: 2022-01-07 | End: 2022-04-04 | Stop reason: SDUPTHER

## 2022-01-07 RX ORDER — OXYCODONE AND ACETAMINOPHEN 10; 325 MG/1; MG/1
10 TABLET ORAL
COMMUNITY
Start: 2021-12-22 | End: 2022-01-07

## 2022-01-07 RX ORDER — ALPRAZOLAM 0.5 MG/1
0.5 TABLET ORAL 3 TIMES DAILY PRN
Qty: 90 TABLET | Refills: 0 | Status: SHIPPED | OUTPATIENT
Start: 2022-01-07 | End: 2022-02-07 | Stop reason: SDUPTHER

## 2022-01-07 RX ORDER — FERROUS SULFATE TAB EC 324 MG (65 MG FE EQUIVALENT) 324 (65 FE) MG
324 TABLET DELAYED RESPONSE ORAL
Qty: 90 TABLET | Refills: 1 | Status: SHIPPED | OUTPATIENT
Start: 2022-01-07 | End: 2022-04-04 | Stop reason: SDUPTHER

## 2022-01-07 RX ORDER — HYDROCODONE BITARTRATE AND ACETAMINOPHEN 10; 325 MG/1; MG/1
1 TABLET ORAL EVERY 6 HOURS PRN
Qty: 120 TABLET | Refills: 0 | Status: SHIPPED | OUTPATIENT
Start: 2022-01-07 | End: 2022-02-07 | Stop reason: SDUPTHER

## 2022-01-07 NOTE — PROGRESS NOTES
Chief Complaint  Back Pain (here for refill ) and Anxiety (here for refill )    Subjective          Adela Arauz presents to [facility]  Here for monthly visit for chronic pain, fibromyalgia, ptsd and RA.  (see previous notes about ptsd).  She says that the pain is a bit worse with this cold weather, she has joint pain (hands, wrists, elbows, knees, hips, feet, back).  It is a struggle for her daily.  She says that she is not suicidal/homicidal but says that her anxiety and PTSD is much worse around the holidays.  Rates her pain 8/10.  No new complaints.  She keeps all her appts, complies with drug screen and control contract.  just had foot surgery and is in a boot    Pain  This is a chronic problem. The current episode started more than 1 year ago. The problem occurs intermittently. The problem has been gradually worsening. Associated symptoms include arthralgias, joint swelling and myalgias. Pertinent negatives include no change in bowel habit, chest pain, congestion, nausea, sore throat, swollen glands, urinary symptoms, vertigo, visual change or weakness. The symptoms are aggravated by bending, standing, exertion, sneezing, walking and twisting. She has tried rest, position changes, oral narcotics, heat and ice for the symptoms. The treatment provided mild relief.   Anxiety  Presents for follow-up visit. Symptoms include decreased concentration, depressed mood, insomnia, muscle tension and nervous/anxious behavior. Patient reports no chest pain, confusion, excessive worry, hyperventilation, nausea, palpitations, panic, restlessness, shortness of breath or suicidal ideas. Symptoms occur most days. The severity of symptoms is moderate. The patient sleeps 6 hours per night. The quality of sleep is fair. Nighttime awakenings: occasional.     Compliance with medications is %.     The following portions of the patient's history were reviewed and updated as appropriate:  "allergies, current medications, past family history, past medical history, past social history, past surgical history and problem list.      Objective   Vital Signs:   /74 (BP Location: Left arm, Patient Position: Sitting, Cuff Size: Large Adult)   Pulse 90   Temp 97.2 °F (36.2 °C) (Temporal)   Resp 16   Ht 160 cm (63\") Comment: pr  Wt 102 kg (224 lb 3.2 oz)   SpO2 98%   BMI 39.72 kg/m²     Physical Exam  Vitals and nursing note reviewed.   Constitutional:       General: She is awake.      Appearance: Normal appearance. She is well-developed and well-groomed.   HENT:      Head: Normocephalic and atraumatic.      Right Ear: Hearing, tympanic membrane, ear canal and external ear normal.      Left Ear: Hearing, tympanic membrane, ear canal and external ear normal.      Nose: Nose normal.      Mouth/Throat:      Lips: Pink.      Pharynx: Oropharynx is clear.   Eyes:      General: Lids are normal.      Conjunctiva/sclera: Conjunctivae normal.   Cardiovascular:      Rate and Rhythm: Normal rate and regular rhythm.      Heart sounds: Normal heart sounds.   Pulmonary:      Effort: Pulmonary effort is normal.      Breath sounds: Normal breath sounds and air entry.   Musculoskeletal:        Arms:       Cervical back: Full passive range of motion without pain.      Right lower leg: No edema.      Left lower leg: No edema.   Lymphadenopathy:      Head:      Right side of head: No submental, submandibular or tonsillar adenopathy.      Left side of head: No submental, submandibular or tonsillar adenopathy.   Skin:     General: Skin is warm and dry.   Neurological:      Mental Status: She is alert.      Sensory: Sensation is intact.      Motor: Motor function is intact.      Coordination: Coordination is intact.      Gait: Gait is intact.   Psychiatric:         Attention and Perception: Attention and perception normal.         Mood and Affect: Mood and affect normal.         Speech: Speech normal.         Behavior: " Behavior normal. Behavior is cooperative.         Thought Content: Thought content normal.         Cognition and Memory: Cognition and memory normal.         Judgment: Judgment normal.        Result Review :                 Assessment and Plan    Diagnoses and all orders for this visit:    1. Panic attack; Chronic, stable  -     ALPRAZolam (Xanax) 0.5 MG tablet; Take 1 tablet by mouth 3 (Three) Times a Day As Needed for Anxiety.  Dispense: 90 tablet; Refill: 0    2. Chronic pain syndrome; Chronic, worsening   -     HYDROcodone-acetaminophen (Norco)  MG per tablet; Take 1 tablet by mouth Every 6 (Six) Hours As Needed for Moderate Pain .  Dispense: 120 tablet; Refill: 0    3. DDD (degenerative disc disease), lumbar; chronic, worsening.   -     HYDROcodone-acetaminophen (Norco)  MG per tablet; Take 1 tablet by mouth Every 6 (Six) Hours As Needed for Moderate Pain .  Dispense: 120 tablet; Refill: 0  -     meloxicam (MOBIC) 15 MG tablet; Take 1 tablet by mouth Daily.  Dispense: 90 tablet; Refill: 0       -        As part of this patient's treatment plan, I am prescribing controlled substances. The patient has been made aware of appropriate use of such medications, including potential risk of opiod use, somnolence, limited ability to drive and /or work safely, and potential for dependence or overdose, and opiods should be used sparingly and are not recommended for long term use.  It has also been made clear that these medications are for use by this patient only, without concomitant use of alcohol or other substances unless prescribed..    This medication is a narcotic pain medication. This medication is monitored by the federal [employer] and the [facility]. Narcotic medication is commonly abused and many patients can become addicted to this medication.    There are problems with narcotic pain medication including addiction, dependence and tolerance to the pain relief. We discussed appropriate and expected  levels of pain to be in the 3-4/10 range following surgery. Increasing levels of pain medication can be required if you take this medication on a regular basis to be effective for pain control. It is best to try to avoid taking this medication other than for the first few weeks after surgery, because of the potential for abuse and dependence and addiction.    Narcotics have side effects, including the common side effect of nausea/vomiting if taken on an empty stomach and the problem of constipation that occur with narcotic use. Therefore, this medication should be only used sparingly and only when needed and weaned off quickly.     Narcotics can impair your judgement and therefore you should never drive or operate heavy machinery while using these medications. Treat this medication similar to alcohol, do not take this and drive or you could injure yourself or others.    As an alternative drug, we encourage the use of tylenol and/or NSAIDs (non-steroidal anti-inflammatory drugs) for additional pain relief and as an anti-inflammatory. NSAIDS are drugs such as aleve (naproxen) and motrin (ibuprofen). Narcotics and NSAIDs work differently and can complement each other well after surgery.    In general, NSAIDs and tylenol are much safer drugs than narcotics. Tylenol (acetaminophen) should not be taken with narcotics as most prescribed narcotics already contain tylenol. The maximum dose of tylenol is 4 grams per day, so this can be used if your narcotic pain medication is used sparingly. Pay close attention to the dosages and ask your pharmacist about the dosage of tylenol.  Some patients can experience GI irritation from NSAID use and patients with kidney problems, previous bleeding from the GI tract, gastric bypass, or certain other conditions may not be able to take these over the counter medications.     4. Anemia, unspecified type  -     ferrous sulfate 324 (65 Fe) MG tablet delayed-release EC tablet; Take 1 tablet  by mouth Daily With Breakfast.  Dispense: 90 tablet; Refill: 1       As part of this patient's treatment plan, I am prescribing controlled substances. The patient has been made aware of appropriate use of such medications, including potential risk of opiod use, somnolence, limited ability to drive and /or work safely, and potential for dependence or overdose, and opiods should be used sparingly and are not recommended for long term use.  It has also been made clear that these medications are for use by this patient only, without concomitant use of alcohol or other substances unless prescribed..    This medication is a narcotic pain medication. This medication is monitored by the federal [facility] and the [facility]. Narcotic medication is commonly abused and many patients can become addicted to this medication.    There are problems with narcotic pain medication including addiction, dependence and tolerance to the pain relief. We discussed appropriate and expected levels of pain to be in the 3-4/10 range following surgery. Increasing levels of pain medication can be required if you take this medication on a regular basis to be effective for pain control. It is best to try to avoid taking this medication other than for the first few weeks after surgery, because of the potential for abuse and dependence and addiction.    Narcotics have side effects, including the common side effect of nausea/vomiting if taken on an empty stomach and the problem of constipation that occur with narcotic use. Therefore, this medication should be only used sparingly and only when needed and weaned off quickly.     Narcotics can impair your judgement and therefore you should never drive or operate heavy machinery while using these medications. Treat this medication similar to alcohol, do not take this and drive or you could injure yourself or others.    As an alternative drug, we encourage the use of tylenol and/or NSAIDs (non-steroidal  anti-inflammatory drugs) for additional pain relief and as an anti-inflammatory. NSAIDS are drugs such as aleve (naproxen) and motrin (ibuprofen). Narcotics and NSAIDs work differently and can complement each other well after surgery.    In general, NSAIDs and tylenol are much safer drugs than narcotics. Tylenol (acetaminophen) should not be taken with narcotics as most prescribed narcotics already contain tylenol. The maximum dose of tylenol is 4 grams per day, so this can be used if your narcotic pain medication is used sparingly. Pay close attention to the dosages and ask your pharmacist about the dosage of tylenol.  Some patients can experience GI irritation from NSAID use and patients with kidney problems, previous bleeding from the GI tract, gastric bypass, or certain other conditions may not be able to take these over the counter medications.     I spent 14 minutes caring for Adela on this date of service. This time includes time spent by me in the following activities:preparing for the visit, performing a medically appropriate examination and/or evaluation , counseling and educating the patient/family/caregiver, ordering medications, tests, or procedures, referring and communicating with other health care professionals , documenting information in the medical record and care coordination  Follow Up   Return in about 1 month (around 2/7/2022) for Recheck  chronic  pain and anxiety.  Patient was given instructions and counseling regarding her condition or for health maintenance advice. Please see specific information pulled into the AVS if appropriate.     Electronically signed by Charlene Huntley, [place], APRHEENA, 01/21/22, 1:07 PM CST.

## 2022-02-07 ENCOUNTER — OFFICE VISIT (OUTPATIENT)
Dept: FAMILY MEDICINE CLINIC | Facility: CLINIC | Age: 57
End: 2022-02-07

## 2022-02-07 VITALS
HEART RATE: 67 BPM | RESPIRATION RATE: 20 BRPM | OXYGEN SATURATION: 95 % | SYSTOLIC BLOOD PRESSURE: 135 MMHG | TEMPERATURE: 96.8 F | WEIGHT: 200 LBS | HEIGHT: 64 IN | BODY MASS INDEX: 34.15 KG/M2 | DIASTOLIC BLOOD PRESSURE: 71 MMHG

## 2022-02-07 DIAGNOSIS — F41.0 PANIC ATTACK: ICD-10-CM

## 2022-02-07 DIAGNOSIS — Z20.822 CLOSE EXPOSURE TO COVID-19 VIRUS: Primary | ICD-10-CM

## 2022-02-07 DIAGNOSIS — G89.4 CHRONIC PAIN SYNDROME: ICD-10-CM

## 2022-02-07 DIAGNOSIS — M51.36 DDD (DEGENERATIVE DISC DISEASE), LUMBAR: ICD-10-CM

## 2022-02-07 DIAGNOSIS — J06.9 UPPER RESPIRATORY TRACT INFECTION, UNSPECIFIED TYPE: ICD-10-CM

## 2022-02-07 LAB — SARS-COV-2 ORF1AB RESP QL NAA+PROBE: NOT DETECTED

## 2022-02-07 PROCEDURE — 99214 OFFICE O/P EST MOD 30 MIN: CPT | Performed by: NURSE PRACTITIONER

## 2022-02-07 PROCEDURE — U0004 COV-19 TEST NON-CDC HGH THRU: HCPCS | Performed by: NURSE PRACTITIONER

## 2022-02-07 PROCEDURE — U0005 INFEC AGEN DETEC AMPLI PROBE: HCPCS | Performed by: NURSE PRACTITIONER

## 2022-02-07 RX ORDER — ALPRAZOLAM 0.5 MG/1
0.5 TABLET ORAL 3 TIMES DAILY PRN
Qty: 90 TABLET | Refills: 0 | Status: SHIPPED | OUTPATIENT
Start: 2022-02-07 | End: 2022-03-04 | Stop reason: SDUPTHER

## 2022-02-07 RX ORDER — HYDROCODONE BITARTRATE AND ACETAMINOPHEN 10; 325 MG/1; MG/1
1 TABLET ORAL EVERY 6 HOURS PRN
Qty: 120 TABLET | Refills: 0 | Status: SHIPPED | OUTPATIENT
Start: 2022-02-07 | End: 2022-03-04 | Stop reason: SDUPTHER

## 2022-02-07 RX ORDER — AZITHROMYCIN 250 MG/1
TABLET, FILM COATED ORAL
Qty: 6 TABLET | Refills: 0 | Status: SHIPPED | OUTPATIENT
Start: 2022-02-07 | End: 2022-03-04

## 2022-02-07 NOTE — PATIENT INSTRUCTIONS

## 2022-02-07 NOTE — PROGRESS NOTES
Chief Complaint  Panic Attack (follow up ) and Exposure To Known Illness (been exposed since friday )    Subjective          Adela Arauz presents to [facility]  Here to be tested for covid because  is positive.  She had surgery dec 22 for plantar fasciitis.   Denies fever, chills, nausea, vomiting, no loss of taste or smell.  Feels bad, has a headache and congestion. She is afraid that if she does not get antibiotic she will get worse.  She also needs to get refills on her norco and alprazolam.  She says the cold weather has really caused her arthritis to flare.  PTSD and Anxiety is about the same.  Denies suicidal/homicidal ideations.      URI   This is a new problem. The current episode started in the past 7 days. The problem has been gradually worsening. There has been no fever. Associated symptoms include congestion, coughing, joint pain and joint swelling. Pertinent negatives include no sore throat or swollen glands. She has tried acetaminophen and antihistamine for the symptoms. The treatment provided mild relief.   Pain  This is a chronic problem. The current episode started more than 1 year ago. The problem occurs constantly. The problem has been unchanged. Associated symptoms include arthralgias, congestion, coughing, joint swelling and myalgias. Pertinent negatives include no fever, sore throat, swollen glands, urinary symptoms, vertigo or weakness. The symptoms are aggravated by bending, standing, twisting, walking and exertion. She has tried rest and oral narcotics for the symptoms. The treatment provided mild relief.   Anxiety  Presents for follow-up visit. Symptoms include decreased concentration, depressed mood and nervous/anxious behavior. Patient reports no excessive worry, insomnia, irritability, obsessions, panic, restlessness or suicidal ideas. Symptoms occur most days. The severity of symptoms is moderate. The patient sleeps 4 hours per night. The  "quality of sleep is fair. Nighttime awakenings: several.     Compliance with medications is %. Side effects of treatment include joint pain.     The following portions of the patient's history were reviewed and updated as appropriate: allergies, current medications, past family history, past medical history, past social history, past surgical history and problem list.    Objective   Vital Signs:   /71 (BP Location: Right arm, Patient Position: Sitting, Cuff Size: Large Adult)   Pulse 67   Temp 96.8 °F (36 °C) (Infrared)   Resp 20   Ht 162.6 cm (64\") Comment: pt reported.  Wt 90.7 kg (200 lb) Comment: pt reported.  SpO2 95%   BMI 34.33 kg/m²     Physical Exam  Vitals and nursing note reviewed.   Constitutional:       General: She is awake.      Appearance: She is well-developed and well-groomed. She is ill-appearing.   HENT:      Head: Normocephalic and atraumatic.      Right Ear: Hearing, tympanic membrane, ear canal and external ear normal.      Left Ear: Hearing, tympanic membrane, ear canal and external ear normal.      Nose: Congestion present.      Right Turbinates: Enlarged.      Left Turbinates: Enlarged.      Right Sinus: No maxillary sinus tenderness or frontal sinus tenderness.      Left Sinus: No maxillary sinus tenderness or frontal sinus tenderness.      Mouth/Throat:      Lips: Pink.      Pharynx: Posterior oropharyngeal erythema present.   Eyes:      General: Lids are normal.      Conjunctiva/sclera: Conjunctivae normal.   Cardiovascular:      Rate and Rhythm: Normal rate and regular rhythm.      Heart sounds: Normal heart sounds.   Pulmonary:      Effort: Pulmonary effort is normal.      Breath sounds: Normal breath sounds and air entry.   Musculoskeletal:      Right shoulder: Tenderness present. Decreased range of motion.      Left shoulder: Tenderness present. Decreased range of motion.      Right elbow: Decreased range of motion. Tenderness present.      Left elbow: Decreased " range of motion. Tenderness present.      Right hand: Tenderness present. Decreased range of motion.      Left hand: Tenderness present. Decreased range of motion.        Arms:       Cervical back: Normal and full passive range of motion without pain.      Thoracic back: Normal.      Lumbar back: Spasms and tenderness present. Decreased range of motion.        Back:       Right lower leg: No edema.      Left lower leg: No edema.        Legs:    Lymphadenopathy:      Head:      Right side of head: No submental, submandibular or tonsillar adenopathy.      Left side of head: No submental, submandibular or tonsillar adenopathy.   Skin:     General: Skin is warm and dry.   Neurological:      Mental Status: She is alert and oriented to person, place, and time.      Sensory: Sensation is intact.      Motor: Motor function is intact.      Coordination: Coordination is intact.      Gait: Gait is intact.   Psychiatric:         Attention and Perception: Attention and perception normal.         Mood and Affect: Mood and affect normal.         Speech: Speech normal.         Behavior: Behavior normal. Behavior is cooperative.         Thought Content: Thought content normal.         Cognition and Memory: Cognition and memory normal.         Judgment: Judgment normal.        Result Review :                 Assessment and Plan    Diagnoses and all orders for this visit:    1. Close exposure to COVID-19 virus (Primary)  -     COVID-19,APTIMA PANTHER,PAD IN-HOUSE,NP/OP/NASAL SWAB IN UTM/VTM/SALINE/LIQUID AMIES TRANSPORT MEDIA/NP WASH OR ASPIRATE, 24 HR TAT - Swab, Nasal Cavity  -     azithromycin (Zithromax) 250 MG tablet; Take 2 tablets the first day, then 1 tablet daily for 4 days.  Dispense: 6 tablet; Refill: 0    2. Upper respiratory tract infection, unspecified type  -     azithromycin (Zithromax) 250 MG tablet; Take 2 tablets the first day, then 1 tablet daily for 4 days.  Dispense: 6 tablet; Refill: 0    3. Panic attack  -      ALPRAZolam (Xanax) 0.5 MG tablet; Take 1 tablet by mouth 3 (Three) Times a Day As Needed for Anxiety.  Dispense: 90 tablet; Refill: 0    4. Chronic pain syndrome  -     HYDROcodone-acetaminophen (Norco)  MG per tablet; Take 1 tablet by mouth Every 6 (Six) Hours As Needed for Moderate Pain .  Dispense: 120 tablet; Refill: 0    5. DDD (degenerative disc disease), lumbar  -     HYDROcodone-acetaminophen (Norco)  MG per tablet; Take 1 tablet by mouth Every 6 (Six) Hours As Needed for Moderate Pain .  Dispense: 120 tablet; Refill: 0    Plan:  [place], UDS, and controlled substance contract in emr. Advised patient of risks associated with controlled substances including physical and mental dependence, abuse, and mental impairment. Advised patient to use least amount of possible and never overuse or share medications with other people. Patient states understanding of risks and instructions on proper use.     I spent 14 minutes caring for Adela on this date of service. This time includes time spent by me in the following activities:preparing for the visit, performing a medically appropriate examination and/or evaluation , counseling and educating the patient/family/caregiver, ordering medications, tests, or procedures, documenting information in the medical record and care coordination  Follow Up   Return in about 1 week (around 2/14/2022), or if symptoms worsen or fail to improve, for Recheck 1 month chronic.  Patient was given instructions and counseling regarding her condition or for health maintenance advice. Please see specific information pulled into the AVS if appropriate.     Electronically signed by Charlene Huntley, [place], APRN, 02/07/22, 3:57 PM CST.

## 2022-02-15 DIAGNOSIS — F41.9 ANXIETY: ICD-10-CM

## 2022-02-15 DIAGNOSIS — F41.0 PANIC ATTACKS: ICD-10-CM

## 2022-02-15 DIAGNOSIS — F33.1 MODERATE EPISODE OF RECURRENT MAJOR DEPRESSIVE DISORDER: ICD-10-CM

## 2022-02-15 NOTE — TELEPHONE ENCOUNTER
Refill too soon     Rx Refill Note  Requested Prescriptions     Pending Prescriptions Disp Refills   • sertraline (ZOLOFT) 50 MG tablet [Pharmacy Med Name: SERTRALINE HCL 50 MG TAB 50 Tablet] 90 tablet 1     Sig: TAKE ONE TABLET BY MOUTH NIGHTLY      Last office visit with prescribing clinician: 2/7/2022      Next office visit with prescribing clinician: 3/4/2022            Desirae Ryan MA  02/15/22, 13:32 CST

## 2022-03-04 ENCOUNTER — OFFICE VISIT (OUTPATIENT)
Dept: FAMILY MEDICINE CLINIC | Facility: CLINIC | Age: 57
End: 2022-03-04

## 2022-03-04 VITALS
HEART RATE: 80 BPM | OXYGEN SATURATION: 99 % | DIASTOLIC BLOOD PRESSURE: 71 MMHG | HEIGHT: 63 IN | RESPIRATION RATE: 18 BRPM | WEIGHT: 224 LBS | SYSTOLIC BLOOD PRESSURE: 108 MMHG | BODY MASS INDEX: 39.69 KG/M2 | TEMPERATURE: 97.3 F

## 2022-03-04 DIAGNOSIS — G89.4 CHRONIC PAIN SYNDROME: ICD-10-CM

## 2022-03-04 DIAGNOSIS — K21.9 GASTROESOPHAGEAL REFLUX DISEASE WITHOUT ESOPHAGITIS: ICD-10-CM

## 2022-03-04 DIAGNOSIS — M51.36 DDD (DEGENERATIVE DISC DISEASE), LUMBAR: ICD-10-CM

## 2022-03-04 DIAGNOSIS — F41.0 PANIC ATTACK: ICD-10-CM

## 2022-03-04 DIAGNOSIS — R60.1 GENERALIZED EDEMA: ICD-10-CM

## 2022-03-04 DIAGNOSIS — F43.10 PTSD (POST-TRAUMATIC STRESS DISORDER): Primary | ICD-10-CM

## 2022-03-04 PROCEDURE — 99214 OFFICE O/P EST MOD 30 MIN: CPT | Performed by: NURSE PRACTITIONER

## 2022-03-04 RX ORDER — FUROSEMIDE 40 MG/1
TABLET ORAL
Qty: 90 TABLET | Refills: 1 | Status: SHIPPED | OUTPATIENT
Start: 2022-03-04 | End: 2022-04-04 | Stop reason: SDUPTHER

## 2022-03-04 RX ORDER — ALPRAZOLAM 0.5 MG/1
0.5 TABLET ORAL 3 TIMES DAILY PRN
Qty: 90 TABLET | Refills: 0 | Status: SHIPPED | OUTPATIENT
Start: 2022-03-04 | End: 2022-04-04 | Stop reason: SDUPTHER

## 2022-03-04 RX ORDER — OMEPRAZOLE 40 MG/1
CAPSULE, DELAYED RELEASE ORAL
Qty: 30 CAPSULE | Refills: 5 | Status: SHIPPED | OUTPATIENT
Start: 2022-03-04 | End: 2022-09-12 | Stop reason: SDUPTHER

## 2022-03-04 RX ORDER — HYDROCODONE BITARTRATE AND ACETAMINOPHEN 10; 325 MG/1; MG/1
1 TABLET ORAL EVERY 6 HOURS PRN
Qty: 120 TABLET | Refills: 0 | Status: SHIPPED | OUTPATIENT
Start: 2022-03-04 | End: 2022-04-04 | Stop reason: SDUPTHER

## 2022-03-04 NOTE — PATIENT INSTRUCTIONS
Managing Post-Traumatic Stress Disorder  If you have been diagnosed with post-traumatic stress disorder (PTSD), you may be relieved that you now know why you have felt or behaved a certain way. Still, you may feel overwhelmed about the treatment ahead. You may also wonder how to get the support you need and how to deal with the condition day-to-day.  If you are living with PTSD, there are ways to help you recover from it and manage your symptoms.  How to manage lifestyle changes  Managing stress  Stress is your body's reaction to life changes and events, both good and bad. Stress can make PTSD worse. Take the following steps to manage stress:  · Talk with your health care provider or a counselor if you would like to learn more about techniques to reduce your stress. He or she may suggest some stress reduction techniques such as:  ? Muscle relaxation exercises.  ? Regular exercise.  ? Meditation, yoga, or other mind-body exercises.  ? Breathing exercises.  ? Listening to quiet music.  ? Spending time outside.  · Maintain a healthy lifestyle. Eat a healthy diet, exercise regularly, get plenty of sleep, and take time to relax.  · Spend time with others. Talk with them about how you are feeling and what kind of support you need. Try not to isolate yourself, even though you may feel like doing that. Isolating yourself can delay your recovery.  · Do activities and hobbies that you enjoy.  · Pace yourself when doing stressful things. Take breaks, and reward yourself when you finish. Make sure that you do not overload your schedule.    Medicines  Your health care provider may suggest certain medicines if he or she feels that they will help to improve your condition. Medicines for depression (antidepressants) or severe loss of contact with reality (antipsychotics) may be used to treat PTSD. Avoid using alcohol and other substances that may prevent your medicines from working properly. It is also important to:  · Talk with  your pharmacist or health care provider about all medicines that you take, their possible side effects, and which medicines are safe to take together.  · Make it your goal to take part in all treatment decisions (shared decision-making). Ask about possible side effects of medicines that your health care provider recommends, and tell him or her how you feel about having those side effects. It is best if shared decision-making with your health care provider is part of your total treatment plan.  If your health care provider prescribes a medicine, you may not notice the full benefits of it for 4-8 weeks. Most people who are treated for PTSD need to take medicine for at least 6-12 months after they feel better. If you are taking medicines as part of your treatment, do not stop taking medicines before you ask your health care provider if it is safe to stop. You may need to have the medicine slowly decreased (tapered) over time to lower the risk of harmful side effects.  Relationships  Many people who have PTSD have difficulty trusting others. Make an effort to:  · Take risks and develop trust with close friends and family members. Developing trust in others can help you feel safe and connect you with emotional support.  · Be open and honest about your feelings.  · Have fun and relax in safe spaces, such as with friends and family.  · Think about going to couples counseling, family education classes, or family therapy. Your loved ones may not always know how to be supportive. Therapy can be helpful for everyone.  How to recognize changes in your condition  Be aware of your symptoms and how often you have them. The following symptoms mean that you need to seek help for your PTSD:  · You feel suspicious and angry.  · You have repeated flashbacks.  · You avoid going out or being with others.  · You have an increasing number of fights with close friends or family members, such as your spouse.  · You have thoughts about  hurting yourself or others.  · You cannot get relief from feelings of depression or anxiety.  Follow these instructions at home:  Lifestyle  · Exercise regularly. Try to do 30 or more minutes of physical activity on most days of the week.  · Try to get 7-9 hours of sleep each night. To help with sleep:  ? Keep your bedroom cool and dark.  ? Avoid screen time before bedtime. This means avoiding use of your TV, computer, tablet, and cell phone.  · Practice self-soothing skills and use them daily.  · Try to have fun and seek humor in your life.  Eating and drinking  · Do not eat a heavy meal during the hour before you go to bed.  · Do not drink alcohol or caffeinated drinks before bed.  · Avoid using alcohol or drugs.  General instructions  · If your PTSD is affecting your marriage or family, seek help from a family therapist.  · Take over-the-counter and prescription medicines only as told by your health care provider.  · Make sure to let all of your health care providers know that you have PTSD. This is especially important if you are having surgery or need to be admitted to the hospital.  · Keep all follow-up visits as told by your health care providers. This is important.  Where to find support  Talking to others  · Explain that PTSD is a mental health problem. It is something that a person can develop after experiencing or seeing a life-threatening event. Tell them that PTSD makes you feel stress like you did during the event.  · Talk to your loved ones about the symptoms you have. Also tell them what things or situations can cause symptoms to start (are triggers for you).  · Assure your loved ones that there are treatments to help PTSD. Discuss possibly seeking family therapy or couples therapy.  · If you are worried or fearful about seeking treatment, ask for support.  · Keep daily contact with at least one trusted friend or family member.  Finances  Not all insurance plans cover mental health care, so it is  important to check with your insurance carrier. If paying for co-pays or counseling services is a problem, search for a local or [facility] mental health care center. Public mental health care services may be offered there at a low cost or no cost when you are not able to see a private health care provider. If you are a , contact a local veterans organization or [facility] for more information.  If you are taking medicine for PTSD, you may be able to get the genericform, which may be less expensive than brand-name medicine. Some makers of prescription medicines also offer help to patients who cannot afford the medicines that they need.  Therapy and support groups  · Find a support group in your community. Often, groups are available for  veterans, trauma victims, and family members or caregivers.  · Look into volunteer opportunities. Taking part in these can help you feel more connected to your community.  · Contact a local organization to find out if you are eligible for a service dog.  Where to find more information  Go to this website to find more information about PTSD, treatment of PTSD, and how to get support:  · National Center for PTSD: www.ptsd.va.gov  Contact a health care provider if:  · Your symptoms get worse or do not get better.  Get help right away if:  · You have thoughts about hurting yourself or others.  If you ever feel like you may hurt yourself or others, or have thoughts about taking your own life, get help right away. You can go to your nearest emergency department or call:  · Your local emergency services (911 in the [place]  · A suicide crisis helpline, such as the National Suicide Prevention Lifeline at 1-602.528.5779. This is open 24-hours a day.  Summary  · If you are living with PTSD, there are ways to help you recover from it and manage your symptoms.  · Find supportive environments and people who understand PTSD. Spend time in those places, and maintain contact with  those people.  · Work with your health care team to create a plan for managing PTSD. The plan should include counseling, stress reduction techniques, and healthy lifestyle habits.  This information is not intended to replace advice given to you by your health care provider. Make sure you discuss any questions you have with your health care provider.  Document Revised: 04/10/2020 Document Reviewed: 04/19/2018  [employer] Patient Education © 2021 [employer]

## 2022-03-04 NOTE — TELEPHONE ENCOUNTER
Rx Refill Note  Requested Prescriptions     Pending Prescriptions Disp Refills   • furosemide (LASIX) 40 MG tablet [Pharmacy Med Name: FUROSEMIDE 40 MG TAB 40 Tablet] 90 tablet 1     Sig: TAKE ONE TABLET BY MOUTH DAILY.   • omeprazole (priLOSEC) 40 MG capsule [Pharmacy Med Name: OMEPRAZOLE 40 MG CAP 40 Capsule] 30 capsule 5     Sig: TAKE ONE CAPSULE BY MOUTH DAILY      Last office visit with prescribing clinician: 3/4/2022      Next office visit with prescribing clinician: 4/4/2022    Last refill:    Lasix 8/30/2021  Prilosec 10/1/2021    Leona Knight MA  03/04/22, 14:02 CST

## 2022-03-04 NOTE — PROGRESS NOTES
Chief Complaint  Panic Attack (follow up), Pain, and PTSD    Subjective          Adela Arauz presents to [facility]  Here for follow up for panic attack, PTSD, anxiety, DDD, RA and chronic pain.  The sertraline and cymbalta are not working and she would like to try something different. Discussed ons, risks, benefits, alternative options.  We discussed how to wean off medications and then return for follow up to start another med.  Rates pain today 8/10 in her back, but says it all hurts, arms, elbows, shoulders, hips, knees and back.  She is limited on the activities she can do daily.  Denies any chest pain, shortness of breath or palpitations. Says her anxiety/PTSD is stable and unchanged    Anxiety  Presents for follow-up visit. Symptoms include decreased concentration, depressed mood, excessive worry, insomnia, nervous/anxious behavior, panic and restlessness. Patient reports no chest pain. Symptoms occur most days. The severity of symptoms is moderate. The patient sleeps 4 hours per night. The quality of sleep is poor. Nighttime awakenings: several.     Her past medical history is significant for anxiety/panic attacks. Compliance with medications is %.   Panic Attack  This is a chronic problem. The current episode started more than 1 year ago. The problem occurs constantly. The problem has been gradually worsening. Associated symptoms include arthralgias and myalgias. Pertinent negatives include no change in bowel habit, chest pain, chills, sore throat or swollen glands. The symptoms are aggravated by bending, twisting, standing and walking. She has tried rest and oral narcotics for the symptoms. The treatment provided mild relief.   Pain  This is a chronic problem. The current episode started more than 1 year ago. The problem occurs constantly. The problem has been gradually worsening. Associated symptoms include arthralgias and myalgias. Pertinent negatives  "include no change in bowel habit, chest pain, chills, sore throat or swollen glands. The symptoms are aggravated by walking, twisting, bending and standing. She has tried rest and oral narcotics for the symptoms. The treatment provided mild relief.     The following portions of the patient's history were reviewed and updated as appropriate: allergies, current medications, past family history, past medical history, past social history, past surgical history and problem list.    Objective   Vital Signs:   /71 (BP Location: Right arm, Patient Position: Sitting, Cuff Size: Adult)   Pulse 80   Temp 97.3 °F (36.3 °C) (Infrared)   Resp 18   Ht 160 cm (63\") Comment: per patient  Wt 102 kg (224 lb)   SpO2 99%   BMI 39.68 kg/m²     Physical Exam  Vitals and nursing note reviewed.   Constitutional:       General: She is awake.      Appearance: Normal appearance. She is well-developed and well-groomed.   HENT:      Head: Normocephalic and atraumatic.      Right Ear: Hearing, tympanic membrane, ear canal and external ear normal.      Left Ear: Hearing, tympanic membrane, ear canal and external ear normal.      Nose: Nose normal.      Mouth/Throat:      Lips: Pink.      Pharynx: Oropharynx is clear.   Eyes:      General: Lids are normal.      Conjunctiva/sclera: Conjunctivae normal.   Cardiovascular:      Rate and Rhythm: Normal rate and regular rhythm.      Heart sounds: Normal heart sounds.   Pulmonary:      Effort: Pulmonary effort is normal.      Breath sounds: Normal breath sounds and air entry.   Musculoskeletal:      Right shoulder: Tenderness present. Decreased range of motion.      Left shoulder: Tenderness present. Decreased range of motion.      Right elbow: Decreased range of motion. Tenderness present.      Left elbow: Decreased range of motion. Tenderness present.        Arms:       Cervical back: Normal and full passive range of motion without pain.      Thoracic back: Normal.      Lumbar back: Normal. "        Back:       Right lower leg: No edema.      Left lower leg: No edema.      Right foot: Normal.      Left foot: Decreased range of motion. Tenderness and bony tenderness present.        Legs:    Lymphadenopathy:      Head:      Right side of head: No submental, submandibular or tonsillar adenopathy.      Left side of head: No submental, submandibular or tonsillar adenopathy.   Skin:     General: Skin is warm and dry.   Neurological:      Mental Status: She is alert and oriented to person, place, and time.      Sensory: Sensation is intact.      Motor: Motor function is intact.      Coordination: Coordination is intact.      Gait: Gait is intact.   Psychiatric:         Attention and Perception: Attention and perception normal.         Mood and Affect: Mood and affect normal.         Speech: Speech normal.         Behavior: Behavior normal. Behavior is cooperative.         Thought Content: Thought content normal.         Cognition and Memory: Cognition and memory normal.         Judgment: Judgment normal.        Result Review :             Assessment and Plan    Diagnoses and all orders for this visit:    1. PTSD (post-traumatic stress disorder) (Primary)      -   Start weaning off of the sertraline and the cymbalta, we discussed how to wean and pt verbalized understanding.       2. Panic attack  -     ALPRAZolam (Xanax) 0.5 MG tablet; Take 1 tablet by mouth 3 (Three) Times a Day As Needed for Anxiety.  Dispense: 90 tablet; Refill: 0        -       ALMA Report:     As part of this patient's treatment plan, I am prescribing controlled substances. The patient has been made aware of appropriate use of such medications, including potential risk of opiod use, somnolence, limited ability to drive and /or work safely, and potential for dependence or overdose, and opiods should be used sparingly and are not recommended for long term use.  It has also been made clear that these medications are for use by this patient  only, without concomitant use of alcohol or other substances unless prescribed.    The patient was provided a Rx for norco as needed for pain control after sustaining multiple back and neck injuries as well as surgeries.     This medication is a narcotic pain medication. This medication is monitored by the federal TERRA and the [facility]. Narcotic medication is commonly abused and many patients can become addicted to this medication.    There are problems with narcotic pain medication including addiction, dependence and tolerance to the pain relief. We discussed appropriate and expected levels of pain.     Narcotics have side effects, including the common side effect of nausea/vomiting if taken on an empty stomach and the problem of constipation that occur with narcotic use.     Narcotics can impair your judgement and therefore you should never drive or operate heavy machinery while using these medications. Treat this medication similar to alcohol, do not take this and drive or you could injure yourself or others.    As an alternative drug, we encourage the use of tylenol and/or NSAIDs (non-steroidal anti-inflammatory drugs) for additional pain relief and as an anti-inflammatory. NSAIDS are drugs such as aleve (naproxen) and motrin (ibuprofen). Narcotics and NSAIDs work differently and can complement each other well after surgery.    In general, NSAIDs and tylenol are much safer drugs than narcotics. Tylenol (acetaminophen) should not be taken with narcotics as most prescribed narcotics already contain tylenol. The maximum dose of tylenol is 4 grams per day, so this can be used if your narcotic pain medication is used sparingly. Pay close attention to the dosages and ask your pharmacist about the dosage of tylenol.  Some patients can experience GI irritation from NSAID use and patients with kidney problems, previous bleeding from the GI tract, gastric bypass, or certain other conditions may not be able to take  these over the counter medications.      Patient has completed prescribing agreement detailing terms of continued prescribing of controlled substances, including monitoring ALMA reports, urine drug screening yearly an random drug screens to monitor patients compliance to treatment plan, and pill counts if necessary. The patient is aware that inappropriate use will result in cessation of prescribing such medications.    Toxassure:  I have ordered a urine drug screen to monitor patients predisposition to and patterns of drug use/misuse in order to establish and maintain the safe and effective use of analgesics in the treatment of chronic pain    ALMA report has been reviewed by: Charlene Huntley, DNP, APRN.  The report was scanned into the patient's chart.      -     Pt is aware of the potential for addiction and dependence.    Addiction was discussed.  The  Risks, benefits and alternative options of drug therapy discussed.  It was reinforced about the risks and benefits of opioids.  It was reinforced that patient may not call early for medication, may not ask for increase in the number of pills given monthly or increase in dosage of medication, may not jump around to different pharmacies or providers and may not receive any narcotics from other providers including ER, or the contract will be broken and narcotics will no longer be prescribed from this facility if any of these criteria has been broken.     Last Dose was: today    Last Fill was:  Feb  Date of last ALMA: today    3. Chronic pain syndrome  -     HYDROcodone-acetaminophen (Norco)  MG per tablet; Take 1 tablet by mouth Every 6 (Six) Hours As Needed for Moderate Pain .  Dispense: 120 tablet; Refill: 0    4. DDD (degenerative disc disease), lumbar  -     HYDROcodone-acetaminophen (Norco)  MG per tablet; Take 1 tablet by mouth Every 6 (Six) Hours As Needed for Moderate Pain .  Dispense: 120 tablet; Refill: 0      I spent 14 minutes caring  for Adela on this date of service. This time includes time spent by me in the following activities:preparing for the visit, performing a medically appropriate examination and/or evaluation , counseling and educating the patient/family/caregiver, ordering medications, tests, or procedures, documenting information in the medical record and care coordination     Follow Up       Return in about 1 month (around 4/4/2022) for Recheck.     Patient was given instructions and counseling regarding her condition or for health maintenance advice. Please see specific information pulled into the AVS if appropriate.

## 2022-04-04 ENCOUNTER — OFFICE VISIT (OUTPATIENT)
Dept: FAMILY MEDICINE CLINIC | Facility: CLINIC | Age: 57
End: 2022-04-04

## 2022-04-04 VITALS
HEIGHT: 63 IN | BODY MASS INDEX: 40.75 KG/M2 | DIASTOLIC BLOOD PRESSURE: 76 MMHG | OXYGEN SATURATION: 98 % | SYSTOLIC BLOOD PRESSURE: 109 MMHG | HEART RATE: 70 BPM | WEIGHT: 230 LBS | TEMPERATURE: 96.2 F | RESPIRATION RATE: 18 BRPM

## 2022-04-04 DIAGNOSIS — D64.9 ANEMIA, UNSPECIFIED TYPE: ICD-10-CM

## 2022-04-04 DIAGNOSIS — G89.4 CHRONIC PAIN SYNDROME: Primary | ICD-10-CM

## 2022-04-04 DIAGNOSIS — Z13.220 SCREENING FOR LIPID DISORDERS: ICD-10-CM

## 2022-04-04 DIAGNOSIS — M51.36 DDD (DEGENERATIVE DISC DISEASE), LUMBAR: ICD-10-CM

## 2022-04-04 DIAGNOSIS — Z51.81 THERAPEUTIC DRUG MONITORING: ICD-10-CM

## 2022-04-04 DIAGNOSIS — F41.9 ANXIETY: ICD-10-CM

## 2022-04-04 DIAGNOSIS — F33.1 MODERATE EPISODE OF RECURRENT MAJOR DEPRESSIVE DISORDER: ICD-10-CM

## 2022-04-04 DIAGNOSIS — M06.9 RHEUMATOID ARTHRITIS INVOLVING MULTIPLE SITES, UNSPECIFIED WHETHER RHEUMATOID FACTOR PRESENT: ICD-10-CM

## 2022-04-04 DIAGNOSIS — R60.1 GENERALIZED EDEMA: ICD-10-CM

## 2022-04-04 DIAGNOSIS — F41.0 PANIC ATTACK: ICD-10-CM

## 2022-04-04 PROCEDURE — 99214 OFFICE O/P EST MOD 30 MIN: CPT | Performed by: NURSE PRACTITIONER

## 2022-04-04 RX ORDER — PREGABALIN 75 MG/1
75 CAPSULE ORAL 2 TIMES DAILY
Qty: 180 CAPSULE | Refills: 1 | Status: SHIPPED | OUTPATIENT
Start: 2022-04-04 | End: 2022-06-14 | Stop reason: DRUGHIGH

## 2022-04-04 RX ORDER — FUROSEMIDE 40 MG/1
40 TABLET ORAL DAILY
Qty: 90 TABLET | Refills: 1 | Status: SHIPPED | OUTPATIENT
Start: 2022-04-04 | End: 2022-07-13 | Stop reason: SDUPTHER

## 2022-04-04 RX ORDER — FERROUS SULFATE TAB EC 324 MG (65 MG FE EQUIVALENT) 324 (65 FE) MG
324 TABLET DELAYED RESPONSE ORAL
Qty: 90 TABLET | Refills: 1 | Status: SHIPPED | OUTPATIENT
Start: 2022-04-04 | End: 2022-09-12 | Stop reason: SDUPTHER

## 2022-04-04 RX ORDER — ALPRAZOLAM 0.5 MG/1
0.5 TABLET ORAL 3 TIMES DAILY PRN
Qty: 90 TABLET | Refills: 0 | Status: SHIPPED | OUTPATIENT
Start: 2022-04-04 | End: 2022-05-09 | Stop reason: SDUPTHER

## 2022-04-04 RX ORDER — MELOXICAM 15 MG/1
15 TABLET ORAL DAILY
Qty: 90 TABLET | Refills: 0 | Status: SHIPPED | OUTPATIENT
Start: 2022-04-04 | End: 2022-07-13 | Stop reason: SDUPTHER

## 2022-04-04 RX ORDER — HYDROCODONE BITARTRATE AND ACETAMINOPHEN 10; 325 MG/1; MG/1
1 TABLET ORAL EVERY 6 HOURS PRN
Qty: 120 TABLET | Refills: 0 | Status: SHIPPED | OUTPATIENT
Start: 2022-04-04 | End: 2022-05-09 | Stop reason: SDUPTHER

## 2022-04-04 RX ORDER — IBANDRONATE SODIUM 150 MG/1
150 TABLET, FILM COATED ORAL
Qty: 90 TABLET | Refills: 1 | Status: SHIPPED | OUTPATIENT
Start: 2022-04-04 | End: 2022-07-13

## 2022-04-04 NOTE — PROGRESS NOTES
Chief Complaint  PTSD (Follow up)    Subjective          Adela Arauz presents to [facility]  Presents for monthly visit for pain control, anxiety/depression, PTSD, vit d def, Depression, GERD, osteoporosis, neuropathy.  Her PTSD is due to her brother called her and told her he was going to kill himself.  She drove right over and she was too late and he was dead. He had shot himself with a 357.  She has never been able to move past this.  She has been having panic attacks and the alprazolam helps.  She also has RA, fibro, DDD and struggles with the pain. She struggles with chronic pain and some days she can't get out of bed.  She tries to  Rest and limit things that cause her more pain. Denies suicidal/homicidal ideations   Depression  Visit Type: follow-up  Patient presents with the following symptoms: decreased concentration, depressed mood, feelings of worthlessness, insomnia, irritability, nervousness/anxiety and restlessness.  Patient is not experiencing: psychomotor agitation, psychomotor retardation, suicidal ideas, suicidal planning and thoughts of death.  Frequency of symptoms: most days   Severity: moderate   Sleep per night: 4 hours  Sleep quality: poor  Nighttime awakenings: many  Compliance with medications:  %  Side effects:  Headaches  Anxiety  Presents for follow-up visit. Symptoms include decreased concentration, depressed mood, insomnia, irritability, nervous/anxious behavior and restlessness. Patient reports no chest pain, nausea or suicidal ideas. Symptoms occur most days. The severity of symptoms is moderate. The quality of sleep is poor. Nighttime awakenings: several.     Her past medical history is significant for depression. Compliance with medications is %. Side effects of treatment include headaches.   Pain  This is a chronic problem. The current episode started more than 1 year ago. The problem occurs constantly. The problem has been  "gradually worsening. Associated symptoms include arthralgias, fatigue and headaches. Pertinent negatives include no change in bowel habit, chest pain or nausea. The symptoms are aggravated by walking, twisting, exertion and standing. She has tried acetaminophen, heat, ice, lying down, oral narcotics, rest, sleep, relaxation and position changes for the symptoms. The treatment provided mild relief.       Objective   Vital Signs:   /76 (BP Location: Left arm, Patient Position: Sitting, Cuff Size: Large Adult)   Pulse 70   Temp 96.2 °F (35.7 °C) (Infrared)   Resp 18   Ht 160 cm (63\") Comment: per patient  Wt 104 kg (230 lb)   SpO2 98%   BMI 40.74 kg/m²     BMI is above normal parameters. Recommendations: educational material discussed/shared in after visit summary and exercise counseling/recommendations       Physical Exam  Vitals and nursing note reviewed.   Constitutional:       General: She is awake.      Appearance: Normal appearance. She is well-developed and well-groomed.   HENT:      Head: Normocephalic and atraumatic.      Right Ear: Hearing, tympanic membrane, ear canal and external ear normal.      Left Ear: Hearing, tympanic membrane, ear canal and external ear normal.      Nose: Nose normal.      Mouth/Throat:      Lips: Pink.      Pharynx: Oropharynx is clear.   Eyes:      General: Lids are normal.      Conjunctiva/sclera: Conjunctivae normal.   Cardiovascular:      Rate and Rhythm: Normal rate and regular rhythm.      Heart sounds: Normal heart sounds.   Pulmonary:      Effort: Pulmonary effort is normal.      Breath sounds: Normal breath sounds and air entry.   Musculoskeletal:      Cervical back: Normal and full passive range of motion without pain.      Thoracic back: Normal.      Lumbar back: Spasms and tenderness present. Decreased range of motion.        Back:       Right lower leg: No edema.      Left lower leg: No edema.   Lymphadenopathy:      Head:      Right side of head: No " submental, submandibular or tonsillar adenopathy.      Left side of head: No submental, submandibular or tonsillar adenopathy.   Skin:     General: Skin is warm and dry.   Neurological:      Mental Status: She is alert and oriented to person, place, and time.      Sensory: Sensation is intact.      Motor: Motor function is intact.      Coordination: Coordination is intact.      Gait: Gait is intact.   Psychiatric:         Attention and Perception: Attention and perception normal.         Mood and Affect: Mood and affect normal.         Speech: Speech normal.         Behavior: Behavior normal. Behavior is cooperative.         Thought Content: Thought content normal.         Cognition and Memory: Cognition and memory normal.         Judgment: Judgment normal.       Result Review :                 Assessment and Plan     Diagnoses and all orders for this visit:    1. Chronic pain syndrome (Primary)  -     HYDROcodone-acetaminophen (Norco)  MG per tablet; Take 1 tablet by mouth Every 6 (Six) Hours As Needed for Moderate Pain .  Dispense: 120 tablet; Refill: 0  -     pregabalin (LYRICA) 75 MG capsule; Take 1 capsule by mouth 2 (Two) Times a Day.  Dispense: 180 capsule; Refill: 1    2. Panic attack  -     ALPRAZolam (Xanax) 0.5 MG tablet; Take 1 tablet by mouth 3 (Three) Times a Day As Needed for Anxiety.  Dispense: 90 tablet; Refill: 0  3. Anxiety  4. Moderate episode of recurrent major depressive disorder (HCC)  -     CBC (No Diff)  -     Comprehensive metabolic panel    5. Anemia, unspecified type  -     ferrous sulfate 324 (65 Fe) MG tablet delayed-release EC tablet; Take 1 tablet by mouth Daily With Breakfast.  Dispense: 90 tablet; Refill: 1    6. Generalized edema  -     furosemide (LASIX) 40 MG tablet; Take 1 tablet by mouth Daily.  Dispense: 90 tablet; Refill: 1    7. Rheumatoid arthritis involving multiple sites, unspecified whether rheumatoid factor present (HCC)  -     pregabalin (LYRICA) 75 MG capsule;  Take 1 capsule by mouth 2 (Two) Times a Day.  Dispense: 180 capsule; Refill: 1  -     CBC (No Diff)  -     Comprehensive metabolic panel    8. DDD (degenerative disc disease), lumbar  -     HYDROcodone-acetaminophen (Norco)  MG per tablet; Take 1 tablet by mouth Every 6 (Six) Hours As Needed for Moderate Pain .  Dispense: 120 tablet; Refill: 0  -     meloxicam (MOBIC) 15 MG tablet; Take 1 tablet by mouth Daily.  Dispense: 90 tablet; Refill: 0    9. Screening for lipid disorders  -     Lipid panel    10. Therapeutic drug monitoring  -     ToxASSURE Select 13 Discrete -    Other orders  -     ibandronate (BONIVA) 150 MG tablet; Take 1 tablet by mouth Every 30 (Thirty) Days.  Dispense: 90 tablet; Refill: 1          ALMA Report:      As part of this patient's treatment plan, I am prescribing controlled substances. The patient has been made aware of appropriate use of such medications, including potential risk of opiod use, somnolence, limited ability to drive and /or work safely, and potential for dependence or overdose, and opiods should be used sparingly and are not recommended for long term use.  It has also been made clear that these medications are for use by this patient only, without concomitant use of alcohol or other substances unless prescribed.    The patient was provided a Rx for norco as needed for pain control after sustaining multiple back and neck injuries as well as surgeries.     This medication is a narcotic pain medication. This medication is monitored by the federal TER[employer] and the [facility]. Narcotic medication is commonly abused and many patients can become addicted to this medication.    There are problems with narcotic pain medication including addiction, dependence and tolerance to the pain relief. We discussed appropriate and expected levels of pain.     Narcotics have side effects, including the common side effect of nausea/vomiting if taken on an empty stomach and the problem of  constipation that occur with narcotic use.     Narcotics can impair your judgement and therefore you should never drive or operate heavy machinery while using these medications. Treat this medication similar to alcohol, do not take this and drive or you could injure yourself or others.    As an alternative drug, we encourage the use of tylenol and/or NSAIDs (non-steroidal anti-inflammatory drugs) for additional pain relief and as an anti-inflammatory. NSAIDS are drugs such as aleve (naproxen) and motrin (ibuprofen). Narcotics and NSAIDs work differently and can complement each other well after surgery.    In general, NSAIDs and tylenol are much safer drugs than narcotics. Tylenol (acetaminophen) should not be taken with narcotics as most prescribed narcotics already contain tylenol. The maximum dose of tylenol is 4 grams per day, so this can be used if your narcotic pain medication is used sparingly. Pay close attention to the dosages and ask your pharmacist about the dosage of tylenol.  Some patients can experience GI irritation from NSAID use and patients with kidney problems, previous bleeding from the GI tract, gastric bypass, or certain other conditions may not be able to take these over the counter medications.      Patient has completed prescribing agreement detailing terms of continued prescribing of controlled substances, including monitoring ALMA reports, urine drug screening yearly an random drug screens to monitor patients compliance to treatment plan, and pill counts if necessary. The patient is aware that inappropriate use will result in cessation of prescribing such medications.    Toxassure:  I have ordered a urine drug screen to monitor patients predisposition to and patterns of drug use/misuse in order to establish and maintain the safe and effective use of analgesics in the treatment of chronic pain    ALMA report has been reviewed by: Charlene Huntley, DNP, APRN    -     Pt is aware of the  potential for addiction and dependence.    Addiction was discussed.  The  Risks, benefits and alternative options of drug therapy discussed.  It was reinforced about the risks and benefits of opioids.  It was reinforced that patient may not call early for medication, may not ask for increase in the number of pills given monthly or increase in dosage of medication, may not jump around to different pharmacies or providers and may not receive any narcotics from other providers including ER, or the contract will be broken and narcotics will no longer be prescribed from this facility if any of these criteria has been broken. Last Dose was: today    Last Fill was:  March  Date of last ALMA: today    Date of last UDS: on file     Follow Up   Return in about 1 month (around 5/4/2022) for Recheck.  Patient was given instructions and counseling regarding her condition or for health maintenance advice. Please see specific information pulled into the AVS if appropriate.     Electronically signed by Charlene Huntley, [place], APRN, 04/27/22, 12:59 PM CDT.  -

## 2022-04-04 NOTE — PATIENT INSTRUCTIONS
Chronic Back Pain  When back pain lasts longer than 3 months, it is called chronic back pain. Pain may get worse at certain times (flare-ups). There are things you can do at home to manage your pain.  Follow these instructions at home:  Pay attention to any changes in your symptoms. Take these actions to help with your pain:  Managing pain and stiffness         If told, put ice on the painful area. Your doctor may tell you to use ice for 24-48 hours after the flare-up starts. To do this:  Put ice in a plastic bag.  Place a towel between your skin and the bag.  Leave the ice on for 20 minutes, 2-3 times a day.  If told, put heat on the painful area. Do this as often as told by your doctor. Use the heat source that your doctor recommends, such as a moist heat pack or a heating pad.  Place a towel between your skin and the heat source.  Leave the heat on for 20-30 minutes.  Take off the heat if your skin turns bright red. This is especially important if you are unable to feel pain, heat, or cold. You may have a greater risk of getting burned.  Soak in a warm bath. This can help relieve pain.  Activity    Avoid bending and other activities that make pain worse.  When standing:  Keep your upper back and neck straight.  Keep your shoulders pulled back.  Avoid slouching.  When sitting:  Keep your back straight.  Relax your shoulders. Do not round your shoulders or pull them backward.  Do not sit or  one place for long periods of time.  Take short rest breaks during the day. Lying down or standing is usually better than sitting. Resting can help relieve pain.  When sitting or lying down for a long time, do some mild activity or stretching. This will help to prevent stiffness and pain.  Get regular exercise. Ask your doctor what activities are safe for you.  Do not lift anything that is heavier than 10 lb (4.5 kg) or the limit that you are told, until your doctor says that it is safe.  To prevent injury when you lift  things:  Bend your knees.  Keep the weight close to your body.  Avoid twisting.  Sleep on a firm mattress. Try lying on your side with your knees slightly bent. If you lie on your back, put a pillow under your knees.    Medicines  Treatment may include medicines for pain and swelling taken by mouth or put on the skin, prescription pain medicine, or muscle relaxants.  Take over-the-counter and prescription medicines only as told by your doctor.  Ask your doctor if the medicine prescribed to you:  Requires you to avoid driving or using machinery.  Can cause trouble pooping (constipation). You may need to take these actions to prevent or treat trouble pooping:  Drink enough fluid to keep your pee (urine) pale yellow.  Take over-the-counter or prescription medicines.  Eat foods that are high in fiber. These include beans, whole grains, and fresh fruits and vegetables.  Limit foods that are high in fat and sugars. These include fried or sweet foods.  General instructions  Do not use any products that contain nicotine or tobacco, such as cigarettes, e-cigarettes, and chewing tobacco. If you need help quitting, ask your doctor.  Keep all follow-up visits as told by your doctor. This is important.  Contact a doctor if:  Your pain does not get better with rest or medicine.  Your pain gets worse, or you have new pain.  You have a high fever.  You lose weight very quickly.  You have trouble doing your normal activities.  Get help right away if:  One or both of your legs or feet feel weak.  One or both of your legs or feet lose feeling (have numbness).  You have trouble controlling when you poop (have a bowel movement) or pee (urinate).  You have bad back pain and:  You feel like you may vomit (nauseous), or you vomit.  You have pain in your belly (abdomen).  You have shortness of breath.  You faint.  Summary  When back pain lasts longer than 3 months, it is called chronic back pain.  Pain may get worse at certain times  (flare-ups).  Use ice and heat as told by your doctor. Your doctor may tell you to use ice after flare-ups.  This information is not intended to replace advice given to you by your health care provider. Make sure you discuss any questions you have with your health care provider.  Document Revised: 01/27/2021 Document Reviewed: 01/27/2021  [employer] Patient Education © 2021 [employer]

## 2022-04-09 LAB
6MAM UR QL CFM: NEGATIVE
6MAM/CREAT UR: NOT DETECTED NG/MG CREAT
7AMINOCLONAZEPAM/CREAT UR: NOT DETECTED NG/MG CREAT
A-OH ALPRAZ/CREAT UR: 292 NG/MG CREAT
A-OH-TRIAZOLAM/CREAT UR CFM: NOT DETECTED NG/MG CREAT
ALBUMIN SERPL-MCNC: 4.4 G/DL (ref 3.5–5.2)
ALBUMIN/GLOB SERPL: 1.5 G/DL
ALFENTANIL/CREAT UR CFM: NOT DETECTED NG/MG CREAT
ALP SERPL-CCNC: 149 U/L (ref 39–117)
ALPHA-HYDROXYMIDAZOLAM, URINE: NOT DETECTED NG/MG CREAT
ALPRAZ/CREAT UR CFM: 200 NG/MG CREAT
ALT SERPL-CCNC: 28 U/L (ref 1–33)
AMOBARBITAL UR QL CFM: NOT DETECTED
AMPHET/CREAT UR: NOT DETECTED NG/MG CREAT
AMPHETAMINES UR QL CFM: NEGATIVE
AST SERPL-CCNC: 35 U/L (ref 1–32)
BARBITAL UR QL CFM: NOT DETECTED
BARBITURATES UR QL CFM: NEGATIVE
BENZODIAZ UR QL CFM: NORMAL
BILIRUB SERPL-MCNC: 0.3 MG/DL (ref 0–1.2)
BUN SERPL-MCNC: 10 MG/DL (ref 6–20)
BUN/CREAT SERPL: 11 (ref 7–25)
BUPRENORPHINE UR QL CFM: NEGATIVE
BUPRENORPHINE/CREAT UR: NOT DETECTED NG/MG CREAT
BUTABARBITAL UR QL CFM: NOT DETECTED
BUTALBITAL UR QL CFM: NOT DETECTED
BZE/CREAT UR: NOT DETECTED NG/MG CREAT
CALCIUM SERPL-MCNC: 9.7 MG/DL (ref 8.6–10.5)
CANNABINOIDS UR QL CFM: NEGATIVE
CARBOXYTHC/CREAT UR: NOT DETECTED NG/MG CREAT
CHLORIDE SERPL-SCNC: 99 MMOL/L (ref 98–107)
CHOLEST SERPL-MCNC: 240 MG/DL (ref 0–200)
CLONAZEPAM/CREAT UR CFM: NOT DETECTED NG/MG CREAT
CO2 SERPL-SCNC: 27.5 MMOL/L (ref 22–29)
COCAETHYLENE/CREAT UR CFM: NOT DETECTED NG/MG CREAT
COCAINE UR QL CFM: NEGATIVE
COCAINE/CREAT UR CFM: NOT DETECTED NG/MG CREAT
CODEINE/CREAT UR: NOT DETECTED NG/MG CREAT
CREAT SERPL-MCNC: 0.91 MG/DL (ref 0.57–1)
CREAT UR-MCNC: 26 MG/DL
DESALKYLFLURAZ/CREAT UR: NOT DETECTED NG/MG CREAT
DESMETHYLFLUNITRAZEPAM: NOT DETECTED NG/MG CREAT
DHC/CREAT UR: 223 NG/MG CREAT
DIAZEPAM/CREAT UR: NOT DETECTED NG/MG CREAT
DRUGS UR: NORMAL
EDDP/CREAT UR: NOT DETECTED NG/MG CREAT
EGFRCR SERPLBLD CKD-EPI 2021: 74.2 ML/MIN/1.73
ERYTHROCYTE [DISTWIDTH] IN BLOOD BY AUTOMATED COUNT: 13.4 % (ref 12.3–15.4)
ETHANOL UR CFM-MCNC: NOT DETECTED G/DL
ETHANOL UR QL CFM: NEGATIVE
FENTANYL UR QL CFM: NEGATIVE
FENTANYL/CREAT UR: NOT DETECTED NG/MG CREAT
FLUNITRAZEPAM UR QL CFM: NOT DETECTED NG/MG CREAT
GLOBULIN SER CALC-MCNC: 3 GM/DL
GLUCOSE SERPL-MCNC: 103 MG/DL (ref 65–99)
HCT VFR BLD AUTO: 46.1 % (ref 34–46.6)
HDLC SERPL-MCNC: 56 MG/DL (ref 40–60)
HGB BLD-MCNC: 14.9 G/DL (ref 12–15.9)
HYDROCODONE/CREAT UR: 1558 NG/MG CREAT
HYDROMORPHONE/CREAT UR: 454 NG/MG CREAT
LDLC SERPL CALC-MCNC: 150 MG/DL (ref 0–100)
LEVEL OF DETECTION:: NORMAL
LORAZEPAM/CREAT UR: NOT DETECTED NG/MG CREAT
MCH RBC QN AUTO: 27.8 PG (ref 26.6–33)
MCHC RBC AUTO-ENTMCNC: 32.3 G/DL (ref 31.5–35.7)
MCV RBC AUTO: 86 FL (ref 79–97)
MDA/CREAT UR: NOT DETECTED NG/MG CREAT
MDMA/CREAT UR: NOT DETECTED NG/MG CREAT
MEPHOBARBITAL UR QL CFM: NOT DETECTED
METHADONE UR QL CFM: NEGATIVE
METHADONE/CREAT UR: NOT DETECTED NG/MG CREAT
METHAMPHET/CREAT UR: NOT DETECTED NG/MG CREAT
MIDAZOLAM/CREAT UR CFM: NOT DETECTED NG/MG CREAT
MORPHINE/CREAT UR: NOT DETECTED NG/MG CREAT
N-NORTRAMADOL/CREAT UR CFM: NOT DETECTED NG/MG CREAT
NARCOTICS UR: NEGATIVE
NORBUPRENORPHINE/CREAT UR: NOT DETECTED NG/MG CREAT
NORCODEINE/CREAT UR CFM: NOT DETECTED NG/MG CREAT
NORDIAZEPAM/CREAT UR: NOT DETECTED NG/MG CREAT
NORFENTANYL/CREAT UR: NOT DETECTED NG/MG CREAT
NORHYDROCODONE/CREAT UR: 1731 NG/MG CREAT
NORMORPHINE UR-MCNC: NOT DETECTED NG/MG CREAT
NOROXYCODONE/CREAT UR: NOT DETECTED NG/MG CREAT
NOROXYMORPHONE/CREAT UR CFM: NOT DETECTED NG/MG CREAT
O-NORTRAMADOL UR CFM-MCNC: NOT DETECTED NG/MG CREAT
OPIATES UR QL CFM: NORMAL
OXAZEPAM/CREAT UR: NOT DETECTED NG/MG CREAT
OXYCODONE UR QL CFM: NEGATIVE
OXYCODONE/CREAT UR: NOT DETECTED NG/MG CREAT
OXYMORPHONE/CREAT UR: NOT DETECTED NG/MG CREAT
PENTOBARB UR QL CFM: NOT DETECTED
PHENOBARB UR QL CFM: NOT DETECTED
PLATELET # BLD AUTO: 323 10*3/MM3 (ref 140–450)
POTASSIUM SERPL-SCNC: 5.2 MMOL/L (ref 3.5–5.2)
PROT SERPL-MCNC: 7.4 G/DL (ref 6–8.5)
RBC # BLD AUTO: 5.36 10*6/MM3 (ref 3.77–5.28)
SECOBARBITAL UR QL CFM: NOT DETECTED
SODIUM SERPL-SCNC: 137 MMOL/L (ref 136–145)
SUFENTANIL/CREAT UR CFM: NOT DETECTED NG/MG CREAT
TAPENTADOL UR QL CFM: NEGATIVE
TAPENTADOL/CREAT UR: NOT DETECTED NG/MG CREAT
TEMAZEPAM/CREAT UR: NOT DETECTED NG/MG CREAT
THIOPENTAL UR QL CFM: NOT DETECTED
TRAMADOL UR QL CFM: NOT DETECTED NG/MG CREAT
TRIGL SERPL-MCNC: 190 MG/DL (ref 0–150)
VLDLC SERPL CALC-MCNC: 34 MG/DL (ref 5–40)
WBC # BLD AUTO: 6.81 10*3/MM3 (ref 3.4–10.8)

## 2022-05-02 ENCOUNTER — TRANSCRIBE ORDERS (OUTPATIENT)
Dept: ADMINISTRATIVE | Facility: HOSPITAL | Age: 57
End: 2022-05-02

## 2022-05-02 DIAGNOSIS — M81.0 AGE-RELATED OSTEOPOROSIS WITHOUT CURRENT PATHOLOGICAL FRACTURE: Primary | ICD-10-CM

## 2022-05-09 ENCOUNTER — OFFICE VISIT (OUTPATIENT)
Dept: FAMILY MEDICINE CLINIC | Facility: CLINIC | Age: 57
End: 2022-05-09

## 2022-05-09 VITALS
TEMPERATURE: 98.4 F | RESPIRATION RATE: 18 BRPM | BODY MASS INDEX: 40.57 KG/M2 | OXYGEN SATURATION: 99 % | HEIGHT: 63 IN | DIASTOLIC BLOOD PRESSURE: 84 MMHG | WEIGHT: 229 LBS | SYSTOLIC BLOOD PRESSURE: 132 MMHG | HEART RATE: 68 BPM

## 2022-05-09 DIAGNOSIS — G89.4 CHRONIC PAIN SYNDROME: ICD-10-CM

## 2022-05-09 DIAGNOSIS — F41.0 PANIC ATTACK: ICD-10-CM

## 2022-05-09 DIAGNOSIS — M51.36 DDD (DEGENERATIVE DISC DISEASE), LUMBAR: ICD-10-CM

## 2022-05-09 PROCEDURE — 99214 OFFICE O/P EST MOD 30 MIN: CPT | Performed by: NURSE PRACTITIONER

## 2022-05-09 RX ORDER — TIZANIDINE 4 MG/1
4 TABLET ORAL 2 TIMES DAILY PRN
Qty: 60 TABLET | Refills: 5 | Status: SHIPPED | OUTPATIENT
Start: 2022-05-09 | End: 2022-09-12 | Stop reason: SDUPTHER

## 2022-05-09 RX ORDER — HYDROCODONE BITARTRATE AND ACETAMINOPHEN 10; 325 MG/1; MG/1
1 TABLET ORAL EVERY 6 HOURS PRN
Qty: 120 TABLET | Refills: 0 | Status: SHIPPED | OUTPATIENT
Start: 2022-05-09 | End: 2022-06-14 | Stop reason: SDUPTHER

## 2022-05-09 RX ORDER — ALPRAZOLAM 0.5 MG/1
0.5 TABLET ORAL 3 TIMES DAILY PRN
Qty: 90 TABLET | Refills: 0 | Status: SHIPPED | OUTPATIENT
Start: 2022-05-09 | End: 2022-06-14 | Stop reason: SDUPTHER

## 2022-05-09 NOTE — PROGRESS NOTES
Chief Complaint  Chronic pain syndrome (Follow up)    Subjective          Adela Arauz presents to [facility]  Here for follow up for chronic pain, fibromyalgia, plantar fasciitis, RA, lumbar radiculopathy and DDD.  She is doing about the same, but she has limitations because she is fused.  She did get out and walk this weekend.  She is on norco and she abides by all rules and keeps all her appts and does not ask for refills early.  She is no longer on the methotrexate because it was not working.  She has learned how to manage her pain better since her surgery    Pain  This is a chronic problem. The current episode started more than 1 year ago. The problem occurs constantly. The problem has been gradually improving. Associated symptoms include arthralgias, joint swelling, myalgias and numbness. Pertinent negatives include no change in bowel habit, chest pain, chills, congestion, coughing, fever, headaches, nausea, neck pain, swollen glands, urinary symptoms, vertigo or visual change. The symptoms are aggravated by bending, stress, standing, walking and twisting. She has tried rest, oral narcotics, walking and lying down for the symptoms. The treatment provided mild relief.   Back Pain  This is a chronic problem. The current episode started more than 1 year ago. The problem occurs constantly. The problem is unchanged. The pain is present in the lumbar spine. The quality of the pain is described as aching, burning, cramping, shooting and stabbing. The pain is at a severity of 8/10. The pain is severe. The pain is worse during the day. The symptoms are aggravated by bending, position, standing and twisting. Stiffness is present all day. Associated symptoms include numbness and paresthesias. Pertinent negatives include no chest pain, fever or headaches. Risk factors include obesity, menopause, sedentary lifestyle and lack of exercise. She has tried bed rest, home exercises, heat,  "NSAIDs, muscle relaxant and ice for the symptoms. The treatment provided mild relief.     The following portions of the patient's history were reviewed and updated as appropriate: allergies, current medications, past family history, past medical history, past social history, past surgical history and problem list.    Objective   Vital Signs:  /84 (BP Location: Right arm, Patient Position: Sitting, Cuff Size: Adult) Comment: 132/84  Pulse 68   Temp 98.4 °F (36.9 °C) (Infrared)   Resp 18   Ht 160 cm (63\") Comment: per patient  Wt 104 kg (229 lb)   SpO2 99%   BMI 40.57 kg/m²     Class 3 Severe Obesity (BMI >=40). Obesity-related health conditions include the following: chronic pain, fibromyalzgia, ra. Obesity is unchanged. BMI is is above average; BMI management plan is completed. We discussed portion control and increasing exercise.      Physical Exam  Vitals and nursing note reviewed.   Constitutional:       General: She is awake.      Appearance: Normal appearance. She is well-developed and well-groomed.   HENT:      Head: Normocephalic and atraumatic.      Right Ear: Hearing, tympanic membrane, ear canal and external ear normal.      Left Ear: Hearing, tympanic membrane, ear canal and external ear normal.      Nose: Nose normal.      Mouth/Throat:      Lips: Pink.      Pharynx: Oropharynx is clear.   Eyes:      General: Lids are normal.      Conjunctiva/sclera: Conjunctivae normal.   Cardiovascular:      Rate and Rhythm: Normal rate and regular rhythm.      Heart sounds: Normal heart sounds.   Pulmonary:      Effort: Pulmonary effort is normal.      Breath sounds: Normal breath sounds and air entry.   Musculoskeletal:      Cervical back: Normal and full passive range of motion without pain.      Thoracic back: Normal.      Lumbar back: Spasms and tenderness present. Decreased range of motion.        Back:       Right lower leg: No edema.      Left lower leg: No edema.   Lymphadenopathy:      Head:      " Right side of head: No submental, submandibular or tonsillar adenopathy.      Left side of head: No submental, submandibular or tonsillar adenopathy.   Skin:     General: Skin is warm and dry.   Neurological:      Mental Status: She is alert and oriented to person, place, and time.      Sensory: Sensation is intact.      Motor: Motor function is intact.      Coordination: Coordination is intact.      Gait: Gait is intact.   Psychiatric:         Attention and Perception: Attention and perception normal.         Mood and Affect: Mood and affect normal.         Speech: Speech normal.         Behavior: Behavior normal. Behavior is cooperative.         Thought Content: Thought content normal.         Cognition and Memory: Cognition and memory normal.         Judgment: Judgment normal.      The following portions of the patient's history were reviewed and updated as appropriate: allergies, current medications, past family history, past medical history, past social history, past surgical history and problem list.    Result Review :                 Assessment and Plan    Diagnoses and all orders for this visit:    1. Chronic pain syndrome  -     HYDROcodone-acetaminophen (Norco)  MG per tablet; Take 1 tablet by mouth Every 6 (Six) Hours As Needed for Moderate Pain .  Dispense: 120 tablet; Refill: 0  -     tiZANidine (ZANAFLEX) 4 MG tablet; Take 1 tablet by mouth 2 (Two) Times a Day As Needed for Muscle Spasms.  Dispense: 60 tablet; Refill: 5    2. DDD (degenerative disc disease), lumbar  -     HYDROcodone-acetaminophen (Norco)  MG per tablet; Take 1 tablet by mouth Every 6 (Six) Hours As Needed for Moderate Pain .  Dispense: 120 tablet; Refill: 0  -     tiZANidine (ZANAFLEX) 4 MG tablet; Take 1 tablet by mouth 2 (Two) Times a Day As Needed for Muscle Spasms.  Dispense: 60 tablet; Refill: 5    3. Panic attack  -     ALPRAZolam (Xanax) 0.5 MG tablet; Take 1 tablet by mouth 3 (Three) Times a Day As Needed for  Anxiety.  Dispense: 90 tablet; Refill: 0      ALMA Report:      As part of this patient's treatment plan, I am prescribing controlled substances. The patient has been made aware of appropriate use of such medications, including potential risk of opiod use, somnolence, limited ability to drive and /or work safely, and potential for dependence or overdose, and opiods should be used sparingly and are not recommended for long term use.  It has also been made clear that these medications are for use by this patient only, without concomitant use of alcohol or other substances unless prescribed.    The patient was provided a Rx for norco as needed for pain control after sustaining multiple back and neck injuries as well as surgeries.     This medication is a narcotic pain medication. This medication is monitored by the federal [facility] and the [facility]. Narcotic medication is commonly abused and many patients can become addicted to this medication.    There are problems with narcotic pain medication including addiction, dependence and tolerance to the pain relief. We discussed appropriate and expected levels of pain.     Narcotics have side effects, including the common side effect of nausea/vomiting if taken on an empty stomach and the problem of constipation that occur with narcotic use.     Narcotics can impair your judgement and therefore you should never drive or operate heavy machinery while using these medications. Treat this medication similar to alcohol, do not take this and drive or you could injure yourself or others.    As an alternative drug, we encourage the use of tylenol and/or NSAIDs (non-steroidal anti-inflammatory drugs) for additional pain relief and as an anti-inflammatory. NSAIDS are drugs such as aleve (naproxen) and motrin (ibuprofen). Narcotics and NSAIDs work differently and can complement each other well after surgery.    In general, NSAIDs and tylenol are much safer drugs than narcotics.  Tylenol (acetaminophen) should not be taken with narcotics as most prescribed narcotics already contain tylenol. The maximum dose of tylenol is 4 grams per day, so this can be used if your narcotic pain medication is used sparingly. Pay close attention to the dosages and ask your pharmacist about the dosage of tylenol.  Some patients can experience GI irritation from NSAID use and patients with kidney problems, previous bleeding from the GI tract, gastric bypass, or certain other conditions may not be able to take these over the counter medications.      Patient has completed prescribing agreement detailing terms of continued prescribing of controlled substances, including monitoring ALMA reports, urine drug screening yearly an random drug screens to monitor patients compliance to treatment plan, and pill counts if necessary. The patient is aware that inappropriate use will result in cessation of prescribing such medications.    Toxassure:  I have ordered a urine drug screen to monitor patients predisposition to and patterns of drug use/misuse in order to establish and maintain the safe and effective use of analgesics in the treatment of chronic pain    ALMA report has been reviewed by: Charlene Huntley, [place], APRN. The report was scanned into the patient's chart.      -     Pt is aware of the potential for addiction and dependence.    Addiction was discussed.  The  Risks, benefits and alternative options of drug therapy discussed.  It was reinforced about the risks and benefits of opioids.  It was reinforced that patient may not call early for medication, may not ask for increase in the number of pills given monthly or increase in dosage of medication, may not jump around to different pharmacies or providers and may not receive any narcotics from other providers including ER, or the contract will be broken and narcotics will no longer be prescribed from this facility if any of these criteria has been broken.       Last Dose was: today  Last Fill was:  April  Date of last ALMA: today    Date of last UDS: kavithae        I spent 14 minutes caring for Adela on this date of service. This time includes time spent by me in the following activities:preparing for the visit, performing a medically appropriate examination and/or evaluation , counseling and educating the patient/family/caregiver, ordering medications, tests, or procedures, documenting information in the medical record and care coordination  Follow Up   Return in about 1 month (around 6/9/2022) for Recheck.  Patient was given instructions and counseling regarding her condition or for health maintenance advice. Please see specific information pulled into the AVS if appropriate.     Electronically signed by Charlene Huntley DNP, APRN, 05/09/22, 10:56 AM CDT.

## 2022-05-16 ENCOUNTER — INFUSION (OUTPATIENT)
Dept: ONCOLOGY | Facility: HOSPITAL | Age: 57
End: 2022-05-16

## 2022-05-16 ENCOUNTER — LAB (OUTPATIENT)
Dept: LAB | Facility: HOSPITAL | Age: 57
End: 2022-05-16

## 2022-05-16 VITALS
RESPIRATION RATE: 18 BRPM | HEIGHT: 64 IN | BODY MASS INDEX: 38.93 KG/M2 | OXYGEN SATURATION: 100 % | TEMPERATURE: 97.9 F | SYSTOLIC BLOOD PRESSURE: 134 MMHG | DIASTOLIC BLOOD PRESSURE: 88 MMHG | HEART RATE: 82 BPM | WEIGHT: 228 LBS

## 2022-05-16 DIAGNOSIS — M81.0 SENILE OSTEOPOROSIS: Primary | ICD-10-CM

## 2022-05-16 DIAGNOSIS — M81.0 AGE-RELATED OSTEOPOROSIS WITHOUT CURRENT PATHOLOGICAL FRACTURE: ICD-10-CM

## 2022-05-16 LAB
25(OH)D3 SERPL-MCNC: 55.4 NG/ML (ref 30–100)
CALCIUM SPEC-SCNC: 9.2 MG/DL (ref 8.6–10.5)

## 2022-05-16 PROCEDURE — 82310 ASSAY OF CALCIUM: CPT

## 2022-05-16 PROCEDURE — 96372 THER/PROPH/DIAG INJ SC/IM: CPT

## 2022-05-16 PROCEDURE — 36415 COLL VENOUS BLD VENIPUNCTURE: CPT

## 2022-05-16 PROCEDURE — 82306 VITAMIN D 25 HYDROXY: CPT

## 2022-05-16 PROCEDURE — 25010000002 ROMOSOZUMAB-AQQG 105 MG/1.17ML SOLUTION PREFILLED SYRINGE: Performed by: PHYSICIAN ASSISTANT

## 2022-05-16 RX ADMIN — ROMOSOZUMAB-AQQG 210 MG: 105 INJECTION, SOLUTION SUBCUTANEOUS at 11:10

## 2022-06-13 ENCOUNTER — APPOINTMENT (OUTPATIENT)
Dept: ONCOLOGY | Facility: HOSPITAL | Age: 57
End: 2022-06-13

## 2022-06-14 ENCOUNTER — OFFICE VISIT (OUTPATIENT)
Dept: FAMILY MEDICINE CLINIC | Facility: CLINIC | Age: 57
End: 2022-06-14

## 2022-06-14 VITALS
BODY MASS INDEX: 40.22 KG/M2 | HEIGHT: 63 IN | HEART RATE: 82 BPM | TEMPERATURE: 98.2 F | OXYGEN SATURATION: 99 % | RESPIRATION RATE: 18 BRPM | DIASTOLIC BLOOD PRESSURE: 75 MMHG | WEIGHT: 227 LBS | SYSTOLIC BLOOD PRESSURE: 110 MMHG

## 2022-06-14 DIAGNOSIS — M51.36 DDD (DEGENERATIVE DISC DISEASE), LUMBAR: ICD-10-CM

## 2022-06-14 DIAGNOSIS — R11.0 NAUSEA: ICD-10-CM

## 2022-06-14 DIAGNOSIS — Z12.31 ENCOUNTER FOR SCREENING MAMMOGRAM FOR MALIGNANT NEOPLASM OF BREAST: ICD-10-CM

## 2022-06-14 DIAGNOSIS — G62.9 NEUROPATHY: Primary | ICD-10-CM

## 2022-06-14 DIAGNOSIS — F41.0 PANIC ATTACK: ICD-10-CM

## 2022-06-14 DIAGNOSIS — G89.4 CHRONIC PAIN SYNDROME: ICD-10-CM

## 2022-06-14 DIAGNOSIS — M06.9 RHEUMATOID ARTHRITIS INVOLVING MULTIPLE SITES, UNSPECIFIED WHETHER RHEUMATOID FACTOR PRESENT: ICD-10-CM

## 2022-06-14 PROCEDURE — 99214 OFFICE O/P EST MOD 30 MIN: CPT | Performed by: NURSE PRACTITIONER

## 2022-06-14 RX ORDER — PREGABALIN 150 MG/1
150 CAPSULE ORAL 2 TIMES DAILY
Qty: 180 CAPSULE | Refills: 5 | Status: SHIPPED | OUTPATIENT
Start: 2022-06-14 | End: 2023-01-23

## 2022-06-14 RX ORDER — HYDROCODONE BITARTRATE AND ACETAMINOPHEN 10; 325 MG/1; MG/1
1 TABLET ORAL EVERY 6 HOURS PRN
Qty: 120 TABLET | Refills: 0 | Status: SHIPPED | OUTPATIENT
Start: 2022-06-14 | End: 2022-07-13 | Stop reason: SDUPTHER

## 2022-06-14 RX ORDER — ALPRAZOLAM 0.5 MG/1
0.5 TABLET ORAL 3 TIMES DAILY PRN
Qty: 90 TABLET | Refills: 0 | Status: SHIPPED | OUTPATIENT
Start: 2022-06-14 | End: 2022-07-13 | Stop reason: SDUPTHER

## 2022-06-14 RX ORDER — ONDANSETRON 4 MG/1
4 TABLET, ORALLY DISINTEGRATING ORAL EVERY 8 HOURS PRN
Qty: 40 TABLET | Refills: 0 | Status: SHIPPED | OUTPATIENT
Start: 2022-06-14 | End: 2022-07-13 | Stop reason: SDUPTHER

## 2022-06-14 NOTE — PROGRESS NOTES
Chief Complaint  Chronic pain syndrome (Follow up)    Subjective        Adela Arauz presents to [facility]  Presents for monthly follow up for multiple chronic problems: PTSD/anxiety/depression stable with current dose of Xanax and sertraline denies suicidal/homicidal ideations, chronic back pain and RA stable with norco, tizanidine and meloxicam, gerd stable with omeprazole, iron def anemia stable with ferrous sulfate, edema stable with furosemide.  Compliant with medication.   Rates her pain in her back 9/10    Anxiety  Presents for follow-up visit. Symptoms include decreased concentration, depressed mood, irritability, muscle tension and nervous/anxious behavior. Patient reports no compulsions, feeling of choking, nausea, panic, restlessness, shortness of breath or suicidal ideas. Symptoms occur most days. The severity of symptoms is moderate. The patient sleeps 5 hours per night. The quality of sleep is fair. Nighttime awakenings: several.     Her past medical history is significant for depression. Compliance with medications is %. Side effects of treatment include joint pain.   Depression  Visit Type: follow-up  Patient presents with the following symptoms: decreased concentration, depressed mood, irritability, muscle tension and nervousness/anxiety.  Patient is not experiencing: compulsions, panic, restlessness, shortness of breath and suicidal ideas.  Frequency of symptoms: most days   Severity: moderate   Sleep quality: fair  Nighttime awakenings: several  Compliance with medications:  %        Back Pain  This is a chronic problem. The current episode started more than 1 year ago. The problem occurs constantly. The problem is unchanged. The pain is present in the lumbar spine. The quality of the pain is described as aching and burning. The pain radiates to the right thigh. The pain is at a severity of 9/10. The pain is severe. The pain is the same all the  "time. The symptoms are aggravated by bending, lying down, twisting and sitting.   Heartburn  She reports no nausea.       Chronic pain: Patient presents today for a follow up for on chronic pain syndrome. Patient has a PMH of back surgery, DDD, RA, fibromyalgia, lumbar radiculopathy, and panic attacks. Patient states Lyrica is managing her pain well, but only if she takes twice the dose at night. Denies adverse reactions with while taking this 150mg of Lyrica. She has chronic pain in low back, but started experiencing a new nerve pain in her upper thighs about 3 months ago. Patient attributes this new nerve pain to healing from her back surgery one year ago. She states she has been more active and exercising. Pain is a 9/10 on a pain scale. Pain is located in low back and upper thighs, characterized as pins and needles in legs and sore, dull ache in back. Patient states her current dose of Norco and Zanaflex is managing her back pain appropriately. States this medication allows her to perform ADLs. No numbness in arms or legs. Bowel and bladder are regular. No adverse reactions.     The following portions of the patient's history were reviewed and updated as appropriate: allergies, current medications, past family history, past medical history, past social history, past surgical history and problem list.      Objective   Vital Signs:  /75 (BP Location: Left arm, Patient Position: Sitting, Cuff Size: Large Adult)   Pulse 82   Temp 98.2 °F (36.8 °C) (Infrared)   Resp 18   Ht 160 cm (63\") Comment: per patient  Wt 103 kg (227 lb)   SpO2 99%   BMI 40.21 kg/m²   Estimated body mass index is 40.21 kg/m² as calculated from the following:    Height as of this encounter: 160 cm (63\").    Weight as of this encounter: 103 kg (227 lb).    Class 3 Severe Obesity (BMI >=40). Obesity-related health conditions include the following: osteoarthritis. Obesity is unchanged. BMI is is above average; BMI management plan is " completed. We discussed portion control and increasing exercise.      Physical Exam  Constitutional:       General: She is not in acute distress.     Appearance: Normal appearance. She is obese. She is not ill-appearing.   HENT:      Head: Normocephalic and atraumatic.   Cardiovascular:      Rate and Rhythm: Normal rate and regular rhythm.      Pulses: Normal pulses.      Heart sounds: Normal heart sounds.   Pulmonary:      Effort: Pulmonary effort is normal.      Breath sounds: Normal breath sounds.   Musculoskeletal:      Lumbar back: Spasms and tenderness present. No deformity or bony tenderness. Decreased range of motion.      Right lower leg: No edema.      Left lower leg: No edema.      Comments: Bilateral non-pitting lower leg edema; limited ROM bending and twisting lumbar back   Skin:     General: Skin is warm and dry.      Capillary Refill: Capillary refill takes less than 2 seconds.   Neurological:      Mental Status: She is alert and oriented to person, place, and time.   Psychiatric:         Mood and Affect: Mood normal.         Behavior: Behavior normal.         Thought Content: Thought content normal.         Judgment: Judgment normal.        Result Review :                Assessment and Plan   Diagnoses and all orders for this visit:    1. Neuropathy (Primary)  -     pregabalin (Lyrica) 150 MG capsule; Take 1 capsule by mouth 2 (Two) Times a Day.  Dispense: 180 capsule; Refill: 5    2. Panic attack  -     ALPRAZolam (Xanax) 0.5 MG tablet; Take 1 tablet by mouth 3 (Three) Times a Day As Needed for Anxiety.  Dispense: 90 tablet; Refill: 0    3. Chronic pain syndrome  -     HYDROcodone-acetaminophen (Norco)  MG per tablet; Take 1 tablet by mouth Every 6 (Six) Hours As Needed for Moderate Pain .  Dispense: 120 tablet; Refill: 0    ALMA Report:      As part of this patient's treatment plan, I am prescribing controlled substances. The patient has been made aware of appropriate use of such  medications, including potential risk of opiod use, somnolence, limited ability to drive and /or work safely, and potential for dependence or overdose, and opiods should be used sparingly and are not recommended for long term use.  It has also been made clear that these medications are for use by this patient only, without concomitant use of alcohol or other substances unless prescribed.    The patient was provided a Rx for norco as needed for pain control after sustaining multiple back and neck injuries as well as surgeries.     This medication is a narcotic pain medication. This medication is monitored by the federal [employer] and the [facility]. Narcotic medication is commonly abused and many patients can become addicted to this medication.    There are problems with narcotic pain medication including addiction, dependence and tolerance to the pain relief. We discussed appropriate and expected levels of pain.     Narcotics have side effects, including the common side effect of nausea/vomiting if taken on an empty stomach and the problem of constipation that occur with narcotic use.     Narcotics can impair your judgement and therefore you should never drive or operate heavy machinery while using these medications. Treat this medication similar to alcohol, do not take this and drive or you could injure yourself or others.    As an alternative drug, we encourage the use of tylenol and/or NSAIDs (non-steroidal anti-inflammatory drugs) for additional pain relief and as an anti-inflammatory. NSAIDS are drugs such as aleve (naproxen) and motrin (ibuprofen). Narcotics and NSAIDs work differently and can complement each other well after surgery.    In general, NSAIDs and tylenol are much safer drugs than narcotics. Tylenol (acetaminophen) should not be taken with narcotics as most prescribed narcotics already contain tylenol. The maximum dose of tylenol is 4 grams per day, so this can be used if your narcotic pain  medication is used sparingly. Pay close attention to the dosages and ask your pharmacist about the dosage of tylenol.  Some patients can experience GI irritation from NSAID use and patients with kidney problems, previous bleeding from the GI tract, gastric bypass, or certain other conditions may not be able to take these over the counter medications.      Patient has completed prescribing agreement detailing terms of continued prescribing of controlled substances, including monitoring ALMA reports, urine drug screening yearly an random drug screens to monitor patients compliance to treatment plan, and pill counts if necessary. The patient is aware that inappropriate use will result in cessation of prescribing such medications.    Toxassure:  I have ordered a urine drug screen to monitor patients predisposition to and patterns of drug use/misuse in order to establish and maintain the safe and effective use of analgesics in the treatment of chronic pain    ALMA report has been reviewed by: Charlene Huntley, [place], APRN     -     Pt is aware of the potential for addiction and dependence.    Addiction was discussed.  The  Risks, benefits and alternative options of drug therapy discussed.  It was reinforced about the risks and benefits of opioids.  It was reinforced that patient may not call early for medication, may not ask for increase in the number of pills given monthly or increase in dosage of medication, may not jump around to different pharmacies or providers and may not receive any narcotics from other providers including ER, or the contract will be broken and narcotics will no longer be prescribed from this facility if any of these criteria has been broken.      Last Dose was: today    Last Fill was:  may  Date of last ALMA:today     Date of last UDS: on file      4. DDD (degenerative disc disease), lumbar  -     HYDROcodone-acetaminophen (Norco)  MG per tablet; Take 1 tablet by mouth Every 6 (Six)  Hours As Needed for Moderate Pain .  Dispense: 120 tablet; Refill: 0    5. Nausea  -     ondansetron ODT (Zofran ODT) 4 MG disintegrating tablet; Place 1 tablet on the tongue Every 8 (Eight) Hours As Needed for Nausea or Vomiting.  Dispense: 40 tablet; Refill: 0    6. Rheumatoid arthritis involving multiple sites, unspecified whether rheumatoid factor present (HCC)    7. Encounter for screening mammogram for malignant neoplasm of breast  -     Mammo Screening Digital Tomosynthesis Bilateral With CAD; Future           I spent 14 minutes caring for Adela on this date of service. This time includes time spent by me in the following activities:preparing for the visit, performing a medically appropriate examination and/or evaluation , counseling and educating the patient/family/caregiver, ordering medications, tests, or procedures, documenting information in the medical record and care coordination  Follow Up   Return in about 1 month (around 7/14/2022) for Recheck.  Patient was given instructions and counseling regarding her condition or for health maintenance advice. Please see specific information pulled into the AVS if appropriate.     Electronically signed by Charlene Huntley, [place], APRN, 06/20/22, 12:36 PM CDT.

## 2022-06-15 ENCOUNTER — HOSPITAL ENCOUNTER (OUTPATIENT)
Dept: MAMMOGRAPHY | Facility: HOSPITAL | Age: 57
Discharge: HOME | End: 2022-06-15
Admitting: NURSE PRACTITIONER

## 2022-06-15 DIAGNOSIS — Z12.31 ENCOUNTER FOR SCREENING MAMMOGRAM FOR MALIGNANT NEOPLASM OF BREAST: ICD-10-CM

## 2022-06-15 PROCEDURE — 77063 BREAST TOMOSYNTHESIS BI: CPT

## 2022-06-15 PROCEDURE — 77067 SCR MAMMO BI INCL CAD: CPT

## 2022-06-22 ENCOUNTER — TRANSCRIBE ORDERS (OUTPATIENT)
Dept: ADMINISTRATIVE | Facility: HOSPITAL | Age: 57
End: 2022-06-22

## 2022-06-22 DIAGNOSIS — M81.0 SENILE OSTEOPOROSIS: Primary | ICD-10-CM

## 2022-07-08 ENCOUNTER — APPOINTMENT (OUTPATIENT)
Dept: ONCOLOGY | Facility: HOSPITAL | Age: 57
End: 2022-07-08

## 2022-07-08 ENCOUNTER — APPOINTMENT (OUTPATIENT)
Dept: LAB | Facility: HOSPITAL | Age: 57
End: 2022-07-08

## 2022-07-13 ENCOUNTER — OFFICE VISIT (OUTPATIENT)
Dept: FAMILY MEDICINE CLINIC | Facility: CLINIC | Age: 57
End: 2022-07-13

## 2022-07-13 ENCOUNTER — LAB (OUTPATIENT)
Dept: LAB | Facility: HOSPITAL | Age: 57
End: 2022-07-13

## 2022-07-13 ENCOUNTER — INFUSION (OUTPATIENT)
Dept: ONCOLOGY | Facility: HOSPITAL | Age: 57
End: 2022-07-13

## 2022-07-13 VITALS
DIASTOLIC BLOOD PRESSURE: 77 MMHG | HEART RATE: 84 BPM | TEMPERATURE: 97.3 F | RESPIRATION RATE: 18 BRPM | HEIGHT: 63 IN | WEIGHT: 226 LBS | BODY MASS INDEX: 40.04 KG/M2 | SYSTOLIC BLOOD PRESSURE: 114 MMHG | OXYGEN SATURATION: 95 %

## 2022-07-13 VITALS
TEMPERATURE: 97.3 F | HEART RATE: 72 BPM | OXYGEN SATURATION: 98 % | BODY MASS INDEX: 40.04 KG/M2 | WEIGHT: 226 LBS | RESPIRATION RATE: 17 BRPM | DIASTOLIC BLOOD PRESSURE: 73 MMHG | SYSTOLIC BLOOD PRESSURE: 134 MMHG | HEIGHT: 63 IN

## 2022-07-13 DIAGNOSIS — M81.0 SENILE OSTEOPOROSIS: ICD-10-CM

## 2022-07-13 DIAGNOSIS — M51.36 DDD (DEGENERATIVE DISC DISEASE), LUMBAR: ICD-10-CM

## 2022-07-13 DIAGNOSIS — F33.1 MODERATE EPISODE OF RECURRENT MAJOR DEPRESSIVE DISORDER: ICD-10-CM

## 2022-07-13 DIAGNOSIS — R60.1 GENERALIZED EDEMA: ICD-10-CM

## 2022-07-13 DIAGNOSIS — F41.9 ANXIETY: ICD-10-CM

## 2022-07-13 DIAGNOSIS — F41.0 PANIC ATTACK: ICD-10-CM

## 2022-07-13 DIAGNOSIS — F41.0 PANIC ATTACKS: ICD-10-CM

## 2022-07-13 DIAGNOSIS — G89.4 CHRONIC PAIN SYNDROME: ICD-10-CM

## 2022-07-13 DIAGNOSIS — M81.0 SENILE OSTEOPOROSIS: Primary | ICD-10-CM

## 2022-07-13 DIAGNOSIS — R11.0 NAUSEA: ICD-10-CM

## 2022-07-13 LAB
CALCIUM SPEC-SCNC: 9.7 MG/DL (ref 8.6–10.5)
MAGNESIUM SERPL-MCNC: 1.9 MG/DL (ref 1.6–2.6)
PHOSPHATE SERPL-MCNC: 3.5 MG/DL (ref 2.5–4.5)

## 2022-07-13 PROCEDURE — 82310 ASSAY OF CALCIUM: CPT

## 2022-07-13 PROCEDURE — 96372 THER/PROPH/DIAG INJ SC/IM: CPT

## 2022-07-13 PROCEDURE — 83735 ASSAY OF MAGNESIUM: CPT

## 2022-07-13 PROCEDURE — 36415 COLL VENOUS BLD VENIPUNCTURE: CPT

## 2022-07-13 PROCEDURE — 25010000002 DENOSUMAB 60 MG/ML SOLUTION PREFILLED SYRINGE: Performed by: PHYSICIAN ASSISTANT

## 2022-07-13 PROCEDURE — 84100 ASSAY OF PHOSPHORUS: CPT

## 2022-07-13 PROCEDURE — 99214 OFFICE O/P EST MOD 30 MIN: CPT | Performed by: NURSE PRACTITIONER

## 2022-07-13 RX ORDER — HYDROCODONE BITARTRATE AND ACETAMINOPHEN 10; 325 MG/1; MG/1
1 TABLET ORAL EVERY 6 HOURS PRN
Qty: 120 TABLET | Refills: 0 | Status: SHIPPED | OUTPATIENT
Start: 2022-07-13 | End: 2022-08-12 | Stop reason: SDUPTHER

## 2022-07-13 RX ORDER — MELOXICAM 15 MG/1
15 TABLET ORAL DAILY
Qty: 90 TABLET | Refills: 1 | Status: SHIPPED | OUTPATIENT
Start: 2022-07-13

## 2022-07-13 RX ORDER — ONDANSETRON 4 MG/1
4 TABLET, ORALLY DISINTEGRATING ORAL EVERY 8 HOURS PRN
Qty: 40 TABLET | Refills: 0 | Status: SHIPPED | OUTPATIENT
Start: 2022-07-13 | End: 2023-03-10 | Stop reason: SDUPTHER

## 2022-07-13 RX ORDER — ALPRAZOLAM 0.5 MG/1
0.5 TABLET ORAL 3 TIMES DAILY PRN
Qty: 90 TABLET | Refills: 0 | Status: SHIPPED | OUTPATIENT
Start: 2022-07-13 | End: 2022-08-12 | Stop reason: SDUPTHER

## 2022-07-13 RX ORDER — FUROSEMIDE 40 MG/1
40 TABLET ORAL DAILY
Qty: 90 TABLET | Refills: 1 | Status: SHIPPED | OUTPATIENT
Start: 2022-07-13 | End: 2023-01-09 | Stop reason: SDUPTHER

## 2022-07-13 RX ADMIN — DENOSUMAB 60 MG: 60 INJECTION SUBCUTANEOUS at 09:11

## 2022-07-13 NOTE — PROGRESS NOTES
Chief Complaint  Chronic pain syndrome (Follow up)    Subjective        Adela Arauz presents to [facility]  Presents today for follow up for chronic back pain, RA, senile osteoporosis, DDD, Lumbar radiculopathy, stable with norco, tizanidine and lyrica,  Has hx of MRSA and staph improved since she had the last surgery, Grade 1 follicular lymphoma, lower extremity edema stable with furosemide, depression, anxiety and PTSD, stable with alprazolam, sertraline, without suicidal/homicidal ideations.  Her pain is about the same in her joints, back, wrists.  Rates her pain 7/10.              Pain  This is a chronic problem. The current episode started more than 1 year ago. The problem occurs constantly. The problem has been unchanged. Associated symptoms include arthralgias and myalgias. Pertinent negatives include no change in bowel habit, chest pain, chills, congestion, headaches, joint swelling, nausea, swollen glands, urinary symptoms or vertigo. The symptoms are aggravated by bending, stress, standing, twisting and walking. She has tried acetaminophen, oral narcotics, rest and position changes for the symptoms. The treatment provided mild relief.   Back Pain  This is a chronic problem. The current episode started more than 1 year ago. The problem occurs constantly. The problem is unchanged. The pain is present in the lumbar spine. The quality of the pain is described as aching, burning and stabbing. The pain does not radiate. The pain is at a severity of 7/10. The pain is moderate. The pain is worse during the day. The symptoms are aggravated by bending, position, sitting, standing and twisting. Stiffness is present all day. Pertinent negatives include no bladder incontinence, chest pain, headaches, paresis, pelvic pain or perianal numbness. Risk factors include menopause, obesity, sedentary lifestyle, history of osteoporosis, history of cancer and lack of exercise. She has  "tried analgesics, ice, heat, NSAIDs and muscle relaxant for the symptoms. The treatment provided mild relief.   Leg Swelling  This is a chronic problem. The current episode started more than 1 year ago. The problem occurs intermittently. The problem has been gradually improving. Associated symptoms include arthralgias and myalgias. Pertinent negatives include no change in bowel habit, chest pain, chills, congestion, headaches, joint swelling, nausea, swollen glands, urinary symptoms or vertigo. The symptoms are aggravated by bending, standing, stress, twisting, walking and exertion. She has tried acetaminophen, rest, oral narcotics, position changes, lying down, NSAIDs and relaxation for the symptoms. The treatment provided mild relief.   Depression  Visit Type: follow-up  Patient presents with the following symptoms: depressed mood, insomnia, irritability, muscle tension, nervousness/anxiety, panic, restlessness and weight gain.  Patient is not experiencing: choking sensation, compulsions, confusion, suicidal ideas, suicidal planning and thoughts of death.  Frequency of symptoms: most days   Severity: moderate   Sleep per night: 5 hours  Sleep quality: fair  Nighttime awakenings: several  Side effects:  Join pain and insomnia  Anxiety  Presents for follow-up visit. Symptoms include depressed mood, insomnia, irritability, muscle tension, nervous/anxious behavior, panic and restlessness. Patient reports no chest pain, compulsions, confusion, nausea or suicidal ideas. Symptoms occur most days. The severity of symptoms is moderate. The patient sleeps 5 hours per night. The quality of sleep is fair. Nighttime awakenings: several.     Her past medical history is significant for depression. Compliance with medications is %.       Objective   Vital Signs:  /77 (BP Location: Left arm, Patient Position: Sitting, Cuff Size: Large Adult)   Pulse 84   Temp 97.3 °F (36.3 °C) (Infrared)   Resp 18   Ht 160 cm (63\") " "Comment: per patient  Wt 103 kg (226 lb)   SpO2 95%   BMI 40.03 kg/m²   Estimated body mass index is 40.03 kg/m² as calculated from the following:    Height as of this encounter: 160 cm (63\").    Weight as of this encounter: 103 kg (226 lb).    Class 3 Severe Obesity (BMI >=40). Obesity-related health conditions include the following: hypertension, dyslipidemias and GERD. Obesity is unchanged. BMI is is above average; BMI management plan is completed. We discussed portion control and increasing exercise.      Physical Exam  Vitals and nursing note reviewed.   Constitutional:       General: She is awake.      Appearance: Normal appearance. She is well-developed and well-groomed.   HENT:      Head: Normocephalic and atraumatic.      Right Ear: Hearing, tympanic membrane, ear canal and external ear normal.      Left Ear: Hearing, tympanic membrane, ear canal and external ear normal.      Nose: Nose normal.      Mouth/Throat:      Lips: Pink.      Pharynx: Oropharynx is clear.   Eyes:      General: Lids are normal.      Conjunctiva/sclera: Conjunctivae normal.   Cardiovascular:      Rate and Rhythm: Normal rate and regular rhythm.      Heart sounds: Normal heart sounds.   Pulmonary:      Effort: Pulmonary effort is normal.      Breath sounds: Normal breath sounds and air entry.   Musculoskeletal:        Arms:       Cervical back: Normal and full passive range of motion without pain.      Thoracic back: Normal.      Lumbar back: Spasms and tenderness present. Decreased range of motion.        Back:       Right lower le+ Edema present.      Left lower le+ Edema present.   Lymphadenopathy:      Head:      Right side of head: No submental, submandibular or tonsillar adenopathy.      Left side of head: No submental, submandibular or tonsillar adenopathy.   Skin:     General: Skin is warm and dry.   Neurological:      General: No focal deficit present.      Mental Status: She is alert and oriented to person, place, " and time.      Cranial Nerves: Cranial nerves are intact.      Sensory: Sensation is intact.      Motor: Motor function is intact.      Coordination: Coordination is intact.      Gait: Gait is intact.   Psychiatric:         Attention and Perception: Attention and perception normal.         Mood and Affect: Mood and affect normal.         Speech: Speech normal.         Behavior: Behavior normal. Behavior is cooperative.         Thought Content: Thought content normal.         Cognition and Memory: Cognition and memory normal.         Judgment: Judgment normal.        Result Review :                Assessment and Plan   Diagnoses and all orders for this visit:    1. Generalized edema  -     furosemide (LASIX) 40 MG tablet; Take 1 tablet by mouth Daily.  Dispense: 90 tablet; Refill: 1    2. Chronic pain syndrome  -     HYDROcodone-acetaminophen (Norco)  MG per tablet; Take 1 tablet by mouth Every 6 (Six) Hours As Needed for Moderate Pain .  Dispense: 120 tablet; Refill: 0    3. DDD (degenerative disc disease), lumbar  -     HYDROcodone-acetaminophen (Norco)  MG per tablet; Take 1 tablet by mouth Every 6 (Six) Hours As Needed for Moderate Pain .  Dispense: 120 tablet; Refill: 0  -     meloxicam (MOBIC) 15 MG tablet; Take 1 tablet by mouth Daily.  Dispense: 90 tablet; Refill: 1    4. Nausea  -     ondansetron ODT (Zofran ODT) 4 MG disintegrating tablet; Place 1 tablet on the tongue Every 8 (Eight) Hours As Needed for Nausea or Vomiting.  Dispense: 40 tablet; Refill: 0    5. Anxiety  -     sertraline (ZOLOFT) 50 MG tablet; Take 1 tablet by mouth every night at bedtime.  Dispense: 90 tablet; Refill: 1    6. Panic attacks  -     sertraline (ZOLOFT) 50 MG tablet; Take 1 tablet by mouth every night at bedtime.  Dispense: 90 tablet; Refill: 1    7. Moderate episode of recurrent major depressive disorder (HCC)  -     sertraline (ZOLOFT) 50 MG tablet; Take 1 tablet by mouth every night at bedtime.  Dispense: 90  tablet; Refill: 1    8. Panic attack  -     ALPRAZolam (Xanax) 0.5 MG tablet; Take 1 tablet by mouth 3 (Three) Times a Day As Needed for Anxiety.  Dispense: 90 tablet; Refill: 0        ALMA Report:      As part of this patient's treatment plan, I am prescribing controlled substances. The patient has been made aware of appropriate use of such medications, including potential risk of opiod use, somnolence, limited ability to drive and /or work safely, and potential for dependence or overdose, and opiods should be used sparingly and are not recommended for long term use.  It has also been made clear that these medications are for use by this patient only, without concomitant use of alcohol or other substances unless prescribed.    The patient was provided a Rx for norco as needed for pain control after sustaining multiple back and neck injuries as well as surgeries.     This medication is a narcotic pain medication. This medication is monitored by the federal [facility] and the [facility]. Narcotic medication is commonly abused and many patients can become addicted to this medication.    There are problems with narcotic pain medication including addiction, dependence and tolerance to the pain relief. We discussed appropriate and expected levels of pain.     Narcotics have side effects, including the common side effect of nausea/vomiting if taken on an empty stomach and the problem of constipation that occur with narcotic use.     Narcotics can impair your judgement and therefore you should never drive or operate heavy machinery while using these medications. Treat this medication similar to alcohol, do not take this and drive or you could injure yourself or others.    As an alternative drug, we encourage the use of tylenol and/or NSAIDs (non-steroidal anti-inflammatory drugs) for additional pain relief and as an anti-inflammatory. NSAIDS are drugs such as aleve (naproxen) and motrin (ibuprofen). Narcotics and NSAIDs  work differently and can complement each other well after surgery.    In general, NSAIDs and tylenol are much safer drugs than narcotics. Tylenol (acetaminophen) should not be taken with narcotics as most prescribed narcotics already contain tylenol. The maximum dose of tylenol is 4 grams per day, so this can be used if your narcotic pain medication is used sparingly. Pay close attention to the dosages and ask your pharmacist about the dosage of tylenol.  Some patients can experience GI irritation from NSAID use and patients with kidney problems, previous bleeding from the GI tract, gastric bypass, or certain other conditions may not be able to take these over the counter medications.      Patient has completed prescribing agreement detailing terms of continued prescribing of controlled substances, including monitoring ALMA reports, urine drug screening yearly an random drug screens to monitor patients compliance to treatment plan, and pill counts if necessary. The patient is aware that inappropriate use will result in cessation of prescribing such medications.    Toxassure:  I have ordered a urine drug screen to monitor patients predisposition to and patterns of drug use/misuse in order to establish and maintain the safe and effective use of analgesics in the treatment of chronic pain    ALMA report has been reviewed by: Charlene Huntley, DNP, APRN. .      -     Pt is aware of the potential for addiction and dependence.    Addiction was discussed.  The  Risks, benefits and alternative options of drug therapy discussed.  It was reinforced about the risks and benefits of opioids.  It was reinforced that patient may not call early for medication, may not ask for increase in the number of pills given monthly or increase in dosage of medication, may not jump around to different pharmacies or providers and may not receive any narcotics from other providers including ER, or the contract will be broken and narcotics  will no longer be prescribed from this facility if any of these criteria has been broken.      Last Dose was: today    Last Fill was: june   Date of last ALMA: today       I spent 14 minutes caring for Adela on this date of service. This time includes time spent by me in the following activities:preparing for the visit, performing a medically appropriate examination and/or evaluation , counseling and educating the patient/family/caregiver, ordering medications, tests, or procedures, documenting information in the medical record and care coordination     Follow Up      Return in about 1 month (around 8/13/2022) for Recheck.     Patient was given instructions and counseling regarding her condition or for health maintenance advice. Please see specific information pulled into the AVS if appropriate.     Electronically signed by Charlene Huntley DNP, APRN, 07/13/22, 2:47 PM CDT.

## 2022-08-12 ENCOUNTER — OFFICE VISIT (OUTPATIENT)
Dept: FAMILY MEDICINE CLINIC | Facility: CLINIC | Age: 57
End: 2022-08-12

## 2022-08-12 VITALS
BODY MASS INDEX: 40.75 KG/M2 | HEART RATE: 66 BPM | RESPIRATION RATE: 18 BRPM | WEIGHT: 230 LBS | DIASTOLIC BLOOD PRESSURE: 78 MMHG | SYSTOLIC BLOOD PRESSURE: 116 MMHG | HEIGHT: 63 IN | TEMPERATURE: 97.5 F | OXYGEN SATURATION: 99 %

## 2022-08-12 DIAGNOSIS — F41.0 PANIC ATTACK: ICD-10-CM

## 2022-08-12 DIAGNOSIS — K21.9 GASTROESOPHAGEAL REFLUX DISEASE WITHOUT ESOPHAGITIS: ICD-10-CM

## 2022-08-12 DIAGNOSIS — Z51.81 THERAPEUTIC DRUG MONITORING: ICD-10-CM

## 2022-08-12 DIAGNOSIS — M51.36 DDD (DEGENERATIVE DISC DISEASE), LUMBAR: ICD-10-CM

## 2022-08-12 DIAGNOSIS — E88.01 AAT (ALPHA-1-ANTITRYPSIN) DEFICIENCY: ICD-10-CM

## 2022-08-12 DIAGNOSIS — G89.4 CHRONIC PAIN SYNDROME: Primary | ICD-10-CM

## 2022-08-12 DIAGNOSIS — F41.9 ANXIETY: ICD-10-CM

## 2022-08-12 DIAGNOSIS — E66.01 MORBID OBESITY DUE TO EXCESS CALORIES: ICD-10-CM

## 2022-08-12 PROCEDURE — 99214 OFFICE O/P EST MOD 30 MIN: CPT | Performed by: NURSE PRACTITIONER

## 2022-08-12 RX ORDER — ALPRAZOLAM 0.5 MG/1
0.5 TABLET ORAL 3 TIMES DAILY PRN
Qty: 90 TABLET | Refills: 0 | Status: SHIPPED | OUTPATIENT
Start: 2022-08-12 | End: 2022-09-12 | Stop reason: SDUPTHER

## 2022-08-12 RX ORDER — HYDROCODONE BITARTRATE AND ACETAMINOPHEN 10; 325 MG/1; MG/1
1 TABLET ORAL EVERY 6 HOURS PRN
Qty: 120 TABLET | Refills: 0 | Status: SHIPPED | OUTPATIENT
Start: 2022-08-12 | End: 2022-09-12 | Stop reason: SDUPTHER

## 2022-08-12 NOTE — PROGRESS NOTES
Chief Complaint  Edema (Follow up)    Subjective        Adela Arauz presents to [facility]  Presents for lower extremity edema, chronic back pain, pain under the shoulder blades, RA, DDD, Panic attack, and PTSD.   Rates the pain in her back 9/10.  Chronic problems include Chronic back pain, shoulder pain and RA pain stable with norco and lyrica, PTSD stable with alprazolam, sertraline, RA stable with arava, constipation secondary to narcotic use stalbe with miralax, nausea stable with zofran, iron def anemia stable with ferrous sulfate and edema stable with furosemide        Pain  This is a chronic problem. The current episode started more than 1 year ago. The problem occurs 2 to 4 times per day. The problem has been unchanged. Associated symptoms include arthralgias and myalgias. Pertinent negatives include no change in bowel habit, chest pain, chills, congestion, fatigue, headaches, joint swelling, swollen glands or vertigo. The symptoms are aggravated by standing, twisting, exertion, bending and stress. She has tried acetaminophen, oral narcotics and rest for the symptoms. The treatment provided mild relief.   Leg Swelling  This is a chronic problem. The current episode started more than 1 year ago. The problem has been gradually worsening. Associated symptoms include arthralgias and myalgias. Pertinent negatives include no change in bowel habit, chest pain, chills, congestion, fatigue, headaches, joint swelling, swollen glands or vertigo. The symptoms are aggravated by bending, standing and twisting. She has tried oral narcotics and rest for the symptoms. The treatment provided mild relief.   Panic Attack  This is a chronic problem. The current episode started more than 1 year ago. The problem has been unchanged. Associated symptoms include arthralgias and myalgias. Pertinent negatives include no change in bowel habit, chest pain, chills, congestion, fatigue, headaches,  "joint swelling, swollen glands or vertigo. The symptoms are aggravated by bending, standing and twisting. She has tried acetaminophen, oral narcotics and rest for the symptoms.   Heartburn  She reports no belching, no chest pain, no choking, no globus sensation, no heartburn, no hoarse voice, no tooth decay or no water brash. This is a chronic problem. The current episode started more than 1 year ago. The problem occurs occasionally. The problem has been gradually improving. The symptoms are aggravated by certain foods. Associated symptoms include anemia. Pertinent negatives include no fatigue, muscle weakness or weight loss. Risk factors include caffeine use and obesity. She has tried an antacid and a PPI for the symptoms. The treatment provided mild relief. Past procedures do not include an EGD or H. pylori antibody titer. Past invasive treatments do not include gastroplasty or gastroplication.     The following portions of the patient's history were reviewed and updated as appropriate: allergies, current medications, past family history, past medical history, past social history, past surgical history and problem list.    Objective   Vital Signs:  /78 (BP Location: Left arm, Patient Position: Sitting, Cuff Size: Large Adult)   Pulse 66   Temp 97.5 °F (36.4 °C) (Infrared)   Resp 18   Ht 160 cm (63\") Comment: per patient  Wt 104 kg (230 lb)   SpO2 99%   BMI 40.74 kg/m²   Estimated body mass index is 40.74 kg/m² as calculated from the following:    Height as of this encounter: 160 cm (63\").    Weight as of this encounter: 104 kg (230 lb).    Class 3 Severe Obesity (BMI >=40). Obesity-related health conditions include the following: hypertension. Obesity is worsening. BMI is is above average; BMI management plan is completed. We discussed portion control and increasing exercise.      Physical Exam  Vitals and nursing note reviewed.   Constitutional:       General: She is awake.      Appearance: Normal " appearance. She is well-developed and well-groomed.   HENT:      Head: Normocephalic and atraumatic.      Right Ear: Hearing, tympanic membrane, ear canal and external ear normal.      Left Ear: Hearing, tympanic membrane, ear canal and external ear normal.      Nose: Nose normal.      Mouth/Throat:      Lips: Pink.      Pharynx: Oropharynx is clear.   Eyes:      General: Lids are normal.      Conjunctiva/sclera: Conjunctivae normal.   Cardiovascular:      Rate and Rhythm: Normal rate and regular rhythm.      Heart sounds: Normal heart sounds.   Pulmonary:      Effort: Pulmonary effort is normal.      Breath sounds: Normal breath sounds and air entry.   Musculoskeletal:      Cervical back: Spasms present. Decreased range of motion.      Lumbar back: Spasms and tenderness present. Decreased range of motion.        Back:       Right lower leg: No edema.      Left lower leg: No edema.   Lymphadenopathy:      Head:      Right side of head: No submental, submandibular or tonsillar adenopathy.      Left side of head: No submental, submandibular or tonsillar adenopathy.   Skin:     General: Skin is warm and dry.   Neurological:      Mental Status: She is alert and oriented to person, place, and time.      Sensory: Sensation is intact.      Motor: Motor function is intact.      Coordination: Coordination is intact.      Gait: Gait is intact.   Psychiatric:         Attention and Perception: Attention and perception normal.         Mood and Affect: Mood and affect normal.         Speech: Speech normal.         Behavior: Behavior normal. Behavior is cooperative.         Thought Content: Thought content normal.         Cognition and Memory: Cognition and memory normal.         Judgment: Judgment normal.        Result Review :                Assessment and Plan   Diagnoses and all orders for this visit:    1. Chronic pain syndrome  -     HYDROcodone-acetaminophen (Norco)  MG per tablet; Take 1 tablet by mouth Every 6 (Six)  Hours As Needed for Moderate Pain .  Dispense: 120 tablet; Refill: 0    2. DDD (degenerative disc disease), lumbar  -     HYDROcodone-acetaminophen (Norco)  MG per tablet; Take 1 tablet by mouth Every 6 (Six) Hours As Needed for Moderate Pain .  Dispense: 120 tablet; Refill: 0        -        ALMA Report:      As part of this patient's treatment plan, I am prescribing controlled substances. The patient has been made aware of appropriate use of such medications, including potential risk of opiod use, somnolence, limited ability to drive and /or work safely, and potential for dependence or overdose, and opiods should be used sparingly and are not recommended for long term use.  It has also been made clear that these medications are for use by this patient only, without concomitant use of alcohol or other substances unless prescribed.    The patient was provided a Rx for norco as needed for pain control after sustaining multiple back and neck injuries as well as surgeries.     This medication is a narcotic pain medication. This medication is monitored by the federal [facility] and the [facility]. Narcotic medication is commonly abused and many patients can become addicted to this medication.    There are problems with narcotic pain medication including addiction, dependence and tolerance to the pain relief. We discussed appropriate and expected levels of pain.     Narcotics have side effects, including the common side effect of nausea/vomiting if taken on an empty stomach and the problem of constipation that occur with narcotic use.     Narcotics can impair your judgement and therefore you should never drive or operate heavy machinery while using these medications. Treat this medication similar to alcohol, do not take this and drive or you could injure yourself or others.    As an alternative drug, we encourage the use of tylenol and/or NSAIDs (non-steroidal anti-inflammatory drugs) for additional pain relief  and as an anti-inflammatory. NSAIDS are drugs such as aleve (naproxen) and motrin (ibuprofen). Narcotics and NSAIDs work differently and can complement each other well after surgery.    In general, NSAIDs and tylenol are much safer drugs than narcotics. Tylenol (acetaminophen) should not be taken with narcotics as most prescribed narcotics already contain tylenol. The maximum dose of tylenol is 4 grams per day, so this can be used if your narcotic pain medication is used sparingly. Pay close attention to the dosages and ask your pharmacist about the dosage of tylenol.  Some patients can experience GI irritation from NSAID use and patients with kidney problems, previous bleeding from the GI tract, gastric bypass, or certain other conditions may not be able to take these over the counter medications.      Patient has completed prescribing agreement detailing terms of continued prescribing of controlled substances, including monitoring ALMA reports, urine drug screening yearly an random drug screens to monitor patients compliance to treatment plan, and pill counts if necessary. The patient is aware that inappropriate use will result in cessation of prescribing such medications.    Toxassure:  I have ordered a urine drug screen to monitor patients predisposition to and patterns of drug use/misuse in order to establish and maintain the safe and effective use of analgesics in the treatment of chronic pain    ALMA report has been reviewed by: Charlene Huntley, [place], APRNt.      -     Pt is aware of the potential for addiction and dependence.    Addiction was discussed.  The  Risks, benefits and alternative options of drug therapy discussed.  It was reinforced about the risks and benefits of opioids.  It was reinforced that patient may not call early for medication, may not ask for increase in the number of pills given monthly or increase in dosage of medication, may not jump around to different pharmacies or providers  and may not receive any narcotics from other providers including ER, or the contract will be broken and narcotics will no longer be prescribed from this facility if any of these criteria has been broken.    Last Dose was: today    Last Fill was:  July  Date of last ALMA: todaty    Date of last UDS: today        3. Panic attack  4. Anxiety  -     ALPRAZolam (Xanax) 0.5 MG tablet; Take 1 tablet by mouth 3 (Three) Times a Day As Needed for Anxiety.  Dispense: 90 tablet; Refill: 0        -     Sertraline as prescribed  5. Therapeutic drug monitoring  -     ToxASSURE Select 13 (MW) - Urine, Clean Catch; Future  -     ToxASSURE Select 13 (MW) - Urine, Clean Catch    6. AAT (alpha-1-antitrypsin) deficiency (HCC)       -   Will continue to monitor    7. Gastroesophageal reflux disease without esophagitis       -  Continue omeprazole as prescribed       -  Avoid foods that aggravate gerd    8. Morbid obesity due to excess calories (HCC)      -  Patient's (Body mass index is 40.74 kg/m².) indicates that they are morbidly obese (BMI > 40 or > 35 with obesity - related health condition) with health related conditions that include dyslipidemias, GERD and osteoarthritis . Weight is worsening. BMI is is above average; BMI management plan is completed. We discussed portion control and increasing exercise.          Follow Up   Return in about 1 month (around 9/12/2022) for Recheck.  Patient was given instructions and counseling regarding her condition or for health maintenance advice. Please see specific information pulled into the AVS if appropriate.     Electronically signed by Charlene Huntley, [place], APRN, 08/27/22, 4:16 PM CDT.

## 2022-08-12 NOTE — PATIENT INSTRUCTIONS
Obesity, Adult  Obesity is having too much body fat. Being obese means that your weight is more than what is healthy for you.  BMI is a number that explains how much body fat you have. If you have a BMI of 30 or more, you are obese. Obesity is often caused by eating or drinking more calories than your body uses. Changing your lifestyle can help you lose weight.  Obesity can cause serious health problems, such as:  Stroke.  Coronary artery disease (CAD).  Type 2 diabetes.  Some types of cancer, including cancers of the colon, breast, uterus, and gallbladder.  Osteoarthritis.  High blood pressure (hypertension).  High cholesterol.  Sleep apnea.  Gallbladder stones.  Infertility problems.  What are the causes?  Eating meals each day that are high in calories, sugar, and fat.  Being born with genes that may make you more likely to become obese.  Having a medical condition that causes obesity.  Taking certain medicines.  Sitting a lot (having a sedentary lifestyle).  Not getting enough sleep.  Drinking a lot of drinks that have sugar in them.  What increases the risk?  Having a family history of obesity.  Being an  woman.  Being a  man.  Living in an area with limited access to:  [place], recreation centers, or sidewalks.  Healthy food choices, such as grocery stores and [employer] markets.  What are the signs or symptoms?  The main sign is having too much body fat.  How is this treated?  Treatment for this condition often includes changing your lifestyle. Treatment may include:  Changing your diet. This may include making a healthy meal plan.  Exercise. This may include activity that causes your heart to beat faster (aerobic exercise) and strength training. Work with your doctor to design a program that works for you.  Medicine to help you lose weight. This may be used if you are not able to lose 1 pound a week after 6 weeks of healthy eating and more exercise.  Treating conditions that cause the  obesity.  Surgery. Options may include gastric banding and gastric bypass. This may be done if:  Other treatments have not helped to improve your condition.  You have a BMI of 40 or higher.  You have life-threatening health problems related to obesity.  Follow these instructions at home:  Eating and drinking    Follow advice from your doctor about what to eat and drink. Your doctor may tell you to:  Limit fast food, sweets, and processed snack foods.  Choose low-fat options. For example, choose low-fat milk instead of whole milk.  Eat 5 or more servings of fruits or vegetables each day.  Eat at home more often. This gives you more control over what you eat.  Choose healthy foods when you eat out.  Learn to read food labels. This will help you learn how much food is in 1 serving.  Keep low-fat snacks available.  Avoid drinks that have a lot of sugar in them. These include soda, fruit juice, iced tea with sugar, and flavored milk.  Drink enough water to keep your pee (urine) pale yellow.  Do not go on fad diets.    Physical activity  Exercise often, as told by your doctor. Most adults should get up to 150 minutes of moderate-intensity exercise every week.Ask your doctor:  What types of exercise are safe for you.  How often you should exercise.  Warm up and stretch before being active.  Do slow stretching after being active (cool down).  Rest between times of being active.  Lifestyle  Work with your doctor and a food expert (dietitian) to set a weight-loss goal that is best for you.  Limit your screen time.  Find ways to reward yourself that do not involve food.  Do not drink alcohol if:  Your doctor tells you not to drink.  You are pregnant, may be pregnant, or are planning to become pregnant.  If you drink alcohol:  Limit how much you use to:  0-1 drink a day for women.  0-2 drinks a day for men.  Be aware of how much alcohol is in your drink. In the [place] one drink equals one 12 oz bottle of beer (355 mL), one 5 oz  glass of wine (148 mL), or one 1½ oz glass of hard liquor (44 mL).  General instructions  Keep a weight-loss journal. This can help you keep track of:  The food that you eat.  How much exercise you get.  Take over-the-counter and prescription medicines only as told by your doctor.  Take vitamins and supplements only as told by your doctor.  Think about joining a support group.  Keep all follow-up visits as told by your doctor. This is important.  Contact a doctor if:  You cannot meet your weight loss goal after you have changed your diet and lifestyle for 6 weeks.  Get help right away if you:  Are having trouble breathing.  Are having thoughts of harming yourself.  Summary  Obesity is having too much body fat.  Being obese means that your weight is more than what is healthy for you.  Work with your doctor to set a weight-loss goal.  Get regular exercise as told by your doctor.  This information is not intended to replace advice given to you by your health care provider. Make sure you discuss any questions you have with your health care provider.  Document Revised: 08/22/2019 Document Reviewed: 08/22/2019  [employer] Patient Education © 2021 [employer]

## 2022-08-20 LAB — DRUGS UR: NORMAL

## 2022-08-27 PROBLEM — J43.8 OTHER EMPHYSEMA: Status: RESOLVED | Noted: 2021-10-17 | Resolved: 2022-08-27

## 2022-09-12 ENCOUNTER — OFFICE VISIT (OUTPATIENT)
Dept: FAMILY MEDICINE CLINIC | Facility: CLINIC | Age: 57
End: 2022-09-12

## 2022-09-12 VITALS
RESPIRATION RATE: 18 BRPM | WEIGHT: 227 LBS | OXYGEN SATURATION: 99 % | HEIGHT: 63 IN | BODY MASS INDEX: 40.22 KG/M2 | HEART RATE: 88 BPM | SYSTOLIC BLOOD PRESSURE: 120 MMHG | TEMPERATURE: 98.6 F | DIASTOLIC BLOOD PRESSURE: 79 MMHG

## 2022-09-12 DIAGNOSIS — R60.1 GENERALIZED EDEMA: ICD-10-CM

## 2022-09-12 DIAGNOSIS — F41.0 PANIC ATTACK: Primary | ICD-10-CM

## 2022-09-12 DIAGNOSIS — E66.01 MORBID (SEVERE) OBESITY DUE TO EXCESS CALORIES: ICD-10-CM

## 2022-09-12 DIAGNOSIS — M05.79 RHEUMATOID ARTHRITIS INVOLVING MULTIPLE SITES WITH POSITIVE RHEUMATOID FACTOR: ICD-10-CM

## 2022-09-12 DIAGNOSIS — K21.9 GASTROESOPHAGEAL REFLUX DISEASE WITHOUT ESOPHAGITIS: ICD-10-CM

## 2022-09-12 DIAGNOSIS — F41.9 ANXIETY: ICD-10-CM

## 2022-09-12 DIAGNOSIS — F33.1 MODERATE EPISODE OF RECURRENT MAJOR DEPRESSIVE DISORDER: ICD-10-CM

## 2022-09-12 DIAGNOSIS — G89.4 CHRONIC PAIN SYNDROME: ICD-10-CM

## 2022-09-12 DIAGNOSIS — M51.36 DDD (DEGENERATIVE DISC DISEASE), LUMBAR: ICD-10-CM

## 2022-09-12 DIAGNOSIS — D64.9 ANEMIA, UNSPECIFIED TYPE: ICD-10-CM

## 2022-09-12 PROCEDURE — 99214 OFFICE O/P EST MOD 30 MIN: CPT | Performed by: NURSE PRACTITIONER

## 2022-09-12 RX ORDER — HYDROCODONE BITARTRATE AND ACETAMINOPHEN 10; 325 MG/1; MG/1
1 TABLET ORAL EVERY 6 HOURS PRN
Qty: 120 TABLET | Refills: 0 | Status: SHIPPED | OUTPATIENT
Start: 2022-09-12 | End: 2022-10-12 | Stop reason: SDUPTHER

## 2022-09-12 RX ORDER — ALPRAZOLAM 0.5 MG/1
0.5 TABLET ORAL 3 TIMES DAILY PRN
Qty: 90 TABLET | Refills: 0 | Status: SHIPPED | OUTPATIENT
Start: 2022-09-12 | End: 2022-10-12 | Stop reason: SDUPTHER

## 2022-09-12 RX ORDER — TIZANIDINE 4 MG/1
4 TABLET ORAL 2 TIMES DAILY PRN
Qty: 180 TABLET | Refills: 1 | Status: SHIPPED | OUTPATIENT
Start: 2022-09-12

## 2022-09-12 RX ORDER — OMEPRAZOLE 40 MG/1
40 CAPSULE, DELAYED RELEASE ORAL DAILY
Qty: 90 CAPSULE | Refills: 1 | Status: SHIPPED | OUTPATIENT
Start: 2022-09-12 | End: 2023-01-23

## 2022-09-12 RX ORDER — FERROUS SULFATE TAB EC 324 MG (65 MG FE EQUIVALENT) 324 (65 FE) MG
324 TABLET DELAYED RESPONSE ORAL
Qty: 90 TABLET | Refills: 1 | Status: SHIPPED | OUTPATIENT
Start: 2022-09-12

## 2022-09-12 NOTE — PROGRESS NOTES
Chief Complaint  chronic pain syndrome (Follow up)    Subjective        Adela Arauz presents to [facility]  History of Present Illness  Here for follow up for chronic pain, anxiety and RA.  Adela struggles with chronic pain, all over, hands, fingers, wrists, back, hips, legs improved and stable with norco, tizanidine and meloxicam.  Rates it 7/10 and is stable.  Anxiety, PTSD panic attacks and depression stable with alprazolam and sertraline, nd she has a naloxone pen and narcan nasal spray and understands how to use it, and when to use it.  Other chronic problems are stable: iron def anemia stable with ferrous sulfate,  Lower extremity edema stable with furosemide, gerd stable with  Omeprazole, neuropathy stable with lyrica.   Here for follow up for chronic pain, anxiety and RA.  Adela struggles with chronic pain, all over, hands, fingers, wrists, back, hips, legs improved and stable with norco, tizanidine and meloxicam.  Rates it 7/10 and is stable.  Anxiety, PTSD panic attacks and depression stable with alprazolam and sertraline, nd she has a naloxone pen and narcan nasal spray and understands how to use it, and when to use it.  Other chronic problems are stable: iron def anemia stable with ferrous sulfate,  Lower extremity edema stable with furosemide, gerd stable with  Omeprazole, neuropathy stable with lyrica.     Pain  This is a chronic problem. The current episode started more than 1 year ago. The problem occurs 2 to 4 times per day. The problem has been gradually improving. Pertinent negatives include no arthralgias, change in bowel habit, chills, headaches, joint swelling, myalgias or urinary symptoms. Nothing aggravates the symptoms. She has tried ice and heat for the symptoms. The treatment provided mild relief.   Anxiety  Presents for follow-up visit. Symptoms include decreased concentration, depressed mood, muscle tension and nervous/anxious behavior. Patient  "reports no compulsions, confusion, feeling of choking, panic, restlessness, shortness of breath or suicidal ideas. Symptoms occur most days. The severity of symptoms is moderate. The patient sleeps 6 hours per night. Nighttime awakenings: several.     Her past medical history is significant for depression. Compliance with medications is %.   Depression  Visit Type: follow-up  Patient presents with the following symptoms: decreased concentration, depressed mood, muscle tension and nervousness/anxiety.  Patient is not experiencing: compulsions, confusion, panic, restlessness, shortness of breath and suicidal ideas.  Frequency of symptoms: most days   Severity: moderate   Sleep quality: fair  Nighttime awakenings: several  Compliance with medications:  %        The following portions of the patient's history were reviewed and updated as appropriate: allergies, current medications, past family history, past medical history, past social history, past surgical history and problem list.    Objective   Vital Signs:  /79 (BP Location: Right arm, Patient Position: Sitting, Cuff Size: Large Adult)   Pulse 88   Temp 98.6 °F (37 °C) (Infrared)   Resp 18   Ht 160 cm (63\") Comment: per patient  Wt 103 kg (227 lb)   SpO2 99%   BMI 40.21 kg/m²   Estimated body mass index is 40.21 kg/m² as calculated from the following:    Height as of this encounter: 160 cm (63\").    Weight as of this encounter: 103 kg (227 lb).    Physical Exam  Vitals and nursing note reviewed.   Constitutional:       General: She is awake.      Appearance: Normal appearance. She is well-developed and well-groomed.   HENT:      Head: Normocephalic and atraumatic.      Right Ear: Hearing, tympanic membrane, ear canal and external ear normal.      Left Ear: Hearing, tympanic membrane, ear canal and external ear normal.      Nose: Nose normal.      Mouth/Throat:      Lips: Pink.      Pharynx: Oropharynx is clear.   Eyes:      General: Lids " are normal.      Conjunctiva/sclera: Conjunctivae normal.   Cardiovascular:      Rate and Rhythm: Normal rate and regular rhythm.      Heart sounds: Normal heart sounds.   Pulmonary:      Effort: Pulmonary effort is normal.      Breath sounds: Normal breath sounds and air entry.   Musculoskeletal:      Right shoulder: Tenderness present. Decreased range of motion.      Left shoulder: Tenderness present. Decreased range of motion.      Right wrist: Tenderness present. Decreased range of motion.      Left wrist: Tenderness present. Decreased range of motion.        Arms:       Cervical back: Spasms present. Decreased range of motion.      Thoracic back: Laceration and spasms present. Decreased range of motion.      Lumbar back: Spasms and tenderness present. Decreased range of motion.        Back:       Right hip: Tenderness present. Decreased range of motion.      Left hip: Tenderness present. Decreased range of motion.      Right lower leg: No edema.      Left lower leg: No edema.        Legs:    Lymphadenopathy:      Head:      Right side of head: No submental, submandibular or tonsillar adenopathy.      Left side of head: No submental, submandibular or tonsillar adenopathy.   Skin:     General: Skin is warm and dry.   Neurological:      Mental Status: She is alert and oriented to person, place, and time.      Cranial Nerves: Cranial nerves are intact.      Sensory: Sensation is intact.      Motor: Motor function is intact.      Coordination: Coordination is intact.      Gait: Gait is intact.   Psychiatric:         Attention and Perception: Attention and perception normal.         Mood and Affect: Mood and affect normal.         Speech: Speech normal.         Behavior: Behavior normal. Behavior is cooperative.         Thought Content: Thought content normal.         Cognition and Memory: Cognition and memory normal.         Judgment: Judgment normal.        Result Review :                Assessment and Plan    Diagnoses and all orders for this visit:    1. Panic attack (Primary)  2. Anxiety  3. Moderate episode of recurrent major depressive disorder (HCC)  -     ALPRAZolam (Xanax) 0.5 MG tablet; Take 1 tablet by mouth 3 (Three) Times a Day As Needed for Anxiety.  Dispense: 90 tablet; Refill: 0    4. Chronic pain syndrome  5. DDD (degenerative disc disease), lumbar        6. Rheumatoid arthritis involving multiple sites with positive rheumatoid factor (HCC)  -     HYDROcodone-acetaminophen (Norco)  MG per tablet; Take 1 tablet by mouth Every 6 (Six) Hours As Needed for Moderate Pain.  Dispense: 120 tablet; Refill: 0  -     tiZANidine (ZANAFLEX) 4 MG tablet; Take 1 tablet by mouth 2 (Two) Times a Day As Needed for Muscle Spasms.  Dispense: 180 tablet; Refill: 1      ALMA Report:      As part of this patient's treatment plan, I am prescribing controlled substances. The patient has been made aware of appropriate use of such medications, including potential risk of opiod use, somnolence, limited ability to drive and /or work safely, and potential for dependence or overdose, and opiods should be used sparingly and are not recommended for long term use.  It has also been made clear that these medications are for use by this patient only, without concomitant use of alcohol or other substances unless prescribed.    The patient was provided a Rx for norco as needed for pain control after sustaining multiple back and neck injuries as well as surgeries.     This medication is a narcotic pain medication. This medication is monitored by the federal [facility] and the [facility]. Narcotic medication is commonly abused and many patients can become addicted to this medication.    There are problems with narcotic pain medication including addiction, dependence and tolerance to the pain relief. We discussed appropriate and expected levels of pain.     Narcotics have side effects, including the common side effect of nausea/vomiting  if taken on an empty stomach and the problem of constipation that occur with narcotic use.     Narcotics can impair your judgement and therefore you should never drive or operate heavy machinery while using these medications. Treat this medication similar to alcohol, do not take this and drive or you could injure yourself or others.    As an alternative drug, we encourage the use of tylenol and/or NSAIDs (non-steroidal anti-inflammatory drugs) for additional pain relief and as an anti-inflammatory. NSAIDS are drugs such as aleve (naproxen) and motrin (ibuprofen). Narcotics and NSAIDs work differently and can complement each other well after surgery.    In general, NSAIDs and tylenol are much safer drugs than narcotics. Tylenol (acetaminophen) should not be taken with narcotics as most prescribed narcotics already contain tylenol. The maximum dose of tylenol is 4 grams per day, so this can be used if your narcotic pain medication is used sparingly. Pay close attention to the dosages and ask your pharmacist about the dosage of tylenol.  Some patients can experience GI irritation from NSAID use and patients with kidney problems, previous bleeding from the GI tract, gastric bypass, or certain other conditions may not be able to take these over the counter medications.      Patient has completed prescribing agreement detailing terms of continued prescribing of controlled substances, including monitoring ALMA reports, urine drug screening yearly an random drug screens to monitor patients compliance to treatment plan, and pill counts if necessary. The patient is aware that inappropriate use will result in cessation of prescribing such medications.    Toxassure:  I have ordered a urine drug screen to monitor patients predisposition to and patterns of drug use/misuse in order to establish and maintain the safe and effective use of analgesics in the treatment of chronic pain    ALMA report has been reviewed by: Charlene Parker  MONTANA Huntley, APRN  -     Pt is aware of the potential for addiction and dependence.    Addiction was discussed.  The  Risks, benefits and alternative options of drug therapy discussed.  It was reinforced about the risks and benefits of opioids.  It reinforced that patient may not call early for medication, may not ask for increase in the number of pills given monthly or increase in dosage of medication, may not jump around to different pharmacies or providers and may not receive any narcotics from other providers including ER, or the contract will be broken and narcotics will no longer be prescribed from this facility if any of these criteria has been broken. Last Dose was:      7. Anemia, unspecified type  -     ferrous sulfate 324 (65 Fe) MG tablet delayed-release EC tablet; Take 1 tablet by mouth Daily With Breakfast.  Dispense: 90 tablet; Refill: 1    8. Generalized edema         - continue furosemide as prescribed    9. Gastroesophageal reflux disease without esophagitis  -     omeprazole (priLOSEC) 40 MG capsule; Take 1 capsule by mouth Daily.  Dispense: 90 capsule; Refill: 1    10. Morbid (severe) obesity due to excess calories (HCC)         -Patient's (Body mass index is 40.21 kg/m².) indicates that they are morbidly obese (BMI > 40 or > 35 with obesity - related health condition) with health related conditions that include hypertension, dyslipidemias and GERD . Weight is improving with lifestyle modifications. BMI is is above average; BMI management plan is completed. We discussed portion control and increasing exercise.            Follow Up   Return in about 1 month (around 10/12/2022) for Recheck.  Patient was given instructions and counseling regarding her condition or for health maintenance advice. Please see specific information pulled into the AVS if appropriate.     Electronically signed by Charlene Huntley DNP, APRHEENA, 09/12/22, 10:09 AM CDT.

## 2022-09-25 PROBLEM — C82.04: Status: RESOLVED | Noted: 2019-09-29 | Resolved: 2022-09-25

## 2022-09-26 DIAGNOSIS — K21.9 GASTROESOPHAGEAL REFLUX DISEASE WITHOUT ESOPHAGITIS: ICD-10-CM

## 2022-09-26 RX ORDER — OMEPRAZOLE 40 MG/1
CAPSULE, DELAYED RELEASE ORAL
Qty: 30 CAPSULE | Refills: 5 | OUTPATIENT
Start: 2022-09-26

## 2022-10-12 ENCOUNTER — OFFICE VISIT (OUTPATIENT)
Dept: FAMILY MEDICINE CLINIC | Facility: CLINIC | Age: 57
End: 2022-10-12

## 2022-10-12 VITALS
HEART RATE: 92 BPM | OXYGEN SATURATION: 96 % | RESPIRATION RATE: 16 BRPM | WEIGHT: 227.2 LBS | DIASTOLIC BLOOD PRESSURE: 85 MMHG | HEIGHT: 63 IN | SYSTOLIC BLOOD PRESSURE: 125 MMHG | TEMPERATURE: 96.2 F | BODY MASS INDEX: 40.26 KG/M2

## 2022-10-12 DIAGNOSIS — D46.4 RA (REFRACTORY ANEMIA): ICD-10-CM

## 2022-10-12 DIAGNOSIS — M51.36 DDD (DEGENERATIVE DISC DISEASE), LUMBAR: ICD-10-CM

## 2022-10-12 DIAGNOSIS — G89.4 CHRONIC PAIN SYNDROME: Primary | ICD-10-CM

## 2022-10-12 DIAGNOSIS — F41.0 PANIC ATTACK: ICD-10-CM

## 2022-10-12 DIAGNOSIS — E66.01 MORBID (SEVERE) OBESITY DUE TO EXCESS CALORIES: ICD-10-CM

## 2022-10-12 PROCEDURE — 99214 OFFICE O/P EST MOD 30 MIN: CPT | Performed by: NURSE PRACTITIONER

## 2022-10-12 RX ORDER — ALPRAZOLAM 0.5 MG/1
0.5 TABLET ORAL 3 TIMES DAILY PRN
Qty: 90 TABLET | Refills: 0 | Status: SHIPPED | OUTPATIENT
Start: 2022-10-12 | End: 2022-11-02 | Stop reason: SDUPTHER

## 2022-10-12 RX ORDER — HYDROCODONE BITARTRATE AND ACETAMINOPHEN 10; 325 MG/1; MG/1
1 TABLET ORAL EVERY 6 HOURS PRN
Qty: 120 TABLET | Refills: 0 | Status: SHIPPED | OUTPATIENT
Start: 2022-10-12 | End: 2022-11-02 | Stop reason: SDUPTHER

## 2022-10-12 NOTE — PROGRESS NOTES
Chief Complaint  Pain (Patient here for follow up. )    Subjective        Adela Arauz presents to [facility]  History of Present Illness  Presents for follow up for chronic pain in back, wrists, knees, hips and hands.        Adela Arauz presents to [facility]  Here for follow up for chronic pain, fibromyalgia, plantar fasciitis, RA, lumbar radiculopathy and DDD.  She is doing about the same, but she has limitations because she is fused.  She did get out and walk this weekend.  She is on norco and she abides by all rules and keeps all her appts and does not ask for refills early.  She is no longer on the methotrexate because it was not working.  She has learned how to manage her pain better since her surgery  Pain  This is a chronic problem. The current episode started more than 1 year ago. The problem occurs constantly. The problem has been unchanged. Associated symptoms include arthralgias, joint swelling and myalgias. Pertinent negatives include no change in bowel habit, chest pain, chills, congestion, headaches, swollen glands or urinary symptoms. The symptoms are aggravated by twisting, standing, walking and bending. She has tried oral narcotics, rest, sleep, NSAIDs, lying down, position changes and relaxation for the symptoms.   Anxiety  Presents for follow-up visit. Symptoms include decreased concentration, depressed mood, muscle tension, nervous/anxious behavior and restlessness. Patient reports no chest pain, insomnia, obsessions or suicidal ideas. Symptoms occur most days. The severity of symptoms is moderate. The patient sleeps 5 hours per night. The quality of sleep is fair. Nighttime awakenings: several.       Obesity  The current episode started more than 1 year ago. The problem occurs constantly. The problem has been gradually worsening. Associated symptoms include arthralgias, joint swelling and myalgias. Pertinent  "negatives include no change in bowel habit, chest pain, chills, congestion, headaches, swollen glands or urinary symptoms. The symptoms are aggravated by stress. She has tried nothing for the symptoms. The treatment provided no relief.     The following portions of the patient's history were reviewed and updated as appropriate: allergies, current medications, past family history, past medical history, past social history, past surgical history and problem list.    Objective   Vital Signs:  /85 (BP Location: Left arm, Patient Position: Sitting, Cuff Size: Adult)   Pulse 92   Temp 96.2 °F (35.7 °C) (Infrared)   Resp 16   Ht 160 cm (63\")   Wt 103 kg (227 lb 3.2 oz)   SpO2 96%   BMI 40.25 kg/m²   Estimated body mass index is 40.25 kg/m² as calculated from the following:    Height as of this encounter: 160 cm (63\").    Weight as of this encounter: 103 kg (227 lb 3.2 oz).    Class 3 Severe Obesity (BMI >=40). Obesity-related health conditions include the following: hypertension, diabetes mellitus and GERD. Obesity is improving with lifestyle modifications. BMI is is above average; BMI management plan is completed. We discussed portion control and increasing exercise.    The following portions of the patient's history were reviewed and updated as appropriate: allergies, current medications, past family history, past medical history, past social history, past surgical history and problem list.      Physical Exam  Vitals and nursing note reviewed.   Constitutional:       General: She is awake.      Appearance: Normal appearance. She is well-developed and well-groomed.   HENT:      Head: Normocephalic and atraumatic.      Right Ear: Hearing, tympanic membrane, ear canal and external ear normal.      Left Ear: Hearing, tympanic membrane, ear canal and external ear normal.      Nose: Nose normal.      Mouth/Throat:      Lips: Pink.      Pharynx: Oropharynx is clear.   Eyes:      General: Lids are normal.      " Conjunctiva/sclera: Conjunctivae normal.   Cardiovascular:      Rate and Rhythm: Normal rate and regular rhythm.      Heart sounds: Normal heart sounds.   Pulmonary:      Effort: Pulmonary effort is normal.      Breath sounds: Normal breath sounds and air entry.   Musculoskeletal:         General: Tenderness present.      Right shoulder: Tenderness present.      Left shoulder: Tenderness present.      Right elbow: Decreased range of motion. Tenderness present.      Left elbow: Decreased range of motion. Tenderness present.      Right wrist: Tenderness present. Decreased range of motion.      Left wrist: Tenderness present. Decreased range of motion.        Arms:       Cervical back: Spasms and tenderness present. Decreased range of motion.      Thoracic back: Normal.      Lumbar back: Spasms and tenderness present. Decreased range of motion.        Back:       Right lower leg: No edema.      Left lower leg: No edema.   Lymphadenopathy:      Head:      Right side of head: No submental, submandibular or tonsillar adenopathy.      Left side of head: No submental, submandibular or tonsillar adenopathy.   Skin:     General: Skin is warm and dry.   Neurological:      Mental Status: She is alert.      Sensory: Sensation is intact.      Motor: Motor function is intact.      Coordination: Coordination is intact.      Gait: Gait is intact.   Psychiatric:         Attention and Perception: Attention and perception normal.         Mood and Affect: Mood and affect normal.         Speech: Speech normal.         Behavior: Behavior normal. Behavior is cooperative.         Thought Content: Thought content normal.         Cognition and Memory: Cognition and memory normal.         Judgment: Judgment normal.        Result Review :                Assessment and Plan   Diagnoses and all orders for this visit:    1. Chronic pain syndrome (Primary)  -     HYDROcodone-acetaminophen (Norco)  MG per tablet; Take 1 tablet by mouth Every 6  (Six) Hours As Needed for Moderate Pain.  Dispense: 120 tablet; Refill: 0    2. Panic attack  -     ALPRAZolam (Xanax) 0.5 MG tablet; Take 1 tablet by mouth 3 (Three) Times a Day As Needed for Anxiety.  Dispense: 90 tablet; Refill: 0    3. DDD (degenerative disc disease), lumbar  4. RA (refractory anemia) (HCC)  -     HYDROcodone-acetaminophen (Norco)  MG per tablet; Take 1 tablet by mouth Every 6 (Six) Hours As Needed for Moderate Pain.  Dispense: 120 tablet; Refill: 0    5. Morbid (severe) obesity due to excess calories (HCC)      - Patient's (Body mass index is 40.25 kg/m².) indicates that they are morbidly obese (BMI > 40 or > 35 with obesity - related health condition) with health related conditions that include GERD . Weight is worsening. BMI is is above average; BMI management plan is completed. We discussed portion control and increasing exercise   -  Exercise at least 3 times a week     - Obesity Plan:    The patient BMI is outside this range and we recommended/discussed today to utilize a diet/exercise program to get back into the appropriate range.  Federal guidelines recommend that people under the age of 65 should have a BMI of 18.5-24.9  The initial step is to document everything that is consumed into a food diary. Studies have shown that patients can lose up to 2x the weight by keeping track of foods.   Choose one bad food weekly and eliminate it from your diet.  Replace with one healthy food  Goal over next 2-4 weeks is walk 30 minutes per day 5 days per week at pace difficult to hold conversation.   Drink more water, less soda.   Cut back on portion sizes.   Today we encouraged roughly a 1 lb per week weight loss with initial goal of 5% weight loss.  Discussed if eating out for a meal, consider cutting food in half and placing into a to go container.  Individually portion any foods coming into the home based on package.  Use smaller plates  Drinking 12 oz of water 30 minutes before meal as way  to suppress appetite.  Medications discussed today include metformin, topamax, phentermine, Qsymia, Belviq (lorcaserin),   Lifestyle therapy   Simple advice to lose weight   Internet programs or self help books.    Advice from dietitian  Set Goals that are realistic   Encouraged to use visual aides to help in measuring foods  Baseball-1 cup good for green salad, frozen yogurt, medium piece of fruit, baked potato  Handful - ½ cup good cut fruit, cooked vegetables, pasta, rice  Egg- ¼ cup good for dried fruit  Deck of cards-3 ounces good for meat and poultry  Check book-3 ounces good for grilled fish  6 dice side by side- 1 ½ ounces good for natural cheese  Simple tips   Plate method-reduce plate size to 9 inch dinner plate.  Half of plate should be filled with non-starchy vegetables (broccoli, lettuce, cauliflower, tomatoes), ¼ plate with lean source of protein (lean chicken, turkey, fish), remaining ½ with whole grains (brown rice, potato, whole grain breads  Avoid liquid calories (regular soda, juice, coffee with cream)  Focus  on water, seltzer water and other non-calorie drinks  Replace regular sugar with non-caloric sweeteners  Avoid skipping meals: plan small regular meals throughout the day in order to keep your hunger controlled  Consider using meal replacements if unable to plan a healthy meal (protein shake, high protein bar)  Replace all white bread with whole wheat/whole grain alternative  Swap regular salad dressings (mayonnaise, butter, or low fat or fat free alternative)  Avoid high fat, high calorie, high carbohydrate snakes (cookies, pastries, cakes)  Snack on fruits, low fat dairy (yogurt, cottage cheese)              ALMA Report:      As part of this patient's treatment plan, I am prescribing controlled substances. The patient has been made aware of appropriate use of such medications, including potential risk of opiod use, somnolence, limited ability to drive and /or work safely, and potential  for dependence or overdose, and opiods should be used sparingly and are not recommended for long term use.  It has also been made clear that these medications are for use by this patient only, without concomitant use of alcohol or other substances unless prescribed.    The patient was provided a Rx for norco as needed for pain control after sustaining multiple back and neck injuries as well as surgeries.     This medication is a narcotic pain medication. This medication is monitored by the [facility] and the [facility]. Narcotic medication is commonly abused and many patients can become addicted to this medication.    There are problems with narcotic pain medication including addiction, dependence and tolerance to the pain relief. We discussed appropriate and expected levels of pain.     Narcotics have side effects, including the common side effect of nausea/vomiting if taken on an empty stomach and the problem of constipation that occur with narcotic use.     Narcotics can impair your judgement and therefore you should never drive or operate heavy machinery while using these medications. Treat this medication similar to alcohol, do not take this and drive or you could injure yourself or others.    As an alternative drug, we encourage the use of tylenol and/or NSAIDs (non-steroidal anti-inflammatory drugs) for additional pain relief and as an anti-inflammatory. NSAIDS are drugs such as aleve (naproxen) and motrin (ibuprofen). Narcotics and NSAIDs work differently and can complement each other well after surgery.    In general, NSAIDs and tylenol are much safer drugs than narcotics. Tylenol (acetaminophen) should not be taken with narcotics as most prescribed narcotics already contain tylenol. The maximum dose of tylenol is 4 grams per day, so this can be used if your narcotic pain medication is used sparingly. Pay close attention to the dosages and ask your pharmacist about the dosage of tylenol.  Some  patients can experience GI irritation from NSAID use and patients with kidney problems, previous bleeding from the GI tract, gastric bypass, or certain other conditions may not be able to take these over the counter medications.      Patient has completed prescribing agreement detailing terms of continued prescribing of controlled substances, including monitoring ALMA reports, urine drug screening yearly an random drug screens to monitor patients compliance to treatment plan, and pill counts if necessary. The patient is aware that inappropriate use will result in cessation of prescribing such medications.    Toxassure:  I have ordered a urine drug screen to monitor patients predisposition to and patterns of drug use/misuse in order to establish and maintain the safe and effective use of analgesics in the treatment of chronic pain    ALMA report has been reviewed by: Charlene Huntley, [place], APRN.  The report was scanned into the patient's chart.      -     Pt is aware of the potential for addiction and dependence.    Addiction was discussed.  The  Risks, benefits and alternative options of drug therapy discussed.  It was reinforced about the risks and benefits of opioids.  It was reinforced that patient may not call early for medication, may not ask for increase in the number of pills given monthly or increase in dosage of medication, may not jump around to different pharmacies or providers and may not receive any narcotics from other providers including ER, or the contract will be broken and narcotics will no longer be prescribed from this facility if any of these criteria has been broken.    Last Dose was: today    Last Fill was:  sept  Date of last ALMA: today    Date of last UDS: on file         Follow Up   Return in about 1 month (around 11/12/2022) for Recheck.  Patient was given instructions and counseling regarding her condition or for health maintenance advice. Please see specific information pulled  into the AVS if appropriate.     Electronically signed by Charlene Huntley, [place], APRN, 10/12/22, 8:08 AM CDT.

## 2022-11-02 ENCOUNTER — OFFICE VISIT (OUTPATIENT)
Dept: FAMILY MEDICINE CLINIC | Facility: CLINIC | Age: 57
End: 2022-11-02

## 2022-11-02 VITALS
HEART RATE: 67 BPM | BODY MASS INDEX: 39.16 KG/M2 | TEMPERATURE: 96.6 F | SYSTOLIC BLOOD PRESSURE: 124 MMHG | WEIGHT: 221 LBS | OXYGEN SATURATION: 99 % | DIASTOLIC BLOOD PRESSURE: 68 MMHG | RESPIRATION RATE: 18 BRPM | HEIGHT: 63 IN

## 2022-11-02 DIAGNOSIS — M51.36 DDD (DEGENERATIVE DISC DISEASE), LUMBAR: ICD-10-CM

## 2022-11-02 DIAGNOSIS — E66.01 MORBID (SEVERE) OBESITY DUE TO EXCESS CALORIES: ICD-10-CM

## 2022-11-02 DIAGNOSIS — Z00.01 ENCOUNTER FOR WELL ADULT EXAM WITH ABNORMAL FINDINGS: Primary | ICD-10-CM

## 2022-11-02 DIAGNOSIS — G89.4 CHRONIC PAIN SYNDROME: ICD-10-CM

## 2022-11-02 DIAGNOSIS — F41.0 PANIC ATTACK: ICD-10-CM

## 2022-11-02 PROCEDURE — 99396 PREV VISIT EST AGE 40-64: CPT | Performed by: NURSE PRACTITIONER

## 2022-11-02 RX ORDER — ALPRAZOLAM 0.5 MG/1
0.5 TABLET ORAL 3 TIMES DAILY PRN
Qty: 90 TABLET | Refills: 0 | Status: SHIPPED | OUTPATIENT
Start: 2022-11-02 | End: 2022-12-07 | Stop reason: SDUPTHER

## 2022-11-02 RX ORDER — HYDROCODONE BITARTRATE AND ACETAMINOPHEN 10; 325 MG/1; MG/1
1 TABLET ORAL EVERY 6 HOURS PRN
Qty: 120 TABLET | Refills: 0 | Status: SHIPPED | OUTPATIENT
Start: 2022-11-02 | End: 2022-12-07 | Stop reason: SDUPTHER

## 2022-11-02 NOTE — PROGRESS NOTES
Chief Complaint   Patient presents with   • Annual Exam      Subjective    History of Present Illness:  Adela Arauz is a 57 y.o. female who presents for an annual well and Panic attack/Anxiety. Chronic pain syndrome/DDD, RA and morbid obesity     The following portions of the patient's history were reviewed and   updated as appropriate: allergies, current medications, past family history, past medical history, past social history, past surgical history and problem list.    The following portions of the patient's history were reviewed and updated as appropriate: allergies, current medications, past family history, past medical history, past social history, past surgical history and problem list.    Compared to one year ago, the patient feels her physical   health is the same.    Compared to one year ago, the patient feels her mental   health is the same.    Recent Hospitalizations:  She was not admitted to the hospital during the last year.       Current Medical Providers:  Patient Care Team:  Charlene Huntley, DNP, APRN as PCP - General (Family Medicine)  Yan Seay MD as Consulting Physician (Pulmonary Disease)  Ramírez Marie MD as Consulting Physician (Hematology and Oncology)    Outpatient Medications Prior to Visit   Medication Sig Dispense Refill   • ALPRAZolam (Xanax) 0.5 MG tablet Take 1 tablet by mouth 3 (Three) Times a Day As Needed for Anxiety. 90 tablet 0   • betamethasone valerate (VALISONE) 0.1 % ointment Apply  topically to the appropriate area as directed 2 (Two) Times a Day. 30 g 0   • Calcium-Magnesium-Vitamin D 500-250-200 MG-MG-UNIT tablet Take 600 tablets by mouth 2 (Two) Times a Day.     • cholecalciferol (VITAMIN D3) 1000 units tablet Take 1,000 Units by mouth Daily.     • ferrous sulfate 324 (65 Fe) MG tablet delayed-release EC tablet Take 1 tablet by mouth Daily With Breakfast. 90 tablet 1   • fluticasone (FLONASE) 50 MCG/ACT nasal spray 2 sprays into the  nostril(s) as directed by provider Daily. 1 bottle 3   • furosemide (LASIX) 40 MG tablet Take 1 tablet by mouth Daily. 90 tablet 1   • HYDROcodone-acetaminophen (Norco)  MG per tablet Take 1 tablet by mouth Every 6 (Six) Hours As Needed for Moderate Pain. 120 tablet 0   • leflunomide (ARAVA) 20 MG tablet      • meloxicam (MOBIC) 15 MG tablet Take 1 tablet by mouth Daily. 90 tablet 1   • mupirocin (BACTROBAN) 2 % ointment Apply to nostrils and fingernails nighlty x 5 nights same 5 nights monthly x 6 months. 30 g 5   • naloxone (NARCAN) 0.4 MG/ML injection Infuse 1 mL into a venous catheter Every 5 (Five) Minutes As Needed for Opioid Reversal. 1 mL 3   • Narcan 4 MG/0.1ML nasal spray      • omeprazole (priLOSEC) 40 MG capsule Take 1 capsule by mouth Daily. 90 capsule 1   • ondansetron ODT (Zofran ODT) 4 MG disintegrating tablet Place 1 tablet on the tongue Every 8 (Eight) Hours As Needed for Nausea or Vomiting. 40 tablet 0   • polyethylene glycol (MIRALAX) 17 g packet Take 17 g by mouth Daily. 30 packet 5   • pregabalin (Lyrica) 150 MG capsule Take 1 capsule by mouth 2 (Two) Times a Day. 180 capsule 5   • sertraline (ZOLOFT) 50 MG tablet Take 1 tablet by mouth every night at bedtime. 90 tablet 1   • tiZANidine (ZANAFLEX) 4 MG tablet Take 1 tablet by mouth 2 (Two) Times a Day As Needed for Muscle Spasms. 180 tablet 1   • Turmeric 500 MG capsule Take 1 capsule by mouth 2 (Two) Times a Day.     • valACYclovir (VALTREX) 1000 MG tablet Take 1 tablet by mouth 2 (Two) Times a Day. 270 tablet 1     No facility-administered medications prior to visit.       Opioid medication/s are on active medication list.  and I have evaluated her active treatment plan and pain score trends (see table).  Vitals:    11/02/22 0909   PainSc:   8   PainLoc: Generalized     I have reviewed the chart for potential of high risk medication and harmful drug interactions in the elderly.        Aspirin is not on active medication list.  Aspirin  "use is not indicated based on review of current medical condition/s. Risk of harm outweighs potential benefits.  .    Patient Active Problem List   Diagnosis   • Chronic pain syndrome   • Rheumatoid arthritis involving multiple sites (HCC)   • Oral herpes simplex infection   • Senile osteoporosis   • Chest pain   • Multiple lung nodules < 6mm identified 5/2018   • Lymphadenopathy   • AAT (alpha-1-antitrypsin) deficiency ([facility])   • Axillary adenopathy   • Encounter for consultation   • Former smoker   • History of radiation therapy   • Cellulitis of right upper extremity   • Lymphedema of right arm   • Pain in right arm   • Hx of osteomyelitis   • History of lymph node dissection of right axilla   • Morbid obesity due to excess calories ([facility])   • MRSA (methicillin resistant Staphylococcus aureus) infection   • Anxiety   • DDD (degenerative disc disease), lumbar   • Foraminal stenosis due to intervertebral disc disease   • Lumbar stenosis   • Lumbar radiculopathy   • History of lumbar fusion   • Chronic pain   • Degeneration of lumbar intervertebral disc   • Stress fracture of calcaneus   • Plantar fasciitis     Advance Care Planning  Advance Directive is not on file.  ACP discussion was declined by the patient. Patient does not have an advance directive, information provided.    Review of Systems   Constitutional: Negative.    HENT: Negative.    Eyes: Negative.    Respiratory: Negative.    Cardiovascular: Negative.    Gastrointestinal: Negative.    Endocrine: Negative.    Genitourinary: Negative.    Musculoskeletal: Positive for arthralgias, back pain and joint swelling.   Psychiatric/Behavioral: Negative.    All other systems reviewed and are negative.       Objective    Vitals:    11/02/22 0909   BP: 124/68   BP Location: Right arm   Patient Position: Sitting   Cuff Size: Adult   Pulse: 67   Resp: 18   Temp: 96.6 °F (35.9 °C)   TempSrc: Infrared   SpO2: 99%   Weight: 100 kg (221 lb)   Height: 160 cm (63\")  Comment: " "per patient   PainSc:   8   PainLoc: Generalized     Estimated body mass index is 39.15 kg/m² as calculated from the following:    Height as of this encounter: 160 cm (63\").    Weight as of this encounter: 100 kg (221 lb).    Class 2 Severe Obesity (BMI >=35 and <=39.9). Obesity-related health conditions include the following: diabetes mellitus and GERD. Obesity is improving with lifestyle modifications. BMI is is above average; BMI management plan is completed. We discussed portion control and increasing exercise.      Does the patient have evidence of cognitive impairment? No    Physical Exam  Vitals and nursing note reviewed.   Constitutional:       General: She is awake.      Appearance: Normal appearance. She is well-developed and well-groomed.   HENT:      Head: Normocephalic and atraumatic.      Right Ear: Hearing, tympanic membrane, ear canal and external ear normal.      Left Ear: Hearing, tympanic membrane, ear canal and external ear normal.      Nose: Nose normal.      Mouth/Throat:      Lips: Pink.      Pharynx: Oropharynx is clear.   Eyes:      General: Lids are normal.      Conjunctiva/sclera: Conjunctivae normal.   Cardiovascular:      Rate and Rhythm: Normal rate and regular rhythm.      Heart sounds: Normal heart sounds.   Pulmonary:      Effort: Pulmonary effort is normal.      Breath sounds: Normal breath sounds and air entry.   Musculoskeletal:      Right wrist: Tenderness, bony tenderness and snuff box tenderness present. Decreased range of motion.      Left wrist: Tenderness, bony tenderness and snuff box tenderness present. Decreased range of motion.      Right hand: Tenderness present. Decreased strength. Normal strength of finger abduction. Decreased sensation.      Left hand: Tenderness present. Decreased strength of finger abduction. Decreased sensation.        Arms:       Cervical back: Normal and full passive range of motion without pain.      Thoracic back: Normal.      Lumbar back: " Spasms and tenderness present. Decreased range of motion.        Back:       Right lower leg: No edema.      Left lower leg: No edema.   Lymphadenopathy:      Head:      Right side of head: No submental, submandibular or tonsillar adenopathy.      Left side of head: No submental, submandibular or tonsillar adenopathy.   Skin:     General: Skin is warm and dry.   Neurological:      Mental Status: She is alert.      Sensory: Sensation is intact.      Motor: Motor function is intact.      Coordination: Coordination is intact.      Gait: Gait is intact.   Psychiatric:         Attention and Perception: Attention and perception normal.         Mood and Affect: Mood and affect normal.         Speech: Speech normal.         Behavior: Behavior normal. Behavior is cooperative.         Thought Content: Thought content normal.         Cognition and Memory: Cognition and memory normal.         Judgment: Judgment normal.       HEALTH RISK ASSESSMENT    Smoking Status:  Social History     Tobacco Use   Smoking Status Every Day   • Packs/day: 0.50   • Years: 25.00   • Pack years: 12.50   • Types: Cigarettes   Smokeless Tobacco Never     Alcohol Consumption:  Social History     Substance and Sexual Activity   Alcohol Use No     Fall Risk Screen:  Not at risk for fall  STEADI Fall Risk Assessment has not been completed.    Depression Screening:  PHQ-2/PHQ-9 Depression Screening 1/7/2022   Retired PHQ-9 Total Score 0   Retired Total Score 0       Health Habits and Functional and Cognitive Screening: no cognitive decline  No flowsheet data found.    Age-appropriate Screening Schedule:  Refer to the list below for future screening recommendations based on patient's age, sex and/or medical conditions. Orders for these recommended tests are listed in the plan section. The patient has been provided with a written plan.    Health Maintenance   Topic Date Due   • INFLUENZA VACCINE  08/01/2022   • DXA SCAN  03/15/2023   • MAMMOGRAM   06/15/2023   • PAP SMEAR  09/25/2023   • TDAP/TD VACCINES (2 - Td or Tdap) 04/20/2027   • ZOSTER VACCINE  Completed              Assessment & Plan   Diagnoses and all orders for this visit:    1. Encounter for well adult exam with abnormal findings (Primary)    2. Panic attack  -     ALPRAZolam (Xanax) 0.5 MG tablet; Take 1 tablet by mouth 3 (Three) Times a Day As Needed for Anxiety.  Dispense: 90 tablet; Refill: 0  -     ALMA Report:      As part of this patient's treatment plan, I am prescribing controlled substances. The patient has been made aware of appropriate use of such medications, including potential risk of opiod use, somnolence, limited ability to drive and /or work safely, and potential for dependence or overdose, and opiods should be used sparingly and are not recommended for long term use.  It has also been made clear that these medications are for use by this patient only, without concomitant use of alcohol or other substances unless prescribed.    The patient was provided a Rx for norco as needed for pain control after sustaining multiple back and neck injuries as well as surgeries.     This medication is a narcotic pain medication. This medication is monitored by the federal [employer] and the [facility]. Narcotic medication is commonly abused and many patients can become addicted to this medication.    There are problems with narcotic pain medication including addiction, dependence and tolerance to the pain relief. We discussed appropriate and expected levels of pain.     Narcotics have side effects, including the common side effect of nausea/vomiting if taken on an empty stomach and the problem of constipation that occur with narcotic use.     Narcotics can impair your judgement and therefore you should never drive or operate heavy machinery while using these medications. Treat this medication similar to alcohol, do not take this and drive or you could injure yourself or others.    As an  alternative drug, we encourage the use of tylenol and/or NSAIDs (non-steroidal anti-inflammatory drugs) for additional pain relief and as an anti-inflammatory. NSAIDS are drugs such as aleve (naproxen) and motrin (ibuprofen). Narcotics and NSAIDs work differently and can complement each other well after surgery.    In general, NSAIDs and tylenol are much safer drugs than narcotics. Tylenol (acetaminophen) should not be taken with narcotics as most prescribed narcotics already contain tylenol. The maximum dose of tylenol is 4 grams per day, so this can be used if your narcotic pain medication is used sparingly. Pay close attention to the dosages and ask your pharmacist about the dosage of tylenol.  Some patients can experience GI irritation from NSAID use and patients with kidney problems, previous bleeding from the GI tract, gastric bypass, or certain other conditions may not be able to take these over the counter medications.      Patient has completed prescribing agreement detailing terms of continued prescribing of controlled substances, including monitoring ALMA reports, urine drug screening yearly an random drug screens to monitor patients compliance to treatment plan, and pill counts if necessary. The patient is aware that inappropriate use will result in cessation of prescribing such medications.    Toxassure:  I have ordered a urine drug screen to monitor patients predisposition to and patterns of drug use/misuse in order to establish and maintain the safe and effective use of analgesics in the treatment of chronic pain    ALMA report has been reviewed by: Charlene Huntley, DNP, APRN  -     Pt is aware of the potential for addiction and dependence.    Addiction was discussed.  The  Risks, benefits and alternative options of drug therapy discussed.  It was reinforced about the risks and benefits of opioids.  It was reinforced that patient may not call early for medication, may not ask for increase in the  number of pills given monthly or increase in dosage of medication, may not jump around to different pharmacies or providers and may not receive any narcotics from other providers including ER, or the contract will be broken and narcotics will no longer be prescribed from this facility if any of these criteria has been broken.      Last Dose was:  This am    Last Fill was:  October    Date of last ALMA: today    Date of last UDS: on file      3. Chronic pain syndrome  -     HYDROcodone-acetaminophen (Norco)  MG per tablet; Take 1 tablet by mouth Every 6 (Six) Hours As Needed for Moderate Pain.  Dispense: 120 tablet; Refill: 0    4. DDD (degenerative disc disease), lumbar  -     HYDROcodone-acetaminophen (Norco)  MG per tablet; Take 1 tablet by mouth Every 6 (Six) Hours As Needed for Moderate Pain.  Dispense: 120 tablet; Refill: 0    5. Morbid (severe) obesity due to excess calories (HCC)  Assessment & Plan:  Patient's (Body mass index is 39.15 kg/m².) indicates that they are morbidly obese (BMI > 40 or > 35 with obesity - related health condition) with health conditions that include diabetes mellitus . Weight is improving with lifestyle modifications. BMI is is above average; BMI management plan is completed. We discussed portion control and increasing exercise.     Orders:  -     metFORMIN (Glucophage) 500 MG tablet; Take 1 tablet by mouth 2 (Two) Times a Day With Meals.  Dispense: 30 tablet; Refill: 0      Health Maintenance Summary    Postponed - COVID-19 Vaccine: (1): Postponed until 11/4/2022 11/02/2022  Postponed until 11/4/2022 by Leona Knight MA (Patient Refused)    09/12/2022  Postponed until 9/14/2022 by Leona Knight MA (Patient Refused)    08/12/2022  Postponed until 8/14/2022 by Leona Knight MA (Patient Refused)    07/13/2022  Postponed until 7/15/2022 by Leona Knight MA (Patient Refused)    05/09/2022  Postponed until 5/11/2022 by Leona Knight MA (Patient Refused)          Postponed - INFLUENZA VACCINE: (Yearly - August to March): Postponed until 11/7/2022 11/02/2022  Postponed until 11/7/2022 by Charlene Hutnley, DNP, APRN (Pending event)    11/01/2021  Imm Admin: FluLaval/Fluzone >6mos    09/25/2020  Done    09/25/2020  Imm Admin: FluLaval/Fluzone >6mos    09/24/2019  Done         DXA SCAN: (Every 2 Years): Next due on 3/15/2023  02/07/2022  Postponed until 9/1/2022 by Desirae Ryan MA (Pending event)    01/07/2022  Postponed until 1/28/2022 by Halle Caldwell MA (Pending event)    03/15/2021  Outside Procedure: CHG DEXA,BONE DENSITY, 1 + SITE, AXIAL SKELETON    10/09/2019  DEXA Bone Density Axial    08/07/2017  DEXA Bone Density Axial      MAMMOGRAM: (Yearly): Next due on 6/15/2023  06/15/2022  Mammo Screening Digital Tomosynthesis Bilateral With CAD    06/11/2021  Mammo Screening Digital Tomosynthesis Bilateral With CAD    10/09/2019  Mammo Diagnostic Digital Tomosynthesis Bilateral With CAD    07/30/2018  Mammo Screening Digital Tomosynthesis Bilateral With CAD      PAP SMEAR: (Every 3 Years): Next due on 9/25/2023 09/25/2020  Pap IG (Image Guided)    09/24/2019  PAPIG, CTNGTVHSV,HPV,RFX16 / 18    04/20/2017  Declined      ANNUAL WELLNESS VISIT: (Yearly): Next due on 11/2/2023 11/02/2022  Done    09/12/2022  Postponed until 9/13/2022 by Leona Knight MA (Patient Refused)    08/12/2022  Postponed until 8/13/2022 by Leona Knight MA (Patient Refused)    01/07/2022  Postponed until 2/6/2022 by Halle Caldwell MA (Pending event)      TDAP/TD VACCINES: (2 - Td or Tdap): Next due on 4/20/2027 04/20/2017  Declined      COLORECTAL CANCER SCREENING: (COLONOSCOPY - Every 10 Years): Next due on 11/6/2027 11/06/2017  SCANNED - COLONOSCOPY    11/06/2017  COLONOSCOPY (Done)    04/20/2017  COLONOSCOPY (Declined)      Pneumococcal Vaccine 0-64: (4 - PPSV23 if available, else PCV20): Next due on 1/15/2031  11/20/2019  Imm Admin: Pneumococcal Polysaccharide (PPSV23)     05/04/2017  Imm Admin: Pneumococcal Conjugate 13-Valent (PCV13)    01/15/2015  Imm Admin: Pneumococcal Polysaccharide (PPSV23)      ZOSTER VACCINE: (Series Information): Completed  05/18/2020  Imm Admin: Shingrix    03/06/2020  Imm Admin: Shingrix    02/21/2020  Postponed until 3/16/2021 by Charlene Huntley DNP, SURYA (Pending event)    05/09/2017  Imm Admin: Zostavax      HEPATITIS C SCREENING: Completed  06/22/2020  Hep C Virus Ab component of Hepatitis C Antibody      HEALTH PROMOTION/ PREVENTION   I discussed and encouraged age appropriate health promotion/ prevention screenings and immunizations specific to this client: pneumococcal vaccine declines, zoster vaccine declines, influenza declines. annual wellness, lipid panel, hepatitis C screening, colonoscopy        Follow Up:   Return in about 1 year (around 11/2/2023) for Recheck.     An After Visit Summary and PPPS were made available to the patient.                 Electronically signed by Charlene Huntley DNP, SURYA, 11/18/22, 10:41 AM CST.

## 2022-11-18 PROBLEM — F41.0 PANIC ATTACK: Status: ACTIVE | Noted: 2022-11-18

## 2022-11-18 PROBLEM — Z87.39 HX OF OSTEOMYELITIS: Status: RESOLVED | Noted: 2020-01-08 | Resolved: 2022-11-18

## 2022-11-18 PROBLEM — A49.02 MRSA (METHICILLIN RESISTANT STAPHYLOCOCCUS AUREUS) INFECTION: Status: RESOLVED | Noted: 2020-05-13 | Resolved: 2022-11-18

## 2022-11-18 PROBLEM — Z98.890 HISTORY OF LYMPH NODE DISSECTION OF RIGHT AXILLA: Status: RESOLVED | Noted: 2020-01-08 | Resolved: 2022-11-18

## 2022-11-18 PROBLEM — Z92.3 HISTORY OF RADIATION THERAPY: Status: RESOLVED | Noted: 2020-01-07 | Resolved: 2022-11-18

## 2022-11-18 PROBLEM — B00.2 ORAL HERPES SIMPLEX INFECTION: Status: RESOLVED | Noted: 2017-05-03 | Resolved: 2022-11-18

## 2022-11-18 PROBLEM — M84.376A STRESS FRACTURE OF CALCANEUS: Status: RESOLVED | Noted: 2021-07-28 | Resolved: 2022-11-18

## 2022-11-18 PROBLEM — L03.113 CELLULITIS OF RIGHT UPPER EXTREMITY: Status: RESOLVED | Noted: 2020-01-08 | Resolved: 2022-11-18

## 2022-11-18 NOTE — ASSESSMENT & PLAN NOTE
Patient's (Body mass index is 39.15 kg/m².) indicates that they are morbidly obese (BMI > 40 or > 35 with obesity - related health condition) with health conditions that include diabetes mellitus . Weight is improving with lifestyle modifications. BMI is is above average; BMI management plan is completed. We discussed portion control and increasing exercise.

## 2022-12-07 ENCOUNTER — OFFICE VISIT (OUTPATIENT)
Dept: FAMILY MEDICINE CLINIC | Facility: CLINIC | Age: 57
End: 2022-12-07

## 2022-12-07 VITALS
HEIGHT: 63 IN | HEART RATE: 88 BPM | RESPIRATION RATE: 18 BRPM | WEIGHT: 221 LBS | SYSTOLIC BLOOD PRESSURE: 104 MMHG | OXYGEN SATURATION: 98 % | BODY MASS INDEX: 39.16 KG/M2 | TEMPERATURE: 98.4 F | DIASTOLIC BLOOD PRESSURE: 75 MMHG

## 2022-12-07 DIAGNOSIS — M51.36 DDD (DEGENERATIVE DISC DISEASE), LUMBAR: ICD-10-CM

## 2022-12-07 DIAGNOSIS — F41.0 PANIC ATTACK: ICD-10-CM

## 2022-12-07 DIAGNOSIS — E66.01 MORBID (SEVERE) OBESITY DUE TO EXCESS CALORIES: ICD-10-CM

## 2022-12-07 DIAGNOSIS — J01.00 SUBACUTE MAXILLARY SINUSITIS: ICD-10-CM

## 2022-12-07 DIAGNOSIS — G89.4 CHRONIC PAIN SYNDROME: ICD-10-CM

## 2022-12-07 DIAGNOSIS — R05.9 COUGH, UNSPECIFIED TYPE: Primary | ICD-10-CM

## 2022-12-07 LAB
EXPIRATION DATE: NORMAL
FLUAV AG UPPER RESP QL IA.RAPID: NOT DETECTED
FLUBV AG UPPER RESP QL IA.RAPID: NOT DETECTED
INTERNAL CONTROL: NORMAL
Lab: NORMAL
SARS-COV-2 AG UPPER RESP QL IA.RAPID: NOT DETECTED

## 2022-12-07 PROCEDURE — 99214 OFFICE O/P EST MOD 30 MIN: CPT | Performed by: NURSE PRACTITIONER

## 2022-12-07 PROCEDURE — 87428 SARSCOV & INF VIR A&B AG IA: CPT | Performed by: NURSE PRACTITIONER

## 2022-12-07 RX ORDER — DEXTROMETHORPHAN HYDROBROMIDE AND PROMETHAZINE HYDROCHLORIDE 15; 6.25 MG/5ML; MG/5ML
5 SYRUP ORAL 4 TIMES DAILY PRN
Qty: 120 ML | Refills: 0 | Status: SHIPPED | OUTPATIENT
Start: 2022-12-07 | End: 2022-12-07 | Stop reason: SDUPTHER

## 2022-12-07 RX ORDER — HYDROCODONE BITARTRATE AND ACETAMINOPHEN 10; 325 MG/1; MG/1
1 TABLET ORAL EVERY 6 HOURS PRN
Qty: 120 TABLET | Refills: 0 | Status: SHIPPED | OUTPATIENT
Start: 2022-12-07 | End: 2023-01-09 | Stop reason: SDUPTHER

## 2022-12-07 RX ORDER — DEXTROMETHORPHAN HYDROBROMIDE AND PROMETHAZINE HYDROCHLORIDE 15; 6.25 MG/5ML; MG/5ML
5 SYRUP ORAL 4 TIMES DAILY PRN
Qty: 120 ML | Refills: 0 | Status: SHIPPED | OUTPATIENT
Start: 2022-12-07 | End: 2022-12-19 | Stop reason: SDUPTHER

## 2022-12-07 RX ORDER — AMOXICILLIN 500 MG/1
1000 CAPSULE ORAL 2 TIMES DAILY
Qty: 40 CAPSULE | Refills: 0 | Status: SHIPPED | OUTPATIENT
Start: 2022-12-07 | End: 2022-12-17

## 2022-12-07 RX ORDER — TIZANIDINE 4 MG/1
TABLET ORAL
Qty: 60 TABLET | Refills: 5 | OUTPATIENT
Start: 2022-12-07

## 2022-12-07 RX ORDER — PREDNISONE 20 MG/1
20 TABLET ORAL DAILY
Qty: 5 TABLET | Refills: 0 | Status: SHIPPED | OUTPATIENT
Start: 2022-12-07 | End: 2022-12-12

## 2022-12-07 RX ORDER — ALPRAZOLAM 0.5 MG/1
0.5 TABLET ORAL 3 TIMES DAILY PRN
Qty: 90 TABLET | Refills: 0 | Status: SHIPPED | OUTPATIENT
Start: 2022-12-07 | End: 2023-01-09 | Stop reason: SDUPTHER

## 2022-12-07 NOTE — PROGRESS NOTES
Chief Complaint  Cough (Cough, scratchy throat and headache x 2 weeks. )    Subjective        Adela Arauz presents to [facility]  History of Present Illness  Presents for complaints of sinus pain and pressure, hurts in cheeks, ears hurt, dry cough, and hoarseness for the last 2 weeks. Has been taking otc meds with little improvement says that her head feels like it will blow off, she has been blowing green snot, and hacking cough when she lays flat.  Hasn't been eating much but drinking appropriately.  Wants to know if she can get norco and xanax filled today so she doesn't have to come back.  I explained I will do it today but in the future this is a sick visit and med refills is a chronic visit they will have to be 2 separate visits.    Sinusitis  This is a new problem. The current episode started 1 to 4 weeks ago. The problem has been gradually worsening since onset. There has been no fever. Her pain is at a severity of 9/10. The pain is severe. Associated symptoms include congestion, coughing, headaches, a hoarse voice, sinus pressure, sneezing, a sore throat and swollen glands. Pertinent negatives include no ear pain. Past treatments include acetaminophen. The treatment provided no relief.   Cough  This is a new problem. The current episode started 1 to 4 weeks ago. The problem has been gradually worsening. The problem occurs constantly. The cough is non-productive. Associated symptoms include headaches, myalgias, nasal congestion, a sore throat and wheezing. Pertinent negatives include no ear congestion, ear pain or fever. The symptoms are aggravated by lying down. She has tried cool air and rest for the symptoms. The treatment provided mild relief.   Pain  This is a chronic problem. The current episode started more than 1 year ago. The problem occurs daily. The problem has been unchanged. Associated symptoms include arthralgias, congestion, coughing, headaches,  "myalgias, a sore throat and swollen glands. Pertinent negatives include no fever, urinary symptoms or vertigo. The symptoms are aggravated by bending, twisting and standing. She has tried oral narcotics and rest for the symptoms. The treatment provided mild relief.   Anxiety  Presents for follow-up visit. Symptoms include decreased concentration, depressed mood, insomnia, irritability and nervous/anxious behavior. Patient reports no obsessions, palpitations, panic, restlessness or suicidal ideas. Symptoms occur most days. The severity of symptoms is moderate. The patient sleeps 5 hours per night. The quality of sleep is fair. Nighttime awakenings: several.     Compliance with medications is %.   The following portions of the patient's history were reviewed and updated as appropriate: allergies, current medications, past family history, past medical history, past social history, past surgical history and problem list.    The following portions of the patient's history were reviewed and updated as appropriate: allergies, current medications, past family history, past medical history, past social history, past surgical history and problem list.    Objective   Vital Signs:  /75 (BP Location: Left arm, Patient Position: Sitting, Cuff Size: Adult)   Pulse 88   Temp 98.4 °F (36.9 °C) (Infrared)   Resp 18   Ht 160 cm (63\") Comment: per patient  Wt 100 kg (221 lb)   SpO2 98%   BMI 39.15 kg/m²   Estimated body mass index is 39.15 kg/m² as calculated from the following:    Height as of this encounter: 160 cm (63\").    Weight as of this encounter: 100 kg (221 lb).    Class 2 Severe Obesity (BMI >=35 and <=39.9). Obesity-related health conditions include the following: hypertension, dyslipidemias and GERD. Obesity is unchanged. BMI is is above average; BMI management plan is completed. We discussed portion control and increasing exercise.      Physical Exam  Vitals and nursing note reviewed.   Constitutional:  "      General: She is awake.      Appearance: She is well-developed and well-groomed. She is morbidly obese. She is ill-appearing.   HENT:      Head: Normocephalic and atraumatic.      Right Ear: Hearing, tympanic membrane, ear canal and external ear normal.      Left Ear: Hearing, tympanic membrane, ear canal and external ear normal.      Nose: Congestion present.      Right Sinus: Maxillary sinus tenderness and frontal sinus tenderness present.      Left Sinus: Maxillary sinus tenderness and frontal sinus tenderness present.      Mouth/Throat:      Lips: Pink.      Pharynx: Oropharynx is clear. Posterior oropharyngeal erythema present.   Eyes:      General: Lids are normal.      Conjunctiva/sclera: Conjunctivae normal.   Cardiovascular:      Rate and Rhythm: Normal rate and regular rhythm.      Heart sounds: Normal heart sounds.   Pulmonary:      Effort: Pulmonary effort is normal.      Breath sounds: Normal air entry. Examination of the right-upper field reveals wheezing. Examination of the left-upper field reveals wheezing. Wheezing present.   Musculoskeletal:      Right shoulder: Tenderness present. Decreased range of motion.      Left shoulder: Tenderness present. Decreased range of motion.      Right wrist: Decreased range of motion.      Left wrist: Decreased range of motion.        Arms:       Cervical back: Normal and full passive range of motion without pain.      Thoracic back: Normal.      Lumbar back: Spasms and tenderness present. Decreased range of motion.        Back:       Right lower leg: No edema.      Left lower leg: No edema.   Lymphadenopathy:      Head:      Right side of head: No submental, submandibular or tonsillar adenopathy.      Left side of head: No submental, submandibular or tonsillar adenopathy.   Skin:     General: Skin is warm and dry.   Neurological:      Mental Status: She is alert and oriented to person, place, and time.      Sensory: Sensation is intact.      Motor: Motor  function is intact.      Coordination: Coordination is intact.      Gait: Gait is intact.   Psychiatric:         Attention and Perception: Attention and perception normal.         Mood and Affect: Mood and affect normal.         Speech: Speech normal.         Behavior: Behavior normal. Behavior is cooperative.         Thought Content: Thought content normal.         Cognition and Memory: Cognition and memory normal.         Judgment: Judgment normal.        Result Review :                Assessment and Plan   Diagnoses and all orders for this visit:    1. Cough, unspecified type (Primary)  -     POCT SARS-CoV-2 Antigen JAMARCUS + Flu  -     predniSONE (DELTASONE) 20 MG tablet; Take 1 tablet by mouth Daily for 5 days.  Dispense: 5 tablet; Refill: 0  -     Discontinue: promethazine-dextromethorphan (PROMETHAZINE-DM) 6.25-15 MG/5ML syrup; Take 5 mL by mouth 4 (Four) Times a Day As Needed for Cough.  Dispense: 120 mL; Refill: 0  -     promethazine-dextromethorphan (PROMETHAZINE-DM) 6.25-15 MG/5ML syrup; Take 5 mL by mouth 4 (Four) Times a Day As Needed for Cough.  Dispense: 120 mL; Refill: 0    2. Subacute maxillary sinusitis  -     amoxicillin (AMOXIL) 500 MG capsule; Take 2 capsules by mouth 2 (Two) Times a Day for 10 days.  Dispense: 40 capsule; Refill: 0    3. Chronic pain syndrome  -     HYDROcodone-acetaminophen (Norco)  MG per tablet; Take 1 tablet by mouth Every 6 (Six) Hours As Needed for Moderate Pain.  Dispense: 120 tablet; Refill: 0         -       ALMA Report:      As part of this patient's treatment plan, I am prescribing controlled substances. The patient has been made aware of appropriate use of such medications, including potential risk of opiod use, somnolence, limited ability to drive and /or work safely, and potential for dependence or overdose, and opiods should be used sparingly and are not recommended for long term use.  It has also been made clear that these medications are for use by this  patient only, without concomitant use of alcohol or other substances unless prescribed.    The patient was provided a Rx for norco as needed for pain control after sustaining multiple back and neck injuries as well as surgeries.     This medication is a narcotic pain medication. This medication is monitored by the federal [employer] and the [facility]. Narcotic medication is commonly abused and many patients can become addicted to this medication.    There are problems with narcotic pain medication including addiction, dependence and tolerance to the pain relief. We discussed appropriate and expected levels of pain.     Narcotics have side effects, including the common side effect of nausea/vomiting if taken on an empty stomach and the problem of constipation that occur with narcotic use.     Narcotics can impair your judgement and therefore you should never drive or operate heavy machinery while using these medications. Treat this medication similar to alcohol, do not take this and drive or you could injure yourself or others.    As an alternative drug, we encourage the use of tylenol and/or NSAIDs (non-steroidal anti-inflammatory drugs) for additional pain relief and as an anti-inflammatory. NSAIDS are drugs such as aleve (naproxen) and motrin (ibuprofen). Narcotics and NSAIDs work differently and can complement each other well after surgery.    In general, NSAIDs and tylenol are much safer drugs than narcotics. Tylenol (acetaminophen) should not be taken with narcotics as most prescribed narcotics already contain tylenol. The maximum dose of tylenol is 4 grams per day, so this can be used if your narcotic pain medication is used sparingly. Pay close attention to the dosages and ask your pharmacist about the dosage of tylenol.  Some patients can experience GI irritation from NSAID use and patients with kidney problems, previous bleeding from the GI tract, gastric bypass, or certain other conditions may not be able  to take these over the counter medications.      Patient has completed prescribing agreement detailing terms of continued prescribing of controlled substances, including monitoring ALMA reports, urine drug screening yearly an random drug screens to monitor patients compliance to treatment plan, and pill counts if necessary. The patient is aware that inappropriate use will result in cessation of prescribing such medications.    Toxassure:  I have ordered a urine drug screen to monitor patients predisposition to and patterns of drug use/misuse in order to establish and maintain the safe and effective use of analgesics in the treatment of chronic pain    ALMA report has been reviewed by: Charlene Huntley, DNP, APRN       -     Pt is aware of the potential for addiction and dependence.    Addiction was discussed.  The  Risks, benefits and alternative options of drug therapy discussed.  It was reinforced about the risks and benefits of opioids.  It was reinforced that patient may not call early for medication, may not ask for increase in the number of pills given monthly or increase in dosage of medication, may not jump around to different pharmacies or providers and may not receive any narcotics from other providers including ER, or the contract will be broken and narcotics will no longer be prescribed from this facility if any of these criteria has been broken.     4. DDD (degenerative disc disease), lumbar  -     HYDROcodone-acetaminophen (Norco)  MG per tablet; Take 1 tablet by mouth Every 6 (Six) Hours As Needed for Moderate Pain.  Dispense: 120 tablet; Refill: 0    5. Panic attack  -     ALPRAZolam (Xanax) 0.5 MG tablet; Take 1 tablet by mouth 3 (Three) Times a Day As Needed for Anxiety.  Dispense: 90 tablet; Refill: 0    6. Morbid (severe) obesity due to excess calories (HCC)    POCT SARS-CoV-2 Antigen JAMARCUS + Flu  Order: [id number]   Status: Final result      Visible to patient: No (scheduled for  12/8/2022 12:11 AM)      Next appt: 12/12/2022 at 09:00 AM in Family Medicine (Charlene Huntley, [place], APRN)      Dx: Cough, unspecified type     Specimen Information: Swab    0 Result Notes  Component   Ref Range & Units 10:11 5 yr ago   SARS Antigen   Not Detected, Presumptive Negative Not Detected     Influenza A Antigen JAMARCUS   Not Detected Not Detected     Influenza B Antigen JAMARCUS   Not Detected Not Detected     Internal Control   Passed Passed  Passed    Lot Number  [id number]  [id number]    Expiration Date  6,012,023  10/31/2018    Resulting Agency  [facility]              Specimen Collected: 12/07/22 10:11 CST Last Resulted: 12/07/22 10:11 CST                    Follow Up   Return in about 1 month (around 1/7/2023), or if symptoms worsen or fail to improve, for Recheck.  Patient was given instructions and counseling regarding her condition or for health maintenance advice. Please see specific information pulled into the AVS if appropriate.   Electronically signed by Charlene Huntley DNP, APRN, 12/07/22, 2:22 PM CST.

## 2022-12-07 NOTE — ASSESSMENT & PLAN NOTE
Patient's (Body mass index is 39.15 kg/m².) indicates that they are morbidly obese (BMI > 40 or > 35 with obesity - related health condition) with health conditions that include hypertension, dyslipidemias, GERD and osteoarthritis . Weight is worsening. BMI is is above average; BMI management plan is completed. We discussed portion control and increasing exercise.

## 2022-12-07 NOTE — TELEPHONE ENCOUNTER
Zanaflex sent to pharmacy on 9/12/22 #180 with 1 refill      Rx Refill Note  Requested Prescriptions     Refused Prescriptions Disp Refills   • tiZANidine (ZANAFLEX) 4 MG tablet [Pharmacy Med Name: TIZANIDINE HCL 4 MG TAB 4 Tablet] 60 tablet 5     Sig: TAKE ONE TABLET BY MOUTH TWICE A DAY AS NEEDED FOR MUSCLE SPASMS      Last office visit with prescribing clinician: 12/7/2022   Last telemedicine visit with prescribing clinician: 12/12/2022   Next office visit with prescribing clinician: 12/12/2022     Leona Knight MA  12/07/22, 10:26 CST

## 2022-12-19 DIAGNOSIS — R05.9 COUGH, UNSPECIFIED TYPE: ICD-10-CM

## 2022-12-19 RX ORDER — DEXTROMETHORPHAN HYDROBROMIDE AND PROMETHAZINE HYDROCHLORIDE 15; 6.25 MG/5ML; MG/5ML
5 SYRUP ORAL 4 TIMES DAILY PRN
Qty: 120 ML | Refills: 0 | Status: SHIPPED | OUTPATIENT
Start: 2022-12-19

## 2022-12-19 NOTE — TELEPHONE ENCOUNTER
Rx Refill Note  Requested Prescriptions     Pending Prescriptions Disp Refills   • promethazine-dextromethorphan (PROMETHAZINE-DM) 6.25-15 MG/5ML syrup 120 mL 0     Sig: Take 5 mL by mouth 4 (Four) Times a Day As Needed for Cough.      Last office visit with prescribing clinician: 12/7/2022   Next office visit with prescribing clinician: 1/9/2023                         Would you like a call back once the refill request has been completed: [] Yes [] No    If the office needs to give you a call back, can they leave a voicemail: [] Yes [] No    Desirae Ryan MA  12/19/22, 08:32 CST

## 2022-12-19 NOTE — TELEPHONE ENCOUNTER
Caller: Adela Arauz    Relationship: Self    Best call back number: [phone]    Requested Prescriptions:   Requested Prescriptions     Pending Prescriptions Disp Refills   • promethazine-dextromethorphan (PROMETHAZINE-DM) 6.25-15 MG/5ML syrup 120 mL 0     Sig: Take 5 mL by mouth 4 (Four) Times a Day As Needed for Cough.        Pharmacy where request should be sent: [facility] - [place], [place] - [address] [place] [address] [phone] [facility] [phone] FX     Additional details provided by patient: PATIENT IS STILL HAVING THE COUGH    Does the patient have less than a 3 day supply:  [x] Yes  [] No    Would you like a call back once the refill request has been completed: [x] Yes [] No    If the office needs to give you a call back, can they leave a voicemail: [x] Yes [] No    Marely Malone Rep   12/19/22 07:47 CST

## 2023-01-04 ENCOUNTER — TELEMEDICINE (OUTPATIENT)
Dept: FAMILY MEDICINE CLINIC | Facility: CLINIC | Age: 58
End: 2023-01-04
Payer: MEDICARE

## 2023-01-04 DIAGNOSIS — M81.0 OSTEOPOROSIS, UNSPECIFIED OSTEOPOROSIS TYPE, UNSPECIFIED PATHOLOGICAL FRACTURE PRESENCE: Primary | ICD-10-CM

## 2023-01-04 DIAGNOSIS — M51.36 DDD (DEGENERATIVE DISC DISEASE), LUMBAR: ICD-10-CM

## 2023-01-04 RX ORDER — MELOXICAM 15 MG/1
TABLET ORAL
Qty: 90 TABLET | Refills: 1 | OUTPATIENT
Start: 2023-01-04

## 2023-01-04 NOTE — PROGRESS NOTES
CC: sinus congestion    History:  Adela Arauz is a 57 y.o. female who presents today for evaluation of the above problems.        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    • Low back pain    • Nausea after anesthesia    • Osteoporosis    • PONV (postoperative nausea and vomiting)    • Psoriasis    • Seasonal allergies    • Senile osteoporosis 9/28/2017     Past Surgical History:   Procedure Laterality Date   • ANTERIOR LUMBAR EXPOSURE N/A 12/2/2020    Procedure: Anterior lumbar interbody fusion of L5-S1 with instrumentation ;  Surgeon: Neptali Rivera DO;  Location:  [facility];  Service: Vascular;  Laterality: N/A;   • AXILLARY LYMPH NODE BIOPSY/EXCISION Right 9/19/2019    Procedure: EXCISION RIGHT AXILLARY MASS;  Surgeon: Jes Enamorado MD;  Location:  [facility];  Service: General   • INCISION AND DRAINAGE ARM Right 6/3/2021    Procedure: INCISION AND DRAINAGE FOREARM;  Surgeon: Jes Enamorado MD;  Location:  [facility];  Service: General;  Laterality: Right;   • LUMBAR FUSION N/A 12/2/2020    Procedure: ANTERIOR DECOMPRESSION, ANTERIOR LUMBAR INTERBODY FUSION WITH INSTRUMENTATION L5-S1;  Surgeon: ALEXIS Dunaway MD;  Location:  [facility];  Service: Orthopedic Spine;  Laterality: N/A;   • LUMBAR FUSION Left 12/16/2020    Procedure: LEFT LATERAL LUMBAR INTERBODY FUSION L3-5 WITH INSTRUMENTATION L3-4;  Surgeon: ALEXIS Dunaway MD;  Location:  [facility];  Service: Orthopedic Spine;  Laterality: Left;   • LUMBAR LAMINECTOMY WITH FUSION N/A 12/18/2020    Procedure: DECOMPRESSION L3-4,  POSTERIOR SPINAL FUSION WITH INSTRUMENTATION L3-S1;  Surgeon: ALEXIS Dunaway MD;  Location: [facility];  Service: Orthopedic Spine;  Laterality: N/A;   • SHOULDER SURGERY     • TUBAL ABDOMINAL LIGATION     • US GUIDED LYMPH NODE BIOPSY  8/9/2019   • WRIST SURGERY Bilateral     fracture     Family History   Problem Relation Age of Onset   • Arthritis Mother    • COPD Mother    • Atrial fibrillation Mother    • Osteoarthritis Mother    • Heart disease Father    • Hypertension Father    • Arthritis Father    • Melanoma Father         metastatic   • Arthritis Sister    • Arthritis Brother    • No Known Problems Daughter    • Arthritis Maternal Grandfather    • Breast cancer Neg Hx       reports that she has been smoking cigarettes. She has a 12.50 pack-year smoking history. She has never used smokeless tobacco. She reports that she does not drink alcohol and does not use drugs.      Current Outpatient Medications:   •  ALPRAZolam (Xanax) 0.5 MG tablet, Take 1 tablet by mouth 3 (Three) Times a Day As Needed for Anxiety., Disp: 90 tablet, Rfl: 0  •  betamethasone valerate (VALISONE) 0.1 % ointment, Apply  topically to the appropriate area as directed 2 (Two) Times a Day., Disp: 30 g, Rfl: 0  •  Calcium-Magnesium-Vitamin D 500-250-200 MG-MG-UNIT tablet, Take 600 tablets by mouth 2 (Two) Times a Day., Disp: , Rfl:   •  cholecalciferol (VITAMIN D3) 1000 units tablet, Take 1,000 Units by mouth Daily., Disp: , Rfl:   •  ferrous sulfate 324 (65 Fe) MG tablet delayed-release EC tablet, Take 1 tablet by mouth Daily With Breakfast., Disp: 90 tablet, Rfl: 1  •  fluticasone (FLONASE) 50 MCG/ACT nasal spray, 2 sprays into the nostril(s) as directed by provider Daily., Disp: 1 bottle, Rfl: 3  •  furosemide (LASIX) 40 MG tablet, Take 1 tablet by mouth Daily., Disp: 90 tablet, Rfl: 1  •  HYDROcodone-acetaminophen (Norco)  MG per tablet, Take 1 tablet by mouth Every 6 (Six) Hours As Needed for Moderate Pain., Disp: 120 tablet,  Rfl: 0  •  leflunomide (ARAVA) 20 MG tablet, , Disp: , Rfl:   •  meloxicam (MOBIC) 15 MG tablet, Take 1 tablet by mouth Daily., Disp: 90 tablet, Rfl: 1  •  metFORMIN (Glucophage) 500 MG tablet, Take 1 tablet by mouth 2 (Two) Times a Day With Meals., Disp: 30 tablet, Rfl: 0  •  mupirocin (BACTROBAN) 2 % ointment, Apply to nostrils and fingernails nighlty x 5 nights same 5 nights monthly x 6 months., Disp: 30 g, Rfl: 5  •  naloxone (NARCAN) 0.4 MG/ML injection, Infuse 1 mL into a venous catheter Every 5 (Five) Minutes As Needed for Opioid Reversal., Disp: 1 mL, Rfl: 3  •  Narcan 4 MG/0.1ML nasal spray, , Disp: , Rfl:   •  omeprazole (priLOSEC) 40 MG capsule, Take 1 capsule by mouth Daily., Disp: 90 capsule, Rfl: 1  •  ondansetron ODT (Zofran ODT) 4 MG disintegrating tablet, Place 1 tablet on the tongue Every 8 (Eight) Hours As Needed for Nausea or Vomiting., Disp: 40 tablet, Rfl: 0  •  polyethylene glycol (MIRALAX) 17 g packet, Take 17 g by mouth Daily., Disp: 30 packet, Rfl: 5  •  pregabalin (Lyrica) 150 MG capsule, Take 1 capsule by mouth 2 (Two) Times a Day., Disp: 180 capsule, Rfl: 5  •  promethazine-dextromethorphan (PROMETHAZINE-DM) 6.25-15 MG/5ML syrup, Take 5 mL by mouth 4 (Four) Times a Day As Needed for Cough., Disp: 120 mL, Rfl: 0  •  sertraline (ZOLOFT) 50 MG tablet, Take 1 tablet by mouth every night at bedtime., Disp: 90 tablet, Rfl: 1  •  tiZANidine (ZANAFLEX) 4 MG tablet, Take 1 tablet by mouth 2 (Two) Times a Day As Needed for Muscle Spasms., Disp: 180 tablet, Rfl: 1  •  Turmeric 500 MG capsule, Take 1 capsule by mouth 2 (Two) Times a Day., Disp: , Rfl:   •  valACYclovir (VALTREX) 1000 MG tablet, Take 1 tablet by mouth 2 (Two) Times a Day., Disp: 270 tablet, Rfl: 1    OBJECTIVE:  LMP 04/01/2014 (Approximate)    Physical Exam    Assessment/Plan    There are no diagnoses linked to this encounter.    An After Visit Summary was printed and given to the patient at discharge.  No follow-ups on file.            Electronically signed.  This encounter was created in error - please disregard.

## 2023-01-04 NOTE — TELEPHONE ENCOUNTER
One refill remaining at pharmacy.     Rx Refill Note  Requested Prescriptions     Pending Prescriptions Disp Refills   • meloxicam (MOBIC) 15 MG tablet [Pharmacy Med Name: MELOXICAM 15 MG TAB 15 Tablet] 90 tablet 1     Sig: TAKE ONE TABLET BY MOUTH DAILY.      Last office visit with prescribing clinician: 12/7/2022   Last telemedicine visit with prescribing clinician: 1/9/2023   Next office visit with prescribing clinician: 1/9/2023     Leona Knight MA  01/04/23, 08:57 CST

## 2023-01-09 ENCOUNTER — OFFICE VISIT (OUTPATIENT)
Dept: FAMILY MEDICINE CLINIC | Facility: CLINIC | Age: 58
End: 2023-01-09
Payer: MEDICARE

## 2023-01-09 VITALS
WEIGHT: 217 LBS | DIASTOLIC BLOOD PRESSURE: 80 MMHG | HEART RATE: 88 BPM | RESPIRATION RATE: 18 BRPM | BODY MASS INDEX: 38.45 KG/M2 | HEIGHT: 63 IN | OXYGEN SATURATION: 99 % | TEMPERATURE: 98.6 F | SYSTOLIC BLOOD PRESSURE: 112 MMHG

## 2023-01-09 DIAGNOSIS — G89.4 CHRONIC PAIN SYNDROME: ICD-10-CM

## 2023-01-09 DIAGNOSIS — F41.0 PANIC ATTACK: Primary | ICD-10-CM

## 2023-01-09 DIAGNOSIS — R60.1 GENERALIZED EDEMA: ICD-10-CM

## 2023-01-09 DIAGNOSIS — K08.89 PAIN IN A TOOTH OR TEETH: ICD-10-CM

## 2023-01-09 DIAGNOSIS — M51.36 DDD (DEGENERATIVE DISC DISEASE), LUMBAR: ICD-10-CM

## 2023-01-09 PROCEDURE — 99214 OFFICE O/P EST MOD 30 MIN: CPT | Performed by: NURSE PRACTITIONER

## 2023-01-09 PROCEDURE — 96372 THER/PROPH/DIAG INJ SC/IM: CPT | Performed by: NURSE PRACTITIONER

## 2023-01-09 RX ORDER — PENICILLIN V POTASSIUM 500 MG/1
500 TABLET ORAL 2 TIMES DAILY
Qty: 20 TABLET | Refills: 0 | Status: SHIPPED | OUTPATIENT
Start: 2023-01-09 | End: 2023-01-19

## 2023-01-09 RX ORDER — KETOROLAC TROMETHAMINE 30 MG/ML
60 INJECTION, SOLUTION INTRAMUSCULAR; INTRAVENOUS ONCE
Status: COMPLETED | OUTPATIENT
Start: 2023-01-09 | End: 2023-01-09

## 2023-01-09 RX ORDER — FUROSEMIDE 40 MG/1
40 TABLET ORAL DAILY
Qty: 90 TABLET | Refills: 1 | Status: SHIPPED | OUTPATIENT
Start: 2023-01-09

## 2023-01-09 RX ORDER — HYDROCODONE BITARTRATE AND ACETAMINOPHEN 10; 325 MG/1; MG/1
1 TABLET ORAL EVERY 6 HOURS PRN
Qty: 120 TABLET | Refills: 0 | Status: SHIPPED | OUTPATIENT
Start: 2023-01-09 | End: 2023-02-10 | Stop reason: SDUPTHER

## 2023-01-09 RX ORDER — DEXAMETHASONE SODIUM PHOSPHATE 10 MG/ML
10 INJECTION INTRAMUSCULAR; INTRAVENOUS ONCE
Status: COMPLETED | OUTPATIENT
Start: 2023-01-09 | End: 2023-01-09

## 2023-01-09 RX ORDER — ALPRAZOLAM 0.5 MG/1
0.5 TABLET ORAL 3 TIMES DAILY PRN
Qty: 90 TABLET | Refills: 5 | Status: SHIPPED | OUTPATIENT
Start: 2023-01-09 | End: 2023-04-07

## 2023-01-09 RX ORDER — KETOROLAC TROMETHAMINE 30 MG/ML
60 INJECTION, SOLUTION INTRAMUSCULAR; INTRAVENOUS ONCE
Status: DISCONTINUED | OUTPATIENT
Start: 2023-01-09 | End: 2023-01-09

## 2023-01-09 RX ADMIN — DEXAMETHASONE SODIUM PHOSPHATE 10 MG: 10 INJECTION INTRAMUSCULAR; INTRAVENOUS at 10:41

## 2023-01-09 RX ADMIN — KETOROLAC TROMETHAMINE 60 MG: 30 INJECTION, SOLUTION INTRAMUSCULAR; INTRAVENOUS at 10:52

## 2023-01-09 NOTE — PROGRESS NOTES
Chief Complaint  Chronic pain syndrome (Follow up)    Subjective        Adela Arauz presents to [facility]  History of Present Illness  Adela Arauz presents for her monthly follow up for chronic pain, DDD usually controlled with norco, however, since she got her teeth extraction she has not been able to control mouth pain, and facial pressure, Panic attack/PTSD/Anxiety stable with alprazolam, generalized edema stable with furosemide. Pt requests a toradol injection and decadron.  She states, \"the fibro pain is so bad, my hands and wrists, hips, and shoulders, and back are really hurting.        Pain  This is a chronic problem. The current episode started more than 1 year ago. The problem occurs intermittently. The problem has been gradually worsening. Associated symptoms include arthralgias. Pertinent negatives include no change in bowel habit, chest pain, chills, congestion, headaches, joint swelling, myalgias, nausea, vertigo or visual change. The symptoms are aggravated by drinking, bending, twisting, exertion and standing. She has tried oral narcotics, rest, acetaminophen, NSAIDs, heat and walking for the symptoms. The treatment provided mild relief.   Anxiety  Presents for follow-up visit. Symptoms include decreased concentration, depressed mood, insomnia and nervous/anxious behavior. Patient reports no chest pain, nausea or panic. The severity of symptoms is moderate. The patient sleeps 4 hours per night. The quality of sleep is fair. Nighttime awakenings: several.     Compliance with medications is %. Side effects of treatment include joint pain.   Dental Pain   This is a new problem. The current episode started in the past 7 days. The problem occurs constantly. The problem has been gradually worsening. The pain is at a severity of 8/10. The pain is moderate. Pertinent negatives include no oral bleeding or thermal sensitivity. She has tried NSAIDs and rest for the  symptoms. The treatment provided no relief.     The following portions of the patient's history were reviewed and updated as appropriate: allergies, current medications, past family history, past medical history, past social history, past surgical history and problem list.    Objective   Vital Signs:  /80 (BP Location: Left arm, Patient Position: Sitting, Cuff Size: Adult)   Pulse 88   Temp 98.6 °F (37 °C) (Infrared)   Resp 18   Ht 160 cm (63\") Comment: per patient  Wt 98.4 kg (217 lb)   SpO2 99%   BMI 38.44 kg/m²   Estimated body mass index is 38.44 kg/m² as calculated from the following:    Height as of this encounter: 160 cm (63\").    Weight as of this encounter: 98.4 kg (217 lb).    Class 2 Severe Obesity (BMI >=35 and <=39.9). Obesity-related health conditions include the following: hypertension and dyslipidemias. Obesity is improving with lifestyle modifications. BMI is is above average; BMI management plan is completed. We discussed portion control and increasing exercise.      Physical Exam  Vitals and nursing note reviewed.   Constitutional:       General: She is awake.      Appearance: Normal appearance. She is well-developed and well-groomed.   HENT:      Head: Normocephalic and atraumatic.      Right Ear: Hearing, tympanic membrane, ear canal and external ear normal.      Left Ear: Hearing, tympanic membrane, ear canal and external ear normal.      Nose: Nose normal.      Mouth/Throat:      Lips: Pink.      Pharynx: Oropharynx is clear.   Eyes:      General: Lids are normal.      Conjunctiva/sclera: Conjunctivae normal.   Cardiovascular:      Rate and Rhythm: Normal rate and regular rhythm.      Heart sounds: Normal heart sounds.   Pulmonary:      Effort: Pulmonary effort is normal.      Breath sounds: Normal breath sounds and air entry.   Musculoskeletal:        Arms:       Cervical back: Decreased range of motion.      Lumbar back: Spasms and tenderness present. Decreased range of motion.         Back:       Right lower leg: No edema.      Left lower leg: No edema.        Legs:    Lymphadenopathy:      Head:      Right side of head: No submental, submandibular or tonsillar adenopathy.      Left side of head: No submental, submandibular or tonsillar adenopathy.   Skin:     General: Skin is warm and dry.   Neurological:      Mental Status: She is alert.      Sensory: Sensation is intact.      Motor: Motor function is intact.      Coordination: Coordination is intact.      Gait: Gait is intact.   Psychiatric:         Attention and Perception: Attention and perception normal.         Mood and Affect: Mood and affect normal.         Speech: Speech normal.         Behavior: Behavior normal. Behavior is cooperative.         Thought Content: Thought content normal.         Cognition and Memory: Cognition and memory normal.         Judgment: Judgment normal.        Result Review :                Assessment and Plan   Diagnoses and all orders for this visit:    1. Panic attack (Primary)  -     ALPRAZolam (Xanax) 0.5 MG tablet; Take 1 tablet by mouth 3 (Three) Times a Day As Needed for Anxiety.  Dispense: 90 tablet; Refill: 5    2. Chronic pain syndrome  -     HYDROcodone-acetaminophen (Norco)  MG per tablet; Take 1 tablet by mouth Every 6 (Six) Hours As Needed for Moderate Pain.  Dispense: 120 tablet; Refill: 0  -     Discontinue: ketorolac (TORADOL) injection 60 mg  -     dexamethasone (DECADRON) injection 10 mg    3. DDD (degenerative disc disease), lumbar  -     HYDROcodone-acetaminophen (Norco)  MG per tablet; Take 1 tablet by mouth Every 6 (Six) Hours As Needed for Moderate Pain.  Dispense: 120 tablet; Refill: 0  -     Discontinue: ketorolac (TORADOL) injection 60 mg  -     dexamethasone (DECADRON) injection 10 mg    4. Generalized edema  -     furosemide (LASIX) 40 MG tablet; Take 1 tablet by mouth Daily.  Dispense: 90 tablet; Refill: 1    5. Pain in a tooth or teeth  -     penicillin v  potassium (VEETID) 500 MG tablet; Take 1 tablet by mouth 2 (Two) Times a Day for 10 days.  Dispense: 20 tablet; Refill: 0  -     Discontinue: ketorolac (TORADOL) injection 60 mg  -     dexamethasone (DECADRON) injection 10 mg  -     ketorolac (TORADOL) injection 60 mg      ALMA Report:      As part of this patient's treatment plan, I am prescribing controlled substances. The patient has been made aware of appropriate use of such medications, including potential risk of opiod use, somnolence, limited ability to drive and /or work safely, and potential for dependence or overdose, and opiods should be used sparingly and are not recommended for long term use.  It has also been made clear that these medications are for use by this patient only, without concomitant use of alcohol or other substances unless prescribed.    The patient was provided a Rx for norco as needed for pain control after sustaining multiple back and neck injuries as well as surgeries.     This medication is a narcotic pain medication. This medication is monitored by the federal [facility] and the [facility]. Narcotic medication is commonly abused and many patients can become addicted to this medication.    There are problems with narcotic pain medication including addiction, dependence and tolerance to the pain relief. We discussed appropriate and expected levels of pain.     Narcotics have side effects, including the common side effect of nausea/vomiting if taken on an empty stomach and the problem of constipation that occur with narcotic use.     Narcotics can impair your judgement and therefore you should never drive or operate heavy machinery while using these medications. Treat this medication similar to alcohol, do not take this and drive or you could injure yourself or others.    As an alternative drug, we encourage the use of tylenol and/or NSAIDs (non-steroidal anti-inflammatory drugs) for additional pain relief and as an  anti-inflammatory. NSAIDS are drugs such as aleve (naproxen) and motrin (ibuprofen). Narcotics and NSAIDs work differently and can complement each other well after surgery.    In general, NSAIDs and tylenol are much safer drugs than narcotics. Tylenol (acetaminophen) should not be taken with narcotics as most prescribed narcotics already contain tylenol. The maximum dose of tylenol is 4 grams per day, so this can be used if your narcotic pain medication is used sparingly. Pay close attention to the dosages and ask your pharmacist about the dosage of tylenol.  Some patients can experience GI irritation from NSAID use and patients with kidney problems, previous bleeding from the GI tract, gastric bypass, or certain other conditions may not be able to take these over the counter medications.      Patient has completed prescribing agreement detailing terms of continued prescribing of controlled substances, including monitoring ALMA reports, urine drug screening yearly an random drug screens to monitor patients compliance to treatment plan, and pill counts if necessary. The patient is aware that inappropriate use will result in cessation of prescribing such medications.    Toxassure:  I have ordered a urine drug screen to monitor patients predisposition to and patterns of drug use/misuse in order to establish and maintain the safe and effective use of analgesics in the treatment of chronic pain    ALMA report has been reviewed by: Charlene Huntley, DNP, APRN     -     Pt is aware of the potential for addiction and dependence.    Addiction was discussed.  The  Risks, benefits and alternative options of drug therapy discussed.  It was reinforced about the risks and benefits of opioids.  It was reinforced that patient may not call early for medication, may not ask for increase in the number of pills given monthly or increase in dosage of medication, may not jump around to different pharmacies or providers and may not  receive any narcotics from other providers including ER, or the contract will be broken and narcotics will no longer be prescribed from this facility if any of these criteria has been broken.           Follow Up   Return in about 1 month (around 2/9/2023) for Recheck.  Patient was given instructions and counseling regarding her condition or for health maintenance advice. Please see specific information pulled into the AVS if appropriate.     Electronically signed by Charlene Huntley DNP, SURYA, 01/10/23, 9:45 PM CST.

## 2023-01-16 ENCOUNTER — APPOINTMENT (OUTPATIENT)
Dept: LAB | Facility: HOSPITAL | Age: 58
End: 2023-01-16
Payer: MEDICARE

## 2023-01-16 ENCOUNTER — INFUSION (OUTPATIENT)
Dept: ONCOLOGY | Facility: HOSPITAL | Age: 58
End: 2023-01-16
Payer: MEDICARE

## 2023-01-16 VITALS
BODY MASS INDEX: 36.5 KG/M2 | HEIGHT: 63 IN | SYSTOLIC BLOOD PRESSURE: 125 MMHG | OXYGEN SATURATION: 95 % | TEMPERATURE: 97.1 F | HEART RATE: 69 BPM | DIASTOLIC BLOOD PRESSURE: 82 MMHG | WEIGHT: 206 LBS | RESPIRATION RATE: 18 BRPM

## 2023-01-16 DIAGNOSIS — M81.0 OSTEOPOROSIS, UNSPECIFIED OSTEOPOROSIS TYPE, UNSPECIFIED PATHOLOGICAL FRACTURE PRESENCE: Primary | ICD-10-CM

## 2023-01-16 PROCEDURE — 96372 THER/PROPH/DIAG INJ SC/IM: CPT

## 2023-01-16 PROCEDURE — 25010000002 DENOSUMAB 60 MG/ML SOLUTION PREFILLED SYRINGE: Performed by: PHYSICIAN ASSISTANT

## 2023-01-16 RX ADMIN — DENOSUMAB 60 MG: 60 INJECTION SUBCUTANEOUS at 10:27

## 2023-01-23 DIAGNOSIS — G62.9 NEUROPATHY: ICD-10-CM

## 2023-01-23 DIAGNOSIS — K21.9 GASTROESOPHAGEAL REFLUX DISEASE WITHOUT ESOPHAGITIS: ICD-10-CM

## 2023-01-23 RX ORDER — OMEPRAZOLE 40 MG/1
40 CAPSULE, DELAYED RELEASE ORAL DAILY
Qty: 90 CAPSULE | Refills: 1 | Status: SHIPPED | OUTPATIENT
Start: 2023-01-23

## 2023-01-23 RX ORDER — PREGABALIN 150 MG/1
CAPSULE ORAL
Qty: 180 CAPSULE | Refills: 5 | Status: SHIPPED | OUTPATIENT
Start: 2023-01-23

## 2023-01-23 NOTE — TELEPHONE ENCOUNTER
Rx Refill Note  Requested Prescriptions     Pending Prescriptions Disp Refills   • omeprazole (priLOSEC) 40 MG capsule [Pharmacy Med Name: OMEPRAZOLE 40 MG CAP 40 Capsule] 90 capsule 1     Sig: Take 1 capsule by mouth Daily.   • pregabalin (LYRICA) 150 MG capsule [Pharmacy Med Name: PREGABALIN 150 MG CAPS 150 Capsule] 180 capsule 5     Sig: TAKE ONE CAPSULE BY MOUTH TWICE A DAY      Last office visit with prescribing clinician: 1/9/2023   Last telemedicine visit with prescribing clinician: 2/10/2023   Next office visit with prescribing clinician: 2/10/2023                         Would you like a call back once the refill request has been completed: [] Yes [] No    If the office needs to give you a call back, can they leave a voicemail: [] Yes [] No    Desirae Ryan MA  01/23/23, 12:03 CST

## 2023-02-02 NOTE — PROGRESS NOTES
Dexamethasone 10 mg injection given IM Right Upper Outer. Injection given by Leona Knight CMA.   Patient instructed to remain in clinic for 20 minutes afterwards, and to report any adverse reaction to me immediately. Patient tolerated procedure well.

## 2023-02-10 ENCOUNTER — OFFICE VISIT (OUTPATIENT)
Dept: FAMILY MEDICINE CLINIC | Facility: CLINIC | Age: 58
End: 2023-02-10
Payer: MEDICARE

## 2023-02-10 VITALS
WEIGHT: 204 LBS | HEIGHT: 63 IN | TEMPERATURE: 98.6 F | SYSTOLIC BLOOD PRESSURE: 129 MMHG | BODY MASS INDEX: 36.14 KG/M2 | RESPIRATION RATE: 18 BRPM | HEART RATE: 71 BPM | DIASTOLIC BLOOD PRESSURE: 81 MMHG | OXYGEN SATURATION: 98 %

## 2023-02-10 DIAGNOSIS — F41.0 PANIC ATTACK: ICD-10-CM

## 2023-02-10 DIAGNOSIS — E66.01 MORBID (SEVERE) OBESITY DUE TO EXCESS CALORIES: ICD-10-CM

## 2023-02-10 DIAGNOSIS — G89.4 CHRONIC PAIN SYNDROME: Primary | ICD-10-CM

## 2023-02-10 DIAGNOSIS — F32.1 EPISODE OF MODERATE MAJOR DEPRESSION: ICD-10-CM

## 2023-02-10 DIAGNOSIS — F41.9 ANXIETY: ICD-10-CM

## 2023-02-10 DIAGNOSIS — M05.79 RHEUMATOID ARTHRITIS INVOLVING MULTIPLE SITES WITH POSITIVE RHEUMATOID FACTOR: ICD-10-CM

## 2023-02-10 DIAGNOSIS — M54.16 LUMBAR RADICULOPATHY: ICD-10-CM

## 2023-02-10 DIAGNOSIS — H00.011 HORDEOLUM EXTERNUM OF RIGHT UPPER EYELID: ICD-10-CM

## 2023-02-10 DIAGNOSIS — M51.36 DDD (DEGENERATIVE DISC DISEASE), LUMBAR: ICD-10-CM

## 2023-02-10 DIAGNOSIS — M79.7 FIBROMYALGIA: ICD-10-CM

## 2023-02-10 DIAGNOSIS — F43.10 PTSD (POST-TRAUMATIC STRESS DISORDER): ICD-10-CM

## 2023-02-10 DIAGNOSIS — M48.062 SPINAL STENOSIS OF LUMBAR REGION WITH NEUROGENIC CLAUDICATION: ICD-10-CM

## 2023-02-10 PROCEDURE — 99214 OFFICE O/P EST MOD 30 MIN: CPT | Performed by: NURSE PRACTITIONER

## 2023-02-10 RX ORDER — HYDROCODONE BITARTRATE AND ACETAMINOPHEN 10; 325 MG/1; MG/1
1 TABLET ORAL EVERY 6 HOURS PRN
Qty: 120 TABLET | Refills: 0 | Status: SHIPPED | OUTPATIENT
Start: 2023-02-10 | End: 2023-03-10 | Stop reason: SDUPTHER

## 2023-02-10 NOTE — PROGRESS NOTES
Chief Complaint  Panic Attack (Follow up)    Subjective        Adela Arauz presents to [facility]  History of Present Illness  Follow up for chronic pain, fibromyalgia, Lunbar radiculopathy and stenosis, RA improved with norco, lyrica, tizanidine, meloxicam, arava but she says the pain is worse today due to the cold weather and rain, PTSD/anxiety/depression/panic attacks (secondary to finding brother who committed suicide) usually controlled, however today she is very tearful and upset because she is going thru a divorce, on alprazolam and sertraline,  Vit d def stable with cholecalciferol, anemia stable with fluticasone, lower extremity edema stable with furosemide, gerd stable with omeprazole, fever blisters stable with valacyclovir.  She says she has an external stye on her upper right eyelid that has been there for almost a month and keeps getting bigger.  It is starting to interfere with vision. Rates pain 8/10.  Denies any fever, chills, nausea, vomiting or diarrhea.  States, I have been crying and the weather has worsening my pain so much several days I just laid in bed.  Pain  This is a chronic problem. The current episode started more than 1 year ago. The problem occurs 2 to 4 times per day. The problem has been gradually worsening. Associated symptoms include arthralgias. Pertinent negatives include no change in bowel habit, coughing, fever, headaches, joint swelling, myalgias, swollen glands, urinary symptoms or vertigo. The symptoms are aggravated by bending, twisting and exertion. She has tried rest and oral narcotics for the symptoms. The treatment provided mild relief.   Anxiety  Presents for follow-up visit. Symptoms include decreased concentration, depressed mood, excessive worry, insomnia, irritability, muscle tension, nervous/anxious behavior and panic. Patient reports no restlessness or suicidal ideas. Symptoms occur most days. The severity of symptoms is  moderate. The patient sleeps 4 hours per night. The quality of sleep is poor. Nighttime awakenings: several.     Her past medical history is significant for depression. Compliance with medications is %.   Depression  Visit Type: follow-up  Patient presents with the following symptoms: decreased concentration, depressed mood, excessive worry, insomnia, irritability, muscle tension, nervousness/anxiety and panic.  Patient is not experiencing: choking sensation, restlessness, suicidal ideas and suicidal planning.  Frequency of symptoms: most days   Severity: moderate   Sleep per night: 4 hours  Sleep quality: poor  Nighttime awakenings: several  Compliance with medications:  %        Obesity  This is a chronic problem. The current episode started more than 1 year ago. The problem occurs constantly. The problem has been gradually worsening. Associated symptoms include arthralgias. Pertinent negatives include no change in bowel habit, coughing, fever, headaches, joint swelling, myalgias, swollen glands, urinary symptoms or vertigo. Nothing aggravates the symptoms. The treatment provided mild relief.   Eye Problem   The right eye is affected. This is a new problem. The current episode started 1 to 4 weeks ago. The problem occurs constantly. The problem has been gradually worsening. There was no injury mechanism. The pain is at a severity of 8/10. The pain is severe. There is no known exposure to pink eye. She does not wear contacts. Associated symptoms include eye redness. Pertinent negatives include no eye discharge, double vision, fever, foreign body sensation, photophobia or recent URI. She has tried nothing for the symptoms. The treatment provided no relief.   Heartburn  She complains of heartburn and tooth decay. She reports no choking, no coughing or no stridor. This is a chronic problem. The current episode started more than 1 year ago. The problem occurs occasionally. The problem has been gradually  "improving. The heartburn is located in the substernum. The heartburn is of moderate intensity. The heartburn does not wake her from sleep. The heartburn does not limit her activity. The heartburn doesn't change with position. The symptoms are aggravated by caffeine and certain foods. Risk factors include obesity, lack of exercise, NSAIDs and caffeine use. She has tried an antacid and a PPI for the symptoms.     The following portions of the patient's history were reviewed and updated as appropriate: allergies, current medications, past family history, past medical history, past social history, past surgical history and problem list.    Objective   Vital Signs:  There were no vitals taken for this visit.  Estimated body mass index is 36.49 kg/m² as calculated from the following:    Height as of 1/16/23: 160 cm (63\").    Weight as of 1/16/23: 93.4 kg (206 lb).       Class 2 Severe Obesity (BMI >=35 and <=39.9). Obesity-related health conditions include the following: depression, chronic pain, RA. Obesity is improving with lifestyle modifications. BMI is is above average; BMI management plan is completed. We discussed portion control and increasing exercise.      Physical Exam  Vitals and nursing note reviewed.   Constitutional:       General: She is awake.      Appearance: Normal appearance. She is well-developed and well-groomed.   HENT:      Head: Normocephalic and atraumatic.      Right Ear: Hearing, tympanic membrane, ear canal and external ear normal.      Left Ear: Hearing, tympanic membrane, ear canal and external ear normal.      Nose: Nose normal.      Mouth/Throat:      Lips: Pink.      Pharynx: Oropharynx is clear.   Eyes:      General: Lids are normal. Lids are everted, no foreign bodies appreciated. Vision grossly intact.         Right eye: Hordeolum present. No foreign body or discharge.         Left eye: No foreign body, discharge or hordeolum.      Extraocular Movements: Extraocular movements intact. "      Conjunctiva/sclera: Conjunctivae normal.        Comments: Has a large Hordeolum on the upper right eye lid    Cardiovascular:      Rate and Rhythm: Normal rate and regular rhythm.      Heart sounds: Normal heart sounds.   Pulmonary:      Effort: Pulmonary effort is normal.      Breath sounds: Normal breath sounds and air entry.   Musculoskeletal:      Right shoulder: Tenderness present. Decreased range of motion.      Left shoulder: Tenderness present. Decreased range of motion.      Right wrist: Tenderness present. Decreased range of motion.      Left wrist: Decreased range of motion.        Arms:       Cervical back: Spasms present. Decreased range of motion.      Thoracic back: Spasms and tenderness present.      Lumbar back: Spasms and tenderness present. Decreased range of motion.        Back:       Right lower leg: No edema.      Left lower leg: No edema.        Legs:       Comments: Has generalized pain in her hips, shoulders, wrists, hands and knees   Lymphadenopathy:      Head:      Right side of head: No submental, submandibular or tonsillar adenopathy.      Left side of head: No submental, submandibular or tonsillar adenopathy.   Skin:     General: Skin is warm and dry.   Neurological:      Mental Status: She is alert and oriented to person, place, and time.      Sensory: Sensation is intact.      Motor: Motor function is intact.      Coordination: Coordination is intact.      Gait: Gait is intact.   Psychiatric:         Attention and Perception: Attention and perception normal.         Mood and Affect: Mood and affect normal.         Speech: Speech normal.         Behavior: Behavior normal. Behavior is cooperative.         Thought Content: Thought content normal.         Cognition and Memory: Cognition and memory normal.         Judgment: Judgment normal.        Result Review :                     Assessment and Plan   Diagnoses and all orders for this visit:    1. Chronic pain syndrome (Primary):  Chronic, worsening  2. DDD (degenerative disc disease), lumbar : Chronic, worsening  3. Lumbar radiculopathy: Chronic, worsening : Chronic, worsening  4. Rheumatoid arthritis involving multiple sites with positive rheumatoid factor (HCC) : Chronic, worsening  5. Spinal stenosis of lumbar region with neurogenic claudication : Chronic, worsening  6. Fibromyalgia : Chronic, worsening  -     HYDROcodone-acetaminophen (Norco)  MG per tablet; Take 1 tablet by mouth Every 6 (Six) Hours As Needed for Moderate Pain.  Dispense: 120 tablet; Refill: 0    7. Anxiety : Chronic, worsening  8. Panic attack : Chronic, worsening  9. Episode of moderate major depression (HCC) : Chronic, worsening  10. PTSD (post-traumatic stress disorder) : Chronic, worsening    11. Hordeolum externum of right upper eyelid: New; worsening  -     Ambulatory Referral to General Surgery    12. Morbid (severe) obesity due to excess calories (HCC): Chronic worsening.   Assessment & Plan:  Patient's (Body mass index is 36.14 kg/m².) indicates that they are morbidly/severely obese (BMI > 40 or > 35 with obesity - related health condition) with health conditions that include dyslipidemias, GERD and osteoarthritis . Weight is worsening. BMI  is above average; BMI management plan is completed. We discussed portion control and increasing exercise.       ALMA Report:      As part of this patient's treatment plan, I am prescribing controlled substances. The patient has been made aware of appropriate use of such medications, including potential risk of opiod use, somnolence, limited ability to drive and /or work safely, and potential for dependence or overdose, and opiods should be used sparingly and are not recommended for long term use.  It has also been made clear that these medications are for use by this patient only, without concomitant use of alcohol or other substances unless prescribed.    The patient was provided a Rx for norco as needed for pain  control after sustaining multiple back and neck injuries as well as surgeries.     This medication is a narcotic pain medication. This medication is monitored by the federal [employer] and the [facility]. Narcotic medication is commonly abused and many patients can become addicted to this medication.    There are problems with narcotic pain medication including addiction, dependence and tolerance to the pain relief. We discussed appropriate and expected levels of pain.     Narcotics have side effects, including the common side effect of nausea/vomiting if taken on an empty stomach and the problem of constipation that occur with narcotic use.     Narcotics can impair your judgement and therefore you should never drive or operate heavy machinery while using these medications. Treat this medication similar to alcohol, do not take this and drive or you could injure yourself or others.    As an alternative drug, we encourage the use of tylenol and/or NSAIDs (non-steroidal anti-inflammatory drugs) for additional pain relief and as an anti-inflammatory. NSAIDS are drugs such as aleve (naproxen) and motrin (ibuprofen). Narcotics and NSAIDs work differently and can complement each other well after surgery.    In general, NSAIDs and tylenol are much safer drugs than narcotics. Tylenol (acetaminophen) should not be taken with narcotics as most prescribed narcotics already contain tylenol. The maximum dose of tylenol is 4 grams per day, so this can be used if your narcotic pain medication is used sparingly. Pay close attention to the dosages and ask your pharmacist about the dosage of tylenol.  Some patients can experience GI irritation from NSAID use and patients with kidney problems, previous bleeding from the GI tract, gastric bypass, or certain other conditions may not be able to take these over the counter medications.      Patient has completed prescribing agreement detailing terms of continued prescribing of controlled  substances, including monitoring ALMA reports, urine drug screening yearly an random drug screens to monitor patients compliance to treatment plan, and pill counts if necessary. The patient is aware that inappropriate use will result in cessation of prescribing such medications.    Toxassure:  I have ordered a urine drug screen to monitor patients predisposition to and patterns of drug use/misuse in order to establish and maintain the safe and effective use of analgesics in the treatment of chronic pain    ALMA report has been reviewed by: Charlene Huntley DNP, APRN    -     Pt is aware of the potential for addiction and dependence.    Addiction was discussed.  The  Risks, benefits and alternative options of drug therapy discussed.  It was reinforced about the risks and benefits of opioids.  It was reinforced that patient may not call early for medication, may not ask for increase in the number of pills given monthly or increase in dosage of medication, may not jump around to different pharmacies or providers and may not receive any narcotics from other providers including ER, or the contract will be broken and narcotics will no longer be prescribed from this facility if any of these criteria has been broken.             Follow Up   Return in about 1 month (around 3/10/2023) for Recheck.  Patient was given instructions and counseling regarding her condition or for health maintenance advice. Please see specific information pulled into the AVS if appropriate.       Electronically signed by Charlene Huntley DNP, APRHEENA, 02/15/23, 6:50 AM CST.

## 2023-02-15 NOTE — ASSESSMENT & PLAN NOTE
Patient's (Body mass index is 36.14 kg/m².) indicates that they are morbidly/severely obese (BMI > 40 or > 35 with obesity - related health condition) with health conditions that include dyslipidemias, GERD and osteoarthritis . Weight is worsening. BMI  is above average; BMI management plan is completed. We discussed portion control and increasing exercise.

## 2023-03-10 ENCOUNTER — OFFICE VISIT (OUTPATIENT)
Dept: FAMILY MEDICINE CLINIC | Facility: CLINIC | Age: 58
End: 2023-03-10
Payer: MEDICARE

## 2023-03-10 VITALS
SYSTOLIC BLOOD PRESSURE: 114 MMHG | HEIGHT: 63 IN | TEMPERATURE: 98.6 F | OXYGEN SATURATION: 99 % | DIASTOLIC BLOOD PRESSURE: 72 MMHG | HEART RATE: 82 BPM | RESPIRATION RATE: 18 BRPM | BODY MASS INDEX: 37.74 KG/M2 | WEIGHT: 213 LBS

## 2023-03-10 DIAGNOSIS — E66.01 MORBID (SEVERE) OBESITY DUE TO EXCESS CALORIES: ICD-10-CM

## 2023-03-10 DIAGNOSIS — R11.0 NAUSEA: ICD-10-CM

## 2023-03-10 DIAGNOSIS — M51.36 DDD (DEGENERATIVE DISC DISEASE), LUMBAR: ICD-10-CM

## 2023-03-10 DIAGNOSIS — G89.4 CHRONIC PAIN SYNDROME: Primary | ICD-10-CM

## 2023-03-10 RX ORDER — ONDANSETRON 4 MG/1
4 TABLET, ORALLY DISINTEGRATING ORAL EVERY 8 HOURS PRN
Qty: 40 TABLET | Refills: 0 | Status: SHIPPED | OUTPATIENT
Start: 2023-03-10

## 2023-03-10 RX ORDER — OXYCODONE AND ACETAMINOPHEN 7.5; 325 MG/1; MG/1
TABLET ORAL
COMMUNITY
Start: 2023-02-23 | End: 2023-03-10

## 2023-03-10 RX ORDER — HYDROCODONE BITARTRATE AND ACETAMINOPHEN 10; 325 MG/1; MG/1
1 TABLET ORAL EVERY 6 HOURS PRN
Qty: 120 TABLET | Refills: 0 | Status: SHIPPED | OUTPATIENT
Start: 2023-03-10 | End: 2023-04-07 | Stop reason: SDUPTHER

## 2023-03-10 NOTE — PROGRESS NOTES
Chief Complaint  Chronic pain syndrome (Follow up)    Subjective        Adela Arauz presents to [facility]  History of Present Illness  Presents for complaints of chronic pain.  She has RA, DDD, fibromyalgia and pain in elbows, hips, knees, back and neck.  She is obese from inactivity and would like to take something for the obesity.  She does struggle with chronic nausea, secondary to norco use. Rates her pain most of the time at 9/9 and some days she is not able to get out of bed.  She struggles from PTSD secondary to finding her brother dead after he shot himself.  Other chronic problems include gerd stable with with omeprazole, depression stable with sertraline, anxiety stable with alprazolam, anemia stable with ferrous sulfate.  Pain  This is a chronic problem. The current episode started more than 1 year ago. The problem occurs constantly. The problem has been gradually worsening. Associated symptoms include arthralgias, fatigue, myalgias and nausea. Pertinent negatives include no chills, congestion, coughing, fever, headaches, swollen glands, urinary symptoms, vertigo or visual change. The symptoms are aggravated by bending, exertion, twisting and standing. She has tried oral narcotics, rest, position changes and lying down for the symptoms. The treatment provided mild relief.   Obesity  This is a chronic problem. The current episode started more than 1 year ago. The problem occurs constantly. The problem has been gradually worsening. Associated symptoms include arthralgias, fatigue, myalgias and nausea. Pertinent negatives include no chills, congestion, coughing, fever, headaches, swollen glands, urinary symptoms, vertigo or visual change. The symptoms are aggravated by drinking and eating. She has tried nothing for the symptoms. The treatment provided no relief.   Nausea  This is a chronic problem. The current episode started more than 1 year ago. The problem occurs  intermittently. Associated symptoms include arthralgias, fatigue, myalgias and nausea. Pertinent negatives include no chills, congestion, coughing, fever, headaches, swollen glands, urinary symptoms, vertigo or visual change. The symptoms are aggravated by eating and drinking. She has tried rest, position changes and relaxation for the symptoms. The treatment provided mild relief.   Anxiety  Presents for follow-up visit. Symptoms include decreased concentration, depressed mood, insomnia, irritability and nausea. Patient reports no suicidal ideas. Symptoms occur most days. The severity of symptoms is moderate. The patient sleeps 4 hours per night. The quality of sleep is poor. Nighttime awakenings: several.     Her past medical history is significant for depression. Compliance with medications is %.   Heartburn  She complains of belching, early satiety, heartburn and nausea. She reports no coughing, no stridor, no tooth decay or no water brash. This is a chronic problem. The current episode started more than 1 year ago. The problem occurs constantly. The problem has been gradually improving. The heartburn duration is less than a minute. The heartburn is located in the substernum. The heartburn is of moderate intensity. The heartburn does not wake her from sleep. The heartburn does not limit her activity. The heartburn doesn't change with position. The symptoms are aggravated by certain foods. Associated symptoms include fatigue. Pertinent negatives include no melena or orthopnea. She has tried an antacid and a PPI for the symptoms. The treatment provided mild relief. Past procedures do not include an EGD, esophageal manometry, H. pylori antibody titer or a UGI.   Depression  Visit Type: follow-up  Patient presents with the following symptoms: decreased concentration, depressed mood, insomnia and irritability.  Patient is not experiencing: suicidal ideas, suicidal planning and thoughts of death.  Frequency of  "symptoms: most days   Severity: moderate   Sleep per night: 4 hours  Sleep quality: fair  Nighttime awakenings: many  Compliance with medications:  %            Objective   Vital Signs:  /72 (BP Location: Left arm, Patient Position: Sitting, Cuff Size: Large Adult)   Pulse 82   Temp 98.6 °F (37 °C) (Infrared)   Resp 18   Ht 160 cm (63\") Comment: per patient  Wt 96.6 kg (213 lb)   SpO2 99%   BMI 37.73 kg/m²   Estimated body mass index is 37.73 kg/m² as calculated from the following:    Height as of this encounter: 160 cm (63\").    Weight as of this encounter: 96.6 kg (213 lb).       Class 2 Severe Obesity (BMI >=35 and <=39.9). Obesity-related health conditions include the following: dyslipidemias and GERD. Obesity is worsening. BMI is is above average; BMI management plan is completed. We discussed portion control and increasing exercise.      Physical Exam  Vitals and nursing note reviewed.   Constitutional:       General: She is awake.      Appearance: Normal appearance. She is well-developed and well-groomed.   HENT:      Head: Normocephalic and atraumatic.      Right Ear: Hearing, tympanic membrane, ear canal and external ear normal.      Left Ear: Hearing, tympanic membrane, ear canal and external ear normal.      Nose: Nose normal.      Mouth/Throat:      Lips: Pink.      Pharynx: Oropharynx is clear.   Eyes:      General: Lids are normal.      Conjunctiva/sclera: Conjunctivae normal.   Cardiovascular:      Rate and Rhythm: Normal rate and regular rhythm.      Heart sounds: Normal heart sounds.   Pulmonary:      Effort: Pulmonary effort is normal.      Breath sounds: Normal breath sounds and air entry.   Musculoskeletal:      Right shoulder: Tenderness present. Decreased range of motion.      Left shoulder: Tenderness present. Decreased range of motion.        Arms:       Cervical back: Spasms and tenderness present. Decreased range of motion.      Lumbar back: Normal.        Back:       " Right hip: Tenderness present. Decreased range of motion.      Left hip: Tenderness present. Decreased range of motion.      Right knee: Decreased range of motion. Tenderness present.      Left knee: Decreased range of motion. Tenderness present.      Right lower leg: No edema.      Left lower leg: No edema.        Legs:    Lymphadenopathy:      Head:      Right side of head: No submental, submandibular or tonsillar adenopathy.      Left side of head: No submental, submandibular or tonsillar adenopathy.   Skin:     General: Skin is warm and dry.   Neurological:      Mental Status: She is alert.      Sensory: Sensation is intact.      Motor: Motor function is intact.      Coordination: Coordination is intact.      Gait: Gait is intact.   Psychiatric:         Attention and Perception: Attention and perception normal.         Mood and Affect: Mood and affect normal.         Speech: Speech normal.         Behavior: Behavior normal. Behavior is cooperative.         Thought Content: Thought content normal.         Cognition and Memory: Cognition and memory normal.         Judgment: Judgment normal.        Result Review :                   Assessment and Plan   Diagnoses and all orders for this visit:    1. Chronic pain syndrome (Primary)  -     HYDROcodone-acetaminophen (Norco)  MG per tablet; Take 1 tablet by mouth Every 6 (Six) Hours As Needed for Moderate Pain.  Dispense: 120 tablet; Refill: 0    2. DDD (degenerative disc disease), lumbar  -     HYDROcodone-acetaminophen (Norco)  MG per tablet; Take 1 tablet by mouth Every 6 (Six) Hours As Needed for Moderate Pain.  Dispense: 120 tablet; Refill: 0    3. Morbid (severe) obesity due to excess calories (HCC)  -     metFORMIN (Glucophage) 500 MG tablet; Take 1 tablet by mouth 2 (Two) Times a Day With Meals.  Dispense: 180 tablet; Refill: 1    4. Nausea  -     ondansetron ODT (Zofran ODT) 4 MG disintegrating tablet; Place 1 tablet on the tongue Every 8 (Eight)  Hours As Needed for Nausea or Vomiting.  Dispense: 40 tablet; Refill: 0             Follow Up   Return in about 1 month (around 4/10/2023) for Recheck  chronic pain .  Patient was given instructions and counseling regarding her condition or for health maintenance advice. Please see specific information pulled into the AVS if appropriate.     Electronically signed by Charlene Huntley DNP, APRN, 03/22/23, 11:28 PM CDT.

## 2023-04-07 ENCOUNTER — OFFICE VISIT (OUTPATIENT)
Dept: FAMILY MEDICINE CLINIC | Facility: CLINIC | Age: 58
End: 2023-04-07
Payer: MEDICARE

## 2023-04-07 VITALS
HEART RATE: 68 BPM | RESPIRATION RATE: 18 BRPM | WEIGHT: 212 LBS | OXYGEN SATURATION: 96 % | DIASTOLIC BLOOD PRESSURE: 83 MMHG | SYSTOLIC BLOOD PRESSURE: 135 MMHG | BODY MASS INDEX: 37.56 KG/M2 | TEMPERATURE: 96.6 F | HEIGHT: 63 IN

## 2023-04-07 DIAGNOSIS — F41.0 PANIC ATTACK: ICD-10-CM

## 2023-04-07 DIAGNOSIS — M51.36 DDD (DEGENERATIVE DISC DISEASE), LUMBAR: ICD-10-CM

## 2023-04-07 DIAGNOSIS — G89.4 CHRONIC PAIN SYNDROME: ICD-10-CM

## 2023-04-07 RX ORDER — DEXAMETHASONE SODIUM PHOSPHATE 10 MG/ML
10 INJECTION INTRAMUSCULAR; INTRAVENOUS ONCE
Status: DISCONTINUED | OUTPATIENT
Start: 2023-04-07 | End: 2023-04-07

## 2023-04-07 RX ORDER — DEXAMETHASONE SODIUM PHOSPHATE 10 MG/ML
10 INJECTION INTRAMUSCULAR; INTRAVENOUS ONCE
Status: COMPLETED | OUTPATIENT
Start: 2023-04-07 | End: 2023-04-07

## 2023-04-07 RX ORDER — ALPRAZOLAM 1 MG/1
1 TABLET ORAL 3 TIMES DAILY PRN
Qty: 90 TABLET | Refills: 0 | Status: SHIPPED | OUTPATIENT
Start: 2023-04-07

## 2023-04-07 RX ORDER — HYDROCODONE BITARTRATE AND ACETAMINOPHEN 10; 325 MG/1; MG/1
1 TABLET ORAL EVERY 6 HOURS PRN
Qty: 120 TABLET | Refills: 0 | Status: SHIPPED | OUTPATIENT
Start: 2023-04-07

## 2023-04-07 RX ADMIN — DEXAMETHASONE SODIUM PHOSPHATE 10 MG: 10 INJECTION INTRAMUSCULAR; INTRAVENOUS at 10:50

## 2023-04-07 NOTE — PROGRESS NOTES
Chief Complaint  chronic pain syndrome (Pt here for follow up/)    Subjective        Adela Arauz presents to [facility]  History of Present Illness  Presents for follow up for panic attacks, chronic pain, fibromyalgia.  She is going thru a divorce and has been having more anxiety and panic attacks.  Having to take extra pain meds and having to go without norco on some days.  She is going to lose her insurance and she is upset and not sure what she can do but she will not beable to afford health insurance.  Her pain is in hare neck/back/hips/hands/arms/feet.  The pain comes and goes but usually stays in the 8-9/10 pain range.     Back Pain  This is a chronic problem. The current episode started more than 1 year ago. The problem occurs constantly. The problem has been gradually worsening since onset. The pain is present in the lumbar spine. The quality of the pain is described as aching, burning, shooting and stabbing. The pain radiates to the left thigh and right thigh. The pain is at a severity of 8/10. The pain is severe. The pain is worse during the day. The symptoms are aggravated by bending, standing, twisting, stress and sitting. Stiffness is present all day. Associated symptoms include headaches, leg pain, numbness and tingling. Risk factors include menopause and obesity. She has tried NSAIDs and muscle relaxant for the symptoms. The treatment provided mild relief.   Neck Pain   This is a chronic problem. The current episode started more than 1 year ago. The problem occurs constantly. The problem has been gradually worsening. The pain is associated with an unknown factor. The pain is present in the anterior neck. The quality of the pain is described as aching and burning. The pain is at a severity of 8/10. The pain is severe. The symptoms are aggravated by coughing, sneezing, twisting, position and bending. The pain is worse during the day. Stiffness is present all day.  "Associated symptoms include headaches, leg pain, numbness and tingling. Pertinent negatives include no trouble swallowing or visual change. She has tried heat and bed rest for the symptoms. The treatment provided mild relief.       Objective   Vital Signs:  /83 (BP Location: Left arm, Patient Position: Sitting, Cuff Size: Adult)   Pulse 68   Temp 96.6 °F (35.9 °C) (Infrared)   Resp 18   Ht 160 cm (63\")   Wt 96.2 kg (212 lb)   SpO2 96%   BMI 37.55 kg/m²   Estimated body mass index is 37.55 kg/m² as calculated from the following:    Height as of this encounter: 160 cm (63\").    Weight as of this encounter: 96.2 kg (212 lb).       Class 2 Severe Obesity (BMI >=35 and <=39.9). Obesity-related health conditions include the following: hypertension and dyslipidemias. Obesity is improving with lifestyle modifications. BMI is is above average; BMI management plan is completed. We discussed portion control and increasing exercise.      Physical Exam  Vitals and nursing note reviewed.   Constitutional:       General: She is awake.      Appearance: Normal appearance. She is well-developed and well-groomed.   HENT:      Head: Normocephalic and atraumatic.      Right Ear: Hearing, tympanic membrane, ear canal and external ear normal.      Left Ear: Hearing, tympanic membrane, ear canal and external ear normal.      Nose: Nose normal.      Mouth/Throat:      Lips: Pink.      Pharynx: Oropharynx is clear.   Eyes:      General: Lids are normal.      Conjunctiva/sclera: Conjunctivae normal.   Cardiovascular:      Rate and Rhythm: Normal rate and regular rhythm.      Heart sounds: Normal heart sounds.   Pulmonary:      Effort: Pulmonary effort is normal.      Breath sounds: Normal breath sounds and air entry.   Musculoskeletal:      Right shoulder: Tenderness present. Decreased range of motion. Decreased strength.      Left shoulder: Tenderness present. Decreased range of motion. Decreased strength.      Right upper arm: " Tenderness present.      Left upper arm: Tenderness present.      Right forearm: Tenderness present.      Left forearm: Tenderness present.      Right wrist: Tenderness present. Decreased range of motion.      Left wrist: Tenderness present. Decreased range of motion.        Arms:       Cervical back: Normal and full passive range of motion without pain.      Thoracic back: Normal.      Lumbar back: Spasms and tenderness present. Decreased range of motion.        Back:       Right lower leg: No edema.      Left lower leg: No edema.        Legs:    Lymphadenopathy:      Head:      Right side of head: No submental, submandibular or tonsillar adenopathy.      Left side of head: No submental, submandibular or tonsillar adenopathy.   Skin:     General: Skin is warm and dry.   Neurological:      Mental Status: She is alert and oriented to person, place, and time.      Sensory: Sensation is intact.      Motor: Motor function is intact.      Coordination: Coordination is intact.      Gait: Gait is intact.   Psychiatric:         Attention and Perception: Attention and perception normal.         Mood and Affect: Mood and affect normal.         Speech: Speech normal.         Behavior: Behavior normal. Behavior is cooperative.         Thought Content: Thought content normal.         Cognition and Memory: Cognition and memory normal.         Judgment: Judgment normal.        Result Review :                   Assessment and Plan   Diagnoses and all orders for this visit:    1. Panic attack  -     ALPRAZolam (XANAX) 1 MG tablet; Take 1 tablet by mouth 3 (Three) Times a Day As Needed for Anxiety.  Dispense: 90 tablet; Refill: 0    2. Chronic pain syndrome  -     HYDROcodone-acetaminophen (Norco)  MG per tablet; Take 1 tablet by mouth Every 6 (Six) Hours As Needed for Moderate Pain.  Dispense: 120 tablet; Refill: 0  -     dexamethasone (DECADRON) injection 10 mg    3. DDD (degenerative disc disease), lumbar  -      HYDROcodone-acetaminophen (Norco)  MG per tablet; Take 1 tablet by mouth Every 6 (Six) Hours As Needed for Moderate Pain.  Dispense: 120 tablet; Refill: 0  -     dexamethasone (DECADRON) injection 10 mg      ALMA Report:      As part of this patient's treatment plan, I am prescribing controlled substances. The patient has been made aware of appropriate use of such medications, including potential risk of opiod use, somnolence, limited ability to drive and /or work safely, and potential for dependence or overdose, and opiods should be used sparingly and are not recommended for long term use.  It has also been made clear that these medications are for use by this patient only, without concomitant use of alcohol or other substances unless prescribed.    The patient was provided a Rx for norco as needed for pain control after sustaining multiple back and neck injuries as well as surgeries.     This medication is a narcotic pain medication. This medication is monitored by the federal [facility] and the [facility]. Narcotic medication is commonly abused and many patients can become addicted to this medication.    There are problems with narcotic pain medication including addiction, dependence and tolerance to the pain relief. We discussed appropriate and expected levels of pain.     Narcotics have side effects, including the common side effect of nausea/vomiting if taken on an empty stomach and the problem of constipation that occur with narcotic use.     Narcotics can impair your judgement and therefore you should never drive or operate heavy machinery while using these medications. Treat this medication similar to alcohol, do not take this and drive or you could injure yourself or others.    As an alternative drug, we encourage the use of tylenol and/or NSAIDs (non-steroidal anti-inflammatory drugs) for additional pain relief and as an anti-inflammatory. NSAIDS are drugs such as aleve (naproxen) and motrin  (ibuprofen). Narcotics and NSAIDs work differently and can complement each other well after surgery.    In general, NSAIDs and tylenol are much safer drugs than narcotics. Tylenol (acetaminophen) should not be taken with narcotics as most prescribed narcotics already contain tylenol. The maximum dose of tylenol is 4 grams per day, so this can be used if your narcotic pain medication is used sparingly. Pay close attention to the dosages and ask your pharmacist about the dosage of tylenol.  Some patients can experience GI irritation from NSAID use and patients with kidney problems, previous bleeding from the GI tract, gastric bypass, or certain other conditions may not be able to take these over the counter medications.      Patient has completed prescribing agreement detailing terms of continued prescribing of controlled substances, including monitoring ALMA reports, urine drug screening yearly an random drug screens to monitor patients compliance to treatment plan, and pill counts if necessary. The patient is aware that inappropriate use will result in cessation of prescribing such medications.    Toxassure:  I have ordered a urine drug screen to monitor patients predisposition to and patterns of drug use/misuse in order to establish and maintain the safe and effective use of analgesics in the treatment of chronic pain    ALMA report has been reviewed by: Charlene Huntley, DNP, APRN    -     Pt is aware of the potential for addiction and dependence.    Addiction was discussed.  The  Risks, benefits and alternative options of drug therapy discussed.  It was reinforced about the risks and benefits of opioids.  It was reinforced that patient may not call early for medication, may not ask for increase in the number of pills given monthly or increase in dosage of medication, may not jump around to different pharmacies or providers and may not receive any narcotics from other providers including ER, or the contract  will be broken and narcotics will no longer be prescribed from this facility if any of these criteria has been broken.              Follow Up      Return in about 1 month (around 5/7/2023) for Recheck.     Patient was given instructions and counseling regarding her condition or for health maintenance advice. Please see specific information pulled into the AVS if appropriate.     Electronically signed by Charlene Huntley DNP, SURYA, 04/13/23, 9:17 AM CDT.

## 2023-04-10 DIAGNOSIS — G89.4 CHRONIC PAIN SYNDROME: ICD-10-CM

## 2023-04-10 DIAGNOSIS — M51.36 DDD (DEGENERATIVE DISC DISEASE), LUMBAR: ICD-10-CM

## 2023-04-10 RX ORDER — MELOXICAM 15 MG/1
TABLET ORAL
Qty: 90 TABLET | Refills: 1 | Status: SHIPPED | OUTPATIENT
Start: 2023-04-10

## 2023-04-10 RX ORDER — TIZANIDINE 4 MG/1
4 TABLET ORAL 2 TIMES DAILY PRN
Qty: 180 TABLET | Refills: 1 | Status: SHIPPED | OUTPATIENT
Start: 2023-04-10

## 2023-04-10 NOTE — TELEPHONE ENCOUNTER
Rx Refill Note  Requested Prescriptions     Pending Prescriptions Disp Refills   • tiZANidine (ZANAFLEX) 4 MG tablet [Pharmacy Med Name: TIZANIDINE HCL 4 MG TAB 4 Tablet] 180 tablet 1     Sig: Take 1 tablet by mouth 2 (Two) Times a Day As Needed for Muscle Spasms.   • meloxicam (MOBIC) 15 MG tablet [Pharmacy Med Name: MELOXICAM 15 MG TAB 15 Tablet] 90 tablet 1     Sig: TAKE ONE TABLET BY MOUTH DAILY.      Last office visit with prescribing clinician: 4/7/2023    Next office visit with prescribing clinician: 5/8/2023                         Would you like a call back once the refill request has been completed: [] Yes [] No    If the office needs to give you a call back, can they leave a voicemail: [] Yes [] No    Desirae Ryan MA  04/10/23, 13:57 CDT

## 2023-04-11 DIAGNOSIS — R60.1 GENERALIZED EDEMA: ICD-10-CM

## 2023-04-11 RX ORDER — FUROSEMIDE 40 MG/1
40 TABLET ORAL DAILY
Qty: 90 TABLET | Refills: 1 | OUTPATIENT
Start: 2023-04-11

## 2023-04-11 NOTE — TELEPHONE ENCOUNTER
Refill too soon     Rx Refill Note  Requested Prescriptions     Pending Prescriptions Disp Refills   • furosemide (LASIX) 40 MG tablet [Pharmacy Med Name: FUROSEMIDE 40 MG TAB 40 Tablet] 90 tablet 1     Sig: Take 1 tablet by mouth Daily.      Last office visit with prescribing clinician: 4/7/2023   Next office visit with prescribing clinician: 5/8/2023                         Would you like a call back once the refill request has been completed: [] Yes [] No    If the office needs to give you a call back, can they leave a voicemail: [] Yes [] No    Desirae Ryan MA  04/11/23, 12:07 CDT

## 2023-05-08 ENCOUNTER — OFFICE VISIT (OUTPATIENT)
Dept: FAMILY MEDICINE CLINIC | Facility: CLINIC | Age: 58
End: 2023-05-08
Payer: MEDICARE

## 2023-05-08 VITALS
DIASTOLIC BLOOD PRESSURE: 82 MMHG | RESPIRATION RATE: 20 BRPM | SYSTOLIC BLOOD PRESSURE: 124 MMHG | BODY MASS INDEX: 37.21 KG/M2 | TEMPERATURE: 98.6 F | OXYGEN SATURATION: 98 % | HEIGHT: 63 IN | WEIGHT: 210 LBS | HEART RATE: 68 BPM

## 2023-05-08 DIAGNOSIS — F41.0 PANIC ATTACK: ICD-10-CM

## 2023-05-08 DIAGNOSIS — M51.36 DDD (DEGENERATIVE DISC DISEASE), LUMBAR: ICD-10-CM

## 2023-05-08 DIAGNOSIS — G89.4 CHRONIC PAIN SYNDROME: ICD-10-CM

## 2023-05-08 PROCEDURE — 1160F RVW MEDS BY RX/DR IN RCRD: CPT | Performed by: NURSE PRACTITIONER

## 2023-05-08 PROCEDURE — 1159F MED LIST DOCD IN RCRD: CPT | Performed by: NURSE PRACTITIONER

## 2023-05-08 PROCEDURE — 99214 OFFICE O/P EST MOD 30 MIN: CPT | Performed by: NURSE PRACTITIONER

## 2023-05-08 RX ORDER — HYDROCODONE BITARTRATE AND ACETAMINOPHEN 10; 325 MG/1; MG/1
1 TABLET ORAL EVERY 6 HOURS PRN
Qty: 120 TABLET | Refills: 0 | Status: SHIPPED | OUTPATIENT
Start: 2023-05-08

## 2023-05-08 RX ORDER — ALPRAZOLAM 1 MG/1
1 TABLET ORAL 3 TIMES DAILY PRN
Qty: 90 TABLET | Refills: 0 | Status: SHIPPED | OUTPATIENT
Start: 2023-05-08

## 2023-05-08 NOTE — PROGRESS NOTES
"Chief Complaint  Pain (Pt presents for monthly chronic pain follow up )    Subjective        Adela Arauz presents to [facility]  Pain  This is a chronic problem. The problem occurs constantly. The problem has been gradually worsening. Pertinent negatives include no anorexia, arthralgias, change in bowel habit, chest pain, chills, headaches, joint swelling, myalgias, nausea, swollen glands, urinary symptoms or vertigo. The symptoms are aggravated by bending, twisting, standing and walking. She has tried acetaminophen, oral narcotics and rest for the symptoms. The treatment provided mild relief.   Anxiety  Presents for follow-up visit. Symptoms include decreased concentration, depressed mood, muscle tension and nervous/anxious behavior. Patient reports no chest pain, nausea, restlessness, shortness of breath or suicidal ideas. Symptoms occur most days. The severity of symptoms is moderate. The patient sleeps 4 hours per night. The quality of sleep is fair. Nighttime awakenings: several.     Compliance with medications is %. Side effects of treatment include joint pain.     The following portions of the patient's history were reviewed and updated as appropriate: allergies, current medications, past family history, past medical history, past social history, past surgical history and problem list.    Objective   Vital Signs:  /82 (BP Location: Right arm, Patient Position: Sitting, Cuff Size: Adult)   Pulse 68   Temp 98.6 °F (37 °C) (Temporal)   Resp 20   Ht 160 cm (63\")   Wt 95.3 kg (210 lb)   SpO2 98%   BMI 37.20 kg/m²   Estimated body mass index is 37.2 kg/m² as calculated from the following:    Height as of this encounter: 160 cm (63\").    Weight as of this encounter: 95.3 kg (210 lb).       Class 2 Severe Obesity (BMI >=35 and <=39.9). Obesity-related health conditions include the following: dyslipidemias and GERD. Obesity is improving with lifestyle " modifications. BMI is is above average; BMI management plan is completed. We discussed portion control and increasing exercise.      Physical Exam  Vitals and nursing note reviewed.   Constitutional:       General: She is awake.      Appearance: Normal appearance. She is well-developed and well-groomed.   HENT:      Head: Normocephalic and atraumatic.      Right Ear: Hearing, tympanic membrane, ear canal and external ear normal.      Left Ear: Hearing, tympanic membrane, ear canal and external ear normal.      Nose: Nose normal.      Mouth/Throat:      Lips: Pink.      Pharynx: Oropharynx is clear.   Eyes:      General: Lids are normal.      Conjunctiva/sclera: Conjunctivae normal.   Cardiovascular:      Rate and Rhythm: Normal rate and regular rhythm.      Heart sounds: Normal heart sounds.   Pulmonary:      Effort: Pulmonary effort is normal.      Breath sounds: Normal breath sounds and air entry.   Musculoskeletal:      Right upper arm: Tenderness present.      Left upper arm: Tenderness present.      Right forearm: Tenderness present.      Left forearm: Tenderness present.        Arms:       Cervical back: Normal and full passive range of motion without pain.      Thoracic back: Normal.      Lumbar back: Spasms and tenderness present. Decreased range of motion.        Back:       Right hip: Bony tenderness present. Decreased range of motion.      Left hip: Bony tenderness present. Decreased range of motion.      Right lower leg: No edema.      Left lower leg: No edema.        Legs:    Lymphadenopathy:      Head:      Right side of head: No submental, submandibular or tonsillar adenopathy.      Left side of head: No submental, submandibular or tonsillar adenopathy.   Skin:     General: Skin is warm and dry.   Neurological:      General: No focal deficit present.      Mental Status: She is alert and oriented to person, place, and time.      Sensory: Sensation is intact.      Motor: Motor function is intact.       Coordination: Coordination is intact.      Gait: Gait is intact.   Psychiatric:         Attention and Perception: Attention and perception normal.         Mood and Affect: Mood and affect normal.         Speech: Speech normal.         Behavior: Behavior normal. Behavior is cooperative.         Thought Content: Thought content normal.         Cognition and Memory: Cognition and memory normal.         Judgment: Judgment normal.        Result Review :                   Assessment and Plan   Diagnoses and all orders for this visit:    1. Panic attack  -     ALPRAZolam (XANAX) 1 MG tablet; Take 1 tablet by mouth 3 (Three) Times a Day As Needed for Anxiety.  Dispense: 90 tablet; Refill: 0    2. Chronic pain syndrome  -     HYDROcodone-acetaminophen (Norco)  MG per tablet; Take 1 tablet by mouth Every 6 (Six) Hours As Needed for Moderate Pain.  Dispense: 120 tablet; Refill: 0    3. DDD (degenerative disc disease), lumbar  -     HYDROcodone-acetaminophen (Norco)  MG per tablet; Take 1 tablet by mouth Every 6 (Six) Hours As Needed for Moderate Pain.  Dispense: 120 tablet; Refill: 0      ALMA Report:      As part of this patient's treatment plan, I am prescribing controlled substances. The patient has been made aware of appropriate use of such medications, including potential risk of opiod use, somnolence, limited ability to drive and /or work safely, and potential for dependence or overdose, and opiods should be used sparingly and are not recommended for long term use.  It has also been made clear that these medications are for use by this patient only, without concomitant use of alcohol or other substances unless prescribed.    The patient was provided a Rx for norco as needed for pain control after sustaining multiple back and neck injuries as well as surgeries.     This medication is a narcotic pain medication. This medication is monitored by the federal [employer] and the [facility]. Narcotic medication is  commonly abused and many patients can become addicted to this medication.    There are problems with narcotic pain medication including addiction, dependence and tolerance to the pain relief. We discussed appropriate and expected levels of pain.     Narcotics have side effects, including the common side effect of nausea/vomiting if taken on an empty stomach and the problem of constipation that occur with narcotic use.     Narcotics can impair your judgement and therefore you should never drive or operate heavy machinery while using these medications. Treat this medication similar to alcohol, do not take this and drive or you could injure yourself or others.    As an alternative drug, we encourage the use of tylenol and/or NSAIDs (non-steroidal anti-inflammatory drugs) for additional pain relief and as an anti-inflammatory. NSAIDS are drugs such as aleve (naproxen) and motrin (ibuprofen). Narcotics and NSAIDs work differently and can complement each other well after surgery.    In general, NSAIDs and tylenol are much safer drugs than narcotics. Tylenol (acetaminophen) should not be taken with narcotics as most prescribed narcotics already contain tylenol. The maximum dose of tylenol is 4 grams per day, so this can be used if your narcotic pain medication is used sparingly. Pay close attention to the dosages and ask your pharmacist about the dosage of tylenol.  Some patients can experience GI irritation from NSAID use and patients with kidney problems, previous bleeding from the GI tract, gastric bypass, or certain other conditions may not be able to take these over the counter medications.      Patient has completed prescribing agreement detailing terms of continued prescribing of controlled substances, including monitoring ALMA reports, urine drug screening yearly an random drug screens to monitor patients compliance to treatment plan, and pill counts if necessary. The patient is aware that inappropriate use will  result in cessation of prescribing such medications.    Toxassure:  I have ordered a urine drug screen to monitor patients predisposition to and patterns of drug use/misuse in order to establish and maintain the safe and effective use of analgesics in the treatment of chronic pain    ALMA report has been reviewed by: Charlene Huntley DNP, APRN   -     Pt is aware of the potential for addiction and dependence.    Addiction was discussed.  The  Risks, benefits and alternative options of drug therapy discussed.  It was reinforced about the risks and benefits of opioids.  It was reinforced that patient may not call early for medication, may not ask for increase in the number of pills given monthly or increase in dosage of medication, may not jump around to different pharmacies or providers and may not receive any narcotics from other providers including ER, or the contract will be broken and narcotics will no longer be prescribed from this facility if any of these criteria has been broken.             Follow Up      Return in about 1 month (around 6/8/2023) for Recheck.     Patient was given instructions and counseling regarding her condition or for health maintenance advice. Please see specific information pulled into the AVS if appropriate.     Electronically signed by Charlene Huntley DNP, APRN, 05/08/23, 10:17 AM CDT.

## 2023-06-06 ENCOUNTER — OFFICE VISIT (OUTPATIENT)
Dept: FAMILY MEDICINE CLINIC | Facility: CLINIC | Age: 58
End: 2023-06-06
Payer: MEDICARE

## 2023-06-06 VITALS
OXYGEN SATURATION: 99 % | HEIGHT: 63 IN | SYSTOLIC BLOOD PRESSURE: 121 MMHG | RESPIRATION RATE: 18 BRPM | DIASTOLIC BLOOD PRESSURE: 84 MMHG | TEMPERATURE: 98.6 F | WEIGHT: 212 LBS | HEART RATE: 88 BPM | BODY MASS INDEX: 37.56 KG/M2

## 2023-06-06 DIAGNOSIS — M51.36 DDD (DEGENERATIVE DISC DISEASE), LUMBAR: ICD-10-CM

## 2023-06-06 DIAGNOSIS — G89.4 CHRONIC PAIN SYNDROME: ICD-10-CM

## 2023-06-06 DIAGNOSIS — F41.0 PANIC ATTACK: ICD-10-CM

## 2023-06-06 RX ORDER — HYDROCODONE BITARTRATE AND ACETAMINOPHEN 10; 325 MG/1; MG/1
1 TABLET ORAL EVERY 6 HOURS PRN
Qty: 120 TABLET | Refills: 0 | Status: SHIPPED | OUTPATIENT
Start: 2023-06-06

## 2023-06-06 RX ORDER — DEXAMETHASONE SODIUM PHOSPHATE 10 MG/ML
10 INJECTION INTRAMUSCULAR; INTRAVENOUS ONCE
Status: COMPLETED | OUTPATIENT
Start: 2023-06-06 | End: 2023-06-06

## 2023-06-06 RX ORDER — ALPRAZOLAM 1 MG/1
1 TABLET ORAL 3 TIMES DAILY PRN
Qty: 90 TABLET | Refills: 0 | Status: SHIPPED | OUTPATIENT
Start: 2023-06-06

## 2023-06-06 RX ADMIN — DEXAMETHASONE SODIUM PHOSPHATE 10 MG: 10 INJECTION INTRAMUSCULAR; INTRAVENOUS at 10:13

## 2023-06-06 NOTE — PATIENT INSTRUCTIONS
Obesity, Adult  Obesity is having too much body fat. Being obese means that your weight is more than what is healthy for you.   BMI (body mass index) is a number that explains how much body fat you have. If you have a BMI of 30 or more, you are obese.  Obesity can cause serious health problems, such as:  Stroke.  Coronary artery disease (CAD).  Type 2 diabetes.  Some types of cancer.  High blood pressure (hypertension).  High cholesterol.  Gallbladder stones.  Obesity can also contribute to:  Osteoarthritis.  Sleep apnea.  Infertility problems.  What are the causes?  Eating meals each day that are high in calories, sugar, and fat.  Drinking a lot of drinks that have sugar in them.  Being born with genes that may make you more likely to become obese.  Having a medical condition that causes obesity.  Taking certain medicines.  Sitting a lot (having a sedentary lifestyle).  Not getting enough sleep.  What increases the risk?  Having a family history of obesity.  Living in an area with limited access to:  [place], recreation centers, or sidewalks.  Healthy food choices, such as grocery stores and farmers' markets.  What are the signs or symptoms?  The main sign is having too much body fat.  How is this treated?  Treatment for this condition often includes changing your lifestyle. Treatment may include:  Changing your diet. This may include making a healthy meal plan.  Exercise. This may include activity that causes your heart to beat faster (aerobic exercise) and strength training. Work with your doctor to design a program that works for you.  Medicine to help you lose weight. This may be used if you are not able to lose one pound a week after 6 weeks of healthy eating and more exercise.  Treating conditions that cause the obesity.  Surgery. Options may include gastric banding and gastric bypass. This may be done if:  Other treatments have not helped to improve your condition.  You have a BMI of 40 or higher.  You have  life-threatening health problems related to obesity.  Follow these instructions at home:  Eating and drinking    Follow advice from your doctor about what to eat and drink. Your doctor may tell you to:  Limit fast food, sweets, and processed snack foods.  Choose low-fat options. For example, choose low-fat milk instead of whole milk.  Eat five or more servings of fruits or vegetables each day.  Eat at home more often. This gives you more control over what you eat.  Choose healthy foods when you eat out.  Learn to read food labels. This will help you learn how much food is in one serving.  Keep low-fat snacks available.  Avoid drinks that have a lot of sugar in them. These include soda, fruit juice, iced tea with sugar, and flavored milk.  Drink enough water to keep your pee (urine) pale yellow.  Do not go on fad diets.  Physical activity  Exercise often, as told by your doctor. Most adults should get up to 150 minutes of moderate-intensity exercise every week.Ask your doctor:  What types of exercise are safe for you.  How often you should exercise.  Warm up and stretch before being active.  Do slow stretching after being active (cool down).  Rest between times of being active.  Lifestyle  Work with your doctor and a food expert (dietitian) to set a weight-loss goal that is best for you.  Limit your screen time.  Find ways to reward yourself that do not involve food.  Do not drink alcohol if:  Your doctor tells you not to drink.  You are pregnant, may be pregnant, or are planning to become pregnant.  If you drink alcohol:  Limit how much you have to:  0-1 drink a day for women.  0-2 drinks a day for men.  Know how much alcohol is in your drink. In the [place] one drink equals one 12 oz bottle of beer (355 mL), one 5 oz glass of wine (148 mL), or one 1½ oz glass of hard liquor (44 mL).  General instructions  Keep a weight-loss journal. This can help you keep track of:  The food that you eat.  How much exercise you  get.  Take over-the-counter and prescription medicines only as told by your doctor.  Take vitamins and supplements only as told by your doctor.  Think about joining a support group.  Pay attention to your mental health as obesity can lead to depression or self esteem issues.  Keep all follow-up visits.  Contact a doctor if:  You cannot meet your weight-loss goal after you have changed your diet and lifestyle for 6 weeks.  You are having trouble breathing.  Summary  Obesity is having too much body fat.  Being obese means that your weight is more than what is healthy for you.  Work with your doctor to set a weight-loss goal.  Get regular exercise as told by your doctor.  This information is not intended to replace advice given to you by your health care provider. Make sure you discuss any questions you have with your health care provider.  Document Revised: 07/26/2022 Document Reviewed: 07/26/2022  Elsevier Patient Education © 2022 [employer]

## 2023-06-06 NOTE — PROGRESS NOTES
"Chief Complaint  Panic Attack (Pt here for follow up)    Subjective        Adela Arauz presents to [facility]  History of Present Illness  Presents for follow up for panic attack chronic pain secondary to fibromyalgia. She has struggled with chronic pain for years and she aggravated it this past week.  She was sitting in a folding chair and went to get up and the chair flipped her over and she landed on her back.  Her back is hurting with spasms 9/10.  Wants steriod shot.   Panic Attack  This is a chronic problem. The current episode started more than 1 year ago. The problem occurs intermittently. The problem has been gradually improving. Associated symptoms include arthralgias. Pertinent negatives include no chills, congestion, fever, headaches, joint swelling, nausea, neck pain, sore throat, swollen glands or urinary symptoms. The symptoms are aggravated by bending, standing, twisting and walking. She has tried acetaminophen, oral narcotics and rest for the symptoms. The treatment provided no relief.   Anxiety  Presents for follow-up visit. Symptoms include decreased concentration, depressed mood and restlessness. Patient reports no nausea. Symptoms occur most days. The severity of symptoms is moderate. The patient sleeps 5 hours per night. The quality of sleep is poor. Nighttime awakenings: early a.m. for rest of night, several.     Her past medical history is significant for anxiety/panic attacks. Compliance with medications is %.     Objective   Vital Signs:  /84 (BP Location: Left arm, Patient Position: Sitting, Cuff Size: Large Adult)   Pulse 88   Temp 98.6 °F (37 °C) (Infrared)   Resp 18   Ht 160 cm (63\") Comment: per patient  Wt 96.2 kg (212 lb)   SpO2 99%   BMI 37.55 kg/m²   Estimated body mass index is 37.55 kg/m² as calculated from the following:    Height as of this encounter: 160 cm (63\").    Weight as of this encounter: 96.2 kg (212 lb).      "    Physical Exam  Vitals and nursing note reviewed.   Constitutional:       General: She is awake.      Appearance: Normal appearance. She is well-developed and well-groomed. She is morbidly obese.   HENT:      Head: Normocephalic and atraumatic.      Right Ear: Hearing, tympanic membrane, ear canal and external ear normal.      Left Ear: Hearing, tympanic membrane, ear canal and external ear normal.      Nose: Nose normal.      Mouth/Throat:      Lips: Pink.      Pharynx: Oropharynx is clear.   Eyes:      General: Lids are normal.      Conjunctiva/sclera: Conjunctivae normal.   Cardiovascular:      Rate and Rhythm: Normal rate and regular rhythm.      Heart sounds: Normal heart sounds.   Pulmonary:      Effort: Pulmonary effort is normal.      Breath sounds: Normal breath sounds and air entry.   Musculoskeletal:        Arms:       Cervical back: Normal and full passive range of motion without pain.      Thoracic back: Normal.      Lumbar back: Normal.        Back:       Right lower leg: No edema.      Left lower leg: No edema.   Lymphadenopathy:      Head:      Right side of head: No submental, submandibular or tonsillar adenopathy.      Left side of head: No submental, submandibular or tonsillar adenopathy.   Skin:     General: Skin is warm and dry.   Neurological:      Mental Status: She is alert and oriented to person, place, and time.      Sensory: Sensation is intact.      Motor: Motor function is intact.      Coordination: Coordination is intact.      Gait: Gait is intact.   Psychiatric:         Attention and Perception: Attention and perception normal.         Mood and Affect: Mood and affect normal.         Speech: Speech normal.         Behavior: Behavior normal. Behavior is cooperative.         Thought Content: Thought content normal.         Cognition and Memory: Cognition and memory normal.         Judgment: Judgment normal.      Result Review :                   Assessment and Plan   Diagnoses and all  orders for this visit:    1. Chronic pain syndrome  -     HYDROcodone-acetaminophen (Norco)  MG per tablet; Take 1 tablet by mouth Every 6 (Six) Hours As Needed for Moderate Pain.  Dispense: 120 tablet; Refill: 0  -     dexamethasone (DECADRON) injection 10 mg    2. DDD (degenerative disc disease), lumbar  -     HYDROcodone-acetaminophen (Norco)  MG per tablet; Take 1 tablet by mouth Every 6 (Six) Hours As Needed for Moderate Pain.  Dispense: 120 tablet; Refill: 0    3. Panic attack  -     ALPRAZolam (XANAX) 1 MG tablet; Take 1 tablet by mouth 3 (Three) Times a Day As Needed for Anxiety.  Dispense: 90 tablet; Refill: 0      ALMA Report:      As part of this patient's treatment plan, I am prescribing controlled substances. The patient has been made aware of appropriate use of such medications, including potential risk of opiod use, somnolence, limited ability to drive and /or work safely, and potential for dependence or overdose, and opiods should be used sparingly and are not recommended for long term use.  It has also been made clear that these medications are for use by this patient only, without concomitant use of alcohol or other substances unless prescribed.    The patient was provided a Rx for norco as needed for pain control after sustaining multiple back and neck injuries as well as surgeries.     This medication is a narcotic pain medication. This medication is monitored by the federal [facility] and the [facility]. Narcotic medication is commonly abused and many patients can become addicted to this medication.    There are problems with narcotic pain medication including addiction, dependence and tolerance to the pain relief. We discussed appropriate and expected levels of pain.     Narcotics have side effects, including the common side effect of nausea/vomiting if taken on an empty stomach and the problem of constipation that occur with narcotic use.     Narcotics can impair your judgement  and therefore you should never drive or operate heavy machinery while using these medications. Treat this medication similar to alcohol, do not take this and drive or you could injure yourself or others.    As an alternative drug, we encourage the use of tylenol and/or NSAIDs (non-steroidal anti-inflammatory drugs) for additional pain relief and as an anti-inflammatory. NSAIDS are drugs such as aleve (naproxen) and motrin (ibuprofen). Narcotics and NSAIDs work differently and can complement each other well after surgery.    In general, NSAIDs and tylenol are much safer drugs than narcotics. Tylenol (acetaminophen) should not be taken with narcotics as most prescribed narcotics already contain tylenol. The maximum dose of tylenol is 4 grams per day, so this can be used if your narcotic pain medication is used sparingly. Pay close attention to the dosages and ask your pharmacist about the dosage of tylenol.  Some patients can experience GI irritation from NSAID use and patients with kidney problems, previous bleeding from the GI tract, gastric bypass, or certain other conditions may not be able to take these over the counter medications.      Patient has completed prescribing agreement detailing terms of continued prescribing of controlled substances, including monitoring ALMA reports, urine drug screening yearly an random drug screens to monitor patients compliance to treatment plan, and pill counts if necessary. The patient is aware that inappropriate use will result in cessation of prescribing such medications.    -     Pt is aware of the potential for addiction and dependence.    Addiction was discussed.  The  Risks, benefits and alternative options of drug therapy discussed.  It was reinforced about the risks and benefits of opioids.  It was reinforced that patient may not call early for medication, may not ask for increase in the number of pills given monthly or increase in dosage of medication, may not jump  around to different pharmacies or providers and may not receive any narcotics from other providers including ER, or the contract will be broken and narcotics will no longer be prescribed from this facility if any of these criteria has been broken.    Last Fill was:  may          Follow Up   Return in about 1 month (around 7/6/2023), or if symptoms worsen or fail to improve.  Patient was given instructions and counseling regarding her condition or for health maintenance advice. Please see specific information pulled into the AVS if appropriate.     Electronically signed by Charlene Huntley DNP, APRN, 06/06/23, 9:51 AM CDT.

## 2023-06-15 ENCOUNTER — TELEPHONE (OUTPATIENT)
Dept: FAMILY MEDICINE CLINIC | Facility: CLINIC | Age: 58
End: 2023-06-15
Payer: MEDICARE

## 2023-06-15 NOTE — TELEPHONE ENCOUNTER
Caller: Adela Arauz    Relationship: Self    Best call back number: [phone]     What is the best time to reach you:  ANYTIME    Who are you requesting to speak with:  CLINICAL    What was the call regarding:  PATIENT REPORTED THAT SHE NOTICED A TICK BITE THIS MORNING ON HER LEFT SHOULDER BLADE.  PATIENT SAID IT STARTED ITCHING THIS MORNING.  PATIENT SAID SHE WAS ABLE TO REMOVE THE TICK'S BODY, BUT COULDN'T REMOVE THE HEAD.  PATIENT SAID SHE USED WITCH HAZEL, BUT COULDN'T REMOVE THE TICK'S HEAD.  PATIENT SAID SHE'S WORRIED ABOUT THIS, AS A FAMILY MBR HAD EXPERIENCED [place] FEVER FROM A TICK.  PATIENT WANTS TO KNOW IF SHE NEEDS TO SCHEDULE AN APPT.    PATIENT SAID SHE'S TAKING A STEROID FOR HER BACK, & WANTS TO KNOW IF THIS WILL HELP WITH HER TICK BITE.    Do you require a call back?  YES

## 2023-07-24 DIAGNOSIS — K21.9 GASTROESOPHAGEAL REFLUX DISEASE WITHOUT ESOPHAGITIS: ICD-10-CM

## 2023-07-24 RX ORDER — OMEPRAZOLE 40 MG/1
40 CAPSULE, DELAYED RELEASE ORAL DAILY
Qty: 90 CAPSULE | Refills: 1 | Status: SHIPPED | OUTPATIENT
Start: 2023-07-24

## 2023-07-24 NOTE — TELEPHONE ENCOUNTER
Rx Refill Note  Requested Prescriptions     Pending Prescriptions Disp Refills    omeprazole (priLOSEC) 40 MG capsule [Pharmacy Med Name: OMEPRAZOLE 40 MG CAP 40 Capsule] 90 capsule 1     Sig: Take 1 capsule by mouth Daily.      Last office visit with prescribing clinician: 7/5/2023   Last telemedicine visit with prescribing clinician: Visit date not found   Next office visit with prescribing clinician: 8/7/2023                         Would you like a call back once the refill request has been completed: [] Yes [] No    If the office needs to give you a call back, can they leave a voicemail: [] Yes [] No    Marisol Nguyen LPN  07/24/23, 10:14 CDT

## 2023-07-26 ENCOUNTER — HOSPITAL ENCOUNTER (OUTPATIENT)
Dept: MAMMOGRAPHY | Facility: HOSPITAL | Age: 58
Discharge: HOME | End: 2023-07-26
Admitting: NURSE PRACTITIONER
Payer: MEDICARE

## 2023-07-26 PROCEDURE — 77063 BREAST TOMOSYNTHESIS BI: CPT

## 2023-07-26 PROCEDURE — 77067 SCR MAMMO BI INCL CAD: CPT

## 2023-08-07 ENCOUNTER — REFERRAL TRIAGE (OUTPATIENT)
Dept: CASE MANAGEMENT | Facility: OTHER | Age: 58
End: 2023-08-07
Payer: MEDICARE

## 2023-08-07 ENCOUNTER — OFFICE VISIT (OUTPATIENT)
Dept: FAMILY MEDICINE CLINIC | Facility: CLINIC | Age: 58
End: 2023-08-07
Payer: MEDICARE

## 2023-08-07 VITALS
WEIGHT: 212 LBS | BODY MASS INDEX: 37.56 KG/M2 | OXYGEN SATURATION: 99 % | HEART RATE: 82 BPM | HEIGHT: 63 IN | SYSTOLIC BLOOD PRESSURE: 108 MMHG | RESPIRATION RATE: 18 BRPM | DIASTOLIC BLOOD PRESSURE: 75 MMHG | TEMPERATURE: 98.4 F

## 2023-08-07 DIAGNOSIS — Z91.89 AT RISK FOR SELF CARE DEFICIT: ICD-10-CM

## 2023-08-07 DIAGNOSIS — E78.00 HYPERCHOLESTEROLEMIA: ICD-10-CM

## 2023-08-07 DIAGNOSIS — G89.4 CHRONIC PAIN SYNDROME: Primary | ICD-10-CM

## 2023-08-07 DIAGNOSIS — F41.0 PANIC ATTACK: ICD-10-CM

## 2023-08-07 DIAGNOSIS — M79.7 FIBROMYALGIA: Primary | ICD-10-CM

## 2023-08-07 DIAGNOSIS — G89.4 CHRONIC PAIN SYNDROME: ICD-10-CM

## 2023-08-07 DIAGNOSIS — R11.0 NAUSEA: ICD-10-CM

## 2023-08-07 DIAGNOSIS — M51.36 DDD (DEGENERATIVE DISC DISEASE), LUMBAR: ICD-10-CM

## 2023-08-07 PROCEDURE — 1159F MED LIST DOCD IN RCRD: CPT | Performed by: NURSE PRACTITIONER

## 2023-08-07 PROCEDURE — 99214 OFFICE O/P EST MOD 30 MIN: CPT | Performed by: NURSE PRACTITIONER

## 2023-08-07 PROCEDURE — 1160F RVW MEDS BY RX/DR IN RCRD: CPT | Performed by: NURSE PRACTITIONER

## 2023-08-07 RX ORDER — HYDROCODONE BITARTRATE AND ACETAMINOPHEN 10; 325 MG/1; MG/1
1 TABLET ORAL EVERY 6 HOURS PRN
Qty: 120 TABLET | Refills: 0 | Status: SHIPPED | OUTPATIENT
Start: 2023-08-07

## 2023-08-07 RX ORDER — ONDANSETRON 4 MG/1
4 TABLET, ORALLY DISINTEGRATING ORAL EVERY 8 HOURS PRN
Qty: 40 TABLET | Refills: 0 | Status: SHIPPED | OUTPATIENT
Start: 2023-08-07

## 2023-08-07 RX ORDER — ALPRAZOLAM 1 MG/1
1 TABLET ORAL 3 TIMES DAILY PRN
Qty: 90 TABLET | Refills: 0 | Status: SHIPPED | OUTPATIENT
Start: 2023-08-07

## 2023-08-07 NOTE — PROGRESS NOTES
Chief Complaint  Chronic pain syndrome (Pt here for follow up)    Subjective        Adela Arauz presents to [facility]  Pain  This is a chronic problem. The current episode started more than 1 year ago. The problem occurs intermittently. The problem has been gradually worsening. Associated symptoms include arthralgias and myalgias. Pertinent negatives include no change in bowel habit, chest pain, chills, congestion, fatigue, fever, headaches, joint swelling, nausea, swollen glands, urinary symptoms or vertigo. The symptoms are aggravated by bending, standing, twisting and exertion. She has tried rest, position changes, lying down and oral narcotics for the symptoms. The treatment provided mild relief.   Panic Attack  This is a chronic problem. The current episode started more than 1 year ago. The problem occurs intermittently. The problem has been unchanged. Associated symptoms include arthralgias and myalgias. Pertinent negatives include no change in bowel habit, chest pain, chills, congestion, fatigue, fever, headaches, joint swelling, nausea, swollen glands, urinary symptoms or vertigo. The symptoms are aggravated by bending, standing, twisting and exertion. She has tried acetaminophen, oral narcotics, rest, position changes and relaxation for the symptoms. The treatment provided mild relief.     The following portions of the patient's history were reviewed and updated as appropriate: allergies, current medications, past family history, past medical history, past social history, past surgical history and problem list.    Chronic problems include:  anxiety.PTSD, and panic attacks stable with alprazolam, edema stable with furosemide, neuropathy stable with lyrica, herpes zoster stable with valacycloir, morbid obesity improving with metformin, gerd stable with omeprazole, iron def anemia stable with ferrous sulfate, RA  stable with arava, vit d def stable with D3, chronic  "pain stable with norco, meloxicam and tizanidine.    Objective   Vital Signs:  /75 (BP Location: Left arm, Patient Position: Sitting, Cuff Size: Large Adult)   Pulse 82   Temp 98.4 øF (36.9 øC) (Infrared)   Resp 18   Ht 160 cm (63\") Comment: per patient  Wt 96.2 kg (212 lb)   SpO2 99%   BMI 37.55 kg/mý   Estimated body mass index is 37.55 kg/mý as calculated from the following:    Height as of this encounter: 160 cm (63\").    Weight as of this encounter: 96.2 kg (212 lb).       Physical Exam  Vitals and nursing note reviewed.   Constitutional:       General: She is awake.      Appearance: Normal appearance. She is well-developed and well-groomed.   HENT:      Head: Normocephalic and atraumatic.      Right Ear: Hearing, tympanic membrane, ear canal and external ear normal.      Left Ear: Hearing, tympanic membrane, ear canal and external ear normal.      Nose: Nose normal.      Mouth/Throat:      Lips: Pink.      Pharynx: Oropharynx is clear.   Eyes:      General: Lids are normal.      Conjunctiva/sclera: Conjunctivae normal.   Cardiovascular:      Rate and Rhythm: Normal rate and regular rhythm.      Heart sounds: Normal heart sounds.   Pulmonary:      Effort: Pulmonary effort is normal.      Breath sounds: Normal breath sounds and air entry.   Musculoskeletal:      Right shoulder: Tenderness present. Decreased range of motion.      Left shoulder: Tenderness present. Decreased range of motion.        Arms:       Cervical back: Full passive range of motion without pain.      Thoracic back: Spasms and tenderness present. Decreased range of motion.      Lumbar back: Spasms and tenderness present. Decreased range of motion.        Back:       Right lower leg: No edema.      Left lower leg: No edema.   Lymphadenopathy:      Head:      Right side of head: No submental, submandibular or tonsillar adenopathy.      Left side of head: No submental, submandibular or tonsillar adenopathy.   Skin:     General: Skin " is warm and dry.   Neurological:      Mental Status: She is alert and oriented to person, place, and time.      Sensory: Sensation is intact.      Motor: Motor function is intact.      Coordination: Coordination is intact.      Gait: Gait is intact.   Psychiatric:         Attention and Perception: Attention and perception normal.         Mood and Affect: Mood and affect normal.         Speech: Speech normal.         Behavior: Behavior normal. Behavior is cooperative.         Thought Content: Thought content normal.         Cognition and Memory: Cognition and memory normal.         Judgment: Judgment normal.      Result Review :                   Assessment and Plan     Diagnoses and all orders for this visit:    1. Fibromyalgia (Primary)    2. Panic attack  -     ALPRAZolam (XANAX) 1 MG tablet; Take 1 tablet by mouth 3 (Three) Times a Day As Needed for Anxiety.  Dispense: 90 tablet; Refill: 0    3. Chronic pain syndrome  -     HYDROcodone-acetaminophen (Norco)  MG per tablet; Take 1 tablet by mouth Every 6 (Six) Hours As Needed for Moderate Pain.  Dispense: 120 tablet; Refill: 0    4. DDD (degenerative disc disease), lumbar  -     HYDROcodone-acetaminophen (Norco)  MG per tablet; Take 1 tablet by mouth Every 6 (Six) Hours As Needed for Moderate Pain.  Dispense: 120 tablet; Refill: 0    5. Nausea  -     ondansetron ODT (Zofran ODT) 4 MG disintegrating tablet; Place 1 tablet on the tongue Every 8 (Eight) Hours As Needed for Nausea or Vomiting.  Dispense: 40 tablet; Refill: 0    6. At risk for self care deficit  -     Ambulatory Referral to Case Management Gaps in Care, Rising Risk      As part of this patient's treatment plan, I am prescribing controlled substances. The patient has been made aware of appropriate use of such medications, including potential risk of opiod use, somnolence, limited ability to drive and /or work safely, and potential for dependence or overdose, and opiods should be used  sparingly and are not recommended for long term use.  It has also been made clear that these medications are for use by this patient only, without concomitant use of alcohol or other substances unless prescribed.    The patient was provided a Rx for norco as needed for pain control after sustaining multiple back and neck injuries as well as surgeries.     This medication is a narcotic pain medication. This medication is monitored by the federal [facility] and the [facility]. Narcotic medication is commonly abused and many patients can become addicted to this medication.    There are problems with narcotic pain medication including addiction, dependence and tolerance to the pain relief. We discussed appropriate and expected levels of pain.     Narcotics have side effects, including the common side effect of nausea/vomiting if taken on an empty stomach and the problem of constipation that occur with narcotic use.     Narcotics can impair your judgement and therefore you should never drive or operate heavy machinery while using these medications. Treat this medication similar to alcohol, do not take this and drive or you could injure yourself or others.    As an alternative drug, we encourage the use of tylenol and/or NSAIDs (non-steroidal anti-inflammatory drugs) for additional pain relief and as an anti-inflammatory. NSAIDS are drugs such as aleve (naproxen) and motrin (ibuprofen). Narcotics and NSAIDs work differently and can complement each other well after surgery.    In general, NSAIDs and tylenol are much safer drugs than narcotics. Tylenol (acetaminophen) should not be taken with narcotics as most prescribed narcotics already contain tylenol. The maximum dose of tylenol is 4 grams per day, so this can be used if your narcotic pain medication is used sparingly. Pay close attention to the dosages and ask your pharmacist about the dosage of tylenol.  Some patients can experience GI irritation from NSAID use and  patients with kidney problems, previous bleeding from the GI tract, gastric bypass, or certain other conditions may not be able to take these over the counter medications.      Patient has completed prescribing agreement detailing terms of continued prescribing of controlled substances, including monitoring ALMA reports, urine drug screening yearly an random drug screens to monitor patients compliance to treatment plan, and pill counts if necessary. The patient is aware that inappropriate use will result in cessation of prescribing such medications.    Toxassure:  I have ordered a urine drug screen to monitor patients predisposition to and patterns of drug use/misuse in order to establish and maintain the safe and effective use of analgesics in the treatment of chronic pain    ALMA report has been reviewed by: Charlene Huntley DNP, APRN    -     Pt is aware of the potential for addiction and dependence.    Addiction was discussed.  The  Risks, benefits and alternative options of drug therapy discussed.  It was reinforced about the risks and benefits of opioids.  It was reinforced that patient may not call early for medication, may not ask for increase in the number of pills given monthly or increase in dosage of medication, may not jump around to different pharmacies or providers and may not receive any narcotics from other providers including ER, or the contract will be broken and narcotics will no longer be prescribed from this facility if any of these criteria has been broken.      Last Dose was: today    Last Fill was:  July   Date of last ALMA: today    Date of last UDS: on file        Follow Up     Return in about 1 month (around 9/7/2023) for Recheck.    Patient was given instructions and counseling regarding her condition or for health maintenance advice. Please see specific information pulled into the AVS if appropriate.     Electronically signed by Charlene Huntley DNP, APRN, 08/07/23, 9:56 AM  CDT.

## 2023-08-08 ENCOUNTER — PATIENT OUTREACH (OUTPATIENT)
Dept: CASE MANAGEMENT | Facility: OTHER | Age: 58
End: 2023-08-08
Payer: MEDICARE

## 2023-08-08 NOTE — OUTREACH NOTE
AMBULATORY CASE MANAGEMENT NOTE    Name and Relationship of Patient/Support Person: Adela Arauz - Self    Patient Outreach    Reached out to patient to determine the level of assistance needed. She indicated that due to her divorce her insurance changed and no longer pays for her OTC medications and has a copay now.    She also is concerned about the cost of food. She eats only one meal a day to limit the amount of food she is purchasing.     I asked about her housing situation and she indicated that she has a home but there are some safety concerns with the structure.    I will refer her to  for assistance.    Patient Outreach    Provided patient a list of resources for food assistance in [facility].    Education Documentation  No documentation found.        Kimberly LAMAS  Ambulatory Case Management    8/8/2023, 10:06 CDT

## 2023-08-09 ENCOUNTER — PATIENT OUTREACH (OUTPATIENT)
Dept: CASE MANAGEMENT | Facility: OTHER | Age: 58
End: 2023-08-09
Payer: MEDICARE

## 2023-08-09 NOTE — OUTREACH NOTE
Social Work Assessment  Questions/Answers      Flowsheet Row Most Recent Value   Referral Source nursing   Reason for Consult medication concerns, financial concerns   Preferred Language English   People in Home alone   Current Living Arrangements home   Source of Income disability   Financial/Environmental Concerns unable to afford medication(s), unable to afford food   Application for Public Assistance obtained public assistance pending number          SDOH updated and reviewed with the patient during this program:  Financial Resource Strain: High Risk    Difficulty of Paying Living Expenses: Hard      Food Insecurity: Food Insecurity Present    Worried About Running Out of Food in the Last Year: Often true    Ran Out of Food in the Last Year: Sometimes true      Transportation Needs: No Transportation Needs    Lack of Transportation (Medical): No    Lack of Transportation (Non-Medical): No      Housing Stability: Low Risk     Unable to Pay for Housing in the Last Year: No    Number of Places Lived in the Last Year: 1    Unstable Housing in the Last Year: No     Patient Outreach    MSW left voicemail with patient to discuss resources. MSW did send MyChart message for medication assistance/copays.    Rosina Snyder   Ambulatory Case Management    8/9/2023, 10:39 EDT

## 2023-08-10 ENCOUNTER — TELEPHONE (OUTPATIENT)
Dept: CASE MANAGEMENT | Facility: OTHER | Age: 58
End: 2023-08-10
Payer: MEDICARE

## 2023-08-14 ENCOUNTER — PATIENT OUTREACH (OUTPATIENT)
Dept: CASE MANAGEMENT | Facility: OTHER | Age: 58
End: 2023-08-14
Payer: MEDICARE

## 2023-08-14 NOTE — OUTREACH NOTE
AMBULATORY CASE MANAGEMENT NOTE    Name and Relationship of Patient/Support Person: Adela Arauz - Self    Patient Outreach    F/U with patient pertaining to resources provided. Patient has received communication from both ACM and MSW. She is optimistic that she will be able to get assistance. She inquired about PAP programs for three of her medications. I will forward her the information for applying for the qualifying programs.    Education Documentation  No documentation found.        Kimberly LAMAS  Ambulatory Case Management    8/14/2023, 14:02 CDT   HEARING EVALUATION    Name:  Neha Cates  :  2010  Age:  10 y o  Date of Evaluation: 11/15/19     History: Failed Screen  Reason for visit: Neha Cates is being seen today at the request of Dr Jenny Ayala for an evaluation of hearing  Parent reports there are no concerns for hearing at this time  No history of ear infections reported  [place] Mates reportedly passed his  hearing screening in both ears  EVALUATION:    Otoscopic Evaluation:   Right Ear: Clear and healthy ear canal and tympanic membrane   Left Ear: Clear and healthy ear canal and tympanic membrane    Tympanometry:   Right: Type A - normal middle ear pressure and compliance   Left: Type A - normal middle ear pressure and compliance    Audiogram Results:  Pure tone testing revealed normal hearing sensitivity bilaterally  SRT and PTA are in agreement indicating good test reliability  Word recognition scores were  excellent bilaterally  *see attached audiogram      RECOMMENDATIONS:  Annual hearing eval, Return to [facility]  for F/U and Copy to Patient/Caregiver    PATIENT EDUCATION:   Discussed results and recommendations with parent  Questions were addressed and the patient was encouraged to contact our department should concerns arise        Milady Lawson   Clinical Audiologist

## 2023-09-11 ENCOUNTER — OFFICE VISIT (OUTPATIENT)
Dept: FAMILY MEDICINE CLINIC | Facility: CLINIC | Age: 58
End: 2023-09-11
Payer: MEDICARE

## 2023-09-11 VITALS
WEIGHT: 212 LBS | RESPIRATION RATE: 18 BRPM | BODY MASS INDEX: 37.56 KG/M2 | HEART RATE: 89 BPM | HEIGHT: 63 IN | SYSTOLIC BLOOD PRESSURE: 111 MMHG | DIASTOLIC BLOOD PRESSURE: 79 MMHG | TEMPERATURE: 98.6 F | OXYGEN SATURATION: 99 %

## 2023-09-11 DIAGNOSIS — F41.0 PANIC ATTACK: ICD-10-CM

## 2023-09-11 DIAGNOSIS — R11.0 NAUSEA: ICD-10-CM

## 2023-09-11 DIAGNOSIS — G89.29 CHRONIC MIDLINE LOW BACK PAIN WITH RIGHT-SIDED SCIATICA: ICD-10-CM

## 2023-09-11 DIAGNOSIS — M79.7 FIBROMYALGIA: Primary | ICD-10-CM

## 2023-09-11 DIAGNOSIS — M06.9 RHEUMATOID ARTHRITIS FLARE: ICD-10-CM

## 2023-09-11 DIAGNOSIS — G89.4 CHRONIC PAIN SYNDROME: ICD-10-CM

## 2023-09-11 DIAGNOSIS — M51.36 DDD (DEGENERATIVE DISC DISEASE), LUMBAR: ICD-10-CM

## 2023-09-11 DIAGNOSIS — M54.41 CHRONIC MIDLINE LOW BACK PAIN WITH RIGHT-SIDED SCIATICA: ICD-10-CM

## 2023-09-11 RX ORDER — ALPRAZOLAM 1 MG/1
1 TABLET ORAL 3 TIMES DAILY PRN
Qty: 90 TABLET | Refills: 0 | Status: SHIPPED | OUTPATIENT
Start: 2023-09-11

## 2023-09-11 RX ORDER — KETOROLAC TROMETHAMINE 30 MG/ML
60 INJECTION, SOLUTION INTRAMUSCULAR; INTRAVENOUS ONCE
Status: COMPLETED | OUTPATIENT
Start: 2023-09-11 | End: 2023-09-11

## 2023-09-11 RX ORDER — KETOROLAC TROMETHAMINE 30 MG/ML
60 INJECTION, SOLUTION INTRAMUSCULAR; INTRAVENOUS ONCE
Status: DISCONTINUED | OUTPATIENT
Start: 2023-09-12 | End: 2023-09-11

## 2023-09-11 RX ORDER — KETOROLAC TROMETHAMINE 30 MG/ML
60 INJECTION, SOLUTION INTRAMUSCULAR; INTRAVENOUS ONCE
Status: DISCONTINUED | OUTPATIENT
Start: 2023-09-11 | End: 2023-09-11

## 2023-09-11 RX ORDER — PREDNISONE 20 MG/1
20 TABLET ORAL DAILY
Qty: 5 TABLET | Refills: 0 | Status: SHIPPED | OUTPATIENT
Start: 2023-09-12 | End: 2023-09-17

## 2023-09-11 RX ORDER — DEXAMETHASONE SODIUM PHOSPHATE 10 MG/ML
10 INJECTION INTRAMUSCULAR; INTRAVENOUS ONCE
Status: COMPLETED | OUTPATIENT
Start: 2023-09-11 | End: 2023-09-11

## 2023-09-11 RX ORDER — HYDROCODONE BITARTRATE AND ACETAMINOPHEN 10; 325 MG/1; MG/1
1 TABLET ORAL EVERY 6 HOURS PRN
Qty: 120 TABLET | Refills: 0 | Status: SHIPPED | OUTPATIENT
Start: 2023-09-11

## 2023-09-11 RX ORDER — ONDANSETRON 4 MG/1
4 TABLET, ORALLY DISINTEGRATING ORAL EVERY 8 HOURS PRN
Qty: 40 TABLET | Refills: 0 | Status: SHIPPED | OUTPATIENT
Start: 2023-09-11

## 2023-09-11 RX ADMIN — DEXAMETHASONE SODIUM PHOSPHATE 10 MG: 10 INJECTION INTRAMUSCULAR; INTRAVENOUS at 09:43

## 2023-09-11 RX ADMIN — KETOROLAC TROMETHAMINE 60 MG: 30 INJECTION, SOLUTION INTRAMUSCULAR; INTRAVENOUS at 09:45

## 2023-09-11 NOTE — PROGRESS NOTES
Chief Complaint  Fibromyalgia (Pt here for follow up)    Subjective        Adela Arauz presents to [facility]  History of Present Illness  The patient is here today for a follow-up on chronic pain. She presents today with worsening of her pain. She has been in a recliner all weekend. She is rating her pain at 10 out of 12. She has RA fibromyalgia, degenerative disc disease, chronic pain in bilateral hips, bilateral knees, back, both elbows, and both shoulders. She struggles every day to just get up and do her normal activities. She does have chickens, but she is going to have to give that up because she is unable to do that. She does have chronic nausea secondary to Norco use. She also struggles with PTSD secondary to finding a brother committing suicide. Her other chronic problems include acid reflux, stable with omeprazole; depression, stable with sertraline; anxiety, stable with alprazolam; anemia, stable with ferrous sulfate; and neuropathy, stable with Lyrica. Her fibromyalgia is chronic, she has had it for more than a year, it is constant, progression has gotten much worse. Her associated symptoms include arthralgias, joint pain, myalgias, neck pain, numbness, and tingling. Her  strength is still weak. She has generalized weakness throughout her whole body. Her aggravating factors include bending, twisting, sitting, and walking. She has tried position changes, rest, relaxation, laying down, and oral narcotics with mild improvement. Her depression is chronic. She has had it for more than a year. Her symptoms, some days are better, some days are worse. She does have decreased concentration and a depressed mood. She experiences dry mouth and bilateral dry eyes. She has fatigue, insomnia, irritability, malaise, nervousness, and anxiety. She has no suicidal thoughts or suicidal planning. She has lost a couple of pounds since 07/2023. She weighed 216 pounds on 07/2023 and  "is currently down to 212 pounds now. She has muscle tension and moderate to severe daily restlessness. She sleeps about 4 hours at night. Her quality of sleep is poor. She has problems both with falling asleep and staying asleep because of the pain. It is gradually worsening. Her associated symptoms are the same thing. She does have headaches that change, neck pain, back pain, elbow pain, shoulder pain, hip pain, knee pain.  Everything that aggravates is the same, such as twisting, bending, squatting, and walking. The treatments tried were rest, position changes, NSAIDs, walking, and narcotics with no improvement. Her anxiety is about the same. She has no suicidal or homicidal ideations.     The following portions of the patient's history were reviewed and updated as appropriate: allergies, current medications, past family history, past medical history, past social history, past surgical history and problem list.      Objective   Vital Signs:  /79 (BP Location: Right arm, Patient Position: Sitting, Cuff Size: Large Adult)   Pulse 89   Temp 98.6 °F (37 °C) (Infrared)   Resp 18   Ht 160 cm (63\") Comment: per patient  Wt 96.2 kg (212 lb)   SpO2 99%   BMI 37.55 kg/m²   Estimated body mass index is 37.55 kg/m² as calculated from the following:    Height as of this encounter: 160 cm (63\").    Weight as of this encounter: 96.2 kg (212 lb).     Physical Exam  Vitals and nursing note reviewed.   Constitutional:       General: She is awake.      Appearance: Normal appearance. She is well-developed and well-groomed.   HENT:      Head: Normocephalic and atraumatic.      Right Ear: Hearing, tympanic membrane, ear canal and external ear normal.      Left Ear: Hearing, tympanic membrane, ear canal and external ear normal.      Nose: Nose normal.      Mouth/Throat:      Lips: Pink.      Pharynx: Oropharynx is clear.   Eyes:      General: Lids are normal.      Conjunctiva/sclera: Conjunctivae normal.   Cardiovascular: "      Rate and Rhythm: Normal rate and regular rhythm.      Heart sounds: Normal heart sounds.   Pulmonary:      Effort: Pulmonary effort is normal.      Breath sounds: Normal breath sounds and air entry.   Musculoskeletal:      Right elbow: Decreased range of motion. Tenderness present.      Left elbow: Decreased range of motion. Tenderness present.      Right hand: Decreased range of motion. Decreased strength.      Left hand: Decreased range of motion. Decreased strength.        Arms:       Cervical back: Full passive range of motion without pain. Rigidity and spasms present. Decreased range of motion.      Thoracic back: Normal.      Lumbar back: Spasms and tenderness present. Decreased range of motion.        Back:       Right hip: Decreased range of motion.      Left hip: Decreased range of motion.      Right knee: Decreased range of motion. Tenderness present.      Left knee: Decreased range of motion. Tenderness present.      Right lower leg: No edema.      Left lower leg: No edema.        Legs:    Lymphadenopathy:      Head:      Right side of head: No submental, submandibular or tonsillar adenopathy.      Left side of head: No submental, submandibular or tonsillar adenopathy.   Skin:     General: Skin is warm and dry.   Neurological:      Mental Status: She is alert.      Sensory: Sensation is intact.      Motor: Motor function is intact.      Coordination: Coordination is intact.      Gait: Gait is intact.   Psychiatric:         Attention and Perception: Attention and perception normal.         Mood and Affect: Mood and affect normal.         Speech: Speech normal.         Behavior: Behavior normal. Behavior is cooperative.         Thought Content: Thought content normal.         Cognition and Memory: Cognition and memory normal.         Judgment: Judgment normal.      Result Review :                 Assessment and Plan   Diagnoses and all orders for this visit:    1. Fibromyalgia (Primary): Chronic,  worsening   2. Chronic midline low back pain with right-sided sciatica: Chronic, worsening   3. Rheumatoid arthritis flare: Chronic, worsening   -     dexAMETHasone (DECADRON) injection 10 mg  -     ketorolac (TORADOL) injection 60 mg  -     predniSONE (DELTASONE) 20 MG tablet; Take 1 tablet by mouth Daily for 5 days.  Dispense: 5 tablet; Refill: 0    4. Panic attack  -     ALPRAZolam (XANAX) 1 MG tablet; Take 1 tablet by mouth 3 (Three) Times a Day As Needed for Anxiety.  Dispense: 90 tablet; Refill: 0    5. Chronic pain syndrome  -     HYDROcodone-acetaminophen (Norco)  MG per tablet; Take 1 tablet by mouth Every 6 (Six) Hours As Needed for Moderate Pain.  Dispense: 120 tablet; Refill: 0  -     predniSONE (DELTASONE) 20 MG tablet; Take 1 tablet by mouth Daily for 5 days.  Dispense: 5 tablet; Refill: 0    6. DDD (degenerative disc disease), lumbar  -     HYDROcodone-acetaminophen (Norco)  MG per tablet; Take 1 tablet by mouth Every 6 (Six) Hours As Needed for Moderate Pain.  Dispense: 120 tablet; Refill: 0    7. Nausea  -     ondansetron ODT (Zofran ODT) 4 MG disintegrating tablet; Place 1 tablet on the tongue Every 8 (Eight) Hours As Needed for Nausea or Vomiting.  Dispense: 40 tablet; Refill: 0      ALMA Report:      As part of this patient's treatment plan, I am prescribing controlled substances. The patient has been made aware of appropriate use of such medications, including potential risk of opiod use, somnolence, limited ability to drive and /or work safely, and potential for dependence or overdose, and opiods should be used sparingly and are not recommended for long term use.  It has also been made clear that these medications are for use by this patient only, without concomitant use of alcohol or other substances unless prescribed.    The patient was provided a Rx for norco as needed for pain control after sustaining multiple back and neck injuries as well as surgeries.     This medication is  a narcotic pain medication. This medication is monitored by the federal TERRA and the [facility]. Narcotic medication is commonly abused and many patients can become addicted to this medication.    There are problems with narcotic pain medication including addiction, dependence and tolerance to the pain relief. We discussed appropriate and expected levels of pain.     Narcotics have side effects, including the common side effect of nausea/vomiting if taken on an empty stomach and the problem of constipation that occur with narcotic use.     Narcotics can impair your judgement and therefore you should never drive or operate heavy machinery while using these medications. Treat this medication similar to alcohol, do not take this and drive or you could injure yourself or others.    As an alternative drug, we encourage the use of tylenol and/or NSAIDs (non-steroidal anti-inflammatory drugs) for additional pain relief and as an anti-inflammatory. NSAIDS are drugs such as aleve (naproxen) and motrin (ibuprofen). Narcotics and NSAIDs work differently and can complement each other well after surgery.    In general, NSAIDs and tylenol are much safer drugs than narcotics. Tylenol (acetaminophen) should not be taken with narcotics as most prescribed narcotics already contain tylenol. The maximum dose of tylenol is 4 grams per day, so this can be used if your narcotic pain medication is used sparingly. Pay close attention to the dosages and ask your pharmacist about the dosage of tylenol.  Some patients can experience GI irritation from NSAID use and patients with kidney problems, previous bleeding from the GI tract, gastric bypass, or certain other conditions may not be able to take these over the counter medications.      Patient has completed prescribing agreement detailing terms of continued prescribing of controlled substances, including monitoring ALMA reports, urine drug screening yearly an random drug screens to  monitor patients compliance to treatment plan, and pill counts if necessary. The patient is aware that inappropriate use will result in cessation of prescribing such medications.    Toxassure:  I have ordered a urine drug screen to monitor patients predisposition to and patterns of drug use/misuse in order to establish and maintain the safe and effective use of analgesics in the treatment of chronic pain    ALMA report has been reviewed by: Charelne Huntley DNP, SURYA  -     Pt is aware of the potential for addiction and dependence.    Addiction was discussed.  The  Risks, benefits and alternative options of drug therapy discussed.  It was reinforced about the risks and benefits of opioids.  It was reinforced that patient may not call early for medication, may not ask for increase in the number of pills given monthly or increase in dosage of medication, may not jump around to different pharmacies or providers and may not receive any narcotics from other providers including ER, or the contract will be broken and narcotics will no longer be prescribed from this facility if any of these criteria has been broken.         Follow Up   Return in about 1 month (around 10/11/2023) for Recheck.  Patient was given instructions and counseling regarding her condition or for health maintenance advice. Please see specific information pulled into the AVS if appropriate.       Transcribed from ambient dictation for Charlene Huntley DNP, SURYA by Rubia Escobedo.  09/11/23   10:02 CDT    Patient or patient representative verbalized consent to the visit recording.  I have personally performed the services described in this document as transcribed by the above individual, and it is both accurate and complete.      Electronically signed by Charlene Huntley DNP, APRN, 09/11/23, 10:13 AM CDT.

## 2023-09-21 ENCOUNTER — PATIENT OUTREACH (OUTPATIENT)
Dept: CASE MANAGEMENT | Facility: OTHER | Age: 58
End: 2023-09-21
Payer: MEDICARE

## 2023-09-21 NOTE — OUTREACH NOTE
AMBULATORY CASE MANAGEMENT NOTE    Name and Relationship of Patient/Support Person:  -     Patient Outreach    Contacted patient to follow up on the resources that were provided to her and ask about how she is feeling. She was able to use the resources provide to acquire food and the prescription assistance was not needed. Her insurance covered more of the cost of her medications than she expected. She seemed to be in much better spirits than my previous contacts with her. She mentioned that she was dealing with sciatic nerve pain currently.        Education Documentation  No documentation found.        Kimberly LAMAS  Ambulatory Case Management    9/21/2023, 10:47 CDT

## 2023-10-11 ENCOUNTER — OFFICE VISIT (OUTPATIENT)
Dept: FAMILY MEDICINE CLINIC | Facility: CLINIC | Age: 58
End: 2023-10-11
Payer: MEDICARE

## 2023-10-11 VITALS
RESPIRATION RATE: 18 BRPM | OXYGEN SATURATION: 99 % | WEIGHT: 212 LBS | DIASTOLIC BLOOD PRESSURE: 88 MMHG | HEART RATE: 71 BPM | HEIGHT: 63 IN | TEMPERATURE: 98.6 F | BODY MASS INDEX: 37.56 KG/M2 | SYSTOLIC BLOOD PRESSURE: 126 MMHG

## 2023-10-11 DIAGNOSIS — M51.36 DDD (DEGENERATIVE DISC DISEASE), LUMBAR: ICD-10-CM

## 2023-10-11 DIAGNOSIS — G89.4 CHRONIC PAIN SYNDROME: ICD-10-CM

## 2023-10-11 DIAGNOSIS — F41.0 PANIC ATTACK: ICD-10-CM

## 2023-10-11 PROCEDURE — 99214 OFFICE O/P EST MOD 30 MIN: CPT | Performed by: NURSE PRACTITIONER

## 2023-10-11 RX ORDER — HYDROCODONE BITARTRATE AND ACETAMINOPHEN 10; 325 MG/1; MG/1
1 TABLET ORAL EVERY 6 HOURS PRN
Qty: 120 TABLET | Refills: 0 | Status: SHIPPED | OUTPATIENT
Start: 2023-10-11

## 2023-10-11 RX ORDER — ALPRAZOLAM 1 MG/1
1 TABLET ORAL 3 TIMES DAILY PRN
Qty: 90 TABLET | Refills: 0 | Status: SHIPPED | OUTPATIENT
Start: 2023-10-11

## 2023-10-11 NOTE — PROGRESS NOTES
"Chief Complaint  Fibromyalgia (Pt here for follow up)    Subjective        Adela Arauz presents to [facility]  History of Present Illness  The patient presents for follow up evaluation of pain.    The patient has fibromyalgia, rheumatoid arthritis, chronic pain syndrome, degeneration of the lumbar intervertebral disc, foramen stenosis, anxiety, and PTSD, iron deficiency, Vitamin D deficiency, lower extremity edema, GERD. Diseases are stable and symptoms are stable.    She takes Norco, tizanidine, and meloxicam for her chronic pain. Today she rates her pain as 8/10. She takes alprazolam and sertraline for anxiety, PTSD, panic attacks, and depression.    Chronic problems include: shingles stable with valtrex, anxiety stable with alprazolam, depression stable with sertraline and lyrica without suicidal/homicidal ideations, iron def anemia stable with ferrous sulfate, vit  def stable with cholecalciferol, lower extremity edema stable with furosemide, GERD stable with omeprazole,. RA stable with arava and evista.      Objective   Vital Signs:  /88 (BP Location: Left arm, Patient Position: Sitting, Cuff Size: Large Adult)   Pulse 71   Temp 98.6 °F (37 °C) (Infrared)   Resp 18   Ht 160 cm (63\") Comment: per patient  Wt 96.2 kg (212 lb)   SpO2 99%   BMI 37.55 kg/m²   Estimated body mass index is 37.55 kg/m² as calculated from the following:    Height as of this encounter: 160 cm (63\").    Weight as of this encounter: 96.2 kg (212 lb).     The following portions of the patient's history were reviewed and updated as appropriate: allergies, current medications, past family history, past medical history, past social history, past surgical history and problem list.    Class 2 Severe Obesity (BMI >=35 and <=39.9). Obesity-related health conditions include the following: hypertension, dyslipidemias, and GERD. Obesity is unchanged. BMI is is above average; BMI management plan is " completed. We discussed portion control and increasing exercise.     Physical Exam  Vitals and nursing note reviewed.   Constitutional:       General: She is awake.      Appearance: Normal appearance. She is well-developed and well-groomed.   HENT:      Head: Normocephalic and atraumatic.      Right Ear: Hearing, tympanic membrane, ear canal and external ear normal.      Left Ear: Hearing, tympanic membrane, ear canal and external ear normal.      Nose: Nose normal.      Mouth/Throat:      Lips: Pink.      Pharynx: Oropharynx is clear.   Eyes:      General: Lids are normal.      Conjunctiva/sclera: Conjunctivae normal.   Cardiovascular:      Rate and Rhythm: Normal rate and regular rhythm.      Heart sounds: Normal heart sounds.   Pulmonary:      Effort: Pulmonary effort is normal.      Breath sounds: Normal breath sounds and air entry.   Musculoskeletal:      Right shoulder: Tenderness present. Decreased range of motion.      Left shoulder: Tenderness present. Decreased range of motion.      Right elbow: Decreased range of motion. Tenderness present.      Left elbow: Decreased range of motion. Tenderness present.      Right wrist: Tenderness present. Decreased range of motion.      Left wrist: Tenderness present. Decreased range of motion.        Arms:       Cervical back: Normal and full passive range of motion without pain.      Thoracic back: Normal.      Lumbar back: Spasms and tenderness present. Decreased range of motion.        Back:       Right hip: Tenderness present. Decreased range of motion.      Left hip: Tenderness present. Decreased range of motion.      Right knee: Decreased range of motion. Tenderness present.      Left knee: Decreased range of motion. Tenderness present.      Right lower leg: No edema.      Left lower leg: No edema.        Legs:    Lymphadenopathy:      Head:      Right side of head: No submental, submandibular or tonsillar adenopathy.      Left side of head: No submental,  submandibular or tonsillar adenopathy.   Skin:     General: Skin is warm and dry.   Neurological:      Mental Status: She is alert.      Sensory: Sensation is intact.      Motor: Motor function is intact.      Coordination: Coordination is intact.      Gait: Gait is intact.   Psychiatric:         Attention and Perception: Attention and perception normal.         Mood and Affect: Mood and affect normal.         Speech: Speech normal.         Behavior: Behavior normal. Behavior is cooperative.         Thought Content: Thought content normal.         Cognition and Memory: Cognition and memory normal.         Judgment: Judgment normal.        Result Review :                   Assessment and Plan   Diagnoses and all orders for this visit:    1. Panic attack  -     ALPRAZolam (XANAX) 1 MG tablet; Take 1 tablet by mouth 3 (Three) Times a Day As Needed for Anxiety.  Dispense: 90 tablet; Refill: 0    2. Chronic pain syndrome  -     HYDROcodone-acetaminophen (Norco)  MG per tablet; Take 1 tablet by mouth Every 6 (Six) Hours As Needed for Moderate Pain.  Dispense: 120 tablet; Refill: 0    3. DDD (degenerative disc disease), lumbar  -     HYDROcodone-acetaminophen (Norco)  MG per tablet; Take 1 tablet by mouth Every 6 (Six) Hours As Needed for Moderate Pain.  Dispense: 120 tablet; Refill: 0      ALMA Report:      As part of this patient's treatment plan, I am prescribing controlled substances. The patient has been made aware of appropriate use of such medications, including potential risk of opiod use, somnolence, limited ability to drive and /or work safely, and potential for dependence or overdose, and opiods should be used sparingly and are not recommended for long term use.  It has also been made clear that these medications are for use by this patient only, without concomitant use of alcohol or other substances unless prescribed.    The patient was provided a Rx for norco as needed for pain control after  sustaining multiple back and neck injuries as well as surgeries.     This medication is a narcotic pain medication. This medication is monitored by the federal TERRA and the [facility]. Narcotic medication is commonly abused and many patients can become addicted to this medication.    There are problems with narcotic pain medication including addiction, dependence and tolerance to the pain relief. We discussed appropriate and expected levels of pain.     Narcotics have side effects, including the common side effect of nausea/vomiting if taken on an empty stomach and the problem of constipation that occur with narcotic use.     Narcotics can impair your judgement and therefore you should never drive or operate heavy machinery while using these medications. Treat this medication similar to alcohol, do not take this and drive or you could injure yourself or others.    As an alternative drug, we encourage the use of tylenol and/or NSAIDs (non-steroidal anti-inflammatory drugs) for additional pain relief and as an anti-inflammatory. NSAIDS are drugs such as aleve (naproxen) and motrin (ibuprofen). Narcotics and NSAIDs work differently and can complement each other well after surgery.    In general, NSAIDs and tylenol are much safer drugs than narcotics. Tylenol (acetaminophen) should not be taken with narcotics as most prescribed narcotics already contain tylenol. The maximum dose of tylenol is 4 grams per day, so this can be used if your narcotic pain medication is used sparingly. Pay close attention to the dosages and ask your pharmacist about the dosage of tylenol.  Some patients can experience GI irritation from NSAID use and patients with kidney problems, previous bleeding from the GI tract, gastric bypass, or certain other conditions may not be able to take these over the counter medications.      Patient has completed prescribing agreement detailing terms of continued prescribing of controlled substances,  including monitoring ALMA reports, urine drug screening yearly an random drug screens to monitor patients compliance to treatment plan, and pill counts if necessary. The patient is aware that inappropriate use will result in cessation of prescribing such medications.    Toxassure:  I have ordered a urine drug screen to monitor patients predisposition to and patterns of drug use/misuse in order to establish and maintain the safe and effective use of analgesics in the treatment of chronic pain    ALMA report has been reviewed by: Charlene Huntley DNP, SURYA   -     Pt is aware of the potential for addiction and dependence.    Addiction was discussed.  The  Risks, benefits and alternative options of drug therapy discussed.  It was reinforced about the risks and benefits of opioids.  It was reinforced that patient may not call early for medication, may not ask for increase in the number of pills given monthly or increase in dosage of medication, may not jump around to different pharmacies or providers and may not receive any narcotics from other providers including ER, or the contract will be broken and narcotics will no longer be prescribed from this facility if any of these criteria has been broken.      The patient will follow up in 1 month for chronic pain, PTSD, anxiety, depression, and at the time we will do her well exam.        Follow Up   No follow-ups on file.  Patient was given instructions and counseling regarding her condition or for health maintenance advice. Please see specific information pulled into the AVS if appropriate.     Transcribed from ambient dictation for Charlene Huntley DNP, APRN by Africa Steiner.  10/11/23   11:56 CDT    Patient or patient representative verbalized consent to the visit recording.  I have personally performed the services described in this document as transcribed by the above individual, and it is both accurate and complete.     Electronically signed by Charlene Parker  MONTANA Huntley, APRN, 10/22/23, 7:31 PM CDT.

## 2023-10-27 DIAGNOSIS — M51.36 DDD (DEGENERATIVE DISC DISEASE), LUMBAR: ICD-10-CM

## 2023-10-27 DIAGNOSIS — G62.9 NEUROPATHY: ICD-10-CM

## 2023-10-27 RX ORDER — MELOXICAM 15 MG/1
TABLET ORAL
Qty: 90 TABLET | Refills: 1 | Status: SHIPPED | OUTPATIENT
Start: 2023-10-27

## 2023-10-27 RX ORDER — PREGABALIN 150 MG/1
CAPSULE ORAL
Qty: 180 CAPSULE | Refills: 5 | Status: SHIPPED | OUTPATIENT
Start: 2023-10-27

## 2023-10-27 NOTE — TELEPHONE ENCOUNTER
Rx Refill Note  Requested Prescriptions     Pending Prescriptions Disp Refills    pregabalin (LYRICA) 150 MG capsule [Pharmacy Med Name: PREGABALIN 150 MG CAPS 150 Capsule] 180 capsule 5     Sig: TAKE ONE CAPSULE BY MOUTH TWICE A DAY          Mindy Juan MA  10/27/23, 09:42 CDT

## 2023-10-27 NOTE — TELEPHONE ENCOUNTER
Rx Refill Note  Requested Prescriptions     Pending Prescriptions Disp Refills    meloxicam (MOBIC) 15 MG tablet [Pharmacy Med Name: MELOXICAM 15 MG TAB 15 Tablet] 90 tablet 1     Sig: TAKE ONE TABLET BY MOUTH DAILY.            Chiqui Luong MA  10/27/23, 09:36 CDT

## 2023-11-08 ENCOUNTER — OFFICE VISIT (OUTPATIENT)
Dept: FAMILY MEDICINE CLINIC | Facility: CLINIC | Age: 58
End: 2023-11-08
Payer: MEDICARE

## 2023-11-08 VITALS
RESPIRATION RATE: 18 BRPM | WEIGHT: 212 LBS | SYSTOLIC BLOOD PRESSURE: 109 MMHG | HEART RATE: 75 BPM | DIASTOLIC BLOOD PRESSURE: 79 MMHG | TEMPERATURE: 97.1 F | BODY MASS INDEX: 37.56 KG/M2 | OXYGEN SATURATION: 98 % | HEIGHT: 63 IN

## 2023-11-08 VITALS
RESPIRATION RATE: 18 BRPM | BODY MASS INDEX: 37.55 KG/M2 | SYSTOLIC BLOOD PRESSURE: 109 MMHG | DIASTOLIC BLOOD PRESSURE: 79 MMHG | HEIGHT: 63 IN | HEART RATE: 75 BPM | TEMPERATURE: 97.1 F | OXYGEN SATURATION: 98 %

## 2023-11-08 DIAGNOSIS — Z00.00 MEDICARE ANNUAL WELLNESS VISIT, SUBSEQUENT: Primary | ICD-10-CM

## 2023-11-08 DIAGNOSIS — F41.0 PANIC ATTACK: ICD-10-CM

## 2023-11-08 DIAGNOSIS — G89.4 CHRONIC PAIN SYNDROME: ICD-10-CM

## 2023-11-08 DIAGNOSIS — M51.36 DDD (DEGENERATIVE DISC DISEASE), LUMBAR: ICD-10-CM

## 2023-11-08 DIAGNOSIS — K21.9 GASTROESOPHAGEAL REFLUX DISEASE WITHOUT ESOPHAGITIS: ICD-10-CM

## 2023-11-08 DIAGNOSIS — R60.1 GENERALIZED EDEMA: ICD-10-CM

## 2023-11-08 PROCEDURE — 1159F MED LIST DOCD IN RCRD: CPT | Performed by: NURSE PRACTITIONER

## 2023-11-08 PROCEDURE — 99214 OFFICE O/P EST MOD 30 MIN: CPT | Performed by: NURSE PRACTITIONER

## 2023-11-08 PROCEDURE — 1160F RVW MEDS BY RX/DR IN RCRD: CPT | Performed by: NURSE PRACTITIONER

## 2023-11-08 PROCEDURE — G0439 PPPS, SUBSEQ VISIT: HCPCS | Performed by: NURSE PRACTITIONER

## 2023-11-08 RX ORDER — OMEPRAZOLE 40 MG/1
40 CAPSULE, DELAYED RELEASE ORAL DAILY
Qty: 90 CAPSULE | Refills: 1 | Status: SHIPPED | OUTPATIENT
Start: 2023-11-08

## 2023-11-08 RX ORDER — ALPRAZOLAM 1 MG/1
1 TABLET ORAL 3 TIMES DAILY PRN
Qty: 90 TABLET | Refills: 0 | Status: SHIPPED | OUTPATIENT
Start: 2023-11-08

## 2023-11-08 RX ORDER — FUROSEMIDE 40 MG/1
40 TABLET ORAL DAILY
Qty: 90 TABLET | Refills: 1 | Status: SHIPPED | OUTPATIENT
Start: 2023-11-08

## 2023-11-08 RX ORDER — HYDROCODONE BITARTRATE AND ACETAMINOPHEN 10; 325 MG/1; MG/1
1 TABLET ORAL EVERY 6 HOURS PRN
Qty: 120 TABLET | Refills: 0 | Status: SHIPPED | OUTPATIENT
Start: 2023-11-08

## 2023-11-08 NOTE — PROGRESS NOTES
Chief Complaint  Fibromyalgia (1 MO FOLLOW UP)    Subjective        Adela Arauz presents to [facility]  History of Present Illness    The patient is here for her monthly follow-up for chronic pain. She struggles with fibromyalgia, rheumatoid arthritis generalized joint aches and pains. She has had surgery on her right forearm several times, but we think that we got it all cleared this time. She rates her pain as a 7 and is constantly dull pain. She denies any chest pain, dyspnea or palpitations. Her aggravating factors are chronic pain with bending, twisting, standing, and walking.    The treatments, she has tried Tylenol, heat, laying down, rest, position changes and now she is on oral narcotics that seem to keep her pretty well controlled with mild relief. The back pain, sometimes she has numbness and paresthesia in her hips. Progression is gradually worsening, associated symptoms are going to be the arthralgias, the joint pain, and myalgias.The aggravating factors still going to be the same bending, twisting, sitting, standing, and lifting. The treatments tried again the same, lying down, position, rest with mild improvement.     She has anxiety and PTSD. She struggles with anxiety and PTSD with nervous and anxious, decreased concentration, depressed mood, and restlessness. She is manuel if she gets 4 hours of sleep a night and her stress is a lot worse.    She is going through a divorce and he just had a significant injury and so she is having to take care of them. Her sleep quality is poor with many awakenings, compliance is 100 percent.    The patient's gastroesophageal acid reflux, the medicine pretty well controlled, but it is just the stress that keeps her stomach messed up. Her heartburn is chronic. She is not sure if she has experienced choking or dysphagia. She has had it for more than a year, it is chronic, it is intermittent certain foods affects it, and stress  "affects it.     She does not have a history of anemia, or weight loss, but does have muscle weakness.    The patient's risk factors is obesity, caffeine use, and lack of exercise. Treatments tried is OTC antacid or a PPI with mild improvement.     She declines having COVID-19 or influenza or pneumonia vaccines.    The following portions of the patient's history were reviewed and updated as appropriate: allergies, current medications, past family history, past medical history, past social history, past surgical history and problem list.    Patient's (Body mass index is 37.55 kg/m².) indicates that they are morbidly obese (BMI > 40 or > 35 with obesity - related health condition) with health related conditions that include hypertension, dyslipidemias, and GERD . Weight is worsening. BMI is is above average; BMI management plan is completed. We discussed portion control and increasing exercise.     Objective   Vital Signs:  /79 (BP Location: Left arm, Patient Position: Sitting, Cuff Size: Adult)   Pulse 75   Temp 97.1 °F (36.2 °C) (Infrared)   Resp 18   Ht 160 cm (63\")   SpO2 98%   BMI 37.55 kg/m²   Estimated body mass index is 37.55 kg/m² as calculated from the following:    Height as of this encounter: 160 cm (63\").    Weight as of 10/11/23: 96.2 kg (212 lb).               Physical Exam  Vitals and nursing note reviewed.   Constitutional:       General: She is awake.      Appearance: Normal appearance. She is well-developed and well-groomed.   HENT:      Head: Normocephalic and atraumatic.      Right Ear: Hearing, tympanic membrane, ear canal and external ear normal.      Left Ear: Hearing, tympanic membrane, ear canal and external ear normal.      Nose: Nose normal.      Mouth/Throat:      Lips: Pink.      Pharynx: Oropharynx is clear.   Eyes:      General: Lids are normal.      Conjunctiva/sclera: Conjunctivae normal.   Cardiovascular:      Rate and Rhythm: Normal rate and regular rhythm.      Heart " sounds: Normal heart sounds.   Pulmonary:      Effort: Pulmonary effort is normal.      Breath sounds: Normal breath sounds and air entry.   Musculoskeletal:      Cervical back: Normal and full passive range of motion without pain.      Thoracic back: Spasms and tenderness present. Decreased range of motion.      Lumbar back: Spasms and tenderness present. Decreased range of motion.        Back:       Right lower leg: No edema.      Left lower leg: No edema.   Lymphadenopathy:      Head:      Right side of head: No submental, submandibular or tonsillar adenopathy.      Left side of head: No submental, submandibular or tonsillar adenopathy.   Skin:     General: Skin is warm and dry.   Neurological:      Mental Status: She is alert and oriented to person, place, and time.      Sensory: Sensation is intact.      Motor: Motor function is intact.      Coordination: Coordination is intact.      Gait: Gait is intact.   Psychiatric:         Attention and Perception: Attention and perception normal.         Mood and Affect: Mood and affect normal.         Speech: Speech normal.         Behavior: Behavior normal. Behavior is cooperative.         Thought Content: Thought content normal.         Cognition and Memory: Cognition and memory normal.         Judgment: Judgment normal.        Result Review :                   Assessment and Plan   Diagnoses and all orders for this visit:    1. Panic attack  -     ALPRAZolam (XANAX) 1 MG tablet; Take 1 tablet by mouth 3 (Three) Times a Day As Needed for Anxiety.  Dispense: 90 tablet; Refill: 0         -    Practice meditation, and relaxation techniques     2. Generalized edema  -     furosemide (LASIX) 40 MG tablet; Take 1 tablet by mouth Daily.  Dispense: 90 tablet; Refill: 1    3. Chronic pain syndrome  4. DDD (degenerative disc disease), lumbar  -     HYDROcodone-acetaminophen (Norco)  MG per tablet; Take 1 tablet by mouth Every 6 (Six) Hours As Needed for Moderate Pain.   Dispense: 120 tablet; Refill: 0        -       ALMA Report:      As part of this patient's treatment plan, I am prescribing controlled substances. The patient has been made aware of appropriate use of such medications, including potential risk of opiod use, somnolence, limited ability to drive and /or work safely, and potential for dependence or overdose, and opiods should be used sparingly and are not recommended for long term use.  It has also been made clear that these medications are for use by this patient only, without concomitant use of alcohol or other substances unless prescribed.    The patient was provided a Rx for norco as needed for pain control after sustaining multiple back and neck injuries as well as surgeries.     This medication is a narcotic pain medication. This medication is monitored by the federal [facility] and the [facility]. Narcotic medication is commonly abused and many patients can become addicted to this medication.    There are problems with narcotic pain medication including addiction, dependence and tolerance to the pain relief. We discussed appropriate and expected levels of pain.     Narcotics have side effects, including the common side effect of nausea/vomiting if taken on an empty stomach and the problem of constipation that occur with narcotic use.     Narcotics can impair your judgement and therefore you should never drive or operate heavy machinery while using these medications. Treat this medication similar to alcohol, do not take this and drive or you could injure yourself or others.    As an alternative drug, we encourage the use of tylenol and/or NSAIDs (non-steroidal anti-inflammatory drugs) for additional pain relief and as an anti-inflammatory. NSAIDS are drugs such as aleve (naproxen) and motrin (ibuprofen). Narcotics and NSAIDs work differently and can complement each other well after surgery.    In general, NSAIDs and tylenol are much safer drugs than narcotics.  Tylenol (acetaminophen) should not be taken with narcotics as most prescribed narcotics already contain tylenol. The maximum dose of tylenol is 4 grams per day, so this can be used if your narcotic pain medication is used sparingly. Pay close attention to the dosages and ask your pharmacist about the dosage of tylenol.  Some patients can experience GI irritation from NSAID use and patients with kidney problems, previous bleeding from the GI tract, gastric bypass, or certain other conditions may not be able to take these over the counter medications.      Patient has completed prescribing agreement detailing terms of continued prescribing of controlled substances, including monitoring ALMA reports, urine drug screening yearly and random drug screens to monitor patients compliance to treatment plan, and pill counts if necessary. The patient is aware that inappropriate use will result in cessation of prescribing such medications.    Toxassure:  I have ordered a urine drug screen to monitor patients predisposition to and patterns of drug use/misuse in order to establish and maintain the safe and effective use of analgesics in the treatment of chronic pain    ALMA report has been reviewed by: Charlene Huntley, DNP, APRN        Pt is aware of the potential for addiction and dependence.    Addiction was discussed.  The  Risks, benefits and alternative options of drug therapy discussed.  It was reinforced about the risks and benefits of opioids.  It was reinforced that patient may not call early for medication, may not ask for increase in the number of pills given monthly or increase in dosage of medication, may not jump around to different pharmacies or providers and may not receive any narcotics from other providers including ER, or the contract will be broken and narcotics will no longer be prescribed from this facility if any of these criteria has been broken.       5. Gastroesophageal reflux disease without  esophagitis  -     omeprazole (priLOSEC) 40 MG capsule; Take 1 capsule by mouth Daily.  Dispense: 90 capsule; Refill: 1          Follow Up     Return in about 1 month (around 12/8/2023) for Recheck.    Patient was given instructions and counseling regarding her condition or for health maintenance advice. Please see specific information pulled into the AVS if appropriate.         Transcribed from ambient dictation for Charlene Huntley, [place], APRN by Regina Connors.  11/08/23   11:24 CST    Patient or patient representative verbalized consent to the visit recording.  I have personally performed the services described in this document as transcribed by the above individual, and it is both accurate and complete.

## 2023-11-08 NOTE — PROGRESS NOTES
The ABCs of the Annual Wellness Visit  Subsequent Medicare Wellness Visit    Subjective      Adela Arauz is a 58 y.o. female who presents for a Subsequent Medicare Wellness Visit.    The following portions of the patient's history were reviewed and updated as appropriate: allergies, current medications, past family history, past medical history, past social history, past surgical history, and problem list.    Compared to one year ago, the patient feels her physical health is the same.    Compared to one year ago, the patient feels her mental health is the same.    Recent Hospitalizations:  She was not admitted to the hospital during the last year.     Current Medical Providers:  Patient Care Team:  Charlene Huntley, DNP, APRN as PCP - General (Family Medicine)  Yan Seay MD as Consulting Physician (Pulmonary Disease)  Ramírez Marie MD as Consulting Physician (Hematology and Oncology)  Kimberly Montes, RN as Ambulatory  ([facility])  Roisna Henley MSW as  ([facility]) ([facility])    Outpatient Medications Prior to Visit   Medication Sig Dispense Refill   • ALPRAZolam (XANAX) 1 MG tablet Take 1 tablet by mouth 3 (Three) Times a Day As Needed for Anxiety. 90 tablet 0   • betamethasone valerate (VALISONE) 0.1 % ointment Apply  topically to the appropriate area as directed 2 (Two) Times a Day. 30 g 0   • Calcium-Magnesium-Vitamin D 500-250-200 MG-MG-UNIT tablet Take 600 tablets by mouth 2 (Two) Times a Day.     • cholecalciferol (VITAMIN D3) 1000 units tablet Take 1 tablet by mouth Daily.     • ferrous sulfate 324 (65 Fe) MG tablet delayed-release EC tablet Take 1 tablet by mouth Daily With Breakfast. 90 tablet 1   • fluticasone (FLONASE) 50 MCG/ACT nasal spray 2 sprays into the nostril(s) as directed by provider Daily. 1 bottle 3   • furosemide (LASIX) 40 MG tablet Take 1 tablet by mouth Daily. 90 tablet 1   • HYDROcodone-acetaminophen (Norco)   MG per tablet Take 1 tablet by mouth Every 6 (Six) Hours As Needed for Moderate Pain. 120 tablet 0   • leflunomide (ARAVA) 20 MG tablet 1 tablet.     • meloxicam (MOBIC) 15 MG tablet TAKE ONE TABLET BY MOUTH DAILY. 90 tablet 1   • metFORMIN (Glucophage) 500 MG tablet Take 1 tablet by mouth 2 (Two) Times a Day With Meals. 180 tablet 1   • mupirocin (BACTROBAN) 2 % ointment Apply to nostrils and fingernails nighlty x 5 nights same 5 nights monthly x 6 months. 30 g 5   • Narcan 4 MG/0.1ML nasal spray      • omeprazole (priLOSEC) 40 MG capsule Take 1 capsule by mouth Daily. 90 capsule 1   • ondansetron ODT (Zofran ODT) 4 MG disintegrating tablet Place 1 tablet on the tongue Every 8 (Eight) Hours As Needed for Nausea or Vomiting. 40 tablet 0   • pregabalin (LYRICA) 150 MG capsule TAKE ONE CAPSULE BY MOUTH TWICE A  capsule 5   • raloxifene (EVISTA) 60 MG tablet Take 1 tablet by mouth Daily.     • tiZANidine (ZANAFLEX) 4 MG tablet Take 1 tablet by mouth 2 (Two) Times a Day As Needed for Muscle Spasms. 180 tablet 1   • Turmeric 500 MG capsule Take 1 capsule by mouth 2 (Two) Times a Day.     • valACYclovir (VALTREX) 1000 MG tablet Take 1 tablet by mouth 2 (Two) Times a Day. 270 tablet 1     No facility-administered medications prior to visit.       Opioid medication/s are on active medication list.  and I have evaluated her active treatment plan and pain score trends (see table).  Vitals:    11/08/23 0907   PainSc:   7   PainLoc: Back     I have reviewed the chart for potential of high risk medication and harmful drug interactions in the elderly.    Aspirin is not on active medication list.  Aspirin use is not indicated based on review of current medical condition/s. Risk of harm outweighs potential benefits.  .    Patient Active Problem List   Diagnosis   • Chronic pain syndrome   • Rheumatoid arthritis involving multiple sites   • Osteoporosis   • Chest pain   • Multiple lung nodules < 6mm identified 5/2018  "  • Lymphadenopathy   • AAT (alpha-1-antitrypsin) deficiency   • Axillary adenopathy   • Former smoker   • Lymphedema of right arm   • Pain in right arm   • Morbid (severe) obesity due to excess calories   • Anxiety   • Foraminal stenosis due to intervertebral disc disease   • Lumbar stenosis   • Lumbar radiculopathy   • History of lumbar fusion   • Degeneration of lumbar intervertebral disc   • Plantar fasciitis   • Panic attack   • GERD (gastroesophageal reflux disease)     Advance Care Planning   Advance Care Planning     Advance Directive is not on file.  ACP discussion was held with the patient during this visit. Patient does not have an advance directive, information provided.     Objective    Vitals:    23 0907   BP: 109/79   BP Location: Left arm   Patient Position: Sitting   Cuff Size: Adult   Pulse: 75   Resp: 18   Temp: 97.1 °F (36.2 °C)   TempSrc: Infrared   SpO2: 98%   Height: 160 cm (63\")   PainSc:   7   PainLoc: Back     Estimated body mass index is 37.55 kg/m² as calculated from the following:    Height as of this encounter: 160 cm (63\").    Weight as of 10/11/23: 96.2 kg (212 lb).     Does the patient have evidence of cognitive impairment?  No    HEALTH RISK ASSESSMENT    Smoking Status:  Social History     Tobacco Use   Smoking Status Every Day   • Packs/day: 0.50   • Years: 25.00   • Additional pack years: 0.00   • Total pack years: 12.50   • Types: Cigarettes   Smokeless Tobacco Never     Alcohol Consumption:  Social History     Substance and Sexual Activity   Alcohol Use No     Fall Risk Screen:  STEADI Fall Risk Assessment was completed, and patient is at LOW risk for falls.Assessment completed on:2023    Depression Screenin/8/2023     9:05 AM   PHQ-2/PHQ-9 Depression Screening   Little Interest or Pleasure in Doing Things 0-->not at all   Feeling Down, Depressed or Hopeless 0-->not at all   PHQ-9: Brief Depression Severity Measure Score 0       Health Habits and Functional " and Cognitive Screening:       No data to display              Age-appropriate Screening Schedule:  Refer to the list below for future screening recommendations based on patient's age, sex and/or medical conditions. Orders for these recommended tests are listed in the plan section. The patient has been provided with a written plan.    Health Maintenance   Topic Date Due   • COVID-19 Vaccine (1) Never done   • INFLUENZA VACCINE  08/01/2023   • PAP SMEAR  09/25/2023   • ANNUAL WELLNESS VISIT  11/02/2023   • MAMMOGRAM  07/26/2024   • BMI FOLLOWUP  10/11/2024   • DXA SCAN  07/17/2025   • TDAP/TD VACCINES (2 - Td or Tdap) 04/20/2027   • COLORECTAL CANCER SCREENING  11/06/2027   • Pneumococcal Vaccine 0-64 (3 - PPSV23 or PCV20) 01/15/2031   • HEPATITIS C SCREENING  Completed   • ZOSTER VACCINE  Completed      Physical Exam  Vitals and nursing note reviewed.   Constitutional:       General: She is awake.      Appearance: Normal appearance. She is well-developed and well-groomed.   HENT:      Head: Normocephalic and atraumatic.      Right Ear: Hearing, tympanic membrane, ear canal and external ear normal.      Left Ear: Hearing, tympanic membrane, ear canal and external ear normal.      Nose: Nose normal.      Mouth/Throat:      Lips: Pink.      Pharynx: Oropharynx is clear.   Eyes:      General: Lids are normal.      Conjunctiva/sclera: Conjunctivae normal.   Cardiovascular:      Rate and Rhythm: Normal rate and regular rhythm.      Heart sounds: Normal heart sounds.   Pulmonary:      Effort: Pulmonary effort is normal.      Breath sounds: Normal breath sounds and air entry.   Musculoskeletal:      Cervical back: Normal and full passive range of motion without pain.      Thoracic back: Normal.      Lumbar back: Spasms and tenderness present. Decreased range of motion.        Back:       Right lower leg: No edema.      Left lower leg: No edema.   Lymphadenopathy:      Head:      Right side of head: No submental,  submandibular or tonsillar adenopathy.      Left side of head: No submental, submandibular or tonsillar adenopathy.   Skin:     General: Skin is warm and dry.   Neurological:      Mental Status: She is alert.      Sensory: Sensation is intact.      Motor: Motor function is intact.      Coordination: Coordination is intact.      Gait: Gait is intact.   Psychiatric:         Attention and Perception: Attention and perception normal.         Mood and Affect: Mood and affect normal.         Speech: Speech normal.         Behavior: Behavior normal. Behavior is cooperative.         Thought Content: Thought content normal.         Cognition and Memory: Cognition and memory normal.         Judgment: Judgment normal.           Assessment  Diagnoses and all orders for this visit:    1. Medicare annual wellness visit, subsequent (Primary)      Health Maintenance Summary    Postponed - COVID-19 Vaccine: (1): Postponed until 11/10/2023  11/08/2023  Postponed until 11/10/2023 by Charlene Huntley DNP, APRN (Patient Refused)   09/11/2023  Postponed until 9/13/2023 by Leona Knight CMA (Patient Refused)   08/07/2023  Postponed until 8/9/2023 by Leona Knight CMA (Patient Refused)   07/05/2023  Postponed until 7/7/2023 by Leona Knight MA (Patient Refused)   06/16/2023  Postponed until 6/18/2023 by Leona Knight MA (Patient Refused)        Postponed - PAP SMEAR: (Every 3 Years): Postponed until 12/31/2023 11/08/2023  Postponed until 12/31/2023 by Charlene Huntley DNP, APRN (Pending event)   09/25/2020  Pap IG (Image Guided)   09/24/2019  PAPIG, [id number],HPV,RFX16 / 18   04/20/2017  Declined     Postponed - INFLUENZA VACCINE: (Yearly - August to March): Postponed until 3/31/2024  11/08/2023  Postponed until 3/31/2024 by Charlene Huntley DNP, SURYA (Patient Refused)   12/07/2022  Postponed until 3/31/2023 by Leona Knight MA (Patient Refused)   11/02/2022  Postponed until 11/7/2022 by Charlene Huntley DNP, SURYA  (Pending event)   11/01/2021  Imm Admin: FluLaval/Fluzone >6mos   09/25/2020  Done        MAMMOGRAM: (Yearly): Next due on 7/26/2024 07/26/2023  Mammo Screening Digital Tomosynthesis Bilateral With CAD   06/15/2022  Mammo Screening Digital Tomosynthesis Bilateral With CAD   06/11/2021  Mammo Screening Digital Tomosynthesis Bilateral With CAD   10/09/2019  Mammo Diagnostic Digital Tomosynthesis Bilateral With CAD   07/30/2018  Mammo Screening Digital Tomosynthesis Bilateral With CAD        BMI FOLLOWUP: (Yearly): Next due on 10/11/2024  10/11/2023  SmartData: WORKFLOW - QUALITY MEASUREMENT - BMI FOLLOW UP CARE PLAN DOCUMENTED   10/11/2023  SmartData: WORKFLOW - QUALITY MEASUREMENT - DOCUMENTED WEIGHT FOLLOW-UP PLAN   05/08/2023  SmartData: WORKFLOW - QUALITY MEASUREMENT - BMI FOLLOW UP CARE PLAN DOCUMENTED   05/08/2023  SmartData: WORKFLOW - QUALITY MEASUREMENT - DOCUMENTED WEIGHT FOLLOW-UP PLAN   04/07/2023  SmartData: WORKFLOW - QUALITY MEASUREMENT - BMI FOLLOW UP CARE PLAN DOCUMENTED        ANNUAL WELLNESS VISIT: (Yearly): Next due on 11/8/2024 11/08/2023  Done   11/02/2022  Done   09/12/2022  Postponed until 9/13/2022 by Leona Knight MA (Patient Refused)   08/12/2022  Postponed until 8/13/2022 by Leona Knight MA (Patient Refused)   01/07/2022  Postponed until 2/6/2022 by Halle Caldwell MA (Pending event)        DXA SCAN: (Every 2 Years): Next due on 7/17/2025 07/17/2023  SCANNED - DEXA   02/07/2022  Postponed until 9/1/2022 by Desirae Ryan MA (Pending event)   01/07/2022  Postponed until 1/28/2022 by Halle Caldwell MA (Pending event)   10/09/2019  DEXA Bone Density Axial   08/07/2017  DEXA Bone Density Axial        TDAP/TD VACCINES: (2 - Td or Tdap): Next due on 4/20/2027 04/20/2017  Declined     COLORECTAL CANCER SCREENING: (COLONOSCOPY - Every 10 Years): Next due on 11/6/2027 11/06/2017  SCANNED - COLONOSCOPY   11/06/2017  COLONOSCOPY (Done)   04/20/2017  COLONOSCOPY (Declined)      Pneumococcal Vaccine 0-64: (3 - PPSV23 or PCV20): Next due on 1/15/2031  11/20/2019  Imm Admin: Pneumococcal Polysaccharide (PPSV23)   05/04/2017  Imm Admin: Pneumococcal Conjugate 13-Valent (PCV13)   01/15/2015  Imm Admin: Pneumococcal Polysaccharide (PPSV23)     ZOSTER VACCINE: (Series Information): Completed  05/18/2020  Imm Admin: Shingrix   03/06/2020  Imm Admin: Shingrix   02/21/2020  Postponed until 3/16/2021 by Charlene Huntley DNP, APRN (Pending event)   05/09/2017  Imm Admin: Zostavax     HEPATITIS C SCREENING: Completed  06/22/2020  Hep C Virus Ab component of Hepatitis C Antibody     CMS Preventative Services Quick Reference  Risk Factors Identified During Encounter:    Alcohol Misuse:  denies  Chronic Pain: Chronic Pain Educational material Discussed and Shared in After Visit Summary for Patient.  Depression/Dysphoria: Current medication is effective, no change recommended  Fall Risk-High or Moderate: Discussed Fall Prevention in the home  Polypharmacy: Medication List reviewed and Medications are appropriate for patient  Dental Screening Recommended: unknown  Vision Screening Recommended: UTD    The above risks/problems have been discussed with the patient.  Pertinent information has been shared with the patient in the After Visit Summary.    Follow Up:   Next Medicare Wellness visit to be scheduled in 1 year.      An After Visit Summary and PPPS were made available to the patient.    Electronically signed by Charlene Hunltey DNP, APRN, 11/08/23, 9:43 AM CST.

## 2023-11-13 DIAGNOSIS — E66.01 MORBID (SEVERE) OBESITY DUE TO EXCESS CALORIES: ICD-10-CM

## 2023-11-13 NOTE — TELEPHONE ENCOUNTER
Rx Refill Note  Requested Prescriptions     Pending Prescriptions Disp Refills    metFORMIN (GLUCOPHAGE) 500 MG tablet [Pharmacy Med Name: METFORMIN  MG TABS 500 Tablet] 180 tablet 1     Sig: Take 1 tablet by mouth 2 (Two) Times a Day With Meals.      Last office visit with prescribing clinician: 11/8/2023   Next office visit with prescribing clinician: 12/15/2023                         Would you like a call back once the refill request has been completed: [] Yes [] No    If the office needs to give you a call back, can they leave a voicemail: [] Yes [] No    Desirae Ryan MA  11/13/23, 11:57 CST

## 2023-12-02 NOTE — PROGRESS NOTES
Please call pt and tell her results of xrays.   I discussed the xray with her in office but did not have an official report.         1. Scoliosis with mild lateral subluxation at L2-L3 and L4-L5.  2. Mild degenerative changes.  This report was finalized on 11/30/2018 12:04 by Dr. Nikita Olivares MD.    Please tell her to think about referral to ortho and we can discuss it at the next visit for chronic pain. The patient is a 42y Female complaining of bloody stools.

## 2023-12-15 ENCOUNTER — OFFICE VISIT (OUTPATIENT)
Dept: FAMILY MEDICINE CLINIC | Facility: CLINIC | Age: 58
End: 2023-12-15
Payer: MEDICARE

## 2023-12-15 VITALS
BODY MASS INDEX: 36.96 KG/M2 | DIASTOLIC BLOOD PRESSURE: 68 MMHG | WEIGHT: 208.6 LBS | OXYGEN SATURATION: 98 % | TEMPERATURE: 97.7 F | HEIGHT: 63 IN | HEART RATE: 75 BPM | RESPIRATION RATE: 20 BRPM | SYSTOLIC BLOOD PRESSURE: 110 MMHG

## 2023-12-15 DIAGNOSIS — M51.36 DDD (DEGENERATIVE DISC DISEASE), LUMBAR: ICD-10-CM

## 2023-12-15 DIAGNOSIS — G89.4 CHRONIC PAIN SYNDROME: ICD-10-CM

## 2023-12-15 DIAGNOSIS — M06.9 RHEUMATOID ARTHRITIS, INVOLVING UNSPECIFIED SITE, UNSPECIFIED WHETHER RHEUMATOID FACTOR PRESENT: Primary | ICD-10-CM

## 2023-12-15 DIAGNOSIS — F41.0 PANIC ATTACK: ICD-10-CM

## 2023-12-15 PROCEDURE — 1159F MED LIST DOCD IN RCRD: CPT | Performed by: NURSE PRACTITIONER

## 2023-12-15 PROCEDURE — 99214 OFFICE O/P EST MOD 30 MIN: CPT | Performed by: NURSE PRACTITIONER

## 2023-12-15 PROCEDURE — 1160F RVW MEDS BY RX/DR IN RCRD: CPT | Performed by: NURSE PRACTITIONER

## 2023-12-15 RX ORDER — HYDROCODONE BITARTRATE AND ACETAMINOPHEN 10; 325 MG/1; MG/1
1 TABLET ORAL EVERY 6 HOURS PRN
Qty: 120 TABLET | Refills: 0 | Status: SHIPPED | OUTPATIENT
Start: 2023-12-15

## 2023-12-15 RX ORDER — TIZANIDINE 4 MG/1
4 TABLET ORAL 2 TIMES DAILY PRN
Qty: 180 TABLET | Refills: 1 | Status: SHIPPED | OUTPATIENT
Start: 2023-12-15

## 2023-12-15 RX ORDER — ALPRAZOLAM 1 MG/1
1 TABLET ORAL 3 TIMES DAILY PRN
Qty: 90 TABLET | Refills: 0 | Status: SHIPPED | OUTPATIENT
Start: 2023-12-15
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
## 2023-12-15 NOTE — PATIENT INSTRUCTIONS
Obesity, Adult  Obesity is having too much body fat. Being obese means that your weight is more than what is healthy for you.   BMI (body mass index) is a number that explains how much body fat you have. If you have a BMI of 30 or more, you are obese.  Obesity can cause serious health problems, such as:  Stroke.  Coronary artery disease (CAD).  Type 2 diabetes.  Some types of cancer.  High blood pressure (hypertension).  High cholesterol.  Gallbladder stones.  Obesity can also contribute to:  Osteoarthritis.  Sleep apnea.  Infertility problems.  What are the causes?  Eating meals each day that are high in calories, sugar, and fat.  Drinking a lot of drinks that have sugar in them.  Being born with genes that may make you more likely to become obese.  Having a medical condition that causes obesity.  Taking certain medicines.  Sitting a lot (having a sedentary lifestyle).  Not getting enough sleep.  What increases the risk?  Having a family history of obesity.  Living in an area with limited access to:  [place], recreation centers, or sidewalks.  Healthy food choices, such as grocery stores and farmers' markets.  What are the signs or symptoms?  The main sign is having too much body fat.  How is this treated?  Treatment for this condition often includes changing your lifestyle. Treatment may include:  Changing your diet. This may include making a healthy meal plan.  Exercise. This may include activity that causes your heart to beat faster (aerobic exercise) and strength training. Work with your doctor to design a program that works for you.  Medicine to help you lose weight. This may be used if you are not able to lose one pound a week after 6 weeks of healthy eating and more exercise.  Treating conditions that cause the obesity.  Surgery. Options may include gastric banding and gastric bypass. This may be done if:  Other treatments have not helped to improve your condition.  You have a BMI of 40 or higher.  You have  life-threatening health problems related to obesity.  Follow these instructions at home:  Eating and drinking    Follow advice from your doctor about what to eat and drink. Your doctor may tell you to:  Limit fast food, sweets, and processed snack foods.  Choose low-fat options. For example, choose low-fat milk instead of whole milk.  Eat five or more servings of fruits or vegetables each day.  Eat at home more often. This gives you more control over what you eat.  Choose healthy foods when you eat out.  Learn to read food labels. This will help you learn how much food is in one serving.  Keep low-fat snacks available.  Avoid drinks that have a lot of sugar in them. These include soda, fruit juice, iced tea with sugar, and flavored milk.  Drink enough water to keep your pee (urine) pale yellow.  Do not go on fad diets.  Physical activity  Exercise often, as told by your doctor. Most adults should get up to 150 minutes of moderate-intensity exercise every week.Ask your doctor:  What types of exercise are safe for you.  How often you should exercise.  Warm up and stretch before being active.  Do slow stretching after being active (cool down).  Rest between times of being active.  Lifestyle  Work with your doctor and a food expert (dietitian) to set a weight-loss goal that is best for you.  Limit your screen time.  Find ways to reward yourself that do not involve food.  Do not drink alcohol if:  Your doctor tells you not to drink.  You are pregnant, may be pregnant, or are planning to become pregnant.  If you drink alcohol:  Limit how much you have to:  0-1 drink a day for women.  0-2 drinks a day for men.  Know how much alcohol is in your drink. In the [place] one drink equals one 12 oz bottle of beer (355 mL), one 5 oz glass of wine (148 mL), or one 1½ oz glass of hard liquor (44 mL).  General instructions  Keep a weight-loss journal. This can help you keep track of:  The food that you eat.  How much exercise you  get.  Take over-the-counter and prescription medicines only as told by your doctor.  Take vitamins and supplements only as told by your doctor.  Think about joining a support group.  Pay attention to your mental health as obesity can lead to depression or self esteem issues.  Keep all follow-up visits.  Contact a doctor if:  You cannot meet your weight-loss goal after you have changed your diet and lifestyle for 6 weeks.  You are having trouble breathing.  Summary  Obesity is having too much body fat.  Being obese means that your weight is more than what is healthy for you.  Work with your doctor to set a weight-loss goal.  Get regular exercise as told by your doctor.  This information is not intended to replace advice given to you by your health care provider. Make sure you discuss any questions you have with your health care provider.  Document Revised: 07/26/2022 Document Reviewed: 07/26/2022  Elsevier Patient Education © 2023 [employer]

## 2024-01-16 ENCOUNTER — OFFICE VISIT (OUTPATIENT)
Dept: FAMILY MEDICINE CLINIC | Facility: CLINIC | Age: 59
End: 2024-01-16
Payer: MEDICARE

## 2024-01-16 VITALS
SYSTOLIC BLOOD PRESSURE: 91 MMHG | WEIGHT: 208 LBS | OXYGEN SATURATION: 99 % | DIASTOLIC BLOOD PRESSURE: 67 MMHG | HEIGHT: 63 IN | TEMPERATURE: 98.5 F | BODY MASS INDEX: 36.86 KG/M2 | RESPIRATION RATE: 18 BRPM

## 2024-01-16 DIAGNOSIS — J01.00 SUBACUTE MAXILLARY SINUSITIS: ICD-10-CM

## 2024-01-16 DIAGNOSIS — J40 BRONCHITIS: ICD-10-CM

## 2024-01-16 DIAGNOSIS — F41.0 PANIC ATTACK: ICD-10-CM

## 2024-01-16 DIAGNOSIS — F41.9 ANXIETY: ICD-10-CM

## 2024-01-16 DIAGNOSIS — K21.9 GASTROESOPHAGEAL REFLUX DISEASE WITHOUT ESOPHAGITIS: ICD-10-CM

## 2024-01-16 DIAGNOSIS — M79.7 FIBROMYALGIA: ICD-10-CM

## 2024-01-16 DIAGNOSIS — M51.36 DDD (DEGENERATIVE DISC DISEASE), LUMBAR: ICD-10-CM

## 2024-01-16 DIAGNOSIS — G89.4 CHRONIC PAIN SYNDROME: ICD-10-CM

## 2024-01-16 DIAGNOSIS — M06.9 RHEUMATOID ARTHRITIS, INVOLVING UNSPECIFIED SITE, UNSPECIFIED WHETHER RHEUMATOID FACTOR PRESENT: ICD-10-CM

## 2024-01-16 DIAGNOSIS — F41.0 PANIC ATTACK: Primary | ICD-10-CM

## 2024-01-16 DIAGNOSIS — F43.10 PTSD (POST-TRAUMATIC STRESS DISORDER): ICD-10-CM

## 2024-01-16 DIAGNOSIS — E66.01 MORBID (SEVERE) OBESITY DUE TO EXCESS CALORIES: ICD-10-CM

## 2024-01-16 PROCEDURE — 1159F MED LIST DOCD IN RCRD: CPT | Performed by: NURSE PRACTITIONER

## 2024-01-16 PROCEDURE — 1160F RVW MEDS BY RX/DR IN RCRD: CPT | Performed by: NURSE PRACTITIONER

## 2024-01-16 PROCEDURE — 99214 OFFICE O/P EST MOD 30 MIN: CPT | Performed by: NURSE PRACTITIONER

## 2024-01-16 RX ORDER — ALPRAZOLAM 1 MG/1
1 TABLET ORAL 3 TIMES DAILY PRN
Qty: 90 TABLET | Refills: 5 | OUTPATIENT
Start: 2024-01-16

## 2024-01-16 RX ORDER — AMOXICILLIN 500 MG/1
500 CAPSULE ORAL 2 TIMES DAILY
Qty: 20 CAPSULE | Refills: 0 | Status: SHIPPED | OUTPATIENT
Start: 2024-01-16 | End: 2024-01-26

## 2024-01-16 RX ORDER — HYDROCODONE BITARTRATE AND ACETAMINOPHEN 10; 325 MG/1; MG/1
1 TABLET ORAL EVERY 6 HOURS PRN
Qty: 120 TABLET | Refills: 0 | Status: SHIPPED | OUTPATIENT
Start: 2024-01-16

## 2024-01-16 RX ORDER — ALPRAZOLAM 1 MG/1
1 TABLET ORAL 3 TIMES DAILY PRN
Qty: 90 TABLET | Refills: 0 | Status: SHIPPED | OUTPATIENT
Start: 2024-01-16

## 2024-01-16 RX ORDER — DEXTROMETHORPHAN HYDROBROMIDE AND PROMETHAZINE HYDROCHLORIDE 15; 6.25 MG/5ML; MG/5ML
5 SYRUP ORAL 4 TIMES DAILY PRN
Qty: 120 ML | Refills: 0 | Status: SHIPPED | OUTPATIENT
Start: 2024-01-16

## 2024-01-16 RX ORDER — OMEPRAZOLE 40 MG/1
40 CAPSULE, DELAYED RELEASE ORAL DAILY
Qty: 90 CAPSULE | Refills: 1 | OUTPATIENT
Start: 2024-01-16

## 2024-01-16 NOTE — TELEPHONE ENCOUNTER
Xanax- refill is a duplicate  Omeprazole- refill too soon    Rx Refill Note  Requested Prescriptions     Pending Prescriptions Disp Refills    omeprazole (priLOSEC) 40 MG capsule [Pharmacy Med Name: OMEPRAZOLE 40 MG CAP 40 Capsule] 90 capsule 1     Sig: Take 1 capsule by mouth Daily.    ALPRAZolam (XANAX) 1 MG tablet [Pharmacy Med Name: ALPRAZOLAM 1 MG TAB 1 Tablet] 90 tablet 5     Sig: Take 1 tablet by mouth 3 (Three) Times a Day As Needed for Anxiety.      Last office visit with prescribing clinician: 12/15/2023   Last telemedicine visit with prescribing clinician: Visit date not found   Next office visit with prescribing clinician: 1/16/2024                         Would you like a call back once the refill request has been completed: [] Yes [] No    If the office needs to give you a call back, can they leave a voicemail: [] Yes [] No    Desirae Ryan MA  01/16/24, 11:22 CST

## 2024-01-16 NOTE — PROGRESS NOTES
Chief Complaint  Rheumatoid Arthritis (Pt here for follow up)    Subjective        Adela Arauz presents to [facility]  History of Present Illness    The patient presents to the clinic for a sinus infection that she has had for approximately 2 weeks.    She denies any fever, chills, nausea, vomiting, or diarrhea. Her ears clog up and then open up. When she blows her nose, it feels like her eardrums are going to explode. She has a lot of pressure in her cheeks. She has been coughing, especially in the morning, which she thinks is from the sinus drainage with mild improvement. She is hoarse today. She denies dyspnea. She has been different kinds of over-the-counter medications with little improvement.    She is also here for chronic rheumatoid arthritis that she has had for more than a year. It is constant. With the cold weather, her fibromyalgia is becoming worse and she is really struggling with it today. Associated symptoms are arthralgia as we said she has congestion, cough and myalgia. Aggravation is caused by any movement, walking, standing, twisting, bending, and coughing. She has tried narcotics, rest, position changes, but the fibromyalgia and the rheumatoid arthritis continue. She experiences pain in her wrists, elbows, knees, shoulders, and back. For the last 2 days, she feels like someone has had a baseball in the back of her lower part of her back. She rates her pain as an 8 out of 10. She has tried Biofreeze.     She has PTSD, which she has had for a while and is currently on alprazolam secondary to finding her brother who had committed suicide. She has decreased concentration, depressed mood, excessive worry, nervousness, and anxious, muscle tension, and restlessness. She wakes up a lot, nonrestorative. She is compliant with doctor's appointments, not calling early for medicines, and does not go to different pharmacies.     The following portions of the patient's  "history were reviewed and updated as appropriate: allergies, current medications, past family history, past medical history, past social history, past surgical history and problem list.    Class 2 Severe Obesity (BMI >=35 and <=39.9). Obesity-related health conditions include the following: hypertension, dyslipidemias, and GERD. Obesity is improving with lifestyle modifications. BMI is is above average; BMI management plan is completed. We discussed portion control and increasing exercise.    Objective   Vital Signs:  BP 91/67 (BP Location: Left arm, Patient Position: Sitting, Cuff Size: Large Adult)   Temp 98.5 °F (36.9 °C) (Infrared)   Resp 18   Ht 160 cm (63\") Comment: per patient  Wt 94.3 kg (208 lb)   SpO2 99%   BMI 36.85 kg/m²   Estimated body mass index is 36.85 kg/m² as calculated from the following:    Height as of this encounter: 160 cm (63\").    Weight as of this encounter: 94.3 kg (208 lb).       Physical Exam  Vitals and nursing note reviewed.   Constitutional:       General: She is awake.      Appearance: She is well-developed and well-groomed. She is obese. She is ill-appearing.   HENT:      Head: Normocephalic and atraumatic.      Right Ear: Hearing, tympanic membrane, ear canal and external ear normal.      Left Ear: Hearing, tympanic membrane, ear canal and external ear normal.      Nose: Congestion present.      Right Turbinates: Enlarged and swollen.      Left Turbinates: Enlarged and swollen.      Right Sinus: Maxillary sinus tenderness and frontal sinus tenderness present.      Left Sinus: Maxillary sinus tenderness and frontal sinus tenderness present.      Mouth/Throat:      Lips: Pink.      Mouth: Mucous membranes are moist.      Pharynx: Posterior oropharyngeal erythema present.   Eyes:      General: Lids are normal.      Conjunctiva/sclera: Conjunctivae normal.   Cardiovascular:      Rate and Rhythm: Normal rate and regular rhythm.      Heart sounds: Normal heart sounds.   Pulmonary: "      Effort: Pulmonary effort is normal.      Breath sounds: Normal breath sounds and air entry.   Musculoskeletal:      Right shoulder: Tenderness present. Decreased range of motion.      Left shoulder: Decreased range of motion.      Right elbow: Decreased range of motion. Tenderness present.      Left elbow: Decreased range of motion. Tenderness present.      Right wrist: Tenderness present. Decreased range of motion.      Left wrist: Tenderness present. Decreased range of motion.        Arms:       Cervical back: Normal and full passive range of motion without pain.      Thoracic back: Normal.      Lumbar back: Spasms and tenderness present. Decreased range of motion.        Back:       Right hip: Tenderness present. Decreased range of motion.      Left hip: Tenderness present. Decreased range of motion.      Right knee: Decreased range of motion. Tenderness present. No medial joint line, lateral joint line, MCL, LCL, ACL or PCL tenderness.      Left knee: Decreased range of motion. Tenderness present. No medial joint line, lateral joint line, MCL, LCL, ACL or PCL tenderness.      Right lower leg: No edema.      Left lower leg: No edema.        Legs:    Lymphadenopathy:      Head:      Right side of head: No submental, submandibular or tonsillar adenopathy.      Left side of head: No submental, submandibular or tonsillar adenopathy.   Skin:     General: Skin is warm and dry.   Neurological:      Mental Status: She is alert and oriented to person, place, and time.      Sensory: Sensation is intact.      Motor: Motor function is intact.      Coordination: Coordination is intact.      Gait: Gait is intact.   Psychiatric:         Attention and Perception: Attention and perception normal.         Mood and Affect: Mood and affect normal.         Speech: Speech normal.         Behavior: Behavior normal. Behavior is cooperative.         Thought Content: Thought content normal.         Cognition and Memory: Cognition and  memory normal.         Judgment: Judgment normal.        Result Review :                     Assessment and Plan     Diagnoses and all orders for this visit:    1. Panic attack (Primary)  -     ALPRAZolam (XANAX) 1 MG tablet; Take 1 tablet by mouth 3 (Three) Times a Day As Needed for Anxiety.  Dispense: 90 tablet; Refill: 0    2. Anxiety    3. PTSD (post-traumatic stress disorder)    4. Chronic pain syndrome  -     HYDROcodone-acetaminophen (Norco)  MG per tablet; Take 1 tablet by mouth Every 6 (Six) Hours As Needed for Moderate Pain.  Dispense: 120 tablet; Refill: 0    5. Fibromyalgia    6. DDD (degenerative disc disease), lumbar  -     HYDROcodone-acetaminophen (Norco)  MG per tablet; Take 1 tablet by mouth Every 6 (Six) Hours As Needed for Moderate Pain.  Dispense: 120 tablet; Refill: 0    7. Rheumatoid arthritis, involving unspecified site, unspecified whether rheumatoid factor present  -     HYDROcodone-acetaminophen (Norco)  MG per tablet; Take 1 tablet by mouth Every 6 (Six) Hours As Needed for Moderate Pain.  Dispense: 120 tablet; Refill: 0    8. Bronchitis  -     promethazine-dextromethorphan (PROMETHAZINE-DM) 6.25-15 MG/5ML syrup; Take 5 mL by mouth 4 (Four) Times a Day As Needed for Cough.  Dispense: 120 mL; Refill: 0    9. Subacute maxillary sinusitis  -     amoxicillin (AMOXIL) 500 MG capsule; Take 1 capsule by mouth 2 (Two) Times a Day for 10 days.  Dispense: 20 capsule; Refill: 0    10. Morbid (severe) obesity due to excess calories        The patient has read and signed the [facility] Controlled Substance Contract.  I will continue to see patient for regular follow up appointments.  They are well controlled on their medication.  ALMA is updated every 3 months. The patient is aware of the potential for addiction and dependence.  Alma checked         Follow Up     Return in about 1 month (around 2/16/2024).  Patient was given instructions and counseling regarding her condition  or for health maintenance advice. Please see specific information pulled into the AVS if appropriate.         Transcribed from ambient dictation for Charlene Huntley DNP, SURYA by Melissa Wing.  01/16/24   11:46 CST    Patient or patient representative verbalized consent to the visit recording.  I have personally performed the services described in this document as transcribed by the above individual, and it is both accurate and complete.     Electronically signed by Charlene Huntley DNP, APRN, 01/16/24, 2:27 PM CST.

## 2024-01-26 DIAGNOSIS — B37.31 YEAST VAGINITIS: Primary | ICD-10-CM

## 2024-01-26 RX ORDER — FLUCONAZOLE 150 MG/1
150 TABLET ORAL ONCE
Qty: 1 TABLET | Refills: 1 | Status: SHIPPED | OUTPATIENT
Start: 2024-01-26 | End: 2024-01-26

## 2024-01-31 DIAGNOSIS — M51.36 DDD (DEGENERATIVE DISC DISEASE), LUMBAR: ICD-10-CM

## 2024-01-31 RX ORDER — MELOXICAM 15 MG/1
TABLET ORAL
Qty: 90 TABLET | Refills: 1 | OUTPATIENT
Start: 2024-01-31

## 2024-01-31 NOTE — TELEPHONE ENCOUNTER
Too early to refill.     Rx Refill Note  Requested Prescriptions     Pending Prescriptions Disp Refills    meloxicam (MOBIC) 15 MG tablet [Pharmacy Med Name: MELOXICAM 15 MG TAB 15 Tablet] 90 tablet 1     Sig: TAKE ONE TABLET BY MOUTH DAILY.      Last office visit with prescribing clinician: 1/16/2024   Last telemedicine visit with prescribing clinician: Visit date not found   Next office visit with prescribing clinician: 2/16/2024                         Would you like a call back once the refill request has been completed: [] Yes [] No    If the office needs to give you a call back, can they leave a voicemail: [] Yes [] No    Marisol Nguyen LPN  01/31/24, 09:31 CST

## 2024-02-08 DIAGNOSIS — B37.31 YEAST VAGINITIS: ICD-10-CM

## 2024-02-08 DIAGNOSIS — R60.1 GENERALIZED EDEMA: ICD-10-CM

## 2024-02-09 RX ORDER — FUROSEMIDE 40 MG/1
40 TABLET ORAL DAILY
Qty: 90 TABLET | Refills: 1 | Status: SHIPPED | OUTPATIENT
Start: 2024-02-09

## 2024-02-09 RX ORDER — FLUCONAZOLE 150 MG/1
150 TABLET ORAL ONCE
Qty: 1 TABLET | Refills: 1 | Status: SHIPPED | OUTPATIENT
Start: 2024-02-09 | End: 2024-02-09

## 2024-02-09 NOTE — TELEPHONE ENCOUNTER
Rx Refill Note  Requested Prescriptions     Pending Prescriptions Disp Refills    fluconazole (DIFLUCAN) 150 MG tablet [Pharmacy Med Name: FLUCONAZOLE 150 MG TABS 150 Tablet] 1 tablet 1     Sig: Take 1 tablet by mouth 1 (One) Time for 1 dose.     Signed Prescriptions Disp Refills    furosemide (LASIX) 40 MG tablet 90 tablet 1     Sig: Take 1 tablet by mouth Daily.     Authorizing Provider: CHRISTIANO WYMAN     Ordering User: DEEPIKA KNIGHT      Last office visit with prescribing clinician: 1/16/2024    Next office visit with prescribing clinician: 2/16/2024       Deepika Knight CMA  02/09/24, 11:46 CST

## 2024-02-16 ENCOUNTER — OFFICE VISIT (OUTPATIENT)
Dept: FAMILY MEDICINE CLINIC | Facility: CLINIC | Age: 59
End: 2024-02-16
Payer: MEDICARE

## 2024-02-16 VITALS
TEMPERATURE: 98.6 F | SYSTOLIC BLOOD PRESSURE: 116 MMHG | DIASTOLIC BLOOD PRESSURE: 72 MMHG | RESPIRATION RATE: 18 BRPM | HEIGHT: 63 IN | BODY MASS INDEX: 36.32 KG/M2 | HEART RATE: 97 BPM | OXYGEN SATURATION: 99 % | WEIGHT: 205 LBS

## 2024-02-16 DIAGNOSIS — F43.10 PTSD (POST-TRAUMATIC STRESS DISORDER): Primary | ICD-10-CM

## 2024-02-16 DIAGNOSIS — G89.4 CHRONIC PAIN SYNDROME: ICD-10-CM

## 2024-02-16 DIAGNOSIS — M51.36 DDD (DEGENERATIVE DISC DISEASE), LUMBAR: ICD-10-CM

## 2024-02-16 DIAGNOSIS — F41.0 PANIC ATTACK: ICD-10-CM

## 2024-02-16 DIAGNOSIS — M06.9 RHEUMATOID ARTHRITIS, INVOLVING UNSPECIFIED SITE, UNSPECIFIED WHETHER RHEUMATOID FACTOR PRESENT: ICD-10-CM

## 2024-02-16 PROCEDURE — 99214 OFFICE O/P EST MOD 30 MIN: CPT | Performed by: NURSE PRACTITIONER

## 2024-02-16 RX ORDER — ALPRAZOLAM 1 MG/1
1 TABLET ORAL 3 TIMES DAILY PRN
Qty: 90 TABLET | Refills: 0 | Status: SHIPPED | OUTPATIENT
Start: 2024-02-16

## 2024-02-16 RX ORDER — HYDROCODONE BITARTRATE AND ACETAMINOPHEN 10; 325 MG/1; MG/1
1 TABLET ORAL EVERY 6 HOURS PRN
Qty: 120 TABLET | Refills: 0 | Status: SHIPPED | OUTPATIENT
Start: 2024-02-16

## 2024-03-15 ENCOUNTER — OFFICE VISIT (OUTPATIENT)
Dept: FAMILY MEDICINE CLINIC | Facility: CLINIC | Age: 59
End: 2024-03-15
Payer: MEDICARE

## 2024-03-15 VITALS
BODY MASS INDEX: 35.97 KG/M2 | SYSTOLIC BLOOD PRESSURE: 105 MMHG | HEART RATE: 84 BPM | TEMPERATURE: 98.4 F | DIASTOLIC BLOOD PRESSURE: 65 MMHG | RESPIRATION RATE: 18 BRPM | OXYGEN SATURATION: 99 % | HEIGHT: 63 IN | WEIGHT: 203 LBS

## 2024-03-15 DIAGNOSIS — G89.4 CHRONIC PAIN SYNDROME: ICD-10-CM

## 2024-03-15 DIAGNOSIS — F41.0 PANIC ATTACK: ICD-10-CM

## 2024-03-15 DIAGNOSIS — M06.9 RHEUMATOID ARTHRITIS, INVOLVING UNSPECIFIED SITE, UNSPECIFIED WHETHER RHEUMATOID FACTOR PRESENT: ICD-10-CM

## 2024-03-15 DIAGNOSIS — M51.36 DDD (DEGENERATIVE DISC DISEASE), LUMBAR: ICD-10-CM

## 2024-03-15 PROCEDURE — 99214 OFFICE O/P EST MOD 30 MIN: CPT | Performed by: NURSE PRACTITIONER

## 2024-03-15 PROCEDURE — 1160F RVW MEDS BY RX/DR IN RCRD: CPT | Performed by: NURSE PRACTITIONER

## 2024-03-15 PROCEDURE — 1159F MED LIST DOCD IN RCRD: CPT | Performed by: NURSE PRACTITIONER

## 2024-03-15 RX ORDER — ALPRAZOLAM 1 MG/1
1 TABLET ORAL 3 TIMES DAILY PRN
Qty: 90 TABLET | Refills: 0 | Status: SHIPPED | OUTPATIENT
Start: 2024-03-15

## 2024-03-15 RX ORDER — HYDROCODONE BITARTRATE AND ACETAMINOPHEN 10; 325 MG/1; MG/1
1 TABLET ORAL EVERY 6 HOURS PRN
Qty: 120 TABLET | Refills: 0 | Status: SHIPPED | OUTPATIENT
Start: 2024-03-15

## 2024-03-15 NOTE — PROGRESS NOTES
Chief Complaint  PTSD (Pt here for follow up)    Subjective        Adela Arauz presents to [facility]  History of Present IllnessThe patient is a 59-year-old female who is here for follow up for chronic pain and anxiety. Past medical history includes anxiety, PTSD, stable with alprazolam; iron deficiency anemia, stable with ferrous sulfate; chronic pain, stable with Norco and meloxicam; lower extremity edema, stable with Lasix; type 2 diabetes mellitus, stable with metformin; GERD, stable with omeprazole; neuropathy, stable with Lyrica and tizanidine. She does get fever blisters and we treat with Valtrex. She suffers from fibromyalgia, all her joints, her back, and her hands.    She is hurting today. The pain is constant. She has had pain for more than 1 year, gradually worsening. Associated symptoms are joint inflammation, myalgias, and pain in her hands and her wrist. Aggravating factors are twisting, bending, sitting, standing, and doing any activity. Treatments are rest, oral narcotics, and NSAIDs with mild improvement.    She does not check her sugar. Symptoms are stable. No blurred vision. No polydipsia, polyphagia, or polyuria. Symptoms are stable. She has had no hypoglycemic complications or blackouts or hospitalizations. She does not have any diabetic complications of kidney disease or CVA. Progression since onset has been about the same. CAD risks are going to be diabetes mellitus, dyslipidemia, obesity, sedentary lifestyle, stress, postmenopausal. Current treatments is metformin. She does not do any of her sugar checks. She has not had a visit with the dietitian. She tries to ambulate up and down the driveway. She is on Lasix. She had an eye exam in 11/2023. She does not see podiatry.      The following portions of the patient's history were reviewed and updated as appropriate: allergies, current medications, past family history, past medical history, past social  "history, past surgical history and problem list.    Body mass index is 35.96 kg/m².    Objective   Vital Signs:  /65 (BP Location: Left arm, Patient Position: Sitting, Cuff Size: Large Adult)   Pulse 84   Temp 98.4 °F (36.9 °C) (Infrared)   Resp 18   Ht 160 cm (63\") Comment: per patient  Wt 92.1 kg (203 lb)   SpO2 99%   BMI 35.96 kg/m²   Estimated body mass index is 35.96 kg/m² as calculated from the following:    Height as of this encounter: 160 cm (63\").    Weight as of this encounter: 92.1 kg (203 lb).       Physical Exam  Vitals and nursing note reviewed.   Constitutional:       General: She is awake.      Appearance: Normal appearance. She is well-developed and well-groomed.   HENT:      Head: Normocephalic and atraumatic.      Right Ear: Hearing, tympanic membrane, ear canal and external ear normal.      Left Ear: Hearing, tympanic membrane, ear canal and external ear normal.      Nose: Nose normal.      Mouth/Throat:      Lips: Pink.      Pharynx: Oropharynx is clear.   Eyes:      General: Lids are normal.      Conjunctiva/sclera: Conjunctivae normal.   Cardiovascular:      Rate and Rhythm: Normal rate and regular rhythm.      Heart sounds: Normal heart sounds.   Pulmonary:      Effort: Pulmonary effort is normal.      Breath sounds: Normal breath sounds and air entry.   Musculoskeletal:      Right shoulder: Tenderness present. Decreased range of motion.      Left shoulder: Tenderness present. Decreased range of motion.      Right elbow: Decreased range of motion. Tenderness present.      Left elbow: Decreased range of motion. Tenderness present.      Right wrist: Tenderness present. Decreased range of motion.      Left wrist: Tenderness present. Decreased range of motion.        Arms:       Cervical back: Spasms present. Decreased range of motion.      Thoracic back: Spasms and tenderness present. Decreased range of motion.      Lumbar back: Spasms and tenderness present. Decreased range of " motion.        Back:       Right lower leg: No edema.      Left lower leg: No edema.   Lymphadenopathy:      Head:      Right side of head: No submental, submandibular or tonsillar adenopathy.      Left side of head: No submental, submandibular or tonsillar adenopathy.   Skin:     General: Skin is warm and dry.   Neurological:      Mental Status: She is alert.      Sensory: Sensation is intact.      Motor: Motor function is intact.      Coordination: Coordination is intact.      Gait: Gait is intact.   Psychiatric:         Attention and Perception: Attention and perception normal.         Mood and Affect: Mood and affect normal.         Speech: Speech normal.         Behavior: Behavior normal. Behavior is cooperative.         Thought Content: Thought content normal.         Cognition and Memory: Cognition and memory normal.         Judgment: Judgment normal.        Result Review :                     Assessment and Plan     Diagnoses and all orders for this visit:    1. Panic attack  -     ALPRAZolam (XANAX) 1 MG tablet; Take 1 tablet by mouth 3 (Three) Times a Day As Needed for Anxiety.  Dispense: 90 tablet; Refill: 0    2. Chronic pain syndrome  -     HYDROcodone-acetaminophen (Norco)  MG per tablet; Take 1 tablet by mouth Every 6 (Six) Hours As Needed for Moderate Pain.  Dispense: 120 tablet; Refill: 0    3. DDD (degenerative disc disease), lumbar  -     HYDROcodone-acetaminophen (Norco)  MG per tablet; Take 1 tablet by mouth Every 6 (Six) Hours As Needed for Moderate Pain.  Dispense: 120 tablet; Refill: 0    4. Rheumatoid arthritis, involving unspecified site, unspecified whether rheumatoid factor present  -     HYDROcodone-acetaminophen (Norco)  MG per tablet; Take 1 tablet by mouth Every 6 (Six) Hours As Needed for Moderate Pain.  Dispense: 120 tablet; Refill: 0          Uday reviewed                Follow Up     Return in about 1 month (around 4/15/2024) for Recheck.    Patient was given  instructions and counseling regarding her condition or for health maintenance advice. Please see specific information pulled into the AVS if appropriate.     Transcribed from ambient dictation for Charlene Huntley DNP, SURYA by Africa Steiner.  03/15/24   11:09 CDT    Patient or patient representative verbalized consent to the visit recording.  I have personally performed the services described in this document as transcribed by the above individual, and it is both accurate and complete.     Electronically signed by Charlene Huntley DNP, APRN, 03/15/24, 12:03 PM CDT.

## 2024-04-03 ENCOUNTER — TELEPHONE (OUTPATIENT)
Dept: FAMILY MEDICINE CLINIC | Facility: CLINIC | Age: 59
End: 2024-04-03
Payer: MEDICARE

## 2024-04-03 NOTE — TELEPHONE ENCOUNTER
Called patient to tell her since she has Medicare she can have a breast and pelvic exam at her next medicare wellness appointment in November.  I told her to verify with insurance to make sure it will be covered though. She doesn't want to self pay for it, so she can come in sooner. She wants to wait until November. Patient voiced understanding.

## 2024-04-15 ENCOUNTER — OFFICE VISIT (OUTPATIENT)
Dept: FAMILY MEDICINE CLINIC | Facility: CLINIC | Age: 59
End: 2024-04-15
Payer: MEDICARE

## 2024-04-15 VITALS
HEART RATE: 64 BPM | BODY MASS INDEX: 35.24 KG/M2 | OXYGEN SATURATION: 97 % | DIASTOLIC BLOOD PRESSURE: 85 MMHG | RESPIRATION RATE: 20 BRPM | SYSTOLIC BLOOD PRESSURE: 145 MMHG | TEMPERATURE: 97.1 F | HEIGHT: 64 IN | WEIGHT: 206.4 LBS

## 2024-04-15 DIAGNOSIS — Z51.81 THERAPEUTIC DRUG MONITORING: Primary | ICD-10-CM

## 2024-04-15 DIAGNOSIS — M06.9 RHEUMATOID ARTHRITIS, INVOLVING UNSPECIFIED SITE, UNSPECIFIED WHETHER RHEUMATOID FACTOR PRESENT: ICD-10-CM

## 2024-04-15 DIAGNOSIS — G89.4 CHRONIC PAIN SYNDROME: ICD-10-CM

## 2024-04-15 DIAGNOSIS — F41.0 PANIC ATTACK: ICD-10-CM

## 2024-04-15 DIAGNOSIS — M51.36 DDD (DEGENERATIVE DISC DISEASE), LUMBAR: ICD-10-CM

## 2024-04-15 PROCEDURE — 99214 OFFICE O/P EST MOD 30 MIN: CPT | Performed by: NURSE PRACTITIONER

## 2024-04-15 PROCEDURE — 1160F RVW MEDS BY RX/DR IN RCRD: CPT | Performed by: NURSE PRACTITIONER

## 2024-04-15 PROCEDURE — 1159F MED LIST DOCD IN RCRD: CPT | Performed by: NURSE PRACTITIONER

## 2024-04-15 RX ORDER — HYDROCODONE BITARTRATE AND ACETAMINOPHEN 10; 325 MG/1; MG/1
1 TABLET ORAL EVERY 6 HOURS PRN
Qty: 120 TABLET | Refills: 0 | Status: SHIPPED | OUTPATIENT
Start: 2024-04-15

## 2024-04-15 RX ORDER — ALPRAZOLAM 1 MG/1
1 TABLET ORAL 3 TIMES DAILY PRN
Qty: 90 TABLET | Refills: 0 | Status: SHIPPED | OUTPATIENT
Start: 2024-04-15

## 2024-04-15 RX ORDER — MELOXICAM 15 MG/1
15 TABLET ORAL DAILY
Qty: 90 TABLET | Refills: 1 | Status: SHIPPED | OUTPATIENT
Start: 2024-04-15

## 2024-04-15 NOTE — PROGRESS NOTES
Chief Complaint  Panic Attack (Follow up )    Subjective        Adela Arauz presents to [facility]  History of Present Illness  History of Present Illness  The patient is a 58-year-old female who is here today for follow-up for chronic pain, PTSD, panic attacks, and fibromyalgia.    The patient attributes her elevated blood pressure to inadequate sleep and stress the previous night. Her panic attacks are chronic, with symptoms persisting for over a year, with some days being better than others. The attacks are characterized by myalgias, anxiety, and depression. Stress exacerbates her panic attacks. Her treatment regimen includes alprazolam, which provides mild relief. Her anxiety is a chronic condition, which has been present for over a year and is progressively worsening. The severity of her anxiety is moderate. Despite trials of SSRIs, non-SRIs, lifestyle modifications, and benzodiazepines, there has been mild improvement in her symptoms. She denies any medication compliance or past compliance problems, and reports no side effects from these medications.    The patient's chronic pain, which has been present throughout her life, has worsened over the past 10 years due to fibromyalgia. She has numerous trigger points in the shoulder blades, shoulders, elbows, wrists, hips, and back. She quantifies her pain today as a 9 out of 10, which is worsening. She denies experiencing chills, congestion, cough, headaches, nausea, or vomiting. She also denies any weakness in her extremities. Her pain management regimen includes Norco. She experiences pain, swelling, stiffness, and warmth in her joints, with symptoms fluctuating in intensity. The affected areas are located in the shoulders, knees, and elbows. Her pain level is rated as 9 out of 10. She denies experiencing dry mouth or nausea from her pain medications. She reports fatigue. Her pain is exacerbated by standing, twisting,  "bending, picking up objects, and walking.    The patient's PTSD is worsening. This began after finding her brother who committed suicide, a trigger for her. Her symptoms have remained consistent for over a year. She denies associated chest pain, shortness of breath, myalgias, or nausea. She reports some joint swelling. Her aggravating factors are life in general and stress, as she is currently going through a divorce, which is exacerbating her symptoms.    The patient has chronic iron deficiency anemia for over a year. This is a follow-up. She denies any confusion, fever, easy bruising, or signs of blood loss through hematochezia or melena. Her compliance with medications are 100 percent. She also has rheumatoid arthritis in her feet, which contributes to her walking difficulties. She is currently prescribed Evista for rheumatoid arthritis and reports no side effects.    The following portions of the patient's history were reviewed and updated as appropriate: allergies, current medications, past family history, past medical history, past social history, past surgical history and problem list.    Objective   Vital Signs:  /85 (BP Location: Left arm, Patient Position: Sitting, Cuff Size: Adult)   Pulse 64   Temp 97.1 °F (36.2 °C) (Infrared)   Resp 20   Ht 162.6 cm (64\")   Wt 93.6 kg (206 lb 6.4 oz)   SpO2 97%   BMI 35.43 kg/m²   Estimated body mass index is 35.43 kg/m² as calculated from the following:    Height as of this encounter: 162.6 cm (64\").    Weight as of this encounter: 93.6 kg (206 lb 6.4 oz).       Class 2 Severe Obesity (BMI >=35 and <=39.9). Obesity-related health conditions include the following: hypertension, diabetes mellitus, dyslipidemias, and GERD. Obesity is improving with lifestyle modifications. BMI is is above average; BMI management plan is completed. We discussed portion control and increasing exercise.        Physical Exam  Vitals and nursing note reviewed.   Constitutional:  "      General: She is awake.      Appearance: Normal appearance. She is well-developed and well-groomed.   HENT:      Head: Normocephalic and atraumatic.      Right Ear: Hearing, tympanic membrane, ear canal and external ear normal.      Left Ear: Hearing, tympanic membrane, ear canal and external ear normal.      Nose: Nose normal.      Mouth/Throat:      Lips: Pink.      Pharynx: Oropharynx is clear.   Eyes:      General: Lids are normal.      Conjunctiva/sclera: Conjunctivae normal.   Cardiovascular:      Rate and Rhythm: Normal rate and regular rhythm.      Heart sounds: Normal heart sounds.   Pulmonary:      Effort: Pulmonary effort is normal.      Breath sounds: Normal breath sounds and air entry.   Musculoskeletal:      Right shoulder: Tenderness, bony tenderness and crepitus present. Decreased range of motion. Decreased strength.      Left shoulder: Tenderness, bony tenderness and crepitus present. Decreased range of motion. Decreased strength.        Arms:       Cervical back: Normal and full passive range of motion without pain. Rigidity, spasms and tenderness present. Decreased range of motion.      Thoracic back: Spasms, tenderness and bony tenderness present.      Lumbar back: Spasms and tenderness present. Decreased range of motion.        Back:       Right lower leg: No edema.      Left lower leg: No edema.   Lymphadenopathy:      Head:      Right side of head: No submental, submandibular or tonsillar adenopathy.      Left side of head: No submental, submandibular or tonsillar adenopathy.   Skin:     General: Skin is warm and dry.   Neurological:      Mental Status: She is alert and oriented to person, place, and time.      Sensory: Sensation is intact.      Motor: Motor function is intact.      Coordination: Coordination is intact.      Gait: Gait is intact.   Psychiatric:         Attention and Perception: Attention and perception normal.         Mood and Affect: Mood and affect normal.          Speech: Speech normal.         Behavior: Behavior normal. Behavior is cooperative.         Thought Content: Thought content normal.         Cognition and Memory: Cognition and memory normal.         Judgment: Judgment normal.        Physical Exam  Vital Signs  The patient's blood pressure is 145/85.    Result Review :          Results             Assessment and Plan     Diagnoses and all orders for this visit:    1. Therapeutic drug monitoring (Primary)  -     ToxASSURE Select 13 Discrete -    2. Panic attack  -     ALPRAZolam (XANAX) 1 MG tablet; Take 1 tablet by mouth 3 (Three) Times a Day As Needed for Anxiety.  Dispense: 90 tablet; Refill: 0  -     ToxASSURE Select 13 Discrete -    3. Chronic pain syndrome  -     HYDROcodone-acetaminophen (Norco)  MG per tablet; Take 1 tablet by mouth Every 6 (Six) Hours As Needed for Moderate Pain.  Dispense: 120 tablet; Refill: 0  -     ToxASSURE Select 13 Discrete -    4. DDD (degenerative disc disease), lumbar  -     HYDROcodone-acetaminophen (Norco)  MG per tablet; Take 1 tablet by mouth Every 6 (Six) Hours As Needed for Moderate Pain.  Dispense: 120 tablet; Refill: 0  -     ToxASSURE Select 13 Discrete -  -     meloxicam (MOBIC) 15 MG tablet; Take 1 tablet by mouth Daily.  Dispense: 90 tablet; Refill: 1    5. Rheumatoid arthritis, involving unspecified site, unspecified whether rheumatoid factor present  -     HYDROcodone-acetaminophen (Norco)  MG per tablet; Take 1 tablet by mouth Every 6 (Six) Hours As Needed for Moderate Pain.  Dispense: 120 tablet; Refill: 0  -     ToxASSURE Select 13 Discrete -      Uday reviewed  Control contract on file      Assessment & Plan  .         Follow Up     Return in about 1 month (around 5/15/2024) for Recheck.  Patient was given instructions and counseling regarding her condition or for health maintenance advice. Please see specific information pulled into the AVS if appropriate.       Patient or patient  representative verbalized consent for the use of Ambient Listening during the visit with  Charlene Huntley DNP, APRN for chart documentation. 4/15/2024  11:20 CDT    Electronically signed by Charlene Huntley DNP, APRN, 04/15/24, 11:22 AM CDT.

## 2024-05-06 DIAGNOSIS — M51.36 DDD (DEGENERATIVE DISC DISEASE), LUMBAR: ICD-10-CM

## 2024-05-06 DIAGNOSIS — G89.4 CHRONIC PAIN SYNDROME: ICD-10-CM

## 2024-05-06 RX ORDER — TIZANIDINE 4 MG/1
4 TABLET ORAL 2 TIMES DAILY PRN
Qty: 180 TABLET | Refills: 1 | Status: SHIPPED | OUTPATIENT
Start: 2024-05-06

## 2024-05-15 ENCOUNTER — OFFICE VISIT (OUTPATIENT)
Dept: FAMILY MEDICINE CLINIC | Facility: CLINIC | Age: 59
End: 2024-05-15
Payer: MEDICARE

## 2024-05-15 VITALS
RESPIRATION RATE: 18 BRPM | OXYGEN SATURATION: 99 % | WEIGHT: 205 LBS | BODY MASS INDEX: 36.32 KG/M2 | TEMPERATURE: 98.3 F | DIASTOLIC BLOOD PRESSURE: 88 MMHG | SYSTOLIC BLOOD PRESSURE: 132 MMHG | HEIGHT: 63 IN | HEART RATE: 52 BPM

## 2024-05-15 DIAGNOSIS — M06.9 RHEUMATOID ARTHRITIS, INVOLVING UNSPECIFIED SITE, UNSPECIFIED WHETHER RHEUMATOID FACTOR PRESENT: ICD-10-CM

## 2024-05-15 DIAGNOSIS — M51.36 DDD (DEGENERATIVE DISC DISEASE), LUMBAR: ICD-10-CM

## 2024-05-15 DIAGNOSIS — B00.1 FEVER BLISTER: ICD-10-CM

## 2024-05-15 DIAGNOSIS — F41.0 PANIC ATTACK: ICD-10-CM

## 2024-05-15 DIAGNOSIS — G89.4 CHRONIC PAIN SYNDROME: ICD-10-CM

## 2024-05-15 PROCEDURE — 1160F RVW MEDS BY RX/DR IN RCRD: CPT | Performed by: NURSE PRACTITIONER

## 2024-05-15 PROCEDURE — 99214 OFFICE O/P EST MOD 30 MIN: CPT | Performed by: NURSE PRACTITIONER

## 2024-05-15 PROCEDURE — 1125F AMNT PAIN NOTED PAIN PRSNT: CPT | Performed by: NURSE PRACTITIONER

## 2024-05-15 PROCEDURE — 1159F MED LIST DOCD IN RCRD: CPT | Performed by: NURSE PRACTITIONER

## 2024-05-15 RX ORDER — ALPRAZOLAM 1 MG/1
1 TABLET ORAL 3 TIMES DAILY PRN
Qty: 90 TABLET | Refills: 0 | Status: SHIPPED | OUTPATIENT
Start: 2024-05-15

## 2024-05-15 RX ORDER — HYDROCODONE BITARTRATE AND ACETAMINOPHEN 10; 325 MG/1; MG/1
1 TABLET ORAL EVERY 6 HOURS PRN
Qty: 120 TABLET | Refills: 0 | Status: SHIPPED | OUTPATIENT
Start: 2024-05-15

## 2024-05-15 RX ORDER — VALACYCLOVIR HYDROCHLORIDE 1 G/1
1000 TABLET, FILM COATED ORAL 2 TIMES DAILY
Qty: 270 TABLET | Refills: 1 | Status: SHIPPED | OUTPATIENT
Start: 2024-05-15
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
## 2024-05-23 LAB
6MAM UR QL CFM: NEGATIVE
6MAM/CREAT UR: NOT DETECTED NG/MG CREAT
7AMINOCLONAZEPAM/CREAT UR: NOT DETECTED NG/MG CREAT
A-OH ALPRAZ/CREAT UR: 345 NG/MG CREAT
A-OH-TRIAZOLAM/CREAT UR CFM: NOT DETECTED NG/MG CREAT
ALFENTANIL/CREAT UR CFM: NOT DETECTED NG/MG CREAT
ALPHA-HYDROXYMIDAZOLAM, URINE: NOT DETECTED NG/MG CREAT
ALPRAZ/CREAT UR CFM: 813 NG/MG CREAT
AMOBARBITAL UR QL CFM: NOT DETECTED
AMPHET/CREAT UR: NOT DETECTED NG/MG CREAT
AMPHETAMINES UR QL CFM: NEGATIVE
BARBITAL UR QL CFM: NOT DETECTED
BARBITURATES UR QL CFM: NEGATIVE
BENZODIAZ UR QL CFM: NORMAL
BUPRENORPHINE UR QL CFM: NEGATIVE
BUPRENORPHINE/CREAT UR: NOT DETECTED NG/MG CREAT
BUTABARBITAL UR QL CFM: NOT DETECTED
BUTALBITAL UR QL CFM: NOT DETECTED
BZE/CREAT UR: NOT DETECTED NG/MG CREAT
CANNABINOIDS UR QL CFM: NEGATIVE
CARBOXYTHC/CREAT UR: NOT DETECTED NG/MG CREAT
CLONAZEPAM/CREAT UR CFM: NOT DETECTED NG/MG CREAT
COCAETHYLENE/CREAT UR CFM: NOT DETECTED NG/MG CREAT
COCAINE UR QL CFM: NEGATIVE
COCAINE/CREAT UR CFM: NOT DETECTED NG/MG CREAT
CODEINE/CREAT UR: NOT DETECTED NG/MG CREAT
CREAT UR-MCNC: 92 MG/DL
DESALKYLFLURAZ/CREAT UR: NOT DETECTED NG/MG CREAT
DESMETHYLFLUNITRAZEPAM: NOT DETECTED NG/MG CREAT
DHC/CREAT UR: 342 NG/MG CREAT
DIAZEPAM/CREAT UR: NOT DETECTED NG/MG CREAT
DRUGS UR: NORMAL
EDDP/CREAT UR: NOT DETECTED NG/MG CREAT
ETHANOL UR CFM-MCNC: NOT DETECTED G/DL
ETHANOL UR QL CFM: NEGATIVE
FENTANYL UR QL CFM: NEGATIVE
FENTANYL/CREAT UR: NOT DETECTED NG/MG CREAT
FLUNITRAZEPAM UR QL CFM: NOT DETECTED NG/MG CREAT
HYDROCODONE/CREAT UR: 3651 NG/MG CREAT
HYDROMORPHONE/CREAT UR: 710 NG/MG CREAT
LORAZEPAM/CREAT UR: NOT DETECTED NG/MG CREAT
MDA/CREAT UR: NOT DETECTED NG/MG CREAT
MDMA/CREAT UR: NOT DETECTED NG/MG CREAT
MEPHOBARBITAL UR QL CFM: NOT DETECTED
METHADONE UR QL CFM: NEGATIVE
METHADONE/CREAT UR: NOT DETECTED NG/MG CREAT
METHAMPHET/CREAT UR: NOT DETECTED NG/MG CREAT
MIDAZOLAM/CREAT UR CFM: NOT DETECTED NG/MG CREAT
MORPHINE/CREAT UR: NOT DETECTED NG/MG CREAT
N-NORTRAMADOL/CREAT UR CFM: NOT DETECTED NG/MG CREAT
NARCOTICS UR: NEGATIVE
NORBUPRENORPHINE/CREAT UR: NOT DETECTED NG/MG CREAT
NORCODEINE/CREAT UR CFM: NOT DETECTED NG/MG CREAT
NORDIAZEPAM/CREAT UR: NOT DETECTED NG/MG CREAT
NORFENTANYL/CREAT UR: NOT DETECTED NG/MG CREAT
NORHYDROCODONE/CREAT UR: 2027 NG/MG CREAT
NORMORPHINE UR-MCNC: NOT DETECTED NG/MG CREAT
NOROXYCODONE/CREAT UR: NOT DETECTED NG/MG CREAT
NOROXYMORPHONE/CREAT UR CFM: NOT DETECTED NG/MG CREAT
O-NORTRAMADOL UR CFM-MCNC: NOT DETECTED NG/MG CREAT
OPIATES UR QL CFM: NORMAL
OXAZEPAM/CREAT UR: NOT DETECTED NG/MG CREAT
OXYCODONE UR QL CFM: NEGATIVE
OXYCODONE/CREAT UR: NOT DETECTED NG/MG CREAT
OXYMORPHONE/CREAT UR: NOT DETECTED NG/MG CREAT
PENTOBARB UR QL CFM: NOT DETECTED
PHENOBARB UR QL CFM: NOT DETECTED
SECOBARBITAL UR QL CFM: NOT DETECTED
SERVICE CMNT 02-IMP: NORMAL
SUFENTANIL/CREAT UR CFM: NOT DETECTED NG/MG CREAT
TAPENTADOL UR QL CFM: NEGATIVE
TAPENTADOL/CREAT UR: NOT DETECTED NG/MG CREAT
TEMAZEPAM/CREAT UR: NOT DETECTED NG/MG CREAT
THIOPENTAL UR QL CFM: NOT DETECTED
TRAMADOL UR QL CFM: NOT DETECTED NG/MG CREAT

## 2024-06-04 DIAGNOSIS — G62.9 NEUROPATHY: ICD-10-CM

## 2024-06-04 RX ORDER — PREGABALIN 150 MG/1
CAPSULE ORAL
Qty: 180 CAPSULE | Refills: 5 | Status: SHIPPED | OUTPATIENT
Start: 2024-06-04

## 2024-06-04 NOTE — TELEPHONE ENCOUNTER
Rx Refill Note  Requested Prescriptions     Pending Prescriptions Disp Refills    pregabalin (LYRICA) 150 MG capsule [Pharmacy Med Name: PREGABALIN 150 MG CAPS 150 Capsule] 180 capsule 5     Sig: TAKE ONE CAPSULE BY MOUTH TWICE A DAY      Last office visit with prescribing clinician: 5/15/2024   Last telemedicine visit with prescribing clinician: Visit date not found   Next office visit with prescribing clinician: 6/14/2024                         Would you like a call back once the refill request has been completed: [] Yes [] No    If the office needs to give you a call back, can they leave a voicemail: [] Yes [] No    Marisol Nguyen LPN  06/04/24, 14:39 CDT

## 2024-06-14 ENCOUNTER — OFFICE VISIT (OUTPATIENT)
Dept: FAMILY MEDICINE CLINIC | Facility: CLINIC | Age: 59
End: 2024-06-14
Payer: MEDICARE

## 2024-06-14 VITALS
RESPIRATION RATE: 18 BRPM | BODY MASS INDEX: 35.61 KG/M2 | DIASTOLIC BLOOD PRESSURE: 70 MMHG | WEIGHT: 201 LBS | SYSTOLIC BLOOD PRESSURE: 110 MMHG | TEMPERATURE: 98.6 F | HEART RATE: 88 BPM | HEIGHT: 63 IN | OXYGEN SATURATION: 98 %

## 2024-06-14 DIAGNOSIS — M51.36 DDD (DEGENERATIVE DISC DISEASE), LUMBAR: ICD-10-CM

## 2024-06-14 DIAGNOSIS — F41.0 PANIC ATTACK: ICD-10-CM

## 2024-06-14 DIAGNOSIS — M54.31 SCIATICA, RIGHT SIDE: Primary | ICD-10-CM

## 2024-06-14 DIAGNOSIS — G89.4 CHRONIC PAIN SYNDROME: ICD-10-CM

## 2024-06-14 DIAGNOSIS — M06.9 RHEUMATOID ARTHRITIS, INVOLVING UNSPECIFIED SITE, UNSPECIFIED WHETHER RHEUMATOID FACTOR PRESENT: ICD-10-CM

## 2024-06-14 PROCEDURE — 1125F AMNT PAIN NOTED PAIN PRSNT: CPT | Performed by: NURSE PRACTITIONER

## 2024-06-14 PROCEDURE — 1159F MED LIST DOCD IN RCRD: CPT | Performed by: NURSE PRACTITIONER

## 2024-06-14 PROCEDURE — 96372 THER/PROPH/DIAG INJ SC/IM: CPT | Performed by: NURSE PRACTITIONER

## 2024-06-14 PROCEDURE — 99214 OFFICE O/P EST MOD 30 MIN: CPT | Performed by: NURSE PRACTITIONER

## 2024-06-14 PROCEDURE — 1160F RVW MEDS BY RX/DR IN RCRD: CPT | Performed by: NURSE PRACTITIONER

## 2024-06-14 RX ORDER — KETOROLAC TROMETHAMINE 30 MG/ML
60 INJECTION, SOLUTION INTRAMUSCULAR; INTRAVENOUS ONCE
Status: COMPLETED | OUTPATIENT
Start: 2024-06-14 | End: 2024-06-14

## 2024-06-14 RX ORDER — DEXAMETHASONE SODIUM PHOSPHATE 10 MG/ML
10 INJECTION INTRAMUSCULAR; INTRAVENOUS ONCE
Status: COMPLETED | OUTPATIENT
Start: 2024-06-14 | End: 2024-06-14

## 2024-06-14 RX ORDER — HYDROCODONE BITARTRATE AND ACETAMINOPHEN 10; 325 MG/1; MG/1
1 TABLET ORAL EVERY 6 HOURS PRN
Qty: 120 TABLET | Refills: 0 | Status: SHIPPED | OUTPATIENT
Start: 2024-06-14

## 2024-06-14 RX ORDER — ALPRAZOLAM 1 MG/1
1 TABLET ORAL 3 TIMES DAILY PRN
Qty: 90 TABLET | Refills: 0 | Status: SHIPPED | OUTPATIENT
Start: 2024-06-14

## 2024-06-14 RX ADMIN — DEXAMETHASONE SODIUM PHOSPHATE 10 MG: 10 INJECTION INTRAMUSCULAR; INTRAVENOUS at 11:40

## 2024-06-14 RX ADMIN — KETOROLAC TROMETHAMINE 60 MG: 30 INJECTION, SOLUTION INTRAMUSCULAR; INTRAVENOUS at 11:41

## 2024-06-14 NOTE — PROGRESS NOTES
Chief Complaint  Chronic pain syndrome (Pt here for follow up)    Subjective        Adela Arauz presents to [facility]  History of Present Illness  History of Present Illness  The patient is a 59-year-old female here for her monthly visit for chronic pain. She has been struggling. She has fibromyalgia, rheumatoid arthritis. She said she has had a really bad week. She said her pain has been really bad in her back and she takes Norco for it. She also has PTSD related to injury and death of her brother.    The patient is under the care of Dr. Diggs, a rheumatologist, in [place], for her rheumatoid arthritis. She reports a decline in her concentration and memory. She denies experiencing fever, chills, nausea, vomiting, or diarrhea. Her back pain is exacerbated by bending, twisting, standing, and walking. She has attempted to manage the pain with oral narcotics, rest, and acetaminophen, which have provided mild relief. She reports a flare-up of her sciatic nerve, characterized by a constant, sharp, and stabbing sensation on the right side of her back, which radiates down her right leg and around her groin. The pain intensifies during the day due to increased movement, necessitating careful movement. The pain is not exacerbated by lying down, bending, or coughing. She reports persistent stiffness throughout the day. She denies bladder or bowel incontinence, fever, or chills. She does, however, experience paresthesia and tingling. Her treatment regimen includes NSAIDs, heat, muscle relaxant, home exercises, and pain medication.    The following portions of the patient's history were reviewed and updated as appropriate: allergies, current medications, past family history, past medical history, past social history, past surgical history and problem list.    Objective   Vital Signs:  /70 (BP Location: Right arm, Patient Position: Sitting, Cuff Size: Adult)   Pulse 88   Temp  "98.6 °F (37 °C) (Infrared)   Resp 18   Ht 160 cm (63\") Comment: per patient  Wt 91.2 kg (201 lb)   SpO2 98%   BMI 35.61 kg/m²   Estimated body mass index is 35.61 kg/m² as calculated from the following:    Height as of this encounter: 160 cm (63\").    Weight as of this encounter: 91.2 kg (201 lb).     Class 2 Severe Obesity (BMI >=35 and <=39.9). Obesity-related health conditions include the following: hypertension, diabetes mellitus, dyslipidemias, and GERD. Obesity is unchanged. BMI is is above average; BMI management plan is completed. We discussed portion control and increasing exercise.    Physical Exam  Vitals and nursing note reviewed.   Constitutional:       General: She is awake.      Appearance: Normal appearance. She is well-developed and well-groomed.   HENT:      Head: Normocephalic and atraumatic.      Right Ear: Hearing, tympanic membrane, ear canal and external ear normal.      Left Ear: Hearing, tympanic membrane, ear canal and external ear normal.      Nose: Nose normal.      Mouth/Throat:      Lips: Pink.      Pharynx: Oropharynx is clear.   Eyes:      General: Lids are normal.      Conjunctiva/sclera: Conjunctivae normal.   Cardiovascular:      Rate and Rhythm: Normal rate and regular rhythm.      Heart sounds: Normal heart sounds.   Pulmonary:      Effort: Pulmonary effort is normal.      Breath sounds: Normal breath sounds and air entry.   Musculoskeletal:      Right upper arm: Normal.      Left upper arm: Normal.        Arms:       Cervical back: Normal and full passive range of motion without pain.      Thoracic back: Normal.      Lumbar back: Spasms and tenderness present. Decreased range of motion.        Back:       Right hip: Tenderness present. Decreased range of motion.      Left hip: Decreased range of motion.      Right lower leg: No edema.      Left lower leg: No edema.        Legs:    Lymphadenopathy:      Head:      Right side of head: No submental, submandibular or tonsillar " adenopathy.      Left side of head: No submental, submandibular or tonsillar adenopathy.   Skin:     General: Skin is warm and dry.   Neurological:      Mental Status: She is alert and oriented to person, place, and time.      Cranial Nerves: Cranial nerves 2-12 are intact.      Sensory: Sensation is intact.      Motor: Motor function is intact.      Coordination: Coordination is intact.      Gait: Gait is intact.   Psychiatric:         Attention and Perception: Attention and perception normal.         Mood and Affect: Mood and affect normal.         Speech: Speech normal.         Behavior: Behavior normal. Behavior is cooperative.         Thought Content: Thought content normal.         Cognition and Memory: Cognition and memory normal.         Judgment: Judgment normal.        Physical Exam      Result Review :          Results             Assessment and Plan     Diagnoses and all orders for this visit:    1. Sciatica, right side (Primary)  -     HYDROcodone-acetaminophen (Norco)  MG per tablet; Take 1 tablet by mouth Every 6 (Six) Hours As Needed for Moderate Pain.  Dispense: 120 tablet; Refill: 0  -     ketorolac (TORADOL) injection 60 mg  -     dexAMETHasone (DECADRON) injection 10 mg    2. Panic attack  -     ALPRAZolam (XANAX) 1 MG tablet; Take 1 tablet by mouth 3 (Three) Times a Day As Needed for Anxiety.  Dispense: 90 tablet; Refill: 0    3. Chronic pain syndrome  -     HYDROcodone-acetaminophen (Norco)  MG per tablet; Take 1 tablet by mouth Every 6 (Six) Hours As Needed for Moderate Pain.  Dispense: 120 tablet; Refill: 0  -     ketorolac (TORADOL) injection 60 mg  -     dexAMETHasone (DECADRON) injection 10 mg    4. DDD (degenerative disc disease), lumbar  -     HYDROcodone-acetaminophen (Norco)  MG per tablet; Take 1 tablet by mouth Every 6 (Six) Hours As Needed for Moderate Pain.  Dispense: 120 tablet; Refill: 0  -     ketorolac (TORADOL) injection 60 mg  -     dexAMETHasone (DECADRON)  injection 10 mg    5. Rheumatoid arthritis, involving unspecified site, unspecified whether rheumatoid factor present  -     HYDROcodone-acetaminophen (Norco)  MG per tablet; Take 1 tablet by mouth Every 6 (Six) Hours As Needed for Moderate Pain.  Dispense: 120 tablet; Refill: 0  -     ketorolac (TORADOL) injection 60 mg  -     dexAMETHasone (DECADRON) injection 10 mg    Uday reviewed, toxassure on file   Assessment & Plan  1. Fibromyalgia and arthritis.  The patient's arthritis and fibromyalgia have been a chronic condition for over a year, with some joints exhibiting warmth and swelling. These symptoms are currently stable, with symptoms manifesting in her back, shoulders, wrists, knees, and hips. Her associated symptoms are not present. Her psychiatric compliance is 100 percent, with no reported side effects. Her obesity categories include diabetes, dyslipidemia, hypertension, and GERD. Her symptoms are unchanged and are above average. Lyrica was prescribed on 06/04/2023. Alprazolam was prescribed for anxiety, with the last refill being 05/15/2023. Norco was last filled on 05/15/2023 for a 30-day period. She also has issues with her iron, for which she takes ferrous sulfate.      ICD-10-CM ICD-9-CM   1. Sciatica, right side  M54.31 724.3   2. Panic attack  F41.0 300.01   3. Chronic pain syndrome  G89.4 338.4   4. DDD (degenerative disc disease), lumbar  M51.36 722.52   5. Rheumatoid arthritis, involving unspecified site, unspecified whether rheumatoid factor present  M06.9 714.0                Follow Up     Return in about 1 month (around 7/14/2024) for Recheck.  Patient was given instructions and counseling regarding her condition or for health maintenance advice. Please see specific information pulled into the AVS if appropriate.       Patient or patient representative verbalized consent for the use of Ambient Listening during the visit with  Charlene Huntley, [place], APRN for chart documentation.  6/14/2024  10:30 CDT    Electronically signed by Charlene Huntley, [place], APRN, 06/14/24, 10:36 AM CDT.

## 2024-07-09 DIAGNOSIS — K21.9 GASTROESOPHAGEAL REFLUX DISEASE WITHOUT ESOPHAGITIS: ICD-10-CM

## 2024-07-09 RX ORDER — OMEPRAZOLE 40 MG/1
40 CAPSULE, DELAYED RELEASE ORAL DAILY
Qty: 90 CAPSULE | Refills: 1 | Status: SHIPPED | OUTPATIENT
Start: 2024-07-09

## 2024-07-09 NOTE — TELEPHONE ENCOUNTER
Rx Refill Note  Requested Prescriptions     Pending Prescriptions Disp Refills    omeprazole (priLOSEC) 40 MG capsule [Pharmacy Med Name: OMEPRAZOLE 40 MG CAP 40 Capsule] 90 capsule 1     Sig: Take 1 capsule by mouth Daily.      Last office visit with prescribing clinician: 6/14/2024   Last telemedicine visit with prescribing clinician: Visit date not found   Next office visit with prescribing clinician: 7/15/2024                         Would you like a call back once the refill request has been completed: [] Yes [] No    If the office needs to give you a call back, can they leave a voicemail: [] Yes [] No    Marisol Nguyen LPN  07/09/24, 10:44 CDT

## 2024-07-15 ENCOUNTER — OFFICE VISIT (OUTPATIENT)
Dept: FAMILY MEDICINE CLINIC | Facility: CLINIC | Age: 59
End: 2024-07-15
Payer: MEDICARE

## 2024-07-15 VITALS
HEART RATE: 63 BPM | HEIGHT: 63 IN | BODY MASS INDEX: 35.97 KG/M2 | SYSTOLIC BLOOD PRESSURE: 136 MMHG | OXYGEN SATURATION: 100 % | RESPIRATION RATE: 16 BRPM | WEIGHT: 203 LBS | DIASTOLIC BLOOD PRESSURE: 82 MMHG

## 2024-07-15 DIAGNOSIS — M51.36 DDD (DEGENERATIVE DISC DISEASE), LUMBAR: ICD-10-CM

## 2024-07-15 DIAGNOSIS — G89.4 CHRONIC PAIN SYNDROME: ICD-10-CM

## 2024-07-15 DIAGNOSIS — G89.29 CHRONIC MIDLINE LOW BACK PAIN WITH RIGHT-SIDED SCIATICA: Primary | ICD-10-CM

## 2024-07-15 DIAGNOSIS — F41.0 PANIC ATTACK: ICD-10-CM

## 2024-07-15 DIAGNOSIS — M54.41 CHRONIC MIDLINE LOW BACK PAIN WITH RIGHT-SIDED SCIATICA: Primary | ICD-10-CM

## 2024-07-15 DIAGNOSIS — F43.10 PTSD (POST-TRAUMATIC STRESS DISORDER): ICD-10-CM

## 2024-07-15 DIAGNOSIS — M54.31 SCIATICA, RIGHT SIDE: ICD-10-CM

## 2024-07-15 DIAGNOSIS — M06.9 RHEUMATOID ARTHRITIS, INVOLVING UNSPECIFIED SITE, UNSPECIFIED WHETHER RHEUMATOID FACTOR PRESENT: ICD-10-CM

## 2024-07-15 PROCEDURE — 99214 OFFICE O/P EST MOD 30 MIN: CPT | Performed by: NURSE PRACTITIONER

## 2024-07-15 PROCEDURE — 1125F AMNT PAIN NOTED PAIN PRSNT: CPT | Performed by: NURSE PRACTITIONER

## 2024-07-15 PROCEDURE — 96372 THER/PROPH/DIAG INJ SC/IM: CPT | Performed by: NURSE PRACTITIONER

## 2024-07-15 PROCEDURE — 1160F RVW MEDS BY RX/DR IN RCRD: CPT | Performed by: NURSE PRACTITIONER

## 2024-07-15 PROCEDURE — 1159F MED LIST DOCD IN RCRD: CPT | Performed by: NURSE PRACTITIONER

## 2024-07-15 RX ORDER — HYDROCODONE BITARTRATE AND ACETAMINOPHEN 10; 325 MG/1; MG/1
1 TABLET ORAL EVERY 6 HOURS PRN
Qty: 120 TABLET | Refills: 0 | Status: SHIPPED | OUTPATIENT
Start: 2024-07-15

## 2024-07-15 RX ORDER — KETOROLAC TROMETHAMINE 30 MG/ML
60 INJECTION, SOLUTION INTRAMUSCULAR; INTRAVENOUS ONCE
Status: COMPLETED | OUTPATIENT
Start: 2024-07-15 | End: 2024-07-15

## 2024-07-15 RX ORDER — METHYLPREDNISOLONE 4 MG/1
TABLET ORAL
Qty: 21 EACH | Refills: 0 | Status: SHIPPED | OUTPATIENT
Start: 2024-07-15

## 2024-07-15 RX ORDER — ALPRAZOLAM 1 MG/1
1 TABLET ORAL 3 TIMES DAILY PRN
Qty: 90 TABLET | Refills: 0 | Status: SHIPPED | OUTPATIENT
Start: 2024-07-15

## 2024-07-15 RX ADMIN — KETOROLAC TROMETHAMINE 60 MG: 30 INJECTION, SOLUTION INTRAMUSCULAR; INTRAVENOUS at 09:58

## 2024-07-15 NOTE — PROGRESS NOTES
Chief Complaint  Sciatica (Pt is here for a follow up for Sciatica. )    Subjective        Adela Arauz presents to [facility]  History of Present Illness  History of Present Illness  The patient is here for follow-up for chronic problems. She struggles with back pain and she also has chronic rheumatoid arthritis in her hands and she has pain in her elbows, her knees, and hips. She also has fibromyalgia, PTSD, anxiety, and chronic pain. She says that something is wrong with her back this is the worse it has ever been and since Dr. Dunaway did her previous back surgeries she would like to go back to him.    She rates her pain 7/10 and would like a steroid injection and toradol.  I explained to her I don't mind giving her the toradol but she just had decadron last month and we can only give it every 3 months.  She says that she has felt horrible and her fibromyalgia and arthritis, along with this heat is making daily activities impossible. Anything she does such as bending, standing, twisting, standing or walking makes her pain worse. She has been taking her narcotics and supplementing between doses with tylenol, rest and heat but it is not working.  She feels like all these problems are worsening.     She has anxiety and PTSD and has had worsening of nerves, decreased concentration, anxiety, restlessness, decreased concentration and feels all of these are much worse because she hurts so bad.  She is not sleeping well and is manuel if she gets 4 hours of sleep a night and to make matters worse she is going thru a divorce.      The patient's gastroesophageal acid reflux, the medicine pretty well controlled, but it is just the stress that keeps her stomach messed up. Her heartburn is chronic. She is not sure if she has experienced choking or dysphagia. She has had it for more than a year, it is chronic, it is intermittent certain foods affects it, and stress affects it.      She  "does not have a history of anemia, or weight loss, but does have muscle weakness.    The patient reports a recent exacerbation of her arthritis, which has exacerbated her back pain. She denies any re-injury to her back. She experiences numbness and tingling in her legs, but denies any back muscle spasms. She quantifies her pain as a 2 on a scale of 1 to 10. She underwent surgery in 12/2020.     She is also upset because her insurance company has advised her that she will have to  pay 20 percent of her Prolia injection, which is 1000 dollars. Since she is on Prilosec, Dr. Duff will not prescribe Fosamax or Voniva to her. She is seeking medication for her osteoporosis.     The following portions of the patient's history were reviewed and updated as appropriate: allergies, current medications, past family history, past medical history, past social history, past surgical history and problem list.    Objective   Vital Signs:  /82 (BP Location: Left arm, Patient Position: Sitting, Cuff Size: Adult)   Pulse 63   Resp 16   Ht 160 cm (62.99\")   Wt 92.1 kg (203 lb)   SpO2 100%   BMI 35.97 kg/m²   Estimated body mass index is 35.97 kg/m² as calculated from the following:    Height as of this encounter: 160 cm (62.99\").    Weight as of this encounter: 92.1 kg (203 lb).     Class 2 Severe Obesity (BMI >=35 and <=39.9). Obesity-related health conditions include the following: hypertension and dyslipidemias. Obesity is worsening. BMI is is above average; BMI management plan is completed. We discussed portion control and increasing exercise.    Physical Exam  Vitals and nursing note reviewed.   Constitutional:       General: She is awake.      Appearance: Normal appearance. She is well-developed and well-groomed.   HENT:      Head: Normocephalic and atraumatic.      Right Ear: Hearing, tympanic membrane, ear canal and external ear normal.      Left Ear: Hearing, tympanic membrane, ear canal and external ear normal. "      Nose: Nose normal.      Mouth/Throat:      Lips: Pink.      Pharynx: Oropharynx is clear.   Eyes:      General: Lids are normal.      Conjunctiva/sclera: Conjunctivae normal.   Cardiovascular:      Rate and Rhythm: Normal rate and regular rhythm.      Heart sounds: Normal heart sounds.   Pulmonary:      Effort: Pulmonary effort is normal.      Breath sounds: Normal breath sounds and air entry.   Musculoskeletal:      Cervical back: Normal and full passive range of motion without pain.      Thoracic back: Normal.      Lumbar back: Spasms and tenderness present. Decreased range of motion.        Back:       Right lower leg: No edema.      Left lower leg: No edema.      Comments: Has tenderness on palpation to the right sciatic area, has difficulty with balance    Lymphadenopathy:      Head:      Right side of head: No submental, submandibular or tonsillar adenopathy.      Left side of head: No submental, submandibular or tonsillar adenopathy.   Skin:     General: Skin is warm and dry.   Neurological:      General: No focal deficit present.      Mental Status: She is alert and oriented to person, place, and time.      Sensory: Sensation is intact.      Motor: Motor function is intact.      Coordination: Coordination is intact.      Gait: Gait is intact.   Psychiatric:         Attention and Perception: Attention and perception normal.         Mood and Affect: Mood and affect normal.         Speech: Speech normal.         Behavior: Behavior normal. Behavior is cooperative.         Thought Content: Thought content normal.         Cognition and Memory: Cognition and memory normal.         Judgment: Judgment normal.        Physical Exam      Result Review :          Results             Assessment and Plan     Diagnoses and all orders for this visit:    1. Chronic midline low back pain with right-sided sciatica (Primary)  -     Ambulatory Referral to Orthopedic Surgery  -     ketorolac (TORADOL) injection 60 mg  -      HYDROcodone-acetaminophen (Norco)  MG per tablet; Take 1 tablet by mouth Every 6 (Six) Hours As Needed for Moderate Pain.  Dispense: 120 tablet; Refill: 0  -     methylPREDNISolone (MEDROL) 4 MG dose pack; Take as directed on package instructions.  Dispense: 21 each; Refill: 0    2. Panic attack  -     ALPRAZolam (XANAX) 1 MG tablet; Take 1 tablet by mouth 3 (Three) Times a Day As Needed for Anxiety.  Dispense: 90 tablet; Refill: 0    3. Chronic pain syndrome  4. DDD (degenerative disc disease), lumbar  5. Rheumatoid arthritis, involving unspecified site, unspecified whether rheumatoid factor present  6. Sciatica, right side  -     HYDROcodone-acetaminophen (Norco)  MG per tablet; Take 1 tablet by mouth Every 6 (Six) Hours As Needed for Moderate Pain.  Dispense: 120 tablet; Refill: 0  - control contract on file  - gladys reviewed  - toxassure on file    7. PTSD (post-traumatic stress disorder)  -     ALPRAZolam (XANAX) 1 MG tablet; Take 1 tablet by mouth 3 (Three) Times a Day As Needed for Anxiety.  Dispense: 90 tablet; Refill: 0    Assessment & Plan  1. Chronic problems.  The patient's GERD is stable with omeprazole, and she is advised to avoid triggers. Recurrent fever blisters are also stable, with Valtrex. Neuropathy in bilateral hands and lower extremities is stable with Lyrica. Vitamin D deficiency is stable with ergocalciferol. Bilateral lower extremity edema is stable with furosemide. PTSD and anxiety are stable with alprazolam. Depression is stable with sertraline. A referral to Dr. De La Cruz will be made. Lumbar spine x-rays will be ordered, and a Toradol 60 injection will be administered. A prednisone pack and Norco will be prescribed. A referral to orthopedics will be made.      ICD-10-CM ICD-9-CM   1. Chronic midline low back pain with right-sided sciatica  M54.41 724.2    G89.29 724.3     338.29   2. Panic attack  F41.0 300.01   3. Chronic pain syndrome  G89.4 338.4   4. DDD (degenerative  disc disease), lumbar  M51.36 722.52   5. Rheumatoid arthritis, involving unspecified site, unspecified whether rheumatoid factor present  M06.9 714.0   6. Sciatica, right side  M54.31 724.3   7. PTSD (post-traumatic stress disorder)  F43.10 309.81     Uday reviewed            Follow Up     Return in about 1 month (around 8/15/2024), or if symptoms worsen or fail to improve, for Recheck.  Patient was given instructions and counseling regarding her condition or for health maintenance advice. Please see specific information pulled into the AVS if appropriate.       Patient or patient representative verbalized consent for the use of Ambient Listening during the visit with  Charlene Huntley DNP, [place] for chart documentation. 7/15/2024  09:56 CDT    Electronically signed by Charlene Huntley DNP, [place], 07/15/24, 1:40 PM CDT.

## 2024-07-17 ENCOUNTER — TELEPHONE (OUTPATIENT)
Dept: FAMILY MEDICINE CLINIC | Facility: CLINIC | Age: 59
End: 2024-07-17
Payer: MEDICARE

## 2024-07-17 NOTE — TELEPHONE ENCOUNTER
Patient is wanting her xray results.  She received a call from Dr. Dunaway's office and is not sure why or what she needs to do.

## 2024-07-30 DIAGNOSIS — M51.36 DDD (DEGENERATIVE DISC DISEASE), LUMBAR: ICD-10-CM

## 2024-07-30 RX ORDER — MELOXICAM 15 MG/1
15 TABLET ORAL DAILY
Qty: 90 TABLET | Refills: 1 | Status: SHIPPED | OUTPATIENT
Start: 2024-07-30

## 2024-07-30 NOTE — TELEPHONE ENCOUNTER
Rx Refill Note  Requested Prescriptions     Pending Prescriptions Disp Refills    meloxicam (MOBIC) 15 MG tablet [Pharmacy Med Name: MELOXICAM 15 MG TAB 15 Tablet] 90 tablet 1     Sig: Take 1 tablet by mouth Daily.      Last office visit with prescribing clinician: 7/15/2024   Next office visit with prescribing clinician: 8/16/2024     Leona Knight CMA  07/30/24, 14:17 CDT

## 2024-08-16 ENCOUNTER — OFFICE VISIT (OUTPATIENT)
Dept: FAMILY MEDICINE CLINIC | Facility: CLINIC | Age: 59
End: 2024-08-16
Payer: MEDICARE

## 2024-08-16 VITALS
HEART RATE: 56 BPM | DIASTOLIC BLOOD PRESSURE: 76 MMHG | OXYGEN SATURATION: 96 % | TEMPERATURE: 98.1 F | BODY MASS INDEX: 36 KG/M2 | SYSTOLIC BLOOD PRESSURE: 110 MMHG | RESPIRATION RATE: 16 BRPM | HEIGHT: 63 IN | WEIGHT: 203.2 LBS

## 2024-08-16 DIAGNOSIS — M54.40 CHRONIC MIDLINE LOW BACK PAIN WITH SCIATICA, SCIATICA LATERALITY UNSPECIFIED: ICD-10-CM

## 2024-08-16 DIAGNOSIS — J30.2 SEASONAL ALLERGIES: ICD-10-CM

## 2024-08-16 DIAGNOSIS — G89.29 CHRONIC MIDLINE LOW BACK PAIN WITH SCIATICA, SCIATICA LATERALITY UNSPECIFIED: ICD-10-CM

## 2024-08-16 DIAGNOSIS — G89.4 CHRONIC PAIN SYNDROME: ICD-10-CM

## 2024-08-16 DIAGNOSIS — R60.1 GENERALIZED EDEMA: ICD-10-CM

## 2024-08-16 DIAGNOSIS — F41.0 PANIC ATTACK: ICD-10-CM

## 2024-08-16 DIAGNOSIS — M79.7 FIBROMYALGIA: ICD-10-CM

## 2024-08-16 DIAGNOSIS — F43.10 PTSD (POST-TRAUMATIC STRESS DISORDER): Primary | ICD-10-CM

## 2024-08-16 DIAGNOSIS — D64.9 ANEMIA, UNSPECIFIED TYPE: ICD-10-CM

## 2024-08-16 DIAGNOSIS — Z12.31 ENCOUNTER FOR SCREENING MAMMOGRAM FOR MALIGNANT NEOPLASM OF BREAST: ICD-10-CM

## 2024-08-16 DIAGNOSIS — M06.9 RHEUMATOID ARTHRITIS, INVOLVING UNSPECIFIED SITE, UNSPECIFIED WHETHER RHEUMATOID FACTOR PRESENT: ICD-10-CM

## 2024-08-16 DIAGNOSIS — M51.36 DDD (DEGENERATIVE DISC DISEASE), LUMBAR: ICD-10-CM

## 2024-08-16 DIAGNOSIS — F41.9 ANXIETY: ICD-10-CM

## 2024-08-16 DIAGNOSIS — M06.89 OTHER SPECIFIED RHEUMATOID ARTHRITIS, MULTIPLE SITES: ICD-10-CM

## 2024-08-16 DIAGNOSIS — M81.8 OTHER OSTEOPOROSIS WITHOUT CURRENT PATHOLOGICAL FRACTURE: ICD-10-CM

## 2024-08-16 RX ORDER — ALPRAZOLAM 1 MG/1
1 TABLET ORAL 3 TIMES DAILY PRN
Qty: 90 TABLET | Refills: 0 | Status: SHIPPED | OUTPATIENT
Start: 2024-08-16

## 2024-08-16 RX ORDER — FLUTICASONE PROPIONATE 50 MCG
2 SPRAY, SUSPENSION (ML) NASAL DAILY
Qty: 16 G | Refills: 3 | Status: SHIPPED | OUTPATIENT
Start: 2024-08-16

## 2024-08-16 RX ORDER — HYDROCODONE BITARTRATE AND ACETAMINOPHEN 10; 325 MG/1; MG/1
1 TABLET ORAL EVERY 6 HOURS PRN
Qty: 120 TABLET | Refills: 0 | Status: SHIPPED | OUTPATIENT
Start: 2024-08-16

## 2024-08-16 RX ORDER — FUROSEMIDE 40 MG/1
40 TABLET ORAL DAILY
Qty: 90 TABLET | Refills: 1 | Status: SHIPPED | OUTPATIENT
Start: 2024-08-16

## 2024-08-16 RX ORDER — FERROUS SULFATE 324(65)MG
324 TABLET, DELAYED RELEASE (ENTERIC COATED) ORAL
Qty: 90 TABLET | Refills: 1 | Status: SHIPPED | OUTPATIENT
Start: 2024-08-16

## 2024-08-16 RX ORDER — ALENDRONATE SODIUM 70 MG/1
70 TABLET ORAL
Qty: 90 TABLET | Refills: 0 | Status: SHIPPED | OUTPATIENT
Start: 2024-08-16

## 2024-08-16 NOTE — PATIENT INSTRUCTIONS
Obesity, Adult  Obesity is having too much body fat. Being obese means that your weight is more than what is healthy for you.   BMI (body mass index) is a number that explains how much body fat you have. If you have a BMI of 30 or more, you are obese.  Obesity can cause serious health problems, such as:  Stroke.  Coronary artery disease (CAD).  Type 2 diabetes.  Some types of cancer.  High blood pressure (hypertension).  High cholesterol.  Gallbladder stones.  Obesity can also contribute to:  Osteoarthritis.  Sleep apnea.  Infertility problems.  What are the causes?  Eating meals each day that are high in calories, sugar, and fat.  Drinking a lot of drinks that have sugar in them.  Being born with genes that may make you more likely to become obese.  Having a medical condition that causes obesity.  Taking certain medicines.  Sitting a lot (having a sedentary lifestyle).  Not getting enough sleep.  What increases the risk?  Having a family history of obesity.  Living in an area with limited access to:  [place], recreation centers, or sidewalks.  Healthy food choices, such as grocery stores and farmers' markets.  What are the signs or symptoms?  The main sign is having too much body fat.  How is this treated?  Treatment for this condition often includes changing your lifestyle. Treatment may include:  Changing your diet. This may include making a healthy meal plan.  Exercise. This may include activity that causes your heart to beat faster (aerobic exercise) and strength training. Work with your doctor to design a program that works for you.  Medicine to help you lose weight. This may be used if you are not able to lose one pound a week after 6 weeks of healthy eating and more exercise.  Treating conditions that cause the obesity.  Surgery. Options may include gastric banding and gastric bypass. This may be done if:  Other treatments have not helped to improve your condition.  You have a BMI of 40 or higher.  You have  life-threatening health problems related to obesity.  Follow these instructions at home:  Eating and drinking    Follow advice from your doctor about what to eat and drink. Your doctor may tell you to:  Limit fast food, sweets, and processed snack foods.  Choose low-fat options. For example, choose low-fat milk instead of whole milk.  Eat five or more servings of fruits or vegetables each day.  Eat at home more often. This gives you more control over what you eat.  Choose healthy foods when you eat out.  Learn to read food labels. This will help you learn how much food is in one serving.  Keep low-fat snacks available.  Avoid drinks that have a lot of sugar in them. These include soda, fruit juice, iced tea with sugar, and flavored milk.  Drink enough water to keep your pee (urine) pale yellow.  Do not go on fad diets.  Physical activity  Exercise often, as told by your doctor. Most adults should get up to 150 minutes of moderate-intensity exercise every week.Ask your doctor:  What types of exercise are safe for you.  How often you should exercise.  Warm up and stretch before being active.  Do slow stretching after being active (cool down).  Rest between times of being active.  Lifestyle  Work with your doctor and a food expert (dietitian) to set a weight-loss goal that is best for you.  Limit your screen time.  Find ways to reward yourself that do not involve food.  Do not drink alcohol if:  Your doctor tells you not to drink.  You are pregnant, may be pregnant, or are planning to become pregnant.  If you drink alcohol:  Limit how much you have to:  0-1 drink a day for women.  0-2 drinks a day for men.  Know how much alcohol is in your drink. In the [place] one drink equals one 12 oz bottle of beer (355 mL), one 5 oz glass of wine (148 mL), or one 1½ oz glass of hard liquor (44 mL).  General instructions  Keep a weight-loss journal. This can help you keep track of:  The food that you eat.  How much exercise you  get.  Take over-the-counter and prescription medicines only as told by your doctor.  Take vitamins and supplements only as told by your doctor.  Think about joining a support group.  Pay attention to your mental health as obesity can lead to depression or self esteem issues.  Keep all follow-up visits.  Contact a doctor if:  You cannot meet your weight-loss goal after you have changed your diet and lifestyle for 6 weeks.  You are having trouble breathing.  Summary  Obesity is having too much body fat.  Being obese means that your weight is more than what is healthy for you.  Work with your doctor to set a weight-loss goal.  Get regular exercise as told by your doctor.  This information is not intended to replace advice given to you by your health care provider. Make sure you discuss any questions you have with your health care provider.  Document Revised: 07/26/2022 Document Reviewed: 07/26/2022  Elsevier Patient Education © 2024 [employer]

## 2024-08-16 NOTE — PROGRESS NOTES
Chief Complaint  Med Refill (Patient states that she is here for her monthly visit, and medication refill. )    Subjective        Adela Arauz presents to [facility]  History of Present Illness  History of Present Illness  The patient is a 59-year-old female who presents for follow-up of chronic multiple problems.    She has PTSD and anxiety secondary to her brother's suicide. She takes Xanax, which helps control these conditions. Additionally, she experiences panic attacks.    She suffers from chronic pain syndrome and rheumatoid arthritis (RA), which have progressively worsened over the years. The pain affects her wrists, hands, elbows, knees, hips, and back. Associated symptoms include joint pain (arthralgias) and muscle pain (myalgias). Aggravating factors include bending, twisting, standing, and sitting. She takes nonsteroidal anti-inflammatory drugs (NSAIDs), meloxicam, Lyrica, Norco, and Arava for RA. She also has a Narcan nasal spray at home and knows how to use it.    She has a chronic vitamin D deficiency, which she has had for more than a year. She takes vitamin D3 supplements. Occasionally, she experiences swelling and takes Lasix to control it. Aggravating factors for the swelling include standing for long periods.    She has seasonal allergies and takes Flonase for relief.    She has chronic gastroesophageal reflux disease (GERD), which is stable and controlled with antacids and proton pump inhibitors (PPIs). She has had GERD for more than a year, and it is improving. There are no associated symptoms like melena or muscle weakness. Risk factors include obesity, caffeine use, and lack of exercise.    She has iron deficiency anemia and takes ferrous sulfate for it.    Her chronic pain also includes sciatica, degenerative joint disease, fibromyalgia, and chronic midline back pain. For these conditions, she takes hydrocodone. She is compliant with her treatment plan,  "attends all her visits, does not request early refills, and adheres to the [place] and control contract on file.    She experiences headaches, which she attributes to her back pain and RA. She also reports episodes of vertigo, particularly when standing up.    Objective   Vital Signs:  /76 (BP Location: Right arm, Patient Position: Sitting, Cuff Size: Large Adult)   Pulse 56   Temp 98.1 °F (36.7 °C) (Oral)   Resp 16   Ht 158.8 cm (62.5\")   Wt 92.2 kg (203 lb 3.2 oz)   SpO2 96%   BMI 36.57 kg/m²   Estimated body mass index is 36.57 kg/m² as calculated from the following:    Height as of this encounter: 158.8 cm (62.5\").    Weight as of this encounter: 92.2 kg (203 lb 3.2 oz).     Class 2 Severe Obesity (BMI >=35 and <=39.9). Obesity-related health conditions include the following:  chronic back  pain, iron def anemia, neuropathy, RA, vit d def, PTSD . Obesity is unchanged. BMI is is above average; BMI management plan is completed. We discussed portion control and increasing exercise.    The following portions of the patient's history were reviewed and updated as appropriate: allergies, current medications, past family history, past medical history, past social history, past surgical history and problem list.    Physical Exam  Vitals and nursing note reviewed.   Constitutional:       General: She is awake.      Appearance: Normal appearance. She is well-developed and well-groomed.   HENT:      Head: Normocephalic and atraumatic.      Right Ear: Hearing, tympanic membrane, ear canal and external ear normal.      Left Ear: Hearing, tympanic membrane, ear canal and external ear normal.      Nose: Nose normal.      Mouth/Throat:      Lips: Pink.      Pharynx: Oropharynx is clear.   Eyes:      General: Lids are normal.      Conjunctiva/sclera: Conjunctivae normal.   Cardiovascular:      Rate and Rhythm: Normal rate and regular rhythm.      Heart sounds: Normal heart sounds.   Pulmonary:      Effort: Pulmonary " effort is normal.      Breath sounds: Normal breath sounds and air entry.   Musculoskeletal:        Arms:       Cervical back: Normal and full passive range of motion without pain.      Thoracic back: Spasms present. Decreased range of motion.      Lumbar back: Spasms and tenderness present. Decreased range of motion.        Back:       Right lower leg: No edema.      Left lower leg: No edema.      Comments: Bilateral shoulder, elbow, wrist and hands.    Lymphadenopathy:      Head:      Right side of head: No submental, submandibular or tonsillar adenopathy.      Left side of head: No submental, submandibular or tonsillar adenopathy.   Skin:     General: Skin is warm and dry.   Neurological:      Mental Status: She is alert and oriented to person, place, and time.      Sensory: Sensation is intact.      Motor: Motor function is intact.      Coordination: Coordination is intact.      Gait: Gait is intact.   Psychiatric:         Attention and Perception: Attention and perception normal.         Mood and Affect: Mood and affect normal.         Speech: Speech normal.         Behavior: Behavior normal. Behavior is cooperative.         Thought Content: Thought content normal.         Cognition and Memory: Cognition and memory normal.         Judgment: Judgment normal.        Physical Exam  Vital Signs  Blood pressure is 110/76. Heart rate is 56.    Result Review :          Results             Assessment and Plan     Diagnoses and all orders for this visit:    1. PTSD (post-traumatic stress disorder) (Primary): chronic, stable  2. Anxiety: chronic, stable  3. Panic attack: chronic, stable  -     ALPRAZolam (XANAX) 1 MG tablet; Take 1 tablet by mouth 3 (Three) Times a Day As Needed for Anxiety.  Dispense: 90 tablet; Refill: 0    4. Generalized edema: chronic, stable  -     furosemide (LASIX) 40 MG tablet; Take 1 tablet by mouth Daily.  Dispense: 90 tablet; Refill: 1    5. Other specified rheumatoid arthritis, multiple  sites: chronic, stable  6. Fibromyalgia: chronic, stable  7. Rheumatoid arthritis, involving unspecified site, unspecified whether rheumatoid factor present  8. Chronic midline low back pain with sciatica, sciatica laterality unspecified: chronic, stable  9. Chronic pain syndrome: chronic, stable  10. DDD (degenerative disc disease), lumbar: chronic, stable  -     HYDROcodone-acetaminophen (Norco)  MG per tablet; Take 1 tablet by mouth Every 6 (Six) Hours As Needed for Moderate Pain.  Dispense: 120 tablet; Refill: 0        -    toxassure, control contract and gladys reviewed    11. Anemia, unspecified type: chronic, stable  -     ferrous sulfate 324 (65 Fe) MG tablet delayed-release EC tablet; Take 1 tablet by mouth Daily With Breakfast.  Dispense: 90 tablet; Refill: 1    12. Seasonal allergies: chronic, stable  -     fluticasone (Flonase) 50 MCG/ACT nasal spray; 2 sprays into the nostril(s) as directed by provider Daily.  Dispense: 16 g; Refill: 3    13. Encounter for screening mammogram for malignant neoplasm of breast  -     Mammo Screening Digital Tomosynthesis Bilateral With CAD    14. Other osteoporosis without current pathological fracture: chronic, stable  -     alendronate (Fosamax) 70 MG tablet; Take 1 tablet by mouth Every 7 (Seven) Days.  Dispense: 90 tablet; Refill: 0    Assessment & Plan  1. Post-Traumatic Stress Disorder (PTSD).  She has PTSD and anxiety secondary to her brother's suicide. She takes Xanax, which helps control her symptoms. She also experiences panic attacks. Continuation of Xanax is recommended.    2. Anxiety.  She experiences anxiety related to her PTSD. Xanax helps control her symptoms. Continuation of Xanax is recommended.    3. Chronic Pain Syndrome.  She has chronic pain syndrome affecting her wrists, hands, elbows, knees, hips, and back, which has gradually worsened. She takes meloxicam, Lyrica, and Norco for pain management. She also uses Arava for rheumatoid arthritis.  Continuation of current medications is recommended. She is advised to avoid aggravating activities such as bending, twisting, standing, and sitting for prolonged periods.    4. Rheumatoid Arthritis (RA).  She has rheumatoid arthritis and takes Arava for management. Continuation of Arava is recommended.     5. Vertigo.  She experiences dizziness when standing up. Her blood pressure is 110/76, but her heart rate is 56, which is lower than usual. She is advised to transition slowly from lying to sitting and then to standing to allow blood flow to stabilize.    6. Vitamin D Deficiency.  She has chronic vitamin D deficiency and takes vitamin D3. Continuation of vitamin D3 is recommended.    7. Edema.  She has chronic swelling in her ankles, which is managed with Lasix (furosemide). Continuation of Lasix is recommended.    8. Seasonal Allergies.  She has seasonal allergies and uses Flonase for management. Continuation of Flonase is recommended.    9. Gastroesophageal Reflux Disease (GERD).  She has chronic GERD, which is stable and controlled with antacids and proton pump inhibitors (PPI). Continuation of current medications is recommended.    10. Iron Deficiency Anemia.  She has iron deficiency anemia and takes ferrous sulfate. Continuation of ferrous sulfate is recommended.    11. Sciatica.  She has sciatica contributing to her chronic pain. Continuation of current pain management regimen is recommended.    12. Degenerative Joint Disease.  She has degenerative joint disease contributing to her chronic pain. Continuation of current pain management regimen is recommended.    13. Fibromyalgia.  She has fibromyalgia contributing to her chronic pain. Continuation of current pain management regimen is recommended.    14. Chronic Midline Back Pain.  She has chronic midline back pain contributing to her overall pain. Continuation of current pain management regimen is recommended.    Follow-up  A follow-up visit is scheduled in  1 month, or sooner if necessary.      ICD-10-CM ICD-9-CM   1. PTSD (post-traumatic stress disorder)  F43.10 309.81   2. Anxiety  F41.9 300.00   3. Panic attack  F41.0 300.01   4. Generalized edema  R60.1 782.3   5. Other specified rheumatoid arthritis, multiple sites  M06.89 714.0   6. Fibromyalgia  M79.7 729.1   7. Rheumatoid arthritis, involving unspecified site, unspecified whether rheumatoid factor present  M06.9 714.0   8. Chronic midline low back pain with sciatica, sciatica laterality unspecified  M54.40 724.2    G89.29 724.3     338.29   9. Chronic pain syndrome  G89.4 338.4   10. DDD (degenerative disc disease), lumbar  M51.36 722.52   11. Anemia, unspecified type  D64.9 285.9   12. Seasonal allergies  J30.2 477.9   13. Encounter for screening mammogram for malignant neoplasm of breast  Z12.31 V76.12   14. Other osteoporosis without current pathological fracture  M81.8 733.09                Follow Up     Return in about 1 month (around 9/16/2024) for Recheck.  Patient was given instructions and counseling regarding her condition or for health maintenance advice. Please see specific information pulled into the AVS if appropriate.       Patient or patient representative verbalized consent for the use of Ambient Listening during the visit with  Charlene Huntley DNP, SURYA for chart documentation. 8/16/2024  12:29 CDT    Electronically signed by Charlene Huntley DNP, SURYA, 08/16/24, 12:29 PM CDT.

## 2024-09-06 ENCOUNTER — TELEPHONE (OUTPATIENT)
Dept: ULTRASOUND IMAGING | Facility: CLINIC | Age: 59
End: 2024-09-06
Payer: MEDICARE

## 2024-09-17 ENCOUNTER — OFFICE VISIT (OUTPATIENT)
Dept: FAMILY MEDICINE CLINIC | Facility: CLINIC | Age: 59
End: 2024-09-17
Payer: MEDICARE

## 2024-09-17 VITALS
TEMPERATURE: 98.6 F | SYSTOLIC BLOOD PRESSURE: 149 MMHG | HEART RATE: 64 BPM | HEIGHT: 63 IN | WEIGHT: 200 LBS | DIASTOLIC BLOOD PRESSURE: 85 MMHG | RESPIRATION RATE: 18 BRPM | BODY MASS INDEX: 35.44 KG/M2 | OXYGEN SATURATION: 99 %

## 2024-09-17 DIAGNOSIS — M48.061 SPINAL STENOSIS OF LUMBAR REGION WITHOUT NEUROGENIC CLAUDICATION: ICD-10-CM

## 2024-09-17 DIAGNOSIS — M51.36 DDD (DEGENERATIVE DISC DISEASE), LUMBAR: ICD-10-CM

## 2024-09-17 DIAGNOSIS — M51.36 DEGENERATION OF LUMBAR INTERVERTEBRAL DISC: Primary | ICD-10-CM

## 2024-09-17 DIAGNOSIS — F43.10 PTSD (POST-TRAUMATIC STRESS DISORDER): ICD-10-CM

## 2024-09-17 DIAGNOSIS — G89.29 CHRONIC MIDLINE LOW BACK PAIN WITH SCIATICA, SCIATICA LATERALITY UNSPECIFIED: ICD-10-CM

## 2024-09-17 DIAGNOSIS — G89.4 CHRONIC PAIN SYNDROME: ICD-10-CM

## 2024-09-17 DIAGNOSIS — M54.16 LUMBAR RADICULOPATHY: ICD-10-CM

## 2024-09-17 DIAGNOSIS — M54.40 CHRONIC MIDLINE LOW BACK PAIN WITH SCIATICA, SCIATICA LATERALITY UNSPECIFIED: ICD-10-CM

## 2024-09-17 DIAGNOSIS — F41.0 PANIC ATTACK: ICD-10-CM

## 2024-09-17 DIAGNOSIS — M06.9 RHEUMATOID ARTHRITIS, INVOLVING UNSPECIFIED SITE, UNSPECIFIED WHETHER RHEUMATOID FACTOR PRESENT: ICD-10-CM

## 2024-09-17 DIAGNOSIS — D50.8 OTHER IRON DEFICIENCY ANEMIA: ICD-10-CM

## 2024-09-17 PROCEDURE — 99214 OFFICE O/P EST MOD 30 MIN: CPT | Performed by: NURSE PRACTITIONER

## 2024-09-17 PROCEDURE — 1160F RVW MEDS BY RX/DR IN RCRD: CPT | Performed by: NURSE PRACTITIONER

## 2024-09-17 PROCEDURE — 1125F AMNT PAIN NOTED PAIN PRSNT: CPT | Performed by: NURSE PRACTITIONER

## 2024-09-17 PROCEDURE — 1159F MED LIST DOCD IN RCRD: CPT | Performed by: NURSE PRACTITIONER

## 2024-09-17 RX ORDER — ALPRAZOLAM 1 MG
1 TABLET ORAL 3 TIMES DAILY PRN
Qty: 90 TABLET | Refills: 0 | Status: SHIPPED | OUTPATIENT
Start: 2024-09-17

## 2024-09-17 RX ORDER — HYDROCODONE BITARTRATE AND ACETAMINOPHEN 10; 325 MG/1; MG/1
1 TABLET ORAL EVERY 6 HOURS PRN
Qty: 120 TABLET | Refills: 0 | Status: SHIPPED | OUTPATIENT
Start: 2024-09-17

## 2024-09-20 ENCOUNTER — TELEPHONE (OUTPATIENT)
Dept: FAMILY MEDICINE CLINIC | Facility: CLINIC | Age: 59
End: 2024-09-20
Payer: MEDICARE

## 2024-10-03 ENCOUNTER — TRANSCRIBE ORDERS (OUTPATIENT)
Dept: ADMINISTRATIVE | Facility: HOSPITAL | Age: 59
End: 2024-10-03
Payer: MEDICARE

## 2024-10-03 DIAGNOSIS — F17.210 CIGARETTE SMOKER: ICD-10-CM

## 2024-10-03 DIAGNOSIS — M47.816 LUMBAR SPONDYLOSIS: ICD-10-CM

## 2024-10-03 DIAGNOSIS — M54.16 LUMBAR RADICULOPATHY: Primary | ICD-10-CM

## 2024-10-07 DIAGNOSIS — K21.9 GASTROESOPHAGEAL REFLUX DISEASE WITHOUT ESOPHAGITIS: ICD-10-CM

## 2024-10-07 RX ORDER — OMEPRAZOLE 40 MG/1
40 CAPSULE, DELAYED RELEASE ORAL DAILY
Qty: 90 CAPSULE | Refills: 1 | Status: SHIPPED | OUTPATIENT
Start: 2024-10-07

## 2024-10-07 NOTE — TELEPHONE ENCOUNTER
Rx Refill Note  Requested Prescriptions     Pending Prescriptions Disp Refills    omeprazole (priLOSEC) 40 MG capsule [Pharmacy Med Name: OMEPRAZOLE 40 MG CAP 40 Capsule] 90 capsule 1     Sig: Take 1 capsule by mouth Daily.      Last office visit with prescribing clinician: 9/17/2024   Last telemedicine visit with prescribing clinician: Visit date not found   Next office visit with prescribing clinician: 10/16/2024                         Would you like a call back once the refill request has been completed: [] Yes [] No    If the office needs to give you a call back, can they leave a voicemail: [] Yes [] No    Marisol Nguyen LPN  10/07/24, 11:02 CDT

## 2024-10-16 ENCOUNTER — OFFICE VISIT (OUTPATIENT)
Dept: FAMILY MEDICINE CLINIC | Facility: CLINIC | Age: 59
End: 2024-10-16
Payer: MEDICARE

## 2024-10-16 VITALS
WEIGHT: 198 LBS | DIASTOLIC BLOOD PRESSURE: 72 MMHG | SYSTOLIC BLOOD PRESSURE: 124 MMHG | HEART RATE: 69 BPM | RESPIRATION RATE: 18 BRPM | OXYGEN SATURATION: 99 % | TEMPERATURE: 98.6 F | HEIGHT: 63 IN | BODY MASS INDEX: 35.08 KG/M2

## 2024-10-16 DIAGNOSIS — F43.10 PTSD (POST-TRAUMATIC STRESS DISORDER): ICD-10-CM

## 2024-10-16 DIAGNOSIS — G89.29 CHRONIC MIDLINE LOW BACK PAIN WITH SCIATICA, SCIATICA LATERALITY UNSPECIFIED: ICD-10-CM

## 2024-10-16 DIAGNOSIS — M54.40 CHRONIC MIDLINE LOW BACK PAIN WITH SCIATICA, SCIATICA LATERALITY UNSPECIFIED: ICD-10-CM

## 2024-10-16 DIAGNOSIS — G89.4 CHRONIC PAIN SYNDROME: ICD-10-CM

## 2024-10-16 DIAGNOSIS — F41.0 PANIC ATTACK: ICD-10-CM

## 2024-10-16 DIAGNOSIS — M51.369 DDD (DEGENERATIVE DISC DISEASE), LUMBAR: ICD-10-CM

## 2024-10-16 DIAGNOSIS — M06.9 RHEUMATOID ARTHRITIS, INVOLVING UNSPECIFIED SITE, UNSPECIFIED WHETHER RHEUMATOID FACTOR PRESENT: ICD-10-CM

## 2024-10-16 PROCEDURE — 1125F AMNT PAIN NOTED PAIN PRSNT: CPT | Performed by: NURSE PRACTITIONER

## 2024-10-16 PROCEDURE — 1159F MED LIST DOCD IN RCRD: CPT | Performed by: NURSE PRACTITIONER

## 2024-10-16 PROCEDURE — 1160F RVW MEDS BY RX/DR IN RCRD: CPT | Performed by: NURSE PRACTITIONER

## 2024-10-16 PROCEDURE — 99214 OFFICE O/P EST MOD 30 MIN: CPT | Performed by: NURSE PRACTITIONER

## 2024-10-16 RX ORDER — HYDROCODONE BITARTRATE AND ACETAMINOPHEN 10; 325 MG/1; MG/1
1 TABLET ORAL EVERY 6 HOURS PRN
Qty: 120 TABLET | Refills: 0 | Status: SHIPPED | OUTPATIENT
Start: 2024-10-16

## 2024-10-16 RX ORDER — ALPRAZOLAM 1 MG/1
1 TABLET ORAL 3 TIMES DAILY PRN
Qty: 90 TABLET | Refills: 0 | Status: SHIPPED | OUTPATIENT
Start: 2024-10-16

## 2024-10-16 RX ORDER — CHLORHEXIDINE GLUCONATE ORAL RINSE 1.2 MG/ML
SOLUTION DENTAL
COMMUNITY
Start: 2024-10-10

## 2024-10-16 RX ORDER — CLINDAMYCIN HYDROCHLORIDE 300 MG/1
CAPSULE ORAL
COMMUNITY
Start: 2024-10-10
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
## 2024-10-31 ENCOUNTER — HOSPITAL ENCOUNTER (OUTPATIENT)
Dept: MRI IMAGING | Facility: HOSPITAL | Age: 59
Discharge: HOME | End: 2024-10-31
Payer: MEDICARE

## 2024-10-31 DIAGNOSIS — F17.210 CIGARETTE SMOKER: ICD-10-CM

## 2024-10-31 DIAGNOSIS — M47.816 LUMBAR SPONDYLOSIS: ICD-10-CM

## 2024-10-31 DIAGNOSIS — M54.16 LUMBAR RADICULOPATHY: ICD-10-CM

## 2024-10-31 PROCEDURE — 72148 MRI LUMBAR SPINE W/O DYE: CPT

## 2024-11-07 ENCOUNTER — TELEPHONE (OUTPATIENT)
Dept: FAMILY MEDICINE CLINIC | Facility: CLINIC | Age: 59
End: 2024-11-07
Payer: MEDICARE

## 2024-11-07 NOTE — TELEPHONE ENCOUNTER
----- Message from Charlene Huntley sent at 11/3/2024  8:36 PM CST -----  Please call pt with results she does not want to go to my chart  Your MRI showed a lot of things.  There is severe osteoarthritis changes, there is loss of disc height, and modic type 1 degenerative endplate signal changes, there is spondylolisthesis , there is spinal stenosis.  Lets discuss all these results at your next visit and decide if you want to see neuro or ortho

## 2024-11-07 NOTE — TELEPHONE ENCOUNTER
Called and notified pt of results, voiced an understanding. Pt states that she saw Dr. Dunaway and he went over the results with the pt already.    No

## 2024-11-13 ENCOUNTER — OFFICE VISIT (OUTPATIENT)
Dept: FAMILY MEDICINE CLINIC | Facility: CLINIC | Age: 59
End: 2024-11-13
Payer: MEDICARE

## 2024-11-13 VITALS
TEMPERATURE: 97.8 F | OXYGEN SATURATION: 98 % | HEIGHT: 63 IN | BODY MASS INDEX: 34.59 KG/M2 | WEIGHT: 195.2 LBS | SYSTOLIC BLOOD PRESSURE: 112 MMHG | DIASTOLIC BLOOD PRESSURE: 56 MMHG | HEART RATE: 81 BPM | RESPIRATION RATE: 20 BRPM

## 2024-11-13 VITALS
HEIGHT: 63 IN | TEMPERATURE: 97.8 F | HEART RATE: 81 BPM | SYSTOLIC BLOOD PRESSURE: 112 MMHG | DIASTOLIC BLOOD PRESSURE: 56 MMHG | RESPIRATION RATE: 20 BRPM | BODY MASS INDEX: 34.55 KG/M2 | WEIGHT: 195 LBS | OXYGEN SATURATION: 98 %

## 2024-11-13 DIAGNOSIS — M06.9 RHEUMATOID ARTHRITIS, INVOLVING UNSPECIFIED SITE, UNSPECIFIED WHETHER RHEUMATOID FACTOR PRESENT: ICD-10-CM

## 2024-11-13 DIAGNOSIS — F43.10 PTSD (POST-TRAUMATIC STRESS DISORDER): ICD-10-CM

## 2024-11-13 DIAGNOSIS — G89.29 CHRONIC MIDLINE LOW BACK PAIN WITH SCIATICA, SCIATICA LATERALITY UNSPECIFIED: ICD-10-CM

## 2024-11-13 DIAGNOSIS — M51.369 DDD (DEGENERATIVE DISC DISEASE), LUMBAR: ICD-10-CM

## 2024-11-13 DIAGNOSIS — M54.40 CHRONIC MIDLINE LOW BACK PAIN WITH SCIATICA, SCIATICA LATERALITY UNSPECIFIED: ICD-10-CM

## 2024-11-13 DIAGNOSIS — G89.4 CHRONIC PAIN SYNDROME: ICD-10-CM

## 2024-11-13 DIAGNOSIS — Z00.00 MEDICARE ANNUAL WELLNESS VISIT, SUBSEQUENT: Primary | ICD-10-CM

## 2024-11-13 DIAGNOSIS — F41.0 PANIC ATTACK: ICD-10-CM

## 2024-11-13 PROCEDURE — G0439 PPPS, SUBSEQ VISIT: HCPCS | Performed by: NURSE PRACTITIONER

## 2024-11-13 PROCEDURE — 1170F FXNL STATUS ASSESSED: CPT | Performed by: NURSE PRACTITIONER

## 2024-11-13 PROCEDURE — 1125F AMNT PAIN NOTED PAIN PRSNT: CPT | Performed by: NURSE PRACTITIONER

## 2024-11-13 RX ORDER — HYDROCODONE BITARTRATE AND ACETAMINOPHEN 10; 325 MG/1; MG/1
1 TABLET ORAL EVERY 6 HOURS PRN
Qty: 120 TABLET | Refills: 0 | Status: SHIPPED | OUTPATIENT
Start: 2024-11-13

## 2024-11-13 RX ORDER — ALPRAZOLAM 1 MG/1
1 TABLET ORAL 3 TIMES DAILY PRN
Qty: 90 TABLET | Refills: 0 | Status: SHIPPED | OUTPATIENT
Start: 2024-11-13

## 2024-11-13 NOTE — PATIENT INSTRUCTIONS
Medicare Wellness  Personal Prevention Plan of Service     Date of Office Visit:    Encounter Provider:  Charlene Huntley, [place], APRN  Place of Service:  [facility]  Patient Name: Adela Arauz  :  1965    As part of the Medicare Wellness portion of your visit today, we are providing you with this personalized preventive plan of services (PPPS). This plan is based upon recommendations of the [place] Preventive Services Task Force (USPSTF) and the Advisory Committee on Immunization Practices (ACIP).    This lists the preventive care services that should be considered, and provides dates of when you are due. Items listed as completed are up-to-date and do not require any further intervention.    Health Maintenance   Topic Date Due    COVID-19 Vaccine (2024- season) 11/15/2024 (Originally 2024)    PAP SMEAR  2025 (Originally 2023)    INFLUENZA VACCINE  2025 (Originally 2024)    BMI FOLLOWUP  2025    ANNUAL WELLNESS VISIT  2025    MAMMOGRAM  2026    TDAP/TD VACCINES (2 - Td or Tdap) 2027    COLORECTAL CANCER SCREENING  2027    Pneumococcal Vaccine 0-64 (3 of 3 - PPSV23 or PCV20) 01/15/2031    HEPATITIS C SCREENING  Completed    ZOSTER VACCINE  Completed    LUNG CANCER SCREENING  Discontinued       No orders of the defined types were placed in this encounter.      No follow-ups on file.

## 2024-11-13 NOTE — PROGRESS NOTES
"Chief Complaint  Anxiety (Patient here for follow up. )    Subjective        Adela Arauz presents to [facility]  History of Present Illness  Presents for chronic pain, fibromyalgia, RA, chronic back pain, knee pain, hip pain.  She states, \"I think I am going to need another back surgery.\" She has lumbar stenosis, Foraminal stenosis, lumbar fusion, AAT, anxiety and panic attacks secondary to her brother committing suicide and her finding his body.  Rates her pain today 10/10       The following portions of the patient's history were reviewed and updated as appropriate: allergies, current medications, past family history, past medical history, past social history, past surgical history and problem list.      Objective   Vital Signs:  /56 (BP Location: Left arm, Patient Position: Sitting, Cuff Size: Large Adult)   Pulse 81   Temp 97.8 °F (36.6 °C) (Infrared)   Resp 20   Ht 158.8 cm (62.5\")   Wt 88.5 kg (195 lb 3.2 oz)   SpO2 98%   BMI 35.13 kg/m²   Estimated body mass index is 35.13 kg/m² as calculated from the following:    Height as of this encounter: 158.8 cm (62.5\").    Weight as of this encounter: 88.5 kg (195 lb 3.2 oz).            Physical Exam  Vitals and nursing note reviewed.   Constitutional:       General: She is awake.      Appearance: Normal appearance. She is well-developed and well-groomed.   HENT:      Head: Normocephalic and atraumatic.      Right Ear: Hearing, tympanic membrane, ear canal and external ear normal.      Left Ear: Hearing, tympanic membrane, ear canal and external ear normal.      Nose: Nose normal.      Mouth/Throat:      Lips: Pink.      Pharynx: Oropharynx is clear.   Eyes:      General: Lids are normal.      Conjunctiva/sclera: Conjunctivae normal.   Cardiovascular:      Rate and Rhythm: Normal rate and regular rhythm.      Heart sounds: Normal heart sounds.   Pulmonary:      Effort: Pulmonary effort is normal.      Breath " sounds: Normal breath sounds and air entry.   Musculoskeletal:      Right shoulder: Tenderness present. Decreased range of motion.      Left shoulder: Tenderness present. Decreased range of motion.      Right elbow: Decreased range of motion. Tenderness present.      Left elbow: Decreased range of motion. No tenderness.      Right wrist: Tenderness present. Decreased range of motion.      Left wrist: Tenderness present. Decreased range of motion.        Arms:       Cervical back: Full passive range of motion without pain. Rigidity present. No spasms. Normal range of motion.      Thoracic back: Spasms present. Decreased range of motion.      Lumbar back: Spasms and tenderness present. Decreased range of motion.        Back:       Right lower leg: No edema.      Left lower leg: No edema.   Lymphadenopathy:      Head:      Right side of head: No submental, submandibular or tonsillar adenopathy.      Left side of head: No submental, submandibular or tonsillar adenopathy.   Skin:     General: Skin is warm and dry.   Neurological:      Mental Status: She is alert and oriented to person, place, and time.      Sensory: Sensation is intact.      Motor: Motor function is intact.      Coordination: Coordination is intact.      Gait: Gait is intact.   Psychiatric:         Attention and Perception: Attention and perception normal.         Mood and Affect: Mood and affect normal.         Speech: Speech normal.         Behavior: Behavior normal. Behavior is cooperative.         Thought Content: Thought content normal.         Cognition and Memory: Cognition and memory normal.         Judgment: Judgment normal.        Result Review :                Assessment and Plan   Diagnoses and all orders for this visit:    1. PTSD (post-traumatic stress disorder)  -     ALPRAZolam (XANAX) 1 MG tablet; Take 1 tablet by mouth 3 (Three) Times a Day As Needed for Anxiety.  Dispense: 90 tablet; Refill: 0    2. Panic attack  -     ALPRAZolam  (XANAX) 1 MG tablet; Take 1 tablet by mouth 3 (Three) Times a Day As Needed for Anxiety.  Dispense: 90 tablet; Refill: 0    3. Chronic midline low back pain with sciatica, sciatica laterality unspecified  -     HYDROcodone-acetaminophen (Norco)  MG per tablet; Take 1 tablet by mouth Every 6 (Six) Hours As Needed for Moderate Pain.  Dispense: 120 tablet; Refill: 0    4. Chronic pain syndrome  -     HYDROcodone-acetaminophen (Norco)  MG per tablet; Take 1 tablet by mouth Every 6 (Six) Hours As Needed for Moderate Pain.  Dispense: 120 tablet; Refill: 0  -     tiZANidine (ZANAFLEX) 4 MG tablet; Take 1 tablet by mouth Every 8 (Eight) Hours As Needed for Muscle Spasms.  Dispense: 270 tablet; Refill: 1    5. DDD (degenerative disc disease), lumbar  -     HYDROcodone-acetaminophen (Norco)  MG per tablet; Take 1 tablet by mouth Every 6 (Six) Hours As Needed for Moderate Pain.  Dispense: 120 tablet; Refill: 0  -     tiZANidine (ZANAFLEX) 4 MG tablet; Take 1 tablet by mouth Every 8 (Eight) Hours As Needed for Muscle Spasms.  Dispense: 270 tablet; Refill: 1    6. Rheumatoid arthritis, involving unspecified site, unspecified whether rheumatoid factor present  -     HYDROcodone-acetaminophen (Norco)  MG per tablet; Take 1 tablet by mouth Every 6 (Six) Hours As Needed for Moderate Pain.  Dispense: 120 tablet; Refill: 0      Uday reviewed  Control on file  Toxassure on file        Follow Up     Return in about 1 month (around 12/13/2024).    Patient was given instructions and counseling regarding her condition or for health maintenance advice. Please see specific information pulled into the AVS if appropriate.       Electronically signed by Charlene Huntley, [place], APRN, 12/01/24, 10:12 AM GARY.

## 2024-11-13 NOTE — PROGRESS NOTES
Subjective   The ABCs of the Annual Wellness Visit  Medicare Wellness Visit      Adela Arauz is a 59 y.o. patient who presents for a Medicare Wellness Visit.    The following portions of the patient's history were reviewed and   updated as appropriate: allergies, current medications, past family history, past medical history, past social history, past surgical history, and problem list    Compared to one year ago, the patient's physical health is worse.  Compared to one year ago, the patient's mental health is worse.    Recent Hospitalizations:  She was not admitted to the hospital during the last year.     Current Medical Providers:  Patient Care Team:  Charlene Huntley, DNP, APRN as PCP - General (Family Medicine)  Yan Seay MD as Consulting Physician (Pulmonary Disease)  Ramírez Marie MD as Consulting Physician (Hematology and Oncology)    Outpatient Medications Prior to Visit   Medication Sig Dispense Refill    alendronate (Fosamax) 70 MG tablet Take 1 tablet by mouth Every 7 (Seven) Days. 90 tablet 0    ALPRAZolam (XANAX) 1 MG tablet Take 1 tablet by mouth 3 (Three) Times a Day As Needed for Anxiety. 90 tablet 0    betamethasone valerate (VALISONE) 0.1 % ointment Apply  topically to the appropriate area as directed 2 (Two) Times a Day. 30 g 0    Calcium-Magnesium-Vitamin D 500-250-200 MG-MG-UNIT tablet Take 600 tablets by mouth 2 (Two) Times a Day.      chlorhexidine (PERIDEX) 0.12 % solution       cholecalciferol (VITAMIN D3) 1000 units tablet Take 1 tablet by mouth Daily.      clindamycin (CLEOCIN) 300 MG capsule       ferrous sulfate 324 (65 Fe) MG tablet delayed-release EC tablet Take 1 tablet by mouth Daily With Breakfast. 90 tablet 1    fluticasone (Flonase) 50 MCG/ACT nasal spray 2 sprays into the nostril(s) as directed by provider Daily. 16 g 3    furosemide (LASIX) 40 MG tablet Take 1 tablet by mouth Daily. 90 tablet 1    HYDROcodone-acetaminophen (Norco)  MG per  tablet Take 1 tablet by mouth Every 6 (Six) Hours As Needed for Moderate Pain. 120 tablet 0    leflunomide (ARAVA) 20 MG tablet 1 tablet.      meloxicam (MOBIC) 15 MG tablet Take 1 tablet by mouth Daily. 90 tablet 1    mupirocin (BACTROBAN) 2 % ointment Apply to nostrils and fingernails nighlty x 5 nights same 5 nights monthly x 6 months. 30 g 5    Narcan 4 MG/0.1ML nasal spray       omeprazole (priLOSEC) 40 MG capsule Take 1 capsule by mouth Daily. 90 capsule 1    pregabalin (LYRICA) 150 MG capsule TAKE ONE CAPSULE BY MOUTH TWICE A  capsule 5    tiZANidine (ZANAFLEX) 4 MG tablet Take 1 tablet by mouth 2 (Two) Times a Day As Needed for Muscle Spasms. 180 tablet 1    Turmeric 500 MG capsule Take 1 capsule by mouth 2 (Two) Times a Day.      valACYclovir (VALTREX) 1000 MG tablet Take 1 tablet by mouth 2 (Two) Times a Day. 270 tablet 1     No facility-administered medications prior to visit.     Opioid medication/s are on active medication list.  and I have evaluated her active treatment plan and pain score trends (see table).  Vitals:    11/13/24 1332   PainSc: 10-Worst pain ever   PainLoc: Generalized     I have reviewed the chart for potential of high risk medication and harmful drug interactions in the elderly.        Aspirin is not on active medication list.  Aspirin use is not indicated based on review of current medical condition/s. Risk of harm outweighs potential benefits.  .    Patient Active Problem List   Diagnosis    Chronic pain syndrome    Rheumatoid arthritis involving multiple sites    Osteoporosis    Chest pain    Multiple lung nodules < 6mm identified 5/2018    Lymphadenopathy    AAT (alpha-1-antitrypsin) deficiency    Axillary adenopathy    Former smoker    Lymphedema of right arm    Pain in right arm    Morbid (severe) obesity due to excess calories    Anxiety    Foraminal stenosis due to intervertebral disc disease    Lumbar stenosis    Lumbar radiculopathy    History of lumbar fusion     "Degeneration of lumbar intervertebral disc    Plantar fasciitis    Panic attack    GERD (gastroesophageal reflux disease)     Advance Care Planning Advance Directive is not on file.  ACP discussion was held with the patient during this visit. Patient does not have an advance directive, information provided.            Objective   There were no vitals filed for this visit.    Estimated body mass index is 35.13 kg/m² as calculated from the following:    Height as of an earlier encounter on 24: 158.8 cm (62.5\").    Weight as of an earlier encounter on 24: 88.5 kg (195 lb 3.2 oz).            Does the patient have evidence of cognitive impairment? No                                                                                                Health  Risk Assessment    Smoking Status:  Social History     Tobacco Use   Smoking Status Every Day    Current packs/day: 0.50    Average packs/day: 0.5 packs/day for 25.0 years (12.5 ttl pk-yrs)    Types: Cigarettes   Smokeless Tobacco Never     Alcohol Consumption:  Social History     Substance and Sexual Activity   Alcohol Use No       Fall Risk Screen  STEADI Fall Risk Assessment was completed, and patient is at LOW risk for falls.Assessment completed on:2024    Depression Screening   Little interest or pleasure in doing things? Not at all   Feeling down, depressed, or hopeless? Not at all   PHQ-2 Total Score 0      Health Habits and Functional and Cognitive Screenin/13/2024     1:31 PM   Functional & Cognitive Status   Do you have difficulty preparing food and eating? No   Do you have difficulty bathing yourself, getting dressed or grooming yourself? No   Do you have difficulty using the toilet? No   Do you have difficulty moving around from place to place? No   Do you have trouble with steps or getting out of a bed or a chair? No   Current Diet Well Balanced Diet   Dental Exam Not up to date   Eye Exam Not up to date   Exercise (times per week) 3 " times per week   Current Exercises Include House Cleaning   Do you need help using the phone?  No   Are you deaf or do you have serious difficulty hearing?  No   Do you need help to go to places out of walking distance? No   Do you need help shopping? No   Do you need help preparing meals?  No   Do you need help with housework?  No   Do you need help with laundry? No   Do you need help taking your medications? No   Do you need help managing money? No   Do you ever drive or ride in a car without wearing a seat belt? No   Have you felt unusual stress, anger or loneliness in the last month? No   Who do you live with? Alone   If you need help, do you have trouble finding someone available to you? No   Have you been bothered in the last four weeks by sexual problems? No   Do you have difficulty concentrating, remembering or making decisions? No           Age-appropriate Screening Schedule:  Refer to the list below for future screening recommendations based on patient's age, sex and/or medical conditions. Orders for these recommended tests are listed in the plan section. The patient has been provided with a written plan.    Health Maintenance List  Health Maintenance   Topic Date Due    COVID-19 Vaccine (1 - 2024-25 season) 11/15/2024 (Originally 9/1/2024)    PAP SMEAR  01/05/2025 (Originally 9/25/2023)    INFLUENZA VACCINE  03/31/2025 (Originally 8/1/2024)    BMI FOLLOWUP  09/17/2025    ANNUAL WELLNESS VISIT  11/13/2025    MAMMOGRAM  09/13/2026    TDAP/TD VACCINES (2 - Td or Tdap) 04/20/2027    COLORECTAL CANCER SCREENING  11/06/2027    Pneumococcal Vaccine 0-64 (3 of 3 - PPSV23 or PCV20) 01/15/2031    HEPATITIS C SCREENING  Completed    ZOSTER VACCINE  Completed    LUNG CANCER SCREENING  Discontinued                                                                                                                                              CMS Preventative Services Quick Reference  Risk Factors Identified During  Encounter  Chronic Pain:  struggles with chronic pain, neck pain, back pain, hip pain, RA, fibro.  Had MRI spine and she says she was told she needs surgery again on back.   Depression/Dysphoria: Current medication is effective, no change recommended. Denies suicidal/homicidal ideations.  Suffers with PTSD from her brothers suicide.   Fall Risk-High or Moderate: Discussed Fall Prevention in the home  Immunizations Discussed/Encouraged: COVID19 and RSV (Respiratory Syncytial Virus)refused  Polypharmacy: Medication List reviewed and Medications are appropriate for patient  Dental Screening Recommended  Vision Screening Recommended    The above risks/problems have been discussed with the patient.  Pertinent information has been shared with the patient in the After Visit Summary.  An After Visit Summary and PPPS were made available to the patient.    Follow Up:   Next Medicare Wellness visit to be scheduled in 1 year.         Additional E&M Note during same encounter follows:  Patient has additional, significant, and separately identifiable condition(s)/problem(s) that require work above and beyond the Medicare Wellness Visit     Chief Complaint  Medicare Wellness-subsequent (Patient here for medicare wellness exam. /)    Subjective   HPI  Adela is also being seen today for an annual adult preventative physical exam.     Review of Systems   Constitutional: Negative.    HENT: Negative.     Respiratory: Negative.     Cardiovascular: Negative.    Genitourinary: Negative.    Musculoskeletal:  Positive for arthralgias, back pain, gait problem, myalgias and neck stiffness.   Neurological: Negative.    Hematological: Negative.    Psychiatric/Behavioral: Negative.     All other systems reviewed and are negative.             Objective   Vital Signs:  There were no vitals taken for this visit.  Physical Exam  Vitals and nursing note reviewed.   Constitutional:       General: She is awake.      Appearance: Normal appearance. She is  well-developed and well-groomed.   HENT:      Head: Normocephalic and atraumatic.      Right Ear: Hearing, tympanic membrane, ear canal and external ear normal.      Left Ear: Hearing, tympanic membrane, ear canal and external ear normal.      Nose: Nose normal.      Mouth/Throat:      Lips: Pink.      Pharynx: Oropharynx is clear.   Eyes:      General: Lids are normal.      Conjunctiva/sclera: Conjunctivae normal.   Cardiovascular:      Rate and Rhythm: Normal rate and regular rhythm.      Heart sounds: Normal heart sounds.   Pulmonary:      Effort: Pulmonary effort is normal.      Breath sounds: Normal breath sounds and air entry.   Musculoskeletal:      Right elbow: Decreased range of motion. Tenderness present.      Left elbow: Decreased range of motion.      Right wrist: Decreased range of motion.      Left wrist: Decreased range of motion.        Arms:       Cervical back: Rigidity and spasms present. Decreased range of motion.      Thoracic back: Signs of trauma, spasms and tenderness present.      Lumbar back: Spasms and tenderness present. Decreased range of motion.        Back:       Right hip: Tenderness present. Decreased range of motion.      Left hip: Tenderness present. Decreased range of motion.      Right lower leg: No edema.      Left lower leg: No edema.        Legs:    Lymphadenopathy:      Head:      Right side of head: No submental, submandibular or tonsillar adenopathy.      Left side of head: No submental, submandibular or tonsillar adenopathy.   Skin:     General: Skin is warm and dry.   Neurological:      Mental Status: She is alert and oriented to person, place, and time.      Sensory: Sensation is intact.      Motor: Motor function is intact.      Coordination: Coordination is intact.      Gait: Gait is intact.   Psychiatric:         Attention and Perception: Attention and perception normal.         Mood and Affect: Mood and affect normal.         Speech: Speech normal.         Behavior:  Behavior normal. Behavior is cooperative.         Thought Content: Thought content normal.         Cognition and Memory: Cognition and memory normal.         Judgment: Judgment normal.                       Assessment and Plan Additional age appropriate preventative wellness advice topics were discussed during today's preventative wellness exam(some topics already addressed during AWV portion of the note above):   Nutrition: Discussed nutrition plan with patient. Information shared in after visit summary. Goal is for a well balanced diet to enhance overall health.     Healthy Weight: Discussed current and goal BMI with patient. Steps to attain this goal discussed. Information shared in after visit summary.     Gun Safety Awareness Discussion: Information Shared in after visit summary.     Motor Vehicle Safety Discussion:  Wearing Seatbelt While in Motor Vehicle recommendation. Adhering to posted speed limit recommendation.     Injury Prevention Discussion:  Information shared in after visit summary.                 Medicare annual wellness visit, subsequent                 Follow Up   Return in about 1 year (around 11/13/2025) for Medicare Wellness.  Patient was given instructions and counseling regarding her condition or for health maintenance advice. Please see specific information pulled into the AVS if appropriate.  Electronically signed by Charlene Huntley, [place], APRN, 11/13/24, 2:24 PM CST.

## 2024-12-11 ENCOUNTER — OFFICE VISIT (OUTPATIENT)
Dept: FAMILY MEDICINE CLINIC | Facility: CLINIC | Age: 59
End: 2024-12-11
Payer: MEDICARE

## 2024-12-11 VITALS
WEIGHT: 198 LBS | SYSTOLIC BLOOD PRESSURE: 125 MMHG | TEMPERATURE: 97 F | RESPIRATION RATE: 18 BRPM | OXYGEN SATURATION: 99 % | DIASTOLIC BLOOD PRESSURE: 75 MMHG | HEART RATE: 83 BPM | BODY MASS INDEX: 36.44 KG/M2 | HEIGHT: 62 IN

## 2024-12-11 DIAGNOSIS — M51.369 DDD (DEGENERATIVE DISC DISEASE), LUMBAR: ICD-10-CM

## 2024-12-11 DIAGNOSIS — J01.00 ACUTE NON-RECURRENT MAXILLARY SINUSITIS: Primary | ICD-10-CM

## 2024-12-11 DIAGNOSIS — M54.40 CHRONIC MIDLINE LOW BACK PAIN WITH SCIATICA, SCIATICA LATERALITY UNSPECIFIED: ICD-10-CM

## 2024-12-11 DIAGNOSIS — M81.8 OTHER OSTEOPOROSIS WITHOUT CURRENT PATHOLOGICAL FRACTURE: ICD-10-CM

## 2024-12-11 DIAGNOSIS — B00.1 FEVER BLISTER: ICD-10-CM

## 2024-12-11 DIAGNOSIS — F41.0 PANIC ATTACK: ICD-10-CM

## 2024-12-11 DIAGNOSIS — G89.4 CHRONIC PAIN SYNDROME: ICD-10-CM

## 2024-12-11 DIAGNOSIS — F43.10 PTSD (POST-TRAUMATIC STRESS DISORDER): ICD-10-CM

## 2024-12-11 DIAGNOSIS — G62.9 NEUROPATHY: ICD-10-CM

## 2024-12-11 DIAGNOSIS — G89.29 CHRONIC MIDLINE LOW BACK PAIN WITH SCIATICA, SCIATICA LATERALITY UNSPECIFIED: ICD-10-CM

## 2024-12-11 DIAGNOSIS — M06.9 RHEUMATOID ARTHRITIS, INVOLVING UNSPECIFIED SITE, UNSPECIFIED WHETHER RHEUMATOID FACTOR PRESENT: ICD-10-CM

## 2024-12-11 RX ORDER — MELOXICAM 15 MG/1
15 TABLET ORAL DAILY
Qty: 90 TABLET | Refills: 1 | Status: SHIPPED | OUTPATIENT
Start: 2024-12-11

## 2024-12-11 RX ORDER — PREGABALIN 150 MG/1
150 CAPSULE ORAL 2 TIMES DAILY
Qty: 180 CAPSULE | Refills: 5 | Status: SHIPPED | OUTPATIENT
Start: 2024-12-11

## 2024-12-11 RX ORDER — ALPRAZOLAM 1 MG/1
1 TABLET ORAL 3 TIMES DAILY PRN
Qty: 90 TABLET | Refills: 2 | Status: SHIPPED | OUTPATIENT
Start: 2024-12-11

## 2024-12-11 RX ORDER — VALACYCLOVIR HYDROCHLORIDE 1 G/1
1000 TABLET, FILM COATED ORAL 2 TIMES DAILY
Qty: 270 TABLET | Refills: 1 | Status: SHIPPED | OUTPATIENT
Start: 2024-12-11

## 2024-12-11 RX ORDER — DEXAMETHASONE SODIUM PHOSPHATE 10 MG/ML
10 INJECTION INTRAMUSCULAR; INTRAVENOUS ONCE
Status: COMPLETED | OUTPATIENT
Start: 2024-12-11 | End: 2024-12-11

## 2024-12-11 RX ORDER — CEFTRIAXONE 1 G/1
1 INJECTION, POWDER, FOR SOLUTION INTRAMUSCULAR; INTRAVENOUS ONCE
Status: COMPLETED | OUTPATIENT
Start: 2024-12-11 | End: 2024-12-11

## 2024-12-11 RX ORDER — HYDROCODONE BITARTRATE AND ACETAMINOPHEN 10; 325 MG/1; MG/1
1 TABLET ORAL EVERY 6 HOURS PRN
Qty: 120 TABLET | Refills: 0 | Status: SHIPPED | OUTPATIENT
Start: 2024-12-11

## 2024-12-11 RX ORDER — ALENDRONATE SODIUM 70 MG/1
70 TABLET ORAL
Qty: 90 TABLET | Refills: 0 | Status: SHIPPED | OUTPATIENT
Start: 2024-12-11

## 2024-12-11 RX ADMIN — DEXAMETHASONE SODIUM PHOSPHATE 10 MG: 10 INJECTION INTRAMUSCULAR; INTRAVENOUS at 14:48

## 2024-12-11 RX ADMIN — CEFTRIAXONE 1 G: 1 INJECTION, POWDER, FOR SOLUTION INTRAMUSCULAR; INTRAVENOUS at 14:48

## 2024-12-11 NOTE — PROGRESS NOTES
Chief Complaint  Anxiety (Pt is here for a follow up )    Subjective        Adela Arauz presents to [facility]  History of Present Illness  History of Present Illness  The patient is a 59-year-old female who presents for a chronic follow-up. She comes in monthly. We usually see her for PTSD, chronic pain, rheumatoid arthritis, and anxiety. Her pain from RA is in her hips, back, legs, knees, and elbows. She has joint pain and muscle spasms. Aggravating factors include any bending, twisting, standing, sitting, and lifting. She takes NSAIDs for anti-inflammatory. She is also on Lyrica, Norco, and Orva for her RA. She does have Narcan at home and she also takes hydrocodone. She also takes turmeric to help try to help her with the pain. She has osteoporosis and is currently on Fosamax for that. She has vitamin D deficiency and is taking OTC vitamin D, ferrous sulfate for iron deficiency anemia. She takes Lasix as needed, mainly in the summer. For acid reflux, she is on omeprazole. For the pain, she is on Lyrica and tizanidine.    She reports experiencing joint pain and muscle spasms, which are exacerbated by physical activities such as bending, twisting, standing, sitting, and lifting. She rates her current pain level as 9 out of 10. She has been experiencing a general feeling of malaise, without any associated cough, diarrhea, or fever. She reports a scratchy throat and ear pain. She describes the pain in her right hip as severe, akin to being physically struck. She has been taking valacyclovir since the onset of her illness to prevent the development of blisters on her face. She reports that her back pain has been progressively worsening, necessitating a consultation with Dr. De La Cruz next week. She expresses dissatisfaction with her current medication regimen, stating that it does not provide significant relief. She reports difficulty in performing tasks with her hands due to  "the pain. She does not require a refill of her Lasix prescription but anticipates running out of meloxicam soon. She has sufficient refills of Lyrica until December 2024.    MEDICATIONS  Lyrica, Norco, Orva, Narcan, hydrocodone, Fosamax, vitamin D, ferrous sulfate, Lasix, omeprazole, tizanidine, meloxicam, valacyclovir.  The following portions of the patient's history were reviewed and updated as appropriate: allergies, current medications, past family history, past medical history, past social history, past surgical history and problem list.    Objective   Vital Signs:  /75 (BP Location: Left arm, Patient Position: Sitting, Cuff Size: Adult)   Pulse 83   Temp 97 °F (36.1 °C) (Temporal)   Resp 18   Ht 158.5 cm (62.4\")   Wt 89.8 kg (198 lb)   SpO2 99%   BMI 35.75 kg/m²   Estimated body mass index is 35.75 kg/m² as calculated from the following:    Height as of this encounter: 158.5 cm (62.4\").    Weight as of this encounter: 89.8 kg (198 lb).     Class 2 Severe Obesity (BMI >=35 and <=39.9). Obesity-related health conditions include the following: GERD. Obesity is worsening. BMI is is above average; BMI management plan is completed. We discussed portion control and increasing exercise.    Physical Exam  Vitals and nursing note reviewed.   Constitutional:       General: She is awake.      Appearance: Normal appearance. She is well-developed and well-groomed. She is morbidly obese. She is ill-appearing.   HENT:      Head: Normocephalic and atraumatic.      Right Ear: Hearing, tympanic membrane, ear canal and external ear normal.      Left Ear: Hearing and external ear normal. There is impacted cerumen.      Nose: Congestion present.      Right Sinus: Maxillary sinus tenderness present.      Left Sinus: Maxillary sinus tenderness present.      Mouth/Throat:      Lips: Pink.      Pharynx: Oropharynx is clear.   Eyes:      General: Lids are normal.      Conjunctiva/sclera: Conjunctivae normal. "   Cardiovascular:      Rate and Rhythm: Normal rate and regular rhythm.      Heart sounds: Normal heart sounds.   Pulmonary:      Effort: Pulmonary effort is normal.      Breath sounds: Normal breath sounds and air entry.   Musculoskeletal:      Cervical back: Full passive range of motion without pain. Spasms and tenderness present.      Thoracic back: Normal. Spasms and tenderness present. Decreased range of motion.      Lumbar back: Spasms and tenderness present. Decreased range of motion.        Back:       Right lower leg: No edema.      Left lower leg: No edema.   Lymphadenopathy:      Head:      Right side of head: No submental, submandibular or tonsillar adenopathy.      Left side of head: No submental, submandibular or tonsillar adenopathy.   Skin:     General: Skin is warm and dry.   Neurological:      Mental Status: She is alert and oriented to person, place, and time.      Sensory: Sensation is intact.      Motor: Motor function is intact.      Coordination: Coordination is intact.      Gait: Gait is intact.   Psychiatric:         Attention and Perception: Attention and perception normal.         Mood and Affect: Mood and affect normal.         Speech: Speech normal.         Behavior: Behavior normal. Behavior is cooperative.         Thought Content: Thought content normal.         Cognition and Memory: Cognition and memory normal.         Judgment: Judgment normal.        Physical Exam      Result Review :          Results  Imaging  MRI shows previous spinal fusion from L3-S1, severe osteoarthritis changes above this at L2-3, severe loss of disc height and a Modic type I degenerative endplate signal change as well as a 10 mm right lateral spondylolisthesis on L2-3. There is severe spinal stenosis at the level of the disc bulging and facet hypertrophy, additional high grade right sided neuroforamen narrowing.           Assessment and Plan     Diagnoses and all orders for this visit:    1. Acute  non-recurrent maxillary sinusitis (Primary)  -     cefTRIAXone (ROCEPHIN) injection 1 g  -     dexAMETHasone (DECADRON) injection 10 mg      2. Other osteoporosis without current pathological fracture  -     alendronate (Fosamax) 70 MG tablet; Take 1 tablet by mouth Every 7 (Seven) Days.  Dispense: 90 tablet; Refill: 0    3. PTSD (post-traumatic stress disorder)  -     ALPRAZolam (XANAX) 1 MG tablet; Take 1 tablet by mouth 3 (Three) Times a Day As Needed for Anxiety.  Dispense: 90 tablet; Refill: 2        -     gladys, control, and toxassure reviewed    4. Panic attack  -     ALPRAZolam (XANAX) 1 MG tablet; Take 1 tablet by mouth 3 (Three) Times a Day As Needed for Anxiety.  Dispense: 90 tablet; Refill: 2    5. Chronic midline low back pain with sciatica, sciatica laterality unspecified  -     HYDROcodone-acetaminophen (Norco)  MG per tablet; Take 1 tablet by mouth Every 6 (Six) Hours As Needed for Moderate Pain.  Dispense: 120 tablet; Refill: 0  -     dexAMETHasone (DECADRON) injection 10 mg    6. Chronic pain syndrome  -     HYDROcodone-acetaminophen (Norco)  MG per tablet; Take 1 tablet by mouth Every 6 (Six) Hours As Needed for Moderate Pain.  Dispense: 120 tablet; Refill: 0  -     dexAMETHasone (DECADRON) injection 10 mg    7. DDD (degenerative disc disease), lumbar  -     HYDROcodone-acetaminophen (Norco)  MG per tablet; Take 1 tablet by mouth Every 6 (Six) Hours As Needed for Moderate Pain.  Dispense: 120 tablet; Refill: 0  -     meloxicam (MOBIC) 15 MG tablet; Take 1 tablet by mouth Daily.  Dispense: 90 tablet; Refill: 1  -     dexAMETHasone (DECADRON) injection 10 mg    8. Rheumatoid arthritis, involving unspecified site, unspecified whether rheumatoid factor present  -     HYDROcodone-acetaminophen (Norco)  MG per tablet; Take 1 tablet by mouth Every 6 (Six) Hours As Needed for Moderate Pain.  Dispense: 120 tablet; Refill: 0    9. Neuropathy  -     pregabalin (LYRICA) 150 MG  capsule; Take 1 capsule by mouth 2 (Two) Times a Day.  Dispense: 180 capsule; Refill: 5    10. Fever blister  -     valACYclovir (VALTREX) 1000 MG tablet; Take 1 tablet by mouth 2 (Two) Times a Day.  Dispense: 270 tablet; Refill: 1          Assessment & Plan  1. Rheumatoid Arthritis.  She reports a pain level of nine today, with joint pain and muscle spasms exacerbated by bending, twisting, standing, sitting, and lifting. She is currently on NSAIDs, Lyrica, Norco, and Orva for her RA. She also takes turmeric to help with the pain. Despite these medications, she continues to experience significant pain and functional limitations, particularly in her hands. She will continue her current medication regimen. A refill for Lyrica will be sent to her pharmacy. If her pain persists, she may need to consider alternative treatments or adjustments to her current medications.    2. Osteoporosis.  She is currently on Fosamax for osteoporosis.    3. Vitamin D Deficiency.  She is taking over-the-counter vitamin D supplements.    4. Iron Deficiency Anemia.  She is taking ferrous sulfate for iron deficiency anemia.    5. Acid Reflux.  She is on omeprazole for acid reflux.    6. Chronic Pain.  She experiences chronic pain due to her RA and previous spinal fusion. Updated x-rays and an MRI from July show severe osteoarthritis changes and spinal stenosis at L2-3. She will see Dr. De La Cruz next week to discuss potential surgical interventions.    7. PTSD.  She continues to manage her PTSD with her current treatment plan.    8. Anxiety.  She continues to manage her anxiety with her current treatment plan.    9. Recent Illness.  She reports feeling sick with a scratchy throat and ear pain but no fever or diarrhea. A prescription for valacyclovir will be sent to her pharmacy to prevent shingles outbreaks, as she has been taking it since she fell ill.      ICD-10-CM ICD-9-CM   1. Acute non-recurrent maxillary sinusitis  J01.00 461.0   2.  Other osteoporosis without current pathological fracture  M81.8 733.09   3. PTSD (post-traumatic stress disorder)  F43.10 309.81   4. Panic attack  F41.0 300.01   5. Chronic midline low back pain with sciatica, sciatica laterality unspecified  M54.40 724.2    G89.29 724.3     338.29   6. Chronic pain syndrome  G89.4 338.4   7. DDD (degenerative disc disease), lumbar  M51.369 722.52   8. Rheumatoid arthritis, involving unspecified site, unspecified whether rheumatoid factor present  M06.9 714.0   9. Neuropathy  G62.9 355.9   10. Fever blister  B00.1 054.9                Follow Up     Return in about 1 month (around 1/11/2025) for Recheck.  Patient was given instructions and counseling regarding her condition or for health maintenance advice. Please see specific information pulled into the AVS if appropriate.       Patient or patient representative verbalized consent for the use of Ambient Listening during the visit with  Charlene Huntley DNP, APRN for chart documentation. 12/11/2024  13:38 CST    Electronically signed by Charlene Huntley DNP, SURYA, 12/11/24, 1:44 PM CST.

## 2024-12-18 ENCOUNTER — OFFICE VISIT (OUTPATIENT)
Dept: NEUROLOGY | Age: 59
End: 2024-12-18
Payer: COMMERCIAL

## 2024-12-18 VITALS
OXYGEN SATURATION: 99 % | BODY MASS INDEX: 32.43 KG/M2 | DIASTOLIC BLOOD PRESSURE: 84 MMHG | HEART RATE: 68 BPM | SYSTOLIC BLOOD PRESSURE: 140 MMHG | WEIGHT: 183 LBS | HEIGHT: 63 IN

## 2024-12-18 DIAGNOSIS — G25.0 BENIGN HEAD TREMOR: ICD-10-CM

## 2024-12-18 DIAGNOSIS — R41.3 MEMORY LOSS: ICD-10-CM

## 2024-12-18 DIAGNOSIS — R41.3 MEMORY LOSS: Primary | ICD-10-CM

## 2024-12-18 LAB
25(OH)D3 SERPL-MCNC: 73.8 NG/ML
ALBUMIN SERPL-MCNC: 4.1 G/DL (ref 3.5–5.2)
ALP SERPL-CCNC: 132 U/L (ref 35–104)
ALT SERPL-CCNC: 31 U/L (ref 5–33)
ANION GAP SERPL CALCULATED.3IONS-SCNC: 12 MMOL/L (ref 7–19)
AST SERPL-CCNC: 23 U/L (ref 5–32)
BASOPHILS # BLD: 0.1 K/UL (ref 0–0.2)
BASOPHILS NFR BLD: 1.1 % (ref 0–1)
BILIRUB SERPL-MCNC: 0.3 MG/DL (ref 0.2–1.2)
BUN SERPL-MCNC: 9 MG/DL (ref 6–20)
CALCIUM SERPL-MCNC: 9.3 MG/DL (ref 8.6–10)
CHLORIDE SERPL-SCNC: 96 MMOL/L (ref 98–111)
CO2 SERPL-SCNC: 28 MMOL/L (ref 22–29)
CREAT SERPL-MCNC: 0.7 MG/DL (ref 0.5–0.9)
CRP SERPL HS-MCNC: 0.86 MG/DL (ref 0–0.5)
EOSINOPHIL # BLD: 0.2 K/UL (ref 0–0.6)
EOSINOPHIL NFR BLD: 4.5 % (ref 0–5)
ERYTHROCYTE [DISTWIDTH] IN BLOOD BY AUTOMATED COUNT: 15 % (ref 11.5–14.5)
ERYTHROCYTE [SEDIMENTATION RATE] IN BLOOD BY WESTERGREN METHOD: 11 MM/HR (ref 0–25)
GLUCOSE SERPL-MCNC: 88 MG/DL (ref 70–99)
HCT VFR BLD AUTO: 44.4 % (ref 37–47)
HGB BLD-MCNC: 14.1 G/DL (ref 12–16)
HIV-1 P24 AG: NORMAL
HIV1+2 AB SERPLBLD QL IA.RAPID: NORMAL
IMM GRANULOCYTES # BLD: 0 K/UL
LYMPHOCYTES # BLD: 1.8 K/UL (ref 1.1–4.5)
LYMPHOCYTES NFR BLD: 33.2 % (ref 20–40)
Lab: NORMAL
MCH RBC QN AUTO: 28 PG (ref 27–31)
MCHC RBC AUTO-ENTMCNC: 31.8 G/DL (ref 33–37)
MCV RBC AUTO: 88.3 FL (ref 81–99)
MONOCYTES # BLD: 0.5 K/UL (ref 0–0.9)
MONOCYTES NFR BLD: 8.5 % (ref 0–10)
NEUTROPHILS # BLD: 2.8 K/UL (ref 1.5–7.5)
NEUTS SEG NFR BLD: 52.5 % (ref 50–65)
PLATELET # BLD AUTO: 233 K/UL (ref 130–400)
PMV BLD AUTO: 9.6 FL (ref 9.4–12.3)
POTASSIUM SERPL-SCNC: 4.5 MMOL/L (ref 3.5–5)
PROT SERPL-MCNC: 7 G/DL (ref 6.4–8.3)
RBC # BLD AUTO: 5.03 M/UL (ref 4.2–5.4)
REPORT: NORMAL
SODIUM SERPL-SCNC: 136 MMOL/L (ref 136–145)
T3FREE SERPL-MCNC: 3.3 PG/ML (ref 2–4.4)
T4 FREE SERPL-MCNC: 0.91 NG/DL (ref 0.93–1.7)
THIS TEST SENT TO: NORMAL
TSH SERPL DL<=0.005 MIU/L-ACNC: 3.65 UIU/ML (ref 0.27–4.2)
VIT B12 SERPL-MCNC: 293 PG/ML (ref 232–1245)
WBC # BLD AUTO: 5.3 K/UL (ref 4.8–10.8)

## 2024-12-18 PROCEDURE — 99204 OFFICE O/P NEW MOD 45 MIN: CPT | Performed by: NURSE PRACTITIONER

## 2024-12-18 RX ORDER — FUROSEMIDE 40 MG/1
40 TABLET ORAL DAILY
COMMUNITY
Start: 2024-08-16

## 2024-12-18 RX ORDER — LEFLUNOMIDE 20 MG/1
TABLET ORAL
COMMUNITY
Start: 2024-10-09

## 2024-12-18 RX ORDER — PREGABALIN 150 MG/1
150 CAPSULE ORAL 2 TIMES DAILY
COMMUNITY
Start: 2024-12-11

## 2024-12-18 RX ORDER — ALENDRONATE SODIUM 70 MG/1
70 TABLET ORAL
COMMUNITY
Start: 2024-12-11

## 2024-12-18 NOTE — PROGRESS NOTES
REVIEW OF SYSTEMS    Constitutional: []Fever []Sweats []Chills [] Recent Injury [x] Denies all unless marked  HEENT:[]Headache  [] Head Injury [] Hearing Loss  [] Sore Throat  [] Ear Ache [x] Denies all unless marked  Spine:  [] Neck pain  [] Back pain  [] Sciaticia  [x] Denies all unless marked  Cardiovascular:[]Heart Disease []Palpitations [] Chest Pain   [x] Denies all unless marked  Pulmonary: []Shortness of Breath []Cough   [x] Denies all unless marked  Psychiatric/Behavioral:[] Depression [] Anxiety [x] Denies all unless marked  Gastrointestinal: []Nausea  []Vomiting  []Abdominal Pain  []Constipation  []Diarrhea  [x] Denies all unless marked  Genitourinary:   [] Frequency  [] Urgency  [] Dysuria [] Incontinence  [x] Denies all unless marked  Extremities: []Pain  []Swelling  [x] Denies all unless marked  Musculoskeletal: [] Myalgias  [] Joint Pain  [] Arthritis [] Muscle Cramps [] Muscle Twitches  [x] Denies all unless marked  Sleep: []Insomnia[]Snoring []Restless Legs  []Sleep Apnea  []Daytime Sleepiness  [x] Denies all unless marked  Skin:[] Rash [] Color Change [x] Denies all unless marked   Neurological:[]Visual Disturbance [x] Memory Loss []Loss of Balance []Slurred Speech []Weakness []Seizures  [] Dizziness [x] Denies all unless marked    
neurodegenerative process is low today.  Question how much anxiety, depression are playing a role in symptoms along with recent stressors. Will plan for additional workup with MRI brain, lab work to exclude secondary sources. Hold off on memory agent today, felt low yield.       ICD-10-CM    1. Memory loss  R41.3 CBC with Auto Differential     Comprehensive Metabolic Panel     C-Reactive Protein     Heavy Metal Blood Panel     HIV Screen     Vitamin D 25 Hydroxy     Vitamin B12     Vitamin B1, Whole Blood     TSH reflex to FT4,FT3     Sedimentation Rate     RPR     MRI BRAIN W WO CONTRAST     MISCELLANEOUS SENDOUT beta amyloid ratio; lab [facility]     MISCELLANEOUS SENDOUT APOE dementia testing; lab [facility]      2. Benign head tremor  G25.0 TSH reflex to FT4,FT3        PLAN:  MRI brain   Labs as listed above   Maximize treatment of depression/anxiety through primary   Continue follow up with Dr. Garcia, rheumatology, as previously scheduled   Recommend 30 minutes daily exercise, daily mental stimulation, and healthy diet with fish, fruits, vegetables, fiber and water  Return in about 3 months (around 3/18/2025) for follow up, sooner if worsening.    Skylar Can DNP, APRN    This dictation was generated by voice recognition computer software.  Although all attempts are made to edit the dictation for accuracy, there may be errors in the transcription that are not intended.

## 2024-12-19 LAB
ARSENIC BLD-MCNC: <10 UG/L
CADMIUM BLD-MCNC: 1.1 UG/L
LEAD BLD-MCNC: <2 UG/DL
MERCURY BLD-MCNC: <2.5 UG/L
RPR SER QL: NORMAL

## 2024-12-20 LAB
Lab: NORMAL
REPORT: NORMAL
THIS TEST SENT TO: NORMAL
VIT B1 BLD-MCNC: 103 NMOL/L (ref 70–180)

## 2024-12-24 LAB — MISCELLANEOUS LAB TEST ORDER: NORMAL

## 2024-12-27 DIAGNOSIS — G62.9 NEUROPATHY: ICD-10-CM

## 2024-12-27 DIAGNOSIS — K21.9 GASTROESOPHAGEAL REFLUX DISEASE WITHOUT ESOPHAGITIS: ICD-10-CM

## 2024-12-27 RX ORDER — OMEPRAZOLE 40 MG/1
40 CAPSULE, DELAYED RELEASE ORAL DAILY
Qty: 90 CAPSULE | Refills: 1 | Status: SHIPPED | OUTPATIENT
Start: 2024-12-27

## 2024-12-27 RX ORDER — PREGABALIN 150 MG/1
150 CAPSULE ORAL 2 TIMES DAILY
Qty: 180 CAPSULE | Refills: 5 | Status: SHIPPED | OUTPATIENT
Start: 2024-12-27

## 2024-12-27 NOTE — TELEPHONE ENCOUNTER
CC: 11/13/2024  UDS: 5/15/2024    Rx Refill Note  Requested Prescriptions     Pending Prescriptions Disp Refills    pregabalin (LYRICA) 150 MG capsule [Pharmacy Med Name: PREGABALIN 150 MG CAPS 150 Capsule] 180 capsule 5     Sig: TAKE ONE CAPSULE BY MOUTH TWICE A DAY    omeprazole (priLOSEC) 40 MG capsule [Pharmacy Med Name: OMEPRAZOLE 40 MG CAP 40 Capsule] 90 capsule 1     Sig: Take 1 capsule by mouth Daily.      Last office visit with prescribing clinician: 12/11/2024   Last telemedicine visit with prescribing clinician: Visit date not found   Next office visit with prescribing clinician: 1/13/2025                         Would you like a call back once the refill request has been completed: [] Yes [] No    If the office needs to give you a call back, can they leave a voicemail: [] Yes [] No    Marisol Nguyen LPN  12/27/24, 14:56 CST

## 2025-01-13 ENCOUNTER — OFFICE VISIT (OUTPATIENT)
Dept: FAMILY MEDICINE CLINIC | Facility: CLINIC | Age: 60
End: 2025-01-13
Payer: MEDICARE

## 2025-01-13 VITALS
HEIGHT: 62 IN | RESPIRATION RATE: 18 BRPM | BODY MASS INDEX: 36.44 KG/M2 | SYSTOLIC BLOOD PRESSURE: 108 MMHG | OXYGEN SATURATION: 99 % | HEART RATE: 83 BPM | WEIGHT: 198 LBS | DIASTOLIC BLOOD PRESSURE: 64 MMHG | TEMPERATURE: 98.6 F

## 2025-01-13 DIAGNOSIS — Z51.81 THERAPEUTIC DRUG MONITORING: Primary | ICD-10-CM

## 2025-01-13 DIAGNOSIS — M06.9 RHEUMATOID ARTHRITIS, INVOLVING UNSPECIFIED SITE, UNSPECIFIED WHETHER RHEUMATOID FACTOR PRESENT: ICD-10-CM

## 2025-01-13 DIAGNOSIS — G89.4 CHRONIC PAIN SYNDROME: ICD-10-CM

## 2025-01-13 DIAGNOSIS — M54.40 CHRONIC MIDLINE LOW BACK PAIN WITH SCIATICA, SCIATICA LATERALITY UNSPECIFIED: ICD-10-CM

## 2025-01-13 DIAGNOSIS — R60.1 GENERALIZED EDEMA: ICD-10-CM

## 2025-01-13 DIAGNOSIS — G89.29 CHRONIC MIDLINE LOW BACK PAIN WITH SCIATICA, SCIATICA LATERALITY UNSPECIFIED: ICD-10-CM

## 2025-01-13 DIAGNOSIS — M51.369 DDD (DEGENERATIVE DISC DISEASE), LUMBAR: ICD-10-CM

## 2025-01-13 DIAGNOSIS — D64.9 ANEMIA, UNSPECIFIED TYPE: ICD-10-CM

## 2025-01-13 PROCEDURE — 1159F MED LIST DOCD IN RCRD: CPT | Performed by: NURSE PRACTITIONER

## 2025-01-13 PROCEDURE — 99214 OFFICE O/P EST MOD 30 MIN: CPT | Performed by: NURSE PRACTITIONER

## 2025-01-13 PROCEDURE — 1160F RVW MEDS BY RX/DR IN RCRD: CPT | Performed by: NURSE PRACTITIONER

## 2025-01-13 PROCEDURE — 1125F AMNT PAIN NOTED PAIN PRSNT: CPT | Performed by: NURSE PRACTITIONER

## 2025-01-13 RX ORDER — FERROUS SULFATE 324(65)MG
324 TABLET, DELAYED RELEASE (ENTERIC COATED) ORAL
Qty: 90 TABLET | Refills: 1 | Status: SHIPPED | OUTPATIENT
Start: 2025-01-13

## 2025-01-13 RX ORDER — HYDROCODONE BITARTRATE AND ACETAMINOPHEN 10; 325 MG/1; MG/1
1 TABLET ORAL EVERY 6 HOURS PRN
Qty: 120 TABLET | Refills: 0 | Status: SHIPPED | OUTPATIENT
Start: 2025-01-13

## 2025-01-13 RX ORDER — FUROSEMIDE 40 MG/1
40 TABLET ORAL DAILY
Qty: 90 TABLET | Refills: 1 | Status: SHIPPED | OUTPATIENT
Start: 2025-01-13

## 2025-01-13 NOTE — PROGRESS NOTES
Chief Complaint  PTSD (Patient here for follow up)    Subjective        Adela Arauz presents to [facility]  History of Present Illness    History of Present Illness    The patient is a 59-year-old female who presents for chronic follow-up of PTSD, anxiety, chronic back pain, arm pain, neck pain, iron deficiency anemia, GERD, RA and neuropathy.    She has been diagnosed with PTSD, which she has been managing for over a year following her brother's suicide. She reports experiencing a few panic attacks, which she attributes to external stressors. Her symptoms include decreased concentration, mood fluctuations, and occasional irritability. She also reports poor sleep quality, averaging only 2 to 3 hours per night, characterized by restlessness and multiple awakenings. She is currently on Xanax, which provides mild relief.    She rates her chronic neck and back pain as a 10 on the pain scale, indicating severe discomfort. She reports no significant relief from her current pain management regimen. Her symptoms, which include back, neck, and leg pain, are exacerbated by activities such as walking, bending, twisting, and standing. Despite attempts at relaxation, rest, oral narcotics, and positional changes, she reports no improvement in her condition.    She has been living with GERD for over a year. She reports no globus sensation or regurgitation. She confirms that her heartburn is well-managed with medication, allowing her to maintain a regular diet.    She has been dealing with chronic anemia for over a year. Despite being on furosemide, she continues to experience significant fatigue and weakness. She reports no presence of blood in her vomit, diarrhea, or stools.    She is scheduled to see Dr. De La Cruz tomorrow, who is likely to perform surgery on her. During her surgical procedures, she is typically prescribed oxycodone. She has a history of lumbar fusion, degenerative lumbar  "disc disease, lumbar stenosis, alpha-1 antitrypsin deficiency, lumbar radiculopathy, and rash.    She has neuropathy in her feet but not in her arms.    She does take Lasix for lower extremity edema. She has a little bit of swelling today, but she has been drinking water all day. She has quit drinking so much coffee and drinks a lot of water.    SOCIAL HISTORY  She has quit drinking so much coffee and drinks a lot of water.    MEDICATIONS  Current: Norco, Xanax, furosemide, Lasix, Arava, meloxicam, omeprazole, Lyrica, tizanidine, Flonase, turmeric, Valtrex  The following portions of the patient's history were reviewed and updated as appropriate: allergies, current medications, past family history, past medical history, past social history, past surgical history and problem list.    Aspirin use is indicated based on review of current medical condition/s. Pros and cons of this therapy have been discussed today. Benefits of this medication outweigh potential harm.  Patient has been encouraged to continue taking this medication.      Objective   Vital Signs:  /64 (BP Location: Left arm, Patient Position: Sitting, Cuff Size: Adult)   Pulse 83   Temp 98.6 °F (37 °C) (Infrared)   Resp 18   Ht 158.5 cm (62.4\")   Wt 89.8 kg (198 lb)   SpO2 99%   BMI 35.75 kg/m²   Estimated body mass index is 35.75 kg/m² as calculated from the following:    Height as of this encounter: 158.5 cm (62.4\").    Weight as of this encounter: 89.8 kg (198 lb).       Class 2 Severe Obesity (BMI >=35 and <=39.9). Obesity-related health conditions include the following: GERD and anxiety, PTSD, Neuropathy, iron deficiency . Obesity is improving with lifestyle modifications. BMI is is above average; BMI management plan is completed. We discussed portion control and increasing exercise.        Physical Exam  Vitals and nursing note reviewed.   Constitutional:       General: She is awake.      Appearance: Normal appearance. She is " well-developed and well-groomed.   HENT:      Head: Normocephalic and atraumatic.      Right Ear: Hearing, tympanic membrane, ear canal and external ear normal.      Left Ear: Hearing, tympanic membrane, ear canal and external ear normal.      Nose: Nose normal.      Mouth/Throat:      Lips: Pink.      Pharynx: Oropharynx is clear.   Eyes:      General: Lids are normal.      Conjunctiva/sclera: Conjunctivae normal.   Cardiovascular:      Rate and Rhythm: Normal rate and regular rhythm.      Heart sounds: Normal heart sounds.   Pulmonary:      Effort: Pulmonary effort is normal.      Breath sounds: Normal breath sounds and air entry.   Musculoskeletal:        Arms:       Cervical back: Normal and full passive range of motion without pain.      Thoracic back: Normal.      Lumbar back: Spasms and tenderness present.        Back:       Right lower leg: No edema.      Left lower leg: No edema.        Legs:    Lymphadenopathy:      Head:      Right side of head: No submental, submandibular or tonsillar adenopathy.      Left side of head: No submental, submandibular or tonsillar adenopathy.   Skin:     General: Skin is warm and dry.   Neurological:      Mental Status: She is alert and oriented to person, place, and time.      Sensory: Sensation is intact.      Motor: Motor function is intact.      Coordination: Coordination is intact.      Gait: Gait is intact.   Psychiatric:         Attention and Perception: Attention and perception normal.         Mood and Affect: Mood and affect normal.         Speech: Speech normal.         Behavior: Behavior normal. Behavior is cooperative.         Thought Content: Thought content normal.         Cognition and Memory: Cognition and memory normal.         Judgment: Judgment normal.        Physical Exam      Result Review :          Results             Assessment and Plan     Diagnoses and all orders for this visit:    1. Therapeutic drug monitoring (Primary)  -     ToxASSURE Select 13  Discrete -    2. Anemia, unspecified type  -     ferrous sulfate 324 (65 Fe) MG tablet delayed-release EC tablet; Take 1 tablet by mouth Daily With Breakfast.  Dispense: 90 tablet; Refill: 1    3. Generalized edema  -     furosemide (LASIX) 40 MG tablet; Take 1 tablet by mouth Daily.  Dispense: 90 tablet; Refill: 1    4. Chronic midline low back pain with sciatica, sciatica laterality unspecified  -     HYDROcodone-acetaminophen (Norco)  MG per tablet; Take 1 tablet by mouth Every 6 (Six) Hours As Needed for Moderate Pain.  Dispense: 120 tablet; Refill: 0    5. Chronic pain syndrome  -     HYDROcodone-acetaminophen (Norco)  MG per tablet; Take 1 tablet by mouth Every 6 (Six) Hours As Needed for Moderate Pain.  Dispense: 120 tablet; Refill: 0    6. DDD (degenerative disc disease), lumbar  -     HYDROcodone-acetaminophen (Norco)  MG per tablet; Take 1 tablet by mouth Every 6 (Six) Hours As Needed for Moderate Pain.  Dispense: 120 tablet; Refill: 0    7. Rheumatoid arthritis, involving unspecified site, unspecified whether rheumatoid factor present  -     HYDROcodone-acetaminophen (Norco)  MG per tablet; Take 1 tablet by mouth Every 6 (Six) Hours As Needed for Moderate Pain.  Dispense: 120 tablet; Refill: 0    [employer] contract on file.    Assessment & Plan  1. Post-traumatic stress disorder (PTSD).  Her PTSD is chronic, having persisted for over a year, and is associated with her brother's suicide. The condition has slightly deteriorated due to her back pain, anxiety, and community-related stressors. She has experienced several panic attacks, which are triggered by external factors. Her symptoms include decreased concentration, mood fluctuations, irritability, insomnia, and restlessness, which are moderate in intensity and occur most days. Her sleep quality is poor, with only 2 to 3 hours of sleep per night and frequent awakenings. She has tried lifestyle modifications, benzodiazepines,  and SSRIs, and is fully compliant with her medication regimen. She reports no issues with her medication, pharmacy, or insurance. Her Xanax prescription was refilled in December 2024, and she has 2 refills remaining.    2. Chronic pain.  Her chronic pain is localized to the neck and back, with a current intensity rating of 10. She reports no significant relief from her current treatment. Her symptoms include back, neck, and leg pain, which are exacerbated by walking, bending, twisting, and standing. Despite attempts at relaxation, rest, oral narcotics, and positional changes, she reports no improvement. She is scheduled to see Dr. De La Cruz tomorrow, who is likely to perform surgery on her. During her surgical procedures, she is typically prescribed oxycodone. We will address her pain and adjust it out by the pain medicines. If she is having back surgery, she needs the oxycodone and we can just hold the Norco. A prescription for Norco will be provided today, but if she undergoes surgery and is prescribed a 10-day course of oxycodone, she will need to wait 10 days before resuming Norco.    3. Gastroesophageal reflux disease (GERD).  Her GERD is chronic, having onset more than a year ago, and is occasionally progressive. She reports no globus sensation or regurgitation. Her heartburn is well-controlled with medication, allowing her to maintain a regular diet. She has associated symptoms of anemia, muscle weakness, and fatigue. Her risk factors include caffeine use, NSAIDs, lack of exercise, and obesity. She has tried an antacid and a PPI, which have provided mild relief. Her omeprazole prescription was refilled in December 2024.    4. Anemia.  Her anemia is chronic, having persisted for over a year. Despite being on furosemide, she continues to experience significant fatigue and weakness. She reports no presence of blood in her vomit, diarrhea, or stools. She is fully compliant with her medication regimen. Her ferrous  sulfate prescription was last filled in August 2024, and a new prescription will be sent today.    5. Neuropathy.  She reports neuropathy in her feet but not in her arms. Her Lyrica prescription was refilled in December 2024, with 5 refills remaining.    6. Lower extremity edema.  She reports a little bit of swelling today but has been drinking water all day. She takes Lasix for lower extremity edema.    7. Rheumatoid arthritis.  She is still taking Arava for her RA. Her meloxicam prescription was refilled in December 2024.      ICD-10-CM ICD-9-CM   1. Therapeutic drug monitoring  Z51.81 V58.83   2. Anemia, unspecified type  D64.9 285.9   3. Generalized edema  R60.1 782.3   4. Chronic midline low back pain with sciatica, sciatica laterality unspecified  M54.40 724.2    G89.29 724.3     338.29   5. Chronic pain syndrome  G89.4 338.4   6. DDD (degenerative disc disease), lumbar  M51.369 722.52   7. Rheumatoid arthritis, involving unspecified site, unspecified whether rheumatoid factor present  M06.9 714.0            Follow Up     Return in about 1 month (around 2/13/2025) for Recheck.  Patient was given instructions and counseling regarding her condition or for health maintenance advice. Please see specific information pulled into the AVS if appropriate.     Patient or patient representative verbalized consent for the use of Ambient Listening during the visit with  Charlene Huntley DNP, SURYA for chart documentation. 1/13/2025  15:33 CST    Electronically signed by Charlene Huntley DNP, APRN, 01/13/25, 3:33 PM CST.

## 2025-01-16 ENCOUNTER — TRANSCRIBE ORDERS (OUTPATIENT)
Dept: ADMINISTRATIVE | Facility: HOSPITAL | Age: 60
End: 2025-01-16
Payer: MEDICARE

## 2025-01-16 DIAGNOSIS — Z13.820 SCREENING FOR OSTEOPOROSIS: Primary | ICD-10-CM

## 2025-01-21 LAB
6MAM UR QL CFM: NEGATIVE
6MAM/CREAT UR: NOT DETECTED NG/MG CREAT
7AMINOCLONAZEPAM/CREAT UR: NOT DETECTED NG/MG CREAT
A-OH ALPRAZ/CREAT UR: 404 NG/MG CREAT
A-OH-TRIAZOLAM/CREAT UR CFM: NOT DETECTED NG/MG CREAT
ALFENTANIL/CREAT UR CFM: NOT DETECTED NG/MG CREAT
ALPHA-HYDROXYMIDAZOLAM, URINE: NOT DETECTED NG/MG CREAT
ALPRAZ/CREAT UR CFM: 1048 NG/MG CREAT
AMOBARBITAL UR QL CFM: NOT DETECTED
AMPHET/CREAT UR: NOT DETECTED NG/MG CREAT
AMPHETAMINES UR QL CFM: NEGATIVE
BARBITAL UR QL CFM: NOT DETECTED
BARBITURATES UR QL CFM: NEGATIVE
BENZODIAZ UR QL CFM: NORMAL
BUPRENORPHINE UR QL CFM: NEGATIVE
BUPRENORPHINE/CREAT UR: NOT DETECTED NG/MG CREAT
BUTABARBITAL UR QL CFM: NOT DETECTED
BUTALBITAL UR QL CFM: NOT DETECTED
BZE/CREAT UR: NOT DETECTED NG/MG CREAT
CANNABINOIDS UR QL CFM: NEGATIVE
CARBOXYTHC/CREAT UR: NOT DETECTED NG/MG CREAT
CLONAZEPAM/CREAT UR CFM: NOT DETECTED NG/MG CREAT
COCAETHYLENE/CREAT UR CFM: NOT DETECTED NG/MG CREAT
COCAINE UR QL CFM: NEGATIVE
COCAINE/CREAT UR CFM: NOT DETECTED NG/MG CREAT
CODEINE/CREAT UR: NOT DETECTED NG/MG CREAT
CREAT UR-MCNC: 23 MG/DL
DESALKYLFLURAZ/CREAT UR: NOT DETECTED NG/MG CREAT
DESMETHYLFLUNITRAZEPAM: NOT DETECTED NG/MG CREAT
DHC/CREAT UR: 1596 NG/MG CREAT
DIAZEPAM/CREAT UR: NOT DETECTED NG/MG CREAT
DRUGS UR: NORMAL
EDDP/CREAT UR: NOT DETECTED NG/MG CREAT
ETHANOL UR CFM-MCNC: NOT DETECTED G/DL
ETHANOL UR QL CFM: NEGATIVE
FENTANYL UR QL CFM: NEGATIVE
FENTANYL/CREAT UR: NOT DETECTED NG/MG CREAT
FLUNITRAZEPAM UR QL CFM: NOT DETECTED NG/MG CREAT
HYDROCODONE/CREAT UR: 5939 NG/MG CREAT
HYDROMORPHONE/CREAT UR: 713 NG/MG CREAT
LORAZEPAM/CREAT UR: NOT DETECTED NG/MG CREAT
MDA/CREAT UR: NOT DETECTED NG/MG CREAT
MDMA/CREAT UR: NOT DETECTED NG/MG CREAT
MEPHOBARBITAL UR QL CFM: NOT DETECTED
METHADONE UR QL CFM: NEGATIVE
METHADONE/CREAT UR: NOT DETECTED NG/MG CREAT
METHAMPHET/CREAT UR: NOT DETECTED NG/MG CREAT
MIDAZOLAM/CREAT UR CFM: NOT DETECTED NG/MG CREAT
MORPHINE/CREAT UR: NOT DETECTED NG/MG CREAT
N-NORTRAMADOL/CREAT UR CFM: NOT DETECTED NG/MG CREAT
NARCOTICS UR: NEGATIVE
NORBUPRENORPHINE/CREAT UR: NOT DETECTED NG/MG CREAT
NORCODEINE/CREAT UR CFM: NOT DETECTED NG/MG CREAT
NORDIAZEPAM/CREAT UR: NOT DETECTED NG/MG CREAT
NORFENTANYL/CREAT UR: NOT DETECTED NG/MG CREAT
NORHYDROCODONE/CREAT UR: 5509 NG/MG CREAT
NORMORPHINE UR-MCNC: NOT DETECTED NG/MG CREAT
NOROXYCODONE/CREAT UR: NOT DETECTED NG/MG CREAT
NOROXYMORPHONE/CREAT UR CFM: NOT DETECTED NG/MG CREAT
O-NORTRAMADOL UR CFM-MCNC: NOT DETECTED NG/MG CREAT
OPIATES UR QL CFM: NORMAL
OXAZEPAM/CREAT UR: NOT DETECTED NG/MG CREAT
OXYCODONE UR QL CFM: NEGATIVE
OXYCODONE/CREAT UR: NOT DETECTED NG/MG CREAT
OXYMORPHONE/CREAT UR: NOT DETECTED NG/MG CREAT
PENTOBARB UR QL CFM: NOT DETECTED
PHENOBARB UR QL CFM: NOT DETECTED
SECOBARBITAL UR QL CFM: NOT DETECTED
SERVICE CMNT 02-IMP: NORMAL
SUFENTANIL/CREAT UR CFM: NOT DETECTED NG/MG CREAT
TAPENTADOL UR QL CFM: NEGATIVE
TAPENTADOL/CREAT UR: NOT DETECTED NG/MG CREAT
TEMAZEPAM/CREAT UR: NOT DETECTED NG/MG CREAT
THIOPENTAL UR QL CFM: NOT DETECTED
TRAMADOL UR QL CFM: NOT DETECTED NG/MG CREAT

## 2025-01-23 ENCOUNTER — HOSPITAL ENCOUNTER (OUTPATIENT)
Dept: BONE DENSITY | Facility: HOSPITAL | Age: 60
Discharge: HOME | End: 2025-01-23
Payer: MEDICARE

## 2025-01-23 ENCOUNTER — PRE-ADMISSION TESTING (OUTPATIENT)
Dept: PREADMISSION TESTING | Facility: HOSPITAL | Age: 60
End: 2025-01-23
Payer: MEDICARE

## 2025-01-23 VITALS
OXYGEN SATURATION: 94 % | WEIGHT: 190.7 LBS | BODY MASS INDEX: 32.56 KG/M2 | DIASTOLIC BLOOD PRESSURE: 53 MMHG | HEART RATE: 78 BPM | RESPIRATION RATE: 20 BRPM | SYSTOLIC BLOOD PRESSURE: 92 MMHG | HEIGHT: 64 IN

## 2025-01-23 DIAGNOSIS — Z13.820 SCREENING FOR OSTEOPOROSIS: ICD-10-CM

## 2025-01-23 LAB
ALBUMIN SERPL-MCNC: 3.2 G/DL (ref 3.5–5.2)
ALBUMIN/GLOB SERPL: 1 G/DL
ALP SERPL-CCNC: 109 U/L (ref 39–117)
ALT SERPL W P-5'-P-CCNC: 11 U/L (ref 1–33)
ANION GAP SERPL CALCULATED.3IONS-SCNC: 9 MMOL/L (ref 5–15)
APTT PPP: 36.2 SECONDS (ref 24.5–36)
AST SERPL-CCNC: 16 U/L (ref 1–32)
BILIRUB SERPL-MCNC: 0.5 MG/DL (ref 0–1.2)
BILIRUB UR QL STRIP: NEGATIVE
BUN SERPL-MCNC: 14 MG/DL (ref 6–20)
BUN/CREAT SERPL: 16.7 (ref 7–25)
CALCIUM SPEC-SCNC: 8.2 MG/DL (ref 8.6–10.5)
CHLORIDE SERPL-SCNC: 101 MMOL/L (ref 98–107)
CLARITY UR: CLEAR
CO2 SERPL-SCNC: 25 MMOL/L (ref 22–29)
COLOR UR: ABNORMAL
CREAT SERPL-MCNC: 0.84 MG/DL (ref 0.57–1)
DEPRECATED RDW RBC AUTO: 47.6 FL (ref 37–54)
EGFRCR SERPLBLD CKD-EPI 2021: 80.2 ML/MIN/1.73
ERYTHROCYTE [DISTWIDTH] IN BLOOD BY AUTOMATED COUNT: 15.7 % (ref 12.3–15.4)
GLOBULIN UR ELPH-MCNC: 3.1 GM/DL
GLUCOSE SERPL-MCNC: 106 MG/DL (ref 65–99)
GLUCOSE UR STRIP-MCNC: NEGATIVE MG/DL
HCT VFR BLD AUTO: 36.3 % (ref 34–46.6)
HGB BLD-MCNC: 12 G/DL (ref 12–15.9)
HGB UR QL STRIP.AUTO: NEGATIVE
INR PPP: 0.96 (ref 0.91–1.09)
KETONES UR QL STRIP: NEGATIVE
LEUKOCYTE ESTERASE UR QL STRIP.AUTO: NEGATIVE
MCH RBC QN AUTO: 28 PG (ref 26.6–33)
MCHC RBC AUTO-ENTMCNC: 33.1 G/DL (ref 31.5–35.7)
MCV RBC AUTO: 84.6 FL (ref 79–97)
NITRITE UR QL STRIP: NEGATIVE
PH UR STRIP.AUTO: 6 [PH] (ref 5–8)
PLATELET # BLD AUTO: 171 10*3/MM3 (ref 140–450)
PMV BLD AUTO: 11 FL (ref 6–12)
POTASSIUM SERPL-SCNC: 3.7 MMOL/L (ref 3.5–5.2)
PROT SERPL-MCNC: 6.3 G/DL (ref 6–8.5)
PROT UR QL STRIP: ABNORMAL
PROTHROMBIN TIME: 13.3 SECONDS (ref 11.8–14.8)
RBC # BLD AUTO: 4.29 10*6/MM3 (ref 3.77–5.28)
SODIUM SERPL-SCNC: 135 MMOL/L (ref 136–145)
SP GR UR STRIP: 1.02 (ref 1–1.03)
UROBILINOGEN UR QL STRIP: ABNORMAL
WBC NRBC COR # BLD AUTO: 5.69 10*3/MM3 (ref 3.4–10.8)

## 2025-01-23 PROCEDURE — 93010 ELECTROCARDIOGRAM REPORT: CPT | Performed by: HOSPITALIST

## 2025-01-23 PROCEDURE — 77080 DXA BONE DENSITY AXIAL: CPT

## 2025-01-23 PROCEDURE — 81003 URINALYSIS AUTO W/O SCOPE: CPT

## 2025-01-23 PROCEDURE — 85730 THROMBOPLASTIN TIME PARTIAL: CPT

## 2025-01-23 PROCEDURE — 93005 ELECTROCARDIOGRAM TRACING: CPT

## 2025-01-23 PROCEDURE — 85027 COMPLETE CBC AUTOMATED: CPT

## 2025-01-23 PROCEDURE — 36415 COLL VENOUS BLD VENIPUNCTURE: CPT

## 2025-01-23 PROCEDURE — 80053 COMPREHEN METABOLIC PANEL: CPT

## 2025-01-23 PROCEDURE — 85610 PROTHROMBIN TIME: CPT

## 2025-01-23 NOTE — DISCHARGE INSTRUCTIONS
Preparing for Surgery  Follow these instructions before the procedure:  Several days or weeks before your procedure      Ask your health care provider about:  Changing or stopping your regular medicines. This is especially important if you are taking diabetes medicines or blood thinners.  Taking medicines such as aspirin and ibuprofen. These medicines can thin your blood. Do not take these medicines unless your health care provider tells you to take them.  Taking over-the-counter medicines, vitamins, herbs, and supplements.    Contact your surgeon if you:  Develop a fever of more than 100.4°F (38°C) or other feelings of illness during the 48 hours before your surgery.  Have symptoms that get worse.  Have questions or concerns about your surgery.  If you are going home the same day of your surgery you will need to arrange for a responsible adult, age 18 years old or older, to drive you home from the hospital and stay with you for 24 hours. Verification of the  will be made prior to any procedure requiring sedation. You may not go home in a taxi or any form of public transportation by yourself.     Day before your procedure    24 hours before your procedure DO NOT drink alcoholic beverages or smoke.  24 hours before your procedure STOP taking Erectile Dysfunction medication (i.e.,Cialis, Viagra)   You may be asked to shower with a germ-killing soap.  Day of your procedure   You may take the following medication(s) the morning of surgery with a sip of water: XANAX, NORCO, OMEPRAZOLE (PRILOSEC), PREGABALIN (LYRICA)      8 hours before your scheduled arrival time, STOP all food, any dairy products, and full liquids. This includes hard candy, chewing gum or mints. This is extremely important to prevent serious complications.     Up to 2 hours before your scheduled arrival time, you may have clear liquids no cream, powder, or pulp of any kind. Safe options are water, black coffee, plain tea, soda, Gatorade/Powerade,  clear broth, apple juice.    2 hours before your scheduled arrival time, STOP drinking clear liquids.    You may need to take another shower with a germ-killing soap before you leave home in the morning. Do not use perfumes, colognes, or body lotions.  Wear comfortable loose-fitting clothing.  Remove all jewelry including body piercing and rings, dark colored nail polish, and make up prior to arrival at the hospital. Leave all valuables at home.   Bring your hearing aids if you rely on them.  Do not wear contact lenses. If you wear eyeglasses remember to bring a case to store them in while you are in surgery.  Do not use denture adhesives since you will be asked to remove them during your surgery.    You do not need to bring your home medications into the hospital.   Bring your sleep apnea device with you on the day of your surgery (if this applies to you).  If you have an Inspire implant for sleep apnea, please bring the remote with you on the day of surgery.  If you wear portable oxygen, bring it with you.   If you are staying overnight, you may bring a bag of items you may need such as slippers, robe and a change of clothes for your discharge. You may want to leave these items in the car until you are ready for them since your family will take your belongings when you leave the pre-operative area.  Arrive at the hospital as scheduled by the office. You will be asked to arrive 2 hours prior to your surgery time in order to prepare for your procedure.  When you arrive at the hospital  Go to the registration desk located at the main entrance of the hospital.  After registration is completed, you will be given a beeper and a sticker sheet. Take the stickers to Outpatient Surgery and place in the tray at the end of the desk to notify the staff that you have arrived and registered.   Return to the lobby to wait. You are not always called back according to the time of arrival but rather the time your doctor will be  ready.  When your beeper lights up and vibrates proceed through the double doors, under the stairs, and a member of the Outpatient Surgery staff will escort you to your preoperative room.     How to Use Chlorhexidine Before Surgery  Chlorhexidine gluconate (CHG) is a germ-killing (antiseptic) solution that is used to clean the skin. It can get rid of the bacteria that normally live on the skin and can keep them away for about 24 hours. To clean your skin with CHG, you may be given:  A CHG solution to use in the shower or as part of a sponge bath.  A prepackaged cloth that contains CHG.  Cleaning your skin with CHG may help lower the risk for infection:  While you are staying in the intensive care unit of the hospital.  If you have a vascular access, such as a central line, to provide short-term or long-term access to your veins.  If you have a catheter to drain urine from your bladder.  If you are on a ventilator. A ventilator is a machine that helps you breathe by moving air in and out of your lungs.  After surgery.  What are the risks?  Risks of using CHG include:  A skin reaction.  Hearing loss, if CHG gets in your ears and you have a perforated eardrum.  Eye injury, if CHG gets in your eyes and is not rinsed out.  The CHG product catching fire.  Make sure that you avoid smoking and flames after applying CHG to your skin.  Do not use CHG:  If you have a chlorhexidine allergy or have previously reacted to chlorhexidine.  On babies younger than 2 months of age.  How to use CHG solution  Use CHG only as told by your health care provider, and follow the instructions on the label.  Use the full amount of CHG as directed. Usually, this is one bottle.  During a shower    Follow these steps when using CHG solution during a shower (unless your health care provider gives you different instructions):  Start the shower.  Use your normal soap and shampoo to wash your face and hair.  Turn off the shower or move out of the  shower stream.  Pour the CHG onto a clean washcloth. Do not use any type of brush or rough-edged sponge.  Starting at your neck, lather your body down to your toes. Make sure you follow these instructions:  If you will be having surgery, pay special attention to the part of your body where you will be having surgery. Scrub this area for at least 1 minute.  Do not use CHG on your head or face. If the solution gets into your ears or eyes, rinse them well with water.  Avoid your genital area.  Avoid any areas of skin that have broken skin, cuts, or scrapes.  Scrub your back and under your arms. Make sure to wash skin folds.  Let the lather sit on your skin for 1-2 minutes or as long as told by your health care provider.  Thoroughly rinse your entire body in the shower. Make sure that all body creases and crevices are rinsed well.  Dry off with a clean towel. Do not put any substances on your body afterward--such as powder, lotion, or perfume--unless you are told to do so by your health care provider. Only use lotions that are recommended by the .  Put on clean clothes or pajamas.  If it is the night before your surgery, sleep in clean sheets.     During a sponge bath  Follow these steps when using CHG solution during a sponge bath (unless your health care provider gives you different instructions):  Use your normal soap and shampoo to wash your face and hair.  Pour the CHG onto a clean washcloth.  Starting at your neck, lather your body down to your toes. Make sure you follow these instructions:  If you will be having surgery, pay special attention to the part of your body where you will be having surgery. Scrub this area for at least 1 minute.  Do not use CHG on your head or face. If the solution gets into your ears or eyes, rinse them well with water.  Avoid your genital area.  Avoid any areas of skin that have broken skin, cuts, or scrapes.  Scrub your back and under your arms. Make sure to wash skin  folds.  Let the lather sit on your skin for 1-2 minutes or as long as told by your health care provider.  Using a different clean, wet washcloth, thoroughly rinse your entire body. Make sure that all body creases and crevices are rinsed well.  Dry off with a clean towel. Do not put any substances on your body afterward--such as powder, lotion, or perfume--unless you are told to do so by your health care provider. Only use lotions that are recommended by the .  Put on clean clothes or pajamas.  If it is the night before your surgery, sleep in clean sheets.  How to use CHG prepackaged cloths  Only use CHG cloths as told by your health care provider, and follow the instructions on the label.  Use the CHG cloth on clean, dry skin.  Do not use the CHG cloth on your head or face unless your health care provider tells you to.  When washing with the CHG cloth:  Avoid your genital area.  Avoid any areas of skin that have broken skin, cuts, or scrapes.  Before surgery    Follow these steps when using a CHG cloth to clean before surgery (unless your health care provider gives you different instructions):  Using the CHG cloth, vigorously scrub the part of your body where you will be having surgery. Scrub using a back-and-forth motion for 3 minutes. The area on your body should be completely wet with CHG when you are done scrubbing.  Do not rinse. Discard the cloth and let the area air-dry. Do not put any substances on the area afterward, such as powder, lotion, or perfume.  Put on clean clothes or pajamas.  If it is the night before your surgery, sleep in clean sheets.     For general bathing  Follow these steps when using CHG cloths for general bathing (unless your health care provider gives you different instructions).  Use a separate CHG cloth for each area of your body. Make sure you wash between any folds of skin and between your fingers and toes. Wash your body in the following order, switching to a new cloth  after each step:  The front of your neck, shoulders, and chest.  Both of your arms, under your arms, and your hands.  Your stomach and groin area, avoiding the genitals.  Your right leg and foot.  Your left leg and foot.  The back of your neck, your back, and your buttocks.  Do not rinse. Discard the cloth and let the area air-dry. Do not put any substances on your body afterward--such as powder, lotion, or perfume--unless you are told to do so by your health care provider. Only use lotions that are recommended by the .  Put on clean clothes or pajamas.  Contact a health care provider if:  Your skin gets irritated after scrubbing.  You have questions about using your solution or cloth.  You swallow any chlorhexidine. Call your local poison control center (1-856.936.5420 in the [place]  Get help right away if:  Your eyes itch badly, or they become very red or swollen.  Your skin itches badly and is red or swollen.  Your hearing changes.  You have trouble seeing.  You have swelling or tingling in your mouth or throat.  You have trouble breathing.  These symptoms may represent a serious problem that is an emergency. Do not wait to see if the symptoms will go away. Get medical help right away. Call your local emergency services (834 in the [place] Do not drive yourself to the hospital.  Summary  Chlorhexidine gluconate (CHG) is a germ-killing (antiseptic) solution that is used to clean the skin. Cleaning your skin with CHG may help to lower your risk for infection.  You may be given CHG to use for bathing. It may be in a bottle or in a prepackaged cloth to use on your skin. Carefully follow your health care provider's instructions and the instructions on the product label.  Do not use CHG if you have a chlorhexidine allergy.  Contact your health care provider if your skin gets irritated after scrubbing.  This information is not intended to replace advice given to you by your health care provider. Make sure you  discuss any questions you have with your health care provider.  Document Revised: 04/17/2023 Document Reviewed: 02/28/2022  [employer] Patient Education © 2023 [employer]

## 2025-01-24 LAB
QT INTERVAL: 406 MS
QTC INTERVAL: 431 MS

## 2025-01-26 ENCOUNTER — APPOINTMENT (OUTPATIENT)
Dept: GENERAL RADIOLOGY | Facility: HOSPITAL | Age: 60
End: 2025-01-26
Payer: MEDICARE

## 2025-01-26 ENCOUNTER — APPOINTMENT (OUTPATIENT)
Dept: CT IMAGING | Facility: HOSPITAL | Age: 60
End: 2025-01-26
Payer: MEDICARE

## 2025-01-26 ENCOUNTER — HOSPITAL ENCOUNTER (EMERGENCY)
Facility: HOSPITAL | Age: 60
Discharge: HOME | End: 2025-01-26
Attending: EMERGENCY MEDICINE | Admitting: EMERGENCY MEDICINE
Payer: MEDICARE

## 2025-01-26 VITALS
BODY MASS INDEX: 32.56 KG/M2 | OXYGEN SATURATION: 95 % | RESPIRATION RATE: 16 BRPM | SYSTOLIC BLOOD PRESSURE: 119 MMHG | HEIGHT: 64 IN | TEMPERATURE: 97.8 F | DIASTOLIC BLOOD PRESSURE: 54 MMHG | HEART RATE: 66 BPM | WEIGHT: 190.7 LBS

## 2025-01-26 DIAGNOSIS — R40.4 TRANSIENT ALTERATION OF AWARENESS: Primary | ICD-10-CM

## 2025-01-26 LAB
ALBUMIN SERPL-MCNC: 3 G/DL (ref 3.5–5.2)
ALBUMIN/GLOB SERPL: 1.1 G/DL
ALP SERPL-CCNC: 136 U/L (ref 39–117)
ALT SERPL W P-5'-P-CCNC: 12 U/L (ref 1–33)
AMMONIA BLD-SCNC: 23 UMOL/L (ref 11–51)
ANION GAP SERPL CALCULATED.3IONS-SCNC: 10 MMOL/L (ref 5–15)
AST SERPL-CCNC: 23 U/L (ref 1–32)
BASOPHILS # BLD AUTO: 0.03 10*3/MM3 (ref 0–0.2)
BASOPHILS NFR BLD AUTO: 0.6 % (ref 0–1.5)
BILIRUB SERPL-MCNC: 0.4 MG/DL (ref 0–1.2)
BILIRUB UR QL STRIP: NEGATIVE
BUN SERPL-MCNC: 9 MG/DL (ref 6–20)
BUN/CREAT SERPL: 17.6 (ref 7–25)
CALCIUM SPEC-SCNC: 8.7 MG/DL (ref 8.6–10.5)
CHLORIDE SERPL-SCNC: 100 MMOL/L (ref 98–107)
CLARITY UR: CLEAR
CO2 SERPL-SCNC: 25 MMOL/L (ref 22–29)
COLOR UR: ABNORMAL
CREAT SERPL-MCNC: 0.51 MG/DL (ref 0.57–1)
CRP SERPL-MCNC: 16.06 MG/DL (ref 0–0.5)
D-LACTATE SERPL-SCNC: 1.2 MMOL/L (ref 0.5–2)
DEPRECATED RDW RBC AUTO: 46.8 FL (ref 37–54)
EGFRCR SERPLBLD CKD-EPI 2021: 107.7 ML/MIN/1.73
EOSINOPHIL # BLD AUTO: 0.27 10*3/MM3 (ref 0–0.4)
EOSINOPHIL NFR BLD AUTO: 5.6 % (ref 0.3–6.2)
ERYTHROCYTE [DISTWIDTH] IN BLOOD BY AUTOMATED COUNT: 15.4 % (ref 12.3–15.4)
FLUAV SUBTYP SPEC NAA+PROBE: NOT DETECTED
FLUBV RNA ISLT QL NAA+PROBE: NOT DETECTED
GLOBULIN UR ELPH-MCNC: 2.7 GM/DL
GLUCOSE SERPL-MCNC: 100 MG/DL (ref 65–99)
GLUCOSE UR STRIP-MCNC: NEGATIVE MG/DL
HCT VFR BLD AUTO: 34.7 % (ref 34–46.6)
HGB BLD-MCNC: 11.4 G/DL (ref 12–15.9)
HGB UR QL STRIP.AUTO: NEGATIVE
HOLD SPECIMEN: NORMAL
IMM GRANULOCYTES # BLD AUTO: 0.01 10*3/MM3 (ref 0–0.05)
IMM GRANULOCYTES NFR BLD AUTO: 0.2 % (ref 0–0.5)
KETONES UR QL STRIP: NEGATIVE
LEUKOCYTE ESTERASE UR QL STRIP.AUTO: NEGATIVE
LYMPHOCYTES # BLD AUTO: 1.35 10*3/MM3 (ref 0.7–3.1)
LYMPHOCYTES NFR BLD AUTO: 27.9 % (ref 19.6–45.3)
MAGNESIUM SERPL-MCNC: 1.8 MG/DL (ref 1.6–2.6)
MCH RBC QN AUTO: 27.7 PG (ref 26.6–33)
MCHC RBC AUTO-ENTMCNC: 32.9 G/DL (ref 31.5–35.7)
MCV RBC AUTO: 84.2 FL (ref 79–97)
MONOCYTES # BLD AUTO: 0.52 10*3/MM3 (ref 0.1–0.9)
MONOCYTES NFR BLD AUTO: 10.7 % (ref 5–12)
NEUTROPHILS NFR BLD AUTO: 2.66 10*3/MM3 (ref 1.7–7)
NEUTROPHILS NFR BLD AUTO: 55 % (ref 42.7–76)
NITRITE UR QL STRIP: NEGATIVE
NRBC BLD AUTO-RTO: 0 /100 WBC (ref 0–0.2)
PH UR STRIP.AUTO: 6 [PH] (ref 5–8)
PLATELET # BLD AUTO: 240 10*3/MM3 (ref 140–450)
PMV BLD AUTO: 9.7 FL (ref 6–12)
POTASSIUM SERPL-SCNC: 3.8 MMOL/L (ref 3.5–5.2)
PROCALCITONIN SERPL-MCNC: 0.24 NG/ML (ref 0–0.25)
PROT SERPL-MCNC: 5.7 G/DL (ref 6–8.5)
PROT UR QL STRIP: NEGATIVE
RBC # BLD AUTO: 4.12 10*6/MM3 (ref 3.77–5.28)
RSV RNA NPH QL NAA+NON-PROBE: NOT DETECTED
SARS-COV-2 RNA RESP QL NAA+PROBE: NOT DETECTED
SODIUM SERPL-SCNC: 135 MMOL/L (ref 136–145)
SP GR UR STRIP: 1.01 (ref 1–1.03)
T4 FREE SERPL-MCNC: 0.94 NG/DL (ref 0.93–1.7)
TSH SERPL DL<=0.05 MIU/L-ACNC: 6.67 UIU/ML (ref 0.27–4.2)
UROBILINOGEN UR QL STRIP: ABNORMAL
WBC NRBC COR # BLD AUTO: 4.84 10*3/MM3 (ref 3.4–10.8)
WHOLE BLOOD HOLD COAG: NORMAL
WHOLE BLOOD HOLD SPECIMEN: NORMAL

## 2025-01-26 PROCEDURE — 93005 ELECTROCARDIOGRAM TRACING: CPT | Performed by: EMERGENCY MEDICINE

## 2025-01-26 PROCEDURE — 36415 COLL VENOUS BLD VENIPUNCTURE: CPT

## 2025-01-26 PROCEDURE — 84439 ASSAY OF FREE THYROXINE: CPT | Performed by: EMERGENCY MEDICINE

## 2025-01-26 PROCEDURE — 84443 ASSAY THYROID STIM HORMONE: CPT | Performed by: EMERGENCY MEDICINE

## 2025-01-26 PROCEDURE — 83735 ASSAY OF MAGNESIUM: CPT | Performed by: EMERGENCY MEDICINE

## 2025-01-26 PROCEDURE — 85025 COMPLETE CBC W/AUTO DIFF WBC: CPT | Performed by: EMERGENCY MEDICINE

## 2025-01-26 PROCEDURE — 82140 ASSAY OF AMMONIA: CPT | Performed by: EMERGENCY MEDICINE

## 2025-01-26 PROCEDURE — 93010 ELECTROCARDIOGRAM REPORT: CPT | Performed by: EMERGENCY MEDICINE

## 2025-01-26 PROCEDURE — 71045 X-RAY EXAM CHEST 1 VIEW: CPT

## 2025-01-26 PROCEDURE — 83605 ASSAY OF LACTIC ACID: CPT | Performed by: EMERGENCY MEDICINE

## 2025-01-26 PROCEDURE — 81003 URINALYSIS AUTO W/O SCOPE: CPT | Performed by: EMERGENCY MEDICINE

## 2025-01-26 PROCEDURE — 84145 PROCALCITONIN (PCT): CPT | Performed by: EMERGENCY MEDICINE

## 2025-01-26 PROCEDURE — 86140 C-REACTIVE PROTEIN: CPT | Performed by: EMERGENCY MEDICINE

## 2025-01-26 PROCEDURE — 70450 CT HEAD/BRAIN W/O DYE: CPT

## 2025-01-26 PROCEDURE — 87040 BLOOD CULTURE FOR BACTERIA: CPT | Performed by: EMERGENCY MEDICINE

## 2025-01-26 PROCEDURE — 80053 COMPREHEN METABOLIC PANEL: CPT | Performed by: EMERGENCY MEDICINE

## 2025-01-26 PROCEDURE — 99284 EMERGENCY DEPT VISIT MOD MDM: CPT

## 2025-01-26 RX ORDER — SODIUM CHLORIDE 0.9 % (FLUSH) 0.9 %
10 SYRINGE (ML) INJECTION AS NEEDED
Status: DISCONTINUED | OUTPATIENT
Start: 2025-01-26 | End: 2025-01-26 | Stop reason: HOSPADM

## 2025-01-26 NOTE — ED PROVIDER NOTES
"Subjective   History of Present Illness  Patient is brought to the emergency room by her daughter with a complaint of altered mental status and possible facial droop.  They tell me that she actually got sick last Saturday which would be 8 days ago.  By this she means she just felt generally bad and lethargic.  Said some nausea but no diarrhea or vomiting.  She has had a general malaise and thought she had symptoms of a viral illness that would pass.  However after that she is continue to be somewhat lethargic and just generally feeling bad.  The daughter says that the patient's siblings noticed on Thursday that she was \"talking out of her head\".  Patient tells me she just feels generally bad but nothing specific.  She does realize that she is some slow and sluggish but does not have any specific pain anywhere.  She has noticed herself to be generally weak but has no focal weaknesses that she is noted.    History provided by:  Patient and relative   used: No    Altered Mental Status  Presenting symptoms: confusion and lethargy    Severity:  Moderate  Most recent episode:  More than 2 days ago  Episode history:  Single  Duration:  8 days  Timing:  Constant  Progression:  Worsening  Chronicity:  New  Context: not alcohol use, not dementia, not drug use, not head injury, not homeless, taking medications as prescribed, not nursing home resident, not recent change in medication, not recent illness and not recent infection    Associated symptoms: nausea    Associated symptoms: no abdominal pain, normal movement, no agitation, no bladder incontinence, no decreased appetite, no depression, no difficulty breathing, no eye deviation, no fever, no hallucinations, no headaches, no light-headedness, no palpitations, no rash, no seizures, no slurred speech, no suicidal behavior, no visual change, no vomiting and no weakness        Review of Systems   Constitutional: Negative.  Negative for decreased appetite " and fever.   HENT: Negative.     Respiratory: Negative.     Cardiovascular: Negative.  Negative for palpitations.   Gastrointestinal:  Positive for nausea. Negative for abdominal pain and vomiting.   Genitourinary: Negative.  Negative for bladder incontinence.   Musculoskeletal: Negative.    Skin: Negative.  Negative for rash.   Neurological: Negative.  Negative for seizures, weakness, light-headedness and headaches.   Psychiatric/Behavioral:  Positive for confusion. Negative for agitation and hallucinations.    All other systems reviewed and are negative.      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     Low back pain     Nausea after anesthesia     Open wound of gum     pt states had all her teeth pulled and there is a spot that hasn't healed due to RA so she is using peridex rinse daily    Osteoporosis     PONV (postoperative nausea and vomiting)     Psoriasis     Rheumatoid arthritis     Seasonal allergies     Senile osteoporosis 09/28/2017       Allergies   Allergen Reactions    Evenity [Romosozumab] Swelling     Injection site swelling, redness, warm to touch       Past Surgical History:   Procedure Laterality Date    ANTERIOR LUMBAR EXPOSURE N/A 12/02/2020    Procedure: Anterior lumbar interbody fusion of L5-S1 with instrumentation ;  Surgeon: Neptali Rivera DO;  Location:  [facility];  Service: Vascular;  Laterality: N/A;    AXILLARY LYMPH NODE BIOPSY/EXCISION Right 09/19/2019    Procedure: EXCISION RIGHT AXILLARY MASS;  Surgeon: Jes Enamorado MD;  Location:  [facility];  Service: General    INCISION AND DRAINAGE ARM Right 06/03/2021    Procedure: INCISION AND DRAINAGE FOREARM;  Surgeon: Jes Enamorado MD;  Location:  [facility];  Service: General;  Laterality:  Right;    LUMBAR FUSION N/A 2020    Procedure: ANTERIOR DECOMPRESSION, ANTERIOR LUMBAR INTERBODY FUSION WITH INSTRUMENTATION L5-S1;  Surgeon: ALEXIS Dunaway MD;  Location:  [facility];  Service: Orthopedic Spine;  Laterality: N/A;    LUMBAR FUSION Left 2020    Procedure: LEFT LATERAL LUMBAR INTERBODY FUSION L3-5 WITH INSTRUMENTATION L3-4;  Surgeon: ALEXIS Dunaway MD;  Location:  [facility];  Service: Orthopedic Spine;  Laterality: Left;    LUMBAR LAMINECTOMY WITH FUSION N/A 2020    Procedure: DECOMPRESSION L3-4, POSTERIOR SPINAL FUSION WITH INSTRUMENTATION L3-S1;  Surgeon: ALEXIS Dunaway MD;  Location:  [facility];  Service: Orthopedic Spine;  Laterality: N/A;    SHOULDER SURGERY Left     arthroscopy for arthtritis and bone spurs    TUBAL ABDOMINAL LIGATION      US GUIDED LYMPH NODE BIOPSY  2019    WRIST SURGERY Bilateral     fracture - no internal hardware       Family History   Problem Relation Age of Onset    Arthritis Mother     COPD Mother     Atrial fibrillation Mother     Osteoarthritis Mother     Heart disease Father     Hypertension Father     Arthritis Father     Melanoma Father         metastatic    Arthritis Sister     Arthritis Brother     No Known Problems Daughter     Arthritis Maternal Grandfather     Breast cancer Neg Hx        Social History     Socioeconomic History    Marital status:    Tobacco Use    Smoking status: Former     Current packs/day: 0.00     Average packs/day: 0.5 packs/day for 34.7 years (17.4 ttl pk-yrs)     Types: Cigarettes     Start date:      Quit date: 10/1/2024     Years since quittin.3    Smokeless tobacco: Never   Vaping Use    Vaping status: Never Used   Substance and Sexual Activity    Alcohol use: No    Drug use: No    Sexual activity: Defer       Prior to Admission medications    Medication Sig Start Date End Date Taking? Authorizing Provider   alendronate (Fosamax) 70 MG tablet Take 1 tablet by mouth Every 7 (Seven)  Days. 12/11/24   Charlene Huntley DNP, APRN   ALPRAZolam (XANAX) 1 MG tablet Take 1 tablet by mouth 3 (Three) Times a Day As Needed for Anxiety. 12/11/24   Charlene Huntley DNP, APRN   Calcium-Magnesium-Vitamin D 500-250-200 MG-MG-UNIT tablet Take 600 tablets by mouth 2 (Two) Times a Day.    Buddy Stanton MD   chlorhexidine (PERIDEX) 0.12 % solution Apply 15 mL to the mouth or throat Daily. 10/10/24   Buddy Stanton MD   cholecalciferol (VITAMIN D3) 1000 units tablet Take 1 tablet by mouth Daily.    Buddy Stanton MD   fluticasone (Flonase) 50 MCG/ACT nasal spray 2 sprays into the nostril(s) as directed by provider Daily.  Patient taking differently: Administer 2 sprays into the nostril(s) as directed by provider Daily As Needed for Allergies or Rhinitis. 8/16/24   Charlene Huntley DNP, APRN   furosemide (LASIX) 40 MG tablet Take 1 tablet by mouth Daily. 1/13/25   Charlene Huntley DNP, APRN   HYDROcodone-acetaminophen (Norco)  MG per tablet Take 1 tablet by mouth Every 6 (Six) Hours As Needed for Moderate Pain. 1/13/25   Charlene Huntley DNP, APRN   leflunomide (ARAVA) 20 MG tablet Take 1 tablet by mouth Daily. 5/8/21   Buddy Stanton MD   meloxicam (MOBIC) 15 MG tablet Take 1 tablet by mouth Daily. 12/11/24   Charlene Huntley DNP, APRN   mupirocin (BACTROBAN) 2 % ointment Apply to nostrils and fingernails nighlty x 5 nights same 5 nights monthly x 6 months. 1/20/20   Charlene Huntley DNP, APRN   Narcan 4 MG/0.1ML nasal spray  10/1/21   Buddy Stanton MD   omeprazole (priLOSEC) 40 MG capsule Take 1 capsule by mouth Daily. 12/27/24   Charlene Huntley DNP, APRN   pregabalin (LYRICA) 150 MG capsule TAKE ONE CAPSULE BY MOUTH TWICE A DAY 12/27/24   Charlene Huntley DNP, APRN   tiZANidine (ZANAFLEX) 4 MG tablet Take 1 tablet by mouth Every 8 (Eight) Hours As Needed for Muscle Spasms. 11/13/24   Charlene Huntley DNP, APRN   Turmeric 500 MG capsule Take  1 capsule by mouth Daily.    Provider, MD Buddy   valACYclovir (VALTREX) 1000 MG tablet Take 1 tablet by mouth 2 (Two) Times a Day.  Patient taking differently: Take 1 tablet by mouth 2 (Two) Times a Day As Needed (FEVER BLISTERS, OR VIRAL ILLNESS). 12/11/24   Charlene Huntley, DNP, APRN       Medications   sodium chloride 0.9 % flush 10 mL (has no administration in time range)       Vitals:    01/26/25 1526   BP:    Pulse:    Resp: 16   Temp: 97.8 °F (36.6 °C)   SpO2:          Objective   Physical Exam  Vitals and nursing note reviewed.   Constitutional:       Appearance: She is well-developed.   HENT:      Head: Normocephalic and atraumatic.   Eyes:      Extraocular Movements: Extraocular movements intact.      Pupils: Pupils are equal, round, and reactive to light.   Cardiovascular:      Rate and Rhythm: Normal rate and regular rhythm.   Pulmonary:      Effort: Pulmonary effort is normal.      Breath sounds: Normal breath sounds.   Abdominal:      General: Bowel sounds are normal.      Palpations: Abdomen is soft.   Musculoskeletal:         General: Normal range of motion.      Cervical back: Normal range of motion and neck supple.   Skin:     General: Skin is warm and dry.   Neurological:      Mental Status: She is alert. She is lethargic.      Comments: Patient answers all questions appropriately.  She moves everything equally.  I do not appreciate any facial droop or any weakness in 1 extremity over the others.  She does seem generally slow in her responses and lethargic.   Psychiatric:         Mood and Affect: Mood normal.         Behavior: Behavior normal.         Procedures         Lab Results (last 24 hours)       Procedure Component Value Units Date/Time    CBC & Differential [[id number]]  (Abnormal) Collected: 01/26/25 1129    Specimen: Blood from Arm, Left Updated: 01/26/25 1155    Narrative:      The following orders were created for panel order CBC & Differential.  Procedure                                Abnormality         Status                     ---------                               -----------         ------                     CBC Auto Differential[[id number]]        Abnormal            Final result                 Please view results for these tests on the individual orders.    Comprehensive Metabolic Panel [[id number]]  (Abnormal) Collected: 01/26/25 1129    Specimen: Blood from Arm, Left Updated: 01/26/25 1211     Glucose 100 mg/dL      BUN 9 mg/dL      Creatinine 0.51 mg/dL      Sodium 135 mmol/L      Potassium 3.8 mmol/L      Chloride 100 mmol/L      CO2 25.0 mmol/L      Calcium 8.7 mg/dL      Total Protein 5.7 g/dL      Albumin 3.0 g/dL      ALT (SGPT) 12 U/L      AST (SGOT) 23 U/L      Alkaline Phosphatase 136 U/L      Total Bilirubin 0.4 mg/dL      Globulin 2.7 gm/dL      A/G Ratio 1.1 g/dL      BUN/Creatinine Ratio 17.6     Anion Gap 10.0 mmol/L      eGFR 107.7 mL/min/1.73     Narrative:      GFR Categories in Chronic Kidney Disease (CKD)      GFR Category          GFR (mL/min/1.73)    Interpretation  G1                     90 or greater         Normal or high (1)  G2                      60-89                Mild decrease (1)  G3a                   45-59                Mild to moderate decrease  G3b                   30-44                Moderate to severe decrease  G4                    15-29                Severe decrease  G5                    14 or less           Kidney failure          (1)In the absence of evidence of kidney disease, neither GFR category G1 or G2 fulfill the criteria for CKD.    eGFR calculation 2021 CKD-EPI creatinine equation, which does not include race as a factor    Lactic Acid, Plasma [[id number]]  (Normal) Collected: 01/26/25 1129    Specimen: Blood from Arm, Left Updated: 01/26/25 1209     Lactate 1.2 mmol/L     C-reactive Protein [[id number]]  (Abnormal) Collected: 01/26/25 1129    Specimen: Blood from Arm, Left Updated: 01/26/25 1211     C-Reactive Protein  "16.06 mg/dL     Procalcitonin [[id number]]  (Normal) Collected: 01/26/25 1129    Specimen: Blood from Arm, Left Updated: 01/26/25 1217     Procalcitonin 0.24 ng/mL     Narrative:      As a Marker for Sepsis (Non-Neonates):    1. <0.5 ng/mL represents a low risk of severe sepsis and/or septic shock.  2. >2 ng/mL represents a high risk of severe sepsis and/or septic shock.    As a Marker for Lower Respiratory Tract Infections that require antibiotic therapy:    PCT on Admission    Antibiotic Therapy       6-12 Hrs later    >0.5                Strongly Recommended  >0.25 - <0.5        Recommended   0.1 - 0.25          Discouraged              Remeasure/reassess PCT  <0.1                Strongly Discouraged     Remeasure/reassess PCT    As 28 day mortality risk marker: \"Change in Procalcitonin Result\" (>80% or <=80%) if Day 0 (or Day 1) and Day 4 values are available. Refer to http://www.[facility]-pct-calculator.com    Change in PCT <=80%  A decrease of PCT levels below or equal to 80% defines a positive change in PCT test result representing a higher risk for 28-day all-cause mortality of patients diagnosed with severe sepsis for septic shock.    Change in PCT >80%  A decrease of PCT levels of more than 80% defines a negative change in PCT result representing a lower risk for 28-day all-cause mortality of patients diagnosed with severe sepsis or septic shock.       Magnesium [[id number]]  (Normal) Collected: 01/26/25 1129    Specimen: Blood from Arm, Left Updated: 01/26/25 1208     Magnesium 1.8 mg/dL     TSH [[id number]]  (Abnormal) Collected: 01/26/25 1129    Specimen: Blood from Arm, Left Updated: 01/26/25 1217     TSH 6.670 uIU/mL     T4, Free [[id number]]  (Normal) Collected: 01/26/25 1129    Specimen: Blood from Arm, Left Updated: 01/26/25 1216     Free T4 0.94 ng/dL     CBC Auto Differential [[id number]]  (Abnormal) Collected: 01/26/25 1129    Specimen: Blood from Arm, Left Updated: 01/26/25 1155     WBC 4.84 10*3/mm3  "     RBC 4.12 10*6/mm3      Hemoglobin 11.4 g/dL      Hematocrit 34.7 %      MCV 84.2 fL      MCH 27.7 pg      MCHC 32.9 g/dL      RDW 15.4 %      RDW-SD 46.8 fl      MPV 9.7 fL      Platelets 240 10*3/mm3      Neutrophil % 55.0 %      Lymphocyte % 27.9 %      Monocyte % 10.7 %      Eosinophil % 5.6 %      Basophil % 0.6 %      Immature Grans % 0.2 %      Neutrophils, Absolute 2.66 10*3/mm3      Lymphocytes, Absolute 1.35 10*3/mm3      Monocytes, Absolute 0.52 10*3/mm3      Eosinophils, Absolute 0.27 10*3/mm3      Basophils, Absolute 0.03 10*3/mm3      Immature Grans, Absolute 0.01 10*3/mm3      nRBC 0.0 /100 WBC     Urinalysis With Culture If Indicated - Urine, Clean Catch [[id number]]  (Abnormal) Collected: 01/26/25 1217    Specimen: Urine, Clean Catch Updated: 01/26/25 1229     Color, UA Dark Yellow     Appearance, UA Clear     pH, UA 6.0     Specific Gravity, UA 1.015     Glucose, UA Negative     Ketones, UA Negative     Bilirubin, UA Negative     Blood, UA Negative     Protein, UA Negative     Leuk Esterase, UA Negative     Nitrite, UA Negative     Urobilinogen, UA 1.0 E.U./dL    Narrative:      In absence of clinical symptoms, the presence of pyuria, bacteria, and/or nitrites on the urinalysis result does not correlate with infection.  Urine microscopic not indicated.    Blood Culture - Blood, Arm, Left [[id number]] Collected: 01/26/25 1254    Specimen: Blood from Arm, Left Updated: 01/26/25 1346    Ammonia [[id number]]  (Normal) Collected: 01/26/25 1254    Specimen: Blood Updated: 01/26/25 1345     Ammonia 23 umol/L     Blood Culture - Blood, Hand, Left [[id number]] Collected: 01/26/25 1302    Specimen: Blood from Hand, Left Updated: 01/26/25 1345    COVID PRE-OP / PRE-PROCEDURE SCREENING ORDER (NO ISOLATION) - Swab, Nasopharynx [[id number]]  (Normal) Collected: 01/26/25 1303    Specimen: Swab from Nasopharynx Updated: 01/26/25 1424    Narrative:      The following orders were created for panel order COVID PRE-OP /  PRE-PROCEDURE SCREENING ORDER (NO ISOLATION) - Swab, Nasopharynx.  Procedure                               Abnormality         Status                     ---------                               -----------         ------                     COVID-19, FLU A/B, RSV P...[[id number]]  Normal              Final result                 Please view results for these tests on the individual orders.    COVID-19, FLU A/B, RSV PCR 1 HR TAT - Swab, Nasopharynx [[id number]]  (Normal) Collected: 01/26/25 1303    Specimen: Swab from Nasopharynx Updated: 01/26/25 1424     COVID19 Not Detected     Influenza A PCR Not Detected     Influenza B PCR Not Detected     RSV, PCR Not Detected            CT Head Without Contrast   Final Result       1. No acute intracranial abnormality. Mild chronic small vessel ischemia   suspected.       This report was signed and finalized on 1/26/2025 1:01 PM by Dr. Katelyn Torres MD.          XR Chest 1 View   Final Result   1. There is a 1.5 cm nodular opacity of the right middle versus lower   lobe which may be infectious/inflammatory, however neoplastic process   included in the differential. Probable left basilar atelectasis. If   clinical signs of pneumonia, follow-up chest x-ray in 10 to 14 days   recommended to assure resolution of the findings. If no clinical   symptoms of pneumonia, a follow-up CT chest could be performed.           This report was signed and finalized on 1/26/2025 12:13 PM by Dr. Katelyn Torres MD.              ED Course          MDM  Number of Diagnoses or Management Options  Transient alteration of awareness: new and requires workup  Diagnosis management comments: I told patient and her daughter that everything we got it works out pretty good.  Her CT scan did not show anything bad in her head and her lab work is all fine.  The only significant finding is a density in her right middle lobe that could be infectious or inflammatory.  However she has no other signs or  symptoms of pneumonia.  I was first, treated that way but then she would question if she could have her back surgery tomorrow as scheduled.  I spoke with anesthesiology and they said it would be okay with him.  I spoke with Dr. Sepulveda's midlevel and he advised that they would go ahead with the plans for surgery if the patient so desires and is what she wants.  I think she just had some type of viral illness that made her feel bad but everything looks okay right now other than just being a little lethargic.  She was discharged in stable condition.       Amount and/or Complexity of Data Reviewed  Clinical lab tests: ordered and reviewed  Tests in the radiology section of CPT®: ordered and reviewed  Tests in the medicine section of CPT®: ordered and reviewed  Discuss the patient with other providers: yes    Risk of Complications, Morbidity, and/or Mortality  Presenting problems: moderate  Diagnostic procedures: moderate  Management options: moderate    Patient Progress  Patient progress: stable        Final diagnoses:   Transient alteration of awareness          Tristan Guillory Jr., MD  01/26/25 0993

## 2025-01-27 ENCOUNTER — ANESTHESIA (OUTPATIENT)
Dept: PERIOP | Facility: HOSPITAL | Age: 60
End: 2025-01-27
Payer: MEDICARE

## 2025-01-27 ENCOUNTER — HOSPITAL ENCOUNTER (INPATIENT)
Facility: HOSPITAL | Age: 60
LOS: 4 days | Discharge: HOME | End: 2025-01-31
Attending: ORTHOPAEDIC SURGERY | Admitting: ORTHOPAEDIC SURGERY
Payer: MEDICARE

## 2025-01-27 ENCOUNTER — ANESTHESIA EVENT (OUTPATIENT)
Dept: PERIOP | Facility: HOSPITAL | Age: 60
End: 2025-01-27
Payer: MEDICARE

## 2025-01-27 ENCOUNTER — APPOINTMENT (OUTPATIENT)
Dept: GENERAL RADIOLOGY | Facility: HOSPITAL | Age: 60
End: 2025-01-27
Payer: MEDICARE

## 2025-01-27 DIAGNOSIS — Z74.09 IMPAIRED MOBILITY: Primary | ICD-10-CM

## 2025-01-27 DIAGNOSIS — M54.16 LUMBAR RADICULOPATHY: ICD-10-CM

## 2025-01-27 DIAGNOSIS — Z98.1 HISTORY OF LUMBAR FUSION: ICD-10-CM

## 2025-01-27 LAB
ABO GROUP BLD: NORMAL
BLD GP AB SCN SERPL QL: NEGATIVE
QT INTERVAL: 408 MS
QTC INTERVAL: 424 MS
RH BLD: POSITIVE
T&S EXPIRATION DATE: NORMAL

## 2025-01-27 PROCEDURE — 25010000002 FENTANYL CITRATE (PF) 100 MCG/2ML SOLUTION

## 2025-01-27 PROCEDURE — 25010000002 PROPOFOL 10 MG/ML EMULSION

## 2025-01-27 PROCEDURE — 72100 X-RAY EXAM L-S SPINE 2/3 VWS: CPT

## 2025-01-27 PROCEDURE — 25010000002 ONDANSETRON PER 1 MG: Performed by: ORTHOPAEDIC SURGERY

## 2025-01-27 PROCEDURE — 25010000002 CEFAZOLIN PER 500 MG: Performed by: ORTHOPAEDIC SURGERY

## 2025-01-27 PROCEDURE — 4A11X4G MONITORING OF PERIPHERAL NERVOUS ELECTRICAL ACTIVITY, INTRAOPERATIVE, EXTERNAL APPROACH: ICD-10-PCS | Performed by: ORTHOPAEDIC SURGERY

## 2025-01-27 PROCEDURE — 76000 FLUOROSCOPY <1 HR PHYS/QHP: CPT

## 2025-01-27 PROCEDURE — 25010000002 HYDROMORPHONE PER 4 MG: Performed by: ANESTHESIOLOGY

## 2025-01-27 PROCEDURE — 86901 BLOOD TYPING SEROLOGIC RH(D): CPT | Performed by: ORTHOPAEDIC SURGERY

## 2025-01-27 PROCEDURE — 0SG00A0 FUSION OF LUMBAR VERTEBRAL JOINT WITH INTERBODY FUSION DEVICE, ANTERIOR APPROACH, ANTERIOR COLUMN, OPEN APPROACH: ICD-10-PCS | Performed by: ORTHOPAEDIC SURGERY

## 2025-01-27 PROCEDURE — 25010000002 LIDOCAINE PF 2% 2 % SOLUTION

## 2025-01-27 PROCEDURE — 25010000002 PROCHLORPERAZINE 10 MG/2ML SOLUTION: Performed by: PHYSICIAN ASSISTANT

## 2025-01-27 PROCEDURE — C1713 ANCHOR/SCREW BN/BN,TIS/BN: HCPCS | Performed by: ORTHOPAEDIC SURGERY

## 2025-01-27 PROCEDURE — 25810000003 LACTATED RINGERS PER 1000 ML: Performed by: ORTHOPAEDIC SURGERY

## 2025-01-27 PROCEDURE — 86850 RBC ANTIBODY SCREEN: CPT | Performed by: ORTHOPAEDIC SURGERY

## 2025-01-27 PROCEDURE — 25010000002 ONDANSETRON PER 1 MG

## 2025-01-27 PROCEDURE — 25010000002 GLYCOPYRROLATE 0.4 MG/2ML SOLUTION

## 2025-01-27 PROCEDURE — 86900 BLOOD TYPING SEROLOGIC ABO: CPT | Performed by: ORTHOPAEDIC SURGERY

## 2025-01-27 PROCEDURE — 25810000003 SODIUM CHLORIDE 0.9 % SOLUTION: Performed by: ORTHOPAEDIC SURGERY

## 2025-01-27 PROCEDURE — 25010000002 FENTANYL CITRATE (PF) 50 MCG/ML SOLUTION: Performed by: ANESTHESIOLOGY

## 2025-01-27 PROCEDURE — 25010000002 DEXAMETHASONE PER 1 MG: Performed by: ANESTHESIOLOGY

## 2025-01-27 DEVICE — IMPLANTABLE DEVICE: Type: IMPLANTABLE DEVICE | Site: SPINE LUMBAR | Status: FUNCTIONAL

## 2025-01-27 RX ORDER — DEXAMETHASONE SODIUM PHOSPHATE 4 MG/ML
4 INJECTION, SOLUTION INTRA-ARTICULAR; INTRALESIONAL; INTRAMUSCULAR; INTRAVENOUS; SOFT TISSUE ONCE AS NEEDED
Status: COMPLETED | OUTPATIENT
Start: 2025-01-27 | End: 2025-01-27

## 2025-01-27 RX ORDER — SODIUM CHLORIDE 0.9 % (FLUSH) 0.9 %
10 SYRINGE (ML) INJECTION AS NEEDED
Status: DISCONTINUED | OUTPATIENT
Start: 2025-01-27 | End: 2025-01-27 | Stop reason: HOSPADM

## 2025-01-27 RX ORDER — SUCCINYLCHOLINE/SOD CL,ISO/PF 200MG/10ML
SYRINGE (ML) INTRAVENOUS AS NEEDED
Status: DISCONTINUED | OUTPATIENT
Start: 2025-01-27 | End: 2025-01-27 | Stop reason: SURG

## 2025-01-27 RX ORDER — SODIUM CHLORIDE 0.9 % (FLUSH) 0.9 %
3 SYRINGE (ML) INJECTION AS NEEDED
Status: DISCONTINUED | OUTPATIENT
Start: 2025-01-27 | End: 2025-01-27 | Stop reason: HOSPADM

## 2025-01-27 RX ORDER — ONDANSETRON 2 MG/ML
4 INJECTION INTRAMUSCULAR; INTRAVENOUS EVERY 6 HOURS PRN
Status: DISCONTINUED | OUTPATIENT
Start: 2025-01-27 | End: 2025-01-31 | Stop reason: HOSPADM

## 2025-01-27 RX ORDER — PROPOFOL 10 MG/ML
VIAL (ML) INTRAVENOUS AS NEEDED
Status: DISCONTINUED | OUTPATIENT
Start: 2025-01-27 | End: 2025-01-27 | Stop reason: SURG

## 2025-01-27 RX ORDER — OXYCODONE AND ACETAMINOPHEN 10; 325 MG/1; MG/1
1 TABLET ORAL EVERY 4 HOURS PRN
Status: DISCONTINUED | OUTPATIENT
Start: 2025-01-27 | End: 2025-01-27 | Stop reason: HOSPADM

## 2025-01-27 RX ORDER — MIDAZOLAM HYDROCHLORIDE 2 MG/2ML
1 INJECTION, SOLUTION INTRAMUSCULAR; INTRAVENOUS
Status: DISCONTINUED | OUTPATIENT
Start: 2025-01-27 | End: 2025-01-27 | Stop reason: HOSPADM

## 2025-01-27 RX ORDER — SODIUM CHLORIDE 9 MG/ML
100 INJECTION, SOLUTION INTRAVENOUS CONTINUOUS
Status: DISPENSED | OUTPATIENT
Start: 2025-01-27 | End: 2025-01-28

## 2025-01-27 RX ORDER — ACETAMINOPHEN 650 MG/1
650 SUPPOSITORY RECTAL EVERY 4 HOURS PRN
Status: DISCONTINUED | OUTPATIENT
Start: 2025-01-27 | End: 2025-01-31 | Stop reason: HOSPADM

## 2025-01-27 RX ORDER — FENTANYL CITRATE 50 UG/ML
50 INJECTION, SOLUTION INTRAMUSCULAR; INTRAVENOUS
Status: DISCONTINUED | OUTPATIENT
Start: 2025-01-27 | End: 2025-01-27 | Stop reason: HOSPADM

## 2025-01-27 RX ORDER — BISACODYL 5 MG/1
5 TABLET, DELAYED RELEASE ORAL DAILY PRN
Status: DISCONTINUED | OUTPATIENT
Start: 2025-01-27 | End: 2025-01-31 | Stop reason: HOSPADM

## 2025-01-27 RX ORDER — ONDANSETRON 2 MG/ML
4 INJECTION INTRAMUSCULAR; INTRAVENOUS
Status: DISCONTINUED | OUTPATIENT
Start: 2025-01-27 | End: 2025-01-27 | Stop reason: HOSPADM

## 2025-01-27 RX ORDER — POLYETHYLENE GLYCOL 3350 17 G/17G
17 POWDER, FOR SOLUTION ORAL DAILY PRN
Status: DISCONTINUED | OUTPATIENT
Start: 2025-01-27 | End: 2025-01-31 | Stop reason: HOSPADM

## 2025-01-27 RX ORDER — HYDROMORPHONE HYDROCHLORIDE 1 MG/ML
0.5 INJECTION, SOLUTION INTRAMUSCULAR; INTRAVENOUS; SUBCUTANEOUS
Status: DISCONTINUED | OUTPATIENT
Start: 2025-01-27 | End: 2025-01-27 | Stop reason: HOSPADM

## 2025-01-27 RX ORDER — SODIUM CHLORIDE 0.9 % (FLUSH) 0.9 %
10 SYRINGE (ML) INJECTION EVERY 12 HOURS SCHEDULED
Status: DISCONTINUED | OUTPATIENT
Start: 2025-01-27 | End: 2025-01-27 | Stop reason: HOSPADM

## 2025-01-27 RX ORDER — ONDANSETRON 2 MG/ML
INJECTION INTRAMUSCULAR; INTRAVENOUS AS NEEDED
Status: DISCONTINUED | OUTPATIENT
Start: 2025-01-27 | End: 2025-01-27 | Stop reason: SURG

## 2025-01-27 RX ORDER — SODIUM CHLORIDE, SODIUM LACTATE, POTASSIUM CHLORIDE, CALCIUM CHLORIDE 600; 310; 30; 20 MG/100ML; MG/100ML; MG/100ML; MG/100ML
100 INJECTION, SOLUTION INTRAVENOUS CONTINUOUS PRN
Status: DISCONTINUED | OUTPATIENT
Start: 2025-01-27 | End: 2025-01-27 | Stop reason: HOSPADM

## 2025-01-27 RX ORDER — ROCURONIUM BROMIDE 10 MG/ML
INJECTION, SOLUTION INTRAVENOUS AS NEEDED
Status: DISCONTINUED | OUTPATIENT
Start: 2025-01-27 | End: 2025-01-27 | Stop reason: SURG

## 2025-01-27 RX ORDER — FUROSEMIDE 40 MG/1
40 TABLET ORAL DAILY
Status: DISCONTINUED | OUTPATIENT
Start: 2025-01-27 | End: 2025-01-31 | Stop reason: HOSPADM

## 2025-01-27 RX ORDER — BISACODYL 10 MG
10 SUPPOSITORY, RECTAL RECTAL DAILY PRN
Status: DISCONTINUED | OUTPATIENT
Start: 2025-01-27 | End: 2025-01-31 | Stop reason: HOSPADM

## 2025-01-27 RX ORDER — ACETAMINOPHEN 325 MG/1
650 TABLET ORAL EVERY 4 HOURS PRN
Status: DISCONTINUED | OUTPATIENT
Start: 2025-01-27 | End: 2025-01-31 | Stop reason: HOSPADM

## 2025-01-27 RX ORDER — NALOXONE HCL 0.4 MG/ML
0.04 VIAL (ML) INJECTION AS NEEDED
Status: DISCONTINUED | OUTPATIENT
Start: 2025-01-27 | End: 2025-01-27 | Stop reason: HOSPADM

## 2025-01-27 RX ORDER — SODIUM CHLORIDE 0.9 % (FLUSH) 0.9 %
3 SYRINGE (ML) INJECTION EVERY 12 HOURS SCHEDULED
Status: DISCONTINUED | OUTPATIENT
Start: 2025-01-27 | End: 2025-01-31 | Stop reason: HOSPADM

## 2025-01-27 RX ORDER — FENTANYL CITRATE 50 UG/ML
INJECTION, SOLUTION INTRAMUSCULAR; INTRAVENOUS AS NEEDED
Status: DISCONTINUED | OUTPATIENT
Start: 2025-01-27 | End: 2025-01-27 | Stop reason: SURG

## 2025-01-27 RX ORDER — BUPIVACAINE HCL/0.9 % NACL/PF 0.125 %
PLASTIC BAG, INJECTION (ML) EPIDURAL AS NEEDED
Status: DISCONTINUED | OUTPATIENT
Start: 2025-01-27 | End: 2025-01-27 | Stop reason: SURG

## 2025-01-27 RX ORDER — PROCHLORPERAZINE EDISYLATE 5 MG/ML
10 INJECTION INTRAMUSCULAR; INTRAVENOUS EVERY 6 HOURS PRN
Status: DISCONTINUED | OUTPATIENT
Start: 2025-01-27 | End: 2025-01-31 | Stop reason: HOSPADM

## 2025-01-27 RX ORDER — SODIUM CHLORIDE, SODIUM LACTATE, POTASSIUM CHLORIDE, CALCIUM CHLORIDE 600; 310; 30; 20 MG/100ML; MG/100ML; MG/100ML; MG/100ML
1000 INJECTION, SOLUTION INTRAVENOUS CONTINUOUS
Status: DISCONTINUED | OUTPATIENT
Start: 2025-01-27 | End: 2025-01-27

## 2025-01-27 RX ORDER — SODIUM CHLORIDE 9 MG/ML
40 INJECTION, SOLUTION INTRAVENOUS AS NEEDED
Status: DISCONTINUED | OUTPATIENT
Start: 2025-01-27 | End: 2025-01-31 | Stop reason: HOSPADM

## 2025-01-27 RX ORDER — LIDOCAINE HYDROCHLORIDE 20 MG/ML
INJECTION, SOLUTION EPIDURAL; INFILTRATION; INTRACAUDAL; PERINEURAL AS NEEDED
Status: DISCONTINUED | OUTPATIENT
Start: 2025-01-27 | End: 2025-01-27 | Stop reason: SURG

## 2025-01-27 RX ORDER — DEXTROSE MONOHYDRATE 25 G/50ML
12.5 INJECTION, SOLUTION INTRAVENOUS AS NEEDED
Status: DISCONTINUED | OUTPATIENT
Start: 2025-01-27 | End: 2025-01-27 | Stop reason: HOSPADM

## 2025-01-27 RX ORDER — FENTANYL CITRATE 50 UG/ML
25 INJECTION, SOLUTION INTRAMUSCULAR; INTRAVENOUS
Status: DISCONTINUED | OUTPATIENT
Start: 2025-01-27 | End: 2025-01-27 | Stop reason: HOSPADM

## 2025-01-27 RX ORDER — OXYCODONE HCL 20 MG/1
20 TABLET, FILM COATED, EXTENDED RELEASE ORAL ONCE
Status: COMPLETED | OUTPATIENT
Start: 2025-01-27 | End: 2025-01-27

## 2025-01-27 RX ORDER — MAGNESIUM HYDROXIDE 1200 MG/15ML
LIQUID ORAL AS NEEDED
Status: DISCONTINUED | OUTPATIENT
Start: 2025-01-27 | End: 2025-01-27 | Stop reason: HOSPADM

## 2025-01-27 RX ORDER — HYDROCODONE BITARTRATE AND ACETAMINOPHEN 10; 325 MG/1; MG/1
1 TABLET ORAL EVERY 4 HOURS PRN
Status: DISCONTINUED | OUTPATIENT
Start: 2025-01-27 | End: 2025-01-31 | Stop reason: HOSPADM

## 2025-01-27 RX ORDER — GLYCOPYRROLATE 0.2 MG/ML
INJECTION INTRAMUSCULAR; INTRAVENOUS AS NEEDED
Status: DISCONTINUED | OUTPATIENT
Start: 2025-01-27 | End: 2025-01-27 | Stop reason: SURG

## 2025-01-27 RX ORDER — ALPRAZOLAM 0.5 MG
1 TABLET ORAL 3 TIMES DAILY PRN
Status: DISCONTINUED | OUTPATIENT
Start: 2025-01-27 | End: 2025-01-31 | Stop reason: HOSPADM

## 2025-01-27 RX ORDER — LABETALOL HYDROCHLORIDE 5 MG/ML
5 INJECTION, SOLUTION INTRAVENOUS
Status: DISCONTINUED | OUTPATIENT
Start: 2025-01-27 | End: 2025-01-27 | Stop reason: HOSPADM

## 2025-01-27 RX ORDER — FLUMAZENIL 0.1 MG/ML
0.2 INJECTION INTRAVENOUS AS NEEDED
Status: DISCONTINUED | OUTPATIENT
Start: 2025-01-27 | End: 2025-01-27 | Stop reason: HOSPADM

## 2025-01-27 RX ORDER — SODIUM CHLORIDE 0.9 % (FLUSH) 0.9 %
10 SYRINGE (ML) INJECTION AS NEEDED
Status: DISCONTINUED | OUTPATIENT
Start: 2025-01-27 | End: 2025-01-31 | Stop reason: HOSPADM

## 2025-01-27 RX ORDER — SODIUM CHLORIDE 9 MG/ML
40 INJECTION, SOLUTION INTRAVENOUS AS NEEDED
Status: DISCONTINUED | OUTPATIENT
Start: 2025-01-27 | End: 2025-01-27 | Stop reason: HOSPADM

## 2025-01-27 RX ORDER — PREGABALIN 75 MG/1
150 CAPSULE ORAL 2 TIMES DAILY
Status: DISCONTINUED | OUTPATIENT
Start: 2025-01-27 | End: 2025-01-31 | Stop reason: HOSPADM

## 2025-01-27 RX ORDER — HYDROCODONE BITARTRATE AND ACETAMINOPHEN 7.5; 325 MG/1; MG/1
1 TABLET ORAL EVERY 4 HOURS PRN
Status: DISCONTINUED | OUTPATIENT
Start: 2025-01-27 | End: 2025-01-31 | Stop reason: HOSPADM

## 2025-01-27 RX ORDER — AMOXICILLIN 250 MG
2 CAPSULE ORAL 2 TIMES DAILY
Status: DISCONTINUED | OUTPATIENT
Start: 2025-01-27 | End: 2025-01-31 | Stop reason: HOSPADM

## 2025-01-27 RX ORDER — IBUPROFEN 600 MG/1
600 TABLET, FILM COATED ORAL EVERY 6 HOURS PRN
Status: DISCONTINUED | OUTPATIENT
Start: 2025-01-27 | End: 2025-01-27 | Stop reason: HOSPADM

## 2025-01-27 RX ORDER — ONDANSETRON 4 MG/1
4 TABLET, ORALLY DISINTEGRATING ORAL EVERY 6 HOURS PRN
Status: DISCONTINUED | OUTPATIENT
Start: 2025-01-27 | End: 2025-01-31 | Stop reason: HOSPADM

## 2025-01-27 RX ORDER — NALOXONE HCL 0.4 MG/ML
0.4 VIAL (ML) INJECTION
Status: DISCONTINUED | OUTPATIENT
Start: 2025-01-27 | End: 2025-01-28 | Stop reason: ALTCHOICE

## 2025-01-27 RX ORDER — SCOPOLAMINE 1 MG/3D
1 PATCH, EXTENDED RELEASE TRANSDERMAL ONCE
Status: DISCONTINUED | OUTPATIENT
Start: 2025-01-27 | End: 2025-01-27

## 2025-01-27 RX ORDER — ACETAMINOPHEN 160 MG/5ML
650 SOLUTION ORAL EVERY 4 HOURS PRN
Status: DISCONTINUED | OUTPATIENT
Start: 2025-01-27 | End: 2025-01-31 | Stop reason: HOSPADM

## 2025-01-27 RX ORDER — HYDROMORPHONE HYDROCHLORIDE 1 MG/ML
0.5 INJECTION, SOLUTION INTRAMUSCULAR; INTRAVENOUS; SUBCUTANEOUS
Status: DISCONTINUED | OUTPATIENT
Start: 2025-01-27 | End: 2025-01-28 | Stop reason: ALTCHOICE

## 2025-01-27 RX ORDER — LIDOCAINE HYDROCHLORIDE 10 MG/ML
0.5 INJECTION, SOLUTION EPIDURAL; INFILTRATION; INTRACAUDAL; PERINEURAL ONCE AS NEEDED
Status: DISCONTINUED | OUTPATIENT
Start: 2025-01-27 | End: 2025-01-27 | Stop reason: HOSPADM

## 2025-01-27 RX ADMIN — Medication 25 MG: at 07:23

## 2025-01-27 RX ADMIN — FENTANYL CITRATE 100 MCG: 50 INJECTION, SOLUTION INTRAMUSCULAR; INTRAVENOUS at 07:10

## 2025-01-27 RX ADMIN — Medication 10 MG: at 07:44

## 2025-01-27 RX ADMIN — ALPRAZOLAM 1 MG: 0.5 TABLET ORAL at 19:59

## 2025-01-27 RX ADMIN — FENTANYL CITRATE 50 MCG: 50 INJECTION, SOLUTION INTRAMUSCULAR; INTRAVENOUS at 07:45

## 2025-01-27 RX ADMIN — HYDROCODONE BITARTRATE AND ACETAMINOPHEN 1 TABLET: 10; 325 TABLET ORAL at 23:41

## 2025-01-27 RX ADMIN — CEFAZOLIN 2000 MG: 2 INJECTION, POWDER, FOR SOLUTION INTRAMUSCULAR; INTRAVENOUS at 07:18

## 2025-01-27 RX ADMIN — FUROSEMIDE 40 MG: 40 TABLET ORAL at 15:28

## 2025-01-27 RX ADMIN — FENTANYL CITRATE 50 MCG: 50 INJECTION, SOLUTION INTRAMUSCULAR; INTRAVENOUS at 07:41

## 2025-01-27 RX ADMIN — DEXAMETHASONE SODIUM PHOSPHATE 4 MG: 4 INJECTION, SOLUTION INTRA-ARTICULAR; INTRALESIONAL; INTRAMUSCULAR; INTRAVENOUS; SOFT TISSUE at 06:51

## 2025-01-27 RX ADMIN — SODIUM CHLORIDE 100 ML/HR: 9 INJECTION, SOLUTION INTRAVENOUS at 13:50

## 2025-01-27 RX ADMIN — HYDROCODONE BITARTRATE AND ACETAMINOPHEN 1 TABLET: 10; 325 TABLET ORAL at 19:59

## 2025-01-27 RX ADMIN — FENTANYL CITRATE 50 MCG: 50 INJECTION, SOLUTION INTRAMUSCULAR; INTRAVENOUS at 08:07

## 2025-01-27 RX ADMIN — HYDROCODONE BITARTRATE AND ACETAMINOPHEN 1 TABLET: 10; 325 TABLET ORAL at 15:28

## 2025-01-27 RX ADMIN — ONDANSETRON 4 MG: 2 INJECTION INTRAMUSCULAR; INTRAVENOUS at 08:14

## 2025-01-27 RX ADMIN — ONDANSETRON 4 MG: 2 INJECTION INTRAMUSCULAR; INTRAVENOUS at 19:17

## 2025-01-27 RX ADMIN — PROCHLORPERAZINE EDISYLATE 10 MG: 5 INJECTION INTRAMUSCULAR; INTRAVENOUS at 21:53

## 2025-01-27 RX ADMIN — OXYCODONE AND ACETAMINOPHEN 1 TABLET: 325; 10 TABLET ORAL at 11:27

## 2025-01-27 RX ADMIN — HYDROMORPHONE HYDROCHLORIDE 0.5 MG: 1 INJECTION, SOLUTION INTRAMUSCULAR; INTRAVENOUS; SUBCUTANEOUS at 10:06

## 2025-01-27 RX ADMIN — FENTANYL CITRATE 50 MCG: 50 INJECTION, SOLUTION INTRAMUSCULAR; INTRAVENOUS at 07:24

## 2025-01-27 RX ADMIN — CEFAZOLIN 2000 MG: 2 INJECTION, POWDER, FOR SOLUTION INTRAMUSCULAR; INTRAVENOUS at 22:32

## 2025-01-27 RX ADMIN — SCOPOLAMINE 1 PATCH: 1.5 PATCH, EXTENDED RELEASE TRANSDERMAL at 06:51

## 2025-01-27 RX ADMIN — DOCUSATE SODIUM 50 MG AND SENNOSIDES 8.6 MG 2 TABLET: 8.6; 5 TABLET, FILM COATED ORAL at 20:00

## 2025-01-27 RX ADMIN — ROCURONIUM BROMIDE 10 MG: 10 INJECTION, SOLUTION INTRAVENOUS at 07:10

## 2025-01-27 RX ADMIN — FENTANYL CITRATE 50 MCG: 50 INJECTION, SOLUTION INTRAMUSCULAR; INTRAVENOUS at 09:25

## 2025-01-27 RX ADMIN — LIDOCAINE HYDROCHLORIDE 100 MG: 20 INJECTION, SOLUTION EPIDURAL; INFILTRATION; INTRACAUDAL; PERINEURAL at 07:10

## 2025-01-27 RX ADMIN — CEFAZOLIN 2000 MG: 2 INJECTION, POWDER, FOR SOLUTION INTRAMUSCULAR; INTRAVENOUS at 15:28

## 2025-01-27 RX ADMIN — HYDROMORPHONE HYDROCHLORIDE 0.5 MG: 1 INJECTION, SOLUTION INTRAMUSCULAR; INTRAVENOUS; SUBCUTANEOUS at 09:20

## 2025-01-27 RX ADMIN — PROPOFOL 40 MG: 10 INJECTION, EMULSION INTRAVENOUS at 07:22

## 2025-01-27 RX ADMIN — GLYCOPYRROLATE 0.2 MG: 0.2 INJECTION INTRAMUSCULAR; INTRAVENOUS at 07:15

## 2025-01-27 RX ADMIN — PREGABALIN 150 MG: 75 CAPSULE ORAL at 20:00

## 2025-01-27 RX ADMIN — Medication 200 MCG: at 07:13

## 2025-01-27 RX ADMIN — OXYCODONE HYDROCHLORIDE 20 MG: 20 TABLET, FILM COATED, EXTENDED RELEASE ORAL at 06:40

## 2025-01-27 RX ADMIN — SODIUM CHLORIDE, POTASSIUM CHLORIDE, SODIUM LACTATE AND CALCIUM CHLORIDE 1000 ML: 600; 310; 30; 20 INJECTION, SOLUTION INTRAVENOUS at 06:41

## 2025-01-27 RX ADMIN — PROPOFOL 160 MG: 10 INJECTION, EMULSION INTRAVENOUS at 07:10

## 2025-01-27 RX ADMIN — Medication 160 MG: at 07:11

## 2025-01-27 RX ADMIN — Medication 15 MG: at 07:54

## 2025-01-27 NOTE — OP NOTE
LUMBAR LATERAL INTERBODY FUSION  Procedure Note    Adela Arauz  1/27/2025    Pre-op Diagnosis:     1. Status post anterior decompression, ALIF with instrumentation L5-S1, 12/02/2020   2. Status post left LLIF L3-5 with instrumentation L3-4, 12/16/2020  3. Status post PSF with instrumentation L3-S1, 12/18/2020   4. Recurrent chronic low back pain   5. Bilateral anterior thigh, groin, and leg radiculopathy, right worse than left  6. Recurrent neurogenic claudication   7. Severe adjacent level degenerative disc disease L2-3   8. Facet arthropathy L2-3   9. Spondylolisthesis with instability L2-3   10. Focal kyphosis L2-3   11. Lateral listhesis L2-3   12. Focal scoliosis concave right L2-3   13. Central disc herniation L2-3   14. Severe central and bilateral foraminal stenosis L2-3    Post-op Diagnosis:    same    Procedure/CPT® Codes:    1.  Right lateral lumbar interbody fusion L2-3  2.  Use of PEEK interbody biomechanical device for fusion L2-3 (NuVasive PEEK spacer)  3.  Use of allograft bone matrix for fusion (Osteocel pro)  4.  Use of fluoroscopy for confirmation of surgical level, placement of PEEK spacer and instrumentation  5.  Intraoperative neural monitoring    Spinal Surgery Levels Completed:1 Level     Anesthesia: General    Surgeon: MELCHOR Dunaway MD    Assistant: eJan Meléndez PA-C    Estimated Blood Loss: Minimal    Complications: None    Condition: Stable to PACU.    Indications:    The patient is a 60-year-old who sees Charlene Huntley NP for medical issues.  She has undergone a staged anterior, lateral, and posterior fusion with instrumentation from L3-S1 in December 2020.  She did well with this procedure until about a year ago.  She presented back to the office with recurrent chronic low back pain along with bilateral anterior thigh, groin, and leg radiculopathy, with the right side worse than the left as well as symptoms consistent with recurrent neurogenic claudication.  Imaging studies  revealed severe adjacent level degenerative disc disease at L2-3 associated with facet arthropathy, spondylolisthesis with instability, focal kyphosis, lateral listhesis, focal scoliosis concave to the right, and a central disc herniation causing severe central and bilateral foraminal stenosis.    After failing conservative measures, it was mutually decided that surgery would be the best option. Risks, benefits, and complications of surgery were discussed with the patient. The patient appeared well informed and wished to proceed. We specifically discussed the risk of infection, blood loss, nerve root injury, CSF leak, and the possibility of incomplete resolution of symptoms. We also discussed the possible risk of a nonunion and the potential need for additional surgery in the event of a pseudoarthrosis or hardware failure.     We elected proceed with a staged operation.  It is planned we will be proceeding with a second posterior procedure at a later date.    Operative Procedure:    After obtaining informed consent and verifying the correct operative site, the patient was brought to the operating room and placed supine on operating table.  A general anesthetic was provided by the anesthesia service with the assistance of an endotracheal tube.  Once this was appropriately positioned and secured, the patient was carefully rotated into the lateral decubitus position with the right side up. All bony prominences were well-padded.  3 inch wide cloth tape was used to maintain the patient's position.  It was also used to tip open the pelvis slightly allowing better access to the lumbar spine through a lateral approach.  Fluoroscopy was then used to identify the L2-3 level, and the skin was marked for our planned incision.  The right flank was then prepped and draped in the usual sterile fashion.  A surgical timeout was taken to confirm this was the correct patient, we were working at the correct level, and that preoperative  antibiotics were given in a timely fashion.    An oblique incision was created of the right flank using a 10 blade scalpel directly centered over the L2-3 segment. Dissection was carried bluntly through subcutaneous tissues. Blunt dissection was also carried between the external oblique and internal oblique musculature. The transversalis fascia was pierced allowing access to the retroperitoneal space. At this point, a series of neuromonitoring probes were used to safely access the L2-3 level. We used stimulated and free run EMG for this purpose. Once nerve roots were confirmed to be out of harm's way, self retaining retractors were placed for continuous exposure.     An annulotomy was then created at the L2-3 area using a 15 blade scalpel on a long handle. A Cardozo elevator was used to remove disc material off of the endplates. Disc material was removed using Kerrisons, pituitaries, and forward angled curettes. Once all disc material had been retrieved, I used the Cardozo elevator to divide the contralateral annulus. This assisted with mobilization of the vertebral body. We then used a series of endplate scrapers to prepare the endplates for interbody fusion. Trial spacers were malleted into position and for the disc space it was felt that a 12 mm x 45 mm implant would be the best fit to restore disc height. The disc space was then thoroughly irrigated with saline solution.     A PEEK spacer from the NuVasive instrumentation set measuring 12 mm x 45 mm x 22 mm was then packed with allograft bone matrix called Osteocel Pro as tightly as possible. This PEEK spacer was then malleted into the L2-3 disc space under fluoroscopic guidance. It was placed as a PEEK interbody biomechanical device to assist with interbody fusion.     The wound was then irrigated thoroughly. A thorough inspection was then undertaken to ensure that we had adequate hemostasis. Bleeding at this point was controlled using thrombin with Gelfoam powder  and bipolar cautery. Closure was then accomplished with a #1 Vicryl reapproximating the internal and external oblique musculature. Immediate subcutaneous cutaneous tissues were closed with a 3-0 Vicryl. And skin closure was accomplished using Mastisol and Steri-Strips. The wound was then sterilely dressed. The patient was then carefully rotated supine onto a hospital gurney, extubated, and sent to the recovery room in good stable condition.     The patient tolerated the procedure well. There were no complications. We estimate blood loss to be minimal. The patient remained hemodynamically stable.     Intraoperative neuro monitoring was ordered and carried out throughout the procedure to add an increased level of safety for the patient.  The interpreting physician was available by means of real-time continuous, bidirectional, remote audio and visual communication as needed throughout the entire procedure.  Modalities used during the procedure included SSEP, EEG, MEP, EMG, and TOF.  There were no neuro monitoring signal changes during the procedure.    Jean Meléndez PA-C provided critical assistance during the procedure. His assistance was medically necessary in order to allow the procedure to occur in the most safe and efficient manner.    MELCHOR Dunaway MD     Date: 1/27/2025  Time: 08:26 CST

## 2025-01-27 NOTE — ANESTHESIA PROCEDURE NOTES
Airway  Urgency: elective    Date/Time: 1/27/2025 7:12 AM  Airway not difficult    General Information and Staff    Patient location during procedure: OR  CRNA/CAA: Nate Dunne CRNA    Indications and Patient Condition  Indications for airway management: airway protection    Preoxygenated: yes  Mask difficulty assessment: 1 - vent by mask    Final Airway Details  Final airway type: endotracheal airway      Successful airway: ETT  Cuffed: yes   Successful intubation technique: direct laryngoscopy  Facilitating devices/methods: intubating stylet  Endotracheal tube insertion site: oral  Blade: Ellison  Blade size: 2  ETT size (mm): 7.0  Cormack-Lehane Classification: grade I - full view of glottis  Placement verified by: capnometry   Cuff volume (mL): 6  Measured from: lips  ETT/EBT  to lips (cm): 21  Number of attempts at approach: 1  Assessment: lips, teeth, and gum same as pre-op and atraumatic intubation

## 2025-01-27 NOTE — ANESTHESIA POSTPROCEDURE EVALUATION
"Patient: Adela Arauz    Procedure Summary       Date: 01/27/25 Room / Location:  [facility]  [facility]    Anesthesia Start: 0708 Anesthesia Stop: 0839    Procedure: RIGHT LATERAL LUMBAR INTERBODY FUSION WITH INSTRUMENTATION L2-3 (Right: Spine Lumbar) Diagnosis: (M54.16)    Surgeons: ALEXIS Dunaway MD Provider: Nate Dunne CRNA    Anesthesia Type: general ASA Status: 2            Anesthesia Type: general    Vitals  Vitals Value Taken Time   /87 01/27/25 1210   Temp 98.1 °F (36.7 °C) 01/27/25 1210   Pulse 81 01/27/25 1212   Resp 20 01/27/25 1210   SpO2 94 % 01/27/25 1211   Vitals shown include unfiled device data.        Post Anesthesia Care and Evaluation    Patient location during evaluation: PACU  Patient participation: complete - patient participated  Level of consciousness: awake and alert  Pain management: adequate    Airway patency: patent  Anesthetic complications: No anesthetic complications    Cardiovascular status: acceptable  Respiratory status: acceptable  Hydration status: acceptable    Comments: Blood pressure 143/81, pulse 78, temperature 98.3 °F (36.8 °C), temperature source Oral, resp. rate 18, height 162.5 cm (63.98\"), weight 96.5 kg (212 lb 11.9 oz), last menstrual period 04/01/2014, SpO2 93%, not currently breastfeeding.    Pt discharged from PACU based on carol score >8    "

## 2025-01-27 NOTE — PLAN OF CARE
Goal Outcome Evaluation:  Plan of Care Reviewed With: patient        Progress: no change  Outcome Evaluation: Pt drowsy from PACU. Oriented x4. Forgetful. Inc CDI. C/o of pain w/ PRN pain pill given. IVF and abx given per orders. Sisster at BS. Not wanting to work w/ PT. Due to void. Call light in reach. Safety maintained.

## 2025-01-28 ENCOUNTER — TELEPHONE (OUTPATIENT)
Dept: FAMILY MEDICINE CLINIC | Facility: CLINIC | Age: 60
End: 2025-01-28
Payer: MEDICARE

## 2025-01-28 LAB
ANION GAP SERPL CALCULATED.3IONS-SCNC: 12 MMOL/L (ref 5–15)
BASOPHILS # BLD AUTO: 0.03 10*3/MM3 (ref 0–0.2)
BASOPHILS NFR BLD AUTO: 0.4 % (ref 0–1.5)
BUN SERPL-MCNC: 7 MG/DL (ref 6–20)
BUN/CREAT SERPL: 14.3 (ref 7–25)
CALCIUM SPEC-SCNC: 8.6 MG/DL (ref 8.6–10.5)
CHLORIDE SERPL-SCNC: 99 MMOL/L (ref 98–107)
CO2 SERPL-SCNC: 28 MMOL/L (ref 22–29)
CREAT SERPL-MCNC: 0.49 MG/DL (ref 0.57–1)
DEPRECATED RDW RBC AUTO: 45.2 FL (ref 37–54)
EGFRCR SERPLBLD CKD-EPI 2021: 108.7 ML/MIN/1.73
EOSINOPHIL # BLD AUTO: 0.01 10*3/MM3 (ref 0–0.4)
EOSINOPHIL NFR BLD AUTO: 0.1 % (ref 0.3–6.2)
ERYTHROCYTE [DISTWIDTH] IN BLOOD BY AUTOMATED COUNT: 14.9 % (ref 12.3–15.4)
FOLATE SERPL-MCNC: 3.87 NG/ML (ref 4.78–24.2)
GLUCOSE SERPL-MCNC: 102 MG/DL (ref 65–99)
HCT VFR BLD AUTO: 35.4 % (ref 34–46.6)
HGB BLD-MCNC: 11.7 G/DL (ref 12–15.9)
IMM GRANULOCYTES # BLD AUTO: 0.05 10*3/MM3 (ref 0–0.05)
IMM GRANULOCYTES NFR BLD AUTO: 0.6 % (ref 0–0.5)
IRON 24H UR-MRATE: 55 MCG/DL (ref 37–145)
IRON SATN MFR SERPL: 20 % (ref 20–50)
LYMPHOCYTES # BLD AUTO: 1.34 10*3/MM3 (ref 0.7–3.1)
LYMPHOCYTES NFR BLD AUTO: 16.1 % (ref 19.6–45.3)
MCH RBC QN AUTO: 27.3 PG (ref 26.6–33)
MCHC RBC AUTO-ENTMCNC: 33.1 G/DL (ref 31.5–35.7)
MCV RBC AUTO: 82.7 FL (ref 79–97)
MONOCYTES # BLD AUTO: 0.69 10*3/MM3 (ref 0.1–0.9)
MONOCYTES NFR BLD AUTO: 8.3 % (ref 5–12)
NEUTROPHILS NFR BLD AUTO: 6.22 10*3/MM3 (ref 1.7–7)
NEUTROPHILS NFR BLD AUTO: 74.5 % (ref 42.7–76)
NRBC BLD AUTO-RTO: 0 /100 WBC (ref 0–0.2)
PLATELET # BLD AUTO: 318 10*3/MM3 (ref 140–450)
PMV BLD AUTO: 9.5 FL (ref 6–12)
POTASSIUM SERPL-SCNC: 3.6 MMOL/L (ref 3.5–5.2)
RBC # BLD AUTO: 4.28 10*6/MM3 (ref 3.77–5.28)
SODIUM SERPL-SCNC: 139 MMOL/L (ref 136–145)
TIBC SERPL-MCNC: 282 MCG/DL (ref 298–536)
TRANSFERRIN SERPL-MCNC: 189 MG/DL (ref 200–360)
VIT B12 BLD-MCNC: 516 PG/ML (ref 211–946)
WBC NRBC COR # BLD AUTO: 8.34 10*3/MM3 (ref 3.4–10.8)

## 2025-01-28 PROCEDURE — 25810000003 SODIUM CHLORIDE 0.9 % SOLUTION: Performed by: ORTHOPAEDIC SURGERY

## 2025-01-28 PROCEDURE — 25010000002 HYDROMORPHONE PER 4 MG: Performed by: ORTHOPAEDIC SURGERY

## 2025-01-28 PROCEDURE — 80048 BASIC METABOLIC PNL TOTAL CA: CPT | Performed by: ORTHOPAEDIC SURGERY

## 2025-01-28 PROCEDURE — 82746 ASSAY OF FOLIC ACID SERUM: CPT | Performed by: FAMILY MEDICINE

## 2025-01-28 PROCEDURE — 82607 VITAMIN B-12: CPT | Performed by: FAMILY MEDICINE

## 2025-01-28 PROCEDURE — 83540 ASSAY OF IRON: CPT | Performed by: FAMILY MEDICINE

## 2025-01-28 PROCEDURE — 84466 ASSAY OF TRANSFERRIN: CPT | Performed by: FAMILY MEDICINE

## 2025-01-28 PROCEDURE — 85025 COMPLETE CBC W/AUTO DIFF WBC: CPT | Performed by: ORTHOPAEDIC SURGERY

## 2025-01-28 PROCEDURE — 97162 PT EVAL MOD COMPLEX 30 MIN: CPT | Performed by: PHYSICAL THERAPIST

## 2025-01-28 PROCEDURE — 97166 OT EVAL MOD COMPLEX 45 MIN: CPT

## 2025-01-28 RX ORDER — HYDROMORPHONE HYDROCHLORIDE 1 MG/ML
0.5 INJECTION, SOLUTION INTRAMUSCULAR; INTRAVENOUS; SUBCUTANEOUS
Status: DISCONTINUED | OUTPATIENT
Start: 2025-01-28 | End: 2025-01-30 | Stop reason: ALTCHOICE

## 2025-01-28 RX ORDER — FERROUS SULFATE 324(65)MG
324 TABLET, DELAYED RELEASE (ENTERIC COATED) ORAL
COMMUNITY

## 2025-01-28 RX ORDER — PREGABALIN 150 MG/1
150 CAPSULE ORAL 2 TIMES DAILY
COMMUNITY

## 2025-01-28 RX ADMIN — ALPRAZOLAM 1 MG: 0.5 TABLET ORAL at 20:20

## 2025-01-28 RX ADMIN — FUROSEMIDE 40 MG: 40 TABLET ORAL at 08:26

## 2025-01-28 RX ADMIN — HYDROCODONE BITARTRATE AND ACETAMINOPHEN 1 TABLET: 10; 325 TABLET ORAL at 12:35

## 2025-01-28 RX ADMIN — PREGABALIN 150 MG: 75 CAPSULE ORAL at 20:20

## 2025-01-28 RX ADMIN — HYDROCODONE BITARTRATE AND ACETAMINOPHEN 1 TABLET: 10; 325 TABLET ORAL at 08:26

## 2025-01-28 RX ADMIN — HYDROMORPHONE HYDROCHLORIDE 0.5 MG: 1 INJECTION, SOLUTION INTRAMUSCULAR; INTRAVENOUS; SUBCUTANEOUS at 20:20

## 2025-01-28 RX ADMIN — TIZANIDINE 4 MG: 4 TABLET ORAL at 20:20

## 2025-01-28 RX ADMIN — HYDROCODONE BITARTRATE AND ACETAMINOPHEN 1 TABLET: 10; 325 TABLET ORAL at 21:55

## 2025-01-28 RX ADMIN — Medication 3 ML: at 20:20

## 2025-01-28 RX ADMIN — SODIUM CHLORIDE 100 ML/HR: 9 INJECTION, SOLUTION INTRAVENOUS at 01:11

## 2025-01-28 RX ADMIN — Medication 3 ML: at 08:27

## 2025-01-28 RX ADMIN — PREGABALIN 150 MG: 75 CAPSULE ORAL at 08:26

## 2025-01-28 RX ADMIN — HYDROCODONE BITARTRATE AND ACETAMINOPHEN 1 TABLET: 10; 325 TABLET ORAL at 03:57

## 2025-01-28 RX ADMIN — DOCUSATE SODIUM 50 MG AND SENNOSIDES 8.6 MG 2 TABLET: 8.6; 5 TABLET, FILM COATED ORAL at 08:26

## 2025-01-28 RX ADMIN — HYDROCODONE BITARTRATE AND ACETAMINOPHEN 1 TABLET: 10; 325 TABLET ORAL at 17:19

## 2025-01-28 NOTE — PLAN OF CARE
Goal Outcome Evaluation:  Plan of Care Reviewed With: patient        Progress: no change  Outcome Evaluation: OT eval completed. Pt presents A&Ox4 c/o low back pain. Educated pt on spinal precautions, log rolling, & LSO brace wear. Pt performed log rolling with [employer] vcs, [employer] rep to fit LSO brace to pt while sitting EOB. Pt demos functional BUE AROM, decreased strength. Pt was able to perform LBD by bringing feet to lap level. She mobilized into the hallway & BR with her hurrycane, stated she may feel better with a rwx. Agree with plan as she was frequently reaching for furniture to stabilzie herself, provided rwx to pt post-tx. Pt demos good balance while toileting self. She was left sitting up in the chait with her brace. Skilled OT indicated to maximize her safety & indep with ADLs. Will treat & re-eval following her second surgery planned for 1/27. Anticipate d/c home with assist.    Anticipated Discharge Disposition (OT): home with assist

## 2025-01-28 NOTE — CONSULTS
Consult Note    Referring Provider: Dr. Dunaway  Reason for Consultation: Medical management    Patient Care Team:  Charlene Huntley, [place], APRN as PCP - General (Family Medicine)  Yan Seay MD as Consulting Physician (Pulmonary Disease)  Ramírez Marie MD as Consulting Physician (Hematology and Oncology)    Chief complaint chronic back pain    Subjective .     History of present illness:  The patient presents today for surgical correction after failing conservative management.  Outpatient work-up has been reviewed through provided outpatient notes per attending.  They have been through anti-inflammatories, muscle relaxers, and pain medication.  The pain has progressed to the point in time where it is affecting their activities of daily living and after being explained all their options, elected to undergo surgical correction.  Their primary care physician does not attend here at [facility]; therefore, I have been asked to take care of their primary medical needs in the perioperative period.  The postoperative pain is as expected.  There are no other precipitating or relieving factors. I have been requested by the Attending Physician to provide Medical Consultation in the perioperative period. The patient understands my role in their hospitalization and agrees with my treatment plan. They understand the importance of follow up with their PCP upon discharge  from [facility] for any concerns or abnormalities.  All questions were encouraged and answered to the best of my ability.     Pleasant 60-year-old white female with alpha 1 antitrypsin is followed by the pulmonary office and by Ms. Huntley on an outpatient basis that presents for two-step decompressive back surgery.  Patient had had extended surgery for 5 years ago initially did well and then her disease process increased to the L2-3 region and currently admitted for surgical correction after failing outpatient  treatment.      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     Low back pain     Nausea after anesthesia     Open wound of gum     pt states had all her teeth pulled and there is a spot that hasn't healed due to RA so she is using peridex rinse daily    Osteoporosis     PONV (postoperative nausea and vomiting)     Psoriasis     Rheumatoid arthritis     Seasonal allergies     Senile osteoporosis 09/28/2017     Past Surgical History:   Procedure Laterality Date    ANTERIOR LUMBAR EXPOSURE N/A 12/02/2020    Procedure: Anterior lumbar interbody fusion of L5-S1 with instrumentation ;  Surgeon: Neptali Rivera DO;  Location:  [facility] OR;  Service: Vascular;  Laterality: N/A;    AXILLARY LYMPH NODE BIOPSY/EXCISION Right 09/19/2019    Procedure: EXCISION RIGHT AXILLARY MASS;  Surgeon: Jes Enamorado MD;  Location:  [facility] OR;  Service: General    INCISION AND DRAINAGE ARM Right 06/03/2021    Procedure: INCISION AND DRAINAGE FOREARM;  Surgeon: Jes Enamorado MD;  Location:  [facility] OR;  Service: General;  Laterality: Right;    LUMBAR FUSION N/A 12/02/2020    Procedure: ANTERIOR DECOMPRESSION, ANTERIOR LUMBAR INTERBODY FUSION WITH INSTRUMENTATION L5-S1;  Surgeon: ALEXIS Dunaway MD;  Location:  [facility] OR;  Service: Orthopedic Spine;  Laterality: N/A;    LUMBAR FUSION Left 12/16/2020    Procedure: LEFT LATERAL LUMBAR INTERBODY FUSION L3-5 WITH INSTRUMENTATION L3-4;  Surgeon: ALEXIS Dunaway MD;  Location:  [facility] OR;  Service: Orthopedic Spine;  Laterality: Left;    LUMBAR LAMINECTOMY WITH FUSION N/A 12/18/2020    Procedure: DECOMPRESSION L3-4, POSTERIOR SPINAL FUSION WITH INSTRUMENTATION L3-S1;  Surgeon: ALEXIS Dunaway MD;   [facility]: [facility];  Service: Orthopedic Spine;  Laterality: N/A;    SHOULDER SURGERY Left     arthroscopy for arthtritis and bone spurs    TUBAL ABDOMINAL LIGATION      US GUIDED LYMPH NODE BIOPSY  2019    WRIST SURGERY Bilateral     fracture - no internal hardware     Family History   Problem Relation Age of Onset    Arthritis Mother     COPD Mother     Atrial fibrillation Mother     Osteoarthritis Mother     Heart disease Father     Hypertension Father     Arthritis Father     Melanoma Father         metastatic    Arthritis Sister     Arthritis Brother     No Known Problems Daughter     Arthritis Maternal Grandfather     Breast cancer Neg Hx      Social History     Tobacco Use    Smoking status: Former     Current packs/day: 0.00     Average packs/day: 0.5 packs/day for 34.7 years (17.4 ttl pk-yrs)     Types: Cigarettes     Start date:      Quit date: 10/1/2024     Years since quittin.3    Smokeless tobacco: Never   Vaping Use    Vaping status: Never Used   Substance Use Topics    Alcohol use: No    Drug use: No     Medications Prior to Admission   Medication Sig Dispense Refill Last Dose/Taking    ALPRAZolam (XANAX) 1 MG tablet Take 1 tablet by mouth 3 (Three) Times a Day As Needed for Anxiety. 90 tablet 2 2025 at  3:30 AM    Calcium-Magnesium-Vitamin D 500-250-200 MG-MG-UNIT tablet Take 600 tablets by mouth 2 (Two) Times a Day.   2025 Morning    chlorhexidine (PERIDEX) 0.12 % solution Apply 15 mL to the mouth or throat Daily.   2025 Morning    cholecalciferol (VITAMIN D3) 1000 units tablet Take 1 tablet by mouth Daily.   2025 Morning    fluticasone (Flonase) 50 MCG/ACT nasal spray 2 sprays into the nostril(s) as directed by provider Daily. (Patient taking differently: Administer 2 sprays into the nostril(s) as directed by provider Daily As Needed for Allergies or Rhinitis.) 16 g 3 Past Month    HYDROcodone-acetaminophen (Norco)  MG per tablet Take 1 tablet by mouth Every  6 (Six) Hours As Needed for Moderate Pain. 120 tablet 0 1/27/2025 at  3:30 AM    leflunomide (ARAVA) 20 MG tablet Take 1 tablet by mouth Daily.   1/26/2025 Morning    meloxicam (MOBIC) 15 MG tablet Take 1 tablet by mouth Daily. 90 tablet 1 1/26/2025 Morning    omeprazole (priLOSEC) 40 MG capsule Take 1 capsule by mouth Daily. 90 capsule 1 1/27/2025 at  3:30 AM    pregabalin (LYRICA) 150 MG capsule TAKE ONE CAPSULE BY MOUTH TWICE A  capsule 5 1/26/2025 Evening    tiZANidine (ZANAFLEX) 4 MG tablet Take 1 tablet by mouth Every 8 (Eight) Hours As Needed for Muscle Spasms. 270 tablet 1 1/26/2025 Evening    Turmeric 500 MG capsule Take 1 capsule by mouth Daily.   1/26/2025 Morning    alendronate (Fosamax) 70 MG tablet Take 1 tablet by mouth Every 7 (Seven) Days. 90 tablet 0 1/23/2025    furosemide (LASIX) 40 MG tablet Take 1 tablet by mouth Daily. 90 tablet 1     mupirocin (BACTROBAN) 2 % ointment Apply to nostrils and fingernails nighlty x 5 nights same 5 nights monthly x 6 months. (Patient not taking: Reported on 1/27/2025) 30 g 5 Not Taking    Narcan 4 MG/0.1ML nasal spray        valACYclovir (VALTREX) 1000 MG tablet Take 1 tablet by mouth 2 (Two) Times a Day. (Patient taking differently: Take 1 tablet by mouth 2 (Two) Times a Day As Needed (FEVER BLISTERS, OR VIRAL ILLNESS).) 270 tablet 1        Allergies:  Evenity [romosozumab]    Objective     Vital Signs   Temp:  [96.9 °F (36.1 °C)-98.6 °F (37 °C)] 98.4 °F (36.9 °C)  Heart Rate:  [53-79] 64  Resp:  [12-25] 18  BP: ()/(55-92) 146/67          Physical Exam:  Constitutional: oriented to person, place, and time. appears well-developed.   Head: Normocephalic and atraumatic.   Eyes: Pupils are equal, round, and reactive to light.  No icterus or erythema  Neck: Neck supple.  Without masses or carotid bruit  Cardiovascular: Regular rhythm and normal heart sounds.  No significant lift, rub or murmur noted  Pulmonary/Chest: Effort normal and breath sounds  normal. CTAB, encourage deep breathing.  Abdominal: Soft. Bowel sounds are normal to hypoactive. No significant distension. There is no rebound and no guarding.   Musculoskeletal: Normal range of motion and no edema or tenderness outside of surgical area.   Neurological: Pt is alert and oriented to person, place, and time.  normal reflexes present.  Somewhat sedated from anesthesia and pain medicine noted  Skin: Skin is warm and dry.  No new rashes of concern.    Results Review:   I reviewed the patient's new imaging results and agree with the interpretation.      Assessment & Plan       Lumbar radiculopathy    Chronic pain syndrome    Rheumatoid arthritis involving multiple sites    AAT (alpha-1-antitrypsin) deficiency    Anxiety    Lumbar stenosis    Degeneration of lumbar intervertebral disc    GERD (gastroesophageal reflux disease)      Constipation-educate patient about the ill effects of anesthesia and narcotic pain medication on the normal peristalsis of the gut.  Encourage judicious use of pain medication and early ambulation to aid in bowel functioning returning to normal.  We will start with MiraLAX 1 capful BID until BM, then decrease to 1 x a day, then can step up to a prokinetic which can be used for opioid-induced constipation, and ultimately end up with Relistor 12 mcg subq every 48 hours to block the effect of narcotics on the gut.  Our plan is to soften the stools so after surgical intervention if stimulation is needed with magnesium citrate and or Dulcolax suppository the stool will move much easier.  Encourage water consumption to help soften the stool as well.     GERD-exacerbated by pain medications and anesthesia, will add PPI and or H2 blocker as needed and can step up to Carafate 1 gm po before each meal and nightly. Patient educated about smaller portions with more frequent feedings. Patient also instructed on early mobilization to help with return of normal peristalsis of gut.       Anemia  post-op as expected.  Will check iron, B12,and folate.  If significant replenish substrates as needed. Transfuse at acceptable levels depending on clinical judgement and comorbidities.  We will check periodically throughout hospitalization and educate patient about following up with their PCP to ensure levels returned to normal.     Alpha 1 antitrypsin deficiency-educated her postoperative care including incentive spirometry and early ambulation.  She seems to be doing well this morning.  She does have an elevated C-reactive protein preop which I suspect is related to her rheumatoid arthritis.    .    I discussed the patient's findings and my recommendations with patient, nursing staff, and consulting provider    Nathaniel Betancourt MD  01/28/25  07:20 CST

## 2025-01-28 NOTE — PLAN OF CARE
Admitted 1/27 s/p anterior decompression, ALIF L5-S1  Consult IM for medical management  SCDs  Hx of PONV; zofran and compazine given (see MAR) d/t n/v  Plan for 2nd surgery Wednesday, 1/29    Problem: Adult Inpatient Plan of Care  Goal: Plan of Care Review  Outcome: Progressing  Flowsheets (Taken 1/28/2025 0311)  Plan of Care Reviewed With: patient     Problem: Adult Inpatient Plan of Care  Goal: Absence of Hospital-Acquired Illness or Injury  Intervention: Identify and Manage Fall Risk  Recent Flowsheet Documentation  Taken 1/27/2025 1959 by Diego Drake RN  Safety Promotion/Fall Prevention: safety round/check completed  Intervention: Prevent Skin Injury  Recent Flowsheet Documentation  Taken 1/27/2025 1959 by Diego Drake RN  Body Position: position changed independently  Intervention: Prevent and Manage VTE (Venous Thromboembolism) Risk  Recent Flowsheet Documentation  Taken 1/27/2025 1959 by Diego Drake RN  VTE Prevention/Management:   bilateral   SCDs (sequential compression devices) off  Goal: Optimal Comfort and Wellbeing  Intervention: Monitor Pain and Promote Comfort  Recent Flowsheet Documentation  Taken 1/27/2025 2341 by Diego Drake RN  Pain Management Interventions: pain medication given  Taken 1/27/2025 1959 by Diego Drake RN  Pain Management Interventions: pain medication given  Goal: Readiness for Transition of Care  Intervention: Mutually Develop Transition Plan  Recent Flowsheet Documentation  Taken 1/27/2025 2234 by Diego Drake RN  Equipment Currently Used at Home: cane, quad tip  Taken 1/27/2025 2233 by Diego Drake RN  Transportation Anticipated: family or friend will provide  Transportation Concerns: none  Patient/Family Anticipated Services at Transition: none  Patient/Family Anticipates Transition to: home with family  Taken 1/27/2025 2224 by Diego Drake RN  Transportation Concerns: none     Problem: Skin Injury Risk Increased  Goal: Skin Health and Integrity  Intervention: Optimize Skin  Protection  Recent Flowsheet Documentation  Taken 1/27/2025 1959 by Diego Drake, RN  Head of Bed (HOB) Positioning: HOB elevated   Goal Outcome Evaluation:  Plan of Care Reviewed With: patient

## 2025-01-28 NOTE — PLAN OF CARE
Goal Outcome Evaluation:  Plan of Care Reviewed With: patient        Progress: no change  Outcome Evaluation: PT eval completed. Pt alert and oriented x4 with c/o 8/10 pain in low back. Pt edu on spinal precautions, log rolling and LSO. Pt performs bed mob with SBA and demos dec strength in B hip flexors. Pt performed sit to stand t/f and gait training with CGA-min A and tripod cane. Pt fatigues quickly and requires therapist HHA in hallway due to reaching for objects for stability. Pt given RW for future mobility and encouraged to use one at home to improve stability and safety. Pt will benefit from skilled PT to improve fxl mob, strength and independence. Rec d/c home with assist and HH pending second part of sx.    Anticipated Discharge Disposition (PT): home with assist, home with home health

## 2025-01-28 NOTE — THERAPY EVALUATION
Patient Name: Adela Arauz  : 1965    MRN: [id number]                              Today's Date: 2025       Admit Date: 2025    Visit Dx:     ICD-10-CM ICD-9-CM   1. Impaired mobility [Z74.09]  Z74.09 799.89     Patient Active Problem List   Diagnosis    Chronic pain syndrome    Rheumatoid arthritis involving multiple sites    Osteoporosis    Chest pain    Multiple lung nodules < 6mm identified 2018    Lymphadenopathy    AAT (alpha-1-antitrypsin) deficiency    Axillary adenopathy    Former smoker    Lymphedema of right arm    Pain in right arm    Morbid (severe) obesity due to excess calories    Anxiety    Foraminal stenosis due to intervertebral disc disease    Lumbar stenosis    Lumbar radiculopathy    History of lumbar fusion    Degeneration of lumbar intervertebral disc    Plantar fasciitis    Panic attack    GERD (gastroesophageal reflux disease)     Past Medical History:   Diagnosis Date    AAT (alpha-1-antitrypsin) deficiency 2019    Anxiety 2020    Cancer     LYMPHOMA    DDD (degenerative disc disease), lumbar     GERD (gastroesophageal reflux disease)     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, 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     Low back pain     Nausea after anesthesia     Open wound of gum     pt states had all her teeth pulled and there is a spot that hasn't healed due to RA so she is using peridex rinse daily    Osteoporosis     PONV (postoperative nausea and vomiting)     Psoriasis     Rheumatoid arthritis     Seasonal allergies     Senile osteoporosis 2017     Past Surgical History:   Procedure Laterality Date    ANTERIOR LUMBAR EXPOSURE N/A 2020    Procedure: Anterior lumbar interbody fusion of L5-S1 with instrumentation ;  Surgeon: Neptali Rivera DO;  Location:  [facility];  Service: Vascular;  Laterality: N/A;    AXILLARY LYMPH NODE BIOPSY/EXCISION Right 2019    Procedure: EXCISION RIGHT AXILLARY MASS;  Surgeon: Jes Enamorado MD;  Location:  [facility];   Service: General    INCISION AND DRAINAGE ARM Right 06/03/2021    Procedure: INCISION AND DRAINAGE FOREARM;  Surgeon: Jes Enamorado MD;  Location:  [facility];  Service: General;  Laterality: Right;    LUMBAR FUSION N/A 12/02/2020    Procedure: ANTERIOR DECOMPRESSION, ANTERIOR LUMBAR INTERBODY FUSION WITH INSTRUMENTATION L5-S1;  Surgeon: ALEXIS Dunaway MD;  Location:  [facility];  Service: Orthopedic Spine;  Laterality: N/A;    LUMBAR FUSION Left 12/16/2020    Procedure: LEFT LATERAL LUMBAR INTERBODY FUSION L3-5 WITH INSTRUMENTATION L3-4;  Surgeon: ALEXIS Dunaway MD;  Location:  [facility];  Service: Orthopedic Spine;  Laterality: Left;    LUMBAR FUSION Right 1/27/2025    Procedure: RIGHT LATERAL LUMBAR INTERBODY FUSION WITH INSTRUMENTATION L2-3;  Surgeon: ALEXIS Dunaway MD;  Location:  [facility];  Service: Orthopedic Spine;  Laterality: Right;    LUMBAR LAMINECTOMY WITH FUSION N/A 12/18/2020    Procedure: DECOMPRESSION L3-4, POSTERIOR SPINAL FUSION WITH INSTRUMENTATION L3-S1;  Surgeon: ALEXIS Dunaway MD;  Location:  [facility];  Service: Orthopedic Spine;  Laterality: N/A;    SHOULDER SURGERY Left     arthroscopy for arthtritis and bone spurs    TUBAL ABDOMINAL LIGATION      US GUIDED LYMPH NODE BIOPSY  08/09/2019    WRIST SURGERY Bilateral     fracture - no internal hardware      General Information       Row Name 01/28/25 1120          Physical Therapy Time and Intention    Document Type evaluation  BLE anterior thigh, groin and leg radiculopathy, neurogenic claudication, low back pain; s/p right LLIF with instrumentation L2-3; PMH staged anterior, lateral and posterior fusion L3-S1 in 2020  -SB     Mode of Treatment physical therapy  -SB       Row Name 01/28/25 1120          General Information    Patient Profile Reviewed yes  -SB     Prior Level of Function independent:;all household mobility;ADL's;home management;cooking;cleaning;driving  using cane, shower chair, walker, step over tub  -SB      Existing Precautions/Restrictions fall;brace worn when out of bed;spinal;LSO  -SB     Barriers to Rehab medically complex  -SB       Row Name 01/28/25 1120          Living Environment    People in Home alone  -SB       Row Name 01/28/25 1120          Home Main Entrance    Number of Stairs, Main Entrance six  -SB     Stair Railings, Main Entrance railings on both sides of stairs  -SB       Row Name 01/28/25 1120          Stairs Within Home, Primary    Number of Stairs, Within Home, Primary none  -SB       Row Name 01/28/25 1120          Cognition    Orientation Status (Cognition) oriented to;person;disoriented to;place;time;verbal cues/prompts needed for orientation  -SB       Row Name 01/28/25 1120          Safety Issues/Impairments Affecting Functional Mobility    Impairments Affecting Function (Mobility) pain;strength;balance;endurance/activity tolerance;range of motion (ROM)  -SB               User Key  (r) = Recorded By, (t) = Taken By, (c) = Cosigned By      Initials Name Provider Type    Fani Frey PT DPT Physical Therapist                   Mobility       Row Name 01/28/25 1120          Bed Mobility    Bed Mobility rolling left;sidelying-sit  -SB     Rolling Left [place] (Bed Mobility) contact guard;verbal cues  -SB     Sidelying-Sit [place] (Bed Mobility) contact guard;verbal cues  -SB     Assistive Device (Bed Mobility) head of bed elevated;bed rails  -SB       Row Name 01/28/25 1120          Sit-Stand Transfer    Sit-Stand [place] (Transfers) verbal cues;contact guard  -SB     Assistive Device (Sit-Stand Transfers) --  hurrycane  -SB       Row Name 01/28/25 1120          Gait/Stairs (Locomotion)    [place] Level (Gait) verbal cues;contact guard;minimum assist (75% patient effort)  -SB     Assistive Device (Gait) other (see comments)  hurrycane  -SB     Patient was able to Ambulate yes  -SB     Distance in Feet (Gait) 40  -SB     Deviations/Abnormal Patterns (Gait) gait speed  decreased  -SB     Bilateral Gait Deviations forward flexed posture  -SB     Comment, (Gait/Stairs) reaches for railing in hallway for stability; fatigues quickly  -SB               User Key  (r) = Recorded By, (t) = Taken By, (c) = Cosigned By      Initials Name Provider Type    Fani Frey PT DPT Physical Therapist                   Obj/Interventions       Row Name 01/28/25 1120          Range of Motion Comprehensive    General Range of Motion lower extremity range of motion deficits identified  -SB       Row Name 01/28/25 1120          Strength Comprehensive (MMT)    General Manual Muscle Testing (MMT) Assessment lower extremity strength deficits identified  -SB     Comment, General Manual Muscle Testing (MMT) Assessment B hip flex 3-/5, all others 4/5  -SB       Row Name 01/28/25 1120          Balance    Balance Assessment sitting static balance;sitting dynamic balance;standing static balance;standing dynamic balance  -SB     Static Sitting Balance standby assist  -SB     Dynamic Sitting Balance contact guard  -SB     Position, Sitting Balance sitting edge of bed;unsupported  -SB     Static Standing Balance contact guard  -SB     Dynamic Standing Balance minimal assist  -SB     Position/Device Used, Standing Balance supported;other (see comments)  hurrycane  -SB       Row Name 01/28/25 1120          Sensory Assessment (Somatosensory)    Sensory Assessment (Somatosensory) LE sensation intact  -SB               User Key  (r) = Recorded By, (t) = Taken By, (c) = Cosigned By      Initials Name Provider Type    Fani Frey, PT DPT Physical Therapist                   Goals/Plan       Row Name 01/28/25 1120          Bed Mobility Goal 1 (PT)    Activity/Assistive Device (Bed Mobility Goal 1, PT) rolling to left;rolling to right;sidelying to sit;sit to sidelying  -SB     [place] Level/Cues Needed (Bed Mobility Goal 1, PT) independent  -SB     Time Frame (Bed Mobility Goal 1, PT) long term goal (LTG)   -SB     Progress/Outcomes (Bed Mobility Goal 1, PT) new goal  -SB       Row Name 01/28/25 1120          Transfer Goal 1 (PT)    Activity/Assistive Device (Transfer Goal 1, PT) sit-to-stand/stand-to-sit;bed-to-chair/chair-to-bed  LRAD  -SB     [place] Level/Cues Needed (Transfer Goal 1, PT) modified independence  -SB     Time Frame (Transfer Goal 1, PT) long term goal (LTG)  -SB     Progress/Outcome (Transfer Goal 1, PT) new goal  -SB       Row Name 01/28/25 1120          Gait Training Goal 1 (PT)    Activity/Assistive Device (Gait Training Goal 1, PT) gait (walking locomotion);increase endurance/gait distance;increase energy conservation;decrease fall risk;other (see comments)  LRAD  -SB     [place] Level (Gait Training Goal 1, PT) contact guard required  -SB     Distance (Gait Training Goal 1, PT) 100  -SB     Time Frame (Gait Training Goal 1, PT) long term goal (LTG)  -SB     Progress/Outcome (Gait Training Goal 1, PT) new goal  -SB       Row Name 01/28/25 1120          Problem Specific Goal 1 (PT)    Problem Specific Goal 1 (PT) Pt will perform mobility with </= 4/10 pain  -SB     Time Frame (Problem Specific Goal 1, PT) long-term goal (LTG)  -SB     Progress/Outcome (Problem Specific Goal 1, PT) new goal  -SB       Row Name 01/28/25 1120          Therapy Assessment/Plan (PT)    Planned Therapy Interventions (PT) balance training;strengthening;bed mobility training;gait training;patient/family education;transfer training;orthotic fitting/training  -SB               User Key  (r) = Recorded By, (t) = Taken By, (c) = Cosigned By      Initials Name Provider Type    SB Fani Hackett, PT DPT Physical Therapist                   Clinical Impression       Row Name 01/28/25 1120          Pain    Pretreatment Pain Rating 8/10  -SB     Posttreatment Pain Rating 7/10  -SB     Pain Location back  -SB     Pain Management Interventions exercise or physical activity utilized  -SB     Response to Pain Interventions  activity participation with tolerable pain  -SB       Row Name 01/28/25 1120          Plan of Care Review    Plan of Care Reviewed With patient  -SB     Progress no change  -SB     Outcome Evaluation PT eval completed. Pt alert and oriented x4 with c/o 8/10 pain in low back. Pt edu on spinal precautions, log rolling and LSO. Pt performs bed mob with SBA and demos dec strength in B hip flexors. Pt performed sit to stand t/f and gait training with CGA-min A and tripod cane. Pt fatigues quickly and requires therapist HHA in hallway due to reaching for objects for stability. Pt given RW for future mobility and encouraged to use one at home to improve stability and safety. Pt will benefit from skilled PT to improve fxl mob, strength and independence. Rec d/c home with assist and HH pending second part of sx.  -SB       Row Name 01/28/25 1120          Therapy Assessment/Plan (PT)    Patient/Family Therapy Goals Statement (PT) improve function  -SB     Rehab Potential (PT) good  -SB     Criteria for Skilled Interventions Met (PT) yes;meets criteria;skilled treatment is necessary  -SB     Therapy Frequency (PT) 2 times/day  -SB     Predicted Duration of Therapy Intervention (PT) until d/c or goals met  -SB       Row Name 01/28/25 1120          Vital Signs    O2 Delivery Pre Treatment room air  -SB       Row Name 01/28/25 1120          Positioning and Restraints    Pre-Treatment Position in bed  -SB     Post Treatment Position chair  -SB     In Chair reclined;call light within reach;encouraged to call for assist;with brace  -SB               User Key  (r) = Recorded By, (t) = Taken By, (c) = Cosigned By      Initials Name Provider Type    Fani Frey, PT DPT Physical Therapist                   Outcome Measures       Row Name 01/28/25 1120          How much help from another person do you currently need...    Turning from your back to your side while in flat bed without using bedrails? 3  -SB     Moving from lying on  back to sitting on the side of a flat bed without bedrails? 3  -SB     Moving to and from a bed to a chair (including a wheelchair)? 3  -SB     Standing up from a chair using your arms (e.g., wheelchair, bedside chair)? 3  -SB     Climbing 3-5 steps with a railing? 3  -SB     To walk in hospital room? 3  -SB     AM-PAC 6 Clicks Score (PT) 18  -SB     Highest Level of Mobility Goal 6 --> Walk 10 steps or more  -SB       Row Name 01/28/25 1140 01/28/25 1120       Functional Assessment    Outcome Measure Options AM-PAC 6 Clicks Daily Activity (OT)  -EC AM-PAC 6 Clicks Basic Mobility (PT)  -SB              User Key  (r) = Recorded By, (t) = Taken By, (c) = Cosigned By      Initials Name Provider Type    SB Fani Hackett, PT DPT Physical Therapist    EC Sugey Bruce, OTR/L Occupational Therapist                                 Physical Therapy Education       Title: PT OT SLP Therapies (In Progress)       Topic: Physical Therapy (In Progress)       Point: Mobility training (Done)       Learning Progress Summary            Patient Acceptance, E, VU,NR by SB at 1/28/2025 1133    Comment: pt edu on POC, benefits of act, d/c plans, spinal precautions, bracing                      Point: Home exercise program (Not Started)       Learner Progress:  Not documented in this visit.              Point: Body mechanics (Not Started)       Learner Progress:  Not documented in this visit.              Point: Precautions (Done)       Learning Progress Summary            Patient Acceptance, E, VU,NR by SB at 1/28/2025 1133    Comment: pt edu on POC, benefits of act, d/c plans, spinal precautions, bracing                                      User Key       Initials Effective Dates Name Provider Type Discipline    SB 07/11/23 -  Fani Hackett, PT DPT Physical Therapist PT                  PT Recommendation and Plan  Planned Therapy Interventions (PT): balance training, strengthening, bed mobility training, gait training, patient/family  education, transfer training, orthotic fitting/training  Progress: no change  Outcome Evaluation: PT eval completed. Pt alert and oriented x4 with c/o 8/10 pain in low back. Pt edu on spinal precautions, log rolling and LSO. Pt performs bed mob with SBA and demos dec strength in B hip flexors. Pt performed sit to stand t/f and gait training with CGA-min A and tripod cane. Pt fatigues quickly and requires therapist HHA in hallway due to reaching for objects for stability. Pt given RW for future mobility and encouraged to use one at home to improve stability and safety. Pt will benefit from skilled PT to improve fxl mob, strength and independence. Rec d/c home with assist and HH pending second part of sx.     Time Calculation:         PT Charges       Row Name 01/28/25 1329             Time Calculation    Start Time 1120  10 min CR for total of 40 min  -SB      Stop Time 1150  -SB      Time Calculation (min) 30 min  -SB      PT Received On 01/28/25  -SB      PT Goal Re-Cert Due Date 02/07/25  -SB         Untimed Charges    PT Eval/Re-eval Minutes 40  -SB         Total Minutes    Untimed Charges Total Minutes 40  -SB       Total Minutes 40  -SB                User Key  (r) = Recorded By, (t) = Taken By, (c) = Cosigned By      Initials Name Provider Type    SB Fani Hackett PT DPT Physical Therapist                  Therapy Charges for Today       Code Description Service Date Service Provider Modifiers Qty    [id number] HC PT EVAL MOD COMPLEXITY 3 1/28/2025 Fani Hackett PT DPT GP 1            PT G-Codes  Outcome Measure Options: AM-PAC 6 Clicks Daily Activity (OT)  AM-PAC 6 Clicks Score (PT): 18  AM-PAC 6 Clicks Score (OT): 20  PT Discharge Summary  Anticipated Discharge Disposition (PT): home with assist, home with home health    Fani Hackett PT DPT  1/28/2025

## 2025-01-28 NOTE — THERAPY EVALUATION
Acute Care - Occupational Therapy Initial Evaluation  [facility]     Patient Name: Adela Arauz  : 1965  MRN: [id number]  Today's Date: 2025     Date of Referral to OT: 25       Admit Date: 2025       ICD-10-CM ICD-9-CM   1. Impaired mobility [Z74.09]  Z74.09 799.89     Patient Active Problem List   Diagnosis    Chronic pain syndrome    Rheumatoid arthritis involving multiple sites    Osteoporosis    Chest pain    Multiple lung nodules < 6mm identified 2018    Lymphadenopathy    AAT (alpha-1-antitrypsin) deficiency    Axillary adenopathy    Former smoker    Lymphedema of right arm    Pain in right arm    Morbid (severe) obesity due to excess calories    Anxiety    Foraminal stenosis due to intervertebral disc disease    Lumbar stenosis    Lumbar radiculopathy    History of lumbar fusion    Degeneration of lumbar intervertebral disc    Plantar fasciitis    Panic attack    GERD (gastroesophageal reflux disease)     Past Medical History:   Diagnosis Date    AAT (alpha-1-antitrypsin) deficiency 2019    Anxiety 2020    Cancer     LYMPHOMA    DDD (degenerative disc disease), lumbar     GERD (gastroesophageal reflux disease)     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, 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     Low back pain     Nausea after anesthesia     Open wound of gum     pt states had all her teeth pulled and there is a spot that hasn't healed due to RA so she is using peridex rinse daily    Osteoporosis     PONV (postoperative nausea and vomiting)     Psoriasis     Rheumatoid arthritis     Seasonal allergies     Senile osteoporosis 2017     Past Surgical History:   Procedure Laterality Date    ANTERIOR LUMBAR EXPOSURE N/A 2020    Procedure: Anterior lumbar interbody fusion of L5-S1 with instrumentation ;  Surgeon: Neptali Rivera DO;  Location: [facility];  Service: Vascular;  Laterality: N/A;    AXILLARY LYMPH NODE BIOPSY/EXCISION Right 2019    Procedure: EXCISION RIGHT  AXILLARY MASS;  Surgeon: Jes Enamorado MD;  Location:  [facility];  Service: General    INCISION AND DRAINAGE ARM Right 06/03/2021    Procedure: INCISION AND DRAINAGE FOREARM;  Surgeon: Jes Enamorado MD;  Location:  [facility];  Service: General;  Laterality: Right;    LUMBAR FUSION N/A 12/02/2020    Procedure: ANTERIOR DECOMPRESSION, ANTERIOR LUMBAR INTERBODY FUSION WITH INSTRUMENTATION L5-S1;  Surgeon: ALEXIS Dunaway MD;  Location:  [facility];  Service: Orthopedic Spine;  Laterality: N/A;    LUMBAR FUSION Left 12/16/2020    Procedure: LEFT LATERAL LUMBAR INTERBODY FUSION L3-5 WITH INSTRUMENTATION L3-4;  Surgeon: ALEXIS Dunaway MD;  Location:  [facility];  Service: Orthopedic Spine;  Laterality: Left;    LUMBAR FUSION Right 1/27/2025    Procedure: RIGHT LATERAL LUMBAR INTERBODY FUSION WITH INSTRUMENTATION L2-3;  Surgeon: ALEXIS Dunaway MD;  Location:  [facility];  Service: Orthopedic Spine;  Laterality: Right;    LUMBAR LAMINECTOMY WITH FUSION N/A 12/18/2020    Procedure: DECOMPRESSION L3-4, POSTERIOR SPINAL FUSION WITH INSTRUMENTATION L3-S1;  Surgeon: ALEXIS Dunaway MD;  Location:  [facility];  Service: Orthopedic Spine;  Laterality: N/A;    SHOULDER SURGERY Left     arthroscopy for arthtritis and bone spurs    TUBAL ABDOMINAL LIGATION      US GUIDED LYMPH NODE BIOPSY  08/09/2019    WRIST SURGERY Bilateral     fracture - no internal hardware         OT ASSESSMENT FLOWSHEET (Last 12 Hours)       OT Evaluation and Treatment       Row Name 01/28/25 1125 01/28/25 1015                OT Time and Intention    Subjective Information complains of;pain  -EC --       Document Type evaluation  -EC --       Mode of Treatment occupational therapy;co-treatment  -EC --       Session Not Performed -- other (see comments)  -EC       Comment, Session Not Performed -- remains too lethargic  -EC          General Information    Patient Profile Reviewed yes  -EC --       Prior Level of Function independent:;all  household mobility;community mobility;ADL's;home management;driving  -EC --       Equipment Currently Used at Home shower chair;walker, rolling;cane, quad  -EC --       Pertinent History of Current Functional Problem worsening low back pain, 3 part surgery in 2020 L3-S1 s/p R LLIF with instrumentation L2/3  -EC --       Existing Precautions/Restrictions fall;spinal;LSO;brace worn when out of bed  -EC --       Barriers to Rehab medically complex  -EC --          Living Environment    Current Living Arrangements home  tub shower  -EC --       Home Accessibility stairs to enter home;stairs within home  -EC --       People in Home alone  -EC --          Home Main Entrance    Number of Stairs, Main Entrance six  -EC --       Stair Railings, Main Entrance railings on both sides of stairs  -EC --          Stairs Within Home, Primary    Number of Stairs, Within Home, Primary none  -EC --          Pain Assessment    Pretreatment Pain Rating 8/10  -EC --       Posttreatment Pain Rating 7/10  -EC --       Pain Location back  -EC --       Pain Side/Orientation generalized  -EC --       Pain Management Interventions exercise or physical activity utilized  -EC --       Response to Pain Interventions activity participation with tolerable pain  -EC --          Cognition    Orientation Status (Cognition) oriented x 4  -EC --          Range of Motion Comprehensive    Comment, General Range of Motion BUE ROM WFL  -EC --          Strength Comprehensive (MMT)    Comment, General Manual Muscle Testing (MMT) Assessment BUE 4-/5  -EC --          Sensory    Additional Documentation Sensory Assessment (Somatosensory) (Group)  -EC --          Sensory Assessment (Somatosensory)    Sensory Assessment (Somatosensory) UE sensation intact  -EC --          Activities of Daily Living    BADL Assessment/Intervention lower body dressing;upper body dressing;toileting  -EC --          Upper Body Dressing Assessment/Training    [place] Level (Upper  Body Dressing) upper body dressing skills;maximum assist (25% patient effort)  -EC --       Position (Upper Body Dressing) edge of bed sitting  -EC --       Comment, (Upper Body Dressing) LSO  -EC --          Lower Body Dressing Assessment/Training    [place] Level (Lower Body Dressing) lower body dressing skills;standby assist  -EC --       Position (Lower Body Dressing) edge of bed sitting  -EC --       Comment, (Lower Body Dressing) adust b socks  -EC --          Toileting Assessment/Training    [place] Level (Toileting) adjust/manage clothing;perform perineal hygiene;standby assist  -EC --       Assistive Devices (Toileting) commode;grab bar/safety frame  -EC --       Position (Toileting) supported sitting;supported standing  -EC --          BADL Safety/Performance    Impairments, BADL Safety/Performance endurance/activity tolerance;pain;strength;balance  -EC --          Bed Mobility    Bed Mobility rolling left;rolling right;sidelying-sit;sit-sidelying  -EC --       Rolling Left [place] (Bed Mobility) verbal cues;contact guard  -EC --       Sidelying-Sit [place] (Bed Mobility) verbal cues;contact guard  -EC --       Assistive Device (Bed Mobility) bed rails;head of bed elevated  -EC --          Functional Mobility    Functional Mobility- Ind. Level contact guard assist  -EC --       Functional Mobility- Device cane, quad  -EC --       Functional Mobility- Comment to  & hallway  -EC --          Transfer Assessment/Treatment    Transfers sit-stand transfer;stand-sit transfer  -EC --          Sit-Stand Transfer    Sit-Stand [place] (Transfers) verbal cues;contact guard  -EC --          Stand-Sit Transfer    Stand-Sit [place] (Transfers) verbal cues;contact guard  -EC --          Safety Issues/Impairments Affecting Functional Mobility    Impairments Affecting Function (Mobility) pain;strength;balance;endurance/activity tolerance;range of motion (ROM)  -EC --          Balance     Balance Assessment sitting static balance;sitting dynamic balance;standing static balance;standing dynamic balance  -EC --       Static Sitting Balance standby assist  -EC --       Dynamic Sitting Balance contact guard  -EC --       Position, Sitting Balance unsupported;sitting edge of bed  -EC --       Static Standing Balance contact guard  -EC --       Dynamic Standing Balance minimal assist  -EC --       Position/Device Used, Standing Balance supported  hurrycane  -EC --          Wound 01/27/25 0742 Right lower flank    Wound - Properties Group Placement Date: 01/27/25  -KT Placement Time: 0742  -KT Side: Right  -KT Orientation: lower  -KT Location: flank  -KT Primary Wound Type: Incision  -KT    Retired Wound - Properties Group Placement Date: 01/27/25  -KT Placement Time: 0742  -KT Side: Right  -KT Orientation: lower  -KT Location: flank  -KT Primary Wound Type: Incision  -KT    Retired Wound - Properties Group Placement Date: 01/27/25  -KT Placement Time: 0742  -KT Side: Right  -KT Orientation: lower  -KT Location: flank  -KT Primary Wound Type: Incision  -KT    Retired Wound - Properties Group Date first assessed: 01/27/25  -KT Time first assessed: 0742  -KT Side: Right  -KT Location: flank  -KT Primary Wound Type: Incision  -KT       Plan of Care Review    Plan of Care Reviewed With patient  -EC --       Progress no change  -EC --       Outcome Evaluation OT eval completed. Pt presents A&Ox4 c/o low back pain. Educated pt on spinal precautions, log rolling, & LSO brace wear. Pt performed log rolling with [employer], [employer] rep to fit LSO brace to pt while sitting EOB. Pt demos functional BUE AROM, decreased strength. Pt was able to perform LBD by bringing feet to lap level. She mobilized into the hallway & BR with her hurrycane, stated she may feel better with a rwx. Agree with plan as she was frequently reaching for furniture to stabilzie herself, provided rwx to pt post-tx. Pt demos good balance while  toileting self. She was left sitting up in the chait with her brace. Skilled OT indicated to maximize her safety & indep with ADLs. Will treat & re-eval following her second surgery planned for 1/27. Anticipate d/c home with assist.  -EC --          Positioning and Restraints    Pre-Treatment Position in bed  -EC --       Post Treatment Position chair  -EC --       In Chair reclined;call light within reach;encouraged to call for assist;with family/caregiver;with brace  -EC --          Therapy Assessment/Plan (OT)    Date of Referral to OT 01/27/25  -EC --       OT Diagnosis decreased adls  -EC --       Rehab Potential (OT) good  -EC --       Criteria for Skilled Therapeutic Interventions Met (OT) yes;meets criteria;skilled treatment is necessary  -EC --       Therapy Frequency (OT) 5 times/wk  -EC --       Predicted Duration of Therapy Intervention (OT) until hospital discharge  -EC --       Planned Therapy Interventions (OT) activity tolerance training;adaptive equipment training;BADL retraining;functional balance retraining;occupation/activity based interventions;orthotic fabrication/fitting/training;patient/caregiver education/training;strengthening exercise;transfer/mobility retraining  -EC --          Therapy Plan Review/Discharge Plan (OT)    Anticipated Discharge Disposition (OT) home with assist  -EC --          OT Goals    Transfer Goal Selection (OT) transfer, OT goal 1  -EC --       Bathing Goal Selection (OT) --  -EC --       Dressing Goal Selection (OT) dressing, OT goal 1  -EC --       Toileting Goal Selection (OT) toileting, OT goal 1  -EC --       Problem Specific Goal Selection (OT) problem specific goal 1, OT  -EC --          Transfer Goal 1 (OT)    Activity/Assistive Device (Transfer Goal 1, OT) transfers, all  -EC --       [place] Level/Cues Needed (Transfer Goal 1, OT) independent  -EC --       Time Frame (Transfer Goal 1, OT) long term goal (LTG)  -EC --       Progress/Outcome (Transfer  Goal 1, OT) new goal  -EC --          Dressing Goal 1 (OT)    Activity/Device (Dressing Goal 1, OT) upper body dressing;lower body dressing  -EC --       [place]/Cues Needed (Dressing Goal 1, OT) independent  -EC --       Time Frame (Dressing Goal 1, OT) long term goal (LTG)  -EC --       Strategies/Barriers (Dressing Goal 1, OT) LSO  -EC --       Progress/Outcome (Dressing Goal 1, OT) new goal  -EC --          Toileting Goal 1 (OT)    Activity/Device (Toileting Goal 1, OT) toileting skills, all  -EC --       [place] Level/Cues Needed (Toileting Goal 1, OT) independent  -EC --       Time Frame (Toileting Goal 1, OT) long term goal (LTG)  -EC --       Progress/Outcome (Toileting Goal 1, OT) new goal  -EC --          Problem Specific Goal 1 (OT)    Problem Specific Goal 1 (OT) Pt will independently implement one pain management technique to decrease pain and improve functional performance.  -EC --       Time Frame (Problem Specific Goal 1, OT) long term goal (LTG)  -EC --       Progress/Outcome (Problem Specific Goal 1, OT) new goal  -EC --                 User Key  (r) = Recorded By, (t) = Taken By, (c) = Cosigned By      Initials Name Effective Dates    KT Matheus Davis RN 02/17/22 -     EC Sugey Bruce OTR/L 10/13/23 -                      Occupational Therapy Education       Title: PT OT SLP Therapies (In Progress)       Topic: Occupational Therapy (Done)       Point: ADL training (Done)       Description:   Instruct learner(s) on proper safety adaptation and remediation techniques during self care or transfers.   Instruct in proper use of assistive devices.                  Learning Progress Summary            Patient Acceptance, E, VU by EC at 1/28/2025 1141                      Point: Home exercise program (Done)       Description:   Instruct learner(s) on appropriate technique for monitoring, assisting and/or progressing therapeutic exercises/activities.                  Learning Progress Summary             Patient Acceptance, E, VU by  at 1/28/2025 1141                      Point: Precautions (Done)       Description:   Instruct learner(s) on prescribed precautions during self-care and functional transfers.                  Learning Progress Summary            Patient Acceptance, E, VU by  at 1/28/2025 1141                      Point: Body mechanics (Done)       Description:   Instruct learner(s) on proper positioning and spine alignment during self-care, functional mobility activities and/or exercises.                  Learning Progress Summary            Patient Acceptance, E, VU by  at 1/28/2025 1141                                      User Key       Initials Effective Dates Name Provider Type Discipline     10/13/23 -  Sugey Bruce, OTR/L Occupational Therapist OT                      OT Recommendation and Plan  Planned Therapy Interventions (OT): activity tolerance training, adaptive equipment training, BADL retraining, functional balance retraining, occupation/activity based interventions, orthotic fabrication/fitting/training, patient/caregiver education/training, strengthening exercise, transfer/mobility retraining  Therapy Frequency (OT): 5 times/wk  Plan of Care Review  Plan of Care Reviewed With: patient  Progress: no change  Outcome Evaluation: OT eval completed. Pt presents A&Ox4 c/o low back pain. Educated pt on spinal precautions, log rolling, & LSO brace wear. Pt performed log rolling with [employer], [employer] rep to fit LSO brace to pt while sitting EOB. Pt demos functional BUE AROM, decreased strength. Pt was able to perform LBD by bringing feet to lap level. She mobilized into the hallway & BR with her hurrycane, stated she may feel better with a rwx. Agree with plan as she was frequently reaching for furniture to stabilzie herself, provided rwx to pt post-tx. Pt demos good balance while toileting self. She was left sitting up in the chait with her brace. Skilled OT indicated to  maximize her safety & indep with ADLs. Will treat & re-eval following her second surgery planned for 1/27. Anticipate d/c home with assist.  Plan of Care Reviewed With: patient  Outcome Evaluation: OT eval completed. Pt presents A&Ox4 c/o low back pain. Educated pt on spinal precautions, log rolling, & LSO brace wear. Pt performed log rolling with [employer] vcs, [employer] rep to fit LSO brace to pt while sitting EOB. Pt demos functional BUE AROM, decreased strength. Pt was able to perform LBD by bringing feet to lap level. She mobilized into the hallway & BR with her hurrycane, stated she may feel better with a rwx. Agree with plan as she was frequently reaching for furniture to stabilzie herself, provided rwx to pt post-tx. Pt demos good balance while toileting self. She was left sitting up in the chait with her brace. Skilled OT indicated to maximize her safety & indep with ADLs. Will treat & re-eval following her second surgery planned for 1/27. Anticipate d/c home with assist.     Outcome Measures       Row Name 01/28/25 1140             How much help from another is currently needed...    Putting on and taking off regular lower body clothing? 3  -EC      Bathing (including washing, rinsing, and drying) 3  -EC      Toileting (which includes using toilet bed pan or urinal) 3  -EC      Putting on and taking off regular upper body clothing 3  -EC      Taking care of personal grooming (such as brushing teeth) 4  -EC      Eating meals 4  -EC      AM-PAC 6 Clicks Score (OT) 20  -EC         Functional Assessment    Outcome Measure Options AM-PAC 6 Clicks Daily Activity (OT)  -EC                User Key  (r) = Recorded By, (t) = Taken By, (c) = Cosigned By      Initials Name Provider Type    EC Sugey Bruce, OTR/L Occupational Therapist                    Time Calculation:    Time Calculation- OT       Row Name 01/28/25 1156             Time Calculation- OT    OT Start Time 1125  +12 min CR  -EC      OT Stop Time 1155  -EC       OT Time Calculation (min) 30 min  -EC      OT Received On 01/28/25  -EC      OT Goal Re-Cert Due Date 02/07/25  -EC         Untimed Charges    OT Eval/Re-eval Minutes 42  -EC         Total Minutes    Untimed Charges Total Minutes 42  -EC       Total Minutes 42  -EC                User Key  (r) = Recorded By, (t) = Taken By, (c) = Cosigned By      Initials Name Provider Type    EC Sugey Bruce, OTR/L Occupational Therapist                  Therapy Charges for Today       Code Description Service Date Service Provider Modifiers Qty    40655169633 HC OT EVAL MOD COMPLEXITY 3 1/28/2025 Sugey Bruce OTR/L GO 1                 Sugey Bruce OTR/L  1/28/2025

## 2025-01-28 NOTE — PROGRESS NOTES
Adela is somewhat sedated this morning likely from pain medication and anesthesia.  Has not yet been up with physical therapy.  Lateral dressing clean dry and intact.  Moving both lower extremities with no weakness noted.  Sensation intact to light touch.  Plan second posterior stage of the procedure tomorrow.

## 2025-01-28 NOTE — TELEPHONE ENCOUNTER
----- Message from Charlenesandy Huntley sent at 1/26/2025 10:17 PM CST -----  Please call pt with results   She does not do my chart  Her drug screen only showed what she is prescribed     Results to my chart  Your Dexa bone scan showed osteoporosis which we knew.  You are already on fosamax and I would continue that  Make sure you are getting vit d and calcium.  Repeat 2 yrs

## 2025-01-28 NOTE — PAYOR COMM NOTE
"ADMIT INPT 1/27/25    Ran Kenyon (60 y.o. Female)       Date of Birth   1965    Social Security Number       Address   [facility] [address] [facility] [address]    Home Phone   [phone]    MRN   [id number]       [facility]    Marital Status                               Admission Date   1/27/25    Admission Type   Elective    Admitting Provider   ALEXIS Dunaway MD    Attending Provider   ALEXIS Dunaway MD    Department, Room/Bed   [facility] 3A, 337/1       Discharge Date       Discharge Disposition       Discharge Destination                                 Attending Provider: ALEXIS Dunaway MD    Allergies: Evenity [Romosozumab]    Isolation: None   Infection: None   Code Status: CPR    Ht: 162.5 cm (63.98\")   Wt: 96.5 kg (212 lb 11.9 oz)    Admission Cmt: None   Principal Problem: Lumbar radiculopathy [M54.16]                   Active Insurance as of 1/27/2025       Primary Coverage       Payor Plan Insurance Group Employer/Plan Group    ANTHEM MEDICARE REPLACEMENT ANTHEM MED ADV SNP [id number]       Payor Plan Address Payor Plan Phone Number Payor Plan Fax Number Effective Dates    [address] [phone]  1/1/2025 - None Entered    [facility] [id number]         Subscriber Name Subscriber Birth Date Member ID       RAN KENYON 1965 [id number]                     Emergency Contacts        (Rel.) Home Phone Work Phone Mobile Phone    Jerri Martinez (Daughter) [phone] -- [phone]                 History & Physical        ALEXIS Dunaway MD at 01/27/25 0640          H&P reviewed. The patient was examined and there are no changes to the H&P.    Electronically signed by ALEXIS Dunaway MD at 01/27/25 0640   Source Note: H&P        [Media Unavailable] Scan on 1/22/2025 1611 by [employer], [employer]: H&P, [facility], 01/14/2025          Electronically signed by [employer], [employer] at 01/22/25 " 1520                 H&P signed by [employer], [employer] at 01/22/25 1520         [Media Unavailable] Scan on 1/22/2025 1611 by [employer], [employer]: H&P, [facility], 01/14/2025          Electronically signed by [employer], [employer] at 01/22/25 1520       Facility-Administered Medications as of 1/28/2025   Medication Dose Route Frequency Provider Last Rate Last Admin    acetaminophen (TYLENOL) tablet 650 mg  650 mg Oral Q4H PRN ALEXIS Dunaway MD        Or    acetaminophen (TYLENOL) 160 MG/5ML oral solution 650 mg  650 mg Oral Q4H PRN ALEXIS Dunaway MD        Or    acetaminophen (TYLENOL) suppository 650 mg  650 mg Rectal Q4H PRN ALEXIS Dunaway MD        ALPRAZolam (XANAX) tablet 1 mg  1 mg Oral TID PRN ALEXIS Dunaway MD   1 mg at 01/27/25 1959    sennosides-docusate (PERICOLACE) 8.6-50 MG per tablet 2 tablet  2 tablet Oral BID ALEXIS Dunaway MD   2 tablet at 01/27/25 2000    And    polyethylene glycol (MIRALAX) packet 17 g  17 g Oral Daily PRN ALEXIS Dunaway MD        And    bisacodyl (DULCOLAX) EC tablet 5 mg  5 mg Oral Daily PRN ALEXIS Dunaway MD        And    bisacodyl (DULCOLAX) suppository 10 mg  10 mg Rectal Daily PRN ALEXIS Dunaway MD        [COMPLETED] ceFAZolin 2000 mg IVPB in 100 mL NS (MBP)  2,000 mg Intravenous Once ALEXIS Dunaway MD   2,000 mg at 01/27/25 0718    [COMPLETED] ceFAZolin 2000 mg IVPB in 100 mL NS (MBP)  2,000 mg Intravenous Q8H ALEXIS Dunaway MD   Stopped at 01/27/25 2343    [COMPLETED] dexAMETHasone (DECADRON) injection 4 mg  4 mg Intravenous Once PRN Riki Mclean MD   4 mg at 01/27/25 0651    furosemide (LASIX) tablet 40 mg  40 mg Oral Daily ALEXIS Dunaway MD   40 mg at 01/27/25 1528    HYDROcodone-acetaminophen (NORCO)  MG per tablet 1 tablet  1 tablet Oral Q4H PRN ALEXIS Dunaway MD   1 tablet at 01/28/25 0357    HYDROcodone-acetaminophen (NORCO) 7.5-325 MG per tablet 1 tablet  1 tablet Oral Q4H PRN  ALEXIS Dunaway MD        HYDROmorphone (DILAUDID) injection 0.5 mg  0.5 mg Intravenous Q3H PRN ALEXIS Dunaway MD        And    naloxone (NARCAN) injection 0.4 mg  0.4 mg Intravenous Q5 Min PRN ALEXIS Dunaway MD        ondansetron ODT (ZOFRAN-ODT) disintegrating tablet 4 mg  4 mg Oral Q6H PRN ALEXIS Dunaway MD        Or    ondansetron (ZOFRAN) injection 4 mg  4 mg Intravenous Q6H PRN ALEXIS Dunaway MD   4 mg at 01/27/25 1917    [COMPLETED] oxyCODONE ER (oxyCONTIN) 12 hr tablet 20 mg  20 mg Oral Once ALEXIS Dunaway MD   20 mg at 01/27/25 0640    pregabalin (LYRICA) capsule 150 mg  150 mg Oral BID ALEXIS Dunaway MD   150 mg at 01/27/25 2000    prochlorperazine (COMPAZINE) injection 10 mg  10 mg Intravenous Q6H PRN Carlos Meléndez PA-C   10 mg at 01/27/25 2153    sodium chloride 0.9 % flush 10 mL  10 mL Intravenous PRN ALEXIS Dunaway MD        sodium chloride 0.9 % flush 3 mL  3 mL Intravenous Q12H ALEXIS Dunaway MD        sodium chloride 0.9 % infusion 40 mL  40 mL Intravenous PRN ALEXIS Dunaway MD        sodium chloride 0.9 % infusion  100 mL/hr Intravenous Continuous ALEXIS Dunaway  mL/hr at 01/28/25 0111 100 mL/hr at 01/28/25 0111    tiZANidine (ZANAFLEX) tablet 4 mg  4 mg Oral Q8H PRN ALEXIS Dunaway MD         Orders (all)        Start     Ordered    01/28/25 0600  CBC & Differential  Morning Draw         01/27/25 1234    01/28/25 0600  Basic Metabolic Panel  Morning Draw         01/27/25 1234    01/28/25 0600  Iron Profile  Morning Draw         01/27/25 1744    01/28/25 0600  Vitamin B12  Morning Draw         01/27/25 1744    01/28/25 0600  Folate  Morning Draw         01/27/25 1744    01/28/25 0600  CBC Auto Differential  PROCEDURE ONCE         01/27/25 2201    01/27/25 2145  prochlorperazine (COMPAZINE) injection 10 mg  Every 6 Hours PRN         01/27/25 2145    01/27/25 2100  pregabalin (LYRICA) capsule 150 mg  2 Times Daily          "01/27/25 1234    01/27/25 1500  furosemide (LASIX) tablet 40 mg  Daily         01/27/25 1234    01/27/25 1500  ceFAZolin 2000 mg IVPB in 100 mL NS (MBP)  Every 8 Hours         01/27/25 1234    01/27/25 1400  Incentive Spirometry  Every 2 Hours While Awake       01/27/25 1234    01/27/25 1330  sodium chloride 0.9 % flush 3 mL  Every 12 Hours Scheduled         01/27/25 1234    01/27/25 1330  sodium chloride 0.9 % infusion  Continuous         01/27/25 1234    01/27/25 1330  sennosides-docusate (PERICOLACE) 8.6-50 MG per tablet 2 tablet  2 Times Daily        Placed in \"And\" Linked Group    01/27/25 1234    01/27/25 1300  Ambulate Patient 3 times daily and PRN as tolerated.  3 Times Daily        Comments: PRN as tolerated.    01/27/25 1234    01/27/25 1235  Vital Signs  Per Hospital Policy/Protocol         01/27/25 1234    01/27/25 1235  Notify Provider (Standard Adult Parameters)  Until Discontinued         01/27/25 1234    01/27/25 1235  Saline Lock IV With Adequate PO Intake  Continuous         01/27/25 1234    01/27/25 1235  Advance Diet As Tolerated -  Until Discontinued         01/27/25 1234    01/27/25 1235  Oxygen Therapy- Nasal Cannula; Titrate 1-6 LPM Per SpO2; 90 - 95%  Continuous         01/27/25 1234    01/27/25 1235  Continuous Pulse Oximetry  Continuous         01/27/25 1234    01/27/25 1235  PT Consult: Eval & Treat  Once         01/27/25 1234    01/27/25 1235  OT Consult: Eval & Treat  Once         01/27/25 1234    01/27/25 1235  Insert Peripheral IV  Once         01/27/25 1234    01/27/25 1235  Saline Lock & Maintain IV Access  Continuous         01/27/25 1234    01/27/25 1235  Code Status and Medical Interventions: CPR (Attempt to Resuscitate); Full Support  Continuous         01/27/25 1234    01/27/25 1235  Place Sequential Compression Device  Once         01/27/25 1234    01/27/25 1235  Maintain Sequential Compression Device  Continuous         01/27/25 1234    01/27/25 1235  Bracing Equipment " "Communication Sitting at Edge of Bed; Spinal; Lumbo-Sacral (LSO); Not Applicable; When out of Bed, Patient is Allowed to be up to the Bathroom and Participate with Therapy before Brace Delivery  Once         01/27/25 1234    01/27/25 1235  Diet: Regular/House; Fluid Consistency: Thin (IDDSI 0)  Diet Effective Now         01/27/25 1234    01/27/25 1235  Inpatient Family Practice Consult  Once        Specialty:  Family Medicine  Provider:  Nathaniel Betancourt MD    01/27/25 1234    01/27/25 1234  sodium chloride 0.9 % flush 10 mL  As Needed         01/27/25 1234    01/27/25 1234  sodium chloride 0.9 % infusion 40 mL  As Needed         01/27/25 1234    01/27/25 1234  ondansetron ODT (ZOFRAN-ODT) disintegrating tablet 4 mg  Every 6 Hours PRN        Placed in \"Or\" Linked Group    01/27/25 1234    01/27/25 1234  ondansetron (ZOFRAN) injection 4 mg  Every 6 Hours PRN        Placed in \"Or\" Linked Group    01/27/25 1234    01/27/25 1234  acetaminophen (TYLENOL) tablet 650 mg  Every 4 Hours PRN        Placed in \"Or\" Linked Group    01/27/25 1234    01/27/25 1234  acetaminophen (TYLENOL) 160 MG/5ML oral solution 650 mg  Every 4 Hours PRN        Placed in \"Or\" Linked Group    01/27/25 1234    01/27/25 1234  acetaminophen (TYLENOL) suppository 650 mg  Every 4 Hours PRN        Placed in \"Or\" Linked Group    01/27/25 1234    01/27/25 1234  HYDROcodone-acetaminophen (NORCO) 7.5-325 MG per tablet 1 tablet  Every 4 Hours PRN         01/27/25 1234    01/27/25 1234  HYDROcodone-acetaminophen (NORCO)  MG per tablet 1 tablet  Every 4 Hours PRN         01/27/25 1234    01/27/25 1234  HYDROmorphone (DILAUDID) injection 0.5 mg  Every 3 Hours PRN        Placed in \"And\" Linked Group    01/27/25 1234    01/27/25 1234  naloxone (NARCAN) injection 0.4 mg  Every 5 Minutes PRN        Placed in \"And\" Linked Group    01/27/25 1234    01/27/25 1234  polyethylene glycol (MIRALAX) packet 17 g  Daily PRN        Placed in \"And\" Linked Group    " "01/27/25 1234    01/27/25 1234  bisacodyl (DULCOLAX) EC tablet 5 mg  Daily PRN        Placed in \"And\" Linked Group    01/27/25 1234    01/27/25 1234  bisacodyl (DULCOLAX) suppository 10 mg  Daily PRN        Placed in \"And\" Linked Group    01/27/25 1234    01/27/25 1234  ALPRAZolam (XANAX) tablet 1 mg  3 Times Daily PRN         01/27/25 1234    01/27/25 1234  tiZANidine (ZANAFLEX) tablet 4 mg  Every 8 Hours PRN         01/27/25 1234    01/27/25 0900  sodium chloride 0.9 % flush 10 mL  Every 12 Hours Scheduled,   Status:  Discontinued         01/27/25 0558    01/27/25 0900  sodium chloride 0.9 % flush 10 mL  Every 12 Hours Scheduled,   Status:  Discontinued         01/27/25 0646    01/27/25 0838  POC Glucose STAT  STAT,   Status:  Canceled        Comments: Post op Glucose Check on All Diabetic Patients, Notify Anesthesia if Blood Sugar is Less Than 80 mg/dL or Greater Than 250mg/dL      01/27/25 0837    01/27/25 0838  Vital signs every 5 minutes for 15 minutes, every 15 minutes thereafter.  Once,   Status:  Canceled         01/27/25 0837    01/27/25 0838  Call Anesthesiologist for additional IV Fluid bolus for Hypotension/Tachycardia  Continuous,   Status:  Canceled         01/27/25 0837    01/27/25 0838  Notify Anesthesia of Any Acute Changes in Patient Condition  Until Discontinued,   Status:  Canceled         01/27/25 0837    01/27/25 0838  Notify Anesthesia for Unrelieved Pain  Until Discontinued,   Status:  Canceled         01/27/25 0837    01/27/25 0838  Once DC criteria to floor met, follow surgeon's orders.  Until Discontinued,   Status:  Canceled         01/27/25 0837    01/27/25 0838  Discharge patient from PACU when discharge criteria is met.  Until Discontinued,   Status:  Canceled         01/27/25 0837    01/27/25 0838  Inpatient Admission  Once         01/27/25 0838    01/27/25 0837  Apply warming blanket  As Needed,   Status:  Canceled      Comments: For a recorded temp of <36.9 C    01/27/25 0837    " 01/27/25 0837  ibuprofen (ADVIL,MOTRIN) tablet 600 mg  Every 6 Hours PRN,   Status:  Discontinued         01/27/25 0837    01/27/25 0837  oxyCODONE-acetaminophen (PERCOCET)  MG per tablet 1 tablet  Every 4 Hours PRN,   Status:  Discontinued         01/27/25 0837    01/27/25 0837  HYDROmorphone (DILAUDID) injection 0.5 mg  Every 10 Minutes PRN,   Status:  Discontinued         01/27/25 0837    01/27/25 0837  fentaNYL citrate (PF) (SUBLIMAZE) injection 50 mcg  Every 10 Minutes PRN,   Status:  Discontinued         01/27/25 0837    01/27/25 0837  naloxone (NARCAN) injection 0.04 mg  As Needed,   Status:  Discontinued         01/27/25 0837    01/27/25 0837  flumazenil (ROMAZICON) injection 0.2 mg  As Needed,   Status:  Discontinued         01/27/25 0837    01/27/25 0837  ondansetron (ZOFRAN) injection 4 mg  Every 15 Minutes PRN,   Status:  Discontinued         01/27/25 0837    01/27/25 0837  labetalol (NORMODYNE,TRANDATE) injection 5 mg  Every 5 Minutes PRN,   Status:  Discontinued         01/27/25 0837    01/27/25 0837  atropine sulfate injection 0.5 mg  Once As Needed,   Status:  Discontinued         01/27/25 0837    01/27/25 0744  sodium chloride (NS) irrigation solution  As Needed,   Status:  Discontinued         01/27/25 0744    01/27/25 0648  scopolamine patch 1 mg/72 hr  Once,   Status:  Discontinued         01/27/25 0646    01/27/25 0647  Oxygen Therapy- Nasal Cannula; Titrate 1-6 LPM Per SpO2; 90 - 95%  Continuous,   Status:  Canceled         01/27/25 0646    01/27/25 0647  Continuous Pulse Oximetry  Continuous,   Status:  Canceled         01/27/25 0646    01/27/25 0647  Insert Peripheral IV  Once,   Status:  Canceled         01/27/25 0646    01/27/25 0647  Saline Lock & Maintain IV Access  Continuous,   Status:  Canceled         01/27/25 0646    01/27/25 0646  dexAMETHasone (DECADRON) injection 4 mg  Once As Needed         01/27/25 0646    01/27/25 0646  Vital Signs - Per Anesthesia Protocol  As Needed,    Status:  Canceled       01/27/25 0646    01/27/25 0646  sodium chloride 0.9 % flush 10 mL  As Needed,   Status:  Discontinued         01/27/25 0646    01/27/25 0646  fentaNYL citrate (PF) (SUBLIMAZE) injection 25 mcg  Every 5 Minutes PRN,   Status:  Discontinued         01/27/25 0646    01/27/25 0646  Midazolam HCl (PF) (VERSED) injection 1 mg  Every 10 Minutes PRN,   Status:  Discontinued         01/27/25 0646    01/27/25 0646  dextrose (D50W) (25 g/50 mL) IV injection 12.5 g  As Needed,   Status:  Discontinued         01/27/25 0646    01/27/25 0636  XR Spine Lumbar AP & Lateral  1 Time Imaging         01/27/25 0635    01/27/25 0636  FL C Arm During Surgery  1 Time Imaging         01/27/25 0635    01/27/25 0606  Type & Screen  Once         01/27/25 0605    01/27/25 0604  lactated ringers infusion 1,000 mL  Continuous,   Status:  Discontinued         01/27/25 0602    01/27/25 0604  lactated ringers infusion 1,000 mL  Continuous,   Status:  Discontinued         01/27/25 0602    01/27/25 0602  Insert Peripheral IV  Once,   Status:  Canceled         01/27/25 0602    01/27/25 0602  Maintain IV Access  Continuous,   Status:  Canceled         01/27/25 0602    01/27/25 0601  sodium chloride 0.9 % flush 10 mL  As Needed,   Status:  Discontinued         01/27/25 0602    01/27/25 0600  ceFAZolin 2000 mg IVPB in 100 mL NS (MBP)  Once         01/27/25 0558    01/27/25 0600  oxyCODONE ER (oxyCONTIN) 12 hr tablet 20 mg  Once         01/27/25 0558    01/27/25 0600  Insert Peripheral IV  Once,   Status:  Canceled         01/27/25 0558    01/27/25 0600  Saline Lock & Maintain IV Access  Continuous,   Status:  Canceled         01/27/25 0558    01/27/25 0600  Follow Anesthesia Guidelines / Protocol  Once,   Status:  Canceled         01/27/25 0602    01/27/25 0600  Please Insert a Second Peripheral IV If Indicated For Procedure (All DaVinci Cases, Gastric Bypass, Sleeve Surgery, AAA, Any Vascular Bypass, Carotid, Sigmoidectomy,  Colectomy (Lap Assisted), Colon Resection, Nissen, Exploratory Laparotomy, Spinal...  Once,   Status:  Canceled        Comments: Please Insert a Second Peripheral IV If Indicated For Procedure (All DaVinci Cases, Gastric Bypass, Sleeve Surgery, AAA, Any Vascular Bypass, Carotid, Sigmoidectomy, Colectomy (Lap Assisted), Colon Resection, Nissen, Exploratory Laparotomy, Spinal Fusion, Craniotomy, Thoracotomy, CABG, TAVR, Valve Surgery or Mediastinoscopy, Femoral Endarterectomy, Carotid Endarterectomy, Anterior Lumbar Exposure, or all Aneurysm Repairs    01/27/25 0602    01/27/25 0600  Insert Peripheral IV  Once,   Status:  Canceled         01/27/25 0602    01/27/25 0600  Maintain IV Access  Continuous,   Status:  Canceled         01/27/25 0602    01/27/25 0559  lidocaine PF 1% (XYLOCAINE) injection 0.5 mL  Once As Needed,   Status:  Discontinued         01/27/25 0602    01/27/25 0559  sodium chloride 0.9 % flush 3 mL  As Needed,   Status:  Discontinued         01/27/25 0602    01/27/25 0559  Follow Anesthesia Guidelines / Protocol  Once,   Status:  Canceled         01/27/25 0558    01/27/25 0558  sodium chloride 0.9 % flush 10 mL  As Needed,   Status:  Discontinued         01/27/25 0558    01/27/25 0558  sodium chloride 0.9 % infusion 40 mL  As Needed,   Status:  Discontinued         01/27/25 0558    01/27/25 0558  lactated ringers infusion  Continuous PRN,   Status:  Discontinued         01/27/25 0558    Unscheduled  Apply Ice to Affected Area / Incision As Needed For Pain or Swelling  As Needed       01/27/25 1234    Unscheduled  Up in Chair  As Needed       01/27/25 1234    Signed and Held  Inpatient Admission  Once         Signed and Held    Signed and Held  Oxygen Therapy- Nasal Cannula; Titrate 1-6 LPM Per SpO2; 90 - 95%  Continuous         Signed and Held    Signed and Held  Continuous Pulse Oximetry  Continuous         Signed and Held                     Operative/Procedure Notes (all)        ALEXIS Dunaway  MD Joseph at 01/27/25 0635  Version 1 of 1         LUMBAR LATERAL INTERBODY FUSION  Procedure Note    Adela Arauz  1/27/2025    Pre-op Diagnosis:     1. Status post anterior decompression, ALIF with instrumentation L5-S1, 12/02/2020   2. Status post left LLIF L3-5 with instrumentation L3-4, 12/16/2020  3. Status post PSF with instrumentation L3-S1, 12/18/2020   4. Recurrent chronic low back pain   5. Bilateral anterior thigh, groin, and leg radiculopathy, right worse than left  6. Recurrent neurogenic claudication   7. Severe adjacent level degenerative disc disease L2-3   8. Facet arthropathy L2-3   9. Spondylolisthesis with instability L2-3   10. Focal kyphosis L2-3   11. Lateral listhesis L2-3   12. Focal scoliosis concave right L2-3   13. Central disc herniation L2-3   14. Severe central and bilateral foraminal stenosis L2-3    Post-op Diagnosis:    same    Procedure/CPT® Codes:    1.  Right lateral lumbar interbody fusion L2-3  2.  Use of PEEK interbody biomechanical device for fusion L2-3 ([employer] PEEK spacer)  3.  Use of allograft bone matrix for fusion (Osteocel pro)  4.  Use of fluoroscopy for confirmation of surgical level, placement of PEEK spacer and instrumentation  5.  Intraoperative neural monitoring    Spinal Surgery Levels Completed:1 Level     Anesthesia: General    Surgeon: MELCHOR Dunaway MD    Assistant: Jean Meléndez PA-C    Estimated Blood Loss: Minimal    Complications: None    Condition: Stable to PACU.    Indications:    The patient is a 60-year-old who sees Charlene Huntley NP for medical issues.  She has undergone a staged anterior, lateral, and posterior fusion with instrumentation from L3-S1 in December 2020.  She did well with this procedure until about a year ago.  She presented back to the office with recurrent chronic low back pain along with bilateral anterior thigh, groin, and leg radiculopathy, with the right side worse than the left as well as symptoms consistent with  recurrent neurogenic claudication.  Imaging studies revealed severe adjacent level degenerative disc disease at L2-3 associated with facet arthropathy, spondylolisthesis with instability, focal kyphosis, lateral listhesis, focal scoliosis concave to the right, and a central disc herniation causing severe central and bilateral foraminal stenosis.    After failing conservative measures, it was mutually decided that surgery would be the best option. Risks, benefits, and complications of surgery were discussed with the patient. The patient appeared well informed and wished to proceed. We specifically discussed the risk of infection, blood loss, nerve root injury, CSF leak, and the possibility of incomplete resolution of symptoms. We also discussed the possible risk of a nonunion and the potential need for additional surgery in the event of a pseudoarthrosis or hardware failure.     We elected proceed with a staged operation.  It is planned we will be proceeding with a second posterior procedure at a later date.    Operative Procedure:    After obtaining informed consent and verifying the correct operative site, the patient was brought to the operating room and placed supine on operating table.  A general anesthetic was provided by the anesthesia service with the assistance of an endotracheal tube.  Once this was appropriately positioned and secured, the patient was carefully rotated into the lateral decubitus position with the right side up. All bony prominences were well-padded.  3 inch wide cloth tape was used to maintain the patient's position.  It was also used to tip open the pelvis slightly allowing better access to the lumbar spine through a lateral approach.  Fluoroscopy was then used to identify the L2-3 level, and the skin was marked for our planned incision.  The right flank was then prepped and draped in the usual sterile fashion.  A surgical timeout was taken to confirm this was the correct patient, we were  working at the correct level, and that preoperative antibiotics were given in a timely fashion.    An oblique incision was created of the right flank using a 10 blade scalpel directly centered over the L2-3 segment. Dissection was carried bluntly through subcutaneous tissues. Blunt dissection was also carried between the external oblique and internal oblique musculature. The transversalis fascia was pierced allowing access to the retroperitoneal space. At this point, a series of neuromonitoring probes were used to safely access the L2-3 level. We used stimulated and free run EMG for this purpose. Once nerve roots were confirmed to be out of harm's way, self retaining retractors were placed for continuous exposure.     An annulotomy was then created at the L2-3 area using a 15 blade scalpel on a long handle. A Cardozo elevator was used to remove disc material off of the endplates. Disc material was removed using Kerrisons, pituitaries, and forward angled curettes. Once all disc material had been retrieved, I used the Cardozo elevator to divide the contralateral annulus. This assisted with mobilization of the vertebral body. We then used a series of endplate scrapers to prepare the endplates for interbody fusion. Trial spacers were malleted into position and for the disc space it was felt that a 12 mm x 45 mm implant would be the best fit to restore disc height. The disc space was then thoroughly irrigated with saline solution.     A PEEK spacer from the [employer] instrumentation set measuring 12 mm x 45 mm x 22 mm was then packed with allograft bone matrix called Osteocel Pro as tightly as possible. This PEEK spacer was then malleted into the L2-3 disc space under fluoroscopic guidance. It was placed as a PEEK interbody biomechanical device to assist with interbody fusion.     The wound was then irrigated thoroughly. A thorough inspection was then undertaken to ensure that we had adequate hemostasis. Bleeding at this point  was controlled using thrombin with Gelfoam powder and bipolar cautery. Closure was then accomplished with a #1 Vicryl reapproximating the internal and external oblique musculature. Immediate subcutaneous cutaneous tissues were closed with a 3-0 Vicryl. And skin closure was accomplished using Mastisol and Steri-Strips. The wound was then sterilely dressed. The patient was then carefully rotated supine onto a hospital gurney, extubated, and sent to the recovery room in good stable condition.     The patient tolerated the procedure well. There were no complications. We estimate blood loss to be minimal. The patient remained hemodynamically stable.     Intraoperative neuro monitoring was ordered and carried out throughout the procedure to add an increased level of safety for the patient.  The interpreting physician was available by means of real-time continuous, bidirectional, remote audio and visual communication as needed throughout the entire procedure.  Modalities used during the procedure included SSEP, EEG, MEP, EMG, and TOF.  There were no neuro monitoring signal changes during the procedure.    Jean Meléndez PA-C provided critical assistance during the procedure. His assistance was medically necessary in order to allow the procedure to occur in the most safe and efficient manner.    MELCHOR Dunaway MD     Date: 1/27/2025  Time: 08:26 CST    Electronically signed by ALEXIS Dunaway MD at 01/27/25 0826       Physician Progress Notes (last 24 hours)  Notes from 01/27/25 0623 through 01/28/25 0623   No notes of this type exist for this encounter.       Consult Notes (last 24 hours)  Notes from 01/27/25 0623 through 01/28/25 0623   No notes of this type exist for this encounter.

## 2025-01-28 NOTE — PROGRESS NOTES
VON Meléndez PA-C   Progress Note        Subjective/Overnight Events:  She is sleepy this morning, family reports she was angry overnight, no PT/OT yet    Vitals  Vitals:    01/27/25 1520 01/27/25 2100 01/27/25 2333 01/28/25 0322   BP: 152/83 151/68 112/83 146/67   BP Location: Left arm Left arm Left arm Right arm   Patient Position: Lying Lying Lying Lying   Pulse: 62 53 62 64   Resp: 18 18 18 18   Temp: 97.6 °F (36.4 °C) 97.5 °F (36.4 °C) 98.4 °F (36.9 °C)    TempSrc: Oral Axillary Oral    SpO2: 95% 95% 95% 92%   Weight:       Height:           Current Facility-Administered Medications   Medication Dose Route Frequency Provider Last Rate Last Admin    acetaminophen (TYLENOL) tablet 650 mg  650 mg Oral Q4H PRN ALEXIS Dunaway MD        Or    acetaminophen (TYLENOL) 160 MG/5ML oral solution 650 mg  650 mg Oral Q4H PRN ALEXIS Dunaway MD        Or    acetaminophen (TYLENOL) suppository 650 mg  650 mg Rectal Q4H PRN ALEXIS Dunaway MD        ALPRAZolam (XANAX) tablet 1 mg  1 mg Oral TID PRN ALEXIS Dunaway MD   1 mg at 01/27/25 1959    sennosides-docusate (PERICOLACE) 8.6-50 MG per tablet 2 tablet  2 tablet Oral BID ALEXIS Dunaway MD   2 tablet at 01/27/25 2000    And    polyethylene glycol (MIRALAX) packet 17 g  17 g Oral Daily PRN ALEXIS Dunaway MD        And    bisacodyl (DULCOLAX) EC tablet 5 mg  5 mg Oral Daily PRN ALEXIS Dunaway MD        And    bisacodyl (DULCOLAX) suppository 10 mg  10 mg Rectal Daily PRN ALEXIS Dunaway MD        [START ON 1/29/2025] ceFAZolin 2000 mg IVPB in 100 mL NS (MBP)  2,000 mg Intravenous On Call to OR Carlos Meléndez PA-C        furosemide (LASIX) tablet 40 mg  40 mg Oral Daily ALEXIS Dunaway MD   40 mg at 01/27/25 1528    HYDROcodone-acetaminophen (NORCO)  MG per tablet 1 tablet  1 tablet Oral Q4H PRN ALEXIS Dunaway MD   1 tablet at 01/28/25 0357    HYDROcodone-acetaminophen (NORCO) 7.5-325 MG  per tablet 1 tablet  1 tablet Oral Q4H PRN ALEXIS Dunaway MD        HYDROmorphone (DILAUDID) injection 0.5 mg  0.5 mg Intravenous Q3H PRN ALEXIS uDnaway MD        And    naloxone (NARCAN) injection 0.4 mg  0.4 mg Intravenous Q5 Min PRN ALEXIS Dunaway MD        ondansetron ODT (ZOFRAN-ODT) disintegrating tablet 4 mg  4 mg Oral Q6H PRN ALEXIS Dunaway MD        Or    ondansetron (ZOFRAN) injection 4 mg  4 mg Intravenous Q6H PRN ALEXIS Dunaway MD   4 mg at 01/27/25 1917    pregabalin (LYRICA) capsule 150 mg  150 mg Oral BID ALEXIS Dunaway MD   150 mg at 01/27/25 2000    prochlorperazine (COMPAZINE) injection 10 mg  10 mg Intravenous Q6H PRN Carlos Meléndez PA-C   10 mg at 01/27/25 2153    sodium chloride 0.9 % flush 10 mL  10 mL Intravenous PRN ALEXIS Dunaway MD        sodium chloride 0.9 % flush 3 mL  3 mL Intravenous Q12H ALEXIS Dunaway MD        sodium chloride 0.9 % infusion 40 mL  40 mL Intravenous PRN ALEXIS Dunaway MD        sodium chloride 0.9 % infusion  100 mL/hr Intravenous Continuous ALEXIS Dunaway  mL/hr at 01/28/25 0111 100 mL/hr at 01/28/25 0111    tiZANidine (ZANAFLEX) tablet 4 mg  4 mg Oral Q8H PRN ALEXIS Dunaway MD           PHYSICAL EXAM:    Orientation:   sleepy    Incision:  no significant drainage    Upper Extremity Motor :  5/5 bilaterally    Upper Motor Neuron Signs:  none     Lower Extremity Motor :  equal bilaterally    Lower Extremity Sensory:  Tibial nerve: Intact, Superficial peroneal nerve: Intact, and Deep peroneal nerve: Intact    Flatus:  flatus    ABNORMAL EXAM FINDINGS:  mild confusion     LABS:    Lab Results (last 24 hours)       Procedure Component Value Units Date/Time    CBC & Differential [[id number]]  (Abnormal) Collected: 01/28/25 0452    Specimen: Blood Updated: 01/28/25 0605    Narrative:      The following orders were created for panel order CBC & Differential.  Procedure                                Abnormality         Status                     ---------                               -----------         ------                     CBC Auto Differential[[id number]]        Abnormal            Final result                 Please view results for these tests on the individual orders.    CBC Auto Differential [[id number]]  (Abnormal) Collected: 01/28/25 0452    Specimen: Blood Updated: 01/28/25 0605     WBC 8.34 10*3/mm3      RBC 4.28 10*6/mm3      Hemoglobin 11.7 g/dL      Hematocrit 35.4 %      MCV 82.7 fL      MCH 27.3 pg      MCHC 33.1 g/dL      RDW 14.9 %      RDW-SD 45.2 fl      MPV 9.5 fL      Platelets 318 10*3/mm3      Neutrophil % 74.5 %      Lymphocyte % 16.1 %      Monocyte % 8.3 %      Eosinophil % 0.1 %      Basophil % 0.4 %      Immature Grans % 0.6 %      Neutrophils, Absolute 6.22 10*3/mm3      Lymphocytes, Absolute 1.34 10*3/mm3      Monocytes, Absolute 0.69 10*3/mm3      Eosinophils, Absolute 0.01 10*3/mm3      Basophils, Absolute 0.03 10*3/mm3      Immature Grans, Absolute 0.05 10*3/mm3      nRBC 0.0 /100 WBC     Basic Metabolic Panel [[id number]]  (Abnormal) Collected: 01/28/25 0452    Specimen: Blood Updated: 01/28/25 0540     Glucose 102 mg/dL      BUN 7 mg/dL      Creatinine 0.49 mg/dL      Sodium 139 mmol/L      Potassium 3.6 mmol/L      Chloride 99 mmol/L      CO2 28.0 mmol/L      Calcium 8.6 mg/dL      BUN/Creatinine Ratio 14.3     Anion Gap 12.0 mmol/L      eGFR 108.7 mL/min/1.73     Narrative:      GFR Categories in Chronic Kidney Disease (CKD)      GFR Category          GFR (mL/min/1.73)    Interpretation  G1                     90 or greater         Normal or high (1)  G2                      60-89                Mild decrease (1)  G3a                   45-59                Mild to moderate decrease  G3b                   30-44                Moderate to severe decrease  G4                    15-29                Severe decrease  G5                    14 or less           Kidney  failure          (1)In the absence of evidence of kidney disease, neither GFR category G1 or G2 fulfill the criteria for CKD.    eGFR calculation 2021 CKD-EPI creatinine equation, which does not include race as a factor    Iron Profile [[id number]]  (Abnormal) Collected: 01/28/25 0452    Specimen: Blood Updated: 01/28/25 0540     Iron 55 mcg/dL      Iron Saturation (TSAT) 20 %      Transferrin 189 mg/dL      TIBC 282 mcg/dL     Vitamin B12 [[id number]] Collected: 01/28/25 0452    Specimen: Blood Updated: 01/28/25 0457    Folate [[id number]] Collected: 01/28/25 0452    Specimen: Blood Updated: 01/28/25 0457            ASSESSMENT AND PLAN:    Post operative day 1 status post lateral lumbar fusion L2/3    1:  Activity Level:  up with pt/ot  2:  Pain Control:  oral analgesia   3:  Discharge Planning:  pending completion of next stage  4:  Other:  npo after midnight, abx on call to OR      Electronically signed by Carlos Meléndez PA-C 1/28/2025 07:39 CST

## 2025-01-29 ENCOUNTER — ANESTHESIA (OUTPATIENT)
Dept: PERIOP | Facility: HOSPITAL | Age: 60
End: 2025-01-29
Payer: MEDICARE

## 2025-01-29 ENCOUNTER — ANESTHESIA EVENT (OUTPATIENT)
Dept: PERIOP | Facility: HOSPITAL | Age: 60
End: 2025-01-29
Payer: MEDICARE

## 2025-01-29 ENCOUNTER — APPOINTMENT (OUTPATIENT)
Dept: GENERAL RADIOLOGY | Facility: HOSPITAL | Age: 60
End: 2025-01-29
Payer: MEDICARE

## 2025-01-29 PROCEDURE — 0QP104Z REMOVAL OF INTERNAL FIXATION DEVICE FROM SACRUM, OPEN APPROACH: ICD-10-PCS | Performed by: ORTHOPAEDIC SURGERY

## 2025-01-29 PROCEDURE — 25010000002 LIDOCAINE PF 2% 2 % SOLUTION

## 2025-01-29 PROCEDURE — 0QP004Z REMOVAL OF INTERNAL FIXATION DEVICE FROM LUMBAR VERTEBRA, OPEN APPROACH: ICD-10-PCS | Performed by: ORTHOPAEDIC SURGERY

## 2025-01-29 PROCEDURE — 0SG00J1 FUSION OF LUMBAR VERTEBRAL JOINT WITH SYNTHETIC SUBSTITUTE, POSTERIOR APPROACH, POSTERIOR COLUMN, OPEN APPROACH: ICD-10-PCS | Performed by: ORTHOPAEDIC SURGERY

## 2025-01-29 PROCEDURE — 25010000002 HYDROMORPHONE PER 4 MG: Performed by: ORTHOPAEDIC SURGERY

## 2025-01-29 PROCEDURE — 25810000003 LACTATED RINGERS PER 1000 ML: Performed by: ORTHOPAEDIC SURGERY

## 2025-01-29 PROCEDURE — 25810000003 SODIUM CHLORIDE PER 500 ML: Performed by: ORTHOPAEDIC SURGERY

## 2025-01-29 PROCEDURE — 72100 X-RAY EXAM L-S SPINE 2/3 VWS: CPT

## 2025-01-29 PROCEDURE — C1769 GUIDE WIRE: HCPCS | Performed by: ORTHOPAEDIC SURGERY

## 2025-01-29 PROCEDURE — 25010000002 HYDROMORPHONE PER 4 MG: Performed by: ANESTHESIOLOGY

## 2025-01-29 PROCEDURE — 97164 PT RE-EVAL EST PLAN CARE: CPT

## 2025-01-29 PROCEDURE — 25010000002 FENTANYL CITRATE (PF) 100 MCG/2ML SOLUTION

## 2025-01-29 PROCEDURE — C1713 ANCHOR/SCREW BN/BN,TIS/BN: HCPCS | Performed by: ORTHOPAEDIC SURGERY

## 2025-01-29 PROCEDURE — 25010000002 CEFAZOLIN PER 500 MG: Performed by: ORTHOPAEDIC SURGERY

## 2025-01-29 PROCEDURE — 25010000002 DEXAMETHASONE PER 1 MG

## 2025-01-29 PROCEDURE — 25010000002 PROPOFOL 10 MG/ML EMULSION

## 2025-01-29 PROCEDURE — 97168 OT RE-EVAL EST PLAN CARE: CPT

## 2025-01-29 PROCEDURE — 25010000002 CEFAZOLIN PER 500 MG: Performed by: PHYSICIAN ASSISTANT

## 2025-01-29 PROCEDURE — 25010000002 PROCHLORPERAZINE 10 MG/2ML SOLUTION: Performed by: ORTHOPAEDIC SURGERY

## 2025-01-29 PROCEDURE — 25010000002 FENTANYL CITRATE (PF) 50 MCG/ML SOLUTION: Performed by: ANESTHESIOLOGY

## 2025-01-29 PROCEDURE — 25010000002 ONDANSETRON PER 1 MG: Performed by: ORTHOPAEDIC SURGERY

## 2025-01-29 PROCEDURE — 25010000002 ONDANSETRON PER 1 MG

## 2025-01-29 PROCEDURE — 25010000002 HYDROMORPHONE 1 MG/ML SOLUTION

## 2025-01-29 PROCEDURE — 0SG10AJ FUSION OF 2 OR MORE LUMBAR VERTEBRAL JOINTS WITH INTERBODY FUSION DEVICE, POSTERIOR APPROACH, ANTERIOR COLUMN, OPEN APPROACH: ICD-10-PCS | Performed by: ORTHOPAEDIC SURGERY

## 2025-01-29 PROCEDURE — 25010000002 BUPIVACAINE LIPOSOME 1.3 % SUSPENSION 10 ML VIAL: Performed by: ORTHOPAEDIC SURGERY

## 2025-01-29 PROCEDURE — 76000 FLUOROSCOPY <1 HR PHYS/QHP: CPT

## 2025-01-29 PROCEDURE — 25010000002 GLYCOPYRROLATE 0.4 MG/2ML SOLUTION

## 2025-01-29 DEVICE — CANNULATED EXTENDED TAB POLYAXIAL REDUCTION SCREW, 6.5 MM X 45 MM
Type: IMPLANTABLE DEVICE | Site: SPINE LUMBAR | Status: FUNCTIONAL
Brand: INVICTUS

## 2025-01-29 DEVICE — SET SCREW
Type: IMPLANTABLE DEVICE | Site: SPINE LUMBAR | Status: FUNCTIONAL
Brand: INVICTUS

## 2025-01-29 DEVICE — TI, MIS LORDOTIC ROD, VI2, 5.5MM X 130MM
Type: IMPLANTABLE DEVICE | Site: SPINE LUMBAR | Status: FUNCTIONAL
Brand: INVICTUS

## 2025-01-29 DEVICE — TI, MIS LORDOTIC ROD, VI2, 5.5MM X 120MM
Type: IMPLANTABLE DEVICE | Site: SPINE LUMBAR | Status: FUNCTIONAL
Brand: INVICTUS

## 2025-01-29 DEVICE — AVITENE FLOUR, 1.0 GRAM
Type: IMPLANTABLE DEVICE | Site: SPINE LUMBAR | Status: FUNCTIONAL
Brand: AVITENE

## 2025-01-29 RX ORDER — LABETALOL HYDROCHLORIDE 5 MG/ML
5 INJECTION, SOLUTION INTRAVENOUS
Status: DISCONTINUED | OUTPATIENT
Start: 2025-01-29 | End: 2025-01-29 | Stop reason: HOSPADM

## 2025-01-29 RX ORDER — OXYCODONE AND ACETAMINOPHEN 10; 325 MG/1; MG/1
1 TABLET ORAL EVERY 4 HOURS PRN
Status: DISCONTINUED | OUTPATIENT
Start: 2025-01-29 | End: 2025-01-29 | Stop reason: HOSPADM

## 2025-01-29 RX ORDER — ONDANSETRON 2 MG/ML
INJECTION INTRAMUSCULAR; INTRAVENOUS AS NEEDED
Status: DISCONTINUED | OUTPATIENT
Start: 2025-01-29 | End: 2025-01-29 | Stop reason: SURG

## 2025-01-29 RX ORDER — PROPOFOL 10 MG/ML
VIAL (ML) INTRAVENOUS AS NEEDED
Status: DISCONTINUED | OUTPATIENT
Start: 2025-01-29 | End: 2025-01-29 | Stop reason: SURG

## 2025-01-29 RX ORDER — FLUMAZENIL 0.1 MG/ML
0.2 INJECTION INTRAVENOUS AS NEEDED
Status: DISCONTINUED | OUTPATIENT
Start: 2025-01-29 | End: 2025-01-29 | Stop reason: HOSPADM

## 2025-01-29 RX ORDER — SODIUM CHLORIDE, SODIUM LACTATE, POTASSIUM CHLORIDE, CALCIUM CHLORIDE 600; 310; 30; 20 MG/100ML; MG/100ML; MG/100ML; MG/100ML
100 INJECTION, SOLUTION INTRAVENOUS CONTINUOUS
Status: DISCONTINUED | OUTPATIENT
Start: 2025-01-29 | End: 2025-01-29 | Stop reason: HOSPADM

## 2025-01-29 RX ORDER — ROCURONIUM BROMIDE 10 MG/ML
INJECTION, SOLUTION INTRAVENOUS AS NEEDED
Status: DISCONTINUED | OUTPATIENT
Start: 2025-01-29 | End: 2025-01-29 | Stop reason: SURG

## 2025-01-29 RX ORDER — MAGNESIUM HYDROXIDE 1200 MG/15ML
LIQUID ORAL AS NEEDED
Status: DISCONTINUED | OUTPATIENT
Start: 2025-01-29 | End: 2025-01-29 | Stop reason: HOSPADM

## 2025-01-29 RX ORDER — LIDOCAINE HYDROCHLORIDE 20 MG/ML
INJECTION, SOLUTION EPIDURAL; INFILTRATION; INTRACAUDAL; PERINEURAL AS NEEDED
Status: DISCONTINUED | OUTPATIENT
Start: 2025-01-29 | End: 2025-01-29 | Stop reason: SURG

## 2025-01-29 RX ORDER — FOLIC ACID 1 MG/1
1 TABLET ORAL DAILY
Status: DISCONTINUED | OUTPATIENT
Start: 2025-01-29 | End: 2025-01-31 | Stop reason: HOSPADM

## 2025-01-29 RX ORDER — SODIUM CHLORIDE 0.9 % (FLUSH) 0.9 %
3-10 SYRINGE (ML) INJECTION AS NEEDED
Status: DISCONTINUED | OUTPATIENT
Start: 2025-01-29 | End: 2025-01-29 | Stop reason: HOSPADM

## 2025-01-29 RX ORDER — GLYCOPYRROLATE 0.2 MG/ML
INJECTION INTRAMUSCULAR; INTRAVENOUS AS NEEDED
Status: DISCONTINUED | OUTPATIENT
Start: 2025-01-29 | End: 2025-01-29 | Stop reason: SURG

## 2025-01-29 RX ORDER — IBUPROFEN 600 MG/1
600 TABLET, FILM COATED ORAL EVERY 6 HOURS PRN
Status: DISCONTINUED | OUTPATIENT
Start: 2025-01-29 | End: 2025-01-29 | Stop reason: HOSPADM

## 2025-01-29 RX ORDER — SCOPOLAMINE 1 MG/3D
1 PATCH, EXTENDED RELEASE TRANSDERMAL ONCE
Status: DISCONTINUED | OUTPATIENT
Start: 2025-01-29 | End: 2025-01-29

## 2025-01-29 RX ORDER — DEXAMETHASONE SODIUM PHOSPHATE 4 MG/ML
INJECTION, SOLUTION INTRA-ARTICULAR; INTRALESIONAL; INTRAMUSCULAR; INTRAVENOUS; SOFT TISSUE AS NEEDED
Status: DISCONTINUED | OUTPATIENT
Start: 2025-01-29 | End: 2025-01-29 | Stop reason: SURG

## 2025-01-29 RX ORDER — SODIUM CHLORIDE 9 MG/ML
INJECTION, SOLUTION INTRAVENOUS AS NEEDED
Status: DISCONTINUED | OUTPATIENT
Start: 2025-01-29 | End: 2025-01-29 | Stop reason: HOSPADM

## 2025-01-29 RX ORDER — ONDANSETRON 2 MG/ML
4 INJECTION INTRAMUSCULAR; INTRAVENOUS
Status: DISCONTINUED | OUTPATIENT
Start: 2025-01-29 | End: 2025-01-29 | Stop reason: HOSPADM

## 2025-01-29 RX ORDER — FENTANYL CITRATE 50 UG/ML
50 INJECTION, SOLUTION INTRAMUSCULAR; INTRAVENOUS
Status: DISCONTINUED | OUTPATIENT
Start: 2025-01-29 | End: 2025-01-29 | Stop reason: HOSPADM

## 2025-01-29 RX ORDER — SODIUM CHLORIDE 0.9 % (FLUSH) 0.9 %
3 SYRINGE (ML) INJECTION EVERY 12 HOURS SCHEDULED
Status: DISCONTINUED | OUTPATIENT
Start: 2025-01-29 | End: 2025-01-29 | Stop reason: HOSPADM

## 2025-01-29 RX ORDER — SODIUM CHLORIDE 9 MG/ML
40 INJECTION, SOLUTION INTRAVENOUS AS NEEDED
Status: DISCONTINUED | OUTPATIENT
Start: 2025-01-29 | End: 2025-01-29 | Stop reason: HOSPADM

## 2025-01-29 RX ORDER — HYDROMORPHONE HYDROCHLORIDE 1 MG/ML
0.5 INJECTION, SOLUTION INTRAMUSCULAR; INTRAVENOUS; SUBCUTANEOUS
Status: DISCONTINUED | OUTPATIENT
Start: 2025-01-29 | End: 2025-01-29 | Stop reason: HOSPADM

## 2025-01-29 RX ORDER — BUPIVACAINE HCL/0.9 % NACL/PF 0.125 %
PLASTIC BAG, INJECTION (ML) EPIDURAL AS NEEDED
Status: DISCONTINUED | OUTPATIENT
Start: 2025-01-29 | End: 2025-01-29 | Stop reason: SURG

## 2025-01-29 RX ORDER — NALOXONE HCL 0.4 MG/ML
0.04 VIAL (ML) INJECTION AS NEEDED
Status: DISCONTINUED | OUTPATIENT
Start: 2025-01-29 | End: 2025-01-29 | Stop reason: HOSPADM

## 2025-01-29 RX ORDER — OXYCODONE HCL 20 MG/1
20 TABLET, FILM COATED, EXTENDED RELEASE ORAL ONCE
Status: COMPLETED | OUTPATIENT
Start: 2025-01-29 | End: 2025-01-29

## 2025-01-29 RX ORDER — FENTANYL CITRATE 50 UG/ML
INJECTION, SOLUTION INTRAMUSCULAR; INTRAVENOUS AS NEEDED
Status: DISCONTINUED | OUTPATIENT
Start: 2025-01-29 | End: 2025-01-29 | Stop reason: SURG

## 2025-01-29 RX ORDER — MIDAZOLAM HYDROCHLORIDE 2 MG/2ML
1 INJECTION, SOLUTION INTRAMUSCULAR; INTRAVENOUS
Status: DISCONTINUED | OUTPATIENT
Start: 2025-01-29 | End: 2025-01-29 | Stop reason: HOSPADM

## 2025-01-29 RX ORDER — SODIUM CHLORIDE, SODIUM LACTATE, POTASSIUM CHLORIDE, CALCIUM CHLORIDE 600; 310; 30; 20 MG/100ML; MG/100ML; MG/100ML; MG/100ML
20 INJECTION, SOLUTION INTRAVENOUS CONTINUOUS
Status: DISPENSED | OUTPATIENT
Start: 2025-01-29 | End: 2025-01-30

## 2025-01-29 RX ORDER — NEOSTIGMINE METHYLSULFATE 5 MG/5 ML
SYRINGE (ML) INTRAVENOUS AS NEEDED
Status: DISCONTINUED | OUTPATIENT
Start: 2025-01-29 | End: 2025-01-29 | Stop reason: SURG

## 2025-01-29 RX ADMIN — FENTANYL CITRATE 50 MCG: 50 INJECTION, SOLUTION INTRAMUSCULAR; INTRAVENOUS at 12:52

## 2025-01-29 RX ADMIN — Medication 20 MG: at 11:14

## 2025-01-29 RX ADMIN — Medication 3 ML: at 20:42

## 2025-01-29 RX ADMIN — ONDANSETRON HYDROCHLORIDE 4 MG: 2 INJECTION, SOLUTION INTRAMUSCULAR; INTRAVENOUS at 11:52

## 2025-01-29 RX ADMIN — Medication 200 MCG: at 10:20

## 2025-01-29 RX ADMIN — HYDROMORPHONE HYDROCHLORIDE 0.5 MG: 1 INJECTION, SOLUTION INTRAMUSCULAR; INTRAVENOUS; SUBCUTANEOUS at 02:12

## 2025-01-29 RX ADMIN — Medication 2 MG: at 11:53

## 2025-01-29 RX ADMIN — CEFAZOLIN 2000 MG: 2 INJECTION, POWDER, FOR SOLUTION INTRAMUSCULAR; INTRAVENOUS at 18:20

## 2025-01-29 RX ADMIN — FENTANYL CITRATE 100 MCG: 50 INJECTION, SOLUTION INTRAMUSCULAR; INTRAVENOUS at 11:32

## 2025-01-29 RX ADMIN — CEFAZOLIN 2000 MG: 2 INJECTION, POWDER, FOR SOLUTION INTRAMUSCULAR; INTRAVENOUS at 10:15

## 2025-01-29 RX ADMIN — ROCURONIUM BROMIDE 40 MG: 10 INJECTION, SOLUTION INTRAVENOUS at 10:08

## 2025-01-29 RX ADMIN — PROCHLORPERAZINE EDISYLATE 10 MG: 5 INJECTION INTRAMUSCULAR; INTRAVENOUS at 14:43

## 2025-01-29 RX ADMIN — HYDROMORPHONE HYDROCHLORIDE 1 MG: 1 INJECTION, SOLUTION INTRAMUSCULAR; INTRAVENOUS; SUBCUTANEOUS at 11:49

## 2025-01-29 RX ADMIN — HYDROMORPHONE HYDROCHLORIDE 0.5 MG: 1 INJECTION, SOLUTION INTRAMUSCULAR; INTRAVENOUS; SUBCUTANEOUS at 16:30

## 2025-01-29 RX ADMIN — Medication 200 MCG: at 11:00

## 2025-01-29 RX ADMIN — ONDANSETRON 4 MG: 2 INJECTION INTRAMUSCULAR; INTRAVENOUS at 18:33

## 2025-01-29 RX ADMIN — FENTANYL CITRATE 100 MCG: 50 INJECTION, SOLUTION INTRAMUSCULAR; INTRAVENOUS at 10:08

## 2025-01-29 RX ADMIN — DEXAMETHASONE SODIUM PHOSPHATE 4 MG: 4 INJECTION, SOLUTION INTRA-ARTICULAR; INTRALESIONAL; INTRAMUSCULAR; INTRAVENOUS; SOFT TISSUE at 11:52

## 2025-01-29 RX ADMIN — HYDROMORPHONE HYDROCHLORIDE 0.5 MG: 1 INJECTION, SOLUTION INTRAMUSCULAR; INTRAVENOUS; SUBCUTANEOUS at 12:51

## 2025-01-29 RX ADMIN — HYDROMORPHONE HYDROCHLORIDE 0.5 MG: 1 INJECTION, SOLUTION INTRAMUSCULAR; INTRAVENOUS; SUBCUTANEOUS at 13:05

## 2025-01-29 RX ADMIN — LIDOCAINE HYDROCHLORIDE 100 MG: 20 INJECTION, SOLUTION EPIDURAL; INFILTRATION; INTRACAUDAL; PERINEURAL at 10:08

## 2025-01-29 RX ADMIN — SODIUM CHLORIDE, POTASSIUM CHLORIDE, SODIUM LACTATE AND CALCIUM CHLORIDE 20 ML/HR: 600; 310; 30; 20 INJECTION, SOLUTION INTRAVENOUS at 09:15

## 2025-01-29 RX ADMIN — Medication 300 MCG: at 10:45

## 2025-01-29 RX ADMIN — ALPRAZOLAM 1 MG: 0.5 TABLET ORAL at 20:41

## 2025-01-29 RX ADMIN — PREGABALIN 150 MG: 75 CAPSULE ORAL at 20:42

## 2025-01-29 RX ADMIN — OXYCODONE HYDROCHLORIDE 20 MG: 20 TABLET, FILM COATED, EXTENDED RELEASE ORAL at 09:22

## 2025-01-29 RX ADMIN — Medication 30 MG: at 10:46

## 2025-01-29 RX ADMIN — HYDROCODONE BITARTRATE AND ACETAMINOPHEN 1 TABLET: 10; 325 TABLET ORAL at 18:19

## 2025-01-29 RX ADMIN — PROPOFOL 150 MG: 10 INJECTION, EMULSION INTRAVENOUS at 10:08

## 2025-01-29 RX ADMIN — HYDROMORPHONE HYDROCHLORIDE 0.5 MG: 1 INJECTION, SOLUTION INTRAMUSCULAR; INTRAVENOUS; SUBCUTANEOUS at 22:54

## 2025-01-29 RX ADMIN — HYDROCODONE BITARTRATE AND ACETAMINOPHEN 1 TABLET: 10; 325 TABLET ORAL at 05:50

## 2025-01-29 RX ADMIN — GLYCOPYRROLATE 0.2 MG: 0.2 INJECTION INTRAMUSCULAR; INTRAVENOUS at 11:53

## 2025-01-29 RX ADMIN — SODIUM CHLORIDE, POTASSIUM CHLORIDE, SODIUM LACTATE AND CALCIUM CHLORIDE: 600; 310; 30; 20 INJECTION, SOLUTION INTRAVENOUS at 12:02

## 2025-01-29 RX ADMIN — HYDROMORPHONE HYDROCHLORIDE 0.5 MG: 1 INJECTION, SOLUTION INTRAMUSCULAR; INTRAVENOUS; SUBCUTANEOUS at 19:28

## 2025-01-29 RX ADMIN — PROCHLORPERAZINE EDISYLATE 10 MG: 5 INJECTION INTRAMUSCULAR; INTRAVENOUS at 20:41

## 2025-01-29 RX ADMIN — Medication 100 MCG: at 11:27

## 2025-01-29 RX ADMIN — Medication 300 MCG: at 10:27

## 2025-01-29 RX ADMIN — SCOPOLAMINE 1 PATCH: 1.5 PATCH, EXTENDED RELEASE TRANSDERMAL at 09:23

## 2025-01-29 RX ADMIN — HYDROCODONE BITARTRATE AND ACETAMINOPHEN 1 TABLET: 10; 325 TABLET ORAL at 22:00

## 2025-01-29 NOTE — ANESTHESIA PREPROCEDURE EVALUATION
Anesthesia Evaluation     Patient summary reviewed   history of anesthetic complications:  PONV  NPO Solid Status: > 8 hours             Airway   Mallampati: II  Dental    (+) upper dentures and lower dentures    Pulmonary    (-) COPD, asthma, sleep apnea, not a smoker  Cardiovascular   Exercise tolerance: good (4-7 METS)    (-) pacemaker, past MI, angina, cardiac stents      Neuro/Psych  (-) seizures, TIA, CVA  GI/Hepatic/Renal/Endo    (+) obesity, GERD  (-) liver disease, no renal disease, diabetes    Musculoskeletal     Abdominal    Substance History      OB/GYN          Other                        Anesthesia Plan    ASA 2     general     intravenous induction     Anesthetic plan, risks, benefits, and alternatives have been provided, discussed and informed consent has been obtained with: patient.      CODE STATUS:    Code Status (Patient has no pulse and is not breathing): CPR (Attempt to Resuscitate)  Medical Interventions (Patient has pulse or is breathing): Full Support

## 2025-01-29 NOTE — OP NOTE
LUMBAR LAMINECTOMY POSTERIOR LUMBAR INTERBODY FUSION  Procedure Note    Adela Arauz  1/29/2025    Pre-op Diagnosis:      1. Status post anterior decompression, ALIF with instrumentation L5-S1, 12/02/2020   2. Status post left LLIF L3-5 with instrumentation L3-4, 12/16/2020  3. Status post PSF with instrumentation L3-S1, 12/18/2020   4. Recurrent chronic low back pain   5. Bilateral anterior thigh, groin, and leg radiculopathy, right worse than left  6. Recurrent neurogenic claudication   7. Severe adjacent level degenerative disc disease L2-3   8. Facet arthropathy L2-3   9. Spondylolisthesis with instability L2-3   10. Focal kyphosis L2-3   11. Lateral listhesis L2-3   12. Focal scoliosis concave right L2-3   13. Central disc herniation L2-3   14. Severe central and bilateral foraminal stenosis L2-3  15.  Status post right LLIF L2-3, 1/27/2025    Post-op Diagnosis:     same     Procedure/CPT® Codes:     1.  Partial removal of posterior instrumentation L3-S1  2.  Exploration of fusion L3-S1  3.  Posterior spinal fusion L2-3  4.  Posterior spinal instrumentation L2-S1 (ATEC pedicle screws and rods)  5.  Use of locally obtained autograft bone for fusion  6.  Use of fluoroscopy for confirmation of surgical level and placement of instrumentation  7.  Intraoperative neural monitoring with pedicle screw stimulation    Spinal Surgery Levels Completed:1 Level      Anesthesia: General     Surgeon: MELCHOR Dunaway MD     Assistant: Jean Meléndez PA-C     Estimated Blood Loss: 50 mL     Complications: None     Condition: Stable to PACU.     Indications:     The patient is a 60-year-old who sees Charlene Huntley NP for medical issues. She has undergone a staged anterior, lateral, and posterior fusion with instrumentation from L3-S1 in December 2020. She did well with this procedure until about a year ago. She presented back to the office with recurrent chronic low back pain along with bilateral anterior thigh, groin,  and leg radiculopathy, with the right side worse than the left as well as symptoms consistent with recurrent neurogenic claudication. Imaging studies revealed severe adjacent level degenerative disc disease at L2-3 associated with facet arthropathy, spondylolisthesis with instability, focal kyphosis, lateral listhesis, focal scoliosis concave to the right, and a central disc herniation causing severe central and bilateral foraminal stenosis.      After failing all conservative measures, it was mutually decided that surgery would be the best option. Risks, benefits, and complications of surgery were discussed with the patient. The patient appeared well informed and wished to proceed. We specifically discussed the risk of infection, blood loss, nerve root injury, CSF leak, and the possibility of incomplete resolution of symptoms.  We also discussed the risk of a nonunion and the potential need for additional surgery in the event of a pseudoarthrosis or hardware failure.     We elected to proceed with surgery in a staged fashion.  Previously on 1/27/2025, the patient underwent a lateral fusion of L2-3.  Today we return to surgery for the second posterior stage of the procedure.     Operative Procedure:     After obtaining informed consent and verifying the correct operative site, the patient was brought to the operating room. A general anesthetic was provided by the anesthesia service with the assistance of an endotracheal tube. Once this was appropriately positioned and secured, the patient was carefully rotated prone onto a Fei frame. All bony processes were well-padded. The lumbar region was prepped and draped in usual sterile fashion. A surgical timeout was taken to confirm this was the correct patient, we were working at the correct levels, and that preoperative antibiotics were given in a timely fashion.     Next, bilateral Wiltsey incisions spanning the previous instrumentation from L3-S1 were created using  a 10 blade scalpel.  Dissection was carried through subcutaneous tissues using Bovie cautery. The fascia was divided in line with the incision. Blunt dissection was carried between the multifidus and longissimus muscles down to the previously placed instrumentation spanning L3-S1. There was some scar tissue as expected around the screws and between the musculature.  The previous instrumentation was completely exposed using Bovie cautery removing some fibrous scar tissue from around the screw heads themselves. The appropriate torx screwdriver and antitorque wrench were used to remove the setscrews. The rods were then taken out with a lola morgan. The area was then thoroughly explored bilaterally.  All of the pedicle screws appeared to be relatively tight with no evidence of loosening.  There appeared to be adequate bone graft in the posterior lateral gutters consistent with a solid fusion from L3 to S1.     The screws were all well-fixed and did not show any signs of loosening.  There were also from the same instrumentation set that I planned to use across L2-3.  The screws were felt to be adequate for use in our new construct and so they were left intact.  The wounds were then copiously irrigated with saline solution.     The left sided Wiltsey incision was then extended using the 10 blade scalpel up to L2.  The same intramuscular plane was used to gain access to the facet joint of L2-3 and the transverse processes of L2 and L3.  Self retaining retractors were placed for continuous exposure.  Bovie cautery was used to expose as much bony surface area on the transverse processes and to destroy the facet capsule at L2-3.  The wound was then thoroughly irrigated with saline solution.     Next under fluoroscopic guidance, I created starting holes for the placement of new pedicle screw instrumentation at L2.  A starting hole was created with a high-speed bur and the pedicle track was then created using a pedicle probe  gaining access through the pedicle to the anterior vertebral body.  The pedicle tract was palpated with a ball-tipped feeler to ensure that the track was adequate.  Into the pedicle of L2 on the left, I placed a screw measuring 6.5 x 45 mm from the [facility] instrumentation set.     Next, I turned my attention to the right side of the spine.  I was able to stretch the right sided Wiltsey incision superiorly and using a Jamshidi needle to cannulate the pedicle of L2 on the right.  Through this, a guidewire was placed to maintain position.  I then placed a 6.5 x 5 mm screw again from the [facility] instrumentation set into the right pedicle of L2.     Fluoroscopy was used to confirm that all instrumentation was properly positioned in both the AP and lateral projections.  The wounds were then copiously irrigated with saline solution.  I used a neuro monitoring probe to stimulate the screws to ensure that there were adequately positioned.  All screws appeared well positioned.     The high-speed bur was used to decorticate the posterior elements of L2 and L3 as well as the previous fusion mass on the left side from L3 to S1.  I also decorticated the facet joints of L2-3.  The local bone was left in situ to assist with obtaining a fusion.  This constituted a posterior fusion of L2-3.     Next appropriate rods were chosen to span the pedicle screw instrumentation spanning L2 to S1 bilaterally.  The rods were bent to accommodate the patient's increased lordosis.  Set screws were inserted into the pedicle screw saddles fixing the rods into position.  The setscrews were then tightened using the appropriate antitorque wrench and torque limiting screwdriver provided by [facility].     A final inspection was then done to ensure that we had adequate hemostasis.  Bleeding was controlled using bipolar cautery and Avitene.     After insuring that we had adequate hemostasis, closure was then undertaken using a #1 Vicryl to reapproximate the fascia.  Immediate subcutaneous tissues were closed with a 2-0 Vicryl and skin closure was then accomplished using Mastisol and Steri-Strips.  I did infuse the subcutaneous layer with half percent Marcaine to assist with postoperative pain control.  The wounds were then washed and sterilely dressed. The patient was then carefully rotated supine onto a hospital gurney, extubated, and sent to the recovery room in good stable condition. The patient tolerated the procedure well, there were no complications.  Estimated blood loss was approximately 50 mL.    Intraoperative neuro monitoring was ordered and carried out throughout the procedure to add an increased level of safety for the patient. The interpreting physician was available by means of real-time continuous, bidirectional, remote audio and visual communication as needed throughout the entire procedure. Modalities used during the procedure included SSEP, EMG, TOF, and pedicle screw stimulation. There were no neuro monitoring signal changes during the procedure.      Michael Hudson PA-C provided critical assistance during the procedure.  His assistance was medically necessary in order to allow the procedure to occur in the most safe and efficient manner.  He assisted not only with the partial removal of instrumentation and exploration of fusion from L3 to S1, but also assisted with the placement of new instrumentation and bone graft spanning the whole construct from L2 to S1.    MELCHOR Dunaway MD     Date: 1/29/2025  Time: 15:03 CST

## 2025-01-29 NOTE — PLAN OF CARE
Goal Outcome Evaluation:  Plan of Care Reviewed With: patient        Progress: improving  Outcome Evaluation: A/OX4. PRN Pain meds given with relief noted. VSS. NPO after MN for sx later today. Drsg on R flank remains cdi. Pt expresses concern about having this second sx d/t the excessive n/v she had from her first sx. RN told pt to be sure and let the anestheiologist know about the n/v from prior sx. SCDs. Safety maintained.

## 2025-01-29 NOTE — ANESTHESIA PROCEDURE NOTES
Airway  Urgency: elective    Date/Time: 1/29/2025 10:09 AM  Airway not difficult    General Information and Staff    Patient location during procedure: OR  CRNA/CAA: Davonte Schulz CRNA    Indications and Patient Condition  Indications for airway management: airway protection    Preoxygenated: yes  Mask difficulty assessment: 1 - vent by mask    Final Airway Details  Final airway type: endotracheal airway      Successful airway: ETT  Cuffed: yes   Successful intubation technique: direct laryngoscopy  Blade: Ellison  Blade size: 2  ETT size (mm): 7.5  Cormack-Lehane Classification: grade I - full view of glottis  Placement verified by: chest auscultation and capnometry   Measured from: lips  ETT/EBT  to lips (cm): 21  Number of attempts at approach: 1  Assessment: lips, teeth, and gum same as pre-op and atraumatic intubation

## 2025-01-29 NOTE — PLAN OF CARE
Goal Outcome Evaluation:  Plan of Care Reviewed With: patient, family        Progress: improving  Outcome Evaluation: Patient A+Ox4, LEWIS- patient able to tolerating PO pain medications and work with therapy today. Up in chair x3- tolerating meals at this time. Patient able to ambulate in hallway and up to BR during shift. LSO brace on while up and OOB. Patient VSS at this time. No reported N/V at this time. Safety maintained this shift. SCDs while in bed. Will be NPO for surgery in a.m.

## 2025-01-29 NOTE — THERAPY RE-EVALUATION
Patient Name: Adela Arauz  : 1965    MRN: [id number]                              Today's Date: 2025       Admit Date: 2025    Visit Dx:     ICD-10-CM ICD-9-CM   1. Impaired mobility [Z74.09]  Z74.09 799.89     Patient Active Problem List   Diagnosis    Chronic pain syndrome    Rheumatoid arthritis involving multiple sites    Osteoporosis    Chest pain    Multiple lung nodules < 6mm identified 2018    Lymphadenopathy    AAT (alpha-1-antitrypsin) deficiency    Axillary adenopathy    Former smoker    Lymphedema of right arm    Pain in right arm    Morbid (severe) obesity due to excess calories    Anxiety    Foraminal stenosis due to intervertebral disc disease    Lumbar stenosis    Lumbar radiculopathy    History of lumbar fusion    Degeneration of lumbar intervertebral disc    Plantar fasciitis    Panic attack    GERD (gastroesophageal reflux disease)     Past Medical History:   Diagnosis Date    AAT (alpha-1-antitrypsin) deficiency 2019    Anxiety 2020    Cancer     LYMPHOMA    DDD (degenerative disc disease), lumbar     GERD (gastroesophageal reflux disease)     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, 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     Low back pain     Nausea after anesthesia     Open wound of gum     pt states had all her teeth pulled and there is a spot that hasn't healed due to RA so she is using peridex rinse daily    Osteoporosis     PONV (postoperative nausea and vomiting)     Psoriasis     Rheumatoid arthritis     Seasonal allergies     Senile osteoporosis 2017     Past Surgical History:   Procedure Laterality Date    ANTERIOR LUMBAR EXPOSURE N/A 2020    Procedure: Anterior lumbar interbody fusion of L5-S1 with instrumentation ;  Surgeon: Neptali Rivera DO;  Location:  [facility];  Service: Vascular;  Laterality: N/A;    AXILLARY LYMPH NODE BIOPSY/EXCISION Right 2019    Procedure: EXCISION RIGHT AXILLARY MASS;  Surgeon: Jes Enamorado MD;  Location:  [facility];   Service: General    INCISION AND DRAINAGE ARM Right 06/03/2021    Procedure: INCISION AND DRAINAGE FOREARM;  Surgeon: Jes Enamorado MD;  Location:  [facility];  Service: General;  Laterality: Right;    LUMBAR FUSION N/A 12/02/2020    Procedure: ANTERIOR DECOMPRESSION, ANTERIOR LUMBAR INTERBODY FUSION WITH INSTRUMENTATION L5-S1;  Surgeon: ALEXIS Dunaway MD;  Location:  [facility];  Service: Orthopedic Spine;  Laterality: N/A;    LUMBAR FUSION Left 12/16/2020    Procedure: LEFT LATERAL LUMBAR INTERBODY FUSION L3-5 WITH INSTRUMENTATION L3-4;  Surgeon: ALEXIS Dunaway MD;  Location:  [facility];  Service: Orthopedic Spine;  Laterality: Left;    LUMBAR FUSION Right 1/27/2025    Procedure: RIGHT LATERAL LUMBAR INTERBODY FUSION WITH INSTRUMENTATION L2-3;  Surgeon: ALEXIS Dunaway MD;  Location:  [facility];  Service: Orthopedic Spine;  Laterality: Right;    LUMBAR LAMINECTOMY WITH FUSION N/A 12/18/2020    Procedure: DECOMPRESSION L3-4, POSTERIOR SPINAL FUSION WITH INSTRUMENTATION L3-S1;  Surgeon: ALEXIS Dunaway MD;  Location:  [facility];  Service: Orthopedic Spine;  Laterality: N/A;    SHOULDER SURGERY Left     arthroscopy for arthtritis and bone spurs    TUBAL ABDOMINAL LIGATION      US GUIDED LYMPH NODE BIOPSY  08/09/2019    WRIST SURGERY Bilateral     fracture - no internal hardware      General Information       Row Name 01/29/25 1433          OT Time and Intention    Subjective Information complains of;pain  -LS     Document Type re-evaluation  s/p PSF L2-S1 on 1/29. Pt previously underwent LLIF with instrumentation L2-3 on 1/27.  -LS     Mode of Treatment occupational therapy  -LS     Patient Effort adequate  -LS       Row Name 01/29/25 1430          General Information    Patient Profile Reviewed yes  -LS     Existing Precautions/Restrictions fall;spinal;LSO;brace worn when out of bed  -LS       Row Name 01/29/25 1430          Cognition    Orientation Status (Cognition) oriented x 4  -LS       Row Name  01/29/25 1434          Safety Issues/Impairments Affecting Functional Mobility    Impairments Affecting Function (Mobility) pain;strength;balance;endurance/activity tolerance  -               User Key  (r) = Recorded By, (t) = Taken By, (c) = Cosigned By      Initials Name Provider Type    Zina Baig OTR/L Occupational Therapist                     Mobility/ADL's       Row Name 01/29/25 1434          Bed Mobility    Bed Mobility rolling left;sidelying-sit  -     Rolling Left [place] (Bed Mobility) minimum assist (75% patient effort);verbal cues;nonverbal cues (demo/gesture)  -LS     Sidelying-Sit [place] (Bed Mobility) minimum assist (75% patient effort);verbal cues;nonverbal cues (demo/gesture)  -     Assistive Device (Bed Mobility) bed rails;head of bed elevated  -       Row Name 01/29/25 1434          Transfers    Transfers sit-stand transfer;stand-sit transfer;toilet transfer  -       Row Name 01/29/25 1434          Sit-Stand Transfer    Sit-Stand [place] (Transfers) contact guard;verbal cues  -     Assistive Device (Sit-Stand Transfers) other (see comments)  A x2  -       Row Name 01/29/25 1434          Stand-Sit Transfer    Stand-Sit [place] (Transfers) contact guard;verbal cues  -     Assistive Device (Stand-Sit Transfers) other (see comments)  A x2  -       Row Name 01/29/25 1434          Toilet Transfer    Type (Toilet Transfer) sit-stand;stand-sit  -LS     [place] Level (Toilet Transfer) contact guard;verbal cues  -     Assistive Device (Toilet Transfer) other (see comments)  A x2  -       Row Name 01/29/25 1434          Functional Mobility    Functional Mobility- Ind. Level contact guard assist  -     Functional Mobility- Device other (see comments)  A x2  -       Row Name 01/29/25 1434          Activities of Daily Living    BADL Assessment/Intervention toileting;upper body dressing  -       Row Name 01/29/25 1434          Toileting  Assessment/Training    [place] Level (Toileting) toileting skills;adjust/manage clothing;maximum assist (25% patient effort);perform perineal hygiene;contact guard assist  -LS     Position (Toileting) unsupported sitting;supported standing  -LS       Row Name 01/29/25 1434          Upper Body Dressing Assessment/Training    [place] Level (Upper Body Dressing) upper body dressing skills;maximum assist (25% patient effort);verbal cues  -LS     Position (Upper Body Dressing) edge of bed sitting  -LS     Comment, (Upper Body Dressing) LSO  -LS               User Key  (r) = Recorded By, (t) = Taken By, (c) = Cosigned By      Initials Name Provider Type    Zina Baig, OTR/L Occupational Therapist                   Obj/Interventions       Row Name 01/29/25 1434          Range of Motion Comprehensive    General Range of Motion bilateral upper extremity ROM WFL  -LS       Row Name 01/29/25 1434          Strength Comprehensive (MMT)    Comment, General Manual Muscle Testing (MMT) Assessment BUE strength testing deferred due to Pt pain  -LS       Row Name 01/29/25 1434          Balance    Balance Assessment sitting static balance;sitting dynamic balance;standing static balance;standing dynamic balance  -LS     Static Sitting Balance standby assist  -LS     Dynamic Sitting Balance contact guard  -LS     Position, Sitting Balance unsupported;sitting edge of bed  -LS     Static Standing Balance contact guard  HHA x2  -LS     Dynamic Standing Balance contact guard  -LS     Position/Device Used, Standing Balance supported  HHA x2  -LS               User Key  (r) = Recorded By, (t) = Taken By, (c) = Cosigned By      Initials Name Provider Type    Zina Baig OTR/L Occupational Therapist                   Goals/Plan       Row Name 01/29/25 1434          Transfer Goal 1 (OT)    Activity/Assistive Device (Transfer Goal 1, OT) toilet;shower chair  -LS     [place] Level/Cues Needed (Transfer Goal 1, OT)  modified independence  -LS     Time Frame (Transfer Goal 1, OT) long term goal (LTG);10 days  -LS     Progress/Outcome (Transfer Goal 1, OT) goal revised this date  -LS       Row Name 01/29/25 1434          Dressing Goal 1 (OT)    Activity/Device (Dressing Goal 1, OT) upper body dressing;lower body dressing  -LS     [place]/Cues Needed (Dressing Goal 1, OT) modified independence  -LS     Time Frame (Dressing Goal 1, OT) long term goal (LTG);10 days  -LS     Progress/Outcome (Dressing Goal 1, OT) goal revised this date  -LS       Row Name 01/29/25 1434          Toileting Goal 1 (OT)    Activity/Device (Toileting Goal 1, OT) toileting skills, all  -LS     [place] Level/Cues Needed (Toileting Goal 1, OT) modified independence  -LS     Time Frame (Toileting Goal 1, OT) long term goal (LTG);10 days  -LS     Progress/Outcome (Toileting Goal 1, OT) goal revised this date  -LS       Row Name 01/29/25 1434          Problem Specific Goal 1 (OT)    Problem Specific Goal 1 (OT) Pt will independently implement one pain management technique to decrease pain and improve functional adl performance.  -LS     Time Frame (Problem Specific Goal 1, OT) long term goal (LTG);by discharge  -LS     Progress/Outcome (Problem Specific Goal 1, OT) goal ongoing  -       Row Name 01/29/25 1437          Therapy Assessment/Plan (OT)    Planned Therapy Interventions (OT) transfer/mobility retraining;strengthening exercise;patient/caregiver education/training;occupation/activity based interventions;functional balance retraining;activity tolerance training;BADL retraining;adaptive equipment training;ROM/therapeutic exercise;orthotic fabrication/fitting/training  -               User Key  (r) = Recorded By, (t) = Taken By, (c) = Cosigned By      Initials Name Provider Type    Zina Baig OTR/L Occupational Therapist                   Clinical Impression       Row Name 01/29/25 1438          Pain Assessment    Pretreatment Pain  Rating 10/10  -LS     Posttreatment Pain Rating 10/10  -LS     Pain Location back  -LS     Pain Management Interventions exercise or physical activity utilized;activity modification encouraged  -LS     Response to Pain Interventions no change per patient report  -       Row Name 01/29/25 1434          Plan of Care Review    Plan of Care Reviewed With patient  -LS     Progress no change  -LS     Outcome Evaluation OT re-eval completed. Pt in fowlers upon therapist arrival; A&Ox4; c/o 10/10 back pain. Pt able to recall 3/3 spinal precautions this date. Pt performed log roll onto L side followed by sidelying>sit utilizing bedrail with HOB slightly elevated with Min A and verbal cues for sequencing and body mechanics. Pt required Max A to irma LSO while seated EOB with Max A. Pt performed all sit<>stand transfers to/from bed and toilet utilizing HHA x2 and/or grab bar with CGA and verbal cues for safety awareness and body mechanics. Pt ambulated from bed<>BR utilizing HHA x2 with CGA. Pt performed toileting task requiring Max A for clothing management and CGA for anterior leo hygiene in standing. Pt continues to require skilled OT intervention in order to address remaining deficits in fxl mobility, fxl activity tolerance, balance, strength, and use of adaptive techniques/equipment during performance of BADLs. Anticipate home with assist at discharge.  -       Row Name 01/29/25 1434          Therapy Assessment/Plan (OT)    Rehab Potential (OT) good  -LS     Criteria for Skilled Therapeutic Interventions Met (OT) yes;skilled treatment is necessary  -     Therapy Frequency (OT) 5 times/wk  -       Row Name 01/29/25 1434          Therapy Plan Review/Discharge Plan (OT)    Anticipated Discharge Disposition (OT) home with assist  -       Row Name 01/29/25 1434          Positioning and Restraints    Pre-Treatment Position in bed  -LS     Post Treatment Position bed  -LS     In Bed fowlers;side rails up x2;call light  within reach;encouraged to call for assist;SCD pump applied  -               User Key  (r) = Recorded By, (t) = Taken By, (c) = Cosigned By      Initials Name Provider Type    Zina Baig OTR/L Occupational Therapist                   Outcome Measures       Row Name 01/29/25 1434          How much help from another is currently needed...    Putting on and taking off regular lower body clothing? 2  -LS     Bathing (including washing, rinsing, and drying) 2  -LS     Toileting (which includes using toilet bed pan or urinal) 2  -LS     Putting on and taking off regular upper body clothing 2  -LS     Taking care of personal grooming (such as brushing teeth) 4  -LS     Eating meals 4  -LS     AM-PAC 6 Clicks Score (OT) 16  -       Row Name 01/29/25 1434          Functional Assessment    Outcome Measure Options AM-PAC 6 Clicks Daily Activity (OT)  -               User Key  (r) = Recorded By, (t) = Taken By, (c) = Cosigned By      Initials Name Provider Type    Zina Baig OTR/L Occupational Therapist                    Occupational Therapy Education       Title: PT OT SLP Therapies (In Progress)       Topic: Occupational Therapy (Done)       Point: ADL training (Done)       Description:   Instruct learner(s) on proper safety adaptation and remediation techniques during self care or transfers.   Instruct in proper use of assistive devices.                  Learning Progress Summary            Patient Acceptance, E, VU by  at 1/29/2025 1516    Acceptance, E, VU by  at 1/28/2025 1141                      Point: Home exercise program (Done)       Description:   Instruct learner(s) on appropriate technique for monitoring, assisting and/or progressing therapeutic exercises/activities.                  Learning Progress Summary            Patient Acceptance, E, VU by  at 1/28/2025 1141                      Point: Precautions (Done)       Description:   Instruct learner(s) on prescribed precautions during  self-care and functional transfers.                  Learning Progress Summary            Patient Acceptance, E, VU by LS at 1/29/2025 1516    Acceptance, E, VU by EC at 1/28/2025 1141                      Point: Body mechanics (Done)       Description:   Instruct learner(s) on proper positioning and spine alignment during self-care, functional mobility activities and/or exercises.                  Learning Progress Summary            Patient Acceptance, E, VU by LS at 1/29/2025 1516    Acceptance, E, VU by EC at 1/28/2025 1141                                      User Key       Initials Effective Dates Name Provider Type Discipline     06/20/22 -  Zina Etienne, OTR/L Occupational Therapist OT    EC 10/13/23 -  Sugey Bruce, OTR/L Occupational Therapist OT                  OT Recommendation and Plan  Planned Therapy Interventions (OT): transfer/mobility retraining, strengthening exercise, patient/caregiver education/training, occupation/activity based interventions, functional balance retraining, activity tolerance training, BADL retraining, adaptive equipment training, ROM/therapeutic exercise, orthotic fabrication/fitting/training  Therapy Frequency (OT): 5 times/wk  Plan of Care Review  Plan of Care Reviewed With: patient  Progress: no change  Outcome Evaluation: OT re-eval completed. Pt in fowlers upon therapist arrival; A&Ox4; c/o 10/10 back pain. Pt able to recall 3/3 spinal precautions this date. Pt performed log roll onto L side followed by sidelying>sit utilizing bedrail with HOB slightly elevated with Min A and verbal cues for sequencing and body mechanics. Pt required Max A to irma LSO while seated EOB with Max A. Pt performed all sit<>stand transfers to/from bed and toilet utilizing HHA x2 and/or grab bar with CGA and verbal cues for safety awareness and body mechanics. Pt ambulated from bed<>BR utilizing HHA x2 with CGA. Pt performed toileting task requiring Max A for clothing management and CGA  for anterior leo hygiene in standing. Pt continues to require skilled OT intervention in order to address remaining deficits in fxl mobility, fxl activity tolerance, balance, strength, and use of adaptive techniques/equipment during performance of BADLs. Anticipate home with assist at discharge.     Time Calculation:         Time Calculation- OT       Row Name 01/29/25 1434             Time Calculation- OT    OT Start Time 1434  -LS      OT Stop Time 1457  -      OT Time Calculation (min) 23 min  -      OT Non-Billable Time (min) 23 min  -      OT Received On 01/29/25  -      OT Goal Re-Cert Due Date 02/08/25  -                User Key  (r) = Recorded By, (t) = Taken By, (c) = Cosigned By      Initials Name Provider Type     Zina Etienne OTR/L Occupational Therapist                  Therapy Charges for Today       Code Description Service Date Service Provider Modifiers Qty    [id number]  OT RE-EVAL 2 1/29/2025 Zina Etienne OTR/L GO 1                 TARA Diallo/LEON  1/29/2025

## 2025-01-29 NOTE — ANESTHESIA POSTPROCEDURE EVALUATION
"Patient: Adela Arauz    Procedure Summary       Date: 01/29/25 Room / Location: [facility] OR  /  [facility] OR    Anesthesia Start: 1005 Anesthesia Stop: 1237    Procedure: REMOVAL OF INSTRUMENTATION, EXPLORATION OF FUSION L3-S1, POSTERIOR SPINAL FUSION L2-3 WITH INSTRUMENTATION L2-S1 (Spine Lumbar) Diagnosis: (M54.16)    Surgeons: ALEXIS Dunaway MD Provider: Davonte Schulz CRNA    Anesthesia Type: general ASA Status: 2            Anesthesia Type: general    Vitals  Vitals Value Taken Time   /74 01/29/25 1340   Temp 98.4 °F (36.9 °C) 01/29/25 1235   Pulse 83 01/29/25 1348   Resp 14 01/29/25 1340   SpO2 96 % 01/29/25 1348   Vitals shown include unfiled device data.        Post Anesthesia Care and Evaluation    Patient location during evaluation: PACU  Patient participation: complete - patient participated  Level of consciousness: awake and alert  Pain management: adequate    Airway patency: patent  Anesthetic complications: No anesthetic complications    Cardiovascular status: acceptable  Respiratory status: acceptable  Hydration status: acceptable    Comments: Blood pressure 137/74, pulse 83, temperature 98.4 °F (36.9 °C), temperature source Temporal, resp. rate 14, height 162.5 cm (63.98\"), weight 96.5 kg (212 lb 11.9 oz), last menstrual period 04/01/2014, SpO2 96%, not currently breastfeeding.    Pt discharged from PACU based on carol score >8    "

## 2025-01-29 NOTE — THERAPY RE-EVALUATION
Patient Name: Adela Arauz  : 1965    MRN: [id number]                              Today's Date: 2025       Admit Date: 2025    Visit Dx:     ICD-10-CM ICD-9-CM   1. Impaired mobility [Z74.09]  Z74.09 799.89     Patient Active Problem List   Diagnosis    Chronic pain syndrome    Rheumatoid arthritis involving multiple sites    Osteoporosis    Chest pain    Multiple lung nodules < 6mm identified 2018    Lymphadenopathy    AAT (alpha-1-antitrypsin) deficiency    Axillary adenopathy    Former smoker    Lymphedema of right arm    Pain in right arm    Morbid (severe) obesity due to excess calories    Anxiety    Foraminal stenosis due to intervertebral disc disease    Lumbar stenosis    Lumbar radiculopathy    History of lumbar fusion    Degeneration of lumbar intervertebral disc    Plantar fasciitis    Panic attack    GERD (gastroesophageal reflux disease)     Past Medical History:   Diagnosis Date    AAT (alpha-1-antitrypsin) deficiency 2019    Anxiety 2020    Cancer     LYMPHOMA    DDD (degenerative disc disease), lumbar     GERD (gastroesophageal reflux disease)     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, 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     Low back pain     Nausea after anesthesia     Open wound of gum     pt states had all her teeth pulled and there is a spot that hasn't healed due to RA so she is using peridex rinse daily    Osteoporosis     PONV (postoperative nausea and vomiting)     Psoriasis     Rheumatoid arthritis     Seasonal allergies     Senile osteoporosis 2017     Past Surgical History:   Procedure Laterality Date    ANTERIOR LUMBAR EXPOSURE N/A 2020    Procedure: Anterior lumbar interbody fusion of L5-S1 with instrumentation ;  Surgeon: Neptali Rivera DO;  Location:  [facility];  Service: Vascular;  Laterality: N/A;    AXILLARY LYMPH NODE BIOPSY/EXCISION Right 2019    Procedure: EXCISION RIGHT AXILLARY MASS;  Surgeon: Jes Enamorado MD;  Location:  [facility];   Service: General    INCISION AND DRAINAGE ARM Right 06/03/2021    Procedure: INCISION AND DRAINAGE FOREARM;  Surgeon: Jes Enamorado MD;  Location:  [facility];  Service: General;  Laterality: Right;    LUMBAR FUSION N/A 12/02/2020    Procedure: ANTERIOR DECOMPRESSION, ANTERIOR LUMBAR INTERBODY FUSION WITH INSTRUMENTATION L5-S1;  Surgeon: ALEXIS Dunaway MD;  Location:  [facility];  Service: Orthopedic Spine;  Laterality: N/A;    LUMBAR FUSION Left 12/16/2020    Procedure: LEFT LATERAL LUMBAR INTERBODY FUSION L3-5 WITH INSTRUMENTATION L3-4;  Surgeon: ALEXIS Dunaway MD;  Location:  [facility];  Service: Orthopedic Spine;  Laterality: Left;    LUMBAR FUSION Right 1/27/2025    Procedure: RIGHT LATERAL LUMBAR INTERBODY FUSION WITH INSTRUMENTATION L2-3;  Surgeon: ALEXIS Dunaway MD;  Location:  [facility];  Service: Orthopedic Spine;  Laterality: Right;    LUMBAR LAMINECTOMY WITH FUSION N/A 12/18/2020    Procedure: DECOMPRESSION L3-4, POSTERIOR SPINAL FUSION WITH INSTRUMENTATION L3-S1;  Surgeon: ALEXIS Dunaway MD;  Location:  [facility];  Service: Orthopedic Spine;  Laterality: N/A;    SHOULDER SURGERY Left     arthroscopy for arthtritis and bone spurs    TUBAL ABDOMINAL LIGATION      US GUIDED LYMPH NODE BIOPSY  08/09/2019    WRIST SURGERY Bilateral     fracture - no internal hardware      General Information       Row Name 01/29/25 1447          Physical Therapy Time and Intention    Document Type re-evaluation  s/p PSF L2-S1 on 1/29. Pt previously underwent R LLIF with instrumentation L2-3 on 1/27.  -DRE     Mode of Treatment physical therapy  -DRE       Row Name 01/29/25 0752          General Information    Patient Profile Reviewed yes  -DRE     Existing Precautions/Restrictions fall;spinal;LSO;brace worn when out of bed  -DRE     Barriers to Rehab medically complex  -DRE       Row Name 01/29/25 3842          Cognition    Orientation Status (Cognition) oriented to;person;place;situation  -DRE       Row Name  01/29/25 1440          Safety Issues/Impairments Affecting Functional Mobility    Impairments Affecting Function (Mobility) pain;strength;balance;endurance/activity tolerance;shortness of breath  -DRE               User Key  (r) = Recorded By, (t) = Taken By, (c) = Cosigned By      Initials Name Provider Type    Guillaume Swanson PT DPT Physical Therapist                   Mobility       Row Name 01/29/25 1440          Bed Mobility    Bed Mobility rolling left;sidelying-sit  -DRE     Rolling Left [place] (Bed Mobility) minimum assist (75% patient effort);verbal cues;nonverbal cues (demo/gesture)  -DRE     Sidelying-Sit [place] (Bed Mobility) minimum assist (75% patient effort);verbal cues;nonverbal cues (demo/gesture)  -DRE     Assistive Device (Bed Mobility) bed rails;head of bed elevated  -DRE       Row Name 01/29/25 1440          Sit-Stand Transfer    Sit-Stand [place] (Transfers) contact guard;verbal cues  -DRE       Row Name 01/29/25 1440          Gait/Stairs (Locomotion)    [place] Level (Gait) verbal cues;contact guard;2 person assist  -DRE     Distance in Feet (Gait) 40  -DRE     Deviations/Abnormal Patterns (Gait) ben decreased;gait speed decreased;antalgic  -DRE               User Key  (r) = Recorded By, (t) = Taken By, (c) = Cosigned By      Initials Name Provider Type    Guillaume Swanson PT DPT Physical Therapist                   Obj/Interventions       Row Name 01/29/25 1440          Range of Motion Comprehensive    General Range of Motion bilateral lower extremity ROM WFL  -DRE       Row Name 01/29/25 1440          Strength Comprehensive (MMT)    Comment, General Manual Muscle Testing (MMT) Assessment B LE grossly 4-/5  -DER       Row Name 01/29/25 1440          Balance    Balance Assessment sitting dynamic balance;standing dynamic balance  -DRE     Dynamic Sitting Balance contact guard  -DRE     Position, Sitting Balance supported;sitting edge of bed  -DRE     Dynamic Standing  Balance contact guard;2-person assist  -DRE     Position/Device Used, Standing Balance supported  -DRE       Row Name 01/29/25 1440          Sensory Assessment (Somatosensory)    Sensory Assessment (Somatosensory) LE sensation intact  -DRE               User Key  (r) = Recorded By, (t) = Taken By, (c) = Cosigned By      Initials Name Provider Type    Guillaume Swanson, PT DPT Physical Therapist                   Goals/Plan       Row Name 01/29/25 1440          Bed Mobility Goal 1 (PT)    Activity/Assistive Device (Bed Mobility Goal 1, PT) rolling to left;rolling to right;sidelying to sit;sit to sidelying  -DRE     [place] Level/Cues Needed (Bed Mobility Goal 1, PT) independent  -DRE     Time Frame (Bed Mobility Goal 1, PT) long term goal (LTG);10 days  -DRE     Progress/Outcomes (Bed Mobility Goal 1, PT) goal not met;goal ongoing  -DRE       Row Name 01/29/25 1440          Transfer Goal 1 (PT)    Activity/Assistive Device (Transfer Goal 1, PT) sit-to-stand/stand-to-sit;bed-to-chair/chair-to-bed  -DRE     [place] Level/Cues Needed (Transfer Goal 1, PT) modified independence  -DRE     Time Frame (Transfer Goal 1, PT) long term goal (LTG);10 days  -DRE     Progress/Outcome (Transfer Goal 1, PT) goal not met;goal ongoing  -DRE       Row Name 01/29/25 1440          Gait Training Goal 1 (PT)    Activity/Assistive Device (Gait Training Goal 1, PT) gait (walking locomotion);increase endurance/gait distance;increase energy conservation;decrease fall risk;other (see comments)  -DRE     [place] Level (Gait Training Goal 1, PT) contact guard required  -DRE     Distance (Gait Training Goal 1, PT) 100  -DRE     Time Frame (Gait Training Goal 1, PT) long term goal (LTG);10 days  -DRE     Progress/Outcome (Gait Training Goal 1, PT) goal not met;goal ongoing  -DRE       Row Name 01/29/25 1440          Problem Specific Goal 1 (PT)    Problem Specific Goal 1 (PT) Pt will perform mobility with </= 4/10 pain  -DRE     Time Frame  (Problem Specific Goal 1, PT) long-term goal (LTG)  -DRE     Progress/Outcome (Problem Specific Goal 1, PT) goal not met;goal ongoing  -DRE       Row Name 01/29/25 1440          Therapy Assessment/Plan (PT)    Planned Therapy Interventions (PT) bed mobility training;transfer training;gait training;balance training;home exercise program;patient/family education;postural re-education;stair training;strengthening;orthotic fitting/training  -DRE               User Key  (r) = Recorded By, (t) = Taken By, (c) = Cosigned By      Initials Name Provider Type    Guillaume Swanson, PT DPT Physical Therapist                   Clinical Impression       Row Name 01/29/25 1440          Pain    Pretreatment Pain Rating 10/10  -DRE     Posttreatment Pain Rating 10/10  -DRE     Pain Location back  -DRE     Pain Management Interventions exercise or physical activity utilized  -DRE       Row Name 01/29/25 1440          Plan of Care Review    Plan of Care Reviewed With patient  -DRE     Outcome Evaluation PT completed re-eval s/p back surgery performed today. She was re educated on spinal precautions and LSO use/irma/doff. She demos pain, SOB and nausea with activity. Needs CGA x 2 to stand and to ambulate into her bathroom. Demos an antalgic gait and slow pace. PT will cont with initial PT goals and POC. I am hopeful she will recover and d.c home with assist.  -DRE       Row Name 01/29/25 1440          Therapy Assessment/Plan (PT)    Patient/Family Therapy Goals Statement (PT) improve function  -DRE     Rehab Potential (PT) good  -DRE     Criteria for Skilled Interventions Met (PT) yes;meets criteria;skilled treatment is necessary  -DRE     Therapy Frequency (PT) 2 times/day  -DRE     Predicted Duration of Therapy Intervention (PT) until d.c  -DRE       Row Name 01/29/25 1440          Positioning and Restraints    Pre-Treatment Position in bed  -DRE     Post Treatment Position bed  -DRE     In Bed notified nsg;fowlers;call light within  reach;encouraged to call for assist;SCD pump applied  -DRE               User Key  (r) = Recorded By, (t) = Taken By, (c) = Cosigned By      Initials Name Provider Type    Guillaume Swanson PT DPT Physical Therapist                   Outcome Measures       Row Name 01/29/25 1440          How much help from another person do you currently need...    Turning from your back to your side while in flat bed without using bedrails? 3  -DRE     Moving from lying on back to sitting on the side of a flat bed without bedrails? 3  -DRE     Moving to and from a bed to a chair (including a wheelchair)? 3  -DRE     Standing up from a chair using your arms (e.g., wheelchair, bedside chair)? 3  -DRE     Climbing 3-5 steps with a railing? 2  -DRE     To walk in hospital room? 3  -DRE     AM-PAC 6 Clicks Score (PT) 17  -DRE     Highest Level of Mobility Goal 5 --> Static standing  -DRE       Row Name 01/29/25 1440 01/29/25 1434       Functional Assessment    Outcome Measure Options AM-PAC 6 Clicks Basic Mobility (PT)  -DRE AM-PAC 6 Clicks Daily Activity (OT)  -LS              User Key  (r) = Recorded By, (t) = Taken By, (c) = Cosigned By      Initials Name Provider Type    Guillaume Swanson PT DPT Physical Therapist    Zina Baig OTR/L Occupational Therapist                                 Physical Therapy Education       Title: PT OT SLP Therapies (In Progress)       Topic: Physical Therapy (In Progress)       Point: Mobility training (Done)       Learning Progress Summary            Patient Acceptance, E, VU,NR by DRE at 1/29/2025 1440    Comment: benefits of activity, progression of PT, spinal prec, LSO use/irma/doff    Acceptance, E, VU,NR by ROGER at 1/28/2025 1133    Comment: pt edu on POC, benefits of act, d/c plans, spinal precautions, bracing                      Point: Home exercise program (Not Started)       Learner Progress:  Not documented in this visit.              Point: Body mechanics (Not Started)       Learner  Progress:  Not documented in this visit.              Point: Precautions (Done)       Learning Progress Summary            Patient Acceptance, E, VU,NR by DRE at 1/29/2025 1440    Comment: benefits of activity, progression of PT, spinal prec, LSO use/irma/doff    Acceptance, E, VU,NR by SB at 1/28/2025 1133    Comment: pt edu on POC, benefits of act, d/c plans, spinal precautions, bracing                                      User Key       Initials Effective Dates Name Provider Type Discipline    DRE 02/03/23 -  Guillaume Liu, PT DPT Physical Therapist PT    SB 07/11/23 -  Fani Hackett, PT DPT Physical Therapist PT                  PT Recommendation and Plan  Planned Therapy Interventions (PT): bed mobility training, transfer training, gait training, balance training, home exercise program, patient/family education, postural re-education, stair training, strengthening, orthotic fitting/training  Outcome Evaluation: PT completed re-eval s/p back surgery performed today. She was re educated on spinal precautions and LSO use/irma/doff. She demos pain, SOB and nausea with activity. Needs CGA x 2 to stand and to ambulate into her bathroom. Demos an antalgic gait and slow pace. PT will cont with initial PT goals and POC. I am hopeful she will recover and d.c home with assist.     Time Calculation:         PT Charges       Row Name 01/29/25 1551             Time Calculation    Start Time 1440  -DRE      Stop Time 1510  -DRE      Time Calculation (min) 30 min  -DRE      PT Received On 01/29/25  -DRE      PT Goal Re-Cert Due Date 02/08/25  -DRE         Untimed Charges    PT Eval/Re-eval Minutes 30  -DRE         Total Minutes    Untimed Charges Total Minutes 30  -DRE       Total Minutes 30  -DRE                User Key  (r) = Recorded By, (t) = Taken By, (c) = Cosigned By      Initials Name Provider Type    Guillaume Swanson, PT DPT Physical Therapist                  Therapy Charges for Today       Code Description Service  Date Service Provider Modifiers Qty    [id number] HC PT RE-EVAL ESTABLISHED PLAN 2 1/29/2025 Guillaume Liu, PT DPT GP 1            PT G-Codes  Outcome Measure Options: AM-PAC 6 Clicks Basic Mobility (PT)  AM-PAC 6 Clicks Score (PT): 17  AM-PAC 6 Clicks Score (OT): 16  PT Discharge Summary  Anticipated Discharge Disposition (PT): home with assist, home with home health    Guillaume Liu, PT DPT  1/29/2025

## 2025-01-29 NOTE — PLAN OF CARE
Goal Outcome Evaluation:  Plan of Care Reviewed With: patient        Progress: no change  Outcome Evaluation: OT re-eval completed. Pt in fowlers upon therapist arrival; A&Ox4; c/o 10/10 back pain. Pt able to recall 3/3 spinal precautions this date. Pt performed log roll onto L side followed by sidelying>sit utilizing bedrail with HOB slightly elevated with Min A and verbal cues for sequencing and body mechanics. Pt required Max A to irma LSO while seated EOB with Max A. Pt performed all sit<>stand transfers to/from bed and toilet utilizing HHA x2 and/or grab bar with CGA and verbal cues for safety awareness and body mechanics. Pt ambulated from bed<>BR utilizing HHA x2 with CGA. Pt performed toileting task requiring Max A for clothing management and CGA for anterior leo hygiene in standing. Pt continues to require skilled OT intervention in order to address remaining deficits in fxl mobility, fxl activity tolerance, balance, strength, and use of adaptive techniques/equipment during performance of BADLs. Anticipate home with assist at discharge.    Anticipated Discharge Disposition (OT): home with assist

## 2025-01-29 NOTE — PROGRESS NOTES
Adela Arauz is a 60 y.o. female patient.      Sedated this morning.  Plan for second step of surgery around lunchtime.  Plan discharge home 1-2 days      Current Facility-Administered Medications   Medication Dose Route Frequency Provider Last Rate Last Admin    acetaminophen (TYLENOL) tablet 650 mg  650 mg Oral Q4H PRN ALEXIS Dunaway MD        Or    acetaminophen (TYLENOL) 160 MG/5ML oral solution 650 mg  650 mg Oral Q4H PRN ALEXIS Dunaway MD        Or    acetaminophen (TYLENOL) suppository 650 mg  650 mg Rectal Q4H PRN ALEXIS Dunaway MD        ALPRAZolam (XANAX) tablet 1 mg  1 mg Oral TID PRN ALEXIS Dunaway MD   1 mg at 01/28/25 2020    sennosides-docusate (PERICOLACE) 8.6-50 MG per tablet 2 tablet  2 tablet Oral BID ALEXIS Dunaway MD   2 tablet at 01/28/25 0826    And    polyethylene glycol (MIRALAX) packet 17 g  17 g Oral Daily PRN ALEXIS Dunaway MD        And    bisacodyl (DULCOLAX) EC tablet 5 mg  5 mg Oral Daily PRN ALEXIS Dunaway MD        And    bisacodyl (DULCOLAX) suppository 10 mg  10 mg Rectal Daily PRN ALEXIS Dunaway MD        ceFAZolin 2000 mg IVPB in 100 mL NS (MBP)  2,000 mg Intravenous On Call to OR Carlos Meléndez PA-C        folic acid (FOLVITE) tablet 1 mg  1 mg Oral Daily Nathaniel Betancourt MD        furosemide (LASIX) tablet 40 mg  40 mg Oral Daily ALEXIS Dunaway MD   40 mg at 01/28/25 0826    HYDROcodone-acetaminophen (NORCO)  MG per tablet 1 tablet  1 tablet Oral Q4H PRN ALEXIS Dunaway MD   1 tablet at 01/29/25 0550    HYDROcodone-acetaminophen (NORCO) 7.5-325 MG per tablet 1 tablet  1 tablet Oral Q4H PRN ALEXIS Dunaway MD        HYDROmorphone (DILAUDID) injection 0.5 mg  0.5 mg Intravenous Q3H PRN ALEXIS Dunaway MD   0.5 mg at 01/29/25 0212    ondansetron ODT (ZOFRAN-ODT) disintegrating tablet 4 mg  4 mg Oral Q6H PRN ALEXIS Dunaway MD        Or    ondansetron (ZOFRAN) injection 4 mg  4 mg Intravenous  "Q6H PRN ALEXIS Dunaway MD   4 mg at 01/27/25 1917    pregabalin (LYRICA) capsule 150 mg  150 mg Oral BID ALEXIS Dunaway MD   150 mg at 01/28/25 2020    prochlorperazine (COMPAZINE) injection 10 mg  10 mg Intravenous Q6H PRN Carlos Meléndez PA-C   10 mg at 01/27/25 2153    sodium chloride 0.9 % flush 10 mL  10 mL Intravenous PRN ALEXIS Dunaway MD        sodium chloride 0.9 % flush 3 mL  3 mL Intravenous Q12H ALEXIS Dunaway MD   3 mL at 01/28/25 2020    sodium chloride 0.9 % infusion 40 mL  40 mL Intravenous PRN ALEXIS Dunaway MD        tiZANidine (ZANAFLEX) tablet 4 mg  4 mg Oral Q8H PRN ALEXIS Dunaway MD   4 mg at 01/28/25 2020     ALLERGIES:    Allergies   Allergen Reactions    Evenity [Romosozumab] Swelling     Injection site swelling, redness, warm to touch       Lumbar radiculopathy    Chronic pain syndrome    Rheumatoid arthritis involving multiple sites    AAT (alpha-1-antitrypsin) deficiency    Anxiety    Lumbar stenosis    Degeneration of lumbar intervertebral disc    GERD (gastroesophageal reflux disease)    Blood pressure 137/65, pulse 52, temperature 97.9 °F (36.6 °C), temperature source Oral, resp. rate 18, height 162.5 cm (63.98\"), weight 96.5 kg (212 lb 11.9 oz), last menstrual period 04/01/2014, SpO2 92%, not currently breastfeeding.      Subjective:  Symptoms:  Stable.    Diet:  NPO.    Activity level: Impaired due to pain.    Pain:  She complains of pain that is moderate.  She reports pain is unchanged.  Pain is partially controlled.      Review of Systems  Objective:  General Appearance:  Comfortable and well-appearing.    Vital signs: (most recent): Blood pressure 137/65, pulse 52, temperature 97.9 °F (36.6 °C), temperature source Oral, resp. rate 18, height 162.5 cm (63.98\"), weight 96.5 kg (212 lb 11.9 oz), last menstrual period 04/01/2014, SpO2 92%, not currently breastfeeding.  Vital signs are normal.    Output: Producing urine and minimal stool output.  "   HEENT: Normal HEENT exam.    Lungs:  Normal effort and normal respiratory rate.    Heart: Normal rate.  Regular rhythm.    Abdomen: Abdomen is soft.  Hypoactive bowel sounds.   There is generalized tenderness.     Extremities: Decreased range of motion.    Neurological: Patient is alert.  (Sedated from pain medication).    Skin:  Warm and dry.                Labs:  Lab Results (last 72 hours)       Procedure Component Value Units Date/Time    Vitamin B12 [[id number]]  (Normal) Collected: 01/28/25 0452    Specimen: Blood Updated: 01/28/25 1233     Vitamin B-12 516 pg/mL     Narrative:      Results may be falsely increased if patient taking Biotin.      Folate [[id number]]  (Abnormal) Collected: 01/28/25 0452    Specimen: Blood Updated: 01/28/25 1233     Folate 3.87 ng/mL     Narrative:      Results may be falsely increased if patient taking Biotin.      CBC & Differential [[id number]]  (Abnormal) Collected: 01/28/25 0452    Specimen: Blood Updated: 01/28/25 0605    Narrative:      The following orders were created for panel order CBC & Differential.  Procedure                               Abnormality         Status                     ---------                               -----------         ------                     CBC Auto Differential[[id number]]        Abnormal            Final result                 Please view results for these tests on the individual orders.    CBC Auto Differential [[id number]]  (Abnormal) Collected: 01/28/25 0452    Specimen: Blood Updated: 01/28/25 0605     WBC 8.34 10*3/mm3      RBC 4.28 10*6/mm3      Hemoglobin 11.7 g/dL      Hematocrit 35.4 %      MCV 82.7 fL      MCH 27.3 pg      MCHC 33.1 g/dL      RDW 14.9 %      RDW-SD 45.2 fl      MPV 9.5 fL      Platelets 318 10*3/mm3      Neutrophil % 74.5 %      Lymphocyte % 16.1 %      Monocyte % 8.3 %      Eosinophil % 0.1 %      Basophil % 0.4 %      Immature Grans % 0.6 %      Neutrophils, Absolute 6.22 10*3/mm3      Lymphocytes, Absolute  1.34 10*3/mm3      Monocytes, Absolute 0.69 10*3/mm3      Eosinophils, Absolute 0.01 10*3/mm3      Basophils, Absolute 0.03 10*3/mm3      Immature Grans, Absolute 0.05 10*3/mm3      nRBC 0.0 /100 WBC     Basic Metabolic Panel [[id number]]  (Abnormal) Collected: 01/28/25 0452    Specimen: Blood Updated: 01/28/25 0540     Glucose 102 mg/dL      BUN 7 mg/dL      Creatinine 0.49 mg/dL      Sodium 139 mmol/L      Potassium 3.6 mmol/L      Chloride 99 mmol/L      CO2 28.0 mmol/L      Calcium 8.6 mg/dL      BUN/Creatinine Ratio 14.3     Anion Gap 12.0 mmol/L      eGFR 108.7 mL/min/1.73     Narrative:      GFR Categories in Chronic Kidney Disease (CKD)      GFR Category          GFR (mL/min/1.73)    Interpretation  G1                     90 or greater         Normal or high (1)  G2                      60-89                Mild decrease (1)  G3a                   45-59                Mild to moderate decrease  G3b                   30-44                Moderate to severe decrease  G4                    15-29                Severe decrease  G5                    14 or less           Kidney failure          (1)In the absence of evidence of kidney disease, neither GFR category G1 or G2 fulfill the criteria for CKD.    eGFR calculation 2021 CKD-EPI creatinine equation, which does not include race as a factor    Iron Profile [[id number]]  (Abnormal) Collected: 01/28/25 0452    Specimen: Blood Updated: 01/28/25 0540     Iron 55 mcg/dL      Iron Saturation (TSAT) 20 %      Transferrin 189 mg/dL      TIBC 282 mcg/dL             Imaging Results (Last 72 Hours)       Procedure Component Value Units Date/Time    XR Spine Lumbar AP & Lateral [[id number]] Collected: 01/27/25 0823     Updated: 01/27/25 0826    Narrative:      XR SPINE LUMBAR AP AND LATERAL- 1/27/2025 6:15 AM     HISTORY: llif     COMPARISON: None     FLUOROSCOPY TIME: 0.253 minutes     FLUOROSCOPY DOSE: 20.418 mGy     NUMBER OF IMAGES: 4.0       Impression:          Intraoperative fluoroscopic images during right lateral and interbody  fusion L2-L3.     Please refer to the operative note for more details.     This report was signed and finalized on 1/27/2025 8:23 AM by Avery Ortiz.       FL C Arm During Surgery [[id number]] Resulted: 01/27/25 0817     Updated: 01/27/25 0817    Narrative:      This procedure was auto-finalized with no dictation required.                  Assessment:    Condition: In stable condition.  Improving.       Plan:   Transfer Plan: To operating room for second step.  Encourage ambulation and per physical therapy.   (      Low folic acid-replace with 1 mg daily for 90 days.  Can follow-up with her primary care doctor to make sure it returns to normal.    Medically stable for second step of surgery today.    Alpha 1 antitrypsin deficiency-stable continue to work with aggressive pulmonary toilet nebulizers.    Follow-up after surgery today, work on discharge planning 1-2 days.).     Problem List:     Lumbar radiculopathy    Chronic pain syndrome    Rheumatoid arthritis involving multiple sites    AAT (alpha-1-antitrypsin) deficiency    Anxiety    Lumbar stenosis    Degeneration of lumbar intervertebral disc    GERD (gastroesophageal reflux disease)    Nathaniel Betancourt MD  1/29/2025

## 2025-01-29 NOTE — PLAN OF CARE
Goal Outcome Evaluation:  Plan of Care Reviewed With: patient           Outcome Evaluation: PT completed re-eval s/p back surgery performed today. She was re educated on spinal precautions and LSO use/irma/doff. She demos pain, SOB and nausea with activity. Needs CGA x 2 to stand and to ambulate into her bathroom. Demos an antalgic gait and slow pace. PT will cont with initial PT goals and POC. I am hopeful she will recover and d.c home with assist.    Anticipated Discharge Disposition (PT): home with assist, home with home health

## 2025-01-30 PROCEDURE — 97116 GAIT TRAINING THERAPY: CPT

## 2025-01-30 PROCEDURE — 25010000002 CEFAZOLIN PER 500 MG: Performed by: ORTHOPAEDIC SURGERY

## 2025-01-30 PROCEDURE — 25010000002 ONDANSETRON PER 1 MG: Performed by: ORTHOPAEDIC SURGERY

## 2025-01-30 PROCEDURE — 25010000002 HYDROMORPHONE PER 4 MG: Performed by: ORTHOPAEDIC SURGERY

## 2025-01-30 PROCEDURE — 97535 SELF CARE MNGMENT TRAINING: CPT

## 2025-01-30 RX ORDER — HYDROMORPHONE HYDROCHLORIDE 1 MG/ML
0.5 INJECTION, SOLUTION INTRAMUSCULAR; INTRAVENOUS; SUBCUTANEOUS
Status: DISCONTINUED | OUTPATIENT
Start: 2025-01-30 | End: 2025-01-31 | Stop reason: HOSPADM

## 2025-01-30 RX ADMIN — HYDROMORPHONE HYDROCHLORIDE 0.5 MG: 1 INJECTION, SOLUTION INTRAMUSCULAR; INTRAVENOUS; SUBCUTANEOUS at 12:07

## 2025-01-30 RX ADMIN — ONDANSETRON 4 MG: 2 INJECTION INTRAMUSCULAR; INTRAVENOUS at 09:39

## 2025-01-30 RX ADMIN — ALPRAZOLAM 1 MG: 0.5 TABLET ORAL at 22:58

## 2025-01-30 RX ADMIN — HYDROCODONE BITARTRATE AND ACETAMINOPHEN 1 TABLET: 10; 325 TABLET ORAL at 19:03

## 2025-01-30 RX ADMIN — FUROSEMIDE 40 MG: 40 TABLET ORAL at 09:38

## 2025-01-30 RX ADMIN — HYDROCODONE BITARTRATE AND ACETAMINOPHEN 1 TABLET: 10; 325 TABLET ORAL at 09:08

## 2025-01-30 RX ADMIN — TIZANIDINE 4 MG: 4 TABLET ORAL at 21:23

## 2025-01-30 RX ADMIN — PREGABALIN 150 MG: 75 CAPSULE ORAL at 09:38

## 2025-01-30 RX ADMIN — CEFAZOLIN 2000 MG: 2 INJECTION, POWDER, FOR SOLUTION INTRAMUSCULAR; INTRAVENOUS at 02:04

## 2025-01-30 RX ADMIN — HYDROCODONE BITARTRATE AND ACETAMINOPHEN 1 TABLET: 10; 325 TABLET ORAL at 14:31

## 2025-01-30 RX ADMIN — HYDROMORPHONE HYDROCHLORIDE 0.5 MG: 1 INJECTION, SOLUTION INTRAMUSCULAR; INTRAVENOUS; SUBCUTANEOUS at 21:23

## 2025-01-30 RX ADMIN — Medication 3 ML: at 21:24

## 2025-01-30 RX ADMIN — DOCUSATE SODIUM 50 MG AND SENNOSIDES 8.6 MG 2 TABLET: 8.6; 5 TABLET, FILM COATED ORAL at 21:23

## 2025-01-30 RX ADMIN — HYDROCODONE BITARTRATE AND ACETAMINOPHEN 1 TABLET: 10; 325 TABLET ORAL at 22:58

## 2025-01-30 RX ADMIN — Medication 3 ML: at 09:40

## 2025-01-30 RX ADMIN — HYDROMORPHONE HYDROCHLORIDE 0.5 MG: 1 INJECTION, SOLUTION INTRAMUSCULAR; INTRAVENOUS; SUBCUTANEOUS at 02:03

## 2025-01-30 RX ADMIN — PREGABALIN 150 MG: 75 CAPSULE ORAL at 21:23

## 2025-01-30 RX ADMIN — HYDROMORPHONE HYDROCHLORIDE 0.5 MG: 1 INJECTION, SOLUTION INTRAMUSCULAR; INTRAVENOUS; SUBCUTANEOUS at 06:14

## 2025-01-30 RX ADMIN — DOCUSATE SODIUM 50 MG AND SENNOSIDES 8.6 MG 2 TABLET: 8.6; 5 TABLET, FILM COATED ORAL at 09:38

## 2025-01-30 RX ADMIN — TIZANIDINE 4 MG: 4 TABLET ORAL at 00:09

## 2025-01-30 RX ADMIN — FOLIC ACID 1 MG: 1 TABLET ORAL at 09:38

## 2025-01-30 RX ADMIN — HYDROCODONE BITARTRATE AND ACETAMINOPHEN 1 TABLET: 10; 325 TABLET ORAL at 03:46

## 2025-01-30 RX ADMIN — HYDROMORPHONE HYDROCHLORIDE 0.5 MG: 1 INJECTION, SOLUTION INTRAMUSCULAR; INTRAVENOUS; SUBCUTANEOUS at 16:46

## 2025-01-30 NOTE — PLAN OF CARE
Goal Outcome Evaluation:  Plan of Care Reviewed With: patient        Progress: improving  Outcome Evaluation: PT  tx completed. Pt c/o pn, rates 9/10. Mobilizing well. Amb 75' with r wx CG/SBA. Pt is limited more by pain.

## 2025-01-30 NOTE — PROGRESS NOTES
Ms. Arauz has had usual postop pain throughout the night.  Pain medications have helped.  Patient is comfortable this morning lying in bed.  She has good hip flexor and quadricep strength bilaterally.  Sensations intact.  Lateral and posterior dressings clean dry and intact.  Plan to work with physical therapy today and hopefully discharge home later this afternoon.

## 2025-01-30 NOTE — THERAPY TREATMENT NOTE
Acute Care - Physical Therapy Treatment Note   [place]     Patient Name: Adela Arauz  : 1965  MRN: [id number]  Today's Date: 2025      Visit Dx:     ICD-10-CM ICD-9-CM   1. Impaired mobility [Z74.09]  Z74.09 799.89     Patient Active Problem List   Diagnosis    Chronic pain syndrome    Rheumatoid arthritis involving multiple sites    Osteoporosis    Chest pain    Multiple lung nodules < 6mm identified 2018    Lymphadenopathy    AAT (alpha-1-antitrypsin) deficiency    Axillary adenopathy    Former smoker    Lymphedema of right arm    Pain in right arm    Morbid (severe) obesity due to excess calories    Anxiety    Foraminal stenosis due to intervertebral disc disease    Lumbar stenosis    Lumbar radiculopathy    History of lumbar fusion    Degeneration of lumbar intervertebral disc    Plantar fasciitis    Panic attack    GERD (gastroesophageal reflux disease)     Past Medical History:   Diagnosis Date    AAT (alpha-1-antitrypsin) deficiency 2019    Anxiety 2020    Cancer     LYMPHOMA    DDD (degenerative disc disease), lumbar     GERD (gastroesophageal reflux disease)     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, 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     Low back pain     Nausea after anesthesia     Open wound of gum     pt states had all her teeth pulled and there is a spot that hasn't healed due to RA so she is using peridex rinse daily    Osteoporosis     PONV (postoperative nausea and vomiting)     Psoriasis     Rheumatoid arthritis     Seasonal allergies     Senile osteoporosis 2017     Past Surgical History:   Procedure Laterality Date    ANTERIOR LUMBAR EXPOSURE N/A 2020    Procedure: Anterior lumbar interbody fusion of L5-S1 with instrumentation ;  Surgeon: Neptali Rivera DO;  Location: Health system;  Service: Vascular;  Laterality: N/A;    AXILLARY LYMPH NODE BIOPSY/EXCISION Right 2019    Procedure: EXCISION RIGHT AXILLARY MASS;  Surgeon: Jes Enamorado MD;  Location:   [facility];  Service: General    INCISION AND DRAINAGE ARM Right 06/03/2021    Procedure: INCISION AND DRAINAGE FOREARM;  Surgeon: Jes Enamorado MD;  Location:  [facility];  Service: General;  Laterality: Right;    LUMBAR FUSION N/A 12/02/2020    Procedure: ANTERIOR DECOMPRESSION, ANTERIOR LUMBAR INTERBODY FUSION WITH INSTRUMENTATION L5-S1;  Surgeon: ALEXIS Dunaway MD;  Location:  [facility];  Service: Orthopedic Spine;  Laterality: N/A;    LUMBAR FUSION Left 12/16/2020    Procedure: LEFT LATERAL LUMBAR INTERBODY FUSION L3-5 WITH INSTRUMENTATION L3-4;  Surgeon: ALEXIS Dunawya MD;  Location:  [facility];  Service: Orthopedic Spine;  Laterality: Left;    LUMBAR FUSION Right 1/27/2025    Procedure: RIGHT LATERAL LUMBAR INTERBODY FUSION WITH INSTRUMENTATION L2-3;  Surgeon: ALEXIS Dunaway MD;  Location:  [facility];  Service: Orthopedic Spine;  Laterality: Right;    LUMBAR LAMINECTOMY WITH FUSION N/A 12/18/2020    Procedure: DECOMPRESSION L3-4, POSTERIOR SPINAL FUSION WITH INSTRUMENTATION L3-S1;  Surgeon: ALEXIS Dunaway MD;  Location:  [facility];  Service: Orthopedic Spine;  Laterality: N/A;    SHOULDER SURGERY Left     arthroscopy for arthtritis and bone spurs    TUBAL ABDOMINAL LIGATION      US GUIDED LYMPH NODE BIOPSY  08/09/2019    WRIST SURGERY Bilateral     fracture - no internal hardware     PT Assessment (Last 12 Hours)       PT Evaluation and Treatment       Row Name 01/30/25 0830          Physical Therapy Time and Intention    Subjective Information complains of;pain  -KJ     Document Type therapy note (daily note)  -KJ     Mode of Treatment physical therapy  -KJ     Patient Effort good  -KJ       Row Name 01/30/25 0830          General Information    Existing Precautions/Restrictions fall;brace worn when out of bed;LSO;spinal  -KJ       Row Name 01/30/25 0830          Pain    Pretreatment Pain Rating 8/10  -KJ     Posttreatment Pain Rating 10/10  -KJ     Pain Location back  -KJ       Row Name  01/30/25 0830          Bed Mobility    Rolling Left [place] (Bed Mobility) modified independence  -KJ     Sidelying-Sit [place] (Bed Mobility) standby assist  -KJ       Row Name 01/30/25 0830          Sit-Stand Transfer    Sit-Stand [place] (Transfers) supervision  -KJ       Row Name 01/30/25 0830          Stand-Sit Transfer    Stand-Sit [place] (Transfers) modified independence  -KJ       Row Name 01/30/25 0830          Gait/Stairs (Locomotion)    [place] Level (Gait) contact guard  -KJ     Assistive Device (Gait) walker, front-wheeled  -KJ     Distance in Feet (Gait) 75  -KJ     Deviations/Abnormal Patterns (Gait) gait speed decreased;stride length decreased  -KJ     Bilateral Gait Deviations forward flexed posture  -KJ       Row Name             Wound 01/27/25 0742 Right lower flank    Wound - Properties Group Placement Date: 01/27/25  -KT Placement Time: 0742  -KT Side: Right  -KT Orientation: lower  -KT Location: flank  -KT Primary Wound Type: Incision  -KT    Retired Wound - Properties Group Placement Date: 01/27/25  -KT Placement Time: 0742  -KT Side: Right  -KT Orientation: lower  -KT Location: flank  -KT Primary Wound Type: Incision  -KT    Retired Wound - Properties Group Placement Date: 01/27/25  -KT Placement Time: 0742  -KT Side: Right  -KT Orientation: lower  -KT Location: flank  -KT Primary Wound Type: Incision  -KT    Retired Wound - Properties Group Date first assessed: 01/27/25  -KT Time first assessed: 0742  -KT Side: Right  -KT Location: flank  -KT Primary Wound Type: Incision  -KT      Row Name             Wound 01/29/25 1050 lumbar spine    Wound - Properties Group Placement Date: 01/29/25  -DT Placement Time: 1050  -DT Location: lumbar spine  -DT Primary Wound Type: Incision  -DT    Retired Wound - Properties Group Placement Date: 01/29/25  -DT Placement Time: 1050  -DT Location: lumbar spine  -DT Primary Wound Type: Incision  -DT    Retired Wound - Properties Group  Placement Date: 01/29/25  -DT Placement Time: 1050 -DT Location: lumbar spine  -DT Primary Wound Type: Incision  -DT    Retired Wound - Properties Group Date first assessed: 01/29/25  -DT Time first assessed: 1050 -DT Location: lumbar spine  -DT Primary Wound Type: Incision  -DT      Row Name 01/30/25 0830          Positioning and Restraints    Pre-Treatment Position in bed  -KJ     Post Treatment Position chair  -KJ               User Key  (r) = Recorded By, (t) = Taken By, (c) = Cosigned By      Initials Name Provider Type    Geena Franks, PTA Physical Therapist Assistant    Jose Cancino RN Registered Nurse    Matheus Ragsdale RN Registered Nurse                    Physical Therapy Education       Title: PT OT SLP Therapies (In Progress)       Topic: Physical Therapy (In Progress)       Point: Mobility training (Done)       Learning Progress Summary            Patient Acceptance, E, VU,NR by DRE at 1/29/2025 1440    Comment: benefits of activity, progression of PT, spinal prec, LSO use/irma/doff    Acceptance, E, VU,NR by SB at 1/28/2025 1133    Comment: pt edu on POC, benefits of act, d/c plans, spinal precautions, bracing                      Point: Home exercise program (Not Started)       Learner Progress:  Not documented in this visit.              Point: Body mechanics (Not Started)       Learner Progress:  Not documented in this visit.              Point: Precautions (Done)       Learning Progress Summary            Patient Acceptance, E, VU,NR by DRE at 1/29/2025 1440    Comment: benefits of activity, progression of PT, spinal prec, LSO use/irma/doff    Acceptance, E, VU,NR by SB at 1/28/2025 1133    Comment: pt edu on POC, benefits of act, d/c plans, spinal precautions, bracing                                      User Key       Initials Effective Dates Name Provider Type Discipline    DRE 02/03/23 -  Guillaume Liu, PT DPT Physical Therapist PT    SB 07/11/23 -  Fain Hackett, PT DPT  Physical Therapist PT                  PT Recommendation and Plan     Progress: improving  Outcome Evaluation: PT  tx completed. Pt c/o pn, rates 9/10. Mobilizing well. Amb 75' with r wx CG/SBA. Pt is limited more by pain.   Outcome Measures       Row Name 01/28/25 1140             How much help from another is currently needed...    Putting on and taking off regular lower body clothing? 3  -EC      Bathing (including washing, rinsing, and drying) 3  -EC      Toileting (which includes using toilet bed pan or urinal) 3  -EC      Putting on and taking off regular upper body clothing 3  -EC      Taking care of personal grooming (such as brushing teeth) 4  -EC      Eating meals 4  -EC      AM-PAC 6 Clicks Score (OT) 20  -EC         Functional Assessment    Outcome Measure Options AM-PAC 6 Clicks Daily Activity (OT)  -EC                User Key  (r) = Recorded By, (t) = Taken By, (c) = Cosigned By      Initials Name Provider Type    Sugey Arcos, OTR/L Occupational Therapist                     Time Calculation:    PT Charges       Row Name 01/30/25 0922             Time Calculation    Start Time 0830  -KJ      Stop Time 0900  -KJ      Time Calculation (min) 30 min  -KJ      PT Received On 01/30/25  -KJ      PT Goal Re-Cert Due Date 02/08/25  -KJ         Time Calculation- PT    Total Timed Code Minutes- PT 30 minute(s)  -KJ                User Key  (r) = Recorded By, (t) = Taken By, (c) = Cosigned By      Initials Name Provider Type    Geena Franks PTA Physical Therapist Assistant                  Therapy Charges for Today       Code Description Service Date Service Provider Modifiers Qty    [id number] HC GAIT TRAINING EA 15 MIN 1/30/2025 Geena Pineda PTA GP 2            PT G-Codes  Outcome Measure Options: AM-PAC 6 Clicks Basic Mobility (PT)  AM-PAC 6 Clicks Score (PT): 17  AM-PAC 6 Clicks Score (OT): 16    Geena Pineda PTA  1/30/2025

## 2025-01-30 NOTE — THERAPY TREATMENT NOTE
Acute Care - Physical Therapy Treatment Note   [place]     Patient Name: Adela Arauz  : 1965  MRN: [id number]  Today's Date: 2025      Visit Dx:     ICD-10-CM ICD-9-CM   1. Impaired mobility [Z74.09]  Z74.09 799.89     Patient Active Problem List   Diagnosis    Chronic pain syndrome    Rheumatoid arthritis involving multiple sites    Osteoporosis    Chest pain    Multiple lung nodules < 6mm identified 2018    Lymphadenopathy    AAT (alpha-1-antitrypsin) deficiency    Axillary adenopathy    Former smoker    Lymphedema of right arm    Pain in right arm    Morbid (severe) obesity due to excess calories    Anxiety    Foraminal stenosis due to intervertebral disc disease    Lumbar stenosis    Lumbar radiculopathy    History of lumbar fusion    Degeneration of lumbar intervertebral disc    Plantar fasciitis    Panic attack    GERD (gastroesophageal reflux disease)     Past Medical History:   Diagnosis Date    AAT (alpha-1-antitrypsin) deficiency 2019    Anxiety 2020    Cancer     LYMPHOMA    DDD (degenerative disc disease), lumbar     GERD (gastroesophageal reflux disease)     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;  Service: Vascular;  Laterality: N/A;    AXILLARY LYMPH NODE BIOPSY/EXCISION Right 2019    Procedure: EXCISION RIGHT AXILLARY MASS;  Surgeon: Jes Enamorado MD;  Location:   [facility];  Service: General    INCISION AND DRAINAGE ARM Right 06/03/2021    Procedure: INCISION AND DRAINAGE FOREARM;  Surgeon: Jes Enamorado MD;  Location:  [facility];  Service: General;  Laterality: Right;    LUMBAR FUSION N/A 12/02/2020    Procedure: ANTERIOR DECOMPRESSION, ANTERIOR LUMBAR INTERBODY FUSION WITH INSTRUMENTATION L5-S1;  Surgeon: ALEXIS Dunaway MD;  Location:  [facility];  Service: Orthopedic Spine;  Laterality: N/A;    LUMBAR FUSION Left 12/16/2020    Procedure: LEFT LATERAL LUMBAR INTERBODY FUSION L3-5 WITH INSTRUMENTATION L3-4;  Surgeon: ALEXIS Dunaway MD;  Location:  [facility];  Service: Orthopedic Spine;  Laterality: Left;    LUMBAR FUSION Right 1/27/2025    Procedure: RIGHT LATERAL LUMBAR INTERBODY FUSION WITH INSTRUMENTATION L2-3;  Surgeon: ALEXIS Dunaway MD;  Location:  [facility];  Service: Orthopedic Spine;  Laterality: Right;    LUMBAR LAMINECTOMY WITH FUSION N/A 12/18/2020    Procedure: DECOMPRESSION L3-4, POSTERIOR SPINAL FUSION WITH INSTRUMENTATION L3-S1;  Surgeon: ALEXIS Dunaway MD;  Location:  [facility];  Service: Orthopedic Spine;  Laterality: N/A;    SHOULDER SURGERY Left     arthroscopy for arthtritis and bone spurs    TUBAL ABDOMINAL LIGATION      US GUIDED LYMPH NODE BIOPSY  08/09/2019    WRIST SURGERY Bilateral     fracture - no internal hardware     PT Assessment (Last 12 Hours)       PT Evaluation and Treatment       Row Name 01/30/25 1354 01/30/25 0830       Physical Therapy Time and Intention    Subjective Information complains of;pain  -KJ complains of;pain  -KJ    Document Type therapy note (daily note)  -KJ therapy note (daily note)  -KJ    Mode of Treatment physical therapy  -KJ physical therapy  -KJ    Patient Effort good  -KJ good  -KJ      Row Name 01/30/25 1354 01/30/25 0830       General Information    Existing Precautions/Restrictions fall;brace worn when out of bed;LSO;spinal  -KJ fall;brace worn when out of bed;LSO;spinal  -KJ      Row Name  01/30/25 1354 01/30/25 0830       Pain    Pretreatment Pain Rating 8/10  -KJ 8/10  -KJ    Posttreatment Pain Rating 10/10  -KJ 10/10  -KJ    Pain Location back  -KJ back  -KJ      Row Name 01/30/25 1354 01/30/25 0830       Bed Mobility    Rolling Left [place] (Bed Mobility) -- modified independence  -KJ    Sidelying-Sit [place] (Bed Mobility) -- standby assist  -KJ    Sit-Sidelying [place] (Bed Mobility) supervision  -KJ --      Row Name 01/30/25 1354 01/30/25 0830       Sit-Stand Transfer    Sit-Stand [place] (Transfers) supervision  -KJ supervision  -KJ      Row Name 01/30/25 1354 01/30/25 0830       Stand-Sit Transfer    Stand-Sit [place] (Transfers) modified independence  -KJ modified independence  -KJ      Row Name 01/30/25 1354 01/30/25 0830       Gait/Stairs (Locomotion)    [place] Level (Gait) contact guard  -KJ contact guard  -KJ    Assistive Device (Gait) walker, front-wheeled  -KJ walker, front-wheeled  -KJ    Distance in Feet (Gait) 125  -KJ 75  -KJ    Deviations/Abnormal Patterns (Gait) gait speed decreased;stride length decreased  -KJ gait speed decreased;stride length decreased  -KJ    Bilateral Gait Deviations forward flexed posture  -KJ forward flexed posture  -KJ      Row Name             Wound 01/27/25 0742 Right lower flank    Wound - Properties Group Placement Date: 01/27/25  -KT Placement Time: 0742  -KT Side: Right  -KT Orientation: lower  -KT Location: flank  -KT Primary Wound Type: Incision  -KT    Retired Wound - Properties Group Placement Date: 01/27/25  -KT Placement Time: 0742  -KT Side: Right  -KT Orientation: lower  -KT Location: flank  -KT Primary Wound Type: Incision  -KT    Retired Wound - Properties Group Placement Date: 01/27/25  -KT Placement Time: 0742  -KT Side: Right  -KT Orientation: lower  -KT Location: flank  -KT Primary Wound Type: Incision  -KT    Retired Wound - Properties Group Date first assessed: 01/27/25  -KT Time first assessed: 0742   -KT Side: Right  -KT Location: flank  -KT Primary Wound Type: Incision  -KT      Row Name             Wound 01/29/25 1050 lumbar spine    Wound - Properties Group Placement Date: 01/29/25  -DT Placement Time: 1050 -DT Location: lumbar spine  -DT Primary Wound Type: Incision  -DT    Retired Wound - Properties Group Placement Date: 01/29/25  -DT Placement Time: 1050 -DT Location: lumbar spine  -DT Primary Wound Type: Incision  -DT    Retired Wound - Properties Group Placement Date: 01/29/25  -DT Placement Time: 1050 -DT Location: lumbar spine  -DT Primary Wound Type: Incision  -DT    Retired Wound - Properties Group Date first assessed: 01/29/25  -DT Time first assessed: 1050 -DT Location: lumbar spine  -DT Primary Wound Type: Incision  -DT      Row Name 01/30/25 1354 01/30/25 0830       Positioning and Restraints    Pre-Treatment Position sitting in chair/recliner  -KJ in bed  -KJ    Post Treatment Position bed  -KJ chair  -KJ              User Key  (r) = Recorded By, (t) = Taken By, (c) = Cosigned By      Initials Name Provider Type    Geena Franks, PTA Physical Therapist Assistant    oJse Cancino RN Registered Nurse    KT Matheus Davis RN Registered Nurse                    Physical Therapy Education       Title: PT OT SLP Therapies (In Progress)       Topic: Physical Therapy (In Progress)       Point: Mobility training (Done)       Learning Progress Summary            Patient Acceptance, E, VU,NR by DRE at 1/29/2025 1440    Comment: benefits of activity, progression of PT, spinal prec, LSO use/irma/doff    Acceptance, E, VU,NR by SB at 1/28/2025 1133    Comment: pt edu on POC, benefits of act, d/c plans, spinal precautions, bracing                      Point: Home exercise program (Not Started)       Learner Progress:  Not documented in this visit.              Point: Body mechanics (Not Started)       Learner Progress:  Not documented in this visit.              Point: Precautions (Done)        Learning Progress Summary            Patient Acceptance, E, VU,NR by DRE at 1/29/2025 1440    Comment: benefits of activity, progression of PT, spinal prec, LSO use/irma/doff    Acceptance, E, VU,NR by SB at 1/28/2025 1133    Comment: pt edu on POC, benefits of act, d/c plans, spinal precautions, bracing                                      User Key       Initials Effective Dates Name Provider Type Discipline    DRE 02/03/23 -  Guillaume Liu, PT DPT Physical Therapist PT    ROGER 07/11/23 -  Fani Hackett, PT DPT Physical Therapist PT                  PT Recommendation and Plan     Progress: improving  Outcome Evaluation: PT  tx completed. Pt c/o pn, rates 9/10. Mobilizing well. Amb 75' with r wx CG/SBA. Pt is limited more by pain.   Outcome Measures       Row Name 01/28/25 1140             How much help from another is currently needed...    Putting on and taking off regular lower body clothing? 3  -EC      Bathing (including washing, rinsing, and drying) 3  -EC      Toileting (which includes using toilet bed pan or urinal) 3  -EC      Putting on and taking off regular upper body clothing 3  -EC      Taking care of personal grooming (such as brushing teeth) 4  -EC      Eating meals 4  -EC      AM-PAC 6 Clicks Score (OT) 20  -EC         Functional Assessment    Outcome Measure Options AM-PAC 6 Clicks Daily Activity (OT)  -EC                User Key  (r) = Recorded By, (t) = Taken By, (c) = Cosigned By      Initials Name Provider Type    EC Sugey Bruce, OTR/L Occupational Therapist                     Time Calculation:    PT Charges       Row Name 01/30/25 1405 01/30/25 0922          Time Calculation    Start Time 1354  -KJ 0830  -KJ     Stop Time 1406  -KJ 0900  -KJ     Time Calculation (min) 12 min  -KJ 30 min  -KJ     PT Received On 01/30/25  -KJ 01/30/25  -KJ     PT Goal Re-Cert Due Date 02/08/25  -KJ 02/08/25  -KJ        Time Calculation- PT    Total Timed Code Minutes- PT 12 minute(s)  -KJ 30 minute(s)  -KJ                User Key  (r) = Recorded By, (t) = Taken By, (c) = Cosigned By      Initials Name Provider Type    KJ Geena Pineda, SUBHA Physical Therapist Assistant                  Therapy Charges for Today       Code Description Service Date Service Provider Modifiers Qty    [id number] HC GAIT TRAINING EA 15 MIN 1/30/2025 Geena Pineda PTA GP 2            PT G-Codes  Outcome Measure Options: AM-PAC 6 Clicks Basic Mobility (PT)  AM-PAC 6 Clicks Score (PT): 17  AM-PAC 6 Clicks Score (OT): 16    Geena Pineda PTA  1/30/2025

## 2025-01-30 NOTE — PAYOR COMM NOTE
"1/29 CLINICAL      Ran Kenyon (60 y.o. Female)       Date of Birth   1965    Social Security Number       Address   [address] [place] [address]    Home Phone   [phone]    MRN   [id number]       [facility]    Marital Status                               Admission Date   1/27/25    Admission Type   Elective    Admitting Provider   ALEXIS Dunaway MD    Attending Provider   ALEXIS Dunaway MD    Department, Room/Bed   [facility] 3A, 337/1       Discharge Date       Discharge Disposition       Discharge Destination                                 Attending Provider: ALEXIS Dunaway MD    Allergies: Evenity [Romosozumab]    Isolation: None   Infection: None   Code Status: CPR    Ht: 162.5 cm (63.98\")   Wt: 96.5 kg (212 lb 11.9 oz)    Admission Cmt: None   Principal Problem: Lumbar radiculopathy [M54.16]                   Active Insurance as of 1/27/2025       Primary Coverage       Payor Plan Insurance Group Employer/Plan Group    ANTHEM MEDICARE REPLACEMENT ANTHEM MED ADV SNP [id number]       Payor Plan Address Payor Plan Phone Number Payor Plan Fax Number Effective Dates    [address] [phone]  1/1/2025 - None Entered    [facility] [id number]         Subscriber Name Subscriber Birth Date Member ID       RAN KENYON 1965 [id number]                     Emergency Contacts        (Rel.) Home Phone Work Phone Mobile Phone    Jerri Martinez (Daughter) [phone] -- [phone]              Current Facility-Administered Medications   Medication Dose Route Frequency Provider Last Rate Last Admin    acetaminophen (TYLENOL) tablet 650 mg  650 mg Oral Q4H PRN ALEXIS Dunaway MD        Or    acetaminophen (TYLENOL) 160 MG/5ML oral solution 650 mg  650 mg Oral Q4H PRN ALEXIS Dunaway MD        Or    acetaminophen (TYLENOL) suppository 650 mg  650 mg Rectal Q4H PRN ALEXIS Dunaway MD        ALPRAZolam " (XANAX) tablet 1 mg  1 mg Oral TID PRN ALEXIS Dunaway MD   1 mg at 01/29/25 2041    sennosides-docusate (PERICOLACE) 8.6-50 MG per tablet 2 tablet  2 tablet Oral BID ALEXIS Dunaway MD   2 tablet at 01/28/25 0826    And    polyethylene glycol (MIRALAX) packet 17 g  17 g Oral Daily PRN ALEXIS Dunaway MD        And    bisacodyl (DULCOLAX) EC tablet 5 mg  5 mg Oral Daily PRN ALEXIS Dunaway MD        And    bisacodyl (DULCOLAX) suppository 10 mg  10 mg Rectal Daily PRN ALEXIS Dunaway MD        folic acid (FOLVITE) tablet 1 mg  1 mg Oral Daily ALEXIS Dunaway MD        furosemide (LASIX) tablet 40 mg  40 mg Oral Daily ALEXIS Dunaway MD   40 mg at 01/28/25 0826    HYDROcodone-acetaminophen (NORCO)  MG per tablet 1 tablet  1 tablet Oral Q4H PRN ALEXIS Dunaway MD   1 tablet at 01/30/25 0346    HYDROcodone-acetaminophen (NORCO) 7.5-325 MG per tablet 1 tablet  1 tablet Oral Q4H PRN ALEXIS Dunaway MD        HYDROmorphone (DILAUDID) injection 0.5 mg  0.5 mg Intravenous Q3H PRN ALEXIS Dunaway MD   0.5 mg at 01/30/25 0203    lactated ringers infusion  20 mL/hr Intravenous Continuous ALEXIS Dunaway MD   Stopped at 01/29/25 1409    ondansetron ODT (ZOFRAN-ODT) disintegrating tablet 4 mg  4 mg Oral Q6H PRN ALEXIS Dunaway MD        Or    ondansetron (ZOFRAN) injection 4 mg  4 mg Intravenous Q6H PRN ALEXIS Dunaway MD   4 mg at 01/29/25 1833    pregabalin (LYRICA) capsule 150 mg  150 mg Oral BID ALEXIS Dunaway MD   150 mg at 01/29/25 2042    prochlorperazine (COMPAZINE) injection 10 mg  10 mg Intravenous Q6H PRN ALEXIS Dunaway MD   10 mg at 01/29/25 2041    sodium chloride 0.9 % flush 10 mL  10 mL Intravenous PRN ALEXIS Dunaway MD        sodium chloride 0.9 % flush 3 mL  3 mL Intravenous Q12H ALEXIS Dunaway MD   3 mL at 01/29/25 2042    sodium chloride 0.9 % infusion 40 mL  40 mL Intravenous PRN ALEXIS Dunaway MD        tiZANidine (ZANAFLEX)  tablet 4 mg  4 mg Oral Q8H PRN ALEXIS Dunaway MD   4 mg at 25 0009     Orders (last 24 hrs)        Start     Ordered    25  Oxygen Therapy- Nasal Cannula; Titrate 1-6 LPM Per SpO2; 90 - 95%  Continuous         258    25  Continuous Pulse Oximetry  Continuous         25 19125 1800  ceFAZolin 2000 mg IVPB in 100 mL NS (MBP)  Every 8 Hours         25 1405    25 1554  PT Plan of Care Cert / Re-Cert  Once        Comments: Physical Therapy Plan of Care  Initial Certification  Certification Period: 2025 - 2025    Patient Name: Adela Arauz  : 1965    (Z74.09) Impaired mobility [Z74.09]  (primary encounter diagnosis)                  Assessment  PT Assessment  Rehab Potential (PT): good  Criteria for Skilled Interventions Met (PT): yes, meets criteria, skilled treatment is necessary         PT Rehab Goals     Row Name 25 1440             Bed Mobility Goal 1 (PT)    Activity/Assistive Device (Bed Mobility Goal 1, PT) rolling to   left;rolling to right;sidelying to sit;sit to sidelying  -DRE      [place] Level/Cues Needed (Bed Mobility Goal 1, PT) independent  -DRE        Time Frame (Bed Mobility Goal 1, PT) long term goal (LTG);10 days  -DRE      Progress/Outcomes (Bed Mobility Goal 1, PT) goal not met;goal ongoing    -DRE         Transfer Goal 1 (PT)    Activity/Assistive Device (Transfer Goal 1, PT)   sit-to-stand/stand-to-sit;bed-to-chair/chair-to-bed  -DRE      [place] Level/Cues Needed (Transfer Goal 1, PT) modified   independence  -DRE      Time Frame (Transfer Goal 1, PT) long term goal (LTG);10 days  -DRE      Progress/Outcome (Transfer Goal 1, PT) goal not met;goal ongoing  -DRE         Gait Training Goal 1 (PT)    Activity/Assistive Device (Gait Training Goal 1, PT) gait (walking   locomotion);increase endurance/gait distance;increase energy   conservation;decrease fall risk;other (see comments)  -DRE       [place] Level (Gait Training Goal 1, PT) contact guard required  -DRE        Distance (Gait Training Goal 1, PT) 100  -DRE      Time Frame (Gait Training Goal 1, PT) long term goal (LTG);10 days  -DRE        Progress/Outcome (Gait Training Goal 1, PT) goal not met;goal ongoing    -DRE         Problem Specific Goal 1 (PT)    Problem Specific Goal 1 (PT) Pt will perform mobility with </= 4/10 pain    -DRE      Time Frame (Problem Specific Goal 1, PT) long-term goal (LTG)  -DRE      Progress/Outcome (Problem Specific Goal 1, PT) goal not met;goal ongoing    -DRE            User Key  (r) = Recorded By, (t) = Taken By, (c) = Cosigned By    Initials Name Provider Type Discipline    Guillaume Swanson, PT DPT Physical Therapist PT               PT OP Goals     Row Name 01/29/25 1551          Time Calculation    PT Goal Re-Cert Due Date 02/08/25  -DRE           User Key  (r) = Recorded By, (t) = Taken By, (c) = Cosigned By    Initials Name Provider Type    Guillaume Swanson, PT DPT Physical Therapist                  Plan  PT Plan  Therapy Frequency (PT): 2 times/day  Predicted Duration of Therapy Intervention (PT): until d.c  Planned Therapy Interventions (PT): bed mobility training, transfer training, gait training, balance training, home exercise program, patient/family education, postural re-education, stair training, strengthening, orthotic fitting/training  Outcome Evaluation: PT completed re-eval s/p back surgery performed today. She was re educated on spinal precautions and LSO use/irma/doff. She demos pain, SOB and nausea with activity. Needs CGA x 2 to stand and to ambulate into her bathroom. Demos an antalgic gait and slow pace. PT will cont with initial PT goals and POC. I am hopeful she will recover and d.c home with assist.        Guillaume Liu, PT DPT  1/29/2025            By cosigning this order, either electronically or physically, I certify that the therapy services are furnished while this patient  is under my care, the services outline above are required by this patient, and will be reviewed every 90 days.        M.D.:__________________________________________ Date: ______________    25 1553    25 1518  OT Plan of Care Cert / Re-Cert  Once        Comments: Occupational Therapy Plan of Care  Initial Certification  Certification Period: 2025 - 2025    Patient Name: Adela Arauz  : 1965    (Z74.09) Impaired mobility [Z74.09]  (primary encounter diagnosis)                Assessment  OT Assessment  OT Diagnosis: decreased adls  Rehab Potential (OT): good  Criteria for Skilled Therapeutic Interventions Met (OT): yes, skilled treatment is necessary         OT Rehab Goals     Row Name 25 1434             Transfer Goal 1 (OT)    Activity/Assistive Device (Transfer Goal 1, OT) toilet;shower chair  -LS        [place] Level/Cues Needed (Transfer Goal 1, OT) modified   independence  -LS      Time Frame (Transfer Goal 1, OT) long term goal (LTG);10 days  -LS      Progress/Outcome (Transfer Goal 1, OT) goal revised this date  -LS         Dressing Goal 1 (OT)    Activity/Device (Dressing Goal 1, OT) upper body dressing;lower body   dressing  -LS      [place]/Cues Needed (Dressing Goal 1, OT) modified independence  -LS        Time Frame (Dressing Goal 1, OT) long term goal (LTG);10 days  -LS      Progress/Outcome (Dressing Goal 1, OT) goal revised this date  -LS         Toileting Goal 1 (OT)    Activity/Device (Toileting Goal 1, OT) toileting skills, all  -LS      [place] Level/Cues Needed (Toileting Goal 1, OT) modified   independence  -LS      Time Frame (Toileting Goal 1, OT) long term goal (LTG);10 days  -LS      Progress/Outcome (Toileting Goal 1, OT) goal revised this date  -LS         Problem Specific Goal 1 (OT)    Problem Specific Goal 1 (OT) Pt will independently implement one pain   management technique to decrease pain and improve functional adl    performance.  -LS      Time Frame (Problem Specific Goal 1, OT) long term goal (LTG);by   discharge  -LS      Progress/Outcome (Problem Specific Goal 1, OT) goal ongoing  -LS            User Key  (r) = Recorded By, (t) = Taken By, (c) = Cosigned By    Initials Name Provider Type Discipline    Zina Baig OTR/L Occupational Therapist OT               OT Goals     Row Name 01/29/25 1434          Time Calculation    OT Goal Re-Cert Due Date 02/08/25  -LS           User Key  (r) = Recorded By, (t) = Taken By, (c) = Cosigned By    Initials Name Provider Type    Zina Baig, OTR/L Occupational Therapist                Plan    OT Plan  Therapy Frequency (OT): 5 times/wk  Predicted Duration of Therapy Intervention (OT): until hospital discharge  Outcome Evaluation: OT re-eval completed. Pt in fowlers upon therapist arrival; A&Ox4; c/o 10/10 back pain. Pt able to recall 3/3 spinal precautions this date. Pt performed log roll onto L side followed by sidelying>sit utilizing bedrail with HOB slightly elevated with Min A and verbal cues for sequencing and body mechanics. Pt required Max A to irma LSO while seated EOB with Max A. Pt performed all sit<>stand transfers to/from bed and toilet utilizing HHA x2 and/or grab bar with CGA and verbal cues for safety awareness and body mechanics. Pt ambulated from bed<>BR utilizing HHA x2 with CGA. Pt performed toileting task requiring Max A for clothing management and CGA for anterior leo hygiene in standing. Pt continues to require skilled OT intervention in order to address remaining deficits in fxl mobility, fxl activity tolerance, balance, strength, and use of adaptive techniques/equipment during performance of BADLs. Anticipate home with assist at discharge.      ATRA Diallo/LEON  1/29/2025        By cosigning this order, either electronically or physically, I certify that the therapy services are furnished while this patient is under my care, the services outline  above are required by this patient, and will be reviewed every 90 days.        M.D.:__________________________________________ Date: ______________    01/29/25 1518    01/29/25 1406  PT Consult: Eval & Treat  Once         01/29/25 1405    01/29/25 1406  OT Consult: Eval & Treat  Once         01/29/25 1405    01/29/25 1406  Diet: Regular/House; Fluid Consistency: Thin (IDDSI 0)  Diet Effective Now         01/29/25 1405    01/29/25 1236  POC Glucose STAT  STAT,   Status:  Canceled        Comments: Post op Glucose Check on All Diabetic Patients, Notify Anesthesia if Blood Sugar is Less Than 80 mg/dL or Greater Than 250mg/dL      01/29/25 1235    01/29/25 1236  Vital signs every 5 minutes for 15 minutes, every 15 minutes thereafter.  Once,   Status:  Canceled         01/29/25 1235    01/29/25 1236  Call Anesthesiologist for additional IV Fluid bolus for Hypotension/Tachycardia  Continuous,   Status:  Canceled         01/29/25 1235    01/29/25 1236  Notify Anesthesia of Any Acute Changes in Patient Condition  Until Discontinued,   Status:  Canceled         01/29/25 1235    01/29/25 1236  Notify Anesthesia for Unrelieved Pain  Until Discontinued,   Status:  Canceled         01/29/25 1235    01/29/25 1236  Once DC criteria to floor met, follow surgeon's orders.  Until Discontinued,   Status:  Canceled         01/29/25 1235    01/29/25 1236  Discharge patient from PACU when discharge criteria is met.  Until Discontinued,   Status:  Canceled         01/29/25 1235    01/29/25 1235  Apply warming blanket  As Needed,   Status:  Canceled      Comments: For a recorded temp of <36.9 C    01/29/25 1235    01/29/25 1235  ibuprofen (ADVIL,MOTRIN) tablet 600 mg  Every 6 Hours PRN,   Status:  Discontinued         01/29/25 1235    01/29/25 1235  oxyCODONE-acetaminophen (PERCOCET)  MG per tablet 1 tablet  Every 4 Hours PRN,   Status:  Discontinued         01/29/25 1235    01/29/25 1235  HYDROmorphone (DILAUDID) injection 0.5 mg   Every 10 Minutes PRN,   Status:  Discontinued         01/29/25 1235    01/29/25 1235  fentaNYL citrate (PF) (SUBLIMAZE) injection 50 mcg  Every 10 Minutes PRN,   Status:  Discontinued         01/29/25 1235    01/29/25 1235  naloxone (NARCAN) injection 0.04 mg  As Needed,   Status:  Discontinued         01/29/25 1235    01/29/25 1235  flumazenil (ROMAZICON) injection 0.2 mg  As Needed,   Status:  Discontinued         01/29/25 1235    01/29/25 1235  ondansetron (ZOFRAN) injection 4 mg  Every 15 Minutes PRN,   Status:  Discontinued         01/29/25 1235    01/29/25 1235  labetalol (NORMODYNE,TRANDATE) injection 5 mg  Every 5 Minutes PRN,   Status:  Discontinued         01/29/25 1235    01/29/25 1235  atropine sulfate injection 0.5 mg  Once As Needed,   Status:  Discontinued         01/29/25 1235    01/29/25 1051  bupivacaine-EPINEPHrine PF 10 mL, bupivacaine liposome 10 mL mixture  As Needed,   Status:  Discontinued         01/29/25 1051    01/29/25 1051  sodium chloride (NS) irrigation solution  As Needed,   Status:  Discontinued         01/29/25 1051    01/29/25 1051  sodium chloride 0.9 % solution  As Needed,   Status:  Discontinued         01/29/25 1051    01/29/25 0919  sodium chloride 0.9 % flush 3 mL  Every 12 Hours Scheduled,   Status:  Discontinued         01/29/25 0917    01/29/25 0919  lactated ringers infusion  Continuous,   Status:  Discontinued         01/29/25 0917    01/29/25 0919  scopolamine patch 1 mg/72 hr  Once,   Status:  Discontinued         01/29/25 0917    01/29/25 0918  Oxygen Therapy- Nasal Cannula; Titrate 1-6 LPM Per SpO2; 90 - 95%  Continuous,   Status:  Canceled         01/29/25 0917 01/29/25 0918  Continuous Pulse Oximetry  Continuous,   Status:  Canceled         01/29/25 0917 01/29/25 0918  Insert Peripheral IV  Once,   Status:  Canceled         01/29/25 0917    01/29/25 0918  Saline Lock & Maintain IV Access  Continuous,   Status:  Canceled         01/29/25 0917    01/29/25 0918   "Oxygen Therapy- Nasal Cannula; Titrate 1-6 LPM Per SpO2; 90 - 95%  Continuous,   Status:  Canceled         01/29/25 0917    01/29/25 0917  POC Glucose PRN  As Needed,   Status:  Canceled      Comments: Notify Anesthesiologist if Blood Sugar Greater Than 200      01/29/25 0917    01/29/25 0917  sodium chloride 0.9 % flush 3-10 mL  As Needed,   Status:  Discontinued         01/29/25 0917    01/29/25 0917  sodium chloride 0.9 % infusion 40 mL  As Needed,   Status:  Discontinued         01/29/25 0917    01/29/25 0917  Midazolam HCl (PF) (VERSED) injection 1 mg  Every 10 Minutes PRN,   Status:  Discontinued         01/29/25 0917    01/29/25 0917  Vital Signs - Per Anesthesia Protocol  As Needed,   Status:  Canceled       01/29/25 0917    01/29/25 0917  lactated ringers infusion  Continuous         01/29/25 0915    01/29/25 0916  oxyCODONE ER (oxyCONTIN) 12 hr tablet 20 mg  Once         01/29/25 0914    01/29/25 0900  folic acid (FOLVITE) tablet 1 mg  Daily         01/29/25 0702    01/29/25 0641  XR Spine Lumbar AP & Lateral  1 Time Imaging         01/29/25 0640    01/29/25 0641  FL C Arm During Surgery  1 Time Imaging         01/29/25 0640    01/29/25 0600  ceFAZolin 2000 mg IVPB in 100 mL NS (MBP)  On Call to O.R.         01/28/25 0708    01/28/25 1941  HYDROmorphone (DILAUDID) injection 0.5 mg  Every 3 Hours PRN         01/28/25 1941 01/27/25 2145  prochlorperazine (COMPAZINE) injection 10 mg  Every 6 Hours PRN         01/27/25 2145    01/27/25 2100  pregabalin (LYRICA) capsule 150 mg  2 Times Daily         01/27/25 1234    01/27/25 1500  furosemide (LASIX) tablet 40 mg  Daily         01/27/25 1234    01/27/25 1400  Incentive Spirometry  Every 2 Hours While Awake       01/27/25 1234    01/27/25 1330  sodium chloride 0.9 % flush 3 mL  Every 12 Hours Scheduled         01/27/25 1234    01/27/25 1330  sennosides-docusate (PERICOLACE) 8.6-50 MG per tablet 2 tablet  2 Times Daily        Placed in \"And\" Linked Group    " "01/27/25 1234    01/27/25 1234  sodium chloride 0.9 % flush 10 mL  As Needed         01/27/25 1234    01/27/25 1234  sodium chloride 0.9 % infusion 40 mL  As Needed         01/27/25 1234    01/27/25 1234  ondansetron ODT (ZOFRAN-ODT) disintegrating tablet 4 mg  Every 6 Hours PRN        Placed in \"Or\" Linked Group    01/27/25 1234    01/27/25 1234  ondansetron (ZOFRAN) injection 4 mg  Every 6 Hours PRN        Placed in \"Or\" Linked Group    01/27/25 1234    01/27/25 1234  acetaminophen (TYLENOL) tablet 650 mg  Every 4 Hours PRN        Placed in \"Or\" Linked Group    01/27/25 1234    01/27/25 1234  acetaminophen (TYLENOL) 160 MG/5ML oral solution 650 mg  Every 4 Hours PRN        Placed in \"Or\" Linked Group    01/27/25 1234    01/27/25 1234  acetaminophen (TYLENOL) suppository 650 mg  Every 4 Hours PRN        Placed in \"Or\" Linked Group    01/27/25 1234    01/27/25 1234  HYDROcodone-acetaminophen (NORCO) 7.5-325 MG per tablet 1 tablet  Every 4 Hours PRN         01/27/25 1234    01/27/25 1234  HYDROcodone-acetaminophen (NORCO)  MG per tablet 1 tablet  Every 4 Hours PRN         01/27/25 1234    01/27/25 1234  polyethylene glycol (MIRALAX) packet 17 g  Daily PRN        Placed in \"And\" Linked Group    01/27/25 1234    01/27/25 1234  bisacodyl (DULCOLAX) EC tablet 5 mg  Daily PRN        Placed in \"And\" Linked Group    01/27/25 1234    01/27/25 1234  bisacodyl (DULCOLAX) suppository 10 mg  Daily PRN        Placed in \"And\" Linked Group    01/27/25 1234    01/27/25 1234  ALPRAZolam (XANAX) tablet 1 mg  3 Times Daily PRN         01/27/25 1234    01/27/25 1234  tiZANidine (ZANAFLEX) tablet 4 mg  Every 8 Hours PRN         01/27/25 1234    Unscheduled  Apply Ice to Affected Area / Incision As Needed For Pain or Swelling  As Needed       01/27/25 1234    Unscheduled  Up in Chair  As Needed       01/27/25 1234    --  pregabalin (LYRICA) 150 MG capsule  2 Times Daily         01/28/25 0857    --  ferrous sulfate 324 (65 Fe) MG " tablet delayed-release EC tablet  Daily With Breakfast         01/28/25 0857                     Operative/Procedure Notes (last 48 hours)        ALEXIS Dunaway MD at 01/29/25 1050          LUMBAR LAMINECTOMY POSTERIOR LUMBAR INTERBODY FUSION  Procedure Note    Adela Arauz  1/29/2025    Pre-op Diagnosis:      1. Status post anterior decompression, ALIF with instrumentation L5-S1, 12/02/2020   2. Status post left LLIF L3-5 with instrumentation L3-4, 12/16/2020  3. Status post PSF with instrumentation L3-S1, 12/18/2020   4. Recurrent chronic low back pain   5. Bilateral anterior thigh, groin, and leg radiculopathy, right worse than left  6. Recurrent neurogenic claudication   7. Severe adjacent level degenerative disc disease L2-3   8. Facet arthropathy L2-3   9. Spondylolisthesis with instability L2-3   10. Focal kyphosis L2-3   11. Lateral listhesis L2-3   12. Focal scoliosis concave right L2-3   13. Central disc herniation L2-3   14. Severe central and bilateral foraminal stenosis L2-3  15.  Status post right LLIF L2-3, 1/27/2025    Post-op Diagnosis:     same     Procedure/CPT® Codes:     1.  Partial removal of posterior instrumentation L3-S1  2.  Exploration of fusion L3-S1  3.  Posterior spinal fusion L2-3  4.  Posterior spinal instrumentation L2-S1 (ATEC pedicle screws and rods)  5.  Use of locally obtained autograft bone for fusion  6.  Use of fluoroscopy for confirmation of surgical level and placement of instrumentation  7.  Intraoperative neural monitoring with pedicle screw stimulation    Spinal Surgery Levels Completed:1 Level      Anesthesia: General     Surgeon: MELCHOR Dunaway MD     Assistant: Jean Meléndez PA-C     Estimated Blood Loss: 50 mL     Complications: None     Condition: Stable to PACU.     Indications:     The patient is a 60-year-old who sees Charlene Huntley NP for medical issues. She has undergone a staged anterior, lateral, and posterior fusion with instrumentation from  L3-S1 in December 2020. She did well with this procedure until about a year ago. She presented back to the office with recurrent chronic low back pain along with bilateral anterior thigh, groin, and leg radiculopathy, with the right side worse than the left as well as symptoms consistent with recurrent neurogenic claudication. Imaging studies revealed severe adjacent level degenerative disc disease at L2-3 associated with facet arthropathy, spondylolisthesis with instability, focal kyphosis, lateral listhesis, focal scoliosis concave to the right, and a central disc herniation causing severe central and bilateral foraminal stenosis.      After failing all conservative measures, it was mutually decided that surgery would be the best option. Risks, benefits, and complications of surgery were discussed with the patient. The patient appeared well informed and wished to proceed. We specifically discussed the risk of infection, blood loss, nerve root injury, CSF leak, and the possibility of incomplete resolution of symptoms.  We also discussed the risk of a nonunion and the potential need for additional surgery in the event of a pseudoarthrosis or hardware failure.     We elected to proceed with surgery in a staged fashion.  Previously on 1/27/2025, the patient underwent a lateral fusion of L2-3.  Today we return to surgery for the second posterior stage of the procedure.     Operative Procedure:     After obtaining informed consent and verifying the correct operative site, the patient was brought to the operating room. A general anesthetic was provided by the anesthesia service with the assistance of an endotracheal tube. Once this was appropriately positioned and secured, the patient was carefully rotated prone onto a Fei frame. All bony processes were well-padded. The lumbar region was prepped and draped in usual sterile fashion. A surgical timeout was taken to confirm this was the correct patient, we were working  at the correct levels, and that preoperative antibiotics were given in a timely fashion.     Next, bilateral [place] incisions spanning the previous instrumentation from L3-S1 were created using a 10 blade scalpel.  Dissection was carried through subcutaneous tissues using Bovie cautery. The fascia was divided in line with the incision. Blunt dissection was carried between the multifidus and longissimus muscles down to the previously placed instrumentation spanning L3-S1. There was some scar tissue as expected around the screws and between the musculature.  The previous instrumentation was completely exposed using Bovie cautery removing some fibrous scar tissue from around the screw heads themselves. The appropriate torx screwdriver and antitorque wrench were used to remove the setscrews. The rods were then taken out with a lola morgan. The area was then thoroughly explored bilaterally.  All of the pedicle screws appeared to be relatively tight with no evidence of loosening.  There appeared to be adequate bone graft in the posterior lateral gutters consistent with a solid fusion from L3 to S1.     The screws were all well-fixed and did not show any signs of loosening.  There were also from the same instrumentation set that I planned to use across L2-3.  The screws were felt to be adequate for use in our new construct and so they were left intact.  The wounds were then copiously irrigated with saline solution.     The left sided Wiltsey incision was then extended using the 10 blade scalpel up to L2.  The same intramuscular plane was used to gain access to the facet joint of L2-3 and the transverse processes of L2 and L3.  Self retaining retractors were placed for continuous exposure.  Bovie cautery was used to expose as much bony surface area on the transverse processes and to destroy the facet capsule at L2-3.  The wound was then thoroughly irrigated with saline solution.     Next under fluoroscopic guidance, I  created starting holes for the placement of new pedicle screw instrumentation at L2.  A starting hole was created with a high-speed bur and the pedicle track was then created using a pedicle probe gaining access through the pedicle to the anterior vertebral body.  The pedicle tract was palpated with a ball-tipped feeler to ensure that the track was adequate.  Into the pedicle of L2 on the left, I placed a screw measuring 6.5 x 45 mm from the [facility] instrumentation set.     Next, I turned my attention to the right side of the spine.  I was able to stretch the right sided Wiltsey incision superiorly and using a Jamshidi needle to cannulate the pedicle of L2 on the right.  Through this, a guidewire was placed to maintain position.  I then placed a 6.5 x 5 mm screw again from the [facility] instrumentation set into the right pedicle of L2.     Fluoroscopy was used to confirm that all instrumentation was properly positioned in both the AP and lateral projections.  The wounds were then copiously irrigated with saline solution.  I used a neuro monitoring probe to stimulate the screws to ensure that there were adequately positioned.  All screws appeared well positioned.     The high-speed bur was used to decorticate the posterior elements of L2 and L3 as well as the previous fusion mass on the left side from L3 to S1.  I also decorticated the facet joints of L2-3.  The local bone was left in situ to assist with obtaining a fusion.  This constituted a posterior fusion of L2-3.     Next appropriate rods were chosen to span the pedicle screw instrumentation spanning L2 to S1 bilaterally.  The rods were bent to accommodate the patient's increased lordosis.  Set screws were inserted into the pedicle screw saddles fixing the rods into position.  The setscrews were then tightened using the appropriate antitorque wrench and torque limiting screwdriver provided by [facility].     A final inspection was then done to ensure that we had adequate  hemostasis.  Bleeding was controlled using bipolar cautery and Avitene.     After insuring that we had adequate hemostasis, closure was then undertaken using a #1 Vicryl to reapproximate the fascia. Immediate subcutaneous tissues were closed with a 2-0 Vicryl and skin closure was then accomplished using Mastisol and Steri-Strips.  I did infuse the subcutaneous layer with half percent Marcaine to assist with postoperative pain control.  The wounds were then washed and sterilely dressed. The patient was then carefully rotated supine onto a hospital gurney, extubated, and sent to the recovery room in good stable condition. The patient tolerated the procedure well, there were no complications.  Estimated blood loss was approximately 50 mL.    Intraoperative neuro monitoring was ordered and carried out throughout the procedure to add an increased level of safety for the patient. The interpreting physician was available by means of real-time continuous, bidirectional, remote audio and visual communication as needed throughout the entire procedure. Modalities used during the procedure included SSEP, EMG, TOF, and pedicle screw stimulation. There were no neuro monitoring signal changes during the procedure.      Michael Hudson PA-C provided critical assistance during the procedure.  His assistance was medically necessary in order to allow the procedure to occur in the most safe and efficient manner.  He assisted not only with the partial removal of instrumentation and exploration of fusion from L3 to S1, but also assisted with the placement of new instrumentation and bone graft spanning the whole construct from L2 to S1.    MELCHOR Dunaway MD     Date: 1/29/2025  Time: 15:03 CST    Electronically signed by ALEXIS Dunaway MD at 01/29/25 1503          Physician Progress Notes (last 24 hours)        Nathaniel Betancourt MD at 01/29/25 0702          Adela Arauz is a 60 y.o. female patient.      Sedated this  morning.  Plan for second step of surgery around lunchtime.  Plan discharge home 1-2 days      Current Facility-Administered Medications   Medication Dose Route Frequency Provider Last Rate Last Admin    acetaminophen (TYLENOL) tablet 650 mg  650 mg Oral Q4H PRN ALEXIS Dunaway MD        Or    acetaminophen (TYLENOL) 160 MG/5ML oral solution 650 mg  650 mg Oral Q4H PRN ALEXIS Dunaway MD        Or    acetaminophen (TYLENOL) suppository 650 mg  650 mg Rectal Q4H PRN ALEXIS Dunaway MD        ALPRAZolam (XANAX) tablet 1 mg  1 mg Oral TID PRN ALEXIS Dunaway MD   1 mg at 01/28/25 2020    sennosides-docusate (PERICOLACE) 8.6-50 MG per tablet 2 tablet  2 tablet Oral BID ALEXIS Dunaway MD   2 tablet at 01/28/25 0826    And    polyethylene glycol (MIRALAX) packet 17 g  17 g Oral Daily PRN ALEXIS Dunaway MD        And    bisacodyl (DULCOLAX) EC tablet 5 mg  5 mg Oral Daily PRN ALEXIS Dunaway MD        And    bisacodyl (DULCOLAX) suppository 10 mg  10 mg Rectal Daily PRN ALEXIS Dunaway MD        ceFAZolin 2000 mg IVPB in 100 mL NS (MBP)  2,000 mg Intravenous On Call to OR Carlos Meléndez PA-C        folic acid (FOLVITE) tablet 1 mg  1 mg Oral Daily Nathaniel Betancourt MD        furosemide (LASIX) tablet 40 mg  40 mg Oral Daily ALEXIS Dunaway MD   40 mg at 01/28/25 0826    HYDROcodone-acetaminophen (NORCO)  MG per tablet 1 tablet  1 tablet Oral Q4H PRN ALEXIS Dunaway MD   1 tablet at 01/29/25 0550    HYDROcodone-acetaminophen (NORCO) 7.5-325 MG per tablet 1 tablet  1 tablet Oral Q4H PRN ALEXIS Dunaway MD        HYDROmorphone (DILAUDID) injection 0.5 mg  0.5 mg Intravenous Q3H PRN ALEXIS Dunaway MD   0.5 mg at 01/29/25 0212    ondansetron ODT (ZOFRAN-ODT) disintegrating tablet 4 mg  4 mg Oral Q6H PRN ALEXIS Dunaway MD        Or    ondansetron (ZOFRAN) injection 4 mg  4 mg Intravenous Q6H PRN ALEXIS Dunaway MD   4 mg at 01/27/25 1917     "pregabalin (LYRICA) capsule 150 mg  150 mg Oral BID ALEXIS Dunaway MD   150 mg at 01/28/25 2020    prochlorperazine (COMPAZINE) injection 10 mg  10 mg Intravenous Q6H PRN Carlos Meléndez PA-C   10 mg at 01/27/25 2153    sodium chloride 0.9 % flush 10 mL  10 mL Intravenous PRN ALEXIS Dunaway MD        sodium chloride 0.9 % flush 3 mL  3 mL Intravenous Q12H ALEXIS Dunaway MD   3 mL at 01/28/25 2020    sodium chloride 0.9 % infusion 40 mL  40 mL Intravenous PRN ALEXIS Dunaway MD        tiZANidine (ZANAFLEX) tablet 4 mg  4 mg Oral Q8H PRN ALEXIS Dunaway MD   4 mg at 01/28/25 2020     ALLERGIES:    Allergies   Allergen Reactions    Evenity [Romosozumab] Swelling     Injection site swelling, redness, warm to touch       Lumbar radiculopathy    Chronic pain syndrome    Rheumatoid arthritis involving multiple sites    AAT (alpha-1-antitrypsin) deficiency    Anxiety    Lumbar stenosis    Degeneration of lumbar intervertebral disc    GERD (gastroesophageal reflux disease)    Blood pressure 137/65, pulse 52, temperature 97.9 °F (36.6 °C), temperature source Oral, resp. rate 18, height 162.5 cm (63.98\"), weight 96.5 kg (212 lb 11.9 oz), last menstrual period 04/01/2014, SpO2 92%, not currently breastfeeding.      Subjective:  Symptoms:  Stable.    Diet:  NPO.    Activity level: Impaired due to pain.    Pain:  She complains of pain that is moderate.  She reports pain is unchanged.  Pain is partially controlled.      Review of Systems  Objective:  General Appearance:  Comfortable and well-appearing.    Vital signs: (most recent): Blood pressure 137/65, pulse 52, temperature 97.9 °F (36.6 °C), temperature source Oral, resp. rate 18, height 162.5 cm (63.98\"), weight 96.5 kg (212 lb 11.9 oz), last menstrual period 04/01/2014, SpO2 92%, not currently breastfeeding.  Vital signs are normal.    Output: Producing urine and minimal stool output.    HEENT: Normal HEENT exam.    Lungs:  Normal effort and " normal respiratory rate.    Heart: Normal rate.  Regular rhythm.    Abdomen: Abdomen is soft.  Hypoactive bowel sounds.   There is generalized tenderness.     Extremities: Decreased range of motion.    Neurological: Patient is alert.  (Sedated from pain medication).    Skin:  Warm and dry.                Labs:  Lab Results (last 72 hours)       Procedure Component Value Units Date/Time    Vitamin B12 [[id number]]  (Normal) Collected: 01/28/25 0452    Specimen: Blood Updated: 01/28/25 1233     Vitamin B-12 516 pg/mL     Narrative:      Results may be falsely increased if patient taking Biotin.      Folate [[id number]]  (Abnormal) Collected: 01/28/25 0452    Specimen: Blood Updated: 01/28/25 1233     Folate 3.87 ng/mL     Narrative:      Results may be falsely increased if patient taking Biotin.      CBC & Differential [[id number]]  (Abnormal) Collected: 01/28/25 0452    Specimen: Blood Updated: 01/28/25 0605    Narrative:      The following orders were created for panel order CBC & Differential.  Procedure                               Abnormality         Status                     ---------                               -----------         ------                     CBC Auto Differential[[id number]]        Abnormal            Final result                 Please view results for these tests on the individual orders.    CBC Auto Differential [[id number]]  (Abnormal) Collected: 01/28/25 0452    Specimen: Blood Updated: 01/28/25 0605     WBC 8.34 10*3/mm3      RBC 4.28 10*6/mm3      Hemoglobin 11.7 g/dL      Hematocrit 35.4 %      MCV 82.7 fL      MCH 27.3 pg      MCHC 33.1 g/dL      RDW 14.9 %      RDW-SD 45.2 fl      MPV 9.5 fL      Platelets 318 10*3/mm3      Neutrophil % 74.5 %      Lymphocyte % 16.1 %      Monocyte % 8.3 %      Eosinophil % 0.1 %      Basophil % 0.4 %      Immature Grans % 0.6 %      Neutrophils, Absolute 6.22 10*3/mm3      Lymphocytes, Absolute 1.34 10*3/mm3      Monocytes, Absolute 0.69 10*3/mm3       Eosinophils, Absolute 0.01 10*3/mm3      Basophils, Absolute 0.03 10*3/mm3      Immature Grans, Absolute 0.05 10*3/mm3      nRBC 0.0 /100 WBC     Basic Metabolic Panel [[id number]]  (Abnormal) Collected: 01/28/25 0452    Specimen: Blood Updated: 01/28/25 0540     Glucose 102 mg/dL      BUN 7 mg/dL      Creatinine 0.49 mg/dL      Sodium 139 mmol/L      Potassium 3.6 mmol/L      Chloride 99 mmol/L      CO2 28.0 mmol/L      Calcium 8.6 mg/dL      BUN/Creatinine Ratio 14.3     Anion Gap 12.0 mmol/L      eGFR 108.7 mL/min/1.73     Narrative:      GFR Categories in Chronic Kidney Disease (CKD)      GFR Category          GFR (mL/min/1.73)    Interpretation  G1                     90 or greater         Normal or high (1)  G2                      60-89                Mild decrease (1)  G3a                   45-59                Mild to moderate decrease  G3b                   30-44                Moderate to severe decrease  G4                    15-29                Severe decrease  G5                    14 or less           Kidney failure          (1)In the absence of evidence of kidney disease, neither GFR category G1 or G2 fulfill the criteria for CKD.    eGFR calculation 2021 CKD-EPI creatinine equation, which does not include race as a factor    Iron Profile [[id number]]  (Abnormal) Collected: 01/28/25 0452    Specimen: Blood Updated: 01/28/25 0540     Iron 55 mcg/dL      Iron Saturation (TSAT) 20 %      Transferrin 189 mg/dL      TIBC 282 mcg/dL             Imaging Results (Last 72 Hours)       Procedure Component Value Units Date/Time    XR Spine Lumbar AP & Lateral [[id number]] Collected: 01/27/25 0823     Updated: 01/27/25 0826    Narrative:      XR SPINE LUMBAR AP AND LATERAL- 1/27/2025 6:15 AM     HISTORY: llif     COMPARISON: None     FLUOROSCOPY TIME: 0.253 minutes     FLUOROSCOPY DOSE: 20.418 mGy     NUMBER OF IMAGES: 4.0       Impression:         Intraoperative fluoroscopic images during right lateral and  interbody  fusion L2-L3.     Please refer to the operative note for more details.     This report was signed and finalized on 1/27/2025 8:23 AM by Avery Ortiz.       FL C Arm During Surgery [[id number]] Resulted: 01/27/25 0817     Updated: 01/27/25 0817    Narrative:      This procedure was auto-finalized with no dictation required.                  Assessment:    Condition: In stable condition.  Improving.       Plan:   Transfer Plan: To operating room for second step.  Encourage ambulation and per physical therapy.   (      Low folic acid-replace with 1 mg daily for 90 days.  Can follow-up with her primary care doctor to make sure it returns to normal.    Medically stable for second step of surgery today.    Alpha 1 antitrypsin deficiency-stable continue to work with aggressive pulmonary toilet nebulizers.    Follow-up after surgery today, work on discharge planning 1-2 days.).     Problem List:     Lumbar radiculopathy    Chronic pain syndrome    Rheumatoid arthritis involving multiple sites    AAT (alpha-1-antitrypsin) deficiency    Anxiety    Lumbar stenosis    Degeneration of lumbar intervertebral disc    GERD (gastroesophageal reflux disease)    Nathaniel Betancourt MD  1/29/2025                                                 Electronically signed by Nathaniel Betancourt MD at 01/29/25 0705       Consult Notes (last 24 hours)  Notes from 01/29/25 0606 through 01/30/25 0606   No notes of this type exist for this encounter.

## 2025-01-30 NOTE — PLAN OF CARE
Goal Outcome Evaluation:  Plan of Care Reviewed With: patient        Progress: improving  Outcome Evaluation: Pt improving. Pt plans to discharge home with assist. Pt and RUSSO discussed DME. Per pt she has shower seat, BSC and RW. Pt educated on ADL modification for spinal precautions. Per pt she feels very confident with LSO.

## 2025-01-30 NOTE — THERAPY TREATMENT NOTE
Acute Care - Occupational Therapy Treatment Note   [place]     Patient Name: Adela Arauz  : 1965  MRN: [id number]  Today's Date: 2025     Date of Referral to OT: 25       Admit Date: 2025       ICD-10-CM ICD-9-CM   1. Impaired mobility [Z74.09]  Z74.09 799.89     Patient Active Problem List   Diagnosis    Chronic pain syndrome    Rheumatoid arthritis involving multiple sites    Osteoporosis    Chest pain    Multiple lung nodules < 6mm identified 2018    Lymphadenopathy    AAT (alpha-1-antitrypsin) deficiency    Axillary adenopathy    Former smoker    Lymphedema of right arm    Pain in right arm    Morbid (severe) obesity due to excess calories    Anxiety    Foraminal stenosis due to intervertebral disc disease    Lumbar stenosis    Lumbar radiculopathy    History of lumbar fusion    Degeneration of lumbar intervertebral disc    Plantar fasciitis    Panic attack    GERD (gastroesophageal reflux disease)     Past Medical History:   Diagnosis Date    AAT (alpha-1-antitrypsin) deficiency 2019    Anxiety 2020    Cancer     LYMPHOMA    DDD (degenerative disc disease), lumbar     GERD (gastroesophageal reflux disease)     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, 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     Low back pain     Nausea after anesthesia     Open wound of gum     pt states had all her teeth pulled and there is a spot that hasn't healed due to RA so she is using peridex rinse daily    Osteoporosis     PONV (postoperative nausea and vomiting)     Psoriasis     Rheumatoid arthritis     Seasonal allergies     Senile osteoporosis 2017     Past Surgical History:   Procedure Laterality Date    ANTERIOR LUMBAR EXPOSURE N/A 2020    Procedure: Anterior lumbar interbody fusion of L5-S1 with instrumentation ;  Surgeon: Neptali Rivera DO;  Location: [facility];  Service: Vascular;  Laterality: N/A;    AXILLARY LYMPH NODE BIOPSY/EXCISION Right 2019    Procedure: EXCISION RIGHT  AXILLARY MASS;  Surgeon: Jes Enamorado MD;  Location:  [facility];  Service: General    INCISION AND DRAINAGE ARM Right 06/03/2021    Procedure: INCISION AND DRAINAGE FOREARM;  Surgeon: Jes Enamorado MD;  Location:  [facility];  Service: General;  Laterality: Right;    LUMBAR FUSION N/A 12/02/2020    Procedure: ANTERIOR DECOMPRESSION, ANTERIOR LUMBAR INTERBODY FUSION WITH INSTRUMENTATION L5-S1;  Surgeon: ALEXIS Dunaway MD;  Location:  [facility];  Service: Orthopedic Spine;  Laterality: N/A;    LUMBAR FUSION Left 12/16/2020    Procedure: LEFT LATERAL LUMBAR INTERBODY FUSION L3-5 WITH INSTRUMENTATION L3-4;  Surgeon: ALEXIS Dunaway MD;  Location:  [facility];  Service: Orthopedic Spine;  Laterality: Left;    LUMBAR FUSION Right 1/27/2025    Procedure: RIGHT LATERAL LUMBAR INTERBODY FUSION WITH INSTRUMENTATION L2-3;  Surgeon: ALEXIS Dunaway MD;  Location:  [facility];  Service: Orthopedic Spine;  Laterality: Right;    LUMBAR LAMINECTOMY WITH FUSION N/A 12/18/2020    Procedure: DECOMPRESSION L3-4, POSTERIOR SPINAL FUSION WITH INSTRUMENTATION L3-S1;  Surgeon: ALEXIS Dunaway MD;  Location:  [facility];  Service: Orthopedic Spine;  Laterality: N/A;    SHOULDER SURGERY Left     arthroscopy for arthtritis and bone spurs    TUBAL ABDOMINAL LIGATION      US GUIDED LYMPH NODE BIOPSY  08/09/2019    WRIST SURGERY Bilateral     fracture - no internal hardware         OT ASSESSMENT FLOWSHEET (Last 12 Hours)       OT Evaluation and Treatment       Row Name 01/30/25 1410                   OT Time and Intention    Subjective Information no complaints  -TS        Document Type therapy note (daily note)  -TS        Mode of Treatment occupational therapy  -TS        Patient Effort good  -TS           General Information    Existing Precautions/Restrictions fall;brace worn when out of bed;LSO;spinal  -TS           Pain Assessment    Pretreatment Pain Rating 0/10 - no pain  -TS        Posttreatment Pain Rating 0/10 - no  pain  -TS           Cognition    Orientation Status (Cognition) oriented x 4  -TS           Wound 01/27/25 0742 Right lower flank    Wound - Properties Group Placement Date: 01/27/25  -KT Placement Time: 0742  -KT Side: Right  -KT Orientation: lower  -KT Location: flank  -KT Primary Wound Type: Incision  -KT    Retired Wound - Properties Group Placement Date: 01/27/25  -KT Placement Time: 0742  -KT Side: Right  -KT Orientation: lower  -KT Location: flank  -KT Primary Wound Type: Incision  -KT    Retired Wound - Properties Group Placement Date: 01/27/25  -KT Placement Time: 0742  -KT Side: Right  -KT Orientation: lower  -KT Location: flank  -KT Primary Wound Type: Incision  -KT    Retired Wound - Properties Group Date first assessed: 01/27/25  -KT Time first assessed: 0742  -KT Side: Right  -KT Location: flank  -KT Primary Wound Type: Incision  -KT       Wound 01/29/25 1050 lumbar spine    Wound - Properties Group Placement Date: 01/29/25  -DT Placement Time: 1050  -DT Location: lumbar spine  -DT Primary Wound Type: Incision  -DT    Retired Wound - Properties Group Placement Date: 01/29/25  -DT Placement Time: 1050  -DT Location: lumbar spine  -DT Primary Wound Type: Incision  -DT    Retired Wound - Properties Group Placement Date: 01/29/25  -DT Placement Time: 1050  -DT Location: lumbar spine  -DT Primary Wound Type: Incision  -DT    Retired Wound - Properties Group Date first assessed: 01/29/25  -DT Time first assessed: 1050  -DT Location: lumbar spine  -DT Primary Wound Type: Incision  -DT       Plan of Care Review    Plan of Care Reviewed With patient  -TS        Progress improving  -TS        Outcome Evaluation Pt improving. Pt plans to discharge home with assist. Pt and RUSSO discussed DME. Per pt she has shower seat, BSC and RW. Pt educated on ADL modification for spinal precautions. Per pt she feels very confident with LSO.  -TS           Positioning and Restraints    Pre-Treatment Position in bed  -TS         Post Treatment Position bed  -TS        In Bed fowlers;call light within reach;encouraged to call for assist;side rails up x3  -TS                  User Key  (r) = Recorded By, (t) = Taken By, (c) = Cosigned By      Initials Name Effective Dates    TS Sarah Koehler, RUSSO 02/03/23 -     Jose Cancino RN 02/17/22 -     KT Matheus Davis RN 02/17/22 -                      Occupational Therapy Education       Title: PT OT SLP Therapies (In Progress)       Topic: Occupational Therapy (Done)       Point: ADL training (Done)       Description:   Instruct learner(s) on proper safety adaptation and remediation techniques during self care or transfers.   Instruct in proper use of assistive devices.                  Learning Progress Summary            Patient Acceptance, E, VU by TS at 1/30/2025 1551    Acceptance, E, VU by LS at 1/29/2025 1516    Acceptance, E, VU by EC at 1/28/2025 1141                      Point: Home exercise program (Done)       Description:   Instruct learner(s) on appropriate technique for monitoring, assisting and/or progressing therapeutic exercises/activities.                  Learning Progress Summary            Patient Acceptance, E, VU by EC at 1/28/2025 1141                      Point: Precautions (Done)       Description:   Instruct learner(s) on prescribed precautions during self-care and functional transfers.                  Learning Progress Summary            Patient Acceptance, E, VU by TS at 1/30/2025 1551    Acceptance, E, VU by LS at 1/29/2025 1516    Acceptance, E, VU by EC at 1/28/2025 1141                      Point: Body mechanics (Done)       Description:   Instruct learner(s) on proper positioning and spine alignment during self-care, functional mobility activities and/or exercises.                  Learning Progress Summary            Patient Acceptance, E, VU by LS at 1/29/2025 1516    Acceptance, E, VU by EC at 1/28/2025 1141                                       User Key       Initials Effective Dates Name Provider Type Discipline    TS 02/03/23 -  Sarah Koehler COTA Occupational Therapist Assistant OT     06/20/22 -  Zina Etienne OTR/L Occupational Therapist OT    EC 10/13/23 -  Sugey Bruce OTR/L Occupational Therapist OT                      OT Recommendation and Plan     Plan of Care Review  Plan of Care Reviewed With: patient  Progress: improving  Outcome Evaluation: Pt improving. Pt plans to discharge home with assist. Pt and RUSSO discussed DME. Per pt she has shower seat, BSC and RW. Pt educated on ADL modification for spinal precautions. Per pt she feels very confident with LSO.  Plan of Care Reviewed With: patient  Outcome Evaluation: Pt improving. Pt plans to discharge home with assist. Pt and RUSSO discussed DME. Per pt she has shower seat, BSC and RW. Pt educated on ADL modification for spinal precautions. Per pt she feels very confident with LSO.     Outcome Measures       Row Name 01/30/25 1500 01/28/25 1140          How much help from another is currently needed...    Putting on and taking off regular lower body clothing? 2  -TS 3  -EC     Bathing (including washing, rinsing, and drying) 3  -TS 3  -EC     Toileting (which includes using toilet bed pan or urinal) 3  -TS 3  -EC     Putting on and taking off regular upper body clothing 3  -TS 3  -EC     Taking care of personal grooming (such as brushing teeth) 4  -TS 4  -EC     Eating meals 4  -TS 4  -EC     AM-PAC 6 Clicks Score (OT) 19  -TS 20  -EC        Functional Assessment    Outcome Measure Options -- AM-PAC 6 Clicks Daily Activity (OT)  -EC               User Key  (r) = Recorded By, (t) = Taken By, (c) = Cosigned By      Initials Name Provider Type    TS Sarah Koehler COTA Occupational Therapist Assistant    EC Sugey Bruce, OTR/L Occupational Therapist                    Time Calculation:    Time Calculation- OT       Row Name 01/30/25 9125             Time Calculation- OT    OT  Start Time 1410  -TS      OT Stop Time 1420  -TS      OT Time Calculation (min) 10 min  -TS      Total Timed Code Minutes- OT 10 minute(s)  -TS      OT Received On 01/30/25  -TS         Timed Charges    08154 - OT Self Care/Mgmt Minutes 10  -TS         Total Minutes    Timed Charges Total Minutes 10  -TS       Total Minutes 10  -TS                User Key  (r) = Recorded By, (t) = Taken By, (c) = Cosigned By      Initials Name Provider Type    TS Sarah Koehler COTA Occupational Therapist Assistant                  Therapy Charges for Today       Code Description Service Date Service Provider Modifiers Qty    [id number] HC OT SELF CARE/MGMT/TRAIN EA 15 MIN 1/30/2025 Sarah Koehler COTA GO 1                 Sarah NAIK. KARAN Koehler  1/30/2025

## 2025-01-30 NOTE — PLAN OF CARE
Goal Outcome Evaluation:  Plan of Care Reviewed With: patient, family        Progress: improving  Outcome Evaluation: Pt c/o having severe pain throughout the shift, PRN PO and IV pain meds given around-the-clock. Sister at bs. Drsgs remain cdi. IV abx infused. SCDs. Voiding without difficulty. VSS. Safety maintained.

## 2025-01-30 NOTE — PROGRESS NOTES
Adela Arauz is a 60 y.o. female patient.      Postop day #3/1 doing well.  Family at bedside.  Much support at home.  Medically stable for discharge when cleared by spine surgery.      Current Facility-Administered Medications   Medication Dose Route Frequency Provider Last Rate Last Admin    acetaminophen (TYLENOL) tablet 650 mg  650 mg Oral Q4H PRN ALEXIS Dunaway MD        Or    acetaminophen (TYLENOL) 160 MG/5ML oral solution 650 mg  650 mg Oral Q4H PRN ALEXIS Dunaway MD        Or    acetaminophen (TYLENOL) suppository 650 mg  650 mg Rectal Q4H PRN ALEXIS Dunaway MD        ALPRAZolam (XANAX) tablet 1 mg  1 mg Oral TID PRN ALEXIS Dunaway MD   1 mg at 01/29/25 2041    sennosides-docusate (PERICOLACE) 8.6-50 MG per tablet 2 tablet  2 tablet Oral BID ALEXIS Dunaway MD   2 tablet at 01/28/25 0826    And    polyethylene glycol (MIRALAX) packet 17 g  17 g Oral Daily PRN ALEXIS Dunaway MD        And    bisacodyl (DULCOLAX) EC tablet 5 mg  5 mg Oral Daily PRN ALEXIS Dunaway MD        And    bisacodyl (DULCOLAX) suppository 10 mg  10 mg Rectal Daily PRN ALEXIS Dunaway MD        folic acid (FOLVITE) tablet 1 mg  1 mg Oral Daily ALEXIS Dunaway MD        furosemide (LASIX) tablet 40 mg  40 mg Oral Daily ALEXIS Dunaway MD   40 mg at 01/28/25 0826    HYDROcodone-acetaminophen (NORCO)  MG per tablet 1 tablet  1 tablet Oral Q4H PRN ALEXIS Dunaway MD   1 tablet at 01/30/25 0346    HYDROcodone-acetaminophen (NORCO) 7.5-325 MG per tablet 1 tablet  1 tablet Oral Q4H PRN ALEXIS Dunaway MD        HYDROmorphone (DILAUDID) injection 0.5 mg  0.5 mg Intravenous Q3H PRN ALEXIS Dunaway MD   0.5 mg at 01/30/25 0614    lactated ringers infusion  20 mL/hr Intravenous Continuous ALEXIS uDnaway MD   Stopped at 01/29/25 1409    ondansetron ODT (ZOFRAN-ODT) disintegrating tablet 4 mg  4 mg Oral Q6H PRN ALEXIS Dunaway MD        Or    ondansetron (ZOFRAN)  "injection 4 mg  4 mg Intravenous Q6H PRN ALEXIS Dunaway MD   4 mg at 01/29/25 1833    pregabalin (LYRICA) capsule 150 mg  150 mg Oral BID ALEXIS Dunaway MD   150 mg at 01/29/25 2042    prochlorperazine (COMPAZINE) injection 10 mg  10 mg Intravenous Q6H PRN ALEXIS Dunaway MD   10 mg at 01/29/25 2041    sodium chloride 0.9 % flush 10 mL  10 mL Intravenous PRN ALEXIS Dunaway MD        sodium chloride 0.9 % flush 3 mL  3 mL Intravenous Q12H ALEXIS Dunaway MD   3 mL at 01/29/25 2042    sodium chloride 0.9 % infusion 40 mL  40 mL Intravenous PRN ALEXIS Dunaway MD        tiZANidine (ZANAFLEX) tablet 4 mg  4 mg Oral Q8H PRN ALEXIS Dunaway MD   4 mg at 01/30/25 0009     ALLERGIES:    Allergies   Allergen Reactions    Evenity [Romosozumab] Swelling     Injection site swelling, redness, warm to touch       Lumbar radiculopathy    Chronic pain syndrome    Rheumatoid arthritis involving multiple sites    AAT (alpha-1-antitrypsin) deficiency    Anxiety    Lumbar stenosis    Degeneration of lumbar intervertebral disc    GERD (gastroesophageal reflux disease)    Blood pressure 150/69, pulse 63, temperature 98.3 °F (36.8 °C), temperature source Oral, resp. rate 16, height 162.5 cm (63.98\"), weight 96.5 kg (212 lb 11.9 oz), last menstrual period 04/01/2014, SpO2 92%, not currently breastfeeding.      Subjective:  Symptoms:  Stable.    Diet:  Adequate intake.    Activity level: Impaired due to pain.    Pain:  She complains of pain that is moderate.  She reports pain is unchanged.  Pain is partially controlled.      Review of Systems  Objective:  General Appearance:  Comfortable and well-appearing.    Vital signs: (most recent): Blood pressure 150/69, pulse 63, temperature 98.3 °F (36.8 °C), temperature source Oral, resp. rate 16, height 162.5 cm (63.98\"), weight 96.5 kg (212 lb 11.9 oz), last menstrual period 04/01/2014, SpO2 92%, not currently breastfeeding.  Vital signs are normal.    Output: " Producing urine and minimal stool output.    HEENT: Normal HEENT exam.    Lungs:  Normal effort and normal respiratory rate.    Heart: Normal rate.  Regular rhythm.    Abdomen: Abdomen is soft.  There is generalized tenderness.     Extremities: Decreased range of motion.    Pulses: Distal pulses are intact.    Skin:  Warm and dry.                Labs:  Lab Results (last 72 hours)       Procedure Component Value Units Date/Time    Vitamin B12 [[id number]]  (Normal) Collected: 01/28/25 0452    Specimen: Blood Updated: 01/28/25 1233     Vitamin B-12 516 pg/mL     Narrative:      Results may be falsely increased if patient taking Biotin.      Folate [[id number]]  (Abnormal) Collected: 01/28/25 0452    Specimen: Blood Updated: 01/28/25 1233     Folate 3.87 ng/mL     Narrative:      Results may be falsely increased if patient taking Biotin.      CBC & Differential [[id number]]  (Abnormal) Collected: 01/28/25 0452    Specimen: Blood Updated: 01/28/25 0605    Narrative:      The following orders were created for panel order CBC & Differential.  Procedure                               Abnormality         Status                     ---------                               -----------         ------                     CBC Auto Differential[[id number]]        Abnormal            Final result                 Please view results for these tests on the individual orders.    CBC Auto Differential [[id number]]  (Abnormal) Collected: 01/28/25 0452    Specimen: Blood Updated: 01/28/25 0605     WBC 8.34 10*3/mm3      RBC 4.28 10*6/mm3      Hemoglobin 11.7 g/dL      Hematocrit 35.4 %      MCV 82.7 fL      MCH 27.3 pg      MCHC 33.1 g/dL      RDW 14.9 %      RDW-SD 45.2 fl      MPV 9.5 fL      Platelets 318 10*3/mm3      Neutrophil % 74.5 %      Lymphocyte % 16.1 %      Monocyte % 8.3 %      Eosinophil % 0.1 %      Basophil % 0.4 %      Immature Grans % 0.6 %      Neutrophils, Absolute 6.22 10*3/mm3      Lymphocytes, Absolute 1.34 10*3/mm3       Monocytes, Absolute 0.69 10*3/mm3      Eosinophils, Absolute 0.01 10*3/mm3      Basophils, Absolute 0.03 10*3/mm3      Immature Grans, Absolute 0.05 10*3/mm3      nRBC 0.0 /100 WBC     Basic Metabolic Panel [[id number]]  (Abnormal) Collected: 01/28/25 0452    Specimen: Blood Updated: 01/28/25 0540     Glucose 102 mg/dL      BUN 7 mg/dL      Creatinine 0.49 mg/dL      Sodium 139 mmol/L      Potassium 3.6 mmol/L      Chloride 99 mmol/L      CO2 28.0 mmol/L      Calcium 8.6 mg/dL      BUN/Creatinine Ratio 14.3     Anion Gap 12.0 mmol/L      eGFR 108.7 mL/min/1.73     Narrative:      GFR Categories in Chronic Kidney Disease (CKD)      GFR Category          GFR (mL/min/1.73)    Interpretation  G1                     90 or greater         Normal or high (1)  G2                      60-89                Mild decrease (1)  G3a                   45-59                Mild to moderate decrease  G3b                   30-44                Moderate to severe decrease  G4                    15-29                Severe decrease  G5                    14 or less           Kidney failure          (1)In the absence of evidence of kidney disease, neither GFR category G1 or G2 fulfill the criteria for CKD.    eGFR calculation 2021 CKD-EPI creatinine equation, which does not include race as a factor    Iron Profile [[id number]]  (Abnormal) Collected: 01/28/25 0452    Specimen: Blood Updated: 01/28/25 0540     Iron 55 mcg/dL      Iron Saturation (TSAT) 20 %      Transferrin 189 mg/dL      TIBC 282 mcg/dL             Imaging Results (Last 72 Hours)       Procedure Component Value Units Date/Time    XR Spine Lumbar AP & Lateral [[id number]] Collected: 01/29/25 1355     Updated: 01/29/25 1407    Narrative:      EXAM: XR SPINE LUMBAR AP AND LATERAL- - 1/29/2025 9:30 AM     HISTORY: fusion; Z74.09-Other reduced mobility       COMPARISON: 1/27/2025.      TECHNIQUE:  Intraoperative fluoroscopy images submitted.     Number of images:  5  Fluoroscopy time: 28.3 seconds  Air kerma: 22.404 mGy     FINDINGS:  See impression.          Impression:      1. Posterior, lateral, and anterior fixation hardware at the lumbar  spine.  2. A radiologist was not present for the acquisition or intraoperative  the interpretation of these images. Please see the operative report for  details pertaining to this exam.     This report was signed and finalized on 1/29/2025 2:04 PM by Dr Maki Burnette MD.       FL C Arm During Surgery [[id number]] Resulted: 01/29/25 1157     Updated: 01/29/25 1157    Narrative:      This procedure was auto-finalized with no dictation required.    XR Spine Lumbar AP & Lateral [[id number]] Collected: 01/27/25 0823     Updated: 01/27/25 0826    Narrative:      XR SPINE LUMBAR AP AND LATERAL- 1/27/2025 6:15 AM     HISTORY: llif     COMPARISON: None     FLUOROSCOPY TIME: 0.253 minutes     FLUOROSCOPY DOSE: 20.418 mGy     NUMBER OF IMAGES: 4.0       Impression:         Intraoperative fluoroscopic images during right lateral and interbody  fusion L2-L3.     Please refer to the operative note for more details.     This report was signed and finalized on 1/27/2025 8:23 AM by Avery Ortiz.       FL C Arm During Surgery [[id number]] Resulted: 01/27/25 0817     Updated: 01/27/25 0817    Narrative:      This procedure was auto-finalized with no dictation required.                  Assessment:    Condition: In stable condition.  Improving.       Plan:    (    Low hemoglobin postop expected-low folic acid present on admission-1 mg of folic acid daily for 90 days follow-up with her primary care to ensure levels returned to normal.    Rheumatoid arthritis-notify her rheumatologist that her folic acid is low and continue with replacement if needed.    Alpha 1 antitrypsin-continue with aggressive pulmonary toilet educated patient on incentive spirometry that she can take home with her.  Patient has done remarkably well postoperative day #3/1-medically  stable for discharge home when cleared by spine surgery.  ).     Problem List:     Lumbar radiculopathy    Chronic pain syndrome    Rheumatoid arthritis involving multiple sites    AAT (alpha-1-antitrypsin) deficiency    Anxiety    Lumbar stenosis    Degeneration of lumbar intervertebral disc    GERD (gastroesophageal reflux disease)    Nathaniel Betancourt MD  1/30/2025

## 2025-01-30 NOTE — PLAN OF CARE
Goal Outcome Evaluation:  Plan of Care Reviewed With: patient, caregiver        Progress: improving  Outcome Evaluation: Patient had part 2 of her planned surgery- with lumbar posterior approach- gauze dressing in place without drainage. IVF going, given IV pain meds x1 and compazine for history of post op N/V- Up to toilet x1 with therapy. Tilting side to side for relief of pain. Purewick in place. LSO brace at bedside. Sister in room assisting

## 2025-01-31 ENCOUNTER — READMISSION MANAGEMENT (OUTPATIENT)
Dept: CALL CENTER | Facility: HOSPITAL | Age: 60
End: 2025-01-31
Payer: MEDICARE

## 2025-01-31 VITALS
TEMPERATURE: 97.8 F | RESPIRATION RATE: 16 BRPM | SYSTOLIC BLOOD PRESSURE: 143 MMHG | HEART RATE: 84 BPM | BODY MASS INDEX: 36.32 KG/M2 | DIASTOLIC BLOOD PRESSURE: 72 MMHG | WEIGHT: 212.74 LBS | OXYGEN SATURATION: 93 % | HEIGHT: 64 IN

## 2025-01-31 LAB
BACTERIA SPEC AEROBE CULT: NORMAL
BACTERIA SPEC AEROBE CULT: NORMAL

## 2025-01-31 PROCEDURE — 25010000002 HYDROMORPHONE PER 4 MG: Performed by: ORTHOPAEDIC SURGERY

## 2025-01-31 RX ORDER — OXYCODONE AND ACETAMINOPHEN 5; 325 MG/1; MG/1
1 TABLET ORAL EVERY 6 HOURS PRN
Qty: 45 TABLET | Refills: 0 | Status: SHIPPED | OUTPATIENT
Start: 2025-01-31

## 2025-01-31 RX ADMIN — HYDROMORPHONE HYDROCHLORIDE 0.5 MG: 1 INJECTION, SOLUTION INTRAMUSCULAR; INTRAVENOUS; SUBCUTANEOUS at 09:29

## 2025-01-31 RX ADMIN — DOCUSATE SODIUM 50 MG AND SENNOSIDES 8.6 MG 2 TABLET: 8.6; 5 TABLET, FILM COATED ORAL at 09:29

## 2025-01-31 RX ADMIN — FOLIC ACID 1 MG: 1 TABLET ORAL at 09:29

## 2025-01-31 RX ADMIN — HYDROMORPHONE HYDROCHLORIDE 0.5 MG: 1 INJECTION, SOLUTION INTRAMUSCULAR; INTRAVENOUS; SUBCUTANEOUS at 06:22

## 2025-01-31 RX ADMIN — HYDROCODONE BITARTRATE AND ACETAMINOPHEN 1 TABLET: 10; 325 TABLET ORAL at 11:26

## 2025-01-31 RX ADMIN — HYDROCODONE BITARTRATE AND ACETAMINOPHEN 1 TABLET: 10; 325 TABLET ORAL at 03:18

## 2025-01-31 RX ADMIN — HYDROCODONE BITARTRATE AND ACETAMINOPHEN 1 TABLET: 10; 325 TABLET ORAL at 07:01

## 2025-01-31 RX ADMIN — PREGABALIN 150 MG: 75 CAPSULE ORAL at 09:29

## 2025-01-31 RX ADMIN — FUROSEMIDE 40 MG: 40 TABLET ORAL at 09:29

## 2025-01-31 RX ADMIN — HYDROMORPHONE HYDROCHLORIDE 0.5 MG: 1 INJECTION, SOLUTION INTRAMUSCULAR; INTRAVENOUS; SUBCUTANEOUS at 00:24

## 2025-01-31 RX ADMIN — HYDROMORPHONE HYDROCHLORIDE 0.5 MG: 1 INJECTION, SOLUTION INTRAMUSCULAR; INTRAVENOUS; SUBCUTANEOUS at 03:18

## 2025-01-31 RX ADMIN — Medication 3 ML: at 09:30

## 2025-01-31 NOTE — PLAN OF CARE
Goal Outcome Evaluation:       Pt currently sitting upright in chair with LSO brace awaiting transport. Pt Aox4. Medicated for pain x2 this shift. Pt pain level is moderate to severe. 2 Ivs removed. Medication delivered from meds to beds. Pt ambulated to bathroom with assist x1 and walker without difficulty. Discharge education complete; questions answered; understanding verbalized. VSS; safety maintained.     1216: Pt left via w/c in route to main entrance.

## 2025-01-31 NOTE — PLAN OF CARE
Goal Outcome Evaluation:  Plan of Care Reviewed With: patient        Progress: improving  Outcome Evaluation: Pt c/o pain throughout shift. Medicated with PO and IV pains per pt request. Safety maintained. A/o x4. IV IID. Dressing to right flank and lumbar CDI. N/v check WNL. PPP. Up with asst x1 with LSO. Voiding per BRP. Plan for possible dc home tomorrow. Cont to monitor.

## 2025-01-31 NOTE — DISCHARGE SUMMARY
[facility]  Carlos Meléndez PA-C  DISCHARGE SUMMARY  Patient ID:  Adela Arauz  [id number]  60 y.o.  1965    Admit date: 1/27/2025    Discharge date and time: 1/31/2025    Admitting Physician: ALEXIS Dunaway MD     Indication for Admission: Planned surgical admission    Admission Diagnoses: Lumbar radiculopathy [M54.16]    Discharge Diagnoses: Lumbar radiculopathy [M54.16]    Procedures: Right lateral lumbar fusion L2-3, posterior spinal fusion L2-3, posterior spinal instrumentation L2-S1    Problem List:   Lumbar radiculopathy    Chronic pain syndrome    Rheumatoid arthritis involving multiple sites    AAT (alpha-1-antitrypsin) deficiency    Anxiety    Lumbar stenosis    Degeneration of lumbar intervertebral disc    GERD (gastroesophageal reflux disease)      Consults:  Family Medicine    Admission Condition: fair    Discharged Condition: stable    Hospital Course: Patient underwent elective spinal surgery, procedures completed without incident, medical care closely provided, patient stable for discharge at this time with clinic follow up    Disposition: home    Patient Instructions:      Discharge Medications        ASK your doctor about these medications        Instructions Start Date   alendronate 70 MG tablet  Commonly known as: Fosamax   70 mg, Oral, Every 7 Days      ALPRAZolam 1 MG tablet  Commonly known as: XANAX   1 mg, Oral, 3 Times Daily PRN      Calcium-Magnesium-Vitamin D 500-250-200 MG-MG-UNIT tablet   600 tablets, 2 Times Daily      cholecalciferol 25 MCG (1000 UT) tablet  Commonly known as: VITAMIN D3   1,000 Units, Oral, Daily      ferrous sulfate 324 (65 Fe) MG tablet delayed-release EC tablet   324 mg, Daily With Breakfast      fluticasone 50 MCG/ACT nasal spray  Commonly known as: Flonase   2 sprays, Nasal, Daily      furosemide 40 MG tablet  Commonly known as: LASIX   40 mg, Oral, Daily      HYDROcodone-acetaminophen  MG per tablet  Commonly known as: Norco   1  tablet, Oral, Every 6 Hours PRN      leflunomide 20 MG tablet  Commonly known as: ARAVA   20 mg, Oral, Daily      meloxicam 15 MG tablet  Commonly known as: MOBIC   15 mg, Oral, Daily      Narcan 4 MG/0.1ML nasal spray  Generic drug: naloxone   1 spray, Nasal, As Needed      omeprazole 40 MG capsule  Commonly known as: priLOSEC   40 mg, Oral, Daily      pregabalin 150 MG capsule  Commonly known as: LYRICA  Ask about: Which instructions should I use?   150 mg, Oral, 2 Times Daily      tiZANidine 4 MG tablet  Commonly known as: ZANAFLEX   4 mg, Oral, Every 8 Hours PRN      Turmeric 500 MG capsule   1 capsule, Daily      valACYclovir 1000 MG tablet  Commonly known as: VALTREX   1,000 mg, Oral, 2 Times Daily             Activity: Avoid bending lifting or twisting, no driving while taking narcotic pain medication, walk 15 minutes 4 times daily  Diet: regular diet  Wound Care: keep wound clean and dry  Other: Contact [facility] with questions or concerns    Follow-up with Dr Dunaway or PA's in 2 weeks.    Electronically signed by Carlos Meléndez PA-C 09:27 CST 1/31/2025

## 2025-01-31 NOTE — PLAN OF CARE
Goal Outcome Evaluation:  Plan of Care Reviewed With: patient        Progress: no change  Outcome Evaluation: A&Ox4. Dressings to rt lumbar and back, cdi. Asking for pain meds early, PO and IV. Not sleeping. GIUSEPPEO. Marcus. Plan is to dc home today.

## 2025-01-31 NOTE — CASE MANAGEMENT/SOCIAL WORK
Case Management Discharge Note                Selected Continued Care - Admitted Since 1/27/2025       Destination    No services have been selected for the patient.                Durable Medical Equipment    No services have been selected for the patient.                Dialysis/Infusion    No services have been selected for the patient.                Home Medical Care    No services have been selected for the patient.                Therapy    No services have been selected for the patient.                Community Resources    No services have been selected for the patient.                Community & DME    No services have been selected for the patient.                         Final Discharge Disposition Code: 01 - home or self-care

## 2025-01-31 NOTE — PROGRESS NOTES
Adela Arauz is a 60 y.o. female patient.      Postop day #4/2 doing well.  Family at bedside.  Much support at home.  Medically stable for discharge when cleared by spine surgery.      Current Facility-Administered Medications   Medication Dose Route Frequency Provider Last Rate Last Admin    acetaminophen (TYLENOL) tablet 650 mg  650 mg Oral Q4H PRN ALEXIS Dunaway MD        Or    acetaminophen (TYLENOL) 160 MG/5ML oral solution 650 mg  650 mg Oral Q4H PRN ALEXIS Dunaway MD        Or    acetaminophen (TYLENOL) suppository 650 mg  650 mg Rectal Q4H PRN ALEXIS Dunaway MD        ALPRAZolam (XANAX) tablet 1 mg  1 mg Oral TID PRN ALEXIS Dunaway MD   1 mg at 01/30/25 2258    sennosides-docusate (PERICOLACE) 8.6-50 MG per tablet 2 tablet  2 tablet Oral BID ALEXIS Dunaway MD   2 tablet at 01/30/25 2123    And    polyethylene glycol (MIRALAX) packet 17 g  17 g Oral Daily PRN ALEXIS Dunaway MD        And    bisacodyl (DULCOLAX) EC tablet 5 mg  5 mg Oral Daily PRN ALEXIS Dunaway MD        And    bisacodyl (DULCOLAX) suppository 10 mg  10 mg Rectal Daily PRN ALEXIS Dunaway MD        folic acid (FOLVITE) tablet 1 mg  1 mg Oral Daily ALEXIS Dunaway MD   1 mg at 01/30/25 0938    furosemide (LASIX) tablet 40 mg  40 mg Oral Daily ALEXIS Dunaway MD   40 mg at 01/30/25 0938    HYDROcodone-acetaminophen (NORCO)  MG per tablet 1 tablet  1 tablet Oral Q4H PRN ALEXIS Dunaway MD   1 tablet at 01/31/25 0701    HYDROcodone-acetaminophen (NORCO) 7.5-325 MG per tablet 1 tablet  1 tablet Oral Q4H PRN ALEXIS Dunaway MD        HYDROmorphone (DILAUDID) injection 0.5 mg  0.5 mg Intravenous Q3H PRN ALEXIS Dunaway MD   0.5 mg at 01/31/25 0622    ondansetron ODT (ZOFRAN-ODT) disintegrating tablet 4 mg  4 mg Oral Q6H PRN ALEXIS Dunaway MD        Or    ondansetron (ZOFRAN) injection 4 mg  4 mg Intravenous Q6H PRN ALEXIS Dunaway MD   4 mg at 01/30/25 0962     "pregabalin (LYRICA) capsule 150 mg  150 mg Oral BID ALEXIS Dunaway MD   150 mg at 01/30/25 2123    prochlorperazine (COMPAZINE) injection 10 mg  10 mg Intravenous Q6H PRN ALEXIS Dunaway MD   10 mg at 01/29/25 2041    sodium chloride 0.9 % flush 10 mL  10 mL Intravenous PRN ALEXIS Dunaway MD        sodium chloride 0.9 % flush 3 mL  3 mL Intravenous Q12H ALEXIS Dunaway MD   3 mL at 01/30/25 2124    sodium chloride 0.9 % infusion 40 mL  40 mL Intravenous PRN ALEXSI Dunaway MD        tiZANidine (ZANAFLEX) tablet 4 mg  4 mg Oral Q8H PRN ALEXIS Dunaway MD   4 mg at 01/30/25 2123     ALLERGIES:    Allergies   Allergen Reactions    Evenity [Romosozumab] Swelling     Injection site swelling, redness, warm to touch       Lumbar radiculopathy    Chronic pain syndrome    Rheumatoid arthritis involving multiple sites    AAT (alpha-1-antitrypsin) deficiency    Anxiety    Lumbar stenosis    Degeneration of lumbar intervertebral disc    GERD (gastroesophageal reflux disease)    Blood pressure 146/66, pulse 60, temperature 98.2 °F (36.8 °C), temperature source Oral, resp. rate 14, height 162.5 cm (63.98\"), weight 96.5 kg (212 lb 11.9 oz), last menstrual period 04/01/2014, SpO2 94%, not currently breastfeeding.      Subjective:  Symptoms:  Stable.    Diet:  Adequate intake.    Activity level: Impaired due to pain.    Pain:  She complains of pain that is moderate.  She reports pain is unchanged.  Pain is partially controlled.      Review of Systems  Objective:  General Appearance:  Comfortable and well-appearing.    Vital signs: (most recent): Blood pressure 146/66, pulse 60, temperature 98.2 °F (36.8 °C), temperature source Oral, resp. rate 14, height 162.5 cm (63.98\"), weight 96.5 kg (212 lb 11.9 oz), last menstrual period 04/01/2014, SpO2 94%, not currently breastfeeding.  Vital signs are normal.    Output: Producing urine and minimal stool output.    HEENT: Normal HEENT exam.    Lungs:  Normal effort " and normal respiratory rate.    Heart: Normal rate.  Regular rhythm.    Abdomen: Abdomen is soft.  There is generalized tenderness.     Extremities: Decreased range of motion.    Pulses: Distal pulses are intact.    Skin:  Warm and dry.                Labs:  Lab Results (last 72 hours)       Procedure Component Value Units Date/Time    Vitamin B12 [[id number]]  (Normal) Collected: 01/28/25 0452    Specimen: Blood Updated: 01/28/25 1233     Vitamin B-12 516 pg/mL     Narrative:      Results may be falsely increased if patient taking Biotin.      Folate [[id number]]  (Abnormal) Collected: 01/28/25 0452    Specimen: Blood Updated: 01/28/25 1233     Folate 3.87 ng/mL     Narrative:      Results may be falsely increased if patient taking Biotin.      CBC & Differential [[id number]]  (Abnormal) Collected: 01/28/25 0452    Specimen: Blood Updated: 01/28/25 0605    Narrative:      The following orders were created for panel order CBC & Differential.  Procedure                               Abnormality         Status                     ---------                               -----------         ------                     CBC Auto Differential[[id number]]        Abnormal            Final result                 Please view results for these tests on the individual orders.    CBC Auto Differential [[id number]]  (Abnormal) Collected: 01/28/25 0452    Specimen: Blood Updated: 01/28/25 0605     WBC 8.34 10*3/mm3      RBC 4.28 10*6/mm3      Hemoglobin 11.7 g/dL      Hematocrit 35.4 %      MCV 82.7 fL      MCH 27.3 pg      MCHC 33.1 g/dL      RDW 14.9 %      RDW-SD 45.2 fl      MPV 9.5 fL      Platelets 318 10*3/mm3      Neutrophil % 74.5 %      Lymphocyte % 16.1 %      Monocyte % 8.3 %      Eosinophil % 0.1 %      Basophil % 0.4 %      Immature Grans % 0.6 %      Neutrophils, Absolute 6.22 10*3/mm3      Lymphocytes, Absolute 1.34 10*3/mm3      Monocytes, Absolute 0.69 10*3/mm3      Eosinophils, Absolute 0.01 10*3/mm3      Basophils,  Absolute 0.03 10*3/mm3      Immature Grans, Absolute 0.05 10*3/mm3      nRBC 0.0 /100 WBC     Basic Metabolic Panel [[id number]]  (Abnormal) Collected: 01/28/25 0452    Specimen: Blood Updated: 01/28/25 0540     Glucose 102 mg/dL      BUN 7 mg/dL      Creatinine 0.49 mg/dL      Sodium 139 mmol/L      Potassium 3.6 mmol/L      Chloride 99 mmol/L      CO2 28.0 mmol/L      Calcium 8.6 mg/dL      BUN/Creatinine Ratio 14.3     Anion Gap 12.0 mmol/L      eGFR 108.7 mL/min/1.73     Narrative:      GFR Categories in Chronic Kidney Disease (CKD)      GFR Category          GFR (mL/min/1.73)    Interpretation  G1                     90 or greater         Normal or high (1)  G2                      60-89                Mild decrease (1)  G3a                   45-59                Mild to moderate decrease  G3b                   30-44                Moderate to severe decrease  G4                    15-29                Severe decrease  G5                    14 or less           Kidney failure          (1)In the absence of evidence of kidney disease, neither GFR category G1 or G2 fulfill the criteria for CKD.    eGFR calculation 2021 CKD-EPI creatinine equation, which does not include race as a factor    Iron Profile [[id number]]  (Abnormal) Collected: 01/28/25 0452    Specimen: Blood Updated: 01/28/25 0540     Iron 55 mcg/dL      Iron Saturation (TSAT) 20 %      Transferrin 189 mg/dL      TIBC 282 mcg/dL             Imaging Results (Last 72 Hours)       Procedure Component Value Units Date/Time    XR Spine Lumbar AP & Lateral [[id number]] Collected: 01/29/25 1355     Updated: 01/29/25 1407    Narrative:      EXAM: XR SPINE LUMBAR AP AND LATERAL- - 1/29/2025 9:30 AM     HISTORY: fusion; Z74.09-Other reduced mobility       COMPARISON: 1/27/2025.      TECHNIQUE:  Intraoperative fluoroscopy images submitted.     Number of images: 5  Fluoroscopy time: 28.3 seconds  Air kerma: 22.404 mGy     FINDINGS:  See impression.           Impression:      1. Posterior, lateral, and anterior fixation hardware at the lumbar  spine.  2. A radiologist was not present for the acquisition or intraoperative  the interpretation of these images. Please see the operative report for  details pertaining to this exam.     This report was signed and finalized on 1/29/2025 2:04 PM by Dr Maki Burnette MD.       FL C Arm During Surgery [[id number]] Resulted: 01/29/25 1157     Updated: 01/29/25 1157    Narrative:      This procedure was auto-finalized with no dictation required.                  Assessment:    Condition: In stable condition.  Improving.       Plan:    (    Low hemoglobin postop expected-low folic acid present on admission-1 mg of folic acid daily for 90 days follow-up with her primary care to ensure levels returned to normal.    Rheumatoid arthritis-notify her rheumatologist that her folic acid is low and continue with replacement if needed.    Alpha 1 antitrypsin-continue with aggressive pulmonary toilet educated patient on incentive spirometry that she can take home with her.  Patient has done remarkably well postoperative day #4/2-medically stable for discharge home when cleared by spine surgery.  ).     Problem List:     Lumbar radiculopathy    Chronic pain syndrome    Rheumatoid arthritis involving multiple sites    AAT (alpha-1-antitrypsin) deficiency    Anxiety    Lumbar stenosis    Degeneration of lumbar intervertebral disc    GERD (gastroesophageal reflux disease)    Nathaniel Betancourt MD  1/31/2025

## 2025-01-31 NOTE — OUTREACH NOTE
Prep Survey      Flowsheet Row Responses   [facility], [place], operated by [facility] patient discharged from? [facility]   Is LACE score < 7 ? No   Eligibility [facility]   Date of Admission 01/27/25   Date of Discharge 01/31/25   Discharge diagnosis REMOVAL OF INSTRUMENTATION, EXPLORATION OF FUSION L3-S1, POSTERIOR SPINAL FUSION L2-3 WITH INSTRUMENTATION L2-S1   Does the patient have one of the following disease processes/diagnoses(primary or secondary)? General Surgery   Prep survey completed? Yes            Geena PEDRO - Registered Nurse

## 2025-01-31 NOTE — THERAPY DISCHARGE NOTE
Acute Care - Physical Therapy Discharge Summary  [facility]       Patient Name: Adela Arauz  : 1965  MRN: [id number]    Today's Date: 2025                 Admit Date: 2025      PT Recommendation and Plan    Visit Dx:    ICD-10-CM ICD-9-CM   1. Impaired mobility [Z74.09]  Z74.09 799.89   2. History of lumbar fusion  Z98.1 V45.4   3. Lumbar radiculopathy  M54.16 724.4        Outcome Measures       Row Name 25 1500             How much help from another is currently needed...    Putting on and taking off regular lower body clothing? 2  -TS      Bathing (including washing, rinsing, and drying) 3  -TS      Toileting (which includes using toilet bed pan or urinal) 3  -TS      Putting on and taking off regular upper body clothing 3  -TS      Taking care of personal grooming (such as brushing teeth) 4  -TS      Eating meals 4  -TS      AM-PAC 6 Clicks Score (OT) 19  -TS                User Key  (r) = Recorded By, (t) = Taken By, (c) = Cosigned By      Initials Name Provider Type    TS Sarah Koehler COTA Occupational Therapist Assistant                         PT Rehab Goals       Row Name 25 1507             Bed Mobility Goal 1 (PT)    Activity/Assistive Device (Bed Mobility Goal 1, PT) rolling to left;rolling to right;sidelying to sit;sit to sidelying  -AH      [place] Level/Cues Needed (Bed Mobility Goal 1, PT) independent  -AH      Time Frame (Bed Mobility Goal 1, PT) long term goal (LTG);10 days  -      Progress/Outcomes (Bed Mobility Goal 1, PT) goal not met  -         Transfer Goal 1 (PT)    Activity/Assistive Device (Transfer Goal 1, PT) sit-to-stand/stand-to-sit;bed-to-chair/chair-to-bed  -AH      [place] Level/Cues Needed (Transfer Goal 1, PT) modified independence  -AH      Time Frame (Transfer Goal 1, PT) long term goal (LTG);10 days  -AH      Progress/Outcome (Transfer Goal 1, PT) goal not met  -         Gait Training Goal 1 (PT)    Activity/Assistive  Device (Gait Training Goal 1, PT) gait (walking locomotion);increase endurance/gait distance;increase energy conservation;decrease fall risk;other (see comments)  -      [place] Level (Gait Training Goal 1, PT) contact guard required  -      Distance (Gait Training Goal 1, PT) 100  -      Time Frame (Gait Training Goal 1, PT) long term goal (LTG);10 days  -      Progress/Outcome (Gait Training Goal 1, PT) goal met  -         Problem Specific Goal 1 (PT)    Problem Specific Goal 1 (PT) Pt will perform mobility with </= 4/10 pain  -      Time Frame (Problem Specific Goal 1, PT) long-term goal (LTG)  -      Progress/Outcome (Problem Specific Goal 1, PT) goal met  -                User Key  (r) = Recorded By, (t) = Taken By, (c) = Cosigned By      Initials Name Provider Type Discipline    Inga Umanzor PTA Physical Therapist Assistant PT                        PT Discharge Summary  Reason for Discharge: Discharge from facility  Outcomes Achieved: Refer to plan of care for updates on goals achieved  Discharge Destination: Home      Inga Stern PTA   1/31/2025

## 2025-01-31 NOTE — PAYOR COMM NOTE
"REF:  [id number]     [facility]  FAX   [phone]      Ran Kenyon (60 y.o. Female)       Date of Birth   1965    Social Security Number       [place]   [facility] [address]    Home Phone   [phone]    MRN   [id number]       Synagogue   Adventism    Marital Status                               Admission Date   1/27/25    Admission Type   Elective    Admitting Provider   ALEXIS Dunaway MD    Attending Provider       Department, Room/Bed   [facility] 3A, 337/1       Discharge Date   1/31/2025    Discharge Disposition   Home or Self Care    Discharge Destination                                 Attending Provider: (none)   Allergies: Evenity [Romosozumab]    Isolation: None   Infection: None   Code Status: CPR    Ht: 162.5 cm (63.98\")   Wt: 96.5 kg (212 lb 11.9 oz)    Admission Cmt: None   Principal Problem: Lumbar radiculopathy [M54.16]                   Active Insurance as of 1/27/2025       Primary Coverage       Payor Plan Insurance Group Employer/Plan Group    ANTHEM MEDICARE REPLACEMENT ANTHEM MED ADV SNP [id number]       Payor Plan Address Payor Plan Phone Number Payor Plan Fax Number Effective Dates    [address] [phone]  1/1/2025 - None Entered    [facility] [address]         Subscriber Name Subscriber Birth Date Member ID       RAN KENYON 1965 [id number]                     Emergency Contacts        (Rel.) Home Phone Work Phone Mobile Phone    Jerri Martinez (Daughter) [phone] -- [phone]                 Discharge Summary        Carlos Meléndez PA-C at 01/31/25 0927       Attestation signed by ALEXIS Dunaway MD at 01/31/25 0931    I have reviewed this documentation and agree.                  VON Meléndez PA-C  DISCHARGE SUMMARY  Patient ID:  Ran Kenyon  [id number]  60 y.o.  1965    Admit date: 1/27/2025    Discharge date and " time: 1/31/2025    Admitting Physician: ALEXIS Dunaway MD     Indication for Admission: Planned surgical admission    Admission Diagnoses: Lumbar radiculopathy [M54.16]    Discharge Diagnoses: Lumbar radiculopathy [M54.16]    Procedures: Right lateral lumbar fusion L2-3, posterior spinal fusion L2-3, posterior spinal instrumentation L2-S1    Problem List:   Lumbar radiculopathy    Chronic pain syndrome    Rheumatoid arthritis involving multiple sites    AAT (alpha-1-antitrypsin) deficiency    Anxiety    Lumbar stenosis    Degeneration of lumbar intervertebral disc    GERD (gastroesophageal reflux disease)      Consults:  Family Medicine    Admission Condition: fair    Discharged Condition: stable    Hospital Course: Patient underwent elective spinal surgery, procedures completed without incident, medical care closely provided, patient stable for discharge at this time with clinic follow up    Disposition: home    Patient Instructions:      Discharge Medications        ASK your doctor about these medications        Instructions Start Date   alendronate 70 MG tablet  Commonly known as: Fosamax   70 mg, Oral, Every 7 Days      ALPRAZolam 1 MG tablet  Commonly known as: XANAX   1 mg, Oral, 3 Times Daily PRN      Calcium-Magnesium-Vitamin D 500-250-200 MG-MG-UNIT tablet   600 tablets, 2 Times Daily      cholecalciferol 25 MCG (1000 UT) tablet  Commonly known as: VITAMIN D3   1,000 Units, Oral, Daily      ferrous sulfate 324 (65 Fe) MG tablet delayed-release EC tablet   324 mg, Daily With Breakfast      fluticasone 50 MCG/ACT nasal spray  Commonly known as: Flonase   2 sprays, Nasal, Daily      furosemide 40 MG tablet  Commonly known as: LASIX   40 mg, Oral, Daily      HYDROcodone-acetaminophen  MG per tablet  Commonly known as: Norco   1 tablet, Oral, Every 6 Hours PRN      leflunomide 20 MG tablet  Commonly known as: ARAVA   20 mg, Oral, Daily      meloxicam 15 MG tablet  Commonly known as: MOBIC   15 mg,  Oral, Daily      Narcan 4 MG/0.1ML nasal spray  Generic drug: naloxone   1 spray, Nasal, As Needed      omeprazole 40 MG capsule  Commonly known as: priLOSEC   40 mg, Oral, Daily      pregabalin 150 MG capsule  Commonly known as: LYRICA  Ask about: Which instructions should I use?   150 mg, Oral, 2 Times Daily      tiZANidine 4 MG tablet  Commonly known as: ZANAFLEX   4 mg, Oral, Every 8 Hours PRN      Turmeric 500 MG capsule   1 capsule, Daily      valACYclovir 1000 MG tablet  Commonly known as: VALTREX   1,000 mg, Oral, 2 Times Daily             Activity: Avoid bending lifting or twisting, no driving while taking narcotic pain medication, walk 15 minutes 4 times daily  Diet: regular diet  Wound Care: keep wound clean and dry  Other: Contact [facility] with questions or concerns    Follow-up with Dr Dunaway or PA's in 2 weeks.    Electronically signed by Carlos Meléndez PA-C 09:27 CST 1/31/2025      Electronically signed by ALEXIS Dunaway MD at 01/31/25 0931     ALEXIS Dunaway MD   Physician  Orthopedics     Op Note     Signed     Date of Service: 01/29/25 1050  Creation Time: 01/29/25 1458  Case Time: Procedures: Surgeons:   01/29/25 1050 REMOVAL OF INSTRUMENTATION, EXPLORATION OF FUSION L3-S1, POSTERIOR SPINAL FUSION L2-3 WITH INSTRUMENTATION L2-S1    ALEXIS Dunaway MD               Signed         LUMBAR LAMINECTOMY POSTERIOR LUMBAR INTERBODY FUSION  Procedure Note     Adela Arauz  1/29/2025     Pre-op Diagnosis:      1. Status post anterior decompression, ALIF with instrumentation L5-S1, 12/02/2020   2. Status post left LLIF L3-5 with instrumentation L3-4, 12/16/2020  3. Status post PSF with instrumentation L3-S1, 12/18/2020   4. Recurrent chronic low back pain   5. Bilateral anterior thigh, groin, and leg radiculopathy, right worse than left  6. Recurrent neurogenic claudication   7. Severe adjacent level degenerative disc disease L2-3   8. Facet arthropathy L2-3   9.  Spondylolisthesis with instability L2-3   10. Focal kyphosis L2-3   11. Lateral listhesis L2-3   12. Focal scoliosis concave right L2-3   13. Central disc herniation L2-3   14. Severe central and bilateral foraminal stenosis L2-3  15.  Status post right LLIF L2-3, 1/27/2025     Post-op Diagnosis:     same     Procedure/CPT® Codes:     1.  Partial removal of posterior instrumentation L3-S1  2.  Exploration of fusion L3-S1  3.  Posterior spinal fusion L2-3  4.  Posterior spinal instrumentation L2-S1 (ATEC pedicle screws and rods)  5.  Use of locally obtained autograft bone for fusion  6.  Use of fluoroscopy for confirmation of surgical level and placement of instrumentation  7.  Intraoperative neural monitoring with pedicle screw stimulation     Spinal Surgery Levels Completed:1 Level      Anesthesia: General     Surgeon: MELCHOR Dunaway MD     Assistant: Jean Meléndez PA-C     Estimated Blood Loss: 50 mL     Complications: None     Condition: Stable to PACU.     Indications:     The patient is a 60-year-old who sees Charlene Huntley NP for medical issues. She has undergone a staged anterior, lateral, and posterior fusion with instrumentation from L3-S1 in December 2020. She did well with this procedure until about a year ago. She presented back to the office with recurrent chronic low back pain along with bilateral anterior thigh, groin, and leg radiculopathy, with the right side worse than the left as well as symptoms consistent with recurrent neurogenic claudication. Imaging studies revealed severe adjacent level degenerative disc disease at L2-3 associated with facet arthropathy, spondylolisthesis with instability, focal kyphosis, lateral listhesis, focal scoliosis concave to the right, and a central disc herniation causing severe central and bilateral foraminal stenosis.      After failing all conservative measures, it was mutually decided that surgery would be the best option. Risks, benefits, and complications  of surgery were discussed with the patient. The patient appeared well informed and wished to proceed. We specifically discussed the risk of infection, blood loss, nerve root injury, CSF leak, and the possibility of incomplete resolution of symptoms.  We also discussed the risk of a nonunion and the potential need for additional surgery in the event of a pseudoarthrosis or hardware failure.     We elected to proceed with surgery in a staged fashion.  Previously on 1/27/2025, the patient underwent a lateral fusion of L2-3.  Today we return to surgery for the second posterior stage of the procedure.     Operative Procedure:     After obtaining informed consent and verifying the correct operative site, the patient was brought to the operating room. A general anesthetic was provided by the anesthesia service with the assistance of an endotracheal tube. Once this was appropriately positioned and secured, the patient was carefully rotated prone onto a Fei frame. All bony processes were well-padded. The lumbar region was prepped and draped in usual sterile fashion. A surgical timeout was taken to confirm this was the correct patient, we were working at the correct levels, and that preoperative antibiotics were given in a timely fashion.     Next, bilateral Wiltsey incisions spanning the previous instrumentation from L3-S1 were created using a 10 blade scalpel.  Dissection was carried through subcutaneous tissues using Bovie cautery. The fascia was divided in line with the incision. Blunt dissection was carried between the multifidus and longissimus muscles down to the previously placed instrumentation spanning L3-S1. There was some scar tissue as expected around the screws and between the musculature.  The previous instrumentation was completely exposed using Bovie cautery removing some fibrous scar tissue from around the screw heads themselves. The appropriate torx screwdriver and antitorque wrench were used to remove  the setscrews. The rods were then taken out with a lola morgan. The area was then thoroughly explored bilaterally.  All of the pedicle screws appeared to be relatively tight with no evidence of loosening.  There appeared to be adequate bone graft in the posterior lateral gutters consistent with a solid fusion from L3 to S1.     The screws were all well-fixed and did not show any signs of loosening.  There were also from the same instrumentation set that I planned to use across L2-3.  The screws were felt to be adequate for use in our new construct and so they were left intact.  The wounds were then copiously irrigated with saline solution.     The left sided Wiltsey incision was then extended using the 10 blade scalpel up to L2.  The same intramuscular plane was used to gain access to the facet joint of L2-3 and the transverse processes of L2 and L3.  Self retaining retractors were placed for continuous exposure.  Bovie cautery was used to expose as much bony surface area on the transverse processes and to destroy the facet capsule at L2-3.  The wound was then thoroughly irrigated with saline solution.     Next under fluoroscopic guidance, I created starting holes for the placement of new pedicle screw instrumentation at L2.  A starting hole was created with a high-speed bur and the pedicle track was then created using a pedicle probe gaining access through the pedicle to the anterior vertebral body.  The pedicle tract was palpated with a ball-tipped feeler to ensure that the track was adequate.  Into the pedicle of L2 on the left, I placed a screw measuring 6.5 x 45 mm from the [facility] instrumentation set.     Next, I turned my attention to the right side of the spine.  I was able to stretch the right sided Wiltsey incision superiorly and using a Jamshidi needle to cannulate the pedicle of L2 on the right.  Through this, a guidewire was placed to maintain position.  I then placed a 6.5 x 5 mm screw again from the [facility]  instrumentation set into the right pedicle of L2.      Fluoroscopy was used to confirm that all instrumentation was properly positioned in both the AP and lateral projections.  The wounds were then copiously irrigated with saline solution.  I used a neuro monitoring probe to stimulate the screws to ensure that there were adequately positioned.  All screws appeared well positioned.     The high-speed bur was used to decorticate the posterior elements of L2 and L3 as well as the previous fusion mass on the left side from L3 to S1.  I also decorticated the facet joints of L2-3.  The local bone was left in situ to assist with obtaining a fusion.  This constituted a posterior fusion of L2-3.     Next appropriate rods were chosen to span the pedicle screw instrumentation spanning L2 to S1 bilaterally.  The rods were bent to accommodate the patient's increased lordosis.  Set screws were inserted into the pedicle screw saddles fixing the rods into position.  The setscrews were then tightened using the appropriate antitorque wrench and torque limiting screwdriver provided by [facility].     A final inspection was then done to ensure that we had adequate hemostasis.  Bleeding was controlled using bipolar cautery and Avitene.     After insuring that we had adequate hemostasis, closure was then undertaken using a #1 Vicryl to reapproximate the fascia. Immediate subcutaneous tissues were closed with a 2-0 Vicryl and skin closure was then accomplished using Mastisol and Steri-Strips.  I did infuse the subcutaneous layer with half percent Marcaine to assist with postoperative pain control.  The wounds were then washed and sterilely dressed. The patient was then carefully rotated supine onto a hospital gurGamaliel, extubated, and sent to the recovery room in good stable condition. The patient tolerated the procedure well, there were no complications.  Estimated blood loss was approximately 50 mL.     Intraoperative neuro monitoring was  ordered and carried out throughout the procedure to add an increased level of safety for the patient. The interpreting physician was available by means of real-time continuous, bidirectional, remote audio and visual communication as needed throughout the entire procedure. Modalities used during the procedure included SSEP, EMG, TOF, and pedicle screw stimulation. There were no neuro monitoring signal changes during the procedure.      Michael Hudson PA-C provided critical assistance during the procedure.  His assistance was medically necessary in order to allow the procedure to occur in the most safe and efficient manner.  He assisted not only with the partial removal of instrumentation and exploration of fusion from L3 to S1, but also assisted with the placement of new instrumentation and bone graft spanning the whole construct from L2 to S1.     MELCHOR Dunaway MD      Date: 1/29/2025  Time: 15:03 CST

## 2025-02-02 NOTE — THERAPY DISCHARGE NOTE
Acute Care - Occupational Therapy Discharge Summary  [facility]     Patient Name: Adela Arauz  : 1965  MRN: [id number]    Today's Date: 2025       Date of Referral to OT: 25         Admit Date: 2025        OT Recommendation and Plan    Visit Dx:    ICD-10-CM ICD-9-CM   1. Impaired mobility [Z74.09]  Z74.09 799.89   2. History of lumbar fusion  Z98.1 V45.4   3. Lumbar radiculopathy  M54.16 724.4                OT Rehab Goals       Row Name 25 1400             Transfer Goal 1 (OT)    Activity/Assistive Device (Transfer Goal 1, OT) toilet;shower chair  -CS      [place] Level/Cues Needed (Transfer Goal 1, OT) modified independence  -CS      Time Frame (Transfer Goal 1, OT) long term goal (LTG);10 days  -CS      Progress/Outcome (Transfer Goal 1, OT) goal not met  -CS         Dressing Goal 1 (OT)    Activity/Device (Dressing Goal 1, OT) upper body dressing;lower body dressing  -CS      [place]/Cues Needed (Dressing Goal 1, OT) modified independence  -CS      Time Frame (Dressing Goal 1, OT) long term goal (LTG);10 days  -CS      Strategies/Barriers (Dressing Goal 1, OT) LSO  -CS      Progress/Outcome (Dressing Goal 1, OT) goal not met  -CS         Toileting Goal 1 (OT)    Activity/Device (Toileting Goal 1, OT) toileting skills, all  -CS      [place] Level/Cues Needed (Toileting Goal 1, OT) modified independence  -CS      Time Frame (Toileting Goal 1, OT) long term goal (LTG);10 days  -CS      Progress/Outcome (Toileting Goal 1, OT) goal not met  -CS         Problem Specific Goal 1 (OT)    Problem Specific Goal 1 (OT) Pt will independently implement one pain management technique to decrease pain and improve functional adl performance.  -CS      Time Frame (Problem Specific Goal 1, OT) long term goal (LTG);by discharge  -CS      Progress/Outcome (Problem Specific Goal 1, OT) goal not met  -CS                User Key  (r) = Recorded By, (t) = Taken By, (c) = Cosigned By       Initials Name Provider Type Discipline    CS Janis Baker OTR/L, VIET Occupational Therapist OT                     Outcome Measures       Row Name 01/30/25 1500             How much help from another is currently needed...    Putting on and taking off regular lower body clothing? 2  -TS      Bathing (including washing, rinsing, and drying) 3  -TS      Toileting (which includes using toilet bed pan or urinal) 3  -TS      Putting on and taking off regular upper body clothing 3  -TS      Taking care of personal grooming (such as brushing teeth) 4  -TS      Eating meals 4  -TS      AM-PAC 6 Clicks Score (OT) 19  -TS                User Key  (r) = Recorded By, (t) = Taken By, (c) = Cosigned By      Initials Name Provider Type    TS Sarah Koehler COTA Occupational Therapist Assistant                    Timed Therapy Charges  Total Units: 1      Suggested Charges  Total Units: 1      Procedure Name Documented Minutes Units Code    HC OT SELF CARE/MGMT/TRAIN EA 15 MIN 10 1   54193 (CPT®)                 Documented Minutes  Total Minutes: 10      Therapy Provided Minutes    11866 - OT Self Care/Mgmt Minutes 10                        OT Discharge Summary  Anticipated Discharge Disposition (OT): home with assist  Reason for Discharge: Discharge from facility  Outcomes Achieved: Refer to plan of care for updates on goals achieved  Discharge Destination: Home with assist      TARA Can/L, CNT  2/2/2025

## 2025-02-03 ENCOUNTER — TRANSITIONAL CARE MANAGEMENT TELEPHONE ENCOUNTER (OUTPATIENT)
Dept: CALL CENTER | Facility: HOSPITAL | Age: 60
End: 2025-02-03
Payer: MEDICARE

## 2025-02-03 NOTE — OUTREACH NOTE
Call Center TCM Note      Flowsheet Row Responses   [facility] facility patient discharged from? [facility]   Does the patient have one of the following disease processes/diagnoses(primary or secondary)? General Surgery   TCM attempt successful? No   Unsuccessful attempts Attempt 2            Cassandra Cardoso LPN    2/3/2025, 15:37 CST

## 2025-02-03 NOTE — OUTREACH NOTE
Call Center TCM Note      Flowsheet Row Responses   [facility] patient discharged from? [facility]   Does the patient have one of the following disease processes/diagnoses(primary or secondary)? General Surgery   TCM attempt successful? No   Unsuccessful attempts Attempt 1   Call Status Left message            Cassandra Cardoso LPN    2/3/2025, 12:35 EST

## 2025-02-04 ENCOUNTER — TRANSITIONAL CARE MANAGEMENT TELEPHONE ENCOUNTER (OUTPATIENT)
Dept: CALL CENTER | Facility: HOSPITAL | Age: 60
End: 2025-02-04
Payer: MEDICARE

## 2025-02-04 NOTE — OUTREACH NOTE
Call Center TCM Note      Flowsheet Row Responses   [facility] patient discharged from? [facility]   Does the patient have one of the following disease processes/diagnoses(primary or secondary)? General Surgery   TCM attempt successful? No  [Manny, ,  Nikita, Jerri children]   Unsuccessful attempts Attempt 3   Call Status Left message   Does the patient have an appointment with their PCP within 7-14 days of discharge? Yes  [2/12/2025 at 1:00 PM]            Dahiana Proctor RN    2/4/2025, 14:06 CST

## 2025-02-07 DIAGNOSIS — M51.369 DDD (DEGENERATIVE DISC DISEASE), LUMBAR: ICD-10-CM

## 2025-02-07 RX ORDER — MELOXICAM 15 MG/1
15 TABLET ORAL DAILY
Qty: 90 TABLET | Refills: 1 | OUTPATIENT
Start: 2025-02-07

## 2025-02-12 ENCOUNTER — OFFICE VISIT (OUTPATIENT)
Dept: FAMILY MEDICINE CLINIC | Facility: CLINIC | Age: 60
End: 2025-02-12
Payer: MEDICARE

## 2025-02-12 VITALS
SYSTOLIC BLOOD PRESSURE: 136 MMHG | DIASTOLIC BLOOD PRESSURE: 78 MMHG | HEART RATE: 76 BPM | TEMPERATURE: 98 F | BODY MASS INDEX: 31.07 KG/M2 | OXYGEN SATURATION: 99 % | WEIGHT: 182 LBS | HEIGHT: 64 IN

## 2025-02-12 DIAGNOSIS — M51.9 FORAMINAL STENOSIS DUE TO INTERVERTEBRAL DISC DISEASE: ICD-10-CM

## 2025-02-12 DIAGNOSIS — Z98.1 S/P LUMBAR FUSION: ICD-10-CM

## 2025-02-12 DIAGNOSIS — M81.0 OSTEOPOROSIS, UNSPECIFIED OSTEOPOROSIS TYPE, UNSPECIFIED PATHOLOGICAL FRACTURE PRESENCE: ICD-10-CM

## 2025-02-12 DIAGNOSIS — Z09 HOSPITAL DISCHARGE FOLLOW-UP: Primary | ICD-10-CM

## 2025-02-12 DIAGNOSIS — M05.79 RHEUMATOID ARTHRITIS INVOLVING MULTIPLE SITES WITH POSITIVE RHEUMATOID FACTOR: ICD-10-CM

## 2025-02-12 DIAGNOSIS — M51.362 DEGENERATION OF INTERVERTEBRAL DISC OF LUMBAR REGION WITH DISCOGENIC BACK PAIN AND LOWER EXTREMITY PAIN: ICD-10-CM

## 2025-02-12 DIAGNOSIS — M99.79 FORAMINAL STENOSIS DUE TO INTERVERTEBRAL DISC DISEASE: ICD-10-CM

## 2025-02-12 DIAGNOSIS — G89.4 CHRONIC PAIN SYNDROME: ICD-10-CM

## 2025-02-12 DIAGNOSIS — M54.16 LUMBAR RADICULOPATHY: ICD-10-CM

## 2025-02-12 PROCEDURE — 1160F RVW MEDS BY RX/DR IN RCRD: CPT | Performed by: NURSE PRACTITIONER

## 2025-02-12 PROCEDURE — 1125F AMNT PAIN NOTED PAIN PRSNT: CPT | Performed by: NURSE PRACTITIONER

## 2025-02-12 PROCEDURE — 1111F DSCHRG MED/CURRENT MED MERGE: CPT | Performed by: NURSE PRACTITIONER

## 2025-02-12 PROCEDURE — 1159F MED LIST DOCD IN RCRD: CPT | Performed by: NURSE PRACTITIONER

## 2025-02-12 PROCEDURE — 99495 TRANSJ CARE MGMT MOD F2F 14D: CPT | Performed by: NURSE PRACTITIONER

## 2025-02-12 RX ORDER — HYDROCODONE BITARTRATE AND ACETAMINOPHEN 10; 325 MG/1; MG/1
1 TABLET ORAL EVERY 6 HOURS PRN
COMMUNITY
End: 2025-02-12 | Stop reason: SDUPTHER

## 2025-02-12 RX ORDER — MELOXICAM 15 MG/1
15 TABLET ORAL
COMMUNITY
Start: 2025-02-07

## 2025-02-12 NOTE — PROGRESS NOTES
"Chief Complaint  Follow-up    Subjective    {Problem List  Visit Diagnosis   Encounters  Notes  Medications  Labs  Result Review Imaging  Media :23}    Adela Arauz presents to [facility]  History of Present Illness  History of Present Illness  MEDICATIONS  Hydrocodone, meloxicam, tizanidine.    Objective   Vital Signs:  /78   Pulse 76   Temp 98 °F (36.7 °C) (Temporal)   Ht 162.5 cm (63.98\")   Wt 82.6 kg (182 lb)   SpO2 99%   BMI 31.26 kg/m²   Estimated body mass index is 31.26 kg/m² as calculated from the following:    Height as of this encounter: 162.5 cm (63.98\").    Weight as of this encounter: 82.6 kg (182 lb).       {BMI is >= 30 and <35. (Class 1 Obesity). The following options were offered after discussion; (Optional):91017}        Physical Exam   Physical Exam      Result Review :{Labs  Result Review  Imaging  Med Tab  Media  Procedures :23}    {The following data was reviewed by (Optional):24939}  {Ambulatory Labs (Optional):73226}  {Data reviewed (Optional):82359:::1}  Results             Assessment and Plan {CC Problem List  Visit Diagnosis   ROS  Review (Popup)  Health Maintenance  Quality  BestPractice  Medications  SmartSets  SnapShot Encounters  Media :23}    There are no diagnoses linked to this encounter.  Assessment & Plan      No diagnosis found.       {Time Spent (Optional):54042}      Follow Up {Instructions Charge Capture  Follow-up Communications :23}    No follow-ups on file.  Patient was given instructions and counseling regarding her condition or for health maintenance advice. Please see specific information pulled into the AVS if appropriate.       {DOLLY [employer] Provider Statement:05616}    "

## 2025-02-12 NOTE — PROGRESS NOTES
Transitional Care Follow Up Visit  Subjective     Adela Arauz is a 60 y.o. female who presents for a transitional care management visit.    Within 48 business hours after discharge our office contacted her via telephone to coordinate her care and needs.      I reviewed and discussed the details of that call along with the discharge summary, hospital problems, inpatient lab results, inpatient diagnostic studies, and consultation reports with Adela.     Current outpatient and discharge medications have been reconciled for the patient.  Reviewed by: Charlene Huntley, DNP, APRN          1/31/2025     5:52 PM   Date of TCM Phone Call   [facility]   Date of Admission 1/27/2025   Date of Discharge 1/31/2025     Risk for Readmission (LACE) Score: 11 (1/31/2025  5:00 AM)    History of Present Illness   Course During Hospital Stay:    The patient is a 60-year-old female who presents for a hospital follow-up. She was admitted to [facility], [place], operated by [facility] on 01/27/2025 and discharged on 01/31/2025 by Dr. Calderon. The reason for admission was a lumbar radiculopathy procedure, which included a right lateral lumbar fusion L2-3, posterior spinal fusion L2-L3, and posterior spinal instrumentation L2-S3. Her diagnoses include lumbar radiculopathy, chronic pain syndrome, rheumatoid arthritis involving multiple sites, alpha-1 antitrypsin deficiency, anxiety, lumbar stenosis, degeneration of lumbar disc, and GERD. She was admitted for an elective surgery, which was completed without incident. She was observed medically and deemed stable for discharge home. She presents today for transitional care management.    She experienced severe pain in December 2024. She had previous surgeries in December 2020, including staged anterolateral and posterior fusion from L3-S1. She had done pretty well until about last year. She started developing back pain, which progressively worsened and limited her ability to walk and stand. The pain  "radiated down her thigh and into her groin, with the right side being worse than the left. She began walking more and leaning forward, which felt awkward to her. The symptoms disrupted her sleep, and she developed numbness and tingling in both thighs and her groin. Despite trying physical therapy and injections with Dr. Valente, her symptoms persisted. Hydrocodone, meloxicam, and tizanidine did not provide relief. A CT and MRI of the lumbar spine, ordered by Dr. De La Cruz, showed a solid fusion from L3-S1 but revealed severe adjacent level degeneration and disc disease at L2-3, associated with facet arthropathy, spondylolisthesis, focal kyphosis, lateral listhesis, and focal scoliosis concave to the right. These conditions were causing severe central and foraminal stenosis, matching her symptoms. After discussing the risks, benefits, and alternatives with Dr. De La Cruz, she agreed to the surgery and was fitted with an LSO back brace postoperatively. The surgery was uneventful, and she returned home.    The following portions of the patient's history were reviewed and updated as appropriate: allergies, current medications, past family history, past medical history, past social history, past surgical history, and problem list.    Review of Systems   Constitutional:  Positive for activity change and appetite change.   HENT: Negative.     Respiratory: Negative.     Cardiovascular: Negative.    Gastrointestinal: Negative.    Endocrine: Negative.    Genitourinary: Negative.    Musculoskeletal:  Positive for arthralgias and back pain.   Allergic/Immunologic: Negative.    Neurological: Negative.    Hematological: Negative.    Psychiatric/Behavioral: Negative.     All other systems reviewed and are negative.      Objective   /78   Pulse 76   Temp 98 °F (36.7 °C) (Temporal)   Ht 162.5 cm (63.98\")   Wt 82.6 kg (182 lb)   SpO2 99%   BMI 31.26 kg/m²   Physical Exam  Vitals and nursing note reviewed.   Constitutional:       " General: She is awake.      Appearance: Normal appearance. She is well-developed and well-groomed.   HENT:      Head: Normocephalic and atraumatic.      Right Ear: Hearing, tympanic membrane, ear canal and external ear normal.      Left Ear: Hearing, tympanic membrane, ear canal and external ear normal.      Nose: Nose normal.      Mouth/Throat:      Lips: Pink.      Pharynx: Oropharynx is clear.   Eyes:      General: Lids are normal.      Conjunctiva/sclera: Conjunctivae normal.   Cardiovascular:      Rate and Rhythm: Normal rate and regular rhythm.      Heart sounds: Normal heart sounds.   Pulmonary:      Effort: Pulmonary effort is normal.      Breath sounds: Normal breath sounds and air entry.   Musculoskeletal:      Cervical back: Full passive range of motion without pain.      Thoracic back: Spasms and tenderness present. Decreased range of motion.      Lumbar back: Spasms and tenderness present. Decreased range of motion.        Back:       Right lower leg: No edema.      Left lower leg: No edema.   Lymphadenopathy:      Head:      Right side of head: No submental, submandibular or tonsillar adenopathy.      Left side of head: No submental, submandibular or tonsillar adenopathy.   Skin:     General: Skin is warm and dry.   Neurological:      Mental Status: She is alert and oriented to person, place, and time.      Sensory: Sensation is intact.      Motor: Motor function is intact.      Coordination: Coordination is intact.      Gait: Gait is intact.   Psychiatric:         Attention and Perception: Attention and perception normal.         Mood and Affect: Mood and affect normal.         Speech: Speech normal.         Behavior: Behavior normal. Behavior is cooperative.         Thought Content: Thought content normal.         Cognition and Memory: Cognition and memory normal.         Judgment: Judgment normal.     Imaging  CT and MRI of the lumbar spine showed a solid fusion from L3-S1 but severe adjacent level  degeneration and disc disease at L2-3, associated with facet arthropathy, spondylolisthesis, focal kyphosis, lateral listhesis, and focal scoliosis concave to the right, causing severe central and foraminal stenosis.      Assessment & Plan   Diagnoses and all orders for this visit:    1. Hospital discharge follow-up (Primary)    2. S/P lumbar fusion    3. Chronic pain syndrome  4. Degeneration of intervertebral disc of lumbar region with discogenic back pain and lower extremity pain  5. Lumbar radiculopathy  6. Foraminal stenosis due to intervertebral disc disease  -     HYDROcodone-acetaminophen (NORCO)  MG per tablet; Take 1 tablet by mouth Every 6 (Six) Hours As Needed for Moderate Pain.  Dispense: 120 tablet; Refill: 0  -   control contract, toxassure and gladys reviewed    7. Rheumatoid arthritis involving multiple sites with positive rheumatoid factor      Continue follow up with RA dr and continue arava and meloxicam as prescribed    8. Osteoporosis, unspecified osteoporosis type, unspecified pathological fracture presence      Continue alendronate as prescribed    9. Anxiety (PTSD)      Continue alprazolam as prescribed     1. Postoperative status following right lateral lumbar fusion L2-3, posterior spinal fusion L2-L3, and posterior spinal instrumentation L2-S3.    PROCEDURE  The patient underwent a right lateral lumbar fusion L2-3, posterior spinal fusion L2-L3, and posterior spinal instrumentation L2-S3 on 01/27/2025. She also had staged anterolateral and posterior fusion from L3-S1 in December 2020.     Electronically signed by Charlene Huntley, [place], APRN, 02/18/25, 3:52 PM CST.

## 2025-02-13 DIAGNOSIS — F41.0 PANIC ATTACK: ICD-10-CM

## 2025-02-13 DIAGNOSIS — F43.10 PTSD (POST-TRAUMATIC STRESS DISORDER): ICD-10-CM

## 2025-02-13 NOTE — TELEPHONE ENCOUNTER
Rx Refill Note  Requested Prescriptions     Pending Prescriptions Disp Refills    ALPRAZolam (XANAX) 1 MG tablet [Pharmacy Med Name: ALPRAZOLAM 1 MG TABS 1 Tablet] 90 tablet 2     Sig: Take 1 tablet by mouth 3 (Three) Times a Day As Needed for Anxiety.    HYDROcodone-acetaminophen (NORCO)  MG per tablet [Pharmacy Med Name: HYDROCODONE APAP 10/325  Tablet] 120 tablet 0     Sig: Take 1 tablet by mouth Every 6 (Six) Hours As Needed for Moderate Pain.      Last office visit with prescribing clinician: 2/12/2025     Next office visit with prescribing clinician: 3/12/2025     LRX:12/11/2024-3 months- refill not due until 3/2025  LRX:1/13/2025-therapy marked completed at surgery DC 1/31/25  UDS:1/2025  CSA: 11/2024          Rosina Sabillon MA  02/13/25, 12:13 CST

## 2025-02-13 NOTE — TELEPHONE ENCOUNTER
Patient calling to check the status of refill.  Advised request has been sent to provider for review.

## 2025-02-14 RX ORDER — HYDROCODONE BITARTRATE AND ACETAMINOPHEN 10; 325 MG/1; MG/1
1 TABLET ORAL EVERY 6 HOURS PRN
Qty: 120 TABLET | Refills: 0 | OUTPATIENT
Start: 2025-02-14

## 2025-02-14 RX ORDER — HYDROCODONE BITARTRATE AND ACETAMINOPHEN 10; 325 MG/1; MG/1
1 TABLET ORAL EVERY 6 HOURS PRN
OUTPATIENT
Start: 2025-02-14

## 2025-02-14 RX ORDER — HYDROCODONE BITARTRATE AND ACETAMINOPHEN 10; 325 MG/1; MG/1
1 TABLET ORAL EVERY 6 HOURS PRN
Qty: 120 TABLET | Refills: 0 | Status: SHIPPED | OUTPATIENT
Start: 2025-02-14

## 2025-02-14 RX ORDER — ALPRAZOLAM 1 MG/1
1 TABLET ORAL 3 TIMES DAILY PRN
Qty: 90 TABLET | Refills: 2 | OUTPATIENT
Start: 2025-02-14

## 2025-02-14 NOTE — TELEPHONE ENCOUNTER
Caller: Adela Arauz    Relationship: Self    Best call back number: [phone]     Requested Prescriptions:   Requested Prescriptions     Pending Prescriptions Disp Refills    HYDROcodone-acetaminophen (NORCO)  MG per tablet       Sig: Take 1 tablet by mouth Every 6 (Six) Hours As Needed for Moderate Pain.        Pharmacy where request should be sent: [facility] - [place], [place] - [address] [facility] - [phone] [facility] [phone] FX     Last office visit with prescribing clinician: 2/12/2025   Last telemedicine visit with prescribing clinician: Visit date not found   Next office visit with prescribing clinician: 3/12/2025     Additional details provided by patient: patient is out    Does the patient have less than a 3 day supply:  [x] Yes  [] No    Would you like a call back once the refill request has been completed: [x] Yes [] No    If the office needs to give you a call back, can they leave a voicemail: [x] Yes [] No    Marely Mcnair Rep   02/14/25 08:50 CST

## 2025-02-24 ENCOUNTER — TELEPHONE (OUTPATIENT)
Dept: MRI IMAGING | Facility: HOSPITAL | Age: 60
End: 2025-02-24
Payer: MEDICARE

## 2025-02-24 NOTE — TELEPHONE ENCOUNTER
I spoke with the patient regarding an incidental finding seen on a recent imaging exam.The patient verbalized understanding the Radiologist's recommendations for follow-up. Per the patient request, the report was routed to the PCP.  Patient states she will have done next month at PCP office.

## 2025-03-10 ENCOUNTER — TELEPHONE (OUTPATIENT)
Dept: FAMILY MEDICINE CLINIC | Facility: CLINIC | Age: 60
End: 2025-03-10
Payer: MEDICARE

## 2025-03-10 NOTE — TELEPHONE ENCOUNTER
PATIENT CALLED IN TO LET STAFF KNOW SHE WILL NEED A PRIOR AUTHORIZATION FOR THE     pregabalin (LYRICA) 150 MG capsule     PATIENT HAS AN APPOINTMENT ON 3/12/25  WEDNESDAY       GOOD CALL BACK

## 2025-03-12 ENCOUNTER — OFFICE VISIT (OUTPATIENT)
Dept: FAMILY MEDICINE CLINIC | Facility: CLINIC | Age: 60
End: 2025-03-12
Payer: MEDICARE

## 2025-03-12 VITALS
HEIGHT: 64 IN | DIASTOLIC BLOOD PRESSURE: 77 MMHG | OXYGEN SATURATION: 99 % | HEART RATE: 101 BPM | RESPIRATION RATE: 18 BRPM | BODY MASS INDEX: 30.8 KG/M2 | SYSTOLIC BLOOD PRESSURE: 114 MMHG | TEMPERATURE: 98.1 F | WEIGHT: 180.4 LBS

## 2025-03-12 DIAGNOSIS — M54.16 LUMBAR RADICULOPATHY: ICD-10-CM

## 2025-03-12 DIAGNOSIS — Z98.1 HISTORY OF LUMBAR FUSION: ICD-10-CM

## 2025-03-12 DIAGNOSIS — M54.41 CHRONIC BILATERAL LOW BACK PAIN WITH BILATERAL SCIATICA: ICD-10-CM

## 2025-03-12 DIAGNOSIS — M54.42 CHRONIC BILATERAL LOW BACK PAIN WITH BILATERAL SCIATICA: ICD-10-CM

## 2025-03-12 DIAGNOSIS — R07.81 RIB PAIN: ICD-10-CM

## 2025-03-12 DIAGNOSIS — E66.01 MORBID (SEVERE) OBESITY DUE TO EXCESS CALORIES: ICD-10-CM

## 2025-03-12 DIAGNOSIS — M48.062 SPINAL STENOSIS OF LUMBAR REGION WITH NEUROGENIC CLAUDICATION: ICD-10-CM

## 2025-03-12 DIAGNOSIS — G89.29 CHRONIC BILATERAL LOW BACK PAIN WITH BILATERAL SCIATICA: ICD-10-CM

## 2025-03-12 DIAGNOSIS — M05.79 RHEUMATOID ARTHRITIS INVOLVING MULTIPLE SITES WITH POSITIVE RHEUMATOID FACTOR: ICD-10-CM

## 2025-03-12 DIAGNOSIS — M51.362 DEGENERATION OF INTERVERTEBRAL DISC OF LUMBAR REGION WITH DISCOGENIC BACK PAIN AND LOWER EXTREMITY PAIN: ICD-10-CM

## 2025-03-12 DIAGNOSIS — F41.9 ANXIETY: ICD-10-CM
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
## 2025-03-12 DIAGNOSIS — F41.0 PANIC ATTACK: ICD-10-CM

## 2025-03-12 DIAGNOSIS — F43.10 PTSD (POST-TRAUMATIC STRESS DISORDER): ICD-10-CM

## 2025-03-12 RX ORDER — ALPRAZOLAM 1 MG/1
1 TABLET ORAL 3 TIMES DAILY PRN
Qty: 90 TABLET | Refills: 2 | Status: SHIPPED | OUTPATIENT
Start: 2025-03-12

## 2025-03-12 RX ORDER — PREGABALIN 150 MG/1
150 CAPSULE ORAL 2 TIMES DAILY
Qty: 180 CAPSULE | Refills: 1 | Status: SHIPPED | OUTPATIENT
Start: 2025-03-12

## 2025-03-12 NOTE — PROGRESS NOTES
Chief Complaint  Back Pain (F/u)    Subjective        Adela Arauz presents to [facility]  History of Present Illness  History of Present Illness  Fall last Wednesday, at 4:00 in the morning she let her dogs out (she usually goes out back but this time she went out front) and when she went to turn she missed the step and fell, landing on her face, right ribs are really hurting, and her left knee is discolored and swollen. Denies loss of consciousness, denies headach    The patient is a 60-year-old female who presents today for pain. She had surgery on 01/27/2025 for status post lumbar fusion, chronic pain syndrome, degeneration of intervertebral disk of the lumbar region with discogenic back pain and lower extremity pain, lumbar radiculopathy, and foraminal stenosis. She also has osteoporosis, rheumatoid arthritis, PTSD, depression, and anxiety. She normally gets Norco every month and she comes in. She does not ask for any refills early. She does not ask for any increase in the number of pills. She comes to all of her appointments. [employer] control contract and tops of [employer] are on file. She struggles with fibromyalgia, rheumatoid arthritis, chronic back and neck pain, hip pain, knee pain, and as far as the PTSD, she actually saw her brother committed suicide and she was the one who found him, so that has been problems. She also has iron deficiency anemia and takes ferrous sulfate daily. She does get some fluid overload from time to time. She takes Lasix for that. She has acid reflux and she takes GERD for that. She does take the Lyrica for the numbness and tingling. However, she switched insurance companies and they said that she would have to have a preauthorization she would get it.    She experienced a panic attack last week, which she managed to control through breathing exercises.    She recently suffered a fall while letting her dogs out, resulting in facial injuries,  "rib pain, and knee trauma. Her left knee is particularly affected, exhibiting bruising, discoloration, and swelling. She rates her current back and leg pain as a 9 on a scale of 10, describing it as achy.    MEDICATIONS  Norco, ferrous sulfate, Lasix, Lyrica.    The following portions of the patient's history were reviewed and updated as appropriate: allergies, current medications, past family history, past medical history, past social history, past surgical history and problem list.    Objective   Vital Signs:  /77 (BP Location: Left arm, Patient Position: Sitting, Cuff Size: Large Adult)   Pulse 101   Temp 98.1 °F (36.7 °C) (Infrared)   Resp 18   Ht 162.5 cm (63.98\")   Wt 81.8 kg (180 lb 6.4 oz)   SpO2 99%   BMI 30.99 kg/m²   Estimated body mass index is 30.99 kg/m² as calculated from the following:    Height as of this encounter: 162.5 cm (63.98\").    Weight as of this encounter: 81.8 kg (180 lb 6.4 oz).       BMI is >= 30 and <35. (Class 1 Obesity). The following options were offered after discussion;: exercise counseling/recommendations and nutrition counseling/recommendations    Physical Exam  Vitals and nursing note reviewed.   Constitutional:       General: She is awake.      Appearance: Normal appearance. She is well-developed and well-groomed.          Comments: Contusion to forehead    HENT:      Head: Normocephalic and atraumatic.      Right Ear: Hearing, tympanic membrane, ear canal and external ear normal.      Left Ear: Hearing, tympanic membrane, ear canal and external ear normal.      Nose: Nose normal.      Mouth/Throat:      Lips: Pink.      Pharynx: Oropharynx is clear.   Eyes:      General: Lids are normal.      Conjunctiva/sclera: Conjunctivae normal.   Cardiovascular:      Rate and Rhythm: Normal rate and regular rhythm.      Heart sounds: Normal heart sounds.   Pulmonary:      Effort: Pulmonary effort is normal.      Breath sounds: Normal breath sounds and air entry. "   Musculoskeletal:      Right elbow: Decreased range of motion. Tenderness present.      Left elbow: Decreased range of motion. Tenderness present.        Arms:       Cervical back: Spasms present. Decreased range of motion.      Thoracic back: Signs of trauma, spasms and tenderness present. Decreased range of motion.      Lumbar back: Spasms and tenderness present. Decreased range of motion.        Back:       Right hip: Tenderness present. Decreased range of motion.      Left hip: Tenderness present. Decreased range of motion.      Right lower leg: No edema.      Left lower leg: No edema.        Legs:       Comments: Has pain in the lumbar area, has rib pain on the right side    Lymphadenopathy:      Head:      Right side of head: No submental, submandibular or tonsillar adenopathy.      Left side of head: No submental, submandibular or tonsillar adenopathy.   Skin:     General: Skin is warm and dry.   Neurological:      Mental Status: She is alert and oriented to person, place, and time.      Sensory: Sensation is intact.      Motor: Motor function is intact.      Coordination: Coordination is intact.      Gait: Gait abnormal.      Comments: Walks with cane    Psychiatric:         Attention and Perception: Attention and perception normal.         Mood and Affect: Mood and affect normal.         Speech: Speech normal.         Behavior: Behavior normal. Behavior is cooperative.         Thought Content: Thought content normal.         Cognition and Memory: Cognition and memory normal.         Judgment: Judgment normal.        Physical Exam      Result Review :          Results             Assessment and Plan     Diagnoses and all orders for this visit:    1. Fall, initial encounter (Primary)  2. Rib pain  3. Rheumatoid arthritis involving multiple sites with positive rheumatoid factor  -     XR Ribs Right With PA Chest; Future  -     pregabalin (LYRICA) 150 MG capsule; Take 1 capsule by mouth 2 (Two) Times a Day.   Dispense: 180 capsule; Refill: 1    4. Panic attack  5. PTSD (post-traumatic stress disorder)  6. Anxiety  -     ALPRAZolam (XANAX) 1 MG tablet; Take 1 tablet by mouth 3 (Three) Times a Day As Needed for Anxiety.  Dispense: 90 tablet; Refill: 2    7. History of lumbar fusion  8. Chronic bilateral low back pain with bilateral sciatica  9. Spinal stenosis of lumbar region with neurogenic claudication  10. Lumbar radiculopathy  11. Degeneration of intervertebral disc of lumbar region with discogenic back pain and lower extremity pain  -     pregabalin (LYRICA) 150 MG capsule; Take 1 capsule by mouth 2 (Two) Times a Day.  Dispense: 180 capsule; Refill: 1        -     hydrocodone-acetaminophen  sent to pharmacy as prescribed         -     hong Frye toxassure reviewed and on chart    12. Morbid (severe) obesity due to excess calories        Obesity Plan:    The patient BMI is outside this range and we recommended/discussed today to utilize a diet/exercise program to get back into the appropriate range.  Federal guidelines recommend that people under the age of 65 should have a BMI of 18.5-24.9  The initial step is to document everything that is consumed into a food diary. Studies have shown that patients can lose up to 2x the weight by keeping track of foods.   Choose one bad food weekly and eliminate it from your diet.  Replace with one healthy food  Goal over next 2-4 weeks is walk 30 minutes per day 5 days per week at pace difficult to hold conversation.   Drink more water, less soda.   Cut back on portion sizes.   Today we encouraged roughly a 1 lb per week weight loss with initial goal of 5% weight loss.  Discussed if eating out for a meal, consider cutting food in half and placing into a to go container.  Individually portion any foods coming into the home based on package.  Use smaller plates  Drinking 12 oz of water 30 minutes before meal as way to suppress appetite.  Lifestyle therapy   Simple advice  to lose weight   Internet programs or self help books.    Advice from dietitian  Set Goals that are realistic   Encouraged to use visual aides to help in measuring foods  Baseball-1 cup good for green salad, frozen yogurt, medium piece of fruit, baked potato  Handful - ½ cup good cut fruit, cooked vegetables, pasta, rice  Egg- ¼ cup good for dried fruit  Deck of cards-3 ounces good for meat and poultry  Check book-3 ounces good for grilled fish  6 dice side by side- 1 ½ ounces good for natural cheese  Simple tips   Plate method-reduce plate size to 9 inch dinner plate.  Half of plate should be filled with non-starchy vegetables (broccoli, lettuce, cauliflower, tomatoes), ¼ plate with lean source of protein (lean chicken, turkey, fish), remaining ½ with whole grains (brown rice, potato, whole grain breads  Avoid liquid calories (regular soda, juice, coffee with cream)  Focus  on water, seltzer water and other non-calorie drinks  Replace regular sugar with non-caloric sweeteners  Avoid skipping meals: plan small regular meals throughout the day in order to keep your hunger controlled  Consider using meal replacements if unable to plan a healthy meal (protein shake, high protein bar)  Replace all white bread with whole wheat/whole grain alternative  Swap regular salad dressings (mayonnaise, butter, or low fat or fat free alternative)  Avoid high fat, high calorie, high carbohydrate snakes (cookies, pastries, cakes)  Snack on fruits, low fat dairy (yogurt, cottage cheese)    Behavioral Modification  Self-Monitoring of dietary Intake-keep a dietary intake log. Obese individuals have been shown to underestimate their food intake  Assessment & Plan  1. Post-surgical status following lumbar fusion.      ICD-10-CM ICD-9-CM   1. Fall, initial encounter  W19.XXXA E888.9   2. Rib pain  R07.81 786.50   3. Rheumatoid arthritis involving multiple sites with positive rheumatoid factor  M05.79 714.0   4. Panic attack  F41.0 300.01    5. PTSD (post-traumatic stress disorder)  F43.10 309.81   6. Anxiety  F41.9 300.00   7. History of lumbar fusion  Z98.1 V45.4   8. Chronic bilateral low back pain with bilateral sciatica  M54.42 724.2    M54.41 724.3    G89.29 338.29   9. Spinal stenosis of lumbar region with neurogenic claudication  M48.062 724.03   10. Lumbar radiculopathy  M54.16 724.4   11. Degeneration of intervertebral disc of lumbar region with discogenic back pain and lower extremity pain  M51.362 722.52   12. Morbid (severe) obesity due to excess calories  E66.01 278.01                Follow Up     Return in about 1 month (around 4/12/2025).  Patient was given instructions and counseling regarding her condition or for health maintenance advice. Please see specific information pulled into the AVS if appropriate.       Patient or patient representative verbalized consent for the use of Ambient Listening during the visit with  Charlene Huntley DNP, APRN for chart documentation. 3/12/2025  13:46 CDT      Electronically signed by Charlene Huntley DNP, [place], 03/18/25, 1:11 PM CDT.

## 2025-03-13 ENCOUNTER — RESULTS FOLLOW-UP (OUTPATIENT)
Dept: GENERAL RADIOLOGY | Facility: CLINIC | Age: 60
End: 2025-03-13
Payer: MEDICARE

## 2025-03-13 NOTE — TELEPHONE ENCOUNTER
No PA has been received via fax or covermymeds- called pharmacy to inquire. Reports no PA is needed for medication and pt has already picked up.

## 2025-03-14 DIAGNOSIS — G89.4 CHRONIC PAIN SYNDROME: ICD-10-CM

## 2025-03-14 NOTE — TELEPHONE ENCOUNTER
Rx Refill Note  Requested Prescriptions     Pending Prescriptions Disp Refills    HYDROcodone-acetaminophen (NORCO)  MG per tablet [Pharmacy Med Name: HYDROCODON-APAP   Tablet] 120 tablet 0     Sig: Take 1 tablet by mouth Every 6 (Six) Hours As Needed for Moderate Pain.      Last office visit with prescribing clinician: 3/12/2025   Next office visit with prescribing clinician: 4/11/2025     CC: 11/13/2024    UDS: 1/13/2025     Last RF: 2/14/2025     Leona Davison CMA  03/14/25, 14:57 CDT

## 2025-03-15 RX ORDER — HYDROCODONE BITARTRATE AND ACETAMINOPHEN 10; 325 MG/1; MG/1
1 TABLET ORAL EVERY 6 HOURS PRN
Qty: 120 TABLET | Refills: 0 | Status: SHIPPED | OUTPATIENT
Start: 2025-03-15

## 2025-03-24 ENCOUNTER — OFFICE VISIT (OUTPATIENT)
Dept: FAMILY MEDICINE CLINIC | Facility: CLINIC | Age: 60
End: 2025-03-24
Payer: MEDICARE

## 2025-03-24 VITALS
OXYGEN SATURATION: 98 % | WEIGHT: 175.6 LBS | DIASTOLIC BLOOD PRESSURE: 62 MMHG | SYSTOLIC BLOOD PRESSURE: 95 MMHG | BODY MASS INDEX: 29.98 KG/M2 | HEART RATE: 62 BPM | HEIGHT: 64 IN | TEMPERATURE: 98 F

## 2025-03-24 DIAGNOSIS — R10.84 GENERALIZED ABDOMINAL PAIN: ICD-10-CM

## 2025-03-24 DIAGNOSIS — R19.7 DIARRHEA, UNSPECIFIED TYPE: Primary | ICD-10-CM

## 2025-03-24 DIAGNOSIS — R53.1 WEAKNESS: ICD-10-CM

## 2025-03-24 DIAGNOSIS — I95.89 OTHER SPECIFIED HYPOTENSION: ICD-10-CM

## 2025-03-24 PROCEDURE — 1159F MED LIST DOCD IN RCRD: CPT | Performed by: NURSE PRACTITIONER

## 2025-03-24 PROCEDURE — 99214 OFFICE O/P EST MOD 30 MIN: CPT | Performed by: NURSE PRACTITIONER

## 2025-03-24 PROCEDURE — 1160F RVW MEDS BY RX/DR IN RCRD: CPT | Performed by: NURSE PRACTITIONER

## 2025-03-24 PROCEDURE — G2211 COMPLEX E/M VISIT ADD ON: HCPCS | Performed by: NURSE PRACTITIONER

## 2025-03-24 PROCEDURE — 1125F AMNT PAIN NOTED PAIN PRSNT: CPT | Performed by: NURSE PRACTITIONER

## 2025-03-24 RX ORDER — DIPHENOXYLATE HYDROCHLORIDE AND ATROPINE SULFATE 2.5; .025 MG/1; MG/1
1 TABLET ORAL 4 TIMES DAILY PRN
Qty: 30 TABLET | Refills: 0 | Status: SHIPPED | OUTPATIENT
Start: 2025-03-24

## 2025-03-24 NOTE — PROGRESS NOTES
Chief Complaint  Diarrhea (Pt reports it started 2 months ago after some flu-like symptoms and it has persisted ever since. Pt has tried to manage with OTC Amodioum. Episodes usually occur 15-20 minutes after eating) and Hypotension    Subjective        Adela Arauz presents to [facility]  History of Present Illness  History of Present Illness  The patient presents for evaluation of diarrhea.    She reports a weight loss of 5 pounds over the past 2 weeks, which she attributes to persistent diarrhea. The onset of her symptoms was approximately 2 months ago, characterized by 10 to 20 episodes of loose, watery stools daily, depending on her food intake. She experiences an urgent need to defecate within 15 minutes of eating, and the diarrhea often disrupts her sleep. Accompanying symptoms include abdominal soreness, bloating, chills, and gas, but no body aches or fever. She has not been in contact with any sick individuals and does not suspect food poisoning as the cause. She has not taken any laxatives or stool softeners in the past 48 hours. Despite maintaining high water intake, she has experienced dizziness and visual disturbances, such as seeing spots, during episodes of low blood pressure. Over the past 3 days, she has also reported severe heartburn, described as a sensation of food being stuck. She has attempted to manage her symptoms with rest, relaxation, increased fluid intake, and Imodium, but these interventions have been ineffective. She has no history of bowel resection, inflammatory bowel disease, diverticulitis, or diverticulosis. She has been self-medicating with Imodium, but this has not resulted in any noticeable improvement. Her medical history includes a recent hospitalization for outpatient surgery.    She has no prior history of blood pressure issues and is not currently on any antihypertensive medications.    MEDICATIONS  Imodium    The following portions  "of the patient's history were reviewed and updated as appropriate: allergies, current medications, past family history, past medical history, past social history, past surgical history and problem list.    Objective   Vital Signs:  BP 95/62 (BP Location: Left arm, Patient Position: Sitting, Cuff Size: Adult)   Pulse 62   Temp 98 °F (36.7 °C) (Infrared)   Ht 162.5 cm (63.98\")   Wt 79.7 kg (175 lb 9.6 oz)   SpO2 98%   BMI 30.16 kg/m²   Estimated body mass index is 30.16 kg/m² as calculated from the following:    Height as of this encounter: 162.5 cm (63.98\").    Weight as of this encounter: 79.7 kg (175 lb 9.6 oz).       BMI is >= 30 and <35. (Class 1 Obesity). The following options were offered after discussion;: exercise counseling/recommendations and nutrition counseling/recommendations    Physical Exam  Vitals and nursing note reviewed.   Constitutional:       General: She is awake.      Appearance: She is well-developed and well-groomed. She is ill-appearing.   HENT:      Head: Normocephalic and atraumatic.      Right Ear: Hearing, tympanic membrane, ear canal and external ear normal.      Left Ear: Hearing, tympanic membrane, ear canal and external ear normal.      Nose: Nose normal.      Mouth/Throat:      Lips: Pink.      Pharynx: Oropharynx is clear.   Eyes:      General: Lids are normal.      Conjunctiva/sclera: Conjunctivae normal.   Cardiovascular:      Rate and Rhythm: Normal rate and regular rhythm.      Heart sounds: Normal heart sounds.   Pulmonary:      Effort: Pulmonary effort is normal.      Breath sounds: Normal breath sounds and air entry.   Abdominal:      General: Abdomen is protuberant. Bowel sounds are increased.      Palpations: Abdomen is soft. There is no hepatomegaly, splenomegaly, mass or pulsatile mass.      Tenderness: There is generalized abdominal tenderness and tenderness in the right upper quadrant. There is guarding and rebound. There is no right CVA tenderness or left CVA " tenderness. Negative signs include Beckford's sign, Rovsing's sign, McBurney's sign, psoas sign and obturator sign.      Hernia: No hernia is present.       Musculoskeletal:      Cervical back: Full passive range of motion without pain.      Right lower leg: No edema.      Left lower leg: No edema.   Lymphadenopathy:      Head:      Right side of head: No submental, submandibular or tonsillar adenopathy.      Left side of head: No submental, submandibular or tonsillar adenopathy.   Skin:     General: Skin is warm and dry.   Neurological:      Mental Status: She is alert and oriented to person, place, and time.      Sensory: Sensation is intact.      Motor: Motor function is intact.      Coordination: Coordination is intact.      Gait: Gait is intact.   Psychiatric:         Attention and Perception: Attention and perception normal.         Mood and Affect: Mood and affect normal.         Speech: Speech normal.         Behavior: Behavior normal. Behavior is cooperative.         Thought Content: Thought content normal.         Cognition and Memory: Cognition and memory normal.         Judgment: Judgment normal.        Physical Exam  Vital Signs  Blood pressure is 95/62.    Result Review :          Results             Assessment and Plan     Diagnoses and all orders for this visit:    1. Diarrhea, unspecified type (Primary)  2. Generalized abdominal pain  3. Weakness  4. Hypotension  -     US Gallbladder; Future  -     [facility] Miscellaneous - , Per Rectum; Future  -     diphenoxylate-atropine (Lomotil) 2.5-0.025 MG per tablet; Take 1 tablet by mouth 4 (Four) Times a Day As Needed for Diarrhea.  Dispense: 30 tablet; Refill: 0  -     CBC & Differential  -     Comprehensive metabolic panel        -     increase fluid intake, try biolyte, try liquid iv or pedialyte        -     liquid diet as tolerated           Assessment & Plan  1. Diarrhea.  She reports having 10-20 watery stools per day for the past two months, which  occur within 15 minutes of eating and even wake her from sleep. She has tried Imodium without improvement. There is no history of bowel resection or inflammatory bowel disease. She has experienced weight loss of 5 pounds in two weeks and abdominal soreness. A stool specimen will be collected for C. difficile testing. If the test is positive, appropriate treatment will be initiated.    2. Hypotension.  Her blood pressure is currently 95/62, and she has experienced dizziness and visual changes, including seeing spots. She is not on any blood pressure medications and has no history of blood pressure problems. It is suspected that she may be dehydrated due to her diarrhea. She is advised to increase her electrolyte intake.      ICD-10-CM ICD-9-CM   1. Diarrhea, unspecified type  R19.7 787.91   2. Generalized abdominal pain  R10.84 789.07   3. Weakness  R53.1 780.79   4. Other specified hypotension  I95.89 458.8                Follow Up     Return in about 1 week (around 3/31/2025) for Recheck.  Patient was given instructions and counseling regarding her condition or for health maintenance advice. Please see specific information pulled into the AVS if appropriate.       Patient or patient representative verbalized consent for the use of Ambient Listening during the visit with  Charlene Huntley DNP, SURYA for chart documentation. 3/24/2025  14:09 CDT    Electronically signed by Charlene Huntley DNP, SURYA, 03/24/25, 2:27 PM CDT.

## 2025-03-25 DIAGNOSIS — R19.7 DIARRHEA, UNSPECIFIED TYPE: ICD-10-CM

## 2025-03-25 DIAGNOSIS — R10.84 GENERALIZED ABDOMINAL PAIN: ICD-10-CM

## 2025-03-26 ENCOUNTER — HOSPITAL ENCOUNTER (EMERGENCY)
Facility: HOSPITAL | Age: 60
Discharge: OTHER INSTITUTION | End: 2025-03-26
Attending: FAMILY MEDICINE
Payer: MEDICARE

## 2025-03-26 ENCOUNTER — HOSPITAL ENCOUNTER (INPATIENT)
Age: 60
LOS: 4 days | Discharge: HOME | DRG: 445 | End: 2025-03-30
Attending: STUDENT IN AN ORGANIZED HEALTH CARE EDUCATION/TRAINING PROGRAM | Admitting: STUDENT IN AN ORGANIZED HEALTH CARE EDUCATION/TRAINING PROGRAM
Payer: MEDICARE

## 2025-03-26 ENCOUNTER — APPOINTMENT (OUTPATIENT)
Dept: CT IMAGING | Facility: HOSPITAL | Age: 60
End: 2025-03-26
Payer: MEDICARE

## 2025-03-26 ENCOUNTER — TELEPHONE (OUTPATIENT)
Dept: FAMILY MEDICINE CLINIC | Facility: CLINIC | Age: 60
End: 2025-03-26
Payer: MEDICARE

## 2025-03-26 VITALS
HEIGHT: 63 IN | BODY MASS INDEX: 30.26 KG/M2 | SYSTOLIC BLOOD PRESSURE: 109 MMHG | HEART RATE: 88 BPM | WEIGHT: 170.8 LBS | OXYGEN SATURATION: 92 % | TEMPERATURE: 98.1 F | DIASTOLIC BLOOD PRESSURE: 66 MMHG | RESPIRATION RATE: 16 BRPM

## 2025-03-26 DIAGNOSIS — K81.9 CHOLECYSTITIS: ICD-10-CM

## 2025-03-26 DIAGNOSIS — K83.09: ICD-10-CM

## 2025-03-26 DIAGNOSIS — K80.50 GALL STONES, COMMON BILE DUCT: Primary | ICD-10-CM

## 2025-03-26 DIAGNOSIS — R19.7 DIARRHEA, UNSPECIFIED TYPE: ICD-10-CM

## 2025-03-26 DIAGNOSIS — R10.9 RIGHT SIDED ABDOMINAL PAIN: ICD-10-CM

## 2025-03-26 DIAGNOSIS — K81.9: ICD-10-CM

## 2025-03-26 LAB
ALBUMIN SERPL-MCNC: 3.8 G/DL (ref 3.5–5.2)
ALBUMIN/GLOB SERPL: 1.1 G/DL
ALP SERPL-CCNC: 351 U/L (ref 39–117)
ALT SERPL W P-5'-P-CCNC: 26 U/L (ref 1–33)
ANION GAP SERPL CALCULATED.3IONS-SCNC: 13 MMOL/L (ref 5–15)
APTT PPP: 36.7 SECONDS (ref 24.5–36)
AST SERPL-CCNC: 34 U/L (ref 1–32)
BACTERIA UR QL AUTO: ABNORMAL /HPF
BASOPHILS # BLD AUTO: 0.06 10*3/MM3 (ref 0–0.2)
BASOPHILS NFR BLD AUTO: 0.6 % (ref 0–1.5)
BILIRUB SERPL-MCNC: 1.1 MG/DL (ref 0–1.2)
BILIRUB UR QL STRIP: ABNORMAL
BUN SERPL-MCNC: 17 MG/DL (ref 6–20)
BUN/CREAT SERPL: 19.8 (ref 7–25)
CALCIUM SPEC-SCNC: 9.3 MG/DL (ref 8.6–10.5)
CHLORIDE SERPL-SCNC: 98 MMOL/L (ref 98–107)
CLARITY UR: CLEAR
CO2 SERPL-SCNC: 24 MMOL/L (ref 22–29)
COLOR UR: ABNORMAL
CREAT SERPL-MCNC: 0.86 MG/DL (ref 0.57–1)
DEPRECATED RDW RBC AUTO: 48.2 FL (ref 37–54)
EGFRCR SERPLBLD CKD-EPI 2021: 77.9 ML/MIN/1.73
EOSINOPHIL # BLD AUTO: 0.06 10*3/MM3 (ref 0–0.4)
EOSINOPHIL NFR BLD AUTO: 0.6 % (ref 0.3–6.2)
ERYTHROCYTE [DISTWIDTH] IN BLOOD BY AUTOMATED COUNT: 15.4 % (ref 12.3–15.4)
GLOBULIN UR ELPH-MCNC: 3.5 GM/DL
GLUCOSE SERPL-MCNC: 112 MG/DL (ref 65–99)
GLUCOSE UR STRIP-MCNC: NEGATIVE MG/DL
HCT VFR BLD AUTO: 43.3 % (ref 34–46.6)
HGB BLD-MCNC: 14 G/DL (ref 12–15.9)
HGB UR QL STRIP.AUTO: NEGATIVE
HYALINE CASTS UR QL AUTO: ABNORMAL /LPF
IMM GRANULOCYTES # BLD AUTO: 0.05 10*3/MM3 (ref 0–0.05)
IMM GRANULOCYTES NFR BLD AUTO: 0.5 % (ref 0–0.5)
INR PPP: 1.08 (ref 0.91–1.09)
KETONES UR QL STRIP: ABNORMAL
LEUKOCYTE ESTERASE UR QL STRIP.AUTO: NEGATIVE
LIPASE SERPL-CCNC: 24 U/L (ref 13–60)
LYMPHOCYTES # BLD AUTO: 1.13 10*3/MM3 (ref 0.7–3.1)
LYMPHOCYTES NFR BLD AUTO: 11.1 % (ref 19.6–45.3)
MAGNESIUM SERPL-MCNC: 1.8 MG/DL (ref 1.6–2.6)
MCH RBC QN AUTO: 27.7 PG (ref 26.6–33)
MCHC RBC AUTO-ENTMCNC: 32.3 G/DL (ref 31.5–35.7)
MCV RBC AUTO: 85.7 FL (ref 79–97)
MONOCYTES # BLD AUTO: 1.21 10*3/MM3 (ref 0.1–0.9)
MONOCYTES NFR BLD AUTO: 11.9 % (ref 5–12)
NEUTROPHILS NFR BLD AUTO: 7.69 10*3/MM3 (ref 1.7–7)
NEUTROPHILS NFR BLD AUTO: 75.3 % (ref 42.7–76)
NITRITE UR QL STRIP: NEGATIVE
NRBC BLD AUTO-RTO: 0 /100 WBC (ref 0–0.2)
PH UR STRIP.AUTO: 5.5 [PH] (ref 5–8)
PLATELET # BLD AUTO: 262 10*3/MM3 (ref 140–450)
PMV BLD AUTO: 10.7 FL (ref 6–12)
POTASSIUM SERPL-SCNC: 4.2 MMOL/L (ref 3.5–5.2)
PROT SERPL-MCNC: 7.3 G/DL (ref 6–8.5)
PROT UR QL STRIP: ABNORMAL
PROTHROMBIN TIME: 14.6 SECONDS (ref 11.8–14.8)
RBC # BLD AUTO: 5.05 10*6/MM3 (ref 3.77–5.28)
RBC # UR STRIP: ABNORMAL /HPF
REF LAB TEST METHOD: ABNORMAL
SODIUM SERPL-SCNC: 135 MMOL/L (ref 136–145)
SP GR UR STRIP: 1.03 (ref 1–1.03)
SQUAMOUS #/AREA URNS HPF: ABNORMAL /HPF
UROBILINOGEN UR QL STRIP: ABNORMAL
WBC # UR STRIP: ABNORMAL /HPF
WBC NRBC COR # BLD AUTO: 10.2 10*3/MM3 (ref 3.4–10.8)

## 2025-03-26 PROCEDURE — 74177 CT ABD & PELVIS W/CONTRAST: CPT

## 2025-03-26 PROCEDURE — 83735 ASSAY OF MAGNESIUM: CPT | Performed by: FAMILY MEDICINE

## 2025-03-26 PROCEDURE — 85025 COMPLETE CBC W/AUTO DIFF WBC: CPT | Performed by: FAMILY MEDICINE

## 2025-03-26 PROCEDURE — 99285 EMERGENCY DEPT VISIT HI MDM: CPT

## 2025-03-26 PROCEDURE — 81001 URINALYSIS AUTO W/SCOPE: CPT | Performed by: FAMILY MEDICINE

## 2025-03-26 PROCEDURE — 25510000001 IOPAMIDOL 61 % SOLUTION: Performed by: FAMILY MEDICINE

## 2025-03-26 PROCEDURE — 25810000003 SODIUM CHLORIDE 0.9 % SOLUTION: Performed by: FAMILY MEDICINE

## 2025-03-26 PROCEDURE — 1200000000 HC SEMI PRIVATE

## 2025-03-26 PROCEDURE — 83690 ASSAY OF LIPASE: CPT | Performed by: FAMILY MEDICINE

## 2025-03-26 PROCEDURE — 80053 COMPREHEN METABOLIC PANEL: CPT | Performed by: FAMILY MEDICINE

## 2025-03-26 PROCEDURE — 85610 PROTHROMBIN TIME: CPT | Performed by: FAMILY MEDICINE

## 2025-03-26 PROCEDURE — 85730 THROMBOPLASTIN TIME PARTIAL: CPT | Performed by: FAMILY MEDICINE

## 2025-03-26 RX ORDER — ENOXAPARIN SODIUM 100 MG/ML
40 INJECTION SUBCUTANEOUS DAILY
Status: DISCONTINUED | OUTPATIENT
Start: 2025-03-27 | End: 2025-03-30 | Stop reason: HOSPADM

## 2025-03-26 RX ORDER — HYDROMORPHONE HYDROCHLORIDE 1 MG/ML
0.25 INJECTION, SOLUTION INTRAMUSCULAR; INTRAVENOUS; SUBCUTANEOUS
Refills: 0 | Status: DISCONTINUED | OUTPATIENT
Start: 2025-03-26 | End: 2025-03-27

## 2025-03-26 RX ORDER — MAGNESIUM SULFATE IN WATER 40 MG/ML
2000 INJECTION, SOLUTION INTRAVENOUS PRN
Status: DISCONTINUED | OUTPATIENT
Start: 2025-03-26 | End: 2025-03-30 | Stop reason: HOSPADM

## 2025-03-26 RX ORDER — POTASSIUM CHLORIDE 7.45 MG/ML
10 INJECTION INTRAVENOUS PRN
Status: DISCONTINUED | OUTPATIENT
Start: 2025-03-26 | End: 2025-03-30 | Stop reason: HOSPADM

## 2025-03-26 RX ORDER — ACETAMINOPHEN 325 MG/1
650 TABLET ORAL EVERY 6 HOURS PRN
Status: DISCONTINUED | OUTPATIENT
Start: 2025-03-26 | End: 2025-03-30 | Stop reason: HOSPADM

## 2025-03-26 RX ORDER — HYDROMORPHONE HYDROCHLORIDE 1 MG/ML
0.5 INJECTION, SOLUTION INTRAMUSCULAR; INTRAVENOUS; SUBCUTANEOUS
Refills: 0 | Status: DISCONTINUED | OUTPATIENT
Start: 2025-03-26 | End: 2025-03-27

## 2025-03-26 RX ORDER — NALOXONE HYDROCHLORIDE 0.4 MG/ML
0.4 INJECTION, SOLUTION INTRAMUSCULAR; INTRAVENOUS; SUBCUTANEOUS PRN
Status: DISCONTINUED | OUTPATIENT
Start: 2025-03-26 | End: 2025-03-30 | Stop reason: HOSPADM

## 2025-03-26 RX ORDER — ACETAMINOPHEN 650 MG/1
650 SUPPOSITORY RECTAL EVERY 6 HOURS PRN
Status: DISCONTINUED | OUTPATIENT
Start: 2025-03-26 | End: 2025-03-30 | Stop reason: HOSPADM

## 2025-03-26 RX ORDER — POLYETHYLENE GLYCOL 3350 17 G/17G
17 POWDER, FOR SOLUTION ORAL DAILY PRN
Status: DISCONTINUED | OUTPATIENT
Start: 2025-03-26 | End: 2025-03-30 | Stop reason: HOSPADM

## 2025-03-26 RX ORDER — SODIUM CHLORIDE 0.9 % (FLUSH) 0.9 %
10 SYRINGE (ML) INJECTION AS NEEDED
Status: DISCONTINUED | OUTPATIENT
Start: 2025-03-26 | End: 2025-03-27 | Stop reason: HOSPADM

## 2025-03-26 RX ORDER — SODIUM CHLORIDE 9 MG/ML
INJECTION, SOLUTION INTRAVENOUS CONTINUOUS
Status: ACTIVE | OUTPATIENT
Start: 2025-03-27 | End: 2025-03-28

## 2025-03-26 RX ORDER — SODIUM CHLORIDE 9 MG/ML
INJECTION, SOLUTION INTRAVENOUS PRN
Status: DISCONTINUED | OUTPATIENT
Start: 2025-03-26 | End: 2025-03-30 | Stop reason: HOSPADM

## 2025-03-26 RX ORDER — SODIUM CHLORIDE 0.9 % (FLUSH) 0.9 %
5-40 SYRINGE (ML) INJECTION PRN
Status: DISCONTINUED | OUTPATIENT
Start: 2025-03-26 | End: 2025-03-30 | Stop reason: HOSPADM

## 2025-03-26 RX ORDER — ONDANSETRON 2 MG/ML
4 INJECTION INTRAMUSCULAR; INTRAVENOUS EVERY 6 HOURS PRN
Status: DISCONTINUED | OUTPATIENT
Start: 2025-03-26 | End: 2025-03-30 | Stop reason: HOSPADM

## 2025-03-26 RX ORDER — POTASSIUM CHLORIDE 1500 MG/1
40 TABLET, EXTENDED RELEASE ORAL PRN
Status: DISCONTINUED | OUTPATIENT
Start: 2025-03-26 | End: 2025-03-30 | Stop reason: HOSPADM

## 2025-03-26 RX ORDER — ONDANSETRON 4 MG/1
4 TABLET, ORALLY DISINTEGRATING ORAL EVERY 8 HOURS PRN
Status: DISCONTINUED | OUTPATIENT
Start: 2025-03-26 | End: 2025-03-30 | Stop reason: HOSPADM

## 2025-03-26 RX ORDER — IOPAMIDOL 612 MG/ML
100 INJECTION, SOLUTION INTRAVASCULAR
Status: COMPLETED | OUTPATIENT
Start: 2025-03-26 | End: 2025-03-26

## 2025-03-26 RX ORDER — SODIUM CHLORIDE 0.9 % (FLUSH) 0.9 %
5-40 SYRINGE (ML) INJECTION EVERY 12 HOURS SCHEDULED
Status: DISCONTINUED | OUTPATIENT
Start: 2025-03-27 | End: 2025-03-30 | Stop reason: HOSPADM

## 2025-03-26 RX ADMIN — IOPAMIDOL 100 ML: 612 INJECTION, SOLUTION INTRAVENOUS at 19:09

## 2025-03-26 RX ADMIN — SODIUM CHLORIDE 1000 ML: 9 INJECTION, SOLUTION INTRAVENOUS at 18:53

## 2025-03-26 RX ADMIN — SODIUM CHLORIDE 1000 ML: 9 INJECTION, SOLUTION INTRAVENOUS at 21:37

## 2025-03-26 NOTE — ED PROVIDER NOTES
Subjective   History of Present Illness  43-year-old female comes emergency room with right side abdominal pain it has been going on for 2 and half months now.  She states that she has had diarrhea for 2 and half months.  She states that anytime she eats or drinks anything she has diarrhea.  She has not seen anyone except her primary care provider within the last few days.  She states that stool studies were negative.  That was the only test her primary care provider.  Provide provider did not order any blood work.  She states that that was done earlier this week.  Patient denies any fevers or chills.  No chest pain or shortness of breath.  Patient denies any other symptoms at this time.      Review of Systems   Gastrointestinal:  Positive for abdominal pain, diarrhea and nausea.   All other systems reviewed and are negative.      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     Low back pain     Nausea after anesthesia     Open wound of gum     pt states had all her teeth pulled and there is a spot that hasn't healed due to RA so she is using peridex rinse daily    Osteoporosis     PONV (postoperative nausea and vomiting)     Psoriasis     Rheumatoid arthritis     Seasonal allergies     Senile osteoporosis 09/28/2017       Allergies   Allergen Reactions    Evenity [Romosozumab] Swelling     Injection site swelling, redness, warm to touch       Past Surgical History:   Procedure Laterality Date    ANTERIOR LUMBAR EXPOSURE N/A 12/02/2020    Procedure: Anterior lumbar interbody fusion of L5-S1 with instrumentation ;  Surgeon: Neptali Rivera DO;  Location: [facility] OR;  Service: Vascular;  Laterality: N/A;    AXILLARY LYMPH NODE BIOPSY/EXCISION Right 09/19/2019    Procedure: EXCISION  RIGHT AXILLARY MASS;  Surgeon: Jes Enamorado MD;  Location:  [facility] OR;  Service: General    INCISION AND DRAINAGE ARM Right 06/03/2021    Procedure: INCISION AND DRAINAGE FOREARM;  Surgeon: Jes Enamorado MD;  Location:  [facility] OR;  Service: General;  Laterality: Right;    LUMBAR FUSION N/A 12/02/2020    Procedure: ANTERIOR DECOMPRESSION, ANTERIOR LUMBAR INTERBODY FUSION WITH INSTRUMENTATION L5-S1;  Surgeon: ALEXIS Dunaway MD;  Location:  [facility] OR;  Service: Orthopedic Spine;  Laterality: N/A;    LUMBAR FUSION Left 12/16/2020    Procedure: LEFT LATERAL LUMBAR INTERBODY FUSION L3-5 WITH INSTRUMENTATION L3-4;  Surgeon: ALEXIS Dunaway MD;  Location:  [facility] OR;  Service: Orthopedic Spine;  Laterality: Left;    LUMBAR FUSION Right 1/27/2025    Procedure: RIGHT LATERAL LUMBAR INTERBODY FUSION WITH INSTRUMENTATION L2-3;  Surgeon: ALEXIS Dunaway MD;  Location:  [facility] OR;  Service: Orthopedic Spine;  Laterality: Right;    LUMBAR LAMINECTOMY WITH FUSION N/A 12/18/2020    Procedure: DECOMPRESSION L3-4, POSTERIOR SPINAL FUSION WITH INSTRUMENTATION L3-S1;  Surgeon: ALEXIS Dunaway MD;  Location:  [facility] OR;  Service: Orthopedic Spine;  Laterality: N/A;    LUMBAR LAMINECTOMY WITH FUSION N/A 1/29/2025    Procedure: REMOVAL OF INSTRUMENTATION, EXPLORATION OF FUSION L3-S1, POSTERIOR SPINAL FUSION L2-3 WITH INSTRUMENTATION L2-S1;  Surgeon: ALEXIS Dunaway MD;  Location:  [facility] OR;  Service: Orthopedic Spine;  Laterality: N/A;    SHOULDER SURGERY Left     arthroscopy for arthtritis and bone spurs    TUBAL ABDOMINAL LIGATION      US GUIDED LYMPH NODE BIOPSY  08/09/2019    WRIST SURGERY Bilateral     fracture - no internal hardware       Family History   Problem Relation Age of Onset    Arthritis Mother     COPD Mother     Atrial fibrillation Mother     Osteoarthritis Mother     Heart disease Father     Hypertension Father     Arthritis Father     Melanoma Father         metastatic    Arthritis Sister      Arthritis Brother     No Known Problems Daughter     Arthritis Maternal Grandfather     Breast cancer Neg Hx        Social History     Socioeconomic History    Marital status:    Tobacco Use    Smoking status: Former     Current packs/day: 0.00     Average packs/day: 0.5 packs/day for 34.7 years (17.4 ttl pk-yrs)     Types: Cigarettes     Start date:      Quit date: 10/1/2024     Years since quittin.4    Smokeless tobacco: Never   Vaping Use    Vaping status: Never Used   Substance and Sexual Activity    Alcohol use: No    Drug use: No    Sexual activity: Defer           Objective   Physical Exam  Vitals and nursing note reviewed.   Constitutional:       Appearance: Normal appearance.   HENT:      Head: Normocephalic and atraumatic.      Mouth/Throat:      Mouth: Mucous membranes are moist.   Eyes:      Extraocular Movements: Extraocular movements intact.      Pupils: Pupils are equal, round, and reactive to light.   Cardiovascular:      Rate and Rhythm: Normal rate and regular rhythm.      Heart sounds: Normal heart sounds.   Pulmonary:      Effort: Pulmonary effort is normal.      Breath sounds: Normal breath sounds.   Abdominal:      General: Bowel sounds are normal.      Palpations: Abdomen is soft.      Tenderness: There is abdominal tenderness in the right upper quadrant and right lower quadrant. There is no guarding or rebound.   Skin:     General: Skin is warm and dry.   Neurological:      General: No focal deficit present.      Mental Status: She is alert and oriented to person, place, and time.   Psychiatric:         Mood and Affect: Mood normal.         Behavior: Behavior normal.         Procedures           ED Course  ED Course as of 03/26/25 2127   Wed Mar 26, 2025   2100 Case was discussed with general surgeon on-call.  He is recommending transfer. [RP]   2100 Case was discussed with [facility].  They have declined due to capacity. [RP]      ED Course User Index  [RP] Héctor Jacobs  MD MIRNA                                                     Lab Results (last 24 hours)       Procedure Component Value Units Date/Time    CBC & Differential [[id number]]  (Abnormal) Collected: 03/26/25 1838    Specimen: Blood Updated: 03/26/25 1900    Narrative:      The following orders were created for panel order CBC & Differential.  Procedure                               Abnormality         Status                     ---------                               -----------         ------                     CBC Auto Differential[[id number]]        Abnormal            Final result                 Please view results for these tests on the individual orders.    Comprehensive Metabolic Panel [[id number]]  (Abnormal) Collected: 03/26/25 1838    Specimen: Blood Updated: 03/26/25 1916     Glucose 112 mg/dL      BUN 17 mg/dL      Creatinine 0.86 mg/dL      Sodium 135 mmol/L      Potassium 4.2 mmol/L      Chloride 98 mmol/L      CO2 24.0 mmol/L      Calcium 9.3 mg/dL      Total Protein 7.3 g/dL      Albumin 3.8 g/dL      ALT (SGPT) 26 U/L      AST (SGOT) 34 U/L      Alkaline Phosphatase 351 U/L      Total Bilirubin 1.1 mg/dL      Globulin 3.5 gm/dL      A/G Ratio 1.1 g/dL      BUN/Creatinine Ratio 19.8     Anion Gap 13.0 mmol/L      eGFR 77.9 mL/min/1.73     Narrative:      GFR Categories in Chronic Kidney Disease (CKD)      GFR Category          GFR (mL/min/1.73)    Interpretation  G1                     90 or greater         Normal or high (1)  G2                      60-89                Mild decrease (1)  G3a                   45-59                Mild to moderate decrease  G3b                   30-44                Moderate to severe decrease  G4                    15-29                Severe decrease  G5                    14 or less           Kidney failure          (1)In the absence of evidence of kidney disease, neither GFR category G1 or G2 fulfill the criteria for CKD.    eGFR calculation 2021 CKD-EPI creatinine  equation, which does not include race as a factor    Protime-INR [[id number]]  (Normal) Collected: 03/26/25 1838    Specimen: Blood Updated: 03/26/25 1917     Protime 14.6 Seconds      INR 1.08    aPTT [[id number]]  (Abnormal) Collected: 03/26/25 1838    Specimen: Blood Updated: 03/26/25 1917     PTT 36.7 seconds     Narrative:      PTT = The equivalent PTT values for the therapeutic range of heparin levels at 0.3 to 0.7 U/ml are 77 - 99 seconds.    Lipase [[id number]]  (Normal) Collected: 03/26/25 1838    Specimen: Blood Updated: 03/26/25 1911     Lipase 24 U/L     Magnesium [[id number]]  (Normal) Collected: 03/26/25 1838    Specimen: Blood Updated: 03/26/25 1916     Magnesium 1.8 mg/dL     CBC Auto Differential [[id number]]  (Abnormal) Collected: 03/26/25 1838    Specimen: Blood Updated: 03/26/25 1900     WBC 10.20 10*3/mm3      RBC 5.05 10*6/mm3      Hemoglobin 14.0 g/dL      Hematocrit 43.3 %      MCV 85.7 fL      MCH 27.7 pg      MCHC 32.3 g/dL      RDW 15.4 %      RDW-SD 48.2 fl      MPV 10.7 fL      Platelets 262 10*3/mm3      Neutrophil % 75.3 %      Lymphocyte % 11.1 %      Monocyte % 11.9 %      Eosinophil % 0.6 %      Basophil % 0.6 %      Immature Grans % 0.5 %      Neutrophils, Absolute 7.69 10*3/mm3      Lymphocytes, Absolute 1.13 10*3/mm3      Monocytes, Absolute 1.21 10*3/mm3      Eosinophils, Absolute 0.06 10*3/mm3      Basophils, Absolute 0.06 10*3/mm3      Immature Grans, Absolute 0.05 10*3/mm3      nRBC 0.0 /100 WBC     Urinalysis With Culture If Indicated - Urine, Clean Catch [[id number]]  (Abnormal) Collected: 03/26/25 1852    Specimen: Urine, Clean Catch Updated: 03/26/25 1920     Color, UA Dark Yellow     Appearance, UA Clear     pH, UA 5.5     Specific Gravity, UA 1.030     Glucose, UA Negative     Ketones, UA Trace     Bilirubin, UA Small (1+)     Blood, UA Negative     Protein,  mg/dL (2+)     Leuk Esterase, UA Negative     Nitrite, UA Negative     Urobilinogen, UA 1.0 E.U./dL     Narrative:      In absence of clinical symptoms, the presence of pyuria, bacteria, and/or nitrites on the urinalysis result does not correlate with infection.    Urinalysis, Microscopic Only - Urine, Clean Catch [[id number]]  (Abnormal) Collected: 03/26/25 1852    Specimen: Urine, Clean Catch Updated: 03/26/25 1920     RBC, UA 0-2 /HPF      WBC, UA 3-5 /HPF      Comment: Urine culture not indicated.        Bacteria, UA None Seen /HPF      Squamous Epithelial Cells, UA 7-12 /HPF      Hyaline Casts, UA 3-6 /LPF      Methodology Automated Microscopy          CT Abdomen Pelvis With Contrast   Final Result   1. CT appearance of acute cholecystitis.   Multiple small gallstones are present and there are 1 or 2 small (2-3   mm) common bile duct stones.       This report was signed and finalized on 3/26/2025 7:22 PM by Dr. Price Claudio MD.            Medications   sodium chloride 0.9 % flush 10 mL (has no administration in time range)   sodium chloride 0.9 % bolus 1,000 mL (has no administration in time range)   sodium chloride 0.9 % bolus 1,000 mL (0 mL Intravenous Stopped 3/26/25 2006)   iopamidol (ISOVUE-300) 61 % injection 100 mL (100 mL Intravenous Given 3/26/25 1909)           Medical Decision Making  60-year-old female found to have a common bile duct stone.  Possible cholecystitis.  Case was discussed with Dr. Tam Nguyen.  He has recommended transfer to [facility].  Case was discussed with Dr. Dion Marcelino hospitalist on-call at [facility].  He has accepted patient to their group service.    Problems Addressed:  Cholecystitis: complicated acute illness or injury  Diarrhea, unspecified type: complicated acute illness or injury  Gall stones, common bile duct: complicated acute illness or injury  Right sided abdominal pain: complicated acute illness or injury    Amount and/or Complexity of Data Reviewed  Labs: ordered. Decision-making details documented in ED Course.  Radiology: ordered. Decision-making  details documented in ED Course.  Discussion of management or test interpretation with external provider(s): Dr. Patel -General Surgery  Dr. Tam Nguyen    Risk  Prescription drug management.        Final diagnoses:   Gall stones, common bile duct   Right sided abdominal pain   Diarrhea, unspecified type   Cholecystitis       ED Disposition  ED Disposition       ED Disposition   Transfer to Another Facility     Condition   --    Comment   --               No follow-up provider specified.       Medication List      No changes were made to your prescriptions during this visit.            Héctor Jacobs MD  03/26/25 6671

## 2025-03-26 NOTE — TELEPHONE ENCOUNTER
Caller: Davonte Adela Smith    Relationship: Self    Best call back number: [phone] (Work)     What test was performed: CDIFF    When was the test performed: 3/24/25    Where was the test performed: [facility]    Additional notes: THE PATIENT STATES THAT SHE WOULD LIKE A CALLBACK ABOUT THE CDIFF TEST.

## 2025-03-27 ENCOUNTER — ANCILLARY PROCEDURE (OUTPATIENT)
Dept: ENDOSCOPY | Age: 60
DRG: 445 | End: 2025-03-27
Attending: INTERNAL MEDICINE
Payer: MEDICARE

## 2025-03-27 ENCOUNTER — TELEPHONE (OUTPATIENT)
Dept: GASTROENTEROLOGY | Age: 60
End: 2025-03-27

## 2025-03-27 ENCOUNTER — ANESTHESIA EVENT (OUTPATIENT)
Dept: ENDOSCOPY | Age: 60
End: 2025-03-27
Payer: MEDICARE

## 2025-03-27 ENCOUNTER — TELEPHONE (OUTPATIENT)
Dept: FAMILY MEDICINE CLINIC | Facility: CLINIC | Age: 60
End: 2025-03-27
Payer: MEDICARE

## 2025-03-27 ENCOUNTER — ANESTHESIA (OUTPATIENT)
Dept: ENDOSCOPY | Age: 60
End: 2025-03-27
Payer: MEDICARE

## 2025-03-27 ENCOUNTER — APPOINTMENT (OUTPATIENT)
Dept: GENERAL RADIOLOGY | Age: 60
DRG: 445 | End: 2025-03-27
Attending: STUDENT IN AN ORGANIZED HEALTH CARE EDUCATION/TRAINING PROGRAM
Payer: MEDICARE

## 2025-03-27 PROBLEM — K83.09 ACUTE CHOLANGITIS (HCC): Status: ACTIVE | Noted: 2025-03-27

## 2025-03-27 PROBLEM — K81.9: Status: ACTIVE | Noted: 2025-03-26

## 2025-03-27 PROBLEM — K83.09: Status: ACTIVE | Noted: 2025-03-26

## 2025-03-27 PROBLEM — K81.0 ACUTE CHOLECYSTITIS: Status: ACTIVE | Noted: 2025-03-27

## 2025-03-27 LAB
ADV 40+41 DNA STL QL NAA+NON-PROBE: NOT DETECTED
ALBUMIN SERPL-MCNC: 3.1 G/DL (ref 3.5–5.2)
ALP SERPL-CCNC: 326 U/L (ref 35–104)
ALT SERPL-CCNC: 33 U/L (ref 10–35)
ANION GAP SERPL CALCULATED.3IONS-SCNC: 10 MMOL/L (ref 8–16)
AST SERPL-CCNC: 79 U/L (ref 10–35)
BACTERIA URNS QL MICRO: NEGATIVE /HPF
BASOPHILS # BLD: 0.1 K/UL (ref 0–0.2)
BASOPHILS NFR BLD: 0.6 % (ref 0–1)
BILIRUB SERPL-MCNC: 1.2 MG/DL (ref 0.2–1.2)
BILIRUB UR QL STRIP: NEGATIVE
BUN SERPL-MCNC: 11 MG/DL (ref 6–20)
C CAYETANENSIS DNA STL QL NAA+NON-PROBE: NOT DETECTED
C COLI+JEJ+UPSA DNA STL QL NAA+NON-PROBE: NOT DETECTED
C DIFF TOX A+B STL QL IA: NORMAL
C DIFF TOX GENS STL QL NAA+PROBE: NORMAL
CALCIUM SERPL-MCNC: 8 MG/DL (ref 8.6–10)
CHLORIDE SERPL-SCNC: 101 MMOL/L (ref 98–107)
CLARITY UR: CLEAR
CO2 SERPL-SCNC: 23 MMOL/L (ref 22–29)
COLOR UR: YELLOW
CREAT SERPL-MCNC: 0.6 MG/DL (ref 0.5–0.9)
CRYPTOSP DNA STL QL NAA+NON-PROBE: NOT DETECTED
CRYSTALS URNS MICRO: NORMAL /HPF
E HISTOLYT DNA STL QL NAA+NON-PROBE: NOT DETECTED
EAEC PAA PLAS AGGR+AATA ST NAA+NON-PRB: NOT DETECTED
EC STX1+STX2 GENES STL QL NAA+NON-PROBE: NOT DETECTED
EOSINOPHIL # BLD: 0 K/UL (ref 0–0.6)
EOSINOPHIL NFR BLD: 0.3 % (ref 0–5)
EPEC EAE GENE STL QL NAA+NON-PROBE: NOT DETECTED
EPI CELLS #/AREA URNS AUTO: 1 /HPF (ref 0–5)
ERYTHROCYTE [DISTWIDTH] IN BLOOD BY AUTOMATED COUNT: 15.1 % (ref 11.5–14.5)
ETEC LTA+ST1A+ST1B TOX ST NAA+NON-PROBE: NOT DETECTED
G LAMBLIA DNA STL QL NAA+NON-PROBE: NOT DETECTED
GI PATH DNA+RNA PNL STL NAA+NON-PROBE: NOT DETECTED
GLUCOSE SERPL-MCNC: 91 MG/DL (ref 70–99)
GLUCOSE UR STRIP.AUTO-MCNC: NEGATIVE MG/DL
HCT VFR BLD AUTO: 36 % (ref 37–47)
HGB BLD-MCNC: 11.6 G/DL (ref 12–16)
HGB UR STRIP.AUTO-MCNC: NEGATIVE MG/L
HYALINE CASTS #/AREA URNS AUTO: 0 /HPF (ref 0–8)
IMM GRANULOCYTES # BLD: 0 K/UL
INR PPP: 1.12 (ref 0.88–1.18)
KETONES UR STRIP.AUTO-MCNC: 15 MG/DL
LACTATE BLDV-SCNC: 0.7 MMOL/L (ref 0.5–1.9)
LEUKOCYTE ESTERASE UR QL STRIP.AUTO: ABNORMAL
LIPASE SERPL-CCNC: 35 U/L (ref 13–60)
LYMPHOCYTES # BLD: 1.1 K/UL (ref 1.1–4.5)
LYMPHOCYTES NFR BLD: 13.1 % (ref 20–40)
MCH RBC QN AUTO: 27.7 PG (ref 27–31)
MCHC RBC AUTO-ENTMCNC: 32.2 G/DL (ref 33–37)
MCV RBC AUTO: 85.9 FL (ref 81–99)
MONOCYTES # BLD: 1 K/UL (ref 0–0.9)
MONOCYTES NFR BLD: 11.6 % (ref 0–10)
NEUTROPHILS # BLD: 6.4 K/UL (ref 1.5–7.5)
NEUTS SEG NFR BLD: 73.9 % (ref 50–65)
NITRITE UR QL STRIP.AUTO: NEGATIVE
NOROVIRUS GI+II RNA STL QL NAA+NON-PROBE: NOT DETECTED
P SHIGELLOIDES DNA STL QL NAA+NON-PROBE: NOT DETECTED
PH UR STRIP.AUTO: 6.5 [PH] (ref 5–8)
PLATELET # BLD AUTO: 222 K/UL (ref 130–400)
PMV BLD AUTO: 10.2 FL (ref 9.4–12.3)
POTASSIUM SERPL-SCNC: 3.6 MMOL/L (ref 3.5–5)
PROT SERPL-MCNC: 5.7 G/DL (ref 6.4–8.3)
PROT UR STRIP.AUTO-MCNC: ABNORMAL MG/DL
PROTHROMBIN TIME: 14.1 SEC (ref 12–14.6)
RBC # BLD AUTO: 4.19 M/UL (ref 4.2–5.4)
RBC #/AREA URNS AUTO: 1 /HPF (ref 0–4)
RVA RNA STL QL NAA+NON-PROBE: NOT DETECTED
S ENT+BONG DNA STL QL NAA+NON-PROBE: NOT DETECTED
SAPO I+II+IV+V RNA STL QL NAA+NON-PROBE: NOT DETECTED
SHIGELLA SP+EIEC IPAH ST NAA+NON-PROBE: NOT DETECTED
SODIUM SERPL-SCNC: 134 MMOL/L (ref 136–145)
SP GR UR STRIP.AUTO: 1.03 (ref 1–1.03)
UROBILINOGEN UR STRIP.AUTO-MCNC: 0.2 E.U./DL
V CHOL+PARA+VUL DNA STL QL NAA+NON-PROBE: NOT DETECTED
V CHOLERAE DNA STL QL NAA+NON-PROBE: NOT DETECTED
WBC # BLD AUTO: 8.6 K/UL (ref 4.8–10.8)
WBC #/AREA URNS AUTO: 1 /HPF (ref 0–5)
Y ENTEROCOL DNA STL QL NAA+NON-PROBE: NOT DETECTED

## 2025-03-27 PROCEDURE — 3609018500 HC EGD US SCOPE W/ADJACENT STRUCTURES: Performed by: INTERNAL MEDICINE

## 2025-03-27 PROCEDURE — 2580000003 HC RX 258: Performed by: INTERNAL MEDICINE

## 2025-03-27 PROCEDURE — 7100000001 HC PACU RECOVERY - ADDTL 15 MIN: Performed by: INTERNAL MEDICINE

## 2025-03-27 PROCEDURE — 0DJ08ZZ INSPECTION OF UPPER INTESTINAL TRACT, VIA NATURAL OR ARTIFICIAL OPENING ENDOSCOPIC: ICD-10-PCS | Performed by: INTERNAL MEDICINE

## 2025-03-27 PROCEDURE — 0FCC8ZZ EXTIRPATION OF MATTER FROM AMPULLA OF VATER, VIA NATURAL OR ARTIFICIAL OPENING ENDOSCOPIC: ICD-10-PCS | Performed by: INTERNAL MEDICINE

## 2025-03-27 PROCEDURE — 0F798DZ DILATION OF COMMON BILE DUCT WITH INTRALUMINAL DEVICE, VIA NATURAL OR ARTIFICIAL OPENING ENDOSCOPIC: ICD-10-PCS | Performed by: INTERNAL MEDICINE

## 2025-03-27 PROCEDURE — 2500000003 HC RX 250 WO HCPCS: Performed by: NURSE ANESTHETIST, CERTIFIED REGISTERED

## 2025-03-27 PROCEDURE — 87493 C DIFF AMPLIFIED PROBE: CPT

## 2025-03-27 PROCEDURE — 94760 N-INVAS EAR/PLS OXIMETRY 1: CPT

## 2025-03-27 PROCEDURE — 74328 X-RAY BILE DUCT ENDOSCOPY: CPT | Performed by: INTERNAL MEDICINE

## 2025-03-27 PROCEDURE — 3609015100 HC ERCP STENT PLACEMENT BILIARY/PANCREATIC DUCT: Performed by: INTERNAL MEDICINE

## 2025-03-27 PROCEDURE — 36415 COLL VENOUS BLD VENIPUNCTURE: CPT

## 2025-03-27 PROCEDURE — 6360000002 HC RX W HCPCS: Performed by: NURSE ANESTHETIST, CERTIFIED REGISTERED

## 2025-03-27 PROCEDURE — C2625 STENT, NON-COR, TEM W/DEL SY: HCPCS | Performed by: INTERNAL MEDICINE

## 2025-03-27 PROCEDURE — 2580000003 HC RX 258: Performed by: NURSE ANESTHETIST, CERTIFIED REGISTERED

## 2025-03-27 PROCEDURE — 7100000000 HC PACU RECOVERY - FIRST 15 MIN: Performed by: INTERNAL MEDICINE

## 2025-03-27 PROCEDURE — 6360000002 HC RX W HCPCS: Performed by: INTERNAL MEDICINE

## 2025-03-27 PROCEDURE — 2580000003 HC RX 258: Performed by: NURSE PRACTITIONER

## 2025-03-27 PROCEDURE — 3609015200 HC ERCP REMOVE CALCULI/DEBRIS BILIARY/PANCREAS DUCT: Performed by: INTERNAL MEDICINE

## 2025-03-27 PROCEDURE — 43264 ERCP REMOVE DUCT CALCULI: CPT | Performed by: INTERNAL MEDICINE

## 2025-03-27 PROCEDURE — 3700000001 HC ADD 15 MINUTES (ANESTHESIA): Performed by: INTERNAL MEDICINE

## 2025-03-27 PROCEDURE — 87449 NOS EACH ORGANISM AG IA: CPT

## 2025-03-27 PROCEDURE — 85025 COMPLETE CBC W/AUTO DIFF WBC: CPT

## 2025-03-27 PROCEDURE — 83605 ASSAY OF LACTIC ACID: CPT

## 2025-03-27 PROCEDURE — 3700000000 HC ANESTHESIA ATTENDED CARE: Performed by: INTERNAL MEDICINE

## 2025-03-27 PROCEDURE — 6370000000 HC RX 637 (ALT 250 FOR IP): Performed by: INTERNAL MEDICINE

## 2025-03-27 PROCEDURE — 74328 X-RAY BILE DUCT ENDOSCOPY: CPT

## 2025-03-27 PROCEDURE — 2720000010 HC SURG SUPPLY STERILE: Performed by: INTERNAL MEDICINE

## 2025-03-27 PROCEDURE — 99222 1ST HOSP IP/OBS MODERATE 55: CPT | Performed by: INTERNAL MEDICINE

## 2025-03-27 PROCEDURE — 6360000002 HC RX W HCPCS: Performed by: NURSE PRACTITIONER

## 2025-03-27 PROCEDURE — C1769 GUIDE WIRE: HCPCS | Performed by: INTERNAL MEDICINE

## 2025-03-27 PROCEDURE — 87324 CLOSTRIDIUM AG IA: CPT

## 2025-03-27 PROCEDURE — 43274 ERCP DUCT STENT PLACEMENT: CPT | Performed by: INTERNAL MEDICINE

## 2025-03-27 PROCEDURE — 99222 1ST HOSP IP/OBS MODERATE 55: CPT | Performed by: SURGERY

## 2025-03-27 PROCEDURE — 2709999900 HC NON-CHARGEABLE SUPPLY: Performed by: INTERNAL MEDICINE

## 2025-03-27 PROCEDURE — 83690 ASSAY OF LIPASE: CPT

## 2025-03-27 PROCEDURE — 87507 IADNA-DNA/RNA PROBE TQ 12-25: CPT

## 2025-03-27 PROCEDURE — 80053 COMPREHEN METABOLIC PANEL: CPT

## 2025-03-27 PROCEDURE — 43259 EGD US EXAM DUODENUM/JEJUNUM: CPT | Performed by: INTERNAL MEDICINE

## 2025-03-27 PROCEDURE — 1200000000 HC SEMI PRIVATE

## 2025-03-27 PROCEDURE — 85610 PROTHROMBIN TIME: CPT

## 2025-03-27 PROCEDURE — 81001 URINALYSIS AUTO W/SCOPE: CPT

## 2025-03-27 DEVICE — BILIARY STENT WITH NAVIFLEXTM RX DELIVERY SYSTEM
Type: IMPLANTABLE DEVICE | Site: BILE DUCT | Status: FUNCTIONAL
Brand: ADVANIX™ BILIARY

## 2025-03-27 RX ORDER — DIPHENHYDRAMINE HYDROCHLORIDE 50 MG/ML
12.5 INJECTION, SOLUTION INTRAMUSCULAR; INTRAVENOUS
Status: DISCONTINUED | OUTPATIENT
Start: 2025-03-27 | End: 2025-03-27 | Stop reason: HOSPADM

## 2025-03-27 RX ORDER — FENTANYL CITRATE 50 UG/ML
INJECTION, SOLUTION INTRAMUSCULAR; INTRAVENOUS
Status: DISCONTINUED | OUTPATIENT
Start: 2025-03-27 | End: 2025-03-27 | Stop reason: SDUPTHER

## 2025-03-27 RX ORDER — MORPHINE SULFATE 4 MG/ML
4 INJECTION, SOLUTION INTRAMUSCULAR; INTRAVENOUS EVERY 4 HOURS PRN
Status: DISCONTINUED | OUTPATIENT
Start: 2025-03-27 | End: 2025-03-30 | Stop reason: HOSPADM

## 2025-03-27 RX ORDER — LIDOCAINE HYDROCHLORIDE 10 MG/ML
INJECTION, SOLUTION EPIDURAL; INFILTRATION; INTRACAUDAL; PERINEURAL
Status: DISCONTINUED | OUTPATIENT
Start: 2025-03-27 | End: 2025-03-27 | Stop reason: SDUPTHER

## 2025-03-27 RX ORDER — ROCURONIUM BROMIDE 10 MG/ML
INJECTION, SOLUTION INTRAVENOUS
Status: DISCONTINUED | OUTPATIENT
Start: 2025-03-27 | End: 2025-03-27 | Stop reason: SDUPTHER

## 2025-03-27 RX ORDER — SODIUM CHLORIDE 0.9 % (FLUSH) 0.9 %
5-40 SYRINGE (ML) INJECTION EVERY 12 HOURS SCHEDULED
Status: DISCONTINUED | OUTPATIENT
Start: 2025-03-27 | End: 2025-03-27 | Stop reason: HOSPADM

## 2025-03-27 RX ORDER — FAMOTIDINE 10 MG/ML
INJECTION, SOLUTION INTRAVENOUS
Status: DISPENSED
Start: 2025-03-27 | End: 2025-03-28

## 2025-03-27 RX ORDER — SODIUM CHLORIDE, SODIUM LACTATE, POTASSIUM CHLORIDE, CALCIUM CHLORIDE 600; 310; 30; 20 MG/100ML; MG/100ML; MG/100ML; MG/100ML
INJECTION, SOLUTION INTRAVENOUS CONTINUOUS
Status: DISCONTINUED | OUTPATIENT
Start: 2025-03-27 | End: 2025-03-30 | Stop reason: HOSPADM

## 2025-03-27 RX ORDER — SODIUM CHLORIDE, SODIUM LACTATE, POTASSIUM CHLORIDE, CALCIUM CHLORIDE 600; 310; 30; 20 MG/100ML; MG/100ML; MG/100ML; MG/100ML
INJECTION, SOLUTION INTRAVENOUS
Status: DISCONTINUED | OUTPATIENT
Start: 2025-03-27 | End: 2025-03-27 | Stop reason: SDUPTHER

## 2025-03-27 RX ORDER — HYDROMORPHONE HYDROCHLORIDE 1 MG/ML
0.5 INJECTION, SOLUTION INTRAMUSCULAR; INTRAVENOUS; SUBCUTANEOUS EVERY 5 MIN PRN
Status: DISCONTINUED | OUTPATIENT
Start: 2025-03-27 | End: 2025-03-27 | Stop reason: HOSPADM

## 2025-03-27 RX ORDER — HYDROMORPHONE HYDROCHLORIDE 1 MG/ML
0.25 INJECTION, SOLUTION INTRAMUSCULAR; INTRAVENOUS; SUBCUTANEOUS EVERY 5 MIN PRN
Status: DISCONTINUED | OUTPATIENT
Start: 2025-03-27 | End: 2025-03-27 | Stop reason: HOSPADM

## 2025-03-27 RX ORDER — SODIUM CHLORIDE 0.9 % (FLUSH) 0.9 %
5-40 SYRINGE (ML) INJECTION PRN
Status: DISCONTINUED | OUTPATIENT
Start: 2025-03-27 | End: 2025-03-27 | Stop reason: HOSPADM

## 2025-03-27 RX ORDER — INDOMETHACIN 100 MG
100 SUPPOSITORY, RECTAL RECTAL ONCE
Status: DISCONTINUED | OUTPATIENT
Start: 2025-03-27 | End: 2025-03-27 | Stop reason: HOSPADM

## 2025-03-27 RX ORDER — INDOMETHACIN 100 MG
SUPPOSITORY, RECTAL RECTAL PRN
Status: DISCONTINUED | OUTPATIENT
Start: 2025-03-27 | End: 2025-03-27 | Stop reason: HOSPADM

## 2025-03-27 RX ORDER — ONDANSETRON 2 MG/ML
INJECTION INTRAMUSCULAR; INTRAVENOUS
Status: DISCONTINUED | OUTPATIENT
Start: 2025-03-27 | End: 2025-03-27 | Stop reason: SDUPTHER

## 2025-03-27 RX ORDER — NALOXONE HYDROCHLORIDE 0.4 MG/ML
INJECTION, SOLUTION INTRAMUSCULAR; INTRAVENOUS; SUBCUTANEOUS PRN
Status: DISCONTINUED | OUTPATIENT
Start: 2025-03-27 | End: 2025-03-27 | Stop reason: HOSPADM

## 2025-03-27 RX ORDER — DEXAMETHASONE SODIUM PHOSPHATE 10 MG/ML
INJECTION, SOLUTION INTRAMUSCULAR; INTRAVENOUS
Status: DISCONTINUED | OUTPATIENT
Start: 2025-03-27 | End: 2025-03-27 | Stop reason: SDUPTHER

## 2025-03-27 RX ORDER — MORPHINE SULFATE 2 MG/ML
2 INJECTION, SOLUTION INTRAMUSCULAR; INTRAVENOUS EVERY 4 HOURS PRN
Refills: 0 | Status: DISCONTINUED | OUTPATIENT
Start: 2025-03-27 | End: 2025-03-30 | Stop reason: HOSPADM

## 2025-03-27 RX ORDER — INDOMETHACIN 100 MG
100 SUPPOSITORY, RECTAL RECTAL ONCE
Status: CANCELLED | OUTPATIENT
Start: 2025-03-27

## 2025-03-27 RX ORDER — METOCLOPRAMIDE HYDROCHLORIDE 5 MG/ML
10 INJECTION INTRAMUSCULAR; INTRAVENOUS
Status: DISCONTINUED | OUTPATIENT
Start: 2025-03-27 | End: 2025-03-27 | Stop reason: HOSPADM

## 2025-03-27 RX ORDER — SODIUM CHLORIDE 9 MG/ML
INJECTION, SOLUTION INTRAVENOUS PRN
Status: DISCONTINUED | OUTPATIENT
Start: 2025-03-27 | End: 2025-03-27 | Stop reason: HOSPADM

## 2025-03-27 RX ORDER — PROPOFOL 10 MG/ML
INJECTION, EMULSION INTRAVENOUS
Status: DISCONTINUED | OUTPATIENT
Start: 2025-03-27 | End: 2025-03-27 | Stop reason: SDUPTHER

## 2025-03-27 RX ORDER — MIDAZOLAM HYDROCHLORIDE 1 MG/ML
INJECTION, SOLUTION INTRAMUSCULAR; INTRAVENOUS
Status: DISCONTINUED | OUTPATIENT
Start: 2025-03-27 | End: 2025-03-27 | Stop reason: SDUPTHER

## 2025-03-27 RX ADMIN — ONDANSETRON 4 MG: 2 INJECTION INTRAMUSCULAR; INTRAVENOUS at 01:04

## 2025-03-27 RX ADMIN — DEXAMETHASONE SODIUM PHOSPHATE 10 MG: 10 INJECTION, SOLUTION INTRAMUSCULAR; INTRAVENOUS at 13:31

## 2025-03-27 RX ADMIN — PIPERACILLIN SODIUM AND TAZOBACTAM SODIUM 4500 MG: 4; .5 INJECTION, POWDER, LYOPHILIZED, FOR SOLUTION INTRAVENOUS at 01:13

## 2025-03-27 RX ADMIN — SODIUM CHLORIDE, SODIUM LACTATE, POTASSIUM CHLORIDE, AND CALCIUM CHLORIDE: 600; 310; 30; 20 INJECTION, SOLUTION INTRAVENOUS at 13:02

## 2025-03-27 RX ADMIN — HYDROMORPHONE HYDROCHLORIDE 0.5 MG: 1 INJECTION, SOLUTION INTRAMUSCULAR; INTRAVENOUS; SUBCUTANEOUS at 05:16

## 2025-03-27 RX ADMIN — SUGAMMADEX 314 MG: 100 INJECTION, SOLUTION INTRAVENOUS at 13:48

## 2025-03-27 RX ADMIN — ROCURONIUM BROMIDE 50 MG: 10 INJECTION, SOLUTION INTRAVENOUS at 13:25

## 2025-03-27 RX ADMIN — HYDROMORPHONE HYDROCHLORIDE 0.5 MG: 1 INJECTION, SOLUTION INTRAMUSCULAR; INTRAVENOUS; SUBCUTANEOUS at 01:03

## 2025-03-27 RX ADMIN — ONDANSETRON 4 MG: 2 INJECTION, SOLUTION INTRAMUSCULAR; INTRAVENOUS at 13:30

## 2025-03-27 RX ADMIN — ONDANSETRON 4 MG: 2 INJECTION INTRAMUSCULAR; INTRAVENOUS at 11:30

## 2025-03-27 RX ADMIN — ONDANSETRON 4 MG: 2 INJECTION INTRAMUSCULAR; INTRAVENOUS at 18:33

## 2025-03-27 RX ADMIN — HYDROMORPHONE HYDROCHLORIDE 0.5 MG: 1 INJECTION, SOLUTION INTRAMUSCULAR; INTRAVENOUS; SUBCUTANEOUS at 08:35

## 2025-03-27 RX ADMIN — SODIUM CHLORIDE: 0.9 INJECTION, SOLUTION INTRAVENOUS at 01:05

## 2025-03-27 RX ADMIN — PROPOFOL 120 MCG/KG/MIN: 10 INJECTION, EMULSION INTRAVENOUS at 13:15

## 2025-03-27 RX ADMIN — PROPOFOL 100 MG: 10 INJECTION, EMULSION INTRAVENOUS at 13:23

## 2025-03-27 RX ADMIN — MORPHINE SULFATE 4 MG: 4 INJECTION, SOLUTION INTRAMUSCULAR; INTRAVENOUS at 20:38

## 2025-03-27 RX ADMIN — MIDAZOLAM 2 MG: 1 INJECTION INTRAMUSCULAR; INTRAVENOUS at 13:09

## 2025-03-27 RX ADMIN — SODIUM CHLORIDE 3375 MG: 9 INJECTION, SOLUTION INTRAVENOUS at 20:39

## 2025-03-27 RX ADMIN — MORPHINE SULFATE 4 MG: 4 INJECTION, SOLUTION INTRAMUSCULAR; INTRAVENOUS at 16:11

## 2025-03-27 RX ADMIN — SODIUM CHLORIDE 3375 MG: 9 INJECTION, SOLUTION INTRAVENOUS at 16:17

## 2025-03-27 RX ADMIN — SODIUM CHLORIDE 3375 MG: 9 INJECTION, SOLUTION INTRAVENOUS at 06:29

## 2025-03-27 RX ADMIN — FENTANYL CITRATE 100 MCG: 50 INJECTION INTRAMUSCULAR; INTRAVENOUS at 13:12

## 2025-03-27 RX ADMIN — LIDOCAINE HYDROCHLORIDE 50 MG: 10 INJECTION, SOLUTION EPIDURAL; INFILTRATION; INTRACAUDAL; PERINEURAL at 13:15

## 2025-03-27 ASSESSMENT — PAIN - FUNCTIONAL ASSESSMENT
PAIN_FUNCTIONAL_ASSESSMENT: PREVENTS OR INTERFERES SOME ACTIVE ACTIVITIES AND ADLS
PAIN_FUNCTIONAL_ASSESSMENT: PREVENTS OR INTERFERES SOME ACTIVE ACTIVITIES AND ADLS

## 2025-03-27 ASSESSMENT — PAIN SCALES - GENERAL
PAINLEVEL_OUTOF10: 9
PAINLEVEL_OUTOF10: 9
PAINLEVEL_OUTOF10: 3
PAINLEVEL_OUTOF10: 9

## 2025-03-27 ASSESSMENT — PAIN DESCRIPTION - LOCATION
LOCATION: ABDOMEN;BACK

## 2025-03-27 ASSESSMENT — PAIN DESCRIPTION - ORIENTATION
ORIENTATION: RIGHT;LOWER
ORIENTATION: RIGHT;LOWER

## 2025-03-27 ASSESSMENT — PAIN DESCRIPTION - DESCRIPTORS
DESCRIPTORS: ACHING
DESCRIPTORS: DULL;ACHING
DESCRIPTORS: SQUEEZING

## 2025-03-27 NOTE — ANESTHESIA PRE PROCEDURE
liquid consumption: 03/24/25                        Date of last solid food consumption: 03/24/25    BMI:   Wt Readings from Last 3 Encounters:   03/26/25 78.6 kg (173 lb 3 oz)   12/18/24 83 kg (183 lb)   11/20/20 87.6 kg (193 lb 1.6 oz)     Body mass index is 30.68 kg/m².    CBC:   Lab Results   Component Value Date/Time    WBC 8.6 03/27/2025 12:29 AM    RBC 4.19 03/27/2025 12:29 AM    HGB 11.6 03/27/2025 12:29 AM    HCT 36.0 03/27/2025 12:29 AM    MCV 85.9 03/27/2025 12:29 AM    RDW 15.1 03/27/2025 12:29 AM     03/27/2025 12:29 AM       CMP:   Lab Results   Component Value Date/Time     03/27/2025 12:29 AM    K 3.6 03/27/2025 12:29 AM     03/27/2025 12:29 AM    CO2 23 03/27/2025 12:29 AM    BUN 11 03/27/2025 12:29 AM    CREATININE 0.6 03/27/2025 12:29 AM    LABGLOM >90 03/27/2025 12:29 AM    GLUCOSE 91 03/27/2025 12:29 AM    CALCIUM 8.0 03/27/2025 12:29 AM    BILITOT 1.2 03/27/2025 12:29 AM    ALKPHOS 326 03/27/2025 12:29 AM    AST 79 03/27/2025 12:29 AM    ALT 33 03/27/2025 12:29 AM       POC Tests: No results for input(s): \"POCGLU\", \"POCNA\", \"POCK\", \"POCCL\", \"POCBUN\", \"POCHEMO\", \"POCHCT\" in the last 72 hours.    Coags:   Lab Results   Component Value Date/Time    PROTIME 14.1 03/27/2025 12:29 AM    INR 1.12 03/27/2025 12:29 AM       HCG (If Applicable): No results found for: \"PREGTESTUR\", \"PREGSERUM\", \"HCG\", \"HCGQUANT\"     ABGs: No results found for: \"PHART\", \"PO2ART\", \"NNT0RSE\", \"FQX5JQY\", \"BEART\", \"N1ZADSPW\"     Type & Screen (If Applicable):  No results found for: \"ABORH\", \"LABANTI\"    Drug/Infectious Status (If Applicable):  Lab Results   Component Value Date/Time    HIV Non-reactive 12/18/2024 10:54 AM       COVID-19 Screening (If Applicable): No results found for: \"COVID19\"        Anesthesia Evaluation  Patient summary reviewed   no history of anesthetic complications:   Airway: Mallampati: II  TM distance: >3 FB   Neck ROM: full  Mouth opening: < 3 FB   Dental: normal exam   (+) upper

## 2025-03-27 NOTE — TELEPHONE ENCOUNTER
Received phone call from patients daughter that Adela was in the hospital having emergency Gallbladder surgery.

## 2025-03-27 NOTE — PROGRESS NOTES
[facility] Hospitalists      Patient:  Edwige Gu  YOB: 1965  Date of Service: 3/27/2025  MRN: [id number]   Acct: [id number]   Primary Care Physician: Vanessa Prasad APRN - CNP  Advance Directive: Full Code  Admit Date: 3/26/2025       Hospital Day: 1  Portions of this note have been copied forward, however, changed to reflect the most current clinical status of this patient.  CHIEF COMPLAINT abdominal pain     SUBJECTIVE:  continues to endorse RUQ pain pain medication not helping    Cumulative hospital course   60-year-old female with GERD, RA Arava, chronic back pain, complaints of ongoing right upper quadrant abdominal pain for the last week.  She has had worsening nausea, pain with eating stated she then began having diaphoresis, rigors, and presented to [facility] ER for further evaluation.  In addition she has had ongoing diarrhea for the past few months and recently had outpatient C. difficile evaluation that was negative.  Workup at OSH ER CT abdomen pelvis concern for acute cholecystitis, cholelithiasis mildly dilated duct and possible small stone in common bile duct, LFTs mild elevation in alkaline phos and AST.  Patient was transferred to [facility].  Patient admitted to hospitalist service with consultation to general surgery and gastroenterology due to concern of acute cholecystitis and choledocholithiasis.  Initiated on IV Zosyn and underwent ERCP with stone/pus removal and biliary stent placement.  General surgery consult pending.  Patient will need colonoscopy once her acute issue is resolved.        Objective:   VITALS:  /73   Pulse 71   Temp 98.4 °F (36.9 °C) (Temporal)   Resp 18   Ht 1.6 m (5' 3\")   Wt 78.6 kg (173 lb 3 oz)   SpO2 94%   BMI 30.68 kg/m²   24HR INTAKE/OUTPUT:    Intake/Output Summary (Last 24 hours) at 3/27/2025 1349  Last data filed at 3/27/2025 0523  Gross per 24 hour   Intake --   Output 400 ml   Net -400 ml       Physical Exam  Vitals and  cholecystitis  -surgery consult  -IV zosyn   -ivf  -pain control  -trend lfts  Diarrhea   C diff/GI panel negative  Will need outpatient colonoscopy after acute process resolved    Antibiotic: Zosyn      DVT Prophylaxis: Taryn Temple, APRN - CNP, 3/27/2025 1:49 PM

## 2025-03-27 NOTE — OP NOTE
Referring/Primary Care Provider: Vanessa Prasad APRN - CNP, Patel, Raj, MD    Date of Procedure: 03/27/25    Procedure:   1. EGD with Endoscopic Ultrasound     Indications:   1. Abnormal imaging, cholelithiasis with questionable filling defects on imaging. Mild LFTS elevation with normal bilirubin. Concern for possible CBD stones but overall low-moderate risk. EUS to check for stones prior to proceeding to ERCP    Anesthesia:  Sedation was administered by anesthesia who monitored the patient during the procedure.    Procedure:   After reviewing the patient's chart, H&P, medications, obtaining informed consent, and discussing risks benefits and alternatives to the procedure the patient was placed in the left lateral decubitus position. A oblique viewing Olympus linear EUS scope was lubricated and inserted through the mouth into the oropharynx. Under indirect visualization, the upper esophagus was intubated. The scope was advanced to the level of the third portion of duodenum with limited views of the esophageal mucosa. Findings and maneuvers are listed in impression below.     The patient tolerated the procedure well. There were no immediate complications.    Findings:   Endoscopic Finding:     - endoscopic evaluation of the upper GI tract appeared normal    Endosonographic Findings:  - The celiac axis and associated vascular structures was identified and examined.  No concerning or malignant lymphadenopathy was identified.     - Limited views of the left lobe of the liver revealed no IHDD. No hepatic mass    - The EUS scope was advanced to the duodenal bulb. In the distal CBD, there were multiple filling defects noted with sludge and calcifications.     - Pancreas: normal.     Estimated Blood Loss: minimal    IMPRESSION:  Choledocholithiasis and sludge    RECOMMENDATIONS:   - ERCP today    The results were discussed with the patient and family. A copy of the images obtained were given to the patient.     Kirit  DAVONTE Cabrera MD  03/27/25  1:55 PM

## 2025-03-27 NOTE — PROGRESS NOTES
Pharmacy Renal Adjustment    Edwige Gu is a 60 y.o. female. Pharmacy has renally adjusted medications per protocol.    No results for input(s): \"BUN\" in the last 72 hours.    No results for input(s): \"CREATININE\" in the last 72 hours.  -Ordered stat lab and will evaluate appropriateness once level comes back.    Height:   Ht Readings from Last 1 Encounters:   03/26/25 1.6 m (5' 3\")     Weight:  Wt Readings from Last 1 Encounters:   03/26/25 78.6 kg (173 lb 3 oz)       Plan: Adjust the following medications based on renal function:           Piperacillin-tazobactam to 4500 mg IV once over 30 minutes followed by 3375 mg IV every 8 hours extended infusion over 240 minutes     Electronically signed by DAVEY CARRION RPH on 3/27/2025 at 12:15 AM

## 2025-03-27 NOTE — ANESTHESIA POSTPROCEDURE EVALUATION
Department of Anesthesiology  Postprocedure Note    Patient: Edwige Gu  MRN: [id number]  YOB: 1965  Date of evaluation: 3/27/2025    Procedure Summary       Date: 03/27/25 Room / Location: [place] [id number] / [facility]    Anesthesia Start: 1302 Anesthesia Stop: 1408    Procedures:       ENDOSCOPIC RETROGRADE CHOLANGIOPANCREATOGRAPHY STENT INSERTION      ENDOSCOPIC ULTRASOUND (Abdomen) Diagnosis:       Choledocholithiasis      (Choledocholithiasis [K80.50])    Surgeons: Kirit Cabrera MD Responsible Provider: Sally Guzman APRN - CRNA    Anesthesia Type: general, TIVA ASA Status: 2            Anesthesia Type: No value filed.    Eduard Phase I: Eduard Score: 9    Eduard Phase II:      Anesthesia Post Evaluation    Patient location during evaluation: PACU  Patient participation: complete - patient participated  Level of consciousness: sleepy but conscious  Pain score: 2  Airway patency: patent  Nausea & Vomiting: no nausea and no vomiting  Cardiovascular status: hemodynamically stable and blood pressure returned to baseline  Respiratory status: acceptable and nasal cannula  Hydration status: stable  Pain management: adequate    No notable events documented.

## 2025-03-27 NOTE — PROGRESS NOTES
Comprehensive Nutrition Assessment    Type and Reason for Visit:  Initial, Positive nutrition screen    Nutrition Recommendations/Plan:   Monitor for diet advancement     Malnutrition Assessment:  Malnutrition Status:  No malnutrition (03/27/25 1208)    Context:  Acute Illness     Findings of the 6 clinical characteristics of malnutrition:  Energy Intake:  No decrease in energy intake  Weight Loss:  No weight loss     Body Fat Loss:  No body fat loss     Muscle Mass Loss:  No muscle mass loss    Fluid Accumulation:  No fluid accumulation     Strength:  Not Performed    Nutrition Assessment:    Pt is nutritionally at risk AEB inadequate energy intake r/t NPO. Pt has had nausea and diarrhea. Last BM noted 3/27. IVF for hydration. Will monitor for diet advancement and implement nutrition intervention as needed.    Current Nutrition Intake & Therapies:    Average Meal Intake: NPO  Average Supplements Intake: NPO  Diet NPO    Anthropometric Measures:  Height: 160 cm (5' 3\")  Ideal Body Weight (IBW): 115 lbs (52 kg)    Current Body Weight: 78.6 kg (173 lb 4.5 oz)  Current BMI (kg/m2): 30.7  BMI Categories: Obese Class 1 (BMI 30.0-34.9)    Estimated Daily Nutrient Needs:  Energy Requirements Based On: Kcal/kg  Weight Used for Energy Requirements: Current  Energy (kcal/day): 1713-6819 kcals/day  Weight Used for Protein Requirements: Ideal  Protein (g/day):  g/protein/day  Method Used for Fluid Requirements: 1 ml/kcal  Fluid (ml/day): 8100-6311 mL/day    Nutrition Diagnosis:   Inadequate protein-energy intake related to acute injury/trauma as evidenced by NPO or clear liquid status due to medical condition    Nutrition Interventions:   Food and/or Nutrient Delivery: Continue NPO  Coordination of Nutrition Care: Continue to monitor while inpatient    Goals:  Goals: Initiate PO diet    Nutrition Monitoring and Evaluation:   Food/Nutrient Intake Outcomes: Diet Advancement/Tolerance  Physical Signs/Symptoms Outcomes:

## 2025-03-27 NOTE — CONSULTS
Ms. Gu is a 60 year old female who presented with abdominal pain. She states that she started having diarrhea about 2 months ago. Then, a few days ago, she started having acute onset of RUQ abdominal pain. The pain is worse with eating. It is associated with nausea. She went to the ER at [facility], where imaging was suggestive of acute cholecystitis and showed choledocholithiasis. She was transferred to [facility] for ERCP. Interestingly, her WBC was WNL as well as her bilirubin, although it was elevated at 1.2 compared to her previous labs this year at 0.4. The patient just underwent ERCP where there was removal of stones and purulence as well as stent placement.    Past Medical History:   Diagnosis Date    Arthritis     Cancer (HCC)     follicular lymphoma    Shoulder fracture, left      Past Surgical History:   Procedure Laterality Date    BACK SURGERY  12/2020    L3-S1    BACK SURGERY  01/27/2025    L1-L2 rods replaced    BONE MARROW BIOPSY Right 10/18/2019    BONE MARROW ASPIRATION BIOPSY performed by Marc Laughlin PA-C at [facility] OR    ERCP  03/27/2025    Dr CARMELINA John/1 cm biliary sphincterotomy, balloon sweep w/stone and pus removal, placement of a 10F biliary stent-Repeat in 3 months    LYMPH NODE BIOPSY Right     UPPER GASTROINTESTINAL ENDOSCOPY  03/27/2025    Dr CARMELINA John/EUS-Choledocholithiasis and sludge, proceed with ERCP    WRIST SURGERY Bilateral      Current Facility-Administered Medications   Medication Dose Route Frequency Provider Last Rate Last Admin    piperacillin-tazobactam (ZOSYN) 3,375 mg in sodium chloride 0.9 % 50 mL IVPB ([id number])  3,375 mg IntraVENous Q8H Kirit Cabrera MD   Stopped at 03/27/25 1109    morphine (PF) injection 2 mg  2 mg IntraVENous Q4H PRN Kirit Cabrera MD        morphine sulfate (PF) injection 4 mg  4 mg IntraVENous Q4H PRN Kirit Cabrera MD        lactated ringers infusion   IntraVENous Continuous Kirit Cabrera MD        sodium chloride flush 0.9 % injection 5-40 mL  Currently       Review of Systems   Constitutional:  Positive for chills. Negative for fever.   HENT:  Negative for congestion and sore throat.    Eyes:  Negative for pain and redness.   Respiratory:  Negative for cough and shortness of breath.    Cardiovascular:  Negative for chest pain and palpitations.   Gastrointestinal:  Positive for abdominal distention, abdominal pain, diarrhea and nausea.   Endocrine: Negative for polydipsia and polyphagia.   Genitourinary:  Negative for dysuria and hematuria.   Musculoskeletal:  Negative for arthralgias and back pain.   Skin:  Negative for rash and wound.   Neurological:  Negative for dizziness and headaches.   Psychiatric/Behavioral:  Negative for confusion and dysphoric mood.        Physical Exam  Vitals reviewed.   Constitutional:       General: She is not in acute distress.     Appearance: She is ill-appearing (flushed cheeks).   HENT:      Head: Normocephalic.      Mouth/Throat:      Mouth: Mucous membranes are moist.   Eyes:      Pupils: Pupils are equal, round, and reactive to light.   Cardiovascular:      Rate and Rhythm: Normal rate.   Pulmonary:      Effort: Pulmonary effort is normal.   Abdominal:      General: There is no distension.      Palpations: Abdomen is soft.      Tenderness: There is abdominal tenderness (mid abdominal and periumbilical). There is guarding.   Musculoskeletal:         General: No deformity.      Cervical back: Neck supple. No rigidity.   Skin:     General: Skin is warm and dry.   Neurological:      General: No focal deficit present.      Mental Status: She is alert.   Psychiatric:         Mood and Affect: Mood normal.         Impression    1. CT appearance of acute cholecystitis.  Multiple small gallstones are present and there are 1 or 2 small (2-3  mm) common bile duct stones.    This report was signed and finalized on 3/26/2025 7:22 PM by Dr. Santos Urban MD.  Narrative    EXAMINATION: CT ABDOMEN PELVIS W CONTRAST-     3/26/2025

## 2025-03-27 NOTE — PROGRESS NOTES
4 Eyes Skin Assessment     NAME:  Edwige Gu  YOB: 1965  MEDICAL RECORD NUMBER:  [id number]    The patient is being assessed for  Admission    I agree that at least one RN has performed a thorough Head to Toe Skin Assessment on the patient. ALL assessment sites listed below have been assessed.      Areas assessed by both nurses:    Head, Face, Ears, Shoulders, Back, Chest, Arms, Elbows, Hands, Sacrum. Buttock, Coccyx, Ischium, and Legs. Feet and Heels        Does the Patient have a Wound? No noted wound(s)       Navjot Prevention initiated by RN: Yes  Wound Care Orders initiated by RN: No    Pressure Injury (Stage 3,4, Unstageable, DTI, NWPT, and Complex wounds) if present, place Wound referral order by RN under : No    New Ostomies, if present place, Ostomy referral order under : No     Nurse 1 eSignature: Electronically signed by Ana Devine RN on 3/26/25 at 11:53 PM CDT    **SHARE this note so that the co-signing nurse can place an eSignature**    Nurse 2 eSignature: Electronically signed by Deyanira Bajwa RN on 3/27/25 at 12:20 AM CDT

## 2025-03-27 NOTE — PROGRESS NOTES
Edwige Gu arrived to room # 527.   Presented with: Acute Cholecystitis  Mental Status: Patient is oriented, alert, coherent, logical, thought processes intact, and able to concentrate and follow conversation.   Vitals:    03/26/25 2301   BP: (!) 114/56   Pulse: 83   Resp: 16   Temp: 97 °F (36.1 °C)   SpO2: 99%     Patient safety contract and falls prevention contract reviewed with patient Yes.  Oriented Patient to room.  Call light within reach. Yes.  Needs, issues or concerns expressed at this time: no.      Electronically signed by Ana Devine RN on 3/26/2025 at 11:48 PM

## 2025-03-27 NOTE — TELEPHONE ENCOUNTER
Velasquez,    Per Dr Cabrera:    IMPRESSION:  1. Choledocholithiasis with cholangitis   2. Placement of 10F biliary stent      RECOMMENDATIONS:    1.  Clear liquid diet today with advancing diet tomorrow as tolerated.   2.  Repeat ERCP in 3 months for stent removal   3.  General surgery to see for cholecystectomy  4.  Continue Antibiotics x 7 days total.         The results were discussed with the patient and family.  A copy of the images obtained were given to the patient.      Kirit Cabrera MD  3/27/2025  1:49 PM

## 2025-03-27 NOTE — OP NOTE
Endoscopic Procedure Note    Patient: Edwige Gu : 1965  [facility] Rec#: [id number] Acc#: [id number]     Primary Care Provider Vanessa Prasad APRN - CNP  Referring Provider:  DUONG Patiño MD, BASILIO Espinoza MD    Endoscopist: Kirit Cabrera MD    Date of Procedure:  3/27/2025     Procedure:   1. ERCP with stone/pus removal  2. ERCP with stent placement   3. Intra procedure interpretation of biliary fluoroscopy 80144    Indications:   1. Abdominal pain  2. Elevated LFTs and abnormal imaging    Anesthesia:  General     Estimated Blood Loss: minimal    Procedure:   Prior to the procedure the patient's chart was reviewed and informed consent was obtained.  Risk and Benefits (Risks including but not limited to bleeding, perforation, infection, 8 to 10% risk of post ERCP pancreatitis, and even death) were discussed with the patient. They were agreeable to continue.     Patient was brought to the operating room, underwent general anesthesia, and placed in the prone position.  A side-viewing duodenoscope was advanced from the oropharynx down to the distal duodenum and reduced into a short position opposite the ampulla. The ampulla appeared bulging. A  film was obtained which showed h/o spinal surgery.     The bile duct was then cannulated using a 0.035 x 260 cm straight Dreamwire. A short nose traction sphincterotome was advanced over the wire and the bile duct was deeply cannulated.  Contrast was injected.  I personally interpreted the bile duct images.  The flow of contrast was adequate.  Contrast injection showed non-dilated intrahepatic ducts. There were multiple filling defects in the CBD. The pancreatic duct was not cannulated nor injected.     To help discover objects an approximate 1 cm biliary sphincterotomy was made using a monofilament autotome and electrocautery.  There was minimal post sphincterotomy bleeding.  Upon sphincterotomy, pus and stones flowed from the ampulla    The bile duct was swept multiple

## 2025-03-27 NOTE — CONSULTS
Pt Name: Edwige Gu  MRN: [id number]  [id number]  YOB: 1965  Admit Date: 3/26/2025 11:05 PM  Date of evaluation: 3/27/2025  Primary Care Physician: Vanessa Prasad APRN - CNP   [id number]       Requesting Provider: Dr. Rufino Strauss    GI Consult    Indication: Abdominal pain.  Diarrhea.    History:  The patient is a 60 y.o. female transferred to the hospital for abdominal pain and abnormal imaging study.  According the patient she has been sick for over 2 months.  She is having significant diarrhea.  She claims that she moves her bowels about 10-20 times a day.  Stools are soft and liquid in consistency.  She wakes up in the night also for bowel movement.  She was having symptoms and nausea and occasional vomiting.  This has been going on over 2 months.  Since last week she is starting abdominal pain.  The pain is mostly in the right upper quadrant area and radiate to the right lower quadrant area.  The pain is intermittent and mild to moderate in intensity.  The pain may be associated with eating.  No fever or chills.  No chest pain or palpitation.  She does not have any heartburns or regurgitation.  No dysphagia or odynophagia.  No anorexia or weight loss.  No recent traveling or any antibiotic use.  Recent stool studies has been negative for C. difficile.  She had a colonoscopy done in 2023 which was normal.  At that time she was not having diarrhea.  CT scan at [facility] [place] operated by [facility] shows cholelithiasis with evidence of cholecystitis, mildly dilated duct and possible small stone in the bile duct.  However, her liver function test is essentially normal except for mild elevation of alkaline phosphatase and AST.      Past Medical History:        Diagnosis Date    Arthritis     Cancer (HCC)     follicular lymphoma    Shoulder fracture, left      Past Surgical History:        Procedure Laterality Date    BACK SURGERY  12/2020    L3-S1    BACK SURGERY  01/27/2025    L1-L2 rods replaced    BONE  Types: Cigarettes     Start date: 2024     Quit date: 1994     Years since quittin.4    Smokeless tobacco: Never   Vaping Use    Vaping status: Never Used   Substance and Sexual Activity    Alcohol use: Not Currently    Drug use: Never    Sexual activity: Not on file   Other Topics Concern    Not on file   Social History Narrative    Not on file     Social Drivers of Health     Financial Resource Strain: High Risk (2023)    Received from [facility]    Overall Financial Resource Strain (CARDIA)     Difficulty of Paying Living Expenses: Hard   Food Insecurity: No Food Insecurity (3/26/2025)    Hunger Vital Sign     Worried About Running Out of Food in the Last Year: Never true     Ran Out of Food in the Last Year: Never true   Transportation Needs: No Transportation Needs (3/26/2025)    PRAPARE - Transportation     Lack of Transportation (Medical): No     Lack of Transportation (Non-Medical): No   Physical Activity: Not on file   Stress: Not on file   Social Connections: Not on file   Intimate Partner Violence: Not At Risk (3/26/2025)    Received from [facility]    Abuse Screen     Feels Unsafe at Home or Work/School: no     Feels Threatened by Someone: no     Does Anyone Try to Keep You From Having Contact with Others or Doing Things Outside Your Home?: no     Physical Signs of Abuse Present: no   Housing Stability: Low Risk  (3/26/2025)    Housing Stability Vital Sign     Unable to Pay for Housing in the Last Year: No     Number of Times Moved in the Last Year: 0     Homeless in the Last Year: No       Family History:   History reviewed. No pertinent family history.      ROS:  Multiple somatic symptoms.    Physical Exam:  BP (!) 113/53   Pulse 74   Temp 98.4 °F (36.9 °C) (Temporal)   Resp 16   Ht 1.6 m (5' 3\")   Wt 78.6 kg (173 lb 3 oz)   SpO2 94%   BMI 30.68 kg/m²     General appearance: alert and cooperative with exam, appears stated age, no acute distress

## 2025-03-28 ENCOUNTER — ANESTHESIA EVENT (OUTPATIENT)
Dept: OPERATING ROOM | Age: 60
End: 2025-03-28
Payer: MEDICARE

## 2025-03-28 ENCOUNTER — ANESTHESIA (OUTPATIENT)
Dept: OPERATING ROOM | Age: 60
End: 2025-03-28
Payer: MEDICARE

## 2025-03-28 LAB
ALBUMIN SERPL-MCNC: 3 G/DL (ref 3.5–5.2)
ALP SERPL-CCNC: 272 U/L (ref 35–104)
ALT SERPL-CCNC: 23 U/L (ref 10–35)
ANION GAP SERPL CALCULATED.3IONS-SCNC: 13 MMOL/L (ref 8–16)
AST SERPL-CCNC: 28 U/L (ref 10–35)
BASOPHILS # BLD: 0 K/UL (ref 0–0.2)
BASOPHILS NFR BLD: 0.4 % (ref 0–1)
BILIRUB DIRECT SERPL-MCNC: 0.2 MG/DL (ref 0–0.3)
BILIRUB INDIRECT SERPL-MCNC: 0.3 MG/DL (ref 0–1)
BILIRUB SERPL-MCNC: 0.5 MG/DL (ref 0.2–1.2)
BUN SERPL-MCNC: 10 MG/DL (ref 6–20)
CALCIUM SERPL-MCNC: 8.3 MG/DL (ref 8.6–10)
CHLORIDE SERPL-SCNC: 107 MMOL/L (ref 98–107)
CO2 SERPL-SCNC: 20 MMOL/L (ref 22–29)
CREAT SERPL-MCNC: 0.4 MG/DL (ref 0.5–0.9)
EOSINOPHIL # BLD: 0 K/UL (ref 0–0.6)
EOSINOPHIL NFR BLD: 0 % (ref 0–5)
ERYTHROCYTE [DISTWIDTH] IN BLOOD BY AUTOMATED COUNT: 14.7 % (ref 11.5–14.5)
GLUCOSE SERPL-MCNC: 85 MG/DL (ref 70–99)
HCT VFR BLD AUTO: 35.8 % (ref 37–47)
HGB BLD-MCNC: 11.1 G/DL (ref 12–16)
IMM GRANULOCYTES # BLD: 0 K/UL
LYMPHOCYTES # BLD: 1 K/UL (ref 1.1–4.5)
LYMPHOCYTES NFR BLD: 20.8 % (ref 20–40)
MCH RBC QN AUTO: 27.3 PG (ref 27–31)
MCHC RBC AUTO-ENTMCNC: 31 G/DL (ref 33–37)
MCV RBC AUTO: 88.2 FL (ref 81–99)
MONOCYTES # BLD: 0.7 K/UL (ref 0–0.9)
MONOCYTES NFR BLD: 13.6 % (ref 0–10)
NEUTROPHILS # BLD: 3.2 K/UL (ref 1.5–7.5)
NEUTS SEG NFR BLD: 64.4 % (ref 50–65)
PLATELET # BLD AUTO: 265 K/UL (ref 130–400)
PMV BLD AUTO: 10.4 FL (ref 9.4–12.3)
POTASSIUM SERPL-SCNC: 4.5 MMOL/L (ref 3.5–5)
PROT SERPL-MCNC: 5.6 G/DL (ref 6.4–8.3)
RBC # BLD AUTO: 4.06 M/UL (ref 4.2–5.4)
SODIUM SERPL-SCNC: 140 MMOL/L (ref 136–145)
WBC # BLD AUTO: 5 K/UL (ref 4.8–10.8)

## 2025-03-28 PROCEDURE — 3700000001 HC ADD 15 MINUTES (ANESTHESIA): Performed by: SURGERY

## 2025-03-28 PROCEDURE — 6360000002 HC RX W HCPCS: Performed by: ANESTHESIOLOGY

## 2025-03-28 PROCEDURE — 47490 INCISION OF GALLBLADDER: CPT | Performed by: SURGERY

## 2025-03-28 PROCEDURE — 3600000014 HC SURGERY LEVEL 4 ADDTL 15MIN: Performed by: SURGERY

## 2025-03-28 PROCEDURE — 6360000002 HC RX W HCPCS: Performed by: SURGERY

## 2025-03-28 PROCEDURE — 2580000003 HC RX 258: Performed by: SURGERY

## 2025-03-28 PROCEDURE — C1889 IMPLANT/INSERT DEVICE, NOC: HCPCS | Performed by: SURGERY

## 2025-03-28 PROCEDURE — 80053 COMPREHEN METABOLIC PANEL: CPT

## 2025-03-28 PROCEDURE — 36415 COLL VENOUS BLD VENIPUNCTURE: CPT

## 2025-03-28 PROCEDURE — 6360000002 HC RX W HCPCS: Performed by: INTERNAL MEDICINE

## 2025-03-28 PROCEDURE — 94760 N-INVAS EAR/PLS OXIMETRY 1: CPT

## 2025-03-28 PROCEDURE — 3700000000 HC ANESTHESIA ATTENDED CARE: Performed by: SURGERY

## 2025-03-28 PROCEDURE — 6370000000 HC RX 637 (ALT 250 FOR IP): Performed by: SURGERY

## 2025-03-28 PROCEDURE — 1200000000 HC SEMI PRIVATE

## 2025-03-28 PROCEDURE — 7100000000 HC PACU RECOVERY - FIRST 15 MIN: Performed by: SURGERY

## 2025-03-28 PROCEDURE — 6370000000 HC RX 637 (ALT 250 FOR IP): Performed by: ANESTHESIOLOGY

## 2025-03-28 PROCEDURE — 2500000003 HC RX 250 WO HCPCS: Performed by: ANESTHESIOLOGY

## 2025-03-28 PROCEDURE — 2580000003 HC RX 258: Performed by: ANESTHESIOLOGY

## 2025-03-28 PROCEDURE — 2580000003 HC RX 258: Performed by: NURSE ANESTHETIST, CERTIFIED REGISTERED

## 2025-03-28 PROCEDURE — 3600000004 HC SURGERY LEVEL 4 BASE: Performed by: SURGERY

## 2025-03-28 PROCEDURE — 0F9440Z DRAINAGE OF GALLBLADDER WITH DRAINAGE DEVICE, PERCUTANEOUS ENDOSCOPIC APPROACH: ICD-10-PCS | Performed by: SURGERY

## 2025-03-28 PROCEDURE — 6360000002 HC RX W HCPCS: Performed by: NURSE ANESTHETIST, CERTIFIED REGISTERED

## 2025-03-28 PROCEDURE — 2500000003 HC RX 250 WO HCPCS: Performed by: NURSE ANESTHETIST, CERTIFIED REGISTERED

## 2025-03-28 PROCEDURE — 7100000001 HC PACU RECOVERY - ADDTL 15 MIN: Performed by: SURGERY

## 2025-03-28 PROCEDURE — 2580000003 HC RX 258: Performed by: INTERNAL MEDICINE

## 2025-03-28 PROCEDURE — 85025 COMPLETE CBC W/AUTO DIFF WBC: CPT

## 2025-03-28 PROCEDURE — 2709999900 HC NON-CHARGEABLE SUPPLY: Performed by: SURGERY

## 2025-03-28 DEVICE — CLIP INT L POLYMER LOK LIG HEM O LOK (6EA/PK): Type: IMPLANTABLE DEVICE | Status: FUNCTIONAL

## 2025-03-28 RX ORDER — SODIUM CHLORIDE 0.9 % (FLUSH) 0.9 %
5-40 SYRINGE (ML) INJECTION PRN
Status: DISCONTINUED | OUTPATIENT
Start: 2025-03-28 | End: 2025-03-28 | Stop reason: HOSPADM

## 2025-03-28 RX ORDER — ROCURONIUM BROMIDE 10 MG/ML
INJECTION, SOLUTION INTRAVENOUS
Status: DISCONTINUED | OUTPATIENT
Start: 2025-03-28 | End: 2025-03-28 | Stop reason: SDUPTHER

## 2025-03-28 RX ORDER — SODIUM CHLORIDE 0.9 % (FLUSH) 0.9 %
5-40 SYRINGE (ML) INJECTION EVERY 12 HOURS SCHEDULED
Status: DISCONTINUED | OUTPATIENT
Start: 2025-03-28 | End: 2025-03-28 | Stop reason: HOSPADM

## 2025-03-28 RX ORDER — OXYCODONE HYDROCHLORIDE 5 MG/1
5 TABLET ORAL EVERY 4 HOURS PRN
Status: DISCONTINUED | OUTPATIENT
Start: 2025-03-28 | End: 2025-03-30 | Stop reason: HOSPADM

## 2025-03-28 RX ORDER — ONDANSETRON 2 MG/ML
INJECTION INTRAMUSCULAR; INTRAVENOUS
Status: DISCONTINUED | OUTPATIENT
Start: 2025-03-28 | End: 2025-03-28 | Stop reason: SDUPTHER

## 2025-03-28 RX ORDER — APREPITANT 40 MG/1
40 CAPSULE ORAL ONCE
Status: COMPLETED | OUTPATIENT
Start: 2025-03-28 | End: 2025-03-28

## 2025-03-28 RX ORDER — SODIUM CHLORIDE, SODIUM LACTATE, POTASSIUM CHLORIDE, CALCIUM CHLORIDE 600; 310; 30; 20 MG/100ML; MG/100ML; MG/100ML; MG/100ML
INJECTION, SOLUTION INTRAVENOUS
Status: DISCONTINUED | OUTPATIENT
Start: 2025-03-28 | End: 2025-03-28 | Stop reason: SDUPTHER

## 2025-03-28 RX ORDER — SODIUM CHLORIDE 9 MG/ML
INJECTION, SOLUTION INTRAVENOUS PRN
Status: DISCONTINUED | OUTPATIENT
Start: 2025-03-28 | End: 2025-03-28 | Stop reason: HOSPADM

## 2025-03-28 RX ORDER — DEXMEDETOMIDINE HYDROCHLORIDE 100 UG/ML
INJECTION, SOLUTION INTRAVENOUS
Status: DISCONTINUED | OUTPATIENT
Start: 2025-03-28 | End: 2025-03-28 | Stop reason: SDUPTHER

## 2025-03-28 RX ORDER — MIDAZOLAM HYDROCHLORIDE 1 MG/ML
INJECTION, SOLUTION INTRAMUSCULAR; INTRAVENOUS
Status: DISCONTINUED | OUTPATIENT
Start: 2025-03-28 | End: 2025-03-28 | Stop reason: SDUPTHER

## 2025-03-28 RX ORDER — LIDOCAINE HYDROCHLORIDE 10 MG/ML
INJECTION, SOLUTION EPIDURAL; INFILTRATION; INTRACAUDAL; PERINEURAL
Status: DISCONTINUED | OUTPATIENT
Start: 2025-03-28 | End: 2025-03-28 | Stop reason: SDUPTHER

## 2025-03-28 RX ORDER — LIDOCAINE HYDROCHLORIDE 10 MG/ML
1 INJECTION, SOLUTION EPIDURAL; INFILTRATION; INTRACAUDAL; PERINEURAL
Status: DISCONTINUED | OUTPATIENT
Start: 2025-03-28 | End: 2025-03-28 | Stop reason: HOSPADM

## 2025-03-28 RX ORDER — PROCHLORPERAZINE EDISYLATE 5 MG/ML
5 INJECTION INTRAMUSCULAR; INTRAVENOUS
Status: DISCONTINUED | OUTPATIENT
Start: 2025-03-28 | End: 2025-03-28 | Stop reason: HOSPADM

## 2025-03-28 RX ORDER — SODIUM CHLORIDE 9 MG/ML
INJECTION, SOLUTION INTRAVENOUS
Status: DISCONTINUED | OUTPATIENT
Start: 2025-03-28 | End: 2025-03-28 | Stop reason: SDUPTHER

## 2025-03-28 RX ORDER — DEXAMETHASONE SODIUM PHOSPHATE 10 MG/ML
INJECTION, SOLUTION INTRAMUSCULAR; INTRAVENOUS
Status: DISCONTINUED | OUTPATIENT
Start: 2025-03-28 | End: 2025-03-28 | Stop reason: SDUPTHER

## 2025-03-28 RX ORDER — SODIUM CHLORIDE 9 MG/ML
INJECTION, SOLUTION INTRAVENOUS PRN
Status: DISCONTINUED | OUTPATIENT
Start: 2025-03-28 | End: 2025-03-30 | Stop reason: HOSPADM

## 2025-03-28 RX ORDER — MIDAZOLAM HYDROCHLORIDE 2 MG/2ML
2 INJECTION, SOLUTION INTRAMUSCULAR; INTRAVENOUS
Status: DISCONTINUED | OUTPATIENT
Start: 2025-03-28 | End: 2025-03-28 | Stop reason: HOSPADM

## 2025-03-28 RX ORDER — OXYCODONE HYDROCHLORIDE 10 MG/1
10 TABLET ORAL EVERY 4 HOURS PRN
Status: DISCONTINUED | OUTPATIENT
Start: 2025-03-28 | End: 2025-03-30 | Stop reason: HOSPADM

## 2025-03-28 RX ORDER — SODIUM CHLORIDE 0.9 % (FLUSH) 0.9 %
5-40 SYRINGE (ML) INJECTION EVERY 12 HOURS SCHEDULED
Status: DISCONTINUED | OUTPATIENT
Start: 2025-03-28 | End: 2025-03-30 | Stop reason: HOSPADM

## 2025-03-28 RX ORDER — NALOXONE HYDROCHLORIDE 0.4 MG/ML
INJECTION, SOLUTION INTRAMUSCULAR; INTRAVENOUS; SUBCUTANEOUS PRN
Status: DISCONTINUED | OUTPATIENT
Start: 2025-03-28 | End: 2025-03-28 | Stop reason: HOSPADM

## 2025-03-28 RX ORDER — FENTANYL CITRATE 50 UG/ML
INJECTION, SOLUTION INTRAMUSCULAR; INTRAVENOUS
Status: DISCONTINUED | OUTPATIENT
Start: 2025-03-28 | End: 2025-03-28 | Stop reason: SDUPTHER

## 2025-03-28 RX ORDER — FENTANYL CITRATE 50 UG/ML
50 INJECTION, SOLUTION INTRAMUSCULAR; INTRAVENOUS EVERY 5 MIN PRN
Status: COMPLETED | OUTPATIENT
Start: 2025-03-28 | End: 2025-03-28

## 2025-03-28 RX ORDER — SODIUM CHLORIDE 9 MG/ML
INJECTION, SOLUTION INTRAVENOUS CONTINUOUS
Status: DISCONTINUED | OUTPATIENT
Start: 2025-03-28 | End: 2025-03-30 | Stop reason: HOSPADM

## 2025-03-28 RX ORDER — ONDANSETRON 2 MG/ML
4 INJECTION INTRAMUSCULAR; INTRAVENOUS
Status: DISCONTINUED | OUTPATIENT
Start: 2025-03-28 | End: 2025-03-28 | Stop reason: HOSPADM

## 2025-03-28 RX ORDER — PROPOFOL 10 MG/ML
INJECTION, EMULSION INTRAVENOUS
Status: DISCONTINUED | OUTPATIENT
Start: 2025-03-28 | End: 2025-03-28 | Stop reason: SDUPTHER

## 2025-03-28 RX ORDER — SCOPOLAMINE 1 MG/3D
1 PATCH, EXTENDED RELEASE TRANSDERMAL
Status: DISCONTINUED | OUTPATIENT
Start: 2025-03-28 | End: 2025-03-30 | Stop reason: HOSPADM

## 2025-03-28 RX ORDER — BUPIVACAINE HYDROCHLORIDE 2.5 MG/ML
INJECTION, SOLUTION INFILTRATION; PERINEURAL PRN
Status: DISCONTINUED | OUTPATIENT
Start: 2025-03-28 | End: 2025-03-28 | Stop reason: ALTCHOICE

## 2025-03-28 RX ORDER — DIPHENHYDRAMINE HYDROCHLORIDE 50 MG/ML
12.5 INJECTION, SOLUTION INTRAMUSCULAR; INTRAVENOUS
Status: DISCONTINUED | OUTPATIENT
Start: 2025-03-28 | End: 2025-03-28 | Stop reason: HOSPADM

## 2025-03-28 RX ORDER — FENTANYL CITRATE 50 UG/ML
25 INJECTION, SOLUTION INTRAMUSCULAR; INTRAVENOUS EVERY 5 MIN PRN
Status: DISCONTINUED | OUTPATIENT
Start: 2025-03-28 | End: 2025-03-28 | Stop reason: HOSPADM

## 2025-03-28 RX ORDER — SODIUM CHLORIDE 0.9 % (FLUSH) 0.9 %
5-40 SYRINGE (ML) INJECTION PRN
Status: DISCONTINUED | OUTPATIENT
Start: 2025-03-28 | End: 2025-03-30 | Stop reason: HOSPADM

## 2025-03-28 RX ADMIN — ACETAMINOPHEN 650 MG: 325 TABLET ORAL at 22:29

## 2025-03-28 RX ADMIN — SODIUM CHLORIDE, PRESERVATIVE FREE 20 MG: 5 INJECTION INTRAVENOUS at 11:26

## 2025-03-28 RX ADMIN — MORPHINE SULFATE 4 MG: 4 INJECTION, SOLUTION INTRAMUSCULAR; INTRAVENOUS at 07:38

## 2025-03-28 RX ADMIN — SODIUM CHLORIDE: 9 INJECTION, SOLUTION INTRAVENOUS at 12:47

## 2025-03-28 RX ADMIN — SODIUM CHLORIDE: 0.9 INJECTION, SOLUTION INTRAVENOUS at 14:12

## 2025-03-28 RX ADMIN — DEXMEDETOMIDINE HYDROCHLORIDE 10 MCG: 100 INJECTION, SOLUTION, CONCENTRATE INTRAVENOUS at 12:30

## 2025-03-28 RX ADMIN — SODIUM CHLORIDE 3375 MG: 9 INJECTION, SOLUTION INTRAVENOUS at 07:51

## 2025-03-28 RX ADMIN — DEXAMETHASONE SODIUM PHOSPHATE 10 MG: 10 INJECTION, SOLUTION INTRAMUSCULAR; INTRAVENOUS at 12:22

## 2025-03-28 RX ADMIN — FENTANYL CITRATE 50 MCG: 0.05 INJECTION, SOLUTION INTRAMUSCULAR; INTRAVENOUS at 12:08

## 2025-03-28 RX ADMIN — PROPOFOL 150 MG: 10 INJECTION, EMULSION INTRAVENOUS at 11:43

## 2025-03-28 RX ADMIN — PROPOFOL 160 MCG/KG/MIN: 10 INJECTION, EMULSION INTRAVENOUS at 11:44

## 2025-03-28 RX ADMIN — MIDAZOLAM 2 MG: 1 INJECTION INTRAMUSCULAR; INTRAVENOUS at 11:35

## 2025-03-28 RX ADMIN — FENTANYL CITRATE 50 MCG: 0.05 INJECTION, SOLUTION INTRAMUSCULAR; INTRAVENOUS at 13:45

## 2025-03-28 RX ADMIN — OXYCODONE HYDROCHLORIDE 10 MG: 10 TABLET ORAL at 22:30

## 2025-03-28 RX ADMIN — SODIUM CHLORIDE 3375 MG: 9 INJECTION, SOLUTION INTRAVENOUS at 22:32

## 2025-03-28 RX ADMIN — MORPHINE SULFATE 2 MG: 2 INJECTION, SOLUTION INTRAMUSCULAR; INTRAVENOUS at 14:49

## 2025-03-28 RX ADMIN — SODIUM CHLORIDE 3375 MG: 9 INJECTION, SOLUTION INTRAVENOUS at 14:13

## 2025-03-28 RX ADMIN — ONDANSETRON 4 MG: 2 INJECTION INTRAMUSCULAR; INTRAVENOUS at 02:14

## 2025-03-28 RX ADMIN — ONDANSETRON 4 MG: 2 INJECTION INTRAMUSCULAR; INTRAVENOUS at 12:22

## 2025-03-28 RX ADMIN — SODIUM CHLORIDE, SODIUM LACTATE, POTASSIUM CHLORIDE, AND CALCIUM CHLORIDE: 600; 310; 30; 20 INJECTION, SOLUTION INTRAVENOUS at 11:37

## 2025-03-28 RX ADMIN — MORPHINE SULFATE 4 MG: 4 INJECTION, SOLUTION INTRAMUSCULAR; INTRAVENOUS at 20:07

## 2025-03-28 RX ADMIN — ROCURONIUM BROMIDE 50 MG: 10 INJECTION, SOLUTION INTRAVENOUS at 11:44

## 2025-03-28 RX ADMIN — LIDOCAINE HYDROCHLORIDE 50 MG: 10 INJECTION, SOLUTION EPIDURAL; INFILTRATION; INTRACAUDAL; PERINEURAL at 11:43

## 2025-03-28 RX ADMIN — FENTANYL CITRATE 50 MCG: 0.05 INJECTION, SOLUTION INTRAMUSCULAR; INTRAVENOUS at 13:25

## 2025-03-28 RX ADMIN — MORPHINE SULFATE 4 MG: 4 INJECTION, SOLUTION INTRAMUSCULAR; INTRAVENOUS at 02:18

## 2025-03-28 RX ADMIN — ONDANSETRON 4 MG: 2 INJECTION INTRAMUSCULAR; INTRAVENOUS at 10:19

## 2025-03-28 RX ADMIN — APREPITANT 40 MG: 40 CAPSULE ORAL at 11:27

## 2025-03-28 RX ADMIN — ONDANSETRON 4 MG: 2 INJECTION INTRAMUSCULAR; INTRAVENOUS at 20:07

## 2025-03-28 RX ADMIN — FENTANYL CITRATE 50 MCG: 0.05 INJECTION, SOLUTION INTRAMUSCULAR; INTRAVENOUS at 12:06

## 2025-03-28 RX ADMIN — FENTANYL CITRATE 50 MCG: 0.05 INJECTION, SOLUTION INTRAMUSCULAR; INTRAVENOUS at 13:35

## 2025-03-28 RX ADMIN — PROPOFOL 50 MG: 10 INJECTION, EMULSION INTRAVENOUS at 12:06

## 2025-03-28 RX ADMIN — SUGAMMADEX 200 MG: 100 INJECTION, SOLUTION INTRAVENOUS at 12:34

## 2025-03-28 RX ADMIN — OXYCODONE HYDROCHLORIDE 5 MG: 5 TABLET ORAL at 16:59

## 2025-03-28 RX ADMIN — FENTANYL CITRATE 50 MCG: 0.05 INJECTION, SOLUTION INTRAMUSCULAR; INTRAVENOUS at 13:16

## 2025-03-28 RX ADMIN — FENTANYL CITRATE 50 MCG: 0.05 INJECTION, SOLUTION INTRAMUSCULAR; INTRAVENOUS at 11:43

## 2025-03-28 RX ADMIN — PROPOFOL 50 MG: 10 INJECTION, EMULSION INTRAVENOUS at 12:11

## 2025-03-28 RX ADMIN — FENTANYL CITRATE 50 MCG: 0.05 INJECTION, SOLUTION INTRAMUSCULAR; INTRAVENOUS at 11:53

## 2025-03-28 ASSESSMENT — PAIN SCALES - GENERAL
PAINLEVEL_OUTOF10: 8
PAINLEVEL_OUTOF10: 9
PAINLEVEL_OUTOF10: 10
PAINLEVEL_OUTOF10: 9
PAINLEVEL_OUTOF10: 7
PAINLEVEL_OUTOF10: 8
PAINLEVEL_OUTOF10: 9
PAINLEVEL_OUTOF10: 7

## 2025-03-28 ASSESSMENT — PAIN DESCRIPTION - DESCRIPTORS
DESCRIPTORS: DISCOMFORT
DESCRIPTORS: ACHING
DESCRIPTORS: DISCOMFORT
DESCRIPTORS: ACHING
DESCRIPTORS: SORE;PRESSURE
DESCRIPTORS: ACHING

## 2025-03-28 ASSESSMENT — PAIN DESCRIPTION - LOCATION
LOCATION: ABDOMEN
LOCATION: ABDOMEN;BACK
LOCATION: ABDOMEN
LOCATION: ABDOMEN;BACK
LOCATION: ABDOMEN

## 2025-03-28 ASSESSMENT — PAIN - FUNCTIONAL ASSESSMENT
PAIN_FUNCTIONAL_ASSESSMENT: PREVENTS OR INTERFERES SOME ACTIVE ACTIVITIES AND ADLS

## 2025-03-28 ASSESSMENT — PAIN DESCRIPTION - ORIENTATION
ORIENTATION: MID
ORIENTATION: RIGHT;LEFT
ORIENTATION: MID
ORIENTATION: MID
ORIENTATION: RIGHT;LOWER
ORIENTATION: ANTERIOR
ORIENTATION: RIGHT;ANTERIOR;LEFT

## 2025-03-28 NOTE — BRIEF OP NOTE
Brief Postoperative Note      Patient: Edwige Gu  YOB: 1965  MRN: [id number]    Date of Procedure: 3/28/2025    Pre-Op Diagnosis Codes:      * Cholecystitis with cholangitis (HCC) [K81.9, K83.09]    Post-Op Diagnosis: Same       Procedure(s):  CHOLECYSTOSTOMY DRAIN TUBE PLACEMENT LAPAROSCOPIC    Surgeon(s):  Anjelica Espinoza MD    Assistant:  * No surgical staff found *    Anesthesia: General    Estimated Blood Loss (mL): less than 50     Complications: None    Specimens:   * No specimens in log *    Implants:  Implant Name Type Inv. Item Serial No.  Lot No. LRB No. Used Action   CLIP INT L POLYMER DAQUAN LIG HEM O DAQUAN (6EA/PK) - [id number]  CLIP INT L POLYMER DAQUAN LIG HEM O DAQUAN (6EA/PK)  [id number]  N/A 1 Implanted         Drains:   Closed/Suction Drain Superior Abdomen Bulb (Active)       Findings:  Infection Present At Time Of Surgery (PATOS) (choose all levels that have infection present):  - Organ Space infection (below fascia) present as evidenced by pus  Other Findings: gallbladder wrapped in omentum and plastered to abdominal wall, hard as a rock. Gently attempted releasing from abdominal wall, encountered purulence and opening, appeared to be in process of eroding in to abdominal wall. Washed out and drain inserted through this opening    Electronically signed by Anjelica Espinoza MD on 3/28/2025 at 12:35 PM

## 2025-03-28 NOTE — ANESTHESIA PRE PROCEDURE
Department of Anesthesiology  Preprocedure Note       Name:  Edwige Gu   Age:  60 y.o.  :  1965                                          MRN:  [id number]         Date:  3/28/2025      Surgeon: Surgeon(s):  Anjelica Espinoza MD    Procedure: Procedure(s):  CHOLECYSTECTOMY LAPAROSCOPIC ROBOTIC    Medications prior to admission:   Prior to Admission medications    Medication Sig Start Date End Date Taking? Authorizing Provider   alendronate (FOSAMAX) 70 MG tablet Take 1 tablet by mouth every 7 days 24  Yes Harvey Rosas MD   leflunomide (ARAVA) 20 MG tablet  10/9/24  Yes Harvey Rosas MD   pregabalin (LYRICA) 150 MG capsule Take 1 capsule by mouth 2 times daily. 24  Yes Harvey Rosas MD   Calcium-Magnesium-Vitamin D 500-250-200 MG-MG-UNIT TABS Take by mouth   Yes Harvey Rosas MD   HYDROcodone-acetaminophen (NORCO)  MG per tablet Take 1 tablet by mouth 4 times daily. 19  Yes Harvey Rosas MD   ALPRAZolam (XANAX) 1 MG tablet Take 1 tablet by mouth 3 times daily as needed. 19  Yes Harvey Rosas MD   TURMERIC PO Take 500 mg by mouth daily    Harvey Rosas MD   furosemide (LASIX) 40 MG tablet Take 1 tablet by mouth as needed 24   Harvey Rosas MD   tiZANidine (ZANAFLEX) 4 MG tablet Take 1 tablet by mouth 3 times daily 19   Harvey Rosas MD   omeprazole (PRILOSEC) 40 MG delayed release capsule Take 1 capsule by mouth daily 9/3/19   Harvey Rosas MD       Current medications:    Current Facility-Administered Medications   Medication Dose Route Frequency Provider Last Rate Last Admin    [Transfer Hold] sodium chloride flush 0.9 % injection 5-40 mL  5-40 mL IntraVENous 2 times per day Anjelica Espinoza MD        [Transfer Hold] sodium chloride flush 0.9 % injection 5-40 mL  5-40 mL IntraVENous PRN Anjelica Espinoza MD        [Transfer Hold] 0.9 % sodium chloride infusion   IntraVENous PRN Anjelica Espinoza

## 2025-03-28 NOTE — PROGRESS NOTES
[facility] Hospitalists      Patient:  Edwige Gu  YOB: 1965  Date of Service: 3/28/2025  MRN: [id number]   Acct: [id number]   Primary Care Physician: Vanessa Prasad APRN - CNP  Advance Directive: Full Code  Admit Date: 3/26/2025       Hospital Day: 2  Portions of this note have been copied forward, however, changed to reflect the most current clinical status of this patient.  CHIEF COMPLAINT abdominal pain     SUBJECTIVE:  seen after surgery, complaints of pain stated that morphine is helping    Cumulative hospital course   60-year-old female with GERD, RA Arava, chronic back pain, complaints of ongoing right upper quadrant abdominal pain for the last week.  She has had worsening nausea, pain with eating stated she then began having diaphoresis, rigors, and presented to [facility] ER for further evaluation.  In addition she has had ongoing diarrhea for the past few months and recently had outpatient C. difficile evaluation that was negative.  Workup at OSH ER CT abdomen pelvis concern for acute cholecystitis, cholelithiasis mildly dilated duct and possible small stone in common bile duct, LFTs mild elevation in alkaline phos and AST.  Patient was transferred to [facility].  Patient admitted to hospitalist service with consultation to general surgery and gastroenterology due to concern of acute cholecystitis and choledocholithiasis.  Initiated on IV Zosyn and s/p 3/27 ERCP with stone/pus removal and biliary stent placement.  General surgery evaluated and 3/28 underwent cholecystostomy drain placement remains on Zosyn    Objective:   VITALS:  BP (!) 142/73   Pulse 60   Temp (!) 96.4 °F (35.8 °C) (Temporal)   Resp 16   Ht 1.6 m (5' 3\")   Wt 76.3 kg (168 lb 3.4 oz)   SpO2 97%   BMI 29.80 kg/m²   24HR INTAKE/OUTPUT:    Intake/Output Summary (Last 24 hours) at 3/28/2025 1458  Last data filed at 3/28/2025 1247  Gross per 24 hour   Intake 1600 ml   Output --   Net 1600 ml       Physical  diff/GI panel negative  Will need outpatient colonoscopy after acute process resolved    Antibiotic: Zosyn      DVT Prophylaxis: Loveheather Temple, HANH - CNP, 3/28/2025 2:58 PM

## 2025-03-28 NOTE — PLAN OF CARE
Problem: Discharge Planning  Goal: Discharge to home or other facility with appropriate resources  Outcome: Progressing     Problem: ABCDS Injury Assessment  Goal: Absence of physical injury  Outcome: Progressing     Problem: Safety - Adult  Goal: Free from fall injury  Outcome: Progressing     Problem: Nutrition Deficit:  Goal: Optimize nutritional status  Outcome: Progressing

## 2025-03-28 NOTE — ANESTHESIA POSTPROCEDURE EVALUATION
Department of Anesthesiology  Postprocedure Note    Patient: Edwige Gu  MRN: [id number]  YOB: 1965  Date of evaluation: 3/28/2025    Procedure Summary       Date: 03/28/25 Room / Location: [address]    Anesthesia Start: 1137 Anesthesia Stop: 1255    Procedure: CHOLECYSTOSTOMY DRAIN TUBE PLACEMENT LAPAROSCOPIC Diagnosis:       Cholecystitis with cholangitis (HCC)      (Cholecystitis with cholangitis (HCC) [K81.9, K83.09])    Surgeons: Anjelica Espinoza MD Responsible Provider: Kristine Kim APRN - CRNA    Anesthesia Type: General ASA Status: 2            Anesthesia Type: General    Eduard Phase I: Eduard Score: 10    Eduard Phase II:      Anesthesia Post Evaluation    Patient location during evaluation: PACU  Patient participation: complete - patient participated  Level of consciousness: sleepy but conscious  Pain score: 0  Airway patency: patent  Nausea & Vomiting: no nausea and no vomiting  Cardiovascular status: blood pressure returned to baseline and hemodynamically stable  Respiratory status: acceptable, room air, spontaneous ventilation and nonlabored ventilation  Hydration status: stable  Comments: BP (!) 125/57   Pulse 67   Temp 97 °F (36.1 °C)   Resp 20   Ht 1.6 m (5' 3\")   Wt 76.3 kg (168 lb 3.4 oz)   SpO2 99%   BMI 29.80 kg/m²     Pain management: adequate    No notable events documented.

## 2025-03-29 LAB
ALBUMIN SERPL-MCNC: 3.6 G/DL (ref 3.5–5.2)
ALP SERPL-CCNC: 277 U/L (ref 35–104)
ALT SERPL-CCNC: 23 U/L (ref 10–35)
ANION GAP SERPL CALCULATED.3IONS-SCNC: 14 MMOL/L (ref 8–16)
AST SERPL-CCNC: 26 U/L (ref 10–35)
BASOPHILS # BLD: 0 K/UL (ref 0–0.2)
BASOPHILS NFR BLD: 0.2 % (ref 0–1)
BILIRUB DIRECT SERPL-MCNC: 0.4 MG/DL (ref 0–0.3)
BILIRUB INDIRECT SERPL-MCNC: 0.2 MG/DL (ref 0–1)
BILIRUB SERPL-MCNC: 0.6 MG/DL (ref 0.2–1.2)
BUN SERPL-MCNC: 9 MG/DL (ref 6–20)
CALCIUM SERPL-MCNC: 9 MG/DL (ref 8.6–10)
CHLORIDE SERPL-SCNC: 103 MMOL/L (ref 98–107)
CO2 SERPL-SCNC: 23 MMOL/L (ref 22–29)
CREAT SERPL-MCNC: 0.5 MG/DL (ref 0.5–0.9)
EOSINOPHIL # BLD: 0 K/UL (ref 0–0.6)
EOSINOPHIL NFR BLD: 0 % (ref 0–5)
ERYTHROCYTE [DISTWIDTH] IN BLOOD BY AUTOMATED COUNT: 14.9 % (ref 11.5–14.5)
GLUCOSE SERPL-MCNC: 90 MG/DL (ref 70–99)
HCT VFR BLD AUTO: 43.7 % (ref 37–47)
HGB BLD-MCNC: 13.3 G/DL (ref 12–16)
IMM GRANULOCYTES # BLD: 0 K/UL
LYMPHOCYTES # BLD: 1.1 K/UL (ref 1.1–4.5)
LYMPHOCYTES NFR BLD: 13.6 % (ref 20–40)
MCH RBC QN AUTO: 27.4 PG (ref 27–31)
MCHC RBC AUTO-ENTMCNC: 30.4 G/DL (ref 33–37)
MCV RBC AUTO: 90.1 FL (ref 81–99)
MONOCYTES # BLD: 1 K/UL (ref 0–0.9)
MONOCYTES NFR BLD: 11.7 % (ref 0–10)
NEUTROPHILS # BLD: 6.2 K/UL (ref 1.5–7.5)
NEUTS SEG NFR BLD: 74 % (ref 50–65)
PLATELET # BLD AUTO: 386 K/UL (ref 130–400)
PMV BLD AUTO: 10.1 FL (ref 9.4–12.3)
POTASSIUM SERPL-SCNC: 3.9 MMOL/L (ref 3.5–5)
POTASSIUM SERPL-SCNC: 3.9 MMOL/L (ref 3.5–5.1)
PROT SERPL-MCNC: 6.6 G/DL (ref 6.4–8.3)
RBC # BLD AUTO: 4.85 M/UL (ref 4.2–5.4)
SODIUM SERPL-SCNC: 140 MMOL/L (ref 136–145)
WBC # BLD AUTO: 8.4 K/UL (ref 4.8–10.8)

## 2025-03-29 PROCEDURE — 94150 VITAL CAPACITY TEST: CPT

## 2025-03-29 PROCEDURE — 82248 BILIRUBIN DIRECT: CPT

## 2025-03-29 PROCEDURE — 6360000002 HC RX W HCPCS: Performed by: SURGERY

## 2025-03-29 PROCEDURE — 80053 COMPREHEN METABOLIC PANEL: CPT

## 2025-03-29 PROCEDURE — 2580000003 HC RX 258: Performed by: SURGERY

## 2025-03-29 PROCEDURE — 99024 POSTOP FOLLOW-UP VISIT: CPT | Performed by: SURGERY

## 2025-03-29 PROCEDURE — 94760 N-INVAS EAR/PLS OXIMETRY 1: CPT

## 2025-03-29 PROCEDURE — 1200000000 HC SEMI PRIVATE

## 2025-03-29 PROCEDURE — 6370000000 HC RX 637 (ALT 250 FOR IP): Performed by: SURGERY

## 2025-03-29 PROCEDURE — 85025 COMPLETE CBC W/AUTO DIFF WBC: CPT

## 2025-03-29 PROCEDURE — 36415 COLL VENOUS BLD VENIPUNCTURE: CPT

## 2025-03-29 RX ADMIN — ENOXAPARIN SODIUM 40 MG: 100 INJECTION SUBCUTANEOUS at 09:15

## 2025-03-29 RX ADMIN — ACETAMINOPHEN 650 MG: 325 TABLET ORAL at 20:58

## 2025-03-29 RX ADMIN — MORPHINE SULFATE 2 MG: 2 INJECTION, SOLUTION INTRAMUSCULAR; INTRAVENOUS at 19:03

## 2025-03-29 RX ADMIN — ONDANSETRON 4 MG: 2 INJECTION INTRAMUSCULAR; INTRAVENOUS at 00:53

## 2025-03-29 RX ADMIN — OXYCODONE HYDROCHLORIDE 10 MG: 10 TABLET ORAL at 09:15

## 2025-03-29 RX ADMIN — OXYCODONE HYDROCHLORIDE 10 MG: 10 TABLET ORAL at 04:31

## 2025-03-29 RX ADMIN — OXYCODONE HYDROCHLORIDE 10 MG: 10 TABLET ORAL at 13:06

## 2025-03-29 RX ADMIN — ACETAMINOPHEN 650 MG: 325 TABLET ORAL at 04:31

## 2025-03-29 RX ADMIN — MORPHINE SULFATE 4 MG: 4 INJECTION, SOLUTION INTRAMUSCULAR; INTRAVENOUS at 00:53

## 2025-03-29 RX ADMIN — SODIUM CHLORIDE 3375 MG: 9 INJECTION, SOLUTION INTRAVENOUS at 21:01

## 2025-03-29 RX ADMIN — SODIUM CHLORIDE 3375 MG: 9 INJECTION, SOLUTION INTRAVENOUS at 04:41

## 2025-03-29 RX ADMIN — SODIUM CHLORIDE 3375 MG: 9 INJECTION, SOLUTION INTRAVENOUS at 13:44

## 2025-03-29 RX ADMIN — ONDANSETRON 4 MG: 2 INJECTION INTRAMUSCULAR; INTRAVENOUS at 09:15

## 2025-03-29 RX ADMIN — OXYCODONE HYDROCHLORIDE 10 MG: 10 TABLET ORAL at 20:59

## 2025-03-29 ASSESSMENT — PAIN DESCRIPTION - DESCRIPTORS
DESCRIPTORS: ACHING
DESCRIPTORS: ACHING;CRAMPING
DESCRIPTORS: ACHING

## 2025-03-29 ASSESSMENT — PAIN DESCRIPTION - LOCATION
LOCATION: ABDOMEN
LOCATION: ABDOMEN;BACK
LOCATION: ABDOMEN;BACK

## 2025-03-29 ASSESSMENT — PAIN SCALES - GENERAL
PAINLEVEL_OUTOF10: 9
PAINLEVEL_OUTOF10: 7
PAINLEVEL_OUTOF10: 7
PAINLEVEL_OUTOF10: 8
PAINLEVEL_OUTOF10: 9
PAINLEVEL_OUTOF10: 6

## 2025-03-29 ASSESSMENT — PAIN DESCRIPTION - ORIENTATION
ORIENTATION: MID
ORIENTATION: RIGHT;LEFT

## 2025-03-29 NOTE — PROGRESS NOTES
[facility] Hospitalists      Patient:  Edwige Gu  YOB: 1965  Date of Service: 3/29/2025  MRN: [id number]   Acct: [id number]   Primary Care Physician: Vanessa Prasad APRN - CNP  Advance Directive: Full Code  Admit Date: 3/26/2025       Hospital Day: 3  Portions of this note have been copied forward, however, changed to reflect the most current clinical status of this patient.  CHIEF COMPLAINT abdominal pain     SUBJECTIVE:  no issues overnight, stated improvement of pain     Cumulative hospital course   60-year-old female with GERD, RA Arava, chronic back pain, complaints of ongoing right upper quadrant abdominal pain for the last week.  She has had worsening nausea, pain with eating stated she then began having diaphoresis, rigors, and presented to [facility] ER for further evaluation.  In addition she has had ongoing diarrhea for the past few months and recently had outpatient C. difficile evaluation that was negative.  Workup at OSH ER CT abdomen pelvis concern for acute cholecystitis, cholelithiasis mildly dilated duct and possible small stone in common bile duct, LFTs mild elevation in alkaline phos and AST.  Patient was transferred to [facility].  Patient admitted to hospitalist service with consultation to general surgery and gastroenterology due to concern of acute cholecystitis and choledocholithiasis.  Initiated on IV Zosyn and s/p 3/27 ERCP with stone/pus removal and biliary stent placement.  General surgery evaluated and 3/28 underwent cholecystostomy drain placement remains on Zosyn. Possible home tomorrow if continues to improve will keep drain in place    Objective:   VITALS:  /63   Pulse (!) 44   Temp 97.3 °F (36.3 °C) (Temporal)   Resp 16   Ht 1.6 m (5' 3\")   Wt 76.3 kg (168 lb 3.4 oz)   SpO2 95%   BMI 29.80 kg/m²   24HR INTAKE/OUTPUT:    Intake/Output Summary (Last 24 hours) at 3/29/2025 1354  Last data filed at 3/29/2025 0844  Gross per 24 hour   Intake --  sodium chloride, oxyCODONE **OR** oxyCODONE, morphine, morphine, sodium chloride flush, sodium chloride, potassium chloride **OR** potassium alternative oral replacement **OR** potassium chloride, magnesium sulfate, ondansetron **OR** ondansetron, polyethylene glycol, acetaminophen **OR** acetaminophen, naloxone  ADULT DIET; Dysphagia - Soft and Bite Sized     Lab and other Data:     Recent Labs     03/27/25  0029 03/28/25  0607 03/29/25  0431   WBC 8.6 5.0 8.4   HGB 11.6* 11.1* 13.3    265 386     Recent Labs     03/27/25  0029 03/28/25  0607 03/29/25  0431   * 140 140   K 3.6 4.5 3.9  3.9    107 103   CO2 23 20* 23   BUN 11 10 9   CREATININE 0.6 0.4* 0.5   GLUCOSE 91 85 90     Recent Labs     03/27/25  0029 03/28/25  0607 03/29/25  0431   AST 79* 28 26   ALT 33 23 23   BILITOT 1.2 0.5 0.6   ALKPHOS 326* 272* 277*     Troponin T: No results for input(s): \"TROPONINI\" in the last 72 hours.  Pro-BNP: No results for input(s): \"BNP\" in the last 72 hours.  INR:   Recent Labs     03/27/25  0029   INR 1.12     UA:  Recent Labs     03/27/25  0430   COLORU YELLOW   PHUR 6.5   WBCUA 1   RBCUA 1   BACTERIA Negative   CLARITYU Clear   LEUKOCYTESUR TRACE*   UROBILINOGEN 0.2   BILIRUBINUR Negative   BLOODU Negative   GLUCOSEU Negative     A1C: No results for input(s): \"LABA1C\" in the last 72 hours.  ABG:No results for input(s): \"PHART\", \"CNW5ZNX\", \"PO2ART\", \"JUB1ELV\", \"BEART\", \"HGBAE\", \"V7GWZGMQ\", \"CARBOXHGBART\" in the last 72 hours.    RAD:   CT Abdomen Pelvis With Contrast  Result Date: 3/26/2025  1. CT appearance of acute cholecystitis. Multiple small gallstones are present and there are 1 or 2 small (2-3 mm) common bile duct stones. This report was signed and finalized on 3/26/2025 7:22 PM by Dr. Santos Urban MD.      Assessment/Plan   Principal Problem:    Choledocholithiasis   gastroenterology   S/p 3/27 ERCP with stone/pus removal and biliary stent placement   CT abdomen OSH reviewed   Daily labs    Active Problems:    Acute cholecystitis  -surgery following   S/p 3/28 cholecystostomy drain placement   -IV zosyn   -ivf  -pain control  -trend lfts  Diarrhea   C diff/GI panel negative  Will need outpatient colonoscopy after acute process resolved    Antibiotic: Zosyn      DVT Prophylaxis: Taryn Temple, APRN - CNP, 3/29/2025 1:54 PM

## 2025-03-29 NOTE — PROGRESS NOTES
Subjective:  No acute events or changes. Reports that she is sore from surgery, but does feel some better overall than before surgery.    Objective:  /63   Pulse (!) 44   Temp 97.3 °F (36.3 °C) (Temporal)   Resp 16   Ht 1.6 m (5' 3\")   Wt 76.3 kg (168 lb 3.4 oz)   SpO2 95%   BMI 29.80 kg/m²   Date 03/29/25 0000 - 03/29/25 2359   Shift 8462-7379 1449-1877 6441-6080 24 Hour Total   INTAKE   Shift Total(mL/kg)       OUTPUT   Drains(mL/kg) 50(0.7) 30(0.4)  80(1)   Shift Total(mL/kg) 50(0.7) 30(0.4)  80(1)   Weight (kg) 76.3 76.3 76.3 76.3     General: NAD, AAO  Chest: regular, non-labored  Abdomen: Soft, obese, ATTP around incisions, GISELE with bilious output    CBC:   Lab Results   Component Value Date/Time    WBC 8.4 03/29/2025 04:31 AM    RBC 4.85 03/29/2025 04:31 AM    HGB 13.3 03/29/2025 04:31 AM    HCT 43.7 03/29/2025 04:31 AM    MCV 90.1 03/29/2025 04:31 AM    MCH 27.4 03/29/2025 04:31 AM    MCHC 30.4 03/29/2025 04:31 AM    RDW 14.9 03/29/2025 04:31 AM     03/29/2025 04:31 AM    MPV 10.1 03/29/2025 04:31 AM     BMP:    Lab Results   Component Value Date/Time     03/29/2025 04:31 AM    K 3.9 03/29/2025 04:31 AM    K 3.9 03/29/2025 04:31 AM     03/29/2025 04:31 AM    CO2 23 03/29/2025 04:31 AM    BUN 9 03/29/2025 04:31 AM    CREATININE 0.5 03/29/2025 04:31 AM    CALCIUM 9.0 03/29/2025 04:31 AM    LABGLOM >90 03/29/2025 04:31 AM    GLUCOSE 90 03/29/2025 04:31 AM     Assessment and plan:  60 year old female s/p laparoscopic cholecystectomy tube placement, s/p ERCP with stent placement, with cholangitis and cholecystitis  Doing well today. Her WBC is not elevated. However, it was never elevated prior to draining all the purulence, so is not an accurate measure. The patient is afebrile and feeling better. If she continues to feel better, then should be able to switch to oral antibiotics in the morning and would be stable for dc home from a general surgery perspective. Discussed with the  patient that she will go home with the drain and follow up in clinic.

## 2025-03-30 ENCOUNTER — READMISSION MANAGEMENT (OUTPATIENT)
Dept: CALL CENTER | Facility: HOSPITAL | Age: 60
End: 2025-03-30
Payer: MEDICARE

## 2025-03-30 VITALS
DIASTOLIC BLOOD PRESSURE: 84 MMHG | WEIGHT: 168.21 LBS | HEART RATE: 62 BPM | TEMPERATURE: 97.5 F | SYSTOLIC BLOOD PRESSURE: 148 MMHG | OXYGEN SATURATION: 97 % | BODY MASS INDEX: 29.8 KG/M2 | HEIGHT: 63 IN | RESPIRATION RATE: 16 BRPM

## 2025-03-30 DIAGNOSIS — K80.50 CHOLEDOCHOLITHIASIS: ICD-10-CM

## 2025-03-30 DIAGNOSIS — K81.0 ACUTE CHOLECYSTITIS: ICD-10-CM

## 2025-03-30 DIAGNOSIS — K83.09: Primary | ICD-10-CM

## 2025-03-30 LAB
ALBUMIN SERPL-MCNC: 3.2 G/DL (ref 3.5–5.2)
ALP SERPL-CCNC: 198 U/L (ref 35–104)
ALT SERPL-CCNC: 18 U/L (ref 10–35)
ANION GAP SERPL CALCULATED.3IONS-SCNC: 13 MMOL/L (ref 8–16)
AST SERPL-CCNC: 24 U/L (ref 10–35)
BASOPHILS # BLD: 0 K/UL (ref 0–0.2)
BASOPHILS NFR BLD: 0.3 % (ref 0–1)
BILIRUB SERPL-MCNC: 0.5 MG/DL (ref 0.2–1.2)
BUN SERPL-MCNC: 6 MG/DL (ref 6–20)
CALCIUM SERPL-MCNC: 8.3 MG/DL (ref 8.6–10)
CHLORIDE SERPL-SCNC: 102 MMOL/L (ref 98–107)
CO2 SERPL-SCNC: 25 MMOL/L (ref 22–29)
CREAT SERPL-MCNC: 0.5 MG/DL (ref 0.5–0.9)
EOSINOPHIL # BLD: 0.1 K/UL (ref 0–0.6)
EOSINOPHIL NFR BLD: 1 % (ref 0–5)
ERYTHROCYTE [DISTWIDTH] IN BLOOD BY AUTOMATED COUNT: 14.8 % (ref 11.5–14.5)
GLUCOSE SERPL-MCNC: 77 MG/DL (ref 70–99)
HCT VFR BLD AUTO: 36.8 % (ref 37–47)
HGB BLD-MCNC: 11.4 G/DL (ref 12–16)
IMM GRANULOCYTES # BLD: 0 K/UL
LYMPHOCYTES # BLD: 1.5 K/UL (ref 1.1–4.5)
LYMPHOCYTES NFR BLD: 20.7 % (ref 20–40)
MAGNESIUM SERPL-MCNC: 1.8 MG/DL (ref 1.6–2.6)
MCH RBC QN AUTO: 27.5 PG (ref 27–31)
MCHC RBC AUTO-ENTMCNC: 31 G/DL (ref 33–37)
MCV RBC AUTO: 88.7 FL (ref 81–99)
MONOCYTES # BLD: 1.1 K/UL (ref 0–0.9)
MONOCYTES NFR BLD: 14.7 % (ref 0–10)
NEUTROPHILS # BLD: 4.5 K/UL (ref 1.5–7.5)
NEUTS SEG NFR BLD: 63 % (ref 50–65)
PLATELET # BLD AUTO: 275 K/UL (ref 130–400)
PMV BLD AUTO: 9.8 FL (ref 9.4–12.3)
POTASSIUM SERPL-SCNC: 3.3 MMOL/L (ref 3.5–5)
PROT SERPL-MCNC: 5.5 G/DL (ref 6.4–8.3)
RBC # BLD AUTO: 4.15 M/UL (ref 4.2–5.4)
SODIUM SERPL-SCNC: 140 MMOL/L (ref 136–145)
WBC # BLD AUTO: 7.1 K/UL (ref 4.8–10.8)

## 2025-03-30 PROCEDURE — 80053 COMPREHEN METABOLIC PANEL: CPT

## 2025-03-30 PROCEDURE — 6360000002 HC RX W HCPCS: Performed by: SURGERY

## 2025-03-30 PROCEDURE — 99024 POSTOP FOLLOW-UP VISIT: CPT | Performed by: SURGERY

## 2025-03-30 PROCEDURE — 85025 COMPLETE CBC W/AUTO DIFF WBC: CPT

## 2025-03-30 PROCEDURE — 2580000003 HC RX 258: Performed by: SURGERY

## 2025-03-30 PROCEDURE — 36415 COLL VENOUS BLD VENIPUNCTURE: CPT

## 2025-03-30 PROCEDURE — 6370000000 HC RX 637 (ALT 250 FOR IP)

## 2025-03-30 PROCEDURE — 83735 ASSAY OF MAGNESIUM: CPT

## 2025-03-30 PROCEDURE — 6370000000 HC RX 637 (ALT 250 FOR IP): Performed by: SURGERY

## 2025-03-30 PROCEDURE — 94760 N-INVAS EAR/PLS OXIMETRY 1: CPT

## 2025-03-30 RX ORDER — POTASSIUM CHLORIDE 1500 MG/1
40 TABLET, EXTENDED RELEASE ORAL ONCE
Status: COMPLETED | OUTPATIENT
Start: 2025-03-30 | End: 2025-03-30

## 2025-03-30 RX ORDER — OXYCODONE HYDROCHLORIDE 5 MG/1
5 TABLET ORAL EVERY 6 HOURS PRN
Qty: 12 TABLET | Refills: 0 | Status: SHIPPED | OUTPATIENT
Start: 2025-03-30 | End: 2025-04-02

## 2025-03-30 RX ORDER — FLUCONAZOLE 100 MG/1
100 TABLET ORAL DAILY
Qty: 3 TABLET | Refills: 0 | Status: SHIPPED | OUTPATIENT
Start: 2025-03-30 | End: 2025-04-02

## 2025-03-30 RX ADMIN — MORPHINE SULFATE 4 MG: 4 INJECTION, SOLUTION INTRAMUSCULAR; INTRAVENOUS at 01:05

## 2025-03-30 RX ADMIN — SODIUM CHLORIDE 3375 MG: 9 INJECTION, SOLUTION INTRAVENOUS at 10:27

## 2025-03-30 RX ADMIN — ONDANSETRON 4 MG: 2 INJECTION INTRAMUSCULAR; INTRAVENOUS at 01:04

## 2025-03-30 RX ADMIN — OXYCODONE HYDROCHLORIDE 5 MG: 5 TABLET ORAL at 10:25

## 2025-03-30 RX ADMIN — ENOXAPARIN SODIUM 40 MG: 100 INJECTION SUBCUTANEOUS at 10:26

## 2025-03-30 RX ADMIN — POTASSIUM CHLORIDE 40 MEQ: 1500 TABLET, EXTENDED RELEASE ORAL at 13:34

## 2025-03-30 ASSESSMENT — PAIN SCALES - GENERAL: PAINLEVEL_OUTOF10: 7

## 2025-03-30 ASSESSMENT — PAIN DESCRIPTION - LOCATION: LOCATION: ABDOMEN;BACK

## 2025-03-30 NOTE — DISCHARGE SUMMARY
Edwige Gu  :  1965  MRN:  [id number]    Admit date:  3/26/2025  Discharge date:  3/30/25    Discharging Physician:  DR Strauss     Advance Directive: Full Code    Consults: IP CONSULT TO GENERAL SURGERY  IP CONSULT TO GI     Primary Care Physician:  Vanessa Prasad, APRN - CNP    Discharge Diagnoses:  Principal Problem:    Choledocholithiasis  Active Problems:    Acute cholecystitis    Acute cholangitis (HCC)    Cholecystitis with cholangitis (HCC)  Resolved Problems:    * No resolved hospital problems. *      Portions of this note have been copied forward, however, changed to reflect the most current clinical status of this patient.  Hospital Course:   60-year-old female with GERD, RA Arava, chronic back pain, complaints of ongoing right upper quadrant abdominal pain for the last week.  She has had worsening nausea, pain with eating stated she then began having diaphoresis, rigors, and presented to [facility] ER for further evaluation.  In addition she has had ongoing diarrhea for the past few months and recently had outpatient C. difficile evaluation that was negative.  Workup at OSH ER CT abdomen pelvis concern for acute cholecystitis, cholelithiasis mildly dilated duct and possible small stone in common bile duct, LFTs mild elevation in alkaline phos and AST.  Patient was transferred to [facility].  Patient admitted to hospitalist service with consultation to general surgery and gastroenterology due to concern of acute cholecystitis and choledocholithiasis.  Initiated on IV Zosyn and s/p 3/27 ERCP with stone/pus removal and biliary stent placement.  General surgery evaluated and 3/28 underwent cholecystostomy drain placement remained on Zosyn. Pain under control and tolerating liquid intake. Patient stable for discharge and will follow with general surgery. Instructed on drain teaching from nursing and transitioned to oral antibiotic for 7 days.   Significant Diagnostic Studies:   FL ERCP  0.4* 0.5 0.5   GLUCOSE 85 90 77     INR: No results for input(s): \"INR\" in the last 72 hours.    Physical Exam:  Vital Signs: BP (!) 148/84   Pulse 62   Temp 97.5 °F (36.4 °C) (Temporal)   Resp 16   Ht 1.6 m (5' 3\")   Wt 76.3 kg (168 lb 3.4 oz)   SpO2 97%   BMI 29.80 kg/m²   Physical Exam  Vitals and nursing note reviewed.   Constitutional:       General: She is not in acute distress.     Appearance: Normal appearance.   HENT:      Mouth/Throat:      Mouth: Mucous membranes are moist.      Pharynx: Oropharynx is clear.   Eyes:      Extraocular Movements: Extraocular movements intact.      Conjunctiva/sclera: Conjunctivae normal.      Pupils: Pupils are equal, round, and reactive to light.   Cardiovascular:      Rate and Rhythm: Normal rate and regular rhythm.      Pulses: Normal pulses.      Heart sounds: Normal heart sounds. No murmur heard.  Pulmonary:      Effort: Pulmonary effort is normal. No respiratory distress.      Breath sounds: Normal breath sounds.   Chest:      Chest wall: No tenderness.   Abdominal:      General: Bowel sounds are normal. There is no distension.      Palpations: Abdomen is soft.      Tenderness: There is no abdominal tenderness. There is no guarding or rebound.      Comments: GISELE drain in place with bloody drainage noted, purulence    Musculoskeletal:         General: No swelling. Normal range of motion.      Cervical back: Normal range of motion and neck supple. No rigidity or tenderness.      Right lower leg: No edema.      Left lower leg: No edema.   Skin:     General: Skin is warm and dry.   Neurological:      General: No focal deficit present.      Mental Status: She is alert and oriented to person, place, and time.      Cranial Nerves: No cranial nerve deficit.      Motor: No weakness.   Psychiatric:         Mood and Affect: Mood normal.         Behavior: Behavior normal.         Discharge Medications:         Medication List        START taking these medications

## 2025-03-30 NOTE — PROGRESS NOTES
Subjective:  Patient reports feeling more better today, pain improving. Tolerating PO.     Objective:  BP (!) 148/84   Pulse 62   Temp 97.5 °F (36.4 °C) (Temporal)   Resp 16   Ht 1.6 m (5' 3\")   Wt 76.3 kg (168 lb 3.4 oz)   SpO2 97%   BMI 29.80 kg/m²   Date 03/30/25 0000 - 03/30/25 2359   Shift 9322-0541 3325-6210 2839-8727 24 Hour Total   INTAKE   I.V.(mL/kg) 500(6.6)   500(6.6)   Shift Total(mL/kg) 500(6.6)   500(6.6)   OUTPUT   Drains(mL/kg)  20(0.3)  20(0.3)   Shift Total(mL/kg)  20(0.3)  20(0.3)   Weight (kg) 76.3 76.3 76.3 76.3     General: NAD, AAO  Chest: regular, non-labored  Abdomen: Soft, mild distention, ATTP, incisions C/D/I, GISELE with cloudy serous with some purulence    CBC:   Lab Results   Component Value Date/Time    WBC 7.1 03/30/2025 04:52 AM    RBC 4.15 03/30/2025 04:52 AM    HGB 11.4 03/30/2025 04:52 AM    HCT 36.8 03/30/2025 04:52 AM    MCV 88.7 03/30/2025 04:52 AM    MCH 27.5 03/30/2025 04:52 AM    MCHC 31.0 03/30/2025 04:52 AM    RDW 14.8 03/30/2025 04:52 AM     03/30/2025 04:52 AM    MPV 9.8 03/30/2025 04:52 AM     CMP:    Lab Results   Component Value Date/Time     03/30/2025 04:52 AM    K 3.3 03/30/2025 04:52 AM     03/30/2025 04:52 AM    CO2 25 03/30/2025 04:52 AM    BUN 6 03/30/2025 04:52 AM    CREATININE 0.5 03/30/2025 04:52 AM    LABGLOM >90 03/30/2025 04:52 AM    GLUCOSE 77 03/30/2025 04:52 AM    CALCIUM 8.3 03/30/2025 04:52 AM    BILITOT 0.5 03/30/2025 04:52 AM    ALKPHOS 198 03/30/2025 04:52 AM    AST 24 03/30/2025 04:52 AM    ALT 18 03/30/2025 04:52 AM     Assessment and plan:  60 year old female s/p ERCP with stent placement for choledocholithiasis and cholangitis, s/p laparoscopic cholecystostomy tube placement for cholecystitis  The patient is doing well after her procedures. She has had drain teaching from nursing. Okay for discharge home from general surgery perspective with drain in place on oral antibiotic course. Patient has requested a dose of  fluconazoe with her dc meds. Will send order to my office for setting up outpatient referral to hepatobiliary surgery.

## 2025-03-31 ENCOUNTER — RESULTS FOLLOW-UP (OUTPATIENT)
Dept: EMERGENCY DEPT | Facility: HOSPITAL | Age: 60
End: 2025-03-31
Payer: MEDICARE

## 2025-03-31 ENCOUNTER — TRANSITIONAL CARE MANAGEMENT TELEPHONE ENCOUNTER (OUTPATIENT)
Dept: CALL CENTER | Facility: HOSPITAL | Age: 60
End: 2025-03-31
Payer: MEDICARE

## 2025-03-31 NOTE — OUTREACH NOTE
Call Center TCM Note      Flowsheet Row Responses   [facility] patient discharged from? Non-  [[facility]]   Does the patient have one of the following disease processes/diagnoses(primary or secondary)? General Surgery   TCM attempt successful? Yes   Call start time 1300   Call end time 1308   Discharge diagnosis Cholecystitis with cholangitis, Lap kate   Meds reviewed with patient/caregiver? Yes   Is the patient having any side effects they believe may be caused by any medication additions or changes? No   Does the patient have all medications ordered at discharge? Yes   Is the patient taking all medications as directed (includes completed medication regime)? Yes   Comments Hospital d/c f/u appt on 4/1/25 @1:30pm   Does the patient have an appointment with their PCP within 7-14 days of discharge? Yes   Has home health visited the patient within 72 hours of discharge? N/A   Psychosocial issues? No   Comments pt has a blaine drain, reports it is dry and intact with good output   Did the patient receive a copy of their discharge instructions? Yes   What is the patient's perception of their health status since discharge? Improving   Is the patient/caregiver able to teach back the hierarchy of who to call/visit for symptoms/problems? PCP, Specialist, Home health nurse, Urgent Care, ED, 911 Yes   TCM call completed? Yes   Call end time 1308   Would this patient benefit from a Referral to Amb Social Work? No   Is the patient interested in additional calls from an ambulatory ? No            Jennifer BOJORQUEZ - Registered Nurse    3/31/2025, 13:08 EDT

## 2025-03-31 NOTE — ANESTHESIA PREPROCEDURE EVALUATION
Anesthesia Evaluation     Patient summary reviewed   history of anesthetic complications:  PONV  NPO Solid Status: > 8 hours             Airway   Mallampati: II  Dental    (+) upper dentures and lower dentures    Pulmonary    (-) COPD, asthma, sleep apnea, not a smoker  Cardiovascular   Exercise tolerance: good (4-7 METS)    (-) pacemaker, past MI, angina, cardiac stents      Neuro/Psych  (-) seizures, TIA, CVA  GI/Hepatic/Renal/Endo    (+) obesity, GERD  (-) liver disease, no renal disease, diabetes    Musculoskeletal     Abdominal    Substance History      OB/GYN          Other                    Anesthesia Plan    ASA 2     general     intravenous induction     Anesthetic plan, risks, benefits, and alternatives have been provided, discussed and informed consent has been obtained with: patient.    CODE STATUS:         
5

## 2025-03-31 NOTE — OUTREACH NOTE
Prep Survey      Flowsheet Row Responses   Religion facility patient discharged from? Non-BH   Is LACE score < 7 ? Non- Discharge   Eligibility [facility]   Date of Admission 03/26/25   Date of Discharge 03/30/25   Discharge Disposition Home or Self Care   Discharge diagnosis Cholecystitis with cholangitis, Lap kate   Does the patient have one of the following disease processes/diagnoses(primary or secondary)? General Surgery   Does the patient have Home health ordered? No   Prep survey completed? Yes            Areille BOJORQUEZ - Registered Nurse

## 2025-04-01 ENCOUNTER — TELEPHONE (OUTPATIENT)
Dept: SURGERY | Age: 60
End: 2025-04-01

## 2025-04-01 ENCOUNTER — OFFICE VISIT (OUTPATIENT)
Dept: FAMILY MEDICINE CLINIC | Facility: CLINIC | Age: 60
End: 2025-04-01
Payer: MEDICARE

## 2025-04-01 ENCOUNTER — TELEPHONE (OUTPATIENT)
Dept: FAMILY MEDICINE CLINIC | Facility: CLINIC | Age: 60
End: 2025-04-01
Payer: MEDICARE

## 2025-04-01 VITALS
DIASTOLIC BLOOD PRESSURE: 92 MMHG | HEIGHT: 63 IN | OXYGEN SATURATION: 98 % | SYSTOLIC BLOOD PRESSURE: 146 MMHG | TEMPERATURE: 98.6 F | BODY MASS INDEX: 31.01 KG/M2 | WEIGHT: 175 LBS

## 2025-04-01 DIAGNOSIS — K80.20 GALLBLADDER STONE WITHOUT CHOLECYSTITIS OR OBSTRUCTION: ICD-10-CM

## 2025-04-01 DIAGNOSIS — Z09 HOSPITAL DISCHARGE FOLLOW-UP: Primary | Chronic | ICD-10-CM

## 2025-04-01 DIAGNOSIS — R10.84 GENERALIZED ABDOMINAL PAIN: ICD-10-CM

## 2025-04-01 DIAGNOSIS — G89.4 CHRONIC PAIN SYNDROME: ICD-10-CM

## 2025-04-01 DIAGNOSIS — K82.8 GALLBLADDER SLUDGE: ICD-10-CM

## 2025-04-01 RX ORDER — HYDROCODONE BITARTRATE AND ACETAMINOPHEN 10; 325 MG/1; MG/1
1 TABLET ORAL EVERY 6 HOURS PRN
Qty: 120 TABLET | Refills: 0 | Status: SHIPPED | OUTPATIENT
Start: 2025-04-01

## 2025-04-01 NOTE — PROGRESS NOTES
Transitional Care Follow Up Visit  Subjective     Adela Arauz is a 60 y.o. female who presents for a transitional care management visit.    Within 48 business hours after discharge our office contacted her via telephone to coordinate her care and needs.      I reviewed and discussed the details of that call along with the discharge summary, hospital problems, inpatient lab results, inpatient diagnostic studies, and consultation reports with Adela.     Current outpatient and discharge medications have been reconciled for the patient.  Reviewed by: Charlene Huntley, DNP, APRN            3/30/2025     9:31 PM   Date of TCM Phone Call   [facility]   Date of Admission 3/26/2025   Date of Discharge 3/30/2025   Discharge Disposition Home or Self Care     Risk for Readmission (LACE) No data recorded    History of Present Illness   Course During Hospital Stay:       Portions of this note have been copied forward, however, changed to reflect the most current clinical status of this patient.  Hospital Course:   60-year-old female with GERD, RA, chronic back pain, complaints of ongoing right upper quadrant abdominal pain for the last week. She has had worsening nausea, pain with eating stated she then began having diaphoresis, rigors, and presented to [facility] ER for further evaluation. (She had been seen in PCP office and worked up abdominal pain, nausea, diarrhea over the past few weeks, C. difficile evaluation that was negative) Workup @ ER CT abdomen pelvis concern for acute cholecystitis, cholelithiasis mildly dilated duct and possible small stone in common bile duct, LFTs mild elevation in alkaline phos and AST. Patient was transferred to [facility] for a ERCP. Patient admitted to hospitalist service with consultation to general surgery and gastroenterology due to concern of acute cholecystitis and choledocholithiasis. Initiated on IV Zosyn and s/p 3/27 ERCP with stone/pus  "removal and biliary stent placement. General surgery evaluated and 3/28 underwent cholecystostomy drain placement remained on Zosyn. Pain under control and tolerating liquid intake so discharged home and to follow up with PCP and Surgeon. She tells me (PCP) pain is worse, nausea and vomiting has intensified, drain continues to drain and her abdomen is getting more distended.  She called the surgeons office to do a follow up appt and states, \"I was told I wasn't a pt of that surgeon and they could not see her.  I explained that she did a procedure in the hospital and her discharge paper work states to fu with her.\"  Pt was sent home on oxycodone which caused hallucinations and she could not take it.  Has with her and wants me to dispose.  I explained I can't take it but she can drop at the police station or one of the pharmacies. She is requesting to be referred to [facility].  We attempted to make referral however, they could not see her immediately and it was recommended she go to [facility].   She was not sure she wanted to do that but stated if got worse will go.  (She did go the next day and was seen and referred to one of their surgeons, so we cancelled the referral).    CT Abdomen Pelvis With Contrast (Rad report)  Result Date: 3/26/2025  EXAMINATION: CT ABDOMEN PELVIS W CONTRAST- 3/26/2025 5:56 PM HISTORY: Right-sided abdominal pain and diarrhea. In order to have a CT radiation dose as low as reasonably achievable Automated Exposure Control was utilized for adjustment of the mA and/or KV according to patient size. CT Dose DLP = 436.88 mGy.cm. (If there are multiple studies performed at the same time this represents the total dose). Abdomen/pelvis CT with IV contrast injection. Axial, sagittal, and coronal sequences. COMPARISON: 9/27/2019. Multiple small calcified gallstones are present and there is now gallbladder edema, mild common bile duct dilation, and evidence of small stones within the common " "bile duct. This is the CT appearance of acute cholecystitis. Normal liver. Normal appearance of the pancreas. No sign of pancreatitis. No focal spleen abnormality. Spleen = 12 x 5.7 x 11.5 cm. Normal adrenal glands and kidneys. Mild aortic calcification with no aneurysm. Lumbar spine surgery with hardware fusion and streak artifact. No bowel dilation. No pelvic mass or free fluid. No diverticulitis or colitis. No sign of appendicitis.    1. CT appearance of acute cholecystitis. Multiple small gallstones are present and there are 1 or 2 small (2-3 mm) common bile duct stones. This report was signed and finalized on 3/26/2025 7:22 PM by Dr. Price Claudio MD.      The following portions of the patient's history were reviewed and updated as appropriate: allergies, current medications, past family history, past medical history, past social history, past surgical history, and problem list.    Review of Systems   Constitutional:  Positive for activity change and fatigue.   HENT: Negative.     Eyes: Negative.    Respiratory: Negative.     Cardiovascular: Negative.    Gastrointestinal:  Positive for abdominal distention, abdominal pain, diarrhea, nausea and vomiting.   Neurological: Negative.    All other systems reviewed and are negative.    Bloated and tender stomach, drain tube in, liquid diet, still having diarrhea     Objective   /92   Temp 98.6 °F (37 °C)   Ht 160 cm (63\")   Wt 79.4 kg (175 lb)   SpO2 98%   BMI 31.00 kg/m²     Physical Exam  Vitals and nursing note reviewed.   Constitutional:       General: She is awake.      Appearance: Normal appearance. She is well-developed and well-groomed.   HENT:      Head: Normocephalic and atraumatic.      Right Ear: Hearing, tympanic membrane, ear canal and external ear normal.      Left Ear: Hearing, tympanic membrane, ear canal and external ear normal.      Nose: Nose normal.      Mouth/Throat:      Lips: Pink.      Pharynx: Oropharynx is clear.   Eyes:      " General: Lids are normal.      Conjunctiva/sclera: Conjunctivae normal.   Cardiovascular:      Rate and Rhythm: Normal rate and regular rhythm.      Heart sounds: Normal heart sounds.   Pulmonary:      Effort: Pulmonary effort is normal.      Breath sounds: Normal breath sounds and air entry.   Abdominal:      General: There is distension.      Palpations: There is fluid wave. There is no splenomegaly.      Tenderness: There is abdominal tenderness in the right upper quadrant. There is guarding and rebound. There is no right CVA tenderness or left CVA tenderness. Positive signs include Beckford's sign. Negative signs include McBurney's sign and psoas sign.      Hernia: No hernia is present.          Comments: Drain tube patent and draining    Musculoskeletal:      Cervical back: Full passive range of motion without pain.      Right lower leg: No edema.      Left lower leg: No edema.   Lymphadenopathy:      Head:      Right side of head: No submental, submandibular or tonsillar adenopathy.      Left side of head: No submental, submandibular or tonsillar adenopathy.   Skin:     General: Skin is warm and dry.   Neurological:      Mental Status: She is alert and oriented to person, place, and time.      Sensory: Sensation is intact.      Motor: Motor function is intact.      Coordination: Coordination is intact.      Gait: Gait is intact.   Psychiatric:         Attention and Perception: Attention and perception normal.         Mood and Affect: Mood and affect normal.         Speech: Speech normal.         Behavior: Behavior normal. Behavior is cooperative.         Thought Content: Thought content normal.         Cognition and Memory: Cognition and memory normal.         Judgment: Judgment normal.         Assessment & Plan   Diagnoses and all orders for this visit:    1. Hospital discharge follow-up (Primary)    2. Gallbladder stone without cholecystitis or obstruction  3. Gallbladder sludge  4. Generalized abdominal  pain  -     Ambulatory Referral to General Surgery    5. Chronic pain syndrome  -     HYDROcodone-acetaminophen (NORCO)  MG per tablet; Take 1 tablet by mouth Every 6 (Six) Hours As Needed for Moderate Pain.  Dispense: 120 tablet; Refill: 0  Uday, control and toxassure reviewed     Chronic problems;  Anxiety: Continue alprazolam as prescribed  Iron Def Anemia: Continue ferrous sulfate as prescribed  Neuropathy: Continue lyrica as prescribed  RA: Continue arava as prescribed  Edema: Continue furosemide as prescribed  Osteoporosis: Continue fosamax as prescribed  Vit D def: continue cholecalciferol as prescribed   Generalized pain: continue meloxiciam and norco as prescribed    If you develop a fever, chills, worsening abdominal pain, go directly to ER.    Electronically signed by Charlene Huntley DNP, SURYA, 04/02/25, 7:44 PM CDT.    Addendum:   4/2/25   Pt called and said that the pain got worse and she was headed to [place] to [facility]       Next follow-up appointment 1 week unless pain gets worse then go to ER.     Electronically signed by Charlene Huntley DNP, SURYA, 04/06/25, 7:39 PM CDT.

## 2025-04-01 NOTE — TELEPHONE ENCOUNTER
Patient's daughter Jerri is requesting an update on her mother after her patient's office visit today. Her phone number is [phone].

## 2025-04-01 NOTE — TELEPHONE ENCOUNTER
4/1/2025 Ambika, Daughter, called and requesting that we call her in regards to outgoing referral and has questions regarding follow with Dr Espinoza and drain removal.    Callback # [phone]  [employer]

## 2025-04-01 NOTE — TELEPHONE ENCOUNTER
Edwige calling to schedule post op appt for this week for drain removal. [facility] couldn't accommodate. Please advise.

## 2025-04-02 NOTE — TELEPHONE ENCOUNTER
Patient gives consent to share medical information to daughter, Jerri.      Jerri is listed on patients verbal.      There was a miscommunication in the office yesterday regarding speaking to the daughter about her medical care.     Daughter is picking up patient today and taking her to [place] ER for gallbladder.  She wanted to let Charlene know.

## 2025-04-02 NOTE — TELEPHONE ENCOUNTER
Spoke with patients daughter.  Daughter asking if theres anyway Charlene could let [place] know they are on their way.  Advised her that Charlene has patients, but I will send her a FYI.  She voiced understanding.

## 2025-04-03 ENCOUNTER — TELEPHONE (OUTPATIENT)
Dept: FAMILY MEDICINE CLINIC | Facility: CLINIC | Age: 60
End: 2025-04-03
Payer: MEDICARE

## 2025-04-03 NOTE — TELEPHONE ENCOUNTER
Caller: Adela Arauz    Relationship: Self    Best call back number: [phone]     What medication are you requesting: SOMETHING FOR A YEAST INFECTION         How long have you been experiencing symptoms: WEEK OR SO    Have you had these symptoms before:    [x] Yes  [] No    Have you been treated for these symptoms before:   [x] Yes  [] No    If a prescription is needed, what is your preferred pharmacy and phone number:  [facility]

## 2025-04-09 DIAGNOSIS — M51.369 DDD (DEGENERATIVE DISC DISEASE), LUMBAR: ICD-10-CM

## 2025-04-09 DIAGNOSIS — G89.4 CHRONIC PAIN SYNDROME: ICD-10-CM

## 2025-04-10 NOTE — TELEPHONE ENCOUNTER
Rx Refill Note  Requested Prescriptions     Pending Prescriptions Disp Refills    tiZANidine (ZANAFLEX) 4 MG tablet [Pharmacy Med Name: TIZANIDINE HCL 4 MG TAB 4 Tablet] 270 tablet 1     Sig: Take 1 tablet by mouth Every 8 (Eight) Hours As Needed for Muscle Spasms.      Last office visit with prescribing clinician: 4/1/2025     Next office visit with prescribing clinician: 5/12/2025       Sharon Syed MA  04/10/25, 12:52 CDT

## 2025-04-14 NOTE — OP NOTE
Operative Note      Patient: Edwige Gu  YOB: 1965  MRN: [id number]    Date of Procedure: 3/28/2025    Pre-Op Diagnosis Codes:      * Cholecystitis with cholangitis (HCC) [K81.9, K83.09]    Post-Op Diagnosis: Same       Procedure(s):  CHOLECYSTOSTOMY DRAIN TUBE PLACEMENT LAPAROSCOPIC    Surgeon(s):  Anjelica Espinoza MD    Assistant:   * No surgical staff found *    Anesthesia: General    Estimated Blood Loss (mL): less than 50     Complications: None    Specimens:   * No specimens in log *    Implants:  Implant Name Type Inv. Item Serial No.  Lot No. LRB No. Used Action   CLIP INT L POLYMER DAQUAN LIG HEM O DAQUAN (6EA/PK) - [id number]  CLIP INT L POLYMER DAQUAN LIG HEM O DAQUAN (6EA/PK)  [employer]  N/A 1 Implanted         Drains:   [REMOVED] Closed/Suction Drain Superior Abdomen Bulb (Removed)   Site Description Clean, dry & intact 03/30/25 0400   Dressing Status New dressing applied 03/30/25 0400   Drainage Appearance Green 03/30/25 0811   Drain Status Compressed 03/30/25 0811   Output (ml) 20 ml 03/30/25 0811       Findings:  Infection Present At Time Of Surgery (PATOS) (choose all levels that have infection present):  - Organ Space infection (below fascia) present as evidenced by pus  Other Findings: gallbladder wrapped in omentum and plastered to abdominal wall, hard as a rock. Gently attempted releasing from abdominal wall, encountered purulence and opening, appeared to be in process of eroding in to abdominal wall. Washed out and drain inserted through this opening    Detailed Description of Procedure:       INDICATIONS  The patient is a 60 year old female who presented with a complaint of 2 months of diarrhea followed by the onset of RUQ abdominal pain. She went to the ER at [facility], where imaging suggested acute cholecystitis and showed choledocholithiasis. Though her WBC was not elevated, at the time of ERCP she was found to have a large amount of purulence. She is taken at

## 2025-04-23 ENCOUNTER — READMISSION MANAGEMENT (OUTPATIENT)
Dept: CALL CENTER | Facility: HOSPITAL | Age: 60
End: 2025-04-23
Payer: MEDICARE

## 2025-04-23 ENCOUNTER — APPOINTMENT (OUTPATIENT)
Dept: CT IMAGING | Facility: HOSPITAL | Age: 60
End: 2025-04-23
Payer: MEDICARE

## 2025-04-23 ENCOUNTER — HOSPITAL ENCOUNTER (OUTPATIENT)
Facility: HOSPITAL | Age: 60
Setting detail: OBSERVATION
Discharge: HOME | End: 2025-04-24
Attending: FAMILY MEDICINE | Admitting: INTERNAL MEDICINE
Payer: MEDICARE

## 2025-04-23 ENCOUNTER — APPOINTMENT (OUTPATIENT)
Dept: CARDIOLOGY | Facility: HOSPITAL | Age: 60
End: 2025-04-23
Payer: MEDICARE

## 2025-04-23 DIAGNOSIS — I48.91 NEW ONSET ATRIAL FIBRILLATION: Primary | ICD-10-CM

## 2025-04-23 DIAGNOSIS — I48.0 PAROXYSMAL ATRIAL FIBRILLATION: ICD-10-CM

## 2025-04-23 DIAGNOSIS — I48.91 ATRIAL FIBRILLATION WITH RAPID VENTRICULAR RESPONSE: ICD-10-CM

## 2025-04-23 DIAGNOSIS — R55 SYNCOPE, UNSPECIFIED SYNCOPE TYPE: ICD-10-CM

## 2025-04-23 DIAGNOSIS — K80.20 GALLSTONES: ICD-10-CM

## 2025-04-23 DIAGNOSIS — M48.061 SPINAL STENOSIS OF LUMBAR REGION, UNSPECIFIED WHETHER NEUROGENIC CLAUDICATION PRESENT: ICD-10-CM

## 2025-04-23 PROBLEM — K80.71 GALLBLADDER & BILE DUCT STONE WITH OBSTRUCTION: Status: ACTIVE | Noted: 2025-04-23

## 2025-04-23 LAB
ALBUMIN SERPL-MCNC: 3.7 G/DL (ref 3.5–5.2)
ALBUMIN/GLOB SERPL: 1.3 G/DL
ALP SERPL-CCNC: 174 U/L (ref 39–117)
ALT SERPL W P-5'-P-CCNC: 14 U/L (ref 1–33)
ANION GAP SERPL CALCULATED.3IONS-SCNC: 14 MMOL/L (ref 5–15)
AORTIC DIMENSIONLESS INDEX: 0.91 (DI)
APTT PPP: 28.8 SECONDS (ref 24.5–36)
AST SERPL-CCNC: 45 U/L (ref 1–32)
AV MEAN PRESS GRAD SYS DOP V1V2: 2 MMHG
AV VMAX SYS DOP: 108 CM/SEC
BASOPHILS # BLD AUTO: 0.08 10*3/MM3 (ref 0–0.2)
BASOPHILS NFR BLD AUTO: 1.2 % (ref 0–1.5)
BH CV ECHO MEAS - AO MAX PG: 4.7 MMHG
BH CV ECHO MEAS - AO V2 VTI: 15.7 CM
BH CV ECHO MEAS - AVA(I,D): 2.9 CM2
BH CV ECHO MEAS - EDV(CUBED): 27 ML
BH CV ECHO MEAS - EDV(MOD-SP2): 28.1 ML
BH CV ECHO MEAS - EDV(MOD-SP4): 35.5 ML
BH CV ECHO MEAS - EF(MOD-SP2): 43.1 %
BH CV ECHO MEAS - EF(MOD-SP4): 65.6 %
BH CV ECHO MEAS - ESV(CUBED): 11.1 ML
BH CV ECHO MEAS - ESV(MOD-SP2): 16 ML
BH CV ECHO MEAS - ESV(MOD-SP4): 12.2 ML
BH CV ECHO MEAS - FS: 25.7 %
BH CV ECHO MEAS - IVS/LVPW: 1.28 CM
BH CV ECHO MEAS - IVSD: 0.94 CM
BH CV ECHO MEAS - LA DIMENSION: 2.5 CM
BH CV ECHO MEAS - LAT PEAK E' VEL: 7.8 CM/SEC
BH CV ECHO MEAS - LV DIASTOLIC VOL/BSA (35-75): 19.4 CM2
BH CV ECHO MEAS - LV MASS(C)D: 63.4 GRAMS
BH CV ECHO MEAS - LV MAX PG: 3.6 MMHG
BH CV ECHO MEAS - LV MEAN PG: 1 MMHG
BH CV ECHO MEAS - LV SYSTOLIC VOL/BSA (12-30): 6.7 CM2
BH CV ECHO MEAS - LV V1 MAX: 95.1 CM/SEC
BH CV ECHO MEAS - LV V1 VTI: 14.3 CM
BH CV ECHO MEAS - LVIDD: 3 CM
BH CV ECHO MEAS - LVIDS: 2.23 CM
BH CV ECHO MEAS - LVOT AREA: 3.1 CM2
BH CV ECHO MEAS - LVOT DIAM: 2 CM
BH CV ECHO MEAS - LVPWD: 0.74 CM
BH CV ECHO MEAS - MED PEAK E' VEL: 8.7 CM/SEC
BH CV ECHO MEAS - MV A MAX VEL: 104 CM/SEC
BH CV ECHO MEAS - MV DEC SLOPE: 329 CM/SEC2
BH CV ECHO MEAS - MV E MAX VEL: 79.7 CM/SEC
BH CV ECHO MEAS - MV E/A: 0.77
BH CV ECHO MEAS - MV P1/2T: 75.8 MSEC
BH CV ECHO MEAS - MVA(P1/2T): 2.9 CM2
BH CV ECHO MEAS - PA V2 MAX: 89 CM/SEC
BH CV ECHO MEAS - RAP SYSTOLE: 3 MMHG
BH CV ECHO MEAS - RV MAX PG: 2 MMHG
BH CV ECHO MEAS - RV V1 MAX: 70.7 CM/SEC
BH CV ECHO MEAS - RVDD: 2.09 CM
BH CV ECHO MEAS - SV(LVOT): 44.9 ML
BH CV ECHO MEAS - SV(MOD-SP2): 12.1 ML
BH CV ECHO MEAS - SV(MOD-SP4): 23.3 ML
BH CV ECHO MEAS - SVI(LVOT): 24.6 ML/M2
BH CV ECHO MEAS - SVI(MOD-SP2): 6.6 ML/M2
BH CV ECHO MEAS - SVI(MOD-SP4): 12.8 ML/M2
BH CV ECHO MEAS - TAPSE (>1.6): 1.56 CM
BH CV ECHO MEASUREMENTS AVERAGE E/E' RATIO: 9.66
BILIRUB SERPL-MCNC: 0.4 MG/DL (ref 0–1.2)
BUN SERPL-MCNC: 4 MG/DL (ref 6–20)
BUN/CREAT SERPL: 7.4 (ref 7–25)
CALCIUM SPEC-SCNC: 9.1 MG/DL (ref 8.6–10.5)
CHLORIDE SERPL-SCNC: 103 MMOL/L (ref 98–107)
CK SERPL-CCNC: 73 U/L (ref 20–180)
CO2 SERPL-SCNC: 23 MMOL/L (ref 22–29)
CREAT SERPL-MCNC: 0.54 MG/DL (ref 0.57–1)
D DIMER PPP FEU-MCNC: 1.68 MCGFEU/ML (ref 0–0.59)
D-LACTATE SERPL-SCNC: 1.9 MMOL/L (ref 0.5–2)
DEPRECATED RDW RBC AUTO: 48.3 FL (ref 37–54)
EGFRCR SERPLBLD CKD-EPI 2021: 106.2 ML/MIN/1.73
EOSINOPHIL # BLD AUTO: 0.66 10*3/MM3 (ref 0–0.4)
EOSINOPHIL NFR BLD AUTO: 9.6 % (ref 0.3–6.2)
ERYTHROCYTE [DISTWIDTH] IN BLOOD BY AUTOMATED COUNT: 16.3 % (ref 12.3–15.4)
GEN 5 1HR TROPONIN T REFLEX: 14 NG/L
GLOBULIN UR ELPH-MCNC: 2.9 GM/DL
GLUCOSE SERPL-MCNC: 97 MG/DL (ref 65–99)
HCT VFR BLD AUTO: 45.3 % (ref 34–46.6)
HGB BLD-MCNC: 14.3 G/DL (ref 12–15.9)
HOLD SPECIMEN: NORMAL
IMM GRANULOCYTES # BLD AUTO: 0.02 10*3/MM3 (ref 0–0.05)
IMM GRANULOCYTES NFR BLD AUTO: 0.3 % (ref 0–0.5)
INR PPP: 0.93 (ref 0.91–1.09)
LEFT ATRIUM VOLUME INDEX: 8.1 ML/M2
LEFT ATRIUM VOLUME: 14.9 ML
LV EF BIPLANE MOD: 55.4 %
LYMPHOCYTES # BLD AUTO: 1.53 10*3/MM3 (ref 0.7–3.1)
LYMPHOCYTES NFR BLD AUTO: 22.2 % (ref 19.6–45.3)
MAGNESIUM SERPL-MCNC: 2 MG/DL (ref 1.6–2.6)
MCH RBC QN AUTO: 26.5 PG (ref 26.6–33)
MCHC RBC AUTO-ENTMCNC: 31.6 G/DL (ref 31.5–35.7)
MCV RBC AUTO: 83.9 FL (ref 79–97)
MONOCYTES # BLD AUTO: 0.72 10*3/MM3 (ref 0.1–0.9)
MONOCYTES NFR BLD AUTO: 10.4 % (ref 5–12)
NEUTROPHILS NFR BLD AUTO: 3.88 10*3/MM3 (ref 1.7–7)
NEUTROPHILS NFR BLD AUTO: 56.3 % (ref 42.7–76)
NRBC BLD AUTO-RTO: 0 /100 WBC (ref 0–0.2)
NT-PROBNP SERPL-MCNC: 280.7 PG/ML (ref 0–900)
PLATELET # BLD AUTO: 284 10*3/MM3 (ref 140–450)
PMV BLD AUTO: 9.8 FL (ref 6–12)
POTASSIUM SERPL-SCNC: 3.7 MMOL/L (ref 3.5–5.2)
PROT SERPL-MCNC: 6.6 G/DL (ref 6–8.5)
PROTHROMBIN TIME: 12.9 SECONDS (ref 11.8–14.8)
RBC # BLD AUTO: 5.4 10*6/MM3 (ref 3.77–5.28)
SINUS: 2.7 CM
SODIUM SERPL-SCNC: 140 MMOL/L (ref 136–145)
STJ: 2.5 CM
T4 FREE SERPL-MCNC: 0.95 NG/DL (ref 0.93–1.7)
TROPONIN T % DELTA: -7
TROPONIN T NUMERIC DELTA: -1 NG/L
TROPONIN T SERPL HS-MCNC: 15 NG/L
TSH SERPL DL<=0.05 MIU/L-ACNC: 3.31 UIU/ML (ref 0.27–4.2)
WBC NRBC COR # BLD AUTO: 6.89 10*3/MM3 (ref 3.4–10.8)
WHOLE BLOOD HOLD COAG: NORMAL
WHOLE BLOOD HOLD SPECIMEN: NORMAL

## 2025-04-23 PROCEDURE — 93010 ELECTROCARDIOGRAM REPORT: CPT | Performed by: HOSPITALIST

## 2025-04-23 PROCEDURE — 72131 CT LUMBAR SPINE W/O DYE: CPT

## 2025-04-23 PROCEDURE — G0378 HOSPITAL OBSERVATION PER HR: HCPCS

## 2025-04-23 PROCEDURE — 99205 OFFICE O/P NEW HI 60 MIN: CPT | Performed by: STUDENT IN AN ORGANIZED HEALTH CARE EDUCATION/TRAINING PROGRAM

## 2025-04-23 PROCEDURE — 84443 ASSAY THYROID STIM HORMONE: CPT | Performed by: FAMILY MEDICINE

## 2025-04-23 PROCEDURE — 85730 THROMBOPLASTIN TIME PARTIAL: CPT | Performed by: FAMILY MEDICINE

## 2025-04-23 PROCEDURE — 85025 COMPLETE CBC W/AUTO DIFF WBC: CPT | Performed by: FAMILY MEDICINE

## 2025-04-23 PROCEDURE — 96366 THER/PROPH/DIAG IV INF ADDON: CPT

## 2025-04-23 PROCEDURE — 70450 CT HEAD/BRAIN W/O DYE: CPT

## 2025-04-23 PROCEDURE — 82550 ASSAY OF CK (CPK): CPT | Performed by: FAMILY MEDICINE

## 2025-04-23 PROCEDURE — 83880 ASSAY OF NATRIURETIC PEPTIDE: CPT | Performed by: FAMILY MEDICINE

## 2025-04-23 PROCEDURE — 96365 THER/PROPH/DIAG IV INF INIT: CPT

## 2025-04-23 PROCEDURE — 85379 FIBRIN DEGRADATION QUANT: CPT | Performed by: FAMILY MEDICINE

## 2025-04-23 PROCEDURE — 80053 COMPREHEN METABOLIC PANEL: CPT | Performed by: FAMILY MEDICINE

## 2025-04-23 PROCEDURE — 99285 EMERGENCY DEPT VISIT HI MDM: CPT

## 2025-04-23 PROCEDURE — 96372 THER/PROPH/DIAG INJ SC/IM: CPT

## 2025-04-23 PROCEDURE — 85610 PROTHROMBIN TIME: CPT | Performed by: FAMILY MEDICINE

## 2025-04-23 PROCEDURE — 83605 ASSAY OF LACTIC ACID: CPT | Performed by: FAMILY MEDICINE

## 2025-04-23 PROCEDURE — 25010000002 ENOXAPARIN PER 10 MG: Performed by: FAMILY MEDICINE

## 2025-04-23 PROCEDURE — 83735 ASSAY OF MAGNESIUM: CPT | Performed by: FAMILY MEDICINE

## 2025-04-23 PROCEDURE — 71275 CT ANGIOGRAPHY CHEST: CPT

## 2025-04-23 PROCEDURE — 96376 TX/PRO/DX INJ SAME DRUG ADON: CPT

## 2025-04-23 PROCEDURE — 72128 CT CHEST SPINE W/O DYE: CPT

## 2025-04-23 PROCEDURE — 84439 ASSAY OF FREE THYROXINE: CPT | Performed by: FAMILY MEDICINE

## 2025-04-23 PROCEDURE — 93306 TTE W/DOPPLER COMPLETE: CPT

## 2025-04-23 PROCEDURE — 36415 COLL VENOUS BLD VENIPUNCTURE: CPT

## 2025-04-23 PROCEDURE — 25510000001 IOPAMIDOL PER 1 ML: Performed by: FAMILY MEDICINE

## 2025-04-23 PROCEDURE — 84484 ASSAY OF TROPONIN QUANT: CPT | Performed by: FAMILY MEDICINE

## 2025-04-23 PROCEDURE — 93306 TTE W/DOPPLER COMPLETE: CPT | Performed by: INTERNAL MEDICINE

## 2025-04-23 PROCEDURE — 93005 ELECTROCARDIOGRAM TRACING: CPT | Performed by: FAMILY MEDICINE

## 2025-04-23 PROCEDURE — 72125 CT NECK SPINE W/O DYE: CPT

## 2025-04-23 RX ORDER — POLYETHYLENE GLYCOL 3350 17 G/17G
17 POWDER, FOR SOLUTION ORAL DAILY PRN
Status: DISCONTINUED | OUTPATIENT
Start: 2025-04-23 | End: 2025-04-24 | Stop reason: HOSPADM

## 2025-04-23 RX ORDER — PANTOPRAZOLE SODIUM 40 MG/1
40 TABLET, DELAYED RELEASE ORAL
Status: DISCONTINUED | OUTPATIENT
Start: 2025-04-24 | End: 2025-04-24 | Stop reason: HOSPADM

## 2025-04-23 RX ORDER — IOPAMIDOL 755 MG/ML
100 INJECTION, SOLUTION INTRAVASCULAR
Status: COMPLETED | OUTPATIENT
Start: 2025-04-23 | End: 2025-04-23

## 2025-04-23 RX ORDER — PREGABALIN 75 MG/1
150 CAPSULE ORAL 2 TIMES DAILY
Status: DISCONTINUED | OUTPATIENT
Start: 2025-04-23 | End: 2025-04-24 | Stop reason: HOSPADM

## 2025-04-23 RX ORDER — LEFLUNOMIDE 20 MG/1
20 TABLET ORAL DAILY
Status: DISCONTINUED | OUTPATIENT
Start: 2025-04-24 | End: 2025-04-23

## 2025-04-23 RX ORDER — FLECAINIDE ACETATE 50 MG/1
50 TABLET ORAL EVERY 12 HOURS SCHEDULED
Status: DISCONTINUED | OUTPATIENT
Start: 2025-04-23 | End: 2025-04-24 | Stop reason: HOSPADM

## 2025-04-23 RX ORDER — ALPRAZOLAM 0.5 MG
1 TABLET ORAL 3 TIMES DAILY PRN
Status: DISCONTINUED | OUTPATIENT
Start: 2025-04-23 | End: 2025-04-24 | Stop reason: HOSPADM

## 2025-04-23 RX ORDER — DILTIAZEM HCL/D5W 125 MG/125
5-15 PLASTIC BAG, INJECTION (ML) INTRAVENOUS
Status: DISCONTINUED | OUTPATIENT
Start: 2025-04-23 | End: 2025-04-24 | Stop reason: HOSPADM

## 2025-04-23 RX ORDER — BISACODYL 10 MG
10 SUPPOSITORY, RECTAL RECTAL DAILY PRN
Status: DISCONTINUED | OUTPATIENT
Start: 2025-04-23 | End: 2025-04-24 | Stop reason: HOSPADM

## 2025-04-23 RX ORDER — BISACODYL 5 MG/1
5 TABLET, DELAYED RELEASE ORAL DAILY PRN
Status: DISCONTINUED | OUTPATIENT
Start: 2025-04-23 | End: 2025-04-24 | Stop reason: HOSPADM

## 2025-04-23 RX ORDER — ENOXAPARIN SODIUM 100 MG/ML
1 INJECTION SUBCUTANEOUS ONCE
Status: COMPLETED | OUTPATIENT
Start: 2025-04-23 | End: 2025-04-23

## 2025-04-23 RX ORDER — DILTIAZEM HYDROCHLORIDE 5 MG/ML
10 INJECTION INTRAVENOUS ONCE
Status: COMPLETED | OUTPATIENT
Start: 2025-04-23 | End: 2025-04-23

## 2025-04-23 RX ORDER — ONDANSETRON 2 MG/ML
4 INJECTION INTRAMUSCULAR; INTRAVENOUS EVERY 6 HOURS PRN
Status: DISCONTINUED | OUTPATIENT
Start: 2025-04-23 | End: 2025-04-24 | Stop reason: HOSPADM

## 2025-04-23 RX ORDER — CHLORHEXIDINE GLUCONATE ORAL RINSE 1.2 MG/ML
15 SOLUTION DENTAL 2 TIMES DAILY
COMMUNITY

## 2025-04-23 RX ORDER — HYDROCODONE BITARTRATE AND ACETAMINOPHEN 10; 325 MG/1; MG/1
1 TABLET ORAL EVERY 6 HOURS PRN
Status: DISCONTINUED | OUTPATIENT
Start: 2025-04-23 | End: 2025-04-24 | Stop reason: HOSPADM

## 2025-04-23 RX ORDER — AMOXICILLIN 250 MG
2 CAPSULE ORAL 2 TIMES DAILY PRN
Status: DISCONTINUED | OUTPATIENT
Start: 2025-04-23 | End: 2025-04-24 | Stop reason: HOSPADM

## 2025-04-23 RX ORDER — ONDANSETRON 4 MG/1
4 TABLET, ORALLY DISINTEGRATING ORAL EVERY 6 HOURS PRN
Status: DISCONTINUED | OUTPATIENT
Start: 2025-04-23 | End: 2025-04-24 | Stop reason: HOSPADM

## 2025-04-23 RX ORDER — MELOXICAM 7.5 MG/1
15 TABLET ORAL DAILY
Status: DISCONTINUED | OUTPATIENT
Start: 2025-04-24 | End: 2025-04-24 | Stop reason: HOSPADM

## 2025-04-23 RX ORDER — SODIUM CHLORIDE 0.9 % (FLUSH) 0.9 %
10 SYRINGE (ML) INJECTION AS NEEDED
Status: DISCONTINUED | OUTPATIENT
Start: 2025-04-23 | End: 2025-04-24 | Stop reason: HOSPADM

## 2025-04-23 RX ORDER — METOPROLOL TARTRATE 25 MG/1
25 TABLET, FILM COATED ORAL EVERY 12 HOURS SCHEDULED
Status: DISCONTINUED | OUTPATIENT
Start: 2025-04-23 | End: 2025-04-24 | Stop reason: HOSPADM

## 2025-04-23 RX ADMIN — PREGABALIN 150 MG: 75 CAPSULE ORAL at 21:08

## 2025-04-23 RX ADMIN — DILTIAZEM HYDROCHLORIDE 10 MG: 5 INJECTION, SOLUTION INTRAVENOUS at 10:52

## 2025-04-23 RX ADMIN — IOPAMIDOL 100 ML: 755 INJECTION, SOLUTION INTRAVENOUS at 11:56

## 2025-04-23 RX ADMIN — Medication 5 MG/HR: at 11:09

## 2025-04-23 RX ADMIN — ENOXAPARIN SODIUM 80 MG: 100 INJECTION SUBCUTANEOUS at 11:10

## 2025-04-23 RX ADMIN — FLECAINIDE ACETATE 50 MG: 50 TABLET ORAL at 21:08

## 2025-04-23 RX ADMIN — APIXABAN 5 MG: 5 TABLET, FILM COATED ORAL at 21:08

## 2025-04-23 RX ADMIN — METOPROLOL TARTRATE 25 MG: 25 TABLET, FILM COATED ORAL at 21:08

## 2025-04-23 RX ADMIN — HYDROCODONE BITARTRATE AND ACETAMINOPHEN 1 TABLET: 10; 325 TABLET ORAL at 21:12

## 2025-04-23 NOTE — H&P
[facility] Medicine Services  HISTORY AND PHYSICAL    Date of Admission: 4/23/2025  Primary Care Physician: Charlene Huntley, DNP, APRN    Subjective   Primary Historian: Patient    Chief Complaint: Syncope    Syncope    Seizures     Neck Pain   Associated symptoms include syncope.     Adela Arauz is a 60-year-old female with a past medical history of AAT deficiency, anxiety, lymphoma, low back pain, osteoporosis, and rheumatoid arthritis that presents to [facility] ED via EMS after a syncopal episode.  Patient is alert and oriented and able to participate in my exam.  She reports feeling palpitations before severe dizziness.  She lowered herself to the ground by sliding down the wall.  After lowering herself to the floor she scooted to her phone and called her brother.  When brother and sister-in-law arrived they helped her to the car and patient appeared to have tremors all over, they thought she was having a seizure so they called 911.  Head tremor noted at rest with a slight left eyelid droop.  Patient reports having this for the last year.  A-fib on telemetry with a rate of 88 and currently diltiazem drip infusing at 7.5 mg/hr. discussed plan of care and she is agreeable to be admitted for further evaluation and treatment    4/18/2025 [facility] H&P admission note:  Adela Arauz is a 60yoF PMH RA (leflunomide), GERD, AAT-1 deficiency with recent cholecystitis and cholangitis s/p ERCP with CBD clearance and stent (3/27) and laparoscopic cholecystostomy tube placement (3/28) at OSH who presents to the ED after her tube fell out. She has also been having increasing abdominal pain, bloating, and profuse diarrhea. Patient was evaluated by EGS on 4/2 in the ED and discharged with Augmentin, then seen in EGS clinic on 4/8 with plan for fluoro tube study on 4/22 to evaluate cystic duct patency. She denies fevers, chills, vomiting. She is having nausea and poor  PO intake.     Patient discharged from [facility] 4/22/2025 with plans for cholecystectomy in the near future, states she has to be on a very low-fat diet.    ED workup reveals troponin 15 with a repeat of 14, BUN 4, creatinine 0.54, alkaline phosphatase 174, AST 45, D-dimer 1.68, EKG reveals A-fib RVR with a rate of 159 with nonspecific ST abnormality, CT of the chest revealed no PE, no thoracic aortic aneurysm or dissection, mild emphysema, right greater than left basilar atelectasis, stable right-sided pulmonary nodules unchanged from 2021 supporting a benign process, common bile duct stent in place with pneumobilia, gallstones within the contracted gallbladder.  CT cervical spine revealed no acute cervical spine abnormality, CT head revealed no acute abnormality CT lumbar spine revealed no evidence of acute fracture, CT thoracic revealed no evidence of thoracic spine fracture and degenerative changes.      Review of Systems   Cardiovascular:  Positive for syncope.   Musculoskeletal:  Positive for neck pain.      Otherwise complete ROS reviewed and negative except as mentioned in the HPI.    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     Low back pain     Nausea after anesthesia     Open wound of gum     pt states had all her teeth pulled and there is a spot that hasn't healed due to RA so she is using peridex rinse daily    Osteoporosis     PONV (postoperative nausea and vomiting)     Psoriasis     Rheumatoid arthritis     Seasonal allergies     Senile osteoporosis 09/28/2017     Past Surgical History:  Past Surgical History:   Procedure Laterality Date    ANTERIOR LUMBAR EXPOSURE N/A 12/02/2020    Procedure: Anterior lumbar interbody fusion of L5-S1 with  instrumentation ;  Surgeon: Neptali Rivera DO;  Location:  [facility] OR;  Service: Vascular;  Laterality: N/A;    AXILLARY LYMPH NODE BIOPSY/EXCISION Right 09/19/2019    Procedure: EXCISION RIGHT AXILLARY MASS;  Surgeon: Jes Enamorado MD;  Location:  [facility] OR;  Service: General    INCISION AND DRAINAGE ARM Right 06/03/2021    Procedure: INCISION AND DRAINAGE FOREARM;  Surgeon: Jes Enamorado MD;  Location:  [facility] OR;  Service: General;  Laterality: Right;    LUMBAR FUSION N/A 12/02/2020    Procedure: ANTERIOR DECOMPRESSION, ANTERIOR LUMBAR INTERBODY FUSION WITH INSTRUMENTATION L5-S1;  Surgeon: ALEXIS Dunaway MD;  Location:  [facility] OR;  Service: Orthopedic Spine;  Laterality: N/A;    LUMBAR FUSION Left 12/16/2020    Procedure: LEFT LATERAL LUMBAR INTERBODY FUSION L3-5 WITH INSTRUMENTATION L3-4;  Surgeon: ALEXIS Dunaway MD;  Location:  [facility] OR;  Service: Orthopedic Spine;  Laterality: Left;    LUMBAR FUSION Right 1/27/2025    Procedure: RIGHT LATERAL LUMBAR INTERBODY FUSION WITH INSTRUMENTATION L2-3;  Surgeon: ALEXIS Dunaway MD;  Location:  [facility] OR;  Service: Orthopedic Spine;  Laterality: Right;    LUMBAR LAMINECTOMY WITH FUSION N/A 12/18/2020    Procedure: DECOMPRESSION L3-4, POSTERIOR SPINAL FUSION WITH INSTRUMENTATION L3-S1;  Surgeon: ALEXIS Dunaway MD;  Location:  [facility] OR;  Service: Orthopedic Spine;  Laterality: N/A;    LUMBAR LAMINECTOMY WITH FUSION N/A 1/29/2025    Procedure: REMOVAL OF INSTRUMENTATION, EXPLORATION OF FUSION L3-S1, POSTERIOR SPINAL FUSION L2-3 WITH INSTRUMENTATION L2-S1;  Surgeon: ALEXIS Dunaway MD;  Location:  [facility] OR;  Service: Orthopedic Spine;  Laterality: N/A;    SHOULDER SURGERY Left     arthroscopy for arthtritis and bone spurs    TUBAL ABDOMINAL LIGATION      US GUIDED LYMPH NODE BIOPSY  08/09/2019    WRIST SURGERY Bilateral     fracture - no internal hardware     Social History:  reports that she quit smoking about 6 months ago. Her smoking use  included cigarettes. She started smoking about 35 years ago. She has a 17.4 pack-year smoking history. She has never used smokeless tobacco. She reports that she does not drink alcohol and does not use drugs.    Family History: family history includes Arthritis in her brother, father, maternal grandfather, mother, and sister; Atrial fibrillation in her mother; COPD in her mother; Heart disease in her father; Hypertension in her father; Melanoma in her father; No Known Problems in her daughter; Osteoarthritis in her mother.       Allergies:  Allergies   Allergen Reactions    Oxycodone-Acetaminophen Hallucinations     Causes hallicinations    Evenity [Romosozumab] Swelling     Injection site swelling, redness, warm to touch       Medications:  Prior to Admission medications    Medication Sig Start Date End Date Taking? Authorizing Provider   alendronate (Fosamax) 70 MG tablet Take 1 tablet by mouth Every 7 (Seven) Days. 12/11/24   Charlene Huntley DNP, APRN   ALPRAZolam (XANAX) 1 MG tablet Take 1 tablet by mouth 3 (Three) Times a Day As Needed for Anxiety. 3/12/25   Charlene Huntley DNP, APRN   Calcium-Magnesium-Vitamin D 500-250-200 MG-MG-UNIT tablet Take 600 tablets by mouth 2 (Two) Times a Day.    ProviderBuddy MD   cholecalciferol (VITAMIN D3) 1000 units tablet Take 1 tablet by mouth Daily.    ProviderBuddy MD   diphenoxylate-atropine (Lomotil) 2.5-0.025 MG per tablet Take 1 tablet by mouth 4 (Four) Times a Day As Needed for Diarrhea. 3/24/25   Charlene Huntley DNP, APRN   ferrous sulfate 324 (65 Fe) MG tablet delayed-release EC tablet Take 1 tablet by mouth Daily With Breakfast.  Patient taking differently: Take 1 tablet by mouth Daily As Needed.    ProviderBuddy MD   fluticasone (Flonase) 50 MCG/ACT nasal spray 2 sprays into the nostril(s) as directed by provider Daily.  Patient taking differently: Administer 2 sprays into the nostril(s) as directed by provider Daily As Needed  "for Allergies or Rhinitis. 8/16/24   Charlene Huntley DNP, APRN   furosemide (LASIX) 40 MG tablet Take 1 tablet by mouth Daily.  Patient not taking: Reported on 3/24/2025 1/13/25   Charlene Huntley DNP, APRN   HYDROcodone-acetaminophen (NORCO)  MG per tablet Take 1 tablet by mouth Every 6 (Six) Hours As Needed for Moderate Pain. 4/1/25   Charlene Huntley DNP, APRN   leflunomide (ARAVA) 20 MG tablet Take 1 tablet by mouth Daily. 5/8/21   ProviderBuddy MD   meloxicam (MOBIC) 15 MG tablet Take 1 tablet by mouth. 2/7/25   ProviderBuddy MD   omeprazole (priLOSEC) 40 MG capsule Take 1 capsule by mouth Daily. 12/27/24   Charlene Huntley DNP, APRN   pregabalin (LYRICA) 150 MG capsule Take 1 capsule by mouth 2 (Two) Times a Day. 3/12/25   Charlene Huntley DNP, APRN   tiZANidine (ZANAFLEX) 4 MG tablet Take 1 tablet by mouth Every 8 (Eight) Hours As Needed for Muscle Spasms. 4/10/25   Riki Jiang MD   Turmeric 500 MG capsule Take 1 capsule by mouth Daily.    Provider, MD Buddy   valACYclovir (VALTREX) 1000 MG tablet Take 1 tablet by mouth 2 (Two) Times a Day.  Patient taking differently: Take 1 tablet by mouth 2 (Two) Times a Day As Needed (FEVER BLISTERS, OR VIRAL ILLNESS). 12/11/24   Charlene Huntley DNP, APRN     I have utilized all available immediate resources to obtain, update, or review the patient's current medications (including all prescriptions, over-the-counter products, herbals, cannabis/cannabidiol products, and vitamin/mineral/dietary (nutritional) supplements).    Objective     Vital Signs: /90   Pulse 96   Temp 97.7 °F (36.5 °C) (Oral)   Resp 23   Ht 160 cm (63\")   Wt 79.4 kg (175 lb)   LMP 04/01/2014 (Approximate)   SpO2 94%   BMI 31.00 kg/m²   Physical Exam  Constitutional:       Appearance: She is obese.   HENT:      Head: Normocephalic and atraumatic.      Mouth/Throat:      Mouth: Mucous membranes are moist.      Pharynx: Oropharynx is " clear.   Eyes:      Extraocular Movements: Extraocular movements intact.      Conjunctiva/sclera: Conjunctivae normal.   Cardiovascular:      Rate and Rhythm: Tachycardia present. Rhythm irregular.      Pulses: Normal pulses.      Heart sounds: Normal heart sounds.   Pulmonary:      Effort: Pulmonary effort is normal.      Breath sounds: Normal breath sounds.   Abdominal:      General: There is no distension.      Palpations: Abdomen is soft.   Musculoskeletal:      Cervical back: Normal range of motion and neck supple.      Right lower leg: No edema.      Left lower leg: No edema.   Skin:     General: Skin is warm and dry.   Neurological:      General: No focal deficit present.      Mental Status: She is alert and oriented to person, place, and time.      Motor: Tremor (Head) present.   Psychiatric:         Mood and Affect: Mood normal.         Behavior: Behavior normal.      Results Reviewed:  Lab Results (last 24 hours)       Procedure Component Value Units Date/Time    High Sensitivity Troponin T 1Hr [[id number]]  (Abnormal) Collected: 04/23/25 1136    Specimen: Blood Updated: 04/23/25 1247     HS Troponin T 14 ng/L      Troponin T Numeric Delta -1 ng/L      Troponin T % Delta -7    Narrative:      High Sensitive Troponin T Reference Range:  <14.0 ng/L- Negative Female for AMI  <22.0 ng/L- Negative Male for AMI  >=14 - Abnormal Female indicating possible myocardial injury.  >=22 - Abnormal Male indicating possible myocardial injury.   Clinicians would have to utilize clinical acumen, EKG, Troponin, and serial changes to determine if it is an Acute Myocardial Infarction or myocardial injury due to an underlying chronic condition.         Protime-INR [[id number]]  (Normal) Collected: 04/23/25 1015    Specimen: Blood Updated: 04/23/25 1107     Protime 12.9 Seconds      INR 0.93    D-dimer, Quantitative [[id number]]  (Abnormal) Collected: 04/23/25 1015    Specimen: Blood Updated: 04/23/25 1107     D-Dimer, Quantitative  "1.68 MCGFEU/mL     Narrative:      According to the assay 's published package insert, a normal (<0.50 MCGFEU/mL) D-dimer result in conjunction with a non-high clinical probability assessment, excludes deep vein thrombosis (DVT) and pulmonary embolism (PE) with high sensitivity.    D-dimer values increase with age and this can make VTE exclusion of an older population difficult. To address this, the [employer], based on best available evidence and recent guidelines, recommends that clinicians use age-adjusted D-dimer thresholds in patients greater than 50 years of age with: a) a low probability of PE who do not meet all Pulmonary Embolism Rule Out Criteria, or b) in those with intermediate probability of PE.   The formula for an age-adjusted D-dimer cut-off is \"age/100\".  For example, a 60 year old patient would have an age-adjusted cut-off of 0.60 MCGFEU/mL and an 80 year old 0.80 MCGFEU/mL.    aPTT [[id number]]  (Normal) Collected: 04/23/25 1015    Specimen: Blood Updated: 04/23/25 1107     PTT 28.8 seconds     Narrative:      PTT = The equivalent PTT values for the therapeutic range of heparin levels at 0.3 to 0.7 U/ml are 77 - 99 seconds.    Comprehensive Metabolic Panel [[id number]]  (Abnormal) Collected: 04/23/25 1015    Specimen: Blood Updated: 04/23/25 1101     Glucose 97 mg/dL      BUN 4 mg/dL      Creatinine 0.54 mg/dL      Sodium 140 mmol/L      Potassium 3.7 mmol/L      Comment: Slight hemolysis detected by analyzer. Result may be falsely elevated.        Chloride 103 mmol/L      CO2 23.0 mmol/L      Calcium 9.1 mg/dL      Total Protein 6.6 g/dL      Albumin 3.7 g/dL      ALT (SGPT) 14 U/L      AST (SGOT) 45 U/L      Comment: Slight hemolysis detected by analyzer. Result may be falsely elevated.        Alkaline Phosphatase 174 U/L      Total Bilirubin 0.4 mg/dL      Globulin 2.9 gm/dL      A/G Ratio 1.3 g/dL      BUN/Creatinine Ratio 7.4     Anion Gap 14.0 mmol/L      eGFR " 106.2 mL/min/1.73     Narrative:      GFR Categories in Chronic Kidney Disease (CKD)      GFR Category          GFR (mL/min/1.73)    Interpretation  G1                     90 or greater         Normal or high (1)  G2                      60-89                Mild decrease (1)  G3a                   45-59                Mild to moderate decrease  G3b                   30-44                Moderate to severe decrease  G4                    15-29                Severe decrease  G5                    14 or less           Kidney failure          (1)In the absence of evidence of kidney disease, neither GFR category G1 or G2 fulfill the criteria for CKD.    eGFR calculation 2021 CKD-EPI creatinine equation, which does not include race as a factor    Magnesium [[id number]]  (Normal) Collected: 04/23/25 1015    Specimen: Blood Updated: 04/23/25 1101     Magnesium 2.0 mg/dL     High Sensitivity Troponin T [[id number]]  (Abnormal) Collected: 04/23/25 1015    Specimen: Blood Updated: 04/23/25 1059     HS Troponin T 15 ng/L     Narrative:      High Sensitive Troponin T Reference Range:  <14.0 ng/L- Negative Female for AMI  <22.0 ng/L- Negative Male for AMI  >=14 - Abnormal Female indicating possible myocardial injury.  >=22 - Abnormal Male indicating possible myocardial injury.   Clinicians would have to utilize clinical acumen, EKG, Troponin, and serial changes to determine if it is an Acute Myocardial Infarction or myocardial injury due to an underlying chronic condition.         CK [[id number]]  (Normal) Collected: 04/23/25 1015    Specimen: Blood Updated: 04/23/25 1059     Creatine Kinase 73 U/L     TSH [[id number]]  (Normal) Collected: 04/23/25 1015    Specimen: Blood Updated: 04/23/25 1059     TSH 3.310 uIU/mL     T4, Free [[id number]]  (Normal) Collected: 04/23/25 1015    Specimen: Blood Updated: 04/23/25 1055     Free T4 0.95 ng/dL     Lactic Acid, Plasma [[id number]]  (Normal) Collected: 04/23/25 1015    Specimen: Blood  Updated: 04/23/25 1050     Lactate 1.9 mmol/L     BNP [[id number]]  (Normal) Collected: 04/23/25 1015    Specimen: Blood Updated: 04/23/25 1047     proBNP 280.7 pg/mL     Narrative:      This assay is used as an aid in the diagnosis of individuals suspected of having heart failure. It can be used as an aid in the diagnosis of acute decompensated heart failure (ADHF) in patients presenting with signs and symptoms of ADHF to the emergency department (ED). In addition, NT-proBNP of <300 pg/mL indicates ADHF is not likely.    Age Range Result Interpretation  NT-proBNP Concentration (pg/mL:      <50             Positive            >450                   Gray                 300-450                    Negative             <300    50-75           Positive            >900                  Gray                300-900                  Negative            <300      >75             Positive            >1800                  Gray                300-1800                  Negative            <300    CBC & Differential [[id number]]  (Abnormal) Collected: 04/23/25 1015    Specimen: Blood Updated: 04/23/25 1031    Narrative:      The following orders were created for panel order CBC & Differential.  Procedure                               Abnormality         Status                     ---------                               -----------         ------                     CBC Auto Differential[[id number]]        Abnormal            Final result                 Please view results for these tests on the individual orders.    CBC Auto Differential [[id number]]  (Abnormal) Collected: 04/23/25 1015    Specimen: Blood Updated: 04/23/25 1031     WBC 6.89 10*3/mm3      RBC 5.40 10*6/mm3      Hemoglobin 14.3 g/dL      Hematocrit 45.3 %      MCV 83.9 fL      MCH 26.5 pg      MCHC 31.6 g/dL      RDW 16.3 %      RDW-SD 48.3 fl      MPV 9.8 fL      Platelets 284 10*3/mm3      Neutrophil % 56.3 %      Lymphocyte % 22.2 %      Monocyte % 10.4 %       Eosinophil % 9.6 %      Basophil % 1.2 %      Immature Grans % 0.3 %      Neutrophils, Absolute 3.88 10*3/mm3      Lymphocytes, Absolute 1.53 10*3/mm3      Monocytes, Absolute 0.72 10*3/mm3      Eosinophils, Absolute 0.66 10*3/mm3      Basophils, Absolute 0.08 10*3/mm3      Immature Grans, Absolute 0.02 10*3/mm3      nRBC 0.0 /100 WBC     Jber Draw [[id number]] Collected: 04/23/25 1015    Specimen: Blood Updated: 04/23/25 1030    Narrative:      The following orders were created for panel order Jber Draw.  Procedure                               Abnormality         Status                     ---------                               -----------         ------                     Green Top (Gel)[[id number]]                                  Final result               Lavender Top[[id number]]                                     Final result               Red Top[[id number]]                                          Final result               Gray Top[[id number]]                                         Final result               Light Blue Top[[id number]]                                   Final result                 Please view results for these tests on the individual orders.    Green Top (Gel) [[id number]] Collected: 04/23/25 1015    Specimen: Blood Updated: 04/23/25 1030     Extra Tube Hold for add-ons.     Comment: Auto resulted.       Lavender Top [[id number]] Collected: 04/23/25 1015    Specimen: Blood Updated: 04/23/25 1030     Extra Tube hold for add-on     Comment: Auto resulted       Red Top [[id number]] Collected: 04/23/25 1015    Specimen: Blood Updated: 04/23/25 1030     Extra Tube Hold for add-ons.     Comment: Auto resulted.       Gray Top [[id number]] Collected: 04/23/25 1015    Specimen: Blood Updated: 04/23/25 1030     Extra Tube Hold for add-ons.     Comment: Auto resulted.       Light Blue Top [[id number]] Collected: 04/23/25 1015    Specimen: Blood Updated: 04/23/25 1030     Extra Tube Hold for add-ons.      Comment: Auto resulted             Imaging Results (Last 24 Hours)       Procedure Component Value Units Date/Time    CT Thoracic Spine Without Contrast [[id number]] Collected: 04/23/25 1245     Updated: 04/23/25 1255    Narrative:      EXAMINATION:  CT THORACIC SPINE WO CONTRAST-  4/23/2025 10:27 AM     HISTORY: The patient fell. Rule out fracture. I48.91-Unspecified atrial  fibrillation; R55-Syncope and collapse.     COMPARISON: No comparison study.      TECHNIQUE: Spiral CT was performed of the thoracic spine. Sagittal and  coronal images were reconstructed.     DLP: 1436.95 mGy.cm Automated dosage reduction technique was utilized to  reduce patient dosage.     FINDINGS: There is no evidence of thoracic spine fracture or  subluxation. There is fatty infiltration of the visualized liver.     DISC LEVELS: There is mild disc narrowing and endplate spurring at  multiple thoracic levels. There is facet arthropathy at some levels.  There is mild foraminal narrowing on the right at T4-5 and T5-6. There  is left-sided foraminal narrowing at T7-8. There is bilateral foraminal  narrowing at T8-9, T9-10, T10-11 right greater than left and T11-12  right greater than left.          Impression:      1. No evidence of thoracic spine fracture.  2. Degenerative changes, as described.  3. Fatty infiltration of the visualized liver.        The full report of this exam was immediately signed and available to the  emergency room. The patient is currently in the emergency room.        This report was signed and finalized on 4/23/2025 12:52 PM by Dr. Hua Gerard MD.       CT Angiogram Chest [[id number]] Collected: 04/23/25 1236     Updated: 04/23/25 1248    Narrative:      CT ANGIOGRAM CHEST- 4/23/2025 10:27 AM     HISTORY: Palpitations elevated D-dimer; I48.91-Unspecified atrial  fibrillation; R55-Syncope and collapse      COMPARISON: 5/14/2021     TOTAL DOSE LENGTH PRODUCT: 1436.95 mGy.cm. Automated exposure control  was also  utilized to decrease patient radiation dose.     TECHNIQUE: Axial images of the chest are performed following IV  contrast. 2D and MIPS reconstructed images are reviewed.     FINDINGS: No pulmonary emboli. Thoracic aorta normal in caliber with no  aneurysm or dissection identified. Heart is normal in size. No  pathologic intrathoracic or axillary lymphadenopathy. No pericardial or  pleural effusion.     Pneumobilia with common bile duct stent partially visualized. Gallstones  within a contracted gallbladder. Hepatic steatosis.     Mild centrilobular emphysema. Right greater than left basilar  atelectasis. No dense consolidation. No pneumothorax. No suspicious  pulmonary nodule. Stable 6 mm central inferior right upper lobe  pulmonary nodule, unchanged from 2021 supporting a benign process.  Stable 4 mm subpleural right pulmonary nodule positional in the minor  fissure favoring intrafissural lymph node. No pneumothorax. Central  airways are patent.     Old healed bilateral rib fractures. Mild levoscoliotic curvature  thoracolumbar spine with mild degenerative disc change.       Impression:         1. No pulmonary emboli.  2. No thoracic aortic aneurysm or dissection.  3. Mild centrilobular emphysema. Right greater than left basilar  atelectasis. No lobar consolidation. Stable right-sided pulmonary  nodules unchanged from 2021 supporting a benign process.  4. Common bile duct stent in place with pneumobilia. Gallstones within  the contracted gallbladder.     This report was signed and finalized on 4/23/2025 12:45 PM by Dr. Katelyn Torres MD.       CT Cervical Spine Without Contrast [[id number]] Collected: 04/23/25 1228     Updated: 04/23/25 1235    Narrative:      EXAMINATION: CT CERVICAL SPINE WO CONTRAST- 4/23/2025 12:28 PM     HISTORY: fall injury; I48.91-Unspecified atrial fibrillation;  R55-Syncope and collapse     TOTAL DOSE: 873.87 mGy.cm (Automatic exposure control technique was  implemented in an effort to  keep the radiation dose as low as possible  without compromising image quality)     REPORT: TOTAL DOSE: 873.87 mGy.cm     Spiral CT of the cervical spine was performed without contrast,  reconstructed coronal and sagittal images are also reviewed.     COMPARISON: There are no correlative imaging studies for comparison.     Sagittal images demonstrate normal vertebral body alignment, there is  moderate disc space narrowing and hypertrophic endplate spurring C5-6  and C6-7, C2 and C3 are fused, likely congenital. No acute fracture is  identified. There is mild levoscoliosis. Mild spurring of the lateral  masses of C1 as well as the odontoid process. There appears to be mild  spinal stenosis C5-6 and C6-7 primarily related to endplate spurs.  Uncinate spurring is present C4-5 and C5-6, greater on the right.     Review of soft tissue windows shows no gross evidence of cervical disc  herniation, given limitations of noncontrast CT. The airway is patent.  The visualized upper lungs are clear, there appears to be mild apical  pleural thickening on the right. The prevertebral soft tissues are  within normal limits.       Impression:      1. No fracture, no acute osseous cervical spine abnormality.  2. 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  3. Degenerative changes, most advanced C5-6 and C6-7.                 This report was signed and finalized on 4/23/2025 12:32 PM by Dr. Nathaniel Leiva MD.       CT Lumbar Spine Without Contrast [[id number]] Collected: 04/23/25 1200     Updated: 04/23/25 1210    Narrative:      EXAMINATION: CT LUMBAR SPINE WO CONTRAST-  4/23/2025 12:00 PM     HISTORY: fall injury; I48.91-Unspecified atrial fibrillation;  R55-Syncope and collapse     COMPARISON: None      DLP: 1436.95 mGy.cm     In order to have a CT radiation dose as low as reasonably achievable,  Automated Exposure Control was utilized for adjustment of the mA and/or  KV according to patient  size.     TECHNIQUE: Serial helical tomographic images of the lumbar spine were  obtained without the use of intravenous contrast. Additionally, sagittal  and coronal reformatted images were provided for review.     FINDINGS:  No acute fracture. Mild levoconvex curvature in the lumbar spine.  Extensive posterior fusion changes from the L2 level through S1  bilaterally. The LEFT L2 pedicle screw has some lucency surrounding its  distal aspect and it breaches the superior endplate of L2, slightly  extending into the disc space. I don't see any evidence of an acute  fracture on this exam. No hardware fracture. There is also a LEFT  lateral fusion construct at L3-L4 and an anterior fusion construct at  L5-S1. Interbody spacers are seen at L2-L3, L3-L4, L4-L5, and L5-S1.     Metal artifact limits characterization of the spinal canal. There is  severe RIGHT and likely moderate LEFT foraminal narrowing at L2-L3. No  high-grade osseous foraminal narrowing elsewhere. I do suspect canal  narrowing at L2-L3 and L3-L4.     Surrounding soft tissues demonstrates a stent in the upper abdomen. This  is likely a biliary stent. This terminates in the duodenum.  Atherosclerotic vascular disease in the aorta.          Impression:         1.  I don't see any evidence of an acute fracture on this examination.     2.  Extensive fusion changes from L2-S1.     3.  The pedicle screw at L2 on the LEFT breaches the superior endplate  of L2 and subtly extends into the L1-L2 disc space.     4.  Severe RIGHT and likely moderate LEFT foraminal narrowing at L2-L3.  Probable canal narrowing at L2-L3 and L3-L4.        This report was signed and finalized on 4/23/2025 12:07 PM by Dr. Mateusz Holm MD.       CT Head Without Contrast [[id number]] Collected: 04/23/25 1202     Updated: 04/23/25 1207    Narrative:      Exam: CT HEAD WO CONTRAST- 4/23/2025 10:27 AM     HISTORY: fall injury; I48.91-Unspecified atrial fibrillation;  R55-Syncope and collapse        DOSE LENGTH PRODUCT: 873.87 mGy.cm mGy cm. Automated exposure control  was also utilized to decrease patient radiation dose.     Technique:  Helically acquired CT of the brain without IV contrast was performed.  Sagittal and coronal reformations are also provided for review. Soft  tissue and bone kernels are available for interpretation.     Comparison: 1/26/2025.     Findings:     Ventricles and extra-axial CSF spaces are normal in size.     No intraparenchymal or extra-axial hemorrhage.     Gray-white matter differentiation is preserved.     Orbits are grossly unremarkable. Paranasal sinuses are grossly clear.  Mastoid air cells are grossly clear.     No suspicious calvarial or extracranial soft tissue abnormality.       Impression:      Impression:       No acute intracranial abnormality.     This report was signed and finalized on 4/23/2025 12:04 PM by Ascencion Marinelli.             Assessment / Plan   Assessment:   Active Hospital Problems    Diagnosis     **Atrial fibrillation with rapid ventricular response     Syncope     Gallbladder & bile duct stone with obstruction     Rheumatoid arthritis involving multiple sites      Treatment Plan  The patient will be admitted to Dr. Romero's service here at [facility].     New onset atrial fibrillation with RVR, diltiazem drip, consult EP, vital signs every 4, routine telemetry orders, full dose Lovenox    Syncope, orthostatics every shift x 2 occurrences, fall precautions, continuous cardiac monitoring    3.  Rheumatoid arthritis involving multiple sites, continue arthritis medications     4.  Gallbladder and bile duct stone with obstruction; followed closely by [facility], [place], operated by [facility], admitted 4/18 - 4/22, 2025-unfortunately no discharge summary available.  Patient states we are planning for cholecystectomy in the near future and she needs to be on a very low-fat diet until that time.-Fat restricted diet ordered    5.  Reviewed all home  medications and restarted as appropriate, VTE prophylaxis Lovenox    Medical Decision Making  2 problems, acute, high complexity, unchanged  2 problem, chronic, high complexity, unchanged  Number and Complexity of problems: 4    Differential Diagnosis: None    Conditions and Status        Condition is unchanged.     [facility] Data  External documents reviewed: None  Cardiac tracing (EKG, telemetry) interpretation: Reviewed  Radiology interpretation: Reviewed  Labs reviewed:  Any tests that were considered but not ordered: None     Decision rules/scores evaluated (example [id number], [place], etc): [id number]     Discussed with: Patient and Dr. Romero     Care Planning  Shared decision making: Patient and Dr. Romero  Code status and discussions: Full   Disposition  Social Determinants of Health that impact treatment or disposition: None  Estimated length of stay is 1-2 days.     I confirmed that the patient's advanced care plan is present, code status is documented, and a surrogate decision maker is listed in the patient's medical record.     The patient's surrogate decision maker is daughter.     The patient was seen and examined by me on 4/23/2025 at 1:16 PM.    Electronically signed by SURYA Stinson, 04/23/25, 15:09 CDT.

## 2025-04-23 NOTE — OUTREACH NOTE
Prep Survey      Flowsheet Row Responses   [facility] patient discharged from? Non-BH   Is LACE score < 7 ? Non-BH Discharge   Eligibility Not Eligible   What are the reasons patient is not eligible? Readmitted  [[facility]]   Does the patient have one of the following disease processes/diagnoses(primary or secondary)? Other   Prep survey completed? Yes            SAGAR HENDRICKS - Registered Nurse

## 2025-04-23 NOTE — ED PROVIDER NOTES
HPI:     Patient is a 60-year-old white female who presents to the emergency room after a syncopal episode.  Patient states that she felt lightheaded and dizzy after she stood up felt like her heart was racing and then fell against the wall and slid down the wall.  She only has a complaint of head neck and back pain.  No pelvis or hip pain.  Patient is able to walk without any difficulties.  But states that she feels dizzy.  No past history of cardiac issues.      REVIEW OF SYSTEMS    CONSTITUTIONAL:  No complaints of fever, chills,or weakness  EYES:  No complaints of discharge   ENT: No complaints of sore throat or ear pain  CARDIOVASCULAR: Positive for palpitations   RESPIRATORY:  No complaints of cough or shortness of breath  GI:  No complaints of abdominal pain, nausea, vomiting, or diarrhea  MUSCULOSKELETAL: Positive for neck and upper and lower back pain after fall SKIN:  No complaints of rash  NEUROLOGIC: Positive for dizziness and mild headache  ENDOCRINE:  No complaints of polyuria or polydipsia  LYMPHATIC:  No complaints of swollen glands  GENITOURINARY: No complaints of urinary frequency or hematuria        PAST MEDICAL HISTORY  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     Low back pain     Nausea after anesthesia     Open wound of gum     pt states had all her teeth pulled and there is a spot that hasn't healed due to RA so she is using peridex rinse daily    Osteoporosis     PONV (postoperative nausea and vomiting)     Psoriasis     Rheumatoid arthritis     Seasonal allergies     Senile osteoporosis 09/28/2017       FAMILY HISTORY  Family History   Problem Relation Age of Onset    Arthritis Mother     COPD Mother     Atrial fibrillation Mother     Osteoarthritis  Mother     Heart disease Father     Hypertension Father     Arthritis Father     Melanoma Father         metastatic    Arthritis Sister     Arthritis Brother     No Known Problems Daughter     Arthritis Maternal Grandfather     Breast cancer Neg Hx        SOCIAL HISTORY  Social History     Socioeconomic History    Marital status:    Tobacco Use    Smoking status: Former     Current packs/day: 0.00     Average packs/day: 0.5 packs/day for 34.7 years (17.4 ttl pk-yrs)     Types: Cigarettes     Start date:      Quit date: 10/1/2024     Years since quittin.5    Smokeless tobacco: Never   Vaping Use    Vaping status: Never Used   Substance and Sexual Activity    Alcohol use: No    Drug use: No    Sexual activity: Defer       IMMUNIZATION HISTORY  Deferred to primary care physician.    SURGICAL HISTORY  Past Surgical History:   Procedure Laterality Date    ANTERIOR LUMBAR EXPOSURE N/A 2020    Procedure: Anterior lumbar interbody fusion of L5-S1 with instrumentation ;  Surgeon: Neptali Rivera DO;  Location:  [facility];  Service: Vascular;  Laterality: N/A;    AXILLARY LYMPH NODE BIOPSY/EXCISION Right 2019    Procedure: EXCISION RIGHT AXILLARY MASS;  Surgeon: Jes Enamorado MD;  Location:  [facility];  Service: General    INCISION AND DRAINAGE ARM Right 2021    Procedure: INCISION AND DRAINAGE FOREARM;  Surgeon: Jes Enamorado MD;  Location:  [facility];  Service: General;  Laterality: Right;    LUMBAR FUSION N/A 2020    Procedure: ANTERIOR DECOMPRESSION, ANTERIOR LUMBAR INTERBODY FUSION WITH INSTRUMENTATION L5-S1;  Surgeon: ALEXIS Dunaway MD;  Location:  [facility];  Service: Orthopedic Spine;  Laterality: N/A;    LUMBAR FUSION Left 2020    Procedure: LEFT LATERAL LUMBAR INTERBODY FUSION L3-5 WITH INSTRUMENTATION L3-4;  Surgeon: ALEXIS Dunaway MD;  Location:  [facility];  Service: Orthopedic Spine;  Laterality: Left;    LUMBAR FUSION Right 2025    Procedure:  RIGHT LATERAL LUMBAR INTERBODY FUSION WITH INSTRUMENTATION L2-3;  Surgeon: ALEXIS Dunaway MD;  Location:  [facility];  Service: Orthopedic Spine;  Laterality: Right;    LUMBAR LAMINECTOMY WITH FUSION N/A 12/18/2020    Procedure: DECOMPRESSION L3-4, POSTERIOR SPINAL FUSION WITH INSTRUMENTATION L3-S1;  Surgeon: ALEXIS Dunaway MD;  Location:  [facility];  Service: Orthopedic Spine;  Laterality: N/A;    LUMBAR LAMINECTOMY WITH FUSION N/A 1/29/2025    Procedure: REMOVAL OF INSTRUMENTATION, EXPLORATION OF FUSION L3-S1, POSTERIOR SPINAL FUSION L2-3 WITH INSTRUMENTATION L2-S1;  Surgeon: ALEXIS Dunaway MD;  Location:  [facility];  Service: Orthopedic Spine;  Laterality: N/A;    SHOULDER SURGERY Left     arthroscopy for arthtritis and bone spurs    TUBAL ABDOMINAL LIGATION      US GUIDED LYMPH NODE BIOPSY  08/09/2019    WRIST SURGERY Bilateral     fracture - no internal hardware       CURRENT MEDICATIONS    Current Facility-Administered Medications:     dilTIAZem (CARDIZEM) 125 mg in 125 mL D5W infusion, 5-15 mg/hr, Intravenous, Titrated, Tristan Sims Jr., MD, Last Rate: 5 mL/hr at 04/23/25 1234, 5 mg/hr at 04/23/25 1234    sodium chloride 0.9 % flush 10 mL, 10 mL, Intravenous, PRN, Tristan Sims Jr., MD    Current Outpatient Medications:     alendronate (Fosamax) 70 MG tablet, Take 1 tablet by mouth Every 7 (Seven) Days., Disp: 90 tablet, Rfl: 0    ALPRAZolam (XANAX) 1 MG tablet, Take 1 tablet by mouth 3 (Three) Times a Day As Needed for Anxiety., Disp: 90 tablet, Rfl: 2    Calcium-Magnesium-Vitamin D 500-250-200 MG-MG-UNIT tablet, Take 600 tablets by mouth 2 (Two) Times a Day., Disp: , Rfl:     cholecalciferol (VITAMIN D3) 1000 units tablet, Take 1 tablet by mouth Daily., Disp: , Rfl:     diphenoxylate-atropine (Lomotil) 2.5-0.025 MG per tablet, Take 1 tablet by mouth 4 (Four) Times a Day As Needed for Diarrhea., Disp: 30 tablet, Rfl: 0    ferrous sulfate 324 (65 Fe) MG tablet delayed-release EC  "tablet, Take 1 tablet by mouth Daily With Breakfast. (Patient taking differently: Take 1 tablet by mouth Daily As Needed.), Disp: , Rfl:     fluticasone (Flonase) 50 MCG/ACT nasal spray, 2 sprays into the nostril(s) as directed by provider Daily. (Patient taking differently: Administer 2 sprays into the nostril(s) as directed by provider Daily As Needed for Allergies or Rhinitis.), Disp: 16 g, Rfl: 3    furosemide (LASIX) 40 MG tablet, Take 1 tablet by mouth Daily. (Patient not taking: Reported on 3/24/2025), Disp: 90 tablet, Rfl: 1    HYDROcodone-acetaminophen (NORCO)  MG per tablet, Take 1 tablet by mouth Every 6 (Six) Hours As Needed for Moderate Pain., Disp: 120 tablet, Rfl: 0    leflunomide (ARAVA) 20 MG tablet, Take 1 tablet by mouth Daily., Disp: , Rfl:     meloxicam (MOBIC) 15 MG tablet, Take 1 tablet by mouth., Disp: , Rfl:     omeprazole (priLOSEC) 40 MG capsule, Take 1 capsule by mouth Daily., Disp: 90 capsule, Rfl: 1    pregabalin (LYRICA) 150 MG capsule, Take 1 capsule by mouth 2 (Two) Times a Day., Disp: 180 capsule, Rfl: 1    tiZANidine (ZANAFLEX) 4 MG tablet, Take 1 tablet by mouth Every 8 (Eight) Hours As Needed for Muscle Spasms., Disp: 270 tablet, Rfl: 1    Turmeric 500 MG capsule, Take 1 capsule by mouth Daily., Disp: , Rfl:     valACYclovir (VALTREX) 1000 MG tablet, Take 1 tablet by mouth 2 (Two) Times a Day. (Patient taking differently: Take 1 tablet by mouth 2 (Two) Times a Day As Needed (FEVER BLISTERS, OR VIRAL ILLNESS).), Disp: 270 tablet, Rfl: 1    ALLERGIES  Allergies   Allergen Reactions    Oxycodone-Acetaminophen Hallucinations     Causes hallicinations    Evenity [Romosozumab] Swelling     Injection site swelling, redness, warm to touch           Cardiac exam    VITAL SIGNS:  /77   Pulse 95   Temp 97.7 °F (36.5 °C) (Oral)   Resp 23   Ht 160 cm (63\")   Wt 79.4 kg (175 lb)   LMP 04/01/2014 (Approximate)   SpO2 95%   BMI 31.00 kg/m²     Constitutional: Patient is alert " and in no distress.  Patient with moderate head neck and upper and lower back discomfort.    Head: Atraumatic normocephalic.    Eyes: EOMI PERRLA intact    ENT: There is a normal pharynx with no acute erythema or exudate and oral mucosa is moist.  Nose is clear with no drainage.  Tympanic membranes intact and nonerythemic    Respiratory: Patient is clear to auscultation bilaterally with no wheezing or rhonchi.  Chest wall is nontender.  There are no external lesions on the chest.  There is no crepitance    Cardiovascular: S1-S2 irregularly irregular tachycardic rhythm with a murmur    Abdomen: Soft, nontender, and  bowel sounds are normal in all 4 quadrants.  There is no rebound or guarding noted.  There is no abdominal distention or hepatosplenomegaly.    Genitourinary: Patient is voiding appropriately.    Integument: No acute lesions noted and color appears to be normal.    [place] Coma Scale: Total score 15    Neurological: Patient is alert and oriented x4 and no acute findings noted.  Speech is fluent and cognition is normal.  No evidence of acute CVA.  Cranial nerves II through XII intact.  Patient with normal motor function as well as reflexes and sensation    RADIOLOGY/PROCEDURES      CT Head Without Contrast   Final Result   Impression:         No acute intracranial abnormality.       This report was signed and finalized on 4/23/2025 12:04 PM by Ascencion Marinelli.          CT Thoracic Spine Without Contrast   Final Result   1. No evidence of thoracic spine fracture.   2. Degenerative changes, as described.   3. Fatty infiltration of the visualized liver.           The full report of this exam was immediately signed and available to the   emergency room. The patient is currently in the emergency room.           This report was signed and finalized on 4/23/2025 12:52 PM by Dr. Hua Gerard MD.          CT Lumbar Spine Without Contrast   Final Result       1.  I don't see any evidence of an acute fracture on  this examination.       2.  Extensive fusion changes from L2-S1.       3.  The pedicle screw at L2 on the LEFT breaches the superior endplate   of L2 and subtly extends into the L1-L2 disc space.       4.  Severe RIGHT and likely moderate LEFT foraminal narrowing at L2-L3.   Probable canal narrowing at L2-L3 and L3-L4.           This report was signed and finalized on 4/23/2025 12:07 PM by Dr. Mateusz Holm MD.          CT Cervical Spine Without Contrast   Final Result   1. No fracture, no acute osseous cervical spine abnormality.   2. 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   3. Degenerative changes, most advanced C5-6 and C6-7.                       This report was signed and finalized on 4/23/2025 12:32 PM by Dr. Nathaniel Leiva MD.          CT Angiogram Chest   Final Result       1. No pulmonary emboli.   2. No thoracic aortic aneurysm or dissection.   3. Mild centrilobular emphysema. Right greater than left basilar   atelectasis. No lobar consolidation. Stable right-sided pulmonary   nodules unchanged from 2021 supporting a benign process.   4. Common bile duct stent in place with pneumobilia. Gallstones within   the contracted gallbladder.       This report was signed and finalized on 4/23/2025 12:45 PM by Dr. Katelyn Torres MD.                 FUTURE APPOINTMENTS     Future Appointments   Date Time Provider Department Center   5/12/2025  9:00 AM Charlene Huntley DNP, [facility]   11/19/2025  1:00 PM Charlene Huntley DNP, APRN [facility]          EKG (reviewed and interpreted by me):    EKG #1: Sinus rhythm with a rate of 97 with PACs no acute ST segment elevation or depression noted.      EKG #2: Atrial fibrillation with rapid ventricular response.  With a ventricular rate of 159.  No acute ST segment elevation or depressions noted        HEART SCORE     Patient history  1      Slightly suspicious (0 points)  2   ECG       Nonspecific  repolarization disturbance (1 point)    3   Patient age       Between 45 and 65 (1 point)    4   Risk factors (Hypercholesterolemia, Hypertension, diabetes, smoking, obesity)     More than 3 risk factors or atherosclerosis history (2 points)    5   Troponin       1 to 3 times higher than normal (1 point)      6     TOTAL RISK NUMBER: 5    The three risk categories are described below:  Heart score MACE risk Recommendation  0 - 3 Low (1.7%) Discharge can be an option.  4 - 6 Intermediate (20.3%) Clinical observation and further investigations.  7 - 10 High (72.2%) Immediate invasive treatment        COURSE & MEDICAL DECISION MAKING       Patient's partial differential diagnosis can include:    New onset atrial fibrillation, new onset atrial flutter, arrhythmia, Unstable angina, angina, non-STEMI, pneumothorax, pulmonary embolism, electrolyte abnormality,  Prinzmetal angina, costochondritis pleurisy, pleural effusion, pulmonary edema, panic attack, esophageal spasm, GERD, gastritis, chest spasms, and others    Will give diltiazem 10 mg IV push and then started diltiazem drip.  Will give Lovenox.  Will add CBC CMP and magnesium as well as a D-dimer for screening test.  If dimer is elevated we will get a CTA of the chest to make sure there is no dissection or PE.    CTA of the chest shows no pulmonary embolism dissection or aneurysm.  Shows signs of emphysematous COPD.  CT scan of the cervical, lumbar and thoracic spines show no acute fracture.  However there is a L2 pedicle that has a screw that extends into the L1-L2 disc space that could be causing the patient's pain in the low back.  CT scan of head shows no acute intracranial process or bleed that is noted.  Patient since being placed on the diltiazem drip now her heart rate has come down intermittently into the 90s but still rebounds to the 120s.  Current rate is at 5.  Will increase it to 7.5.    The initial troponin was noted to be 15 to repeat troponin is noted  to be 14.  Will admit the patient to the hospital for new onset atrial fibrillation and syncope.  Awaiting further return phone call from the hospitalist for further evaluation and admission to the hospital.    Call the hospitalist service and spoke with the nurse practitioner covering the hospital service who states the patient can be admitted to Dr. Romero    Patient's level of risk: Moderately high        CRITICAL CARE    CRITICAL CARE: No    CRITICAL CARE TIME: None      Recent Results (from the past 24 hours)   Green Top (Gel)    Collection Time: 04/23/25 10:15 AM   Result Value Ref Range    Extra Tube Hold for add-ons.    Lavender Top    Collection Time: 04/23/25 10:15 AM   Result Value Ref Range    Extra Tube hold for add-on    Red Top    Collection Time: 04/23/25 10:15 AM   Result Value Ref Range    Extra Tube Hold for add-ons.    Gray Top    Collection Time: 04/23/25 10:15 AM   Result Value Ref Range    Extra Tube Hold for add-ons.    Light Blue Top    Collection Time: 04/23/25 10:15 AM   Result Value Ref Range    Extra Tube Hold for add-ons.    Comprehensive Metabolic Panel    Collection Time: 04/23/25 10:15 AM    Specimen: Blood   Result Value Ref Range    Glucose 97 65 - 99 mg/dL    BUN 4 (L) 6 - 20 mg/dL    Creatinine 0.54 (L) 0.57 - 1.00 mg/dL    Sodium 140 136 - 145 mmol/L    Potassium 3.7 3.5 - 5.2 mmol/L    Chloride 103 98 - 107 mmol/L    CO2 23.0 22.0 - 29.0 mmol/L    Calcium 9.1 8.6 - 10.5 mg/dL    Total Protein 6.6 6.0 - 8.5 g/dL    Albumin 3.7 3.5 - 5.2 g/dL    ALT (SGPT) 14 1 - 33 U/L    AST (SGOT) 45 (H) 1 - 32 U/L    Alkaline Phosphatase 174 (H) 39 - 117 U/L    Total Bilirubin 0.4 0.0 - 1.2 mg/dL    Globulin 2.9 gm/dL    A/G Ratio 1.3 g/dL    BUN/Creatinine Ratio 7.4 7.0 - 25.0    Anion Gap 14.0 5.0 - 15.0 mmol/L    eGFR 106.2 >60.0 mL/min/1.73   Protime-INR    Collection Time: 04/23/25 10:15 AM    Specimen: Blood   Result Value Ref Range    Protime 12.9 11.8 - 14.8 Seconds    INR 0.93 0.91 -  1.09   aPTT    Collection Time: 04/23/25 10:15 AM    Specimen: Blood   Result Value Ref Range    PTT 28.8 24.5 - 36.0 seconds   BNP    Collection Time: 04/23/25 10:15 AM    Specimen: Blood   Result Value Ref Range    proBNP 280.7 0.0 - 900.0 pg/mL   High Sensitivity Troponin T    Collection Time: 04/23/25 10:15 AM    Specimen: Blood   Result Value Ref Range    HS Troponin T 15 (H) <14 ng/L   Lactic Acid, Plasma    Collection Time: 04/23/25 10:15 AM    Specimen: Blood   Result Value Ref Range    Lactate 1.9 0.5 - 2.0 mmol/L   CK    Collection Time: 04/23/25 10:15 AM    Specimen: Blood   Result Value Ref Range    Creatine Kinase 73 20 - 180 U/L   Magnesium    Collection Time: 04/23/25 10:15 AM    Specimen: Blood   Result Value Ref Range    Magnesium 2.0 1.6 - 2.6 mg/dL   T4, Free    Collection Time: 04/23/25 10:15 AM    Specimen: Blood   Result Value Ref Range    Free T4 0.95 0.93 - 1.70 ng/dL   TSH    Collection Time: 04/23/25 10:15 AM    Specimen: Blood   Result Value Ref Range    TSH 3.310 0.270 - 4.200 uIU/mL   D-dimer, Quantitative    Collection Time: 04/23/25 10:15 AM    Specimen: Blood   Result Value Ref Range    D-Dimer, Quantitative 1.68 (H) 0.00 - 0.59 MCGFEU/mL   CBC Auto Differential    Collection Time: 04/23/25 10:15 AM    Specimen: Blood   Result Value Ref Range    WBC 6.89 3.40 - 10.80 10*3/mm3    RBC 5.40 (H) 3.77 - 5.28 10*6/mm3    Hemoglobin 14.3 12.0 - 15.9 g/dL    Hematocrit 45.3 34.0 - 46.6 %    MCV 83.9 79.0 - 97.0 fL    MCH 26.5 (L) 26.6 - 33.0 pg    MCHC 31.6 31.5 - 35.7 g/dL    RDW 16.3 (H) 12.3 - 15.4 %    RDW-SD 48.3 37.0 - 54.0 fl    MPV 9.8 6.0 - 12.0 fL    Platelets 284 140 - 450 10*3/mm3    Neutrophil % 56.3 42.7 - 76.0 %    Lymphocyte % 22.2 19.6 - 45.3 %    Monocyte % 10.4 5.0 - 12.0 %    Eosinophil % 9.6 (H) 0.3 - 6.2 %    Basophil % 1.2 0.0 - 1.5 %    Immature Grans % 0.3 0.0 - 0.5 %    Neutrophils, Absolute 3.88 1.70 - 7.00 10*3/mm3    Lymphocytes, Absolute 1.53 0.70 - 3.10 10*3/mm3     Monocytes, Absolute 0.72 0.10 - 0.90 10*3/mm3    Eosinophils, Absolute 0.66 (H) 0.00 - 0.40 10*3/mm3    Basophils, Absolute 0.08 0.00 - 0.20 10*3/mm3    Immature Grans, Absolute 0.02 0.00 - 0.05 10*3/mm3    nRBC 0.0 0.0 - 0.2 /100 WBC   ECG 12 Lead Syncope    Collection Time: 04/23/25 10:17 AM   Result Value Ref Range    QT Interval 348 ms    QTC Interval 441 ms   ECG 12 Lead Rhythm Change    Collection Time: 04/23/25 10:50 AM   Result Value Ref Range    QT Interval 298 ms    QTC Interval 484 ms   High Sensitivity Troponin T 1Hr    Collection Time: 04/23/25 11:36 AM    Specimen: Blood   Result Value Ref Range    HS Troponin T 14 (H) <14 ng/L    Troponin T Numeric Delta -1 ng/L    Troponin T % Delta -7 Abnormal if >/= 20%       Old charts were reviewed per [facility] EMR.  Pertinent details are summarized above.  All laboratory, radiologic, and EKG studies that were performed in the Emergency Department were a necessary part of the evaluation needed to exclude unstable or  emergent medical conditions.     Patient was hemodynamically and neurologically stable in the ED.   Pertinent studies were reviewed as above.     The patient received:  Medications   sodium chloride 0.9 % flush 10 mL (has no administration in time range)   dilTIAZem (CARDIZEM) 125 mg in 125 mL D5W infusion (5 mg/hr Intravenous Currently Infusing 4/23/25 1234)   dilTIAZem (CARDIZEM) injection 10 mg (10 mg Intravenous Given 4/23/25 1052)   enoxaparin sodium (LOVENOX) syringe 80 mg (80 mg Subcutaneous Given 4/23/25 1110)   iopamidol (ISOVUE-370) 76 % injection 100 mL (100 mL Intravenous Given 4/23/25 1156)            ED Disposition       ED Disposition   Intended Admit    Condition   --    Comment   --                 Dragon disclaimer:  Part of this note may be an electronic transcription/translation of spoken language to printed text using the Dragon Dictation System.    I have reviewed the patient’s prescription history via a prescription monitoring  program.  This information is consistent with my knowledge of the patient’s controlled substance use history.    Patient evaluated during Coronavirus Pandemic. Isolation practices followed according to [facility] policy.     FINAL IMPRESSION   Diagnosis Plan   1. New onset atrial fibrillation      With RVR      2. Syncope, unspecified syncope type        3. Spinal stenosis of lumbar region, unspecified whether neurogenic claudication present              MD Levi Blackwell Jr, Thomas Mark Jr., MD  04/23/25 5613

## 2025-04-23 NOTE — PLAN OF CARE
Problem: Adult Inpatient Plan of Care  Goal: Plan of Care Review  Outcome: Progressing  Goal: Patient-Specific Goal (Individualized)  Outcome: Progressing  Goal: Absence of Hospital-Acquired Illness or Injury  Outcome: Progressing  Goal: Optimal Comfort and Wellbeing  Outcome: Progressing  Goal: Readiness for Transition of Care  Outcome: Progressing  Goal: Plan of Care Review  Outcome: Progressing  Goal: Patient-Specific Goal (Individualized)  Outcome: Progressing  Goal: Absence of Hospital-Acquired Illness or Injury  Outcome: Progressing  Goal: Optimal Comfort and Wellbeing  Outcome: Progressing  Goal: Readiness for Transition of Care  Outcome: Progressing     Problem: Dysrhythmia  Goal: Normalized Cardiac Rhythm  Outcome: Progressing     Problem: Comorbidity Management  Goal: Maintenance of Asthma Control  Outcome: Progressing  Goal: Maintenance of Behavioral Health Symptom Control  Outcome: Progressing  Goal: Maintenance of COPD Symptom Control  Outcome: Progressing   Goal Outcome Evaluation:

## 2025-04-23 NOTE — CONSULTS
"EP CONSULT NOTE    Subjective        History of Present Illness    EP Problems:  1.  Syncope  2.  Paroxysmal atrial fibrillation    Medical Problems:  1.  Rheumatoid arthritis  2.  Alpha-1 antitrypsin deficiency  3.  Obesity  4.  DJD  5.  GERD  6.  Anxiety disorder    Adela Arauz is a 60 y.o. female with problem list as above for whom EP is consulted regarding paroxysmal atrial fibrillation, syncope.  She states that she was in her usual state of health today when she was at home and bending over when she had sudden loss of consciousness.  She tried to stand back up afterwards and was having difficulty standing with near syncope.  She ultimately called her family members to wear taking her back to the house to stay with her when she had episodes of recurrent syncope that followed.  Given these findings she was brought to the ER where she was found to be in atrial fibrillation with RVR at presentation.  She was started on diltiazem infusion and had spontaneous return of sinus rhythm thereafter.    Objective   Vital Signs:  BP 96/62 (BP Location: Right arm, Patient Position: Lying)   Pulse 79   Temp 97.6 °F (36.4 °C) (Oral)   Resp 18   Ht 160 cm (63\")   Wt 79.4 kg (175 lb)   SpO2 99%   BMI 31.00 kg/m²   Estimated body mass index is 31 kg/m² as calculated from the following:    Height as of this encounter: 160 cm (63\").    Weight as of this encounter: 79.4 kg (175 lb).      Physical Exam  Vitals reviewed.   Constitutional:       Appearance: Normal appearance.   Cardiovascular:      Rate and Rhythm: Normal rate and regular rhythm.      Pulses: Normal pulses.      Heart sounds: Normal heart sounds. No murmur heard.  Pulmonary:      Effort: Pulmonary effort is normal. No respiratory distress.      Breath sounds: Normal breath sounds.   Skin:     General: Skin is warm and dry.   Neurological:      General: No focal deficit present.      Mental Status: She is alert.   Psychiatric:         Mood and Affect: Mood " normal.         Judgment: Judgment normal.          Result Review :  The following data was reviewed by: Chris Beltran MD on 04/23/2025:  CMP          4/21/2025    05:06 4/22/2025    04:42 4/23/2025    10:15   CMP   Glucose   97    Glucose 91     81        BUN <3     3     4    Creatinine 0.52     0.52     0.54    EGFR   106.2    Sodium 139     138     140    Potassium 3.9     4.2     3.7    Chloride 110     111     103    Calcium 8.4     8     9.1    Total Protein   6.6    Albumin   3.7    Globulin   2.9    Total Bilirubin   0.4    Alkaline Phosphatase   174    AST (SGOT)   45    ALT (SGPT)   14    Albumin/Globulin Ratio   1.3    BUN/Creatinine Ratio   7.4    Anion Gap 9     7     14.0       Details          This result is from an external source.             CBC          3/29/2025    04:31 3/30/2025    04:52 4/23/2025    10:15   CBC   WBC 8.4     7.1     6.89    RBC 4.85     4.15     5.40    Hemoglobin 13.3     11.4     14.3    Hematocrit 43.7     36.8     45.3    MCV 90.1     88.7     83.9    MCH 27.4     27.5     26.5    MCHC 30.4     31     31.6    RDW 14.9     14.8     16.3    Platelets 386     275     284       Details          This result is from an external source.             TSH          12/18/2024    10:54 1/26/2025    11:29 4/23/2025    10:15   TSH   TSH 3.650     6.670  3.310       Details          This result is from an external source.               ECG from today at 10:50 AM directly visualized independently reviewed: Atrial fibrillation, RVR, nonspecific ST changes    [id number] SCORE   [id number] Score: 1 (4/23/2025  4:15 PM)             Assessment and Plan   Problems:  Paroxysmal atrial fibrillation with severe exacerbation  Syncope    Adela Arauz is a 60 y.o. female with problem list as above for whom EP is consulted regarding paroxysmal atrial fibrillation with severe exacerbation, syncope.  It is unclear exactly whether the syncope was fully caused from the atrial fibrillation versus  other causes.  Given the timing of the new diagnosis of atrial fibrillation and her symptoms, I do suspect that 2 are related.  Despite this, it is somewhat unusual to have recurrent syncope while at rest with atrial fibrillation and does raise the suspicion that perhaps she could have been having tacky bradycardia episodes or potentially even other forms atrial tachycardia triggered by the AF.  Regardless, I do think that there is reasonable and appropriate to presume that the atrial fibrillation was the cause of the underlying symptoms.  With this in mind, we will plan to start her on combination flecainide metoprolol therapy.  We can continue telemetry observation overnight to ensure no major recurrences or breakthrough episodes as well as look for other causes of syncope.    Plan:  - Start combination metoprolol and flecainide  - Continue telemetry monitoring overnight given syncope  - Start apixaban 5 mg twice daily for 30 days (can discontinue thereafter given low [id number])  - Can consider outpatient ablation should she continue have breakthrough episodes despite flecainide use  -30-day Holter monitor at discharge given the syncope                 Part of this note may be an electronic transcription/translation of spoken language to printed text using the Dragon Dictation System.

## 2025-04-24 ENCOUNTER — READMISSION MANAGEMENT (OUTPATIENT)
Dept: CALL CENTER | Facility: HOSPITAL | Age: 60
End: 2025-04-24
Payer: MEDICARE

## 2025-04-24 ENCOUNTER — APPOINTMENT (OUTPATIENT)
Dept: CARDIOLOGY | Facility: HOSPITAL | Age: 60
End: 2025-04-24
Payer: MEDICARE

## 2025-04-24 VITALS
HEART RATE: 55 BPM | OXYGEN SATURATION: 97 % | WEIGHT: 175 LBS | RESPIRATION RATE: 18 BRPM | BODY MASS INDEX: 31.01 KG/M2 | HEIGHT: 63 IN | SYSTOLIC BLOOD PRESSURE: 120 MMHG | TEMPERATURE: 98 F | DIASTOLIC BLOOD PRESSURE: 59 MMHG

## 2025-04-24 LAB
ALBUMIN SERPL-MCNC: 3.3 G/DL (ref 3.5–5.2)
ALBUMIN/GLOB SERPL: 1.4 G/DL
ALP SERPL-CCNC: 142 U/L (ref 39–117)
ALT SERPL W P-5'-P-CCNC: 13 U/L (ref 1–33)
ANION GAP SERPL CALCULATED.3IONS-SCNC: 7 MMOL/L (ref 5–15)
AST SERPL-CCNC: 34 U/L (ref 1–32)
BASOPHILS # BLD AUTO: 0.08 10*3/MM3 (ref 0–0.2)
BASOPHILS NFR BLD AUTO: 1.4 % (ref 0–1.5)
BILIRUB SERPL-MCNC: 0.4 MG/DL (ref 0–1.2)
BUN SERPL-MCNC: 5 MG/DL (ref 6–20)
BUN/CREAT SERPL: 9.4 (ref 7–25)
CALCIUM SPEC-SCNC: 9 MG/DL (ref 8.6–10.5)
CHLORIDE SERPL-SCNC: 104 MMOL/L (ref 98–107)
CO2 SERPL-SCNC: 29 MMOL/L (ref 22–29)
CREAT SERPL-MCNC: 0.53 MG/DL (ref 0.57–1)
DEPRECATED RDW RBC AUTO: 49.8 FL (ref 37–54)
EGFRCR SERPLBLD CKD-EPI 2021: 106.7 ML/MIN/1.73
EOSINOPHIL # BLD AUTO: 0.61 10*3/MM3 (ref 0–0.4)
EOSINOPHIL NFR BLD AUTO: 11 % (ref 0.3–6.2)
ERYTHROCYTE [DISTWIDTH] IN BLOOD BY AUTOMATED COUNT: 16.3 % (ref 12.3–15.4)
GLOBULIN UR ELPH-MCNC: 2.4 GM/DL
GLUCOSE SERPL-MCNC: 75 MG/DL (ref 65–99)
HCT VFR BLD AUTO: 43 % (ref 34–46.6)
HGB BLD-MCNC: 13.3 G/DL (ref 12–15.9)
IMM GRANULOCYTES # BLD AUTO: 0.01 10*3/MM3 (ref 0–0.05)
IMM GRANULOCYTES NFR BLD AUTO: 0.2 % (ref 0–0.5)
LYMPHOCYTES # BLD AUTO: 1.61 10*3/MM3 (ref 0.7–3.1)
LYMPHOCYTES NFR BLD AUTO: 29 % (ref 19.6–45.3)
MCH RBC QN AUTO: 26.5 PG (ref 26.6–33)
MCHC RBC AUTO-ENTMCNC: 30.9 G/DL (ref 31.5–35.7)
MCV RBC AUTO: 85.8 FL (ref 79–97)
MONOCYTES # BLD AUTO: 0.68 10*3/MM3 (ref 0.1–0.9)
MONOCYTES NFR BLD AUTO: 12.3 % (ref 5–12)
NEUTROPHILS NFR BLD AUTO: 2.56 10*3/MM3 (ref 1.7–7)
NEUTROPHILS NFR BLD AUTO: 46.1 % (ref 42.7–76)
NRBC BLD AUTO-RTO: 0 /100 WBC (ref 0–0.2)
PLATELET # BLD AUTO: 262 10*3/MM3 (ref 140–450)
PMV BLD AUTO: 9.8 FL (ref 6–12)
POTASSIUM SERPL-SCNC: 4.5 MMOL/L (ref 3.5–5.2)
PROT SERPL-MCNC: 5.7 G/DL (ref 6–8.5)
QT INTERVAL: 298 MS
QT INTERVAL: 348 MS
QTC INTERVAL: 441 MS
QTC INTERVAL: 484 MS
RBC # BLD AUTO: 5.01 10*6/MM3 (ref 3.77–5.28)
SODIUM SERPL-SCNC: 140 MMOL/L (ref 136–145)
WBC NRBC COR # BLD AUTO: 5.55 10*3/MM3 (ref 3.4–10.8)

## 2025-04-24 PROCEDURE — G0378 HOSPITAL OBSERVATION PER HR: HCPCS

## 2025-04-24 PROCEDURE — 80053 COMPREHEN METABOLIC PANEL: CPT

## 2025-04-24 PROCEDURE — 99214 OFFICE O/P EST MOD 30 MIN: CPT | Performed by: STUDENT IN AN ORGANIZED HEALTH CARE EDUCATION/TRAINING PROGRAM

## 2025-04-24 PROCEDURE — 85025 COMPLETE CBC W/AUTO DIFF WBC: CPT

## 2025-04-24 RX ORDER — FLECAINIDE ACETATE 50 MG/1
50 TABLET ORAL EVERY 12 HOURS SCHEDULED
Qty: 180 TABLET | Refills: 3 | Status: SHIPPED | OUTPATIENT
Start: 2025-04-24

## 2025-04-24 RX ORDER — METOPROLOL TARTRATE 25 MG/1
25 TABLET, FILM COATED ORAL EVERY 12 HOURS SCHEDULED
Qty: 180 TABLET | Refills: 3 | Status: SHIPPED | OUTPATIENT
Start: 2025-04-24

## 2025-04-24 RX ADMIN — PREGABALIN 150 MG: 75 CAPSULE ORAL at 09:50

## 2025-04-24 RX ADMIN — PANTOPRAZOLE SODIUM 40 MG: 40 TABLET, DELAYED RELEASE ORAL at 06:04

## 2025-04-24 RX ADMIN — APIXABAN 5 MG: 5 TABLET, FILM COATED ORAL at 09:50

## 2025-04-24 RX ADMIN — MELOXICAM 15 MG: 7.5 TABLET ORAL at 09:50

## 2025-04-24 RX ADMIN — METOPROLOL TARTRATE 25 MG: 25 TABLET, FILM COATED ORAL at 09:50

## 2025-04-24 RX ADMIN — FLECAINIDE ACETATE 50 MG: 50 TABLET ORAL at 09:50

## 2025-04-24 RX ADMIN — Medication 10 ML: at 09:52

## 2025-04-24 NOTE — PLAN OF CARE
Goal Outcome Evaluation:           Progress: no change  Outcome Evaluation: SA/SB/S 55-90 PAC PVC

## 2025-04-24 NOTE — PROGRESS NOTES
"EP Problems:  1.  Syncope  2.  Paroxysmal atrial fibrillation     Medical Problems:  1.  Rheumatoid arthritis  2.  Alpha-1 antitrypsin deficiency  3.  Obesity  4.  DJD  5.  GERD  6.  Anxiety disorder       Patient ID:  Adela Arauz is a 60 y.o. female with problem list as above as above who EP is following for paroxysmal atrial fibrillation and syncope.    Subjective:  No significant events overnight.  Tolerating medications well.    Objective:  /59 (BP Location: Right arm, Patient Position: Lying)   Pulse 55   Temp 98 °F (36.7 °C) (Oral)   Resp 18   Ht 160 cm (63\")   Wt 79.4 kg (175 lb)   SpO2 97%   BMI 31.00 kg/m²     Awake, alert, no acute distress  Clear to auscultation bilaterally  Regular rate and rhythm  Warm, well-perfused    Labs today:  Lab Results   Component Value Date    GLUCOSE 75 04/24/2025    CALCIUM 9.0 04/24/2025     04/24/2025    K 4.5 04/24/2025    CO2 29.0 04/24/2025    BUN 5 (L) 04/24/2025    CREATININE 0.53 (L) 04/24/2025    EGFR 106.7 04/24/2025     Lab Results   Component Value Date    WBC 5.55 04/24/2025    HGB 13.3 04/24/2025    HCT 43.0 04/24/2025     04/24/2025     Echo from yesterday reviewed: Normal EF, structurally normal heart    Assessment:  Paroxysmal atrial fibrillation  Syncope    Plan:  - Okay for discharge home from EP standpoint  - Continue metoprolol and flecainide at current doses  - 30-day Holter monitor at discharge  - Continue apixaban for a 30-day course (can hold this medication as needed for gallbladder surgery)    Part of this note may be an electronic transcription/translation of spoken language to printed text using the Dragon Dictation System.    "

## 2025-04-24 NOTE — OUTREACH NOTE
Prep Survey      Flowsheet Row Responses   [facility] patient discharged from? [place]   Is LACE score < 7 ? Yes   Eligibility [facility]   Date of Admission 04/23/25   Date of Discharge 04/24/25   Discharge Disposition Home or Self Care   Discharge diagnosis Atrial fibrillation with rapid ventricular response   Does the patient have one of the following disease processes/diagnoses(primary or secondary)? Other   Prep survey completed? Yes            Geena PEDRO - Registered Nurse

## 2025-04-24 NOTE — DISCHARGE SUMMARY
[facility]  DISCHARGE SUMMARY       Date of Admission: 4/23/2025  Date of Discharge:  4/24/2025  Primary Care Physician: Charlene Huntley, DNP, APRN    Presenting Problem/History of Present Illness:  Syncope    Final Discharge Diagnoses:  Active Hospital Problems    Diagnosis     **Atrial fibrillation with rapid ventricular response     Syncope     Gallbladder & bile duct stone with obstruction     Rheumatoid arthritis involving multiple sites        Consults: Cardiac electrophysiology-Dr. Beltran    Procedures Performed: None    Pertinent Test Results:   Results for orders placed during the hospital encounter of 04/23/25    Adult Transthoracic Echo Complete With Contrast if Necessary Per Protocol    Interpretation Summary    Left ventricular systolic function is normal. Left ventricular ejection fraction appears to be 56 - 60%.    Left ventricular diastolic function is consistent with (grade I) impaired relaxation.    No hemodynamically significant (greater than mild) valvular abnormalities are noted.      Imaging Results (All)       Procedure Component Value Units Date/Time    CT Thoracic Spine Without Contrast [[id number]] Collected: 04/23/25 1245     Updated: 04/23/25 1255    Narrative:      EXAMINATION:  CT THORACIC SPINE WO CONTRAST-  4/23/2025 10:27 AM     HISTORY: The patient fell. Rule out fracture. I48.91-Unspecified atrial  fibrillation; R55-Syncope and collapse.     COMPARISON: No comparison study.      TECHNIQUE: Spiral CT was performed of the thoracic spine. Sagittal and  coronal images were reconstructed.     DLP: 1436.95 mGy.cm Automated dosage reduction technique was utilized to  reduce patient dosage.     FINDINGS: There is no evidence of thoracic spine fracture or  subluxation. There is fatty infiltration of the visualized liver.     DISC LEVELS: There is mild disc narrowing and endplate spurring at  multiple thoracic levels. There is facet  arthropathy at some levels.  There is mild foraminal narrowing on the right at T4-5 and T5-6. There  is left-sided foraminal narrowing at T7-8. There is bilateral foraminal  narrowing at T8-9, T9-10, T10-11 right greater than left and T11-12  right greater than left.          Impression:      1. No evidence of thoracic spine fracture.  2. Degenerative changes, as described.  3. Fatty infiltration of the visualized liver.        The full report of this exam was immediately signed and available to the  emergency room. The patient is currently in the emergency room.        This report was signed and finalized on 4/23/2025 12:52 PM by Dr. Hua Gerard MD.       CT Angiogram Chest [[id number]] Collected: 04/23/25 1236     Updated: 04/23/25 1248    Narrative:      CT ANGIOGRAM CHEST- 4/23/2025 10:27 AM     HISTORY: Palpitations elevated D-dimer; I48.91-Unspecified atrial  fibrillation; R55-Syncope and collapse      COMPARISON: 5/14/2021     TOTAL DOSE LENGTH PRODUCT: 1436.95 mGy.cm. Automated exposure control  was also utilized to decrease patient radiation dose.     TECHNIQUE: Axial images of the chest are performed following IV  contrast. 2D and MIPS reconstructed images are reviewed.     FINDINGS: No pulmonary emboli. Thoracic aorta normal in caliber with no  aneurysm or dissection identified. Heart is normal in size. No  pathologic intrathoracic or axillary lymphadenopathy. No pericardial or  pleural effusion.     Pneumobilia with common bile duct stent partially visualized. Gallstones  within a contracted gallbladder. Hepatic steatosis.     Mild centrilobular emphysema. Right greater than left basilar  atelectasis. No dense consolidation. No pneumothorax. No suspicious  pulmonary nodule. Stable 6 mm central inferior right upper lobe  pulmonary nodule, unchanged from 2021 supporting a benign process.  Stable 4 mm subpleural right pulmonary nodule positional in the minor  fissure favoring intrafissural lymph node. No  pneumothorax. Central  airways are patent.     Old healed bilateral rib fractures. Mild levoscoliotic curvature  thoracolumbar spine with mild degenerative disc change.       Impression:         1. No pulmonary emboli.  2. No thoracic aortic aneurysm or dissection.  3. Mild centrilobular emphysema. Right greater than left basilar  atelectasis. No lobar consolidation. Stable right-sided pulmonary  nodules unchanged from 2021 supporting a benign process.  4. Common bile duct stent in place with pneumobilia. Gallstones within  the contracted gallbladder.     This report was signed and finalized on 4/23/2025 12:45 PM by Dr. Katelyn Torres MD.       CT Cervical Spine Without Contrast [[id number]] Collected: 04/23/25 1228     Updated: 04/23/25 1235    Narrative:      EXAMINATION: CT CERVICAL SPINE WO CONTRAST- 4/23/2025 12:28 PM     HISTORY: fall injury; I48.91-Unspecified atrial fibrillation;  R55-Syncope and collapse     TOTAL DOSE: 873.87 mGy.cm (Automatic exposure control technique was  implemented in an effort to keep the radiation dose as low as possible  without compromising image quality)     REPORT: TOTAL DOSE: 873.87 mGy.cm     Spiral CT of the cervical spine was performed without contrast,  reconstructed coronal and sagittal images are also reviewed.     COMPARISON: There are no correlative imaging studies for comparison.     Sagittal images demonstrate normal vertebral body alignment, there is  moderate disc space narrowing and hypertrophic endplate spurring C5-6  and C6-7, C2 and C3 are fused, likely congenital. No acute fracture is  identified. There is mild levoscoliosis. Mild spurring of the lateral  masses of C1 as well as the odontoid process. There appears to be mild  spinal stenosis C5-6 and C6-7 primarily related to endplate spurs.  Uncinate spurring is present C4-5 and C5-6, greater on the right.     Review of soft tissue windows shows no gross evidence of cervical disc  herniation, given  limitations of noncontrast CT. The airway is patent.  The visualized upper lungs are clear, there appears to be mild apical  pleural thickening on the right. The prevertebral soft tissues are  within normal limits.       Impression:      1. No fracture, no acute osseous cervical spine abnormality.  2. 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  3. Degenerative changes, most advanced C5-6 and C6-7.                 This report was signed and finalized on 4/23/2025 12:32 PM by Dr. Nathaniel Leiva MD.       CT Lumbar Spine Without Contrast [[id number]] Collected: 04/23/25 1200     Updated: 04/23/25 1210    Narrative:      EXAMINATION: CT LUMBAR SPINE WO CONTRAST-  4/23/2025 12:00 PM     HISTORY: fall injury; I48.91-Unspecified atrial fibrillation;  R55-Syncope and collapse     COMPARISON: None      DLP: 1436.95 mGy.cm     In order to have a CT radiation dose as low as reasonably achievable,  Automated Exposure Control was utilized for adjustment of the mA and/or  KV according to patient size.     TECHNIQUE: Serial helical tomographic images of the lumbar spine were  obtained without the use of intravenous contrast. Additionally, sagittal  and coronal reformatted images were provided for review.     FINDINGS:  No acute fracture. Mild levoconvex curvature in the lumbar spine.  Extensive posterior fusion changes from the L2 level through S1  bilaterally. The LEFT L2 pedicle screw has some lucency surrounding its  distal aspect and it breaches the superior endplate of L2, slightly  extending into the disc space. I don't see any evidence of an acute  fracture on this exam. No hardware fracture. There is also a LEFT  lateral fusion construct at L3-L4 and an anterior fusion construct at  L5-S1. Interbody spacers are seen at L2-L3, L3-L4, L4-L5, and L5-S1.     Metal artifact limits characterization of the spinal canal. There is  severe RIGHT and likely moderate LEFT foraminal narrowing at  L2-L3. No  high-grade osseous foraminal narrowing elsewhere. I do suspect canal  narrowing at L2-L3 and L3-L4.     Surrounding soft tissues demonstrates a stent in the upper abdomen. This  is likely a biliary stent. This terminates in the duodenum.  Atherosclerotic vascular disease in the aorta.          Impression:         1.  I don't see any evidence of an acute fracture on this examination.     2.  Extensive fusion changes from L2-S1.     3.  The pedicle screw at L2 on the LEFT breaches the superior endplate  of L2 and subtly extends into the L1-L2 disc space.     4.  Severe RIGHT and likely moderate LEFT foraminal narrowing at L2-L3.  Probable canal narrowing at L2-L3 and L3-L4.        This report was signed and finalized on 4/23/2025 12:07 PM by Dr. Mateusz Holm MD.       CT Head Without Contrast [[id number]] Collected: 04/23/25 1202     Updated: 04/23/25 1207    Narrative:      Exam: CT HEAD WO CONTRAST- 4/23/2025 10:27 AM     HISTORY: fall injury; I48.91-Unspecified atrial fibrillation;  R55-Syncope and collapse       DOSE LENGTH PRODUCT: 873.87 mGy.cm mGy cm. Automated exposure control  was also utilized to decrease patient radiation dose.     Technique:  Helically acquired CT of the brain without IV contrast was performed.  Sagittal and coronal reformations are also provided for review. Soft  tissue and bone kernels are available for interpretation.     Comparison: 1/26/2025.     Findings:     Ventricles and extra-axial CSF spaces are normal in size.     No intraparenchymal or extra-axial hemorrhage.     Gray-white matter differentiation is preserved.     Orbits are grossly unremarkable. Paranasal sinuses are grossly clear.  Mastoid air cells are grossly clear.     No suspicious calvarial or extracranial soft tissue abnormality.       Impression:      Impression:       No acute intracranial abnormality.     This report was signed and finalized on 4/23/2025 12:04 PM by Ascencion Marinelli.             LAB  RESULTS:      Lab 04/24/25  0946 04/23/25  1015   WBC 5.55 6.89   HEMOGLOBIN 13.3 14.3   HEMATOCRIT 43.0 45.3   PLATELETS 262 284   NEUTROS ABS 2.56 3.88   IMMATURE GRANS (ABS) 0.01 0.02   LYMPHS ABS 1.61 1.53   MONOS ABS 0.68 0.72   EOS ABS 0.61* 0.66*   MCV 85.8 83.9   LACTATE  --  1.9   PROTIME  --  12.9   APTT  --  28.8   D DIMER QUANT  --  1.68*         Lab 04/24/25  0946 04/23/25  1015 04/22/25  0442 04/21/25  0506 04/20/25  0840 04/20/25  0403 04/19/25  0410   SODIUM 140 140 138 139 139  --   --    POTASSIUM 4.5 3.7 4.2 3.9 4  --   --    CHLORIDE 104 103 111* 110* 109*  --   --    TOTAL CO2  --   --  20* 20* 25  --   --    CO2 29.0 23.0  --   --   --   --   --    ANION GAP 7.0 14.0 7 9 5  --   --    BUN 5* 4* 3* <3* <3*  --   --    CREATININE 0.53* 0.54* 0.52* 0.52* 0.53*  --   --    EGFR 106.7 106.2  --   --   --   --   --    GLUCOSE 75 97  --   --   --   --   --    CALCIUM 9.0 9.1 8* 8.4 8.2*  --   --    MAGNESIUM  --  2.0 1.8 1.9  --  2 2   TSH  --  3.310  --   --   --   --   --          Lab 04/24/25  0946 04/23/25  1015 04/18/25  1402   TOTAL PROTEIN 5.7* 6.6  --    ALBUMIN 3.3* 3.7  --    GLOBULIN 2.4 2.9  --    ALT (SGPT) 13 14  --    AST (SGOT) 34* 45*  --    BILIRUBIN 0.4 0.4  --    ALK PHOS 142* 174*  --    LIPASE  --   --  19         Lab 04/23/25  1136 04/23/25  1015   PROBNP  --  280.7   HSTROP T 14* 15*   PROTIME  --  12.9   INR  --  0.93                 Brief Urine Lab Results  (Last result in the past 365 days)        Color   Clarity   Blood   Leuk Est   Nitrite   Protein   CREAT   Urine HCG        04/18/25 1408 Yellow     Negative  Comment: This test was performed at: [facility],CLIA #[id number],Tyson Velarde MD, PhD,[address] [place] [address], [facility],[place],[address],   Small   Negative   20                 Microbiology Results (last 10 days)       Procedure Component Value - Date/Time    Gastrointestinal Panel, PCR - , [[id number]] Collected: 04/19/25 0057    Lab  Status: Final result Specimen: Stool Updated: 04/23/25 0957     Campylobacter Not Detected     Salmonella Not Detected     Vibrio Not Detected     Vibrio cholerae Not Detected     Result Not Detected     Enterotoxigenic E. coli Not Detected     Shiga-like toxin-producing E. coli (STEC) stx1/stx2 Not Detected     E. coli O157 N/A     Shigella/Enteroinvasive E. coli (EIEC) Not Detected     Cryptosporidium Not Detected     Cyclospora cayetanensis Not Detected     Entamoeba Histolytica Ag Not Detected     Giardia lamblia Not Detected     Adenovirus F40/41 Not Detected     Astrovirus Not Detected     Norovirus GI/GII Not Detected     Comment: The norovirus target of this assay is implicated in a  recall due to the possibility of false positive results. If norovirus is detected, please interpret this result cautiously and in the full clinical context.        Rotavirus A Not Detected     Sapovirus Not Detected     Specimen Type: Stool     Comment: This test was performed using a multiplexed nucleic acid test. The test was validated for stool samples in [employer] transport media. Results of this test should not be used as the sole basis for diagnosis, treatment, or other patient management decisions.             Microbiology Results (last 10 days)       Procedure Component Value - Date/Time    Gastrointestinal Panel, PCR - , [[id number]] Collected: 04/19/25 0057    Lab Status: Final result Specimen: Stool Updated: 04/23/25 0957     Campylobacter Not Detected     Salmonella Not Detected     Vibrio Not Detected     Vibrio cholerae Not Detected     Result Not Detected     Enterotoxigenic E. coli Not Detected     Shiga-like toxin-producing E. coli (STEC) stx1/stx2 Not Detected     E. coli O157 N/A     Shigella/Enteroinvasive E. coli (EIEC) Not Detected     Cryptosporidium Not Detected     Cyclospora cayetanensis Not Detected     Entamoeba Histolytica Ag Not Detected     Giardia lamblia Not Detected     Adenovirus  F40/41 Not Detected     Astrovirus Not Detected     Norovirus GI/GII Not Detected     Comment: The norovirus target of this assay is implicated in a  recall due to the possibility of false positive results. If norovirus is detected, please interpret this result cautiously and in the full clinical context.        Rotavirus A Not Detected     Sapovirus Not Detected     Specimen Type: Stool     Comment: This test was performed using a multiplexed nucleic acid test. The test was validated for stool samples in [employer] transport media. Results of this test should not be used as the sole basis for diagnosis, treatment, or other patient management decisions.                Documented weights    04/23/25 1002   Weight: 79.4 kg (175 lb)        Hospital Course: Adela Arauz is a 60-year-old female with a past medical history of AAT deficiency, anxiety, lymphoma, low back pain, osteoporosis, and rheumatoid arthritis that presents to [facility] ED via EMS after a syncopal episode.  Patient is alert and oriented and able to participate in my exam.  She reports feeling palpitations before severe dizziness.  She lowered herself to the ground by sliding down the wall.  After lowering herself to the floor she scooted to her phone and called her brother.  When brother and sister-in-law arrived they helped her to the car and patient appeared to have tremors all over, they thought she was having a seizure so they called 911.  Head tremor noted at rest with a slight left eyelid droop.  Patient reports having this for the last year.  A-fib on telemetry with a rate of 88 and currently diltiazem drip infusing at 7.5 mg/hr. discussed plan of care and she is agreeable to be admitted for further evaluation and treatment     4/18/2025 [facility] H&P admission note:  Adela Arauz is a 60yoF PMH RA (leflunomide), GERD, AAT-1 deficiency with recent cholecystitis and cholangitis s/p ERCP with CBD clearance and  stent (3/27) and laparoscopic cholecystostomy tube placement (3/28) at OSH who presents to the ED after her tube fell out. She has also been having increasing abdominal pain, bloating, and profuse diarrhea. Patient was evaluated by EGS on 4/2 in the ED and discharged with Augmentin, then seen in EGS clinic on 4/8 with plan for fluoro tube study on 4/22 to evaluate cystic duct patency. She denies fevers, chills, vomiting. She is having nausea and poor PO intake.     Patient discharged from [facility] 4/22/2025 with plans for cholecystectomy in the near future, states she has to be on a very low-fat diet     Atrial fibrillation with rapid ventricular rate was evaluated by Dr. Beltran, cardiac electrophysiology and was started on a combination of metoprolol and flecainide no overnight abnormalities patient remained normal sinus rhythm 60s to 80s.  Start Eliquis 5 mg twice daily for 30 days and then can discontinue thereafter given low [id number] score.  He may consider outpatient ablation should she continue to have breakthrough episodes despite flecainide use.  Present prior to discharge to the cardiology office for placement of a 30-day Holter monitor.    Syncope, patient has not had a second episode since admission.  Orthostatics negative, patient states her dizziness was completely resolved once she was back in normal rhythm.  Echocardiogram revealed normal systolic function, LVEF 56-60% diastolic function is consistent with grade 1 impaired relaxation.    Gallbladder and bile duct stone with obstruction; followed closely by [facility], admitted 4/18 - 4/22, 2025-unfortunately no discharge summary available. Patient states we are planning for cholecystectomy in the near future and she needs to be on a very low-fat diet until that time.-Fat restricted diet ordered hospitalization.  [facility] called while I was in the room this morning to schedule her next follow-up.  Requested patient continue  "per their guidance.    This morning patient is resting comfortably in bed on room air with no family at bedside.  Patient is alert and oriented and able to participate in assessment and discharge planning.  Patient continues to state that she feels significantly improved since she has converted back to normal sinus rhythm.  She verbalized understanding of medication changes, Eliquis and restrictions all questions were answered to the best my ability and she is in agreement for discharge home today.    Patient has reached the maximum benefit of hospitalization and is stable for discharge  Patient has been evaluated today 04/24/25 and is stable for discharge.     Physical Exam on Discharge:  /59 (BP Location: Right arm, Patient Position: Lying)   Pulse 55   Temp 98 °F (36.7 °C) (Oral)   Resp 18   Ht 160 cm (63\")   Wt 79.4 kg (175 lb)   LMP 04/01/2014 (Approximate)   SpO2 97%   BMI 31.00 kg/m²   Physical Exam  onstitutional:       Appearance: She is obese.   HENT:      Head: Normocephalic and atraumatic.      Mouth/Throat:      Mouth: Mucous membranes are moist.      Pharynx: Oropharynx is clear.   Eyes:      Extraocular Movements: Extraocular movements intact.      Conjunctiva/sclera: Conjunctivae normal.   Cardiovascular:      Rate and Rhythm: Normal rate and regular rhythm     Pulses: Normal pulses.      Heart sounds: Normal heart sounds.   Pulmonary:      Effort: Pulmonary effort is normal.      Breath sounds: Normal breath sounds.   Abdominal:      General: There is no distension.      Palpations: Abdomen is soft.   Musculoskeletal:      Cervical back: Normal range of motion and neck supple.      Right lower leg: No edema.      Left lower leg: No edema.   Skin:     General: Skin is warm and dry.   Neurological:      General: No focal deficit present.      Mental Status: She is alert and oriented to person, place, and time.      Motor: Tremor (Head) present.  (Baseline)  Psychiatric:         Mood and " Affect: Mood normal.         Behavior: Behavior normal.     Condition on Discharge: Stable for discharge home    Discharge Disposition:  Home or Self Care    Discharge Medications:     Discharge Medications        New Medications        Instructions Start Date   apixaban 5 MG tablet tablet  Commonly known as: ELIQUIS   5 mg, Oral, Every 12 Hours Scheduled      flecainide 50 MG tablet  Commonly known as: TAMBOCOR   50 mg, Oral, Every 12 Hours Scheduled      metoprolol tartrate 25 MG tablet  Commonly known as: LOPRESSOR   25 mg, Oral, Every 12 Hours Scheduled             Continue These Medications        Instructions Start Date   alendronate 70 MG tablet  Commonly known as: Fosamax   70 mg, Oral, Every 7 Days      ALPRAZolam 1 MG tablet  Commonly known as: XANAX   1 mg, Oral, 3 Times Daily PRN      Calcium-Magnesium-Vitamin D 500-250-200 MG-MG-UNIT tablet   600 tablets, 2 Times Daily      chlorhexidine 0.12 % solution  Commonly known as: PERIDEX   15 mL, 2 Times Daily      cholecalciferol 25 MCG (1000 UT) tablet  Commonly known as: VITAMIN D3   1,000 Units, Daily      ferrous sulfate 324 (65 Fe) MG tablet delayed-release EC tablet   324 mg, Daily With Breakfast      fluticasone 50 MCG/ACT nasal spray  Commonly known as: Flonase   2 sprays, Nasal, Daily      furosemide 40 MG tablet  Commonly known as: LASIX   40 mg, Oral, Daily      HYDROcodone-acetaminophen  MG per tablet  Commonly known as: NORCO   1 tablet, Oral, Every 6 Hours PRN      meloxicam 15 MG tablet  Commonly known as: MOBIC   15 mg      omeprazole 40 MG capsule  Commonly known as: priLOSEC   40 mg, Oral, Daily      pregabalin 150 MG capsule  Commonly known as: LYRICA   150 mg, Oral, 2 Times Daily      tiZANidine 4 MG tablet  Commonly known as: ZANAFLEX   4 mg, Oral, Every 8 Hours PRN      Turmeric 500 MG capsule   1 capsule, Daily      valACYclovir 1000 MG tablet  Commonly known as: VALTREX   1,000 mg, Oral, 2 Times Daily               Discharge  Diet: Change prescribed diet by [facility]    Activity at Discharge:   Activity Instructions       Activity as Tolerated              Discharge Instructions:   1.  Patient was instructed return to medical attention for any new or worsening chest pain, shortness of breath or palpitations.  2.  Patient was instructed to follow-up with PCP in 1 week.  3.  Patient was instructed to follow-up with Dr. Beltran per his recommendations, the office will call with date and time of appointment.  4.  Patient was instructed verbally and with discharge instructions regarding all new medications and medication changes.  5.  Patient was instructed by cardiology technician on the proper placement, management and return of Zio patch cardiac monitor.  6.  Patient was instructed to follow-up with [facility] to continue their prescribed diet.    Follow-up Appointments:   Future Appointments   Date Time Provider Department Center   5/12/2025  9:00 AM Charlene Huntley DNP, [facility]   11/19/2025  1:00 PM Charlene Huntley DNP, [facility]       Test Results Pending at Discharge: 14-day Zio patch    Electronically signed by MONA Hilton, 04/24/25, 10:56 CDT.    Time: 35 minutes.

## 2025-04-24 NOTE — PROGRESS NOTES
Malnutrition Severity Assessment    Patient Name:  Adela Arauz  YOB: 1965  MRN: [id number]  Admit Date:  4/23/2025    Patient meets criteria for : Severe Malnutrition    Comments:    Pt in room alone. Noted wt loss of 50 lb since January, reflecting a 22% wt loss over the past three months. Pt has had multiple admissions and complications related to her gallbladder. During a previous admission at another hospital, providers recommended a CLD at home, which pt followed for two months, resulting in rapid wt loss. CLDs are not suitable for long-term nutrition due to insufficient calories and protein. Pt also reported decreased intake at home due to pain, N/V, and diarrhea. Recently started a low-fat diet per provider recommendations. NFPE performed; pt is severely malnourished in the context of chronic illness, evidenced by decreased PO intake, significant wt loss, and moderate fat and muscle wasting. Dietitian encouraged pt to eat as tolerated and continue a low-fat diet at home. Provided education on appropriate ONS options and low-fat snacks and meals. Pt further encouraged to self-monitor wt at home and follow up with PCP if wt loss continues. Anticipated d/c today. Will monitor.       Malnutrition Severity Assessment  Malnutrition Type: Chronic Disease - Related Malnutrition  Malnutrition Type (Last 8 Hours)       Malnutrition Severity Assessment       Row Name 04/24/25 1143       Malnutrition Severity Assessment    Malnutrition Type Chronic Disease - Related Malnutrition      Row Name 04/24/25 1143       Insufficient Energy Intake     Insufficient Energy Intake Findings Severe    Insufficient Energy Intake  <75% of est. energy requirement for > or equal to 1 month      Row Name 04/24/25 1143       Unintentional Weight Loss     Unintentional Weight Loss Findings Severe    Unintentional Weight Loss  Weight loss greater than 7.5% in three months      Row Name 04/24/25 1143       Muscle Loss     Loss of Muscle Mass Findings Moderate    [place] Region Moderate - slight depression    Clavicle Bone Region Moderate - some protrusion in females, visible in males    Patellar Region Moderate - patella more prominent, less muscle definition around patella    Anterior Thigh Region Moderate - mild depression on inner thigh    Posterior Calf Region Moderate - some roundness, slight firmness      Row Name 04/24/25 1143       Fat Loss    Subcutaneous Fat Loss Findings Moderate    Orbital Region  Moderate -  somewhat hollowness, slightly dark circles    Upper Arm Region Moderate - some fat tissue, not ample      Row Name 04/24/25 1143       Criteria Met (Must meet criteria for severity in at least 2 of these categories: M Wasting, Fat Loss, Fluid, Secondary Signs, Wt. Status, Intake)    Patient meets criteria for  Severe Malnutrition                    Electronically signed by:  Nancy Leach RDN, LD  04/24/25 11:57 CDT

## 2025-04-24 NOTE — PLAN OF CARE
Goal Outcome Evaluation:       PT order received and screening performed. Pt has been up ad bela in room and reports no complaints at this time. Pt hopeful to d/c home this afternoon, PT to sign off. Please reconsult with change in status or need regarding this pt care.

## 2025-04-25 ENCOUNTER — TRANSITIONAL CARE MANAGEMENT TELEPHONE ENCOUNTER (OUTPATIENT)
Dept: CALL CENTER | Facility: HOSPITAL | Age: 60
End: 2025-04-25
Payer: MEDICARE

## 2025-04-25 ENCOUNTER — TELEPHONE (OUTPATIENT)
Dept: CARDIOLOGY | Facility: CLINIC | Age: 60
End: 2025-04-25

## 2025-04-25 NOTE — OUTREACH NOTE
Call Center TCM Note      Flowsheet Row Responses   [facility] patient discharged from? [facility]   Does the patient have one of the following disease processes/diagnoses(primary or secondary)? Other   TCM attempt successful? Yes   Call start time 1243   Call end time 1249   List who call center can speak with pt   Meds reviewed with patient/caregiver? Yes   Is the patient having any side effects they believe may be caused by any medication additions or changes? No   Does the patient have all medications ordered at discharge? Yes   Is the patient taking all medications as directed (includes completed medication regime)? Yes   Comments Hospital f/u scheduled for 4- @ 10:30 am.   Does the patient have an appointment with their PCP within 7-14 days of discharge? Yes   Has home health visited the patient within 72 hours of discharge? N/A   Psychosocial issues? No   Did the patient receive a copy of their discharge instructions? Yes   Nursing interventions Reviewed instructions with patient   What is the patient's perception of their health status since discharge? Improving   Is the patient/caregiver able to teach back signs and symptoms related to disease process for when to call PCP? Yes   Is the patient/caregiver able to teach back signs and symptoms related to disease process for when to call 911? Yes   Is the patient/caregiver able to teach back the hierarchy of who to call/visit for symptoms/problems? PCP, Specialist, Home health nurse, Urgent Care, ED, 911 Yes   TCM call completed? Yes   Wrap up additional comments Pt reports imrpoving some. Pt reports feeling tired due to low bp.. Pt understands to skip bp med in the evening if bp is low per MD instructions.   Call end time 1249   Would this patient benefit from a Referral to Amb Social Work? No   Is the patient interested in additional calls from an ambulatory ? No            Geena NELSON - Registered Nurse    4/25/2025, 12:51 EDT

## 2025-04-30 ENCOUNTER — OFFICE VISIT (OUTPATIENT)
Dept: FAMILY MEDICINE CLINIC | Facility: CLINIC | Age: 60
End: 2025-04-30
Payer: MEDICARE

## 2025-04-30 VITALS
HEIGHT: 63 IN | DIASTOLIC BLOOD PRESSURE: 86 MMHG | WEIGHT: 167 LBS | BODY MASS INDEX: 29.59 KG/M2 | RESPIRATION RATE: 18 BRPM | TEMPERATURE: 97.3 F | SYSTOLIC BLOOD PRESSURE: 122 MMHG | OXYGEN SATURATION: 99 % | HEART RATE: 62 BPM

## 2025-04-30 DIAGNOSIS — F41.0 PANIC ATTACK: ICD-10-CM

## 2025-04-30 DIAGNOSIS — F43.10 PTSD (POST-TRAUMATIC STRESS DISORDER): ICD-10-CM

## 2025-04-30 DIAGNOSIS — M05.79 RHEUMATOID ARTHRITIS INVOLVING MULTIPLE SITES WITH POSITIVE RHEUMATOID FACTOR: ICD-10-CM

## 2025-04-30 DIAGNOSIS — M19.91 PRIMARY OSTEOARTHRITIS, UNSPECIFIED SITE: Primary | ICD-10-CM

## 2025-04-30 DIAGNOSIS — G89.4 CHRONIC PAIN SYNDROME: ICD-10-CM

## 2025-04-30 DIAGNOSIS — M81.8 OTHER OSTEOPOROSIS WITHOUT CURRENT PATHOLOGICAL FRACTURE: ICD-10-CM

## 2025-04-30 RX ORDER — ALPRAZOLAM 1 MG/1
1 TABLET ORAL 3 TIMES DAILY PRN
Qty: 90 TABLET | Refills: 2 | Status: SHIPPED | OUTPATIENT
Start: 2025-04-30

## 2025-04-30 RX ORDER — HYDROCODONE BITARTRATE AND ACETAMINOPHEN 10; 325 MG/1; MG/1
1 TABLET ORAL EVERY 6 HOURS PRN
Qty: 120 TABLET | Refills: 0 | Status: SHIPPED | OUTPATIENT
Start: 2025-04-30

## 2025-04-30 RX ORDER — ALENDRONATE SODIUM 70 MG/1
70 TABLET ORAL
Qty: 90 TABLET | Refills: 0 | Status: SHIPPED | OUTPATIENT
Start: 2025-04-30

## 2025-04-30 NOTE — PROGRESS NOTES
Chief Complaint  Hospital Follow Up Visit    Subjective        Adela Arauz presents to [facility]  History of Present Illness  History of Present Illness  The patient is a 60-year-old female who presents for evaluation of gallbladder issues, atrial fibrillation, rheumatoid arthritis, anxiety, vitamin D deficiency, acid reflux, neuropathy, iron deficiency anemia, and chronic pain.    She has been experiencing recurrent gallbladder issues, with three episodes since her last visit. Despite the persistence of gallstones, a cholecystectomy has not been performed due to concurrent liver inflammation. It was explained that gallbladder surgery could potentially exacerbate her liver condition and damage her small intestine. Approximately eight different physicians were consulted, none of whom recommended gallbladder removal. A nurse practitioner finally explained the treatment plan. A CT scan is scheduled for either 05/08/2025 or 05/09/2025, with a follow-up appointment on 05/13/2025 to discuss the results and potentially schedule gallbladder surgery. At least three more trips to [place] will be required to address this issue. Additionally, it was noted that her bladder wall is starting to thicken.    Upon returning home from the hospital, a syncopal episode occurred, leading to a diagnosis of atrial fibrillation. Blood pressure has been monitored, and a Zio monitor is being worn. The cardiologist has informed her that if sinus rhythm is maintained, flecainide can be discontinued. She has been cleared for surgery by her cardiologist.    Rheumatoid arthritis medication was initially advised against during this period but has since been resumed under the guidance of Dr. Diggs.    Anxiety is managed with Xanax.    A prescribed regimen of vitamin D is being followed for vitamin D deficiency.    Omeprazole is continued for acid reflux management.    Lyrica is taken for neuropathy and  "pain management, which she reports as beneficial.    Ferrous sulfate is taken for iron deficiency anemia.    For chronic pain management, Norco is used. During her hospital stay, OxyContin and Tylenol were prescribed for pain management, but hallucinations were experienced with OxyContin, leading to its discontinuation. Meloxicam has been advised against while on Eliquis.    I asked if she needed a surgical clearance for gb surgery however, she states, \"Not from you, they wanted one from cardiology due to the Afib.     Pt has lost 8 pounds in the last month, as she is unable to eat because she is nauseated, vomits, and doesn't have an appetite.   The following portions of the patient's history were reviewed and updated as appropriate: allergies, current medications, past family history, past medical history, past social history, past surgical history and problem list.    Objective   Vital Signs:  /86 (BP Location: Left arm, Patient Position: Sitting, Cuff Size: Large Adult)   Pulse 62   Temp 97.3 °F (36.3 °C) (Infrared)   Resp 18   Ht 160 cm (63\")   Wt 75.8 kg (167 lb)   SpO2 99%   BMI 29.58 kg/m²   Estimated body mass index is 29.58 kg/m² as calculated from the following:    Height as of this encounter: 160 cm (63\").    Weight as of this encounter: 75.8 kg (167 lb).       BMI is >= 25 and <30. (Overweight) The following options were offered after discussion;: exercise counseling/recommendations and nutrition counseling/recommendations    Physical Exam  Vitals and nursing note reviewed.   Constitutional:       General: She is awake.      Appearance: Normal appearance. She is well-developed and well-groomed.   HENT:      Head: Normocephalic and atraumatic.      Right Ear: Hearing, tympanic membrane, ear canal and external ear normal.      Left Ear: Hearing, tympanic membrane, ear canal and external ear normal.      Nose: Nose normal.      Mouth/Throat:      Lips: Pink.      Pharynx: Oropharynx is clear. "   Eyes:      General: Lids are normal.      Conjunctiva/sclera: Conjunctivae normal.   Cardiovascular:      Rate and Rhythm: Normal rate and regular rhythm.      Heart sounds: Normal heart sounds.   Pulmonary:      Effort: Pulmonary effort is normal.      Breath sounds: Normal breath sounds and air entry.   Abdominal:      General: Abdomen is protuberant. Bowel sounds are decreased.      Palpations: Abdomen is soft.      Tenderness: generalized  There is guarding and rebound. There is no right CVA tenderness or left CVA tenderness. Positive signs include Beckford's sign. Negative signs include Rovsing's sign, McBurney's sign, psoas sign and obturator sign.       Musculoskeletal:      Cervical back: Full passive range of motion without pain.        Back:       Right lower leg: No edema.      Left lower leg: No edema.   Lymphadenopathy:      Head:      Right side of head: No submental, submandibular or tonsillar adenopathy.      Left side of head: No submental, submandibular or tonsillar adenopathy.   Skin:     General: Skin is warm and dry.   Neurological:      Mental Status: She is alert and oriented to person, place, and time.      Sensory: Sensation is intact.      Motor: Motor function is intact.      Coordination: Coordination is intact.      Gait: Gait is intact.   Psychiatric:         Attention and Perception: Attention and perception normal.         Mood and Affect: Mood and affect normal.         Speech: Speech normal.         Behavior: Behavior normal. Behavior is cooperative.         Thought Content: Thought content normal.         Cognition and Memory: Cognition and memory normal.         Judgment: Judgment normal.        Physical Exam  Respiratory: Emphysema worse on the left than the right, pulmonary nodules on the right unchanged from 2021.  Gastrointestinal: Common bile duct stent in place with pneumobilia, gallstones within the contracted gallbladder.    Result Review :          Results  Imaging   - CT  of the cervical spine: 04/23/2025, No evidence of thoracic spine fracture, degenerative changes, fatty infiltrate.   - CT of the lumbar spine: No thoracic spine fracture, degenerative changes and fatty liver, extensive fusion changes from L2-S1, severe right and likely moderate left foramen narrowing at L2, L3, probable canal narrowing at L2, L3, and L3, L4.   - CT of the head: White matter differentiation is preserved, no suspicious calvarial or extracranial soft tissue abnormalities.   - CT of the chest: No pulmonary emboli, no thoracic aortic aneurysm or dissection, some emphysema worse on the left than the right, pulmonary nodules on the right, common bile duct stent in place with pneumonbilia, gallstones within the contracted gallbladder.   - HIDA scan: Tracer activity is seen in the gallbladder after giving morphine which is usually excluded for acute cholecystitis, however, there was presence of a cystic duct sign that can be a false negative but we are seeing activity in the gallbladder in this case. Therefore acute cholecystitis still cannot be ruled out.           Assessment and Plan     Diagnoses and all orders for this visit:    1. Primary osteoarthritis, unspecified site (Primary)    2. Other osteoporosis without current pathological fracture  -     alendronate (Fosamax) 70 MG tablet; Take 1 tablet by mouth Every 7 (Seven) Days.  Dispense: 90 tablet; Refill: 0    3. Chronic pain syndrome  -     HYDROcodone-acetaminophen (NORCO)  MG per tablet; Take 1 tablet by mouth Every 6 (Six) Hours As Needed for Moderate Pain.  Dispense: 120 tablet; Refill: 0    4. Panic attack  -     ALPRAZolam (XANAX) 1 MG tablet; Take 1 tablet by mouth 3 (Three) Times a Day As Needed for Anxiety.  Dispense: 90 tablet; Refill: 2    5. PTSD (post-traumatic stress disorder)  -     ALPRAZolam (XANAX) 1 MG tablet; Take 1 tablet by mouth 3 (Three) Times a Day As Needed for Anxiety.  Dispense: 90 tablet; Refill: 2    6. Rheumatoid  "arthritis involving multiple sites with positive rheumatoid factor      - [place] stopped her RA medication due to gallbladder problems and she says that her pain is worse then it has ever been       - she spoke to her RA doctor and states, \"She told me not to stop the medication it will intensify the RA pain so I started back on it.\"  Assessment & Plan  1. Gallbladder issues.  - Return to [place] for a CT scan to determine the necessity of gallbladder surgery.  - CT scan scheduled for 05/08/2025 or 05/09/2025, with results to be read on 05/13/2025.  - If surgery is needed, it will be scheduled on the same day.  - HIDA scan indicated acute cholecystitis cannot be ruled out.    2. Osteoporosis.  - Continue weekly Fosamax regimen.  - No new symptoms or complications reported.    3. Rheumatoid arthritis.  - Continue taking meloxicam as prescribed for rheumatoid arthritis and fibromyalgia.  - Inflammation noted, but RA medication resumed as per Dr. Diggs' advice.  - Avoid turmeric due to potential interaction with Eliquis.    4. Anxiety.  - Maintain current Xanax prescription.  - Prescription Drug Monitoring Program (PDMP) checked for Xanax refill, next eligible date is 05/08/2025.    5. Vitamin D deficiency.  - Continue prescribed vitamin D regimen.  - No new symptoms or complications reported.    6. Atrial fibrillation.  - Continue flecainide regimen under cardiology management.  - Blood pressure readings stable but continue monitoring.  - Potential discontinuation of flecainide if sinus rhythm is maintained.    7. Acid reflux.  - Continue omeprazole regimen.  - No new symptoms or complications reported.    8. Neuropathy.  - Continue Lyrica regimen for neuropathy and pain management.  - Medication effectiveness reported as satisfactory.    9. Iron deficiency anemia.  - Continue ferrous sulfate regimen.  - No new symptoms or complications reported.    10. Chronic pain.  - Continue Norco regimen.  - Refill for " Norco will be sent to the pharmacy, next eligible date is 05/14/2025.  - Advised to avoid OxyContin due to previous adverse effects.    Follow-up  - Follow up in 1 month.  - If surgery occurs, follow-up within 14 days post-hospitalization.      ICD-10-CM ICD-9-CM   1. Primary osteoarthritis, unspecified site  M19.91 715.10   2. Other osteoporosis without current pathological fracture  M81.8 733.09   3. Chronic pain syndrome  G89.4 338.4   4. Panic attack  F41.0 300.01   5. PTSD (post-traumatic stress disorder)  F43.10 309.81   6. Rheumatoid arthritis involving multiple sites with positive rheumatoid factor  M05.79 714.0                Follow Up     Return in about 1 month (around 5/30/2025) for Recheck.  Patient was given instructions and counseling regarding her condition or for health maintenance advice. Please see specific information pulled into the AVS if appropriate.       Patient or patient representative verbalized consent for the use of Ambient Listening during the visit with  Charlene Huntley DNP, APRHEENA for chart documentation. 5/5/2025  14:18 CDT      Electronically signed by Charlene Huntley DNP, APRN, 05/05/25, 2:18 PM CDT.

## 2025-05-01 ENCOUNTER — NURSE TRIAGE (OUTPATIENT)
Dept: CALL CENTER | Facility: HOSPITAL | Age: 60
End: 2025-05-01
Payer: MEDICARE

## 2025-05-01 NOTE — TELEPHONE ENCOUNTER
"Reason for Disposition  • Health Information question, no triage required and triager able to answer question    Additional Information  • Negative: [1] Caller is not with the adult (patient) AND [2] reporting urgent symptoms  • Negative: Lab result questions  • Negative: Medication questions  • Negative: Caller can't be reached by phone  • Negative: Caller has already spoken to PCP or another triager  • Negative: RN needs further essential information from caller in order to complete triage  • Negative: Requesting regular office appointment  • Negative: [1] Caller requesting NON-URGENT health information AND [2] PCP's office is the best resource    Answer Assessment - Initial Assessment Questions  1. REASON FOR CALL or QUESTION: \"What is your reason for calling today?\" or \"How can I best help you?\" or \"What question do you have that I can help answer?\"      Pt calling to see if documentation had been faxed to [facility] for her cardiac clearance to have her gallbladder surgery. Transferred her to Dr Harris office, spoke with Katelyn, and then she put pt through to Dr Devin Reynoso's nurse to help pt.    Protocols used: Information Only Call - No Triage-ADULT-    "

## 2025-05-01 NOTE — TELEPHONE ENCOUNTER
Pt calling to see if documentation had been faxed to [facility] [place] for her cardiac clearance to have her gallbladder surgery. Transferred her to Dr Harris office, spoke with Katelyn, and then she put pt through to Dr Devin Reynoso's nurse to help pt.

## 2025-05-28 ENCOUNTER — OFFICE VISIT (OUTPATIENT)
Dept: FAMILY MEDICINE CLINIC | Facility: CLINIC | Age: 60
End: 2025-05-28
Payer: MEDICARE

## 2025-05-28 VITALS
OXYGEN SATURATION: 99 % | DIASTOLIC BLOOD PRESSURE: 73 MMHG | WEIGHT: 166 LBS | HEART RATE: 73 BPM | HEIGHT: 63 IN | SYSTOLIC BLOOD PRESSURE: 113 MMHG | BODY MASS INDEX: 29.41 KG/M2 | RESPIRATION RATE: 18 BRPM | TEMPERATURE: 98.6 F

## 2025-05-28 DIAGNOSIS — I48.91 ATRIAL FIBRILLATION WITH RAPID VENTRICULAR RESPONSE: ICD-10-CM

## 2025-05-28 DIAGNOSIS — M54.42 CHRONIC BILATERAL LOW BACK PAIN WITH BILATERAL SCIATICA: ICD-10-CM

## 2025-05-28 DIAGNOSIS — M48.062 SPINAL STENOSIS OF LUMBAR REGION WITH NEUROGENIC CLAUDICATION: ICD-10-CM

## 2025-05-28 DIAGNOSIS — G89.29 CHRONIC BILATERAL LOW BACK PAIN WITH BILATERAL SCIATICA: ICD-10-CM

## 2025-05-28 DIAGNOSIS — Z09 HOSPITAL DISCHARGE FOLLOW-UP: Primary | ICD-10-CM

## 2025-05-28 DIAGNOSIS — M54.16 LUMBAR RADICULOPATHY: ICD-10-CM

## 2025-05-28 DIAGNOSIS — M54.41 CHRONIC BILATERAL LOW BACK PAIN WITH BILATERAL SCIATICA: ICD-10-CM

## 2025-05-28 DIAGNOSIS — G89.4 CHRONIC PAIN SYNDROME: ICD-10-CM

## 2025-05-28 DIAGNOSIS — M51.362 DEGENERATION OF INTERVERTEBRAL DISC OF LUMBAR REGION WITH DISCOGENIC BACK PAIN AND LOWER EXTREMITY PAIN: ICD-10-CM

## 2025-05-28 DIAGNOSIS — M05.79 RHEUMATOID ARTHRITIS INVOLVING MULTIPLE SITES WITH POSITIVE RHEUMATOID FACTOR: ICD-10-CM

## 2025-05-28 DIAGNOSIS — D50.9 IRON DEFICIENCY ANEMIA, UNSPECIFIED IRON DEFICIENCY ANEMIA TYPE: ICD-10-CM

## 2025-05-28 PROBLEM — K80.20 CHOLELITHIASIS WITHOUT CHOLANGITIS: Status: ACTIVE | Noted: 2025-04-18

## 2025-05-28 PROBLEM — K83.09 CHOLECYSTITIS WITH CHOLANGITIS: Status: ACTIVE | Noted: 2025-03-26

## 2025-05-28 PROBLEM — K80.50 CHOLEDOCHOLITHIASIS: Status: ACTIVE | Noted: 2025-03-26

## 2025-05-28 PROBLEM — K81.0 ACUTE CHOLECYSTITIS: Status: ACTIVE | Noted: 2025-03-27

## 2025-05-28 PROBLEM — R10.11 RUQ ABDOMINAL PAIN: Status: ACTIVE | Noted: 2025-04-02

## 2025-05-28 PROBLEM — K81.9 CHOLECYSTITIS WITH CHOLANGITIS: Status: ACTIVE | Noted: 2025-03-26

## 2025-05-28 PROBLEM — K83.09 ACUTE CHOLANGITIS: Status: ACTIVE | Noted: 2025-03-27

## 2025-05-28 PROCEDURE — 99214 OFFICE O/P EST MOD 30 MIN: CPT | Performed by: NURSE PRACTITIONER

## 2025-05-28 PROCEDURE — G2211 COMPLEX E/M VISIT ADD ON: HCPCS | Performed by: NURSE PRACTITIONER

## 2025-05-28 PROCEDURE — 1160F RVW MEDS BY RX/DR IN RCRD: CPT | Performed by: NURSE PRACTITIONER

## 2025-05-28 PROCEDURE — 1125F AMNT PAIN NOTED PAIN PRSNT: CPT | Performed by: NURSE PRACTITIONER

## 2025-05-28 PROCEDURE — 1159F MED LIST DOCD IN RCRD: CPT | Performed by: NURSE PRACTITIONER

## 2025-05-28 RX ORDER — PREGABALIN 150 MG/1
150 CAPSULE ORAL 2 TIMES DAILY
Qty: 180 CAPSULE | Refills: 1 | Status: SHIPPED | OUTPATIENT
Start: 2025-05-28

## 2025-05-28 RX ORDER — HYDROCODONE BITARTRATE AND ACETAMINOPHEN 10; 325 MG/1; MG/1
1 TABLET ORAL EVERY 6 HOURS PRN
Qty: 120 TABLET | Refills: 0 | Status: SHIPPED | OUTPATIENT
Start: 2025-05-28

## 2025-05-28 RX ORDER — FERROUS SULFATE 324(65)MG
324 TABLET, DELAYED RELEASE (ENTERIC COATED) ORAL
Qty: 90 TABLET | Refills: 0 | Status: SHIPPED | OUTPATIENT
Start: 2025-05-28

## 2025-05-28 NOTE — PROGRESS NOTES
Chief Complaint  Hospital Follow Up Visit    Subjective        Adela Arauz presents to [facility]  History of Present Illness  History of Present Illness  The patient is a 60-year-old female who presents today for follow-up of chronic pain and post-surgical follow-up.    She has been experiencing chronic pain, which she manages with Norco. During her recent hospital stay at [facility], she was offered OxyContin for pain management but declined due to previous adverse reactions, including hallucinations. She also takes Lyrica, turmeric, and tizanidine for fibromyalgia and chronic pain.    She underwent a robotic cholecystectomy at [facility] on 05/23/2025. She was advised to temporarily discontinue Eliquis for a week post-surgery and resume it on Friday. She did not have any ports placed during the surgery. She was supposed to have the surgery on 05/19/2025, but it was postponed because she was not told to stop taking Eliquis.    She has rheumatoid arthritis, which causes pain in her elbows, shoulders, wrists, knees, hips, and back.    She has been dealing with anxiety for over 5 years, which is managed with Xanax. She also has PTSD but reports no suicidal or homicidal ideations.    She is currently on anticoagulant therapy with Eliquis, which is on hold until Friday.    She takes ferrous sulfate for iron deficiency anemia.    She takes Lasix for lower extremity edema but does not take it every day.    She takes omeprazole for acid reflux.    She takes metoprolol.    PAST SURGICAL HISTORY:  Robotic cholecystectomy on 05/23/2025.    Current Outpatient Medications on File Prior to Visit   Medication Sig Dispense Refill    alendronate (Fosamax) 70 MG tablet Take 1 tablet by mouth Every 7 (Seven) Days. 90 tablet 0    ALPRAZolam (XANAX) 1 MG tablet Take 1 tablet by mouth 3 (Three) Times a Day As Needed for Anxiety. 90 tablet 2    apixaban (ELIQUIS) 5 MG tablet tablet Take 1 tablet  by mouth Every 12 (Twelve) Hours. Indications: Atrial Fibrillation 180 tablet 1    Calcium-Magnesium-Vitamin D 500-250-200 MG-MG-UNIT tablet Take 600 tablets by mouth 2 (Two) Times a Day.      chlorhexidine (PERIDEX) 0.12 % solution Apply 15 mL to the mouth or throat 2 (Two) Times a Day.      cholecalciferol (VITAMIN D3) 1000 units tablet Take 1 tablet by mouth Daily.      flecainide (TAMBOCOR) 50 MG tablet Take 1 tablet by mouth Every 12 (Twelve) Hours. 180 tablet 3    fluticasone (Flonase) 50 MCG/ACT nasal spray 2 sprays into the nostril(s) as directed by provider Daily. (Patient taking differently: Administer 2 sprays into the nostril(s) as directed by provider Daily As Needed for Allergies or Rhinitis.) 16 g 3    furosemide (LASIX) 40 MG tablet Take 1 tablet by mouth Daily. 90 tablet 1    omeprazole (priLOSEC) 40 MG capsule Take 1 capsule by mouth Daily. 90 capsule 1    tiZANidine (ZANAFLEX) 4 MG tablet Take 1 tablet by mouth Every 8 (Eight) Hours As Needed for Muscle Spasms. 270 tablet 1    Turmeric 500 MG capsule Take 1 capsule by mouth Daily.      valACYclovir (VALTREX) 1000 MG tablet Take 1 tablet by mouth 2 (Two) Times a Day. (Patient taking differently: Take 1 tablet by mouth 2 (Two) Times a Day As Needed (FEVER BLISTERS, OR VIRAL ILLNESS).) 270 tablet 1     No current facility-administered medications on file prior to visit.     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    Objective   Vital Signs:  Temp 98.6 °F (37 °C) (Infrared)   Resp 18   Ht 160 cm (63\")   Wt 75.3 kg (166 lb)   SpO2 99%   BMI 29.41 kg/m²   Estimated body mass index is 29.41 kg/m² as calculated from the following:    Height as of this encounter: 160 cm (63\").    Weight as of this encounter: 75.3 kg (166 lb).       BMI is >= 25 and <30. (Overweight) The following options were offered after discussion;: exercise counseling/recommendations and nutrition counseling/recommendations    Physical Exam  Vitals and nursing note reviewed.   Constitutional:       General: She is awake.      Appearance: Normal appearance. She is well-developed and well-groomed.   HENT:      Head: Normocephalic and atraumatic.      Right Ear: Hearing, tympanic membrane, ear canal and external ear normal.      Left Ear: Hearing, tympanic membrane, ear canal and external ear normal.      Nose: Nose normal.      Mouth/Throat:      Lips: Pink.      Pharynx: Oropharynx is clear.   Eyes:      General: Lids are normal.      Conjunctiva/sclera: Conjunctivae normal.   Cardiovascular:      Rate and Rhythm: Normal rate and regular rhythm.      Heart sounds: Normal heart sounds.   Pulmonary:      Effort: Pulmonary effort is normal.      Breath sounds: Normal breath sounds and air entry.   Abdominal:      General: Abdomen is protuberant. Bowel sounds are normal.      Tenderness: There is generalized abdominal tenderness. There is no right CVA tenderness, left CVA tenderness, guarding or rebound. Negative signs include Beckford's sign, Rovsing's sign, McBurney's sign and psoas sign.          Comments: Has healing incisions from lap choley   Musculoskeletal:      Right hand: " Tenderness present. Decreased range of motion.      Left hand: Tenderness present. Decreased range of motion.        Arms:       Cervical back: Spasms present. Decreased range of motion.      Thoracic back: Normal.      Lumbar back: Normal.        Back:       Right lower leg: No edema.      Left lower leg: No edema.   Lymphadenopathy:      Head:      Right side of head: No submental, submandibular or tonsillar adenopathy.      Left side of head: No submental, submandibular or tonsillar adenopathy.   Skin:     General: Skin is warm and dry.   Neurological:      Mental Status: She is alert and oriented to person, place, and time.      Sensory: Sensation is intact.      Motor: Motor function is intact.      Coordination: Coordination is intact.      Gait: Gait is intact.   Psychiatric:         Attention and Perception: Attention and perception normal.         Mood and Affect: Mood and affect normal.         Speech: Speech normal.         Behavior: Behavior normal. Behavior is cooperative.         Thought Content: Thought content normal.         Cognition and Memory: Cognition and memory normal.         Judgment: Judgment normal.        Physical Exam      Result Review :          Results             Assessment and Plan     Diagnoses and all orders for this visit:    1. Hospital discharge follow-up (Primary)    2. Chronic pain syndrome  -     HYDROcodone-acetaminophen (NORCO)  MG per tablet; Take 1 tablet by mouth Every 6 (Six) Hours As Needed for Moderate Pain.  Dispense: 120 tablet; Refill: 0    3. Rheumatoid arthritis involving multiple sites with positive rheumatoid factor  -     pregabalin (LYRICA) 150 MG capsule; Take 1 capsule by mouth 2 (Two) Times a Day.  Dispense: 180 capsule; Refill: 1    4. Spinal stenosis of lumbar region with neurogenic claudication  -     pregabalin (LYRICA) 150 MG capsule; Take 1 capsule by mouth 2 (Two) Times a Day.  Dispense: 180 capsule; Refill: 1    5. Lumbar radiculopathy  -      pregabalin (LYRICA) 150 MG capsule; Take 1 capsule by mouth 2 (Two) Times a Day.  Dispense: 180 capsule; Refill: 1    6. Degeneration of intervertebral disc of lumbar region with discogenic back pain and lower extremity pain  -     pregabalin (LYRICA) 150 MG capsule; Take 1 capsule by mouth 2 (Two) Times a Day.  Dispense: 180 capsule; Refill: 1    7. Chronic bilateral low back pain with bilateral sciatica  -     pregabalin (LYRICA) 150 MG capsule; Take 1 capsule by mouth 2 (Two) Times a Day.  Dispense: 180 capsule; Refill: 1    8. Iron deficiency anemia, unspecified iron deficiency anemia type  -     ferrous sulfate 324 (65 Fe) MG tablet delayed-release EC tablet; Take 1 tablet by mouth Daily With Breakfast.  Dispense: 90 tablet; Refill: 0    9. Atrial fibrillation with rapid ventricular response    - gladys, control contract and toxassure on file   Assessment & Plan  1. Chronic pain.  - Continues to experience chronic pain associated with rheumatoid arthritis, affecting elbows, shoulders, wrists, knees, hips, and back.  - Currently managing pain with Norco, Lyrica, turmeric, and tizanidine.  - Norco prescription issued on 05/13/2025, not due for refill until 06/12/2025.  - Next appointment scheduled for 07/12/2025, bypassing [facility]ne visit unless earlier consultation is necessary.    2. Post-surgical follow-up.  - Underwent robotic cholecystectomy on 05/23/2025 at [place] due to chronically inflamed gallbladder with omentum adhesions.  - Surgery was without complications, discharged the same day.  - Advised to hold Eliquis for a week post-surgery, will resume on Friday.    3. Rheumatoid arthritis.  - Experiences pain in elbows, shoulders, wrists, knees, hips, and back due to rheumatoid arthritis.  - Managing condition with existing medication regimen.    4. Anxiety.  - Chronic anxiety managed with Xanax.  - No issues reported with current treatment.    5. Post-traumatic stress disorder (PTSD).  - Has PTSD but  reports no suicidal or homicidal ideations.  - Current treatment appears effective.    6. Anticoagulant therapy.  - On Eliquis for anticoagulation, temporarily held post-surgery, will resume on Friday.    7. Iron deficiency anemia.  - Taking ferrous sulfate for iron deficiency anemia.  - Refill for ferrous sulfate sent in January, will need another refill soon.    8. Lower extremity edema.  - Takes Lasix for lower extremity edema.  - Prescription sent in January for three months, had a refill, should be okay on that.    9. Acid reflux.  - Takes omeprazole for acid reflux.  - Prescription last refilled in April, has a remaining refill.    10. Fibromyalgia.  - Takes Lyrica for fibromyalgia pain.  - Refill provided in March and another in April; will need a refill for May.    11. Medication management.  - Also takes metoprolol and Fosamax.  - Metoprolol last refilled in April, Fosamax supply is sufficient.      ICD-10-CM ICD-9-CM   1. Hospital discharge follow-up  Z09 V67.59   2. Chronic pain syndrome  G89.4 338.4   3. Rheumatoid arthritis involving multiple sites with positive rheumatoid factor  M05.79 714.0   4. Spinal stenosis of lumbar region with neurogenic claudication  M48.062 724.03   5. Lumbar radiculopathy  M54.16 724.4   6. Degeneration of intervertebral disc of lumbar region with discogenic back pain and lower extremity pain  M51.362 722.52   7. Chronic bilateral low back pain with bilateral sciatica  M54.42 724.2    M54.41 724.3    G89.29 338.29   8. Iron deficiency anemia, unspecified iron deficiency anemia type  D50.9 280.9   9. Atrial fibrillation with rapid ventricular response  I48.91 427.31                Follow Up     Return in about 6 weeks (around 7/11/2025) for Recheck.  Patient was given instructions and counseling regarding her condition or for health maintenance advice. Please see specific information pulled into the AVS if appropriate.       Patient or patient representative verbalized  consent for the use of Ambient Listening during the visit with  Charlene Huntley DNP, APRN for chart documentation. 6/3/2025  07:14 CDT    Electronically signed by Charlene Huntley DNP, APRN, 06/03/25, 7:15 AM CDT.

## 2025-05-30 ENCOUNTER — OFFICE VISIT (OUTPATIENT)
Dept: CARDIOLOGY | Facility: CLINIC | Age: 60
End: 2025-05-30
Payer: MEDICARE

## 2025-05-30 VITALS
HEART RATE: 59 BPM | SYSTOLIC BLOOD PRESSURE: 124 MMHG | HEIGHT: 63 IN | OXYGEN SATURATION: 97 % | WEIGHT: 163.6 LBS | BODY MASS INDEX: 28.99 KG/M2 | DIASTOLIC BLOOD PRESSURE: 78 MMHG

## 2025-05-30 DIAGNOSIS — I48.0 PAROXYSMAL A-FIB: Primary | ICD-10-CM

## 2025-05-30 RX ORDER — METOPROLOL TARTRATE 25 MG/1
12.5 TABLET, FILM COATED ORAL EVERY 12 HOURS SCHEDULED
Qty: 90 TABLET | Refills: 3 | Status: SHIPPED | OUTPATIENT
Start: 2025-05-30

## 2025-05-30 NOTE — PROGRESS NOTES
"EP FOLLOW UP PATIENT VISIT    Chief Complaint  Atrial Fibrillation (Onset Atrial Fibrillation /1 Month Hospital Follow- Up /)    Subjective        Atrial Fibrillation  Past medical history includes atrial fibrillation.            EP Problems:  1.  Syncope  2.  Paroxysmal atrial fibrillation     Medical Problems:  1.  Rheumatoid arthritis  2.  Alpha-1 antitrypsin deficiency  3.  Obesity  4.  DJD  5.  GERD  6.  Anxiety disorder        Patient is a 60-year-old female who presents to the clinic today following up for paroxysmal atrial fibrillation and syncope. On 4/23/2025 she presented to the ER after multiple syncopal episode at home. In the ER she was found to be in atrial fibrillation with RVR and was started on diltiazem infusion. She converted back to sinus rhythm spontaneously. Started on anticoagulation, flecainide, and metoprolol. She wore a 30-day Holter monitor at discharge that has not yet been read.      She says she has been doing okay since her hospitalization last month. She has had her gallbladder out on 5/23/2025 and said she has been recovering well since then. She says she was instructed by the surgeon to hold her Eliquis for a week after this procedure. She denies any bleeding issues with her Eliquis. She has been cutting her metoprolol and flecainide in half and only taking the half doses because she said that she feels very tired and fatigued when she takes these medications. She denies any lightheadedness or dizziness or feelings like she is going to pass out.      Objective   Vital Signs:  /78 (BP Location: Left arm, Patient Position: Sitting, Cuff Size: Adult)   Pulse 59   Ht 160 cm (62.99\")   Wt 74.2 kg (163 lb 9.6 oz)   SpO2 97%   BMI 28.99 kg/m²   Estimated body mass index is 28.99 kg/m² as calculated from the following:    Height as of this encounter: 160 cm (62.99\").    Weight as of this encounter: 74.2 kg (163 lb 9.6 oz).      Physical Exam  Vitals reviewed.   Constitutional:  "      Appearance: Normal appearance. She is normal weight.   Cardiovascular:      Rate and Rhythm: Regular rhythm. Bradycardia present.      Pulses: Normal pulses.           Radial pulses are 2+ on the right side and 2+ on the left side.        Posterior tibial pulses are 2+ on the right side and 2+ on the left side.      Heart sounds: Normal heart sounds.   Pulmonary:      Effort: Pulmonary effort is normal.      Breath sounds: Normal breath sounds.   Musculoskeletal:      Right lower leg: No edema.      Left lower leg: No edema.   Skin:     General: Skin is warm and dry.   Neurological:      Mental Status: She is alert.                      Result Review :  The following data was reviewed by: SURYA Palma on 05/30/2025:  CMP          4/22/2025    04:42 4/23/2025    10:15 4/24/2025    09:46   CMP   Glucose  97  75    Glucose 81         BUN 3     4  5    Creatinine 0.52     0.54  0.53    EGFR  106.2  106.7    Sodium 138     140  140    Potassium 4.2     3.7  4.5    Chloride 111     103  104    Calcium 8     9.1  9.0    Total Protein  6.6  5.7    Albumin  3.7  3.3    Globulin  2.9  2.4    Total Bilirubin  0.4  0.4    Alkaline Phosphatase  174  142    AST (SGOT)  45  34    ALT (SGPT)  14  13    Albumin/Globulin Ratio  1.3  1.4    BUN/Creatinine Ratio  7.4  9.4    Anion Gap 7     14.0  7.0       Details          This result is from an external source.             CBC          3/30/2025    04:52 4/23/2025    10:15 4/24/2025    09:46   CBC   WBC 7.1     6.89  5.55    RBC 4.15     5.40  5.01    Hemoglobin 11.4     14.3  13.3    Hematocrit 36.8     45.3  43.0    MCV 88.7     83.9  85.8    MCH 27.5     26.5  26.5    MCHC 31     31.6  30.9    RDW 14.8     16.3  16.3    Platelets 275     284  262       Details          This result is from an external source.             TSH          12/18/2024    10:54 1/26/2025    11:29 4/23/2025    10:15   TSH   TSH 3.650     6.670  3.310       Details          This result is from an  external source.             [id number] SCORE   [id number] Score: 1 (5/30/2025 10:36 AM)      ECG 12 Lead    Date/Time: 5/30/2025 12:56 PM  Performed by: Mo Griffiths APRN    Authorized by: Mo Griffiths APRN  Comparison: compared with previous ECG from 4/23/2025  Comparison to previous ECG: Sinus bradycardia has replaced atrial fibrillation  Rhythm: sinus bradycardia  Rate: bradycardic  BPM: 59  QRS axis: left    Clinical impression: abnormal EKG              Assessment and Plan   Diagnoses and all orders for this visit:    1. Paroxysmal A-fib (Primary)  -     metoprolol tartrate (LOPRESSOR) 25 MG tablet; Take 0.5 tablets by mouth Every 12 (Twelve) Hours.  Dispense: 90 tablet; Refill: 3  -     ECG 12 Lead            Plan:  - Continue Eliquis for anticoagulation given her elevated [id number] score of 1. Instructed her to go ahead and start taking her Eliquis today.  - Continue flecainide 50 mg twice a day for rhythm control. Instructed her to continue the full dose of this medication.  - Will cut down her metoprolol tartrate to 12.5 mg twice a day for rate control and see if her fatigue and drowsiness improves.  - Awaiting results of her 30-day Holter monitor. Will call her with these results.                   Follow Up   Return in about 3 months (around 8/30/2025).  Patient was given instructions and counseling regarding her condition or for health maintenance advice. Please see specific information pulled into the AVS if appropriate.     Part of this note may be an electronic transcription/translation of spoken language to printed text using the Dragon Dictation System.

## 2025-06-03 ENCOUNTER — TELEPHONE (OUTPATIENT)
Dept: CARDIOLOGY | Facility: CLINIC | Age: 60
End: 2025-06-03
Payer: MEDICARE

## 2025-06-03 RX ORDER — FLECAINIDE ACETATE 50 MG/1
50 TABLET ORAL EVERY 12 HOURS SCHEDULED
COMMUNITY
Start: 2025-04-24

## 2025-06-03 RX ORDER — CEPHALEXIN 500 MG/1
CAPSULE ORAL
COMMUNITY
Start: 2025-03-07

## 2025-06-03 RX ORDER — DIPHENOXYLATE HYDROCHLORIDE AND ATROPINE SULFATE 2.5; .025 MG/1; MG/1
TABLET ORAL
COMMUNITY
Start: 2025-03-24

## 2025-06-03 RX ORDER — PSYLLIUM HUSK 0.4 G
1000 CAPSULE ORAL DAILY
COMMUNITY

## 2025-06-03 NOTE — TELEPHONE ENCOUNTER
REQUEST FOR CARDIAC CLEARANCE    Caller name: Adela Arauz     Phone Number: [phone]    Surgeon's name: DR. LEANDRO LESLIE    Type of planned surgery: STENT REMOVAL  AFTER GALLBLADDER REMOVAL    Date of planned surgery: 6.25.25    Type of anesthesia: UNKNOWN    Have you been experiencing chest pain or shortness of breath? NO    Is your doctor requesting for you to stop any of your medications prior to your surgery? PT STATED THAT SHE RECEIVED A LETTER SAYING IF SHE TAKES BLOOD THINNERS, THEN CLEARANCE IS NEEDED PRIOR TO SURGERY (PT IS ON ELIQUIS)    Where should we fax the clearance to? [facility] [phone]

## 2025-06-04 NOTE — TELEPHONE ENCOUNTER
MED HOLD REQUEST FROM TREY BARBER RECEIVED AND RESPONDED TO 6/4/2025.  LETTER ATTACHED.  Cynthia Magana MA

## 2025-06-06 LAB
CV ZIO BASELINE AVG BPM: 67
CV ZIO BASELINE BPM HIGH: 180
CV ZIO BASELINE BPM LOW: 42

## 2025-06-08 ENCOUNTER — RESULTS FOLLOW-UP (OUTPATIENT)
Dept: TELEMETRY | Facility: HOSPITAL | Age: 60
End: 2025-06-08
Payer: MEDICARE

## 2025-06-09 ENCOUNTER — TELEPHONE (OUTPATIENT)
Dept: CARDIOLOGY | Facility: CLINIC | Age: 60
End: 2025-06-09
Payer: MEDICARE

## 2025-06-09 NOTE — TELEPHONE ENCOUNTER
I called pt and discussed results. Pt voiced understanding. Pt has already had appt with Dr. Beltran.

## 2025-06-09 NOTE — TELEPHONE ENCOUNTER
Chris Beltran MD Cooper, Shelby R, MA  Please let her know that her monitor looked quite reassuring.  No significant arrhythmias.    Thanks,  Chris

## 2025-06-15 ENCOUNTER — HOSPITAL ENCOUNTER (EMERGENCY)
Facility: HOSPITAL | Age: 60
Discharge: HOME | End: 2025-06-15
Attending: EMERGENCY MEDICINE | Admitting: EMERGENCY MEDICINE
Payer: MEDICARE

## 2025-06-15 VITALS
HEIGHT: 63 IN | HEART RATE: 85 BPM | BODY MASS INDEX: 29.06 KG/M2 | SYSTOLIC BLOOD PRESSURE: 121 MMHG | DIASTOLIC BLOOD PRESSURE: 79 MMHG | OXYGEN SATURATION: 99 % | RESPIRATION RATE: 16 BRPM | WEIGHT: 164 LBS | TEMPERATURE: 97.7 F

## 2025-06-15 DIAGNOSIS — L03.311 CELLULITIS OF ABDOMINAL WALL: Primary | ICD-10-CM

## 2025-06-15 PROCEDURE — 99283 EMERGENCY DEPT VISIT LOW MDM: CPT | Performed by: EMERGENCY MEDICINE

## 2025-06-15 RX ORDER — DOXYCYCLINE 100 MG/1
100 CAPSULE ORAL 2 TIMES DAILY
Qty: 20 CAPSULE | Refills: 0 | Status: SHIPPED | OUTPATIENT
Start: 2025-06-15

## 2025-06-15 RX ORDER — DOXYCYCLINE 100 MG/1
100 CAPSULE ORAL ONCE
Status: COMPLETED | OUTPATIENT
Start: 2025-06-15 | End: 2025-06-15

## 2025-06-15 RX ADMIN — DOXYCYCLINE 100 MG: 100 CAPSULE ORAL at 06:51

## 2025-06-15 NOTE — ED PROVIDER NOTES
Subjective   History of Present Illness  Patient complains of a wound infection in her abdominal wall.  She has a cholecystectomy at [facility] about 3 weeks ago.  She now has developed over the last few days a reddened irritated swollen tender area on her abdominal wall just below one of the surgical sites on the right side of her abdomen.  She does have a history of MRSA and is concerned that is what it is again.  She denies any fever or chills or cough or congestion or vomiting or diarrhea.    History provided by:  Patient   used: No    Wound Infection  Location:  Abodminal wall  Quality:  Red and swollen and tender  Severity:  Moderate  Onset quality:  Gradual  Duration:  4 days  Timing:  Constant  Progression:  Worsening  Chronicity:  New  Associated symptoms: no abdominal pain, no congestion, no cough, no diarrhea, no fatigue, no fever, no headaches, no myalgias, no rash, no rhinorrhea, no sore throat and no vomiting        Review of Systems   Constitutional: Negative.  Negative for fatigue and fever.   HENT: Negative.  Negative for congestion, rhinorrhea and sore throat.    Respiratory: Negative.  Negative for cough.    Gastrointestinal:  Negative for abdominal pain, diarrhea and vomiting.   Genitourinary: Negative.    Musculoskeletal:  Negative for myalgias.   Skin:  Negative for rash.   Neurological: Negative.  Negative for headaches.   All other systems reviewed and are negative.      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     Low back pain     Nausea after anesthesia     Open wound of gum     pt states had all her teeth pulled and there is a spot that hasn't healed due to RA so she is using peridex rinse daily    Osteoporosis     PONV  (postoperative nausea and vomiting)     Psoriasis     Rheumatoid arthritis     Seasonal allergies     Senile osteoporosis 09/28/2017       Allergies   Allergen Reactions    Oxycodone-Acetaminophen Hallucinations     Causes hallicinations    Evenity [Romosozumab] Swelling     Injection site swelling, redness, warm to touch       Past Surgical History:   Procedure Laterality Date    ANTERIOR LUMBAR EXPOSURE N/A 12/02/2020    Procedure: Anterior lumbar interbody fusion of L5-S1 with instrumentation ;  Surgeon: Neptali Rivera DO;  Location:  [facility] OR;  Service: Vascular;  Laterality: N/A;    AXILLARY LYMPH NODE BIOPSY/EXCISION Right 09/19/2019    Procedure: EXCISION RIGHT AXILLARY MASS;  Surgeon: Jes Enamorado MD;  Location:  [facility] OR;  Service: General    GALLBLADDER SURGERY  05/23/2025    INCISION AND DRAINAGE ARM Right 06/03/2021    Procedure: INCISION AND DRAINAGE FOREARM;  Surgeon: Jes Enamorado MD;  Location:  [facility];  Service: General;  Laterality: Right;    LUMBAR FUSION N/A 12/02/2020    Procedure: ANTERIOR DECOMPRESSION, ANTERIOR LUMBAR INTERBODY FUSION WITH INSTRUMENTATION L5-S1;  Surgeon: ALEXIS Dunaway MD;  Location:  [facility];  Service: Orthopedic Spine;  Laterality: N/A;    LUMBAR FUSION Left 12/16/2020    Procedure: LEFT LATERAL LUMBAR INTERBODY FUSION L3-5 WITH INSTRUMENTATION L3-4;  Surgeon: ALEXIS Dunaway MD;  Location:  [facility];  Service: Orthopedic Spine;  Laterality: Left;    LUMBAR FUSION Right 01/27/2025    Procedure: RIGHT LATERAL LUMBAR INTERBODY FUSION WITH INSTRUMENTATION L2-3;  Surgeon: ALEXIS Dunaway MD;  Location:  [facility];  Service: Orthopedic Spine;  Laterality: Right;    LUMBAR LAMINECTOMY WITH FUSION N/A 12/18/2020    Procedure: DECOMPRESSION L3-4, POSTERIOR SPINAL FUSION WITH INSTRUMENTATION L3-S1;  Surgeon: ALEXIS Dunaway MD;  Location:  [facility];  Service: Orthopedic Spine;  Laterality: N/A;    LUMBAR LAMINECTOMY WITH FUSION N/A 01/29/2025     Procedure: REMOVAL OF INSTRUMENTATION, EXPLORATION OF FUSION L3-S1, POSTERIOR SPINAL FUSION L2-3 WITH INSTRUMENTATION L2-S1;  Surgeon: ALEXIS Dunaway MD;  Location: [facility];  Service: Orthopedic Spine;  Laterality: N/A;    SHOULDER SURGERY Left     arthroscopy for arthtritis and bone spurs    TUBAL ABDOMINAL LIGATION      US GUIDED LYMPH NODE BIOPSY  2019    WRIST SURGERY Bilateral     fracture - no internal hardware       Family History   Problem Relation Age of Onset    Arthritis Mother     COPD Mother     Atrial fibrillation Mother     Osteoarthritis Mother     Heart disease Father     Hypertension Father     Arthritis Father     Melanoma Father         metastatic    Arthritis Sister     Arthritis Brother     No Known Problems Daughter     Arthritis Maternal Grandfather     Breast cancer Neg Hx        Social History     Socioeconomic History    Marital status:    Tobacco Use    Smoking status: Former     Current packs/day: 0.00     Average packs/day: 0.5 packs/day for 34.7 years (17.4 ttl pk-yrs)     Types: Cigarettes     Start date:      Quit date: 10/1/2024     Years since quittin.7    Smokeless tobacco: Never   Vaping Use    Vaping status: Never Used   Substance and Sexual Activity    Alcohol use: No    Drug use: No    Sexual activity: Defer       Prior to Admission medications    Medication Sig Start Date End Date Taking? Authorizing Provider   alendronate (Fosamax) 70 MG tablet Take 1 tablet by mouth Every 7 (Seven) Days. 25   Charlene Huntley DNP, APRN   ALPRAZolam (XANAX) 1 MG tablet Take 1 tablet by mouth 3 (Three) Times a Day As Needed for Anxiety. 25   Charlene Huntley DNP, APRN   apixaban (ELIQUIS) 5 MG tablet tablet Take 1 tablet by mouth Every 12 (Twelve) Hours. Indications: Atrial Fibrillation 25   , SUYRA Roberts   Calcium-Magnesium-Vitamin D 500-250-200 MG-MG-UNIT tablet Take 600 tablets by mouth 2 (Two) Times a Day.    Provider, Historical,  MD   chlorhexidine (PERIDEX) 0.12 % solution Apply 15 mL to the mouth or throat 2 (Two) Times a Day.    Provider, MD Buddy   cholecalciferol (VITAMIN D3) 1000 units tablet Take 1 tablet by mouth Daily.    ProviderBuddy MD   doxycycline (MONODOX) 100 MG capsule Take 1 capsule by mouth 2 (Two) Times a Day. 6/15/25   Tristan Guillory Jr., MD   ferrous sulfate 324 (65 Fe) MG tablet delayed-release EC tablet Take 1 tablet by mouth Daily With Breakfast. 5/28/25   Charlene Huntley DNP, APRN   flecainide (TAMBOCOR) 50 MG tablet Take 1 tablet by mouth Every 12 (Twelve) Hours. 4/24/25   Alejandra Toribio APRN   fluticasone (Flonase) 50 MCG/ACT nasal spray 2 sprays into the nostril(s) as directed by provider Daily.  Patient taking differently: Administer 2 sprays into the nostril(s) as directed by provider Daily As Needed for Allergies or Rhinitis. 8/16/24   Charlene Huntley DNP, APRN   furosemide (LASIX) 40 MG tablet Take 1 tablet by mouth Daily. 1/13/25   Charlene Huntley DNP, APRN   HYDROcodone-acetaminophen (NORCO)  MG per tablet Take 1 tablet by mouth Every 6 (Six) Hours As Needed for Moderate Pain. 5/28/25   Charlene Huntley DNP, APRN   metoprolol tartrate (LOPRESSOR) 25 MG tablet Take 0.5 tablets by mouth Every 12 (Twelve) Hours. 5/30/25   Mo Griffiths APRN   omeprazole (priLOSEC) 40 MG capsule Take 1 capsule by mouth Daily. 12/27/24   Charlene Huntley DNP, APRN   pregabalin (LYRICA) 150 MG capsule Take 1 capsule by mouth 2 (Two) Times a Day. 5/28/25   Charlene Huntley DNP, APRN   tiZANidine (ZANAFLEX) 4 MG tablet Take 1 tablet by mouth Every 8 (Eight) Hours As Needed for Muscle Spasms. 4/10/25   Riki Jiang MD   Turmeric 500 MG capsule Take 1 capsule by mouth Daily.    ProviderBuddy MD   valACYclovir (VALTREX) 1000 MG tablet Take 1 tablet by mouth 2 (Two) Times a Day.  Patient taking differently: Take 1 tablet by mouth 2 (Two) Times a Day As Needed (FEVER  BLISTERS, OR VIRAL ILLNESS). 12/11/24   Charlene Huntley, DNP, APRN       Medications   doxycycline (MONODOX) capsule 100 mg (has no administration in time range)       Vitals:    06/15/25 0554   BP: 121/79   Pulse: 85   Resp: 16   Temp: 97.7 °F (36.5 °C)   SpO2: 99%         Objective   Physical Exam  Vitals and nursing note reviewed.   Constitutional:       Appearance: Normal appearance.   HENT:      Head: Normocephalic and atraumatic.   Cardiovascular:      Rate and Rhythm: Normal rate and regular rhythm.   Pulmonary:      Effort: Pulmonary effort is normal.      Breath sounds: Normal breath sounds.   Abdominal:      General: Bowel sounds are normal.      Palpations: Abdomen is soft.      Comments: Patient does have a nickel sized vesicular swollen tender area on the right abdominal wall about mid of the abdomen.  There is some surrounding erythema.  There is no actual fluctuance of of an abscess.   Neurological:      General: No focal deficit present.      Mental Status: She is alert and oriented to person, place, and time.   Psychiatric:         Mood and Affect: Mood normal.         Behavior: Behavior normal.         Procedures         Lab Results (last 24 hours)       ** No results found for the last 24 hours. **            No orders to display       ED Course          MDM  Number of Diagnoses or Management Options  Cellulitis of abdominal wall: new and does not require workup  Diagnosis management comments: I told the patient his lesion does have the appearance of a staph infection.  He has no drainage right now so there is nothing to culture but we will treated as such.  She does not Rumer the antibiotics that she were on but I will get her on doxycycline and see if we can help her.  She is discharged in stable condition.    Risk of Complications, Morbidity, and/or Mortality  Presenting problems: moderate  Diagnostic procedures: low  Management options: moderate    Patient Progress  Patient progress:  stable        Final diagnoses:   Cellulitis of abdominal wall          Tristan Guillory Jr., MD  06/15/25 0628

## 2025-06-16 ENCOUNTER — OFFICE VISIT (OUTPATIENT)
Dept: FAMILY MEDICINE CLINIC | Facility: CLINIC | Age: 60
End: 2025-06-16
Payer: MEDICARE

## 2025-06-16 VITALS
WEIGHT: 164 LBS | RESPIRATION RATE: 18 BRPM | BODY MASS INDEX: 29.06 KG/M2 | OXYGEN SATURATION: 99 % | TEMPERATURE: 98.6 F | HEART RATE: 63 BPM | SYSTOLIC BLOOD PRESSURE: 117 MMHG | DIASTOLIC BLOOD PRESSURE: 78 MMHG | HEIGHT: 63 IN

## 2025-06-16 DIAGNOSIS — Z09 HOSPITAL DISCHARGE FOLLOW-UP: ICD-10-CM

## 2025-06-16 DIAGNOSIS — T14.8XXA WOUND INFECTION: Primary | ICD-10-CM

## 2025-06-16 DIAGNOSIS — L08.9 WOUND INFECTION: Primary | ICD-10-CM

## 2025-06-16 PROCEDURE — 99214 OFFICE O/P EST MOD 30 MIN: CPT | Performed by: NURSE PRACTITIONER

## 2025-06-16 PROCEDURE — 1160F RVW MEDS BY RX/DR IN RCRD: CPT | Performed by: NURSE PRACTITIONER

## 2025-06-16 PROCEDURE — 1159F MED LIST DOCD IN RCRD: CPT | Performed by: NURSE PRACTITIONER

## 2025-06-16 PROCEDURE — 1125F AMNT PAIN NOTED PAIN PRSNT: CPT | Performed by: NURSE PRACTITIONER

## 2025-06-16 RX ORDER — GINSENG 100 MG
1 CAPSULE ORAL 2 TIMES DAILY
Qty: 14 G | Refills: 1 | Status: SHIPPED | OUTPATIENT
Start: 2025-06-16 | End: 2025-06-16

## 2025-06-16 RX ORDER — MUPIROCIN CALCIUM 20 MG/G
1 CREAM TOPICAL 3 TIMES DAILY
Qty: 30 G | Refills: 1 | Status: SHIPPED | OUTPATIENT
Start: 2025-06-16

## 2025-06-16 NOTE — PROGRESS NOTES
"Chief Complaint  Hospital Follow Up Visit    Subjective        Adela Arauz presents to [facility]  History of Present Illness  History of Present Illness  Pt presents for an infection in her abdomen on the right side just below her incision from the gallbladder surgery she had 3 weeks ago at [place]..  She states, yesterday it started getting bigger and tender and then by night it ruptured and drained the worse, thick, foul smelling secretions so she went to ER. She describes the area as tender, red and swollen.  She has a history of MRSA however states that the ER doc did not do a culture and she is worried about that.  He did give her an antibiotic (doxy) and said the dressings will have to be changed 2 times daily.  She denies any fever, chills, nausea, vomiting or diarrhea.       Chronic problems include: RA, osteoporosis, chronic pain, fever blisters, iron def aniemia, Gerd, PTSD, anxiety, chronic prain, Afib with RVR, degenerative disc   Lumbar, lumbar radiculopathy, hx of lumbar fusion and morbid obesity  Objective   Vital Signs:  /78 (BP Location: Right arm, Patient Position: Sitting, Cuff Size: Large Adult)   Pulse 63   Temp 98.6 °F (37 °C) (Infrared)   Resp 18   Ht 160 cm (63\")   Wt 74.4 kg (164 lb)   SpO2 99%   BMI 29.05 kg/m²   Estimated body mass index is 29.05 kg/m² as calculated from the following:    Height as of this encounter: 160 cm (63\").    Weight as of this encounter: 74.4 kg (164 lb).       BMI is >= 25 and <30. (Overweight) The following options were offered after discussion;: exercise counseling/recommendations and nutrition counseling/recommendations    Physical Exam  Vitals and nursing note reviewed.   Constitutional:       General: She is awake.      Appearance: Normal appearance. She is well-developed and well-groomed.   HENT:      Head: Normocephalic and atraumatic.      Right Ear: Hearing, tympanic membrane, ear canal and " external ear normal.      Left Ear: Hearing, tympanic membrane, ear canal and external ear normal.      Nose: Nose normal.      Mouth/Throat:      Lips: Pink.      Pharynx: Oropharynx is clear.   Eyes:      General: Lids are normal.      Conjunctiva/sclera: Conjunctivae normal.   Cardiovascular:      Rate and Rhythm: Normal rate and regular rhythm.      Heart sounds: Normal heart sounds.   Pulmonary:      Effort: Pulmonary effort is normal.      Breath sounds: Normal breath sounds and air entry.   Abdominal:      General: Abdomen is protuberant. Bowel sounds are normal.      Tenderness: There is abdominal tenderness in the right lower quadrant. There is guarding. There is no right CVA tenderness, left CVA tenderness or rebound. Positive signs include Beckford's sign. Negative signs include Rovsing's sign, McBurney's sign, psoas sign and obturator sign.      Hernia: No hernia is present.          Comments: Has a open area to the right lower abdomen from gallbladder surgery that is draining yellow tinged secretions with induration around the area, redness and tenderness.    Musculoskeletal:      Cervical back: Full passive range of motion without pain.      Right lower leg: No edema.      Left lower leg: No edema.   Lymphadenopathy:      Head:      Right side of head: No submental, submandibular or tonsillar adenopathy.      Left side of head: No submental, submandibular or tonsillar adenopathy.   Skin:     General: Skin is warm and dry.          Neurological:      Mental Status: She is alert and oriented to person, place, and time.      Sensory: Sensation is intact.      Motor: Motor function is intact.      Coordination: Coordination is intact.      Gait: Gait is intact.   Psychiatric:         Attention and Perception: Attention and perception normal.         Mood and Affect: Mood and affect normal.         Speech: Speech normal.         Behavior: Behavior normal. Behavior is cooperative.         Thought Content:  Thought content normal.         Cognition and Memory: Cognition and memory normal.         Judgment: Judgment normal.      Physical Exam      Result Review :          Results             Assessment and Plan     Diagnoses and all orders for this visit  Hospital follow up  2. Wound infection (Primary)        -     Continue doxycycline as prescribed   -     Anaerobic Culture - Swab, Abdomen; Future  -     Anaerobic Culture - Swab, Abdomen  -     mupirocin (BACTROBAN) 2 % cream; Apply 1 Application topically to the appropriate area as directed 3 (Three) Times a Day.  Dispense: 30 g; Refill: 1  -     Change dressings on affected area at least 2 times daily, may need to be changed more frequently depending on drainage.  -     If pain worsens or develop fever follow up  or go to ER   -     Use good hand washing techniques    3. PTSD/Anxiety  -  continue alprazolam as prescribed      4. Chronic pain/lumbar radiculopathy/RA  - continue norco as prescribed  - continue tizanidine as prescribed  - continue pregabalin as prescribed  - Continue tumeric as prescribed    5. Osteoarthritis   - continue alendronate and make sure you are taking it as prescribed    6. Vit D def  - continue cholecalciferol as prescribed  - increase vit d in diet    7. Iron def    - continue taking ferrous sulfate as prescribed   - continue to increase iron in diet  -  Iron constipates may have to take a stool softner as directed    8. Edema    - continue furosemide as prescribed    - prop legs up as much as possible  -  encouraged to wear compression hose    9. Afib with RVR  - continue apixaban as prescribed  - continue fiecainde as prescribed   - continue metoprolol  - watch for bleeding from rectum or in urine    10. GERD  - continue omeprazole as prescribed  - Avoid foods that trigger gerd           Return in about 1 week (around 6/23/2025), or if symptoms worsen or fail to improve, for PRN for wound and normal visit for pain next month  .      Electronically signed by Charlene Huntley, [place], APRN, 06/17/25, 7:21 AM CDT.

## 2025-06-18 ENCOUNTER — PATIENT OUTREACH (OUTPATIENT)
Dept: CASE MANAGEMENT | Facility: OTHER | Age: 60
End: 2025-06-18
Payer: MEDICARE

## 2025-06-18 NOTE — OUTREACH NOTE
AMBULATORY CASE MANAGEMENT NOTE    Names and Relationships of Patient/Support Persons: Contact: Davonte Adela Smith; Relationship: Self -     Patient Outreach    HRCM follow up with patient seen at [facility] ED 6.15.25 for cellulitis of abdominal wall. Patient has been seen by PCP on 6.16.25 after the wound ruptured and a culture was taken in the office. She is taking Doxycycline, denied fever, and is consuming plenty of water. Patient denied needs at this time and will call for concerns.         Heather BOJORQUEZ  Ambulatory Case Management    6/18/2025, 12:16 CDT

## 2025-06-20 ENCOUNTER — TELEPHONE (OUTPATIENT)
Dept: GASTROENTEROLOGY | Age: 60
End: 2025-06-20

## 2025-06-20 NOTE — TELEPHONE ENCOUNTER
called patient to confirm  procedure scheduled for 6/25/25@1200   with Dr. Cabrera at [facility] outpatient      PATIENT CONFIRMED     Patient: Edwige Gu    YOB: 1965      Clearance was received on June 20, 2025.    for Endoscopy / Colonoscopy scheduled for: 6/25/25    Patient may discontinue the use of ELIQUIS for 2  days prior to the procedure.    IS Lovenox required:  NO    PATIENT NOTIFIED ON:  6/20/25      Clearance scanned into chart

## 2025-06-23 ENCOUNTER — RESULTS FOLLOW-UP (OUTPATIENT)
Dept: FAMILY MEDICINE CLINIC | Facility: CLINIC | Age: 60
End: 2025-06-23
Payer: MEDICARE

## 2025-06-23 LAB
BACTERIA SPEC CULT: NORMAL
MICROORGANISM/AGENT SPEC: NORMAL

## 2025-06-24 ENCOUNTER — ANESTHESIA EVENT (OUTPATIENT)
Dept: ENDOSCOPY | Age: 60
End: 2025-06-24
Payer: MEDICARE

## 2025-06-25 ENCOUNTER — ANESTHESIA (OUTPATIENT)
Dept: ENDOSCOPY | Age: 60
End: 2025-06-25
Payer: MEDICARE

## 2025-06-25 ENCOUNTER — HOSPITAL ENCOUNTER (OUTPATIENT)
Age: 60
Setting detail: OUTPATIENT SURGERY
Discharge: HOME | End: 2025-06-25
Attending: INTERNAL MEDICINE | Admitting: INTERNAL MEDICINE
Payer: MEDICARE

## 2025-06-25 ENCOUNTER — APPOINTMENT (OUTPATIENT)
Dept: GENERAL RADIOLOGY | Age: 60
End: 2025-06-25
Attending: INTERNAL MEDICINE
Payer: MEDICARE

## 2025-06-25 ENCOUNTER — ANCILLARY PROCEDURE (OUTPATIENT)
Dept: ENDOSCOPY | Age: 60
End: 2025-06-25
Attending: INTERNAL MEDICINE
Payer: MEDICARE

## 2025-06-25 VITALS
DIASTOLIC BLOOD PRESSURE: 75 MMHG | SYSTOLIC BLOOD PRESSURE: 127 MMHG | HEIGHT: 63 IN | BODY MASS INDEX: 29.77 KG/M2 | WEIGHT: 168 LBS | RESPIRATION RATE: 20 BRPM | HEART RATE: 65 BPM | OXYGEN SATURATION: 99 % | TEMPERATURE: 97.4 F

## 2025-06-25 PROCEDURE — 3609015000 HC ERCP REMOVE FOREIGN BODY/STENT BILIARY/PANC DUCT: Performed by: INTERNAL MEDICINE

## 2025-06-25 PROCEDURE — C1769 GUIDE WIRE: HCPCS | Performed by: INTERNAL MEDICINE

## 2025-06-25 PROCEDURE — 3700000000 HC ANESTHESIA ATTENDED CARE: Performed by: INTERNAL MEDICINE

## 2025-06-25 PROCEDURE — 7100000011 HC PHASE II RECOVERY - ADDTL 15 MIN: Performed by: INTERNAL MEDICINE

## 2025-06-25 PROCEDURE — 2580000003 HC RX 258: Performed by: ANESTHESIOLOGY

## 2025-06-25 PROCEDURE — 74328 X-RAY BILE DUCT ENDOSCOPY: CPT

## 2025-06-25 PROCEDURE — 74328 X-RAY BILE DUCT ENDOSCOPY: CPT | Performed by: INTERNAL MEDICINE

## 2025-06-25 PROCEDURE — 7100000010 HC PHASE II RECOVERY - FIRST 15 MIN: Performed by: INTERNAL MEDICINE

## 2025-06-25 PROCEDURE — 6370000000 HC RX 637 (ALT 250 FOR IP): Performed by: INTERNAL MEDICINE

## 2025-06-25 PROCEDURE — 43275 ERCP REMOVE FORGN BODY DUCT: CPT | Performed by: INTERNAL MEDICINE

## 2025-06-25 PROCEDURE — 3700000001 HC ADD 15 MINUTES (ANESTHESIA): Performed by: INTERNAL MEDICINE

## 2025-06-25 PROCEDURE — 2720000010 HC SURG SUPPLY STERILE: Performed by: INTERNAL MEDICINE

## 2025-06-25 PROCEDURE — 6360000002 HC RX W HCPCS: Performed by: NURSE ANESTHETIST, CERTIFIED REGISTERED

## 2025-06-25 PROCEDURE — 2709999900 HC NON-CHARGEABLE SUPPLY: Performed by: INTERNAL MEDICINE

## 2025-06-25 RX ORDER — DEXAMETHASONE SODIUM PHOSPHATE 10 MG/ML
INJECTION, SOLUTION INTRAMUSCULAR; INTRAVENOUS
Status: DISCONTINUED | OUTPATIENT
Start: 2025-06-25 | End: 2025-06-25 | Stop reason: SDUPTHER

## 2025-06-25 RX ORDER — GLYCOPYRROLATE 0.2 MG/ML
INJECTION INTRAMUSCULAR; INTRAVENOUS
Status: DISCONTINUED | OUTPATIENT
Start: 2025-06-25 | End: 2025-06-25 | Stop reason: SDUPTHER

## 2025-06-25 RX ORDER — FENTANYL CITRATE 50 UG/ML
INJECTION, SOLUTION INTRAMUSCULAR; INTRAVENOUS
Status: DISCONTINUED | OUTPATIENT
Start: 2025-06-25 | End: 2025-06-25 | Stop reason: SDUPTHER

## 2025-06-25 RX ORDER — PROPOFOL 10 MG/ML
INJECTION, EMULSION INTRAVENOUS
Status: DISCONTINUED | OUTPATIENT
Start: 2025-06-25 | End: 2025-06-25 | Stop reason: SDUPTHER

## 2025-06-25 RX ORDER — INDOMETHACIN 100 MG
SUPPOSITORY, RECTAL RECTAL PRN
Status: DISCONTINUED | OUTPATIENT
Start: 2025-06-25 | End: 2025-06-25 | Stop reason: HOSPADM

## 2025-06-25 RX ORDER — LIDOCAINE HYDROCHLORIDE 10 MG/ML
INJECTION, SOLUTION INFILTRATION; PERINEURAL
Status: DISCONTINUED | OUTPATIENT
Start: 2025-06-25 | End: 2025-06-25 | Stop reason: SDUPTHER

## 2025-06-25 RX ORDER — MIDAZOLAM HYDROCHLORIDE 1 MG/ML
INJECTION, SOLUTION INTRAMUSCULAR; INTRAVENOUS
Status: DISCONTINUED | OUTPATIENT
Start: 2025-06-25 | End: 2025-06-25 | Stop reason: SDUPTHER

## 2025-06-25 RX ORDER — SODIUM CHLORIDE, SODIUM LACTATE, POTASSIUM CHLORIDE, CALCIUM CHLORIDE 600; 310; 30; 20 MG/100ML; MG/100ML; MG/100ML; MG/100ML
INJECTION, SOLUTION INTRAVENOUS CONTINUOUS
Status: DISCONTINUED | OUTPATIENT
Start: 2025-06-25 | End: 2025-06-25 | Stop reason: HOSPADM

## 2025-06-25 RX ORDER — INDOMETHACIN 100 MG
100 SUPPOSITORY, RECTAL RECTAL ONCE
Status: DISCONTINUED | OUTPATIENT
Start: 2025-06-25 | End: 2025-06-25 | Stop reason: HOSPADM

## 2025-06-25 RX ORDER — ONDANSETRON 2 MG/ML
INJECTION INTRAMUSCULAR; INTRAVENOUS
Status: DISCONTINUED | OUTPATIENT
Start: 2025-06-25 | End: 2025-06-25 | Stop reason: SDUPTHER

## 2025-06-25 RX ADMIN — MIDAZOLAM 2 MG: 1 INJECTION INTRAMUSCULAR; INTRAVENOUS at 13:42

## 2025-06-25 RX ADMIN — FENTANYL CITRATE 50 MCG: 50 INJECTION INTRAMUSCULAR; INTRAVENOUS at 13:46

## 2025-06-25 RX ADMIN — GLYCOPYRROLATE 0.2 MG: 0.2 INJECTION INTRAMUSCULAR; INTRAVENOUS at 13:46

## 2025-06-25 RX ADMIN — ONDANSETRON 4 MG: 2 INJECTION, SOLUTION INTRAMUSCULAR; INTRAVENOUS at 13:43

## 2025-06-25 RX ADMIN — LIDOCAINE HYDROCHLORIDE 40 MG: 10 INJECTION, SOLUTION INFILTRATION; PERINEURAL at 13:44

## 2025-06-25 RX ADMIN — SODIUM CHLORIDE, SODIUM LACTATE, POTASSIUM CHLORIDE, AND CALCIUM CHLORIDE: .6; .31; .03; .02 INJECTION, SOLUTION INTRAVENOUS at 12:19

## 2025-06-25 RX ADMIN — DEXAMETHASONE SODIUM PHOSPHATE 10 MG: 10 INJECTION, SOLUTION INTRAMUSCULAR; INTRAVENOUS at 13:56

## 2025-06-25 RX ADMIN — FENTANYL CITRATE 50 MCG: 50 INJECTION INTRAMUSCULAR; INTRAVENOUS at 13:54

## 2025-06-25 RX ADMIN — PROPOFOL 100 MCG/KG/MIN: 10 INJECTION, EMULSION INTRAVENOUS at 13:44

## 2025-06-25 ASSESSMENT — PAIN DESCRIPTION - ONSET: ONSET: ON-GOING

## 2025-06-25 ASSESSMENT — PAIN SCALES - GENERAL: PAINLEVEL_OUTOF10: 8

## 2025-06-25 ASSESSMENT — PAIN - FUNCTIONAL ASSESSMENT
PAIN_FUNCTIONAL_ASSESSMENT: 0-10
PAIN_FUNCTIONAL_ASSESSMENT: PREVENTS OR INTERFERES SOME ACTIVE ACTIVITIES AND ADLS
PAIN_FUNCTIONAL_ASSESSMENT: 0-10
PAIN_FUNCTIONAL_ASSESSMENT: PREVENTS OR INTERFERES SOME ACTIVE ACTIVITIES AND ADLS
PAIN_FUNCTIONAL_ASSESSMENT: 0-10
PAIN_FUNCTIONAL_ASSESSMENT: PREVENTS OR INTERFERES SOME ACTIVE ACTIVITIES AND ADLS
PAIN_FUNCTIONAL_ASSESSMENT: 0-10
PAIN_FUNCTIONAL_ASSESSMENT: PREVENTS OR INTERFERES SOME ACTIVE ACTIVITIES AND ADLS

## 2025-06-25 ASSESSMENT — PAIN DESCRIPTION - DESCRIPTORS
DESCRIPTORS: ACHING;DISCOMFORT;SORE
DESCRIPTORS: SORE;DISCOMFORT;ACHING
DESCRIPTORS: OTHER (COMMENT)
DESCRIPTORS: SORE;DISCOMFORT;ACHING

## 2025-06-25 ASSESSMENT — PAIN DESCRIPTION - LOCATION: LOCATION: BACK

## 2025-06-25 ASSESSMENT — PAIN DESCRIPTION - ORIENTATION: ORIENTATION: LOWER

## 2025-06-25 ASSESSMENT — PAIN DESCRIPTION - FREQUENCY: FREQUENCY: CONTINUOUS

## 2025-06-25 ASSESSMENT — PAIN DESCRIPTION - PAIN TYPE: TYPE: CHRONIC PAIN

## 2025-06-25 NOTE — DISCHARGE INSTRUCTIONS
RECOMMENDATIONS:    1.  Clear liquid diet today with advancing diet tomorrow as tolerated.   2.  Please call with any questions/concerns.      Endoscopic Retrograde Cholangiopancreatogram (ERCP): What to Expect at Home  Your Recovery  After you have an endoscopic retrograde cholangiopancreatogram (ERCP), you probably will stay at the hospital or clinic for 1 to 2 hours. This will allow the medicine to wear off. You will be able to go home after your doctor or a nurse checks to make sure you are not having any problems. If you stay in the hospital overnight, you may go home the next day.  You may have a sore throat for a day or two after the procedure.  This care sheet gives you a general idea about how long it will take for you to recover. But each person recovers at a different pace. Follow the steps below to get better as quickly as possible.  How can you care for yourself at home?  Activity    Rest as much as you need to after you go home.     You should be able to go back to your usual activities the day after the procedure.   Diet    Follow your doctor's directions for eating after the procedure.     Drink plenty of fluids (unless your doctor tells you not to).   Medicines    Your doctor will tell you if and when you can restart your medicines. The doctor will also give you instructions about taking any new medicines.     If you stopped taking aspirin or some other blood thinner, your doctor will tell you when to start taking it again.     If you have a sore throat the next day, use an over-the-counter spray to numb your throat. Be safe with medicines. Read and follow all instructions on the label.   Follow-up care is a key part of your treatment and safety. Be sure to make and go to all appointments, and call your doctor if you are having problems. It's also a good idea to know your test results and keep a list of the medicines you take.  When should you call for help?   Call 911 anytime you think you may need

## 2025-06-25 NOTE — ANESTHESIA PRE PROCEDURE
team.    Attending anesthesiologist reviewed and agrees with Preprocedure content            Sally Guzman, APRN - CRNA   6/25/2025

## 2025-06-25 NOTE — H&P
pregabalin (LYRICA) 150 MG capsule Take 1 capsule by mouth 2 times daily. 12/11/24  Yes Harvey Rosas MD   Calcium-Magnesium-Vitamin D 500-250-200 MG-MG-UNIT TABS Take by mouth   Yes Harvey Rosas MD   tiZANidine (ZANAFLEX) 4 MG tablet Take 1 tablet by mouth 3 times daily 9/4/19  Yes Harvey Rosas MD   omeprazole (PRILOSEC) 40 MG delayed release capsule Take 1 capsule by mouth daily 9/3/19  Yes Harvey Rosas MD   HYDROcodone-acetaminophen (NORCO)  MG per tablet Take 1 tablet by mouth 4 times daily. 9/4/19  Yes Harvey Rosas MD   ALPRAZolam (XANAX) 1 MG tablet Take 1 tablet by mouth 3 times daily as needed. 8/26/19  Yes Harvey Rosas MD   apixaban (ELIQUIS) 5 MG TABS tablet Take 1 tablet by mouth every 12 hours 4/24/25   Harvey Rosas MD   alendronate (FOSAMAX) 70 MG tablet Take 1 tablet by mouth every 7 days 12/11/24   Harvey Rosas MD   leflunomide (ARAVA) 20 MG tablet  10/9/24   Harvey Rosas MD       Past Medical History:  Past Medical History:   Diagnosis Date    Arthritis     Atrial fibrillation (HCC)     Cancer (HCC)     follicular lymphoma    Chronic back pain     Neuropathy     Shoulder fracture, left        Past Surgical History:  Past Surgical History:   Procedure Laterality Date    BACK SURGERY  12/2020    L3-S1    BACK SURGERY  01/27/2025    L1-L2 rods replaced    BONE MARROW BIOPSY Right 10/18/2019    BONE MARROW ASPIRATION BIOPSY performed by Marc Laughlin PA-C at [facility] OR    CHOLECYSTECTOMY, LAPAROSCOPIC N/A 3/28/2025    CHOLECYSTOSTOMY DRAIN TUBE PLACEMENT LAPAROSCOPIC performed by Anjelica Espinoza MD at [facility] OR    ERCP N/A 03/27/2025    Dr CARMELINA Cabrera-w/1 cm biliary sphincterotomy, balloon sweep w/stone and pus removal, placement of a 10F biliary stent-Repeat in 3 months    ERCP N/A 03/27/2025    Dr CARMELINA Cabrera-w/1 cm biliary sphincterotomy, balloon sweep w/stone and pus removal, placement of a 10F biliary stent-Repeat in 3

## 2025-06-25 NOTE — OP NOTE
Endoscopic Procedure Note    Patient: Edwige Gu : 1965  Med Rec#: [id number] Acc#: [id number]     Primary Care Provider Vanessa Prasad APRN - CNP  Referring Provider: DUONG Patiño MD    Endoscopist: Kirit Cabrera MD    Date of Procedure:  2025     Procedure:   1. ERCP with stent removal  2. Intra procedure interpretation of biliary fluorosocopy [id number]    Indications:   1. History of ERCP with stone removal and stent placement      Anesthesia:  General     Estimated Blood Loss: minimal    Procedure:   Prior to the procedure the patient's chart was reviewed and informed consent was obtained.  Risk and Benefits (Risks including but not limited to bleeding, perforation, infection, 8 to 10% risk of post ERCP pancreatitis, and even death) were discussed with the patient. They were agreeable to continue.     Patient was brought to the operating room, underwent general anesthesia, and placed in the prone position.  A side-viewing duodenoscope was advanced from the oropharynx down to the distal duodenum and reduced into a short position opposite the ampulla. The ampulla appeared c/w previous stent placement and sphincterotomy. A  film was obtained which showed stent in proper position.     Stent removal:  An existing stent was seen emanating from the ampulla. This was grasped and removed with a polypectomy snare and removed in its entirety through the scope.     The bile duct was then cannulated using a 0.035 x 260 cm straight Dreamwire. A short nose traction sphincterotome was advanced over the wire and the bile duct was deeply cannulated.  Contrast was injected.  I personally interpreted the bile duct images.  The flow of contrast was adequate.  Contrast injection showed no continued filling defects. The pancreatic duct was not cannulated nor injected.     The bile duct was swept multiple times starting the bifurcation with a 12mm biliary extraction balloon. No stones/sludge was removed.     At the end

## 2025-06-25 NOTE — ANESTHESIA POSTPROCEDURE EVALUATION
Department of Anesthesiology  Postprocedure Note    Patient: Edwige Gu  MRN: [id number]  YOB: 1965  Date of evaluation: 6/25/2025    Procedure Summary       Date: 06/25/25 Room / Location: [place] [address] / [facility]    Anesthesia Start: 1338 Anesthesia Stop: 1411    Procedure: ENDOSCOPIC RETROGRADE CHOLANGIOPANCREATOGRAPHY STENT REMOVAL (Abdomen) Diagnosis:       Encounter for removal of biliary stent      (Encounter for removal of biliary stent [Z46.89])    Surgeons: Kirit Cabrera MD Responsible Provider: Sally Guzman APRN - CRNA    Anesthesia Type: general, TIVA ASA Status: 2            Anesthesia Type: No value filed.    Eduard Phase I: Eduard Score: 10    Eduard Phase II:      Anesthesia Post Evaluation    Patient location during evaluation: bedside  Patient participation: complete - patient participated  Level of consciousness: sleepy but conscious  Pain score: 0  Airway patency: patent  Nausea & Vomiting: no nausea and no vomiting  Cardiovascular status: hemodynamically stable and blood pressure returned to baseline  Respiratory status: acceptable and nasal cannula  Hydration status: stable  Pain management: adequate    No notable events documented.

## 2025-07-08 DIAGNOSIS — K21.9 GASTROESOPHAGEAL REFLUX DISEASE WITHOUT ESOPHAGITIS: ICD-10-CM

## 2025-07-10 RX ORDER — OMEPRAZOLE 40 MG/1
40 CAPSULE, DELAYED RELEASE ORAL DAILY
Qty: 90 CAPSULE | Refills: 1 | Status: SHIPPED | OUTPATIENT
Start: 2025-07-10

## 2025-07-11 ENCOUNTER — OFFICE VISIT (OUTPATIENT)
Dept: FAMILY MEDICINE CLINIC | Facility: CLINIC | Age: 60
End: 2025-07-11
Payer: MEDICARE

## 2025-07-11 VITALS
WEIGHT: 162 LBS | DIASTOLIC BLOOD PRESSURE: 58 MMHG | HEIGHT: 63 IN | TEMPERATURE: 98.6 F | BODY MASS INDEX: 28.7 KG/M2 | SYSTOLIC BLOOD PRESSURE: 89 MMHG | RESPIRATION RATE: 18 BRPM | OXYGEN SATURATION: 96 % | HEART RATE: 59 BPM

## 2025-07-11 DIAGNOSIS — M54.41 CHRONIC BILATERAL LOW BACK PAIN WITH BILATERAL SCIATICA: ICD-10-CM

## 2025-07-11 DIAGNOSIS — M54.42 CHRONIC BILATERAL LOW BACK PAIN WITH BILATERAL SCIATICA: ICD-10-CM

## 2025-07-11 DIAGNOSIS — M05.79 RHEUMATOID ARTHRITIS INVOLVING MULTIPLE SITES WITH POSITIVE RHEUMATOID FACTOR: ICD-10-CM

## 2025-07-11 DIAGNOSIS — G89.4 CHRONIC PAIN SYNDROME: ICD-10-CM

## 2025-07-11 DIAGNOSIS — R23.2 HOT FLASHES: ICD-10-CM

## 2025-07-11 DIAGNOSIS — F41.0 PANIC ATTACK: ICD-10-CM

## 2025-07-11 DIAGNOSIS — N95.1 MENOPAUSAL SYMPTOMS: ICD-10-CM

## 2025-07-11 DIAGNOSIS — F51.01 PRIMARY INSOMNIA: Primary | ICD-10-CM

## 2025-07-11 DIAGNOSIS — R45.86 MOOD SWINGS: ICD-10-CM

## 2025-07-11 DIAGNOSIS — M48.062 SPINAL STENOSIS OF LUMBAR REGION WITH NEUROGENIC CLAUDICATION: ICD-10-CM

## 2025-07-11 DIAGNOSIS — R53.83 OTHER FATIGUE: ICD-10-CM

## 2025-07-11 DIAGNOSIS — M51.362 DEGENERATION OF INTERVERTEBRAL DISC OF LUMBAR REGION WITH DISCOGENIC BACK PAIN AND LOWER EXTREMITY PAIN: ICD-10-CM

## 2025-07-11 DIAGNOSIS — F43.10 PTSD (POST-TRAUMATIC STRESS DISORDER): ICD-10-CM

## 2025-07-11 DIAGNOSIS — G89.29 CHRONIC BILATERAL LOW BACK PAIN WITH BILATERAL SCIATICA: ICD-10-CM

## 2025-07-11 DIAGNOSIS — M54.16 LUMBAR RADICULOPATHY: ICD-10-CM

## 2025-07-11 RX ORDER — HYDROCODONE BITARTRATE AND ACETAMINOPHEN 10; 325 MG/1; MG/1
1 TABLET ORAL EVERY 6 HOURS PRN
Qty: 120 TABLET | Refills: 0 | Status: SHIPPED | OUTPATIENT
Start: 2025-07-11

## 2025-07-11 RX ORDER — PREGABALIN 150 MG/1
150 CAPSULE ORAL 2 TIMES DAILY
Qty: 180 CAPSULE | Refills: 1 | Status: SHIPPED | OUTPATIENT
Start: 2025-07-11

## 2025-07-11 RX ORDER — TRAZODONE HYDROCHLORIDE 50 MG/1
50 TABLET ORAL NIGHTLY
Qty: 30 TABLET | Refills: 0 | Status: SHIPPED | OUTPATIENT
Start: 2025-07-11

## 2025-07-11 RX ORDER — ALPRAZOLAM 1 MG/1
1 TABLET ORAL 3 TIMES DAILY PRN
Qty: 90 TABLET | Refills: 2 | Status: SHIPPED | OUTPATIENT
Start: 2025-07-11

## 2025-07-11 RX ORDER — CLINDAMYCIN HYDROCHLORIDE 300 MG/1
CAPSULE ORAL
COMMUNITY
Start: 2025-07-10

## 2025-07-11 NOTE — PROGRESS NOTES
Chief Complaint  Wound Infection (Pt here for follow up)    Subjective        Adela Arauz presents to [facility]  History of Present Illness  History of Present Illness  The patient is a 60-year-old female who presents for a follow-up of chronic pain. She has rheumatoid arthritis affecting her hips, elbows, hands, feet, shoulders, and knees.    She reports experiencing back pain today. Her current pain medication is Norco. She was offered OxyContin for pain management but declined as she was already taking Norco. She had a robotic cholecystectomy and experienced complications from the surgery, including an infection in her mouth that required clindamycin treatment. She visited the emergency department on 06/15/2025 due to cellulitis of the wound in her abdominal wall following the cholecystectomy, which has since healed. She continues to experience diarrhea. She is no longer taking doxycycline.    She has anxiety, PTSD, and depression, which are currently stable. She is currently on alprazolam.    She experienced a fall in either April 2025 or May 2025, during which she was diagnosed with atrial fibrillation. She is currently on Eliquis, metoprolol, and flecainide for this condition.    Her neuropathy is stable. She is currently on Lyrica for this condition.    She also has iron deficiency anemia. She is currently on ferrous sulfate for this condition.    She has edema. She is currently on Lasix for this condition.    Her acid reflux is well-managed, although she experienced severe heartburn yesterday. Despite taking her medication in the morning and another dose at bedtime, the heartburn persisted.      She wants to get tested for hormones because she has been experiencing mood swings, irritability, hot flashes and something is wrong. She is menopausal    PAST SURGICAL HISTORY:  Robotic cholecystectomy    The following portions of the patient's history were reviewed and  "updated as appropriate: allergies, current medications, past family history, past medical history, past social history, past surgical history and problem list.    Objective   Vital Signs:  BP (!) 89/58 (BP Location: Right arm, Patient Position: Sitting, Cuff Size: Large Adult)   Pulse 59   Temp 98.6 °F (37 °C) (Infrared)   Resp 18   Ht 160 cm (63\")   Wt 73.5 kg (162 lb)   SpO2 96%   BMI 28.70 kg/m²   Estimated body mass index is 28.7 kg/m² as calculated from the following:    Height as of this encounter: 160 cm (63\").    Weight as of this encounter: 73.5 kg (162 lb).       BMI is >= 25 and <30. (Overweight) The following options were offered after discussion;: exercise counseling/recommendations and nutrition counseling/recommendations      Physical Exam  Vitals and nursing note reviewed.   Constitutional:       General: She is awake.      Appearance: Normal appearance. She is well-developed and well-groomed.   HENT:      Head: Normocephalic and atraumatic.      Right Ear: Hearing, tympanic membrane, ear canal and external ear normal.      Left Ear: Hearing, tympanic membrane, ear canal and external ear normal.      Nose: Nose normal.      Mouth/Throat:      Lips: Pink.      Pharynx: Oropharynx is clear.   Eyes:      General: Lids are normal.      Conjunctiva/sclera: Conjunctivae normal.   Cardiovascular:      Rate and Rhythm: Normal rate and regular rhythm.      Heart sounds: Normal heart sounds.   Pulmonary:      Effort: Pulmonary effort is normal.      Breath sounds: Normal breath sounds and air entry.   Musculoskeletal:      Right shoulder: Tenderness present. Decreased range of motion.      Left shoulder: Tenderness present. Decreased range of motion.      Right elbow: Decreased range of motion. Tenderness present.      Left elbow: Decreased range of motion. Tenderness present.        Arms:       Cervical back: Full passive range of motion without pain.      Right hip: Tenderness present. Decreased range " of motion.      Left hip: Tenderness present. Decreased range of motion.      Right knee: Decreased range of motion. Tenderness present.      Left knee: Decreased range of motion. Tenderness present.      Right lower leg: No edema.      Left lower leg: No edema.      Right foot: Decreased range of motion. Tenderness present.      Left foot: Decreased range of motion. Tenderness present.        Legs:    Lymphadenopathy:      Head:      Right side of head: No submental, submandibular or tonsillar adenopathy.      Left side of head: No submental, submandibular or tonsillar adenopathy.   Skin:     General: Skin is warm and dry.   Neurological:      Mental Status: She is alert.      Sensory: Sensation is intact.      Motor: Motor function is intact.      Coordination: Coordination is intact.      Gait: Gait is intact.   Psychiatric:         Attention and Perception: Attention and perception normal.         Mood and Affect: Mood and affect normal.         Speech: Speech normal.         Behavior: Behavior normal. Behavior is cooperative.         Thought Content: Thought content normal.         Cognition and Memory: Cognition and memory normal.         Judgment: Judgment normal.      Physical Exam      Result Review :          Results           Assessment and Plan     Diagnoses and all orders for this visit:    1. Primary insomnia (Primary)  -     traZODone (DESYREL) 50 MG tablet; Take 1 tablet by mouth Every Night.  Dispense: 30 tablet; Refill: 0    2. Panic attack  -     ALPRAZolam (XANAX) 1 MG tablet; Take 1 tablet by mouth 3 (Three) Times a Day As Needed for Anxiety.  Dispense: 90 tablet; Refill: 2    3. PTSD (post-traumatic stress disorder)  -     ALPRAZolam (XANAX) 1 MG tablet; Take 1 tablet by mouth 3 (Three) Times a Day As Needed for Anxiety.  Dispense: 90 tablet; Refill: 2    4. Chronic pain syndrome  -     HYDROcodone-acetaminophen (NORCO)  MG per tablet; Take 1 tablet by mouth Every 6 (Six) Hours As Needed  for Moderate Pain.  Dispense: 120 tablet; Refill: 0    5. Rheumatoid arthritis involving multiple sites with positive rheumatoid factor  -     pregabalin (LYRICA) 150 MG capsule; Take 1 capsule by mouth 2 (Two) Times a Day.  Dispense: 180 capsule; Refill: 1    6. Spinal stenosis of lumbar region with neurogenic claudication  -     pregabalin (LYRICA) 150 MG capsule; Take 1 capsule by mouth 2 (Two) Times a Day.  Dispense: 180 capsule; Refill: 1    7. Lumbar radiculopathy  -     pregabalin (LYRICA) 150 MG capsule; Take 1 capsule by mouth 2 (Two) Times a Day.  Dispense: 180 capsule; Refill: 1    8. Degeneration of intervertebral disc of lumbar region with discogenic back pain and lower extremity pain  -     pregabalin (LYRICA) 150 MG capsule; Take 1 capsule by mouth 2 (Two) Times a Day.  Dispense: 180 capsule; Refill: 1    9. Chronic bilateral low back pain with bilateral sciatica  -     pregabalin (LYRICA) 150 MG capsule; Take 1 capsule by mouth 2 (Two) Times a Day.  Dispense: 180 capsule; Refill: 1    10. Other fatigue  -     CBC auto differential; Future  -     DHEA-sulfate; Future  -     Follicle stimulating hormone; Future  -     Luteinizing hormone; Future  -     Prolactin; Future  -     Testosterone Free Direct; Future  -     TSH; Future  -     Cortisol - AM; Future    11. Menopausal symptoms  -     CBC auto differential; Future  -     DHEA-sulfate; Future  -     Follicle stimulating hormone; Future  -     Luteinizing hormone; Future  -     Prolactin; Future  -     Testosterone Free Direct; Future  -     TSH; Future  -     Cortisol - AM; Future    12. Hot flashes  -     CBC auto differential; Future  -     DHEA-sulfate; Future  -     Follicle stimulating hormone; Future  -     Luteinizing hormone; Future  -     Prolactin; Future  -     Testosterone Free Direct; Future  -     TSH; Future  -     Cortisol - AM; Future    13. Mood swings  -     CBC auto differential; Future  -     DHEA-sulfate; Future  -      Follicle stimulating hormone; Future  -     Luteinizing hormone; Future  -     Prolactin; Future  -     Testosterone Free Direct; Future  -     TSH; Future  -     Cortisol - AM; Future    Control contract, kamran (1/13/25)  and gladys reviewed today  Assessment & Plan  1. Post-traumatic stress disorder (PTSD) and anxiety.  - Continues with alprazolam as prescribed.  - First prescription issued in 04/2025, with refills in 05/2025 and 06/2025.  - No further refills available at this time.    2. Atrial fibrillation.  - Continues with flecainide, Eliquis, and metoprolol.  - No recent falls reported since the initial diagnosis.    3. Edema.  - Continues with Lasix as prescribed.  - No new symptoms or changes reported.    4. Chronic pain and rheumatoid arthritis.  - Last prescription for hydrocodone on 06/13/2025.  - Continues with Norco as prescribed.  - Lyrica prescribed in 05/2025 and refilled in 06/2025, no further refills available.  - Continues with tizanidine as prescribed.    5. Iron deficiency anemia.  - Continues with ferrous sulfate as prescribed.  - No new symptoms or changes reported.    6. Oral infection.  - Continues with Peridex solution.  - No new symptoms or changes reported.    7. Gastroesophageal reflux disease (GERD).  - Reported severe heartburn yesterday, took an additional dose of medication at bedtime.  - Continues with current GERD medication regimen.    8. Neuropathy.  - Last prescription for Lyrica on 07/08/2025.  - Continues with Lyrica as prescribed.      ICD-10-CM ICD-9-CM   1. Primary insomnia  F51.01 307.42   2. Panic attack  F41.0 300.01   3. PTSD (post-traumatic stress disorder)  F43.10 309.81   4. Chronic pain syndrome  G89.4 338.4   5. Rheumatoid arthritis involving multiple sites with positive rheumatoid factor  M05.79 714.0   6. Spinal stenosis of lumbar region with neurogenic claudication  M48.062 724.03   7. Lumbar radiculopathy  M54.16 724.4   8. Degeneration of intervertebral  disc of lumbar region with discogenic back pain and lower extremity pain  M51.362 722.52   9. Chronic bilateral low back pain with bilateral sciatica  M54.42 724.2    M54.41 724.3    G89.29 338.29   10. Other fatigue  R53.83 780.79   11. Menopausal symptoms  N95.1 627.2   12. Hot flashes  R23.2 782.62   13. Mood swings  R45.86 799.24                Follow Up     Return in about 1 month (around 8/11/2025) for Recheck.  Patient was given instructions and counseling regarding her condition or for health maintenance advice. Please see specific information pulled into the AVS if appropriate.       Patient or patient representative verbalized consent for the use of Ambient Listening during the visit with  Charlene Huntley DNP, SURYA for chart documentation. 7/11/2025  11:37 CDT    Electronically signed by Charlene Huntley DNP, SURYA, 07/11/25, 11:37 AM CDT.

## 2025-08-11 ENCOUNTER — OFFICE VISIT (OUTPATIENT)
Dept: FAMILY MEDICINE CLINIC | Facility: CLINIC | Age: 60
End: 2025-08-11
Payer: MEDICARE

## 2025-08-11 VITALS
WEIGHT: 158 LBS | DIASTOLIC BLOOD PRESSURE: 62 MMHG | OXYGEN SATURATION: 96 % | TEMPERATURE: 98 F | BODY MASS INDEX: 28 KG/M2 | HEIGHT: 63 IN | SYSTOLIC BLOOD PRESSURE: 89 MMHG | RESPIRATION RATE: 15 BRPM | HEART RATE: 85 BPM

## 2025-08-11 DIAGNOSIS — M81.8 OTHER OSTEOPOROSIS WITHOUT CURRENT PATHOLOGICAL FRACTURE: ICD-10-CM

## 2025-08-11 DIAGNOSIS — T14.8XXA WOUND INFECTION: ICD-10-CM

## 2025-08-11 DIAGNOSIS — L08.9 WOUND INFECTION: ICD-10-CM

## 2025-08-11 DIAGNOSIS — J40 BRONCHITIS: ICD-10-CM

## 2025-08-11 DIAGNOSIS — J06.9 UPPER RESPIRATORY TRACT INFECTION, UNSPECIFIED TYPE: ICD-10-CM

## 2025-08-11 DIAGNOSIS — G89.4 CHRONIC PAIN SYNDROME: ICD-10-CM

## 2025-08-11 DIAGNOSIS — R60.1 GENERALIZED EDEMA: ICD-10-CM

## 2025-08-11 DIAGNOSIS — I48.0 PAROXYSMAL ATRIAL FIBRILLATION: Primary | ICD-10-CM

## 2025-08-11 DIAGNOSIS — D50.9 IRON DEFICIENCY ANEMIA, UNSPECIFIED IRON DEFICIENCY ANEMIA TYPE: ICD-10-CM

## 2025-08-11 DIAGNOSIS — F51.01 PRIMARY INSOMNIA: ICD-10-CM

## 2025-08-11 RX ORDER — HYDROCODONE BITARTRATE AND ACETAMINOPHEN 10; 325 MG/1; MG/1
1 TABLET ORAL EVERY 6 HOURS PRN
Qty: 120 TABLET | Refills: 0 | Status: SHIPPED | OUTPATIENT
Start: 2025-08-11

## 2025-08-11 RX ORDER — FERROUS SULFATE 324(65)MG
324 TABLET, DELAYED RELEASE (ENTERIC COATED) ORAL
Qty: 90 TABLET | Refills: 0 | Status: SHIPPED | OUTPATIENT
Start: 2025-08-11

## 2025-08-11 RX ORDER — DEXTROMETHORPHAN HYDROBROMIDE AND PROMETHAZINE HYDROCHLORIDE 15; 6.25 MG/5ML; MG/5ML
5 SYRUP ORAL 4 TIMES DAILY PRN
Qty: 120 ML | Refills: 0 | Status: SHIPPED | OUTPATIENT
Start: 2025-08-11

## 2025-08-11 RX ORDER — ALENDRONATE SODIUM 70 MG/1
70 TABLET ORAL
Qty: 90 TABLET | Refills: 1 | Status: SHIPPED | OUTPATIENT
Start: 2025-08-11

## 2025-08-11 RX ORDER — FUROSEMIDE 40 MG/1
40 TABLET ORAL DAILY
Qty: 90 TABLET | Refills: 1 | Status: SHIPPED | OUTPATIENT
Start: 2025-08-11

## 2025-08-11 RX ORDER — DEXAMETHASONE SODIUM PHOSPHATE 10 MG/ML
10 INJECTION, SOLUTION INTRA-ARTICULAR; INTRALESIONAL; INTRAMUSCULAR; INTRAVENOUS; SOFT TISSUE ONCE
Status: COMPLETED | OUTPATIENT
Start: 2025-08-11 | End: 2025-08-11

## 2025-08-11 RX ADMIN — DEXAMETHASONE SODIUM PHOSPHATE 10 MG: 10 INJECTION, SOLUTION INTRA-ARTICULAR; INTRALESIONAL; INTRAMUSCULAR; INTRAVENOUS; SOFT TISSUE at 09:35

## 2025-08-12 RX ORDER — MUPIROCIN 2 %
OINTMENT (GRAM) TOPICAL
Qty: 22 G | Refills: 1 | Status: SHIPPED | OUTPATIENT
Start: 2025-08-12

## 2025-08-12 RX ORDER — TRAZODONE HYDROCHLORIDE 50 MG/1
50 TABLET ORAL NIGHTLY
Qty: 30 TABLET | Refills: 0 | Status: SHIPPED | OUTPATIENT
Start: 2025-08-12

## 2025-08-15 ENCOUNTER — RESULTS FOLLOW-UP (OUTPATIENT)
Dept: FAMILY MEDICINE CLINIC | Facility: CLINIC | Age: 60
End: 2025-08-15
Payer: MEDICARE

## 2025-08-15 DIAGNOSIS — R89.9 ABNORMAL LABORATORY TEST: ICD-10-CM

## 2025-08-15 DIAGNOSIS — R79.89 ELEVATED CORTISOL LEVEL: ICD-10-CM

## 2025-08-15 DIAGNOSIS — R79.89 ABNORMAL CBC: Primary | ICD-10-CM

## 2025-08-29 ENCOUNTER — OFFICE VISIT (OUTPATIENT)
Dept: CARDIOLOGY | Facility: CLINIC | Age: 60
End: 2025-08-29
Payer: MEDICARE

## 2025-08-29 VITALS
DIASTOLIC BLOOD PRESSURE: 68 MMHG | SYSTOLIC BLOOD PRESSURE: 112 MMHG | BODY MASS INDEX: 28.81 KG/M2 | WEIGHT: 162.6 LBS | HEIGHT: 63 IN | HEART RATE: 67 BPM

## 2025-08-29 DIAGNOSIS — I48.0 PAROXYSMAL A-FIB: Primary | ICD-10-CM

## (undated) DEVICE — BIPOLAR SEALER 23-113-1 AQM 2.3: Brand: AQUAMANTYS™

## (undated) DEVICE — PK TURNOVER RM ADV

## (undated) DEVICE — ENDO KIT,LOURDES HOSPITAL: Brand: MEDLINE INDUSTRIES, INC.

## (undated) DEVICE — PK SPINE POST 30

## (undated) DEVICE — PAD MINOR UNIVERSAL: Brand: MEDLINE INDUSTRIES, INC.

## (undated) DEVICE — SAFEOP 3 THORACOLUMBAR ELECTRODE KIT - EMG/SSEP - NEEDLE: Brand: SAFEOP 3

## (undated) DEVICE — DRAPE,UTILITY,TAPE,15X26,STERILE: Brand: MEDLINE

## (undated) DEVICE — GLV SURG BIOGEL LTX PF 6 1/2

## (undated) DEVICE — GOWN,NON-REINFORCED,SIRUS,SET IN SLV,XL: Brand: MEDLINE

## (undated) DEVICE — SPNG GZ WOVN 4X4IN 12PLY 10/BX STRL

## (undated) DEVICE — ELECTRD BLD EZ CLN MOD XLNG 2.75IN

## (undated) DEVICE — PATIENT RETURN ELECTRODE, SINGLE-USE, CONTACT QUALITY MONITORING, ADULT, WITH 9FT CORD, FOR PATIENTS WEIGING OVER 33LBS. (15KG): Brand: MEGADYNE

## (undated) DEVICE — 3M™ STERI-STRIP™ REINFORCED ADHESIVE SKIN CLOSURES, R1547, 1/2 IN X 4 IN (12 MM X 100 MM), 6 STRIPS/ENVELOPE: Brand: 3M™ STERI-STRIP™

## (undated) DEVICE — GLV SURG BIOGEL LTX PF 7 1/2

## (undated) DEVICE — ANTIBACTERIAL UNDYED BRAIDED (POLYGLACTIN 910), SYNTHETIC ABSORBABLE SUTURE: Brand: COATED VICRYL

## (undated) DEVICE — Device

## (undated) DEVICE — GLV SURG GRN DERMASSURE LF PF 7.5

## (undated) DEVICE — SYSTEM BX CAP BILI RAP EXCHG CAP LOK DEV COMPATIBLE W/ OLY

## (undated) DEVICE — PK SPINE LAT 30

## (undated) DEVICE — AIRLIFE™ NASAL OXYGEN CANNULA CURVED, NONFLARED TIP, WITH 7' FEET (2.1 M) CRUSH RESISTANT TUBING, OVER-THE-EAR STYLE: Brand: AIRLIFE™

## (undated) DEVICE — CVR UNIV C/ARM

## (undated) DEVICE — AIRSEAL 8 MM CANNULA CAP AND OBTURATOR WITH BLADELESS OPTICAL TIP COMPATIBLE WITH INTUITIVE DA VINCI XI AND DA VINCI X 8 MM INSTRUMENT CANNULA, STANDARD LENGTH: Brand: AIRSEAL

## (undated) DEVICE — SOLUTION IRRIG 1000ML STRL H2O USP PLAS POUR BTL

## (undated) DEVICE — SYS ACC IPAS3 EMG I/O PED BVL

## (undated) DEVICE — CANNULATING SPHINCTEROTOME: Brand: JAGTOME™ REVOLUTION RX

## (undated) DEVICE — ADHS LIQ MASTISOL 2/3ML

## (undated) DEVICE — LOCKING DEVICE AND BIOPSY CAP FOR USE WITH RX BILIARY SYSTEM: Brand: RX LOCKING DEVICE AND BIOPSY CAP

## (undated) DEVICE — APPL CHLORAPREP HI/LITE 26ML ORNG

## (undated) DEVICE — GLV SURG SENSICARE W/ALOE PF LF 7.5 STRL

## (undated) DEVICE — SUT PDS 0 CTX 36IN VIO PDP370T

## (undated) DEVICE — SUTURE N ABSRB MONOFILAMENT 2-0 FSLX 75 CM 36 MM ETHILON

## (undated) DEVICE — THE STERILE LIGHT HANDLE COVER IS USED WITH STERIS SURGICAL LIGHTING AND VISUALIZATION SYSTEMS.

## (undated) DEVICE — DRP C/ARMOR

## (undated) DEVICE — SUT SILK 0 SUTUPAK TIES 24IN SA76G

## (undated) DEVICE — GLV SURG DERMASSURE GRN LF PF 8.0

## (undated) DEVICE — TRAP FLD MINIVAC MEGADYNE 100ML

## (undated) DEVICE — DRAIN SURG 15FR 100% SIL RND END PERF

## (undated) DEVICE — ELECTRD BLD EZ CLN STD 6.5IN

## (undated) DEVICE — SYSTEM KIT LG AD SUPERNOVA ET

## (undated) DEVICE — TISSUE RETRIEVAL SYSTEM: Brand: INZII RETRIEVAL SYSTEM

## (undated) DEVICE — NDL TP BVL JAMSHIDI ACC GUIDE ARCUS

## (undated) DEVICE — YANKAUER SUCTION INSTRUMENT WITHOUT CONTROL VENT, OPEN TIP, CLEAR: Brand: YANKAUER

## (undated) DEVICE — TROCAR TIP NITINOL GUIDEWIRE 18": Brand: INVICTUS

## (undated) DEVICE — COVER,MAYO STAND,STERILE: Brand: MEDLINE

## (undated) DEVICE — SUT PROLN 5/0 C1 DA 24IN 8725H

## (undated) DEVICE — SEAL

## (undated) DEVICE — CATH IV ANGIO FEP 12G 3IN LTBLU 10PK

## (undated) DEVICE — SPHINCTEROTOME: Brand: DREAMTOME™ RX 44

## (undated) DEVICE — ANESTHESIA CIRCUIT ADULT-LF: Brand: MEDLINE INDUSTRIES, INC.

## (undated) DEVICE — JACKSON-PRATT 100CC BULB RESERVOIR: Brand: CARDINAL HEALTH

## (undated) DEVICE — COLUMN DRAPE

## (undated) DEVICE — SPONGE,LAP,12"X12",XR,ST,5/PK,40PK/CS: Brand: MEDLINE

## (undated) DEVICE — INSUFFLATION NEEDLE TO ESTABLISH PNEUMOPERITONEUM.: Brand: INSUFFLATION NEEDLE

## (undated) DEVICE — CONTRAST IOTHALAMATE MEGLUMINE 60% 50 ML INJ CONRAY 60

## (undated) DEVICE — SUT SILK 4/0 SUTUPAK TIES 24IN SA73H

## (undated) DEVICE — SUTURE MONOCRYL SZ 4-0 L18IN ABSRB UD L19MM PS-2 3/8 CIR PRIM Y496G

## (undated) DEVICE — SUT MNCRYL 4/0 PS2 27IN UD MCP426H

## (undated) DEVICE — SUT SILK 2/0 SUTUPAK TIES 24IN SA75H

## (undated) DEVICE — TP SILK DURAPORE 3IN

## (undated) DEVICE — CLTH CLENS READYCLEANSE PERI CARE PK/5

## (undated) DEVICE — BLADELESS OBTURATOR: Brand: WECK VISTA

## (undated) DEVICE — LP VESL MAXI 2.5X1MM RED 2PK

## (undated) DEVICE — SOLUTION ANTIFOG VIS SYS CLEARIFY LAPSCP

## (undated) DEVICE — SUTURE VICRYL CTD ANTBCTRL 54 IN TI PLU VLT

## (undated) DEVICE — SUT SILK 2/0 SH 75CM 30IN BLK C016D

## (undated) DEVICE — ARM DRAPE

## (undated) DEVICE — TRY PREP SCRB VAG PVP

## (undated) DEVICE — PROB CADWELL 275MM

## (undated) DEVICE — 4-PORT MANIFOLD: Brand: NEPTUNE 2

## (undated) DEVICE — TUBE ET 7MM NSL ORAL BASIC CUF INTMED MURPHY EYE RADPQ MRK

## (undated) DEVICE — SPK10277 JACKSON/PRO-AXIS KIT: Brand: SPK10277 JACKSON/PRO-AXIS KIT

## (undated) DEVICE — LIQUIBAND RAPID ADHESIVE 36/CS 0.8ML: Brand: MEDLINE

## (undated) DEVICE — YANKAUER,OPEN TIP,W/O VENT,STERILE: Brand: MEDLINE INDUSTRIES, INC.

## (undated) DEVICE — VAGINAL PREP TRAY: Brand: MEDLINE INDUSTRIES, INC.

## (undated) DEVICE — DRAPE,UTILITY,XL,4/PK,STERILE: Brand: MEDLINE

## (undated) DEVICE — THE STERILE THE STERIS STERILE CAMERA HANDLE COVERS ARE DESIGNED FOR HARMONYAIR 4K CAMERA MODULE, AND PROVIDE STERILE CONTROL THAT ALLOW FOR INCREASING AND DECREASING ILLUMINATION THROUGH SEVEN INTENSITY LEVELS.

## (undated) DEVICE — PUMP SUC IRR TBNG L10FT W/ HNDPC ASSEMB STRYKEFLOW 2

## (undated) DEVICE — SAFEOP 3 STIMULATING BALL-TIP PROBE, STERILE: Brand: SAFEOP 3

## (undated) DEVICE — RETRIEVAL BALLOON CATHETER: Brand: EXTRACTOR™ PRO RX

## (undated) DEVICE — DILATOR NEUROVISION XLIF M5 KT 6 9 12

## (undated) DEVICE — GLOVE SURG SZ 7 CRM LTX FREE POLYISOPRENE POLYMER BEAD ANTI

## (undated) DEVICE — DRSNG SURESITE WNDW 4X4.5

## (undated) DEVICE — KT INST SPINE LLIF/XLIF MAXCESS SURG/ACC 1/PU DISP

## (undated) DEVICE — SNARE ENDOSCP L240CM LOOP W13MM SHTH DIA2.4MM SM OVL FLX

## (undated) DEVICE — SPNG DISSCT SECTO KTTNER PK/5

## (undated) DEVICE — TOTAL TRAY, 16FR 10ML SIL FOLEY, URN: Brand: MEDLINE

## (undated) DEVICE — SUT SILK 3/0 SUTUPAK TIES 24IN SA74H

## (undated) DEVICE — PACK,SET UP,NO DRAPES: Brand: MEDLINE

## (undated) DEVICE — ELECTRD BLD EDGE/INSUL1P 2.4X5.1MM STRL

## (undated) DEVICE — PAD,NON-ADHERENT,3X8,STERILE,LF,1/PK: Brand: MEDLINE

## (undated) DEVICE — SCANLAN® SURG-I-PAW® INSTRUMENT COVERS - RED, 1/10" X 5"/ 3 MMX13 CM (2 - 5" PCS /PKG): Brand: SCANLAN® SURG-I-PAW® INSTRUMENT COVERS

## (undated) DEVICE — KWIRE LAT INTECH 340MM NS
Type: IMPLANTABLE DEVICE | Status: NON-FUNCTIONAL
Removed: 2020-12-16

## (undated) DEVICE — TAPE,CLOTH/SILK,CURAD,3"X10YD,LF,40/CS: Brand: CURAD

## (undated) DEVICE — LT SOURCE LIGHTMAT BIF DISP

## (undated) DEVICE — GOWN, ORBIS, XLNG/XXLARGE, STRL: Brand: MEDLINE

## (undated) DEVICE — CURAVIEW LED LARYNGOSCOPE BLADE & HANDLE,DISPOSABLE,MILLER 2: Brand: CURAPLEX

## (undated) DEVICE — AIRSEAL BIFURCATED FILTERED TUBESET WITH ACTIVATED CHARCOAL FILTER: Brand: AIRSEAL